# Patient Record
Sex: MALE | Race: WHITE | Employment: OTHER | URBAN - METROPOLITAN AREA
[De-identification: names, ages, dates, MRNs, and addresses within clinical notes are randomized per-mention and may not be internally consistent; named-entity substitution may affect disease eponyms.]

---

## 2019-10-15 ENCOUNTER — HOSPITAL ENCOUNTER (INPATIENT)
Age: 64
LOS: 10 days | Discharge: NURSING, REHAB OR LONG-TERM CARE | DRG: 166 | End: 2019-10-25
Attending: EMERGENCY MEDICINE | Admitting: INTERNAL MEDICINE
Payer: MEDICAID

## 2019-10-15 DIAGNOSIS — I48.92 ATRIAL FLUTTER, UNSPECIFIED TYPE (HCC): Primary | ICD-10-CM

## 2019-10-15 LAB
ABSOLUTE EOS #: 0.25 K/UL (ref 0–0.44)
ABSOLUTE IMMATURE GRANULOCYTE: 0.07 K/UL (ref 0–0.3)
ABSOLUTE LYMPH #: 1.22 K/UL (ref 1.1–3.7)
ABSOLUTE MONO #: 1.1 K/UL (ref 0.1–1.2)
ANION GAP SERPL CALCULATED.3IONS-SCNC: 13 MMOL/L (ref 9–17)
BASOPHILS # BLD: 1 % (ref 0–2)
BASOPHILS ABSOLUTE: 0.06 K/UL (ref 0–0.2)
BUN BLDV-MCNC: 14 MG/DL (ref 8–23)
BUN/CREAT BLD: ABNORMAL (ref 9–20)
CALCIUM SERPL-MCNC: 9.4 MG/DL (ref 8.6–10.4)
CHLORIDE BLD-SCNC: 104 MMOL/L (ref 98–107)
CO2: 24 MMOL/L (ref 20–31)
CREAT SERPL-MCNC: 0.68 MG/DL (ref 0.7–1.2)
DIFFERENTIAL TYPE: ABNORMAL
EOSINOPHILS RELATIVE PERCENT: 3 % (ref 1–4)
GFR AFRICAN AMERICAN: >60 ML/MIN
GFR NON-AFRICAN AMERICAN: >60 ML/MIN
GFR SERPL CREATININE-BSD FRML MDRD: ABNORMAL ML/MIN/{1.73_M2}
GFR SERPL CREATININE-BSD FRML MDRD: ABNORMAL ML/MIN/{1.73_M2}
GLUCOSE BLD-MCNC: 107 MG/DL (ref 70–99)
HCT VFR BLD CALC: 43.7 % (ref 40.7–50.3)
HEMOGLOBIN: 14.5 G/DL (ref 13–17)
IMMATURE GRANULOCYTES: 1 %
LYMPHOCYTES # BLD: 15 % (ref 24–43)
MAGNESIUM: 2.4 MG/DL (ref 1.6–2.6)
MCH RBC QN AUTO: 31.3 PG (ref 25.2–33.5)
MCHC RBC AUTO-ENTMCNC: 33.2 G/DL (ref 28.4–34.8)
MCV RBC AUTO: 94.2 FL (ref 82.6–102.9)
MONOCYTES # BLD: 13 % (ref 3–12)
NRBC AUTOMATED: 0 PER 100 WBC
PARTIAL THROMBOPLASTIN TIME: 27.1 SEC (ref 20.5–30.5)
PARTIAL THROMBOPLASTIN TIME: 27.6 SEC (ref 20.5–30.5)
PDW BLD-RTO: 13.2 % (ref 11.8–14.4)
PLATELET # BLD: 263 K/UL (ref 138–453)
PLATELET ESTIMATE: ABNORMAL
PMV BLD AUTO: 10.2 FL (ref 8.1–13.5)
POTASSIUM SERPL-SCNC: 3.8 MMOL/L (ref 3.7–5.3)
RBC # BLD: 4.64 M/UL (ref 4.21–5.77)
RBC # BLD: ABNORMAL 10*6/UL
SEG NEUTROPHILS: 67 % (ref 36–65)
SEGMENTED NEUTROPHILS ABSOLUTE COUNT: 5.62 K/UL (ref 1.5–8.1)
SODIUM BLD-SCNC: 141 MMOL/L (ref 135–144)
TROPONIN INTERP: NORMAL
TROPONIN INTERP: NORMAL
TROPONIN T: NORMAL NG/ML
TROPONIN T: NORMAL NG/ML
TROPONIN, HIGH SENSITIVITY: 8 NG/L (ref 0–22)
TROPONIN, HIGH SENSITIVITY: 9 NG/L (ref 0–22)
TSH SERPL DL<=0.05 MIU/L-ACNC: 2.8 MIU/L (ref 0.3–5)
WBC # BLD: 8.3 K/UL (ref 3.5–11.3)
WBC # BLD: ABNORMAL 10*3/UL

## 2019-10-15 PROCEDURE — 96365 THER/PROPH/DIAG IV INF INIT: CPT

## 2019-10-15 PROCEDURE — 96375 TX/PRO/DX INJ NEW DRUG ADDON: CPT

## 2019-10-15 PROCEDURE — 80048 BASIC METABOLIC PNL TOTAL CA: CPT

## 2019-10-15 PROCEDURE — 99285 EMERGENCY DEPT VISIT HI MDM: CPT

## 2019-10-15 PROCEDURE — 6370000000 HC RX 637 (ALT 250 FOR IP): Performed by: STUDENT IN AN ORGANIZED HEALTH CARE EDUCATION/TRAINING PROGRAM

## 2019-10-15 PROCEDURE — 93005 ELECTROCARDIOGRAM TRACING: CPT | Performed by: INTERNAL MEDICINE

## 2019-10-15 PROCEDURE — 83735 ASSAY OF MAGNESIUM: CPT

## 2019-10-15 PROCEDURE — 36415 COLL VENOUS BLD VENIPUNCTURE: CPT

## 2019-10-15 PROCEDURE — 80307 DRUG TEST PRSMV CHEM ANLYZR: CPT

## 2019-10-15 PROCEDURE — 2500000003 HC RX 250 WO HCPCS: Performed by: STUDENT IN AN ORGANIZED HEALTH CARE EDUCATION/TRAINING PROGRAM

## 2019-10-15 PROCEDURE — 2060000000 HC ICU INTERMEDIATE R&B

## 2019-10-15 PROCEDURE — 85730 THROMBOPLASTIN TIME PARTIAL: CPT

## 2019-10-15 PROCEDURE — 84443 ASSAY THYROID STIM HORMONE: CPT

## 2019-10-15 PROCEDURE — 6360000002 HC RX W HCPCS: Performed by: STUDENT IN AN ORGANIZED HEALTH CARE EDUCATION/TRAINING PROGRAM

## 2019-10-15 PROCEDURE — 84484 ASSAY OF TROPONIN QUANT: CPT

## 2019-10-15 PROCEDURE — 2580000003 HC RX 258: Performed by: STUDENT IN AN ORGANIZED HEALTH CARE EDUCATION/TRAINING PROGRAM

## 2019-10-15 PROCEDURE — 96376 TX/PRO/DX INJ SAME DRUG ADON: CPT

## 2019-10-15 PROCEDURE — 85025 COMPLETE CBC W/AUTO DIFF WBC: CPT

## 2019-10-15 PROCEDURE — 93005 ELECTROCARDIOGRAM TRACING: CPT | Performed by: STUDENT IN AN ORGANIZED HEALTH CARE EDUCATION/TRAINING PROGRAM

## 2019-10-15 RX ORDER — ONDANSETRON 2 MG/ML
4 INJECTION INTRAMUSCULAR; INTRAVENOUS EVERY 6 HOURS PRN
Status: DISCONTINUED | OUTPATIENT
Start: 2019-10-15 | End: 2019-10-21

## 2019-10-15 RX ORDER — DILTIAZEM HYDROCHLORIDE 5 MG/ML
20 INJECTION INTRAVENOUS ONCE
Status: COMPLETED | OUTPATIENT
Start: 2019-10-15 | End: 2019-10-15

## 2019-10-15 RX ORDER — METOPROLOL TARTRATE 50 MG/1
25 TABLET, FILM COATED ORAL 2 TIMES DAILY
Status: DISCONTINUED | OUTPATIENT
Start: 2019-10-15 | End: 2019-10-15

## 2019-10-15 RX ORDER — HEPARIN SODIUM 10000 [USP'U]/100ML
15.8 INJECTION, SOLUTION INTRAVENOUS CONTINUOUS
Status: DISCONTINUED | OUTPATIENT
Start: 2019-10-15 | End: 2019-10-15

## 2019-10-15 RX ORDER — ACETAMINOPHEN 325 MG/1
650 TABLET ORAL EVERY 4 HOURS PRN
Status: DISCONTINUED | OUTPATIENT
Start: 2019-10-15 | End: 2019-10-21

## 2019-10-15 RX ORDER — SODIUM CHLORIDE 0.9 % (FLUSH) 0.9 %
10 SYRINGE (ML) INJECTION PRN
Status: DISCONTINUED | OUTPATIENT
Start: 2019-10-15 | End: 2019-10-21

## 2019-10-15 RX ORDER — AMIODARONE HYDROCHLORIDE 50 MG/ML
300 INJECTION, SOLUTION INTRAVENOUS ONCE
Status: COMPLETED | OUTPATIENT
Start: 2019-10-15 | End: 2019-10-15

## 2019-10-15 RX ORDER — HEPARIN SODIUM 1000 [USP'U]/ML
4000 INJECTION, SOLUTION INTRAVENOUS; SUBCUTANEOUS PRN
Status: DISCONTINUED | OUTPATIENT
Start: 2019-10-15 | End: 2019-10-21

## 2019-10-15 RX ORDER — 0.9 % SODIUM CHLORIDE 0.9 %
500 INTRAVENOUS SOLUTION INTRAVENOUS ONCE
Status: COMPLETED | OUTPATIENT
Start: 2019-10-15 | End: 2019-10-15

## 2019-10-15 RX ORDER — HEPARIN SODIUM 1000 [USP'U]/ML
5000 INJECTION, SOLUTION INTRAVENOUS; SUBCUTANEOUS PRN
Status: DISCONTINUED | OUTPATIENT
Start: 2019-10-15 | End: 2019-10-15

## 2019-10-15 RX ORDER — HEPARIN SODIUM 10000 [USP'U]/100ML
1000 INJECTION, SOLUTION INTRAVENOUS CONTINUOUS
Status: DISCONTINUED | OUTPATIENT
Start: 2019-10-15 | End: 2019-10-16

## 2019-10-15 RX ORDER — HEPARIN SODIUM 1000 [USP'U]/ML
2000 INJECTION, SOLUTION INTRAVENOUS; SUBCUTANEOUS PRN
Status: DISCONTINUED | OUTPATIENT
Start: 2019-10-15 | End: 2019-10-21

## 2019-10-15 RX ORDER — DILTIAZEM HYDROCHLORIDE 5 MG/ML
30 INJECTION INTRAVENOUS ONCE
Status: COMPLETED | OUTPATIENT
Start: 2019-10-15 | End: 2019-10-15

## 2019-10-15 RX ORDER — SODIUM CHLORIDE 0.9 % (FLUSH) 0.9 %
10 SYRINGE (ML) INJECTION EVERY 12 HOURS SCHEDULED
Status: DISCONTINUED | OUTPATIENT
Start: 2019-10-15 | End: 2019-10-21

## 2019-10-15 RX ORDER — ASPIRIN 81 MG/1
81 TABLET ORAL DAILY
Status: DISCONTINUED | OUTPATIENT
Start: 2019-10-16 | End: 2019-10-21

## 2019-10-15 RX ORDER — HEPARIN SODIUM 1000 [USP'U]/ML
10000 INJECTION, SOLUTION INTRAVENOUS; SUBCUTANEOUS ONCE
Status: DISCONTINUED | OUTPATIENT
Start: 2019-10-15 | End: 2019-10-15

## 2019-10-15 RX ORDER — HEPARIN SODIUM 1000 [USP'U]/ML
10000 INJECTION, SOLUTION INTRAVENOUS; SUBCUTANEOUS PRN
Status: DISCONTINUED | OUTPATIENT
Start: 2019-10-15 | End: 2019-10-15

## 2019-10-15 RX ORDER — ONDANSETRON 2 MG/ML
4 INJECTION INTRAMUSCULAR; INTRAVENOUS EVERY 6 HOURS PRN
Status: DISCONTINUED | OUTPATIENT
Start: 2019-10-15 | End: 2019-10-15

## 2019-10-15 RX ORDER — SODIUM CHLORIDE 0.9 % (FLUSH) 0.9 %
10 SYRINGE (ML) INJECTION EVERY 12 HOURS SCHEDULED
Status: DISCONTINUED | OUTPATIENT
Start: 2019-10-15 | End: 2019-10-18

## 2019-10-15 RX ADMIN — SODIUM CHLORIDE, PRESERVATIVE FREE 10 ML: 5 INJECTION INTRAVENOUS at 22:37

## 2019-10-15 RX ADMIN — AMIODARONE HYDROCHLORIDE 300 MG: 50 INJECTION, SOLUTION INTRAVENOUS at 18:48

## 2019-10-15 RX ADMIN — HEPARIN SODIUM AND DEXTROSE 7.9 UNITS/KG/HR: 10000; 5 INJECTION INTRAVENOUS at 19:31

## 2019-10-15 RX ADMIN — SODIUM CHLORIDE 500 ML: 9 INJECTION, SOLUTION INTRAVENOUS at 17:05

## 2019-10-15 RX ADMIN — DILTIAZEM HYDROCHLORIDE 10 MG/HR: 5 INJECTION INTRAVENOUS at 16:23

## 2019-10-15 RX ADMIN — DILTIAZEM HYDROCHLORIDE 30 MG: 5 INJECTION INTRAVENOUS at 17:06

## 2019-10-15 RX ADMIN — AMIODARONE HYDROCHLORIDE 1 MG/MIN: 50 INJECTION, SOLUTION INTRAVENOUS at 18:52

## 2019-10-15 RX ADMIN — METOPROLOL TARTRATE 25 MG: 25 TABLET ORAL at 23:30

## 2019-10-15 RX ADMIN — DILTIAZEM HYDROCHLORIDE 20 MG: 5 INJECTION INTRAVENOUS at 16:18

## 2019-10-15 ASSESSMENT — ENCOUNTER SYMPTOMS
BLOOD IN STOOL: 0
VOMITING: 0
CONSTIPATION: 0
ABDOMINAL PAIN: 0
SHORTNESS OF BREATH: 0
NAUSEA: 0
DIARRHEA: 0
COUGH: 0
PHOTOPHOBIA: 0
SORE THROAT: 0

## 2019-10-15 ASSESSMENT — PAIN SCALES - GENERAL: PAINLEVEL_OUTOF10: 0

## 2019-10-16 ENCOUNTER — APPOINTMENT (OUTPATIENT)
Dept: GENERAL RADIOLOGY | Age: 64
DRG: 166 | End: 2019-10-16
Payer: MEDICAID

## 2019-10-16 LAB
ABSOLUTE EOS #: 0.34 K/UL (ref 0–0.44)
ABSOLUTE IMMATURE GRANULOCYTE: 0.09 K/UL (ref 0–0.3)
ABSOLUTE LYMPH #: 1.49 K/UL (ref 1.1–3.7)
ABSOLUTE MONO #: 0.89 K/UL (ref 0.1–1.2)
ANION GAP SERPL CALCULATED.3IONS-SCNC: 10 MMOL/L (ref 9–17)
BASOPHILS # BLD: 1 % (ref 0–2)
BASOPHILS ABSOLUTE: 0.05 K/UL (ref 0–0.2)
BUN BLDV-MCNC: 15 MG/DL (ref 8–23)
BUN/CREAT BLD: ABNORMAL (ref 9–20)
CALCIUM SERPL-MCNC: 8.6 MG/DL (ref 8.6–10.4)
CHLORIDE BLD-SCNC: 105 MMOL/L (ref 98–107)
CO2: 22 MMOL/L (ref 20–31)
CREAT SERPL-MCNC: 0.67 MG/DL (ref 0.7–1.2)
DIFFERENTIAL TYPE: ABNORMAL
EKG ATRIAL RATE: 264 BPM
EKG ATRIAL RATE: 286 BPM
EKG ATRIAL RATE: 70 BPM
EKG P AXIS: 87 DEGREES
EKG Q-T INTERVAL: 268 MS
EKG Q-T INTERVAL: 322 MS
EKG Q-T INTERVAL: 366 MS
EKG QRS DURATION: 78 MS
EKG QRS DURATION: 86 MS
EKG QRS DURATION: 96 MS
EKG QTC CALCULATION (BAZETT): 413 MS
EKG QTC CALCULATION (BAZETT): 477 MS
EKG QTC CALCULATION (BAZETT): 550 MS
EKG R AXIS: 21 DEGREES
EKG R AXIS: 29 DEGREES
EKG R AXIS: 6 DEGREES
EKG T AXIS: 58 DEGREES
EKG T AXIS: 60 DEGREES
EKG T AXIS: 61 DEGREES
EKG VENTRICULAR RATE: 132 BPM
EKG VENTRICULAR RATE: 136 BPM
EKG VENTRICULAR RATE: 143 BPM
EOSINOPHILS RELATIVE PERCENT: 4 % (ref 1–4)
GFR AFRICAN AMERICAN: >60 ML/MIN
GFR NON-AFRICAN AMERICAN: >60 ML/MIN
GFR SERPL CREATININE-BSD FRML MDRD: ABNORMAL ML/MIN/{1.73_M2}
GFR SERPL CREATININE-BSD FRML MDRD: ABNORMAL ML/MIN/{1.73_M2}
GLUCOSE BLD-MCNC: 121 MG/DL (ref 70–99)
HCT VFR BLD CALC: 42 % (ref 40.7–50.3)
HEMOGLOBIN: 13.7 G/DL (ref 13–17)
IMMATURE GRANULOCYTES: 1 %
LYMPHOCYTES # BLD: 19 % (ref 24–43)
MCH RBC QN AUTO: 31.4 PG (ref 25.2–33.5)
MCHC RBC AUTO-ENTMCNC: 32.6 G/DL (ref 28.4–34.8)
MCV RBC AUTO: 96.3 FL (ref 82.6–102.9)
MONOCYTES # BLD: 11 % (ref 3–12)
NRBC AUTOMATED: 0 PER 100 WBC
PARTIAL THROMBOPLASTIN TIME: 32.5 SEC (ref 20.5–30.5)
PARTIAL THROMBOPLASTIN TIME: 39 SEC (ref 20.5–30.5)
PARTIAL THROMBOPLASTIN TIME: 50.8 SEC (ref 20.5–30.5)
PDW BLD-RTO: 13.1 % (ref 11.8–14.4)
PLATELET # BLD: 245 K/UL (ref 138–453)
PLATELET ESTIMATE: ABNORMAL
PMV BLD AUTO: 9.9 FL (ref 8.1–13.5)
POTASSIUM SERPL-SCNC: 3.8 MMOL/L (ref 3.7–5.3)
RBC # BLD: 4.36 M/UL (ref 4.21–5.77)
RBC # BLD: ABNORMAL 10*6/UL
SEG NEUTROPHILS: 64 % (ref 36–65)
SEGMENTED NEUTROPHILS ABSOLUTE COUNT: 5.18 K/UL (ref 1.5–8.1)
SODIUM BLD-SCNC: 137 MMOL/L (ref 135–144)
TROPONIN INTERP: NORMAL
TROPONIN INTERP: NORMAL
TROPONIN T: NORMAL NG/ML
TROPONIN T: NORMAL NG/ML
TROPONIN, HIGH SENSITIVITY: 9 NG/L (ref 0–22)
TROPONIN, HIGH SENSITIVITY: 9 NG/L (ref 0–22)
WBC # BLD: 8 K/UL (ref 3.5–11.3)
WBC # BLD: ABNORMAL 10*3/UL

## 2019-10-16 PROCEDURE — 6370000000 HC RX 637 (ALT 250 FOR IP): Performed by: STUDENT IN AN ORGANIZED HEALTH CARE EDUCATION/TRAINING PROGRAM

## 2019-10-16 PROCEDURE — 93005 ELECTROCARDIOGRAM TRACING: CPT | Performed by: INTERNAL MEDICINE

## 2019-10-16 PROCEDURE — 1200000000 HC SEMI PRIVATE

## 2019-10-16 PROCEDURE — 6360000002 HC RX W HCPCS

## 2019-10-16 PROCEDURE — 80048 BASIC METABOLIC PNL TOTAL CA: CPT

## 2019-10-16 PROCEDURE — 94761 N-INVAS EAR/PLS OXIMETRY MLT: CPT

## 2019-10-16 PROCEDURE — 85025 COMPLETE CBC W/AUTO DIFF WBC: CPT

## 2019-10-16 PROCEDURE — 99223 1ST HOSP IP/OBS HIGH 75: CPT | Performed by: INTERNAL MEDICINE

## 2019-10-16 PROCEDURE — 2709999900 HC NON-CHARGEABLE SUPPLY

## 2019-10-16 PROCEDURE — 84484 ASSAY OF TROPONIN QUANT: CPT

## 2019-10-16 PROCEDURE — 2580000003 HC RX 258: Performed by: STUDENT IN AN ORGANIZED HEALTH CARE EDUCATION/TRAINING PROGRAM

## 2019-10-16 PROCEDURE — 93325 DOPPLER ECHO COLOR FLOW MAPG: CPT

## 2019-10-16 PROCEDURE — 6360000002 HC RX W HCPCS: Performed by: STUDENT IN AN ORGANIZED HEALTH CARE EDUCATION/TRAINING PROGRAM

## 2019-10-16 PROCEDURE — 71045 X-RAY EXAM CHEST 1 VIEW: CPT

## 2019-10-16 PROCEDURE — 93312 ECHO TRANSESOPHAGEAL: CPT

## 2019-10-16 PROCEDURE — B246ZZ4 ULTRASONOGRAPHY OF RIGHT AND LEFT HEART, TRANSESOPHAGEAL: ICD-10-PCS | Performed by: INTERNAL MEDICINE

## 2019-10-16 PROCEDURE — 85730 THROMBOPLASTIN TIME PARTIAL: CPT

## 2019-10-16 PROCEDURE — 36415 COLL VENOUS BLD VENIPUNCTURE: CPT

## 2019-10-16 PROCEDURE — 2500000003 HC RX 250 WO HCPCS: Performed by: STUDENT IN AN ORGANIZED HEALTH CARE EDUCATION/TRAINING PROGRAM

## 2019-10-16 RX ORDER — SIMVASTATIN 10 MG
10 TABLET ORAL NIGHTLY
Status: DISCONTINUED | OUTPATIENT
Start: 2019-10-16 | End: 2019-10-21

## 2019-10-16 RX ORDER — METOPROLOL TARTRATE 50 MG/1
50 TABLET, FILM COATED ORAL 2 TIMES DAILY
Status: DISCONTINUED | OUTPATIENT
Start: 2019-10-16 | End: 2019-10-20

## 2019-10-16 RX ORDER — SODIUM CHLORIDE 0.9 % (FLUSH) 0.9 %
10 SYRINGE (ML) INJECTION PRN
Status: DISCONTINUED | OUTPATIENT
Start: 2019-10-16 | End: 2019-10-21

## 2019-10-16 RX ORDER — SODIUM CHLORIDE 0.9 % (FLUSH) 0.9 %
10 SYRINGE (ML) INJECTION EVERY 12 HOURS SCHEDULED
Status: DISCONTINUED | OUTPATIENT
Start: 2019-10-16 | End: 2019-10-18

## 2019-10-16 RX ORDER — HEPARIN SODIUM 10000 [USP'U]/100ML
8.1 INJECTION, SOLUTION INTRAVENOUS CONTINUOUS
Status: DISCONTINUED | OUTPATIENT
Start: 2019-10-16 | End: 2019-10-21

## 2019-10-16 RX ADMIN — AMIODARONE HYDROCHLORIDE 1 MG/MIN: 50 INJECTION, SOLUTION INTRAVENOUS at 03:11

## 2019-10-16 RX ADMIN — SODIUM CHLORIDE, PRESERVATIVE FREE 10 ML: 5 INJECTION INTRAVENOUS at 21:23

## 2019-10-16 RX ADMIN — AMIODARONE HYDROCHLORIDE 1 MG/MIN: 50 INJECTION, SOLUTION INTRAVENOUS at 14:31

## 2019-10-16 RX ADMIN — SODIUM CHLORIDE, PRESERVATIVE FREE 10 ML: 5 INJECTION INTRAVENOUS at 23:39

## 2019-10-16 RX ADMIN — METOPROLOL TARTRATE 50 MG: 25 TABLET ORAL at 21:23

## 2019-10-16 RX ADMIN — METOPROLOL TARTRATE 50 MG: 25 TABLET ORAL at 08:48

## 2019-10-16 RX ADMIN — HEPARIN SODIUM 2000 UNITS: 1000 INJECTION INTRAVENOUS; SUBCUTANEOUS at 14:01

## 2019-10-16 RX ADMIN — HEPARIN SODIUM 2000 UNITS: 1000 INJECTION INTRAVENOUS; SUBCUTANEOUS at 06:01

## 2019-10-16 RX ADMIN — DILTIAZEM HYDROCHLORIDE 12.5 MG/HR: 5 INJECTION INTRAVENOUS at 04:57

## 2019-10-16 RX ADMIN — SIMVASTATIN 10 MG: 10 TABLET, FILM COATED ORAL at 23:39

## 2019-10-16 RX ADMIN — SODIUM CHLORIDE, PRESERVATIVE FREE 10 ML: 5 INJECTION INTRAVENOUS at 00:32

## 2019-10-16 RX ADMIN — Medication 81 MG: at 08:48

## 2019-10-16 RX ADMIN — DILTIAZEM HYDROCHLORIDE 15 MG/HR: 5 INJECTION INTRAVENOUS at 03:21

## 2019-10-16 RX ADMIN — SODIUM CHLORIDE, PRESERVATIVE FREE 10 ML: 5 INJECTION INTRAVENOUS at 21:24

## 2019-10-16 ASSESSMENT — PAIN DESCRIPTION - ONSET
ONSET: GRADUAL
ONSET: GRADUAL

## 2019-10-16 ASSESSMENT — PAIN DESCRIPTION - DESCRIPTORS
DESCRIPTORS: ACHING;DISCOMFORT
DESCRIPTORS: ACHING;DISCOMFORT
DESCRIPTORS: PRESSURE

## 2019-10-16 ASSESSMENT — PAIN DESCRIPTION - LOCATION
LOCATION: LEG
LOCATION: LEG
LOCATION: CHEST
LOCATION: CHEST

## 2019-10-16 ASSESSMENT — PAIN DESCRIPTION - ORIENTATION
ORIENTATION: LEFT

## 2019-10-16 ASSESSMENT — PAIN SCALES - GENERAL
PAINLEVEL_OUTOF10: 1
PAINLEVEL_OUTOF10: 5
PAINLEVEL_OUTOF10: 0
PAINLEVEL_OUTOF10: 2
PAINLEVEL_OUTOF10: 0
PAINLEVEL_OUTOF10: 2

## 2019-10-16 ASSESSMENT — PAIN DESCRIPTION - FREQUENCY
FREQUENCY: INTERMITTENT
FREQUENCY: INTERMITTENT
FREQUENCY: CONTINUOUS

## 2019-10-16 ASSESSMENT — PAIN DESCRIPTION - PAIN TYPE
TYPE: ACUTE PAIN

## 2019-10-17 LAB
EKG ATRIAL RATE: 245 BPM
EKG ATRIAL RATE: 246 BPM
EKG ATRIAL RATE: 249 BPM
EKG P AXIS: -40 DEGREES
EKG Q-T INTERVAL: 406 MS
EKG Q-T INTERVAL: 442 MS
EKG Q-T INTERVAL: 460 MS
EKG QRS DURATION: 78 MS
EKG QRS DURATION: 78 MS
EKG QRS DURATION: 80 MS
EKG QTC CALCULATION (BAZETT): 474 MS
EKG QTC CALCULATION (BAZETT): 500 MS
EKG QTC CALCULATION (BAZETT): 503 MS
EKG R AXIS: 21 DEGREES
EKG R AXIS: 34 DEGREES
EKG R AXIS: 39 DEGREES
EKG T AXIS: 54 DEGREES
EKG T AXIS: 54 DEGREES
EKG T AXIS: 55 DEGREES
EKG VENTRICULAR RATE: 72 BPM
EKG VENTRICULAR RATE: 77 BPM
EKG VENTRICULAR RATE: 82 BPM
PARTIAL THROMBOPLASTIN TIME: 44.2 SEC (ref 20.5–30.5)
PARTIAL THROMBOPLASTIN TIME: 66.6 SEC (ref 20.5–30.5)
PARTIAL THROMBOPLASTIN TIME: 70.9 SEC (ref 20.5–30.5)

## 2019-10-17 PROCEDURE — 97162 PT EVAL MOD COMPLEX 30 MIN: CPT

## 2019-10-17 PROCEDURE — 99232 SBSQ HOSP IP/OBS MODERATE 35: CPT | Performed by: INTERNAL MEDICINE

## 2019-10-17 PROCEDURE — 6360000002 HC RX W HCPCS: Performed by: STUDENT IN AN ORGANIZED HEALTH CARE EDUCATION/TRAINING PROGRAM

## 2019-10-17 PROCEDURE — 6370000000 HC RX 637 (ALT 250 FOR IP): Performed by: STUDENT IN AN ORGANIZED HEALTH CARE EDUCATION/TRAINING PROGRAM

## 2019-10-17 PROCEDURE — 85730 THROMBOPLASTIN TIME PARTIAL: CPT

## 2019-10-17 PROCEDURE — 36415 COLL VENOUS BLD VENIPUNCTURE: CPT

## 2019-10-17 PROCEDURE — G0008 ADMIN INFLUENZA VIRUS VAC: HCPCS | Performed by: STUDENT IN AN ORGANIZED HEALTH CARE EDUCATION/TRAINING PROGRAM

## 2019-10-17 PROCEDURE — 97530 THERAPEUTIC ACTIVITIES: CPT

## 2019-10-17 PROCEDURE — 2580000003 HC RX 258: Performed by: STUDENT IN AN ORGANIZED HEALTH CARE EDUCATION/TRAINING PROGRAM

## 2019-10-17 PROCEDURE — 6360000002 HC RX W HCPCS: Performed by: NURSE PRACTITIONER

## 2019-10-17 PROCEDURE — 90686 IIV4 VACC NO PRSV 0.5 ML IM: CPT | Performed by: STUDENT IN AN ORGANIZED HEALTH CARE EDUCATION/TRAINING PROGRAM

## 2019-10-17 PROCEDURE — 2500000003 HC RX 250 WO HCPCS: Performed by: STUDENT IN AN ORGANIZED HEALTH CARE EDUCATION/TRAINING PROGRAM

## 2019-10-17 PROCEDURE — 1200000000 HC SEMI PRIVATE

## 2019-10-17 RX ORDER — FUROSEMIDE 10 MG/ML
20 INJECTION INTRAMUSCULAR; INTRAVENOUS ONCE
Status: COMPLETED | OUTPATIENT
Start: 2019-10-17 | End: 2019-10-17

## 2019-10-17 RX ORDER — DIGOXIN 0.25 MG/ML
250 INJECTION INTRAMUSCULAR; INTRAVENOUS EVERY 4 HOURS
Status: DISPENSED | OUTPATIENT
Start: 2019-10-17 | End: 2019-10-17

## 2019-10-17 RX ADMIN — DILTIAZEM HYDROCHLORIDE 5 MG/HR: 5 INJECTION INTRAVENOUS at 16:27

## 2019-10-17 RX ADMIN — METOPROLOL TARTRATE 50 MG: 25 TABLET ORAL at 08:15

## 2019-10-17 RX ADMIN — HEPARIN SODIUM 2000 UNITS: 1000 INJECTION INTRAVENOUS; SUBCUTANEOUS at 04:11

## 2019-10-17 RX ADMIN — AMIODARONE HYDROCHLORIDE 1 MG/MIN: 50 INJECTION, SOLUTION INTRAVENOUS at 06:05

## 2019-10-17 RX ADMIN — INFLUENZA A VIRUS A/BRISBANE/02/2018 IVR-190 (H1N1) ANTIGEN (PROPIOLACTONE INACTIVATED), INFLUENZA A VIRUS A/KANSAS/14/2017 X-327 (H3N2) ANTIGEN (PROPIOLACTONE INACTIVATED), INFLUENZA B VIRUS B/MARYLAND/15/2016 ANTIGEN (PROPIOLACTONE INACTIVATED), INFLUENZA B VIRUS B/PHUKET/3073/2013 BVR-1B ANTIGEN (PROPIOLACTONE INACTIVATED) 0.5 ML: 15; 15; 15; 15 INJECTION, SUSPENSION INTRAMUSCULAR at 10:04

## 2019-10-17 RX ADMIN — DILTIAZEM HYDROCHLORIDE 10 MG/HR: 5 INJECTION INTRAVENOUS at 00:41

## 2019-10-17 RX ADMIN — Medication 81 MG: at 08:15

## 2019-10-17 RX ADMIN — HEPARIN SODIUM 14.04 UNITS/KG/HR: 10000 INJECTION, SOLUTION INTRAVENOUS at 10:03

## 2019-10-17 RX ADMIN — METOPROLOL TARTRATE 50 MG: 25 TABLET ORAL at 22:33

## 2019-10-17 RX ADMIN — FUROSEMIDE 20 MG: 10 INJECTION, SOLUTION INTRAMUSCULAR; INTRAVENOUS at 14:27

## 2019-10-17 RX ADMIN — SIMVASTATIN 10 MG: 10 TABLET, FILM COATED ORAL at 22:33

## 2019-10-18 ENCOUNTER — APPOINTMENT (OUTPATIENT)
Dept: CT IMAGING | Age: 64
DRG: 166 | End: 2019-10-18
Payer: MEDICAID

## 2019-10-18 ENCOUNTER — APPOINTMENT (OUTPATIENT)
Dept: CARDIAC CATH/INVASIVE PROCEDURES | Age: 64
DRG: 166 | End: 2019-10-18
Payer: MEDICAID

## 2019-10-18 LAB
ABSOLUTE EOS #: 0.19 K/UL (ref 0–0.44)
ABSOLUTE EOS #: 0.27 K/UL (ref 0–0.44)
ABSOLUTE IMMATURE GRANULOCYTE: 0.16 K/UL (ref 0–0.3)
ABSOLUTE IMMATURE GRANULOCYTE: 0.19 K/UL (ref 0–0.3)
ABSOLUTE LYMPH #: 1.03 K/UL (ref 1.1–3.7)
ABSOLUTE LYMPH #: 1.2 K/UL (ref 1.1–3.7)
ABSOLUTE MONO #: 1.03 K/UL (ref 0.1–1.2)
ABSOLUTE MONO #: 1.1 K/UL (ref 0.1–1.2)
ALBUMIN SERPL-MCNC: 3.6 G/DL (ref 3.5–5.2)
ALBUMIN/GLOBULIN RATIO: 1.2 (ref 1–2.5)
ALP BLD-CCNC: 54 U/L (ref 40–129)
ALT SERPL-CCNC: 28 U/L (ref 5–41)
AMPHETAMINE SCREEN URINE: NEGATIVE
ANION GAP SERPL CALCULATED.3IONS-SCNC: 10 MMOL/L (ref 9–17)
ANION GAP SERPL CALCULATED.3IONS-SCNC: 10 MMOL/L (ref 9–17)
AST SERPL-CCNC: 23 U/L
BARBITURATE SCREEN URINE: NEGATIVE
BASOPHILS # BLD: 1 % (ref 0–2)
BASOPHILS # BLD: 1 % (ref 0–2)
BASOPHILS ABSOLUTE: 0.05 K/UL (ref 0–0.2)
BASOPHILS ABSOLUTE: 0.06 K/UL (ref 0–0.2)
BENZODIAZEPINE SCREEN, URINE: NEGATIVE
BILIRUB SERPL-MCNC: 0.37 MG/DL (ref 0.3–1.2)
BILIRUBIN URINE: NEGATIVE
BUN BLDV-MCNC: 12 MG/DL (ref 8–23)
BUN BLDV-MCNC: 12 MG/DL (ref 8–23)
BUN/CREAT BLD: ABNORMAL (ref 9–20)
BUN/CREAT BLD: ABNORMAL (ref 9–20)
BUPRENORPHINE URINE: NORMAL
CALCIUM SERPL-MCNC: 8.7 MG/DL (ref 8.6–10.4)
CALCIUM SERPL-MCNC: 8.8 MG/DL (ref 8.6–10.4)
CANNABINOID SCREEN URINE: NEGATIVE
CHLORIDE BLD-SCNC: 107 MMOL/L (ref 98–107)
CHLORIDE BLD-SCNC: 109 MMOL/L (ref 98–107)
CO2: 21 MMOL/L (ref 20–31)
CO2: 22 MMOL/L (ref 20–31)
COCAINE METABOLITE, URINE: NEGATIVE
COLOR: YELLOW
COMMENT UA: ABNORMAL
CREAT SERPL-MCNC: 0.48 MG/DL (ref 0.7–1.2)
CREAT SERPL-MCNC: 0.51 MG/DL (ref 0.7–1.2)
CULTURE: NORMAL
DIFFERENTIAL TYPE: ABNORMAL
DIFFERENTIAL TYPE: ABNORMAL
EOSINOPHILS RELATIVE PERCENT: 2 % (ref 1–4)
EOSINOPHILS RELATIVE PERCENT: 3 % (ref 1–4)
GFR AFRICAN AMERICAN: >60 ML/MIN
GFR AFRICAN AMERICAN: >60 ML/MIN
GFR NON-AFRICAN AMERICAN: >60 ML/MIN
GFR NON-AFRICAN AMERICAN: >60 ML/MIN
GFR SERPL CREATININE-BSD FRML MDRD: ABNORMAL ML/MIN/{1.73_M2}
GLUCOSE BLD-MCNC: 95 MG/DL (ref 70–99)
GLUCOSE BLD-MCNC: 97 MG/DL (ref 70–99)
GLUCOSE URINE: NEGATIVE
HCT VFR BLD CALC: 43.3 % (ref 40.7–50.3)
HCT VFR BLD CALC: 43.5 % (ref 40.7–50.3)
HEMOGLOBIN: 13.4 G/DL (ref 13–17)
HEMOGLOBIN: 13.9 G/DL (ref 13–17)
IMMATURE GRANULOCYTES: 2 %
IMMATURE GRANULOCYTES: 2 %
INR BLD: 1
KETONES, URINE: NEGATIVE
LEUKOCYTE ESTERASE, URINE: NEGATIVE
LYMPHOCYTES # BLD: 11 % (ref 24–43)
LYMPHOCYTES # BLD: 12 % (ref 24–43)
Lab: NORMAL
MCH RBC QN AUTO: 31.1 PG (ref 25.2–33.5)
MCH RBC QN AUTO: 31.2 PG (ref 25.2–33.5)
MCHC RBC AUTO-ENTMCNC: 30.9 G/DL (ref 28.4–34.8)
MCHC RBC AUTO-ENTMCNC: 32 G/DL (ref 28.4–34.8)
MCV RBC AUTO: 100.7 FL (ref 82.6–102.9)
MCV RBC AUTO: 97.3 FL (ref 82.6–102.9)
MDMA URINE: NORMAL
METHADONE SCREEN, URINE: NEGATIVE
METHAMPHETAMINE, URINE: NORMAL
MONOCYTES # BLD: 11 % (ref 3–12)
MONOCYTES # BLD: 11 % (ref 3–12)
NITRITE, URINE: NEGATIVE
NRBC AUTOMATED: 0 PER 100 WBC
NRBC AUTOMATED: 0 PER 100 WBC
OPIATES, URINE: NEGATIVE
OXYCODONE SCREEN URINE: NEGATIVE
PARTIAL THROMBOPLASTIN TIME: 35.1 SEC (ref 20.5–30.5)
PARTIAL THROMBOPLASTIN TIME: 59.8 SEC (ref 20.5–30.5)
PDW BLD-RTO: 13.4 % (ref 11.8–14.4)
PDW BLD-RTO: 13.5 % (ref 11.8–14.4)
PH UA: 7.5 (ref 5–8)
PHENCYCLIDINE, URINE: NEGATIVE
PLATELET # BLD: 212 K/UL (ref 138–453)
PLATELET # BLD: 220 K/UL (ref 138–453)
PLATELET ESTIMATE: ABNORMAL
PLATELET ESTIMATE: ABNORMAL
PMV BLD AUTO: 10 FL (ref 8.1–13.5)
PMV BLD AUTO: 9.9 FL (ref 8.1–13.5)
POTASSIUM SERPL-SCNC: 4 MMOL/L (ref 3.7–5.3)
POTASSIUM SERPL-SCNC: 4.1 MMOL/L (ref 3.7–5.3)
PROPOXYPHENE, URINE: NORMAL
PROTEIN UA: NEGATIVE
PROTHROMBIN TIME: 10.7 SEC (ref 9–12)
RBC # BLD: 4.3 M/UL (ref 4.21–5.77)
RBC # BLD: 4.47 M/UL (ref 4.21–5.77)
RBC # BLD: ABNORMAL 10*6/UL
RBC # BLD: ABNORMAL 10*6/UL
SEG NEUTROPHILS: 71 % (ref 36–65)
SEG NEUTROPHILS: 73 % (ref 36–65)
SEGMENTED NEUTROPHILS ABSOLUTE COUNT: 6.97 K/UL (ref 1.5–8.1)
SEGMENTED NEUTROPHILS ABSOLUTE COUNT: 7.5 K/UL (ref 1.5–8.1)
SODIUM BLD-SCNC: 138 MMOL/L (ref 135–144)
SODIUM BLD-SCNC: 141 MMOL/L (ref 135–144)
SPECIFIC GRAVITY UA: 1.04 (ref 1–1.03)
SPECIMEN DESCRIPTION: NORMAL
TEST INFORMATION: NORMAL
TOTAL PROTEIN: 6.7 G/DL (ref 6.4–8.3)
TRICYCLIC ANTIDEPRESSANTS, UR: NORMAL
TURBIDITY: CLEAR
URINE HGB: NEGATIVE
UROBILINOGEN, URINE: NORMAL
WBC # BLD: 10.3 K/UL (ref 3.5–11.3)
WBC # BLD: 9.4 K/UL (ref 3.5–11.3)
WBC # BLD: ABNORMAL 10*3/UL
WBC # BLD: ABNORMAL 10*3/UL

## 2019-10-18 PROCEDURE — 6360000004 HC RX CONTRAST MEDICATION

## 2019-10-18 PROCEDURE — 6360000002 HC RX W HCPCS

## 2019-10-18 PROCEDURE — 6360000002 HC RX W HCPCS: Performed by: STUDENT IN AN ORGANIZED HEALTH CARE EDUCATION/TRAINING PROGRAM

## 2019-10-18 PROCEDURE — C1769 GUIDE WIRE: HCPCS

## 2019-10-18 PROCEDURE — 2709999900 HC NON-CHARGEABLE SUPPLY

## 2019-10-18 PROCEDURE — 93454 CORONARY ARTERY ANGIO S&I: CPT | Performed by: INTERNAL MEDICINE

## 2019-10-18 PROCEDURE — 85730 THROMBOPLASTIN TIME PARTIAL: CPT

## 2019-10-18 PROCEDURE — 80048 BASIC METABOLIC PNL TOTAL CA: CPT

## 2019-10-18 PROCEDURE — 6370000000 HC RX 637 (ALT 250 FOR IP): Performed by: STUDENT IN AN ORGANIZED HEALTH CARE EDUCATION/TRAINING PROGRAM

## 2019-10-18 PROCEDURE — 80053 COMPREHEN METABOLIC PANEL: CPT

## 2019-10-18 PROCEDURE — C1894 INTRO/SHEATH, NON-LASER: HCPCS

## 2019-10-18 PROCEDURE — 71250 CT THORAX DX C-: CPT

## 2019-10-18 PROCEDURE — 85610 PROTHROMBIN TIME: CPT

## 2019-10-18 PROCEDURE — B2111ZZ FLUOROSCOPY OF MULTIPLE CORONARY ARTERIES USING LOW OSMOLAR CONTRAST: ICD-10-PCS | Performed by: INTERNAL MEDICINE

## 2019-10-18 PROCEDURE — 83036 HEMOGLOBIN GLYCOSYLATED A1C: CPT

## 2019-10-18 PROCEDURE — 99233 SBSQ HOSP IP/OBS HIGH 50: CPT | Performed by: INTERNAL MEDICINE

## 2019-10-18 PROCEDURE — 81003 URINALYSIS AUTO W/O SCOPE: CPT

## 2019-10-18 PROCEDURE — C1760 CLOSURE DEV, VASC: HCPCS

## 2019-10-18 PROCEDURE — 2580000003 HC RX 258: Performed by: STUDENT IN AN ORGANIZED HEALTH CARE EDUCATION/TRAINING PROGRAM

## 2019-10-18 PROCEDURE — 36415 COLL VENOUS BLD VENIPUNCTURE: CPT

## 2019-10-18 PROCEDURE — 1200000000 HC SEMI PRIVATE

## 2019-10-18 PROCEDURE — 94761 N-INVAS EAR/PLS OXIMETRY MLT: CPT

## 2019-10-18 PROCEDURE — 87641 MR-STAPH DNA AMP PROBE: CPT

## 2019-10-18 PROCEDURE — 2500000003 HC RX 250 WO HCPCS

## 2019-10-18 PROCEDURE — 99253 IP/OBS CNSLTJ NEW/EST LOW 45: CPT | Performed by: NURSE PRACTITIONER

## 2019-10-18 PROCEDURE — 85025 COMPLETE CBC W/AUTO DIFF WBC: CPT

## 2019-10-18 RX ORDER — SODIUM CHLORIDE 0.9 % (FLUSH) 0.9 %
10 SYRINGE (ML) INJECTION PRN
Status: DISCONTINUED | OUTPATIENT
Start: 2019-10-18 | End: 2019-10-21

## 2019-10-18 RX ORDER — ACETAMINOPHEN 325 MG/1
650 TABLET ORAL EVERY 4 HOURS PRN
Status: DISCONTINUED | OUTPATIENT
Start: 2019-10-18 | End: 2019-10-21

## 2019-10-18 RX ORDER — SODIUM CHLORIDE 0.9 % (FLUSH) 0.9 %
10 SYRINGE (ML) INJECTION EVERY 12 HOURS SCHEDULED
Status: DISCONTINUED | OUTPATIENT
Start: 2019-10-18 | End: 2019-10-21

## 2019-10-18 RX ADMIN — SODIUM CHLORIDE, PRESERVATIVE FREE 10 ML: 5 INJECTION INTRAVENOUS at 08:39

## 2019-10-18 RX ADMIN — HEPARIN SODIUM 2000 UNITS: 1000 INJECTION INTRAVENOUS; SUBCUTANEOUS at 22:08

## 2019-10-18 RX ADMIN — HEPARIN SODIUM 14.04 UNITS/KG/HR: 10000 INJECTION, SOLUTION INTRAVENOUS at 20:31

## 2019-10-18 RX ADMIN — METOPROLOL TARTRATE 50 MG: 25 TABLET ORAL at 08:38

## 2019-10-18 RX ADMIN — METOPROLOL TARTRATE 50 MG: 25 TABLET ORAL at 21:57

## 2019-10-18 RX ADMIN — Medication 81 MG: at 08:38

## 2019-10-18 RX ADMIN — SIMVASTATIN 10 MG: 10 TABLET, FILM COATED ORAL at 20:29

## 2019-10-18 ASSESSMENT — ENCOUNTER SYMPTOMS
SHORTNESS OF BREATH: 0
ABDOMINAL PAIN: 0
EYE REDNESS: 0
TROUBLE SWALLOWING: 0
NAUSEA: 0
CHEST TIGHTNESS: 0
COUGH: 0
VOMITING: 0
CONSTIPATION: 0
EYE DISCHARGE: 0

## 2019-10-18 ASSESSMENT — PAIN SCALES - GENERAL: PAINLEVEL_OUTOF10: 0

## 2019-10-19 LAB
ABSOLUTE EOS #: 0.21 K/UL (ref 0–0.44)
ABSOLUTE IMMATURE GRANULOCYTE: 0.21 K/UL (ref 0–0.3)
ABSOLUTE LYMPH #: 1.3 K/UL (ref 1.1–3.7)
ABSOLUTE MONO #: 1.09 K/UL (ref 0.1–1.2)
ALLEN TEST: ABNORMAL
ALLEN TEST: POSITIVE
ANION GAP SERPL CALCULATED.3IONS-SCNC: 10 MMOL/L (ref 9–17)
BASOPHILS # BLD: 1 % (ref 0–2)
BASOPHILS ABSOLUTE: 0.07 K/UL (ref 0–0.2)
BUN BLDV-MCNC: 11 MG/DL (ref 8–23)
BUN/CREAT BLD: ABNORMAL (ref 9–20)
CALCIUM SERPL-MCNC: 8.9 MG/DL (ref 8.6–10.4)
CHLORIDE BLD-SCNC: 105 MMOL/L (ref 98–107)
CO2: 23 MMOL/L (ref 20–31)
CREAT SERPL-MCNC: 0.61 MG/DL (ref 0.7–1.2)
DIFFERENTIAL TYPE: ABNORMAL
EOSINOPHILS RELATIVE PERCENT: 2 % (ref 1–4)
FIO2: 21
FIO2: ABNORMAL
GFR AFRICAN AMERICAN: >60 ML/MIN
GFR NON-AFRICAN AMERICAN: >60 ML/MIN
GFR SERPL CREATININE-BSD FRML MDRD: ABNORMAL ML/MIN/{1.73_M2}
GFR SERPL CREATININE-BSD FRML MDRD: ABNORMAL ML/MIN/{1.73_M2}
GLUCOSE BLD-MCNC: 114 MG/DL (ref 70–99)
HCT VFR BLD CALC: 42 % (ref 40.7–50.3)
HEMOGLOBIN: 13.7 G/DL (ref 13–17)
IMMATURE GRANULOCYTES: 2 %
LYMPHOCYTES # BLD: 13 % (ref 24–43)
MCH RBC QN AUTO: 31.1 PG (ref 25.2–33.5)
MCHC RBC AUTO-ENTMCNC: 32.6 G/DL (ref 28.4–34.8)
MCV RBC AUTO: 95.2 FL (ref 82.6–102.9)
MODE: ABNORMAL
MODE: ABNORMAL
MONOCYTES # BLD: 11 % (ref 3–12)
MRSA, DNA, NASAL: NORMAL
NEGATIVE BASE EXCESS, ART: 1 (ref 0–2)
NEGATIVE BASE EXCESS, ART: ABNORMAL (ref 0–2)
NRBC AUTOMATED: 0 PER 100 WBC
O2 DEVICE/FLOW/%: ABNORMAL
O2 DEVICE/FLOW/%: ABNORMAL
PARTIAL THROMBOPLASTIN TIME: 26.2 SEC (ref 20.5–30.5)
PARTIAL THROMBOPLASTIN TIME: 47.1 SEC (ref 20.5–30.5)
PARTIAL THROMBOPLASTIN TIME: 60.2 SEC (ref 20.5–30.5)
PATIENT TEMP: ABNORMAL
PATIENT TEMP: ABNORMAL
PDW BLD-RTO: 13.2 % (ref 11.8–14.4)
PLATELET # BLD: 234 K/UL (ref 138–453)
PLATELET ESTIMATE: ABNORMAL
PMV BLD AUTO: 10.2 FL (ref 8.1–13.5)
POC HCO3: 23.6 MMOL/L (ref 21–28)
POC HCO3: 24.4 MMOL/L (ref 21–28)
POC LACTIC ACID: 1.08 MMOL/L (ref 0.56–1.39)
POC O2 SATURATION: 86 % (ref 94–98)
POC O2 SATURATION: 92 % (ref 94–98)
POC PCO2 TEMP: ABNORMAL MM HG
POC PCO2 TEMP: ABNORMAL MM HG
POC PCO2: 37 MM HG (ref 35–48)
POC PCO2: 37.7 MM HG (ref 35–48)
POC PH TEMP: ABNORMAL
POC PH TEMP: ABNORMAL
POC PH: 7.41 (ref 7.35–7.45)
POC PH: 7.42 (ref 7.35–7.45)
POC PO2 TEMP: ABNORMAL MM HG
POC PO2 TEMP: ABNORMAL MM HG
POC PO2: 51.1 MM HG (ref 83–108)
POC PO2: 61.9 MM HG (ref 83–108)
POSITIVE BASE EXCESS, ART: 0 (ref 0–3)
POSITIVE BASE EXCESS, ART: ABNORMAL (ref 0–3)
POTASSIUM SERPL-SCNC: 4.5 MMOL/L (ref 3.7–5.3)
RBC # BLD: 4.41 M/UL (ref 4.21–5.77)
RBC # BLD: ABNORMAL 10*6/UL
SAMPLE SITE: ABNORMAL
SAMPLE SITE: ABNORMAL
SEG NEUTROPHILS: 71 % (ref 36–65)
SEGMENTED NEUTROPHILS ABSOLUTE COUNT: 7.16 K/UL (ref 1.5–8.1)
SODIUM BLD-SCNC: 138 MMOL/L (ref 135–144)
SPECIMEN DESCRIPTION: NORMAL
TCO2 (CALC), ART: 25 MMOL/L (ref 22–29)
TCO2 (CALC), ART: 26 MMOL/L (ref 22–29)
WBC # BLD: 10 K/UL (ref 3.5–11.3)
WBC # BLD: ABNORMAL 10*3/UL

## 2019-10-19 PROCEDURE — 6360000002 HC RX W HCPCS: Performed by: STUDENT IN AN ORGANIZED HEALTH CARE EDUCATION/TRAINING PROGRAM

## 2019-10-19 PROCEDURE — 2580000003 HC RX 258: Performed by: STUDENT IN AN ORGANIZED HEALTH CARE EDUCATION/TRAINING PROGRAM

## 2019-10-19 PROCEDURE — 80048 BASIC METABOLIC PNL TOTAL CA: CPT

## 2019-10-19 PROCEDURE — 6370000000 HC RX 637 (ALT 250 FOR IP): Performed by: STUDENT IN AN ORGANIZED HEALTH CARE EDUCATION/TRAINING PROGRAM

## 2019-10-19 PROCEDURE — 94761 N-INVAS EAR/PLS OXIMETRY MLT: CPT

## 2019-10-19 PROCEDURE — 83605 ASSAY OF LACTIC ACID: CPT

## 2019-10-19 PROCEDURE — 36600 WITHDRAWAL OF ARTERIAL BLOOD: CPT

## 2019-10-19 PROCEDURE — 2500000003 HC RX 250 WO HCPCS: Performed by: STUDENT IN AN ORGANIZED HEALTH CARE EDUCATION/TRAINING PROGRAM

## 2019-10-19 PROCEDURE — 36415 COLL VENOUS BLD VENIPUNCTURE: CPT

## 2019-10-19 PROCEDURE — 82803 BLOOD GASES ANY COMBINATION: CPT

## 2019-10-19 PROCEDURE — 85730 THROMBOPLASTIN TIME PARTIAL: CPT

## 2019-10-19 PROCEDURE — 93880 EXTRACRANIAL BILAT STUDY: CPT

## 2019-10-19 PROCEDURE — 85025 COMPLETE CBC W/AUTO DIFF WBC: CPT

## 2019-10-19 PROCEDURE — 99233 SBSQ HOSP IP/OBS HIGH 50: CPT | Performed by: INTERNAL MEDICINE

## 2019-10-19 PROCEDURE — 93005 ELECTROCARDIOGRAM TRACING: CPT | Performed by: STUDENT IN AN ORGANIZED HEALTH CARE EDUCATION/TRAINING PROGRAM

## 2019-10-19 PROCEDURE — 2060000000 HC ICU INTERMEDIATE R&B

## 2019-10-19 PROCEDURE — 93970 EXTREMITY STUDY: CPT

## 2019-10-19 RX ADMIN — METOPROLOL TARTRATE 50 MG: 25 TABLET ORAL at 08:24

## 2019-10-19 RX ADMIN — AMIODARONE HYDROCHLORIDE 1 MG/MIN: 50 INJECTION, SOLUTION INTRAVENOUS at 14:33

## 2019-10-19 RX ADMIN — HEPARIN SODIUM 4000 UNITS: 1000 INJECTION INTRAVENOUS; SUBCUTANEOUS at 21:27

## 2019-10-19 RX ADMIN — SIMVASTATIN 10 MG: 10 TABLET, FILM COATED ORAL at 19:43

## 2019-10-19 RX ADMIN — DILTIAZEM HYDROCHLORIDE 5 MG/HR: 5 INJECTION INTRAVENOUS at 01:59

## 2019-10-19 RX ADMIN — METOPROLOL TARTRATE 50 MG: 25 TABLET ORAL at 19:43

## 2019-10-19 RX ADMIN — AMIODARONE HYDROCHLORIDE 1 MG/MIN: 50 INJECTION, SOLUTION INTRAVENOUS at 19:36

## 2019-10-19 RX ADMIN — HEPARIN SODIUM 2000 UNITS: 1000 INJECTION INTRAVENOUS; SUBCUTANEOUS at 13:03

## 2019-10-19 RX ADMIN — HEPARIN SODIUM 16.06 UNITS/KG/HR: 10000 INJECTION, SOLUTION INTRAVENOUS at 12:29

## 2019-10-19 RX ADMIN — Medication 81 MG: at 08:24

## 2019-10-19 ASSESSMENT — PAIN SCALES - GENERAL: PAINLEVEL_OUTOF10: 0

## 2019-10-20 LAB
ABSOLUTE EOS #: 0.3 K/UL (ref 0–0.44)
ABSOLUTE IMMATURE GRANULOCYTE: 0.28 K/UL (ref 0–0.3)
ABSOLUTE LYMPH #: 1.32 K/UL (ref 1.1–3.7)
ABSOLUTE MONO #: 1.33 K/UL (ref 0.1–1.2)
ANION GAP SERPL CALCULATED.3IONS-SCNC: 11 MMOL/L (ref 9–17)
BASOPHILS # BLD: 1 % (ref 0–2)
BASOPHILS ABSOLUTE: 0.08 K/UL (ref 0–0.2)
BLOOD BANK SPECIMEN: NORMAL
BUN BLDV-MCNC: 11 MG/DL (ref 8–23)
BUN/CREAT BLD: ABNORMAL (ref 9–20)
CALCIUM SERPL-MCNC: 8.6 MG/DL (ref 8.6–10.4)
CHLORIDE BLD-SCNC: 105 MMOL/L (ref 98–107)
CO2: 20 MMOL/L (ref 20–31)
CREAT SERPL-MCNC: 0.6 MG/DL (ref 0.7–1.2)
DIFFERENTIAL TYPE: ABNORMAL
EOSINOPHILS RELATIVE PERCENT: 2 % (ref 1–4)
ESTIMATED AVERAGE GLUCOSE: 120 MG/DL
GFR AFRICAN AMERICAN: >60 ML/MIN
GFR NON-AFRICAN AMERICAN: >60 ML/MIN
GFR SERPL CREATININE-BSD FRML MDRD: ABNORMAL ML/MIN/{1.73_M2}
GFR SERPL CREATININE-BSD FRML MDRD: ABNORMAL ML/MIN/{1.73_M2}
GLUCOSE BLD-MCNC: 113 MG/DL (ref 70–99)
GLUCOSE BLD-MCNC: 129 MG/DL (ref 75–110)
HBA1C MFR BLD: 5.8 % (ref 4–6)
HCT VFR BLD CALC: 43.4 % (ref 40.7–50.3)
HEMOGLOBIN: 13.8 G/DL (ref 13–17)
IMMATURE GRANULOCYTES: 2 %
LYMPHOCYTES # BLD: 10 % (ref 24–43)
MCH RBC QN AUTO: 31.2 PG (ref 25.2–33.5)
MCHC RBC AUTO-ENTMCNC: 31.8 G/DL (ref 28.4–34.8)
MCV RBC AUTO: 98 FL (ref 82.6–102.9)
MONOCYTES # BLD: 10 % (ref 3–12)
NRBC AUTOMATED: 0 PER 100 WBC
PARTIAL THROMBOPLASTIN TIME: 49.2 SEC (ref 20.5–30.5)
PARTIAL THROMBOPLASTIN TIME: 72.6 SEC (ref 20.5–30.5)
PARTIAL THROMBOPLASTIN TIME: 90.1 SEC (ref 20.5–30.5)
PDW BLD-RTO: 13.3 % (ref 11.8–14.4)
PLATELET # BLD: 228 K/UL (ref 138–453)
PLATELET ESTIMATE: ABNORMAL
PMV BLD AUTO: 10.6 FL (ref 8.1–13.5)
POTASSIUM SERPL-SCNC: 4.5 MMOL/L (ref 3.7–5.3)
RBC # BLD: 4.43 M/UL (ref 4.21–5.77)
RBC # BLD: ABNORMAL 10*6/UL
SEG NEUTROPHILS: 75 % (ref 36–65)
SEGMENTED NEUTROPHILS ABSOLUTE COUNT: 9.66 K/UL (ref 1.5–8.1)
SODIUM BLD-SCNC: 136 MMOL/L (ref 135–144)
WBC # BLD: 13 K/UL (ref 3.5–11.3)
WBC # BLD: ABNORMAL 10*3/UL

## 2019-10-20 PROCEDURE — 86900 BLOOD TYPING SEROLOGIC ABO: CPT

## 2019-10-20 PROCEDURE — 82947 ASSAY GLUCOSE BLOOD QUANT: CPT

## 2019-10-20 PROCEDURE — 2580000003 HC RX 258: Performed by: STUDENT IN AN ORGANIZED HEALTH CARE EDUCATION/TRAINING PROGRAM

## 2019-10-20 PROCEDURE — 86850 RBC ANTIBODY SCREEN: CPT

## 2019-10-20 PROCEDURE — 85025 COMPLETE CBC W/AUTO DIFF WBC: CPT

## 2019-10-20 PROCEDURE — 2500000003 HC RX 250 WO HCPCS: Performed by: STUDENT IN AN ORGANIZED HEALTH CARE EDUCATION/TRAINING PROGRAM

## 2019-10-20 PROCEDURE — 80048 BASIC METABOLIC PNL TOTAL CA: CPT

## 2019-10-20 PROCEDURE — 85730 THROMBOPLASTIN TIME PARTIAL: CPT

## 2019-10-20 PROCEDURE — 6370000000 HC RX 637 (ALT 250 FOR IP): Performed by: STUDENT IN AN ORGANIZED HEALTH CARE EDUCATION/TRAINING PROGRAM

## 2019-10-20 PROCEDURE — 36415 COLL VENOUS BLD VENIPUNCTURE: CPT

## 2019-10-20 PROCEDURE — 99233 SBSQ HOSP IP/OBS HIGH 50: CPT | Performed by: INTERNAL MEDICINE

## 2019-10-20 PROCEDURE — 6360000002 HC RX W HCPCS: Performed by: STUDENT IN AN ORGANIZED HEALTH CARE EDUCATION/TRAINING PROGRAM

## 2019-10-20 PROCEDURE — 86901 BLOOD TYPING SEROLOGIC RH(D): CPT

## 2019-10-20 PROCEDURE — 2060000000 HC ICU INTERMEDIATE R&B

## 2019-10-20 PROCEDURE — 86920 COMPATIBILITY TEST SPIN: CPT

## 2019-10-20 RX ORDER — CHLORHEXIDINE GLUCONATE 0.12 MG/ML
15 RINSE ORAL ONCE
Status: COMPLETED | OUTPATIENT
Start: 2019-10-20 | End: 2019-10-21

## 2019-10-20 RX ORDER — CHLORHEXIDINE GLUCONATE 4 G/100ML
SOLUTION TOPICAL SEE ADMIN INSTRUCTIONS
Status: DISCONTINUED | OUTPATIENT
Start: 2019-10-20 | End: 2019-10-21 | Stop reason: HOSPADM

## 2019-10-20 RX ORDER — VERAPAMIL HYDROCHLORIDE 2.5 MG/ML
5 INJECTION, SOLUTION INTRAVENOUS EVERY 6 HOURS PRN
Status: DISCONTINUED | OUTPATIENT
Start: 2019-10-20 | End: 2019-10-21

## 2019-10-20 RX ADMIN — HEPARIN SODIUM 17.11 UNITS/KG/HR: 10000 INJECTION, SOLUTION INTRAVENOUS at 15:11

## 2019-10-20 RX ADMIN — MAGNESIUM HYDROXIDE 30 ML: 400 SUSPENSION ORAL at 09:02

## 2019-10-20 RX ADMIN — AMIODARONE HYDROCHLORIDE 1 MG/MIN: 50 INJECTION, SOLUTION INTRAVENOUS at 05:27

## 2019-10-20 RX ADMIN — HEPARIN SODIUM 2000 UNITS: 1000 INJECTION INTRAVENOUS; SUBCUTANEOUS at 22:34

## 2019-10-20 RX ADMIN — VERAPAMIL HYDROCHLORIDE 5 MG: 2.5 INJECTION, SOLUTION INTRAVENOUS at 15:22

## 2019-10-20 RX ADMIN — Medication 10 ML: at 21:05

## 2019-10-20 RX ADMIN — METOPROLOL TARTRATE 50 MG: 25 TABLET ORAL at 08:27

## 2019-10-20 RX ADMIN — METOPROLOL TARTRATE 75 MG: 25 TABLET ORAL at 21:04

## 2019-10-20 RX ADMIN — AMIODARONE HYDROCHLORIDE 1 MG/MIN: 50 INJECTION, SOLUTION INTRAVENOUS at 23:17

## 2019-10-20 RX ADMIN — HEPARIN SODIUM 22.11 UNITS/KG/HR: 10000 INJECTION, SOLUTION INTRAVENOUS at 01:57

## 2019-10-20 RX ADMIN — Medication 81 MG: at 08:27

## 2019-10-20 RX ADMIN — AMIODARONE HYDROCHLORIDE 1 MG/MIN: 50 INJECTION, SOLUTION INTRAVENOUS at 15:12

## 2019-10-20 RX ADMIN — SIMVASTATIN 10 MG: 10 TABLET, FILM COATED ORAL at 23:03

## 2019-10-20 ASSESSMENT — PAIN SCALES - GENERAL
PAINLEVEL_OUTOF10: 0

## 2019-10-21 ENCOUNTER — APPOINTMENT (OUTPATIENT)
Dept: GENERAL RADIOLOGY | Age: 64
DRG: 166 | End: 2019-10-21
Payer: MEDICAID

## 2019-10-21 ENCOUNTER — ANESTHESIA EVENT (OUTPATIENT)
Dept: OPERATING ROOM | Age: 64
DRG: 166 | End: 2019-10-21
Payer: MEDICAID

## 2019-10-21 ENCOUNTER — ANESTHESIA (OUTPATIENT)
Dept: OPERATING ROOM | Age: 64
DRG: 166 | End: 2019-10-21
Payer: MEDICAID

## 2019-10-21 VITALS — TEMPERATURE: 97.9 F | OXYGEN SATURATION: 100 % | DIASTOLIC BLOOD PRESSURE: 93 MMHG | SYSTOLIC BLOOD PRESSURE: 126 MMHG

## 2019-10-21 LAB
ABSOLUTE EOS #: 0.34 K/UL (ref 0–0.44)
ABSOLUTE IMMATURE GRANULOCYTE: 0.35 K/UL (ref 0–0.3)
ABSOLUTE LYMPH #: 1.36 K/UL (ref 1.1–3.7)
ABSOLUTE MONO #: 1.25 K/UL (ref 0.1–1.2)
ALLEN TEST: ABNORMAL
ALLEN TEST: NORMAL
ANION GAP SERPL CALCULATED.3IONS-SCNC: 12 MMOL/L (ref 9–17)
ANION GAP SERPL CALCULATED.3IONS-SCNC: 15 MMOL/L (ref 9–17)
ANION GAP: 14 MMOL/L (ref 7–16)
BASOPHILS # BLD: 1 % (ref 0–2)
BASOPHILS ABSOLUTE: 0.07 K/UL (ref 0–0.2)
BUN BLDV-MCNC: 11 MG/DL (ref 8–23)
BUN BLDV-MCNC: 12 MG/DL (ref 8–23)
BUN/CREAT BLD: ABNORMAL (ref 9–20)
BUN/CREAT BLD: ABNORMAL (ref 9–20)
CALCIUM IONIZED: 1.02 MMOL/L (ref 1.13–1.33)
CALCIUM IONIZED: 1.15 MMOL/L (ref 1.13–1.33)
CALCIUM SERPL-MCNC: 7.8 MG/DL (ref 8.6–10.4)
CALCIUM SERPL-MCNC: 8.9 MG/DL (ref 8.6–10.4)
CHLORIDE BLD-SCNC: 103 MMOL/L (ref 98–107)
CHLORIDE BLD-SCNC: 107 MMOL/L (ref 98–107)
CO2: 20 MMOL/L (ref 20–31)
CO2: 21 MMOL/L (ref 20–31)
CREAT SERPL-MCNC: 0.58 MG/DL (ref 0.7–1.2)
CREAT SERPL-MCNC: 0.74 MG/DL (ref 0.7–1.2)
DIFFERENTIAL TYPE: ABNORMAL
EOSINOPHILS RELATIVE PERCENT: 3 % (ref 1–4)
FIO2: 40
FIO2: 60
FIO2: ABNORMAL
FIO2: NORMAL
GFR AFRICAN AMERICAN: >60 ML/MIN
GFR AFRICAN AMERICAN: >60 ML/MIN
GFR NON-AFRICAN AMERICAN: >60 ML/MIN
GFR SERPL CREATININE-BSD FRML MDRD: >60 ML/MIN
GFR SERPL CREATININE-BSD FRML MDRD: ABNORMAL ML/MIN/{1.73_M2}
GFR SERPL CREATININE-BSD FRML MDRD: NORMAL ML/MIN/{1.73_M2}
GLUCOSE BLD-MCNC: 104 MG/DL (ref 75–110)
GLUCOSE BLD-MCNC: 107 MG/DL (ref 70–99)
GLUCOSE BLD-MCNC: 108 MG/DL (ref 74–100)
GLUCOSE BLD-MCNC: 118 MG/DL (ref 75–110)
GLUCOSE BLD-MCNC: 121 MG/DL (ref 75–110)
GLUCOSE BLD-MCNC: 122 MG/DL (ref 74–100)
GLUCOSE BLD-MCNC: 123 MG/DL (ref 74–100)
GLUCOSE BLD-MCNC: 127 MG/DL (ref 74–100)
GLUCOSE BLD-MCNC: 129 MG/DL (ref 75–110)
GLUCOSE BLD-MCNC: 138 MG/DL (ref 75–110)
GLUCOSE BLD-MCNC: 153 MG/DL (ref 74–100)
GLUCOSE BLD-MCNC: 185 MG/DL (ref 70–99)
GLUCOSE BLD-MCNC: 187 MG/DL (ref 74–100)
GLUCOSE BLD-MCNC: 188 MG/DL (ref 74–100)
GLUCOSE BLD-MCNC: 192 MG/DL (ref 74–100)
GLUCOSE BLD-MCNC: 197 MG/DL (ref 74–100)
GLUCOSE BLD-MCNC: 234 MG/DL (ref 74–100)
GLUCOSE BLD-MCNC: 290 MG/DL (ref 74–100)
HCT VFR BLD CALC: 44 % (ref 40.7–50.3)
HCT VFR BLD CALC: 44.6 % (ref 40.7–50.3)
HEMOGLOBIN: 13.8 G/DL (ref 13–17)
HEMOGLOBIN: 14.7 G/DL (ref 13–17)
IMMATURE GRANULOCYTES: 3 %
INR BLD: 1
LYMPHOCYTES # BLD: 11 % (ref 24–43)
MAGNESIUM: 2.8 MG/DL (ref 1.6–2.6)
MCH RBC QN AUTO: 31 PG (ref 25.2–33.5)
MCH RBC QN AUTO: 31.2 PG (ref 25.2–33.5)
MCHC RBC AUTO-ENTMCNC: 31.4 G/DL (ref 28.4–34.8)
MCHC RBC AUTO-ENTMCNC: 33 G/DL (ref 28.4–34.8)
MCV RBC AUTO: 94.7 FL (ref 82.6–102.9)
MCV RBC AUTO: 98.9 FL (ref 82.6–102.9)
MODE: ABNORMAL
MODE: NORMAL
MONOCYTES # BLD: 10 % (ref 3–12)
NEGATIVE BASE EXCESS, ART: 1 (ref 0–2)
NEGATIVE BASE EXCESS, ART: 1 (ref 0–2)
NEGATIVE BASE EXCESS, ART: 2 (ref 0–2)
NEGATIVE BASE EXCESS, ART: 3 (ref 0–2)
NEGATIVE BASE EXCESS, ART: 3 (ref 0–2)
NEGATIVE BASE EXCESS, ART: 4 (ref 0–2)
NEGATIVE BASE EXCESS, ART: 4 (ref 0–2)
NEGATIVE BASE EXCESS, ART: ABNORMAL (ref 0–2)
NEGATIVE BASE EXCESS, ART: ABNORMAL (ref 0–2)
NRBC AUTOMATED: 0 PER 100 WBC
NRBC AUTOMATED: 0 PER 100 WBC
O2 DEVICE/FLOW/%: ABNORMAL
O2 DEVICE/FLOW/%: NORMAL
PARTIAL THROMBOPLASTIN TIME: 25.6 SEC (ref 20.5–30.5)
PARTIAL THROMBOPLASTIN TIME: 85.2 SEC (ref 20.5–30.5)
PATIENT TEMP: ABNORMAL
PATIENT TEMP: NORMAL
PDW BLD-RTO: 13.2 % (ref 11.8–14.4)
PDW BLD-RTO: 13.5 % (ref 11.8–14.4)
PLATELET # BLD: 196 K/UL (ref 138–453)
PLATELET # BLD: 238 K/UL (ref 138–453)
PLATELET ESTIMATE: ABNORMAL
PMV BLD AUTO: 10.2 FL (ref 8.1–13.5)
PMV BLD AUTO: 10.4 FL (ref 8.1–13.5)
POC CHLORIDE: 105 MMOL/L (ref 98–107)
POC CREATININE: 0.7 MG/DL (ref 0.51–1.19)
POC HCO3: 21 MMOL/L (ref 21–28)
POC HCO3: 21.2 MMOL/L (ref 21–28)
POC HCO3: 21.4 MMOL/L (ref 21–28)
POC HCO3: 22.8 MMOL/L (ref 21–28)
POC HCO3: 23.3 MMOL/L (ref 21–28)
POC HCO3: 23.6 MMOL/L (ref 21–28)
POC HCO3: 23.7 MMOL/L (ref 21–28)
POC HCO3: 23.8 MMOL/L (ref 21–28)
POC HCO3: 24.5 MMOL/L (ref 21–28)
POC HCO3: 26 MMOL/L (ref 21–28)
POC HCO3: 26.6 MMOL/L (ref 21–28)
POC HEMATOCRIT: 27 % (ref 41–53)
POC HEMATOCRIT: 31 % (ref 41–53)
POC HEMATOCRIT: 34 % (ref 41–53)
POC HEMATOCRIT: 35 % (ref 41–53)
POC HEMATOCRIT: 35 % (ref 41–53)
POC HEMATOCRIT: 40 % (ref 41–53)
POC HEMATOCRIT: 41 % (ref 41–53)
POC HEMATOCRIT: 43 % (ref 41–53)
POC HEMATOCRIT: 44 % (ref 41–53)
POC HEMOGLOBIN: 10.5 G/DL (ref 13.5–17.5)
POC HEMOGLOBIN: 11.5 G/DL (ref 13.5–17.5)
POC HEMOGLOBIN: 11.8 G/DL (ref 13.5–17.5)
POC HEMOGLOBIN: 12 G/DL (ref 13.5–17.5)
POC HEMOGLOBIN: 13.8 G/DL (ref 13.5–17.5)
POC HEMOGLOBIN: 13.9 G/DL (ref 13.5–17.5)
POC HEMOGLOBIN: 14.6 G/DL (ref 13.5–17.5)
POC HEMOGLOBIN: 15 G/DL (ref 13.5–17.5)
POC HEMOGLOBIN: 9.3 G/DL (ref 13.5–17.5)
POC IONIZED CALCIUM: 0.98 MMOL/L (ref 1.15–1.33)
POC IONIZED CALCIUM: 1.02 MMOL/L (ref 1.15–1.33)
POC IONIZED CALCIUM: 1.05 MMOL/L (ref 1.15–1.33)
POC IONIZED CALCIUM: 1.08 MMOL/L (ref 1.15–1.33)
POC IONIZED CALCIUM: 1.09 MMOL/L (ref 1.15–1.33)
POC IONIZED CALCIUM: 1.12 MMOL/L (ref 1.15–1.33)
POC IONIZED CALCIUM: 1.17 MMOL/L (ref 1.15–1.33)
POC IONIZED CALCIUM: 1.18 MMOL/L (ref 1.15–1.33)
POC IONIZED CALCIUM: 1.32 MMOL/L (ref 1.15–1.33)
POC LACTIC ACID: 3.91 MMOL/L (ref 0.56–1.39)
POC O2 SATURATION: 100 % (ref 94–98)
POC O2 SATURATION: 93 % (ref 94–98)
POC O2 SATURATION: 95 % (ref 94–98)
POC O2 SATURATION: 96 % (ref 94–98)
POC O2 SATURATION: 97 % (ref 94–98)
POC PCO2 TEMP: ABNORMAL MM HG
POC PCO2 TEMP: NORMAL MM HG
POC PCO2: 34.8 MM HG (ref 35–48)
POC PCO2: 35.5 MM HG (ref 35–48)
POC PCO2: 36.8 MM HG (ref 35–48)
POC PCO2: 39 MM HG (ref 35–48)
POC PCO2: 40.2 MM HG (ref 35–48)
POC PCO2: 40.6 MM HG (ref 35–48)
POC PCO2: 40.9 MM HG (ref 35–48)
POC PCO2: 41.1 MM HG (ref 35–48)
POC PCO2: 42.9 MM HG (ref 35–48)
POC PCO2: 43 MM HG (ref 35–48)
POC PCO2: 43 MM HG (ref 35–48)
POC PH TEMP: ABNORMAL
POC PH TEMP: NORMAL
POC PH: 7.34 (ref 7.35–7.45)
POC PH: 7.34 (ref 7.35–7.45)
POC PH: 7.35 (ref 7.35–7.45)
POC PH: 7.35 (ref 7.35–7.45)
POC PH: 7.37 (ref 7.35–7.45)
POC PH: 7.37 (ref 7.35–7.45)
POC PH: 7.38 (ref 7.35–7.45)
POC PH: 7.38 (ref 7.35–7.45)
POC PH: 7.39 (ref 7.35–7.45)
POC PH: 7.39 (ref 7.35–7.45)
POC PH: 7.48 (ref 7.35–7.45)
POC PO2 TEMP: ABNORMAL MM HG
POC PO2 TEMP: NORMAL MM HG
POC PO2: 183 MM HG (ref 83–108)
POC PO2: 259.3 MM HG (ref 83–108)
POC PO2: 275.5 MM HG (ref 83–108)
POC PO2: 340.1 MM HG (ref 83–108)
POC PO2: 362.1 MM HG (ref 83–108)
POC PO2: 509 MM HG (ref 83–108)
POC PO2: 510.6 MM HG (ref 83–108)
POC PO2: 70 MM HG (ref 83–108)
POC PO2: 73.5 MM HG (ref 83–108)
POC PO2: 82.2 MM HG (ref 83–108)
POC PO2: 94.5 MM HG (ref 83–108)
POC POTASSIUM: 3.2 MMOL/L (ref 3.5–4.5)
POC POTASSIUM: 3.5 MMOL/L (ref 3.5–4.5)
POC POTASSIUM: 3.6 MMOL/L (ref 3.5–4.5)
POC POTASSIUM: 4.3 MMOL/L (ref 3.5–4.5)
POC POTASSIUM: 4.4 MMOL/L (ref 3.5–4.5)
POC POTASSIUM: 4.6 MMOL/L (ref 3.5–4.5)
POC POTASSIUM: 4.6 MMOL/L (ref 3.5–4.5)
POC POTASSIUM: 5.7 MMOL/L (ref 3.5–4.5)
POC POTASSIUM: 5.9 MMOL/L (ref 3.5–4.5)
POC SODIUM: 135 MMOL/L (ref 138–146)
POC SODIUM: 137 MMOL/L (ref 138–146)
POC SODIUM: 138 MMOL/L (ref 138–146)
POC SODIUM: 139 MMOL/L (ref 138–146)
POC SODIUM: 140 MMOL/L (ref 138–146)
POC SODIUM: 140 MMOL/L (ref 138–146)
POC SODIUM: 142 MMOL/L (ref 138–146)
POSITIVE BASE EXCESS, ART: 1 (ref 0–3)
POSITIVE BASE EXCESS, ART: 3 (ref 0–3)
POSITIVE BASE EXCESS, ART: ABNORMAL (ref 0–3)
POSITIVE BASE EXCESS, ART: NORMAL (ref 0–3)
POTASSIUM SERPL-SCNC: 3.6 MMOL/L (ref 3.7–5.3)
POTASSIUM SERPL-SCNC: 4.2 MMOL/L (ref 3.7–5.3)
POTASSIUM SERPL-SCNC: 4.4 MMOL/L (ref 3.7–5.3)
PROTHROMBIN TIME: 11 SEC (ref 9–12)
RBC # BLD: 4.45 M/UL (ref 4.21–5.77)
RBC # BLD: 4.71 M/UL (ref 4.21–5.77)
RBC # BLD: ABNORMAL 10*6/UL
SAMPLE SITE: ABNORMAL
SAMPLE SITE: NORMAL
SEG NEUTROPHILS: 72 % (ref 36–65)
SEGMENTED NEUTROPHILS ABSOLUTE COUNT: 8.86 K/UL (ref 1.5–8.1)
SODIUM BLD-SCNC: 136 MMOL/L (ref 135–144)
SODIUM BLD-SCNC: 142 MMOL/L (ref 135–144)
TCO2 (CALC), ART: 22 MMOL/L (ref 22–29)
TCO2 (CALC), ART: 22 MMOL/L (ref 22–29)
TCO2 (CALC), ART: 23 MMOL/L (ref 22–29)
TCO2 (CALC), ART: 24 MMOL/L (ref 22–29)
TCO2 (CALC), ART: 25 MMOL/L (ref 22–29)
TCO2 (CALC), ART: 26 MMOL/L (ref 22–29)
TCO2 (CALC), ART: 27 MMOL/L (ref 22–29)
TCO2 (CALC), ART: 28 MMOL/L (ref 22–29)
WBC # BLD: 12.2 K/UL (ref 3.5–11.3)
WBC # BLD: 30.3 K/UL (ref 3.5–11.3)
WBC # BLD: ABNORMAL 10*3/UL

## 2019-10-21 PROCEDURE — 6360000002 HC RX W HCPCS: Performed by: THORACIC SURGERY (CARDIOTHORACIC VASCULAR SURGERY)

## 2019-10-21 PROCEDURE — 85347 COAGULATION TIME ACTIVATED: CPT

## 2019-10-21 PROCEDURE — 2720000010 HC SURG SUPPLY STERILE: Performed by: THORACIC SURGERY (CARDIOTHORACIC VASCULAR SURGERY)

## 2019-10-21 PROCEDURE — 2580000003 HC RX 258: Performed by: NURSE ANESTHETIST, CERTIFIED REGISTERED

## 2019-10-21 PROCEDURE — 84132 ASSAY OF SERUM POTASSIUM: CPT

## 2019-10-21 PROCEDURE — 2060000000 HC ICU INTERMEDIATE R&B

## 2019-10-21 PROCEDURE — 2580000003 HC RX 258: Performed by: NURSE PRACTITIONER

## 2019-10-21 PROCEDURE — 3600000018 HC SURGERY OHS ADDTL 15MIN: Performed by: THORACIC SURGERY (CARDIOTHORACIC VASCULAR SURGERY)

## 2019-10-21 PROCEDURE — 2580000003 HC RX 258: Performed by: THORACIC SURGERY (CARDIOTHORACIC VASCULAR SURGERY)

## 2019-10-21 PROCEDURE — 02100Z9 BYPASS CORONARY ARTERY, ONE ARTERY FROM LEFT INTERNAL MAMMARY, OPEN APPROACH: ICD-10-PCS | Performed by: THORACIC SURGERY (CARDIOTHORACIC VASCULAR SURGERY)

## 2019-10-21 PROCEDURE — 82435 ASSAY OF BLOOD CHLORIDE: CPT

## 2019-10-21 PROCEDURE — 71045 X-RAY EXAM CHEST 1 VIEW: CPT

## 2019-10-21 PROCEDURE — 99233 SBSQ HOSP IP/OBS HIGH 50: CPT | Performed by: INTERNAL MEDICINE

## 2019-10-21 PROCEDURE — 6360000002 HC RX W HCPCS: Performed by: NURSE ANESTHETIST, CERTIFIED REGISTERED

## 2019-10-21 PROCEDURE — 80048 BASIC METABOLIC PNL TOTAL CA: CPT

## 2019-10-21 PROCEDURE — 94761 N-INVAS EAR/PLS OXIMETRY MLT: CPT

## 2019-10-21 PROCEDURE — 94660 CPAP INITIATION&MGMT: CPT

## 2019-10-21 PROCEDURE — 82330 ASSAY OF CALCIUM: CPT

## 2019-10-21 PROCEDURE — 2580000003 HC RX 258: Performed by: PHYSICIAN ASSISTANT

## 2019-10-21 PROCEDURE — 85390 FIBRINOLYSINS SCREEN I&R: CPT

## 2019-10-21 PROCEDURE — 83605 ASSAY OF LACTIC ACID: CPT

## 2019-10-21 PROCEDURE — 84295 ASSAY OF SERUM SODIUM: CPT

## 2019-10-21 PROCEDURE — 85610 PROTHROMBIN TIME: CPT

## 2019-10-21 PROCEDURE — 6360000002 HC RX W HCPCS: Performed by: STUDENT IN AN ORGANIZED HEALTH CARE EDUCATION/TRAINING PROGRAM

## 2019-10-21 PROCEDURE — 2580000003 HC RX 258: Performed by: STUDENT IN AN ORGANIZED HEALTH CARE EDUCATION/TRAINING PROGRAM

## 2019-10-21 PROCEDURE — 6370000000 HC RX 637 (ALT 250 FOR IP): Performed by: NURSE ANESTHETIST, CERTIFIED REGISTERED

## 2019-10-21 PROCEDURE — 85576 BLOOD PLATELET AGGREGATION: CPT

## 2019-10-21 PROCEDURE — 3600000008 HC SURGERY OHS BASE: Performed by: THORACIC SURGERY (CARDIOTHORACIC VASCULAR SURGERY)

## 2019-10-21 PROCEDURE — 2700000000 HC OXYGEN THERAPY PER DAY

## 2019-10-21 PROCEDURE — 85730 THROMBOPLASTIN TIME PARTIAL: CPT

## 2019-10-21 PROCEDURE — 3700000001 HC ADD 15 MINUTES (ANESTHESIA): Performed by: THORACIC SURGERY (CARDIOTHORACIC VASCULAR SURGERY)

## 2019-10-21 PROCEDURE — 2000000000 HC ICU R&B

## 2019-10-21 PROCEDURE — 2500000003 HC RX 250 WO HCPCS: Performed by: PHYSICIAN ASSISTANT

## 2019-10-21 PROCEDURE — 05H633Z INSERTION OF INFUSION DEVICE INTO LEFT SUBCLAVIAN VEIN, PERCUTANEOUS APPROACH: ICD-10-PCS | Performed by: ANESTHESIOLOGY

## 2019-10-21 PROCEDURE — 6360000002 HC RX W HCPCS

## 2019-10-21 PROCEDURE — 85027 COMPLETE CBC AUTOMATED: CPT

## 2019-10-21 PROCEDURE — 02L70CK OCCLUSION OF LEFT ATRIAL APPENDAGE WITH EXTRALUMINAL DEVICE, OPEN APPROACH: ICD-10-PCS | Performed by: THORACIC SURGERY (CARDIOTHORACIC VASCULAR SURGERY)

## 2019-10-21 PROCEDURE — 85014 HEMATOCRIT: CPT

## 2019-10-21 PROCEDURE — 82947 ASSAY GLUCOSE BLOOD QUANT: CPT

## 2019-10-21 PROCEDURE — 82803 BLOOD GASES ANY COMBINATION: CPT

## 2019-10-21 PROCEDURE — C2618 PROBE/NEEDLE, CRYO: HCPCS | Performed by: THORACIC SURGERY (CARDIOTHORACIC VASCULAR SURGERY)

## 2019-10-21 PROCEDURE — 86927 PLASMA FRESH FROZEN: CPT

## 2019-10-21 PROCEDURE — 2709999900 HC NON-CHARGEABLE SUPPLY: Performed by: THORACIC SURGERY (CARDIOTHORACIC VASCULAR SURGERY)

## 2019-10-21 PROCEDURE — 6370000000 HC RX 637 (ALT 250 FOR IP): Performed by: PHYSICIAN ASSISTANT

## 2019-10-21 PROCEDURE — 82565 ASSAY OF CREATININE: CPT

## 2019-10-21 PROCEDURE — 94002 VENT MGMT INPAT INIT DAY: CPT

## 2019-10-21 PROCEDURE — P9041 ALBUMIN (HUMAN),5%, 50ML: HCPCS | Performed by: PHYSICIAN ASSISTANT

## 2019-10-21 PROCEDURE — 33533 CABG ARTERIAL SINGLE: CPT | Performed by: THORACIC SURGERY (CARDIOTHORACIC VASCULAR SURGERY)

## 2019-10-21 PROCEDURE — 85025 COMPLETE CBC W/AUTO DIFF WBC: CPT

## 2019-10-21 PROCEDURE — 2780000010 HC IMPLANT OTHER: Performed by: THORACIC SURGERY (CARDIOTHORACIC VASCULAR SURGERY)

## 2019-10-21 PROCEDURE — 33259 ABLATE ATRIA W/BYPASS ADD-ON: CPT | Performed by: THORACIC SURGERY (CARDIOTHORACIC VASCULAR SURGERY)

## 2019-10-21 PROCEDURE — 93005 ELECTROCARDIOGRAM TRACING: CPT | Performed by: PHYSICIAN ASSISTANT

## 2019-10-21 PROCEDURE — 36415 COLL VENOUS BLD VENIPUNCTURE: CPT

## 2019-10-21 PROCEDURE — 37799 UNLISTED PX VASCULAR SURGERY: CPT

## 2019-10-21 PROCEDURE — 02570ZK DESTRUCTION OF LEFT ATRIAL APPENDAGE, OPEN APPROACH: ICD-10-PCS | Performed by: THORACIC SURGERY (CARDIOTHORACIC VASCULAR SURGERY)

## 2019-10-21 PROCEDURE — 6360000002 HC RX W HCPCS: Performed by: PHYSICIAN ASSISTANT

## 2019-10-21 PROCEDURE — 3700000000 HC ANESTHESIA ATTENDED CARE: Performed by: THORACIC SURGERY (CARDIOTHORACIC VASCULAR SURGERY)

## 2019-10-21 PROCEDURE — 87086 URINE CULTURE/COLONY COUNT: CPT

## 2019-10-21 PROCEDURE — C1713 ANCHOR/SCREW BN/BN,TIS/BN: HCPCS | Performed by: THORACIC SURGERY (CARDIOTHORACIC VASCULAR SURGERY)

## 2019-10-21 PROCEDURE — 5A1221Z PERFORMANCE OF CARDIAC OUTPUT, CONTINUOUS: ICD-10-PCS | Performed by: THORACIC SURGERY (CARDIOTHORACIC VASCULAR SURGERY)

## 2019-10-21 PROCEDURE — P9017 PLASMA 1 DONOR FRZ W/IN 8 HR: HCPCS

## 2019-10-21 PROCEDURE — 6360000002 HC RX W HCPCS: Performed by: NURSE PRACTITIONER

## 2019-10-21 PROCEDURE — 6370000000 HC RX 637 (ALT 250 FOR IP): Performed by: NURSE PRACTITIONER

## 2019-10-21 PROCEDURE — 2500000003 HC RX 250 WO HCPCS: Performed by: NURSE ANESTHETIST, CERTIFIED REGISTERED

## 2019-10-21 PROCEDURE — C9290 INJ, BUPIVACAINE LIPOSOME: HCPCS | Performed by: THORACIC SURGERY (CARDIOTHORACIC VASCULAR SURGERY)

## 2019-10-21 PROCEDURE — 83735 ASSAY OF MAGNESIUM: CPT

## 2019-10-21 PROCEDURE — 94770 HC ETCO2 MONITOR DAILY: CPT

## 2019-10-21 PROCEDURE — 85384 FIBRINOGEN ACTIVITY: CPT

## 2019-10-21 DEVICE — SCREW BONE L12MM DIA3MM STRNL TI SELF DRL LCK FOR STBL INT: Type: IMPLANTABLE DEVICE | Site: HEART | Status: FUNCTIONAL

## 2019-10-21 DEVICE — ZINACTIVE USE 2540327 DEVICE OCCL CLP L50MM PLUNG GRP FLX SHFT FOR GILLINOV: Type: IMPLANTABLE DEVICE | Site: HEART | Status: FUNCTIONAL

## 2019-10-21 DEVICE — IMPLANTABLE DEVICE: Type: IMPLANTABLE DEVICE | Site: HEART | Status: FUNCTIONAL

## 2019-10-21 RX ORDER — MAGNESIUM SULFATE 1 G/100ML
1 INJECTION INTRAVENOUS PRN
Status: DISCONTINUED | OUTPATIENT
Start: 2019-10-21 | End: 2019-10-25 | Stop reason: HOSPADM

## 2019-10-21 RX ORDER — ASPIRIN 81 MG/1
81 TABLET ORAL
Status: COMPLETED | OUTPATIENT
Start: 2019-10-21 | End: 2019-10-21

## 2019-10-21 RX ORDER — HYDRALAZINE HYDROCHLORIDE 20 MG/ML
5 INJECTION INTRAMUSCULAR; INTRAVENOUS EVERY 5 MIN PRN
Status: DISCONTINUED | OUTPATIENT
Start: 2019-10-21 | End: 2019-10-25 | Stop reason: HOSPADM

## 2019-10-21 RX ORDER — BISACODYL 10 MG
10 SUPPOSITORY, RECTAL RECTAL DAILY PRN
Status: DISCONTINUED | OUTPATIENT
Start: 2019-10-21 | End: 2019-10-25 | Stop reason: HOSPADM

## 2019-10-21 RX ORDER — DOCUSATE SODIUM 100 MG/1
100 CAPSULE, LIQUID FILLED ORAL 2 TIMES DAILY
Status: DISCONTINUED | OUTPATIENT
Start: 2019-10-21 | End: 2019-10-25 | Stop reason: HOSPADM

## 2019-10-21 RX ORDER — POLYETHYLENE GLYCOL 3350 17 G/17G
17 POWDER, FOR SOLUTION ORAL DAILY
Status: DISCONTINUED | OUTPATIENT
Start: 2019-10-22 | End: 2019-10-25 | Stop reason: HOSPADM

## 2019-10-21 RX ORDER — ONDANSETRON 2 MG/ML
4 INJECTION INTRAMUSCULAR; INTRAVENOUS EVERY 8 HOURS PRN
Status: DISCONTINUED | OUTPATIENT
Start: 2019-10-21 | End: 2019-10-25 | Stop reason: HOSPADM

## 2019-10-21 RX ORDER — PROPOFOL 10 MG/ML
INJECTION, EMULSION INTRAVENOUS CONTINUOUS PRN
Status: DISCONTINUED | OUTPATIENT
Start: 2019-10-21 | End: 2019-10-21 | Stop reason: SDUPTHER

## 2019-10-21 RX ORDER — DIPHENHYDRAMINE HCL 25 MG
25 TABLET ORAL NIGHTLY PRN
Status: DISCONTINUED | OUTPATIENT
Start: 2019-10-22 | End: 2019-10-25 | Stop reason: HOSPADM

## 2019-10-21 RX ORDER — SODIUM CHLORIDE 9 MG/ML
INJECTION, SOLUTION INTRAVENOUS CONTINUOUS
Status: DISCONTINUED | OUTPATIENT
Start: 2019-10-21 | End: 2019-10-25 | Stop reason: HOSPADM

## 2019-10-21 RX ORDER — SODIUM CHLORIDE 9 MG/ML
INJECTION, SOLUTION INTRAVENOUS CONTINUOUS PRN
Status: DISCONTINUED | OUTPATIENT
Start: 2019-10-21 | End: 2019-10-21 | Stop reason: SDUPTHER

## 2019-10-21 RX ORDER — PROTAMINE SULFATE 10 MG/ML
INJECTION, SOLUTION INTRAVENOUS PRN
Status: DISCONTINUED | OUTPATIENT
Start: 2019-10-21 | End: 2019-10-21 | Stop reason: SDUPTHER

## 2019-10-21 RX ORDER — OXYCODONE HYDROCHLORIDE AND ACETAMINOPHEN 5; 325 MG/1; MG/1
1 TABLET ORAL EVERY 4 HOURS PRN
Status: DISCONTINUED | OUTPATIENT
Start: 2019-10-21 | End: 2019-10-25 | Stop reason: HOSPADM

## 2019-10-21 RX ORDER — SODIUM CHLORIDE, SODIUM LACTATE, POTASSIUM CHLORIDE, CALCIUM CHLORIDE 600; 310; 30; 20 MG/100ML; MG/100ML; MG/100ML; MG/100ML
INJECTION, SOLUTION INTRAVENOUS CONTINUOUS PRN
Status: DISCONTINUED | OUTPATIENT
Start: 2019-10-21 | End: 2019-10-21 | Stop reason: SDUPTHER

## 2019-10-21 RX ORDER — ACETAMINOPHEN 325 MG/1
650 TABLET ORAL EVERY 4 HOURS PRN
Status: DISCONTINUED | OUTPATIENT
Start: 2019-10-21 | End: 2019-10-25 | Stop reason: HOSPADM

## 2019-10-21 RX ORDER — SODIUM CHLORIDE 0.9 % (FLUSH) 0.9 %
10 SYRINGE (ML) INJECTION EVERY 12 HOURS SCHEDULED
Status: DISCONTINUED | OUTPATIENT
Start: 2019-10-21 | End: 2019-10-25 | Stop reason: HOSPADM

## 2019-10-21 RX ORDER — M-VIT,TX,IRON,MINS/CALC/FOLIC 27MG-0.4MG
1 TABLET ORAL
Status: DISCONTINUED | OUTPATIENT
Start: 2019-10-22 | End: 2019-10-25 | Stop reason: HOSPADM

## 2019-10-21 RX ORDER — DEXTROSE MONOHYDRATE 25 G/50ML
INJECTION, SOLUTION INTRAVENOUS PRN
Status: DISCONTINUED | OUTPATIENT
Start: 2019-10-21 | End: 2019-10-21 | Stop reason: SDUPTHER

## 2019-10-21 RX ORDER — POTASSIUM CHLORIDE 29.8 MG/ML
20 INJECTION INTRAVENOUS PRN
Status: DISCONTINUED | OUTPATIENT
Start: 2019-10-21 | End: 2019-10-25 | Stop reason: HOSPADM

## 2019-10-21 RX ORDER — OXYCODONE HYDROCHLORIDE AND ACETAMINOPHEN 5; 325 MG/1; MG/1
2 TABLET ORAL EVERY 4 HOURS PRN
Status: DISCONTINUED | OUTPATIENT
Start: 2019-10-21 | End: 2019-10-25 | Stop reason: HOSPADM

## 2019-10-21 RX ORDER — ATORVASTATIN CALCIUM 40 MG/1
40 TABLET, FILM COATED ORAL NIGHTLY
Status: DISCONTINUED | OUTPATIENT
Start: 2019-10-22 | End: 2019-10-25 | Stop reason: HOSPADM

## 2019-10-21 RX ORDER — DEXTROSE MONOHYDRATE 25 G/50ML
12.5 INJECTION, SOLUTION INTRAVENOUS PRN
Status: DISCONTINUED | OUTPATIENT
Start: 2019-10-21 | End: 2019-10-25 | Stop reason: HOSPADM

## 2019-10-21 RX ORDER — AMIODARONE HYDROCHLORIDE 200 MG/1
200 TABLET ORAL 3 TIMES DAILY
Status: DISCONTINUED | OUTPATIENT
Start: 2019-10-21 | End: 2019-10-21

## 2019-10-21 RX ORDER — ROCURONIUM BROMIDE 10 MG/ML
INJECTION, SOLUTION INTRAVENOUS PRN
Status: DISCONTINUED | OUTPATIENT
Start: 2019-10-21 | End: 2019-10-21 | Stop reason: SDUPTHER

## 2019-10-21 RX ORDER — FAMOTIDINE 20 MG/1
20 TABLET, FILM COATED ORAL 2 TIMES DAILY
Status: DISCONTINUED | OUTPATIENT
Start: 2019-10-23 | End: 2019-10-25 | Stop reason: HOSPADM

## 2019-10-21 RX ORDER — NICOTINE POLACRILEX 4 MG
15 LOZENGE BUCCAL PRN
Status: DISCONTINUED | OUTPATIENT
Start: 2019-10-21 | End: 2019-10-25 | Stop reason: HOSPADM

## 2019-10-21 RX ORDER — ACETAMINOPHEN 650 MG/1
650 SUPPOSITORY RECTAL EVERY 4 HOURS PRN
Status: DISCONTINUED | OUTPATIENT
Start: 2019-10-21 | End: 2019-10-25 | Stop reason: HOSPADM

## 2019-10-21 RX ORDER — MILRINONE LACTATE 0.2 MG/ML
0.38 INJECTION, SOLUTION INTRAVENOUS CONTINUOUS
Status: DISCONTINUED | OUTPATIENT
Start: 2019-10-21 | End: 2019-10-25 | Stop reason: HOSPADM

## 2019-10-21 RX ORDER — SODIUM CHLORIDE 0.9 % (FLUSH) 0.9 %
10 SYRINGE (ML) INJECTION PRN
Status: DISCONTINUED | OUTPATIENT
Start: 2019-10-21 | End: 2019-10-25 | Stop reason: HOSPADM

## 2019-10-21 RX ORDER — LIDOCAINE HYDROCHLORIDE 10 MG/ML
INJECTION, SOLUTION EPIDURAL; INFILTRATION; INTRACAUDAL; PERINEURAL PRN
Status: DISCONTINUED | OUTPATIENT
Start: 2019-10-21 | End: 2019-10-21 | Stop reason: SDUPTHER

## 2019-10-21 RX ORDER — AMIODARONE HYDROCHLORIDE 200 MG/1
200 TABLET ORAL 2 TIMES DAILY
Status: DISCONTINUED | OUTPATIENT
Start: 2019-10-21 | End: 2019-10-23

## 2019-10-21 RX ORDER — MIDAZOLAM HYDROCHLORIDE 1 MG/ML
INJECTION INTRAMUSCULAR; INTRAVENOUS PRN
Status: DISCONTINUED | OUTPATIENT
Start: 2019-10-21 | End: 2019-10-21 | Stop reason: SDUPTHER

## 2019-10-21 RX ORDER — SODIUM CHLORIDE 0.9 % (FLUSH) 0.9 %
10 SYRINGE (ML) INJECTION EVERY 12 HOURS SCHEDULED
Status: DISCONTINUED | OUTPATIENT
Start: 2019-10-21 | End: 2019-10-21

## 2019-10-21 RX ORDER — FENTANYL CITRATE 50 UG/ML
25 INJECTION, SOLUTION INTRAMUSCULAR; INTRAVENOUS
Status: DISCONTINUED | OUTPATIENT
Start: 2019-10-21 | End: 2019-10-25 | Stop reason: HOSPADM

## 2019-10-21 RX ORDER — CHLORHEXIDINE GLUCONATE 0.12 MG/ML
15 RINSE ORAL 2 TIMES DAILY
Status: DISCONTINUED | OUTPATIENT
Start: 2019-10-21 | End: 2019-10-25 | Stop reason: HOSPADM

## 2019-10-21 RX ORDER — ALBUMIN, HUMAN INJ 5% 5 %
25 SOLUTION INTRAVENOUS PRN
Status: DISCONTINUED | OUTPATIENT
Start: 2019-10-21 | End: 2019-10-25 | Stop reason: HOSPADM

## 2019-10-21 RX ORDER — SODIUM CHLORIDE 0.9 % (FLUSH) 0.9 %
10 SYRINGE (ML) INJECTION PRN
Status: DISCONTINUED | OUTPATIENT
Start: 2019-10-21 | End: 2019-10-21

## 2019-10-21 RX ORDER — SODIUM CHLORIDE 9 MG/ML
INJECTION, SOLUTION INTRAVENOUS CONTINUOUS
Status: DISCONTINUED | OUTPATIENT
Start: 2019-10-22 | End: 2019-10-21

## 2019-10-21 RX ORDER — DEXTROSE MONOHYDRATE 50 MG/ML
100 INJECTION, SOLUTION INTRAVENOUS PRN
Status: DISCONTINUED | OUTPATIENT
Start: 2019-10-21 | End: 2019-10-25 | Stop reason: HOSPADM

## 2019-10-21 RX ORDER — ASPIRIN 81 MG/1
81 TABLET ORAL DAILY
Status: DISCONTINUED | OUTPATIENT
Start: 2019-10-21 | End: 2019-10-25 | Stop reason: HOSPADM

## 2019-10-21 RX ORDER — HEPARIN SODIUM 1000 [USP'U]/ML
INJECTION, SOLUTION INTRAVENOUS; SUBCUTANEOUS PRN
Status: DISCONTINUED | OUTPATIENT
Start: 2019-10-21 | End: 2019-10-21 | Stop reason: SDUPTHER

## 2019-10-21 RX ORDER — LISINOPRIL 2.5 MG/1
2.5 TABLET ORAL DAILY
Status: DISCONTINUED | OUTPATIENT
Start: 2019-10-22 | End: 2019-10-23

## 2019-10-21 RX ORDER — PROTAMINE SULFATE 10 MG/ML
50 INJECTION, SOLUTION INTRAVENOUS
Status: ACTIVE | OUTPATIENT
Start: 2019-10-21 | End: 2019-10-21

## 2019-10-21 RX ORDER — MEPERIDINE HYDROCHLORIDE 50 MG/ML
25 INJECTION INTRAMUSCULAR; INTRAVENOUS; SUBCUTANEOUS
Status: ACTIVE | OUTPATIENT
Start: 2019-10-21 | End: 2019-10-21

## 2019-10-21 RX ORDER — PROPOFOL 10 MG/ML
10 INJECTION, EMULSION INTRAVENOUS
Status: DISCONTINUED | OUTPATIENT
Start: 2019-10-21 | End: 2019-10-25 | Stop reason: HOSPADM

## 2019-10-21 RX ORDER — CLOPIDOGREL BISULFATE 75 MG/1
75 TABLET ORAL DAILY
Status: DISCONTINUED | OUTPATIENT
Start: 2019-10-22 | End: 2019-10-25 | Stop reason: HOSPADM

## 2019-10-21 RX ORDER — PROPOFOL 10 MG/ML
INJECTION, EMULSION INTRAVENOUS PRN
Status: DISCONTINUED | OUTPATIENT
Start: 2019-10-21 | End: 2019-10-21 | Stop reason: SDUPTHER

## 2019-10-21 RX ORDER — FENTANYL CITRATE 50 UG/ML
50 INJECTION, SOLUTION INTRAMUSCULAR; INTRAVENOUS
Status: DISCONTINUED | OUTPATIENT
Start: 2019-10-21 | End: 2019-10-25 | Stop reason: HOSPADM

## 2019-10-21 RX ORDER — PROPOFOL 10 MG/ML
INJECTION, EMULSION INTRAVENOUS
Status: COMPLETED
Start: 2019-10-21 | End: 2019-10-21

## 2019-10-21 RX ORDER — FENTANYL CITRATE 0.05 MG/ML
INJECTION, SOLUTION INTRAMUSCULAR; INTRAVENOUS PRN
Status: DISCONTINUED | OUTPATIENT
Start: 2019-10-21 | End: 2019-10-21 | Stop reason: SDUPTHER

## 2019-10-21 RX ADMIN — SODIUM CHLORIDE: 900 INJECTION, SOLUTION INTRAVENOUS at 08:50

## 2019-10-21 RX ADMIN — FENTANYL CITRATE 100 MCG: 50 INJECTION, SOLUTION INTRAMUSCULAR; INTRAVENOUS at 10:07

## 2019-10-21 RX ADMIN — FENTANYL CITRATE 50 MCG: 50 INJECTION INTRAMUSCULAR; INTRAVENOUS at 19:49

## 2019-10-21 RX ADMIN — ROCURONIUM BROMIDE 50 MG: 10 INJECTION INTRAVENOUS at 09:28

## 2019-10-21 RX ADMIN — MUPIROCIN: 20 OINTMENT TOPICAL at 21:23

## 2019-10-21 RX ADMIN — DEXMEDETOMIDINE HYDROCHLORIDE 0.3 MCG/KG/HR: 100 INJECTION, SOLUTION INTRAVENOUS at 22:21

## 2019-10-21 RX ADMIN — FENTANYL CITRATE 350 MCG: 50 INJECTION, SOLUTION INTRAMUSCULAR; INTRAVENOUS at 10:13

## 2019-10-21 RX ADMIN — Medication 81 MG: at 17:24

## 2019-10-21 RX ADMIN — MUPIROCIN: 20 OINTMENT TOPICAL at 08:35

## 2019-10-21 RX ADMIN — FENTANYL CITRATE 50 MCG: 50 INJECTION, SOLUTION INTRAMUSCULAR; INTRAVENOUS at 10:57

## 2019-10-21 RX ADMIN — PROPOFOL 30 MCG/KG/MIN: 10 INJECTION, EMULSION INTRAVENOUS at 14:45

## 2019-10-21 RX ADMIN — PHENYLEPHRINE HYDROCHLORIDE 100 MCG/MIN: 10 INJECTION INTRAVENOUS at 14:45

## 2019-10-21 RX ADMIN — PHENYLEPHRINE HYDROCHLORIDE 100 MCG: 10 INJECTION INTRAVENOUS at 13:47

## 2019-10-21 RX ADMIN — PHENYLEPHRINE HYDROCHLORIDE 100 MCG: 10 INJECTION INTRAVENOUS at 09:59

## 2019-10-21 RX ADMIN — FENTANYL CITRATE 50 MCG: 50 INJECTION, SOLUTION INTRAMUSCULAR; INTRAVENOUS at 10:54

## 2019-10-21 RX ADMIN — PROTAMINE SULFATE 10 MG: 10 INJECTION, SOLUTION INTRAVENOUS at 13:24

## 2019-10-21 RX ADMIN — PROPOFOL 35 MCG/KG/MIN: 10 INJECTION, EMULSION INTRAVENOUS at 16:05

## 2019-10-21 RX ADMIN — ROCURONIUM BROMIDE 50 MG: 10 INJECTION INTRAVENOUS at 13:41

## 2019-10-21 RX ADMIN — CHLORHEXIDINE GLUCONATE 0.12% ORAL RINSE 15 ML: 1.2 LIQUID ORAL at 08:34

## 2019-10-21 RX ADMIN — FENTANYL CITRATE 100 MCG: 50 INJECTION, SOLUTION INTRAMUSCULAR; INTRAVENOUS at 08:56

## 2019-10-21 RX ADMIN — FENTANYL CITRATE 50 MCG: 50 INJECTION, SOLUTION INTRAMUSCULAR; INTRAVENOUS at 10:19

## 2019-10-21 RX ADMIN — SODIUM CHLORIDE 3.7 UNITS/HR: 9 INJECTION, SOLUTION INTRAVENOUS at 19:05

## 2019-10-21 RX ADMIN — DEXTROSE MONOHYDRATE 25 G: 25 INJECTION, SOLUTION INTRAVENOUS at 12:42

## 2019-10-21 RX ADMIN — FENTANYL CITRATE 50 MCG: 50 INJECTION, SOLUTION INTRAMUSCULAR; INTRAVENOUS at 11:01

## 2019-10-21 RX ADMIN — SODIUM CHLORIDE: 9 INJECTION, SOLUTION INTRAVENOUS at 15:30

## 2019-10-21 RX ADMIN — FENTANYL CITRATE 100 MCG: 50 INJECTION, SOLUTION INTRAMUSCULAR; INTRAVENOUS at 10:10

## 2019-10-21 RX ADMIN — SODIUM CHLORIDE 2000 MG: 9 INJECTION, SOLUTION INTRAVENOUS at 09:49

## 2019-10-21 RX ADMIN — HEPARIN SODIUM 40000 UNITS: 1000 INJECTION INTRAVENOUS; SUBCUTANEOUS at 11:00

## 2019-10-21 RX ADMIN — ROCURONIUM BROMIDE 50 MG: 10 INJECTION INTRAVENOUS at 11:23

## 2019-10-21 RX ADMIN — HEPARIN SODIUM 19.1 UNITS/KG/HR: 10000 INJECTION, SOLUTION INTRAVENOUS at 05:09

## 2019-10-21 RX ADMIN — ROCURONIUM BROMIDE 50 MG: 10 INJECTION INTRAVENOUS at 10:00

## 2019-10-21 RX ADMIN — FENTANYL CITRATE 50 MCG: 50 INJECTION, SOLUTION INTRAMUSCULAR; INTRAVENOUS at 10:04

## 2019-10-21 RX ADMIN — PHENYLEPHRINE HYDROCHLORIDE 50 MCG: 10 INJECTION INTRAVENOUS at 10:48

## 2019-10-21 RX ADMIN — Medication 3 G: at 12:30

## 2019-10-21 RX ADMIN — PROPOFOL 25 MCG/KG/MIN: 10 INJECTION, EMULSION INTRAVENOUS at 13:58

## 2019-10-21 RX ADMIN — FENTANYL CITRATE 50 MCG: 50 INJECTION, SOLUTION INTRAMUSCULAR; INTRAVENOUS at 09:58

## 2019-10-21 RX ADMIN — MILRINONE LACTATE 0.19 MCG/KG/MIN: 200 INJECTION, SOLUTION INTRAVENOUS at 17:21

## 2019-10-21 RX ADMIN — PROPOFOL 100 MG: 10 INJECTION, EMULSION INTRAVENOUS at 09:03

## 2019-10-21 RX ADMIN — SODIUM CHLORIDE: 9 INJECTION, SOLUTION INTRAVENOUS at 04:29

## 2019-10-21 RX ADMIN — SODIUM CHLORIDE, POTASSIUM CHLORIDE, SODIUM LACTATE AND CALCIUM CHLORIDE: 600; 310; 30; 20 INJECTION, SOLUTION INTRAVENOUS at 08:50

## 2019-10-21 RX ADMIN — Medication 15 ML: at 21:23

## 2019-10-21 RX ADMIN — MILRINONE LACTATE 0.19 MCG/KG/MIN: 200 INJECTION, SOLUTION INTRAVENOUS at 23:51

## 2019-10-21 RX ADMIN — PHENYLEPHRINE HYDROCHLORIDE 100 MCG: 10 INJECTION INTRAVENOUS at 13:38

## 2019-10-21 RX ADMIN — PHENYLEPHRINE HYDROCHLORIDE 100 MCG: 10 INJECTION INTRAVENOUS at 10:11

## 2019-10-21 RX ADMIN — POTASSIUM CHLORIDE 20 MEQ: 29.8 INJECTION, SOLUTION INTRAVENOUS at 15:30

## 2019-10-21 RX ADMIN — PROPOFOL 35 MCG/KG/MIN: 10 INJECTION, EMULSION INTRAVENOUS at 17:59

## 2019-10-21 RX ADMIN — SODIUM CHLORIDE 6 UNITS/HR: 9 INJECTION, SOLUTION INTRAVENOUS at 14:45

## 2019-10-21 RX ADMIN — PHENYLEPHRINE HYDROCHLORIDE 100 MCG: 10 INJECTION INTRAVENOUS at 10:06

## 2019-10-21 RX ADMIN — CALCIUM CHLORIDE 1 G: 100 INJECTION INTRAVENOUS; INTRAVENTRICULAR at 17:54

## 2019-10-21 RX ADMIN — SODIUM CHLORIDE 2 UNITS/HR: 9 INJECTION, SOLUTION INTRAVENOUS at 12:13

## 2019-10-21 RX ADMIN — MIDAZOLAM HYDROCHLORIDE 2 MG: 1 INJECTION, SOLUTION INTRAMUSCULAR; INTRAVENOUS at 08:56

## 2019-10-21 RX ADMIN — INSULIN HUMAN 5 UNITS: 100 INJECTION, SOLUTION PARENTERAL at 13:21

## 2019-10-21 RX ADMIN — Medication 10 ML: at 08:35

## 2019-10-21 RX ADMIN — Medication 3 G: at 09:45

## 2019-10-21 RX ADMIN — PHENYLEPHRINE HYDROCHLORIDE 100 MCG: 10 INJECTION INTRAVENOUS at 13:44

## 2019-10-21 RX ADMIN — ROCURONIUM BROMIDE 50 MG: 10 INJECTION INTRAVENOUS at 12:21

## 2019-10-21 RX ADMIN — PHENYLEPHRINE HYDROCHLORIDE 100 MCG: 10 INJECTION INTRAVENOUS at 09:09

## 2019-10-21 RX ADMIN — PROTAMINE SULFATE 290 MG: 10 INJECTION, SOLUTION INTRAVENOUS at 13:27

## 2019-10-21 RX ADMIN — PHENYLEPHRINE HYDROCHLORIDE 25 MCG/MIN: 10 INJECTION INTRAVENOUS at 10:12

## 2019-10-21 RX ADMIN — ALBUMIN (HUMAN) 25 G: 12.5 INJECTION, SOLUTION INTRAVENOUS at 16:00

## 2019-10-21 RX ADMIN — METOPROLOL TARTRATE 12.5 MG: 25 TABLET ORAL at 08:34

## 2019-10-21 RX ADMIN — ROCURONIUM BROMIDE 50 MG: 10 INJECTION INTRAVENOUS at 14:07

## 2019-10-21 RX ADMIN — PHENYLEPHRINE HYDROCHLORIDE 100 MCG: 10 INJECTION INTRAVENOUS at 10:22

## 2019-10-21 RX ADMIN — LIDOCAINE HYDROCHLORIDE 50 MG: 10 INJECTION, SOLUTION EPIDURAL; INFILTRATION; INTRACAUDAL at 09:03

## 2019-10-21 RX ADMIN — FAMOTIDINE 20 MG: 10 INJECTION, SOLUTION INTRAVENOUS at 21:23

## 2019-10-21 RX ADMIN — INSULIN HUMAN 10 UNITS: 100 INJECTION, SOLUTION PARENTERAL at 12:43

## 2019-10-21 RX ADMIN — ALBUMIN (HUMAN) 25 G: 12.5 INJECTION, SOLUTION INTRAVENOUS at 18:33

## 2019-10-21 RX ADMIN — PHENYLEPHRINE HYDROCHLORIDE 100 MCG: 10 INJECTION INTRAVENOUS at 13:46

## 2019-10-21 RX ADMIN — AMIODARONE HYDROCHLORIDE 1 MG/MIN: 50 INJECTION, SOLUTION INTRAVENOUS at 08:00

## 2019-10-21 RX ADMIN — FENTANYL CITRATE 50 MCG: 50 INJECTION, SOLUTION INTRAMUSCULAR; INTRAVENOUS at 10:02

## 2019-10-21 RX ADMIN — Medication 81 MG: at 08:34

## 2019-10-21 RX ADMIN — PHENYLEPHRINE HYDROCHLORIDE 100 MCG: 10 INJECTION INTRAVENOUS at 10:09

## 2019-10-21 RX ADMIN — PHENYLEPHRINE HYDROCHLORIDE 100 MCG: 10 INJECTION INTRAVENOUS at 09:07

## 2019-10-21 RX ADMIN — AMIODARONE HYDROCHLORIDE 200 MG: 200 TABLET ORAL at 08:34

## 2019-10-21 RX ADMIN — OXYCODONE HYDROCHLORIDE AND ACETAMINOPHEN 2 TABLET: 5; 325 TABLET ORAL at 21:23

## 2019-10-21 RX ADMIN — PROTAMINE SULFATE 50 MG: 10 INJECTION, SOLUTION INTRAVENOUS at 13:56

## 2019-10-21 RX ADMIN — Medication 3 G: at 21:23

## 2019-10-21 RX ADMIN — OXYCODONE HYDROCHLORIDE AND ACETAMINOPHEN 2 TABLET: 5; 325 TABLET ORAL at 17:25

## 2019-10-21 RX ADMIN — ROCURONIUM BROMIDE 50 MG: 10 INJECTION INTRAVENOUS at 09:03

## 2019-10-21 RX ADMIN — MIDAZOLAM HYDROCHLORIDE 2 MG: 1 INJECTION, SOLUTION INTRAMUSCULAR; INTRAVENOUS at 12:32

## 2019-10-21 RX ADMIN — PHENYLEPHRINE HYDROCHLORIDE 100 MCG: 10 INJECTION INTRAVENOUS at 11:06

## 2019-10-21 RX ADMIN — POTASSIUM CHLORIDE 20 MEQ: 29.8 INJECTION, SOLUTION INTRAVENOUS at 16:59

## 2019-10-21 RX ADMIN — SODIUM CHLORIDE 5 G/HR: 900 INJECTION, SOLUTION INTRAVENOUS at 09:49

## 2019-10-21 ASSESSMENT — PULMONARY FUNCTION TESTS
PIF_VALUE: 1
PIF_VALUE: 21
PIF_VALUE: 1
PIF_VALUE: 27
PIF_VALUE: 22
PIF_VALUE: 31
PIF_VALUE: 10
PIF_VALUE: 29
PIF_VALUE: 0
PIF_VALUE: 22
PIF_VALUE: 21
PIF_VALUE: 24
PIF_VALUE: 2
PIF_VALUE: 27
PIF_VALUE: 32
PIF_VALUE: 14
PIF_VALUE: 25
PIF_VALUE: 1
PIF_VALUE: 2
PIF_VALUE: 30
PIF_VALUE: 32
PIF_VALUE: 26
PIF_VALUE: 29
PIF_VALUE: 1
PIF_VALUE: 25
PIF_VALUE: 0
PIF_VALUE: 21
PIF_VALUE: 1
PIF_VALUE: 25
PIF_VALUE: 1
PIF_VALUE: 25
PIF_VALUE: 0
PIF_VALUE: 37
PIF_VALUE: 30
PIF_VALUE: 1
PIF_VALUE: 2
PIF_VALUE: 5
PIF_VALUE: 23
PIF_VALUE: 1
PIF_VALUE: 20
PIF_VALUE: 30
PIF_VALUE: 23
PIF_VALUE: 27
PIF_VALUE: 25
PIF_VALUE: 10
PIF_VALUE: 0
PIF_VALUE: 22
PIF_VALUE: 22
PIF_VALUE: 20
PIF_VALUE: 29
PIF_VALUE: 23
PIF_VALUE: 30
PIF_VALUE: 2
PIF_VALUE: 20
PIF_VALUE: 22
PIF_VALUE: 21
PIF_VALUE: 1
PIF_VALUE: 2
PIF_VALUE: 21
PIF_VALUE: 24
PIF_VALUE: 0
PIF_VALUE: 30
PIF_VALUE: 27
PIF_VALUE: 22
PIF_VALUE: 23
PIF_VALUE: 24
PIF_VALUE: 21
PIF_VALUE: 21
PIF_VALUE: 25
PIF_VALUE: 32
PIF_VALUE: 20
PIF_VALUE: 34
PIF_VALUE: 29
PIF_VALUE: 20
PIF_VALUE: 22
PIF_VALUE: 24
PIF_VALUE: 1
PIF_VALUE: 24
PIF_VALUE: 1
PIF_VALUE: 26
PIF_VALUE: 21
PIF_VALUE: 1
PIF_VALUE: 23
PIF_VALUE: 1
PIF_VALUE: 26
PIF_VALUE: 26
PIF_VALUE: 1
PIF_VALUE: 27
PIF_VALUE: 1
PIF_VALUE: 3
PIF_VALUE: 2
PIF_VALUE: 31
PIF_VALUE: 21
PIF_VALUE: 26
PIF_VALUE: 1
PIF_VALUE: 25
PIF_VALUE: 31
PIF_VALUE: 29
PIF_VALUE: 24
PIF_VALUE: 1
PIF_VALUE: 22
PIF_VALUE: 30
PIF_VALUE: 25
PIF_VALUE: 31
PIF_VALUE: 20
PIF_VALUE: 22
PIF_VALUE: 21
PIF_VALUE: 1
PIF_VALUE: 29
PIF_VALUE: 26
PIF_VALUE: 25
PIF_VALUE: 1
PIF_VALUE: 0
PIF_VALUE: 2
PIF_VALUE: 25
PIF_VALUE: 22
PIF_VALUE: 0
PIF_VALUE: 27
PIF_VALUE: 13
PIF_VALUE: 29
PIF_VALUE: 28
PIF_VALUE: 32
PIF_VALUE: 25
PIF_VALUE: 26
PIF_VALUE: 20
PIF_VALUE: 27
PIF_VALUE: 27
PIF_VALUE: 20
PIF_VALUE: 29
PIF_VALUE: 0
PIF_VALUE: 30
PIF_VALUE: 27
PIF_VALUE: 0
PIF_VALUE: 1
PIF_VALUE: 1
PIF_VALUE: 23
PIF_VALUE: 27
PIF_VALUE: 21
PIF_VALUE: 23
PIF_VALUE: 1
PIF_VALUE: 31
PIF_VALUE: 25
PIF_VALUE: 1
PIF_VALUE: 21
PIF_VALUE: 22
PIF_VALUE: 31
PIF_VALUE: 2
PIF_VALUE: 1
PIF_VALUE: 29
PIF_VALUE: 0
PIF_VALUE: 1
PIF_VALUE: 23
PIF_VALUE: 2
PIF_VALUE: 25
PIF_VALUE: 4
PIF_VALUE: 23
PIF_VALUE: 28
PIF_VALUE: 0
PIF_VALUE: 31
PIF_VALUE: 2
PIF_VALUE: 1
PIF_VALUE: 0
PIF_VALUE: 1
PIF_VALUE: 32
PIF_VALUE: 23
PIF_VALUE: 24
PIF_VALUE: 27
PIF_VALUE: 30
PIF_VALUE: 1
PIF_VALUE: 25
PIF_VALUE: 22
PIF_VALUE: 26
PIF_VALUE: 32
PIF_VALUE: 22
PIF_VALUE: 30
PIF_VALUE: 24
PIF_VALUE: 0
PIF_VALUE: 21
PIF_VALUE: 29
PIF_VALUE: 1
PIF_VALUE: 1
PIF_VALUE: 31
PIF_VALUE: 25
PIF_VALUE: 1
PIF_VALUE: 1
PIF_VALUE: 22
PIF_VALUE: 25
PIF_VALUE: 1
PIF_VALUE: 1
PIF_VALUE: 21
PIF_VALUE: 1
PIF_VALUE: 5
PIF_VALUE: 1
PIF_VALUE: 0
PIF_VALUE: 20
PIF_VALUE: 24
PIF_VALUE: 25
PIF_VALUE: 21
PIF_VALUE: 31
PIF_VALUE: 26
PIF_VALUE: 10
PIF_VALUE: 1
PIF_VALUE: 29
PIF_VALUE: 24
PIF_VALUE: 19
PIF_VALUE: 25
PIF_VALUE: 28
PIF_VALUE: 1
PIF_VALUE: 0
PIF_VALUE: 0
PIF_VALUE: 2
PIF_VALUE: 28
PIF_VALUE: 20
PIF_VALUE: 2
PIF_VALUE: 28
PIF_VALUE: 22
PIF_VALUE: 30
PIF_VALUE: 31
PIF_VALUE: 30
PIF_VALUE: 0
PIF_VALUE: 1
PIF_VALUE: 23
PIF_VALUE: 1
PIF_VALUE: 21
PIF_VALUE: 25
PIF_VALUE: 23
PIF_VALUE: 23
PIF_VALUE: 22
PIF_VALUE: 32
PIF_VALUE: 3
PIF_VALUE: 0
PIF_VALUE: 20
PIF_VALUE: 21
PIF_VALUE: 28
PIF_VALUE: 25
PIF_VALUE: 1
PIF_VALUE: 21
PIF_VALUE: 25
PIF_VALUE: 1
PIF_VALUE: 21
PIF_VALUE: 22
PIF_VALUE: 21
PIF_VALUE: 23
PIF_VALUE: 32
PIF_VALUE: 4
PIF_VALUE: 29
PIF_VALUE: 29
PIF_VALUE: 27
PIF_VALUE: 0
PIF_VALUE: 25
PIF_VALUE: 26
PIF_VALUE: 30
PIF_VALUE: 26
PIF_VALUE: 19
PIF_VALUE: 1
PIF_VALUE: 29
PIF_VALUE: 31
PIF_VALUE: 22
PIF_VALUE: 20
PIF_VALUE: 21
PIF_VALUE: 2
PIF_VALUE: 30
PIF_VALUE: 30
PIF_VALUE: 28
PIF_VALUE: 0
PIF_VALUE: 30
PIF_VALUE: 1
PIF_VALUE: 25
PIF_VALUE: 26
PIF_VALUE: 3
PIF_VALUE: 21
PIF_VALUE: 0
PIF_VALUE: 24
PIF_VALUE: 23
PIF_VALUE: 21
PIF_VALUE: 30
PIF_VALUE: 20
PIF_VALUE: 1
PIF_VALUE: 34
PIF_VALUE: 1
PIF_VALUE: 2
PIF_VALUE: 0
PIF_VALUE: 1
PIF_VALUE: 26
PIF_VALUE: 1
PIF_VALUE: 24
PIF_VALUE: 28
PIF_VALUE: 1
PIF_VALUE: 1
PIF_VALUE: 29
PIF_VALUE: 1
PIF_VALUE: 1
PIF_VALUE: 26
PIF_VALUE: 3
PIF_VALUE: 23
PIF_VALUE: 32
PIF_VALUE: 1
PIF_VALUE: 25
PIF_VALUE: 30
PIF_VALUE: 29
PIF_VALUE: 2
PIF_VALUE: 1
PIF_VALUE: 19
PIF_VALUE: 21
PIF_VALUE: 24
PIF_VALUE: 24
PIF_VALUE: 26
PIF_VALUE: 20
PIF_VALUE: 28
PIF_VALUE: 30
PIF_VALUE: 1
PIF_VALUE: 0
PIF_VALUE: 26
PIF_VALUE: 21
PIF_VALUE: 27
PIF_VALUE: 2
PIF_VALUE: 1
PIF_VALUE: 1
PIF_VALUE: 21
PIF_VALUE: 27
PIF_VALUE: 26
PIF_VALUE: 1
PIF_VALUE: 21
PIF_VALUE: 1
PIF_VALUE: 21
PIF_VALUE: 28
PIF_VALUE: 11
PIF_VALUE: 22
PIF_VALUE: 20
PIF_VALUE: 30
PIF_VALUE: 1
PIF_VALUE: 2
PIF_VALUE: 32
PIF_VALUE: 30
PIF_VALUE: 28
PIF_VALUE: 27
PIF_VALUE: 25
PIF_VALUE: 21
PIF_VALUE: 1
PIF_VALUE: 1
PIF_VALUE: 2
PIF_VALUE: 28
PIF_VALUE: 1
PIF_VALUE: 0
PIF_VALUE: 29
PIF_VALUE: 1
PIF_VALUE: 28
PIF_VALUE: 30
PIF_VALUE: 21
PIF_VALUE: 22
PIF_VALUE: 29
PIF_VALUE: 32
PIF_VALUE: 1
PIF_VALUE: 30
PIF_VALUE: 24
PIF_VALUE: 25

## 2019-10-21 ASSESSMENT — PAIN SCALES - GENERAL
PAINLEVEL_OUTOF10: 0

## 2019-10-22 ENCOUNTER — APPOINTMENT (OUTPATIENT)
Dept: GENERAL RADIOLOGY | Age: 64
DRG: 166 | End: 2019-10-22
Payer: MEDICAID

## 2019-10-22 LAB
ABO/RH: NORMAL
ANION GAP SERPL CALCULATED.3IONS-SCNC: 9 MMOL/L (ref 9–17)
ANTIBODY SCREEN: NEGATIVE
ARM BAND NUMBER: NORMAL
BLD PROD TYP BPU: NORMAL
BUN BLDV-MCNC: 17 MG/DL (ref 8–23)
BUN/CREAT BLD: ABNORMAL (ref 9–20)
CALCIUM SERPL-MCNC: 8.1 MG/DL (ref 8.6–10.4)
CHLORIDE BLD-SCNC: 113 MMOL/L (ref 98–107)
CO2: 21 MMOL/L (ref 20–31)
CREAT SERPL-MCNC: 0.74 MG/DL (ref 0.7–1.2)
CROSSMATCH RESULT: NORMAL
CROSSMATCH RESULT: NORMAL
CULTURE: NO GROWTH
DISPENSE STATUS BLOOD BANK: NORMAL
EKG ATRIAL RATE: 249 BPM
EKG ATRIAL RATE: 59 BPM
EKG P AXIS: -85 DEGREES
EKG P AXIS: 80 DEGREES
EKG P-R INTERVAL: 240 MS
EKG Q-T INTERVAL: 326 MS
EKG Q-T INTERVAL: 570 MS
EKG QRS DURATION: 72 MS
EKG QRS DURATION: 84 MS
EKG QTC CALCULATION (BAZETT): 460 MS
EKG QTC CALCULATION (BAZETT): 564 MS
EKG R AXIS: 15 DEGREES
EKG R AXIS: 3 DEGREES
EKG T AXIS: -12 DEGREES
EKG T AXIS: 51 DEGREES
EKG VENTRICULAR RATE: 120 BPM
EKG VENTRICULAR RATE: 59 BPM
EXPIRATION DATE: NORMAL
GFR AFRICAN AMERICAN: >60 ML/MIN
GFR NON-AFRICAN AMERICAN: >60 ML/MIN
GFR SERPL CREATININE-BSD FRML MDRD: ABNORMAL ML/MIN/{1.73_M2}
GFR SERPL CREATININE-BSD FRML MDRD: ABNORMAL ML/MIN/{1.73_M2}
GLUCOSE BLD-MCNC: 112 MG/DL (ref 70–99)
GLUCOSE BLD-MCNC: 113 MG/DL (ref 75–110)
GLUCOSE BLD-MCNC: 113 MG/DL (ref 75–110)
GLUCOSE BLD-MCNC: 116 MG/DL (ref 75–110)
GLUCOSE BLD-MCNC: 118 MG/DL (ref 75–110)
GLUCOSE BLD-MCNC: 132 MG/DL (ref 75–110)
GLUCOSE BLD-MCNC: 133 MG/DL (ref 75–110)
GLUCOSE BLD-MCNC: 139 MG/DL (ref 75–110)
GLUCOSE BLD-MCNC: 146 MG/DL (ref 75–110)
GLUCOSE BLD-MCNC: 147 MG/DL (ref 75–110)
GLUCOSE BLD-MCNC: 151 MG/DL (ref 75–110)
GLUCOSE BLD-MCNC: 169 MG/DL (ref 75–110)
GLUCOSE BLD-MCNC: 184 MG/DL (ref 75–110)
HCT VFR BLD CALC: 33 % (ref 40.7–50.3)
HEMOGLOBIN: 10.8 G/DL (ref 13–17)
INR BLD: 1
Lab: NORMAL
MAGNESIUM: 2.6 MG/DL (ref 1.6–2.6)
MCH RBC QN AUTO: 31 PG (ref 25.2–33.5)
MCHC RBC AUTO-ENTMCNC: 32.7 G/DL (ref 28.4–34.8)
MCV RBC AUTO: 94.8 FL (ref 82.6–102.9)
NRBC AUTOMATED: 0 PER 100 WBC
PDW BLD-RTO: 13.2 % (ref 11.8–14.4)
PLATELET # BLD: 155 K/UL (ref 138–453)
PMV BLD AUTO: 10.4 FL (ref 8.1–13.5)
POC ANGLE TEG W HEP: 58.6 DEG (ref 59–74)
POC ANGLE TEG W HEP: 64.7 DEG (ref 59–74)
POC ANGLE TEG W HEP: 66.6 DEG (ref 59–74)
POC ANGLE TEG: 65.3 DEG (ref 59–74)
POC ANGLE TEG: ABNORMAL DEG (ref 59–74)
POC EPL TEG W/HEP: 2.1 % (ref 0–15)
POC EPL TEG W/HEP: ABNORMAL % (ref 0–15)
POC EPL TEG W/HEP: ABNORMAL % (ref 0–15)
POC EPL TEG: ABNORMAL % (ref 0–15)
POC EPL TEG: ABNORMAL % (ref 0–15)
POC KINETICS TEG W HEP: 1.7 MIN (ref 1–3)
POC KINETICS TEG W HEP: 1.9 MIN (ref 1–3)
POC KINETICS TEG W HEP: 2.4 MIN (ref 1–3)
POC KINETICS TEG: 1.8 MIN (ref 1–3)
POC KINETICS TEG: 3 MIN (ref 1–3)
POC LY30(LYSIS) TEG W HEP: 2.1 % (ref 0–8)
POC LY30(LYSIS) TEG W HEP: ABNORMAL % (ref 0–8)
POC LY30(LYSIS) TEG W HEP: ABNORMAL % (ref 0–8)
POC LY30(LYSIS) TEG: ABNORMAL % (ref 0–8)
POC LY30(LYSIS) TEG: ABNORMAL % (ref 0–8)
POC MA(MAX CLOT) TEG: 65.8 MM (ref 55–74)
POC MA(MAX CLOT) TEG: ABNORMAL MM (ref 55–74)
POC MAX CLOT TEG W HEP: 64.5 MM (ref 55–74)
POC MAX CLOT TEG W HEP: 64.7 MM (ref 55–74)
POC MAX CLOT TEG W HEP: ABNORMAL MM (ref 55–74)
POC REACTION TIME TEG W HEP: 10.1 MIN (ref 4–9)
POC REACTION TIME TEG W HEP: 10.4 MIN (ref 4–9)
POC REACTION TIME TEG W HEP: 11.9 MIN (ref 4–9)
POC REACTION TIME TEG: 10.5 MIN (ref 4–9)
POC REACTION TIME TEG: 15.6 MIN (ref 4–9)
POTASSIUM SERPL-SCNC: 4.3 MMOL/L (ref 3.7–5.3)
POTASSIUM SERPL-SCNC: 4.7 MMOL/L (ref 3.7–5.3)
PROTHROMBIN TIME: 11 SEC (ref 9–12)
RBC # BLD: 3.48 M/UL (ref 4.21–5.77)
SODIUM BLD-SCNC: 143 MMOL/L (ref 135–144)
SPECIMEN DESCRIPTION: NORMAL
TEG COMMENT: ABNORMAL
TRANSFUSION STATUS: NORMAL
UNIT DIVISION: 0
UNIT NUMBER: NORMAL
WBC # BLD: 23.3 K/UL (ref 3.5–11.3)

## 2019-10-22 PROCEDURE — 2580000003 HC RX 258: Performed by: PHYSICIAN ASSISTANT

## 2019-10-22 PROCEDURE — 71045 X-RAY EXAM CHEST 1 VIEW: CPT

## 2019-10-22 PROCEDURE — 94667 MNPJ CHEST WALL 1ST: CPT

## 2019-10-22 PROCEDURE — 6360000002 HC RX W HCPCS: Performed by: THORACIC SURGERY (CARDIOTHORACIC VASCULAR SURGERY)

## 2019-10-22 PROCEDURE — 83735 ASSAY OF MAGNESIUM: CPT

## 2019-10-22 PROCEDURE — 6370000000 HC RX 637 (ALT 250 FOR IP): Performed by: PHYSICIAN ASSISTANT

## 2019-10-22 PROCEDURE — 85027 COMPLETE CBC AUTOMATED: CPT

## 2019-10-22 PROCEDURE — 94761 N-INVAS EAR/PLS OXIMETRY MLT: CPT

## 2019-10-22 PROCEDURE — 2000000000 HC ICU R&B

## 2019-10-22 PROCEDURE — 6360000002 HC RX W HCPCS: Performed by: PHYSICIAN ASSISTANT

## 2019-10-22 PROCEDURE — APPSS30 APP SPLIT SHARED TIME 16-30 MINUTES: Performed by: PHYSICIAN ASSISTANT

## 2019-10-22 PROCEDURE — 94668 MNPJ CHEST WALL SBSQ: CPT

## 2019-10-22 PROCEDURE — 2700000000 HC OXYGEN THERAPY PER DAY

## 2019-10-22 PROCEDURE — 80048 BASIC METABOLIC PNL TOTAL CA: CPT

## 2019-10-22 PROCEDURE — 97530 THERAPEUTIC ACTIVITIES: CPT

## 2019-10-22 PROCEDURE — 2580000003 HC RX 258: Performed by: THORACIC SURGERY (CARDIOTHORACIC VASCULAR SURGERY)

## 2019-10-22 PROCEDURE — 99024 POSTOP FOLLOW-UP VISIT: CPT | Performed by: PHYSICIAN ASSISTANT

## 2019-10-22 PROCEDURE — 85610 PROTHROMBIN TIME: CPT

## 2019-10-22 PROCEDURE — 97162 PT EVAL MOD COMPLEX 30 MIN: CPT

## 2019-10-22 PROCEDURE — 97166 OT EVAL MOD COMPLEX 45 MIN: CPT

## 2019-10-22 PROCEDURE — 97535 SELF CARE MNGMENT TRAINING: CPT

## 2019-10-22 PROCEDURE — 99233 SBSQ HOSP IP/OBS HIGH 50: CPT | Performed by: INTERNAL MEDICINE

## 2019-10-22 PROCEDURE — 2500000003 HC RX 250 WO HCPCS: Performed by: PHYSICIAN ASSISTANT

## 2019-10-22 RX ORDER — FUROSEMIDE 10 MG/ML
20 INJECTION INTRAMUSCULAR; INTRAVENOUS ONCE
Status: COMPLETED | OUTPATIENT
Start: 2019-10-22 | End: 2019-10-22

## 2019-10-22 RX ADMIN — DOCUSATE SODIUM 100 MG: 100 CAPSULE, LIQUID FILLED ORAL at 09:36

## 2019-10-22 RX ADMIN — Medication 3 G: at 05:09

## 2019-10-22 RX ADMIN — MUPIROCIN: 20 OINTMENT TOPICAL at 09:36

## 2019-10-22 RX ADMIN — MUPIROCIN: 20 OINTMENT TOPICAL at 19:51

## 2019-10-22 RX ADMIN — FENTANYL CITRATE 50 MCG: 50 INJECTION INTRAMUSCULAR; INTRAVENOUS at 00:16

## 2019-10-22 RX ADMIN — AMIODARONE HYDROCHLORIDE 200 MG: 200 TABLET ORAL at 19:52

## 2019-10-22 RX ADMIN — OXYCODONE HYDROCHLORIDE AND ACETAMINOPHEN 1 TABLET: 5; 325 TABLET ORAL at 13:41

## 2019-10-22 RX ADMIN — POLYETHYLENE GLYCOL 3350 17 G: 17 POWDER, FOR SOLUTION ORAL at 09:36

## 2019-10-22 RX ADMIN — AMIODARONE HYDROCHLORIDE 200 MG: 200 TABLET ORAL at 09:36

## 2019-10-22 RX ADMIN — LISINOPRIL 2.5 MG: 2.5 TABLET ORAL at 09:36

## 2019-10-22 RX ADMIN — Medication 81 MG: at 09:36

## 2019-10-22 RX ADMIN — DESMOPRESSIN ACETATE 40 MG: 0.2 TABLET ORAL at 19:52

## 2019-10-22 RX ADMIN — ENOXAPARIN SODIUM 40 MG: 40 INJECTION SUBCUTANEOUS at 09:35

## 2019-10-22 RX ADMIN — Medication 3 G: at 12:32

## 2019-10-22 RX ADMIN — OXYCODONE HYDROCHLORIDE AND ACETAMINOPHEN 1 TABLET: 5; 325 TABLET ORAL at 23:36

## 2019-10-22 RX ADMIN — Medication 3 G: at 19:53

## 2019-10-22 RX ADMIN — Medication 15 ML: at 19:52

## 2019-10-22 RX ADMIN — FAMOTIDINE 20 MG: 10 INJECTION, SOLUTION INTRAVENOUS at 09:35

## 2019-10-22 RX ADMIN — SODIUM CHLORIDE, PRESERVATIVE FREE 10 ML: 5 INJECTION INTRAVENOUS at 09:36

## 2019-10-22 RX ADMIN — FAMOTIDINE 20 MG: 10 INJECTION, SOLUTION INTRAVENOUS at 19:52

## 2019-10-22 RX ADMIN — OXYCODONE HYDROCHLORIDE AND ACETAMINOPHEN 2 TABLET: 5; 325 TABLET ORAL at 18:58

## 2019-10-22 RX ADMIN — CLOPIDOGREL 75 MG: 75 TABLET, FILM COATED ORAL at 09:37

## 2019-10-22 RX ADMIN — FUROSEMIDE 20 MG: 10 INJECTION, SOLUTION INTRAMUSCULAR; INTRAVENOUS at 07:43

## 2019-10-22 RX ADMIN — PHENYLEPHRINE HYDROCHLORIDE 50 MCG/MIN: 10 INJECTION INTRAVENOUS at 05:11

## 2019-10-22 RX ADMIN — MULTIPLE VITAMINS W/ MINERALS TAB 1 TABLET: TAB at 09:37

## 2019-10-22 RX ADMIN — SODIUM CHLORIDE 6.2 UNITS/HR: 9 INJECTION, SOLUTION INTRAVENOUS at 12:32

## 2019-10-22 RX ADMIN — SODIUM CHLORIDE: 9 INJECTION, SOLUTION INTRAVENOUS at 14:03

## 2019-10-22 RX ADMIN — POTASSIUM CHLORIDE 20 MEQ: 29.8 INJECTION, SOLUTION INTRAVENOUS at 06:02

## 2019-10-22 RX ADMIN — DOCUSATE SODIUM 100 MG: 100 CAPSULE, LIQUID FILLED ORAL at 19:52

## 2019-10-22 RX ADMIN — INSULIN LISPRO 1 UNITS: 100 INJECTION, SOLUTION INTRAVENOUS; SUBCUTANEOUS at 21:53

## 2019-10-22 ASSESSMENT — PAIN DESCRIPTION - LOCATION
LOCATION: CHEST;INCISION
LOCATION: CHEST

## 2019-10-22 ASSESSMENT — PAIN SCALES - GENERAL
PAINLEVEL_OUTOF10: 5
PAINLEVEL_OUTOF10: 8
PAINLEVEL_OUTOF10: 4
PAINLEVEL_OUTOF10: 7
PAINLEVEL_OUTOF10: 2

## 2019-10-22 ASSESSMENT — PAIN DESCRIPTION - PAIN TYPE
TYPE: SURGICAL PAIN
TYPE: SURGICAL PAIN

## 2019-10-22 ASSESSMENT — PAIN SCALES - WONG BAKER
WONGBAKER_NUMERICALRESPONSE: 4
WONGBAKER_NUMERICALRESPONSE: 4

## 2019-10-23 LAB
ANION GAP SERPL CALCULATED.3IONS-SCNC: 11 MMOL/L (ref 9–17)
BUN BLDV-MCNC: 21 MG/DL (ref 8–23)
BUN/CREAT BLD: ABNORMAL (ref 9–20)
CALCIUM SERPL-MCNC: 8.3 MG/DL (ref 8.6–10.4)
CHLORIDE BLD-SCNC: 104 MMOL/L (ref 98–107)
CO2: 23 MMOL/L (ref 20–31)
CREAT SERPL-MCNC: 0.57 MG/DL (ref 0.7–1.2)
GFR AFRICAN AMERICAN: >60 ML/MIN
GFR NON-AFRICAN AMERICAN: >60 ML/MIN
GFR SERPL CREATININE-BSD FRML MDRD: ABNORMAL ML/MIN/{1.73_M2}
GFR SERPL CREATININE-BSD FRML MDRD: ABNORMAL ML/MIN/{1.73_M2}
GLUCOSE BLD-MCNC: 123 MG/DL (ref 75–110)
GLUCOSE BLD-MCNC: 151 MG/DL (ref 75–110)
GLUCOSE BLD-MCNC: 161 MG/DL (ref 75–110)
GLUCOSE BLD-MCNC: 168 MG/DL (ref 70–99)
HCT VFR BLD CALC: 33 % (ref 40.7–50.3)
HEMOGLOBIN: 10.5 G/DL (ref 13–17)
INR BLD: 1
MAGNESIUM: 2.6 MG/DL (ref 1.6–2.6)
MCH RBC QN AUTO: 31.3 PG (ref 25.2–33.5)
MCHC RBC AUTO-ENTMCNC: 31.8 G/DL (ref 28.4–34.8)
MCV RBC AUTO: 98.5 FL (ref 82.6–102.9)
NRBC AUTOMATED: 0 PER 100 WBC
PDW BLD-RTO: 13.5 % (ref 11.8–14.4)
PLATELET # BLD: 137 K/UL (ref 138–453)
PMV BLD AUTO: 11.1 FL (ref 8.1–13.5)
POTASSIUM SERPL-SCNC: 4.6 MMOL/L (ref 3.7–5.3)
PROTHROMBIN TIME: 10.3 SEC (ref 9–12)
RBC # BLD: 3.35 M/UL (ref 4.21–5.77)
SODIUM BLD-SCNC: 138 MMOL/L (ref 135–144)
WBC # BLD: 25.2 K/UL (ref 3.5–11.3)

## 2019-10-23 PROCEDURE — 85027 COMPLETE CBC AUTOMATED: CPT

## 2019-10-23 PROCEDURE — 83735 ASSAY OF MAGNESIUM: CPT

## 2019-10-23 PROCEDURE — 2000000000 HC ICU R&B

## 2019-10-23 PROCEDURE — 94660 CPAP INITIATION&MGMT: CPT

## 2019-10-23 PROCEDURE — 97530 THERAPEUTIC ACTIVITIES: CPT

## 2019-10-23 PROCEDURE — 36415 COLL VENOUS BLD VENIPUNCTURE: CPT

## 2019-10-23 PROCEDURE — 82947 ASSAY GLUCOSE BLOOD QUANT: CPT

## 2019-10-23 PROCEDURE — 6370000000 HC RX 637 (ALT 250 FOR IP): Performed by: PHYSICIAN ASSISTANT

## 2019-10-23 PROCEDURE — 94761 N-INVAS EAR/PLS OXIMETRY MLT: CPT

## 2019-10-23 PROCEDURE — 94668 MNPJ CHEST WALL SBSQ: CPT

## 2019-10-23 PROCEDURE — 99233 SBSQ HOSP IP/OBS HIGH 50: CPT | Performed by: INTERNAL MEDICINE

## 2019-10-23 PROCEDURE — 97116 GAIT TRAINING THERAPY: CPT

## 2019-10-23 PROCEDURE — 97110 THERAPEUTIC EXERCISES: CPT

## 2019-10-23 PROCEDURE — 6360000002 HC RX W HCPCS: Performed by: PHYSICIAN ASSISTANT

## 2019-10-23 PROCEDURE — 2580000003 HC RX 258: Performed by: PHYSICIAN ASSISTANT

## 2019-10-23 PROCEDURE — 80048 BASIC METABOLIC PNL TOTAL CA: CPT

## 2019-10-23 PROCEDURE — 85610 PROTHROMBIN TIME: CPT

## 2019-10-23 PROCEDURE — 2700000000 HC OXYGEN THERAPY PER DAY

## 2019-10-23 RX ADMIN — MUPIROCIN: 20 OINTMENT TOPICAL at 20:57

## 2019-10-23 RX ADMIN — POLYETHYLENE GLYCOL 3350 17 G: 17 POWDER, FOR SOLUTION ORAL at 10:00

## 2019-10-23 RX ADMIN — FAMOTIDINE 20 MG: 20 TABLET, FILM COATED ORAL at 20:58

## 2019-10-23 RX ADMIN — DIPHENHYDRAMINE HCL 25 MG: 25 TABLET ORAL at 00:00

## 2019-10-23 RX ADMIN — SODIUM CHLORIDE, PRESERVATIVE FREE 10 ML: 5 INJECTION INTRAVENOUS at 20:58

## 2019-10-23 RX ADMIN — CLOPIDOGREL 75 MG: 75 TABLET, FILM COATED ORAL at 09:59

## 2019-10-23 RX ADMIN — Medication 3 G: at 04:55

## 2019-10-23 RX ADMIN — INSULIN LISPRO 2 UNITS: 100 INJECTION, SOLUTION INTRAVENOUS; SUBCUTANEOUS at 08:17

## 2019-10-23 RX ADMIN — MUPIROCIN: 20 OINTMENT TOPICAL at 10:02

## 2019-10-23 RX ADMIN — Medication 81 MG: at 09:59

## 2019-10-23 RX ADMIN — ENOXAPARIN SODIUM 40 MG: 40 INJECTION SUBCUTANEOUS at 09:58

## 2019-10-23 RX ADMIN — DOCUSATE SODIUM 100 MG: 100 CAPSULE, LIQUID FILLED ORAL at 09:59

## 2019-10-23 RX ADMIN — SODIUM CHLORIDE, PRESERVATIVE FREE 10 ML: 5 INJECTION INTRAVENOUS at 10:01

## 2019-10-23 RX ADMIN — INSULIN LISPRO 2 UNITS: 100 INJECTION, SOLUTION INTRAVENOUS; SUBCUTANEOUS at 12:42

## 2019-10-23 RX ADMIN — SODIUM CHLORIDE: 9 INJECTION, SOLUTION INTRAVENOUS at 11:32

## 2019-10-23 RX ADMIN — DESMOPRESSIN ACETATE 40 MG: 0.2 TABLET ORAL at 22:00

## 2019-10-23 RX ADMIN — FAMOTIDINE 20 MG: 20 TABLET, FILM COATED ORAL at 09:59

## 2019-10-23 RX ADMIN — Medication 15 ML: at 20:57

## 2019-10-23 ASSESSMENT — PAIN SCALES - GENERAL
PAINLEVEL_OUTOF10: 0

## 2019-10-24 LAB
ANION GAP SERPL CALCULATED.3IONS-SCNC: 9 MMOL/L (ref 9–17)
BUN BLDV-MCNC: 23 MG/DL (ref 8–23)
BUN/CREAT BLD: ABNORMAL (ref 9–20)
CALCIUM SERPL-MCNC: 8.1 MG/DL (ref 8.6–10.4)
CHLORIDE BLD-SCNC: 103 MMOL/L (ref 98–107)
CO2: 21 MMOL/L (ref 20–31)
CREAT SERPL-MCNC: 0.49 MG/DL (ref 0.7–1.2)
GFR AFRICAN AMERICAN: >60 ML/MIN
GFR NON-AFRICAN AMERICAN: >60 ML/MIN
GFR SERPL CREATININE-BSD FRML MDRD: ABNORMAL ML/MIN/{1.73_M2}
GFR SERPL CREATININE-BSD FRML MDRD: ABNORMAL ML/MIN/{1.73_M2}
GLUCOSE BLD-MCNC: 105 MG/DL (ref 75–110)
GLUCOSE BLD-MCNC: 107 MG/DL (ref 75–110)
GLUCOSE BLD-MCNC: 110 MG/DL (ref 70–99)
GLUCOSE BLD-MCNC: 110 MG/DL (ref 75–110)
GLUCOSE BLD-MCNC: 93 MG/DL (ref 75–110)
GLUCOSE BLD-MCNC: 97 MG/DL (ref 75–110)
HCT VFR BLD CALC: 32.1 % (ref 40.7–50.3)
HEMOGLOBIN: 9.8 G/DL (ref 13–17)
INR BLD: 0.9
MAGNESIUM: 2.8 MG/DL (ref 1.6–2.6)
MCH RBC QN AUTO: 31.2 PG (ref 25.2–33.5)
MCHC RBC AUTO-ENTMCNC: 30.5 G/DL (ref 28.4–34.8)
MCV RBC AUTO: 102.2 FL (ref 82.6–102.9)
NRBC AUTOMATED: 0.1 PER 100 WBC
PDW BLD-RTO: 13.7 % (ref 11.8–14.4)
PLATELET # BLD: 144 K/UL (ref 138–453)
PMV BLD AUTO: 11.1 FL (ref 8.1–13.5)
POTASSIUM SERPL-SCNC: 4.5 MMOL/L (ref 3.7–5.3)
PROTHROMBIN TIME: 9.9 SEC (ref 9–12)
RBC # BLD: 3.14 M/UL (ref 4.21–5.77)
SODIUM BLD-SCNC: 133 MMOL/L (ref 135–144)
WBC # BLD: 21.6 K/UL (ref 3.5–11.3)

## 2019-10-24 PROCEDURE — 6360000002 HC RX W HCPCS: Performed by: PHYSICIAN ASSISTANT

## 2019-10-24 PROCEDURE — 85610 PROTHROMBIN TIME: CPT

## 2019-10-24 PROCEDURE — 98960 EDU&TRN PT SELF-MGMT NQHP 1: CPT

## 2019-10-24 PROCEDURE — 6370000000 HC RX 637 (ALT 250 FOR IP): Performed by: INTERNAL MEDICINE

## 2019-10-24 PROCEDURE — 83735 ASSAY OF MAGNESIUM: CPT

## 2019-10-24 PROCEDURE — 94668 MNPJ CHEST WALL SBSQ: CPT

## 2019-10-24 PROCEDURE — 82947 ASSAY GLUCOSE BLOOD QUANT: CPT

## 2019-10-24 PROCEDURE — 94669 MECHANICAL CHEST WALL OSCILL: CPT

## 2019-10-24 PROCEDURE — 85027 COMPLETE CBC AUTOMATED: CPT

## 2019-10-24 PROCEDURE — 2700000000 HC OXYGEN THERAPY PER DAY

## 2019-10-24 PROCEDURE — 99232 SBSQ HOSP IP/OBS MODERATE 35: CPT | Performed by: INTERNAL MEDICINE

## 2019-10-24 PROCEDURE — 94660 CPAP INITIATION&MGMT: CPT

## 2019-10-24 PROCEDURE — 97535 SELF CARE MNGMENT TRAINING: CPT

## 2019-10-24 PROCEDURE — 80048 BASIC METABOLIC PNL TOTAL CA: CPT

## 2019-10-24 PROCEDURE — 6370000000 HC RX 637 (ALT 250 FOR IP): Performed by: PHYSICIAN ASSISTANT

## 2019-10-24 PROCEDURE — 2580000003 HC RX 258: Performed by: PHYSICIAN ASSISTANT

## 2019-10-24 PROCEDURE — 36415 COLL VENOUS BLD VENIPUNCTURE: CPT

## 2019-10-24 PROCEDURE — 99254 IP/OBS CNSLTJ NEW/EST MOD 60: CPT | Performed by: PHYSICAL MEDICINE & REHABILITATION

## 2019-10-24 PROCEDURE — 93005 ELECTROCARDIOGRAM TRACING: CPT | Performed by: INTERNAL MEDICINE

## 2019-10-24 PROCEDURE — 97530 THERAPEUTIC ACTIVITIES: CPT

## 2019-10-24 PROCEDURE — 97116 GAIT TRAINING THERAPY: CPT

## 2019-10-24 PROCEDURE — 94761 N-INVAS EAR/PLS OXIMETRY MLT: CPT

## 2019-10-24 PROCEDURE — 2140000001 HC CVICU INTERMEDIATE R&B

## 2019-10-24 RX ORDER — LISINOPRIL 2.5 MG/1
2.5 TABLET ORAL DAILY
Status: DISCONTINUED | OUTPATIENT
Start: 2019-10-24 | End: 2019-10-25 | Stop reason: HOSPADM

## 2019-10-24 RX ADMIN — Medication 15 ML: at 20:50

## 2019-10-24 RX ADMIN — FAMOTIDINE 20 MG: 20 TABLET, FILM COATED ORAL at 20:45

## 2019-10-24 RX ADMIN — MUPIROCIN: 20 OINTMENT TOPICAL at 20:45

## 2019-10-24 RX ADMIN — Medication 15 ML: at 09:27

## 2019-10-24 RX ADMIN — MUPIROCIN: 20 OINTMENT TOPICAL at 09:32

## 2019-10-24 RX ADMIN — ENOXAPARIN SODIUM 40 MG: 40 INJECTION SUBCUTANEOUS at 09:29

## 2019-10-24 RX ADMIN — MULTIPLE VITAMINS W/ MINERALS TAB 1 TABLET: TAB at 09:26

## 2019-10-24 RX ADMIN — DOCUSATE SODIUM 100 MG: 100 CAPSULE, LIQUID FILLED ORAL at 00:47

## 2019-10-24 RX ADMIN — SODIUM CHLORIDE, PRESERVATIVE FREE 10 ML: 5 INJECTION INTRAVENOUS at 20:45

## 2019-10-24 RX ADMIN — DOCUSATE SODIUM 100 MG: 100 CAPSULE, LIQUID FILLED ORAL at 20:45

## 2019-10-24 RX ADMIN — POLYETHYLENE GLYCOL 3350 17 G: 17 POWDER, FOR SOLUTION ORAL at 09:28

## 2019-10-24 RX ADMIN — LISINOPRIL 2.5 MG: 2.5 TABLET ORAL at 09:43

## 2019-10-24 RX ADMIN — DOCUSATE SODIUM 100 MG: 100 CAPSULE, LIQUID FILLED ORAL at 09:27

## 2019-10-24 RX ADMIN — SODIUM CHLORIDE, PRESERVATIVE FREE 10 ML: 5 INJECTION INTRAVENOUS at 09:27

## 2019-10-24 RX ADMIN — Medication 81 MG: at 09:27

## 2019-10-24 RX ADMIN — FAMOTIDINE 20 MG: 20 TABLET, FILM COATED ORAL at 09:26

## 2019-10-24 RX ADMIN — CLOPIDOGREL 75 MG: 75 TABLET, FILM COATED ORAL at 09:27

## 2019-10-24 RX ADMIN — DESMOPRESSIN ACETATE 40 MG: 0.2 TABLET ORAL at 20:45

## 2019-10-24 ASSESSMENT — PAIN SCALES - GENERAL
PAINLEVEL_OUTOF10: 0

## 2019-10-25 ENCOUNTER — HOSPITAL ENCOUNTER (INPATIENT)
Age: 64
LOS: 3 days | Discharge: ANOTHER HOSPITAL | DRG: 201 | End: 2019-10-28
Attending: PHYSICAL MEDICINE & REHABILITATION | Admitting: PHYSICAL MEDICINE & REHABILITATION
Payer: MEDICAID

## 2019-10-25 VITALS
RESPIRATION RATE: 16 BRPM | TEMPERATURE: 97.9 F | HEIGHT: 67 IN | WEIGHT: 287.48 LBS | BODY MASS INDEX: 45.12 KG/M2 | SYSTOLIC BLOOD PRESSURE: 106 MMHG | HEART RATE: 68 BPM | OXYGEN SATURATION: 93 % | DIASTOLIC BLOOD PRESSURE: 54 MMHG

## 2019-10-25 PROBLEM — Q24.9 CARDIAC ABNORMALITY: Status: ACTIVE | Noted: 2019-10-25

## 2019-10-25 LAB
ANION GAP SERPL CALCULATED.3IONS-SCNC: 8 MMOL/L (ref 9–17)
BUN BLDV-MCNC: 18 MG/DL (ref 8–23)
BUN/CREAT BLD: ABNORMAL (ref 9–20)
CALCIUM SERPL-MCNC: 7.7 MG/DL (ref 8.6–10.4)
CHLORIDE BLD-SCNC: 103 MMOL/L (ref 98–107)
CO2: 22 MMOL/L (ref 20–31)
CREAT SERPL-MCNC: 0.46 MG/DL (ref 0.7–1.2)
EKG ATRIAL RATE: 70 BPM
EKG Q-T INTERVAL: 496 MS
EKG QRS DURATION: 80 MS
EKG QTC CALCULATION (BAZETT): 519 MS
EKG R AXIS: 39 DEGREES
EKG T AXIS: 32 DEGREES
EKG VENTRICULAR RATE: 66 BPM
GFR AFRICAN AMERICAN: >60 ML/MIN
GFR NON-AFRICAN AMERICAN: >60 ML/MIN
GFR SERPL CREATININE-BSD FRML MDRD: ABNORMAL ML/MIN/{1.73_M2}
GFR SERPL CREATININE-BSD FRML MDRD: ABNORMAL ML/MIN/{1.73_M2}
GLUCOSE BLD-MCNC: 102 MG/DL (ref 75–110)
GLUCOSE BLD-MCNC: 105 MG/DL (ref 70–99)
GLUCOSE BLD-MCNC: 96 MG/DL (ref 75–110)
HCT VFR BLD CALC: 30.6 % (ref 40.7–50.3)
HEMOGLOBIN: 9.9 G/DL (ref 13–17)
INR BLD: 1
LV EF: 40 %
LVEF MODALITY: NORMAL
MAGNESIUM: 2.6 MG/DL (ref 1.6–2.6)
MCH RBC QN AUTO: 31.4 PG (ref 25.2–33.5)
MCHC RBC AUTO-ENTMCNC: 32.4 G/DL (ref 28.4–34.8)
MCV RBC AUTO: 97.1 FL (ref 82.6–102.9)
NRBC AUTOMATED: 0.3 PER 100 WBC
PDW BLD-RTO: 13.7 % (ref 11.8–14.4)
PLATELET # BLD: 159 K/UL (ref 138–453)
PMV BLD AUTO: 10.5 FL (ref 8.1–13.5)
POTASSIUM SERPL-SCNC: 4.3 MMOL/L (ref 3.7–5.3)
PROTHROMBIN TIME: 10.3 SEC (ref 9–12)
RBC # BLD: 3.15 M/UL (ref 4.21–5.77)
SODIUM BLD-SCNC: 133 MMOL/L (ref 135–144)
WBC # BLD: 15.5 K/UL (ref 3.5–11.3)

## 2019-10-25 PROCEDURE — 93010 ELECTROCARDIOGRAM REPORT: CPT | Performed by: INTERNAL MEDICINE

## 2019-10-25 PROCEDURE — 80048 BASIC METABOLIC PNL TOTAL CA: CPT

## 2019-10-25 PROCEDURE — 93320 DOPPLER ECHO COMPLETE: CPT

## 2019-10-25 PROCEDURE — 94660 CPAP INITIATION&MGMT: CPT

## 2019-10-25 PROCEDURE — APPSS15 APP SPLIT SHARED TIME 0-15 MINUTES: Performed by: PHYSICIAN ASSISTANT

## 2019-10-25 PROCEDURE — 97530 THERAPEUTIC ACTIVITIES: CPT

## 2019-10-25 PROCEDURE — 6370000000 HC RX 637 (ALT 250 FOR IP): Performed by: INTERNAL MEDICINE

## 2019-10-25 PROCEDURE — 82947 ASSAY GLUCOSE BLOOD QUANT: CPT

## 2019-10-25 PROCEDURE — 6370000000 HC RX 637 (ALT 250 FOR IP): Performed by: PHYSICIAN ASSISTANT

## 2019-10-25 PROCEDURE — 93308 TTE F-UP OR LMTD: CPT

## 2019-10-25 PROCEDURE — 97116 GAIT TRAINING THERAPY: CPT

## 2019-10-25 PROCEDURE — 93325 DOPPLER ECHO COLOR FLOW MAPG: CPT

## 2019-10-25 PROCEDURE — 6360000002 HC RX W HCPCS: Performed by: PHYSICIAN ASSISTANT

## 2019-10-25 PROCEDURE — 97110 THERAPEUTIC EXERCISES: CPT

## 2019-10-25 PROCEDURE — 99024 POSTOP FOLLOW-UP VISIT: CPT | Performed by: PHYSICIAN ASSISTANT

## 2019-10-25 PROCEDURE — 85610 PROTHROMBIN TIME: CPT

## 2019-10-25 PROCEDURE — 1180000000 HC REHAB R&B

## 2019-10-25 PROCEDURE — 83735 ASSAY OF MAGNESIUM: CPT

## 2019-10-25 PROCEDURE — 2580000003 HC RX 258: Performed by: PHYSICIAN ASSISTANT

## 2019-10-25 PROCEDURE — 99239 HOSP IP/OBS DSCHRG MGMT >30: CPT | Performed by: INTERNAL MEDICINE

## 2019-10-25 PROCEDURE — 85027 COMPLETE CBC AUTOMATED: CPT

## 2019-10-25 PROCEDURE — APPSS30 APP SPLIT SHARED TIME 16-30 MINUTES: Performed by: PHYSICIAN ASSISTANT

## 2019-10-25 PROCEDURE — 36415 COLL VENOUS BLD VENIPUNCTURE: CPT

## 2019-10-25 RX ORDER — ACETAMINOPHEN 325 MG/1
650 TABLET ORAL EVERY 4 HOURS PRN
Status: DISCONTINUED | OUTPATIENT
Start: 2019-10-25 | End: 2019-10-28 | Stop reason: HOSPADM

## 2019-10-25 RX ORDER — OXYCODONE HYDROCHLORIDE AND ACETAMINOPHEN 5; 325 MG/1; MG/1
1 TABLET ORAL EVERY 4 HOURS PRN
Status: DISCONTINUED | OUTPATIENT
Start: 2019-10-25 | End: 2019-10-28 | Stop reason: HOSPADM

## 2019-10-25 RX ORDER — DOCUSATE SODIUM 100 MG/1
100 CAPSULE, LIQUID FILLED ORAL 2 TIMES DAILY
Status: DISCONTINUED | OUTPATIENT
Start: 2019-10-26 | End: 2019-10-26

## 2019-10-25 RX ORDER — OXYCODONE HYDROCHLORIDE AND ACETAMINOPHEN 5; 325 MG/1; MG/1
1 TABLET ORAL EVERY 4 HOURS PRN
Status: CANCELLED | OUTPATIENT
Start: 2019-10-25

## 2019-10-25 RX ORDER — ATORVASTATIN CALCIUM 40 MG/1
40 TABLET, FILM COATED ORAL NIGHTLY
Status: CANCELLED | OUTPATIENT
Start: 2019-10-25

## 2019-10-25 RX ORDER — OXYCODONE HYDROCHLORIDE AND ACETAMINOPHEN 5; 325 MG/1; MG/1
2 TABLET ORAL EVERY 4 HOURS PRN
Status: DISCONTINUED | OUTPATIENT
Start: 2019-10-25 | End: 2019-10-28 | Stop reason: HOSPADM

## 2019-10-25 RX ORDER — DIPHENHYDRAMINE HCL 25 MG
25 TABLET ORAL NIGHTLY PRN
Status: CANCELLED | OUTPATIENT
Start: 2019-10-25

## 2019-10-25 RX ORDER — FAMOTIDINE 20 MG/1
20 TABLET, FILM COATED ORAL 2 TIMES DAILY
Status: DISCONTINUED | OUTPATIENT
Start: 2019-10-26 | End: 2019-10-28 | Stop reason: HOSPADM

## 2019-10-25 RX ORDER — ATORVASTATIN CALCIUM 40 MG/1
40 TABLET, FILM COATED ORAL NIGHTLY
Status: DISCONTINUED | OUTPATIENT
Start: 2019-10-26 | End: 2019-10-28 | Stop reason: HOSPADM

## 2019-10-25 RX ORDER — OXYCODONE HYDROCHLORIDE AND ACETAMINOPHEN 5; 325 MG/1; MG/1
2 TABLET ORAL EVERY 4 HOURS PRN
Status: CANCELLED | OUTPATIENT
Start: 2019-10-25

## 2019-10-25 RX ORDER — LISINOPRIL 2.5 MG/1
2.5 TABLET ORAL DAILY
Qty: 30 TABLET | Refills: 3 | Status: ON HOLD | OUTPATIENT
Start: 2019-10-26 | End: 2019-11-18 | Stop reason: SDUPTHER

## 2019-10-25 RX ORDER — CLOPIDOGREL BISULFATE 75 MG/1
75 TABLET ORAL DAILY
Status: DISCONTINUED | OUTPATIENT
Start: 2019-10-26 | End: 2019-10-28 | Stop reason: HOSPADM

## 2019-10-25 RX ORDER — ATORVASTATIN CALCIUM 40 MG/1
40 TABLET, FILM COATED ORAL NIGHTLY
Qty: 30 TABLET | Refills: 3 | Status: ON HOLD | OUTPATIENT
Start: 2019-10-25 | End: 2019-11-18 | Stop reason: SDUPTHER

## 2019-10-25 RX ORDER — ASPIRIN 81 MG/1
81 TABLET ORAL DAILY
Status: DISCONTINUED | OUTPATIENT
Start: 2019-10-26 | End: 2019-10-28 | Stop reason: HOSPADM

## 2019-10-25 RX ORDER — LISINOPRIL 5 MG/1
2.5 TABLET ORAL DAILY
Status: DISCONTINUED | OUTPATIENT
Start: 2019-10-26 | End: 2019-10-28 | Stop reason: HOSPADM

## 2019-10-25 RX ORDER — BISACODYL 10 MG
10 SUPPOSITORY, RECTAL RECTAL DAILY PRN
Status: DISCONTINUED | OUTPATIENT
Start: 2019-10-25 | End: 2019-10-26

## 2019-10-25 RX ORDER — DIPHENHYDRAMINE HCL 25 MG
25 TABLET ORAL NIGHTLY PRN
Status: DISCONTINUED | OUTPATIENT
Start: 2019-10-25 | End: 2019-10-28 | Stop reason: HOSPADM

## 2019-10-25 RX ORDER — LISINOPRIL 2.5 MG/1
2.5 TABLET ORAL DAILY
Status: CANCELLED | OUTPATIENT
Start: 2019-10-26

## 2019-10-25 RX ORDER — BISACODYL 10 MG
10 SUPPOSITORY, RECTAL RECTAL DAILY PRN
Status: CANCELLED | OUTPATIENT
Start: 2019-10-25

## 2019-10-25 RX ORDER — ACETAMINOPHEN 325 MG/1
650 TABLET ORAL EVERY 4 HOURS PRN
Status: CANCELLED | OUTPATIENT
Start: 2019-10-25

## 2019-10-25 RX ORDER — CLOPIDOGREL BISULFATE 75 MG/1
75 TABLET ORAL DAILY
Qty: 30 TABLET | Refills: 3 | Status: ON HOLD | OUTPATIENT
Start: 2019-10-26 | End: 2019-11-18 | Stop reason: SDUPTHER

## 2019-10-25 RX ORDER — ASPIRIN 81 MG/1
81 TABLET ORAL DAILY
Status: CANCELLED | OUTPATIENT
Start: 2019-10-26

## 2019-10-25 RX ORDER — BISACODYL 10 MG
10 SUPPOSITORY, RECTAL RECTAL DAILY PRN
Status: DISCONTINUED | OUTPATIENT
Start: 2019-10-25 | End: 2019-10-28 | Stop reason: HOSPADM

## 2019-10-25 RX ORDER — DOCUSATE SODIUM 100 MG/1
100 CAPSULE, LIQUID FILLED ORAL 2 TIMES DAILY
Status: CANCELLED | OUTPATIENT
Start: 2019-10-25

## 2019-10-25 RX ORDER — POLYETHYLENE GLYCOL 3350 17 G/17G
17 POWDER, FOR SOLUTION ORAL DAILY
Status: DISCONTINUED | OUTPATIENT
Start: 2019-10-26 | End: 2019-10-28 | Stop reason: HOSPADM

## 2019-10-25 RX ORDER — CLOPIDOGREL BISULFATE 75 MG/1
75 TABLET ORAL DAILY
Status: CANCELLED | OUTPATIENT
Start: 2019-10-26

## 2019-10-25 RX ORDER — FAMOTIDINE 20 MG/1
20 TABLET, FILM COATED ORAL 2 TIMES DAILY
Status: CANCELLED | OUTPATIENT
Start: 2019-10-25

## 2019-10-25 RX ADMIN — DOCUSATE SODIUM 100 MG: 100 CAPSULE, LIQUID FILLED ORAL at 09:08

## 2019-10-25 RX ADMIN — FAMOTIDINE 20 MG: 20 TABLET, FILM COATED ORAL at 19:55

## 2019-10-25 RX ADMIN — Medication 81 MG: at 09:08

## 2019-10-25 RX ADMIN — LISINOPRIL 2.5 MG: 2.5 TABLET ORAL at 09:08

## 2019-10-25 RX ADMIN — POLYETHYLENE GLYCOL 3350 17 G: 17 POWDER, FOR SOLUTION ORAL at 09:09

## 2019-10-25 RX ADMIN — SODIUM CHLORIDE, PRESERVATIVE FREE 10 ML: 5 INJECTION INTRAVENOUS at 09:09

## 2019-10-25 RX ADMIN — MUPIROCIN: 20 OINTMENT TOPICAL at 09:09

## 2019-10-25 RX ADMIN — CLOPIDOGREL 75 MG: 75 TABLET, FILM COATED ORAL at 09:08

## 2019-10-25 RX ADMIN — ENOXAPARIN SODIUM 40 MG: 40 INJECTION SUBCUTANEOUS at 09:09

## 2019-10-25 RX ADMIN — DESMOPRESSIN ACETATE 40 MG: 0.2 TABLET ORAL at 19:55

## 2019-10-25 RX ADMIN — FAMOTIDINE 20 MG: 20 TABLET, FILM COATED ORAL at 09:08

## 2019-10-25 RX ADMIN — Medication 15 ML: at 09:08

## 2019-10-25 RX ADMIN — MULTIPLE VITAMINS W/ MINERALS TAB 1 TABLET: TAB at 09:08

## 2019-10-25 ASSESSMENT — PAIN SCALES - GENERAL
PAINLEVEL_OUTOF10: 0

## 2019-10-26 ENCOUNTER — APPOINTMENT (OUTPATIENT)
Dept: GENERAL RADIOLOGY | Age: 64
DRG: 201 | End: 2019-10-26
Attending: PHYSICAL MEDICINE & REHABILITATION
Payer: MEDICAID

## 2019-10-26 LAB
ABSOLUTE EOS #: 0.41 K/UL (ref 0–0.4)
ABSOLUTE IMMATURE GRANULOCYTE: ABNORMAL K/UL (ref 0–0.3)
ABSOLUTE LYMPH #: 0.41 K/UL (ref 1–4.8)
ABSOLUTE MONO #: 0.95 K/UL (ref 0.1–1.3)
ANION GAP SERPL CALCULATED.3IONS-SCNC: 11 MMOL/L (ref 9–17)
BASOPHILS # BLD: 0 % (ref 0–2)
BASOPHILS ABSOLUTE: 0 K/UL (ref 0–0.2)
BUN BLDV-MCNC: 15 MG/DL (ref 8–23)
BUN/CREAT BLD: ABNORMAL (ref 9–20)
CALCIUM SERPL-MCNC: 8.1 MG/DL (ref 8.6–10.4)
CHLORIDE BLD-SCNC: 102 MMOL/L (ref 98–107)
CO2: 23 MMOL/L (ref 20–31)
CREAT SERPL-MCNC: 0.59 MG/DL (ref 0.7–1.2)
DIFFERENTIAL TYPE: ABNORMAL
EOSINOPHILS RELATIVE PERCENT: 3 % (ref 0–4)
GFR AFRICAN AMERICAN: >60 ML/MIN
GFR NON-AFRICAN AMERICAN: >60 ML/MIN
GFR SERPL CREATININE-BSD FRML MDRD: ABNORMAL ML/MIN/{1.73_M2}
GFR SERPL CREATININE-BSD FRML MDRD: ABNORMAL ML/MIN/{1.73_M2}
GLUCOSE BLD-MCNC: 111 MG/DL (ref 70–99)
HCT VFR BLD CALC: 30.8 % (ref 41–53)
HEMOGLOBIN: 10.1 G/DL (ref 13.5–17.5)
IMMATURE GRANULOCYTES: ABNORMAL %
LYMPHOCYTES # BLD: 3 % (ref 24–44)
MCH RBC QN AUTO: 31.3 PG (ref 26–34)
MCHC RBC AUTO-ENTMCNC: 32.9 G/DL (ref 31–37)
MCV RBC AUTO: 94.9 FL (ref 80–100)
METAMYELOCYTES ABSOLUTE COUNT: 0.68 K/UL
METAMYELOCYTES: 5 %
MONOCYTES # BLD: 7 % (ref 1–7)
MORPHOLOGY: ABNORMAL
MYELOCYTES ABSOLUTE COUNT: 0.14 K/UL
MYELOCYTES: 1 %
NRBC AUTOMATED: ABNORMAL PER 100 WBC
PDW BLD-RTO: 13.9 % (ref 11.5–14.9)
PLATELET # BLD: 212 K/UL (ref 150–450)
PLATELET ESTIMATE: ABNORMAL
PMV BLD AUTO: 7.7 FL (ref 6–12)
POTASSIUM SERPL-SCNC: 5 MMOL/L (ref 3.7–5.3)
RBC # BLD: 3.24 M/UL (ref 4.5–5.9)
RBC # BLD: ABNORMAL 10*6/UL
SEG NEUTROPHILS: 81 % (ref 36–66)
SEGMENTED NEUTROPHILS ABSOLUTE COUNT: 10.91 K/UL (ref 1.3–9.1)
SODIUM BLD-SCNC: 136 MMOL/L (ref 135–144)
WBC # BLD: 13.5 K/UL (ref 3.5–11)
WBC # BLD: ABNORMAL 10*3/UL

## 2019-10-26 PROCEDURE — 6370000000 HC RX 637 (ALT 250 FOR IP): Performed by: INTERNAL MEDICINE

## 2019-10-26 PROCEDURE — 97162 PT EVAL MOD COMPLEX 30 MIN: CPT

## 2019-10-26 PROCEDURE — 36415 COLL VENOUS BLD VENIPUNCTURE: CPT

## 2019-10-26 PROCEDURE — 97116 GAIT TRAINING THERAPY: CPT

## 2019-10-26 PROCEDURE — 97110 THERAPEUTIC EXERCISES: CPT

## 2019-10-26 PROCEDURE — 6360000002 HC RX W HCPCS: Performed by: INTERNAL MEDICINE

## 2019-10-26 PROCEDURE — 1180000000 HC REHAB R&B

## 2019-10-26 PROCEDURE — 97535 SELF CARE MNGMENT TRAINING: CPT

## 2019-10-26 PROCEDURE — 6370000000 HC RX 637 (ALT 250 FOR IP): Performed by: PHYSICAL MEDICINE & REHABILITATION

## 2019-10-26 PROCEDURE — 71045 X-RAY EXAM CHEST 1 VIEW: CPT

## 2019-10-26 PROCEDURE — 99223 1ST HOSP IP/OBS HIGH 75: CPT | Performed by: PHYSICAL MEDICINE & REHABILITATION

## 2019-10-26 PROCEDURE — 97530 THERAPEUTIC ACTIVITIES: CPT

## 2019-10-26 PROCEDURE — 97166 OT EVAL MOD COMPLEX 45 MIN: CPT

## 2019-10-26 PROCEDURE — 6370000000 HC RX 637 (ALT 250 FOR IP): Performed by: STUDENT IN AN ORGANIZED HEALTH CARE EDUCATION/TRAINING PROGRAM

## 2019-10-26 PROCEDURE — 85025 COMPLETE CBC W/AUTO DIFF WBC: CPT

## 2019-10-26 PROCEDURE — 80048 BASIC METABOLIC PNL TOTAL CA: CPT

## 2019-10-26 RX ORDER — DOCUSATE SODIUM 100 MG/1
100 CAPSULE, LIQUID FILLED ORAL 2 TIMES DAILY PRN
Status: DISCONTINUED | OUTPATIENT
Start: 2019-10-26 | End: 2019-10-28 | Stop reason: HOSPADM

## 2019-10-26 RX ORDER — FUROSEMIDE 20 MG/1
20 TABLET ORAL
Status: DISCONTINUED | OUTPATIENT
Start: 2019-10-26 | End: 2019-10-28 | Stop reason: HOSPADM

## 2019-10-26 RX ORDER — SENNA PLUS 8.6 MG/1
2 TABLET ORAL NIGHTLY PRN
Status: DISCONTINUED | OUTPATIENT
Start: 2019-10-26 | End: 2019-10-28 | Stop reason: HOSPADM

## 2019-10-26 RX ADMIN — FAMOTIDINE 20 MG: 20 TABLET, FILM COATED ORAL at 22:16

## 2019-10-26 RX ADMIN — MUPIROCIN: 20 OINTMENT TOPICAL at 08:09

## 2019-10-26 RX ADMIN — ENOXAPARIN SODIUM 30 MG: 30 INJECTION SUBCUTANEOUS at 22:17

## 2019-10-26 RX ADMIN — FAMOTIDINE 20 MG: 20 TABLET, FILM COATED ORAL at 08:07

## 2019-10-26 RX ADMIN — DOCUSATE SODIUM 100 MG: 100 CAPSULE, LIQUID FILLED ORAL at 08:07

## 2019-10-26 RX ADMIN — ASPIRIN 81 MG: 81 TABLET, COATED ORAL at 08:07

## 2019-10-26 RX ADMIN — CLOPIDOGREL BISULFATE 75 MG: 75 TABLET ORAL at 08:07

## 2019-10-26 RX ADMIN — ENOXAPARIN SODIUM 30 MG: 30 INJECTION SUBCUTANEOUS at 14:06

## 2019-10-26 RX ADMIN — ATORVASTATIN CALCIUM 40 MG: 40 TABLET, FILM COATED ORAL at 22:17

## 2019-10-26 RX ADMIN — FUROSEMIDE 20 MG: 20 TABLET ORAL at 22:17

## 2019-10-26 RX ADMIN — POLYETHYLENE GLYCOL 3350 17 G: 17 POWDER, FOR SOLUTION ORAL at 08:07

## 2019-10-26 RX ADMIN — LISINOPRIL 2.5 MG: 5 TABLET ORAL at 08:07

## 2019-10-26 ASSESSMENT — PAIN DESCRIPTION - DESCRIPTORS: DESCRIPTORS: ACHING;DISCOMFORT

## 2019-10-26 ASSESSMENT — 9 HOLE PEG TEST
TEST_RESULT: IMPAIRED
TEST_RESULT: IMPAIRED
TESTTIME_SECONDS: 49
TESTTIME_SECONDS: 70

## 2019-10-26 ASSESSMENT — PAIN DESCRIPTION - FREQUENCY: FREQUENCY: INTERMITTENT

## 2019-10-26 ASSESSMENT — PAIN DESCRIPTION - ORIENTATION: ORIENTATION: LEFT

## 2019-10-26 ASSESSMENT — PAIN SCALES - GENERAL
PAINLEVEL_OUTOF10: 0
PAINLEVEL_OUTOF10: 0
PAINLEVEL_OUTOF10: 3

## 2019-10-26 ASSESSMENT — PAIN DESCRIPTION - PROGRESSION: CLINICAL_PROGRESSION: NOT CHANGED

## 2019-10-26 ASSESSMENT — PAIN DESCRIPTION - ONSET: ONSET: ON-GOING

## 2019-10-27 VITALS
TEMPERATURE: 98.1 F | RESPIRATION RATE: 16 BRPM | DIASTOLIC BLOOD PRESSURE: 58 MMHG | HEIGHT: 67 IN | WEIGHT: 283 LBS | BODY MASS INDEX: 44.42 KG/M2 | SYSTOLIC BLOOD PRESSURE: 115 MMHG | OXYGEN SATURATION: 94 % | HEART RATE: 71 BPM

## 2019-10-27 LAB
ANION GAP SERPL CALCULATED.3IONS-SCNC: 12 MMOL/L (ref 9–17)
BUN BLDV-MCNC: 13 MG/DL (ref 8–23)
BUN/CREAT BLD: ABNORMAL (ref 9–20)
CALCIUM SERPL-MCNC: 8.4 MG/DL (ref 8.6–10.4)
CHLORIDE BLD-SCNC: 103 MMOL/L (ref 98–107)
CO2: 21 MMOL/L (ref 20–31)
CREAT SERPL-MCNC: 0.68 MG/DL (ref 0.7–1.2)
GFR AFRICAN AMERICAN: >60 ML/MIN
GFR NON-AFRICAN AMERICAN: >60 ML/MIN
GFR SERPL CREATININE-BSD FRML MDRD: ABNORMAL ML/MIN/{1.73_M2}
GFR SERPL CREATININE-BSD FRML MDRD: ABNORMAL ML/MIN/{1.73_M2}
GLUCOSE BLD-MCNC: 135 MG/DL (ref 70–99)
POTASSIUM SERPL-SCNC: 4.7 MMOL/L (ref 3.7–5.3)
SODIUM BLD-SCNC: 136 MMOL/L (ref 135–144)

## 2019-10-27 PROCEDURE — 1180000000 HC REHAB R&B

## 2019-10-27 PROCEDURE — 97116 GAIT TRAINING THERAPY: CPT

## 2019-10-27 PROCEDURE — 6360000002 HC RX W HCPCS: Performed by: INTERNAL MEDICINE

## 2019-10-27 PROCEDURE — 36415 COLL VENOUS BLD VENIPUNCTURE: CPT

## 2019-10-27 PROCEDURE — 97110 THERAPEUTIC EXERCISES: CPT

## 2019-10-27 PROCEDURE — 80048 BASIC METABOLIC PNL TOTAL CA: CPT

## 2019-10-27 PROCEDURE — 97535 SELF CARE MNGMENT TRAINING: CPT

## 2019-10-27 PROCEDURE — 6370000000 HC RX 637 (ALT 250 FOR IP): Performed by: STUDENT IN AN ORGANIZED HEALTH CARE EDUCATION/TRAINING PROGRAM

## 2019-10-27 PROCEDURE — 6370000000 HC RX 637 (ALT 250 FOR IP): Performed by: PHYSICAL MEDICINE & REHABILITATION

## 2019-10-27 PROCEDURE — 6370000000 HC RX 637 (ALT 250 FOR IP): Performed by: INTERNAL MEDICINE

## 2019-10-27 RX ORDER — CLOPIDOGREL BISULFATE 75 MG/1
75 TABLET ORAL DAILY
Status: CANCELLED | OUTPATIENT
Start: 2019-10-28

## 2019-10-27 RX ORDER — SENNA PLUS 8.6 MG/1
2 TABLET ORAL NIGHTLY PRN
Status: CANCELLED | OUTPATIENT
Start: 2019-10-27

## 2019-10-27 RX ORDER — FAMOTIDINE 20 MG/1
20 TABLET, FILM COATED ORAL 2 TIMES DAILY
Status: CANCELLED | OUTPATIENT
Start: 2019-10-28

## 2019-10-27 RX ORDER — DIPHENHYDRAMINE HCL 25 MG
25 TABLET ORAL NIGHTLY PRN
Status: CANCELLED | OUTPATIENT
Start: 2019-10-27

## 2019-10-27 RX ORDER — POLYETHYLENE GLYCOL 3350 17 G/17G
17 POWDER, FOR SOLUTION ORAL DAILY
Status: CANCELLED | OUTPATIENT
Start: 2019-10-28

## 2019-10-27 RX ORDER — ASPIRIN 81 MG/1
81 TABLET ORAL DAILY
Status: CANCELLED | OUTPATIENT
Start: 2019-10-28

## 2019-10-27 RX ORDER — FUROSEMIDE 20 MG/1
20 TABLET ORAL
Status: CANCELLED | OUTPATIENT
Start: 2019-10-28

## 2019-10-27 RX ORDER — BISACODYL 10 MG
10 SUPPOSITORY, RECTAL RECTAL DAILY PRN
Status: CANCELLED | OUTPATIENT
Start: 2019-10-27

## 2019-10-27 RX ORDER — ATORVASTATIN CALCIUM 40 MG/1
40 TABLET, FILM COATED ORAL NIGHTLY
Status: CANCELLED | OUTPATIENT
Start: 2019-10-28

## 2019-10-27 RX ORDER — OXYCODONE HYDROCHLORIDE AND ACETAMINOPHEN 5; 325 MG/1; MG/1
2 TABLET ORAL EVERY 4 HOURS PRN
Status: CANCELLED | OUTPATIENT
Start: 2019-10-27

## 2019-10-27 RX ORDER — DOCUSATE SODIUM 100 MG/1
100 CAPSULE, LIQUID FILLED ORAL 2 TIMES DAILY PRN
Status: CANCELLED | OUTPATIENT
Start: 2019-10-27

## 2019-10-27 RX ORDER — LISINOPRIL 5 MG/1
2.5 TABLET ORAL DAILY
Status: CANCELLED | OUTPATIENT
Start: 2019-10-28

## 2019-10-27 RX ORDER — ACETAMINOPHEN 325 MG/1
650 TABLET ORAL EVERY 4 HOURS PRN
Status: CANCELLED | OUTPATIENT
Start: 2019-10-27

## 2019-10-27 RX ORDER — OXYCODONE HYDROCHLORIDE AND ACETAMINOPHEN 5; 325 MG/1; MG/1
1 TABLET ORAL EVERY 4 HOURS PRN
Status: CANCELLED | OUTPATIENT
Start: 2019-10-27

## 2019-10-27 RX ORDER — FUROSEMIDE 40 MG/1
40 TABLET ORAL ONCE
Status: COMPLETED | OUTPATIENT
Start: 2019-10-27 | End: 2019-10-27

## 2019-10-27 RX ADMIN — FAMOTIDINE 20 MG: 20 TABLET, FILM COATED ORAL at 21:21

## 2019-10-27 RX ADMIN — ENOXAPARIN SODIUM 30 MG: 30 INJECTION SUBCUTANEOUS at 21:21

## 2019-10-27 RX ADMIN — LISINOPRIL 2.5 MG: 5 TABLET ORAL at 07:32

## 2019-10-27 RX ADMIN — CLOPIDOGREL BISULFATE 75 MG: 75 TABLET ORAL at 07:32

## 2019-10-27 RX ADMIN — FUROSEMIDE 40 MG: 40 TABLET ORAL at 23:34

## 2019-10-27 RX ADMIN — POLYETHYLENE GLYCOL 3350 17 G: 17 POWDER, FOR SOLUTION ORAL at 07:34

## 2019-10-27 RX ADMIN — FAMOTIDINE 20 MG: 20 TABLET, FILM COATED ORAL at 07:32

## 2019-10-27 RX ADMIN — ENOXAPARIN SODIUM 30 MG: 30 INJECTION SUBCUTANEOUS at 07:31

## 2019-10-27 RX ADMIN — ATORVASTATIN CALCIUM 40 MG: 40 TABLET, FILM COATED ORAL at 21:21

## 2019-10-27 RX ADMIN — ASPIRIN 81 MG: 81 TABLET, COATED ORAL at 07:34

## 2019-10-27 ASSESSMENT — PAIN SCALES - GENERAL
PAINLEVEL_OUTOF10: 0
PAINLEVEL_OUTOF10: 0

## 2019-10-27 ASSESSMENT — PAIN DESCRIPTION - PROGRESSION
CLINICAL_PROGRESSION: NOT CHANGED
CLINICAL_PROGRESSION: NOT CHANGED

## 2019-10-28 ENCOUNTER — APPOINTMENT (OUTPATIENT)
Dept: GENERAL RADIOLOGY | Age: 64
DRG: 201 | End: 2019-10-28
Attending: PHYSICAL MEDICINE & REHABILITATION
Payer: MEDICAID

## 2019-10-28 ENCOUNTER — HOSPITAL ENCOUNTER (INPATIENT)
Age: 64
LOS: 7 days | Discharge: NURSING, REHAB OR LONG-TERM CARE | DRG: 201 | End: 2019-11-04
Attending: INTERNAL MEDICINE | Admitting: INTERNAL MEDICINE
Payer: MEDICAID

## 2019-10-28 DIAGNOSIS — Q24.9 CARDIAC ABNORMALITY: Primary | ICD-10-CM

## 2019-10-28 PROBLEM — R00.0 TACHYCARDIA: Status: ACTIVE | Noted: 2019-10-28

## 2019-10-28 LAB
ABSOLUTE BANDS #: 0.26 K/UL (ref 0–1)
ABSOLUTE EOS #: 0.26 K/UL (ref 0–0.4)
ABSOLUTE IMMATURE GRANULOCYTE: ABNORMAL K/UL (ref 0–0.3)
ABSOLUTE LYMPH #: 0.9 K/UL (ref 1–4.8)
ABSOLUTE MONO #: 0.9 K/UL (ref 0.1–1.3)
ANION GAP SERPL CALCULATED.3IONS-SCNC: 12 MMOL/L (ref 9–17)
BANDS: 2 % (ref 0–10)
BASOPHILS # BLD: 0 % (ref 0–2)
BASOPHILS ABSOLUTE: 0 K/UL (ref 0–0.2)
BUN BLDV-MCNC: 13 MG/DL (ref 8–23)
BUN/CREAT BLD: ABNORMAL (ref 9–20)
CALCIUM SERPL-MCNC: 8.3 MG/DL (ref 8.6–10.4)
CHLORIDE BLD-SCNC: 105 MMOL/L (ref 98–107)
CO2: 22 MMOL/L (ref 20–31)
CREAT SERPL-MCNC: 0.6 MG/DL (ref 0.7–1.2)
DIFFERENTIAL TYPE: ABNORMAL
EOSINOPHILS RELATIVE PERCENT: 2 % (ref 0–4)
GFR AFRICAN AMERICAN: >60 ML/MIN
GFR NON-AFRICAN AMERICAN: >60 ML/MIN
GFR SERPL CREATININE-BSD FRML MDRD: ABNORMAL ML/MIN/{1.73_M2}
GFR SERPL CREATININE-BSD FRML MDRD: ABNORMAL ML/MIN/{1.73_M2}
GLUCOSE BLD-MCNC: 107 MG/DL (ref 70–99)
HCT VFR BLD CALC: 29 % (ref 41–53)
HEMOGLOBIN: 9.7 G/DL (ref 13.5–17.5)
IMMATURE GRANULOCYTES: ABNORMAL %
LYMPHOCYTES # BLD: 7 % (ref 24–44)
MCH RBC QN AUTO: 30.8 PG (ref 26–34)
MCHC RBC AUTO-ENTMCNC: 33.3 G/DL (ref 31–37)
MCV RBC AUTO: 92.6 FL (ref 80–100)
METAMYELOCYTES ABSOLUTE COUNT: 0.26 K/UL
METAMYELOCYTES: 2 %
MONOCYTES # BLD: 7 % (ref 1–7)
MORPHOLOGY: ABNORMAL
MYELOCYTES ABSOLUTE COUNT: 0.13 K/UL
MYELOCYTES: 1 %
NRBC AUTOMATED: ABNORMAL PER 100 WBC
PDW BLD-RTO: 14 % (ref 11.5–14.9)
PLATELET # BLD: 286 K/UL (ref 150–450)
PLATELET ESTIMATE: ABNORMAL
PMV BLD AUTO: 7.9 FL (ref 6–12)
POTASSIUM SERPL-SCNC: 4.6 MMOL/L (ref 3.7–5.3)
RBC # BLD: 3.14 M/UL (ref 4.5–5.9)
RBC # BLD: ABNORMAL 10*6/UL
SEG NEUTROPHILS: 79 % (ref 36–66)
SEGMENTED NEUTROPHILS ABSOLUTE COUNT: 10.09 K/UL (ref 1.3–9.1)
SODIUM BLD-SCNC: 139 MMOL/L (ref 135–144)
WBC # BLD: 12.8 K/UL (ref 3.5–11)
WBC # BLD: ABNORMAL 10*3/UL

## 2019-10-28 PROCEDURE — 80048 BASIC METABOLIC PNL TOTAL CA: CPT

## 2019-10-28 PROCEDURE — 6370000000 HC RX 637 (ALT 250 FOR IP): Performed by: PHYSICAL MEDICINE & REHABILITATION

## 2019-10-28 PROCEDURE — 36415 COLL VENOUS BLD VENIPUNCTURE: CPT

## 2019-10-28 PROCEDURE — 2000000000 HC ICU R&B

## 2019-10-28 PROCEDURE — 93005 ELECTROCARDIOGRAM TRACING: CPT | Performed by: INTERNAL MEDICINE

## 2019-10-28 PROCEDURE — 2500000003 HC RX 250 WO HCPCS: Performed by: INTERNAL MEDICINE

## 2019-10-28 PROCEDURE — 6360000002 HC RX W HCPCS: Performed by: INTERNAL MEDICINE

## 2019-10-28 PROCEDURE — 85025 COMPLETE CBC W/AUTO DIFF WBC: CPT

## 2019-10-28 PROCEDURE — 6370000000 HC RX 637 (ALT 250 FOR IP): Performed by: INTERNAL MEDICINE

## 2019-10-28 PROCEDURE — 2580000003 HC RX 258: Performed by: INTERNAL MEDICINE

## 2019-10-28 PROCEDURE — 71045 X-RAY EXAM CHEST 1 VIEW: CPT

## 2019-10-28 PROCEDURE — 94761 N-INVAS EAR/PLS OXIMETRY MLT: CPT

## 2019-10-28 PROCEDURE — 99233 SBSQ HOSP IP/OBS HIGH 50: CPT | Performed by: INTERNAL MEDICINE

## 2019-10-28 PROCEDURE — 6360000002 HC RX W HCPCS: Performed by: PHYSICAL MEDICINE & REHABILITATION

## 2019-10-28 RX ORDER — ADENOSINE 3 MG/ML
INJECTION, SOLUTION INTRAVENOUS
Status: DISCONTINUED
Start: 2019-10-28 | End: 2019-10-28 | Stop reason: HOSPADM

## 2019-10-28 RX ORDER — OXYCODONE HYDROCHLORIDE AND ACETAMINOPHEN 5; 325 MG/1; MG/1
1 TABLET ORAL EVERY 4 HOURS PRN
Status: DISCONTINUED | OUTPATIENT
Start: 2019-10-28 | End: 2019-11-04 | Stop reason: HOSPADM

## 2019-10-28 RX ORDER — ASPIRIN 81 MG/1
81 TABLET ORAL DAILY
Status: DISCONTINUED | OUTPATIENT
Start: 2019-10-28 | End: 2019-10-28

## 2019-10-28 RX ORDER — ATORVASTATIN CALCIUM 40 MG/1
40 TABLET, FILM COATED ORAL NIGHTLY
Status: DISCONTINUED | OUTPATIENT
Start: 2019-10-28 | End: 2019-11-04 | Stop reason: HOSPADM

## 2019-10-28 RX ORDER — ASPIRIN 81 MG/1
81 TABLET ORAL DAILY
Status: DISCONTINUED | OUTPATIENT
Start: 2019-10-28 | End: 2019-10-28 | Stop reason: SDUPTHER

## 2019-10-28 RX ORDER — ADENOSINE 3 MG/ML
12 INJECTION, SOLUTION INTRAVENOUS ONCE
Status: COMPLETED | OUTPATIENT
Start: 2019-10-28 | End: 2019-10-28

## 2019-10-28 RX ORDER — ACETAMINOPHEN 325 MG/1
650 TABLET ORAL EVERY 4 HOURS PRN
Status: DISCONTINUED | OUTPATIENT
Start: 2019-10-28 | End: 2019-11-04 | Stop reason: HOSPADM

## 2019-10-28 RX ORDER — DIGOXIN 0.25 MG/ML
500 INJECTION INTRAMUSCULAR; INTRAVENOUS ONCE
Status: COMPLETED | OUTPATIENT
Start: 2019-10-28 | End: 2019-10-28

## 2019-10-28 RX ORDER — DOCUSATE SODIUM 100 MG/1
100 CAPSULE, LIQUID FILLED ORAL 2 TIMES DAILY PRN
Status: DISCONTINUED | OUTPATIENT
Start: 2019-10-28 | End: 2019-11-04 | Stop reason: HOSPADM

## 2019-10-28 RX ORDER — FAMOTIDINE 20 MG/1
20 TABLET, FILM COATED ORAL 2 TIMES DAILY
Status: DISCONTINUED | OUTPATIENT
Start: 2019-10-28 | End: 2019-11-04 | Stop reason: HOSPADM

## 2019-10-28 RX ORDER — ESMOLOL HYDROCHLORIDE 10 MG/ML
100 INJECTION, SOLUTION INTRAVENOUS CONTINUOUS
Status: DISCONTINUED | OUTPATIENT
Start: 2019-10-28 | End: 2019-10-30

## 2019-10-28 RX ORDER — ADENOSINE 3 MG/ML
6 INJECTION, SOLUTION INTRAVENOUS ONCE
Status: COMPLETED | OUTPATIENT
Start: 2019-10-28 | End: 2019-10-28

## 2019-10-28 RX ORDER — CLOPIDOGREL BISULFATE 75 MG/1
75 TABLET ORAL DAILY
Status: DISCONTINUED | OUTPATIENT
Start: 2019-10-28 | End: 2019-10-28 | Stop reason: SDUPTHER

## 2019-10-28 RX ORDER — BISACODYL 10 MG
10 SUPPOSITORY, RECTAL RECTAL DAILY PRN
Status: DISCONTINUED | OUTPATIENT
Start: 2019-10-28 | End: 2019-11-04 | Stop reason: HOSPADM

## 2019-10-28 RX ORDER — 0.9 % SODIUM CHLORIDE 0.9 %
250 INTRAVENOUS SOLUTION INTRAVENOUS ONCE
Status: COMPLETED | OUTPATIENT
Start: 2019-10-28 | End: 2019-10-28

## 2019-10-28 RX ORDER — OXYCODONE HYDROCHLORIDE AND ACETAMINOPHEN 5; 325 MG/1; MG/1
2 TABLET ORAL EVERY 4 HOURS PRN
Status: DISCONTINUED | OUTPATIENT
Start: 2019-10-28 | End: 2019-11-04 | Stop reason: HOSPADM

## 2019-10-28 RX ORDER — DIPHENHYDRAMINE HCL 25 MG
25 TABLET ORAL NIGHTLY PRN
Status: DISCONTINUED | OUTPATIENT
Start: 2019-10-28 | End: 2019-11-04 | Stop reason: HOSPADM

## 2019-10-28 RX ORDER — FUROSEMIDE 10 MG/ML
20 INJECTION INTRAMUSCULAR; INTRAVENOUS 2 TIMES DAILY
Status: DISCONTINUED | OUTPATIENT
Start: 2019-10-28 | End: 2019-10-30

## 2019-10-28 RX ORDER — LISINOPRIL 5 MG/1
2.5 TABLET ORAL DAILY
Status: DISCONTINUED | OUTPATIENT
Start: 2019-10-28 | End: 2019-11-04 | Stop reason: HOSPADM

## 2019-10-28 RX ORDER — ATORVASTATIN CALCIUM 40 MG/1
40 TABLET, FILM COATED ORAL NIGHTLY
Status: DISCONTINUED | OUTPATIENT
Start: 2019-10-28 | End: 2019-10-28 | Stop reason: SDUPTHER

## 2019-10-28 RX ORDER — CLOPIDOGREL BISULFATE 75 MG/1
75 TABLET ORAL DAILY
Status: DISCONTINUED | OUTPATIENT
Start: 2019-10-28 | End: 2019-10-28

## 2019-10-28 RX ADMIN — FUROSEMIDE 20 MG: 10 INJECTION, SOLUTION INTRAMUSCULAR; INTRAVENOUS at 08:18

## 2019-10-28 RX ADMIN — ESMOLOL HYDROCHLORIDE 50 MCG/KG/MIN: 10 INJECTION INTRAVENOUS at 11:29

## 2019-10-28 RX ADMIN — ESMOLOL HYDROCHLORIDE 50 MCG/KG/MIN: 10 INJECTION INTRAVENOUS at 22:45

## 2019-10-28 RX ADMIN — SODIUM CHLORIDE 250 ML: 0.9 INJECTION, SOLUTION INTRAVENOUS at 06:32

## 2019-10-28 RX ADMIN — METOPROLOL TARTRATE 25 MG: 25 TABLET ORAL at 21:19

## 2019-10-28 RX ADMIN — FUROSEMIDE 20 MG: 10 INJECTION, SOLUTION INTRAMUSCULAR; INTRAVENOUS at 17:13

## 2019-10-28 RX ADMIN — ATORVASTATIN CALCIUM 40 MG: 40 TABLET, FILM COATED ORAL at 20:31

## 2019-10-28 RX ADMIN — ENOXAPARIN SODIUM 135 MG: 150 INJECTION SUBCUTANEOUS at 20:32

## 2019-10-28 RX ADMIN — FAMOTIDINE 20 MG: 20 TABLET ORAL at 20:32

## 2019-10-28 RX ADMIN — ESMOLOL HYDROCHLORIDE 50 MCG/KG/MIN: 10 INJECTION INTRAVENOUS at 01:54

## 2019-10-28 RX ADMIN — FAMOTIDINE 20 MG: 20 TABLET ORAL at 08:17

## 2019-10-28 RX ADMIN — ESMOLOL HYDROCHLORIDE 100 MCG/KG/MIN: 10 INJECTION INTRAVENOUS at 09:19

## 2019-10-28 RX ADMIN — ENOXAPARIN SODIUM 100 MG: 100 INJECTION SUBCUTANEOUS at 12:22

## 2019-10-28 RX ADMIN — ESMOLOL HYDROCHLORIDE 100 MCG/KG/MIN: 10 INJECTION INTRAVENOUS at 17:45

## 2019-10-28 RX ADMIN — ADENOSINE 6 MG: 3 INJECTION, SOLUTION INTRAVENOUS at 01:16

## 2019-10-28 RX ADMIN — ESMOLOL HYDROCHLORIDE 50 MCG/KG/MIN: 10 INJECTION INTRAVENOUS at 21:08

## 2019-10-28 RX ADMIN — ADENOSINE 12 MG: 3 INJECTION, SOLUTION INTRAVENOUS at 01:21

## 2019-10-28 RX ADMIN — ENOXAPARIN SODIUM 30 MG: 30 INJECTION SUBCUTANEOUS at 08:18

## 2019-10-28 RX ADMIN — METOPROLOL TARTRATE 25 MG: 25 TABLET ORAL at 11:03

## 2019-10-28 RX ADMIN — DIGOXIN 500 MCG: 0.25 INJECTION INTRAMUSCULAR; INTRAVENOUS at 11:03

## 2019-10-28 ASSESSMENT — PAIN SCALES - GENERAL
PAINLEVEL_OUTOF10: 0
PAINLEVEL_OUTOF10: 0
PAINLEVEL_OUTOF10: 5
PAINLEVEL_OUTOF10: 0
PAINLEVEL_OUTOF10: 0

## 2019-10-29 LAB
ABSOLUTE BANDS #: 0.23 K/UL (ref 0–1)
ABSOLUTE EOS #: 0.81 K/UL (ref 0–0.4)
ABSOLUTE IMMATURE GRANULOCYTE: ABNORMAL K/UL (ref 0–0.3)
ABSOLUTE LYMPH #: 0.7 K/UL (ref 1–4.8)
ABSOLUTE MONO #: 0.7 K/UL (ref 0.1–1.3)
ANION GAP SERPL CALCULATED.3IONS-SCNC: 10 MMOL/L (ref 9–17)
BANDS: 2 % (ref 0–10)
BASOPHILS # BLD: 0 % (ref 0–2)
BASOPHILS ABSOLUTE: 0 K/UL (ref 0–0.2)
BUN BLDV-MCNC: 12 MG/DL (ref 8–23)
BUN/CREAT BLD: ABNORMAL (ref 9–20)
CALCIUM SERPL-MCNC: 8.2 MG/DL (ref 8.6–10.4)
CHLORIDE BLD-SCNC: 101 MMOL/L (ref 98–107)
CO2: 24 MMOL/L (ref 20–31)
CREAT SERPL-MCNC: 0.6 MG/DL (ref 0.7–1.2)
DIFFERENTIAL TYPE: ABNORMAL
EKG ATRIAL RATE: 100 BPM
EKG Q-T INTERVAL: 364 MS
EKG QRS DURATION: 74 MS
EKG QTC CALCULATION (BAZETT): 499 MS
EKG R AXIS: 59 DEGREES
EKG T AXIS: 111 DEGREES
EKG VENTRICULAR RATE: 113 BPM
EOSINOPHILS RELATIVE PERCENT: 7 % (ref 0–4)
GFR AFRICAN AMERICAN: >60 ML/MIN
GFR NON-AFRICAN AMERICAN: >60 ML/MIN
GFR SERPL CREATININE-BSD FRML MDRD: ABNORMAL ML/MIN/{1.73_M2}
GFR SERPL CREATININE-BSD FRML MDRD: ABNORMAL ML/MIN/{1.73_M2}
GLUCOSE BLD-MCNC: 105 MG/DL (ref 70–99)
HCT VFR BLD CALC: 29.7 % (ref 41–53)
HEMOGLOBIN: 9.7 G/DL (ref 13.5–17.5)
IMMATURE GRANULOCYTES: ABNORMAL %
LYMPHOCYTES # BLD: 6 % (ref 24–44)
MCH RBC QN AUTO: 30.6 PG (ref 26–34)
MCHC RBC AUTO-ENTMCNC: 32.6 G/DL (ref 31–37)
MCV RBC AUTO: 93.8 FL (ref 80–100)
METAMYELOCYTES ABSOLUTE COUNT: 0.12 K/UL
METAMYELOCYTES: 1 %
MONOCYTES # BLD: 6 % (ref 1–7)
MORPHOLOGY: ABNORMAL
MYELOCYTES ABSOLUTE COUNT: 0.12 K/UL
MYELOCYTES: 1 %
NRBC AUTOMATED: ABNORMAL PER 100 WBC
PDW BLD-RTO: 13.6 % (ref 11.5–14.9)
PLATELET # BLD: 224 K/UL (ref 150–450)
PLATELET ESTIMATE: ABNORMAL
PMV BLD AUTO: 7.3 FL (ref 6–12)
POTASSIUM SERPL-SCNC: 4.1 MMOL/L (ref 3.7–5.3)
RBC # BLD: 3.17 M/UL (ref 4.5–5.9)
RBC # BLD: ABNORMAL 10*6/UL
SEG NEUTROPHILS: 77 % (ref 36–66)
SEGMENTED NEUTROPHILS ABSOLUTE COUNT: 8.92 K/UL (ref 1.3–9.1)
SODIUM BLD-SCNC: 135 MMOL/L (ref 135–144)
WBC # BLD: 11.6 K/UL (ref 3.5–11)
WBC # BLD: ABNORMAL 10*3/UL

## 2019-10-29 PROCEDURE — 93010 ELECTROCARDIOGRAM REPORT: CPT | Performed by: INTERNAL MEDICINE

## 2019-10-29 PROCEDURE — 6370000000 HC RX 637 (ALT 250 FOR IP): Performed by: INTERNAL MEDICINE

## 2019-10-29 PROCEDURE — 85025 COMPLETE CBC W/AUTO DIFF WBC: CPT

## 2019-10-29 PROCEDURE — 36415 COLL VENOUS BLD VENIPUNCTURE: CPT

## 2019-10-29 PROCEDURE — 6360000002 HC RX W HCPCS: Performed by: INTERNAL MEDICINE

## 2019-10-29 PROCEDURE — 97535 SELF CARE MNGMENT TRAINING: CPT

## 2019-10-29 PROCEDURE — 97166 OT EVAL MOD COMPLEX 45 MIN: CPT

## 2019-10-29 PROCEDURE — 80048 BASIC METABOLIC PNL TOTAL CA: CPT

## 2019-10-29 PROCEDURE — 6370000000 HC RX 637 (ALT 250 FOR IP): Performed by: PHYSICAL MEDICINE & REHABILITATION

## 2019-10-29 PROCEDURE — 2060000000 HC ICU INTERMEDIATE R&B

## 2019-10-29 RX ORDER — DIGOXIN 0.25 MG/ML
500 INJECTION INTRAMUSCULAR; INTRAVENOUS ONCE
Status: COMPLETED | OUTPATIENT
Start: 2019-10-29 | End: 2019-10-29

## 2019-10-29 RX ORDER — AMIODARONE HYDROCHLORIDE 200 MG/1
200 TABLET ORAL 2 TIMES DAILY
Status: DISCONTINUED | OUTPATIENT
Start: 2019-10-29 | End: 2019-11-01 | Stop reason: ALTCHOICE

## 2019-10-29 RX ADMIN — ENOXAPARIN SODIUM 135 MG: 150 INJECTION SUBCUTANEOUS at 21:30

## 2019-10-29 RX ADMIN — METOPROLOL TARTRATE 25 MG: 25 TABLET ORAL at 19:43

## 2019-10-29 RX ADMIN — FAMOTIDINE 20 MG: 20 TABLET ORAL at 19:43

## 2019-10-29 RX ADMIN — DIGOXIN 500 MCG: 0.25 INJECTION INTRAMUSCULAR; INTRAVENOUS at 12:31

## 2019-10-29 RX ADMIN — ATORVASTATIN CALCIUM 40 MG: 40 TABLET, FILM COATED ORAL at 19:43

## 2019-10-29 RX ADMIN — FUROSEMIDE 20 MG: 10 INJECTION, SOLUTION INTRAMUSCULAR; INTRAVENOUS at 17:50

## 2019-10-29 RX ADMIN — FAMOTIDINE 20 MG: 20 TABLET ORAL at 08:16

## 2019-10-29 RX ADMIN — LISINOPRIL 2.5 MG: 5 TABLET ORAL at 08:15

## 2019-10-29 RX ADMIN — METOPROLOL TARTRATE 25 MG: 25 TABLET ORAL at 08:15

## 2019-10-29 RX ADMIN — AMIODARONE HYDROCHLORIDE 200 MG: 200 TABLET ORAL at 19:43

## 2019-10-29 RX ADMIN — AMIODARONE HYDROCHLORIDE 200 MG: 200 TABLET ORAL at 12:31

## 2019-10-29 RX ADMIN — ENOXAPARIN SODIUM 135 MG: 150 INJECTION SUBCUTANEOUS at 08:37

## 2019-10-29 ASSESSMENT — PAIN SCALES - GENERAL
PAINLEVEL_OUTOF10: 0

## 2019-10-30 LAB
ABSOLUTE EOS #: 0.53 K/UL (ref 0–0.4)
ABSOLUTE IMMATURE GRANULOCYTE: ABNORMAL K/UL (ref 0–0.3)
ABSOLUTE LYMPH #: 0.66 K/UL (ref 1–4.8)
ABSOLUTE MONO #: 1.58 K/UL (ref 0.1–1.3)
ANION GAP SERPL CALCULATED.3IONS-SCNC: 12 MMOL/L (ref 9–17)
BASOPHILS # BLD: 0 % (ref 0–2)
BASOPHILS ABSOLUTE: 0 K/UL (ref 0–0.2)
BUN BLDV-MCNC: 11 MG/DL (ref 8–23)
BUN/CREAT BLD: NORMAL (ref 9–20)
CALCIUM SERPL-MCNC: 8.6 MG/DL (ref 8.6–10.4)
CHLORIDE BLD-SCNC: 99 MMOL/L (ref 98–107)
CO2: 26 MMOL/L (ref 20–31)
CREAT SERPL-MCNC: 0.7 MG/DL (ref 0.7–1.2)
DIFFERENTIAL TYPE: ABNORMAL
EOSINOPHILS RELATIVE PERCENT: 4 % (ref 0–4)
GFR AFRICAN AMERICAN: >60 ML/MIN
GFR NON-AFRICAN AMERICAN: >60 ML/MIN
GFR SERPL CREATININE-BSD FRML MDRD: NORMAL ML/MIN/{1.73_M2}
GFR SERPL CREATININE-BSD FRML MDRD: NORMAL ML/MIN/{1.73_M2}
GLUCOSE BLD-MCNC: 98 MG/DL (ref 70–99)
HCT VFR BLD CALC: 31.7 % (ref 41–53)
HEMOGLOBIN: 10.4 G/DL (ref 13.5–17.5)
IMMATURE GRANULOCYTES: ABNORMAL %
LYMPHOCYTES # BLD: 5 % (ref 24–44)
MCH RBC QN AUTO: 30.5 PG (ref 26–34)
MCHC RBC AUTO-ENTMCNC: 32.7 G/DL (ref 31–37)
MCV RBC AUTO: 93.4 FL (ref 80–100)
MONOCYTES # BLD: 12 % (ref 1–7)
MORPHOLOGY: ABNORMAL
NRBC AUTOMATED: ABNORMAL PER 100 WBC
PDW BLD-RTO: 13.8 % (ref 11.5–14.9)
PLATELET # BLD: 144 K/UL (ref 150–450)
PLATELET ESTIMATE: ABNORMAL
PMV BLD AUTO: 7 FL (ref 6–12)
POTASSIUM SERPL-SCNC: 4.4 MMOL/L (ref 3.7–5.3)
RBC # BLD: 3.4 M/UL (ref 4.5–5.9)
RBC # BLD: ABNORMAL 10*6/UL
SEG NEUTROPHILS: 79 % (ref 36–66)
SEGMENTED NEUTROPHILS ABSOLUTE COUNT: 10.43 K/UL (ref 1.3–9.1)
SODIUM BLD-SCNC: 137 MMOL/L (ref 135–144)
WBC # BLD: 13.2 K/UL (ref 3.5–11)
WBC # BLD: ABNORMAL 10*3/UL

## 2019-10-30 PROCEDURE — 36415 COLL VENOUS BLD VENIPUNCTURE: CPT

## 2019-10-30 PROCEDURE — 80048 BASIC METABOLIC PNL TOTAL CA: CPT

## 2019-10-30 PROCEDURE — 6360000002 HC RX W HCPCS: Performed by: INTERNAL MEDICINE

## 2019-10-30 PROCEDURE — 2060000000 HC ICU INTERMEDIATE R&B

## 2019-10-30 PROCEDURE — 6370000000 HC RX 637 (ALT 250 FOR IP): Performed by: PHYSICAL MEDICINE & REHABILITATION

## 2019-10-30 PROCEDURE — 97530 THERAPEUTIC ACTIVITIES: CPT

## 2019-10-30 PROCEDURE — 6370000000 HC RX 637 (ALT 250 FOR IP): Performed by: INTERNAL MEDICINE

## 2019-10-30 PROCEDURE — 97162 PT EVAL MOD COMPLEX 30 MIN: CPT

## 2019-10-30 PROCEDURE — 85025 COMPLETE CBC W/AUTO DIFF WBC: CPT

## 2019-10-30 PROCEDURE — 97535 SELF CARE MNGMENT TRAINING: CPT

## 2019-10-30 PROCEDURE — 97116 GAIT TRAINING THERAPY: CPT

## 2019-10-30 RX ORDER — SPIRONOLACTONE 25 MG/1
25 TABLET ORAL DAILY
Status: DISCONTINUED | OUTPATIENT
Start: 2019-10-30 | End: 2019-11-04 | Stop reason: HOSPADM

## 2019-10-30 RX ORDER — BUMETANIDE 1 MG/1
1 TABLET ORAL DAILY
Status: DISCONTINUED | OUTPATIENT
Start: 2019-10-30 | End: 2019-11-02

## 2019-10-30 RX ORDER — CLOPIDOGREL BISULFATE 75 MG/1
75 TABLET ORAL DAILY
Status: DISCONTINUED | OUTPATIENT
Start: 2019-10-30 | End: 2019-11-04 | Stop reason: HOSPADM

## 2019-10-30 RX ORDER — METOPROLOL TARTRATE 50 MG/1
50 TABLET, FILM COATED ORAL 2 TIMES DAILY
Status: DISCONTINUED | OUTPATIENT
Start: 2019-10-30 | End: 2019-10-31

## 2019-10-30 RX ORDER — DIGOXIN 0.25 MG/ML
250 INJECTION INTRAMUSCULAR; INTRAVENOUS ONCE
Status: COMPLETED | OUTPATIENT
Start: 2019-10-30 | End: 2019-10-30

## 2019-10-30 RX ADMIN — ENOXAPARIN SODIUM 135 MG: 150 INJECTION SUBCUTANEOUS at 21:56

## 2019-10-30 RX ADMIN — FAMOTIDINE 20 MG: 20 TABLET ORAL at 21:56

## 2019-10-30 RX ADMIN — DIGOXIN 250 MCG: 0.25 INJECTION INTRAMUSCULAR; INTRAVENOUS at 21:50

## 2019-10-30 RX ADMIN — AMIODARONE HYDROCHLORIDE 200 MG: 200 TABLET ORAL at 08:29

## 2019-10-30 RX ADMIN — ENOXAPARIN SODIUM 135 MG: 150 INJECTION SUBCUTANEOUS at 10:51

## 2019-10-30 RX ADMIN — FUROSEMIDE 20 MG: 10 INJECTION, SOLUTION INTRAMUSCULAR; INTRAVENOUS at 08:28

## 2019-10-30 RX ADMIN — BUMETANIDE 1 MG: 1 TABLET ORAL at 10:23

## 2019-10-30 RX ADMIN — SPIRONOLACTONE 25 MG: 25 TABLET, FILM COATED ORAL at 10:23

## 2019-10-30 RX ADMIN — FAMOTIDINE 20 MG: 20 TABLET ORAL at 08:28

## 2019-10-30 RX ADMIN — LISINOPRIL 2.5 MG: 5 TABLET ORAL at 08:28

## 2019-10-30 RX ADMIN — METOPROLOL TARTRATE 25 MG: 25 TABLET ORAL at 08:28

## 2019-10-30 RX ADMIN — CLOPIDOGREL BISULFATE 75 MG: 75 TABLET ORAL at 10:23

## 2019-10-30 RX ADMIN — ATORVASTATIN CALCIUM 40 MG: 40 TABLET, FILM COATED ORAL at 21:56

## 2019-10-30 ASSESSMENT — PAIN DESCRIPTION - PROGRESSION
CLINICAL_PROGRESSION: NOT CHANGED
CLINICAL_PROGRESSION: NOT CHANGED

## 2019-10-30 ASSESSMENT — PAIN SCALES - WONG BAKER
WONGBAKER_NUMERICALRESPONSE: 4
WONGBAKER_NUMERICALRESPONSE: 4

## 2019-10-30 ASSESSMENT — 9 HOLE PEG TEST
TEST_RESULT: IMPAIRED
TEST_RESULT: IMPAIRED
TESTTIME_SECONDS: 70

## 2019-10-30 ASSESSMENT — PAIN SCALES - GENERAL: PAINLEVEL_OUTOF10: 0

## 2019-10-31 ENCOUNTER — APPOINTMENT (OUTPATIENT)
Dept: GENERAL RADIOLOGY | Age: 64
DRG: 201 | End: 2019-10-31
Attending: INTERNAL MEDICINE
Payer: MEDICAID

## 2019-10-31 LAB
ABSOLUTE EOS #: 0.42 K/UL (ref 0–0.4)
ABSOLUTE IMMATURE GRANULOCYTE: ABNORMAL K/UL (ref 0–0.3)
ABSOLUTE LYMPH #: 1.17 K/UL (ref 1–4.8)
ABSOLUTE MONO #: 0.64 K/UL (ref 0.1–1.3)
ANION GAP SERPL CALCULATED.3IONS-SCNC: 11 MMOL/L (ref 9–17)
BASOPHILS # BLD: 0 % (ref 0–2)
BASOPHILS ABSOLUTE: 0 K/UL (ref 0–0.2)
BUN BLDV-MCNC: 15 MG/DL (ref 8–23)
BUN/CREAT BLD: ABNORMAL (ref 9–20)
CALCIUM SERPL-MCNC: 8.7 MG/DL (ref 8.6–10.4)
CHLORIDE BLD-SCNC: 100 MMOL/L (ref 98–107)
CO2: 26 MMOL/L (ref 20–31)
CREAT SERPL-MCNC: 0.69 MG/DL (ref 0.7–1.2)
DIFFERENTIAL TYPE: ABNORMAL
EKG ATRIAL RATE: 216 BPM
EKG P AXIS: 41 DEGREES
EKG P-R INTERVAL: 86 MS
EKG Q-T INTERVAL: 216 MS
EKG QRS DURATION: 72 MS
EKG QTC CALCULATION (BAZETT): 409 MS
EKG R AXIS: 58 DEGREES
EKG T AXIS: -90 DEGREES
EKG VENTRICULAR RATE: 216 BPM
EOSINOPHILS RELATIVE PERCENT: 4 % (ref 0–4)
GFR AFRICAN AMERICAN: >60 ML/MIN
GFR NON-AFRICAN AMERICAN: >60 ML/MIN
GFR SERPL CREATININE-BSD FRML MDRD: ABNORMAL ML/MIN/{1.73_M2}
GFR SERPL CREATININE-BSD FRML MDRD: ABNORMAL ML/MIN/{1.73_M2}
GLUCOSE BLD-MCNC: 101 MG/DL (ref 70–99)
HCT VFR BLD CALC: 30.6 % (ref 41–53)
HEMOGLOBIN: 10 G/DL (ref 13.5–17.5)
IMMATURE GRANULOCYTES: ABNORMAL %
LYMPHOCYTES # BLD: 11 % (ref 24–44)
MCH RBC QN AUTO: 30.8 PG (ref 26–34)
MCHC RBC AUTO-ENTMCNC: 32.6 G/DL (ref 31–37)
MCV RBC AUTO: 94.4 FL (ref 80–100)
MONOCYTES # BLD: 6 % (ref 1–7)
MORPHOLOGY: ABNORMAL
MORPHOLOGY: ABNORMAL
NRBC AUTOMATED: ABNORMAL PER 100 WBC
PDW BLD-RTO: 14.1 % (ref 11.5–14.9)
PLATELET # BLD: 112 K/UL (ref 150–450)
PLATELET ESTIMATE: ABNORMAL
PMV BLD AUTO: 7.9 FL (ref 6–12)
POTASSIUM SERPL-SCNC: 4.4 MMOL/L (ref 3.7–5.3)
RBC # BLD: 3.24 M/UL (ref 4.5–5.9)
RBC # BLD: ABNORMAL 10*6/UL
SEG NEUTROPHILS: 79 % (ref 36–66)
SEGMENTED NEUTROPHILS ABSOLUTE COUNT: 8.37 K/UL (ref 1.3–9.1)
SODIUM BLD-SCNC: 137 MMOL/L (ref 135–144)
WBC # BLD: 10.6 K/UL (ref 3.5–11)
WBC # BLD: ABNORMAL 10*3/UL

## 2019-10-31 PROCEDURE — 6370000000 HC RX 637 (ALT 250 FOR IP): Performed by: INTERNAL MEDICINE

## 2019-10-31 PROCEDURE — 80048 BASIC METABOLIC PNL TOTAL CA: CPT

## 2019-10-31 PROCEDURE — 71045 X-RAY EXAM CHEST 1 VIEW: CPT

## 2019-10-31 PROCEDURE — 93010 ELECTROCARDIOGRAM REPORT: CPT | Performed by: INTERNAL MEDICINE

## 2019-10-31 PROCEDURE — 36415 COLL VENOUS BLD VENIPUNCTURE: CPT

## 2019-10-31 PROCEDURE — 6370000000 HC RX 637 (ALT 250 FOR IP): Performed by: PHYSICAL MEDICINE & REHABILITATION

## 2019-10-31 PROCEDURE — 85025 COMPLETE CBC W/AUTO DIFF WBC: CPT

## 2019-10-31 PROCEDURE — 2060000000 HC ICU INTERMEDIATE R&B

## 2019-10-31 RX ADMIN — ATORVASTATIN CALCIUM 40 MG: 40 TABLET, FILM COATED ORAL at 22:24

## 2019-10-31 RX ADMIN — CLOPIDOGREL BISULFATE 75 MG: 75 TABLET ORAL at 08:45

## 2019-10-31 RX ADMIN — BUMETANIDE 1 MG: 1 TABLET ORAL at 08:46

## 2019-10-31 RX ADMIN — APIXABAN 5 MG: 5 TABLET, FILM COATED ORAL at 22:24

## 2019-10-31 RX ADMIN — AMIODARONE HYDROCHLORIDE 200 MG: 200 TABLET ORAL at 22:24

## 2019-10-31 RX ADMIN — METOPROLOL TARTRATE 25 MG: 25 TABLET ORAL at 22:24

## 2019-10-31 RX ADMIN — FAMOTIDINE 20 MG: 20 TABLET ORAL at 22:24

## 2019-10-31 RX ADMIN — AMIODARONE HYDROCHLORIDE 200 MG: 200 TABLET ORAL at 08:45

## 2019-10-31 RX ADMIN — FAMOTIDINE 20 MG: 20 TABLET ORAL at 08:46

## 2019-10-31 RX ADMIN — APIXABAN 5 MG: 5 TABLET, FILM COATED ORAL at 08:45

## 2019-11-01 LAB
ABSOLUTE EOS #: 0.43 K/UL (ref 0–0.4)
ABSOLUTE IMMATURE GRANULOCYTE: ABNORMAL K/UL (ref 0–0.3)
ABSOLUTE LYMPH #: 0.76 K/UL (ref 1–4.8)
ABSOLUTE MONO #: 0.86 K/UL (ref 0.1–1.3)
ANION GAP SERPL CALCULATED.3IONS-SCNC: 12 MMOL/L (ref 9–17)
BASOPHILS # BLD: 0 % (ref 0–2)
BASOPHILS ABSOLUTE: 0 K/UL (ref 0–0.2)
BUN BLDV-MCNC: 12 MG/DL (ref 8–23)
BUN/CREAT BLD: ABNORMAL (ref 9–20)
CALCIUM SERPL-MCNC: 8.8 MG/DL (ref 8.6–10.4)
CHLORIDE BLD-SCNC: 99 MMOL/L (ref 98–107)
CO2: 26 MMOL/L (ref 20–31)
CREAT SERPL-MCNC: 0.62 MG/DL (ref 0.7–1.2)
DIFFERENTIAL TYPE: ABNORMAL
EOSINOPHILS RELATIVE PERCENT: 4 % (ref 0–4)
GFR AFRICAN AMERICAN: >60 ML/MIN
GFR NON-AFRICAN AMERICAN: >60 ML/MIN
GFR SERPL CREATININE-BSD FRML MDRD: ABNORMAL ML/MIN/{1.73_M2}
GFR SERPL CREATININE-BSD FRML MDRD: ABNORMAL ML/MIN/{1.73_M2}
GLUCOSE BLD-MCNC: 105 MG/DL (ref 70–99)
HCT VFR BLD CALC: 30.7 % (ref 41–53)
HEMOGLOBIN: 10.1 G/DL (ref 13.5–17.5)
IMMATURE GRANULOCYTES: ABNORMAL %
LYMPHOCYTES # BLD: 7 % (ref 24–44)
MCH RBC QN AUTO: 30.6 PG (ref 26–34)
MCHC RBC AUTO-ENTMCNC: 32.9 G/DL (ref 31–37)
MCV RBC AUTO: 93.1 FL (ref 80–100)
MONOCYTES # BLD: 8 % (ref 1–7)
MORPHOLOGY: ABNORMAL
MORPHOLOGY: ABNORMAL
NRBC AUTOMATED: ABNORMAL PER 100 WBC
PDW BLD-RTO: 13.9 % (ref 11.5–14.9)
PLATELET # BLD: 139 K/UL (ref 150–450)
PLATELET ESTIMATE: ABNORMAL
PMV BLD AUTO: 7.7 FL (ref 6–12)
POTASSIUM SERPL-SCNC: 4.1 MMOL/L (ref 3.7–5.3)
RBC # BLD: 3.29 M/UL (ref 4.5–5.9)
RBC # BLD: ABNORMAL 10*6/UL
SEG NEUTROPHILS: 81 % (ref 36–66)
SEGMENTED NEUTROPHILS ABSOLUTE COUNT: 8.75 K/UL (ref 1.3–9.1)
SODIUM BLD-SCNC: 137 MMOL/L (ref 135–144)
WBC # BLD: 10.8 K/UL (ref 3.5–11)
WBC # BLD: ABNORMAL 10*3/UL

## 2019-11-01 PROCEDURE — 2500000003 HC RX 250 WO HCPCS: Performed by: INTERNAL MEDICINE

## 2019-11-01 PROCEDURE — 85025 COMPLETE CBC W/AUTO DIFF WBC: CPT

## 2019-11-01 PROCEDURE — 97110 THERAPEUTIC EXERCISES: CPT

## 2019-11-01 PROCEDURE — 2060000000 HC ICU INTERMEDIATE R&B

## 2019-11-01 PROCEDURE — 93971 EXTREMITY STUDY: CPT

## 2019-11-01 PROCEDURE — 36415 COLL VENOUS BLD VENIPUNCTURE: CPT

## 2019-11-01 PROCEDURE — 97530 THERAPEUTIC ACTIVITIES: CPT

## 2019-11-01 PROCEDURE — 80048 BASIC METABOLIC PNL TOTAL CA: CPT

## 2019-11-01 PROCEDURE — 6370000000 HC RX 637 (ALT 250 FOR IP): Performed by: INTERNAL MEDICINE

## 2019-11-01 PROCEDURE — 97116 GAIT TRAINING THERAPY: CPT

## 2019-11-01 PROCEDURE — 6370000000 HC RX 637 (ALT 250 FOR IP): Performed by: PHYSICAL MEDICINE & REHABILITATION

## 2019-11-01 RX ORDER — OXYCODONE HYDROCHLORIDE AND ACETAMINOPHEN 5; 325 MG/1; MG/1
1 TABLET ORAL EVERY 4 HOURS PRN
Qty: 30 TABLET | Refills: 0 | Status: ON HOLD | OUTPATIENT
Start: 2019-11-01 | End: 2019-11-18 | Stop reason: HOSPADM

## 2019-11-01 RX ORDER — BISACODYL 10 MG
10 SUPPOSITORY, RECTAL RECTAL DAILY PRN
Qty: 30 SUPPOSITORY | Refills: 2 | Status: ON HOLD | OUTPATIENT
Start: 2019-11-01 | End: 2019-11-18 | Stop reason: HOSPADM

## 2019-11-01 RX ORDER — METOPROLOL TARTRATE 50 MG/1
50 TABLET, FILM COATED ORAL 2 TIMES DAILY
Qty: 60 TABLET | Refills: 3 | Status: ON HOLD | OUTPATIENT
Start: 2019-11-01 | End: 2019-11-18 | Stop reason: HOSPADM

## 2019-11-01 RX ORDER — METOPROLOL TARTRATE 50 MG/1
50 TABLET, FILM COATED ORAL 2 TIMES DAILY
Status: DISCONTINUED | OUTPATIENT
Start: 2019-11-01 | End: 2019-11-02

## 2019-11-01 RX ORDER — METOPROLOL TARTRATE 5 MG/5ML
5 INJECTION INTRAVENOUS EVERY 6 HOURS PRN
Status: DISCONTINUED | OUTPATIENT
Start: 2019-11-01 | End: 2019-11-04 | Stop reason: HOSPADM

## 2019-11-01 RX ORDER — SPIRONOLACTONE 25 MG/1
25 TABLET ORAL DAILY
Qty: 30 TABLET | Refills: 3 | Status: ON HOLD | OUTPATIENT
Start: 2019-11-02 | End: 2019-11-18 | Stop reason: HOSPADM

## 2019-11-01 RX ORDER — BUMETANIDE 1 MG/1
1 TABLET ORAL DAILY
Qty: 30 TABLET | Refills: 3 | Status: ON HOLD | OUTPATIENT
Start: 2019-11-02 | End: 2019-11-18 | Stop reason: SDUPTHER

## 2019-11-01 RX ORDER — DIGOXIN 250 MCG
250 TABLET ORAL DAILY
Status: DISCONTINUED | OUTPATIENT
Start: 2019-11-01 | End: 2019-11-04 | Stop reason: HOSPADM

## 2019-11-01 RX ORDER — DIGOXIN 250 MCG
250 TABLET ORAL DAILY
Qty: 30 TABLET | Refills: 3 | Status: ON HOLD | OUTPATIENT
Start: 2019-11-01 | End: 2019-11-18 | Stop reason: HOSPADM

## 2019-11-01 RX ORDER — PSEUDOEPHEDRINE HCL 30 MG
100 TABLET ORAL 2 TIMES DAILY PRN
Qty: 30 CAPSULE | Refills: 1 | Status: ON HOLD | OUTPATIENT
Start: 2019-11-01 | End: 2019-11-18 | Stop reason: HOSPADM

## 2019-11-01 RX ORDER — FAMOTIDINE 20 MG/1
20 TABLET, FILM COATED ORAL 2 TIMES DAILY
Qty: 60 TABLET | Refills: 3 | Status: ON HOLD | OUTPATIENT
Start: 2019-11-01 | End: 2019-11-18 | Stop reason: HOSPADM

## 2019-11-01 RX ADMIN — FAMOTIDINE 20 MG: 20 TABLET ORAL at 10:12

## 2019-11-01 RX ADMIN — SPIRONOLACTONE 25 MG: 25 TABLET, FILM COATED ORAL at 10:15

## 2019-11-01 RX ADMIN — METOPROLOL TARTRATE 50 MG: 50 TABLET, FILM COATED ORAL at 20:55

## 2019-11-01 RX ADMIN — APIXABAN 5 MG: 5 TABLET, FILM COATED ORAL at 20:34

## 2019-11-01 RX ADMIN — FAMOTIDINE 20 MG: 20 TABLET ORAL at 20:34

## 2019-11-01 RX ADMIN — BUMETANIDE 1 MG: 1 TABLET ORAL at 10:14

## 2019-11-01 RX ADMIN — METOPROLOL TARTRATE 50 MG: 50 TABLET, FILM COATED ORAL at 10:13

## 2019-11-01 RX ADMIN — AMIODARONE HYDROCHLORIDE 200 MG: 200 TABLET ORAL at 10:13

## 2019-11-01 RX ADMIN — CLOPIDOGREL BISULFATE 75 MG: 75 TABLET ORAL at 10:12

## 2019-11-01 RX ADMIN — LISINOPRIL 2.5 MG: 5 TABLET ORAL at 10:14

## 2019-11-01 RX ADMIN — DIGOXIN 250 MCG: 250 TABLET ORAL at 14:15

## 2019-11-01 RX ADMIN — APIXABAN 5 MG: 5 TABLET, FILM COATED ORAL at 10:12

## 2019-11-01 RX ADMIN — ATORVASTATIN CALCIUM 40 MG: 40 TABLET, FILM COATED ORAL at 20:35

## 2019-11-01 RX ADMIN — METOPROLOL TARTRATE 5 MG: 1 INJECTION, SOLUTION INTRAVENOUS at 03:48

## 2019-11-01 RX ADMIN — METOPROLOL TARTRATE 50 MG: 50 TABLET, FILM COATED ORAL at 01:29

## 2019-11-02 LAB
ABSOLUTE BANDS #: 0.12 K/UL (ref 0–1)
ABSOLUTE EOS #: 0 K/UL (ref 0–0.4)
ABSOLUTE IMMATURE GRANULOCYTE: ABNORMAL K/UL (ref 0–0.3)
ABSOLUTE LYMPH #: 1.5 K/UL (ref 1–4.8)
ABSOLUTE MONO #: 1.04 K/UL (ref 0.1–1.3)
ANION GAP SERPL CALCULATED.3IONS-SCNC: 12 MMOL/L (ref 9–17)
BANDS: 1 % (ref 0–10)
BASOPHILS # BLD: 0 % (ref 0–2)
BASOPHILS ABSOLUTE: 0 K/UL (ref 0–0.2)
BUN BLDV-MCNC: 14 MG/DL (ref 8–23)
BUN/CREAT BLD: NORMAL (ref 9–20)
CALCIUM SERPL-MCNC: 9 MG/DL (ref 8.6–10.4)
CHLORIDE BLD-SCNC: 101 MMOL/L (ref 98–107)
CO2: 26 MMOL/L (ref 20–31)
CREAT SERPL-MCNC: 0.73 MG/DL (ref 0.7–1.2)
DIFFERENTIAL TYPE: ABNORMAL
EOSINOPHILS RELATIVE PERCENT: 0 % (ref 0–4)
GFR AFRICAN AMERICAN: >60 ML/MIN
GFR NON-AFRICAN AMERICAN: >60 ML/MIN
GFR SERPL CREATININE-BSD FRML MDRD: NORMAL ML/MIN/{1.73_M2}
GFR SERPL CREATININE-BSD FRML MDRD: NORMAL ML/MIN/{1.73_M2}
GLUCOSE BLD-MCNC: 96 MG/DL (ref 70–99)
HCT VFR BLD CALC: 30.7 % (ref 41–53)
HEMOGLOBIN: 10 G/DL (ref 13.5–17.5)
IMMATURE GRANULOCYTES: ABNORMAL %
LYMPHOCYTES # BLD: 13 % (ref 24–44)
MCH RBC QN AUTO: 30.4 PG (ref 26–34)
MCHC RBC AUTO-ENTMCNC: 32.7 G/DL (ref 31–37)
MCV RBC AUTO: 93.2 FL (ref 80–100)
MONOCYTES # BLD: 9 % (ref 1–7)
MORPHOLOGY: NORMAL
NRBC AUTOMATED: ABNORMAL PER 100 WBC
PDW BLD-RTO: 13.5 % (ref 11.5–14.9)
PLATELET # BLD: 169 K/UL (ref 150–450)
PLATELET ESTIMATE: ABNORMAL
PMV BLD AUTO: 7.7 FL (ref 6–12)
POTASSIUM SERPL-SCNC: 4.6 MMOL/L (ref 3.7–5.3)
RBC # BLD: 3.29 M/UL (ref 4.5–5.9)
RBC # BLD: ABNORMAL 10*6/UL
SEG NEUTROPHILS: 77 % (ref 36–66)
SEGMENTED NEUTROPHILS ABSOLUTE COUNT: 8.84 K/UL (ref 1.3–9.1)
SODIUM BLD-SCNC: 139 MMOL/L (ref 135–144)
WBC # BLD: 11.5 K/UL (ref 3.5–11)
WBC # BLD: ABNORMAL 10*3/UL

## 2019-11-02 PROCEDURE — 97110 THERAPEUTIC EXERCISES: CPT

## 2019-11-02 PROCEDURE — 6370000000 HC RX 637 (ALT 250 FOR IP): Performed by: INTERNAL MEDICINE

## 2019-11-02 PROCEDURE — 6370000000 HC RX 637 (ALT 250 FOR IP): Performed by: PHYSICAL MEDICINE & REHABILITATION

## 2019-11-02 PROCEDURE — 2060000000 HC ICU INTERMEDIATE R&B

## 2019-11-02 PROCEDURE — 97530 THERAPEUTIC ACTIVITIES: CPT

## 2019-11-02 PROCEDURE — 85025 COMPLETE CBC W/AUTO DIFF WBC: CPT

## 2019-11-02 PROCEDURE — 80048 BASIC METABOLIC PNL TOTAL CA: CPT

## 2019-11-02 PROCEDURE — 36415 COLL VENOUS BLD VENIPUNCTURE: CPT

## 2019-11-02 RX ORDER — AMIODARONE HYDROCHLORIDE 200 MG/1
200 TABLET ORAL 2 TIMES DAILY
Status: DISCONTINUED | OUTPATIENT
Start: 2019-11-02 | End: 2019-11-04 | Stop reason: HOSPADM

## 2019-11-02 RX ORDER — BUMETANIDE 1 MG/1
1 TABLET ORAL 2 TIMES DAILY
Status: DISCONTINUED | OUTPATIENT
Start: 2019-11-02 | End: 2019-11-04 | Stop reason: HOSPADM

## 2019-11-02 RX ADMIN — BUMETANIDE 1 MG: 1 TABLET ORAL at 21:41

## 2019-11-02 RX ADMIN — METOPROLOL TARTRATE 75 MG: 50 TABLET, FILM COATED ORAL at 21:41

## 2019-11-02 RX ADMIN — FAMOTIDINE 20 MG: 20 TABLET ORAL at 21:41

## 2019-11-02 RX ADMIN — APIXABAN 5 MG: 5 TABLET, FILM COATED ORAL at 21:41

## 2019-11-02 RX ADMIN — APIXABAN 5 MG: 5 TABLET, FILM COATED ORAL at 09:11

## 2019-11-02 RX ADMIN — SPIRONOLACTONE 25 MG: 25 TABLET, FILM COATED ORAL at 09:10

## 2019-11-02 RX ADMIN — DIGOXIN 250 MCG: 250 TABLET ORAL at 09:10

## 2019-11-02 RX ADMIN — ATORVASTATIN CALCIUM 40 MG: 40 TABLET, FILM COATED ORAL at 21:41

## 2019-11-02 RX ADMIN — CLOPIDOGREL BISULFATE 75 MG: 75 TABLET ORAL at 09:11

## 2019-11-02 RX ADMIN — AMIODARONE HYDROCHLORIDE 200 MG: 200 TABLET ORAL at 12:44

## 2019-11-02 RX ADMIN — LISINOPRIL 2.5 MG: 5 TABLET ORAL at 09:11

## 2019-11-02 RX ADMIN — METOPROLOL TARTRATE 50 MG: 50 TABLET, FILM COATED ORAL at 09:10

## 2019-11-02 RX ADMIN — AMIODARONE HYDROCHLORIDE 200 MG: 200 TABLET ORAL at 21:41

## 2019-11-02 RX ADMIN — BUMETANIDE 1 MG: 1 TABLET ORAL at 09:11

## 2019-11-02 RX ADMIN — FAMOTIDINE 20 MG: 20 TABLET ORAL at 09:11

## 2019-11-03 LAB
ABSOLUTE EOS #: 0.6 K/UL (ref 0–0.4)
ABSOLUTE IMMATURE GRANULOCYTE: ABNORMAL K/UL (ref 0–0.3)
ABSOLUTE LYMPH #: 1 K/UL (ref 1–4.8)
ABSOLUTE MONO #: 0.9 K/UL (ref 0.1–1.3)
ANION GAP SERPL CALCULATED.3IONS-SCNC: 13 MMOL/L (ref 9–17)
BASOPHILS # BLD: 1 % (ref 0–2)
BASOPHILS ABSOLUTE: 0.1 K/UL (ref 0–0.2)
BUN BLDV-MCNC: 17 MG/DL (ref 8–23)
BUN/CREAT BLD: NORMAL (ref 9–20)
CALCIUM SERPL-MCNC: 9.1 MG/DL (ref 8.6–10.4)
CHLORIDE BLD-SCNC: 101 MMOL/L (ref 98–107)
CO2: 25 MMOL/L (ref 20–31)
CREAT SERPL-MCNC: 0.77 MG/DL (ref 0.7–1.2)
DIFFERENTIAL TYPE: ABNORMAL
EOSINOPHILS RELATIVE PERCENT: 5 % (ref 0–4)
GFR AFRICAN AMERICAN: >60 ML/MIN
GFR NON-AFRICAN AMERICAN: >60 ML/MIN
GFR SERPL CREATININE-BSD FRML MDRD: NORMAL ML/MIN/{1.73_M2}
GFR SERPL CREATININE-BSD FRML MDRD: NORMAL ML/MIN/{1.73_M2}
GLUCOSE BLD-MCNC: 99 MG/DL (ref 70–99)
HCT VFR BLD CALC: 30.9 % (ref 41–53)
HEMOGLOBIN: 10.4 G/DL (ref 13.5–17.5)
IMMATURE GRANULOCYTES: ABNORMAL %
LYMPHOCYTES # BLD: 9 % (ref 24–44)
MCH RBC QN AUTO: 31.4 PG (ref 26–34)
MCHC RBC AUTO-ENTMCNC: 33.5 G/DL (ref 31–37)
MCV RBC AUTO: 93.7 FL (ref 80–100)
MONOCYTES # BLD: 8 % (ref 1–7)
NRBC AUTOMATED: ABNORMAL PER 100 WBC
PDW BLD-RTO: 13.6 % (ref 11.5–14.9)
PLATELET # BLD: 211 K/UL (ref 150–450)
PLATELET ESTIMATE: ABNORMAL
PMV BLD AUTO: 7.4 FL (ref 6–12)
POTASSIUM SERPL-SCNC: 4.4 MMOL/L (ref 3.7–5.3)
RBC # BLD: 3.3 M/UL (ref 4.5–5.9)
RBC # BLD: ABNORMAL 10*6/UL
SEG NEUTROPHILS: 77 % (ref 36–66)
SEGMENTED NEUTROPHILS ABSOLUTE COUNT: 8.3 K/UL (ref 1.3–9.1)
SODIUM BLD-SCNC: 139 MMOL/L (ref 135–144)
WBC # BLD: 10.8 K/UL (ref 3.5–11)
WBC # BLD: ABNORMAL 10*3/UL

## 2019-11-03 PROCEDURE — 6370000000 HC RX 637 (ALT 250 FOR IP): Performed by: INTERNAL MEDICINE

## 2019-11-03 PROCEDURE — 85025 COMPLETE CBC W/AUTO DIFF WBC: CPT

## 2019-11-03 PROCEDURE — 97530 THERAPEUTIC ACTIVITIES: CPT

## 2019-11-03 PROCEDURE — 97110 THERAPEUTIC EXERCISES: CPT

## 2019-11-03 PROCEDURE — 2060000000 HC ICU INTERMEDIATE R&B

## 2019-11-03 PROCEDURE — 36415 COLL VENOUS BLD VENIPUNCTURE: CPT

## 2019-11-03 PROCEDURE — 80048 BASIC METABOLIC PNL TOTAL CA: CPT

## 2019-11-03 PROCEDURE — 6370000000 HC RX 637 (ALT 250 FOR IP): Performed by: PHYSICAL MEDICINE & REHABILITATION

## 2019-11-03 PROCEDURE — 93005 ELECTROCARDIOGRAM TRACING: CPT | Performed by: INTERNAL MEDICINE

## 2019-11-03 RX ADMIN — APIXABAN 5 MG: 5 TABLET, FILM COATED ORAL at 21:13

## 2019-11-03 RX ADMIN — DIGOXIN 250 MCG: 250 TABLET ORAL at 09:28

## 2019-11-03 RX ADMIN — AMIODARONE HYDROCHLORIDE 200 MG: 200 TABLET ORAL at 09:28

## 2019-11-03 RX ADMIN — BUMETANIDE 1 MG: 1 TABLET ORAL at 09:26

## 2019-11-03 RX ADMIN — AMIODARONE HYDROCHLORIDE 200 MG: 200 TABLET ORAL at 21:13

## 2019-11-03 RX ADMIN — METOPROLOL TARTRATE 75 MG: 50 TABLET, FILM COATED ORAL at 21:13

## 2019-11-03 RX ADMIN — BUMETANIDE 1 MG: 1 TABLET ORAL at 21:13

## 2019-11-03 RX ADMIN — METOPROLOL TARTRATE 75 MG: 50 TABLET, FILM COATED ORAL at 09:28

## 2019-11-03 RX ADMIN — FAMOTIDINE 20 MG: 20 TABLET ORAL at 21:13

## 2019-11-03 RX ADMIN — LISINOPRIL 2.5 MG: 5 TABLET ORAL at 09:27

## 2019-11-03 RX ADMIN — CLOPIDOGREL BISULFATE 75 MG: 75 TABLET ORAL at 09:28

## 2019-11-03 RX ADMIN — ATORVASTATIN CALCIUM 40 MG: 40 TABLET, FILM COATED ORAL at 21:13

## 2019-11-03 RX ADMIN — APIXABAN 5 MG: 5 TABLET, FILM COATED ORAL at 09:28

## 2019-11-03 RX ADMIN — SPIRONOLACTONE 25 MG: 25 TABLET, FILM COATED ORAL at 09:28

## 2019-11-03 RX ADMIN — FAMOTIDINE 20 MG: 20 TABLET ORAL at 09:26

## 2019-11-03 ASSESSMENT — PAIN SCALES - GENERAL: PAINLEVEL_OUTOF10: 5

## 2019-11-03 ASSESSMENT — PAIN DESCRIPTION - ORIENTATION: ORIENTATION: LEFT

## 2019-11-03 ASSESSMENT — PAIN DESCRIPTION - DESCRIPTORS: DESCRIPTORS: SORE

## 2019-11-04 ENCOUNTER — HOSPITAL ENCOUNTER (INPATIENT)
Age: 64
LOS: 14 days | Discharge: HOME | DRG: 862 | End: 2019-11-18
Attending: PHYSICAL MEDICINE & REHABILITATION | Admitting: PHYSICAL MEDICINE & REHABILITATION
Payer: MEDICAID

## 2019-11-04 VITALS
TEMPERATURE: 97.5 F | SYSTOLIC BLOOD PRESSURE: 104 MMHG | WEIGHT: 277.2 LBS | HEART RATE: 109 BPM | OXYGEN SATURATION: 97 % | BODY MASS INDEX: 43.51 KG/M2 | DIASTOLIC BLOOD PRESSURE: 59 MMHG | HEIGHT: 67 IN | RESPIRATION RATE: 16 BRPM

## 2019-11-04 DIAGNOSIS — M79.652 ACUTE PAIN OF LEFT THIGH: Primary | ICD-10-CM

## 2019-11-04 LAB
ABSOLUTE EOS #: 0.3 K/UL (ref 0–0.4)
ABSOLUTE IMMATURE GRANULOCYTE: ABNORMAL K/UL (ref 0–0.3)
ABSOLUTE LYMPH #: 0.4 K/UL (ref 1–4.8)
ABSOLUTE MONO #: 0.81 K/UL (ref 0.1–1.3)
ANION GAP SERPL CALCULATED.3IONS-SCNC: 14 MMOL/L (ref 9–17)
BASOPHILS # BLD: 0 % (ref 0–2)
BASOPHILS ABSOLUTE: 0 K/UL (ref 0–0.2)
BUN BLDV-MCNC: 18 MG/DL (ref 8–23)
BUN/CREAT BLD: ABNORMAL (ref 9–20)
CALCIUM SERPL-MCNC: 9.1 MG/DL (ref 8.6–10.4)
CHLORIDE BLD-SCNC: 101 MMOL/L (ref 98–107)
CO2: 24 MMOL/L (ref 20–31)
CREAT SERPL-MCNC: 0.9 MG/DL (ref 0.7–1.2)
DIFFERENTIAL TYPE: ABNORMAL
EOSINOPHILS RELATIVE PERCENT: 3 % (ref 0–4)
GFR AFRICAN AMERICAN: >60 ML/MIN
GFR NON-AFRICAN AMERICAN: >60 ML/MIN
GFR SERPL CREATININE-BSD FRML MDRD: ABNORMAL ML/MIN/{1.73_M2}
GFR SERPL CREATININE-BSD FRML MDRD: ABNORMAL ML/MIN/{1.73_M2}
GLUCOSE BLD-MCNC: 102 MG/DL (ref 70–99)
HCT VFR BLD CALC: 31.2 % (ref 41–53)
HEMOGLOBIN: 10.5 G/DL (ref 13.5–17.5)
IMMATURE GRANULOCYTES: ABNORMAL %
LYMPHOCYTES # BLD: 4 % (ref 24–44)
MCH RBC QN AUTO: 31.3 PG (ref 26–34)
MCHC RBC AUTO-ENTMCNC: 33.7 G/DL (ref 31–37)
MCV RBC AUTO: 93 FL (ref 80–100)
MONOCYTES # BLD: 8 % (ref 1–7)
MORPHOLOGY: ABNORMAL
NRBC AUTOMATED: ABNORMAL PER 100 WBC
PDW BLD-RTO: 13.7 % (ref 11.5–14.9)
PLATELET # BLD: 267 K/UL (ref 150–450)
PLATELET ESTIMATE: ABNORMAL
PMV BLD AUTO: 7 FL (ref 6–12)
POTASSIUM SERPL-SCNC: 4.7 MMOL/L (ref 3.7–5.3)
RBC # BLD: 3.36 M/UL (ref 4.5–5.9)
RBC # BLD: ABNORMAL 10*6/UL
SEG NEUTROPHILS: 85 % (ref 36–66)
SEGMENTED NEUTROPHILS ABSOLUTE COUNT: 8.59 K/UL (ref 1.3–9.1)
SODIUM BLD-SCNC: 139 MMOL/L (ref 135–144)
WBC # BLD: 10.1 K/UL (ref 3.5–11)
WBC # BLD: ABNORMAL 10*3/UL

## 2019-11-04 PROCEDURE — 6370000000 HC RX 637 (ALT 250 FOR IP): Performed by: INTERNAL MEDICINE

## 2019-11-04 PROCEDURE — 97116 GAIT TRAINING THERAPY: CPT

## 2019-11-04 PROCEDURE — 6370000000 HC RX 637 (ALT 250 FOR IP): Performed by: PHYSICAL MEDICINE & REHABILITATION

## 2019-11-04 PROCEDURE — 85025 COMPLETE CBC W/AUTO DIFF WBC: CPT

## 2019-11-04 PROCEDURE — 97162 PT EVAL MOD COMPLEX 30 MIN: CPT

## 2019-11-04 PROCEDURE — 97535 SELF CARE MNGMENT TRAINING: CPT

## 2019-11-04 PROCEDURE — 94669 MECHANICAL CHEST WALL OSCILL: CPT

## 2019-11-04 PROCEDURE — 80048 BASIC METABOLIC PNL TOTAL CA: CPT

## 2019-11-04 PROCEDURE — 36415 COLL VENOUS BLD VENIPUNCTURE: CPT

## 2019-11-04 PROCEDURE — 1180000000 HC REHAB R&B

## 2019-11-04 RX ORDER — FAMOTIDINE 20 MG/1
20 TABLET, FILM COATED ORAL 2 TIMES DAILY
Status: CANCELLED | OUTPATIENT
Start: 2019-11-04

## 2019-11-04 RX ORDER — METOPROLOL TARTRATE 5 MG/5ML
5 INJECTION INTRAVENOUS EVERY 6 HOURS PRN
Status: DISCONTINUED | OUTPATIENT
Start: 2019-11-04 | End: 2019-11-05

## 2019-11-04 RX ORDER — BISACODYL 10 MG
10 SUPPOSITORY, RECTAL RECTAL DAILY PRN
Status: DISCONTINUED | OUTPATIENT
Start: 2019-11-04 | End: 2019-11-04 | Stop reason: SDUPTHER

## 2019-11-04 RX ORDER — OXYCODONE HYDROCHLORIDE AND ACETAMINOPHEN 5; 325 MG/1; MG/1
2 TABLET ORAL EVERY 4 HOURS PRN
Status: CANCELLED | OUTPATIENT
Start: 2019-11-04

## 2019-11-04 RX ORDER — DIGOXIN 125 MCG
250 TABLET ORAL DAILY
Status: DISCONTINUED | OUTPATIENT
Start: 2019-11-05 | End: 2019-11-12

## 2019-11-04 RX ORDER — BUMETANIDE 1 MG/1
1 TABLET ORAL 2 TIMES DAILY
Status: DISCONTINUED | OUTPATIENT
Start: 2019-11-04 | End: 2019-11-05

## 2019-11-04 RX ORDER — CLOPIDOGREL BISULFATE 75 MG/1
75 TABLET ORAL DAILY
Status: CANCELLED | OUTPATIENT
Start: 2019-11-05

## 2019-11-04 RX ORDER — DOCUSATE SODIUM 100 MG/1
100 CAPSULE, LIQUID FILLED ORAL 2 TIMES DAILY PRN
Status: CANCELLED | OUTPATIENT
Start: 2019-11-04

## 2019-11-04 RX ORDER — OXYCODONE HYDROCHLORIDE AND ACETAMINOPHEN 5; 325 MG/1; MG/1
2 TABLET ORAL EVERY 4 HOURS PRN
Status: DISCONTINUED | OUTPATIENT
Start: 2019-11-04 | End: 2019-11-18 | Stop reason: HOSPADM

## 2019-11-04 RX ORDER — DIGOXIN 250 MCG
250 TABLET ORAL DAILY
Status: CANCELLED | OUTPATIENT
Start: 2019-11-05

## 2019-11-04 RX ORDER — BISACODYL 10 MG
10 SUPPOSITORY, RECTAL RECTAL DAILY PRN
Status: DISCONTINUED | OUTPATIENT
Start: 2019-11-04 | End: 2019-11-18 | Stop reason: HOSPADM

## 2019-11-04 RX ORDER — SPIRONOLACTONE 25 MG/1
25 TABLET ORAL DAILY
Status: CANCELLED | OUTPATIENT
Start: 2019-11-05

## 2019-11-04 RX ORDER — SPIRONOLACTONE 25 MG/1
25 TABLET ORAL DAILY
Status: DISCONTINUED | OUTPATIENT
Start: 2019-11-05 | End: 2019-11-06

## 2019-11-04 RX ORDER — ACETAMINOPHEN 325 MG/1
650 TABLET ORAL EVERY 4 HOURS PRN
Status: CANCELLED | OUTPATIENT
Start: 2019-11-04

## 2019-11-04 RX ORDER — POLYETHYLENE GLYCOL 3350 17 G/17G
17 POWDER, FOR SOLUTION ORAL DAILY
Status: DISCONTINUED | OUTPATIENT
Start: 2019-11-04 | End: 2019-11-18 | Stop reason: HOSPADM

## 2019-11-04 RX ORDER — ATORVASTATIN CALCIUM 40 MG/1
40 TABLET, FILM COATED ORAL NIGHTLY
Status: DISCONTINUED | OUTPATIENT
Start: 2019-11-04 | End: 2019-11-18 | Stop reason: HOSPADM

## 2019-11-04 RX ORDER — AMIODARONE HYDROCHLORIDE 200 MG/1
200 TABLET ORAL 2 TIMES DAILY
Status: DISCONTINUED | OUTPATIENT
Start: 2019-11-04 | End: 2019-11-12

## 2019-11-04 RX ORDER — AMIODARONE HYDROCHLORIDE 200 MG/1
200 TABLET ORAL 2 TIMES DAILY
Status: CANCELLED | OUTPATIENT
Start: 2019-11-04

## 2019-11-04 RX ORDER — BUMETANIDE 1 MG/1
1 TABLET ORAL 2 TIMES DAILY
Status: CANCELLED | OUTPATIENT
Start: 2019-11-04

## 2019-11-04 RX ORDER — DIPHENHYDRAMINE HCL 25 MG
25 TABLET ORAL NIGHTLY PRN
Status: CANCELLED | OUTPATIENT
Start: 2019-11-04

## 2019-11-04 RX ORDER — SENNA PLUS 8.6 MG/1
2 TABLET ORAL NIGHTLY PRN
Status: DISCONTINUED | OUTPATIENT
Start: 2019-11-04 | End: 2019-11-18 | Stop reason: HOSPADM

## 2019-11-04 RX ORDER — LISINOPRIL 5 MG/1
2.5 TABLET ORAL DAILY
Status: DISCONTINUED | OUTPATIENT
Start: 2019-11-05 | End: 2019-11-04 | Stop reason: SDUPTHER

## 2019-11-04 RX ORDER — DOCUSATE SODIUM 100 MG/1
100 CAPSULE, LIQUID FILLED ORAL 2 TIMES DAILY PRN
Status: DISCONTINUED | OUTPATIENT
Start: 2019-11-04 | End: 2019-11-18 | Stop reason: HOSPADM

## 2019-11-04 RX ORDER — LISINOPRIL 5 MG/1
2.5 TABLET ORAL DAILY
Status: CANCELLED | OUTPATIENT
Start: 2019-11-05

## 2019-11-04 RX ORDER — BISACODYL 10 MG
10 SUPPOSITORY, RECTAL RECTAL DAILY PRN
Status: CANCELLED | OUTPATIENT
Start: 2019-11-04

## 2019-11-04 RX ORDER — CLOPIDOGREL BISULFATE 75 MG/1
75 TABLET ORAL DAILY
Status: DISCONTINUED | OUTPATIENT
Start: 2019-11-05 | End: 2019-11-18 | Stop reason: HOSPADM

## 2019-11-04 RX ORDER — OXYCODONE HYDROCHLORIDE AND ACETAMINOPHEN 5; 325 MG/1; MG/1
1 TABLET ORAL EVERY 4 HOURS PRN
Status: DISCONTINUED | OUTPATIENT
Start: 2019-11-04 | End: 2019-11-18 | Stop reason: HOSPADM

## 2019-11-04 RX ORDER — METOPROLOL TARTRATE 5 MG/5ML
5 INJECTION INTRAVENOUS EVERY 6 HOURS PRN
Status: CANCELLED | OUTPATIENT
Start: 2019-11-04

## 2019-11-04 RX ORDER — FAMOTIDINE 20 MG/1
20 TABLET, FILM COATED ORAL 2 TIMES DAILY
Status: DISCONTINUED | OUTPATIENT
Start: 2019-11-04 | End: 2019-11-17

## 2019-11-04 RX ORDER — DIPHENHYDRAMINE HCL 25 MG
25 TABLET ORAL NIGHTLY PRN
Status: DISCONTINUED | OUTPATIENT
Start: 2019-11-04 | End: 2019-11-18 | Stop reason: HOSPADM

## 2019-11-04 RX ORDER — OXYCODONE HYDROCHLORIDE AND ACETAMINOPHEN 5; 325 MG/1; MG/1
1 TABLET ORAL EVERY 4 HOURS PRN
Status: CANCELLED | OUTPATIENT
Start: 2019-11-04

## 2019-11-04 RX ORDER — ACETAMINOPHEN 325 MG/1
650 TABLET ORAL EVERY 4 HOURS PRN
Status: DISCONTINUED | OUTPATIENT
Start: 2019-11-04 | End: 2019-11-18 | Stop reason: HOSPADM

## 2019-11-04 RX ORDER — ATORVASTATIN CALCIUM 40 MG/1
40 TABLET, FILM COATED ORAL NIGHTLY
Status: CANCELLED | OUTPATIENT
Start: 2019-11-04

## 2019-11-04 RX ORDER — SENNA AND DOCUSATE SODIUM 50; 8.6 MG/1; MG/1
1 TABLET, FILM COATED ORAL 2 TIMES DAILY
Status: DISCONTINUED | OUTPATIENT
Start: 2019-11-04 | End: 2019-11-18 | Stop reason: HOSPADM

## 2019-11-04 RX ORDER — FUROSEMIDE 20 MG/1
20 TABLET ORAL
Status: DISCONTINUED | OUTPATIENT
Start: 2019-11-04 | End: 2019-11-05

## 2019-11-04 RX ORDER — LISINOPRIL 5 MG/1
2.5 TABLET ORAL DAILY
Status: DISCONTINUED | OUTPATIENT
Start: 2019-11-04 | End: 2019-11-06

## 2019-11-04 RX ADMIN — FAMOTIDINE 20 MG: 20 TABLET ORAL at 09:49

## 2019-11-04 RX ADMIN — AMIODARONE HYDROCHLORIDE 200 MG: 200 TABLET ORAL at 09:48

## 2019-11-04 RX ADMIN — METOPROLOL TARTRATE 75 MG: 50 TABLET, FILM COATED ORAL at 20:51

## 2019-11-04 RX ADMIN — SPIRONOLACTONE 25 MG: 25 TABLET, FILM COATED ORAL at 09:49

## 2019-11-04 RX ADMIN — BUMETANIDE 1 MG: 1 TABLET ORAL at 20:58

## 2019-11-04 RX ADMIN — DIPHENHYDRAMINE HCL 25 MG: 25 TABLET ORAL at 20:51

## 2019-11-04 RX ADMIN — CLOPIDOGREL BISULFATE 75 MG: 75 TABLET ORAL at 09:49

## 2019-11-04 RX ADMIN — AMIODARONE HYDROCHLORIDE 200 MG: 200 TABLET ORAL at 20:51

## 2019-11-04 RX ADMIN — LISINOPRIL 2.5 MG: 5 TABLET ORAL at 09:48

## 2019-11-04 RX ADMIN — APIXABAN 5 MG: 5 TABLET, FILM COATED ORAL at 09:48

## 2019-11-04 RX ADMIN — OXYCODONE HYDROCHLORIDE AND ACETAMINOPHEN 2 TABLET: 5; 325 TABLET ORAL at 20:59

## 2019-11-04 RX ADMIN — DIGOXIN 250 MCG: 250 TABLET ORAL at 09:48

## 2019-11-04 RX ADMIN — APIXABAN 5 MG: 5 TABLET, FILM COATED ORAL at 20:51

## 2019-11-04 RX ADMIN — ATORVASTATIN CALCIUM 40 MG: 40 TABLET, FILM COATED ORAL at 20:51

## 2019-11-04 RX ADMIN — BUMETANIDE 1 MG: 1 TABLET ORAL at 09:48

## 2019-11-04 RX ADMIN — FAMOTIDINE 20 MG: 20 TABLET ORAL at 20:51

## 2019-11-04 ASSESSMENT — PAIN DESCRIPTION - LOCATION: LOCATION: KNEE

## 2019-11-04 ASSESSMENT — PAIN SCALES - GENERAL
PAINLEVEL_OUTOF10: 0
PAINLEVEL_OUTOF10: 8
PAINLEVEL_OUTOF10: 2
PAINLEVEL_OUTOF10: 9

## 2019-11-04 ASSESSMENT — PAIN DESCRIPTION - PAIN TYPE: TYPE: ACUTE PAIN

## 2019-11-04 ASSESSMENT — PAIN DESCRIPTION - ORIENTATION: ORIENTATION: ANTERIOR;LEFT

## 2019-11-04 ASSESSMENT — PAIN DESCRIPTION - PROGRESSION: CLINICAL_PROGRESSION: GRADUALLY WORSENING

## 2019-11-04 ASSESSMENT — PAIN - FUNCTIONAL ASSESSMENT: PAIN_FUNCTIONAL_ASSESSMENT: PREVENTS OR INTERFERES SOME ACTIVE ACTIVITIES AND ADLS

## 2019-11-04 ASSESSMENT — PAIN DESCRIPTION - DESCRIPTORS: DESCRIPTORS: CONSTANT

## 2019-11-04 ASSESSMENT — PAIN DESCRIPTION - FREQUENCY: FREQUENCY: CONTINUOUS

## 2019-11-05 LAB
ABSOLUTE BANDS #: 0.38 K/UL (ref 0–1)
ABSOLUTE EOS #: 0.29 K/UL (ref 0–0.4)
ABSOLUTE IMMATURE GRANULOCYTE: ABNORMAL K/UL (ref 0–0.3)
ABSOLUTE LYMPH #: 0.95 K/UL (ref 1–4.8)
ABSOLUTE MONO #: 0.48 K/UL (ref 0.1–1.3)
ANION GAP SERPL CALCULATED.3IONS-SCNC: 14 MMOL/L (ref 9–17)
BANDS: 4 % (ref 0–10)
BASOPHILS # BLD: 1 % (ref 0–2)
BASOPHILS ABSOLUTE: 0.1 K/UL (ref 0–0.2)
BUN BLDV-MCNC: 23 MG/DL (ref 8–23)
BUN/CREAT BLD: ABNORMAL (ref 9–20)
CALCIUM SERPL-MCNC: 9.5 MG/DL (ref 8.6–10.4)
CHLORIDE BLD-SCNC: 97 MMOL/L (ref 98–107)
CO2: 25 MMOL/L (ref 20–31)
CREAT SERPL-MCNC: 1.02 MG/DL (ref 0.7–1.2)
DIFFERENTIAL TYPE: ABNORMAL
EKG ATRIAL RATE: 107 BPM
EKG P AXIS: 93 DEGREES
EKG P-R INTERVAL: 180 MS
EKG Q-T INTERVAL: 362 MS
EKG QRS DURATION: 76 MS
EKG QTC CALCULATION (BAZETT): 483 MS
EKG R AXIS: 28 DEGREES
EKG T AXIS: 82 DEGREES
EKG VENTRICULAR RATE: 107 BPM
EOSINOPHILS RELATIVE PERCENT: 3 % (ref 0–4)
GFR AFRICAN AMERICAN: >60 ML/MIN
GFR NON-AFRICAN AMERICAN: >60 ML/MIN
GFR SERPL CREATININE-BSD FRML MDRD: ABNORMAL ML/MIN/{1.73_M2}
GFR SERPL CREATININE-BSD FRML MDRD: ABNORMAL ML/MIN/{1.73_M2}
GLUCOSE BLD-MCNC: 110 MG/DL (ref 75–110)
GLUCOSE BLD-MCNC: 162 MG/DL (ref 70–99)
HCT VFR BLD CALC: 32.9 % (ref 41–53)
HEMOGLOBIN: 10.9 G/DL (ref 13.5–17.5)
IMMATURE GRANULOCYTES: ABNORMAL %
LYMPHOCYTES # BLD: 10 % (ref 24–44)
MCH RBC QN AUTO: 30.8 PG (ref 26–34)
MCHC RBC AUTO-ENTMCNC: 33.2 G/DL (ref 31–37)
MCV RBC AUTO: 92.6 FL (ref 80–100)
METAMYELOCYTES ABSOLUTE COUNT: 0.1 K/UL
METAMYELOCYTES: 1 %
MONOCYTES # BLD: 5 % (ref 1–7)
MORPHOLOGY: ABNORMAL
NRBC AUTOMATED: ABNORMAL PER 100 WBC
PDW BLD-RTO: 13.8 % (ref 11.5–14.9)
PLATELET # BLD: 340 K/UL (ref 150–450)
PLATELET ESTIMATE: ABNORMAL
PMV BLD AUTO: 7.5 FL (ref 6–12)
POTASSIUM SERPL-SCNC: 5.1 MMOL/L (ref 3.7–5.3)
RBC # BLD: 3.56 M/UL (ref 4.5–5.9)
RBC # BLD: ABNORMAL 10*6/UL
SEG NEUTROPHILS: 76 % (ref 36–66)
SEGMENTED NEUTROPHILS ABSOLUTE COUNT: 7.2 K/UL (ref 1.3–9.1)
SODIUM BLD-SCNC: 136 MMOL/L (ref 135–144)
WBC # BLD: 9.5 K/UL (ref 3.5–11)
WBC # BLD: ABNORMAL 10*3/UL

## 2019-11-05 PROCEDURE — 85025 COMPLETE CBC W/AUTO DIFF WBC: CPT

## 2019-11-05 PROCEDURE — 80048 BASIC METABOLIC PNL TOTAL CA: CPT

## 2019-11-05 PROCEDURE — 82947 ASSAY GLUCOSE BLOOD QUANT: CPT

## 2019-11-05 PROCEDURE — 6370000000 HC RX 637 (ALT 250 FOR IP): Performed by: INTERNAL MEDICINE

## 2019-11-05 PROCEDURE — 97116 GAIT TRAINING THERAPY: CPT

## 2019-11-05 PROCEDURE — 97166 OT EVAL MOD COMPLEX 45 MIN: CPT

## 2019-11-05 PROCEDURE — 36415 COLL VENOUS BLD VENIPUNCTURE: CPT

## 2019-11-05 PROCEDURE — 93010 ELECTROCARDIOGRAM REPORT: CPT | Performed by: INTERNAL MEDICINE

## 2019-11-05 PROCEDURE — 97110 THERAPEUTIC EXERCISES: CPT

## 2019-11-05 PROCEDURE — 97535 SELF CARE MNGMENT TRAINING: CPT

## 2019-11-05 PROCEDURE — 6370000000 HC RX 637 (ALT 250 FOR IP): Performed by: PHYSICAL MEDICINE & REHABILITATION

## 2019-11-05 PROCEDURE — 97530 THERAPEUTIC ACTIVITIES: CPT

## 2019-11-05 PROCEDURE — 99223 1ST HOSP IP/OBS HIGH 75: CPT | Performed by: PHYSICAL MEDICINE & REHABILITATION

## 2019-11-05 PROCEDURE — 1180000000 HC REHAB R&B

## 2019-11-05 RX ORDER — BUMETANIDE 1 MG/1
1 TABLET ORAL DAILY
Status: DISCONTINUED | OUTPATIENT
Start: 2019-11-06 | End: 2019-11-18 | Stop reason: HOSPADM

## 2019-11-05 RX ORDER — CALCIUM CARBONATE 200(500)MG
500 TABLET,CHEWABLE ORAL 3 TIMES DAILY PRN
Status: DISCONTINUED | OUTPATIENT
Start: 2019-11-05 | End: 2019-11-18 | Stop reason: HOSPADM

## 2019-11-05 RX ADMIN — APIXABAN 5 MG: 5 TABLET, FILM COATED ORAL at 07:48

## 2019-11-05 RX ADMIN — AMIODARONE HYDROCHLORIDE 200 MG: 200 TABLET ORAL at 21:17

## 2019-11-05 RX ADMIN — DIPHENHYDRAMINE HCL 25 MG: 25 TABLET ORAL at 21:17

## 2019-11-05 RX ADMIN — LISINOPRIL 2.5 MG: 5 TABLET ORAL at 07:47

## 2019-11-05 RX ADMIN — APIXABAN 5 MG: 5 TABLET, FILM COATED ORAL at 21:17

## 2019-11-05 RX ADMIN — OXYCODONE HYDROCHLORIDE AND ACETAMINOPHEN 2 TABLET: 5; 325 TABLET ORAL at 09:31

## 2019-11-05 RX ADMIN — METOPROLOL TARTRATE 50 MG: 25 TABLET, FILM COATED ORAL at 12:20

## 2019-11-05 RX ADMIN — DOCUSATE SODIUM AND SENNOSIDES 1 TABLET: 50; 8.6 TABLET, FILM COATED ORAL at 07:46

## 2019-11-05 RX ADMIN — ATORVASTATIN CALCIUM 40 MG: 40 TABLET, FILM COATED ORAL at 21:17

## 2019-11-05 RX ADMIN — Medication 500 MG: at 21:17

## 2019-11-05 RX ADMIN — POLYETHYLENE GLYCOL 3350 17 G: 17 POWDER, FOR SOLUTION ORAL at 07:46

## 2019-11-05 RX ADMIN — FAMOTIDINE 20 MG: 20 TABLET ORAL at 21:17

## 2019-11-05 RX ADMIN — OXYCODONE HYDROCHLORIDE AND ACETAMINOPHEN 2 TABLET: 5; 325 TABLET ORAL at 18:31

## 2019-11-05 RX ADMIN — AMIODARONE HYDROCHLORIDE 200 MG: 200 TABLET ORAL at 07:48

## 2019-11-05 RX ADMIN — CLOPIDOGREL BISULFATE 75 MG: 75 TABLET ORAL at 07:48

## 2019-11-05 RX ADMIN — SPIRONOLACTONE 25 MG: 25 TABLET, FILM COATED ORAL at 07:47

## 2019-11-05 RX ADMIN — DIGOXIN 250 MCG: 125 TABLET ORAL at 07:53

## 2019-11-05 RX ADMIN — OXYCODONE HYDROCHLORIDE AND ACETAMINOPHEN 2 TABLET: 5; 325 TABLET ORAL at 23:03

## 2019-11-05 RX ADMIN — FAMOTIDINE 20 MG: 20 TABLET ORAL at 07:48

## 2019-11-05 RX ADMIN — METOPROLOL TARTRATE 75 MG: 50 TABLET, FILM COATED ORAL at 07:48

## 2019-11-05 RX ADMIN — BUMETANIDE 1 MG: 1 TABLET ORAL at 07:53

## 2019-11-05 ASSESSMENT — 9 HOLE PEG TEST
TEST_RESULT: IMPAIRED
TEST_RESULT: IMPAIRED
TESTTIME_SECONDS: 61
TESTTIME_SECONDS: 47

## 2019-11-05 ASSESSMENT — PAIN DESCRIPTION - DESCRIPTORS: DESCRIPTORS: CONSTANT;DISCOMFORT;SHARP

## 2019-11-05 ASSESSMENT — PAIN SCALES - GENERAL
PAINLEVEL_OUTOF10: 4
PAINLEVEL_OUTOF10: 0
PAINLEVEL_OUTOF10: 6
PAINLEVEL_OUTOF10: 5
PAINLEVEL_OUTOF10: 0
PAINLEVEL_OUTOF10: 8
PAINLEVEL_OUTOF10: 7

## 2019-11-05 ASSESSMENT — PAIN DESCRIPTION - ORIENTATION
ORIENTATION: LEFT
ORIENTATION: UPPER;OUTER

## 2019-11-05 ASSESSMENT — PAIN DESCRIPTION - LOCATION
LOCATION: LEG
LOCATION: LEG

## 2019-11-05 ASSESSMENT — PAIN DESCRIPTION - PROGRESSION: CLINICAL_PROGRESSION: GRADUALLY WORSENING

## 2019-11-05 ASSESSMENT — PAIN DESCRIPTION - PAIN TYPE
TYPE: ACUTE PAIN
TYPE: ACUTE PAIN

## 2019-11-05 ASSESSMENT — PAIN SCALES - WONG BAKER: WONGBAKER_NUMERICALRESPONSE: 8

## 2019-11-05 ASSESSMENT — PAIN - FUNCTIONAL ASSESSMENT: PAIN_FUNCTIONAL_ASSESSMENT: PREVENTS OR INTERFERES WITH ALL ACTIVE AND SOME PASSIVE ACTIVITIES

## 2019-11-05 ASSESSMENT — PAIN DESCRIPTION - FREQUENCY: FREQUENCY: CONTINUOUS

## 2019-11-06 LAB
ABSOLUTE EOS #: 0.5 K/UL (ref 0–0.4)
ABSOLUTE IMMATURE GRANULOCYTE: ABNORMAL K/UL (ref 0–0.3)
ABSOLUTE LYMPH #: 1.2 K/UL (ref 1–4.8)
ABSOLUTE MONO #: 0.9 K/UL (ref 0.1–1.3)
ANION GAP SERPL CALCULATED.3IONS-SCNC: 12 MMOL/L (ref 9–17)
BASOPHILS # BLD: 1 % (ref 0–2)
BASOPHILS ABSOLUTE: 0.1 K/UL (ref 0–0.2)
BUN BLDV-MCNC: 19 MG/DL (ref 8–23)
BUN/CREAT BLD: ABNORMAL (ref 9–20)
CALCIUM SERPL-MCNC: 9.4 MG/DL (ref 8.6–10.4)
CHLORIDE BLD-SCNC: 97 MMOL/L (ref 98–107)
CO2: 25 MMOL/L (ref 20–31)
CREAT SERPL-MCNC: 0.76 MG/DL (ref 0.7–1.2)
DIFFERENTIAL TYPE: ABNORMAL
EOSINOPHILS RELATIVE PERCENT: 5 % (ref 0–4)
GFR AFRICAN AMERICAN: >60 ML/MIN
GFR NON-AFRICAN AMERICAN: >60 ML/MIN
GFR SERPL CREATININE-BSD FRML MDRD: ABNORMAL ML/MIN/{1.73_M2}
GFR SERPL CREATININE-BSD FRML MDRD: ABNORMAL ML/MIN/{1.73_M2}
GLUCOSE BLD-MCNC: 98 MG/DL (ref 70–99)
HCT VFR BLD CALC: 32.6 % (ref 41–53)
HEMOGLOBIN: 10.7 G/DL (ref 13.5–17.5)
IMMATURE GRANULOCYTES: ABNORMAL %
LYMPHOCYTES # BLD: 12 % (ref 24–44)
MCH RBC QN AUTO: 30.9 PG (ref 26–34)
MCHC RBC AUTO-ENTMCNC: 32.9 G/DL (ref 31–37)
MCV RBC AUTO: 93.9 FL (ref 80–100)
MONOCYTES # BLD: 9 % (ref 1–7)
NRBC AUTOMATED: ABNORMAL PER 100 WBC
PDW BLD-RTO: 13.3 % (ref 11.5–14.9)
PLATELET # BLD: 367 K/UL (ref 150–450)
PLATELET ESTIMATE: ABNORMAL
PMV BLD AUTO: 7.3 FL (ref 6–12)
POTASSIUM SERPL-SCNC: 4.8 MMOL/L (ref 3.7–5.3)
RBC # BLD: 3.47 M/UL (ref 4.5–5.9)
RBC # BLD: ABNORMAL 10*6/UL
SEG NEUTROPHILS: 73 % (ref 36–66)
SEGMENTED NEUTROPHILS ABSOLUTE COUNT: 7.1 K/UL (ref 1.3–9.1)
SODIUM BLD-SCNC: 134 MMOL/L (ref 135–144)
WBC # BLD: 9.7 K/UL (ref 3.5–11)
WBC # BLD: ABNORMAL 10*3/UL

## 2019-11-06 PROCEDURE — 36415 COLL VENOUS BLD VENIPUNCTURE: CPT

## 2019-11-06 PROCEDURE — 97530 THERAPEUTIC ACTIVITIES: CPT

## 2019-11-06 PROCEDURE — 6370000000 HC RX 637 (ALT 250 FOR IP): Performed by: PHYSICAL MEDICINE & REHABILITATION

## 2019-11-06 PROCEDURE — 99232 SBSQ HOSP IP/OBS MODERATE 35: CPT | Performed by: PHYSICAL MEDICINE & REHABILITATION

## 2019-11-06 PROCEDURE — 97535 SELF CARE MNGMENT TRAINING: CPT

## 2019-11-06 PROCEDURE — 6370000000 HC RX 637 (ALT 250 FOR IP): Performed by: INTERNAL MEDICINE

## 2019-11-06 PROCEDURE — 97542 WHEELCHAIR MNGMENT TRAINING: CPT

## 2019-11-06 PROCEDURE — 85025 COMPLETE CBC W/AUTO DIFF WBC: CPT

## 2019-11-06 PROCEDURE — 97116 GAIT TRAINING THERAPY: CPT

## 2019-11-06 PROCEDURE — 1180000000 HC REHAB R&B

## 2019-11-06 PROCEDURE — 97110 THERAPEUTIC EXERCISES: CPT

## 2019-11-06 PROCEDURE — 80048 BASIC METABOLIC PNL TOTAL CA: CPT

## 2019-11-06 RX ORDER — PREDNISONE 20 MG/1
40 TABLET ORAL ONCE
Status: COMPLETED | OUTPATIENT
Start: 2019-11-06 | End: 2019-11-06

## 2019-11-06 RX ORDER — ONDANSETRON 4 MG/1
4 TABLET, FILM COATED ORAL EVERY 6 HOURS PRN
Status: DISCONTINUED | OUTPATIENT
Start: 2019-11-06 | End: 2019-11-18 | Stop reason: HOSPADM

## 2019-11-06 RX ORDER — GABAPENTIN 100 MG/1
100 CAPSULE ORAL 3 TIMES DAILY
Status: DISCONTINUED | OUTPATIENT
Start: 2019-11-06 | End: 2019-11-18 | Stop reason: HOSPADM

## 2019-11-06 RX ADMIN — METOPROLOL TARTRATE 50 MG: 25 TABLET, FILM COATED ORAL at 11:21

## 2019-11-06 RX ADMIN — AMIODARONE HYDROCHLORIDE 200 MG: 200 TABLET ORAL at 20:40

## 2019-11-06 RX ADMIN — OXYCODONE HYDROCHLORIDE AND ACETAMINOPHEN 2 TABLET: 5; 325 TABLET ORAL at 11:21

## 2019-11-06 RX ADMIN — CLOPIDOGREL BISULFATE 75 MG: 75 TABLET ORAL at 08:56

## 2019-11-06 RX ADMIN — AMIODARONE HYDROCHLORIDE 200 MG: 200 TABLET ORAL at 08:56

## 2019-11-06 RX ADMIN — METOPROLOL TARTRATE 50 MG: 25 TABLET, FILM COATED ORAL at 05:51

## 2019-11-06 RX ADMIN — DOCUSATE SODIUM AND SENNOSIDES 1 TABLET: 50; 8.6 TABLET, FILM COATED ORAL at 20:39

## 2019-11-06 RX ADMIN — GABAPENTIN 100 MG: 100 CAPSULE ORAL at 22:53

## 2019-11-06 RX ADMIN — METOPROLOL TARTRATE 50 MG: 25 TABLET, FILM COATED ORAL at 17:21

## 2019-11-06 RX ADMIN — METOPROLOL TARTRATE 50 MG: 25 TABLET, FILM COATED ORAL at 22:54

## 2019-11-06 RX ADMIN — ATORVASTATIN CALCIUM 40 MG: 40 TABLET, FILM COATED ORAL at 20:40

## 2019-11-06 RX ADMIN — BUMETANIDE 1 MG: 1 TABLET ORAL at 08:57

## 2019-11-06 RX ADMIN — FAMOTIDINE 20 MG: 20 TABLET ORAL at 20:39

## 2019-11-06 RX ADMIN — ONDANSETRON HYDROCHLORIDE 4 MG: 4 TABLET, FILM COATED ORAL at 15:19

## 2019-11-06 RX ADMIN — DIGOXIN 250 MCG: 125 TABLET ORAL at 08:57

## 2019-11-06 RX ADMIN — FAMOTIDINE 20 MG: 20 TABLET ORAL at 08:56

## 2019-11-06 RX ADMIN — APIXABAN 5 MG: 5 TABLET, FILM COATED ORAL at 20:40

## 2019-11-06 RX ADMIN — OXYCODONE HYDROCHLORIDE AND ACETAMINOPHEN 2 TABLET: 5; 325 TABLET ORAL at 05:51

## 2019-11-06 RX ADMIN — APIXABAN 5 MG: 5 TABLET, FILM COATED ORAL at 08:57

## 2019-11-06 RX ADMIN — OXYCODONE HYDROCHLORIDE AND ACETAMINOPHEN 2 TABLET: 5; 325 TABLET ORAL at 16:16

## 2019-11-06 RX ADMIN — PREDNISONE 40 MG: 20 TABLET ORAL at 13:35

## 2019-11-06 ASSESSMENT — PAIN SCALES - GENERAL
PAINLEVEL_OUTOF10: 7
PAINLEVEL_OUTOF10: 0
PAINLEVEL_OUTOF10: 0
PAINLEVEL_OUTOF10: 5
PAINLEVEL_OUTOF10: 7
PAINLEVEL_OUTOF10: 8
PAINLEVEL_OUTOF10: 4
PAINLEVEL_OUTOF10: 6
PAINLEVEL_OUTOF10: 0

## 2019-11-06 ASSESSMENT — PAIN DESCRIPTION - ORIENTATION
ORIENTATION: LEFT
ORIENTATION: LEFT
ORIENTATION: LEFT;OTHER (COMMENT)
ORIENTATION: LEFT

## 2019-11-06 ASSESSMENT — PAIN DESCRIPTION - PAIN TYPE
TYPE: ACUTE PAIN

## 2019-11-06 ASSESSMENT — PAIN DESCRIPTION - FREQUENCY
FREQUENCY: CONTINUOUS
FREQUENCY: INTERMITTENT
FREQUENCY: INTERMITTENT

## 2019-11-06 ASSESSMENT — PAIN - FUNCTIONAL ASSESSMENT
PAIN_FUNCTIONAL_ASSESSMENT: ACTIVITIES ARE NOT PREVENTED
PAIN_FUNCTIONAL_ASSESSMENT: ACTIVITIES ARE NOT PREVENTED

## 2019-11-06 ASSESSMENT — PAIN DESCRIPTION - DESCRIPTORS
DESCRIPTORS: THROBBING
DESCRIPTORS: THROBBING
DESCRIPTORS: CONSTANT;SHARP

## 2019-11-06 ASSESSMENT — PAIN DESCRIPTION - ONSET
ONSET: ON-GOING
ONSET: ON-GOING

## 2019-11-06 ASSESSMENT — PAIN DESCRIPTION - LOCATION
LOCATION: LEG

## 2019-11-06 ASSESSMENT — PAIN SCALES - WONG BAKER
WONGBAKER_NUMERICALRESPONSE: 0

## 2019-11-06 ASSESSMENT — PAIN DESCRIPTION - PROGRESSION
CLINICAL_PROGRESSION: GRADUALLY IMPROVING

## 2019-11-07 LAB
ABSOLUTE EOS #: 0.1 K/UL (ref 0–0.4)
ABSOLUTE IMMATURE GRANULOCYTE: ABNORMAL K/UL (ref 0–0.3)
ABSOLUTE LYMPH #: 1.1 K/UL (ref 1–4.8)
ABSOLUTE MONO #: 1 K/UL (ref 0.1–1.3)
ANION GAP SERPL CALCULATED.3IONS-SCNC: 12 MMOL/L (ref 9–17)
BASOPHILS # BLD: 0 % (ref 0–2)
BASOPHILS ABSOLUTE: 0.1 K/UL (ref 0–0.2)
BUN BLDV-MCNC: 16 MG/DL (ref 8–23)
BUN/CREAT BLD: ABNORMAL (ref 9–20)
CALCIUM SERPL-MCNC: 9.7 MG/DL (ref 8.6–10.4)
CHLORIDE BLD-SCNC: 97 MMOL/L (ref 98–107)
CO2: 25 MMOL/L (ref 20–31)
CREAT SERPL-MCNC: 0.78 MG/DL (ref 0.7–1.2)
DIFFERENTIAL TYPE: ABNORMAL
EOSINOPHILS RELATIVE PERCENT: 1 % (ref 0–4)
GFR AFRICAN AMERICAN: >60 ML/MIN
GFR NON-AFRICAN AMERICAN: >60 ML/MIN
GFR SERPL CREATININE-BSD FRML MDRD: ABNORMAL ML/MIN/{1.73_M2}
GFR SERPL CREATININE-BSD FRML MDRD: ABNORMAL ML/MIN/{1.73_M2}
GLUCOSE BLD-MCNC: 94 MG/DL (ref 70–99)
HCT VFR BLD CALC: 34.1 % (ref 41–53)
HEMOGLOBIN: 11.1 G/DL (ref 13.5–17.5)
IMMATURE GRANULOCYTES: ABNORMAL %
LYMPHOCYTES # BLD: 9 % (ref 24–44)
MCH RBC QN AUTO: 30.3 PG (ref 26–34)
MCHC RBC AUTO-ENTMCNC: 32.7 G/DL (ref 31–37)
MCV RBC AUTO: 92.7 FL (ref 80–100)
MONOCYTES # BLD: 9 % (ref 1–7)
NRBC AUTOMATED: ABNORMAL PER 100 WBC
PDW BLD-RTO: 13.5 % (ref 11.5–14.9)
PLATELET # BLD: 455 K/UL (ref 150–450)
PLATELET ESTIMATE: ABNORMAL
PMV BLD AUTO: 7.1 FL (ref 6–12)
POTASSIUM SERPL-SCNC: 4.6 MMOL/L (ref 3.7–5.3)
RBC # BLD: 3.67 M/UL (ref 4.5–5.9)
RBC # BLD: ABNORMAL 10*6/UL
SEG NEUTROPHILS: 81 % (ref 36–66)
SEGMENTED NEUTROPHILS ABSOLUTE COUNT: 9.4 K/UL (ref 1.3–9.1)
SODIUM BLD-SCNC: 134 MMOL/L (ref 135–144)
WBC # BLD: 11.6 K/UL (ref 3.5–11)
WBC # BLD: ABNORMAL 10*3/UL

## 2019-11-07 PROCEDURE — 85025 COMPLETE CBC W/AUTO DIFF WBC: CPT

## 2019-11-07 PROCEDURE — 97535 SELF CARE MNGMENT TRAINING: CPT

## 2019-11-07 PROCEDURE — 6370000000 HC RX 637 (ALT 250 FOR IP): Performed by: INTERNAL MEDICINE

## 2019-11-07 PROCEDURE — 80048 BASIC METABOLIC PNL TOTAL CA: CPT

## 2019-11-07 PROCEDURE — 97110 THERAPEUTIC EXERCISES: CPT

## 2019-11-07 PROCEDURE — 97530 THERAPEUTIC ACTIVITIES: CPT

## 2019-11-07 PROCEDURE — 97116 GAIT TRAINING THERAPY: CPT

## 2019-11-07 PROCEDURE — 6370000000 HC RX 637 (ALT 250 FOR IP): Performed by: PHYSICAL MEDICINE & REHABILITATION

## 2019-11-07 PROCEDURE — 1180000000 HC REHAB R&B

## 2019-11-07 PROCEDURE — 36415 COLL VENOUS BLD VENIPUNCTURE: CPT

## 2019-11-07 RX ADMIN — GABAPENTIN 100 MG: 100 CAPSULE ORAL at 20:06

## 2019-11-07 RX ADMIN — FAMOTIDINE 20 MG: 20 TABLET ORAL at 20:06

## 2019-11-07 RX ADMIN — DIPHENHYDRAMINE HCL 25 MG: 25 TABLET ORAL at 20:06

## 2019-11-07 RX ADMIN — METOPROLOL TARTRATE 50 MG: 25 TABLET, FILM COATED ORAL at 11:58

## 2019-11-07 RX ADMIN — METOPROLOL TARTRATE 50 MG: 25 TABLET, FILM COATED ORAL at 17:11

## 2019-11-07 RX ADMIN — BUMETANIDE 1 MG: 1 TABLET ORAL at 07:44

## 2019-11-07 RX ADMIN — APIXABAN 5 MG: 5 TABLET, FILM COATED ORAL at 07:44

## 2019-11-07 RX ADMIN — AMIODARONE HYDROCHLORIDE 200 MG: 200 TABLET ORAL at 20:06

## 2019-11-07 RX ADMIN — ATORVASTATIN CALCIUM 40 MG: 40 TABLET, FILM COATED ORAL at 20:06

## 2019-11-07 RX ADMIN — CLOPIDOGREL BISULFATE 75 MG: 75 TABLET ORAL at 07:44

## 2019-11-07 RX ADMIN — APIXABAN 5 MG: 5 TABLET, FILM COATED ORAL at 20:06

## 2019-11-07 RX ADMIN — DOCUSATE SODIUM AND SENNOSIDES 1 TABLET: 50; 8.6 TABLET, FILM COATED ORAL at 20:06

## 2019-11-07 RX ADMIN — OXYCODONE HYDROCHLORIDE AND ACETAMINOPHEN 2 TABLET: 5; 325 TABLET ORAL at 15:58

## 2019-11-07 RX ADMIN — DIGOXIN 250 MCG: 125 TABLET ORAL at 07:44

## 2019-11-07 RX ADMIN — FAMOTIDINE 20 MG: 20 TABLET ORAL at 07:44

## 2019-11-07 RX ADMIN — GABAPENTIN 100 MG: 100 CAPSULE ORAL at 13:16

## 2019-11-07 RX ADMIN — METOPROLOL TARTRATE 50 MG: 25 TABLET, FILM COATED ORAL at 05:29

## 2019-11-07 RX ADMIN — AMIODARONE HYDROCHLORIDE 200 MG: 200 TABLET ORAL at 07:44

## 2019-11-07 RX ADMIN — GABAPENTIN 100 MG: 100 CAPSULE ORAL at 07:44

## 2019-11-07 ASSESSMENT — PAIN DESCRIPTION - ORIENTATION
ORIENTATION: LEFT;OUTER;UPPER
ORIENTATION: LEFT

## 2019-11-07 ASSESSMENT — PAIN DESCRIPTION - DESCRIPTORS
DESCRIPTORS: CRAMPING
DESCRIPTORS: THROBBING

## 2019-11-07 ASSESSMENT — PAIN DESCRIPTION - LOCATION
LOCATION: LEG

## 2019-11-07 ASSESSMENT — PAIN DESCRIPTION - ONSET
ONSET: ON-GOING

## 2019-11-07 ASSESSMENT — PAIN SCALES - GENERAL
PAINLEVEL_OUTOF10: 0
PAINLEVEL_OUTOF10: 5
PAINLEVEL_OUTOF10: 7
PAINLEVEL_OUTOF10: 6
PAINLEVEL_OUTOF10: 0
PAINLEVEL_OUTOF10: 4
PAINLEVEL_OUTOF10: 0
PAINLEVEL_OUTOF10: 6

## 2019-11-07 ASSESSMENT — PAIN DESCRIPTION - FREQUENCY
FREQUENCY: INTERMITTENT
FREQUENCY: CONTINUOUS
FREQUENCY: INTERMITTENT

## 2019-11-07 ASSESSMENT — PAIN SCALES - WONG BAKER
WONGBAKER_NUMERICALRESPONSE: 6
WONGBAKER_NUMERICALRESPONSE: 0

## 2019-11-07 ASSESSMENT — PAIN DESCRIPTION - PAIN TYPE
TYPE: ACUTE PAIN

## 2019-11-07 ASSESSMENT — PAIN DESCRIPTION - PROGRESSION
CLINICAL_PROGRESSION: GRADUALLY IMPROVING
CLINICAL_PROGRESSION: NOT CHANGED

## 2019-11-07 ASSESSMENT — PAIN - FUNCTIONAL ASSESSMENT: PAIN_FUNCTIONAL_ASSESSMENT: PREVENTS OR INTERFERES SOME ACTIVE ACTIVITIES AND ADLS

## 2019-11-08 LAB
ABSOLUTE EOS #: 0.2 K/UL (ref 0–0.4)
ABSOLUTE IMMATURE GRANULOCYTE: ABNORMAL K/UL (ref 0–0.3)
ABSOLUTE LYMPH #: 1.3 K/UL (ref 1–4.8)
ABSOLUTE MONO #: 0.8 K/UL (ref 0.1–1.3)
ANION GAP SERPL CALCULATED.3IONS-SCNC: 12 MMOL/L (ref 9–17)
BASOPHILS # BLD: 1 % (ref 0–2)
BASOPHILS ABSOLUTE: 0.1 K/UL (ref 0–0.2)
BUN BLDV-MCNC: 16 MG/DL (ref 8–23)
BUN/CREAT BLD: NORMAL (ref 9–20)
CALCIUM SERPL-MCNC: 9.3 MG/DL (ref 8.6–10.4)
CHLORIDE BLD-SCNC: 100 MMOL/L (ref 98–107)
CO2: 27 MMOL/L (ref 20–31)
CREAT SERPL-MCNC: 0.8 MG/DL (ref 0.7–1.2)
DIFFERENTIAL TYPE: ABNORMAL
EOSINOPHILS RELATIVE PERCENT: 2 % (ref 0–4)
GFR AFRICAN AMERICAN: >60 ML/MIN
GFR NON-AFRICAN AMERICAN: >60 ML/MIN
GFR SERPL CREATININE-BSD FRML MDRD: NORMAL ML/MIN/{1.73_M2}
GFR SERPL CREATININE-BSD FRML MDRD: NORMAL ML/MIN/{1.73_M2}
GLUCOSE BLD-MCNC: 85 MG/DL (ref 70–99)
HCT VFR BLD CALC: 33.8 % (ref 41–53)
HEMOGLOBIN: 11.1 G/DL (ref 13.5–17.5)
IMMATURE GRANULOCYTES: ABNORMAL %
LYMPHOCYTES # BLD: 18 % (ref 24–44)
MCH RBC QN AUTO: 30.6 PG (ref 26–34)
MCHC RBC AUTO-ENTMCNC: 32.8 G/DL (ref 31–37)
MCV RBC AUTO: 93.2 FL (ref 80–100)
MONOCYTES # BLD: 11 % (ref 1–7)
NRBC AUTOMATED: ABNORMAL PER 100 WBC
PDW BLD-RTO: 13.7 % (ref 11.5–14.9)
PLATELET # BLD: 440 K/UL (ref 150–450)
PLATELET ESTIMATE: ABNORMAL
PMV BLD AUTO: 7.2 FL (ref 6–12)
POTASSIUM SERPL-SCNC: 4.4 MMOL/L (ref 3.7–5.3)
RBC # BLD: 3.62 M/UL (ref 4.5–5.9)
RBC # BLD: ABNORMAL 10*6/UL
SEG NEUTROPHILS: 68 % (ref 36–66)
SEGMENTED NEUTROPHILS ABSOLUTE COUNT: 5 K/UL (ref 1.3–9.1)
SODIUM BLD-SCNC: 139 MMOL/L (ref 135–144)
WBC # BLD: 7.4 K/UL (ref 3.5–11)
WBC # BLD: ABNORMAL 10*3/UL

## 2019-11-08 PROCEDURE — 1180000000 HC REHAB R&B

## 2019-11-08 PROCEDURE — 36415 COLL VENOUS BLD VENIPUNCTURE: CPT

## 2019-11-08 PROCEDURE — 6370000000 HC RX 637 (ALT 250 FOR IP): Performed by: INTERNAL MEDICINE

## 2019-11-08 PROCEDURE — 80048 BASIC METABOLIC PNL TOTAL CA: CPT

## 2019-11-08 PROCEDURE — 97116 GAIT TRAINING THERAPY: CPT

## 2019-11-08 PROCEDURE — 6370000000 HC RX 637 (ALT 250 FOR IP): Performed by: PHYSICAL MEDICINE & REHABILITATION

## 2019-11-08 PROCEDURE — 97535 SELF CARE MNGMENT TRAINING: CPT

## 2019-11-08 PROCEDURE — 97530 THERAPEUTIC ACTIVITIES: CPT

## 2019-11-08 PROCEDURE — 85025 COMPLETE CBC W/AUTO DIFF WBC: CPT

## 2019-11-08 RX ADMIN — AMIODARONE HYDROCHLORIDE 200 MG: 200 TABLET ORAL at 09:33

## 2019-11-08 RX ADMIN — POLYETHYLENE GLYCOL 3350 17 G: 17 POWDER, FOR SOLUTION ORAL at 09:34

## 2019-11-08 RX ADMIN — METOPROLOL TARTRATE 50 MG: 25 TABLET, FILM COATED ORAL at 22:50

## 2019-11-08 RX ADMIN — GABAPENTIN 100 MG: 100 CAPSULE ORAL at 13:15

## 2019-11-08 RX ADMIN — FAMOTIDINE 20 MG: 20 TABLET ORAL at 10:02

## 2019-11-08 RX ADMIN — ATORVASTATIN CALCIUM 40 MG: 40 TABLET, FILM COATED ORAL at 20:38

## 2019-11-08 RX ADMIN — DIGOXIN 250 MCG: 125 TABLET ORAL at 09:34

## 2019-11-08 RX ADMIN — DOCUSATE SODIUM AND SENNOSIDES 1 TABLET: 50; 8.6 TABLET, FILM COATED ORAL at 09:34

## 2019-11-08 RX ADMIN — FAMOTIDINE 20 MG: 20 TABLET ORAL at 20:38

## 2019-11-08 RX ADMIN — OXYCODONE HYDROCHLORIDE AND ACETAMINOPHEN 2 TABLET: 5; 325 TABLET ORAL at 01:13

## 2019-11-08 RX ADMIN — METOPROLOL TARTRATE 50 MG: 25 TABLET, FILM COATED ORAL at 17:27

## 2019-11-08 RX ADMIN — APIXABAN 5 MG: 5 TABLET, FILM COATED ORAL at 20:38

## 2019-11-08 RX ADMIN — APIXABAN 5 MG: 5 TABLET, FILM COATED ORAL at 09:33

## 2019-11-08 RX ADMIN — GABAPENTIN 100 MG: 100 CAPSULE ORAL at 20:38

## 2019-11-08 RX ADMIN — AMIODARONE HYDROCHLORIDE 200 MG: 200 TABLET ORAL at 20:38

## 2019-11-08 RX ADMIN — OXYCODONE HYDROCHLORIDE AND ACETAMINOPHEN 2 TABLET: 5; 325 TABLET ORAL at 05:58

## 2019-11-08 RX ADMIN — CLOPIDOGREL BISULFATE 75 MG: 75 TABLET ORAL at 09:33

## 2019-11-08 RX ADMIN — GABAPENTIN 100 MG: 100 CAPSULE ORAL at 09:33

## 2019-11-08 RX ADMIN — DOCUSATE SODIUM AND SENNOSIDES 1 TABLET: 50; 8.6 TABLET, FILM COATED ORAL at 20:38

## 2019-11-08 RX ADMIN — DOCUSATE SODIUM 100 MG: 100 CAPSULE, LIQUID FILLED ORAL at 09:34

## 2019-11-08 RX ADMIN — METOPROLOL TARTRATE 50 MG: 25 TABLET, FILM COATED ORAL at 05:55

## 2019-11-08 RX ADMIN — METOPROLOL TARTRATE 50 MG: 25 TABLET, FILM COATED ORAL at 13:15

## 2019-11-08 RX ADMIN — BUMETANIDE 1 MG: 1 TABLET ORAL at 09:35

## 2019-11-08 RX ADMIN — OXYCODONE HYDROCHLORIDE AND ACETAMINOPHEN 2 TABLET: 5; 325 TABLET ORAL at 13:19

## 2019-11-08 ASSESSMENT — PAIN SCALES - WONG BAKER: WONGBAKER_NUMERICALRESPONSE: 8

## 2019-11-08 ASSESSMENT — PAIN DESCRIPTION - LOCATION
LOCATION: LEG
LOCATION: LEG

## 2019-11-08 ASSESSMENT — PAIN SCALES - GENERAL
PAINLEVEL_OUTOF10: 8
PAINLEVEL_OUTOF10: 4
PAINLEVEL_OUTOF10: 8
PAINLEVEL_OUTOF10: 7
PAINLEVEL_OUTOF10: 6
PAINLEVEL_OUTOF10: 6
PAINLEVEL_OUTOF10: 3

## 2019-11-08 ASSESSMENT — PAIN - FUNCTIONAL ASSESSMENT
PAIN_FUNCTIONAL_ASSESSMENT: PREVENTS OR INTERFERES SOME ACTIVE ACTIVITIES AND ADLS
PAIN_FUNCTIONAL_ASSESSMENT: PREVENTS OR INTERFERES WITH ALL ACTIVE AND SOME PASSIVE ACTIVITIES

## 2019-11-08 ASSESSMENT — PAIN DESCRIPTION - PAIN TYPE
TYPE: ACUTE PAIN
TYPE: ACUTE PAIN;CHRONIC PAIN

## 2019-11-08 ASSESSMENT — PAIN DESCRIPTION - ORIENTATION: ORIENTATION: LEFT

## 2019-11-08 ASSESSMENT — PAIN DESCRIPTION - DESCRIPTORS: DESCRIPTORS: CRAMPING

## 2019-11-09 LAB
ABSOLUTE EOS #: 0.3 K/UL (ref 0–0.4)
ABSOLUTE IMMATURE GRANULOCYTE: ABNORMAL K/UL (ref 0–0.3)
ABSOLUTE LYMPH #: 1 K/UL (ref 1–4.8)
ABSOLUTE MONO #: 1 K/UL (ref 0.1–1.3)
ANION GAP SERPL CALCULATED.3IONS-SCNC: 12 MMOL/L (ref 9–17)
BASOPHILS # BLD: 1 % (ref 0–2)
BASOPHILS ABSOLUTE: 0.1 K/UL (ref 0–0.2)
BUN BLDV-MCNC: 17 MG/DL (ref 8–23)
BUN/CREAT BLD: NORMAL (ref 9–20)
CALCIUM SERPL-MCNC: 9.1 MG/DL (ref 8.6–10.4)
CHLORIDE BLD-SCNC: 101 MMOL/L (ref 98–107)
CO2: 27 MMOL/L (ref 20–31)
CREAT SERPL-MCNC: 0.83 MG/DL (ref 0.7–1.2)
DIFFERENTIAL TYPE: ABNORMAL
EOSINOPHILS RELATIVE PERCENT: 3 % (ref 0–4)
GFR AFRICAN AMERICAN: >60 ML/MIN
GFR NON-AFRICAN AMERICAN: >60 ML/MIN
GFR SERPL CREATININE-BSD FRML MDRD: NORMAL ML/MIN/{1.73_M2}
GFR SERPL CREATININE-BSD FRML MDRD: NORMAL ML/MIN/{1.73_M2}
GLUCOSE BLD-MCNC: 89 MG/DL (ref 70–99)
HCT VFR BLD CALC: 32.5 % (ref 41–53)
HEMOGLOBIN: 10.9 G/DL (ref 13.5–17.5)
IMMATURE GRANULOCYTES: ABNORMAL %
LYMPHOCYTES # BLD: 12 % (ref 24–44)
MCH RBC QN AUTO: 31.3 PG (ref 26–34)
MCHC RBC AUTO-ENTMCNC: 33.6 G/DL (ref 31–37)
MCV RBC AUTO: 93.2 FL (ref 80–100)
MONOCYTES # BLD: 12 % (ref 1–7)
NRBC AUTOMATED: ABNORMAL PER 100 WBC
PDW BLD-RTO: 13.5 % (ref 11.5–14.9)
PLATELET # BLD: 437 K/UL (ref 150–450)
PLATELET ESTIMATE: ABNORMAL
PMV BLD AUTO: 7.1 FL (ref 6–12)
POTASSIUM SERPL-SCNC: 4.5 MMOL/L (ref 3.7–5.3)
RBC # BLD: 3.48 M/UL (ref 4.5–5.9)
RBC # BLD: ABNORMAL 10*6/UL
SEG NEUTROPHILS: 72 % (ref 36–66)
SEGMENTED NEUTROPHILS ABSOLUTE COUNT: 5.8 K/UL (ref 1.3–9.1)
SODIUM BLD-SCNC: 140 MMOL/L (ref 135–144)
WBC # BLD: 8.1 K/UL (ref 3.5–11)
WBC # BLD: ABNORMAL 10*3/UL

## 2019-11-09 PROCEDURE — 6370000000 HC RX 637 (ALT 250 FOR IP): Performed by: PHYSICAL MEDICINE & REHABILITATION

## 2019-11-09 PROCEDURE — 6370000000 HC RX 637 (ALT 250 FOR IP): Performed by: INTERNAL MEDICINE

## 2019-11-09 PROCEDURE — 97116 GAIT TRAINING THERAPY: CPT

## 2019-11-09 PROCEDURE — 36415 COLL VENOUS BLD VENIPUNCTURE: CPT

## 2019-11-09 PROCEDURE — 80048 BASIC METABOLIC PNL TOTAL CA: CPT

## 2019-11-09 PROCEDURE — 85025 COMPLETE CBC W/AUTO DIFF WBC: CPT

## 2019-11-09 PROCEDURE — 1180000000 HC REHAB R&B

## 2019-11-09 PROCEDURE — 97110 THERAPEUTIC EXERCISES: CPT

## 2019-11-09 PROCEDURE — 97535 SELF CARE MNGMENT TRAINING: CPT

## 2019-11-09 RX ADMIN — APIXABAN 5 MG: 5 TABLET, FILM COATED ORAL at 09:46

## 2019-11-09 RX ADMIN — BUMETANIDE 1 MG: 1 TABLET ORAL at 09:47

## 2019-11-09 RX ADMIN — METOPROLOL TARTRATE 50 MG: 25 TABLET, FILM COATED ORAL at 16:50

## 2019-11-09 RX ADMIN — FAMOTIDINE 20 MG: 20 TABLET ORAL at 09:46

## 2019-11-09 RX ADMIN — METOPROLOL TARTRATE 50 MG: 25 TABLET, FILM COATED ORAL at 12:01

## 2019-11-09 RX ADMIN — APIXABAN 5 MG: 5 TABLET, FILM COATED ORAL at 20:34

## 2019-11-09 RX ADMIN — GABAPENTIN 100 MG: 100 CAPSULE ORAL at 09:46

## 2019-11-09 RX ADMIN — OXYCODONE HYDROCHLORIDE AND ACETAMINOPHEN 2 TABLET: 5; 325 TABLET ORAL at 00:28

## 2019-11-09 RX ADMIN — DOCUSATE SODIUM AND SENNOSIDES 1 TABLET: 50; 8.6 TABLET, FILM COATED ORAL at 09:46

## 2019-11-09 RX ADMIN — GABAPENTIN 100 MG: 100 CAPSULE ORAL at 20:34

## 2019-11-09 RX ADMIN — CLOPIDOGREL BISULFATE 75 MG: 75 TABLET ORAL at 09:46

## 2019-11-09 RX ADMIN — DOCUSATE SODIUM AND SENNOSIDES 1 TABLET: 50; 8.6 TABLET, FILM COATED ORAL at 20:34

## 2019-11-09 RX ADMIN — FAMOTIDINE 20 MG: 20 TABLET ORAL at 20:34

## 2019-11-09 RX ADMIN — AMIODARONE HYDROCHLORIDE 200 MG: 200 TABLET ORAL at 09:46

## 2019-11-09 RX ADMIN — DIGOXIN 250 MCG: 125 TABLET ORAL at 09:46

## 2019-11-09 RX ADMIN — AMIODARONE HYDROCHLORIDE 200 MG: 200 TABLET ORAL at 20:34

## 2019-11-09 RX ADMIN — METOPROLOL TARTRATE 50 MG: 25 TABLET, FILM COATED ORAL at 04:58

## 2019-11-09 RX ADMIN — OXYCODONE HYDROCHLORIDE AND ACETAMINOPHEN 2 TABLET: 5; 325 TABLET ORAL at 07:43

## 2019-11-09 RX ADMIN — GABAPENTIN 100 MG: 100 CAPSULE ORAL at 13:35

## 2019-11-09 RX ADMIN — ATORVASTATIN CALCIUM 40 MG: 40 TABLET, FILM COATED ORAL at 20:34

## 2019-11-09 ASSESSMENT — PAIN DESCRIPTION - LOCATION
LOCATION: LEG
LOCATION: LEG

## 2019-11-09 ASSESSMENT — PAIN SCALES - GENERAL
PAINLEVEL_OUTOF10: 7
PAINLEVEL_OUTOF10: 6
PAINLEVEL_OUTOF10: 2
PAINLEVEL_OUTOF10: 7
PAINLEVEL_OUTOF10: 6

## 2019-11-09 ASSESSMENT — PAIN DESCRIPTION - DESCRIPTORS: DESCRIPTORS: CRAMPING;ACHING

## 2019-11-09 ASSESSMENT — PAIN DESCRIPTION - FREQUENCY: FREQUENCY: INTERMITTENT

## 2019-11-09 ASSESSMENT — PAIN DESCRIPTION - PAIN TYPE: TYPE: ACUTE PAIN;CHRONIC PAIN

## 2019-11-09 ASSESSMENT — PAIN DESCRIPTION - ORIENTATION
ORIENTATION: LEFT
ORIENTATION: RIGHT

## 2019-11-10 LAB
ABSOLUTE EOS #: 0.2 K/UL (ref 0–0.4)
ABSOLUTE IMMATURE GRANULOCYTE: ABNORMAL K/UL (ref 0–0.3)
ABSOLUTE LYMPH #: 1.1 K/UL (ref 1–4.8)
ABSOLUTE MONO #: 1 K/UL (ref 0.1–1.3)
ANION GAP SERPL CALCULATED.3IONS-SCNC: 11 MMOL/L (ref 9–17)
BASOPHILS # BLD: 1 % (ref 0–2)
BASOPHILS ABSOLUTE: 0.1 K/UL (ref 0–0.2)
BUN BLDV-MCNC: 13 MG/DL (ref 8–23)
BUN/CREAT BLD: ABNORMAL (ref 9–20)
CALCIUM SERPL-MCNC: 9.3 MG/DL (ref 8.6–10.4)
CHLORIDE BLD-SCNC: 101 MMOL/L (ref 98–107)
CO2: 28 MMOL/L (ref 20–31)
CREAT SERPL-MCNC: 0.8 MG/DL (ref 0.7–1.2)
DIFFERENTIAL TYPE: ABNORMAL
EOSINOPHILS RELATIVE PERCENT: 3 % (ref 0–4)
GFR AFRICAN AMERICAN: >60 ML/MIN
GFR NON-AFRICAN AMERICAN: >60 ML/MIN
GFR SERPL CREATININE-BSD FRML MDRD: ABNORMAL ML/MIN/{1.73_M2}
GFR SERPL CREATININE-BSD FRML MDRD: ABNORMAL ML/MIN/{1.73_M2}
GLUCOSE BLD-MCNC: 103 MG/DL (ref 70–99)
HCT VFR BLD CALC: 33.9 % (ref 41–53)
HEMOGLOBIN: 11.2 G/DL (ref 13.5–17.5)
IMMATURE GRANULOCYTES: ABNORMAL %
LYMPHOCYTES # BLD: 13 % (ref 24–44)
MCH RBC QN AUTO: 30.6 PG (ref 26–34)
MCHC RBC AUTO-ENTMCNC: 33 G/DL (ref 31–37)
MCV RBC AUTO: 92.7 FL (ref 80–100)
MONOCYTES # BLD: 11 % (ref 1–7)
NRBC AUTOMATED: ABNORMAL PER 100 WBC
PDW BLD-RTO: 13.7 % (ref 11.5–14.9)
PLATELET # BLD: 441 K/UL (ref 150–450)
PLATELET ESTIMATE: ABNORMAL
PMV BLD AUTO: 7 FL (ref 6–12)
POTASSIUM SERPL-SCNC: 4.9 MMOL/L (ref 3.7–5.3)
RBC # BLD: 3.65 M/UL (ref 4.5–5.9)
RBC # BLD: ABNORMAL 10*6/UL
SEG NEUTROPHILS: 72 % (ref 36–66)
SEGMENTED NEUTROPHILS ABSOLUTE COUNT: 6.2 K/UL (ref 1.3–9.1)
SODIUM BLD-SCNC: 140 MMOL/L (ref 135–144)
WBC # BLD: 8.6 K/UL (ref 3.5–11)
WBC # BLD: ABNORMAL 10*3/UL

## 2019-11-10 PROCEDURE — 80048 BASIC METABOLIC PNL TOTAL CA: CPT

## 2019-11-10 PROCEDURE — 85025 COMPLETE CBC W/AUTO DIFF WBC: CPT

## 2019-11-10 PROCEDURE — 6370000000 HC RX 637 (ALT 250 FOR IP): Performed by: INTERNAL MEDICINE

## 2019-11-10 PROCEDURE — 36415 COLL VENOUS BLD VENIPUNCTURE: CPT

## 2019-11-10 PROCEDURE — 99232 SBSQ HOSP IP/OBS MODERATE 35: CPT | Performed by: PHYSICAL MEDICINE & REHABILITATION

## 2019-11-10 PROCEDURE — 6370000000 HC RX 637 (ALT 250 FOR IP): Performed by: PHYSICAL MEDICINE & REHABILITATION

## 2019-11-10 PROCEDURE — 1180000000 HC REHAB R&B

## 2019-11-10 RX ADMIN — POLYETHYLENE GLYCOL 3350 17 G: 17 POWDER, FOR SOLUTION ORAL at 08:36

## 2019-11-10 RX ADMIN — CLOPIDOGREL BISULFATE 75 MG: 75 TABLET ORAL at 08:36

## 2019-11-10 RX ADMIN — BUMETANIDE 1 MG: 1 TABLET ORAL at 08:47

## 2019-11-10 RX ADMIN — APIXABAN 5 MG: 5 TABLET, FILM COATED ORAL at 20:45

## 2019-11-10 RX ADMIN — OXYCODONE HYDROCHLORIDE AND ACETAMINOPHEN 2 TABLET: 5; 325 TABLET ORAL at 17:02

## 2019-11-10 RX ADMIN — DOCUSATE SODIUM AND SENNOSIDES 1 TABLET: 50; 8.6 TABLET, FILM COATED ORAL at 20:45

## 2019-11-10 RX ADMIN — DIGOXIN 250 MCG: 125 TABLET ORAL at 08:46

## 2019-11-10 RX ADMIN — GABAPENTIN 100 MG: 100 CAPSULE ORAL at 14:23

## 2019-11-10 RX ADMIN — AMIODARONE HYDROCHLORIDE 200 MG: 200 TABLET ORAL at 20:45

## 2019-11-10 RX ADMIN — GABAPENTIN 100 MG: 100 CAPSULE ORAL at 20:45

## 2019-11-10 RX ADMIN — FAMOTIDINE 20 MG: 20 TABLET ORAL at 20:45

## 2019-11-10 RX ADMIN — DOCUSATE SODIUM AND SENNOSIDES 1 TABLET: 50; 8.6 TABLET, FILM COATED ORAL at 08:36

## 2019-11-10 RX ADMIN — AMIODARONE HYDROCHLORIDE 200 MG: 200 TABLET ORAL at 08:36

## 2019-11-10 RX ADMIN — OXYCODONE HYDROCHLORIDE AND ACETAMINOPHEN 2 TABLET: 5; 325 TABLET ORAL at 00:59

## 2019-11-10 RX ADMIN — ATORVASTATIN CALCIUM 40 MG: 40 TABLET, FILM COATED ORAL at 20:45

## 2019-11-10 RX ADMIN — DIPHENHYDRAMINE HCL 25 MG: 25 TABLET ORAL at 20:45

## 2019-11-10 RX ADMIN — APIXABAN 5 MG: 5 TABLET, FILM COATED ORAL at 08:36

## 2019-11-10 RX ADMIN — METOPROLOL TARTRATE 50 MG: 25 TABLET, FILM COATED ORAL at 17:03

## 2019-11-10 RX ADMIN — METOPROLOL TARTRATE 50 MG: 25 TABLET, FILM COATED ORAL at 12:08

## 2019-11-10 RX ADMIN — GABAPENTIN 100 MG: 100 CAPSULE ORAL at 08:36

## 2019-11-10 RX ADMIN — FAMOTIDINE 20 MG: 20 TABLET ORAL at 08:37

## 2019-11-10 RX ADMIN — METOPROLOL TARTRATE 50 MG: 25 TABLET, FILM COATED ORAL at 23:28

## 2019-11-10 ASSESSMENT — PAIN SCALES - GENERAL
PAINLEVEL_OUTOF10: 4
PAINLEVEL_OUTOF10: 6
PAINLEVEL_OUTOF10: 7
PAINLEVEL_OUTOF10: 7

## 2019-11-10 ASSESSMENT — PAIN DESCRIPTION - DESCRIPTORS: DESCRIPTORS: SHARP;SHOOTING

## 2019-11-10 ASSESSMENT — PAIN DESCRIPTION - ONSET: ONSET: GRADUAL

## 2019-11-10 ASSESSMENT — PAIN DESCRIPTION - ORIENTATION: ORIENTATION: LEFT

## 2019-11-10 ASSESSMENT — PAIN DESCRIPTION - LOCATION: LOCATION: LEG

## 2019-11-10 ASSESSMENT — PAIN DESCRIPTION - FREQUENCY: FREQUENCY: INTERMITTENT

## 2019-11-10 ASSESSMENT — PAIN DESCRIPTION - PAIN TYPE: TYPE: ACUTE PAIN

## 2019-11-11 ENCOUNTER — APPOINTMENT (OUTPATIENT)
Dept: GENERAL RADIOLOGY | Age: 64
DRG: 862 | End: 2019-11-11
Attending: PHYSICAL MEDICINE & REHABILITATION
Payer: MEDICAID

## 2019-11-11 LAB
ABSOLUTE EOS #: 0.22 K/UL (ref 0–0.4)
ABSOLUTE IMMATURE GRANULOCYTE: ABNORMAL K/UL (ref 0–0.3)
ABSOLUTE LYMPH #: 0.81 K/UL (ref 1–4.8)
ABSOLUTE MONO #: 1.04 K/UL (ref 0.1–1.3)
ANION GAP SERPL CALCULATED.3IONS-SCNC: 13 MMOL/L (ref 9–17)
BASOPHILS # BLD: 2 % (ref 0–2)
BASOPHILS ABSOLUTE: 0.15 K/UL (ref 0–0.2)
BUN BLDV-MCNC: 14 MG/DL (ref 8–23)
BUN/CREAT BLD: NORMAL (ref 9–20)
CALCIUM SERPL-MCNC: 9.2 MG/DL (ref 8.6–10.4)
CHLORIDE BLD-SCNC: 99 MMOL/L (ref 98–107)
CO2: 25 MMOL/L (ref 20–31)
CREAT SERPL-MCNC: 0.8 MG/DL (ref 0.7–1.2)
DIFFERENTIAL TYPE: ABNORMAL
EOSINOPHILS RELATIVE PERCENT: 3 % (ref 0–4)
GFR AFRICAN AMERICAN: >60 ML/MIN
GFR NON-AFRICAN AMERICAN: >60 ML/MIN
GFR SERPL CREATININE-BSD FRML MDRD: NORMAL ML/MIN/{1.73_M2}
GFR SERPL CREATININE-BSD FRML MDRD: NORMAL ML/MIN/{1.73_M2}
GLUCOSE BLD-MCNC: 90 MG/DL (ref 70–99)
HCT VFR BLD CALC: 33.8 % (ref 41–53)
HEMOGLOBIN: 11.3 G/DL (ref 13.5–17.5)
IMMATURE GRANULOCYTES: ABNORMAL %
LYMPHOCYTES # BLD: 11 % (ref 24–44)
MCH RBC QN AUTO: 31 PG (ref 26–34)
MCHC RBC AUTO-ENTMCNC: 33.4 G/DL (ref 31–37)
MCV RBC AUTO: 92.6 FL (ref 80–100)
MONOCYTES # BLD: 14 % (ref 1–7)
MORPHOLOGY: ABNORMAL
NRBC AUTOMATED: ABNORMAL PER 100 WBC
PDW BLD-RTO: 14 % (ref 11.5–14.9)
PLATELET # BLD: 443 K/UL (ref 150–450)
PLATELET ESTIMATE: ABNORMAL
PMV BLD AUTO: 7.2 FL (ref 6–12)
POTASSIUM SERPL-SCNC: 4.1 MMOL/L (ref 3.7–5.3)
RBC # BLD: 3.65 M/UL (ref 4.5–5.9)
RBC # BLD: ABNORMAL 10*6/UL
SEG NEUTROPHILS: 70 % (ref 36–66)
SEGMENTED NEUTROPHILS ABSOLUTE COUNT: 5.18 K/UL (ref 1.3–9.1)
SODIUM BLD-SCNC: 137 MMOL/L (ref 135–144)
WBC # BLD: 7.4 K/UL (ref 3.5–11)
WBC # BLD: ABNORMAL 10*3/UL

## 2019-11-11 PROCEDURE — 80048 BASIC METABOLIC PNL TOTAL CA: CPT

## 2019-11-11 PROCEDURE — 36415 COLL VENOUS BLD VENIPUNCTURE: CPT

## 2019-11-11 PROCEDURE — 99232 SBSQ HOSP IP/OBS MODERATE 35: CPT | Performed by: PHYSICAL MEDICINE & REHABILITATION

## 2019-11-11 PROCEDURE — 97150 GROUP THERAPEUTIC PROCEDURES: CPT

## 2019-11-11 PROCEDURE — 6370000000 HC RX 637 (ALT 250 FOR IP): Performed by: PHYSICAL MEDICINE & REHABILITATION

## 2019-11-11 PROCEDURE — 73502 X-RAY EXAM HIP UNI 2-3 VIEWS: CPT

## 2019-11-11 PROCEDURE — 93971 EXTREMITY STUDY: CPT

## 2019-11-11 PROCEDURE — 97535 SELF CARE MNGMENT TRAINING: CPT

## 2019-11-11 PROCEDURE — 1180000000 HC REHAB R&B

## 2019-11-11 PROCEDURE — 6370000000 HC RX 637 (ALT 250 FOR IP): Performed by: INTERNAL MEDICINE

## 2019-11-11 PROCEDURE — 85025 COMPLETE CBC W/AUTO DIFF WBC: CPT

## 2019-11-11 PROCEDURE — 97530 THERAPEUTIC ACTIVITIES: CPT

## 2019-11-11 PROCEDURE — 97110 THERAPEUTIC EXERCISES: CPT

## 2019-11-11 PROCEDURE — 97116 GAIT TRAINING THERAPY: CPT

## 2019-11-11 RX ORDER — DILTIAZEM HYDROCHLORIDE 120 MG/1
120 CAPSULE, COATED, EXTENDED RELEASE ORAL DAILY
Status: DISCONTINUED | OUTPATIENT
Start: 2019-11-11 | End: 2019-11-12

## 2019-11-11 RX ADMIN — METOPROLOL TARTRATE 50 MG: 25 TABLET, FILM COATED ORAL at 23:26

## 2019-11-11 RX ADMIN — FAMOTIDINE 20 MG: 20 TABLET ORAL at 23:27

## 2019-11-11 RX ADMIN — APIXABAN 5 MG: 5 TABLET, FILM COATED ORAL at 23:27

## 2019-11-11 RX ADMIN — METOPROLOL TARTRATE 50 MG: 25 TABLET, FILM COATED ORAL at 17:22

## 2019-11-11 RX ADMIN — AMIODARONE HYDROCHLORIDE 200 MG: 200 TABLET ORAL at 07:59

## 2019-11-11 RX ADMIN — ATORVASTATIN CALCIUM 40 MG: 40 TABLET, FILM COATED ORAL at 23:27

## 2019-11-11 RX ADMIN — GABAPENTIN 100 MG: 100 CAPSULE ORAL at 12:33

## 2019-11-11 RX ADMIN — OXYCODONE HYDROCHLORIDE AND ACETAMINOPHEN 2 TABLET: 5; 325 TABLET ORAL at 12:33

## 2019-11-11 RX ADMIN — DOCUSATE SODIUM AND SENNOSIDES 1 TABLET: 50; 8.6 TABLET, FILM COATED ORAL at 07:59

## 2019-11-11 RX ADMIN — BUMETANIDE 1 MG: 1 TABLET ORAL at 08:00

## 2019-11-11 RX ADMIN — METOPROLOL TARTRATE 50 MG: 25 TABLET, FILM COATED ORAL at 04:58

## 2019-11-11 RX ADMIN — ONDANSETRON HYDROCHLORIDE 4 MG: 4 TABLET, FILM COATED ORAL at 17:22

## 2019-11-11 RX ADMIN — CLOPIDOGREL BISULFATE 75 MG: 75 TABLET ORAL at 07:59

## 2019-11-11 RX ADMIN — METOPROLOL TARTRATE 50 MG: 25 TABLET, FILM COATED ORAL at 11:03

## 2019-11-11 RX ADMIN — GABAPENTIN 100 MG: 100 CAPSULE ORAL at 07:59

## 2019-11-11 RX ADMIN — POLYETHYLENE GLYCOL 3350 17 G: 17 POWDER, FOR SOLUTION ORAL at 07:58

## 2019-11-11 RX ADMIN — DIGOXIN 250 MCG: 125 TABLET ORAL at 07:59

## 2019-11-11 RX ADMIN — OXYCODONE HYDROCHLORIDE AND ACETAMINOPHEN 2 TABLET: 5; 325 TABLET ORAL at 08:05

## 2019-11-11 RX ADMIN — FAMOTIDINE 20 MG: 20 TABLET ORAL at 07:59

## 2019-11-11 RX ADMIN — DIPHENHYDRAMINE HCL 25 MG: 25 TABLET ORAL at 23:27

## 2019-11-11 RX ADMIN — APIXABAN 5 MG: 5 TABLET, FILM COATED ORAL at 07:59

## 2019-11-11 RX ADMIN — GABAPENTIN 100 MG: 100 CAPSULE ORAL at 23:27

## 2019-11-11 RX ADMIN — DILTIAZEM HYDROCHLORIDE 120 MG: 120 CAPSULE, COATED, EXTENDED RELEASE ORAL at 07:59

## 2019-11-11 RX ADMIN — AMIODARONE HYDROCHLORIDE 200 MG: 200 TABLET ORAL at 23:27

## 2019-11-11 ASSESSMENT — PAIN SCALES - GENERAL
PAINLEVEL_OUTOF10: 3
PAINLEVEL_OUTOF10: 5
PAINLEVEL_OUTOF10: 7
PAINLEVEL_OUTOF10: 6

## 2019-11-11 ASSESSMENT — PAIN DESCRIPTION - DESCRIPTORS: DESCRIPTORS: SHARP;SHOOTING

## 2019-11-11 ASSESSMENT — PAIN - FUNCTIONAL ASSESSMENT: PAIN_FUNCTIONAL_ASSESSMENT: PREVENTS OR INTERFERES SOME ACTIVE ACTIVITIES AND ADLS

## 2019-11-11 ASSESSMENT — PAIN DESCRIPTION - LOCATION: LOCATION: LEG

## 2019-11-11 ASSESSMENT — PAIN DESCRIPTION - ORIENTATION: ORIENTATION: LEFT

## 2019-11-11 ASSESSMENT — PAIN DESCRIPTION - PAIN TYPE: TYPE: ACUTE PAIN

## 2019-11-11 ASSESSMENT — PAIN DESCRIPTION - FREQUENCY: FREQUENCY: INTERMITTENT

## 2019-11-11 ASSESSMENT — PAIN DESCRIPTION - ONSET: ONSET: GRADUAL

## 2019-11-11 ASSESSMENT — PAIN DESCRIPTION - PROGRESSION: CLINICAL_PROGRESSION: NOT CHANGED

## 2019-11-12 LAB
ABSOLUTE EOS #: 0.07 K/UL (ref 0–0.4)
ABSOLUTE IMMATURE GRANULOCYTE: ABNORMAL K/UL (ref 0–0.3)
ABSOLUTE LYMPH #: 0.77 K/UL (ref 1–4.8)
ABSOLUTE MONO #: 0.63 K/UL (ref 0.1–1.3)
ANION GAP SERPL CALCULATED.3IONS-SCNC: 12 MMOL/L (ref 9–17)
BASOPHILS # BLD: 0 % (ref 0–2)
BASOPHILS ABSOLUTE: 0 K/UL (ref 0–0.2)
BUN BLDV-MCNC: 15 MG/DL (ref 8–23)
BUN/CREAT BLD: NORMAL (ref 9–20)
CALCIUM SERPL-MCNC: 9 MG/DL (ref 8.6–10.4)
CHLORIDE BLD-SCNC: 100 MMOL/L (ref 98–107)
CO2: 26 MMOL/L (ref 20–31)
CREAT SERPL-MCNC: 0.84 MG/DL (ref 0.7–1.2)
DIFFERENTIAL TYPE: ABNORMAL
DIGOXIN DATE LAST DOSE: NORMAL
DIGOXIN DOSE AMOUNT: NORMAL
DIGOXIN DOSE TIME: 759
DIGOXIN LEVEL: 1.2 NG/ML (ref 0.5–2)
EOSINOPHILS RELATIVE PERCENT: 1 % (ref 0–4)
GFR AFRICAN AMERICAN: >60 ML/MIN
GFR NON-AFRICAN AMERICAN: >60 ML/MIN
GFR SERPL CREATININE-BSD FRML MDRD: NORMAL ML/MIN/{1.73_M2}
GFR SERPL CREATININE-BSD FRML MDRD: NORMAL ML/MIN/{1.73_M2}
GLUCOSE BLD-MCNC: 93 MG/DL (ref 70–99)
HCT VFR BLD CALC: 32.8 % (ref 41–53)
HEMOGLOBIN: 10.9 G/DL (ref 13.5–17.5)
IMMATURE GRANULOCYTES: ABNORMAL %
LYMPHOCYTES # BLD: 11 % (ref 24–44)
MCH RBC QN AUTO: 30.9 PG (ref 26–34)
MCHC RBC AUTO-ENTMCNC: 33.2 G/DL (ref 31–37)
MCV RBC AUTO: 93 FL (ref 80–100)
MONOCYTES # BLD: 9 % (ref 1–7)
MORPHOLOGY: ABNORMAL
MORPHOLOGY: ABNORMAL
NRBC AUTOMATED: ABNORMAL PER 100 WBC
PDW BLD-RTO: 13.6 % (ref 11.5–14.9)
PLATELET # BLD: 412 K/UL (ref 150–450)
PLATELET ESTIMATE: ABNORMAL
PMV BLD AUTO: 7.2 FL (ref 6–12)
POTASSIUM SERPL-SCNC: 4.4 MMOL/L (ref 3.7–5.3)
RBC # BLD: 3.53 M/UL (ref 4.5–5.9)
RBC # BLD: ABNORMAL 10*6/UL
SEG NEUTROPHILS: 79 % (ref 36–66)
SEGMENTED NEUTROPHILS ABSOLUTE COUNT: 5.53 K/UL (ref 1.3–9.1)
SODIUM BLD-SCNC: 138 MMOL/L (ref 135–144)
WBC # BLD: 7 K/UL (ref 3.5–11)
WBC # BLD: ABNORMAL 10*3/UL

## 2019-11-12 PROCEDURE — 97110 THERAPEUTIC EXERCISES: CPT

## 2019-11-12 PROCEDURE — 97116 GAIT TRAINING THERAPY: CPT

## 2019-11-12 PROCEDURE — 36415 COLL VENOUS BLD VENIPUNCTURE: CPT

## 2019-11-12 PROCEDURE — 80162 ASSAY OF DIGOXIN TOTAL: CPT

## 2019-11-12 PROCEDURE — 6370000000 HC RX 637 (ALT 250 FOR IP): Performed by: PHYSICAL MEDICINE & REHABILITATION

## 2019-11-12 PROCEDURE — 6370000000 HC RX 637 (ALT 250 FOR IP): Performed by: INTERNAL MEDICINE

## 2019-11-12 PROCEDURE — 1180000000 HC REHAB R&B

## 2019-11-12 PROCEDURE — 85025 COMPLETE CBC W/AUTO DIFF WBC: CPT

## 2019-11-12 PROCEDURE — 97535 SELF CARE MNGMENT TRAINING: CPT

## 2019-11-12 PROCEDURE — 80048 BASIC METABOLIC PNL TOTAL CA: CPT

## 2019-11-12 PROCEDURE — 97530 THERAPEUTIC ACTIVITIES: CPT

## 2019-11-12 RX ORDER — DIGOXIN 125 MCG
125 TABLET ORAL DAILY
Status: DISCONTINUED | OUTPATIENT
Start: 2019-11-13 | End: 2019-11-18 | Stop reason: HOSPADM

## 2019-11-12 RX ORDER — AMIODARONE HYDROCHLORIDE 200 MG/1
200 TABLET ORAL DAILY
Status: DISCONTINUED | OUTPATIENT
Start: 2019-11-12 | End: 2019-11-18 | Stop reason: HOSPADM

## 2019-11-12 RX ADMIN — APIXABAN 5 MG: 5 TABLET, FILM COATED ORAL at 09:41

## 2019-11-12 RX ADMIN — OXYCODONE HYDROCHLORIDE AND ACETAMINOPHEN 2 TABLET: 5; 325 TABLET ORAL at 12:35

## 2019-11-12 RX ADMIN — DOCUSATE SODIUM AND SENNOSIDES 1 TABLET: 50; 8.6 TABLET, FILM COATED ORAL at 09:42

## 2019-11-12 RX ADMIN — METOPROLOL SUCCINATE 150 MG: 100 TABLET, EXTENDED RELEASE ORAL at 10:01

## 2019-11-12 RX ADMIN — CLOPIDOGREL BISULFATE 75 MG: 75 TABLET ORAL at 09:42

## 2019-11-12 RX ADMIN — METOPROLOL SUCCINATE 150 MG: 100 TABLET, EXTENDED RELEASE ORAL at 21:39

## 2019-11-12 RX ADMIN — FAMOTIDINE 20 MG: 20 TABLET ORAL at 09:42

## 2019-11-12 RX ADMIN — GABAPENTIN 100 MG: 100 CAPSULE ORAL at 09:42

## 2019-11-12 RX ADMIN — POLYETHYLENE GLYCOL 3350 17 G: 17 POWDER, FOR SOLUTION ORAL at 09:42

## 2019-11-12 RX ADMIN — FAMOTIDINE 20 MG: 20 TABLET ORAL at 21:39

## 2019-11-12 RX ADMIN — GABAPENTIN 100 MG: 100 CAPSULE ORAL at 15:08

## 2019-11-12 RX ADMIN — APIXABAN 5 MG: 5 TABLET, FILM COATED ORAL at 21:39

## 2019-11-12 RX ADMIN — METOPROLOL TARTRATE 50 MG: 25 TABLET, FILM COATED ORAL at 05:23

## 2019-11-12 RX ADMIN — ATORVASTATIN CALCIUM 40 MG: 40 TABLET, FILM COATED ORAL at 21:39

## 2019-11-12 RX ADMIN — BUMETANIDE 1 MG: 1 TABLET ORAL at 09:53

## 2019-11-12 RX ADMIN — AMIODARONE HYDROCHLORIDE 200 MG: 200 TABLET ORAL at 09:41

## 2019-11-12 RX ADMIN — Medication 500 MG: at 15:08

## 2019-11-12 RX ADMIN — DOCUSATE SODIUM AND SENNOSIDES 1 TABLET: 50; 8.6 TABLET, FILM COATED ORAL at 21:39

## 2019-11-12 RX ADMIN — GABAPENTIN 100 MG: 100 CAPSULE ORAL at 21:39

## 2019-11-12 ASSESSMENT — PAIN DESCRIPTION - DESCRIPTORS
DESCRIPTORS: SHOOTING;THROBBING
DESCRIPTORS: ACHING;THROBBING

## 2019-11-12 ASSESSMENT — PAIN SCALES - GENERAL
PAINLEVEL_OUTOF10: 6
PAINLEVEL_OUTOF10: 7
PAINLEVEL_OUTOF10: 7
PAINLEVEL_OUTOF10: 3

## 2019-11-12 ASSESSMENT — PAIN DESCRIPTION - LOCATION
LOCATION: LEG
LOCATION: LEG;KNEE

## 2019-11-12 ASSESSMENT — PAIN DESCRIPTION - PAIN TYPE: TYPE: ACUTE PAIN

## 2019-11-12 ASSESSMENT — PAIN DESCRIPTION - ORIENTATION
ORIENTATION: LEFT
ORIENTATION: LEFT

## 2019-11-13 LAB
ABSOLUTE EOS #: 0.13 K/UL (ref 0–0.4)
ABSOLUTE IMMATURE GRANULOCYTE: ABNORMAL K/UL (ref 0–0.3)
ABSOLUTE LYMPH #: 0.86 K/UL (ref 1–4.8)
ABSOLUTE MONO #: 0.79 K/UL (ref 0.1–1.3)
ANION GAP SERPL CALCULATED.3IONS-SCNC: 10 MMOL/L (ref 9–17)
BASOPHILS # BLD: 0 % (ref 0–2)
BASOPHILS ABSOLUTE: 0 K/UL (ref 0–0.2)
BUN BLDV-MCNC: 15 MG/DL (ref 8–23)
BUN/CREAT BLD: ABNORMAL (ref 9–20)
CALCIUM SERPL-MCNC: 8.9 MG/DL (ref 8.6–10.4)
CHLORIDE BLD-SCNC: 99 MMOL/L (ref 98–107)
CO2: 28 MMOL/L (ref 20–31)
CREAT SERPL-MCNC: 0.85 MG/DL (ref 0.7–1.2)
DIFFERENTIAL TYPE: ABNORMAL
EOSINOPHILS RELATIVE PERCENT: 2 % (ref 0–4)
GFR AFRICAN AMERICAN: >60 ML/MIN
GFR NON-AFRICAN AMERICAN: >60 ML/MIN
GFR SERPL CREATININE-BSD FRML MDRD: ABNORMAL ML/MIN/{1.73_M2}
GFR SERPL CREATININE-BSD FRML MDRD: ABNORMAL ML/MIN/{1.73_M2}
GLUCOSE BLD-MCNC: 113 MG/DL (ref 70–99)
HCT VFR BLD CALC: 32.1 % (ref 41–53)
HEMOGLOBIN: 10.6 G/DL (ref 13.5–17.5)
IMMATURE GRANULOCYTES: ABNORMAL %
LYMPHOCYTES # BLD: 13 % (ref 24–44)
MCH RBC QN AUTO: 30.5 PG (ref 26–34)
MCHC RBC AUTO-ENTMCNC: 33 G/DL (ref 31–37)
MCV RBC AUTO: 92.3 FL (ref 80–100)
METAMYELOCYTES ABSOLUTE COUNT: 0.07 K/UL
METAMYELOCYTES: 1 %
MONOCYTES # BLD: 12 % (ref 1–7)
MORPHOLOGY: ABNORMAL
MORPHOLOGY: ABNORMAL
NRBC AUTOMATED: ABNORMAL PER 100 WBC
PDW BLD-RTO: 13.6 % (ref 11.5–14.9)
PLATELET # BLD: 384 K/UL (ref 150–450)
PLATELET ESTIMATE: ABNORMAL
PMV BLD AUTO: 7 FL (ref 6–12)
POTASSIUM SERPL-SCNC: 3.8 MMOL/L (ref 3.7–5.3)
RBC # BLD: 3.48 M/UL (ref 4.5–5.9)
RBC # BLD: ABNORMAL 10*6/UL
SEG NEUTROPHILS: 72 % (ref 36–66)
SEGMENTED NEUTROPHILS ABSOLUTE COUNT: 4.75 K/UL (ref 1.3–9.1)
SODIUM BLD-SCNC: 137 MMOL/L (ref 135–144)
WBC # BLD: 6.6 K/UL (ref 3.5–11)
WBC # BLD: ABNORMAL 10*3/UL

## 2019-11-13 PROCEDURE — 97535 SELF CARE MNGMENT TRAINING: CPT

## 2019-11-13 PROCEDURE — 6370000000 HC RX 637 (ALT 250 FOR IP): Performed by: INTERNAL MEDICINE

## 2019-11-13 PROCEDURE — 6370000000 HC RX 637 (ALT 250 FOR IP): Performed by: PHYSICAL MEDICINE & REHABILITATION

## 2019-11-13 PROCEDURE — 85025 COMPLETE CBC W/AUTO DIFF WBC: CPT

## 2019-11-13 PROCEDURE — 97116 GAIT TRAINING THERAPY: CPT

## 2019-11-13 PROCEDURE — 97530 THERAPEUTIC ACTIVITIES: CPT

## 2019-11-13 PROCEDURE — 36415 COLL VENOUS BLD VENIPUNCTURE: CPT

## 2019-11-13 PROCEDURE — 1180000000 HC REHAB R&B

## 2019-11-13 PROCEDURE — 80048 BASIC METABOLIC PNL TOTAL CA: CPT

## 2019-11-13 PROCEDURE — 97110 THERAPEUTIC EXERCISES: CPT

## 2019-11-13 RX ADMIN — DOCUSATE SODIUM AND SENNOSIDES 1 TABLET: 50; 8.6 TABLET, FILM COATED ORAL at 07:14

## 2019-11-13 RX ADMIN — POLYETHYLENE GLYCOL 3350 17 G: 17 POWDER, FOR SOLUTION ORAL at 07:14

## 2019-11-13 RX ADMIN — GABAPENTIN 100 MG: 100 CAPSULE ORAL at 21:56

## 2019-11-13 RX ADMIN — METOPROLOL SUCCINATE 150 MG: 100 TABLET, EXTENDED RELEASE ORAL at 07:14

## 2019-11-13 RX ADMIN — DIGOXIN 125 MCG: 125 TABLET ORAL at 07:15

## 2019-11-13 RX ADMIN — BUMETANIDE 1 MG: 1 TABLET ORAL at 07:16

## 2019-11-13 RX ADMIN — OXYCODONE HYDROCHLORIDE AND ACETAMINOPHEN 2 TABLET: 5; 325 TABLET ORAL at 00:38

## 2019-11-13 RX ADMIN — ATORVASTATIN CALCIUM 40 MG: 40 TABLET, FILM COATED ORAL at 21:56

## 2019-11-13 RX ADMIN — ONDANSETRON HYDROCHLORIDE 4 MG: 4 TABLET, FILM COATED ORAL at 15:34

## 2019-11-13 RX ADMIN — APIXABAN 5 MG: 5 TABLET, FILM COATED ORAL at 07:14

## 2019-11-13 RX ADMIN — FAMOTIDINE 20 MG: 20 TABLET ORAL at 21:56

## 2019-11-13 RX ADMIN — CLOPIDOGREL BISULFATE 75 MG: 75 TABLET ORAL at 07:14

## 2019-11-13 RX ADMIN — AMIODARONE HYDROCHLORIDE 200 MG: 200 TABLET ORAL at 07:14

## 2019-11-13 RX ADMIN — METOPROLOL SUCCINATE 150 MG: 100 TABLET, EXTENDED RELEASE ORAL at 21:56

## 2019-11-13 RX ADMIN — FAMOTIDINE 20 MG: 20 TABLET ORAL at 07:14

## 2019-11-13 RX ADMIN — GABAPENTIN 100 MG: 100 CAPSULE ORAL at 07:14

## 2019-11-13 RX ADMIN — APIXABAN 5 MG: 5 TABLET, FILM COATED ORAL at 21:56

## 2019-11-13 RX ADMIN — GABAPENTIN 100 MG: 100 CAPSULE ORAL at 13:00

## 2019-11-13 ASSESSMENT — PAIN DESCRIPTION - PROGRESSION: CLINICAL_PROGRESSION: NOT CHANGED

## 2019-11-13 ASSESSMENT — PAIN SCALES - GENERAL
PAINLEVEL_OUTOF10: 8
PAINLEVEL_OUTOF10: 0

## 2019-11-13 ASSESSMENT — PAIN SCALES - WONG BAKER: WONGBAKER_NUMERICALRESPONSE: 0

## 2019-11-14 LAB
ABSOLUTE BANDS #: 0.13 K/UL (ref 0–1)
ABSOLUTE EOS #: 0.4 K/UL (ref 0–0.4)
ABSOLUTE IMMATURE GRANULOCYTE: ABNORMAL K/UL (ref 0–0.3)
ABSOLUTE LYMPH #: 1.47 K/UL (ref 1–4.8)
ABSOLUTE MONO #: 0.67 K/UL (ref 0.1–1.3)
ANION GAP SERPL CALCULATED.3IONS-SCNC: 10 MMOL/L (ref 9–17)
BANDS: 2 % (ref 0–10)
BASOPHILS # BLD: 0 % (ref 0–2)
BASOPHILS ABSOLUTE: 0 K/UL (ref 0–0.2)
BUN BLDV-MCNC: 16 MG/DL (ref 8–23)
BUN/CREAT BLD: NORMAL (ref 9–20)
CALCIUM SERPL-MCNC: 8.8 MG/DL (ref 8.6–10.4)
CHLORIDE BLD-SCNC: 99 MMOL/L (ref 98–107)
CO2: 27 MMOL/L (ref 20–31)
CREAT SERPL-MCNC: 0.79 MG/DL (ref 0.7–1.2)
DIFFERENTIAL TYPE: ABNORMAL
EOSINOPHILS RELATIVE PERCENT: 6 % (ref 0–4)
GFR AFRICAN AMERICAN: >60 ML/MIN
GFR NON-AFRICAN AMERICAN: >60 ML/MIN
GFR SERPL CREATININE-BSD FRML MDRD: NORMAL ML/MIN/{1.73_M2}
GFR SERPL CREATININE-BSD FRML MDRD: NORMAL ML/MIN/{1.73_M2}
GLUCOSE BLD-MCNC: 99 MG/DL (ref 70–99)
HCT VFR BLD CALC: 33 % (ref 41–53)
HEMOGLOBIN: 10.8 G/DL (ref 13.5–17.5)
IMMATURE GRANULOCYTES: ABNORMAL %
LYMPHOCYTES # BLD: 22 % (ref 24–44)
MCH RBC QN AUTO: 30.3 PG (ref 26–34)
MCHC RBC AUTO-ENTMCNC: 32.6 G/DL (ref 31–37)
MCV RBC AUTO: 92.9 FL (ref 80–100)
MONOCYTES # BLD: 10 % (ref 1–7)
MORPHOLOGY: NORMAL
NRBC AUTOMATED: ABNORMAL PER 100 WBC
PDW BLD-RTO: 14.1 % (ref 11.5–14.9)
PLATELET # BLD: 373 K/UL (ref 150–450)
PLATELET ESTIMATE: ABNORMAL
PMV BLD AUTO: 7 FL (ref 6–12)
POTASSIUM SERPL-SCNC: 4.1 MMOL/L (ref 3.7–5.3)
RBC # BLD: 3.55 M/UL (ref 4.5–5.9)
RBC # BLD: ABNORMAL 10*6/UL
SEG NEUTROPHILS: 60 % (ref 36–66)
SEGMENTED NEUTROPHILS ABSOLUTE COUNT: 4.03 K/UL (ref 1.3–9.1)
SODIUM BLD-SCNC: 136 MMOL/L (ref 135–144)
WBC # BLD: 6.7 K/UL (ref 3.5–11)
WBC # BLD: ABNORMAL 10*3/UL

## 2019-11-14 PROCEDURE — 97535 SELF CARE MNGMENT TRAINING: CPT

## 2019-11-14 PROCEDURE — 85025 COMPLETE CBC W/AUTO DIFF WBC: CPT

## 2019-11-14 PROCEDURE — 36415 COLL VENOUS BLD VENIPUNCTURE: CPT

## 2019-11-14 PROCEDURE — 6370000000 HC RX 637 (ALT 250 FOR IP): Performed by: INTERNAL MEDICINE

## 2019-11-14 PROCEDURE — 80048 BASIC METABOLIC PNL TOTAL CA: CPT

## 2019-11-14 PROCEDURE — 97116 GAIT TRAINING THERAPY: CPT

## 2019-11-14 PROCEDURE — 97110 THERAPEUTIC EXERCISES: CPT

## 2019-11-14 PROCEDURE — 1180000000 HC REHAB R&B

## 2019-11-14 PROCEDURE — 6370000000 HC RX 637 (ALT 250 FOR IP): Performed by: PHYSICAL MEDICINE & REHABILITATION

## 2019-11-14 PROCEDURE — 97530 THERAPEUTIC ACTIVITIES: CPT

## 2019-11-14 RX ADMIN — FAMOTIDINE 20 MG: 20 TABLET ORAL at 07:40

## 2019-11-14 RX ADMIN — FAMOTIDINE 20 MG: 20 TABLET ORAL at 20:43

## 2019-11-14 RX ADMIN — DIPHENHYDRAMINE HCL 25 MG: 25 TABLET ORAL at 21:30

## 2019-11-14 RX ADMIN — OXYCODONE HYDROCHLORIDE AND ACETAMINOPHEN 2 TABLET: 5; 325 TABLET ORAL at 09:32

## 2019-11-14 RX ADMIN — GABAPENTIN 100 MG: 100 CAPSULE ORAL at 14:46

## 2019-11-14 RX ADMIN — ATORVASTATIN CALCIUM 40 MG: 40 TABLET, FILM COATED ORAL at 20:43

## 2019-11-14 RX ADMIN — DOCUSATE SODIUM AND SENNOSIDES 1 TABLET: 50; 8.6 TABLET, FILM COATED ORAL at 07:40

## 2019-11-14 RX ADMIN — OXYCODONE HYDROCHLORIDE AND ACETAMINOPHEN 2 TABLET: 5; 325 TABLET ORAL at 00:33

## 2019-11-14 RX ADMIN — METOPROLOL SUCCINATE 150 MG: 100 TABLET, EXTENDED RELEASE ORAL at 20:43

## 2019-11-14 RX ADMIN — GABAPENTIN 100 MG: 100 CAPSULE ORAL at 20:43

## 2019-11-14 RX ADMIN — DIGOXIN 125 MCG: 125 TABLET ORAL at 07:40

## 2019-11-14 RX ADMIN — OXYCODONE HYDROCHLORIDE AND ACETAMINOPHEN 2 TABLET: 5; 325 TABLET ORAL at 16:38

## 2019-11-14 RX ADMIN — AMIODARONE HYDROCHLORIDE 200 MG: 200 TABLET ORAL at 07:41

## 2019-11-14 RX ADMIN — GABAPENTIN 100 MG: 100 CAPSULE ORAL at 07:41

## 2019-11-14 RX ADMIN — CLOPIDOGREL BISULFATE 75 MG: 75 TABLET ORAL at 07:41

## 2019-11-14 RX ADMIN — METOPROLOL SUCCINATE 150 MG: 100 TABLET, EXTENDED RELEASE ORAL at 09:32

## 2019-11-14 RX ADMIN — BUMETANIDE 1 MG: 1 TABLET ORAL at 07:42

## 2019-11-14 RX ADMIN — APIXABAN 5 MG: 5 TABLET, FILM COATED ORAL at 20:43

## 2019-11-14 RX ADMIN — APIXABAN 5 MG: 5 TABLET, FILM COATED ORAL at 07:40

## 2019-11-14 ASSESSMENT — PAIN DESCRIPTION - PAIN TYPE
TYPE: ACUTE PAIN
TYPE: ACUTE PAIN

## 2019-11-14 ASSESSMENT — PAIN SCALES - GENERAL
PAINLEVEL_OUTOF10: 5
PAINLEVEL_OUTOF10: 0
PAINLEVEL_OUTOF10: 7
PAINLEVEL_OUTOF10: 6
PAINLEVEL_OUTOF10: 7
PAINLEVEL_OUTOF10: 0

## 2019-11-14 ASSESSMENT — PAIN DESCRIPTION - ORIENTATION
ORIENTATION: LEFT
ORIENTATION: LEFT

## 2019-11-14 ASSESSMENT — PAIN DESCRIPTION - LOCATION
LOCATION: LEG
LOCATION: LEG

## 2019-11-14 ASSESSMENT — PAIN DESCRIPTION - FREQUENCY: FREQUENCY: INTERMITTENT

## 2019-11-14 ASSESSMENT — PAIN DESCRIPTION - DESCRIPTORS: DESCRIPTORS: ACHING;THROBBING;DISCOMFORT

## 2019-11-15 LAB
ABSOLUTE EOS #: 0.06 K/UL (ref 0–0.4)
ABSOLUTE IMMATURE GRANULOCYTE: ABNORMAL K/UL (ref 0–0.3)
ABSOLUTE LYMPH #: 1.54 K/UL (ref 1–4.8)
ABSOLUTE MONO #: 0.51 K/UL (ref 0.1–1.3)
ANION GAP SERPL CALCULATED.3IONS-SCNC: 10 MMOL/L (ref 9–17)
BASOPHILS # BLD: 0 % (ref 0–2)
BASOPHILS ABSOLUTE: 0 K/UL (ref 0–0.2)
BUN BLDV-MCNC: 16 MG/DL (ref 8–23)
BUN/CREAT BLD: ABNORMAL (ref 9–20)
CALCIUM SERPL-MCNC: 8.9 MG/DL (ref 8.6–10.4)
CHLORIDE BLD-SCNC: 105 MMOL/L (ref 98–107)
CO2: 30 MMOL/L (ref 20–31)
CREAT SERPL-MCNC: 0.81 MG/DL (ref 0.7–1.2)
DIFFERENTIAL TYPE: ABNORMAL
EOSINOPHILS RELATIVE PERCENT: 1 % (ref 0–4)
GFR AFRICAN AMERICAN: >60 ML/MIN
GFR NON-AFRICAN AMERICAN: >60 ML/MIN
GFR SERPL CREATININE-BSD FRML MDRD: ABNORMAL ML/MIN/{1.73_M2}
GFR SERPL CREATININE-BSD FRML MDRD: ABNORMAL ML/MIN/{1.73_M2}
GLUCOSE BLD-MCNC: 98 MG/DL (ref 70–99)
HCT VFR BLD CALC: 32.1 % (ref 41–53)
HEMOGLOBIN: 10.7 G/DL (ref 13.5–17.5)
IMMATURE GRANULOCYTES: ABNORMAL %
LYMPHOCYTES # BLD: 24 % (ref 24–44)
MCH RBC QN AUTO: 30.9 PG (ref 26–34)
MCHC RBC AUTO-ENTMCNC: 33.3 G/DL (ref 31–37)
MCV RBC AUTO: 92.7 FL (ref 80–100)
MONOCYTES # BLD: 8 % (ref 1–7)
MORPHOLOGY: ABNORMAL
NRBC AUTOMATED: ABNORMAL PER 100 WBC
PDW BLD-RTO: 13.9 % (ref 11.5–14.9)
PLATELET # BLD: 328 K/UL (ref 150–450)
PLATELET ESTIMATE: ABNORMAL
PMV BLD AUTO: 7 FL (ref 6–12)
POTASSIUM SERPL-SCNC: 4.6 MMOL/L (ref 3.7–5.3)
RBC # BLD: 3.46 M/UL (ref 4.5–5.9)
RBC # BLD: ABNORMAL 10*6/UL
SEG NEUTROPHILS: 67 % (ref 36–66)
SEGMENTED NEUTROPHILS ABSOLUTE COUNT: 4.29 K/UL (ref 1.3–9.1)
SODIUM BLD-SCNC: 145 MMOL/L (ref 135–144)
WBC # BLD: 6.4 K/UL (ref 3.5–11)
WBC # BLD: ABNORMAL 10*3/UL

## 2019-11-15 PROCEDURE — 97110 THERAPEUTIC EXERCISES: CPT

## 2019-11-15 PROCEDURE — 97116 GAIT TRAINING THERAPY: CPT

## 2019-11-15 PROCEDURE — 6370000000 HC RX 637 (ALT 250 FOR IP): Performed by: INTERNAL MEDICINE

## 2019-11-15 PROCEDURE — 51798 US URINE CAPACITY MEASURE: CPT

## 2019-11-15 PROCEDURE — 97530 THERAPEUTIC ACTIVITIES: CPT

## 2019-11-15 PROCEDURE — 1180000000 HC REHAB R&B

## 2019-11-15 PROCEDURE — 97535 SELF CARE MNGMENT TRAINING: CPT

## 2019-11-15 PROCEDURE — 6370000000 HC RX 637 (ALT 250 FOR IP): Performed by: PHYSICAL MEDICINE & REHABILITATION

## 2019-11-15 PROCEDURE — 80048 BASIC METABOLIC PNL TOTAL CA: CPT

## 2019-11-15 PROCEDURE — 85025 COMPLETE CBC W/AUTO DIFF WBC: CPT

## 2019-11-15 PROCEDURE — 36415 COLL VENOUS BLD VENIPUNCTURE: CPT

## 2019-11-15 RX ADMIN — OXYCODONE HYDROCHLORIDE AND ACETAMINOPHEN 2 TABLET: 5; 325 TABLET ORAL at 08:02

## 2019-11-15 RX ADMIN — METOPROLOL SUCCINATE 150 MG: 100 TABLET, EXTENDED RELEASE ORAL at 08:01

## 2019-11-15 RX ADMIN — DIGOXIN 125 MCG: 125 TABLET ORAL at 08:02

## 2019-11-15 RX ADMIN — FAMOTIDINE 20 MG: 20 TABLET ORAL at 20:34

## 2019-11-15 RX ADMIN — GABAPENTIN 100 MG: 100 CAPSULE ORAL at 08:02

## 2019-11-15 RX ADMIN — ATORVASTATIN CALCIUM 40 MG: 40 TABLET, FILM COATED ORAL at 20:34

## 2019-11-15 RX ADMIN — APIXABAN 5 MG: 5 TABLET, FILM COATED ORAL at 08:02

## 2019-11-15 RX ADMIN — GABAPENTIN 100 MG: 100 CAPSULE ORAL at 20:34

## 2019-11-15 RX ADMIN — GABAPENTIN 100 MG: 100 CAPSULE ORAL at 15:07

## 2019-11-15 RX ADMIN — AMIODARONE HYDROCHLORIDE 200 MG: 200 TABLET ORAL at 08:01

## 2019-11-15 RX ADMIN — OXYCODONE HYDROCHLORIDE AND ACETAMINOPHEN 2 TABLET: 5; 325 TABLET ORAL at 15:08

## 2019-11-15 RX ADMIN — APIXABAN 5 MG: 5 TABLET, FILM COATED ORAL at 20:34

## 2019-11-15 RX ADMIN — FAMOTIDINE 20 MG: 20 TABLET ORAL at 08:02

## 2019-11-15 RX ADMIN — METOPROLOL SUCCINATE 150 MG: 100 TABLET, EXTENDED RELEASE ORAL at 20:34

## 2019-11-15 RX ADMIN — CLOPIDOGREL BISULFATE 75 MG: 75 TABLET ORAL at 08:01

## 2019-11-15 RX ADMIN — BUMETANIDE 1 MG: 1 TABLET ORAL at 08:02

## 2019-11-15 ASSESSMENT — PAIN SCALES - GENERAL
PAINLEVEL_OUTOF10: 3
PAINLEVEL_OUTOF10: 4
PAINLEVEL_OUTOF10: 5
PAINLEVEL_OUTOF10: 6
PAINLEVEL_OUTOF10: 4

## 2019-11-16 LAB
-: NORMAL
ABSOLUTE EOS #: 0.4 K/UL (ref 0–0.4)
ABSOLUTE IMMATURE GRANULOCYTE: ABNORMAL K/UL (ref 0–0.3)
ABSOLUTE LYMPH #: 0.9 K/UL (ref 1–4.8)
ABSOLUTE MONO #: 0.9 K/UL (ref 0.1–1.3)
ANION GAP SERPL CALCULATED.3IONS-SCNC: 11 MMOL/L (ref 9–17)
BASOPHILS # BLD: 1 % (ref 0–2)
BASOPHILS ABSOLUTE: 0.1 K/UL (ref 0–0.2)
BUN BLDV-MCNC: 13 MG/DL (ref 8–23)
BUN/CREAT BLD: ABNORMAL (ref 9–20)
CALCIUM SERPL-MCNC: 9.1 MG/DL (ref 8.6–10.4)
CHLORIDE BLD-SCNC: 104 MMOL/L (ref 98–107)
CO2: 27 MMOL/L (ref 20–31)
CREAT SERPL-MCNC: 0.77 MG/DL (ref 0.7–1.2)
DIFFERENTIAL TYPE: ABNORMAL
EOSINOPHILS RELATIVE PERCENT: 5 % (ref 0–4)
GFR AFRICAN AMERICAN: >60 ML/MIN
GFR NON-AFRICAN AMERICAN: >60 ML/MIN
GFR SERPL CREATININE-BSD FRML MDRD: ABNORMAL ML/MIN/{1.73_M2}
GFR SERPL CREATININE-BSD FRML MDRD: ABNORMAL ML/MIN/{1.73_M2}
GLUCOSE BLD-MCNC: 131 MG/DL (ref 70–99)
HCT VFR BLD CALC: 33.1 % (ref 41–53)
HEMOGLOBIN: 10.8 G/DL (ref 13.5–17.5)
IMMATURE GRANULOCYTES: ABNORMAL %
LYMPHOCYTES # BLD: 11 % (ref 24–44)
MCH RBC QN AUTO: 30.4 PG (ref 26–34)
MCHC RBC AUTO-ENTMCNC: 32.7 G/DL (ref 31–37)
MCV RBC AUTO: 93.2 FL (ref 80–100)
MONOCYTES # BLD: 11 % (ref 1–7)
NRBC AUTOMATED: ABNORMAL PER 100 WBC
PDW BLD-RTO: 14 % (ref 11.5–14.9)
PLATELET # BLD: 326 K/UL (ref 150–450)
PLATELET ESTIMATE: ABNORMAL
PMV BLD AUTO: 7.6 FL (ref 6–12)
POTASSIUM SERPL-SCNC: 4.4 MMOL/L (ref 3.7–5.3)
RBC # BLD: 3.55 M/UL (ref 4.5–5.9)
RBC # BLD: ABNORMAL 10*6/UL
REASON FOR REJECTION: NORMAL
SEG NEUTROPHILS: 72 % (ref 36–66)
SEGMENTED NEUTROPHILS ABSOLUTE COUNT: 5.9 K/UL (ref 1.3–9.1)
SODIUM BLD-SCNC: 142 MMOL/L (ref 135–144)
WBC # BLD: 8.1 K/UL (ref 3.5–11)
WBC # BLD: ABNORMAL 10*3/UL
ZZ NTE CLEAN UP: ORDERED TEST: NORMAL
ZZ NTE WITH NAME CLEAN UP: SPECIMEN SOURCE: NORMAL

## 2019-11-16 PROCEDURE — 80048 BASIC METABOLIC PNL TOTAL CA: CPT

## 2019-11-16 PROCEDURE — 85025 COMPLETE CBC W/AUTO DIFF WBC: CPT

## 2019-11-16 PROCEDURE — 6370000000 HC RX 637 (ALT 250 FOR IP): Performed by: PHYSICAL MEDICINE & REHABILITATION

## 2019-11-16 PROCEDURE — 6370000000 HC RX 637 (ALT 250 FOR IP): Performed by: INTERNAL MEDICINE

## 2019-11-16 PROCEDURE — 97110 THERAPEUTIC EXERCISES: CPT

## 2019-11-16 PROCEDURE — 97116 GAIT TRAINING THERAPY: CPT

## 2019-11-16 PROCEDURE — 36415 COLL VENOUS BLD VENIPUNCTURE: CPT

## 2019-11-16 PROCEDURE — 1180000000 HC REHAB R&B

## 2019-11-16 RX ADMIN — OXYCODONE HYDROCHLORIDE AND ACETAMINOPHEN 2 TABLET: 5; 325 TABLET ORAL at 11:03

## 2019-11-16 RX ADMIN — GABAPENTIN 100 MG: 100 CAPSULE ORAL at 20:24

## 2019-11-16 RX ADMIN — GABAPENTIN 100 MG: 100 CAPSULE ORAL at 14:46

## 2019-11-16 RX ADMIN — METOPROLOL SUCCINATE 150 MG: 100 TABLET, EXTENDED RELEASE ORAL at 20:24

## 2019-11-16 RX ADMIN — DIGOXIN 125 MCG: 125 TABLET ORAL at 08:02

## 2019-11-16 RX ADMIN — Medication 500 MG: at 14:49

## 2019-11-16 RX ADMIN — METOPROLOL SUCCINATE 150 MG: 100 TABLET, EXTENDED RELEASE ORAL at 08:03

## 2019-11-16 RX ADMIN — APIXABAN 5 MG: 5 TABLET, FILM COATED ORAL at 08:02

## 2019-11-16 RX ADMIN — DOCUSATE SODIUM AND SENNOSIDES 1 TABLET: 50; 8.6 TABLET, FILM COATED ORAL at 08:02

## 2019-11-16 RX ADMIN — FAMOTIDINE 20 MG: 20 TABLET ORAL at 08:03

## 2019-11-16 RX ADMIN — ATORVASTATIN CALCIUM 40 MG: 40 TABLET, FILM COATED ORAL at 20:25

## 2019-11-16 RX ADMIN — BUMETANIDE 1 MG: 1 TABLET ORAL at 08:04

## 2019-11-16 RX ADMIN — FAMOTIDINE 20 MG: 20 TABLET ORAL at 20:24

## 2019-11-16 RX ADMIN — GABAPENTIN 100 MG: 100 CAPSULE ORAL at 08:02

## 2019-11-16 RX ADMIN — CLOPIDOGREL BISULFATE 75 MG: 75 TABLET ORAL at 08:03

## 2019-11-16 RX ADMIN — AMIODARONE HYDROCHLORIDE 200 MG: 200 TABLET ORAL at 08:02

## 2019-11-16 RX ADMIN — APIXABAN 5 MG: 5 TABLET, FILM COATED ORAL at 20:24

## 2019-11-16 ASSESSMENT — PAIN SCALES - GENERAL
PAINLEVEL_OUTOF10: 7
PAINLEVEL_OUTOF10: 0
PAINLEVEL_OUTOF10: 3
PAINLEVEL_OUTOF10: 3

## 2019-11-17 LAB
ABSOLUTE EOS #: 0.4 K/UL (ref 0–0.4)
ABSOLUTE IMMATURE GRANULOCYTE: ABNORMAL K/UL (ref 0–0.3)
ABSOLUTE LYMPH #: 0.8 K/UL (ref 1–4.8)
ABSOLUTE MONO #: 1 K/UL (ref 0.1–1.3)
ANION GAP SERPL CALCULATED.3IONS-SCNC: 11 MMOL/L (ref 9–17)
BASOPHILS # BLD: 1 % (ref 0–2)
BASOPHILS ABSOLUTE: 0.1 K/UL (ref 0–0.2)
BUN BLDV-MCNC: 14 MG/DL (ref 8–23)
BUN/CREAT BLD: ABNORMAL (ref 9–20)
CALCIUM SERPL-MCNC: 9.1 MG/DL (ref 8.6–10.4)
CHLORIDE BLD-SCNC: 104 MMOL/L (ref 98–107)
CO2: 26 MMOL/L (ref 20–31)
CREAT SERPL-MCNC: 0.78 MG/DL (ref 0.7–1.2)
DIFFERENTIAL TYPE: ABNORMAL
EOSINOPHILS RELATIVE PERCENT: 4 % (ref 0–4)
GFR AFRICAN AMERICAN: >60 ML/MIN
GFR NON-AFRICAN AMERICAN: >60 ML/MIN
GFR SERPL CREATININE-BSD FRML MDRD: ABNORMAL ML/MIN/{1.73_M2}
GFR SERPL CREATININE-BSD FRML MDRD: ABNORMAL ML/MIN/{1.73_M2}
GLUCOSE BLD-MCNC: 100 MG/DL (ref 70–99)
HCT VFR BLD CALC: 32.1 % (ref 41–53)
HEMOGLOBIN: 10.6 G/DL (ref 13.5–17.5)
IMMATURE GRANULOCYTES: ABNORMAL %
LYMPHOCYTES # BLD: 9 % (ref 24–44)
MCH RBC QN AUTO: 31 PG (ref 26–34)
MCHC RBC AUTO-ENTMCNC: 33.1 G/DL (ref 31–37)
MCV RBC AUTO: 93.8 FL (ref 80–100)
MONOCYTES # BLD: 11 % (ref 1–7)
NRBC AUTOMATED: ABNORMAL PER 100 WBC
PDW BLD-RTO: 13.9 % (ref 11.5–14.9)
PLATELET # BLD: 283 K/UL (ref 150–450)
PLATELET ESTIMATE: ABNORMAL
PMV BLD AUTO: 7.1 FL (ref 6–12)
POTASSIUM SERPL-SCNC: 4.3 MMOL/L (ref 3.7–5.3)
RBC # BLD: 3.42 M/UL (ref 4.5–5.9)
RBC # BLD: ABNORMAL 10*6/UL
SEG NEUTROPHILS: 75 % (ref 36–66)
SEGMENTED NEUTROPHILS ABSOLUTE COUNT: 7.2 K/UL (ref 1.3–9.1)
SODIUM BLD-SCNC: 141 MMOL/L (ref 135–144)
WBC # BLD: 9.5 K/UL (ref 3.5–11)
WBC # BLD: ABNORMAL 10*3/UL

## 2019-11-17 PROCEDURE — 80048 BASIC METABOLIC PNL TOTAL CA: CPT

## 2019-11-17 PROCEDURE — 97530 THERAPEUTIC ACTIVITIES: CPT

## 2019-11-17 PROCEDURE — 36415 COLL VENOUS BLD VENIPUNCTURE: CPT

## 2019-11-17 PROCEDURE — 1180000000 HC REHAB R&B

## 2019-11-17 PROCEDURE — 6370000000 HC RX 637 (ALT 250 FOR IP): Performed by: INTERNAL MEDICINE

## 2019-11-17 PROCEDURE — 6370000000 HC RX 637 (ALT 250 FOR IP): Performed by: PHYSICAL MEDICINE & REHABILITATION

## 2019-11-17 PROCEDURE — 97535 SELF CARE MNGMENT TRAINING: CPT

## 2019-11-17 PROCEDURE — 97110 THERAPEUTIC EXERCISES: CPT

## 2019-11-17 PROCEDURE — 97116 GAIT TRAINING THERAPY: CPT

## 2019-11-17 PROCEDURE — 85025 COMPLETE CBC W/AUTO DIFF WBC: CPT

## 2019-11-17 RX ORDER — PANTOPRAZOLE SODIUM 40 MG/1
40 TABLET, DELAYED RELEASE ORAL
Status: DISCONTINUED | OUTPATIENT
Start: 2019-11-17 | End: 2019-11-18 | Stop reason: HOSPADM

## 2019-11-17 RX ORDER — NICOTINE POLACRILEX 4 MG/1
20 GUM, CHEWING ORAL DAILY
Status: DISCONTINUED | OUTPATIENT
Start: 2019-11-17 | End: 2019-11-17 | Stop reason: CLARIF

## 2019-11-17 RX ADMIN — BUMETANIDE 1 MG: 1 TABLET ORAL at 07:54

## 2019-11-17 RX ADMIN — APIXABAN 5 MG: 5 TABLET, FILM COATED ORAL at 20:23

## 2019-11-17 RX ADMIN — GABAPENTIN 100 MG: 100 CAPSULE ORAL at 14:52

## 2019-11-17 RX ADMIN — DOCUSATE SODIUM AND SENNOSIDES 1 TABLET: 50; 8.6 TABLET, FILM COATED ORAL at 07:53

## 2019-11-17 RX ADMIN — GABAPENTIN 100 MG: 100 CAPSULE ORAL at 07:53

## 2019-11-17 RX ADMIN — FAMOTIDINE 20 MG: 20 TABLET ORAL at 07:52

## 2019-11-17 RX ADMIN — METOPROLOL SUCCINATE 150 MG: 100 TABLET, EXTENDED RELEASE ORAL at 20:23

## 2019-11-17 RX ADMIN — ATORVASTATIN CALCIUM 40 MG: 40 TABLET, FILM COATED ORAL at 20:23

## 2019-11-17 RX ADMIN — GABAPENTIN 100 MG: 100 CAPSULE ORAL at 20:23

## 2019-11-17 RX ADMIN — METOPROLOL SUCCINATE 150 MG: 100 TABLET, EXTENDED RELEASE ORAL at 07:53

## 2019-11-17 RX ADMIN — DIGOXIN 125 MCG: 125 TABLET ORAL at 07:52

## 2019-11-17 RX ADMIN — POLYETHYLENE GLYCOL 3350 17 G: 17 POWDER, FOR SOLUTION ORAL at 07:53

## 2019-11-17 RX ADMIN — Medication 500 MG: at 18:32

## 2019-11-17 RX ADMIN — AMIODARONE HYDROCHLORIDE 200 MG: 200 TABLET ORAL at 07:52

## 2019-11-17 RX ADMIN — APIXABAN 5 MG: 5 TABLET, FILM COATED ORAL at 07:53

## 2019-11-17 RX ADMIN — OXYCODONE HYDROCHLORIDE AND ACETAMINOPHEN 2 TABLET: 5; 325 TABLET ORAL at 14:52

## 2019-11-17 RX ADMIN — CLOPIDOGREL BISULFATE 75 MG: 75 TABLET ORAL at 07:52

## 2019-11-17 ASSESSMENT — PAIN DESCRIPTION - PAIN TYPE
TYPE: ACUTE PAIN
TYPE: ACUTE PAIN

## 2019-11-17 ASSESSMENT — PAIN SCALES - GENERAL
PAINLEVEL_OUTOF10: 0
PAINLEVEL_OUTOF10: 3
PAINLEVEL_OUTOF10: 4
PAINLEVEL_OUTOF10: 7

## 2019-11-17 ASSESSMENT — PAIN DESCRIPTION - PROGRESSION: CLINICAL_PROGRESSION: GRADUALLY IMPROVING

## 2019-11-17 ASSESSMENT — PAIN DESCRIPTION - LOCATION
LOCATION: LEG
LOCATION: LEG

## 2019-11-17 ASSESSMENT — PAIN DESCRIPTION - ORIENTATION
ORIENTATION: LEFT
ORIENTATION: LEFT

## 2019-11-18 VITALS
DIASTOLIC BLOOD PRESSURE: 85 MMHG | TEMPERATURE: 97.7 F | BODY MASS INDEX: 40.65 KG/M2 | HEIGHT: 67 IN | RESPIRATION RATE: 20 BRPM | SYSTOLIC BLOOD PRESSURE: 98 MMHG | OXYGEN SATURATION: 97 % | HEART RATE: 108 BPM | WEIGHT: 259 LBS

## 2019-11-18 LAB
ABSOLUTE EOS #: 0.3 K/UL (ref 0–0.4)
ABSOLUTE IMMATURE GRANULOCYTE: ABNORMAL K/UL (ref 0–0.3)
ABSOLUTE LYMPH #: 0.9 K/UL (ref 1–4.8)
ABSOLUTE MONO #: 0.9 K/UL (ref 0.1–1.3)
ANION GAP SERPL CALCULATED.3IONS-SCNC: 13 MMOL/L (ref 9–17)
BASOPHILS # BLD: 1 % (ref 0–2)
BASOPHILS ABSOLUTE: 0.1 K/UL (ref 0–0.2)
BUN BLDV-MCNC: 12 MG/DL (ref 8–23)
BUN/CREAT BLD: ABNORMAL (ref 9–20)
CALCIUM SERPL-MCNC: 8.9 MG/DL (ref 8.6–10.4)
CHLORIDE BLD-SCNC: 103 MMOL/L (ref 98–107)
CO2: 25 MMOL/L (ref 20–31)
CREAT SERPL-MCNC: 0.75 MG/DL (ref 0.7–1.2)
DIFFERENTIAL TYPE: ABNORMAL
DIGOXIN DATE LAST DOSE: NORMAL
DIGOXIN DOSE AMOUNT: NORMAL
DIGOXIN DOSE TIME: 752
DIGOXIN LEVEL: 0.6 NG/ML (ref 0.5–2)
EOSINOPHILS RELATIVE PERCENT: 4 % (ref 0–4)
GFR AFRICAN AMERICAN: >60 ML/MIN
GFR NON-AFRICAN AMERICAN: >60 ML/MIN
GFR SERPL CREATININE-BSD FRML MDRD: ABNORMAL ML/MIN/{1.73_M2}
GFR SERPL CREATININE-BSD FRML MDRD: ABNORMAL ML/MIN/{1.73_M2}
GLUCOSE BLD-MCNC: 100 MG/DL (ref 70–99)
HCT VFR BLD CALC: 32.6 % (ref 41–53)
HEMOGLOBIN: 10.9 G/DL (ref 13.5–17.5)
IMMATURE GRANULOCYTES: ABNORMAL %
LYMPHOCYTES # BLD: 12 % (ref 24–44)
MCH RBC QN AUTO: 31.3 PG (ref 26–34)
MCHC RBC AUTO-ENTMCNC: 33.3 G/DL (ref 31–37)
MCV RBC AUTO: 94 FL (ref 80–100)
MONOCYTES # BLD: 11 % (ref 1–7)
NRBC AUTOMATED: ABNORMAL PER 100 WBC
PDW BLD-RTO: 14.3 % (ref 11.5–14.9)
PLATELET # BLD: 260 K/UL (ref 150–450)
PLATELET ESTIMATE: ABNORMAL
PMV BLD AUTO: 7.4 FL (ref 6–12)
POTASSIUM SERPL-SCNC: 4 MMOL/L (ref 3.7–5.3)
RBC # BLD: 3.47 M/UL (ref 4.5–5.9)
RBC # BLD: ABNORMAL 10*6/UL
SEG NEUTROPHILS: 72 % (ref 36–66)
SEGMENTED NEUTROPHILS ABSOLUTE COUNT: 5.8 K/UL (ref 1.3–9.1)
SODIUM BLD-SCNC: 141 MMOL/L (ref 135–144)
WBC # BLD: 8.1 K/UL (ref 3.5–11)
WBC # BLD: ABNORMAL 10*3/UL

## 2019-11-18 PROCEDURE — 80162 ASSAY OF DIGOXIN TOTAL: CPT

## 2019-11-18 PROCEDURE — 97110 THERAPEUTIC EXERCISES: CPT

## 2019-11-18 PROCEDURE — 97116 GAIT TRAINING THERAPY: CPT

## 2019-11-18 PROCEDURE — 97530 THERAPEUTIC ACTIVITIES: CPT

## 2019-11-18 PROCEDURE — 85025 COMPLETE CBC W/AUTO DIFF WBC: CPT

## 2019-11-18 PROCEDURE — 6370000000 HC RX 637 (ALT 250 FOR IP): Performed by: INTERNAL MEDICINE

## 2019-11-18 PROCEDURE — 93005 ELECTROCARDIOGRAM TRACING: CPT | Performed by: INTERNAL MEDICINE

## 2019-11-18 PROCEDURE — 80048 BASIC METABOLIC PNL TOTAL CA: CPT

## 2019-11-18 PROCEDURE — 99239 HOSP IP/OBS DSCHRG MGMT >30: CPT | Performed by: PHYSICAL MEDICINE & REHABILITATION

## 2019-11-18 PROCEDURE — 6370000000 HC RX 637 (ALT 250 FOR IP): Performed by: PHYSICAL MEDICINE & REHABILITATION

## 2019-11-18 PROCEDURE — 36415 COLL VENOUS BLD VENIPUNCTURE: CPT

## 2019-11-18 RX ORDER — METOPROLOL SUCCINATE 50 MG/1
150 TABLET, EXTENDED RELEASE ORAL 2 TIMES DAILY
Qty: 90 TABLET | Refills: 0 | Status: ON HOLD | OUTPATIENT
Start: 2019-11-18 | End: 2020-01-17 | Stop reason: HOSPADM

## 2019-11-18 RX ORDER — AMIODARONE HYDROCHLORIDE 200 MG/1
200 TABLET ORAL DAILY
Qty: 30 TABLET | Refills: 0 | Status: SHIPPED | OUTPATIENT
Start: 2019-11-19 | End: 2020-06-04

## 2019-11-18 RX ORDER — GABAPENTIN 100 MG/1
100 CAPSULE ORAL 3 TIMES DAILY
Qty: 90 CAPSULE | Refills: 0 | Status: SHIPPED | OUTPATIENT
Start: 2019-11-18 | End: 2020-06-04

## 2019-11-18 RX ORDER — DIPHENHYDRAMINE HCL 25 MG
25 TABLET ORAL NIGHTLY PRN
COMMUNITY
Start: 2019-11-18 | End: 2019-12-18

## 2019-11-18 RX ORDER — BUMETANIDE 1 MG/1
1 TABLET ORAL DAILY
Qty: 30 TABLET | Refills: 0 | Status: SHIPPED | OUTPATIENT
Start: 2019-11-18 | End: 2020-01-15 | Stop reason: ALTCHOICE

## 2019-11-18 RX ORDER — ATORVASTATIN CALCIUM 40 MG/1
40 TABLET, FILM COATED ORAL NIGHTLY
Qty: 30 TABLET | Refills: 0 | Status: SHIPPED | OUTPATIENT
Start: 2019-11-18 | End: 2020-09-21 | Stop reason: SDUPTHER

## 2019-11-18 RX ORDER — DIGOXIN 125 MCG
125 TABLET ORAL DAILY
Qty: 30 TABLET | Refills: 0 | Status: SHIPPED | OUTPATIENT
Start: 2019-11-19 | End: 2020-06-04

## 2019-11-18 RX ORDER — LISINOPRIL 2.5 MG/1
2.5 TABLET ORAL DAILY
Qty: 30 TABLET | Refills: 0 | Status: SHIPPED | OUTPATIENT
Start: 2019-11-18 | End: 2020-06-04

## 2019-11-18 RX ORDER — PANTOPRAZOLE SODIUM 40 MG/1
40 TABLET, DELAYED RELEASE ORAL
Qty: 30 TABLET | Refills: 0 | Status: ON HOLD | OUTPATIENT
Start: 2019-11-19 | End: 2020-01-17 | Stop reason: SDUPTHER

## 2019-11-18 RX ORDER — CLOPIDOGREL BISULFATE 75 MG/1
75 TABLET ORAL DAILY
Qty: 30 TABLET | Refills: 0 | Status: SHIPPED | OUTPATIENT
Start: 2019-11-18 | End: 2020-09-21 | Stop reason: SDUPTHER

## 2019-11-18 RX ORDER — CALCIUM CARBONATE 200(500)MG
500 TABLET,CHEWABLE ORAL 3 TIMES DAILY PRN
COMMUNITY
Start: 2019-11-18 | End: 2019-12-18

## 2019-11-18 RX ORDER — OXYCODONE HYDROCHLORIDE AND ACETAMINOPHEN 5; 325 MG/1; MG/1
1 TABLET ORAL EVERY 8 HOURS PRN
Qty: 21 TABLET | Refills: 0 | Status: SHIPPED | OUTPATIENT
Start: 2019-11-18 | End: 2019-11-25

## 2019-11-18 RX ADMIN — OXYCODONE HYDROCHLORIDE AND ACETAMINOPHEN 2 TABLET: 5; 325 TABLET ORAL at 13:12

## 2019-11-18 RX ADMIN — PANTOPRAZOLE SODIUM 40 MG: 40 TABLET, DELAYED RELEASE ORAL at 05:32

## 2019-11-18 RX ADMIN — GABAPENTIN 100 MG: 100 CAPSULE ORAL at 08:12

## 2019-11-18 RX ADMIN — CLOPIDOGREL BISULFATE 75 MG: 75 TABLET ORAL at 08:13

## 2019-11-18 RX ADMIN — GABAPENTIN 100 MG: 100 CAPSULE ORAL at 13:12

## 2019-11-18 RX ADMIN — DIGOXIN 125 MCG: 125 TABLET ORAL at 08:12

## 2019-11-18 RX ADMIN — APIXABAN 5 MG: 5 TABLET, FILM COATED ORAL at 08:12

## 2019-11-18 RX ADMIN — METOPROLOL SUCCINATE 150 MG: 100 TABLET, EXTENDED RELEASE ORAL at 08:13

## 2019-11-18 RX ADMIN — DOCUSATE SODIUM AND SENNOSIDES 1 TABLET: 50; 8.6 TABLET, FILM COATED ORAL at 08:12

## 2019-11-18 RX ADMIN — AMIODARONE HYDROCHLORIDE 200 MG: 200 TABLET ORAL at 08:13

## 2019-11-18 RX ADMIN — BUMETANIDE 1 MG: 1 TABLET ORAL at 08:14

## 2019-11-18 ASSESSMENT — PAIN SCALES - GENERAL: PAINLEVEL_OUTOF10: 7

## 2019-11-19 LAB
EKG ATRIAL RATE: 106 BPM
EKG P AXIS: 59 DEGREES
EKG P-R INTERVAL: 114 MS
EKG Q-T INTERVAL: 344 MS
EKG QRS DURATION: 92 MS
EKG QTC CALCULATION (BAZETT): 456 MS
EKG R AXIS: 24 DEGREES
EKG T AXIS: 65 DEGREES
EKG VENTRICULAR RATE: 106 BPM

## 2019-11-19 PROCEDURE — 93010 ELECTROCARDIOGRAM REPORT: CPT | Performed by: INTERNAL MEDICINE

## 2019-11-21 ENCOUNTER — OFFICE VISIT (OUTPATIENT)
Dept: CARDIOTHORACIC SURGERY | Age: 64
End: 2019-11-21

## 2019-11-21 VITALS
HEIGHT: 67 IN | OXYGEN SATURATION: 96 % | SYSTOLIC BLOOD PRESSURE: 86 MMHG | BODY MASS INDEX: 40.49 KG/M2 | DIASTOLIC BLOOD PRESSURE: 57 MMHG | WEIGHT: 258 LBS | HEART RATE: 115 BPM | TEMPERATURE: 97.8 F

## 2019-11-21 DIAGNOSIS — Z95.1 S/P CABG X 3: Primary | ICD-10-CM

## 2019-11-21 PROCEDURE — 99024 POSTOP FOLLOW-UP VISIT: CPT | Performed by: PHYSICIAN ASSISTANT

## 2019-12-10 ENCOUNTER — HOSPITAL ENCOUNTER (OUTPATIENT)
Age: 64
Setting detail: SPECIMEN
Discharge: HOME | End: 2019-12-10
Payer: MEDICAID

## 2019-12-10 LAB
ABSOLUTE EOS #: 0.28 K/UL (ref 0–0.44)
ABSOLUTE IMMATURE GRANULOCYTE: 0.1 K/UL (ref 0–0.3)
ABSOLUTE LYMPH #: 0.92 K/UL (ref 1.1–3.7)
ABSOLUTE MONO #: 0.81 K/UL (ref 0.1–1.2)
ALBUMIN SERPL-MCNC: 4 G/DL (ref 3.5–5.2)
ALBUMIN/GLOBULIN RATIO: 1.2 (ref 1–2.5)
ALP BLD-CCNC: 75 U/L (ref 40–129)
ALT SERPL-CCNC: 18 U/L (ref 5–41)
ANION GAP SERPL CALCULATED.3IONS-SCNC: 16 MMOL/L (ref 9–17)
ANION GAP SERPL CALCULATED.3IONS-SCNC: 17 MMOL/L (ref 9–17)
AST SERPL-CCNC: 22 U/L
BASOPHILS # BLD: 1 % (ref 0–2)
BASOPHILS ABSOLUTE: 0.06 K/UL (ref 0–0.2)
BILIRUB SERPL-MCNC: 0.48 MG/DL (ref 0.3–1.2)
BUN BLDV-MCNC: 12 MG/DL (ref 8–23)
BUN BLDV-MCNC: 12 MG/DL (ref 8–23)
BUN/CREAT BLD: ABNORMAL (ref 9–20)
BUN/CREAT BLD: ABNORMAL (ref 9–20)
CALCIUM SERPL-MCNC: 9.3 MG/DL (ref 8.6–10.4)
CALCIUM SERPL-MCNC: 9.5 MG/DL (ref 8.6–10.4)
CHLORIDE BLD-SCNC: 105 MMOL/L (ref 98–107)
CHLORIDE BLD-SCNC: 105 MMOL/L (ref 98–107)
CHOLESTEROL/HDL RATIO: 2.3
CHOLESTEROL: 98 MG/DL
CO2: 20 MMOL/L (ref 20–31)
CO2: 21 MMOL/L (ref 20–31)
CREAT SERPL-MCNC: 0.74 MG/DL (ref 0.7–1.2)
CREAT SERPL-MCNC: 0.81 MG/DL (ref 0.7–1.2)
DIFFERENTIAL TYPE: ABNORMAL
EOSINOPHILS RELATIVE PERCENT: 4 % (ref 1–4)
ESTIMATED AVERAGE GLUCOSE: 114 MG/DL
FERRITIN: 143 UG/L (ref 30–400)
FOLATE: 14.1 NG/ML
GFR AFRICAN AMERICAN: >60 ML/MIN
GFR AFRICAN AMERICAN: >60 ML/MIN
GFR NON-AFRICAN AMERICAN: >60 ML/MIN
GFR NON-AFRICAN AMERICAN: >60 ML/MIN
GFR SERPL CREATININE-BSD FRML MDRD: ABNORMAL ML/MIN/{1.73_M2}
GLUCOSE BLD-MCNC: 111 MG/DL (ref 70–99)
GLUCOSE BLD-MCNC: 113 MG/DL (ref 70–99)
HBA1C MFR BLD: 5.6 % (ref 4–6)
HCT VFR BLD CALC: 36.5 % (ref 40.7–50.3)
HDLC SERPL-MCNC: 43 MG/DL
HEMOGLOBIN: 11.6 G/DL (ref 13–17)
IMMATURE GRANULOCYTES: 1 %
IRON SATURATION: 26 % (ref 20–55)
IRON: 62 UG/DL (ref 59–158)
LDL CHOLESTEROL: 38 MG/DL (ref 0–130)
LYMPHOCYTES # BLD: 13 % (ref 24–43)
MCH RBC QN AUTO: 30.2 PG (ref 25.2–33.5)
MCHC RBC AUTO-ENTMCNC: 31.8 G/DL (ref 28.4–34.8)
MCV RBC AUTO: 95.1 FL (ref 82.6–102.9)
MONOCYTES # BLD: 11 % (ref 3–12)
NRBC AUTOMATED: 0 PER 100 WBC
PDW BLD-RTO: 13.8 % (ref 11.8–14.4)
PLATELET # BLD: 313 K/UL (ref 138–453)
PLATELET ESTIMATE: ABNORMAL
PMV BLD AUTO: 10.3 FL (ref 8.1–13.5)
POTASSIUM SERPL-SCNC: 3.9 MMOL/L (ref 3.7–5.3)
POTASSIUM SERPL-SCNC: 4 MMOL/L (ref 3.7–5.3)
RBC # BLD: 3.84 M/UL (ref 4.21–5.77)
RBC # BLD: ABNORMAL 10*6/UL
SEG NEUTROPHILS: 70 % (ref 36–65)
SEGMENTED NEUTROPHILS ABSOLUTE COUNT: 5.1 K/UL (ref 1.5–8.1)
SODIUM BLD-SCNC: 142 MMOL/L (ref 135–144)
SODIUM BLD-SCNC: 142 MMOL/L (ref 135–144)
TOTAL IRON BINDING CAPACITY: 236 UG/DL (ref 250–450)
TOTAL PROTEIN: 7.4 G/DL (ref 6.4–8.3)
TRIGL SERPL-MCNC: 85 MG/DL
TSH SERPL DL<=0.05 MIU/L-ACNC: 2.44 MIU/L (ref 0.3–5)
UNSATURATED IRON BINDING CAPACITY: 174 UG/DL (ref 112–347)
VITAMIN B-12: 308 PG/ML (ref 232–1245)
VLDLC SERPL CALC-MCNC: NORMAL MG/DL (ref 1–30)
WBC # BLD: 7.3 K/UL (ref 3.5–11.3)
WBC # BLD: ABNORMAL 10*3/UL

## 2019-12-11 ENCOUNTER — HOSPITAL ENCOUNTER (OUTPATIENT)
Dept: VASCULAR LAB | Age: 64
Discharge: HOME | End: 2019-12-11
Payer: MEDICAID

## 2019-12-11 PROCEDURE — 93970 EXTREMITY STUDY: CPT

## 2020-01-14 ENCOUNTER — HOSPITAL ENCOUNTER (OUTPATIENT)
Age: 65
Setting detail: SPECIMEN
Discharge: HOME | End: 2020-01-14
Payer: MEDICAID

## 2020-01-14 LAB
ANION GAP SERPL CALCULATED.3IONS-SCNC: 17 MMOL/L (ref 9–17)
BUN BLDV-MCNC: 16 MG/DL (ref 8–23)
BUN/CREAT BLD: ABNORMAL (ref 9–20)
CALCIUM SERPL-MCNC: 9.6 MG/DL (ref 8.6–10.4)
CHLORIDE BLD-SCNC: 87 MMOL/L (ref 98–107)
CO2: 33 MMOL/L (ref 20–31)
CREAT SERPL-MCNC: 0.99 MG/DL (ref 0.7–1.2)
GFR AFRICAN AMERICAN: >60 ML/MIN
GFR NON-AFRICAN AMERICAN: >60 ML/MIN
GFR SERPL CREATININE-BSD FRML MDRD: ABNORMAL ML/MIN/{1.73_M2}
GFR SERPL CREATININE-BSD FRML MDRD: ABNORMAL ML/MIN/{1.73_M2}
GLUCOSE BLD-MCNC: 116 MG/DL (ref 70–99)
MAGNESIUM: 2 MG/DL (ref 1.6–2.6)
POTASSIUM SERPL-SCNC: 2.6 MMOL/L (ref 3.7–5.3)
SODIUM BLD-SCNC: 137 MMOL/L (ref 135–144)

## 2020-01-15 ENCOUNTER — APPOINTMENT (OUTPATIENT)
Dept: GENERAL RADIOLOGY | Age: 65
DRG: 425 | End: 2020-01-15
Payer: MEDICAID

## 2020-01-15 ENCOUNTER — HOSPITAL ENCOUNTER (INPATIENT)
Age: 65
LOS: 2 days | Discharge: HOME | DRG: 425 | End: 2020-01-17
Attending: EMERGENCY MEDICINE | Admitting: INTERNAL MEDICINE
Payer: MEDICAID

## 2020-01-15 PROBLEM — I48.91 ATRIAL FIBRILLATION (HCC): Status: ACTIVE | Noted: 2020-01-15

## 2020-01-15 PROBLEM — I50.40 COMBINED SYSTOLIC AND DIASTOLIC CONGESTIVE HEART FAILURE (HCC): Status: ACTIVE | Noted: 2020-01-15

## 2020-01-15 PROBLEM — E87.3 METABOLIC ALKALOSIS: Status: ACTIVE | Noted: 2020-01-15

## 2020-01-15 PROBLEM — I25.5 ISCHEMIC CARDIOMYOPATHY: Status: ACTIVE | Noted: 2020-01-15

## 2020-01-15 PROBLEM — E87.6 HYPOKALEMIA: Status: ACTIVE | Noted: 2020-01-15

## 2020-01-15 LAB
-: ABNORMAL
ALLEN TEST: POSITIVE
AMORPHOUS: ABNORMAL
AMPHETAMINE SCREEN URINE: NEGATIVE
ANION GAP SERPL CALCULATED.3IONS-SCNC: 19 MMOL/L (ref 9–17)
BACTERIA: ABNORMAL
BARBITURATE SCREEN URINE: NEGATIVE
BENZODIAZEPINE SCREEN, URINE: NEGATIVE
BILIRUBIN URINE: NEGATIVE
BNP INTERPRETATION: ABNORMAL
BUN BLDV-MCNC: 21 MG/DL (ref 8–23)
BUN/CREAT BLD: ABNORMAL (ref 9–20)
BUPRENORPHINE URINE: NORMAL
CALCIUM SERPL-MCNC: 9.6 MG/DL (ref 8.6–10.4)
CANNABINOID SCREEN URINE: NEGATIVE
CASTS UA: ABNORMAL /LPF (ref 0–8)
CHLORIDE BLD-SCNC: 86 MMOL/L (ref 98–107)
CHLORIDE, UR: 79 MMOL/L
CO2: 32 MMOL/L (ref 20–31)
COCAINE METABOLITE, URINE: NEGATIVE
COLOR: YELLOW
CREAT SERPL-MCNC: 1.1 MG/DL (ref 0.7–1.2)
CRYSTALS, UA: ABNORMAL /HPF
DIGOXIN DATE LAST DOSE: NORMAL
DIGOXIN DOSE AMOUNT: NORMAL
DIGOXIN DOSE TIME: NORMAL
DIGOXIN LEVEL: 1.1 NG/ML (ref 0.5–2)
EPITHELIAL CELLS UA: ABNORMAL /HPF (ref 0–5)
FIO2: 21
GFR AFRICAN AMERICAN: >60 ML/MIN
GFR NON-AFRICAN AMERICAN: >60 ML/MIN
GFR SERPL CREATININE-BSD FRML MDRD: ABNORMAL ML/MIN/{1.73_M2}
GFR SERPL CREATININE-BSD FRML MDRD: ABNORMAL ML/MIN/{1.73_M2}
GLUCOSE BLD-MCNC: 129 MG/DL (ref 70–99)
GLUCOSE URINE: NEGATIVE
KETONES, URINE: NEGATIVE
LEUKOCYTE ESTERASE, URINE: ABNORMAL
MDMA URINE: NORMAL
METHADONE SCREEN, URINE: NEGATIVE
METHAMPHETAMINE, URINE: NORMAL
MODE: ABNORMAL
MUCUS: ABNORMAL
NEGATIVE BASE EXCESS, ART: ABNORMAL (ref 0–2)
NITRITE, URINE: NEGATIVE
O2 DEVICE/FLOW/%: ABNORMAL
OPIATES, URINE: NEGATIVE
OTHER OBSERVATIONS UA: ABNORMAL
OXYCODONE SCREEN URINE: NEGATIVE
PATIENT TEMP: ABNORMAL
PH UA: 8.5 (ref 5–8)
PHENCYCLIDINE, URINE: NEGATIVE
POC HCO3: 36.3 MMOL/L (ref 21–28)
POC O2 SATURATION: 94 % (ref 94–98)
POC PCO2 TEMP: ABNORMAL MM HG
POC PCO2: 44.7 MM HG (ref 35–48)
POC PH TEMP: ABNORMAL
POC PH: 7.52 (ref 7.35–7.45)
POC PO2 TEMP: ABNORMAL MM HG
POC PO2: 64.8 MM HG (ref 83–108)
POSITIVE BASE EXCESS, ART: 12 (ref 0–3)
POTASSIUM SERPL-SCNC: 2.6 MMOL/L (ref 3.7–5.3)
POTASSIUM, UR: 21.1 MMOL/L
PRO-BNP: 1182 PG/ML
PROPOXYPHENE, URINE: NORMAL
PROTEIN UA: NEGATIVE
RBC UA: ABNORMAL /HPF (ref 0–4)
RENAL EPITHELIAL, UA: ABNORMAL /HPF
SAMPLE SITE: ABNORMAL
SODIUM BLD-SCNC: 137 MMOL/L (ref 135–144)
SODIUM,UR: 104 MMOL/L
SPECIFIC GRAVITY UA: 1.01 (ref 1–1.03)
TCO2 (CALC), ART: 38 MMOL/L (ref 22–29)
TEST INFORMATION: NORMAL
TRICHOMONAS: ABNORMAL
TRICYCLIC ANTIDEPRESSANTS, UR: NORMAL
TROPONIN INTERP: ABNORMAL
TROPONIN INTERP: NORMAL
TROPONIN T: ABNORMAL NG/ML
TROPONIN T: NORMAL NG/ML
TROPONIN, HIGH SENSITIVITY: 22 NG/L (ref 0–22)
TROPONIN, HIGH SENSITIVITY: 26 NG/L (ref 0–22)
TURBIDITY: ABNORMAL
URINE HGB: ABNORMAL
UROBILINOGEN, URINE: NORMAL
WBC UA: ABNORMAL /HPF (ref 0–5)
YEAST: ABNORMAL

## 2020-01-15 PROCEDURE — 94660 CPAP INITIATION&MGMT: CPT

## 2020-01-15 PROCEDURE — 6360000002 HC RX W HCPCS: Performed by: STUDENT IN AN ORGANIZED HEALTH CARE EDUCATION/TRAINING PROGRAM

## 2020-01-15 PROCEDURE — 99223 1ST HOSP IP/OBS HIGH 75: CPT | Performed by: INTERNAL MEDICINE

## 2020-01-15 PROCEDURE — 84133 ASSAY OF URINE POTASSIUM: CPT

## 2020-01-15 PROCEDURE — 87186 SC STD MICRODIL/AGAR DIL: CPT

## 2020-01-15 PROCEDURE — 82803 BLOOD GASES ANY COMBINATION: CPT

## 2020-01-15 PROCEDURE — G0378 HOSPITAL OBSERVATION PER HR: HCPCS

## 2020-01-15 PROCEDURE — 84484 ASSAY OF TROPONIN QUANT: CPT

## 2020-01-15 PROCEDURE — 87077 CULTURE AEROBIC IDENTIFY: CPT

## 2020-01-15 PROCEDURE — 93005 ELECTROCARDIOGRAM TRACING: CPT | Performed by: INTERNAL MEDICINE

## 2020-01-15 PROCEDURE — 82436 ASSAY OF URINE CHLORIDE: CPT

## 2020-01-15 PROCEDURE — 96367 TX/PROPH/DG ADDL SEQ IV INF: CPT

## 2020-01-15 PROCEDURE — 83880 ASSAY OF NATRIURETIC PEPTIDE: CPT

## 2020-01-15 PROCEDURE — 80162 ASSAY OF DIGOXIN TOTAL: CPT

## 2020-01-15 PROCEDURE — 80307 DRUG TEST PRSMV CHEM ANLYZR: CPT

## 2020-01-15 PROCEDURE — 6360000002 HC RX W HCPCS: Performed by: EMERGENCY MEDICINE

## 2020-01-15 PROCEDURE — 93005 ELECTROCARDIOGRAM TRACING: CPT | Performed by: EMERGENCY MEDICINE

## 2020-01-15 PROCEDURE — 80048 BASIC METABOLIC PNL TOTAL CA: CPT

## 2020-01-15 PROCEDURE — 81001 URINALYSIS AUTO W/SCOPE: CPT

## 2020-01-15 PROCEDURE — 99285 EMERGENCY DEPT VISIT HI MDM: CPT

## 2020-01-15 PROCEDURE — 84300 ASSAY OF URINE SODIUM: CPT

## 2020-01-15 PROCEDURE — 1200000000 HC SEMI PRIVATE

## 2020-01-15 PROCEDURE — 96376 TX/PRO/DX INJ SAME DRUG ADON: CPT

## 2020-01-15 PROCEDURE — 71046 X-RAY EXAM CHEST 2 VIEWS: CPT

## 2020-01-15 PROCEDURE — 87086 URINE CULTURE/COLONY COUNT: CPT

## 2020-01-15 PROCEDURE — 36600 WITHDRAWAL OF ARTERIAL BLOOD: CPT

## 2020-01-15 PROCEDURE — 96365 THER/PROPH/DIAG IV INF INIT: CPT

## 2020-01-15 PROCEDURE — 6370000000 HC RX 637 (ALT 250 FOR IP): Performed by: STUDENT IN AN ORGANIZED HEALTH CARE EDUCATION/TRAINING PROGRAM

## 2020-01-15 PROCEDURE — 82340 ASSAY OF CALCIUM IN URINE: CPT

## 2020-01-15 RX ORDER — POTASSIUM CHLORIDE 20 MEQ/1
40 TABLET, EXTENDED RELEASE ORAL EVERY 4 HOURS
Status: COMPLETED | OUTPATIENT
Start: 2020-01-15 | End: 2020-01-15

## 2020-01-15 RX ORDER — SODIUM CHLORIDE 0.9 % (FLUSH) 0.9 %
10 SYRINGE (ML) INJECTION PRN
Status: DISCONTINUED | OUTPATIENT
Start: 2020-01-15 | End: 2020-01-15

## 2020-01-15 RX ORDER — DIGOXIN 125 MCG
125 TABLET ORAL DAILY
Status: DISCONTINUED | OUTPATIENT
Start: 2020-01-15 | End: 2020-01-17 | Stop reason: HOSPADM

## 2020-01-15 RX ORDER — METOPROLOL SUCCINATE 100 MG/1
100 TABLET, EXTENDED RELEASE ORAL 2 TIMES DAILY
Status: DISCONTINUED | OUTPATIENT
Start: 2020-01-15 | End: 2020-01-17 | Stop reason: HOSPADM

## 2020-01-15 RX ORDER — SODIUM CHLORIDE 0.9 % (FLUSH) 0.9 %
10 SYRINGE (ML) INJECTION PRN
Status: DISCONTINUED | OUTPATIENT
Start: 2020-01-15 | End: 2020-01-17 | Stop reason: HOSPADM

## 2020-01-15 RX ORDER — SODIUM CHLORIDE 0.9 % (FLUSH) 0.9 %
10 SYRINGE (ML) INJECTION EVERY 12 HOURS SCHEDULED
Status: DISCONTINUED | OUTPATIENT
Start: 2020-01-15 | End: 2020-01-17 | Stop reason: HOSPADM

## 2020-01-15 RX ORDER — ONDANSETRON 2 MG/ML
4 INJECTION INTRAMUSCULAR; INTRAVENOUS EVERY 6 HOURS PRN
Status: DISCONTINUED | OUTPATIENT
Start: 2020-01-15 | End: 2020-01-15 | Stop reason: SDUPTHER

## 2020-01-15 RX ORDER — MAGNESIUM SULFATE 1 G/100ML
1 INJECTION INTRAVENOUS PRN
Status: DISCONTINUED | OUTPATIENT
Start: 2020-01-15 | End: 2020-01-17

## 2020-01-15 RX ORDER — PANTOPRAZOLE SODIUM 40 MG/1
40 TABLET, DELAYED RELEASE ORAL
Status: DISCONTINUED | OUTPATIENT
Start: 2020-01-16 | End: 2020-01-17 | Stop reason: HOSPADM

## 2020-01-15 RX ORDER — ACETAMINOPHEN 325 MG/1
650 TABLET ORAL EVERY 4 HOURS PRN
Status: DISCONTINUED | OUTPATIENT
Start: 2020-01-15 | End: 2020-01-17 | Stop reason: HOSPADM

## 2020-01-15 RX ORDER — SENNA PLUS 8.6 MG/1
1 TABLET ORAL NIGHTLY
Status: DISCONTINUED | OUTPATIENT
Start: 2020-01-15 | End: 2020-01-17 | Stop reason: HOSPADM

## 2020-01-15 RX ORDER — ATORVASTATIN CALCIUM 40 MG/1
40 TABLET, FILM COATED ORAL NIGHTLY
Status: DISCONTINUED | OUTPATIENT
Start: 2020-01-15 | End: 2020-01-17 | Stop reason: HOSPADM

## 2020-01-15 RX ORDER — SODIUM CHLORIDE 0.9 % (FLUSH) 0.9 %
10 SYRINGE (ML) INJECTION EVERY 12 HOURS SCHEDULED
Status: DISCONTINUED | OUTPATIENT
Start: 2020-01-15 | End: 2020-01-15

## 2020-01-15 RX ORDER — MAGNESIUM SULFATE 1 G/100ML
1 INJECTION INTRAVENOUS
Status: DISPENSED | OUTPATIENT
Start: 2020-01-15 | End: 2020-01-15

## 2020-01-15 RX ORDER — AMIODARONE HYDROCHLORIDE 200 MG/1
200 TABLET ORAL DAILY
Status: DISCONTINUED | OUTPATIENT
Start: 2020-01-15 | End: 2020-01-17 | Stop reason: HOSPADM

## 2020-01-15 RX ORDER — POTASSIUM CHLORIDE 7.45 MG/ML
10 INJECTION INTRAVENOUS ONCE
Status: COMPLETED | OUTPATIENT
Start: 2020-01-15 | End: 2020-01-15

## 2020-01-15 RX ORDER — LISINOPRIL 2.5 MG/1
2.5 TABLET ORAL DAILY
Status: DISCONTINUED | OUTPATIENT
Start: 2020-01-15 | End: 2020-01-17 | Stop reason: HOSPADM

## 2020-01-15 RX ORDER — ONDANSETRON 2 MG/ML
4 INJECTION INTRAMUSCULAR; INTRAVENOUS EVERY 6 HOURS PRN
Status: DISCONTINUED | OUTPATIENT
Start: 2020-01-15 | End: 2020-01-17 | Stop reason: HOSPADM

## 2020-01-15 RX ORDER — GABAPENTIN 100 MG/1
100 CAPSULE ORAL 3 TIMES DAILY
Status: DISCONTINUED | OUTPATIENT
Start: 2020-01-15 | End: 2020-01-17 | Stop reason: HOSPADM

## 2020-01-15 RX ORDER — CLOPIDOGREL BISULFATE 75 MG/1
75 TABLET ORAL DAILY
Status: DISCONTINUED | OUTPATIENT
Start: 2020-01-15 | End: 2020-01-17 | Stop reason: HOSPADM

## 2020-01-15 RX ORDER — POTASSIUM CHLORIDE 7.45 MG/ML
INJECTION INTRAVENOUS
Status: DISPENSED
Start: 2020-01-15 | End: 2020-01-16

## 2020-01-15 RX ORDER — POTASSIUM CHLORIDE 7.45 MG/ML
10 INJECTION INTRAVENOUS
Status: DISPENSED | OUTPATIENT
Start: 2020-01-15 | End: 2020-01-15

## 2020-01-15 RX ADMIN — POTASSIUM CHLORIDE 10 MEQ: 10 INJECTION, SOLUTION INTRAVENOUS at 19:16

## 2020-01-15 RX ADMIN — MAGNESIUM SULFATE HEPTAHYDRATE 1 G: 1 INJECTION, SOLUTION INTRAVENOUS at 12:00

## 2020-01-15 RX ADMIN — POTASSIUM CHLORIDE 10 MEQ: 7.46 INJECTION, SOLUTION INTRAVENOUS at 23:49

## 2020-01-15 RX ADMIN — POTASSIUM CHLORIDE 40 MEQ: 1500 TABLET, EXTENDED RELEASE ORAL at 14:30

## 2020-01-15 RX ADMIN — DESMOPRESSIN ACETATE 40 MG: 0.2 TABLET ORAL at 21:23

## 2020-01-15 RX ADMIN — SENNOSIDES 8.6 MG: 8.6 TABLET, FILM COATED ORAL at 22:13

## 2020-01-15 RX ADMIN — POTASSIUM CHLORIDE 10 MEQ: 7.46 INJECTION, SOLUTION INTRAVENOUS at 10:48

## 2020-01-15 RX ADMIN — POTASSIUM CHLORIDE 40 MEQ: 1500 TABLET, EXTENDED RELEASE ORAL at 21:56

## 2020-01-15 RX ADMIN — POTASSIUM CHLORIDE 10 MEQ: 10 INJECTION, SOLUTION INTRAVENOUS at 18:13

## 2020-01-15 RX ADMIN — POTASSIUM CHLORIDE 40 MEQ: 1500 TABLET, EXTENDED RELEASE ORAL at 17:20

## 2020-01-15 RX ADMIN — POTASSIUM CHLORIDE 10 MEQ: 10 INJECTION, SOLUTION INTRAVENOUS at 21:27

## 2020-01-15 RX ADMIN — GABAPENTIN 100 MG: 100 CAPSULE ORAL at 21:23

## 2020-01-15 RX ADMIN — MAGNESIUM SULFATE HEPTAHYDRATE 1 G: 1 INJECTION, SOLUTION INTRAVENOUS at 12:11

## 2020-01-15 ASSESSMENT — ENCOUNTER SYMPTOMS
DIARRHEA: 0
SHORTNESS OF BREATH: 1
COUGH: 1
WHEEZING: 0
TROUBLE SWALLOWING: 1
SORE THROAT: 0
VOMITING: 0
NAUSEA: 0
CHOKING: 0
RHINORRHEA: 0
CHEST TIGHTNESS: 0
STRIDOR: 0

## 2020-01-15 NOTE — PROGRESS NOTES
1400-patient stating he took all of  his medications in the AM and will resume meds on night shift. Nurse will continue to monitor.

## 2020-01-15 NOTE — PROGRESS NOTES
extended release tablet 100 mg, 100 mg, Oral, BID    Labs:     CBC: No results for input(s): WBC, HGB, HCT, PLT in the last 72 hours.   BMP:   Recent Labs     01/14/20  1312 01/15/20  1011    137   K 2.6* 2.6*   CO2 33* 32*   BUN 16 21   CREATININE 0.99 1.10   LABGLOM >60 >60   GLUCOSE 116* 129*     CARDIAC ENZYMES:  Recent Labs     01/15/20  1011 01/15/20  1213   TROPHS 26* 22     FASTING LIPID PANEL:  Lab Results   Component Value Date    HDL 43 12/10/2019    TRIG 85 12/10/2019     [facility]   Pager: [phone]

## 2020-01-15 NOTE — ED PROVIDER NOTES
[facility]  eMERGENCY dEPARTMENT eNCOUnter      Pt Name: Elaina Jimenez  MRN: [id number]  [place] 1955  Date of evaluation: 1/15/2020  PCP:    [address]       Chief Complaint   Patient presents with    Other     Pt called from pcp office to come to ER d/t low potassium of 2.6         HISTORY OF PRESENT ILLNESS    Elaina Jimenez is a 60 y.o. male who presents with known hypokalemia, potassium 2.6. He was called by his physician about his recent labs. He believes he is off his diuretic because he has been urinating a lot. He has some dyspnea on exertion the past few days, more than recent but not severe. He has dry cough for the past several weeks especially after eating food or swallowing liquids and is scheduled for a scope tomorrow. He has improvement in his leg edema. He denies vomiting diarrhea. Denies weakness. He denies cramps. He denies chest pain. No palpitations noticed. He has atrial fibrillation and is compliant with his medications. He denies feeling palpitations. REVIEW OF SYSTEMS         Review of Systems   Constitutional: Negative for chills, diaphoresis, fatigue and fever. HENT: Positive for trouble swallowing. Negative for nosebleeds, postnasal drip, rhinorrhea and sore throat. Eyes: Negative for visual disturbance. Respiratory: Positive for cough and shortness of breath. Negative for choking, chest tightness, wheezing and stridor. Cardiovascular: Positive for leg swelling. Negative for chest pain and palpitations. Gastrointestinal: Negative for diarrhea, nausea and vomiting. Endocrine: Positive for polyuria. Genitourinary: Negative for dysuria, flank pain and frequency. Musculoskeletal: Negative for joint swelling and myalgias. Skin: Negative for rash. Neurological: Negative for dizziness, tremors, seizures, syncope, weakness and light-headedness. Hematological: Negative.     Psychiatric/Behavioral:
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
## 2020-01-15 NOTE — ED NOTES
Dr. Aneudy Pardo at bedside re-evaluating patient and updating on test results and poc.       Sheyla Quiroga, RN  01/15/20 [id number]

## 2020-01-15 NOTE — PROGRESS NOTES
felicia.    Pearlean Lefort, MD  PGY-2 Internal Medicine Resident  [place], [place]  1/15/2020 7:36 PM Attending Physician Statement  I have discussed the care of Wilian Santacruz, including pertinent history and exam findings,  with the resident. I have seen and examined the patient and the key elements of all parts of the encounter have been performed by me. I agree with the assessment, plan and orders as documented by the resident with additions . CC A. Fib  Patient examined in the Er. Metabolic alkalosis. Will replace CL as KCL to replace there K+ and to correct the alkalosis. Xray chest reviewed and normal.No Co2 retention. Treatment plan Discussed with nursing staff in detail , all questions answered . Electronically signed by Karlo Obregon MD on   1/15/20 at 9:42 PM    Please note that this chart was generated using voice recognition Dragon dictation software. Although every effort was made to ensure the accuracy of this automated transcription, some errors in transcription may have occurred.

## 2020-01-15 NOTE — CARE COORDINATION
Case Management Initial Discharge Plan  Elaina Jimenez,             Met with:patient to discuss discharge plans. Information verified: address, contacts, phone number, , insurance Yes  PCP: Gladys Morejon  Date of last visit: 20    Insurance Provider: [facility]    Discharge Planning    Living Arrangements:      Support Systems:       Home has 1 stories  0 steps to enter the building. Patient able to perform ADL's:Independent    Current Services (outpatient & in home) none  DME equipment: uses a cane, has a walker  DME provider:       Potential Assistance Needed:       Patient agreeable to home care: No  [place] of choice provided:  n/a    Prior SNF/Rehab Placement and Facility:   Agreeable to SNF/Rehab: No  [place] of choice provided: n/a   Evaluation: no    Expected Discharge date:     Patient expects to be discharged to: Follow Up Appointment: Best Day/ Time:      Transportation provider: CHERELLE  Transportation arrangements needed for discharge: [place] [facility]. Readmission Risk              Risk of Unplanned Readmission:        0             Does patient have a readmission risk score greater than 14?: not calculated. If yes, follow-up appointment must be made within 7 days of discharge.      Goals of Care: lab work return to normal.      Discharge Plan: return to [employer]          Electronically signed by Barry Abdul RN on 1/15/20 at 1:19 PM

## 2020-01-16 LAB
ABSOLUTE EOS #: 0.15 K/UL (ref 0–0.44)
ABSOLUTE IMMATURE GRANULOCYTE: 0.05 K/UL (ref 0–0.3)
ABSOLUTE LYMPH #: 1.11 K/UL (ref 1.1–3.7)
ABSOLUTE MONO #: 1.17 K/UL (ref 0.1–1.2)
ANION GAP SERPL CALCULATED.3IONS-SCNC: 15 MMOL/L (ref 9–17)
BASOPHILS # BLD: 1 % (ref 0–2)
BASOPHILS ABSOLUTE: 0.04 K/UL (ref 0–0.2)
BUN BLDV-MCNC: 19 MG/DL (ref 8–23)
BUN/CREAT BLD: ABNORMAL (ref 9–20)
CALCIUM SERPL-MCNC: 8.6 MG/DL (ref 8.6–10.4)
CALCIUM URINE: 0.7 MG/DL
CALCIUM URINE: 2.7 MG/DL
CHLORIDE BLD-SCNC: 93 MMOL/L (ref 98–107)
CO2: 30 MMOL/L (ref 20–31)
CREAT SERPL-MCNC: 0.85 MG/DL (ref 0.7–1.2)
DIFFERENTIAL TYPE: ABNORMAL
EKG ATRIAL RATE: 127 BPM
EKG ATRIAL RATE: 90 BPM
EKG Q-T INTERVAL: 390 MS
EKG Q-T INTERVAL: 542 MS
EKG QRS DURATION: 102 MS
EKG QRS DURATION: 86 MS
EKG QTC CALCULATION (BAZETT): 518 MS
EKG QTC CALCULATION (BAZETT): 625 MS
EKG R AXIS: 22 DEGREES
EKG R AXIS: 23 DEGREES
EKG T AXIS: 163 DEGREES
EKG T AXIS: 88 DEGREES
EKG VENTRICULAR RATE: 106 BPM
EKG VENTRICULAR RATE: 80 BPM
EOSINOPHILS RELATIVE PERCENT: 2 % (ref 1–4)
GFR AFRICAN AMERICAN: >60 ML/MIN
GFR NON-AFRICAN AMERICAN: >60 ML/MIN
GFR SERPL CREATININE-BSD FRML MDRD: ABNORMAL ML/MIN/{1.73_M2}
GFR SERPL CREATININE-BSD FRML MDRD: ABNORMAL ML/MIN/{1.73_M2}
GLUCOSE BLD-MCNC: 115 MG/DL (ref 70–99)
HCT VFR BLD CALC: 35.3 % (ref 40.7–50.3)
HEMOGLOBIN: 11.6 G/DL (ref 13–17)
IMMATURE GRANULOCYTES: 1 %
LYMPHOCYTES # BLD: 16 % (ref 24–43)
MAGNESIUM: 2.3 MG/DL (ref 1.6–2.6)
MAGNESIUM: 2.4 MG/DL (ref 1.6–2.6)
MCH RBC QN AUTO: 30.4 PG (ref 25.2–33.5)
MCHC RBC AUTO-ENTMCNC: 32.9 G/DL (ref 28.4–34.8)
MCV RBC AUTO: 92.4 FL (ref 82.6–102.9)
MONOCYTES # BLD: 16 % (ref 3–12)
NRBC AUTOMATED: 0 PER 100 WBC
PDW BLD-RTO: 13.2 % (ref 11.8–14.4)
PLATELET # BLD: 252 K/UL (ref 138–453)
PLATELET ESTIMATE: ABNORMAL
PMV BLD AUTO: 10.4 FL (ref 8.1–13.5)
POTASSIUM SERPL-SCNC: 3 MMOL/L (ref 3.7–5.3)
RBC # BLD: 3.82 M/UL (ref 4.21–5.77)
RBC # BLD: ABNORMAL 10*6/UL
SEG NEUTROPHILS: 64 % (ref 36–65)
SEGMENTED NEUTROPHILS ABSOLUTE COUNT: 4.64 K/UL (ref 1.5–8.1)
SODIUM BLD-SCNC: 138 MMOL/L (ref 135–144)
WBC # BLD: 7.2 K/UL (ref 3.5–11.3)
WBC # BLD: ABNORMAL 10*3/UL

## 2020-01-16 PROCEDURE — 1200000000 HC SEMI PRIVATE

## 2020-01-16 PROCEDURE — 97162 PT EVAL MOD COMPLEX 30 MIN: CPT

## 2020-01-16 PROCEDURE — 80048 BASIC METABOLIC PNL TOTAL CA: CPT

## 2020-01-16 PROCEDURE — G0378 HOSPITAL OBSERVATION PER HR: HCPCS

## 2020-01-16 PROCEDURE — 96376 TX/PRO/DX INJ SAME DRUG ADON: CPT

## 2020-01-16 PROCEDURE — 99232 SBSQ HOSP IP/OBS MODERATE 35: CPT | Performed by: INTERNAL MEDICINE

## 2020-01-16 PROCEDURE — 6370000000 HC RX 637 (ALT 250 FOR IP): Performed by: STUDENT IN AN ORGANIZED HEALTH CARE EDUCATION/TRAINING PROGRAM

## 2020-01-16 PROCEDURE — 93010 ELECTROCARDIOGRAM REPORT: CPT | Performed by: INTERNAL MEDICINE

## 2020-01-16 PROCEDURE — 82340 ASSAY OF CALCIUM IN URINE: CPT

## 2020-01-16 PROCEDURE — 97116 GAIT TRAINING THERAPY: CPT

## 2020-01-16 PROCEDURE — 6370000000 HC RX 637 (ALT 250 FOR IP): Performed by: INTERNAL MEDICINE

## 2020-01-16 PROCEDURE — 2580000003 HC RX 258: Performed by: STUDENT IN AN ORGANIZED HEALTH CARE EDUCATION/TRAINING PROGRAM

## 2020-01-16 PROCEDURE — 36415 COLL VENOUS BLD VENIPUNCTURE: CPT

## 2020-01-16 PROCEDURE — 83735 ASSAY OF MAGNESIUM: CPT

## 2020-01-16 PROCEDURE — 6360000002 HC RX W HCPCS: Performed by: STUDENT IN AN ORGANIZED HEALTH CARE EDUCATION/TRAINING PROGRAM

## 2020-01-16 PROCEDURE — 97535 SELF CARE MNGMENT TRAINING: CPT | Performed by: OCCUPATIONAL THERAPIST

## 2020-01-16 PROCEDURE — 97165 OT EVAL LOW COMPLEX 30 MIN: CPT | Performed by: OCCUPATIONAL THERAPIST

## 2020-01-16 PROCEDURE — 85025 COMPLETE CBC W/AUTO DIFF WBC: CPT

## 2020-01-16 RX ORDER — SPIRONOLACTONE 25 MG/1
25 TABLET ORAL DAILY
Status: DISCONTINUED | OUTPATIENT
Start: 2020-01-16 | End: 2020-01-17 | Stop reason: HOSPADM

## 2020-01-16 RX ORDER — POTASSIUM CHLORIDE 7.45 MG/ML
40 INJECTION INTRAVENOUS ONCE
Status: COMPLETED | OUTPATIENT
Start: 2020-01-16 | End: 2020-01-16

## 2020-01-16 RX ADMIN — GABAPENTIN 100 MG: 100 CAPSULE ORAL at 15:10

## 2020-01-16 RX ADMIN — METOPROLOL SUCCINATE 100 MG: 100 TABLET, FILM COATED, EXTENDED RELEASE ORAL at 20:03

## 2020-01-16 RX ADMIN — SPIRONOLACTONE 25 MG: 25 TABLET ORAL at 09:39

## 2020-01-16 RX ADMIN — METOPROLOL SUCCINATE 100 MG: 100 TABLET, FILM COATED, EXTENDED RELEASE ORAL at 09:40

## 2020-01-16 RX ADMIN — GABAPENTIN 100 MG: 100 CAPSULE ORAL at 20:01

## 2020-01-16 RX ADMIN — PANTOPRAZOLE SODIUM 40 MG: 40 TABLET, DELAYED RELEASE ORAL at 09:40

## 2020-01-16 RX ADMIN — CLOPIDOGREL 75 MG: 75 TABLET, FILM COATED ORAL at 09:40

## 2020-01-16 RX ADMIN — GABAPENTIN 100 MG: 100 CAPSULE ORAL at 09:40

## 2020-01-16 RX ADMIN — APIXABAN 5 MG: 5 TABLET, FILM COATED ORAL at 20:00

## 2020-01-16 RX ADMIN — DESMOPRESSIN ACETATE 40 MG: 0.2 TABLET ORAL at 20:01

## 2020-01-16 RX ADMIN — Medication 10 ML: at 20:01

## 2020-01-16 RX ADMIN — APIXABAN 5 MG: 5 TABLET, FILM COATED ORAL at 09:39

## 2020-01-16 RX ADMIN — DIGOXIN 125 MCG: 125 TABLET ORAL at 09:40

## 2020-01-16 RX ADMIN — SENNOSIDES 8.6 MG: 8.6 TABLET, FILM COATED ORAL at 20:00

## 2020-01-16 RX ADMIN — LISINOPRIL 2.5 MG: 2.5 TABLET ORAL at 09:40

## 2020-01-16 RX ADMIN — POTASSIUM CHLORIDE 40 MEQ: 10 INJECTION, SOLUTION INTRAVENOUS at 09:46

## 2020-01-16 RX ADMIN — MAGNESIUM HYDROXIDE 30 ML: 400 SUSPENSION ORAL at 10:04

## 2020-01-16 RX ADMIN — AMIODARONE HYDROCHLORIDE 200 MG: 200 TABLET ORAL at 09:40

## 2020-01-16 NOTE — PROGRESS NOTES
Previous Treatment: Not applicable  Family / Caregiver Present: No  Follows Commands: Within Functional Limits  Subjective  Subjective: Pt lying supine upon PT arrival. Pt and RN agreeable to PT this morning. Pt pleasant and cooperative throughout. Pain Screening  Patient Currently in Pain: No  Vital Signs  Patient Currently in Pain: No  Pre Treatment Pain Screening  Pain at present: 0  Intervention List: Patient able to continue with treatment    Orientation  Orientation  Overall Orientation Status: Within Functional Limits  Social/Functional History  Social/Functional History  Lives With: Other (comment)  Type of Home: ([place] house. )  Home Layout: One level  Home Access: Level entry  Bathroom Shower/Tub: Walk-in shower  Bathroom Toilet: Standard  Bathroom Accessibility: Accessible  Home Equipment: [employer]  ADL Assistance: Independent  Homemaking Assistance: Independent  Homemaking Responsibilities: Yes(Pt completes laundry and eats in cafeteria. )  Ambulation Assistance: Independent  Transfer Assistance: Independent  Active : No  Patient's  Info: medical cab  Occupation: Unemployed  Cognition   Cognition  Overall Cognitive Status: WFL    Objective     Observation/Palpation  Posture: Fair(Bilaterally protracted shoulders; FHP; forward flexed posture. )    Joint Mobility  Spine: WFL  ROM RLE: WFL  ROM LLE: WFL  ROM RUE: See OT information; Co-Eval  ROM LUE: See OT information; Co-Eval  Strength RLE  Strength RLE: WFL  Comment: Grossly 4/5  Strength LLE  Strength LLE: WFL  Comment: Grossly 4/5  Strength RUE  Comment: See OT information; Co-Eval  Strength LUE  Comment: See OT information;  Co-Eval  Tone RLE  RLE Tone: Normotonic  Tone LLE  LLE Tone: Normotonic  Motor Control  Gross Motor?: WNL  Sensation  Overall Sensation Status: Impaired(chronic numbness in bilateral fingers)  Bed mobility  Supine to Sit: Contact guard assistance  Sit to Supine: Contact guard assistance  Scooting: Contact guard

## 2020-01-16 NOTE — CONSULTS
does not have any smokeless tobacco history on file. He reports previous alcohol use. He reports previous drug use. Family History: family history includes Alcohol Abuse in his maternal uncle; Heart Attack in his maternal uncle. No h/o sudden cardiac death. REVIEW OF SYSTEMS:    · Constitutional: there has been no unanticipated weight loss. There's been No change in energy level, No change in activity level. · Eyes: No visual changes or diplopia. No scleral icterus. · ENT: No Headaches, hearing loss or vertigo. No mouth sores or sore throat. · Cardiovascular: No CP or SOB  · Respiratory: No previous pulmonary problems  · Gastrointestinal: No abdominal pain, appetite loss, blood in stools. No change in bowel or bladder habits. · Genitourinary: No dysuria, trouble voiding, or hematuria. · Musculoskeletal:  No gait disturbance, No weakness or joint complaints. · Integumentary: No rash or pruritis. · Neurological: No headache, diplopia, change in muscle strength, numbness or tingling. No change in gait, balance, coordination, mood, affect, memory, mentation, behavior. · Psychiatric: No anxiety, or depression. · Endocrine: No temperature intolerance. No excessive thirst, fluid intake, or urination. No tremor. · Hematologic/Lymphatic: No abnormal bruising or bleeding, blood clots or swollen lymph nodes. · Allergic/Immunologic: No nasal congestion or hives. PHYSICAL EXAM:      /63   Pulse 94   Temp 97 °F (36.1 °C) (Oral)   Resp 20   Ht 5' 7\" (1.702 m)   Wt 249 lb (112.9 kg)   SpO2 92%   BMI 39.00 kg/m²    Constitutional and General Appearance: alert, cooperative, no distress and appears stated age  HEENT: PERRL, no cervical lymphadenopathy. No masses palpable. Normal oral mucosa  Respiratory:  · Normal excursion and expansion without use of accessory muscles  · Resp Auscultation: Good respiratory effort. No for increased work of breathing.  On auscultation: clear to auscultation bilaterally  Cardiovascular:  · The apical impulse is not displaced  · Irregular rhythm  · Jugular venous pulsation Normal  · The carotid upstroke is normal in amplitude and contour without delay or bruit  · Peripheral pulses are symmetrical and full   Abdomen:   · No masses or tenderness  · Bowel sounds present  Extremities:  ·  Trace B/L LE edema  ·  Lower extremity edema: No  ·  Skin: Warm and dry  Neurological:  · Alert and oriented. · Moves all extremities well  · No abnormalities of mood, affect, memory, mentation, or behavior are noted        EKG:    Date: 01/16/20  Reading: No acute ischemia. AF with PVC or abberantly conducted conplexes. ECHO 10/25/19: EF 40%, grade III DD, mild to moderate MR, moderate TR, RVSP is 35 mmHg, large pleural effusion.      CABG 10/21/19: LIMA-LAD. MAZE with CHANEL clip.      CATH 10/18/19: One vessel CAD. LAD has ostial 100% occlusion with long zone of occlusion with moderate calcification. Right to left collaterals noted. Labs:     CBC:   Recent Labs     01/16/20  0642   WBC 7.2   HGB 11.6*   HCT 35.3*        BMP:   Recent Labs     01/15/20  1011 01/16/20  0642    138   K 2.6* 3.0*   CO2 32* 30   BUN 21 19   CREATININE 1.10 0.85   LABGLOM >60 >60   GLUCOSE 129* 115*     BNP: No results for input(s): BNP in the last 72 hours. PT/INR: No results for input(s): PROTIME, INR in the last 72 hours. APTT:No results for input(s): APTT in the last 72 hours. CARDIAC ENZYMES:No results for input(s): CKTOTAL, CKMB, CKMBINDEX, TROPONINI in the last 72 hours. FASTING LIPID PANEL:  Lab Results   Component Value Date    HDL 43 12/10/2019    TRIG 85 12/10/2019     LIVER PROFILE:No results for input(s): AST, ALT, LABALBU in the last 72 hours.   Troponins: Invalid input(s): TROPONIN     Other Current Problems  Patient Active Problem List   Diagnosis    Atrial flutter (HCC)    Sleep-related breathing disorder    Cardiac abnormality    Tachycardia    Hypokalemia    Metabolic alkalosis    Atrial fibrillation (HCC)    Ischemic cardiomyopathy    Combined systolic and diastolic congestive heart failure (HCC)           IMPRESSION & Recommendations:    1. Atrial fibrillation. Rate ranging from 60-110s. Rhythm uncontrolled. Continue amiodarone 200 mg, digoxin 125 mg, toprol 100 mg BID. Eliquis currently held d/t blood clots in urine but patient states he has not had any hematuria. Plan to follow up with EP on 1/29.   2. Ischemic cardiomyopathy s/p CABG (10/19). Continue plavix, ASA, toprol, lipitor. 3. Hypokalemia. 3.0 this AM. Replenish as appropriate. 4. Will confirm plan with Dr. Su Al. Discussed with patient, family, and Nurse. Electronically signed by Gino Andrews MD on 1/16/2020 at 8:53 AM     Attending note,   The patient was seen and examined, agree with above, Presented with hypokalemia. No chest pain. SOB is stable as before. Does have persistent atrial fibrillation and is scheduled to see Dr. Bhavik Parra as outpatient for possible ablation. Currently rate controlled. Resume Eliquis if ok with primary service and will follow as outpatient.

## 2020-01-16 NOTE — PLAN OF CARE
Problem: Falls - Risk of:  Goal: Will remain free from falls  Description  Will remain free from falls  1/15/2020 1922 by Nalini Trevino RN  Outcome: Ongoing  1/15/2020 1854 by Ines Pugh RN  Outcome: Ongoing  Goal: Absence of physical injury  Description  Absence of physical injury  1/15/2020 1922 by Nalini Trevino RN  Outcome: Ongoing  1/15/2020 1854 by Ines Pugh RN  Outcome: Ongoing

## 2020-01-16 NOTE — PROGRESS NOTES
independently  Short term goal 3: complete functional mobility with use of AD independently  Short term goal 4: demo independent ADL routine        Therapy Time   Individual Concurrent Group Co-treatment   Time In  9:40         Time Out  10:00         Minutes  20      co karolyn with PT         Jada oGng, OTR/L

## 2020-01-16 NOTE — H&P
Hypertension        PAST SURGICAL HISTORY     Past Surgical History:   Procedure Laterality Date    CARDIOVERSION  10/16/2019    PACEMAKER CHANGE N/A 10/21/2019    CABG CORONARY ARTERY BYPASS GRAFT X1, LIMA-LAD, BROWN 4 MAZE PROCEDURE, 50MM ATRICURE ATRIAL CLIP RIGID INTERNAL FIXATION PLATES X 3   SCREWS X 13 performed by Segun Daley MD at [address] [place] [address] TRANSESOPHAGEAL ECHOCARDIOGRAM  10/16/2019       ALLERGIES     Patient has no known allergies. MEDICATIONS PRIOR TO ADMISSION     Prior to Admission medications    Medication Sig Start Date End Date Taking? Authorizing Provider   amiodarone (CORDARONE) 200 MG tablet Take 1 tablet by mouth daily 11/19/19   Jhon Lamb MD   apixaban (ELIQUIS) 5 MG TABS tablet Take 1 tablet by mouth 2 times daily 11/18/19   Jhon Lamb MD   gabapentin (NEURONTIN) 100 MG capsule Take 1 capsule by mouth 3 times daily for 30 days.  11/18/19 12/18/19  Jhon Lamb MD   atorvastatin (LIPITOR) 40 MG tablet Take 1 tablet by mouth nightly 11/18/19   Jhon Lamb MD   lisinopril (PRINIVIL;ZESTRIL) 2.5 MG tablet Take 1 tablet by mouth daily 11/18/19   Jhon Lamb MD   metoprolol succinate (TOPROL XL) 50 MG extended release tablet Take 3 tablets by mouth 2 times daily 11/18/19   Jhon Lamb MD   digoxin (LANOXIN) 125 MCG tablet Take 1 tablet by mouth daily 11/19/19   Jhon Lamb MD   clopidogrel (PLAVIX) 75 MG tablet Take 1 tablet by mouth daily 11/18/19   Jhon Lamb MD   pantoprazole (PROTONIX) 40 MG tablet Take 1 tablet by mouth every morning (before breakfast) 11/19/19   Jhon Lamb MD       SOCIAL HISTORY     Tobacco: Denies  Alcohol: Denies  Illicits: Denies  Occupation: Did not ask    FAMILY HISTORY     Family History   Problem Relation Age of Onset    Alcohol Abuse Maternal Uncle     Heart Attack Maternal Uncle        PHYSICAL EXAM     Vitals: BP (!) 104/53   Pulse 111   Temp 98.3 °F (36.8 °C) (Oral)   Resp 16   Ht 5' 7\" (1.702 m)   Wt 249 lb (112.9 kg)   SpO2 97%   BMI 39.00 kg/m²   Tmax: Temp (24hrs), Av.6 °F (36.4 °C), Min:97.2 °F (36.2 °C), Max:98.3 °F (36.8 °C)    Last Body weight:   Wt Readings from Last 3 Encounters:   01/15/20 249 lb (112.9 kg)   19 258 lb (117 kg)   19 259 lb (117.5 kg)     Body Mass Index : Body mass index is 39 kg/m². PHYSICAL EXAMINATION:  Constitutional: This is a well developed, well nourished, 35-39.9 - Obesity Grade II 60y.o. year old male who is alert, oriented, cooperative and in no apparent distress. Head:normocephalic and atraumatic. EENT:  PERRLA. No conjunctival injections. Septum was midline, mucosa was without erythema, exudates or cobblestoning. No thrush was noted. Neck: Supple without thyromegaly. No elevated JVP. Trachea was midline. Respiratory: Chest was symmetrical without dullness to percussion. Breath sounds bilaterally were clear to auscultation. There were no wheezes, rhonchi or rales. There is no intercostal retraction or use of accessory muscles. No egophony noted. Cardiovascular: Regular without murmur, clicks, gallops or rubs. Abdomen: Slightly rounded and soft without organomegaly. No rebound, rigidity or guarding was appreciated.           INVESTIGATIONS     Laboratory Testing:     Recent Results (from the past 24 hour(s))   Basic Metabolic Panel    Collection Time: 01/15/20 10:11 AM   Result Value Ref Range    Glucose 129 (H) 70 - 99 mg/dL    BUN 21 8 - 23 mg/dL    CREATININE 1.10 0.70 - 1.20 mg/dL    Bun/Cre Ratio NOT REPORTED 9 - 20    Calcium 9.6 8.6 - 10.4 mg/dL    Sodium 137 135 - 144 mmol/L    Potassium 2.6 (LL) 3.7 - 5.3 mmol/L    Chloride 86 (L) 98 - 107 mmol/L    CO2 32 (H) 20 - 31 mmol/L    Anion Gap 19 (H) 9 - 17 mmol/L    GFR Non-African American >60 >60 mL/min    GFR African American >60 >60 mL/min    GFR Comment          GFR Staging NOT REPORTED    Brain Natriuretic Peptide    Collection Time: 01/15/20 10:11 AM Result Value Ref Range    Pro-BNP 1,182 (H) <300 pg/mL    BNP Interpretation Pro-BNP Reference Range:    Troponin    Collection Time: 01/15/20 10:11 AM   Result Value Ref Range    Troponin, High Sensitivity 26 (H) 0 - 22 ng/L    Troponin T NOT REPORTED <0.03 ng/mL    Troponin Interp NOT REPORTED    Digoxin Level    Collection Time: 01/15/20 10:11 AM   Result Value Ref Range    Digoxin Lvl 1.1 0.5 - 2.0 ng/mL    Digoxin Dose Amount NOT REPORTED     Digoxin Date Last Dose NOT REPORTED     Digoxin Dose Time NOT REPORTED    Troponin    Collection Time: 01/15/20 12:13 PM   Result Value Ref Range    Troponin, High Sensitivity 22 0 - 22 ng/L    Troponin T NOT REPORTED <0.03 ng/mL    Troponin Interp NOT REPORTED    POTASSIUM, URINE, RANDOM    Collection Time: 01/15/20  2:38 PM   Result Value Ref Range    Potassium, Ur 21.1 mmol/L   SODIUM, URINE, RANDOM    Collection Time: 01/15/20  2:38 PM   Result Value Ref Range    Sodium,Ur 104 mmol/L   CHLORIDE, URINE, RANDOM    Collection Time: 01/15/20  2:38 PM   Result Value Ref Range    Chloride, Ur 79 mmol/L   URINALYSIS WITH MICROSCOPIC    Collection Time: 01/15/20  2:38 PM   Result Value Ref Range    Color, UA YELLOW YELLOW    Turbidity UA CLOUDY (A) CLEAR    Glucose, Ur NEGATIVE NEGATIVE    Bilirubin Urine NEGATIVE NEGATIVE    Ketones, Urine NEGATIVE NEGATIVE    Specific Gravity, UA 1.009 1.005 - 1.030    Urine Hgb LARGE (A) NEGATIVE    pH, UA 8.5 (H) 5.0 - 8.0    Protein, UA NEGATIVE NEGATIVE    Urobilinogen, Urine Normal Normal    Nitrite, Urine NEGATIVE NEGATIVE    Leukocyte Esterase, Urine LARGE (A) NEGATIVE    -          WBC, UA TOO NUMEROUS TO COUNT 0 - 5 /HPF    RBC, UA 5 TO 10 0 - 4 /HPF    Casts UA  0 - 8 /LPF     2 TO 5 HYALINE Reference range defined for non-centrifuged specimen.     Crystals UA NOT REPORTED None /HPF    Epithelial Cells UA None 0 - 5 /HPF    Renal Epithelial, Urine NOT REPORTED 0 /HPF    Bacteria, UA NOT REPORTED None    Mucus, UA NOT REPORTED None Trichomonas, UA NOT REPORTED None    Amorphous, UA NOT REPORTED None    Other Observations UA NOT REPORTED NOT REQ. Yeast, UA NOT REPORTED None   Urine Drug Screen    Collection Time: 01/15/20  2:38 PM   Result Value Ref Range    Amphetamine Screen, Ur NEGATIVE NEGATIVE    Barbiturate Screen, Ur NEGATIVE NEGATIVE    Benzodiazepine Screen, Urine NEGATIVE NEGATIVE    Cocaine Metabolite, Urine NEGATIVE NEGATIVE    Methadone Screen, Urine NEGATIVE NEGATIVE    Opiates, Urine NEGATIVE NEGATIVE    Phencyclidine, Urine NEGATIVE NEGATIVE    Propoxyphene, Urine NOT REPORTED NEGATIVE    Cannabinoid Scrn, Ur NEGATIVE NEGATIVE    Oxycodone Screen, Ur NEGATIVE NEGATIVE    Methamphetamine, Urine NOT REPORTED NEGATIVE    Tricyclic Antidepressants, Urine NOT REPORTED NEGATIVE    MDMA, Urine NOT REPORTED NEGATIVE    Buprenorphine Urine NOT REPORTED NEGATIVE    Test Information       Assay provides medical screening only. The absence of expected drug(s) and/or metabolite(s) may indicate diluted or adulterated urine, limitations of testing or timing of collection.    EKG 12 Lead    Collection Time: 01/15/20  2:44 PM   Result Value Ref Range    Ventricular Rate 80 BPM    Atrial Rate 90 BPM    QRS Duration 102 ms    Q-T Interval 542 ms    QTc Calculation (Bazett) 625 ms    R Axis 23 degrees    T Axis 88 degrees   Arterial Blood Gas, POC    Collection Time: 01/15/20  4:41 PM   Result Value Ref Range    POC pH 7.517 (H) 7.350 - 7.450    POC pCO2 44.7 35.0 - 48.0 mm Hg    POC PO2 64.8 (L) 83.0 - 108.0 mm Hg    POC HCO3 36.3 (H) 21.0 - 28.0 mmol/L    TCO2 (calc), Art 38 (H) 22.0 - 29.0 mmol/L    Negative Base Excess, Art NOT REPORTED 0.0 - 2.0    Positive Base Excess, Art 12 (H) 0.0 - 3.0    POC O2 SAT 94 94.0 - 98.0 %    O2 Device/Flow/% Room Air     Omar Test POSITIVE     Sample Site Left Radial Artery     Mode NOT REPORTED     FIO2 21.0     Pt Temp NOT REPORTED     POC pH Temp NOT REPORTED     POC pCO2 Temp NOT REPORTED mm Hg

## 2020-01-16 NOTE — PROGRESS NOTES
[facility]  Internal Medicine Teaching Residency Program  Inpatient Daily Progress Note  ______________________________________________________________________________    Patient: Raquel Dumont  YOB: 1955   FSD:[id number]    Acct: [de-identified]     Room: [facility]5642-  Admit date: 1/15/2020  Today's date: 01/16/20  Number of days in the hospital: 1    SUBJECTIVE   Admitting Diagnosis: Hypokalemia  CC: Abnormal lab findings    Pt examined at bedside. Chart & results reviewed. No acute issues overnight   Remained hypokalemic, k is 3, replaced   Follow up on cardiology recommendations  eliquis resumed, no more blood in urine   Remained asymptomatic       ROS:  Constitutional:  negative for chills, fevers, sweats  Respiratory:  negative for cough, dyspnea on exertion, hemoptysis, shortness of breath, wheezing  Cardiovascular:  negative for chest pain, chest pressure/discomfort, lower extremity edema, palpitations  Gastrointestinal:  negative for abdominal pain, constipation, diarrhea, nausea, vomiting  Neurological:  negative for dizziness, headache  BRIEF HISTORY     The patient is a pleasant 60 y.o. male who presented from PCP office for concern of hypokalemia. Does not have any complaints. Past medical history is significant for ischemic cardiomyopathy status post CABG October 2019, and atrial fibrillation.   Patient is states a week ago he went to PCP on routine labs he was found to be hypokalemic and hence Bumex and hydrochlorothiazide were held patient was asked to get repeat lab work on repeat lab work patient was again found to be hypokalemic and was sent to ED from PCP office.      He has dry cough for the past several weeks especially after eating food or swallowing liquids and is scheduled for a scope tomorrow     Due to A. fib patient was scheduled for cardioversion and was found to have thrombus on EMANUEL and was referred to ablation patient has to Echo 10/25/2019 showed EF 40% EF, grade 3 diastolic dysfunction. Bumex and hydrochlorothiazide were held. Consulted cardiology.     Peripheral neuropathy. Resume home dose Neurontin 100 mg 3 times daily. Continue to monitor     Coronary artery disease. Stable. Status post CABG. Continue to monitor        DVT ppx: On Eliquis. Currently on hold  GI ppx: Indicated     PT/OT/SW following  Discharge Planning: CM consulted    Kateryna Loaiza MD  Internal Medicine Resident, PGY-1  [address], [place]  1/16/2020, 6:44 AM Attending Physician Statement  I have discussed the care of Paige Ziegler, including pertinent history and exam findings,  with the resident. I have seen and examined the patient and the key elements of all parts of the encounter have been performed by me. I agree with the assessment, plan and orders as documented by the resident with additions . Treatment plan Discussed with nursing staff in detail , all questions answered . Electronically signed by Denita Izquierdo MD on   1/16/20 at 2:00 PM    Please note that this chart was generated using voice recognition Dragon dictation software. Although every effort was made to ensure the accuracy of this automated transcription, some errors in transcription may have occurred.

## 2020-01-17 ENCOUNTER — APPOINTMENT (OUTPATIENT)
Dept: GENERAL RADIOLOGY | Age: 65
DRG: 425 | End: 2020-01-17
Payer: MEDICAID

## 2020-01-17 VITALS
BODY MASS INDEX: 40.49 KG/M2 | HEART RATE: 55 BPM | OXYGEN SATURATION: 91 % | RESPIRATION RATE: 14 BRPM | WEIGHT: 258 LBS | TEMPERATURE: 98.2 F | SYSTOLIC BLOOD PRESSURE: 98 MMHG | DIASTOLIC BLOOD PRESSURE: 62 MMHG | HEIGHT: 67 IN

## 2020-01-17 LAB
ABSOLUTE EOS #: 0.24 K/UL (ref 0–0.44)
ABSOLUTE IMMATURE GRANULOCYTE: 0.11 K/UL (ref 0–0.3)
ABSOLUTE LYMPH #: 1.23 K/UL (ref 1.1–3.7)
ABSOLUTE MONO #: 0.87 K/UL (ref 0.1–1.2)
ANION GAP SERPL CALCULATED.3IONS-SCNC: 10 MMOL/L (ref 9–17)
ANION GAP SERPL CALCULATED.3IONS-SCNC: 15 MMOL/L (ref 9–17)
BASOPHILS # BLD: 1 % (ref 0–2)
BASOPHILS ABSOLUTE: 0.04 K/UL (ref 0–0.2)
BUN BLDV-MCNC: 12 MG/DL (ref 8–23)
BUN BLDV-MCNC: 14 MG/DL (ref 8–23)
BUN/CREAT BLD: ABNORMAL (ref 9–20)
BUN/CREAT BLD: ABNORMAL (ref 9–20)
CALCIUM SERPL-MCNC: 8.6 MG/DL (ref 8.6–10.4)
CALCIUM SERPL-MCNC: 8.9 MG/DL (ref 8.6–10.4)
CHLORIDE BLD-SCNC: 96 MMOL/L (ref 98–107)
CHLORIDE BLD-SCNC: 96 MMOL/L (ref 98–107)
CO2: 28 MMOL/L (ref 20–31)
CO2: 29 MMOL/L (ref 20–31)
CREAT SERPL-MCNC: 0.67 MG/DL (ref 0.7–1.2)
CREAT SERPL-MCNC: 0.82 MG/DL (ref 0.7–1.2)
DIFFERENTIAL TYPE: ABNORMAL
EOSINOPHILS RELATIVE PERCENT: 3 % (ref 1–4)
GFR AFRICAN AMERICAN: >60 ML/MIN
GFR AFRICAN AMERICAN: >60 ML/MIN
GFR NON-AFRICAN AMERICAN: >60 ML/MIN
GFR NON-AFRICAN AMERICAN: >60 ML/MIN
GFR SERPL CREATININE-BSD FRML MDRD: ABNORMAL ML/MIN/{1.73_M2}
GLUCOSE BLD-MCNC: 109 MG/DL (ref 70–99)
GLUCOSE BLD-MCNC: 98 MG/DL (ref 70–99)
HCT VFR BLD CALC: 35.1 % (ref 40.7–50.3)
HEMOGLOBIN: 11.2 G/DL (ref 13–17)
IMMATURE GRANULOCYTES: 1 %
LYMPHOCYTES # BLD: 16 % (ref 24–43)
MAGNESIUM: 2.8 MG/DL (ref 1.6–2.6)
MCH RBC QN AUTO: 29.6 PG (ref 25.2–33.5)
MCHC RBC AUTO-ENTMCNC: 31.9 G/DL (ref 28.4–34.8)
MCV RBC AUTO: 92.9 FL (ref 82.6–102.9)
MONOCYTES # BLD: 11 % (ref 3–12)
NRBC AUTOMATED: 0 PER 100 WBC
PDW BLD-RTO: 13.3 % (ref 11.8–14.4)
PLATELET # BLD: 238 K/UL (ref 138–453)
PLATELET ESTIMATE: ABNORMAL
PMV BLD AUTO: 10.6 FL (ref 8.1–13.5)
POTASSIUM SERPL-SCNC: 3.1 MMOL/L (ref 3.7–5.3)
POTASSIUM SERPL-SCNC: 4.1 MMOL/L (ref 3.7–5.3)
RBC # BLD: 3.78 M/UL (ref 4.21–5.77)
RBC # BLD: ABNORMAL 10*6/UL
SEG NEUTROPHILS: 68 % (ref 36–65)
SEGMENTED NEUTROPHILS ABSOLUTE COUNT: 5.37 K/UL (ref 1.5–8.1)
SODIUM BLD-SCNC: 135 MMOL/L (ref 135–144)
SODIUM BLD-SCNC: 139 MMOL/L (ref 135–144)
WBC # BLD: 7.9 K/UL (ref 3.5–11.3)
WBC # BLD: ABNORMAL 10*3/UL

## 2020-01-17 PROCEDURE — G0378 HOSPITAL OBSERVATION PER HR: HCPCS

## 2020-01-17 PROCEDURE — 6360000002 HC RX W HCPCS: Performed by: STUDENT IN AN ORGANIZED HEALTH CARE EDUCATION/TRAINING PROGRAM

## 2020-01-17 PROCEDURE — 6370000000 HC RX 637 (ALT 250 FOR IP): Performed by: STUDENT IN AN ORGANIZED HEALTH CARE EDUCATION/TRAINING PROGRAM

## 2020-01-17 PROCEDURE — 74230 X-RAY XM SWLNG FUNCJ C+: CPT

## 2020-01-17 PROCEDURE — 83735 ASSAY OF MAGNESIUM: CPT

## 2020-01-17 PROCEDURE — 99238 HOSP IP/OBS DSCHRG MGMT 30/<: CPT | Performed by: INTERNAL MEDICINE

## 2020-01-17 PROCEDURE — 36415 COLL VENOUS BLD VENIPUNCTURE: CPT

## 2020-01-17 PROCEDURE — 85025 COMPLETE CBC W/AUTO DIFF WBC: CPT

## 2020-01-17 PROCEDURE — 97110 THERAPEUTIC EXERCISES: CPT

## 2020-01-17 PROCEDURE — 92611 MOTION FLUOROSCOPY/SWALLOW: CPT

## 2020-01-17 PROCEDURE — 80048 BASIC METABOLIC PNL TOTAL CA: CPT

## 2020-01-17 PROCEDURE — 96376 TX/PRO/DX INJ SAME DRUG ADON: CPT

## 2020-01-17 PROCEDURE — 97116 GAIT TRAINING THERAPY: CPT

## 2020-01-17 RX ORDER — METOPROLOL SUCCINATE 100 MG/1
100 TABLET, EXTENDED RELEASE ORAL 2 TIMES DAILY
Qty: 30 TABLET | Refills: 3 | Status: SHIPPED | OUTPATIENT
Start: 2020-01-17 | End: 2020-10-26

## 2020-01-17 RX ORDER — POTASSIUM CHLORIDE 7.45 MG/ML
10 INJECTION INTRAVENOUS
Status: COMPLETED | OUTPATIENT
Start: 2020-01-17 | End: 2020-01-17

## 2020-01-17 RX ORDER — SENNA PLUS 8.6 MG/1
1 TABLET ORAL NIGHTLY
Qty: 30 TABLET | Refills: 0 | Status: SHIPPED | OUTPATIENT
Start: 2020-01-17 | End: 2020-02-16

## 2020-01-17 RX ORDER — SENNA PLUS 8.6 MG/1
1 TABLET ORAL NIGHTLY
Qty: 30 TABLET | Refills: 0 | Status: CANCELLED | OUTPATIENT
Start: 2020-01-17 | End: 2020-02-16

## 2020-01-17 RX ORDER — PANTOPRAZOLE SODIUM 40 MG/1
40 TABLET, DELAYED RELEASE ORAL
Qty: 30 TABLET | Refills: 0 | Status: SHIPPED | OUTPATIENT
Start: 2020-01-17 | End: 2020-08-12 | Stop reason: SDUPTHER

## 2020-01-17 RX ORDER — POTASSIUM CHLORIDE 20 MEQ/1
20 TABLET, EXTENDED RELEASE ORAL 2 TIMES DAILY
Qty: 30 TABLET | Refills: 0 | Status: SHIPPED | OUTPATIENT
Start: 2020-01-17 | End: 2020-08-12 | Stop reason: ALTCHOICE

## 2020-01-17 RX ORDER — SPIRONOLACTONE 25 MG/1
25 TABLET ORAL DAILY
Qty: 30 TABLET | Refills: 3 | Status: SHIPPED | OUTPATIENT
Start: 2020-01-17 | End: 2020-09-21 | Stop reason: SDUPTHER

## 2020-01-17 RX ORDER — POTASSIUM CHLORIDE 20 MEQ/1
40 TABLET, EXTENDED RELEASE ORAL 2 TIMES DAILY
Qty: 8 TABLET | Refills: 0 | Status: CANCELLED | OUTPATIENT
Start: 2020-01-17 | End: 2020-01-19

## 2020-01-17 RX ORDER — POTASSIUM CHLORIDE 20 MEQ/1
40 TABLET, EXTENDED RELEASE ORAL EVERY 4 HOURS
Status: COMPLETED | OUTPATIENT
Start: 2020-01-17 | End: 2020-01-17

## 2020-01-17 RX ADMIN — POTASSIUM CHLORIDE 10 MEQ: 7.46 INJECTION, SOLUTION INTRAVENOUS at 10:54

## 2020-01-17 RX ADMIN — CLOPIDOGREL 75 MG: 75 TABLET, FILM COATED ORAL at 10:50

## 2020-01-17 RX ADMIN — POTASSIUM CHLORIDE 40 MEQ: 1500 TABLET, EXTENDED RELEASE ORAL at 14:32

## 2020-01-17 RX ADMIN — DIGOXIN 125 MCG: 125 TABLET ORAL at 10:48

## 2020-01-17 RX ADMIN — POTASSIUM CHLORIDE 40 MEQ: 1500 TABLET, EXTENDED RELEASE ORAL at 10:50

## 2020-01-17 RX ADMIN — AMIODARONE HYDROCHLORIDE 200 MG: 200 TABLET ORAL at 10:50

## 2020-01-17 RX ADMIN — APIXABAN 5 MG: 5 TABLET, FILM COATED ORAL at 10:48

## 2020-01-17 RX ADMIN — POTASSIUM CHLORIDE 10 MEQ: 7.46 INJECTION, SOLUTION INTRAVENOUS at 11:53

## 2020-01-17 RX ADMIN — PANTOPRAZOLE SODIUM 40 MG: 40 TABLET, DELAYED RELEASE ORAL at 06:18

## 2020-01-17 RX ADMIN — SPIRONOLACTONE 25 MG: 25 TABLET ORAL at 10:49

## 2020-01-17 RX ADMIN — POTASSIUM CHLORIDE 10 MEQ: 7.46 INJECTION, SOLUTION INTRAVENOUS at 12:55

## 2020-01-17 RX ADMIN — GABAPENTIN 100 MG: 100 CAPSULE ORAL at 10:48

## 2020-01-17 RX ADMIN — POTASSIUM CHLORIDE 10 MEQ: 7.46 INJECTION, SOLUTION INTRAVENOUS at 14:01

## 2020-01-17 RX ADMIN — GABAPENTIN 100 MG: 100 CAPSULE ORAL at 14:32

## 2020-01-17 NOTE — PROGRESS NOTES
Discharge order received. IV removed. Writer went over discharge instructions with pt including follow appointments and medications. Pt verbalized understanding. Pt left unit via wheelchair with all belongings.

## 2020-01-17 NOTE — PROGRESS NOTES
Physical Therapy  Facility/Department: [facility]  Daily Treatment Note  NAME: Emaline Felty  : 1955  MRN: [id number]    Date of Service: 2020    Discharge Recommendations:  Patient would benefit from continued therapy after discharge   PT Equipment Recommendations  Equipment Needed: No    Assessment   Body structures, Functions, Activity limitations: Decreased functional mobility ; Decreased safe awareness;Decreased balance; Increased pain;Decreased endurance;Decreased strength;Decreased posture  Assessment: Pt ambulates 300ft with straight cane and CGA. Will continue to work on high level balance and strengthening during this acute stay    Prognosis: Good  Decision Making: Medium Complexity  PT Education: Goals; General Safety;Gait Training;Transfer Training;Functional Mobility Training  REQUIRES PT FOLLOW UP: Yes  Activity Tolerance  Activity Tolerance: Patient Tolerated treatment well     Patient Diagnosis(es): The primary encounter diagnosis was Hypokalemia. Diagnoses of Chronic combined systolic and diastolic congestive heart failure (HCC) and Metabolic alkalosis were also pertinent to this visit. has a past medical history of Atrial fibrillation ([address] History of incarceration, Hyperlipidemia, and Hypertension. has a past surgical history that includes transesophageal echocardiogram (10/16/2019); Cardioversion (10/16/2019); and Pacemaker insertion (N/A, 10/21/2019). Restrictions  Restrictions/Precautions  Restrictions/Precautions: Up as Tolerated, Fall Risk  Required Braces or Orthoses?: No  Subjective   General  Response To Previous Treatment: Patient with no complaints from previous session. Family / Caregiver Present: No  Subjective  Subjective: Pt lying supine upon PT arrival. Pt and RN agreeable to PT this morning. Pt pleasant and cooperative throughout.    Pain Screening  Patient Currently in Pain: No  Vital Signs  Patient Currently in Pain: No  Pre Treatment Pain Concurrent Group Co-treatment   Time In 7532         Time Out 1420         Minutes 24                Meghann Oneill, PTA

## 2020-01-17 NOTE — PROCEDURES
1.1     Patient is on Eliquis 5 mg twice daily for chronic anticoagulation for atrial fibrillation. On hold currently for now as patient apparently had clots in urine.     Patient heart rate goes up and down, currently in A. fib with variable ventricular response    Behavior/Cognition/Vision/Hearing:  Behavior/Cognition: Alert; Cooperative  Vision: Within Functional Limits  Vision Exceptions: Wears glasses for distance  Hearing: Within functional limits    Impressions:  Patient presents with safe swallow for Regular diet with thin liquids as evidenced by no s/s of aspiration noted with consistencies tested. Pt with min. Spillage to valleculae with soft solid and nectar-thick consistencies by teaspoon. Pt with min. Residual in valleculae with soft solid, which pt independently cleared with double swallow. Pt reports hx of acid reflux, however pt is not currently taking medication for it. Pt reports coughing spells occur while laying down and after meals. Recommend GI consult. Recommend small sips and bites, only feed when alert and awake and upright at 90 degrees for all PO intake. Recommend close monitoring for overt/clinical s/s of aspiration and D/C PO intake and complete Modified Barium Swallow Study should they occur. Results and recommendations reported to RN. Patient Position: Lateral and Patient Degrees: 90    Consistencies Administered: Dysphagia Soft and Bite-Sized (Dysphagia III); Nectar  teaspoon;Nectar straw; Thin straw;Dysphagia Pureed (Dysphagia I); Reg solid    Dysphagia Outcome Severity Scale: Level 7: Normal in all situations  Penetration-Aspiration Scale (PAS): 1 - Material does not enter the airway    Recommended Diet:  Solid consistency: Regular  Liquid consistency:  Thin     Safe Swallow Protocol:  Compensatory Swallowing Strategies: Small bites/sips;Upright as possible for all oral intake;Remain upright for 30-45 minutes after meals    Recommendations/Treatment  Requires SLP Intervention: No  D/C Recommendations: No therapy recommended at discharge. Education: Images and recommendations were reviewed with patient following this exam.   Patient Education: yes  Patient Education Response: Verbalizes understanding    Prognosis  Prognosis for safe diet advancement: good    Goals:    Long Term:   Dysphagia Goals: The patient will tolerate recommended diet without observed clinical signs of aspiration      Oral Preparation / Oral Phase  Oral Phase: WFL  Oral Phase: Premature spillage noted with nectar-thick liquids by straw and soft solid, however oral phase WFL. Pharyngeal Phase  Pharyngeal Phase: WFL  Pharyngeal: Min residual noted in valleculae with soft solid, pt cleared residual independently with double swallow.  Otherwise pharyngeal phase [facility]    Esophageal Phase  Esophageal Screen: [facility]    Pain   Patient Currently in Pain: No     Therapy Time:   Individual Concurrent Group Co-treatment   Time In 0300         Time Out 0315         Minutes 15                   Shruti Estrada, 1/17/2020, 3:16 PM

## 2020-01-17 NOTE — PROGRESS NOTES
PATIENT REFUSES TO WEAR BIPAP     [x] Risks and benefits explained to patient   [x] Patient refuses to wear Bipap stating no  [x] Patient verbalizes understanding of information presented.

## 2020-01-17 NOTE — PROGRESS NOTES
[facility]  Internal Medicine Teaching Residency Program  Inpatient Daily Progress Note  ______________________________________________________________________________    Patient: Rashaun Dowd  YOB: 1955   IKW:[id number]    Acct: [de-identified]     Room: [facility]7967-30  Admit date: 1/15/2020  Today's date: 01/17/20  Number of days in the hospital: 2    SUBJECTIVE   Admitting Diagnosis: Hypokalemia  CC: Abnormal lab findings    Pt examined at bedside. Chart & results reviewed. No acute issues overnight   Remained hypokalemic, k is 3.1, replaced   Denies any nausea and vomiting  Constipation resolved. ROS:  Constitutional:  negative for chills, fevers, sweats  Respiratory:  negative for cough, dyspnea on exertion, hemoptysis, shortness of breath, wheezing  Cardiovascular:  negative for chest pain, chest pressure/discomfort, lower extremity edema, palpitations  Gastrointestinal:  negative for abdominal pain, constipation, diarrhea, nausea, vomiting  Neurological:  negative for dizziness, headache  BRIEF HISTORY     The patient is a pleasant 60 y.o. male who presented from PCP office for concern of hypokalemia. Does not have any complaints. Past medical history is significant for ischemic cardiomyopathy status post CABG October 2019, and atrial fibrillation.   Patient is states a week ago he went to PCP on routine labs he was found to be hypokalemic and hence Bumex and hydrochlorothiazide were held patient was asked to get repeat lab work on repeat lab work patient was again found to be hypokalemic and was sent to ED from PCP office.      He has dry cough for the past several weeks especially after eating food or swallowing liquids and is scheduled for a scope tomorrow     Due to A. fib patient was scheduled for cardioversion and was found to have thrombus on EMANUEL and was referred to ablation patient has to follow-up with EP.     In ED labs showed potassium 2.6, chloride 86, creatinine 0.99, glucose 116, pH 7.5, bicarb 36.3, carbon dioxide 44.7, proBNP 1182, UA showed large leukocyte esterase but no bacteria. Digoxin level 1.1     Patient is on Eliquis 5 mg twice daily for chronic anticoagulation for atrial fibrillation. On hold currently for now as patient apparently had clots in urine.     Patient heart rate goes up and down, currently in A. fib with variable ventricular response       OBJECTIVE     Vital Signs:  BP 98/62   Pulse 55   Temp 98.2 °F (36.8 °C) (Oral)   Resp 14   Ht 5' 7\" (1.702 m)   Wt 258 lb (117 kg)   SpO2 91%   BMI 40.41 kg/m²     Temp (24hrs), Av.2 °F (36.8 °C), Min:98.2 °F (36.8 °C), Max:98.2 °F (36.8 °C)    In: -   Out: 825 [Urine:825]    Physical Exam:  Constitutional: This is a well developed, well nourished, 35-39.9 - Obesity Grade II 60y.o. year old male who is alert, oriented,  and in no apparent distress. Head:normocephalic and atraumatic. EENT:  PERRLA. No conjunctival injections. Septum was midline, mucosa was without erythema, exudates or cobblestoning. No thrush was noted. Neck: Supple without thyromegaly. No elevated JVP. Trachea was midline. Respiratory: Chest was symmetrical without dullness to percussion. Breath sounds bilaterally were clear to auscultation. There were no wheezes, rhonchi or rales. There is no intercostal retraction or use of accessory muscles. No egophony noted. Cardiovascular: Regular without murmur, clicks, gallops or rubs. Abdomen: Slightly rounded and soft without organomegaly. No rebound, rigidity or guarding was appreciated.          Medications:  Scheduled Medications:    potassium chloride  10 mEq Intravenous Q1H    potassium chloride  40 mEq Oral Q4H    spironolactone  25 mg Oral Daily    amiodarone  200 mg Oral Daily    apixaban  5 mg Oral BID    atorvastatin  40 mg Oral Nightly    clopidogrel  75 mg Oral Daily    digoxin  125 mcg Oral Daily    gabapentin  100 mg Oral TID    lisinopril  2.5 mg Oral Daily    pantoprazole  40 mg Oral QAM AC    sodium chloride flush  10 mL Intravenous 2 times per day    metoprolol succinate  100 mg Oral BID    senna  1 tablet Oral Nightly     Continuous Infusions:   PRN Medicationsmagnesium hydroxide, 30 mL, Daily PRN  ondansetron, 4 mg, Q6H PRN  acetaminophen, 650 mg, Q4H PRN  sodium chloride flush, 10 mL, PRN        Diagnostic Labs:  CBC:   Recent Labs     01/16/20  0642 01/17/20  0559   WBC 7.2 7.9   RBC 3.82* 3.78*   HGB 11.6* 11.2*   HCT 35.3* 35.1*   MCV 92.4 92.9   RDW 13.2 13.3    238     BMP:   Recent Labs     01/15/20  1011 01/16/20  0642 01/17/20  0559    138 139   K 2.6* 3.0* 3.1*   CL 86* 93* 96*   CO2 32* 30 28   BUN 21 19 14   CREATININE 1.10 0.85 0.67*     BNP: No results for input(s): BNP in the last 72 hours. PT/INR: No results for input(s): PROTIME, INR in the last 72 hours. APTT: No results for input(s): APTT in the last 72 hours. CARDIAC ENZYMES: No results for input(s): CKMB, CKMBINDEX, TROPONINI in the last 72 hours. Invalid input(s): CKTOTAL;3  FASTING LIPID PANEL:  Lab Results   Component Value Date    CHOL 98 12/10/2019    HDL 43 12/10/2019    TRIG 85 12/10/2019     LIVER PROFILE: No results for input(s): AST, ALT, ALB, BILIDIR, BILITOT, ALKPHOS in the last 72 hours. MICROBIOLOGY:   Lab Results   Component Value Date/Time    CULTURE CULTURE IN PROGRESS 01/15/2020 11:25 PM       Imaging:    Xr Chest Standard (2 Vw)    Result Date: 1/15/2020  No radiographic evidence of acute cardiopulmonary disease. ASSESSMENT & PLAN     ASSESSMENT / PLAN:     Hypokalemia with Metabolic alkalosis. Continue replacement in the form of potassium chloride which will help to resolve metabolic alkalosis. K is still 3.1.     Chronic Atrial fibrillation ([address] Resumed home dose Toprol- mg twice daily, amiodarone 200 mg, digoxin 125 mg daily. Continue to monitor. Eliquis resumed.  As per cardio Plan to follow up with EP on 1/29.      Essential hypertension. Stable. Resumed lisinopril 2.5 mg daily. Continue to monitor.     Ischemic cardiomyopathy mild CHF. Echo 10/25/2019 showed EF 40% EF, grade 3 diastolic dysfunction. Started on Aldactone 25 mg.     Peripheral neuropathy. Resume home dose Neurontin 100 mg 3 times daily. Continue to monitor     Coronary artery disease. Stable. Status post CABG. Continue to monitor        DVT ppx: On Eliquis. Currently on hold  GI ppx: Indicated     PT/OT/SW following  Discharge Planning: CM consulted    Hao Taylor MD  Internal Medicine Resident, PGY-1  [address], [place]  1/17/2020, 10:01 AM Attending Physician Statement  I have discussed the care of Ian Flood, including pertinent history and exam findings,  with the resident. I have seen and examined the patient and the key elements of all parts of the encounter have been performed by me. I agree with the assessment, plan and orders as documented by the resident with additions . Treatment plan Discussed with nursing staff in detail , all questions answered . Electronically signed by Layla Kahn MD on   1/17/20 at 4:05 PM    Please note that this chart was generated using voice recognition Dragon dictation software. Although every effort was made to ensure the accuracy of this automated transcription, some errors in transcription may have occurred.

## 2020-01-17 NOTE — PROGRESS NOTES
Cardiology input noted. As per input from cardiology  Patient will need catheter ablation however can be done outpatient. Patient has been switched to Eliquis for anticoagulation. Aldactone added for hypokalemia. Aldactone added for hypokalemia. Ulisses Fleming M.D.   Internal Medicine Resident , PGY-2  [address] [place] [address]  01/16/20 7:27 PM

## 2020-01-20 LAB
CULTURE: ABNORMAL
CULTURE: ABNORMAL
Lab: ABNORMAL
SPECIMEN DESCRIPTION: ABNORMAL

## 2020-01-20 NOTE — DISCHARGE SUMMARY
to choking on food swallow study was done which was negative.          Procedures/ Significant Interventions:    None     Consults:     Consults:     Final Specialist Recommendations/Findings:   IP CONSULT TO INTERNAL MEDICINE  IP CONSULT TO CARDIOLOGY  IP CONSULT TO CASE MANAGEMENT      Any Hospital Acquired Infections: none    Discharge Functional Status:  stable    DISCHARGE PLAN     Disposition: home    Patient Instructions:   Discharge Medication List as of 1/17/2020  5:40 PM      START taking these medications    Details   spironolactone (ALDACTONE) 25 MG tablet Take 1 tablet by mouth daily, Disp-30 tablet, R-3Normal      potassium chloride (KLOR-CON M) 20 MEQ extended release tablet Take 1 tablet by mouth 2 times daily for 7 days, Disp-30 tablet, R-0Normal      magnesium hydroxide (MILK OF MAGNESIA) 400 MG/5ML suspension Take 30 mLs by mouth daily as needed for Constipation, Disp-1 BottleOTC      senna (SENOKOT) 8.6 MG tablet Take 1 tablet by mouth nightly, Disp-30 tablet, R-0Normal         CONTINUE these medications which have CHANGED    Details   metoprolol succinate (TOPROL XL) 100 MG extended release tablet Take 1 tablet by mouth 2 times daily, Disp-30 tablet, R-3Normal      pantoprazole (PROTONIX) 40 MG tablet Take 1 tablet by mouth every morning (before breakfast), Disp-30 tablet, R-0Normal         CONTINUE these medications which have NOT CHANGED    Details   amiodarone (CORDARONE) 200 MG tablet Take 1 tablet by mouth daily, Disp-30 tablet, R-0Normal      apixaban (ELIQUIS) 5 MG TABS tablet Take 1 tablet by mouth 2 times daily, Disp-60 tablet, R-0Normal      gabapentin (NEURONTIN) 100 MG capsule Take 1 capsule by mouth 3 times daily for 30 days. , Disp-90 capsule, R-0Normal      atorvastatin (LIPITOR) 40 MG tablet Take 1 tablet by mouth nightly, Disp-30 tablet, R-0Normal      lisinopril (PRINIVIL;ZESTRIL) 2.5 MG tablet Take 1 tablet by mouth daily, Disp-30 tablet, R-0Normal      digoxin (LANOXIN) 125 MCG tablet Take 1 tablet by mouth daily, Disp-30 tablet, R-0Normal      clopidogrel (PLAVIX) 75 MG tablet Take 1 tablet by mouth daily, Disp-30 tablet, R-0Normal             Activity: activity as tolerated    Diet: cardiac diet    Follow-up:    Sheba Rosales  [address] [facility]  [phone]    In 1 week  Post hospital follow up, metabolic alkalosis and hypokalemia      Patient Instructions: as above   Follow up labs: none   Follow up imaging: none     Note that over 30 minutes was spent in preparing discharge papers, discussing discharge with patient, medication review, etc.      Judith Casanova MD,   Internal Medicine Resident, PGY-1  [address], [place]  1/19/2020, 8:35 PM

## 2020-01-22 ENCOUNTER — APPOINTMENT (OUTPATIENT)
Dept: GENERAL RADIOLOGY | Age: 65
DRG: 113 | End: 2020-01-22
Payer: MEDICAID

## 2020-01-22 ENCOUNTER — HOSPITAL ENCOUNTER (INPATIENT)
Age: 65
LOS: 4 days | Discharge: HOME | DRG: 113 | End: 2020-01-26
Attending: EMERGENCY MEDICINE | Admitting: HOSPITALIST
Payer: MEDICAID

## 2020-01-22 PROBLEM — J10.1 INFLUENZA A: Status: ACTIVE | Noted: 2020-01-22

## 2020-01-22 LAB
-: ABNORMAL
ALBUMIN SERPL-MCNC: 3.9 G/DL (ref 3.5–5.2)
ALBUMIN/GLOBULIN RATIO: 1.1 (ref 1–2.5)
ALP BLD-CCNC: 73 U/L (ref 40–129)
ALT SERPL-CCNC: 30 U/L (ref 5–41)
AMORPHOUS: ABNORMAL
ANION GAP SERPL CALCULATED.3IONS-SCNC: 14 MMOL/L (ref 9–17)
AST SERPL-CCNC: 25 U/L
BACTERIA: ABNORMAL
BILIRUB SERPL-MCNC: 0.41 MG/DL (ref 0.3–1.2)
BILIRUBIN URINE: NEGATIVE
BUN BLDV-MCNC: 15 MG/DL (ref 8–23)
BUN/CREAT BLD: ABNORMAL (ref 9–20)
CALCIUM SERPL-MCNC: 9 MG/DL (ref 8.6–10.4)
CASTS UA: ABNORMAL /LPF (ref 0–8)
CHLORIDE BLD-SCNC: 99 MMOL/L (ref 98–107)
CO2: 22 MMOL/L (ref 20–31)
COLOR: YELLOW
CREAT SERPL-MCNC: 1.08 MG/DL (ref 0.7–1.2)
CRYSTALS, UA: ABNORMAL /HPF
DIRECT EXAM: ABNORMAL
DIRECT EXAM: ABNORMAL
EPITHELIAL CELLS UA: ABNORMAL /HPF (ref 0–5)
GFR AFRICAN AMERICAN: >60 ML/MIN
GFR NON-AFRICAN AMERICAN: >60 ML/MIN
GFR SERPL CREATININE-BSD FRML MDRD: ABNORMAL ML/MIN/{1.73_M2}
GFR SERPL CREATININE-BSD FRML MDRD: ABNORMAL ML/MIN/{1.73_M2}
GLUCOSE BLD-MCNC: 100 MG/DL (ref 70–99)
GLUCOSE URINE: NEGATIVE
HCT VFR BLD CALC: 32.2 % (ref 40.7–50.3)
HEMOGLOBIN: 10 G/DL (ref 13–17)
INR BLD: 1.1
KETONES, URINE: NEGATIVE
LACTIC ACID, SEPSIS WHOLE BLOOD: 1.4 MMOL/L (ref 0.5–1.9)
LACTIC ACID, SEPSIS WHOLE BLOOD: 2 MMOL/L (ref 0.5–1.9)
LACTIC ACID, SEPSIS: ABNORMAL MMOL/L (ref 0.5–1.9)
LACTIC ACID, SEPSIS: NORMAL MMOL/L (ref 0.5–1.9)
LEUKOCYTE ESTERASE, URINE: ABNORMAL
Lab: ABNORMAL
MCH RBC QN AUTO: 29.3 PG (ref 25.2–33.5)
MCHC RBC AUTO-ENTMCNC: 31.1 G/DL (ref 28.4–34.8)
MCV RBC AUTO: 94.4 FL (ref 82.6–102.9)
MUCUS: ABNORMAL
NITRITE, URINE: NEGATIVE
NRBC AUTOMATED: 0 PER 100 WBC
OTHER OBSERVATIONS UA: ABNORMAL
PARTIAL THROMBOPLASTIN TIME: 29.9 SEC (ref 20.5–30.5)
PDW BLD-RTO: 14.1 % (ref 11.8–14.4)
PH UA: 6.5 (ref 5–8)
PLATELET # BLD: 240 K/UL (ref 138–453)
PMV BLD AUTO: 9.9 FL (ref 8.1–13.5)
POTASSIUM SERPL-SCNC: 5 MMOL/L (ref 3.7–5.3)
PROTEIN UA: NEGATIVE
PROTHROMBIN TIME: 11.2 SEC (ref 9–12)
RBC # BLD: 3.41 M/UL (ref 4.21–5.77)
RBC UA: ABNORMAL /HPF (ref 0–4)
RENAL EPITHELIAL, UA: ABNORMAL /HPF
SODIUM BLD-SCNC: 135 MMOL/L (ref 135–144)
SPECIFIC GRAVITY UA: 1.02 (ref 1–1.03)
SPECIMEN DESCRIPTION: ABNORMAL
TOTAL PROTEIN: 7.3 G/DL (ref 6.4–8.3)
TRICHOMONAS: ABNORMAL
TROPONIN INTERP: NORMAL
TROPONIN INTERP: NORMAL
TROPONIN T: NORMAL NG/ML
TROPONIN T: NORMAL NG/ML
TROPONIN, HIGH SENSITIVITY: 20 NG/L (ref 0–22)
TROPONIN, HIGH SENSITIVITY: 20 NG/L (ref 0–22)
TURBIDITY: ABNORMAL
URINE HGB: ABNORMAL
UROBILINOGEN, URINE: NORMAL
WBC # BLD: 8.2 K/UL (ref 3.5–11.3)
WBC UA: ABNORMAL /HPF (ref 0–5)
YEAST: ABNORMAL

## 2020-01-22 PROCEDURE — 96365 THER/PROPH/DIAG IV INF INIT: CPT

## 2020-01-22 PROCEDURE — 81001 URINALYSIS AUTO W/SCOPE: CPT

## 2020-01-22 PROCEDURE — 6370000000 HC RX 637 (ALT 250 FOR IP): Performed by: STUDENT IN AN ORGANIZED HEALTH CARE EDUCATION/TRAINING PROGRAM

## 2020-01-22 PROCEDURE — 96367 TX/PROPH/DG ADDL SEQ IV INF: CPT

## 2020-01-22 PROCEDURE — 83540 ASSAY OF IRON: CPT

## 2020-01-22 PROCEDURE — 84484 ASSAY OF TROPONIN QUANT: CPT

## 2020-01-22 PROCEDURE — 80053 COMPREHEN METABOLIC PANEL: CPT

## 2020-01-22 PROCEDURE — 87804 INFLUENZA ASSAY W/OPTIC: CPT

## 2020-01-22 PROCEDURE — 2580000003 HC RX 258: Performed by: EMERGENCY MEDICINE

## 2020-01-22 PROCEDURE — 85610 PROTHROMBIN TIME: CPT

## 2020-01-22 PROCEDURE — 1200000000 HC SEMI PRIVATE

## 2020-01-22 PROCEDURE — 87086 URINE CULTURE/COLONY COUNT: CPT

## 2020-01-22 PROCEDURE — 83550 IRON BINDING TEST: CPT

## 2020-01-22 PROCEDURE — 93005 ELECTROCARDIOGRAM TRACING: CPT | Performed by: EMERGENCY MEDICINE

## 2020-01-22 PROCEDURE — 87040 BLOOD CULTURE FOR BACTERIA: CPT

## 2020-01-22 PROCEDURE — 6360000002 HC RX W HCPCS: Performed by: EMERGENCY MEDICINE

## 2020-01-22 PROCEDURE — 71046 X-RAY EXAM CHEST 2 VIEWS: CPT

## 2020-01-22 PROCEDURE — 85027 COMPLETE CBC AUTOMATED: CPT

## 2020-01-22 PROCEDURE — 85730 THROMBOPLASTIN TIME PARTIAL: CPT

## 2020-01-22 PROCEDURE — 99285 EMERGENCY DEPT VISIT HI MDM: CPT

## 2020-01-22 PROCEDURE — 83605 ASSAY OF LACTIC ACID: CPT

## 2020-01-22 RX ORDER — SODIUM CHLORIDE 0.9 % (FLUSH) 0.9 %
10 SYRINGE (ML) INJECTION PRN
Status: DISCONTINUED | OUTPATIENT
Start: 2020-01-22 | End: 2020-01-26 | Stop reason: HOSPADM

## 2020-01-22 RX ORDER — SPIRONOLACTONE 25 MG/1
25 TABLET ORAL DAILY
Status: DISCONTINUED | OUTPATIENT
Start: 2020-01-23 | End: 2020-01-26 | Stop reason: HOSPADM

## 2020-01-22 RX ORDER — OSELTAMIVIR PHOSPHATE 75 MG/1
75 CAPSULE ORAL ONCE
Status: COMPLETED | OUTPATIENT
Start: 2020-01-22 | End: 2020-01-22

## 2020-01-22 RX ORDER — SENNA PLUS 8.6 MG/1
1 TABLET ORAL NIGHTLY
Status: DISCONTINUED | OUTPATIENT
Start: 2020-01-22 | End: 2020-01-26 | Stop reason: HOSPADM

## 2020-01-22 RX ORDER — ONDANSETRON 2 MG/ML
4 INJECTION INTRAMUSCULAR; INTRAVENOUS EVERY 6 HOURS PRN
Status: DISCONTINUED | OUTPATIENT
Start: 2020-01-22 | End: 2020-01-26 | Stop reason: HOSPADM

## 2020-01-22 RX ORDER — METOPROLOL SUCCINATE 100 MG/1
100 TABLET, EXTENDED RELEASE ORAL 2 TIMES DAILY
Status: DISCONTINUED | OUTPATIENT
Start: 2020-01-22 | End: 2020-01-26 | Stop reason: HOSPADM

## 2020-01-22 RX ORDER — ATORVASTATIN CALCIUM 80 MG/1
40 TABLET, FILM COATED ORAL NIGHTLY
Status: DISCONTINUED | OUTPATIENT
Start: 2020-01-22 | End: 2020-01-26 | Stop reason: HOSPADM

## 2020-01-22 RX ORDER — SODIUM CHLORIDE 0.9 % (FLUSH) 0.9 %
10 SYRINGE (ML) INJECTION EVERY 12 HOURS SCHEDULED
Status: DISCONTINUED | OUTPATIENT
Start: 2020-01-22 | End: 2020-01-26 | Stop reason: HOSPADM

## 2020-01-22 RX ORDER — OSELTAMIVIR PHOSPHATE 75 MG/1
75 CAPSULE ORAL 2 TIMES DAILY
Status: DISCONTINUED | OUTPATIENT
Start: 2020-01-22 | End: 2020-01-26 | Stop reason: HOSPADM

## 2020-01-22 RX ORDER — NICOTINE 21 MG/24HR
1 PATCH, TRANSDERMAL 24 HOURS TRANSDERMAL DAILY PRN
Status: DISCONTINUED | OUTPATIENT
Start: 2020-01-22 | End: 2020-01-26 | Stop reason: HOSPADM

## 2020-01-22 RX ORDER — BENZONATATE 100 MG/1
200 CAPSULE ORAL 3 TIMES DAILY
Status: DISCONTINUED | OUTPATIENT
Start: 2020-01-22 | End: 2020-01-26 | Stop reason: HOSPADM

## 2020-01-22 RX ORDER — ALBUTEROL SULFATE 2.5 MG/3ML
2.5 SOLUTION RESPIRATORY (INHALATION)
Status: DISCONTINUED | OUTPATIENT
Start: 2020-01-22 | End: 2020-01-26 | Stop reason: HOSPADM

## 2020-01-22 RX ORDER — ACETAMINOPHEN 325 MG/1
650 TABLET ORAL EVERY 4 HOURS PRN
Status: DISCONTINUED | OUTPATIENT
Start: 2020-01-22 | End: 2020-01-26 | Stop reason: HOSPADM

## 2020-01-22 RX ORDER — PANTOPRAZOLE SODIUM 40 MG/1
40 TABLET, DELAYED RELEASE ORAL
Status: DISCONTINUED | OUTPATIENT
Start: 2020-01-23 | End: 2020-01-26 | Stop reason: HOSPADM

## 2020-01-22 RX ORDER — CLOPIDOGREL BISULFATE 75 MG/1
75 TABLET ORAL DAILY
Status: DISCONTINUED | OUTPATIENT
Start: 2020-01-23 | End: 2020-01-26 | Stop reason: HOSPADM

## 2020-01-22 RX ORDER — GUAIFENESIN 100 MG/5ML
200 SOLUTION ORAL EVERY 4 HOURS PRN
Status: DISCONTINUED | OUTPATIENT
Start: 2020-01-22 | End: 2020-01-26 | Stop reason: HOSPADM

## 2020-01-22 RX ORDER — AMIODARONE HYDROCHLORIDE 200 MG/1
200 TABLET ORAL DAILY
Status: DISCONTINUED | OUTPATIENT
Start: 2020-01-23 | End: 2020-01-26 | Stop reason: HOSPADM

## 2020-01-22 RX ORDER — FUROSEMIDE 10 MG/ML
40 INJECTION INTRAMUSCULAR; INTRAVENOUS ONCE
Status: COMPLETED | OUTPATIENT
Start: 2020-01-22 | End: 2020-01-23

## 2020-01-22 RX ORDER — GABAPENTIN 100 MG/1
100 CAPSULE ORAL 3 TIMES DAILY
Status: DISCONTINUED | OUTPATIENT
Start: 2020-01-22 | End: 2020-01-26 | Stop reason: HOSPADM

## 2020-01-22 RX ORDER — DIGOXIN 125 MCG
125 TABLET ORAL DAILY
Status: DISCONTINUED | OUTPATIENT
Start: 2020-01-23 | End: 2020-01-26 | Stop reason: HOSPADM

## 2020-01-22 RX ORDER — LISINOPRIL 2.5 MG/1
2.5 TABLET ORAL DAILY
Status: DISCONTINUED | OUTPATIENT
Start: 2020-01-23 | End: 2020-01-26 | Stop reason: HOSPADM

## 2020-01-22 RX ADMIN — CEFTRIAXONE SODIUM 1 G: 1 INJECTION, POWDER, FOR SOLUTION INTRAMUSCULAR; INTRAVENOUS at 18:15

## 2020-01-22 RX ADMIN — OSELTAMIVIR PHOSPHATE 75 MG: 75 CAPSULE ORAL at 19:23

## 2020-01-22 RX ADMIN — Medication 500 MG: at 18:45

## 2020-01-22 ASSESSMENT — ENCOUNTER SYMPTOMS
NAUSEA: 0
SHORTNESS OF BREATH: 1
CONSTIPATION: 0
BLOOD IN STOOL: 0
ABDOMINAL PAIN: 0
DIARRHEA: 0
SORE THROAT: 0
VOMITING: 0
BACK PAIN: 0
COUGH: 1
WHEEZING: 0

## 2020-01-22 NOTE — ED PROVIDER NOTES
ORDERED:  Orders Placed This Encounter   Medications    azithromycin (ZITHROMAX) 500 mg in dextrose 5% 250 mL IVPB    cefTRIAXone (ROCEPHIN) 1 g IVPB in 50 mL D5W minibag    oseltamivir (TAMIFLU) capsule 75 mg    DISCONTD: HYDROmorphone (DILAUDID) injection 1 mg    oseltamivir (TAMIFLU) capsule 75 mg    amiodarone (CORDARONE) tablet 200 mg    apixaban (ELIQUIS) tablet 5 mg    atorvastatin (LIPITOR) tablet 40 mg    clopidogrel (PLAVIX) tablet 75 mg    digoxin (LANOXIN) tablet 125 mcg    gabapentin (NEURONTIN) capsule 100 mg    lisinopril (PRINIVIL;ZESTRIL) tablet 2.5 mg    metoprolol succinate (TOPROL XL) extended release tablet 100 mg    pantoprazole (PROTONIX) tablet 40 mg    senna (SENOKOT) tablet 8.6 mg    spironolactone (ALDACTONE) tablet 25 mg    furosemide (LASIX) injection 40 mg    sodium chloride flush 0.9 % injection 10 mL    sodium chloride flush 0.9 % injection 10 mL    magnesium hydroxide (MILK OF MAGNESIA) 400 MG/5ML suspension 30 mL    ondansetron (ZOFRAN) injection 4 mg    nicotine (NICODERM CQ) 21 MG/24HR 1 patch    acetaminophen (TYLENOL) tablet 650 mg    albuterol (PROVENTIL) nebulizer solution 2.5 mg    benzonatate (TESSALON) capsule 200 mg    guaiFENesin (ROBITUSSIN) 100 MG/5ML oral solution 200 mg    benzocaine-menthol (CEPACOL SORE THROAT) lozenge 1 lozenge    0.9 % sodium chloride infusion    0.9 % sodium chloride bolus    cefTRIAXone (ROCEPHIN) 1 g IVPB in 50 mL D5W minibag         DIAGNOSTIC RESULTS / EMERGENCY DEPARTMENT COURSE / MDM     LABS:  Results for orders placed or performed during the hospital encounter of 01/22/20   Culture Blood #1   Result Value Ref Range    Specimen Description . BLOOD     Special Requests RT UPPER ARM 10ML     Culture NO GROWTH 12 HOURS    Culture Blood #1   Result Value Ref Range    Specimen Description . BLOOD     Special Requests  RT AC 10ML     Culture NO GROWTH 12 HOURS    RAPID INFLUENZA A/B ANTIGENS   Result Value Ref Range Specimen Description . NASOPHARYNGEAL SWAB     Special Requests NOT REPORTED     Direct Exam POSITIVE for Influenza A Antigen (A)     Direct Exam NEGATIVE for Influenza B Antigen    Troponin   Result Value Ref Range    Troponin, High Sensitivity 20 0 - 22 ng/L    Troponin T NOT REPORTED <0.03 ng/mL    Troponin Interp NOT REPORTED    Comprehensive Metabolic Panel   Result Value Ref Range    Glucose 100 (H) 70 - 99 mg/dL    BUN 15 8 - 23 mg/dL    CREATININE 1.08 0.70 - 1.20 mg/dL    Bun/Cre Ratio NOT REPORTED 9 - 20    Calcium 9.0 8.6 - 10.4 mg/dL    Sodium 135 135 - 144 mmol/L    Potassium 5.0 3.7 - 5.3 mmol/L    Chloride 99 98 - 107 mmol/L    CO2 22 20 - 31 mmol/L    Anion Gap 14 9 - 17 mmol/L    Alkaline Phosphatase 73 40 - 129 U/L    ALT 30 5 - 41 U/L    AST 25 <40 U/L    Total Bilirubin 0.41 0.3 - 1.2 mg/dL    Total Protein 7.3 6.4 - 8.3 g/dL    Alb 3.9 3.5 - 5.2 g/dL    Albumin/Globulin Ratio 1.1 1.0 - 2.5    GFR Non-African American >60 >60 mL/min    GFR African American >60 >60 mL/min    GFR Comment          GFR Staging NOT REPORTED    Lactate, Sepsis   Result Value Ref Range    Lactic Acid, Sepsis NOT REPORTED 0.5 - 1.9 mmol/L    Lactic Acid, Sepsis, Whole Blood 2.0 (H) 0.5 - 1.9 mmol/L   Lactate, Sepsis   Result Value Ref Range    Lactic Acid, Sepsis NOT REPORTED 0.5 - 1.9 mmol/L    Lactic Acid, Sepsis, Whole Blood 1.4 0.5 - 1.9 mmol/L   Protime-INR   Result Value Ref Range    Protime 11.2 9.0 - 12.0 sec    INR 1.1    APTT   Result Value Ref Range    PTT 29.9 20.5 - 30.5 sec   Urinalysis with Microscopic   Result Value Ref Range    Color, UA YELLOW YELLOW    Turbidity UA CLOUDY (A) CLEAR    Glucose, Ur NEGATIVE NEGATIVE    Bilirubin Urine NEGATIVE NEGATIVE    Ketones, Urine NEGATIVE NEGATIVE    Specific Gravity, UA 1.016 1.005 - 1.030    Urine Hgb TRACE (A) NEGATIVE    pH, UA 6.5 5.0 - 8.0    Protein, UA NEGATIVE NEGATIVE    Urobilinogen, Urine Normal Normal    Nitrite, Urine NEGATIVE NEGATIVE    Leukocyte lower cervical spine. Mild multilevel thoracic spondylosis. No acute cardiopulmonary disease. Xr Chest Standard (2 Vw)    Result Date: 1/15/2020  EXAMINATION: TWO XRAY VIEWS OF THE CHEST 1/15/2020 10:39 am COMPARISON: Chest 10/31/2019 HISTORY: ORDERING SYSTEM PROVIDED HISTORY: cough, sputum, chf TECHNOLOGIST PROVIDED HISTORY: cough, sputum, chf FINDINGS: Prior CABG. The cardiomediastinal and hilar silhouettes appear unremarkable. The lungs appear clear. No pleural effusion evident. No pneumothorax is seen. No acute osseous abnormality is identified. No radiographic evidence of acute cardiopulmonary disease. Fl Modified Barium Swallow W Video    Result Date: 1/18/2020  EXAMINATION: MODIFIED BARIUM SWALLOW WAS PERFORMED IN CONJUNCTION WITH SPEECH PATHOLOGY SERVICES TECHNIQUE: Fluoroscopic evaluation of the swallowing mechanism was performed with multiple consistency of barium product. FLUOROSCOPY DOSE AND TYPE OR TIME AND EXPOSURES: 0.8 minutes. DAP 1.053 mGy COMPARISON: None HISTORY: ORDERING SYSTEM PROVIDED HISTORY: chocking and cough after eating TECHNOLOGIST PROVIDED HISTORY: chocking and cough after eating FINDINGS: No aspiration or penetration with multiple attempted consistencies. No aspiration. Please see separate speech pathology report for full discussion of findings and recommendations. EKG    EKG: normal EKG, normal sinus rhythm. All EKG's are interpreted by the Emergency Department Physician whoeither signs or Co-signs this chart in the absence of a cardiologist.    MDM/EMERGENCY DEPARTMENT COURSE:      ED Course as of Jan 23 1109   Wed Jan 22, 2020   1743 Lung sounds are clear to auscultation bilaterally no wheezing may be some faint crackles in the bases. Patient has low-grade temp so we will do septic work-up. Concern for CHF exacerbation with possible overlying pneumonia. I also added on a flu swab.     [KW]   1833 INR: 1.1 [KW]   1839 Lactic Acid, Sepsis, Whole but occasionally words are mis-transcribed.)       Tamara Giraldo DO  01/23/20 1108

## 2020-01-23 PROBLEM — Z95.1 HX OF CABG: Status: ACTIVE | Noted: 2020-01-23

## 2020-01-23 PROBLEM — N30.00 ACUTE CYSTITIS WITHOUT HEMATURIA: Status: ACTIVE | Noted: 2020-01-23

## 2020-01-23 PROBLEM — L03.116 LEFT LEG CELLULITIS: Status: ACTIVE | Noted: 2020-01-23

## 2020-01-23 LAB
CULTURE: NORMAL
EKG ATRIAL RATE: 340 BPM
EKG Q-T INTERVAL: 374 MS
EKG QRS DURATION: 86 MS
EKG QTC CALCULATION (BAZETT): 467 MS
EKG R AXIS: 31 DEGREES
EKG T AXIS: 89 DEGREES
EKG VENTRICULAR RATE: 94 BPM
IRON SATURATION: 12 % (ref 20–55)
IRON: 32 UG/DL (ref 59–158)
Lab: NORMAL
SPECIMEN DESCRIPTION: NORMAL
TOTAL IRON BINDING CAPACITY: 273 UG/DL (ref 250–450)
UNSATURATED IRON BINDING CAPACITY: 241 UG/DL (ref 112–347)

## 2020-01-23 PROCEDURE — 2580000003 HC RX 258: Performed by: HOSPITALIST

## 2020-01-23 PROCEDURE — 97535 SELF CARE MNGMENT TRAINING: CPT

## 2020-01-23 PROCEDURE — 6360000002 HC RX W HCPCS: Performed by: NURSE PRACTITIONER

## 2020-01-23 PROCEDURE — 93010 ELECTROCARDIOGRAM REPORT: CPT | Performed by: INTERNAL MEDICINE

## 2020-01-23 PROCEDURE — 6360000002 HC RX W HCPCS: Performed by: HOSPITALIST

## 2020-01-23 PROCEDURE — 1200000000 HC SEMI PRIVATE

## 2020-01-23 PROCEDURE — 76937 US GUIDE VASCULAR ACCESS: CPT

## 2020-01-23 PROCEDURE — 97530 THERAPEUTIC ACTIVITIES: CPT

## 2020-01-23 PROCEDURE — 6370000000 HC RX 637 (ALT 250 FOR IP): Performed by: NURSE PRACTITIONER

## 2020-01-23 PROCEDURE — 2580000003 HC RX 258: Performed by: NURSE PRACTITIONER

## 2020-01-23 PROCEDURE — 99223 1ST HOSP IP/OBS HIGH 75: CPT | Performed by: HOSPITALIST

## 2020-01-23 PROCEDURE — 97166 OT EVAL MOD COMPLEX 45 MIN: CPT

## 2020-01-23 PROCEDURE — 97162 PT EVAL MOD COMPLEX 30 MIN: CPT

## 2020-01-23 RX ORDER — SODIUM CHLORIDE 9 MG/ML
INJECTION, SOLUTION INTRAVENOUS CONTINUOUS
Status: DISCONTINUED | OUTPATIENT
Start: 2020-01-23 | End: 2020-01-26 | Stop reason: HOSPADM

## 2020-01-23 RX ORDER — 0.9 % SODIUM CHLORIDE 0.9 %
500 INTRAVENOUS SOLUTION INTRAVENOUS ONCE
Status: COMPLETED | OUTPATIENT
Start: 2020-01-23 | End: 2020-01-23

## 2020-01-23 RX ADMIN — AMIODARONE HYDROCHLORIDE 200 MG: 200 TABLET ORAL at 08:48

## 2020-01-23 RX ADMIN — APIXABAN 5 MG: 5 TABLET, FILM COATED ORAL at 00:44

## 2020-01-23 RX ADMIN — BENZONATATE 200 MG: 100 CAPSULE ORAL at 08:48

## 2020-01-23 RX ADMIN — OSELTAMIVIR PHOSPHATE 75 MG: 75 CAPSULE ORAL at 21:17

## 2020-01-23 RX ADMIN — SENNOSIDES 8.6 MG: 8.6 TABLET, FILM COATED ORAL at 21:17

## 2020-01-23 RX ADMIN — GABAPENTIN 100 MG: 100 CAPSULE ORAL at 14:42

## 2020-01-23 RX ADMIN — GABAPENTIN 100 MG: 100 CAPSULE ORAL at 21:17

## 2020-01-23 RX ADMIN — BENZOCAINE AND MENTHOL 1 LOZENGE: 15; 3.6 LOZENGE ORAL at 06:20

## 2020-01-23 RX ADMIN — GUAIFENESIN 200 MG: 200 SOLUTION ORAL at 11:40

## 2020-01-23 RX ADMIN — BENZONATATE 200 MG: 100 CAPSULE ORAL at 21:17

## 2020-01-23 RX ADMIN — APIXABAN 5 MG: 5 TABLET, FILM COATED ORAL at 09:57

## 2020-01-23 RX ADMIN — SODIUM CHLORIDE, PRESERVATIVE FREE 10 ML: 5 INJECTION INTRAVENOUS at 08:50

## 2020-01-23 RX ADMIN — BENZONATATE 200 MG: 100 CAPSULE ORAL at 00:44

## 2020-01-23 RX ADMIN — OSELTAMIVIR PHOSPHATE 75 MG: 75 CAPSULE ORAL at 08:48

## 2020-01-23 RX ADMIN — SENNOSIDES 8.6 MG: 8.6 TABLET, FILM COATED ORAL at 00:44

## 2020-01-23 RX ADMIN — ATORVASTATIN CALCIUM 40 MG: 80 TABLET, FILM COATED ORAL at 21:17

## 2020-01-23 RX ADMIN — SPIRONOLACTONE 25 MG: 25 TABLET ORAL at 08:48

## 2020-01-23 RX ADMIN — APIXABAN 5 MG: 5 TABLET, FILM COATED ORAL at 21:17

## 2020-01-23 RX ADMIN — FUROSEMIDE 40 MG: 10 INJECTION, SOLUTION INTRAMUSCULAR; INTRAVENOUS at 00:43

## 2020-01-23 RX ADMIN — BENZONATATE 200 MG: 100 CAPSULE ORAL at 14:42

## 2020-01-23 RX ADMIN — METOPROLOL SUCCINATE 100 MG: 100 TABLET, FILM COATED, EXTENDED RELEASE ORAL at 00:43

## 2020-01-23 RX ADMIN — GABAPENTIN 100 MG: 100 CAPSULE ORAL at 00:44

## 2020-01-23 RX ADMIN — ACETAMINOPHEN 650 MG: 325 TABLET ORAL at 05:26

## 2020-01-23 RX ADMIN — GUAIFENESIN 200 MG: 200 SOLUTION ORAL at 17:56

## 2020-01-23 RX ADMIN — GUAIFENESIN 200 MG: 200 SOLUTION ORAL at 05:26

## 2020-01-23 RX ADMIN — ATORVASTATIN CALCIUM 40 MG: 80 TABLET, FILM COATED ORAL at 00:44

## 2020-01-23 RX ADMIN — SODIUM CHLORIDE 500 ML: 9 INJECTION, SOLUTION INTRAVENOUS at 08:49

## 2020-01-23 RX ADMIN — SODIUM CHLORIDE: 9 INJECTION, SOLUTION INTRAVENOUS at 11:40

## 2020-01-23 RX ADMIN — GABAPENTIN 100 MG: 100 CAPSULE ORAL at 08:48

## 2020-01-23 RX ADMIN — CLOPIDOGREL 75 MG: 75 TABLET, FILM COATED ORAL at 08:48

## 2020-01-23 RX ADMIN — CEFTRIAXONE SODIUM 1 G: 1 INJECTION, POWDER, FOR SOLUTION INTRAMUSCULAR; INTRAVENOUS at 17:56

## 2020-01-23 RX ADMIN — SODIUM CHLORIDE, PRESERVATIVE FREE 10 ML: 5 INJECTION INTRAVENOUS at 00:43

## 2020-01-23 RX ADMIN — BENZOCAINE AND MENTHOL 1 LOZENGE: 15; 3.6 LOZENGE ORAL at 18:01

## 2020-01-23 RX ADMIN — PANTOPRAZOLE SODIUM 40 MG: 40 TABLET, DELAYED RELEASE ORAL at 06:20

## 2020-01-23 RX ADMIN — BENZOCAINE AND MENTHOL 1 LOZENGE: 15; 3.6 LOZENGE ORAL at 14:42

## 2020-01-23 RX ADMIN — BENZOCAINE AND MENTHOL 1 LOZENGE: 15; 3.6 LOZENGE ORAL at 09:57

## 2020-01-23 ASSESSMENT — PAIN SCALES - GENERAL
PAINLEVEL_OUTOF10: 3
PAINLEVEL_OUTOF10: 3

## 2020-01-23 ASSESSMENT — PAIN DESCRIPTION - LOCATION: LOCATION: CHEST

## 2020-01-23 NOTE — ED PROVIDER NOTES
[address] ED  Emergency Department  Emergency Medicine Resident Sign-out     Care of Napoleon Ivy was assumed from Dr. Lata Fang and is being seen for Cough (cough and short of breath symptoms for months having chills) and Chest Pain (cough since having open heart in Oct. )  . The patient's initial evaluation and plan have been discussed with the prior provider who initially evaluated the patient.      EMERGENCY DEPARTMENT COURSE / MEDICAL DECISION MAKING:       MEDICATIONS GIVEN:  Orders Placed This Encounter   Medications    azithromycin (ZITHROMAX) 500 mg in dextrose 5% 250 mL IVPB    cefTRIAXone (ROCEPHIN) 1 g IVPB in 50 mL D5W minibag    oseltamivir (TAMIFLU) capsule 75 mg    DISCONTD: HYDROmorphone (DILAUDID) injection 1 mg       LABS / RADIOLOGY:     Labs Reviewed   RAPID INFLUENZA A/B ANTIGENS - Abnormal; Notable for the following components:       Result Value    Direct Exam POSITIVE for Influenza A Antigen (*)     All other components within normal limits   COMPREHENSIVE METABOLIC PANEL - Abnormal; Notable for the following components:    Glucose 100 (*)     All other components within normal limits   LACTATE, SEPSIS - Abnormal; Notable for the following components:    Lactic Acid, Sepsis, Whole Blood 2.0 (*)     All other components within normal limits   URINALYSIS WITH MICROSCOPIC - Abnormal; Notable for the following components:    Turbidity UA CLOUDY (*)     Urine Hgb TRACE (*)     Leukocyte Esterase, Urine MODERATE (*)     All other components within normal limits   CBC - Abnormal; Notable for the following components:    RBC 3.41 (*)     Hemoglobin 10.0 (*)     Hematocrit 32.2 (*)     All other components within normal limits   CULTURE BLOOD #1   CULTURE BLOOD #1   URINE CULTURE   TROPONIN   LACTATE, SEPSIS   PROTIME-INR   APTT   TROPONIN   PREVIOUS SPECIMEN       Xr Chest Standard (2 Vw)    Result Date: 1/22/2020  EXAMINATION: TWO XRAY VIEWS OF THE CHEST 1/22/2020 5:58 pm treat with Tamiflu, septic work-up with antibiotics as well. [facility]      OUTSTANDING TASKS / RECOMMENDATIONS:    1. Bed placement     FINAL IMPRESSION:     1. Acute on chronic systolic congestive heart failure ([address]    2.  Influenza A        DISPOSITION:         DISPOSITION:  []  Discharge   []  Transfer -    []  Admission -     []  Against Medical Advice   []  Eloped   FOLLOW-UP: Hayley Dumont  [address] [facility]  [phone]           DISCHARGE MEDICATIONS: New Prescriptions    No medications on file          Bandar Whitt DO  Emergency Medicine Resident  [place], [place]  01/22/20 2880

## 2020-01-23 NOTE — ED NOTES
Report received from [facility]  All questions answered     Santiago Reddy RN  01/22/20 2024

## 2020-01-23 NOTE — ED NOTES
Patient asleep on stretcher; remains on monitor  Resting comfortably with respirations even and non-labored  No needs at this time  Will continue to follow plan of care     Berenice Avila RN  01/22/20 1275

## 2020-01-23 NOTE — ED NOTES
Patient resting comfortably in bed, in no apparent distress  Respirations even and non-labored  No needs at this time  Will continue to follow plan of care     Shena Amaya RN  01/22/20 6709

## 2020-01-23 NOTE — PROGRESS NOTES
Physical Therapy    Facility/Department: [address] ORTHO/MED SURG  Initial Assessment    NAME: Pamela Weeks  : 1955  MRN: [id number]    Date of Service: 2020    Discharge Recommendations:  Patient would benefit from continued therapy after discharge   Assessment   Body structures, Functions, Activity limitations: Decreased functional mobility ; Decreased endurance;Decreased strength  Assessment: The pt ambulated 35 ft with a std cane x CGA . Will continue to see for gait and strengthening while in the hospital  Prognosis: Good  Decision Making: Medium Complexity  PT Education: Goals;Plan of Care;PT Role  REQUIRES PT FOLLOW UP: Yes  Activity Tolerance  Activity Tolerance: Patient limited by fatigue       Patient Diagnosis(es): The primary encounter diagnosis was Acute on chronic systolic congestive heart failure ([facility] [address] A diagnosis of Influenza A was also pertinent to this visit. has a past medical history of Atrial fibrillation ([facility] [address] History of incarceration, Hyperlipidemia, and Hypertension. has a past surgical history that includes transesophageal echocardiogram (10/16/2019); Cardioversion (10/16/2019); and Pacemaker insertion (N/A, 10/21/2019).     Restrictions  Restrictions/Precautions  Required Braces or Orthoses?: No  Position Activity Restriction  Other position/activity restrictions: up with assist   Vision/Hearing        Subjective  General  Patient assessed for rehabilitation services?: Yes  Response To Previous Treatment: Not applicable  Family / Caregiver Present: No  Follows Commands: Within Functional Limits  Pain Screening  Patient Currently in Pain: Yes  Pain Assessment  Pain Assessment: 0-10  Pain Level: 3  Pain Location: Chest  Vital Signs  Patient Currently in Pain: Yes     Orientation  Orientation  Overall Orientation Status: Within Normal Limits  Social/Functional History  Social/Functional History  Lives With: Other (comment)(half way house)  Type of Home: House  Home Layout: (01/23/20 1232)  AM-PAC Inpatient T-Scale Score : 43.63 (01/23/20 1232)  Mobility Inpatient CMS 0-100% Score: 46.58 (01/23/20 1232)  Mobility Inpatient CMS G-Code Modifier : CK (01/23/20 1232)        Goals  Short term goals  Time Frame for Short term goals: 10 visits  Short term goal 1: transfers with SBA  Short term goal 2: amb 150 ft with a std cane x SBA  Short term goal 3: ascend/descend 4 steps with SBA  Short term goal 4: exercise program x SBA  Patient Goals   Patient goals : Return home     Therapy Time   Individual Concurrent Group Co-treatment   Time In 0800         Time Out 0825         Minutes 25             1 of [address], PT

## 2020-01-23 NOTE — CARE COORDINATION
Case Management Initial Discharge Plan  Kin Donato,             Met with:patient to discuss discharge plans. Information verified: address, contacts, phone number, , insurance Yes  PCP: Gerhardt Dickens  Date of last visit: 2020    Insurance Provider: [address]    Discharge Planning    Living Arrangements:  Friends   Support Systems:  Friends/Neighbors    Home is a [place] through [facility]. Facility has 1 story  no stairs to climb to get into front door, no stairs to climb to reach second floor  Location of bedroom/bathroom in home main    Patient able to perform ADL's:Independent    Current Services (outpatient & in home) DME  DME equipment: cane  DME provider: n/a      Potential Assistance Needed:  N/A IV infusion center    Patient agreeable to home care: No  [place] of choice provided:  yes    Prior SNF/Rehab Placement and Facility: None  Agreeable to SNF/Rehab: No  [place] of choice provided: n/a   Evaluation: no - Current with [facility] [employer] [address] ([facility]. [facility])    Expected Discharge date:  20  Patient expects to be discharged to:  home  Follow Up Appointment: Best Day/ Time:     [facility]  Transportation arrangements needed for discharge: Yes Will need to call [facility] [address] to arrange. Transport only available up to 3PM.    Readmission Risk              Risk of Unplanned Readmission:        24             Does patient have a readmission risk score greater than 14?: Yes  If yes, follow-up appointment must be made within 7 days of discharge. Goals of Care: Improve infection and alleviate cough      Discharge Plan: Return to [facility] [employer]. Pt gives permission to contact his  Ms. Lorenza Calle or whomever is available to discuss his post hospital stay needs at [facility]. 15. Monitor for IV abx needs after hospital stay for LLE cellulitis.   CM called [facility]

## 2020-01-23 NOTE — ED NOTES
Patient resting comfortably in bed, in no apparent distress  Respirations even and non-labored  No other needs at this time  Will continue to follow plan of care     Robert Molina RN  01/22/20 2040

## 2020-01-23 NOTE — PROGRESS NOTES
Occupational Therapy   Occupational Therapy Initial Assessment  Date: 2020   Patient Name: Gretchen Abdul  MRN: [id number]     : 1955    Date of Service: 2020    Discharge Recommendations:    No occupational therapy recommended at discharge. Assessment   Performance deficits / Impairments: Decreased endurance;Decreased ADL status; Decreased functional mobility ; Decreased high-level IADLs;Decreased fine motor control  Prognosis: Good  Decision Making: Medium Complexity  Patient Education: pt ed on POC, purpose of eval, importance of movement, benefits of therapy, balancing rest with movement, safety during functional transfers/functional mobility. good return   REQUIRES OT FOLLOW UP: Yes  Activity Tolerance  Activity Tolerance: Patient Tolerated treatment well  Safety Devices  Safety Devices in place: Yes  Type of devices: Call light within reach; Left in bed  Restraints  Initially in place: No         Patient Diagnosis(es): The primary encounter diagnosis was Acute on chronic systolic congestive heart failure ([facility] [address] A diagnosis of Influenza A was also pertinent to this visit. has a past medical history of Atrial fibrillation ([address] History of incarceration, Hyperlipidemia, and Hypertension. has a past surgical history that includes transesophageal echocardiogram (10/16/2019); Cardioversion (10/16/2019); and Pacemaker insertion (N/A, 10/21/2019). Restrictions  Restrictions/Precautions  Required Braces or Orthoses?: No  Position Activity Restriction  Other position/activity restrictions: up with assist     Subjective   General  Patient assessed for rehabilitation services?: Yes  Family / Caregiver Present: No  Diagnosis: cough  General Comment  Comments: RN ok'd for therapy this morning.  Pt agreeable to participate in session and cooperative/pleasant throughout   Patient Currently in Pain: Denies    Oxygen Therapy  O2 Device: None (Room air)     Social/Functional Other  Comment: pt reported difficulty holding onto objects due to numbness to fingertips but reported this is chronic      Bed mobility  Supine to Sit: Supervision  Sit to Supine: Supervision  Scooting: Supervision  Transfers  Sit to stand: Stand by assistance  Stand to sit: Stand by assistance  Transfer Comments: with cane      Cognition  Overall Cognitive Status: WFL     Sensation  Overall Sensation Status: Impaired  Additional Comments: pt reported numbness to finger tips and difficulty holding onto objects, which is chronic since 2010       LUE AROM : Exceptions  L Shoulder Flexion 0-180: 0-100  L Elbow Flexion 0-145: WFL  L Wrist Flexion 0-80: WFL  L Wrist Extension 0-70: WFL  Left Hand AROM: WFL    RUE AROM : Exceptions  R Shoulder Flexion 0-180: 0-100  R Elbow Flexion 0-145: WFL  R Wrist Flexion 0-80: WFL  R Wrist Extension 0-70: WFL  Right Hand AROM: WFL    LUE Strength  Gross LUE Strength: WFL  L Hand General: 4+/5  RUE Strength  Gross RUE Strength: WFL  R Hand General: 4+/5      Plan   Plan  Times per week: 2-3x/wk    AM-PAC Score   AM-[facility] Inpatient Daily Activity Raw Score: 20 (01/23/20 1114)  AM-PAC Inpatient ADL T-Scale Score : 42.03 (01/23/20 1114)  ADL Inpatient CMS 0-100% Score: 38.32 (01/23/20 1114)  ADL Inpatient CMS G-Code Modifier : Kathrine Mota (01/23/20 1114)    Goals  Short term goals  Time Frame for Short term goals: pt will, by discharge  Short term goal 1: complete LB ADLs and toileting tasks with supervision and set up  Short term goal 2: complete UB ADLs and grooming tasks with mod I and set up  Short term goal 3: increase activity tolerance to 25+ minutes in order to participate in daily tasks  Short term goal 4: dem supervision during functional transfers/functional mobility with LRD, as needed  Short term goal 5: dem ~8 minutes dynamic standing tolerance with supervision and LRd in order to complete functional tasks      Therapy Time   Individual Concurrent Group Co-treatment   Time In 0235

## 2020-01-23 NOTE — H&P
11/18/19  Yes Riley Farr MD   atorvastatin (LIPITOR) 40 MG tablet Take 1 tablet by mouth nightly 11/18/19  Yes Riley Farr MD   lisinopril (PRINIVIL;ZESTRIL) 2.5 MG tablet Take 1 tablet by mouth daily 11/18/19  Yes Riley Farr MD   digoxin (LANOXIN) 125 MCG tablet Take 1 tablet by mouth daily 11/19/19  Yes Riley Farr MD   clopidogrel (PLAVIX) 75 MG tablet Take 1 tablet by mouth daily 11/18/19  Yes Riley Farr MD   gabapentin (NEURONTIN) 100 MG capsule Take 1 capsule by mouth 3 times daily for 30 days. 11/18/19 12/18/19  Riley Farr MD        Allergies:     Patient has no known allergies. Social History:     Tobacco:    reports that he has never smoked. He does not have any smokeless tobacco history on file. Alcohol:      reports previous alcohol use. Drug Use:  reports previous drug use. Family History:     Family History   Problem Relation Age of Onset    Alcohol Abuse Maternal Uncle     Heart Attack Maternal Uncle        Review of Systems:     Positive and Negative as described in HPI.     CONSTITUTIONAL:  negative for fevers, chills, sweats, fatigue, weight loss  HEENT:  negative for vision, hearing changes, runny nose, throat pain  RESPIRATORY: Positive for constant cough  CARDIOVASCULAR:  negative for chest pain, palpitations  GASTROINTESTINAL:  negative for nausea, vomiting, diarrhea, constipation, change in bowel habits, abdominal pain   GENITOURINARY:  negative for difficulty of urination, burning with urination, frequency   INTEGUMENT:  negative for rash, skin lesions, easy bruising   HEMATOLOGIC/LYMPHATIC:  negative for swelling/edema   ALLERGIC/IMMUNOLOGIC:  negative for urticaria , itching  ENDOCRINE:  negative increase in drinking, increase in urination, hot or cold intolerance  MUSCULOSKELETAL:  negative joint pains, muscle aches, swelling of joints  NEUROLOGICAL:  negative for headaches, dizziness, lightheadedness, numbness, pain, tingling ms    QTc Calculation (Bazett) 467 ms    R Axis 31 degrees    T Axis 89 degrees   Culture Blood #1    Collection Time: 01/22/20  5:55 PM   Result Value Ref Range    Specimen Description . BLOOD     Special Requests  RT AC 10ML     Culture NO GROWTH 12 HOURS    Comprehensive Metabolic Panel    Collection Time: 01/22/20  6:10 PM   Result Value Ref Range    Glucose 100 (H) 70 - 99 mg/dL    BUN 15 8 - 23 mg/dL    CREATININE 1.08 0.70 - 1.20 mg/dL    Bun/Cre Ratio NOT REPORTED 9 - 20    Calcium 9.0 8.6 - 10.4 mg/dL    Sodium 135 135 - 144 mmol/L    Potassium 5.0 3.7 - 5.3 mmol/L    Chloride 99 98 - 107 mmol/L    CO2 22 20 - 31 mmol/L    Anion Gap 14 9 - 17 mmol/L    Alkaline Phosphatase 73 40 - 129 U/L    ALT 30 5 - 41 U/L    AST 25 <40 U/L    Total Bilirubin 0.41 0.3 - 1.2 mg/dL    Total Protein 7.3 6.4 - 8.3 g/dL    Alb 3.9 3.5 - 5.2 g/dL    Albumin/Globulin Ratio 1.1 1.0 - 2.5    GFR Non-African American >60 >60 mL/min    GFR African American >60 >60 mL/min    GFR Comment          GFR Staging NOT REPORTED    Protime-INR    Collection Time: 01/22/20  6:10 PM   Result Value Ref Range    Protime 11.2 9.0 - 12.0 sec    INR 1.1    APTT    Collection Time: 01/22/20  6:10 PM   Result Value Ref Range    PTT 29.9 20.5 - 30.5 sec   Troponin    Collection Time: 01/22/20  6:10 PM   Result Value Ref Range    Troponin, High Sensitivity 20 0 - 22 ng/L    Troponin T NOT REPORTED <0.03 ng/mL    Troponin Interp NOT REPORTED    Iron and TIBC    Collection Time: 01/22/20  6:10 PM   Result Value Ref Range    Iron 32 (L) 59 - 158 ug/dL    TIBC 273 250 - 450 ug/dL    Iron Saturation 12 (L) 20 - 55 %    UIBC 241 112 - 347 ug/dL   Lactate, Sepsis    Collection Time: 01/22/20  6:12 PM   Result Value Ref Range    Lactic Acid, Sepsis NOT REPORTED 0.5 - 1.9 mmol/L    Lactic Acid, Sepsis, Whole Blood 2.0 (H) 0.5 - 1.9 mmol/L   RAPID INFLUENZA A/B ANTIGENS    Collection Time: 01/22/20  6:31 PM   Result Value Ref Range    Specimen Description [facility] NASOPHARYNGEAL SWAB     Special Requests NOT REPORTED     Direct Exam POSITIVE for Influenza A Antigen (A)     Direct Exam NEGATIVE for Influenza B Antigen    Troponin    Collection Time: 01/22/20  6:54 PM   Result Value Ref Range    Troponin, High Sensitivity 20 0 - 22 ng/L    Troponin T NOT REPORTED <0.03 ng/mL    Troponin Interp NOT REPORTED    CBC    Collection Time: 01/22/20  6:54 PM   Result Value Ref Range    WBC 8.2 3.5 - 11.3 k/uL    RBC 3.41 (L) 4.21 - 5.77 m/uL    Hemoglobin 10.0 (L) 13.0 - 17.0 g/dL    Hematocrit 32.2 (L) 40.7 - 50.3 %    MCV 94.4 82.6 - 102.9 fL    MCH 29.3 25.2 - 33.5 pg    MCHC 31.1 28.4 - 34.8 g/dL    RDW 14.1 11.8 - 14.4 %    Platelets 817 807 - 426 k/uL    MPV 9.9 8.1 - 13.5 fL    NRBC Automated 0.0 0.0 per 100 WBC   Urinalysis with Microscopic    Collection Time: 01/22/20  7:32 PM   Result Value Ref Range    Color, UA YELLOW YELLOW    Turbidity UA CLOUDY (A) CLEAR    Glucose, Ur NEGATIVE NEGATIVE    Bilirubin Urine NEGATIVE NEGATIVE    Ketones, Urine NEGATIVE NEGATIVE    Specific Gravity, UA 1.016 1.005 - 1.030    Urine Hgb TRACE (A) NEGATIVE    pH, UA 6.5 5.0 - 8.0    Protein, UA NEGATIVE NEGATIVE    Urobilinogen, Urine Normal Normal    Nitrite, Urine NEGATIVE NEGATIVE    Leukocyte Esterase, Urine MODERATE (A) NEGATIVE    -          WBC, UA 50  0 - 5 /HPF    RBC, UA 2 TO 5 0 - 4 /HPF    Casts UA  0 - 8 /LPF     2 TO 5 HYALINE Reference range defined for non-centrifuged specimen. Crystals UA NOT REPORTED None /HPF    Epithelial Cells UA 2 TO 5 0 - 5 /HPF    Renal Epithelial, Urine NOT REPORTED 0 /HPF    Bacteria, UA NOT REPORTED None    Mucus, UA NOT REPORTED None    Trichomonas, UA NOT REPORTED None    Amorphous, UA NOT REPORTED None    Other Observations UA NOT REPORTED NOT REQ.     Yeast, UA NOT REPORTED None   Lactate, Sepsis    Collection Time: 01/22/20  8:06 PM   Result Value Ref Range    Lactic Acid, Sepsis NOT REPORTED 0.5 - 1.9 mmol/L    Lactic Acid, Sepsis, Whole Blood 1.4 0.5 - 1.9 mmol/L       Imaging/Diagnostics:  Xr Chest Standard (2 Vw)     Result Date: 1/22/2020  No acute cardiopulmonary disease. Fl Modified Barium Swallow W Video    Result Date: 1/18/2020  No aspiration. Please see separate speech pathology report for full discussion of findings and recommendations. Assessment :      Hospital Problems           Last Modified POA    * (Principal) Influenza A 1/23/2020 Yes    Atrial fibrillation ([facility] [address] 1/23/2020 Yes    Ischemic cardiomyopathy 1/23/2020 Yes    Combined systolic and diastolic congestive heart failure ([facility] [address] 1/23/2020 Yes    Left leg cellulitis 1/23/2020 Yes    Acute cystitis without hematuria 1/23/2020 Yes    Hx of CABG 1/23/2020 Yes          Plan:     Patient status inpatient in the Med/Surge    1. Influenza A -continue supportive care. Patient is unclear when he became symptomatic. Tamiflu is not indicated. 2. Left leg cellulitis -vcontinue Rocephin  3. Urinary tract infection -patient is on Rocephin  4. Coronary artery disease and history of CABG -stable. Continue home medications. 5. Combined systolic and diastolic congestive heart failure -patient is hypotensive and chest x-ray is clear. Will introduce some fluids with care. 6. Hypotension -likely due to volume depletion. Mild IV hydration will be initiated. Consultations:   IP CONSULT TO INTERNAL MEDICINE  IP CONSULT TO HOSPITALIST  IP CONSULT TO DIETITIAN     Patient is admitted as inpatient status because of co-morbidities listed above, severity of signs and symptoms as outlined, requirement for current medical therapies and most importantly because of direct risk to patient if care not provided in a hospital setting.     Tyrell Nichols DO  1/23/2020  8:42 AM    Copy sent to Dr. Dallas Shay

## 2020-01-24 LAB
CHOLESTEROL/HDL RATIO: 2.9
CHOLESTEROL: 90 MG/DL
HCT VFR BLD CALC: 33.4 % (ref 40.7–50.3)
HDLC SERPL-MCNC: 31 MG/DL
HEMOGLOBIN: 10.4 G/DL (ref 13–17)
LDL CHOLESTEROL: 37 MG/DL (ref 0–130)
MAGNESIUM: 2.5 MG/DL (ref 1.6–2.6)
MCH RBC QN AUTO: 29.5 PG (ref 25.2–33.5)
MCHC RBC AUTO-ENTMCNC: 31.1 G/DL (ref 28.4–34.8)
MCV RBC AUTO: 94.9 FL (ref 82.6–102.9)
NRBC AUTOMATED: 0 PER 100 WBC
PDW BLD-RTO: 14.6 % (ref 11.8–14.4)
PLATELET # BLD: 216 K/UL (ref 138–453)
PMV BLD AUTO: 10 FL (ref 8.1–13.5)
RBC # BLD: 3.52 M/UL (ref 4.21–5.77)
TRIGL SERPL-MCNC: 110 MG/DL
TSH SERPL DL<=0.05 MIU/L-ACNC: 1.48 MIU/L (ref 0.3–5)
VLDLC SERPL CALC-MCNC: ABNORMAL MG/DL (ref 1–30)
WBC # BLD: 3.6 K/UL (ref 3.5–11.3)

## 2020-01-24 PROCEDURE — 97530 THERAPEUTIC ACTIVITIES: CPT

## 2020-01-24 PROCEDURE — 85027 COMPLETE CBC AUTOMATED: CPT

## 2020-01-24 PROCEDURE — 6360000002 HC RX W HCPCS: Performed by: HOSPITALIST

## 2020-01-24 PROCEDURE — 84443 ASSAY THYROID STIM HORMONE: CPT

## 2020-01-24 PROCEDURE — 6370000000 HC RX 637 (ALT 250 FOR IP): Performed by: HOSPITALIST

## 2020-01-24 PROCEDURE — 94660 CPAP INITIATION&MGMT: CPT

## 2020-01-24 PROCEDURE — 36415 COLL VENOUS BLD VENIPUNCTURE: CPT

## 2020-01-24 PROCEDURE — 80061 LIPID PANEL: CPT

## 2020-01-24 PROCEDURE — 2580000003 HC RX 258: Performed by: NURSE PRACTITIONER

## 2020-01-24 PROCEDURE — 2580000003 HC RX 258: Performed by: HOSPITALIST

## 2020-01-24 PROCEDURE — 6370000000 HC RX 637 (ALT 250 FOR IP): Performed by: NURSE PRACTITIONER

## 2020-01-24 PROCEDURE — 99232 SBSQ HOSP IP/OBS MODERATE 35: CPT | Performed by: HOSPITALIST

## 2020-01-24 PROCEDURE — 83735 ASSAY OF MAGNESIUM: CPT

## 2020-01-24 PROCEDURE — 1200000000 HC SEMI PRIVATE

## 2020-01-24 RX ORDER — PREDNISONE 20 MG/1
40 TABLET ORAL DAILY
Status: DISCONTINUED | OUTPATIENT
Start: 2020-01-24 | End: 2020-01-26 | Stop reason: HOSPADM

## 2020-01-24 RX ADMIN — DIGOXIN 125 MCG: 125 TABLET ORAL at 08:34

## 2020-01-24 RX ADMIN — APIXABAN 5 MG: 5 TABLET, FILM COATED ORAL at 20:52

## 2020-01-24 RX ADMIN — GABAPENTIN 100 MG: 100 CAPSULE ORAL at 08:34

## 2020-01-24 RX ADMIN — SODIUM CHLORIDE: 9 INJECTION, SOLUTION INTRAVENOUS at 16:04

## 2020-01-24 RX ADMIN — BENZOCAINE AND MENTHOL 1 LOZENGE: 15; 3.6 LOZENGE ORAL at 00:18

## 2020-01-24 RX ADMIN — SPIRONOLACTONE 25 MG: 25 TABLET ORAL at 08:34

## 2020-01-24 RX ADMIN — OSELTAMIVIR PHOSPHATE 75 MG: 75 CAPSULE ORAL at 20:52

## 2020-01-24 RX ADMIN — APIXABAN 5 MG: 5 TABLET, FILM COATED ORAL at 08:34

## 2020-01-24 RX ADMIN — SODIUM CHLORIDE, PRESERVATIVE FREE 10 ML: 5 INJECTION INTRAVENOUS at 20:52

## 2020-01-24 RX ADMIN — GABAPENTIN 100 MG: 100 CAPSULE ORAL at 14:06

## 2020-01-24 RX ADMIN — ATORVASTATIN CALCIUM 40 MG: 80 TABLET, FILM COATED ORAL at 20:52

## 2020-01-24 RX ADMIN — OSELTAMIVIR PHOSPHATE 75 MG: 75 CAPSULE ORAL at 08:34

## 2020-01-24 RX ADMIN — BENZONATATE 200 MG: 100 CAPSULE ORAL at 14:06

## 2020-01-24 RX ADMIN — BENZONATATE 200 MG: 100 CAPSULE ORAL at 08:34

## 2020-01-24 RX ADMIN — GUAIFENESIN 200 MG: 200 SOLUTION ORAL at 00:17

## 2020-01-24 RX ADMIN — BENZOCAINE AND MENTHOL 1 LOZENGE: 15; 3.6 LOZENGE ORAL at 18:04

## 2020-01-24 RX ADMIN — PANTOPRAZOLE SODIUM 40 MG: 40 TABLET, DELAYED RELEASE ORAL at 06:20

## 2020-01-24 RX ADMIN — CLOPIDOGREL 75 MG: 75 TABLET, FILM COATED ORAL at 08:34

## 2020-01-24 RX ADMIN — AMIODARONE HYDROCHLORIDE 200 MG: 200 TABLET ORAL at 08:34

## 2020-01-24 RX ADMIN — GABAPENTIN 100 MG: 100 CAPSULE ORAL at 20:52

## 2020-01-24 RX ADMIN — CEFTRIAXONE SODIUM 1 G: 1 INJECTION, POWDER, FOR SOLUTION INTRAMUSCULAR; INTRAVENOUS at 17:55

## 2020-01-24 RX ADMIN — PREDNISONE 40 MG: 20 TABLET ORAL at 14:06

## 2020-01-24 RX ADMIN — BENZONATATE 200 MG: 100 CAPSULE ORAL at 20:52

## 2020-01-24 RX ADMIN — SENNOSIDES 8.6 MG: 8.6 TABLET, FILM COATED ORAL at 20:51

## 2020-01-24 NOTE — PROGRESS NOTES
Meds:    predniSONE  40 mg Oral Daily    cefTRIAXone (ROCEPHIN) IV  1 g Intravenous Q24H    oseltamivir  75 mg Oral BID    amiodarone  200 mg Oral Daily    apixaban  5 mg Oral BID    atorvastatin  40 mg Oral Nightly    clopidogrel  75 mg Oral Daily    digoxin  125 mcg Oral Daily    gabapentin  100 mg Oral TID    lisinopril  2.5 mg Oral Daily    metoprolol succinate  100 mg Oral BID    pantoprazole  40 mg Oral QAM AC    senna  1 tablet Oral Nightly    spironolactone  25 mg Oral Daily    sodium chloride flush  10 mL Intravenous 2 times per day    benzonatate  200 mg Oral TID     Continuous Infusions:    sodium chloride 100 mL/hr at 20 2121     PRN Meds: benzocaine-menthol, sodium chloride flush, magnesium hydroxide, ondansetron, nicotine, acetaminophen, albuterol, guaiFENesin    Data:     Past Medical History:   has a past medical history of Atrial fibrillation ([address] History of incarceration, Hyperlipidemia, and Hypertension. Social History:   reports that he has never smoked. He does not have any smokeless tobacco history on file. He reports previous alcohol use. He reports previous drug use. Family History:   Family History   Problem Relation Age of Onset    Alcohol Abuse Maternal Uncle     Heart Attack Maternal Uncle        Vitals:  /73   Pulse 98   Temp 98.7 °F (37.1 °C) (Oral)   Resp 20   Ht 5' 7\" (1.702 m)   Wt 250 lb 4.8 oz (113.5 kg)   SpO2 98%   BMI 39.20 kg/m²   Temp (24hrs), Av.2 °F (37.3 °C), Min:98.7 °F (37.1 °C), Max:99.6 °F (37.6 °C)    No results for input(s): POCGLU in the last 72 hours. I/O (24Hr):     Intake/Output Summary (Last 24 hours) at 2020 1249  Last data filed at 2020 3943  Gross per 24 hour   Intake 1727 ml   Output 600 ml   Net 1127 ml       Labs:  Hematology:  Recent Labs     20  1810 20  1854 20  0816   WBC  --  8.2 3.6   RBC  --  3.41* 3.52*   HGB  --  10.0* 10.4*   HCT  --  32.2* 33.4*   MCV  --  94.4 94.9 MCH  --  29.3 29.5   MCHC  --  31.1 31.1   RDW  --  14.1 14.6*   PLT  --  240 216   MPV  --  9.9 10.0   INR 1.1  --   --      Chemistry:  Recent Labs     01/22/20 1810 01/22/20  1854 01/24/20  0816     --   --    K 5.0  --   --    CL 99  --   --    CO2 22  --   --    GLUCOSE 100*  --   --    BUN 15  --   --    CREATININE 1.08  --   --    MG  --   --  2.5   ANIONGAP 14  --   --    LABGLOM >60  --   --    GFRAA >60  --   --    CALCIUM 9.0  --   --    TROPHS 20 20  --      Recent Labs     01/22/20 1810 01/24/20  0816   PROT 7.3  --    LABALBU 3.9  --    TSH  --  1.48   AST 25  --    ALT 30  --    ALKPHOS 73  --    BILITOT 0.41  --    CHOL  --  90   HDL  --  31*   LDLCHOLESTEROL  --  37   CHOLHDLRATIO  --  2.9   TRIG  --  110   VLDL  --  NOT REPORTED     ABG:  Lab Results   Component Value Date    POCPH 7.517 01/15/2020    POCPCO2 44.7 01/15/2020    POCPO2 64.8 01/15/2020    POCHCO3 36.3 01/15/2020    NBEA NOT REPORTED 01/15/2020    PBEA 12 01/15/2020    XAW8CKN 38 01/15/2020    JAMR1PDO 94 01/15/2020    FIO2 21.0 01/15/2020     Lab Results   Component Value Date/Time    SPECIAL NOT REPORTED 01/22/2020 07:32 PM     Lab Results   Component Value Date/Time    CULTURE NO SIGNIFICANT GROWTH 01/22/2020 07:32 PM       Radiology:  Xr Chest Standard (2 Vw)    Result Date: 1/22/2020  No acute cardiopulmonary disease. Fl Modified Barium Swallow W Video    Result Date: 1/18/2020  No aspiration. Please see separate speech pathology report for full discussion of findings and recommendations.        Physical Examination:        General appearance:  alert, cooperative and no distress  Mental Status:  oriented to person, place and time and normal affect  Lungs:  clear to auscultation bilaterally, normal effort  Heart:  regular rate and rhythm, no murmur  Abdomen:  soft, nontender, nondistended, normal bowel sounds, no masses, hepatomegaly, splenomegaly  Extremities:  no edema, redness, tenderness in the calves  Skin:  no gross lesions, rashes, induration    Assessment:        Hospital Problems           Last Modified POA    * (Principal) Influenza A 1/23/2020 Yes    Atrial fibrillation ([facility] [address] 1/23/2020 Yes    Ischemic cardiomyopathy 1/23/2020 Yes    Combined systolic and diastolic congestive heart failure ([facility] [address] 1/23/2020 Yes    Left leg cellulitis 1/23/2020 Yes    Acute cystitis without hematuria 1/23/2020 Yes    Hx of CABG 1/23/2020 Yes          Plan:        1. Influenza A -continue supportive care. Patient is unclear when he became symptomatic. At least 5 days prior to coming to hospital. Tamiflu is not indicated. 2. Left leg cellulitis -vcontinue Rocephin  3. Urinary tract infection -patient is on Rocephin  4. Coronary artery disease and history of CABG -stable. Continue home medications. 5. Combined systolic and diastolic congestive heart failure -patient is hypotensive and chest x-ray is clear. Will introduce some fluids with care. 6. Hypotension -likely due to volume depletion. Mild IV hydration will be initiated.     Dianna Booth DO  1/24/2020  12:49 PM

## 2020-01-24 NOTE — PROGRESS NOTES
Physical Therapy  Facility/Department: [address] ORTHO/MED SURG  Daily Treatment Note  NAME: Eneida Sood  : 1955  MRN: [id number]    Date of Service: 2020    Discharge Recommendations:  Further therapy recommended at discharge. Assessment   Body structures, Functions, Activity limitations: Decreased functional mobility ; Decreased endurance;Decreased strength  Assessment: The pt ambulated ~300' ft with a standard cane and SBA. Pt performs bed mobility and transfers with a cane and SBA. Pt reported that he does not have any stairs that he will need to navigate. Will continue to see for gait and strengthening while in the hospital  Prognosis: Good  PT Education: Goals;PT Role;Plan of Care;Home Exercise Program  REQUIRES PT FOLLOW UP: Yes  Activity Tolerance  Activity Tolerance: Patient limited by fatigue     Patient Diagnosis(es): The primary encounter diagnosis was Acute on chronic systolic congestive heart failure ([facility] [address] A diagnosis of Influenza A was also pertinent to this visit. has a past medical history of Atrial fibrillation ([facility] [address] History of incarceration, Hyperlipidemia, and Hypertension. has a past surgical history that includes transesophageal echocardiogram (10/16/2019); Cardioversion (10/16/2019); and Pacemaker insertion (N/A, 10/21/2019). Restrictions  Restrictions/Precautions  Required Braces or Orthoses?: No  Position Activity Restriction  Other position/activity restrictions: up with assist      Subjective   General  Chart Reviewed: Yes  Response To Previous Treatment: Patient with no complaints from previous session. Family / Caregiver Present: No  Subjective  Subjective: RN and pt agreed to PT today.   Pain Screening  Patient Currently in Pain: Denies  Vital Signs  Patient Currently in Pain: Denies       Orientation  Orientation  Overall Orientation Status: Within Normal Limits    Objective   Bed mobility  Supine to Sit: Stand by assistance  Sit to Supine: Stand by assistance  Scooting: Stand by assistance  Transfers  Sit to Stand: Stand by assistance  Stand to sit: Stand by assistance  Ambulation  Ambulation?: Yes  Ambulation 1  Surface: level tile  Device: Single point cane  Assistance: Stand by assistance  Gait Deviations: Slow Amarilis  Distance: ~300'     Exercises  Hip Flexion: Marches: x15 BLE  Hip Abduction: x15 BLE  Knee Long Arc Quad: x15 BLE  Ankle Pumps: x15 BLE       Goals  Short term goals  Time Frame for Short term goals: 10 visits  Short term goal 1: transfers with SBA  Short term goal 2: amb 150 ft with a std cane x SBA  Short term goal 3: ascend/descend 4 steps with SBA  Short term goal 4: exercise program x SBA  Patient Goals   Patient goals : Return home    Plan    Plan  Times per week: 5-6x wk  Current Treatment Recommendations: Strengthening, Endurance Training, Gait Training, Functional Mobility Training, Stair training, Safety Education & Training  Safety Devices  Type of devices: Left in bed, Call light within reach, Nurse notified, Gait belt     Therapy Time   Individual Concurrent Group Co-treatment   Time In 1140         Time Out 1158         Minutes 18         Timed Code Treatment Minutes: [address] [facility], [facility]

## 2020-01-24 NOTE — PLAN OF CARE
Problem: Falls - Risk of:  Goal: Will remain free from falls  Description  Will remain free from falls  1/24/2020 1815 by Renay Mcnair RN  Outcome: Met This Shift  1/24/2020 0541 by Pascual Galo RN  Outcome: Met This Shift  Goal: Absence of physical injury  Description  Absence of physical injury  1/24/2020 1815 by Renay Mcnair RN  Outcome: Met This Shift  1/24/2020 0541 by Pascual Galo RN  Outcome: Met This Shift     Problem: Musculor/Skeletal Functional Status  Goal: Highest potential functional level  1/24/2020 1815 by Renay Mcnair RN  Outcome: Met This Shift  1/24/2020 0541 by Pascual Galo RN  Outcome: Met This Shift     Problem: Pain:  Goal: Pain level will decrease  Description  Pain level will decrease  1/24/2020 1815 by Renay Mcnair RN  Outcome: Met This Shift  1/24/2020 0541 by Pascual Galo RN  Outcome: Met This Shift  Goal: Control of acute pain  Description  Control of acute pain  1/24/2020 1815 by Renay Mcnair RN  Outcome: Met This Shift  1/24/2020 0541 by Pascual Galo RN  Outcome: Met This Shift  Goal: Control of chronic pain  Description  Control of chronic pain  1/24/2020 1815 by Renay Mcnair RN  Outcome: Met This Shift  1/24/2020 0541 by Pascual Galo RN  Outcome: Met This Shift

## 2020-01-24 NOTE — PROGRESS NOTES
Notified Nidhi Luciano via perfect serve pt is desatting to 75 while sleeping. States he's been told he has sleep apnea in the past and has worn a bipap. Respiratory therapist coming to bedside. Pt asymptomatic.

## 2020-01-25 LAB
BNP INTERPRETATION: ABNORMAL
PRO-BNP: 570 PG/ML

## 2020-01-25 PROCEDURE — 94660 CPAP INITIATION&MGMT: CPT

## 2020-01-25 PROCEDURE — 99232 SBSQ HOSP IP/OBS MODERATE 35: CPT | Performed by: HOSPITALIST

## 2020-01-25 PROCEDURE — 2580000003 HC RX 258: Performed by: NURSE PRACTITIONER

## 2020-01-25 PROCEDURE — 6360000002 HC RX W HCPCS: Performed by: HOSPITALIST

## 2020-01-25 PROCEDURE — 1200000000 HC SEMI PRIVATE

## 2020-01-25 PROCEDURE — 6370000000 HC RX 637 (ALT 250 FOR IP): Performed by: HOSPITALIST

## 2020-01-25 PROCEDURE — 36415 COLL VENOUS BLD VENIPUNCTURE: CPT

## 2020-01-25 PROCEDURE — 6370000000 HC RX 637 (ALT 250 FOR IP): Performed by: NURSE PRACTITIONER

## 2020-01-25 PROCEDURE — 83880 ASSAY OF NATRIURETIC PEPTIDE: CPT

## 2020-01-25 RX ORDER — DOXYCYCLINE HYCLATE 100 MG
100 TABLET ORAL EVERY 12 HOURS SCHEDULED
Status: DISCONTINUED | OUTPATIENT
Start: 2020-01-25 | End: 2020-01-26 | Stop reason: HOSPADM

## 2020-01-25 RX ORDER — FUROSEMIDE 10 MG/ML
40 INJECTION INTRAMUSCULAR; INTRAVENOUS ONCE
Status: COMPLETED | OUTPATIENT
Start: 2020-01-25 | End: 2020-01-25

## 2020-01-25 RX ADMIN — OSELTAMIVIR PHOSPHATE 75 MG: 75 CAPSULE ORAL at 08:54

## 2020-01-25 RX ADMIN — CLOPIDOGREL 75 MG: 75 TABLET, FILM COATED ORAL at 08:54

## 2020-01-25 RX ADMIN — DIGOXIN 125 MCG: 125 TABLET ORAL at 08:54

## 2020-01-25 RX ADMIN — PREDNISONE 40 MG: 20 TABLET ORAL at 08:53

## 2020-01-25 RX ADMIN — ATORVASTATIN CALCIUM 40 MG: 80 TABLET, FILM COATED ORAL at 20:51

## 2020-01-25 RX ADMIN — OSELTAMIVIR PHOSPHATE 75 MG: 75 CAPSULE ORAL at 20:52

## 2020-01-25 RX ADMIN — METOPROLOL SUCCINATE 100 MG: 100 TABLET, FILM COATED, EXTENDED RELEASE ORAL at 08:53

## 2020-01-25 RX ADMIN — LISINOPRIL 2.5 MG: 2.5 TABLET ORAL at 08:54

## 2020-01-25 RX ADMIN — METOPROLOL SUCCINATE 100 MG: 100 TABLET, FILM COATED, EXTENDED RELEASE ORAL at 20:50

## 2020-01-25 RX ADMIN — BENZONATATE 200 MG: 100 CAPSULE ORAL at 20:50

## 2020-01-25 RX ADMIN — GABAPENTIN 100 MG: 100 CAPSULE ORAL at 20:52

## 2020-01-25 RX ADMIN — SODIUM CHLORIDE, PRESERVATIVE FREE 10 ML: 5 INJECTION INTRAVENOUS at 08:54

## 2020-01-25 RX ADMIN — BENZONATATE 200 MG: 100 CAPSULE ORAL at 09:07

## 2020-01-25 RX ADMIN — SPIRONOLACTONE 25 MG: 25 TABLET ORAL at 08:53

## 2020-01-25 RX ADMIN — GABAPENTIN 100 MG: 100 CAPSULE ORAL at 13:53

## 2020-01-25 RX ADMIN — GABAPENTIN 100 MG: 100 CAPSULE ORAL at 08:54

## 2020-01-25 RX ADMIN — FUROSEMIDE 40 MG: 10 INJECTION, SOLUTION INTRAMUSCULAR; INTRAVENOUS at 11:37

## 2020-01-25 RX ADMIN — APIXABAN 5 MG: 5 TABLET, FILM COATED ORAL at 08:54

## 2020-01-25 RX ADMIN — BENZONATATE 200 MG: 100 CAPSULE ORAL at 13:53

## 2020-01-25 RX ADMIN — DOXYCYCLINE HYCLATE 100 MG: 100 TABLET, COATED ORAL at 20:51

## 2020-01-25 RX ADMIN — DOXYCYCLINE HYCLATE 100 MG: 100 TABLET, COATED ORAL at 11:37

## 2020-01-25 RX ADMIN — AMIODARONE HYDROCHLORIDE 200 MG: 200 TABLET ORAL at 08:54

## 2020-01-25 RX ADMIN — PANTOPRAZOLE SODIUM 40 MG: 40 TABLET, DELAYED RELEASE ORAL at 06:35

## 2020-01-25 RX ADMIN — APIXABAN 5 MG: 5 TABLET, FILM COATED ORAL at 20:50

## 2020-01-25 RX ADMIN — SODIUM CHLORIDE: 9 INJECTION, SOLUTION INTRAVENOUS at 02:09

## 2020-01-25 ASSESSMENT — PAIN SCALES - GENERAL: PAINLEVEL_OUTOF10: 0

## 2020-01-25 NOTE — CARE COORDINATION
Transitional Planning  Spoke with Dr Lai Almodovar about discharge plans and whether pt will be discharged today. Dr Lai Almodovar plan is discharge tomorrow. Ailyn Kerris Ms Nash Prey [phone] at [facility] to let her know pt would not be discharged today and the plan is tomorrow. Will need to call her tomorrow once we have a time for her to pick him up.

## 2020-01-25 NOTE — PROGRESS NOTES
Patient resting in bed, side rails up x 2, call light within reach. Denies any pain/distress. Most likely discharge tomorrow.

## 2020-01-25 NOTE — PROGRESS NOTES
fibrillation ([facility] [address] 1/23/2020 Yes    Ischemic cardiomyopathy 1/23/2020 Yes    Combined systolic and diastolic congestive heart failure ([facility] [address] 1/23/2020 Yes    Left leg cellulitis 1/23/2020 Yes    Acute cystitis without hematuria 1/23/2020 Yes    Hx of CABG 1/23/2020 Yes          Plan:        1. Influenza A -continue supportive care. Mahesh Garcia is unclear when he became symptomatic. At least 5 days prior to coming to hospital.   2. Left leg cellulitis -improving. We will switch antibiotics to oral doxycycline prepare patient for discharge. 3. Urinary tract infection -improved  4. Coronary artery disease and history of CABG -stable.  Continue home medications. 5. Combined systolic and diastolic congestive heart failure -patient is hypotensive and chest x-ray is clear.  Will introduce some fluids with care. 6. Hypotension -likely due to volume depletion.  Mild IV hydration will be initiated. 7. Lower extremity edema -patient has mild increase in lower extremity edema. We will give him 1 dose of IV Lasix.     Iris Lima DO  1/25/2020  9:56 AM

## 2020-01-25 NOTE — PLAN OF CARE
Problem: Falls - Risk of:  Goal: Will remain free from falls  Description  Will remain free from falls  1/25/2020 0529 by Florina Alvarado RN  Outcome: Ongoing  1/24/2020 1815 by Lila Gilmore RN  Outcome: Met This Shift  Goal: Absence of physical injury  Description  Absence of physical injury  1/25/2020 0529 by Florina Alvarado RN  Outcome: Ongoing  1/24/2020 1815 by Lila Gilmore RN  Outcome: Met This Shift     Problem: Musculor/Skeletal Functional Status  Goal: Highest potential functional level  1/25/2020 0529 by Florina Alvarado RN  Outcome: Ongoing  1/24/2020 1815 by Lila Gilmore RN  Outcome: Met This Shift     Problem: Pain:  Goal: Pain level will decrease  Description  Pain level will decrease  1/25/2020 0529 by Florina Alvarado RN  Outcome: Ongoing  1/24/2020 1815 by Lila Gilmore RN  Outcome: Met This Shift  Goal: Control of acute pain  Description  Control of acute pain  1/25/2020 0529 by Florina Alvarado RN  Outcome: Ongoing  1/24/2020 1815 by Lila Gilmore RN  Outcome: Met This Shift  Goal: Control of chronic pain  Description  Control of chronic pain  1/25/2020 0529 by Florina Alvarado RN  Outcome: Ongoing  1/24/2020 1815 by Lila Gilmore RN  Outcome: Met This Shift

## 2020-01-26 VITALS
HEART RATE: 83 BPM | TEMPERATURE: 97.7 F | SYSTOLIC BLOOD PRESSURE: 120 MMHG | WEIGHT: 250.3 LBS | OXYGEN SATURATION: 95 % | BODY MASS INDEX: 39.28 KG/M2 | HEIGHT: 67 IN | RESPIRATION RATE: 16 BRPM | DIASTOLIC BLOOD PRESSURE: 86 MMHG

## 2020-01-26 LAB
ANION GAP SERPL CALCULATED.3IONS-SCNC: 10 MMOL/L (ref 9–17)
BUN BLDV-MCNC: 13 MG/DL (ref 8–23)
BUN/CREAT BLD: ABNORMAL (ref 9–20)
CALCIUM SERPL-MCNC: 8.4 MG/DL (ref 8.6–10.4)
CHLORIDE BLD-SCNC: 108 MMOL/L (ref 98–107)
CO2: 23 MMOL/L (ref 20–31)
CREAT SERPL-MCNC: 0.73 MG/DL (ref 0.7–1.2)
GFR AFRICAN AMERICAN: >60 ML/MIN
GFR NON-AFRICAN AMERICAN: >60 ML/MIN
GFR SERPL CREATININE-BSD FRML MDRD: ABNORMAL ML/MIN/{1.73_M2}
GFR SERPL CREATININE-BSD FRML MDRD: ABNORMAL ML/MIN/{1.73_M2}
GLUCOSE BLD-MCNC: 91 MG/DL (ref 70–99)
HCT VFR BLD CALC: 32.8 % (ref 40.7–50.3)
HEMOGLOBIN: 10.6 G/DL (ref 13–17)
MCH RBC QN AUTO: 31.5 PG (ref 25.2–33.5)
MCHC RBC AUTO-ENTMCNC: 32.3 G/DL (ref 28.4–34.8)
MCV RBC AUTO: 97.6 FL (ref 82.6–102.9)
NRBC AUTOMATED: 0 PER 100 WBC
PDW BLD-RTO: 14.5 % (ref 11.8–14.4)
PLATELET # BLD: 255 K/UL (ref 138–453)
PMV BLD AUTO: 10 FL (ref 8.1–13.5)
POTASSIUM SERPL-SCNC: 4.9 MMOL/L (ref 3.7–5.3)
RBC # BLD: 3.36 M/UL (ref 4.21–5.77)
SODIUM BLD-SCNC: 141 MMOL/L (ref 135–144)
WBC # BLD: 7.4 K/UL (ref 3.5–11.3)

## 2020-01-26 PROCEDURE — 94660 CPAP INITIATION&MGMT: CPT

## 2020-01-26 PROCEDURE — 6370000000 HC RX 637 (ALT 250 FOR IP): Performed by: NURSE PRACTITIONER

## 2020-01-26 PROCEDURE — 6370000000 HC RX 637 (ALT 250 FOR IP): Performed by: HOSPITALIST

## 2020-01-26 PROCEDURE — 85027 COMPLETE CBC AUTOMATED: CPT

## 2020-01-26 PROCEDURE — 99239 HOSP IP/OBS DSCHRG MGMT >30: CPT | Performed by: HOSPITALIST

## 2020-01-26 PROCEDURE — 36415 COLL VENOUS BLD VENIPUNCTURE: CPT

## 2020-01-26 PROCEDURE — 80048 BASIC METABOLIC PNL TOTAL CA: CPT

## 2020-01-26 RX ORDER — DOXYCYCLINE HYCLATE 100 MG
100 TABLET ORAL EVERY 12 HOURS SCHEDULED
Qty: 10 TABLET | Refills: 0 | Status: SHIPPED | OUTPATIENT
Start: 2020-01-26 | End: 2020-01-31

## 2020-01-26 RX ORDER — PREDNISONE 20 MG/1
40 TABLET ORAL DAILY
Qty: 20 TABLET | Refills: 0 | Status: SHIPPED | OUTPATIENT
Start: 2020-01-27 | End: 2020-02-06

## 2020-01-26 RX ORDER — OSELTAMIVIR PHOSPHATE 75 MG/1
75 CAPSULE ORAL 2 TIMES DAILY
Qty: 6 CAPSULE | Refills: 0 | Status: SHIPPED | OUTPATIENT
Start: 2020-01-26 | End: 2020-01-29

## 2020-01-26 RX ADMIN — OSELTAMIVIR PHOSPHATE 75 MG: 75 CAPSULE ORAL at 08:16

## 2020-01-26 RX ADMIN — AMIODARONE HYDROCHLORIDE 200 MG: 200 TABLET ORAL at 08:15

## 2020-01-26 RX ADMIN — BENZONATATE 200 MG: 100 CAPSULE ORAL at 13:08

## 2020-01-26 RX ADMIN — PANTOPRAZOLE SODIUM 40 MG: 40 TABLET, DELAYED RELEASE ORAL at 06:42

## 2020-01-26 RX ADMIN — CLOPIDOGREL 75 MG: 75 TABLET, FILM COATED ORAL at 08:15

## 2020-01-26 RX ADMIN — GABAPENTIN 100 MG: 100 CAPSULE ORAL at 08:16

## 2020-01-26 RX ADMIN — METOPROLOL SUCCINATE 100 MG: 100 TABLET, FILM COATED, EXTENDED RELEASE ORAL at 08:16

## 2020-01-26 RX ADMIN — LISINOPRIL 2.5 MG: 2.5 TABLET ORAL at 08:16

## 2020-01-26 RX ADMIN — SPIRONOLACTONE 25 MG: 25 TABLET ORAL at 08:16

## 2020-01-26 RX ADMIN — DIGOXIN 125 MCG: 125 TABLET ORAL at 08:15

## 2020-01-26 RX ADMIN — GABAPENTIN 100 MG: 100 CAPSULE ORAL at 13:08

## 2020-01-26 RX ADMIN — PREDNISONE 40 MG: 20 TABLET ORAL at 08:16

## 2020-01-26 RX ADMIN — DOXYCYCLINE HYCLATE 100 MG: 100 TABLET, COATED ORAL at 08:15

## 2020-01-26 RX ADMIN — APIXABAN 5 MG: 5 TABLET, FILM COATED ORAL at 08:15

## 2020-01-26 RX ADMIN — BENZONATATE 200 MG: 100 CAPSULE ORAL at 08:15

## 2020-01-26 ASSESSMENT — PAIN SCALES - GENERAL: PAINLEVEL_OUTOF10: 0

## 2020-01-26 NOTE — PROGRESS NOTES
Called Ms. Winchester @  [phone],  [facility] to let her know Edgar Hsu is discharged and ready to be picked up.   Ms. Jeramy Diaz is sending a ride to the main entrance

## 2020-01-26 NOTE — DISCHARGE SUMMARY
01/26/2020    K 4.9 01/26/2020     01/26/2020    CO2 23 01/26/2020    ANIONGAP 10 01/26/2020    BUN 13 01/26/2020    CREATININE 0.73 01/26/2020    BUNCRER NOT REPORTED 01/26/2020    CALCIUM 8.4 01/26/2020    LABGLOM >60 01/26/2020    GFRAA >60 01/26/2020    GFR      01/26/2020    GFR NOT REPORTED 01/26/2020     HFP:    Lab Results   Component Value Date    PROT 7.3 01/22/2020     CMP:    Lab Results   Component Value Date    GLUCOSE 91 01/26/2020     01/26/2020    K 4.9 01/26/2020     01/26/2020    CO2 23 01/26/2020    BUN 13 01/26/2020    CREATININE 0.73 01/26/2020    ANIONGAP 10 01/26/2020    ALKPHOS 73 01/22/2020    ALT 30 01/22/2020    AST 25 01/22/2020    BILITOT 0.41 01/22/2020    LABALBU 3.9 01/22/2020    ALBUMIN 1.1 01/22/2020    LABGLOM >60 01/26/2020    GFRAA >60 01/26/2020    GFR      01/26/2020    GFR NOT REPORTED 01/26/2020    PROT 7.3 01/22/2020    CALCIUM 8.4 01/26/2020     PT/INR:    Lab Results   Component Value Date    PROTIME 11.2 01/22/2020    INR 1.1 01/22/2020     PTT:   Lab Results   Component Value Date    APTT 29.9 01/22/2020     FLP:    Lab Results   Component Value Date    CHOL 90 01/24/2020    TRIG 110 01/24/2020    HDL 31 01/24/2020     U/A:    Lab Results   Component Value Date    COLORU YELLOW 01/22/2020    TURBIDITY CLOUDY 01/22/2020    SPECGRAV 1.016 01/22/2020    HGBUR TRACE 01/22/2020    PHUR 6.5 01/22/2020    PROTEINU NEGATIVE 01/22/2020    GLUCOSEU NEGATIVE 01/22/2020    KETUA NEGATIVE 01/22/2020    BILIRUBINUR NEGATIVE 01/22/2020    UROBILINOGEN Normal 01/22/2020    NITRU NEGATIVE 01/22/2020    LEUKOCYTESUR MODERATE 01/22/2020     TSH:    Lab Results   Component Value Date    TSH 1.48 01/24/2020        Radiology:  Xr Chest Standard (2 Vw)    Result Date: 1/22/2020  No acute cardiopulmonary disease.        Consultations:    Consults:     Final Specialist Recommendations/Findings:   IP CONSULT TO INTERNAL MEDICINE  IP CONSULT TO HOSPITALIST  IP CONSULT

## 2020-01-27 NOTE — PROGRESS NOTES
CLINICAL PHARMACY NOTE: MEDS TO [address] Select Patient?: No  Total # of Prescriptions Filled: 3   The following medications were delivered to the patient:  · Tamiflu  · Doxycycline  · Prednisone  Total # of Interventions Completed: 0  Time Spent (min): 0    Additional Documentation:  Medications delivered to patient. No reported questions.

## 2020-01-28 LAB
CULTURE: NORMAL
CULTURE: NORMAL
Lab: NORMAL
Lab: NORMAL
SPECIMEN DESCRIPTION: NORMAL
SPECIMEN DESCRIPTION: NORMAL

## 2020-01-29 ENCOUNTER — HOSPITAL ENCOUNTER (OUTPATIENT)
Dept: OTHER | Age: 65
Discharge: HOME | End: 2020-01-29
Payer: MEDICAID

## 2020-01-29 VITALS
HEART RATE: 98 BPM | OXYGEN SATURATION: 97 % | RESPIRATION RATE: 20 BRPM | SYSTOLIC BLOOD PRESSURE: 94 MMHG | DIASTOLIC BLOOD PRESSURE: 62 MMHG

## 2020-01-29 PROBLEM — I50.42 HEART FAILURE, SYSTOLIC AND DIASTOLIC, CHRONIC (HCC): Status: ACTIVE | Noted: 2020-01-29

## 2020-01-29 PROCEDURE — 99215 OFFICE O/P EST HI 40 MIN: CPT

## 2020-01-29 PROCEDURE — 99215 OFFICE O/P EST HI 40 MIN: CPT | Performed by: NURSE PRACTITIONER

## 2020-01-29 RX ORDER — ASPIRIN 81 MG/1
81 TABLET ORAL DAILY
COMMUNITY
End: 2020-09-18 | Stop reason: ALTCHOICE

## 2020-01-29 RX ORDER — HYDROCHLOROTHIAZIDE 25 MG/1
25 TABLET ORAL 2 TIMES DAILY
COMMUNITY
End: 2020-06-04

## 2020-01-29 ASSESSMENT — ENCOUNTER SYMPTOMS
ABDOMINAL PAIN: 0
SHORTNESS OF BREATH: 1
BLOOD IN STOOL: 0
EYE DISCHARGE: 0
COUGH: 1

## 2020-01-29 NOTE — PROGRESS NOTES
CHF Clinic at [address] [place] [address]    Office: [phone] Fax: [phone] [facility] CHF CLINIC  [phone]  Dept: [phone]  Loc: [phone]    Ian Flood is a 60 y.o. male who presents today for CHF evaluation. HPI:     +  Sob with walking, no sob with ADLs , is at [facility] , release from [facility].    mild fatigue, not curtailing activity   No Edema   Cough, treated for bronchitis , abx and steroids,  and influenza A     Has cardio appt with dr Qasim Jacobson planned, 2/7/20  Se[facility] dr Luis Coley [facility] [address] [place]         Past Medical History:   Diagnosis Date    Atrial fibrillation [facility]) 10/2019    History of incarceration     released 2019    Hyperlipidemia     Hypertension      Past Surgical History:   Procedure Laterality Date    CARDIOVERSION  10/16/2019    CARPAL TUNNEL RELEASE Bilateral 2008    PACEMAKER CHANGE N/A 10/21/2019    CABG CORONARY ARTERY BYPASS GRAFT X1, LIMA-LAD, BROWN 4 MAZE PROCEDURE, 50MM ATRICURE ATRIAL CLIP RIGID INTERNAL FIXATION PLATES X 3   SCREWS X 13 performed by Oneyda Olvera MD at [address] [place] [address] TRANSESOPHAGEAL ECHOCARDIOGRAM  10/16/2019       Family History   Problem Relation Age of Onset    Alcohol Abuse Maternal Uncle     Heart Attack Maternal Uncle        Social History     Tobacco Use    Smoking status: Never Smoker    Smokeless tobacco: Never Used   Substance Use Topics    Alcohol use: Not Currently      Current Outpatient Medications   Medication Sig Dispense Refill    aspirin 81 MG EC tablet Take 81 mg by mouth daily      hydrochlorothiazide (HYDRODIURIL) 25 MG tablet Take 25 mg by mouth 2 times daily      oseltamivir (TAMIFLU) 75 MG capsule Take 1 capsule by mouth 2 times daily for 3 days 6 capsule 0    predniSONE (DELTASONE) 20 MG tablet Take 2 tablets by mouth daily for 10 days 20 tablet 0    doxycycline hyclate (VIBRA-TABS) 100 MG tablet Take 1 tablet by mouth every 12 hours for 5 days 10 tablet 0    metoprolol succinate (TOPROL XL) 100 MG extended release tablet Take 1 tablet by mouth 2 times daily 30 tablet 3    spironolactone (ALDACTONE) 25 MG tablet Take 1 tablet by mouth daily 30 tablet 3    potassium chloride (KLOR-CON M) 20 MEQ extended release tablet Take 1 tablet by mouth 2 times daily for 7 days 30 tablet 0    magnesium hydroxide (MILK OF MAGNESIA) 400 MG/5ML suspension Take 30 mLs by mouth daily as needed for Constipation 1 Bottle     senna (SENOKOT) 8.6 MG tablet Take 1 tablet by mouth nightly 30 tablet 0    pantoprazole (PROTONIX) 40 MG tablet Take 1 tablet by mouth every morning (before breakfast) 30 tablet 0    amiodarone (CORDARONE) 200 MG tablet Take 1 tablet by mouth daily 30 tablet 0    apixaban (ELIQUIS) 5 MG TABS tablet Take 1 tablet by mouth 2 times daily 60 tablet 0    atorvastatin (LIPITOR) 40 MG tablet Take 1 tablet by mouth nightly 30 tablet 0    lisinopril (PRINIVIL;ZESTRIL) 2.5 MG tablet Take 1 tablet by mouth daily 30 tablet 0    digoxin (LANOXIN) 125 MCG tablet Take 1 tablet by mouth daily 30 tablet 0    clopidogrel (PLAVIX) 75 MG tablet Take 1 tablet by mouth daily 30 tablet 0    gabapentin (NEURONTIN) 100 MG capsule Take 1 capsule by mouth 3 times daily for 30 days. 90 capsule 0     No current facility-administered medications for this encounter. No Known Allergies      Subjective:      Review of Systems   Constitutional: Positive for fatigue. Negative for activity change, chills and fever. Eyes: Negative for discharge and visual disturbance. Respiratory: Positive for cough and shortness of breath. Cardiovascular: Negative for chest pain and leg swelling. Gastrointestinal: Negative for abdominal pain and blood in stool. Endocrine: Negative for cold intolerance and heat intolerance. Genitourinary: Negative for dysuria and flank pain.    Musculoskeletal: Circumference (cm): 45 cm  L Calf Circumference (cm): 44 cm  R Ankle Circumference (cm): 29 cm  L Ankle Circumference (cm): 29 cm    CBC:   Lab Results   Component Value Date    WBC 7.4 01/26/2020    RBC 3.36 01/26/2020    HGB 10.6 01/26/2020    HCT 32.8 01/26/2020    MCV 97.6 01/26/2020    MCH 31.5 01/26/2020    MCHC 32.3 01/26/2020    RDW 14.5 01/26/2020     01/26/2020    MPV 10.0 01/26/2020     CMP:    Lab Results   Component Value Date     01/26/2020    K 4.9 01/26/2020     01/26/2020    CO2 23 01/26/2020    BUN 13 01/26/2020    CREATININE 0.73 01/26/2020    GFRAA >60 01/26/2020    LABGLOM >60 01/26/2020    GLUCOSE 91 01/26/2020    PROT 7.3 01/22/2020    LABALBU 3.9 01/22/2020    CALCIUM 8.4 01/26/2020    BILITOT 0.41 01/22/2020    ALKPHOS 73 01/22/2020    AST 25 01/22/2020    ALT 30 01/22/2020     Lab Results   Component Value Date    LABA1C 5.6 12/10/2019     Echo Summary  Left ventricle is normal in size with mild left ventricular hypertrophy. Overall systolic function is moderately reduced with a calculated EF 40%. Abnormal septal wall motion. Grade III (severe) left ventricular diastolic dysfunction. Left atrium is moderately dilated. Right atrial dilatation. Right ventricular dilatation with reduced systolic function. Mild to moderate mitral regurgitation (posteriorly eccentric jet.)  Moderate tricuspid regurgitation. Estimated right ventricular systolic  pressure is 35 mmHg. Large pleural effusion.     Signature  ----------------------------------------------------------------------------   Electronically signed by Saurav Cuenca) on 10/25/2019 12:46      :Assessment      1. Heart failure, systolic and diastolic, chronic ([address]    2. Ischemic cardiomyopathy    3. Paroxysmal atrial fibrillation (HCC)    4. Hypokalemia        :Plan      1. Heart failure, systolic and diastolic, chronic ([address]    2. Ischemic cardiomyopathy    3. Paroxysmal atrial fibrillation (HCC)    4. Hypokalemia          On Guideline-Directed Medication Therapy:   Beta Blocker:   ACE1 / ARB / ARN1  Aldosterone Antagonist:   Hydral-Nitrate  Diuretic Therapy  The current medical regimen is effective;  continue present plan and medications. Healing cellulitis. Will monitor legs for edema and for improved appearance following cellulitis. H/o CABG. Was in prison and is establishing himself with providers. Will see dr Kathy Pérez in feb. No f/u needed with CTS. irregular rhythm, has appt w/ dr Ti Yi. On KCl repletion. Last K+ wnl. Treated for copd and influenza A. Minor cough remains. Education completed. Reviewed all educational  material in the new pt education folder. Educational materials  given including multiple pages on Low Sodium food choices, how to read a food label, a Weight and Heart Failure Zone log, and illustrated Signs and Symptoms of Heart Failure       No orders of the defined types were placed in this encounter. No orders of the defined types were placed in this encounter. Patientgiven verbal and/or written educational instructions. Follow up as directed. I have reviewed and agree with the nursing documentation. Verbally reviewed medication list with patient; patient verbalized understanding. Discussed 2000mg/day sodium restricted diet; patient verbalized understanding. Moderate daily exercise encouraged as tolerated. Discussed rest breaks as needed; patient verbalized understanding. Patient instructed to weigh self at the same time of each day, using same clothes and same scale; reinforced teaching to monitor for 3-5 lb weight increase over 1-2 days, and to notify the CHF clinic at [phone] or physician office if weight change noted. Patient verbalized understanding. Risks of smoking discussed with the patient if applicable; patient strongly discouraged to smoke. Patient verbalized understanding.      Signs and symptoms of CHF discussed with patient, such as feeling more tired than normal, feeling short of breath, coughing that increases when you lie down, sudden weight gain, swelling of your feet, legs or belly. Patient verbalized understanding to notify the CHF clinic at [phone] or physician office if these symptoms occur. Compliance with plan of care and further disease process causes discussed with patient, patient encouraged to keep all follow up appointments. Patient verbalized understanding. Greater than 40 minutes spent face to face with the patient.            Electronically signedby LUDY Yung CNP on 1/29/2020 at 2:18 PM

## 2020-02-05 ENCOUNTER — HOSPITAL ENCOUNTER (OUTPATIENT)
Dept: SLEEP CENTER | Age: 65
Discharge: HOME | End: 2020-02-07
Payer: MEDICAID

## 2020-02-05 PROCEDURE — 95810 POLYSOM 6/> YRS 4/> PARAM: CPT

## 2020-02-06 VITALS
RESPIRATION RATE: 20 BRPM | WEIGHT: 253 LBS | BODY MASS INDEX: 39.71 KG/M2 | HEIGHT: 67 IN | HEART RATE: 44 BPM | OXYGEN SATURATION: 92 %

## 2020-02-07 ENCOUNTER — HOSPITAL ENCOUNTER (OUTPATIENT)
Dept: SLEEP CENTER | Age: 65
Discharge: HOME | End: 2020-02-09
Payer: MEDICAID

## 2020-02-07 PROCEDURE — 95811 POLYSOM 6/>YRS CPAP 4/> PARM: CPT

## 2020-02-14 ENCOUNTER — HOSPITAL ENCOUNTER (EMERGENCY)
Age: 65
Discharge: HOME | End: 2020-02-14
Attending: EMERGENCY MEDICINE
Payer: MEDICAID

## 2020-02-14 VITALS
WEIGHT: 256 LBS | HEIGHT: 67 IN | TEMPERATURE: 97.5 F | HEART RATE: 72 BPM | BODY MASS INDEX: 40.18 KG/M2 | OXYGEN SATURATION: 98 % | SYSTOLIC BLOOD PRESSURE: 151 MMHG | RESPIRATION RATE: 17 BRPM | DIASTOLIC BLOOD PRESSURE: 92 MMHG

## 2020-02-14 PROCEDURE — 2500000003 HC RX 250 WO HCPCS: Performed by: EMERGENCY MEDICINE

## 2020-02-14 PROCEDURE — 99283 EMERGENCY DEPT VISIT LOW MDM: CPT

## 2020-02-14 RX ORDER — PETROLATUM,WHITE
1 OINTMENT IN PACKET (GRAM) TOPICAL PRN
Qty: 106 G | Refills: 0 | Status: ON HOLD | OUTPATIENT
Start: 2020-02-14 | End: 2021-03-09 | Stop reason: HOSPADM

## 2020-02-14 RX ORDER — TRANEXAMIC ACID 100 MG/ML
1000 INJECTION, SOLUTION INTRAVENOUS ONCE
Status: COMPLETED | OUTPATIENT
Start: 2020-02-14 | End: 2020-02-14

## 2020-02-14 RX ADMIN — TRANEXAMIC ACID 1000 MG: 100 INJECTION, SOLUTION INTRAVENOUS at 12:57

## 2020-02-14 ASSESSMENT — ENCOUNTER SYMPTOMS
VOMITING: 0
COUGH: 0
ABDOMINAL PAIN: 0
BACK PAIN: 0
SHORTNESS OF BREATH: 0
NAUSEA: 0
CHEST TIGHTNESS: 0

## 2020-02-14 NOTE — ED PROVIDER NOTES
Transportation needs:     Medical: Not on file     Non-medical: Not on file   Tobacco Use    Smoking status: Never Smoker    Smokeless tobacco: Never Used   Substance and Sexual Activity    Alcohol use: Not Currently    Drug use: Not Currently    Sexual activity: Not Currently   Lifestyle    Physical activity:     Days per week: Not on file     Minutes per session: Not on file    Stress: Not on file   Relationships    Social connections:     Talks on phone: Not on file     Gets together: Not on file     Attends [employer] service: Not on file     Active member of club or organization: Not on file     Attends meetings of clubs or organizations: Not on file     Relationship status: Not on file    Intimate partner violence:     Fear of current or ex partner: Not on file     Emotionally abused: Not on file     Physically abused: Not on file     Forced sexual activity: Not on file   Other Topics Concern    Not on file   Social History Narrative    Not on file       Patient advised to stop smoking or to avoid tobacco use. Family History   Problem Relation Age of Onset    Alcohol Abuse Maternal Uncle     Heart Attack Maternal Uncle         Allergies:  Patient has no known allergies. HomeMedications:  Prior to Admission medications    Medication Sig Start Date End Date Taking?  Authorizing Provider   white petrolatum GEL Apply 28 g topically as needed for Dry Lips 2/14/20  Yes Henderson Edin Kim, DO   aspirin 81 MG EC tablet Take 81 mg by mouth daily    Historical Provider, MD   hydrochlorothiazide (HYDRODIURIL) 25 MG tablet Take 25 mg by mouth 2 times daily    Historical Provider, MD   metoprolol succinate (TOPROL XL) 100 MG extended release tablet Take 1 tablet by mouth 2 times daily 1/17/20   Trinidad Jones MD   spironolactone (ALDACTONE) 25 MG tablet Take 1 tablet by mouth daily 1/17/20   Trinidad Jones MD   potassium chloride (KLOR-CON M) 20 MEQ extended release tablet Take 1 tablet by (116.1 kg)   SpO2 98%   BMI 40.10 kg/m²     Physical Exam  Constitutional:       General: He is not in acute distress. Appearance: Normal appearance. He is well-developed. HENT:      Head: Normocephalic and atraumatic. Right Ear: External ear normal.      Left Ear: External ear normal.      Nose: No nasal deformity or laceration. Eyes:      Conjunctiva/sclera: Conjunctivae normal.   Neck:      Musculoskeletal: Normal range of motion. Vascular: No JVD. Trachea: No tracheal deviation. Cardiovascular:      Rate and Rhythm: Normal rate and regular rhythm. Heart sounds: No murmur. Pulmonary:      Effort: Pulmonary effort is normal.      Breath sounds: Normal breath sounds. Abdominal:      Tenderness: There is no abdominal tenderness. Musculoskeletal: Normal range of motion. Comments: Plus pitting edema bilateral lower extremities with circumferential swelling which are symmetric. The left lower extremity has a little bit erythema on the leg. This is chronic. Skin:     General: Skin is warm. Neurological:      Mental Status: He is alert and oriented to person, place, and time. DIFFERENTIAL  DIAGNOSIS     PLAN (LABS / IMAGING / EKG):  No orders of the defined types were placed in this encounter. MEDICATIONS ORDERED:  Orders Placed This Encounter   Medications    white petrolatum GEL     Sig: Apply 28 g topically as needed for Dry Lips     Dispense:  106 g     Refill:  0    tranexamic acid (CYKLOKAPRON) injection 1,000 mg         CORES  DIAGNOSTIC RESULTS / EMERGENCY DEPARTMENT COURSE / MDM     LABS:  No results found for this visit on 02/14/20. IMPRESSION: This is a 72-year-old male present with lower lip bleeding. On exam he is no acute distress. There is significant leg swelling bilaterally, the left side is more erythematous. Note tenderness crepitus or warmth.   Lower lip is chapped, there is a small area that appears to have a wound from a bite in

## 2020-02-14 NOTE — ED NOTES
Bleeding of lip now controlled. Pt leaves in NAD w/ rr even and unlabored, acknowledges all d/c instructions.      Marah Vergara RN  02/14/20 7501

## 2020-02-14 NOTE — ED PROVIDER NOTES
[facility]     Emergency Department     Faculty Attestation    I performed a history and physical examination of the patient and discussed management with the resident. I reviewed the residents note and agree with the documented findings and plan of care. Any areas of disagreement are noted on the chart. I was personally present for the key portions of any procedures. I have documented in the chart those procedures where I was not present during the key portions. I have reviewed the emergency nurses triage note. I agree with the chief complaint, past medical history, past surgical history, allergies, medications, social and family history as documented unless otherwise noted below. For Physician Assistant/ Nurse Practitioner cases/documentation I have personally evaluated this patient and have completed at least one if not all key elements of the E/M (history, physical exam, and MDM). Additional findings are as noted. I have personally seen and evaluated the patient. I find the patient's history and physical exam are consistent with the NP/PA documentation. I agree with the care provided, treatment rendered, disposition and follow-up plan. Persistent bleeding from a small wound on the inner lip patient is on anticoagulant therapy      Critical Care     Margaret Arellano M.D.   Attending Emergency  Physician              Evan Joseph MD  02/14/20 7984

## 2020-02-20 ENCOUNTER — TELEPHONE (OUTPATIENT)
Dept: GASTROENTEROLOGY | Age: 65
End: 2020-02-20

## 2020-02-20 LAB
STATUS: NORMAL
STATUS: NORMAL

## 2020-02-22 PROBLEM — J10.1 INFLUENZA A: Status: RESOLVED | Noted: 2020-01-22 | Resolved: 2020-02-22

## 2020-02-26 ENCOUNTER — HOSPITAL ENCOUNTER (OUTPATIENT)
Dept: OTHER | Age: 65
Discharge: HOME | End: 2020-02-26
Payer: MEDICAID

## 2020-02-26 VITALS
HEART RATE: 78 BPM | WEIGHT: 250.2 LBS | OXYGEN SATURATION: 97 % | RESPIRATION RATE: 20 BRPM | DIASTOLIC BLOOD PRESSURE: 74 MMHG | BODY MASS INDEX: 39.19 KG/M2 | SYSTOLIC BLOOD PRESSURE: 126 MMHG

## 2020-02-26 PROCEDURE — 99212 OFFICE O/P EST SF 10 MIN: CPT

## 2020-02-26 RX ORDER — FUROSEMIDE 40 MG/1
40 TABLET ORAL DAILY
COMMUNITY
End: 2020-07-30

## 2020-02-26 RX ORDER — NITROGLYCERIN 0.4 MG/1
0.4 TABLET SUBLINGUAL EVERY 5 MIN PRN
COMMUNITY
End: 2020-11-23 | Stop reason: SDUPTHER

## 2020-02-27 ENCOUNTER — HOSPITAL ENCOUNTER (OUTPATIENT)
Age: 65
Discharge: HOME | End: 2020-02-27
Payer: MEDICAID

## 2020-02-27 LAB
ALBUMIN SERPL-MCNC: 4.1 G/DL (ref 3.5–5.2)
ALBUMIN/GLOBULIN RATIO: 1.2 (ref 1–2.5)
ALP BLD-CCNC: 76 U/L (ref 40–129)
ALT SERPL-CCNC: 22 U/L (ref 5–41)
ANION GAP SERPL CALCULATED.3IONS-SCNC: 17 MMOL/L (ref 9–17)
AST SERPL-CCNC: 22 U/L
BILIRUB SERPL-MCNC: 0.27 MG/DL (ref 0.3–1.2)
BUN BLDV-MCNC: 17 MG/DL (ref 8–23)
BUN/CREAT BLD: ABNORMAL (ref 9–20)
CALCIUM SERPL-MCNC: 9.5 MG/DL (ref 8.6–10.4)
CHLORIDE BLD-SCNC: 101 MMOL/L (ref 98–107)
CO2: 21 MMOL/L (ref 20–31)
CREAT SERPL-MCNC: 0.71 MG/DL (ref 0.7–1.2)
GFR AFRICAN AMERICAN: >60 ML/MIN
GFR NON-AFRICAN AMERICAN: >60 ML/MIN
GFR SERPL CREATININE-BSD FRML MDRD: ABNORMAL ML/MIN/{1.73_M2}
GFR SERPL CREATININE-BSD FRML MDRD: ABNORMAL ML/MIN/{1.73_M2}
GLUCOSE BLD-MCNC: 109 MG/DL (ref 70–99)
POTASSIUM SERPL-SCNC: 4.1 MMOL/L (ref 3.7–5.3)
SODIUM BLD-SCNC: 139 MMOL/L (ref 135–144)
TOTAL PROTEIN: 7.6 G/DL (ref 6.4–8.3)

## 2020-02-27 PROCEDURE — 80053 COMPREHEN METABOLIC PANEL: CPT

## 2020-02-27 PROCEDURE — 36415 COLL VENOUS BLD VENIPUNCTURE: CPT

## 2020-04-20 ENCOUNTER — TELEPHONE (OUTPATIENT)
Dept: GASTROENTEROLOGY | Age: 65
End: 2020-04-20

## 2020-04-20 NOTE — TELEPHONE ENCOUNTER
Tried to contact patient to inform him of the need to postpone new patient visit scheduled on 4/22/20 due to current situation with Covid-19 but patient phone does not accept calls. Visit has been rescheduled and appointment card mailed.

## 2020-05-26 ENCOUNTER — HOSPITAL ENCOUNTER (OUTPATIENT)
Age: 65
Discharge: HOME | End: 2020-05-26
Payer: MEDICAID

## 2020-05-26 LAB
ABSOLUTE EOS #: 0.22 K/UL (ref 0–0.44)
ABSOLUTE IMMATURE GRANULOCYTE: 0.06 K/UL (ref 0–0.3)
ABSOLUTE LYMPH #: 0.98 K/UL (ref 1.1–3.7)
ABSOLUTE MONO #: 0.74 K/UL (ref 0.1–1.2)
ALBUMIN SERPL-MCNC: 3.9 G/DL (ref 3.5–5.2)
ALBUMIN/GLOBULIN RATIO: 1.3 (ref 1–2.5)
ALP BLD-CCNC: 58 U/L (ref 40–129)
ALT SERPL-CCNC: 17 U/L (ref 5–41)
ANION GAP SERPL CALCULATED.3IONS-SCNC: 14 MMOL/L (ref 9–17)
AST SERPL-CCNC: 19 U/L
BASOPHILS # BLD: 1 % (ref 0–2)
BASOPHILS ABSOLUTE: 0.05 K/UL (ref 0–0.2)
BILIRUB SERPL-MCNC: 0.39 MG/DL (ref 0.3–1.2)
BNP INTERPRETATION: NORMAL
BUN BLDV-MCNC: 19 MG/DL (ref 8–23)
BUN/CREAT BLD: ABNORMAL (ref 9–20)
CALCIUM SERPL-MCNC: 9.4 MG/DL (ref 8.6–10.4)
CHLORIDE BLD-SCNC: 107 MMOL/L (ref 98–107)
CHOLESTEROL/HDL RATIO: 2
CHOLESTEROL: 112 MG/DL
CO2: 23 MMOL/L (ref 20–31)
CREAT SERPL-MCNC: 0.8 MG/DL (ref 0.7–1.2)
DIFFERENTIAL TYPE: ABNORMAL
EOSINOPHILS RELATIVE PERCENT: 3 % (ref 1–4)
GFR AFRICAN AMERICAN: >60 ML/MIN
GFR NON-AFRICAN AMERICAN: >60 ML/MIN
GFR SERPL CREATININE-BSD FRML MDRD: ABNORMAL ML/MIN/{1.73_M2}
GFR SERPL CREATININE-BSD FRML MDRD: ABNORMAL ML/MIN/{1.73_M2}
GLUCOSE BLD-MCNC: 103 MG/DL (ref 70–99)
HCT VFR BLD CALC: 39.1 % (ref 40.7–50.3)
HDLC SERPL-MCNC: 56 MG/DL
HEMOGLOBIN: 12.4 G/DL (ref 13–17)
IMMATURE GRANULOCYTES: 1 %
LDL CHOLESTEROL: 45 MG/DL (ref 0–130)
LYMPHOCYTES # BLD: 14 % (ref 24–43)
MCH RBC QN AUTO: 30 PG (ref 25.2–33.5)
MCHC RBC AUTO-ENTMCNC: 31.7 G/DL (ref 28.4–34.8)
MCV RBC AUTO: 94.7 FL (ref 82.6–102.9)
MONOCYTES # BLD: 10 % (ref 3–12)
NRBC AUTOMATED: 0 PER 100 WBC
PDW BLD-RTO: 14.4 % (ref 11.8–14.4)
PLATELET # BLD: 235 K/UL (ref 138–453)
PLATELET ESTIMATE: ABNORMAL
PMV BLD AUTO: 9.8 FL (ref 8.1–13.5)
POTASSIUM SERPL-SCNC: 4.4 MMOL/L (ref 3.7–5.3)
PRO-BNP: 157 PG/ML
RBC # BLD: 4.13 M/UL (ref 4.21–5.77)
RBC # BLD: ABNORMAL 10*6/UL
SEG NEUTROPHILS: 71 % (ref 36–65)
SEGMENTED NEUTROPHILS ABSOLUTE COUNT: 5.18 K/UL (ref 1.5–8.1)
SODIUM BLD-SCNC: 144 MMOL/L (ref 135–144)
TOTAL PROTEIN: 6.9 G/DL (ref 6.4–8.3)
TRIGL SERPL-MCNC: 53 MG/DL
VLDLC SERPL CALC-MCNC: NORMAL MG/DL (ref 1–30)
WBC # BLD: 7.2 K/UL (ref 3.5–11.3)
WBC # BLD: ABNORMAL 10*3/UL

## 2020-05-26 PROCEDURE — 83880 ASSAY OF NATRIURETIC PEPTIDE: CPT

## 2020-05-26 PROCEDURE — 36415 COLL VENOUS BLD VENIPUNCTURE: CPT

## 2020-05-26 PROCEDURE — 85025 COMPLETE CBC W/AUTO DIFF WBC: CPT

## 2020-05-26 PROCEDURE — 80061 LIPID PANEL: CPT

## 2020-05-26 PROCEDURE — 80053 COMPREHEN METABOLIC PANEL: CPT

## 2020-05-27 ENCOUNTER — HOSPITAL ENCOUNTER (OUTPATIENT)
Dept: OTHER | Age: 65
Discharge: HOME | End: 2020-05-27
Payer: MEDICAID

## 2020-05-27 VITALS
SYSTOLIC BLOOD PRESSURE: 104 MMHG | HEART RATE: 78 BPM | DIASTOLIC BLOOD PRESSURE: 70 MMHG | RESPIRATION RATE: 20 BRPM | OXYGEN SATURATION: 99 % | BODY MASS INDEX: 42.48 KG/M2 | WEIGHT: 271.2 LBS

## 2020-05-27 PROCEDURE — 99212 OFFICE O/P EST SF 10 MIN: CPT | Performed by: NURSE PRACTITIONER

## 2020-05-27 PROCEDURE — 99213 OFFICE O/P EST LOW 20 MIN: CPT

## 2020-05-28 ASSESSMENT — ENCOUNTER SYMPTOMS: COLOR CHANGE: 1

## 2020-05-29 ENCOUNTER — TELEPHONE (OUTPATIENT)
Dept: OTHER | Age: 65
End: 2020-05-29

## 2020-05-29 NOTE — TELEPHONE ENCOUNTER
Telephone call to pt to assess his leg since we have not heard back from him. He has not heard back from his pcp Gui Hensley NP,  re: his leg. Dimitri Major RN calls his pcp and leave message with pcp office. Pt states blister is still present, but the redness is not as bad. He is using a non steroidal cream he was prescribed in the past on the legs. He cannot assess warmth as he states he has no feeling in his hands. Pt states he has his same, 'normal' amount of swelling. Pt sees Cardio and has plans for labs on Mon. Pt to call us if any change in his leg. With erythema improving, the increased lasix dose may be improving status. If he is not started on abx over the WE, and if there are any worsening symptoms, will start abx for the pt.

## 2020-06-01 ENCOUNTER — HOSPITAL ENCOUNTER (OUTPATIENT)
Age: 65
Discharge: HOME | End: 2020-06-01
Payer: MEDICAID

## 2020-06-01 ENCOUNTER — TELEPHONE (OUTPATIENT)
Dept: OTHER | Age: 65
End: 2020-06-01

## 2020-06-01 LAB
ANION GAP SERPL CALCULATED.3IONS-SCNC: 18 MMOL/L (ref 9–17)
BUN BLDV-MCNC: 18 MG/DL (ref 8–23)
BUN/CREAT BLD: ABNORMAL (ref 9–20)
CALCIUM SERPL-MCNC: 9.8 MG/DL (ref 8.6–10.4)
CHLORIDE BLD-SCNC: 100 MMOL/L (ref 98–107)
CO2: 22 MMOL/L (ref 20–31)
CREAT SERPL-MCNC: 0.95 MG/DL (ref 0.7–1.2)
GFR AFRICAN AMERICAN: >60 ML/MIN
GFR NON-AFRICAN AMERICAN: >60 ML/MIN
GFR SERPL CREATININE-BSD FRML MDRD: ABNORMAL ML/MIN/{1.73_M2}
GFR SERPL CREATININE-BSD FRML MDRD: ABNORMAL ML/MIN/{1.73_M2}
GLUCOSE BLD-MCNC: 109 MG/DL (ref 70–99)
POTASSIUM SERPL-SCNC: 4.2 MMOL/L (ref 3.7–5.3)
SODIUM BLD-SCNC: 140 MMOL/L (ref 135–144)

## 2020-06-01 PROCEDURE — 80048 BASIC METABOLIC PNL TOTAL CA: CPT

## 2020-06-01 PROCEDURE — 36415 COLL VENOUS BLD VENIPUNCTURE: CPT

## 2020-06-03 ENCOUNTER — HOSPITAL ENCOUNTER (OUTPATIENT)
Dept: OTHER | Age: 65
Discharge: HOME | End: 2020-06-03
Payer: MEDICAID

## 2020-06-03 VITALS
BODY MASS INDEX: 42.52 KG/M2 | OXYGEN SATURATION: 96 % | SYSTOLIC BLOOD PRESSURE: 122 MMHG | DIASTOLIC BLOOD PRESSURE: 72 MMHG | WEIGHT: 271.5 LBS | RESPIRATION RATE: 18 BRPM | HEART RATE: 80 BPM

## 2020-06-03 PROCEDURE — 99213 OFFICE O/P EST LOW 20 MIN: CPT | Performed by: NURSE PRACTITIONER

## 2020-06-03 PROCEDURE — 99212 OFFICE O/P EST SF 10 MIN: CPT

## 2020-06-03 RX ORDER — CEPHALEXIN 500 MG/1
500 CAPSULE ORAL 2 TIMES DAILY
Qty: 20 CAPSULE | Refills: 0 | Status: SHIPPED | OUTPATIENT
Start: 2020-06-03 | End: 2020-06-13

## 2020-06-03 ASSESSMENT — ENCOUNTER SYMPTOMS: COLOR CHANGE: 1

## 2020-06-03 NOTE — PROGRESS NOTES
Date:  6/3/2020  Time:  12:12 PM    CHF Clinic at [address] [facility]    Office: [phone] Fax: [phone]    Re:  Anuradha Schwba   Patient : 1955    Vital Signs: /72   Pulse 80   Resp 18   Wt 271 lb 8 oz (123.2 kg)   SpO2 96%   BMI 42.52 kg/m²                       O2 Device: None (Room air)                           Recent Labs     20  1300      K 4.2      CO2 22   BUN 18   CREATININE 0.95   GLUCOSE 109*        Respiratory:                               Complaints: Left  Lower leg remain red and warm to the touch  Physician Orders LUANNE Bonilla will order Keflex per her instructions and would like pt seen in 2 weeks    Comment : Mr Joseph Salinas ambulates per self with a cane. No acute s/s of CHF noted on assessment . Pt weight remains unchanged. His left lower leg is red and warm to the touch with a small ulcer noted. LUANNE Bonilla once again noted no change in the appearance of the leg since 20. [facility] has not been able to get in touch with his current PCP. Keflex will be ordered and follow up assessment will be in 2 weeks on 20. We will continue to get in touch with PCP Komal Tabares.     Electronically signed by Rajinder Bryant RN on 6/3/2020 at 12:12 PM

## 2020-06-03 NOTE — PROGRESS NOTES
CHF Clinic at [facility]    Office: [phone] Fax: [phone] [facility] CHF CLINIC  [phone]  Dept: [phone]  Loc: [phone]    Robin Fam is a 60 y.o. male who presents today for CHF evaluation. HPI:     Pt returns to  [facility] with ongoing raised erythema of LLE. Previous blister has popped and leaves behind a quatter size ulcer, scabbed, no concerns for focal infection at that site. However, concerns for LLE cellulits continue as leg remains erythematous and slightly warmer than R leg. Pt states his leg is often red, but not as red as it is currently or has been in past 2 weeks. He has been unsuccessful in reaching his pcp office to have this evaluated. Pt was seeing dr Geovanna Maurer when he was homeless, but since having obtained a residence he is no longer seeing her. He can see her in [place] on [facility], but he uses buses for transportation and does not know that part of town.        Past Medical History:   Diagnosis Date    Atrial fibrillation ([address] 10/2019    History of incarceration     released 2019    Hyperlipidemia     Hypertension      Past Surgical History:   Procedure Laterality Date    CARDIOVERSION  10/16/2019    CARPAL TUNNEL RELEASE Bilateral 2008    PACEMAKER CHANGE N/A 10/21/2019    CABG CORONARY ARTERY BYPASS GRAFT X1, LIMA-LAD, BROWN 4 MAZE PROCEDURE, 50MM ATRICURE ATRIAL CLIP RIGID INTERNAL FIXATION PLATES X 3   SCREWS X 13 performed by Chelsi Avitia MD at [address] [place] [address] TRANSESOPHAGEAL ECHOCARDIOGRAM  10/16/2019       Family History   Problem Relation Age of Onset    Alcohol Abuse Maternal Uncle     Heart Attack Maternal Uncle        Social History     Tobacco Use    Smoking status: Never Smoker    Smokeless tobacco: Never Used   Substance Use Topics    Alcohol use: Not Currently      Current Outpatient Medications   Medication Sig causes discussed with patient, patient encouraged to keep all follow up appointments. Patient verbalized understanding.       Electronically signedby LUDY Khan CNP on 6/3/2020 at 5:40 PM

## 2020-06-04 ENCOUNTER — OFFICE VISIT (OUTPATIENT)
Dept: GASTROENTEROLOGY | Age: 65
End: 2020-06-04
Payer: MEDICAID

## 2020-06-04 VITALS — RESPIRATION RATE: 19 BRPM | TEMPERATURE: 97.6 F | WEIGHT: 273 LBS | BODY MASS INDEX: 42.76 KG/M2

## 2020-06-04 PROCEDURE — G8417 CALC BMI ABV UP PARAM F/U: HCPCS | Performed by: INTERNAL MEDICINE

## 2020-06-04 PROCEDURE — G8427 DOCREV CUR MEDS BY ELIG CLIN: HCPCS | Performed by: INTERNAL MEDICINE

## 2020-06-04 PROCEDURE — 1036F TOBACCO NON-USER: CPT | Performed by: INTERNAL MEDICINE

## 2020-06-04 PROCEDURE — 99202 OFFICE O/P NEW SF 15 MIN: CPT | Performed by: INTERNAL MEDICINE

## 2020-06-04 PROCEDURE — 3017F COLORECTAL CA SCREEN DOC REV: CPT | Performed by: INTERNAL MEDICINE

## 2020-06-04 RX ORDER — PANTOPRAZOLE SODIUM 40 MG/1
40 TABLET, DELAYED RELEASE ORAL
Qty: 60 TABLET | Refills: 5 | Status: SHIPPED | OUTPATIENT
Start: 2020-06-04 | End: 2020-11-23 | Stop reason: SDUPTHER

## 2020-06-04 ASSESSMENT — ENCOUNTER SYMPTOMS
APNEA: 1
GASTROINTESTINAL NEGATIVE: 1
COUGH: 1
EYES NEGATIVE: 1
CHEST TIGHTNESS: 1
ALLERGIC/IMMUNOLOGIC NEGATIVE: 1

## 2020-06-04 NOTE — PROGRESS NOTES
Reason for Referral: NP cough, silent reflux   Mortimer Agar, MD  [address] [facility], [address] [place] [address] [address]    Chief Complaint   Patient presents with    New Patient     Patient states that he gets a bad cough after he eats/drinks. States it feels like he is coughing up a lung. Patient denies having trouble swallowing. He states having a heavy feeling over his chest.    Gastroesophageal Reflux     He states that he doesn't have gerd symptoms but they ordered him protonix, he does not think that this helps at all. HISTORY OF PRESENT ILLNESS: Mr.Andrew Kassandra Valdez is a 60 y.o. male with a past history remarkable for HARPREET, A. fib on Eliquis, morbid obesity, history of CABG in October 2019 on dual antiplatelet therapy (aspirin and Plavix), referred for evaluation of nonproductive postprandial cough. Nonproductive cough had started since the patient's a CABG procedure in October 2019 patient reports that her symptoms are exacerbated in the postprandial setting and appear to be associated with silent reflux symptoms. Most recently the patient has reported worsening of his \"coughing spells\" due to recent weight gain. Patient has reported gaining approximately 20 pounds over the last few months which is likely contributed the patient's silent reflux-like symptoms. Patient reports some modest improvement with Protonix 40 mg daily. Past Medical,Family, and Social History reviewed and does contribute to the patient presentingcondition. Patient's PMH/PSH,SH,PSYCH Hx, MEDs, ALLERGIES, and ROS were all reviewed and updated in the appropriate sections.     PAST MEDICAL HISTORY:  Past Medical History:   Diagnosis Date    Atrial fibrillation ([address] 10/2019    History of incarceration     released 2019    Hyperlipidemia     Hypertension        Past Surgical History:   Procedure Laterality Date    CARDIOVERSION  10/16/2019    CARPAL TUNNEL RELEASE Bilateral 2008    PACEMAKER CHANGE N/A 10/21/2019 Uncle          SOCIAL HISTORY:   Social History     Socioeconomic History    Marital status:      Spouse name: Not on file    Number of children: Not on file    Years of education: Not on file    Highest education level: Not on file   Occupational History    Not on file   Social Needs    Financial resource strain: Not on file    Food insecurity     Worry: Not on file     Inability: Not on file    Transportation needs     Medical: Not on file     Non-medical: Not on file   Tobacco Use    Smoking status: Never Smoker    Smokeless tobacco: Never Used   Substance and Sexual Activity    Alcohol use: Not Currently    Drug use: Not Currently    Sexual activity: Not Currently   Lifestyle    Physical activity     Days per week: Not on file     Minutes per session: Not on file    Stress: Not on file   Relationships    Social connections     Talks on phone: Not on file     Gets together: Not on file     Attends Denominational service: Not on file     Active member of club or organization: Not on file     Attends meetings of clubs or organizations: Not on file     Relationship status: Not on file    Intimate partner violence     Fear of current or ex partner: Not on file     Emotionally abused: Not on file     Physically abused: Not on file     Forced sexual activity: Not on file   Other Topics Concern    Not on file   Social History Narrative    Not on file         REVIEW OF SYSTEMS: A 12-point review of systems was obtained and pertinent positives and negatives were listed below. REVIEW OF SYSTEMS:     Constitutional: No fever, no chills, no lethargy, no weakness. HEENT:  No headache, otalgia, itchy eyes, nasal discharge or sore throat. Cardiac:  No chest pain, dyspnea, orthopnea or PND. Chest:   No cough, phlegm or wheezing. Abdomen:      Detailed by MA   Neuro:  No focal weakness, abnormal movements or seizure like activity. Skin:   No rashes, no itching.   :   No hematuria, no pyuria, no dysuria, no flank pain. Extremities:  No swelling or joint pains. ROS was otherwise negative    Review of Systems    PHYSICAL EXAMINATION: Vital signs reviewed per the nursing documentation. Temp 97.6 °F (36.4 °C)   Resp 19   Wt 273 lb (123.8 kg)   BMI 42.76 kg/m²   Body mass index is 42.76 kg/m². Physical Exam    Physical Exam   Constitutional: Patient is oriented to person, place, and time. Patient appears well-developed and well-nourished. HENT:   Head: Normocephalic and atraumatic. Eyes: Pupils are equal, round, and reactive to light. EOM are normal.   Neck: Normal range of motion. Neck supple. No JVD present. No tracheal deviation present. No thyromegaly present. Cardiovascular: Normal rate, regular rhythm, normal heart sounds and intact distal pulses. Pulmonary/Chest: Effort normal and breath sounds normal. No stridor. No respiratory distress. He has no wheezes. He has no rales. He exhibits no tenderness. Abdominal: Soft. Bowel sounds are normal. He exhibits no distension and no mass. There is no tenderness. There is no rebound and no guarding. No hernia. Musculoskeletal: Normal range of motion. Lymphadenopathy:    Patient has no cervical adenopathy. Neurological: Patient is alert and oriented to person, place, and time. Psychiatric: Patient has a normal mood and affect.  Patient behavior is normal.       LABORATORY DATA: Reviewed  Lab Results   Component Value Date    WBC 7.2 05/26/2020    HGB 12.4 (L) 05/26/2020    HCT 39.1 (L) 05/26/2020    MCV 94.7 05/26/2020     05/26/2020     06/01/2020    K 4.2 06/01/2020     06/01/2020    CO2 22 06/01/2020    BUN 18 06/01/2020    CREATININE 0.95 06/01/2020    LABALBU 3.9 05/26/2020    BILITOT 0.39 05/26/2020    ALKPHOS 58 05/26/2020    AST 19 05/26/2020    ALT 17 05/26/2020    INR 1.1 01/22/2020         Lab Results   Component Value Date    RBC 4.13 (L) 05/26/2020    HGB 12.4 (L) 05/26/2020    MCV 94.7 05/26/2020    MCH 30.0 05/26/2020    MCHC 31.7 05/26/2020    RDW 14.4 05/26/2020    MPV 9.8 05/26/2020    BASOPCT 1 05/26/2020    LYMPHSABS 0.98 (L) 05/26/2020    MONOSABS 0.74 05/26/2020    NEUTROABS 5.18 05/26/2020    EOSABS 0.22 05/26/2020    BASOSABS 0.05 05/26/2020         DIAGNOSTIC TESTING:     No results found. IMPRESSION: Mr.Andrew Lavelle Jones is a 60 y.o. male with a past history remarkable for HARPREET, A. fib on Eliquis, morbid obesity, history of CABG in October 2019 on dual antiplatelet therapy (aspirin and Plavix), referred for evaluation of nonproductive postprandial cough. Nonproductive cough had started since the patient's a CABG procedure in October 2019 patient reports that her symptoms are exacerbated in the postprandial setting and appear to be associated with silent reflux symptoms. Most recently the patient has reported worsening of his \"coughing spells\" due to recent weight gain. Patient has reported gaining approximately 20 pounds over the last few months which is likely contributed the patient's silent reflux-like symptoms. Patient reports some modest improvement with Protonix 40 mg daily. PLAN:    1) nonproductive postprandial cough- findings appear to be suggestive of a silent reflux   Versus laryngeal pharyngeal reflux. Patient to be placed on Protonix 40 mg daily. Advised patient that he is to separate his PPI medication from others and to take this medication 30 minutes before breakfast on empty stomach to enhance efficacy. Patient patient's worsening weight gain, history of HARPREET, and poor dietary habits is likely contributing to these reflux symptoms. Will order for Protonix 40 mg twice daily but patient instructed only to take if there is no improvement after dietary modifications  and/or modifying the timing of his PPI dose. He is encouraged to lose weight, approximately 5 pounds prior to the next visit    2) RTC in 8 weeks.   There are no changes in weight or symptoms after 8 weeks

## 2020-06-16 ENCOUNTER — HOSPITAL ENCOUNTER (OUTPATIENT)
Dept: OTHER | Age: 65
Discharge: HOME | End: 2020-06-16
Payer: MEDICAID

## 2020-06-16 VITALS
OXYGEN SATURATION: 98 % | DIASTOLIC BLOOD PRESSURE: 78 MMHG | RESPIRATION RATE: 18 BRPM | HEART RATE: 78 BPM | BODY MASS INDEX: 43.78 KG/M2 | WEIGHT: 279.5 LBS | SYSTOLIC BLOOD PRESSURE: 128 MMHG

## 2020-06-16 PROBLEM — L53.9 LEG ERYTHEMA: Status: ACTIVE | Noted: 2020-06-16

## 2020-06-16 PROCEDURE — 99213 OFFICE O/P EST LOW 20 MIN: CPT | Performed by: NURSE PRACTITIONER

## 2020-06-16 PROCEDURE — 99212 OFFICE O/P EST SF 10 MIN: CPT

## 2020-06-16 ASSESSMENT — ENCOUNTER SYMPTOMS: COLOR CHANGE: 1

## 2020-06-16 NOTE — PROGRESS NOTES
CHF Clinic at [facility]    Office: [phone] Fax: [address] [facility]  [address] [id number]  Dept: [phone]  Loc: [phone]    Brittany Pedroza is a 60 y.o. male who presents today for CHF evaluation.        HPI:     Denies shortness of breath  Denies  fatigue  +  Edema ; chronic, no change in pink/red appearance of skin,   Completed keflex treatment             Past Medical History:   Diagnosis Date    Atrial fibrillation ([address] 10/2019    History of incarceration     released 2019    Hyperlipidemia     Hypertension      Past Surgical History:   Procedure Laterality Date    CARDIOVERSION  10/16/2019    CARPAL TUNNEL RELEASE Bilateral 2008    PACEMAKER CHANGE N/A 10/21/2019    CABG CORONARY ARTERY BYPASS GRAFT X1, LIMA-LAD, BROWN 4 MAZE PROCEDURE, 50MM ATRICURE ATRIAL CLIP RIGID INTERNAL FIXATION PLATES X 3   SCREWS X 13 performed by Carmelo Ray MD at [address] [place] [address] TRANSESOPHAGEAL ECHOCARDIOGRAM  10/16/2019       Family History   Problem Relation Age of Onset    Alcohol Abuse Maternal Uncle     Heart Attack Maternal Uncle        Social History     Tobacco Use    Smoking status: Never Smoker    Smokeless tobacco: Never Used   Substance Use Topics    Alcohol use: Not Currently      Current Outpatient Medications   Medication Sig Dispense Refill    pantoprazole (PROTONIX) 40 MG tablet Take 1 tablet by mouth 2 times daily (before meals) 60 tablet 5    furosemide (LASIX) 40 MG tablet Take 40 mg by mouth daily      aspirin 81 MG EC tablet Take 81 mg by mouth daily      spironolactone (ALDACTONE) 25 MG tablet Take 1 tablet by mouth daily 30 tablet 3    apixaban (ELIQUIS) 5 MG TABS tablet Take 1 tablet by mouth 2 times daily 60 tablet 0    atorvastatin (LIPITOR) 40 MG tablet Take 1 tablet by mouth nightly 30 tablet 0    clopidogrel (PLAVIX) 75 MG tablet Take 1

## 2020-06-22 ENCOUNTER — OFFICE VISIT (OUTPATIENT)
Dept: FAMILY MEDICINE CLINIC | Age: 65
End: 2020-06-22
Payer: MEDICAID

## 2020-06-22 VITALS
HEIGHT: 67 IN | SYSTOLIC BLOOD PRESSURE: 138 MMHG | HEART RATE: 80 BPM | TEMPERATURE: 98.5 F | WEIGHT: 282.2 LBS | DIASTOLIC BLOOD PRESSURE: 80 MMHG | BODY MASS INDEX: 44.29 KG/M2

## 2020-06-22 PROBLEM — L03.116 LEFT LEG CELLULITIS: Status: RESOLVED | Noted: 2020-01-23 | Resolved: 2020-06-22

## 2020-06-22 PROBLEM — R00.0 TACHYCARDIA: Status: RESOLVED | Noted: 2019-10-28 | Resolved: 2020-06-22

## 2020-06-22 PROBLEM — L53.9 LEG ERYTHEMA: Status: RESOLVED | Noted: 2020-06-16 | Resolved: 2020-06-22

## 2020-06-22 PROBLEM — Q24.9 CARDIAC ABNORMALITY: Status: RESOLVED | Noted: 2019-10-25 | Resolved: 2020-06-22

## 2020-06-22 PROBLEM — E87.6 HYPOKALEMIA: Status: RESOLVED | Noted: 2020-01-15 | Resolved: 2020-06-22

## 2020-06-22 PROBLEM — E87.3 METABOLIC ALKALOSIS: Status: RESOLVED | Noted: 2020-01-15 | Resolved: 2020-06-22

## 2020-06-22 PROCEDURE — 99213 OFFICE O/P EST LOW 20 MIN: CPT

## 2020-06-22 RX ORDER — TRIAMCINOLONE ACETONIDE 1 MG/G
CREAM TOPICAL 2 TIMES DAILY
COMMUNITY
End: 2020-10-26

## 2020-06-22 RX ORDER — AMMONIUM LACTATE 12 G/100G
CREAM TOPICAL DAILY
COMMUNITY
End: 2021-02-25 | Stop reason: ALTCHOICE

## 2020-06-22 ASSESSMENT — ENCOUNTER SYMPTOMS
CONSTIPATION: 0
ABDOMINAL DISTENTION: 0
NAUSEA: 0
DIARRHEA: 0
WHEEZING: 0
SHORTNESS OF BREATH: 0
VOMITING: 0
APNEA: 0
PHOTOPHOBIA: 0
COUGH: 1
COLOR CHANGE: 1

## 2020-06-22 ASSESSMENT — PATIENT HEALTH QUESTIONNAIRE - PHQ9
SUM OF ALL RESPONSES TO PHQ QUESTIONS 1-9: 0
1. LITTLE INTEREST OR PLEASURE IN DOING THINGS: 0
2. FEELING DOWN, DEPRESSED OR HOPELESS: 0
SUM OF ALL RESPONSES TO PHQ9 QUESTIONS 1 & 2: 0
SUM OF ALL RESPONSES TO PHQ QUESTIONS 1-9: 0

## 2020-06-22 NOTE — PROGRESS NOTES
I have reviewed and discussed key elements of Laura Eugene with the resident including plan of care and follow up and agree with the care yogesh plan.
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
## 2020-06-22 NOTE — PROGRESS NOTES
Visit Information    Have you changed or started any medications since your last visit including any over-the-counter medicines, vitamins, or herbal medicines? no   Have you stopped taking any of your medications? Is so, why? -  no  Are you having any side effects from any of your medications? - no    Have you seen any other physician or provider since your last visit? yes - Cardiology,  Pulmonology,   Have you had any other diagnostic tests since your last visit?  no   Have you been seen in the emergency room and/or had an admission in a hospital since we last saw you?  yes - [facility]   Have you had your routine dental cleaning in the past 6 months?  no     Do you have an active MyChart account? If no, what is the barrier?   No: Pending    Patient Care Team:  Maris Okeefe MD as PCP - General (Family Medicine)    Medical History Review  Past Medical, Family, and Social History reviewed and does contribute to the patient presenting condition    Health Maintenance   Topic Date Due    Hepatitis C screen  1955    Pneumococcal 0-64 years Vaccine (1 of 1 - PPSV23) 07/17/1963    HIV screen  07/17/1972    DTaP/Tdap/Td vaccine (1 - Tdap) 07/17/1976    Shingles Vaccine (1 of 2) 07/17/2007    Colon cancer screen colonoscopy  07/17/2007    Lipid screen  05/26/2021    Potassium monitoring  06/01/2021    Creatinine monitoring  06/01/2021    Diabetes screen  12/10/2022    Flu vaccine  Completed    Hepatitis A vaccine  Aged Out    Hepatitis B vaccine  Aged Out    Hib vaccine  Aged Out    Meningococcal (ACWY) vaccine  Aged Out

## 2020-06-22 NOTE — PATIENT INSTRUCTIONS
Thank you for letting us take care of you today. We hope all your questions were addressed. If a question was overlooked or something else comes to mind after you return home, please contact a member of your Care Team listed below. Please make sure you have a routine office visit set up to follow-up on [address]. Your Care Team at [place] [address] is Team #4  Margarita Guzman MD (Faculty)  Radha Hagan MD (Faculty  Beni Ambriz MD (Resident)  Ryne Flanagan MD (Resident)  Leandro Viatl MD (Resident)  Priyanka Schwartz MD (Resident)  Riya Wilburn MD (Resident)  DELIA Lal ,PERRY PENA,PERRY ORDOÑEZ, PERRY Anderson ([facility] office)  Citlaly Fuller [facility] office)  Alvina Gilliam [facility] office)  Talat John, [address] ([address] [facility])  Isaias Strickland, [address] [facility] (Clinical Pharmacist)       Office phone number: [phone]    If you need to get in right away due to illness, please be advised we have \"Same Day\" appointments available Monday-Friday. Please call us at [phone] option #3 to schedule your \"Same Day\" appointment.

## 2020-06-25 ENCOUNTER — TELEPHONE (OUTPATIENT)
Dept: GASTROENTEROLOGY | Age: 65
End: 2020-06-25

## 2020-06-25 NOTE — TELEPHONE ENCOUNTER
Patient called and LVM stating that his cough is getting worse. Writer called patient back. Patient states that he has been taking Protonix BID and he is still having attacks of coughing. He states that it is getting worse and it takes him up to 30 minutes to get under control and he completely looses his breath when it happens. He wants to know if there is anything else that he can do or take, or if he should make a sooner appointment to see Dusty Ryan. Writer informed patient that the doctor would be notified of this and we will get back to him and let him know what to do next. Patient thanked writer for calling him back.

## 2020-06-30 ENCOUNTER — HOSPITAL ENCOUNTER (OUTPATIENT)
Dept: OTHER | Age: 65
Discharge: HOME | End: 2020-06-30
Payer: MEDICAID

## 2020-06-30 ENCOUNTER — TELEPHONE (OUTPATIENT)
Dept: FAMILY MEDICINE CLINIC | Age: 65
End: 2020-06-30

## 2020-06-30 VITALS
OXYGEN SATURATION: 96 % | DIASTOLIC BLOOD PRESSURE: 82 MMHG | TEMPERATURE: 96.8 F | WEIGHT: 275 LBS | HEART RATE: 85 BPM | SYSTOLIC BLOOD PRESSURE: 122 MMHG | RESPIRATION RATE: 20 BRPM | BODY MASS INDEX: 43.06 KG/M2

## 2020-06-30 PROCEDURE — 99212 OFFICE O/P EST SF 10 MIN: CPT

## 2020-06-30 NOTE — TELEPHONE ENCOUNTER
Estevan Rodney called stating the pharmacy do not prescribe JOBST OPAQUE KNEE 20-30MMHG XL) you would need to print out the order and fax it over to [facility] pharmacy.

## 2020-07-08 ENCOUNTER — OFFICE VISIT (OUTPATIENT)
Dept: FAMILY MEDICINE CLINIC | Age: 65
End: 2020-07-08
Payer: MEDICAID

## 2020-07-08 VITALS
HEART RATE: 82 BPM | WEIGHT: 284 LBS | SYSTOLIC BLOOD PRESSURE: 123 MMHG | HEIGHT: 67 IN | TEMPERATURE: 97.3 F | BODY MASS INDEX: 44.57 KG/M2 | DIASTOLIC BLOOD PRESSURE: 70 MMHG

## 2020-07-08 PROCEDURE — G8417 CALC BMI ABV UP PARAM F/U: HCPCS

## 2020-07-08 PROCEDURE — 3017F COLORECTAL CA SCREEN DOC REV: CPT

## 2020-07-08 PROCEDURE — 99213 OFFICE O/P EST LOW 20 MIN: CPT

## 2020-07-08 PROCEDURE — G8427 DOCREV CUR MEDS BY ELIG CLIN: HCPCS

## 2020-07-08 PROCEDURE — 1036F TOBACCO NON-USER: CPT

## 2020-07-08 RX ORDER — IBUPROFEN 600 MG/1
600 TABLET ORAL 4 TIMES DAILY PRN
Qty: 360 TABLET | Refills: 1 | Status: SHIPPED | OUTPATIENT
Start: 2020-07-08 | End: 2020-07-10 | Stop reason: SDUPTHER

## 2020-07-08 ASSESSMENT — ENCOUNTER SYMPTOMS
PHOTOPHOBIA: 0
DIARRHEA: 0
SHORTNESS OF BREATH: 0
COLOR CHANGE: 0
WHEEZING: 0
CONSTIPATION: 0
VOMITING: 0
ABDOMINAL DISTENTION: 0
COUGH: 0
APNEA: 0
NAUSEA: 0

## 2020-07-08 NOTE — PROGRESS NOTES
123/70 (Site: Right Upper Arm, Position: Sitting, Cuff Size: Small Adult)   Pulse 82   Temp 97.3 °F (36.3 °C) (Temporal)   Ht 5' 7\" (1.702 m)   Wt 284 lb (128.8 kg)   BMI 44.48 kg/m²    BP Readings from Last 3 Encounters:   07/08/20 123/70   06/30/20 122/82   06/22/20 138/80       Physical Exam  Constitutional:       General: He is not in acute distress. Appearance: Normal appearance. He is obese. He is not ill-appearing, toxic-appearing or diaphoretic. HENT:      Head: Normocephalic and atraumatic. Nose: Nose normal. No congestion. Eyes:      General: No scleral icterus. Right eye: No discharge. Left eye: No discharge. Extraocular Movements: Extraocular movements intact. Conjunctiva/sclera: Conjunctivae normal.      Pupils: Pupils are equal, round, and reactive to light. Cardiovascular:      Rate and Rhythm: Normal rate and regular rhythm. Pulses: Normal pulses. Heart sounds: No murmur. Pulmonary:      Effort: Pulmonary effort is normal.      Breath sounds: Normal breath sounds. No wheezing. Abdominal:      General: There is no distension. Tenderness: There is no abdominal tenderness. Musculoskeletal: Normal range of motion. General: Tenderness present. No swelling. Right lower leg: Edema present. Left lower leg: Edema present. Comments: Right thigh pain on active flexion, no tenderness on passive flexion or extension. Negative straight leg raise test   Skin:     General: Skin is warm. Coloration: Skin is not jaundiced. Findings: Erythema present. Comments: Venous dermatitis in bilateral legs getting better than last time   Neurological:      Mental Status: He is alert and oriented to person, place, and time. Motor: No weakness. Psychiatric:         Mood and Affect: Mood normal.         Behavior: Behavior normal.         Thought Content:  Thought content normal.         Judgment: Judgment normal. Lab Results   Component Value Date    WBC 7.2 05/26/2020    HGB 12.4 (L) 05/26/2020    HCT 39.1 (L) 05/26/2020     05/26/2020    CHOL 112 05/26/2020    TRIG 53 05/26/2020    HDL 56 05/26/2020    ALT 17 05/26/2020    AST 19 05/26/2020     06/01/2020    K 4.2 06/01/2020     06/01/2020    CREATININE 0.95 06/01/2020    BUN 18 06/01/2020    CO2 22 06/01/2020    TSH 1.48 01/24/2020    INR 1.1 01/22/2020    LABA1C 5.6 12/10/2019     Lab Results   Component Value Date    CALCIUM 9.8 06/01/2020     Lab Results   Component Value Date    LDLCHOLESTEROL 45 05/26/2020       Assessment and Plan:    1. Right thigh pain  Ibuprofen 600 mg 4 times daily as needed  Voltaren gel as needed      2. Need for prophylactic vaccination and inoculation against varicella  - zoster recombinant adjuvanted vaccine [facility]) 50 MCG/0.5ML SUSR injection; Inject 0.5 mLs into the muscle once for 1 dose 50 MCG IM then repeat 2-6 months. Dispense: 1 each; Refill: 1          Requested Prescriptions     Pending Prescriptions Disp Refills    zoster recombinant adjuvanted vaccine (SHINGRIX) 50 MCG/0.5ML SUSR injection 1 each 1     Sig: Inject 0.5 mLs into the muscle once for 1 dose 50 MCG IM then repeat 2-6 months. There are no discontinued medications. Angelica Verdin received counseling on the following healthy behaviors: nutrition, exercise and medication adherence    Discussed use,benefit, and side effects of prescribed medications. Barriers to medication compliance addressed. All patient questions answered. Pt voiced understanding. Return in about 6 months (around 1/8/2021) for CHF, Thigh pain, AFIB. Disclaimer: Some orall of this note was transcribed using voice-recognition software. This may cause typographical errors occasionally. Although all effort is made to fix these errors, please do not hesitate to contact our office if there Tacey Lash concern with the understanding of this note.

## 2020-07-08 NOTE — PROGRESS NOTES
Visit Information    Have you changed or started any medications since your last visit including any over-the-counter medicines, vitamins, or herbal medicines? no   Have you stopped taking any of your medications? Is so, why? -  no  Are you having any side effects from any of your medications? - no    Have you seen any other physician or provider since your last visit?  no   Have you had any other diagnostic tests since your last visit?  no   Have you been seen in the emergency room and/or had an admission in a hospital since we last saw you?  no   Have you had your routine dental cleaning in the past 6 months?  no     Do you have an active MyChart account? If no, what is the barrier?  No declined    Patient Care Team:  Diane York MD as PCP - General (Family Medicine)    Medical History Review  Past Medical, Family, and Social History reviewed and does not contribute to the patient presenting condition    Health Maintenance   Topic Date Due    Hepatitis C screen  1955    Pneumococcal 0-64 years Vaccine (1 of 1 - PPSV23) 07/17/1963    HIV screen  07/17/1972    DTaP/Tdap/Td vaccine (1 - Tdap) 07/17/1976    Shingles Vaccine (1 of 2) 07/17/2007    Colon cancer screen colonoscopy  07/17/2007    Flu vaccine (1) 09/01/2020    Lipid screen  05/26/2021    Potassium monitoring  06/01/2021    Creatinine monitoring  06/01/2021    Diabetes screen  12/10/2022    Hepatitis A vaccine  Aged Out    Hepatitis B vaccine  Aged Out    Hib vaccine  Aged Out    Meningococcal (ACWY) vaccine  Aged Out

## 2020-07-08 NOTE — PROGRESS NOTES
I have reviewed and discussed yeboah elements of Ruddy Flood with the resident including plan of care and follow up and agree with the care yogesh plan.

## 2020-07-10 RX ORDER — IBUPROFEN 600 MG/1
600 TABLET ORAL 4 TIMES DAILY PRN
Qty: 360 TABLET | Refills: 1 | Status: SHIPPED | OUTPATIENT
Start: 2020-07-10 | End: 2020-10-26

## 2020-07-16 ENCOUNTER — TELEPHONE (OUTPATIENT)
Dept: GASTROENTEROLOGY | Age: 65
End: 2020-07-16

## 2020-07-16 NOTE — TELEPHONE ENCOUNTER
Pt ask if you call him today to please wait until the afternoon as he has a dentist appt this morning

## 2020-07-16 NOTE — TELEPHONE ENCOUNTER
Pt called back and stated the ENT that Dr Jag Bobo referred to  called and said they do not take his insurance.  Please contact pt and let him know what to do next

## 2020-07-16 NOTE — TELEPHONE ENCOUNTER
Patient called 7/16/2020 @ 1:25 and LVM that he needs to speak with  nurse Please return his call [phone]

## 2020-07-20 ENCOUNTER — TELEPHONE (OUTPATIENT)
Dept: GASTROENTEROLOGY | Age: 65
End: 2020-07-20

## 2020-07-20 NOTE — TELEPHONE ENCOUNTER
Patient called and stated that he found 3 ENT doctors that his insurance would cover:  Mira Dao [id number]  Chloeromina Tae [id number]  Chela Gutierrez [id number]      Writer made new referral to Dr. Chela Gutierrez and faxed referral to [phone]

## 2020-07-28 ENCOUNTER — TELEPHONE (OUTPATIENT)
Dept: FAMILY MEDICINE CLINIC | Age: 65
End: 2020-07-28

## 2020-07-28 ENCOUNTER — OFFICE VISIT (OUTPATIENT)
Dept: FAMILY MEDICINE CLINIC | Age: 65
End: 2020-07-28
Payer: MEDICAID

## 2020-07-28 VITALS
SYSTOLIC BLOOD PRESSURE: 128 MMHG | HEIGHT: 67 IN | BODY MASS INDEX: 45.83 KG/M2 | WEIGHT: 292 LBS | DIASTOLIC BLOOD PRESSURE: 78 MMHG | HEART RATE: 77 BPM | TEMPERATURE: 97.3 F

## 2020-07-28 PROCEDURE — 1036F TOBACCO NON-USER: CPT | Performed by: STUDENT IN AN ORGANIZED HEALTH CARE EDUCATION/TRAINING PROGRAM

## 2020-07-28 PROCEDURE — 3017F COLORECTAL CA SCREEN DOC REV: CPT | Performed by: STUDENT IN AN ORGANIZED HEALTH CARE EDUCATION/TRAINING PROGRAM

## 2020-07-28 PROCEDURE — 99213 OFFICE O/P EST LOW 20 MIN: CPT | Performed by: STUDENT IN AN ORGANIZED HEALTH CARE EDUCATION/TRAINING PROGRAM

## 2020-07-28 PROCEDURE — G8417 CALC BMI ABV UP PARAM F/U: HCPCS | Performed by: STUDENT IN AN ORGANIZED HEALTH CARE EDUCATION/TRAINING PROGRAM

## 2020-07-28 PROCEDURE — G8427 DOCREV CUR MEDS BY ELIG CLIN: HCPCS | Performed by: STUDENT IN AN ORGANIZED HEALTH CARE EDUCATION/TRAINING PROGRAM

## 2020-07-28 RX ORDER — LIDOCAINE 4 G/G
1 PATCH TOPICAL DAILY
Qty: 30 PATCH | Refills: 0 | Status: SHIPPED | OUTPATIENT
Start: 2020-07-28 | End: 2020-08-04

## 2020-07-28 ASSESSMENT — ENCOUNTER SYMPTOMS
VOMITING: 0
WHEEZING: 0
CONSTIPATION: 0
DIARRHEA: 0
NAUSEA: 0
COUGH: 0
COLOR CHANGE: 0
APNEA: 0
PHOTOPHOBIA: 0
ABDOMINAL DISTENTION: 0
SHORTNESS OF BREATH: 0

## 2020-07-28 NOTE — TELEPHONE ENCOUNTER
Patient called stated insurance does not cover the patch that was prescribe for right thigh pain, asking if something else could be called in, Please advise

## 2020-07-28 NOTE — PROGRESS NOTES
Subjective: Verona Dickens is a 63 y.o. male with  has a past medical history of Atrial fibrillation ([address] History of incarceration, Hyperlipidemia, and Hypertension. Family History   Problem Relation Age of Onset    Alcohol Abuse Maternal Uncle     Heart Attack Maternal Uncle        Presented tot office today for:  Chief Complaint   Patient presents with    Pain     F/U       HPI  Patient is a 80-year-old male presenting for right thigh pain follow-up. Pain was sudden in onset 3 weeks ago and is reported as constant 6 out of 10 sharp pain that is nonradiating. No numbness/tingling, loss of sensation, decreased range of motion. Has tried ibuprofen Voltaren gel with no relief. Denies any erythema, swelling. Review of Systems   Constitutional: Negative for activity change, appetite change, fatigue, fever and unexpected weight change. HENT: Negative for congestion. Eyes: Negative for photophobia and visual disturbance. Respiratory: Negative for apnea, cough, shortness of breath and wheezing. Cardiovascular: Negative for chest pain, palpitations and leg swelling. Gastrointestinal: Negative for abdominal distention, constipation, diarrhea, nausea and vomiting. Endocrine: Negative for polyuria. Genitourinary: Negative for dysuria and urgency. Musculoskeletal: Positive for arthralgias and gait problem. Right thigh pain   Skin: Negative for color change, pallor, rash and wound. Venous dermatitis getting better than last time   Neurological: Positive for weakness. Negative for dizziness, tremors, light-headedness and headaches. Psychiatric/Behavioral: Negative for agitation, behavioral problems, confusion and decreased concentration.        Objective:    /78 (Site: Right Upper Arm, Position: Sitting, Cuff Size: Medium Adult)   Pulse 77   Temp 97.3 °F (36.3 °C) (Temporal)   Ht 5' 7\" (1.702 m)   Wt 292 lb (132.5 kg)   BMI 45.73 kg/m²    BP Readings from Last 3

## 2020-07-28 NOTE — PROGRESS NOTES
I have reviewed and discussed yeboah elements of Sim aWrren with the resident including plan of care and follow up and agree with the care yogesh plan.

## 2020-07-29 ENCOUNTER — OFFICE VISIT (OUTPATIENT)
Dept: SURGERY | Age: 65
End: 2020-07-29
Payer: MEDICAID

## 2020-07-29 ENCOUNTER — HOSPITAL ENCOUNTER (OUTPATIENT)
Dept: OTHER | Age: 65
Discharge: HOME | End: 2020-07-29
Payer: MEDICAID

## 2020-07-29 VITALS
HEART RATE: 85 BPM | TEMPERATURE: 98.3 F | WEIGHT: 295 LBS | SYSTOLIC BLOOD PRESSURE: 130 MMHG | OXYGEN SATURATION: 98 % | RESPIRATION RATE: 16 BRPM | DIASTOLIC BLOOD PRESSURE: 80 MMHG | BODY MASS INDEX: 46.2 KG/M2

## 2020-07-29 VITALS
HEART RATE: 76 BPM | BODY MASS INDEX: 45.73 KG/M2 | TEMPERATURE: 96.6 F | SYSTOLIC BLOOD PRESSURE: 115 MMHG | DIASTOLIC BLOOD PRESSURE: 73 MMHG | HEIGHT: 67 IN

## 2020-07-29 PROBLEM — M79.651 RIGHT THIGH PAIN: Status: ACTIVE | Noted: 2020-07-29

## 2020-07-29 PROBLEM — R20.0 BILATERAL HAND NUMBNESS: Status: ACTIVE | Noted: 2020-07-29

## 2020-07-29 PROBLEM — R29.898 LEFT LEG WEAKNESS: Status: ACTIVE | Noted: 2020-07-29

## 2020-07-29 PROCEDURE — 1036F TOBACCO NON-USER: CPT | Performed by: STUDENT IN AN ORGANIZED HEALTH CARE EDUCATION/TRAINING PROGRAM

## 2020-07-29 PROCEDURE — G8417 CALC BMI ABV UP PARAM F/U: HCPCS | Performed by: STUDENT IN AN ORGANIZED HEALTH CARE EDUCATION/TRAINING PROGRAM

## 2020-07-29 PROCEDURE — 3017F COLORECTAL CA SCREEN DOC REV: CPT | Performed by: STUDENT IN AN ORGANIZED HEALTH CARE EDUCATION/TRAINING PROGRAM

## 2020-07-29 PROCEDURE — 99202 OFFICE O/P NEW SF 15 MIN: CPT | Performed by: STUDENT IN AN ORGANIZED HEALTH CARE EDUCATION/TRAINING PROGRAM

## 2020-07-29 PROCEDURE — 99212 OFFICE O/P EST SF 10 MIN: CPT

## 2020-07-29 PROCEDURE — 99214 OFFICE O/P EST MOD 30 MIN: CPT | Performed by: NURSE PRACTITIONER

## 2020-07-29 PROCEDURE — G8427 DOCREV CUR MEDS BY ELIG CLIN: HCPCS | Performed by: STUDENT IN AN ORGANIZED HEALTH CARE EDUCATION/TRAINING PROGRAM

## 2020-07-29 RX ORDER — POLYETHYLENE GLYCOL 3350 17 G/17G
POWDER ORAL
Qty: 1 BOTTLE | Refills: 0 | Status: ON HOLD | OUTPATIENT
Start: 2020-07-29 | End: 2021-03-09 | Stop reason: HOSPADM

## 2020-07-29 RX ORDER — FUROSEMIDE 20 MG/1
TABLET ORAL
Qty: 30 TABLET | Refills: 1 | Status: SHIPPED
Start: 2020-07-29 | End: 2020-08-12 | Stop reason: DRUGHIGH

## 2020-07-29 RX ORDER — POTASSIUM CHLORIDE 20 MEQ/1
20 TABLET, EXTENDED RELEASE ORAL DAILY
Qty: 30 TABLET | Refills: 1 | Status: SHIPPED | OUTPATIENT
Start: 2020-07-29 | End: 2020-12-03 | Stop reason: SDUPTHER

## 2020-07-29 ASSESSMENT — ENCOUNTER SYMPTOMS
BLOOD IN STOOL: 0
COUGH: 0
EYE DISCHARGE: 0
COLOR CHANGE: 1
ABDOMINAL PAIN: 0
SHORTNESS OF BREATH: 1

## 2020-07-29 ASSESSMENT — PATIENT HEALTH QUESTIONNAIRE - PHQ9
2. FEELING DOWN, DEPRESSED OR HOPELESS: 0
SUM OF ALL RESPONSES TO PHQ QUESTIONS 1-9: 0
SUM OF ALL RESPONSES TO PHQ9 QUESTIONS 1 & 2: 0
SUM OF ALL RESPONSES TO PHQ QUESTIONS 1-9: 0
1. LITTLE INTEREST OR PLEASURE IN DOING THINGS: 0

## 2020-07-29 ASSESSMENT — PAIN SCALES - GENERAL: PAINLEVEL_OUTOF10: 0

## 2020-07-29 NOTE — PROGRESS NOTES
[facility]    Patient's Name: Ron Ojeda   YOB: 1955 (63 y.o.)    CC: + FIT    HPI: Ron Ojeda is a 63 y.o. male that presents to the [facility] as a consult for a colonoscopy. Pt had a positive Cologuard test and was referred here for colonoscopy. Patient does have significant past medical and surgical history including a CABG and MAZE procedure last year and currently takes Plavix, Eliquis and aspirin. Patient has hypertension, hyperlipidemia, and history of A. fib. Patient denies any current fever, chills, nausea, emesis, chest pain, shortness of breath. Patient denies any history of colon cancer for self or family. Patient does admit to having Hx of cancer in his family mothers side mainly being throat and breast cancer but not sure of more details then that. Patient denies any use of cigarettes or nicotine at this time. Patient denies any excessive alcohol use. Patient denies any other illicit drug use. Pt does have appointment with CHF clinic today. No Known Allergies    Current Outpatient Medications on File Prior to Visit   Medication Sig Dispense Refill    lidocaine 4 % external patch Place 1 patch onto the skin daily 30 patch 0    ibuprofen (ADVIL;MOTRIN) 600 MG tablet Take 1 tablet by mouth 4 times daily as needed for Pain 360 tablet 1    diclofenac sodium (VOLTAREN) 1 % GEL Apply 2 g topically 4 times daily as needed for Pain 2 Tube 1    triamcinolone (KENALOG) 0.1 % cream Apply topically 2 times daily Apply topically 2 times daily.  ammonium lactate (AMLACTIN) 12 % cream Apply topically daily Apply topically as needed.       Elastic Bandages & Supports (JOBST OPAQUE KNEE 20-30MMHG XL) MISC Dispense two pair closed toe knee high 20 to 30 mmHg Jobst compression stockings 2 each 0    pantoprazole (PROTONIX) 40 MG tablet Take 1 tablet by mouth 2 times daily (before meals) 60 tablet 5    furosemide (LASIX) 40 MG tablet Take 40 mg by mouth daily      nitroGLYCERIN (NITROSTAT) 0.4 MG SL tablet Place 0.4 mg under the tongue every 5 minutes as needed for Chest pain up to max of 3 total doses. If no relief after 1 dose, call 911.  white petrolatum GEL Apply 28 g topically as needed for Dry Lips 106 g 0    aspirin 81 MG EC tablet Take 81 mg by mouth daily      metoprolol succinate (TOPROL XL) 100 MG extended release tablet Take 1 tablet by mouth 2 times daily 30 tablet 3    spironolactone (ALDACTONE) 25 MG tablet Take 1 tablet by mouth daily 30 tablet 3    potassium chloride (KLOR-CON M) 20 MEQ extended release tablet Take 1 tablet by mouth 2 times daily for 7 days 30 tablet 0    magnesium hydroxide (MILK OF MAGNESIA) 400 MG/5ML suspension Take 30 mLs by mouth daily as needed for Constipation 1 Bottle     pantoprazole (PROTONIX) 40 MG tablet Take 1 tablet by mouth every morning (before breakfast) 30 tablet 0    apixaban (ELIQUIS) 5 MG TABS tablet Take 1 tablet by mouth 2 times daily 60 tablet 0    atorvastatin (LIPITOR) 40 MG tablet Take 1 tablet by mouth nightly 30 tablet 0    clopidogrel (PLAVIX) 75 MG tablet Take 1 tablet by mouth daily 30 tablet 0     No current facility-administered medications on file prior to visit.         Past Medical History:   Diagnosis Date    Atrial fibrillation ([address] 10/2019    History of incarceration     released 2019    Hyperlipidemia     Hypertension        Past Surgical History:   Procedure Laterality Date    CARDIAC SURGERY      CARDIOVERSION  10/16/2019    CARPAL TUNNEL RELEASE Bilateral 2008    CERVICAL SPINE SURGERY      2-5    FRACTURE SURGERY      Left leg    PACEMAKER CHANGE N/A 10/21/2019    CABG CORONARY ARTERY BYPASS GRAFT X1, LIMA-LAD, BROWN 4 MAZE PROCEDURE, 50MM ATRICURE ATRIAL CLIP RIGID INTERNAL FIXATION PLATES X 3   SCREWS X 13 performed by Krystle Craig MD at [address] [place] [address] TRANSESOPHAGEAL ECHOCARDIOGRAM  10/16/2019       Social History     Socioeconomic History    Marital status:      Spouse name: None    Number of children: None    Years of education: None    Highest education level: None   Occupational History    None   Social Needs    Financial resource strain: None    Food insecurity     Worry: None     Inability: None    Transportation needs     Medical: None     Non-medical: None   Tobacco Use    Smoking status: Never Smoker    Smokeless tobacco: Never Used   Substance and Sexual Activity    Alcohol use: Not Currently    Drug use: Not Currently    Sexual activity: Not Currently   Lifestyle    Physical activity     Days per week: None     Minutes per session: None    Stress: None   Relationships    Social connections     Talks on phone: None     Gets together: None     Attends Jainism service: None     Active member of club or organization: None     Attends meetings of clubs or organizations: None     Relationship status: None    Intimate partner violence     Fear of current or ex partner: None     Emotionally abused: None     Physically abused: None     Forced sexual activity: None   Other Topics Concern    None   Social History Narrative    None       Family History   Problem Relation Age of Onset    Alcohol Abuse Maternal Uncle     Heart Attack Maternal Uncle        ROS:  GEN: Denies recent weight loss, fatigue, fevers, chills. HEENT: No rhinorrhea, dysphagia, odynphagia. CV: No history of MI, recent chest pain. RESP: Denies shortness of breath, COPD, asthma. GI: per HPI  : Denies increased frequency or dysuria. HEM[de-identified] Denies history of anemia or DVTs. ENDO: Denies history of thyroid problems or diabetes. NEURO: Denies history of CVA, TIA. Notes reviewed, and agree with documentation in pt's chart. PHYSICAL EXAM:  Vitals:    07/29/20 0850   BP: 115/73   Pulse: 76   Temp: 96.6 °F (35.9 °C)     GEN: Alert and oriented x 3. In no acute distress. Appears stated age.   HEENT: PERRLA, EOMI  NECK: trachea midline  HEART: Regular rate and rhythm,   LUNGS: symmetrical chest rise bilaterally. No audible wheezes   ABDOMEN: soft, NT, ND, no rebound, no scars present no of abdomen, previous sternotomy scar noted  EXT: no cyanosis, clubbing, +1 edema b/l LE present, chronic venous stasis changes to b/l LE  NEURO: no focal deficits, gross motor intact. SKIN: No rashes or nodules noted. ASSESSMENT/ PLAN:  Augusta Bains is a 63 y.o. male that presents to the [facility] for + FIT test. We recommend that this patient undergo a colonoscopy at the earliest convenient date. The colon preparation was discussed in detail with patient, allowing for questions. All questions were answered in the office. The risks, benefits and alternatives, of the procedure were explained in detail. All questions were thoroughly answered. Patient understood and agreed to proceed with the procedure. A signed informed consent was obtained for the procedure in the office. A date will be forthcoming and we will follow up with Augusta Bains as needed at the [facility]. Patient will need to obtain medical and cardiology/CT Sx clearance prior to procedure. Will schedule once cleared. Pt understands will need to hold [facility] [place] by [facility] per discretion and regimen of cardiology/CT Sx recommendations. Thank you for the consult. We appreciate being involved in the care of your patient. Ziggy Gage  7/29/2020     I have reviewed the resident's note and I either performed the key elements of the medical history and physical exam or was present with the resident when the key elements of the medical history and physical exam were performed. I have discussed the findings, established the care plan and recommendations with Resident.     Electronically signed by Mary Beth Sheriff IV, DO on 8/5/20 at 10:55 AM EDT

## 2020-07-29 NOTE — PROGRESS NOTES
CHF Clinic at [facility]    Office: [phone] Fax: [address] [facility]  [phone]  Dept: [phone]  Loc: [phone]    Emanuel Villarreal is a 63 y.o. male who presents today for CHF evaluation. HPI:     + shortness of breath, with intense exertion. Minimal problems with a short distance   using cane d/t thigh pain. And L leg weakness/ thigh pain   Baseline  fatigue  +  Edema  X sev wks, increasing in past few weeks, no blistering. Elevates at night, edema after legs are dependent 10 + mins. No weeping   Denies chest pain   Denies  palpitations     Denies high Na+ foods  \drinking approx 28- 32 oz fluids , he thinks. Asked to measure.    Pt did make lasagna for himself recently     Pt had admit in [address] , low K+ of 2.6        Past Medical History:   Diagnosis Date    [address] [address] [facility] 10/2019    History of incarceration     released 2019    Hyperlipidemia     Hypertension      Past Surgical History:   Procedure Laterality Date    CARDIAC SURGERY      CARDIOVERSION  10/16/2019    CARPAL TUNNEL RELEASE Bilateral 2008    CERVICAL SPINE SURGERY      2-5    FRACTURE SURGERY      Left leg    PACEMAKER CHANGE N/A 10/21/2019    CABG CORONARY ARTERY BYPASS GRAFT X1, LIMA-LAD, BROWN 4 MAZE PROCEDURE, 50MM ATRICURE ATRIAL CLIP RIGID INTERNAL FIXATION PLATES X 3   SCREWS X 13 performed by Tutu Morgan MD at [address] [place] [address] TRANSESOPHAGEAL ECHOCARDIOGRAM  10/16/2019       Family History   Problem Relation Age of Onset    Alcohol Abuse Maternal Uncle     Heart Attack Maternal Uncle        Social History     Tobacco Use    Smoking status: Never Smoker    Smokeless tobacco: Never Used   Substance Use Topics    Alcohol use: Not Currently      Current Outpatient Medications   Medication Sig Dispense Refill    polyethylene glycol (MIRALAX) 17 GM/SCOOP POWD powder 250g bottle. For colonoscopy prep. Follow per instructions from clinic. 1 Bottle 0    furosemide (LASIX) 20 MG tablet Take 1 tab po q afternoon. Continue with lasix 40 mg q am. 30 tablet 1    potassium chloride (KLOR-CON M) 20 MEQ extended release tablet Take 1 tablet by mouth daily 30 tablet 1    lidocaine 4 % external patch Place 1 patch onto the skin daily 30 patch 0    ibuprofen (ADVIL;MOTRIN) 600 MG tablet Take 1 tablet by mouth 4 times daily as needed for Pain 360 tablet 1    diclofenac sodium (VOLTAREN) 1 % GEL Apply 2 g topically 4 times daily as needed for Pain 2 Tube 1    triamcinolone (KENALOG) 0.1 % cream Apply topically 2 times daily Apply topically 2 times daily.  ammonium lactate (AMLACTIN) 12 % cream Apply topically daily Apply topically as needed.  Elastic Bandages & Supports (JOBST OPAQUE KNEE 20-30MMHG XL) MISC Dispense two pair closed toe knee high 20 to 30 mmHg Jobst compression stockings 2 each 0    furosemide (LASIX) 40 MG tablet Take 40 mg by mouth daily      nitroGLYCERIN (NITROSTAT) 0.4 MG SL tablet Place 0.4 mg under the tongue every 5 minutes as needed for Chest pain up to max of 3 total doses. If no relief after 1 dose, call 911.       aspirin 81 MG EC tablet Take 81 mg by mouth daily      spironolactone (ALDACTONE) 25 MG tablet Take 1 tablet by mouth daily 30 tablet 3    magnesium hydroxide (MILK OF MAGNESIA) 400 MG/5ML suspension Take 30 mLs by mouth daily as needed for Constipation 1 Bottle     pantoprazole (PROTONIX) 40 MG tablet Take 1 tablet by mouth every morning (before breakfast) 30 tablet 0    apixaban (ELIQUIS) 5 MG TABS tablet Take 1 tablet by mouth 2 times daily 60 tablet 0    atorvastatin (LIPITOR) 40 MG tablet Take 1 tablet by mouth nightly 30 tablet 0    clopidogrel (PLAVIX) 75 MG tablet Take 1 tablet by mouth daily 30 tablet 0    pantoprazole (PROTONIX) 40 MG tablet Take 1 tablet by mouth 2 times daily (before meals) 60 tablet 5    white petrolatum GEL Apply 28 g topically as needed for Dry Lips 106 g 0    metoprolol succinate (TOPROL XL) 100 MG extended release tablet Take 1 tablet by mouth 2 times daily 30 tablet 3    potassium chloride (KLOR-CON M) 20 MEQ extended release tablet Take 1 tablet by mouth 2 times daily for 7 days 30 tablet 0     No current facility-administered medications for this encounter. No Known Allergies      Subjective:      Review of Systems   Constitutional: Positive for fatigue. Negative for activity change, chills and fever. Eyes: Negative for discharge and visual disturbance. Respiratory: Positive for shortness of breath. Negative for cough. Cardiovascular: Positive for leg swelling. Negative for chest pain. Gastrointestinal: Negative for abdominal pain and blood in stool. Endocrine: Negative for cold intolerance and heat intolerance. Genitourinary: Negative for dysuria and flank pain. Musculoskeletal: Positive for gait problem, joint swelling and myalgias. Skin: Positive for color change. Negative for pallor, rash and wound. Neurological: Positive for weakness. Negative for dizziness and headaches. Psychiatric/Behavioral: Negative for hallucinations and suicidal ideas. Objective:     Physical Exam  Vitals signs and nursing note reviewed. Constitutional:       Appearance: He is well-developed. Comments:      HENT:      Head: Normocephalic and atraumatic. Eyes:      General: No scleral icterus. Conjunctiva/sclera: Conjunctivae normal.   Neck:      Musculoskeletal: Neck supple. Cardiovascular:      Rate and Rhythm: Normal rate and regular rhythm. Heart sounds: Normal heart sounds. Pulmonary:      Effort: Pulmonary effort is normal.      Breath sounds: Normal breath sounds. No wheezing or rales. Abdominal:      General: Bowel sounds are normal.      Palpations: Abdomen is soft. Musculoskeletal: Normal range of motion. Right lower leg: Edema present.       Left lower leg: Edema present. Comments: Mild redness of L LE, mild warmth. 3+ edema. Blisters starting to form. RLE 2+ pitting edema    Skin:     General: Skin is warm and dry. Neurological:      Mental Status: He is alert and oriented to person, place, and time. /80   Pulse 85   Temp 98.3 °F (36.8 °C)   Resp 16   Wt 295 lb (133.8 kg)   SpO2 98%   BMI 46.20 kg/m²           Lower Extremity Measurements in cm. R Calf Circumference (cm): 49.5 cm  L Calf Circumference (cm): 50 cm  R Ankle Circumference (cm): 29.5 cm  L Ankle Circumference (cm): 31.5 cm    CBC:   Lab Results   Component Value Date    WBC 7.2 05/26/2020    RBC 4.13 05/26/2020    HGB 12.4 05/26/2020    HCT 39.1 05/26/2020    MCV 94.7 05/26/2020    MCH 30.0 05/26/2020    MCHC 31.7 05/26/2020    RDW 14.4 05/26/2020     05/26/2020    MPV 9.8 05/26/2020     CMP:    Lab Results   Component Value Date     06/01/2020    K 4.2 06/01/2020     06/01/2020    CO2 22 06/01/2020    BUN 18 06/01/2020    CREATININE 0.95 06/01/2020    GFRAA >60 06/01/2020    LABGLOM >60 06/01/2020    GLUCOSE 109 06/01/2020    PROT 6.9 05/26/2020    LABALBU 3.9 05/26/2020    CALCIUM 9.8 06/01/2020    BILITOT 0.39 05/26/2020    ALKPHOS 58 05/26/2020    AST 19 05/26/2020    ALT 17 05/26/2020     Lab Results   Component Value Date    LABA1C 5.6 12/10/2019       Echo Overall systolic function is moderately reduced with a calculated EF 40%. Abnormal septal wall motion. Grade III (severe) left ventricular diastolic dysfunction. Left atrium is moderately dilated. Right atrial dilatation. Right ventricular dilatation with reduced systolic function. Mild to moderate mitral regurgitation (posteriorly eccentric jet.)  Moderate tricuspid regurgitation. Estimated right ventricular systolic  pressure is 35 mmHg.   Large pleural effusion.     Signature  ----------------------------------------------------------------------------   Electronically signed by Rekha Arriola(Sonographer) on 10/25/2019 12:46    :Assessment      1. Heart failure, systolic and diastolic, chronic ([address]    2. Hypokalemia    3. Bilateral hand numbness    4. Right thigh pain    5. Left leg weakness        :Plan      1. Heart failure, systolic and diastolic, chronic ([address]    2. Hypokalemia    3. Bilateral hand numbness    4. Right thigh pain    5. Left leg weakness      Medications reviewed. Echocardiogram reviewed. On Guideline-Directed Medication Therapy:   Beta - blocker  Aldosterone Antagonist  Diuretic Therapy    Pt is up 20 lbs since last appt in June.   edema has returned in b/l LE. Blisters starting to emerge. Prev treated with keflex with no change. This erythema and mild warmth is characteristic of his edema pattern. leg measurements have increased. Pt denies poor diet or increased fluids. Will add lasix 20 mg q afternoon, and KCl as well. Labs prior to next appt, will monitor renal fx and K+ levels. RTC 2 w         Orders Placed This Encounter   Procedures    Basic Metabolic Panel     Standing Status:   Future     Standing Expiration Date:   7/29/2021     Orders Placed This Encounter   Medications    furosemide (LASIX) 20 MG tablet     Sig: Take 1 tab po q afternoon. Continue with lasix 40 mg q am.     Dispense:  30 tablet     Refill:  1    potassium chloride (KLOR-CON M) 20 MEQ extended release tablet     Sig: Take 1 tablet by mouth daily     Dispense:  30 tablet     Refill:  1       Patientgiven verbal and/or written educational instructions. Follow up as directed. I have reviewed and agree with the nursing documentation. Verbally reviewed medication list with patient; patient verbalized understanding. Discussed 2000mg/day sodium restricted diet; patient verbalized understanding. Moderate daily exercise encouraged as tolerated. Discussed rest breaks as needed; patient verbalized understanding.      Patient instructed to weigh self at the same time of each day, using same clothes and same scale; reinforced teaching to monitor for 3-5 lb weight increase over 1-2 days, and to notify the CHF clinic at [phone] or physician office if weight change noted. Patient verbalized understanding. Risks of smoking discussed with the patient if applicable; patient strongly discouraged to smoke. Patient verbalized understanding. Signs and symptoms of CHF discussed with patient, such as feeling more tired than normal, feeling short of breath, coughing that increases when you lie down, sudden weight gain, swelling of your feet, legs or belly. Patient verbalized understanding to notify the CHF clinic at [phone] or physician office if these symptoms occur. Compliance with plan of care and further disease process causes discussed with patient, patient encouraged to keep all follow up appointments. Patient verbalized understanding. Patient was seen with total face to face time of  25 minutes. More than 50% of this visit was counseling and education, & coordination of care.            Electronically signedby LUDY Rosales CNP on 7/29/2020 at 2:54 PM

## 2020-07-30 RX ORDER — FUROSEMIDE 40 MG/1
40 TABLET ORAL DAILY
Qty: 60 TABLET | OUTPATIENT
Start: 2020-07-30

## 2020-07-30 NOTE — TELEPHONE ENCOUNTER
Already on lasix 20 mg
refill and close the encounter. If I can be of assistance, please route to the applicable pool. Thank you.

## 2020-07-30 NOTE — TELEPHONE ENCOUNTER
Patient called in stating that he was told by pharmacist that if script is changed to 5% instead of 4% insurance will cover.  Please advise

## 2020-08-03 ENCOUNTER — TELEPHONE (OUTPATIENT)
Dept: FAMILY MEDICINE CLINIC | Age: 65
End: 2020-08-03

## 2020-08-04 ENCOUNTER — OFFICE VISIT (OUTPATIENT)
Dept: GASTROENTEROLOGY | Age: 65
End: 2020-08-04
Payer: MEDICAID

## 2020-08-04 VITALS — BODY MASS INDEX: 45.73 KG/M2 | TEMPERATURE: 97.2 F | WEIGHT: 292 LBS | RESPIRATION RATE: 18 BRPM

## 2020-08-04 PROCEDURE — G8427 DOCREV CUR MEDS BY ELIG CLIN: HCPCS | Performed by: INTERNAL MEDICINE

## 2020-08-04 PROCEDURE — 1036F TOBACCO NON-USER: CPT | Performed by: INTERNAL MEDICINE

## 2020-08-04 PROCEDURE — G8417 CALC BMI ABV UP PARAM F/U: HCPCS | Performed by: INTERNAL MEDICINE

## 2020-08-04 PROCEDURE — 3017F COLORECTAL CA SCREEN DOC REV: CPT | Performed by: INTERNAL MEDICINE

## 2020-08-04 PROCEDURE — 99213 OFFICE O/P EST LOW 20 MIN: CPT | Performed by: INTERNAL MEDICINE

## 2020-08-04 RX ORDER — LIDOCAINE 50 MG/G
1 PATCH TOPICAL DAILY
Qty: 10 PATCH | Refills: 0 | Status: SHIPPED | OUTPATIENT
Start: 2020-08-04 | End: 2020-08-10

## 2020-08-04 ASSESSMENT — ENCOUNTER SYMPTOMS
CHEST TIGHTNESS: 1
GASTROINTESTINAL NEGATIVE: 1
APNEA: 1
EYES NEGATIVE: 1
COUGH: 1
ALLERGIC/IMMUNOLOGIC NEGATIVE: 1

## 2020-08-04 NOTE — PROGRESS NOTES
Subjective:      Patient ID: Jesus Cloud is a 63 y.o. male. HPI    Review of Systems   Constitutional: Positive for fatigue. HENT: Negative. Eyes: Negative. Respiratory: Positive for apnea, cough and chest tightness. Cardiovascular: Negative. Gastrointestinal: Negative. Endocrine: Negative. Genitourinary: Negative. Musculoskeletal: Positive for gait problem. Skin: Negative. Allergic/Immunologic: Negative. Hematological: Bruises/bleeds easily. Psychiatric/Behavioral: Positive for sleep disturbance. All other systems reviewed and are negative.       Objective:   Physical Exam    Assessment:            Plan:

## 2020-08-04 NOTE — TELEPHONE ENCOUNTER
PC from [facility] outpatient pharmacy in regards to lidocaine patch script     States that box comes qty of 30 but script only written for 10, wants ok to dispense full box for qty 30?     Please review and advise

## 2020-08-04 NOTE — PROGRESS NOTES
GI FOLLOW UP    INTERVAL HISTORY:       Postprandial refill  Patient has 1 large meal the middle day where he most significantly has most of his postprandial symptoms    Chief Complaint   Patient presents with    Follow-up     Patient states he still has a bad cough after he eats. He states it feels like he is coughing up a lung, cant catch his breath. He states having a lot of swelling in his lower extremities. HISTORY OF PRESENT ILLNESS: Mr.Andrew Boogie Limon is a 60 y.o. male with a past history remarkable for HARPREET, A. fib on Eliquis, morbid obesity, history of CABG in October 2019 on dual antiplatelet therapy (aspirin and Plavix), referred for evaluation of nonproductive postprandial cough.     Nonproductive cough had started since the patient's a CABG procedure in October 2019 patient reports that her symptoms are exacerbated in the postprandial setting and appear to be associated with silent reflux symptoms. Most recently the patient has reported worsening of his \"coughing spells\" due to recent weight gain. Patient has reported gaining approximately 20 pounds over the last few months which is likely contributed the patient's silent reflux-like symptoms. Patient reports some modest improvement with Protonix 40 mg daily. Past Medical,Family, and Social History reviewed and does contribute to the patient presenting condition. Patient's PMH/PSH,SH,PSYCH Hx, MEDs, ALLERGIES, and ROS were all reviewed and updated in the appropriate sections.     PAST MEDICAL HISTORY:  Past Medical History:   Diagnosis Date    Atrial fibrillation ([address] 10/2019    History of incarceration     released 2019    Hyperlipidemia     Hypertension        Past Surgical History:   Procedure Laterality Date    CARDIAC SURGERY      CARDIOVERSION  10/16/2019    CARPAL TUNNEL RELEASE Bilateral 2008    CERVICAL for Dry Lips, Disp: 106 g, Rfl: 0    aspirin 81 MG EC tablet, Take 81 mg by mouth daily, Disp: , Rfl:     metoprolol succinate (TOPROL XL) 100 MG extended release tablet, Take 1 tablet by mouth 2 times daily, Disp: 30 tablet, Rfl: 3    spironolactone (ALDACTONE) 25 MG tablet, Take 1 tablet by mouth daily, Disp: 30 tablet, Rfl: 3    magnesium hydroxide (MILK OF MAGNESIA) 400 MG/5ML suspension, Take 30 mLs by mouth daily as needed for Constipation, Disp: 1 Bottle, Rfl:     pantoprazole (PROTONIX) 40 MG tablet, Take 1 tablet by mouth every morning (before breakfast), Disp: 30 tablet, Rfl: 0    apixaban (ELIQUIS) 5 MG TABS tablet, Take 1 tablet by mouth 2 times daily, Disp: 60 tablet, Rfl: 0    atorvastatin (LIPITOR) 40 MG tablet, Take 1 tablet by mouth nightly, Disp: 30 tablet, Rfl: 0    clopidogrel (PLAVIX) 75 MG tablet, Take 1 tablet by mouth daily, Disp: 30 tablet, Rfl: 0    potassium chloride (KLOR-CON M) 20 MEQ extended release tablet, Take 1 tablet by mouth 2 times daily for 7 days, Disp: 30 tablet, Rfl: 0    ALLERGIES:   No Known Allergies    FAMILY HISTORY:       Problem Relation Age of Onset    Alcohol Abuse Maternal Uncle     Heart Attack Maternal Uncle          SOCIAL HISTORY:   Social History     Socioeconomic History    Marital status:      Spouse name: Not on file    Number of children: Not on file    Years of education: Not on file    Highest education level: Not on file   Occupational History    Not on file   Social Needs    Financial resource strain: Not on file    Food insecurity     Worry: Not on file     Inability: Not on file   [employer] needs     Medical: Not on file     Non-medical: Not on file   Tobacco Use    Smoking status: Never Smoker    Smokeless tobacco: Never Used   Substance and Sexual Activity    Alcohol use: Not Currently    Drug use: Not Currently    Sexual activity: Not Currently   Lifestyle    Physical activity     Days per week: Not on file Minutes per session: Not on file    Stress: Not on file   Relationships    Social connections     Talks on phone: Not on file     Gets together: Not on file     Attends Protestant service: Not on file     Active member of club or organization: Not on file     Attends meetings of clubs or organizations: Not on file     Relationship status: Not on file    Intimate partner violence     Fear of current or ex partner: Not on file     Emotionally abused: Not on file     Physically abused: Not on file     Forced sexual activity: Not on file   Other Topics Concern    Not on file   Social History Narrative    Not on file       REVIEW OF SYSTEMS: A 12-point review of systems was obtained and pertinent positives and negatives were listed below. REVIEW OF SYSTEMS:     Constitutional: No fever, no chills, no lethargy, no weakness. HEENT:  No headache, otalgia, itchy eyes, nasal discharge or sore throat. Cardiac:  No chest pain, dyspnea, orthopnea or PND. Chest:   No cough, phlegm or wheezing. Abdomen:      Detailed by MA   Neuro:  No focal weakness, abnormal movements or seizure like activity. Skin:   No rashes, no itching. :   No hematuria, no pyuria, no dysuria, no flank pain. Extremities:  No swelling or joint pains. ROS was otherwise negative    Review of Systems    PHYSICAL EXAMINATION: Vital signs reviewed per the nursing documentation. Temp 97.2 °F (36.2 °C)   Resp 18   Wt 292 lb (132.5 kg)   BMI 45.73 kg/m²   Body mass index is 45.73 kg/m². Physical Exam    Physical Exam   Constitutional: Patient is oriented to person, place, and time. Patient appears well-developed and well-nourished. HENT:   Head: Normocephalic and atraumatic. Eyes: Pupils are equal, round, and reactive to light. EOM are normal.   Neck: Normal range of motion. Neck supple. No JVD present. No tracheal deviation present. No thyromegaly present.    Cardiovascular: Normal rate, regular rhythm, normal heart sounds and intact distal pulses. Pulmonary/Chest: Effort normal and breath sounds normal. No stridor. No respiratory distress. He has no wheezes. He has no rales. He exhibits no tenderness. Abdominal: Soft. Bowel sounds are normal. He exhibits no distension and no mass. There is no tenderness. There is no rebound and no guarding. No hernia. Musculoskeletal: Normal range of motion. Lymphadenopathy:    Patient has no cervical adenopathy. Neurological: Patient is alert and oriented to person, place, and time. Psychiatric: Patient has a normal mood and affect. Patient behavior is normal.       LABORATORY DATA: Reviewed  Lab Results   Component Value Date    WBC 7.2 05/26/2020    HGB 12.4 (L) 05/26/2020    HCT 39.1 (L) 05/26/2020    MCV 94.7 05/26/2020     05/26/2020     06/01/2020    K 4.2 06/01/2020     06/01/2020    CO2 22 06/01/2020    BUN 18 06/01/2020    CREATININE 0.95 06/01/2020    LABALBU 3.9 05/26/2020    BILITOT 0.39 05/26/2020    ALKPHOS 58 05/26/2020    AST 19 05/26/2020    ALT 17 05/26/2020    INR 1.1 01/22/2020         Lab Results   Component Value Date    RBC 4.13 (L) 05/26/2020    HGB 12.4 (L) 05/26/2020    MCV 94.7 05/26/2020    MCH 30.0 05/26/2020    MCHC 31.7 05/26/2020    RDW 14.4 05/26/2020    MPV 9.8 05/26/2020    BASOPCT 1 05/26/2020    LYMPHSABS 0.98 (L) 05/26/2020    MONOSABS 0.74 05/26/2020    NEUTROABS 5.18 05/26/2020    EOSABS 0.22 05/26/2020    BASOSABS 0.05 05/26/2020         DIAGNOSTIC TESTING:     No results found.        IMPRESSION: Mr.Andrew Franko Lopez is a 60 y.o. male with a past history remarkable for HARPREET, A. fib on Eliquis, morbid obesity, history of CABG in October 2019 on dual antiplatelet therapy (aspirin and Plavix), referred for evaluation of nonproductive postprandial cough.     Nonproductive cough had started since the patient's a CABG procedure in October 2019 patient reports that her symptoms are exacerbated in the postprandial setting and appear to be associated with silent reflux symptoms. Most recently the patient has reported worsening of his \"coughing spells\" due to recent weight gain. Patient has reported gaining approximately 20 pounds over the last few months which is likely contributed the patient's silent reflux-like symptoms. Patient reports some modest improvement with Protonix 40 mg daily.      Patient was unable to space out his meals and continues to consume 1 large meal which is likely contributing to his postprandial reflux and regurgitation. Additionally, patient was taking Protonix at the wrong times    PLAN:    1) Silent reflux with postprandial regurgitation-Protonix 40mg BID to be continued. Not being timed accordingly. Patient was educated on timing. Evening dose to be taken in evening time and patient to continue with Protonix 30 minutes before breakfast.  Strongly advised spacing out his meals with small portions. No strong indication for endoscopy at this time. 2) RTC in 4 -6 weeks. Thank you for allowing me to participate in the care of Mr. Brett Simpson. For any further questions please do not hesitate to contact me. I have reviewed and agree with the ROS entered by the MA/LPN from today's encounter documented in a separate note. Rosanna Strauss MD, MPH   [facility]  Office #: [phone]    this note is created with the assistance of a speech recognition program.  While intending to generate a document that actually reflects the content of the visit, the document can still have some errors including those of syntax and sound a like substitutions which may escape proof reading. It such instances, actual meaning can be extrapolated by contextual diversion.

## 2020-08-05 ENCOUNTER — TELEPHONE (OUTPATIENT)
Dept: GASTROENTEROLOGY | Age: 65
End: 2020-08-05

## 2020-08-10 ENCOUNTER — TELEPHONE (OUTPATIENT)
Dept: FAMILY MEDICINE CLINIC | Age: 65
End: 2020-08-10

## 2020-08-10 ENCOUNTER — HOSPITAL ENCOUNTER (OUTPATIENT)
Age: 65
Setting detail: SPECIMEN
Discharge: HOME | End: 2020-08-10
Payer: MEDICAID

## 2020-08-10 LAB
ANION GAP SERPL CALCULATED.3IONS-SCNC: 15 MMOL/L (ref 9–17)
BUN BLDV-MCNC: 23 MG/DL (ref 8–23)
BUN/CREAT BLD: ABNORMAL (ref 9–20)
CALCIUM SERPL-MCNC: 9.2 MG/DL (ref 8.6–10.4)
CHLORIDE BLD-SCNC: 106 MMOL/L (ref 98–107)
CO2: 24 MMOL/L (ref 20–31)
CREAT SERPL-MCNC: 0.95 MG/DL (ref 0.7–1.2)
GFR AFRICAN AMERICAN: >60 ML/MIN
GFR NON-AFRICAN AMERICAN: >60 ML/MIN
GFR SERPL CREATININE-BSD FRML MDRD: ABNORMAL ML/MIN/{1.73_M2}
GFR SERPL CREATININE-BSD FRML MDRD: ABNORMAL ML/MIN/{1.73_M2}
GLUCOSE BLD-MCNC: 111 MG/DL (ref 70–99)
POTASSIUM SERPL-SCNC: 4.6 MMOL/L (ref 3.7–5.3)
SODIUM BLD-SCNC: 145 MMOL/L (ref 135–144)

## 2020-08-10 RX ORDER — LIDOCAINE 50 MG/G
1 PATCH TOPICAL DAILY
Qty: 30 PATCH | Refills: 2 | Status: SHIPPED | OUTPATIENT
Start: 2020-08-10 | End: 2020-11-08

## 2020-08-10 NOTE — TELEPHONE ENCOUNTER
Previous encounter on 08/04/2020. It had not been addressed. Perfect serve sent to Dr. Jessica Lewis.

## 2020-08-10 NOTE — TELEPHONE ENCOUNTER
Patient came into the office upset that this has not been addressed. He states his legs are in pain and he wants this to be addressed today. Can someone in office, please sign pended order. Thank you.

## 2020-08-11 NOTE — TELEPHONE ENCOUNTER
PC from pt stating that script was transferred to [address] [place] [address] incorrectly, transferred as 4% and order is supposed to be 5% due to insurance coverage. Writer called pharmacy and spoke to Gaviota Roe who states that they have lidocaine 4% script from July they were filling for pt and that no new script was transferred from [facility]. Writer called in script for lidocaine 5% to [facility] as ordered 08/10.      No additional needs at this time

## 2020-08-12 ENCOUNTER — HOSPITAL ENCOUNTER (OUTPATIENT)
Dept: OTHER | Age: 65
Discharge: HOME | End: 2020-08-12
Payer: MEDICAID

## 2020-08-12 VITALS
OXYGEN SATURATION: 97 % | TEMPERATURE: 98.9 F | DIASTOLIC BLOOD PRESSURE: 64 MMHG | HEART RATE: 74 BPM | SYSTOLIC BLOOD PRESSURE: 102 MMHG | BODY MASS INDEX: 47.02 KG/M2 | RESPIRATION RATE: 20 BRPM | WEIGHT: 300.2 LBS

## 2020-08-12 PROCEDURE — 99214 OFFICE O/P EST MOD 30 MIN: CPT | Performed by: NURSE PRACTITIONER

## 2020-08-12 PROCEDURE — 99212 OFFICE O/P EST SF 10 MIN: CPT

## 2020-08-12 RX ORDER — LISINOPRIL 5 MG/1
2.5 TABLET ORAL DAILY
Qty: 45 TABLET | Refills: 0 | Status: SHIPPED | OUTPATIENT
Start: 2020-08-12 | End: 2020-11-23 | Stop reason: SINTOL

## 2020-08-12 RX ORDER — FUROSEMIDE 40 MG/1
40 TABLET ORAL 2 TIMES DAILY
Qty: 60 TABLET | Refills: 1 | Status: SHIPPED | OUTPATIENT
Start: 2020-08-12 | End: 2020-10-12 | Stop reason: DRUGHIGH

## 2020-08-12 ASSESSMENT — ENCOUNTER SYMPTOMS
COUGH: 1
BLOOD IN STOOL: 0
EYE DISCHARGE: 0
ABDOMINAL PAIN: 0
SHORTNESS OF BREATH: 1

## 2020-08-12 NOTE — PROGRESS NOTES
CHF Clinic at [facility]    Office: [phone] Fax: [phone] [facility]  [phone] [id number]  Dept: [phone]  Loc: [phone]    Demetra Arreola is a 63 y.o. male who presents today for CHF evaluation. HPI:     Denies shortness of breath, did significant distance walking yesterday and had no issues   Uses cane since L leg \"went dead on him\", approx 2010, noticed L leg weakness after neck surgery. occas Fatigue, needs nap sporadically   +  Edema , feels unchanged with exception of his hands swelling  Denies chest pain   Denies  palpitations   Pt has been coughing after eating consistently  . To see ENT re: this issue. Reviewing his diet, Pt reports eating pre packed oatmeal, various flavors  Yesterdays meals included double meat whopper and a lunch meat sandwich for dinner. Fluid intake he states is 50-64oz.          Past Medical History:   Diagnosis Date    Atrial fibrillation ([address] 10/2019    Cellulitis     Coronary artery disease     History of incarceration     released 2019    Hyperlipidemia     Hypertension      Past Surgical History:   Procedure Laterality Date    CARDIAC SURGERY      CARDIOVERSION  10/16/2019    CARPAL TUNNEL RELEASE Bilateral 2008    CERVICAL SPINE SURGERY      2-5    FRACTURE SURGERY      Left leg    PACEMAKER CHANGE N/A 10/21/2019    CABG CORONARY ARTERY BYPASS GRAFT X1, LIMA-LAD, BROWN 4 MAZE PROCEDURE, 50MM ATRICURE ATRIAL CLIP RIGID INTERNAL FIXATION PLATES X 3   SCREWS X 13 performed by Man Montesinos MD at [address] [place] [address] TRANSESOPHAGEAL ECHOCARDIOGRAM  10/16/2019       Family History   Problem Relation Age of Onset    Alcohol Abuse Maternal Uncle     Heart Attack Maternal Uncle        Social History     Tobacco Use    Smoking status: Never Smoker    Smokeless tobacco: Never Used   Substance Use Topics    Alcohol use: patch onto the skin daily 12 hours on, 12 hours off. 30 patch 2    Elastic Bandages & Supports (JOBST OPAQUE KNEE 20-30MMHG XL) MISC Dispense two pair closed toe knee high 20 to 30 mmHg Jobst compression stockings 2 each 0    nitroGLYCERIN (NITROSTAT) 0.4 MG SL tablet Place 0.4 mg under the tongue every 5 minutes as needed for Chest pain up to max of 3 total doses. If no relief after 1 dose, call 911.  metoprolol succinate (TOPROL XL) 100 MG extended release tablet Take 1 tablet by mouth 2 times daily 30 tablet 3     No current facility-administered medications for this encounter. No Known Allergies      Subjective:      Review of Systems   Constitutional: Negative for activity change, chills, fatigue and fever. Eyes: Negative for discharge and visual disturbance. Respiratory: Positive for cough and shortness of breath. Cardiovascular: Positive for leg swelling. Negative for chest pain and palpitations. Gastrointestinal: Negative for abdominal pain and blood in stool. Endocrine: Negative for cold intolerance and heat intolerance. Genitourinary: Negative for dysuria and flank pain. Musculoskeletal: Negative for joint swelling and myalgias. L leg weakness, needs cane   Skin: Negative for pallor and rash. Neurological: Positive for numbness. Negative for dizziness and headaches. Psychiatric/Behavioral: Negative for hallucinations and suicidal ideas. Objective:     EMANUEL Summary  Left ventricle is normal in size with mild left ventricular hypertrophy. Overall systolic function is moderately reduced with a calculated EF 40%. Abnormal septal wall motion. Grade III (severe) left ventricular diastolic dysfunction. Left atrium is moderately dilated. Right atrial dilatation. Right ventricular dilatation with reduced systolic function. Mild to moderate mitral regurgitation (posteriorly eccentric jet.)  Moderate tricuspid regurgitation.  Estimated right ventricular Value Date     08/10/2020    K 4.6 08/10/2020     08/10/2020    CO2 24 08/10/2020    BUN 23 08/10/2020    CREATININE 0.95 08/10/2020    GFRAA >60 08/10/2020    LABGLOM >60 08/10/2020    GLUCOSE 111 08/10/2020    PROT 6.9 05/26/2020    LABALBU 3.9 05/26/2020    CALCIUM 9.2 08/10/2020    BILITOT 0.39 05/26/2020    ALKPHOS 58 05/26/2020    AST 19 05/26/2020    ALT 17 05/26/2020     Lab Results   Component Value Date    LABA1C 5.6 12/10/2019           :Assessment      1. Chronic combined systolic and diastolic congestive heart failure (HCC)    2. Paroxysmal atrial fibrillation ([facility] [address]    3. Hx of CABG    4. Coronary artery disease involving native coronary artery of native heart without angina pectoris    5. Chronic venous insufficiency        :Plan      1. Chronic combined systolic and diastolic congestive heart failure (HCC)    2. Paroxysmal atrial fibrillation ([facility] [address]    3. Hx of CABG    4. Coronary artery disease involving native coronary artery of native heart without angina pectoris    5. Chronic venous insufficiency      Wt gain,  leg measurements increased. increased his lasix to 40 mg bid. RTC 2 w. Extensive counseling on low Na+ diet given today. [place] Jobst hose [place] pharm [place]. F/u in  2 w, and labs prior to appt. On Guideline-Directed Medication Therapy:     Aldosterone Antagonist  Diuretic Therapy  Will add ACE I, low dose only, due to low BP  Pt was on toprol 100 mg bid, but his BP too low to add back toprol today. Not following with [facility] provider. Asked for phone number to establish  with new group. givent to pt today. Pt  Has not been making his f/u appts with dr Andrea Escoto. On eliquis for afib. Was on amiodarone in past, but no longer. Taking ASA and statin. has not needed NTG in a long time. Exercise is limited . His diet is poor. Medications reviewed. Echocardiogram reviewed. Medications ordered   Hose ordered.     Patient was seen with total face to face time of  25 minutes. More than 50% of this visit was counseling and education, & coordination of care. Orders Placed This Encounter   Procedures    Basic Metabolic Panel     Standing Status:   Future     Standing Expiration Date:   2021     Orders Placed This Encounter   Medications    furosemide (LASIX) 40 MG tablet     Sig: Take 1 tablet by mouth 2 times daily     Dispense:  60 tablet     Refill:  1    lisinopril (PRINIVIL;ZESTRIL) 5 MG tablet     Sig: Take 0.5 tablets by mouth daily     Dispense:  45 tablet     Refill:  0    Elastic Bandages & Supports (JOBST KNEE HIGH COMPRESSION SM) MISC     Si each by Does not apply route daily Wear q day. Remove q hs. Diagnosis: Chronic venous insufficiency     Dispense:  2 each     Refill:  0    Elastic Bandages & Supports (JOBST KNEE HIGH COMPRESSION SM) MISC     Sig: Wear q day. Remove q hs     Dispense:  2 each     Refill:  0       Patientgiven verbal and/or written educational instructions. Follow up as directed. I have reviewed and agree with the nursing documentation. Verbally reviewed medication list with patient; patient verbalized understanding. Discussed 2000mg/day sodium restricted diet; patient verbalized understanding. Moderate daily exercise encouraged as tolerated. Discussed rest breaks as needed; patient verbalized understanding. Patient instructed to weigh self at the same time of each day, using same clothes and same scale; reinforced teaching to monitor for 3-5 lb weight increase over 1-2 days, and to notify the CHF clinic at [phone] or physician office if weight change noted. Patient verbalized understanding. Risks of smoking discussed with the patient if applicable; patient strongly discouraged to smoke. Patient verbalized understanding.      Signs and symptoms of CHF discussed with patient, such as feeling more tired than normal, feeling short of breath, coughing that increases when you lie down, sudden weight gain, swelling of your feet, legs or belly. Patient verbalized understanding to notify the CHF clinic at [phone] or physician office if these symptoms occur. Compliance with plan of care and further disease process causes discussed with patient, patient encouraged to keep all follow up appointments. Patient verbalized understanding.       Electronically signedby LUDY Bermeo CNP on 8/17/2020 at 3:12 PM

## 2020-08-13 ENCOUNTER — TELEPHONE (OUTPATIENT)
Dept: FAMILY MEDICINE CLINIC | Age: 65
End: 2020-08-13

## 2020-08-13 NOTE — TELEPHONE ENCOUNTER
PA request for Lidocaine.     PA processed and submitted to pt insurance, waiting for response in regards to coverage

## 2020-08-19 NOTE — PROGRESS NOTES
PHYSICAL EXAMINATION FORM   Name: Cooper Holt      EXAMINATION     Vitals: BP 94/62   Pulse 80   Temp 98 °F (36.7 °C) (Temporal)   Resp 20   Ht 4' 10.5\" (1.486 m)   Wt 30.4 kg (67 lb)   BMI 13.76 kg/m²   BSA 1.15 m²  male  Vision: R 20/               L 20/                      Corrected   Y         N     MEDICAL NORMAL ABNORMAL FINDINGS   Appearance  · Marfan stigmata (kyphoscoliosis, high-arched palate, pectus excavatum, arachnodactyly, arm span > height, hyperlaxity, myopia, MVP, aortic insufficiency) Yes    Eyes/ears/nose/throat  · Pupils equal  · Hearing Yes    Lymph nodes Yes    Heart*  · Murmurs (auscultation standing, supine, +/- Valsalva)  · Location of point of maximal impulse (PMI) Yes    Pulses  · Simultaneous femoral and radial pulses Yes    Lungs Yes    Abdomen Yes    Genitourinary (males only)** Yes    Skin  · HSV, lesions suggestive of MRSA, tinea corporis Yes    Neurologic# Yes    MUSCULOSKELETAL     Neck Yes    Back Yes    Shoulder/arm Yes    Elbow/forearm Yes    Wrist/hand/fingers Yes    Hip/thigh Yes    Knee Yes    Leg/Ankle Yes    Foot/toes Yes    Functional  · Duck-walk, single leg hop Yes    * Consider ECG, echocardiogram, and referral to cardiology for abnormal cardiac history or exam  ** Consider  exam in private setting.  Having third party present is recommended.  # Consider cognitive evaluation or baseline neuropsychiatric testing if a history of significant concussion.    On the basic on the examination on this day, I approve this child's participation in interscholastic sports for 395 days from this date.  Yes  Examination Date: 8/19/2020    Additional Comments:                    Signature*: _______________________________________           Print Name:  Akosua Walker MD                         * effective January 2003, the [facility] Board of Directors approved a recommendation, consistent with the [place] [employer], that allow Physician's Assistants or Advanced Nurse  Visit Information    Have you changed or started any medications since your last visit including any over-the-counter medicines, vitamins, or herbal medicines? no   Have you stopped taking any of your medications? Is so, why? -  no  Are you having any side effects from any of your medications? - no    Have you seen any other physician or provider since your last visit?  no   Have you had any other diagnostic tests since your last visit?  no   Have you been seen in the emergency room and/or had an admission in a hospital since we last saw you?  no   Have you had your routine dental cleaning in the past 6 months?  no     Do you have an active MyChart account? If no, what is the barrier?   Yes    Patient Care Team:  Camila Valera MD as PCP - General (Family Medicine)    Medical History Review  Past Medical, Family, and Social History reviewed and does not contribute to the patient presenting condition    Health Maintenance   Topic Date Due    Hepatitis C screen  1955    Pneumococcal 0-64 years Vaccine (1 of 1 - PPSV23) 07/17/1963    HIV screen  07/17/1972    DTaP/Tdap/Td vaccine (1 - Tdap) 07/17/1976    Shingles Vaccine (1 of 2) 07/17/2007    Flu vaccine (1) 09/01/2020    Lipid screen  05/26/2021    Potassium monitoring  06/01/2021    Creatinine monitoring  06/01/2021    Diabetes screen  12/10/2022    Colon cancer screen fecal DNA test (Cologuard)  07/13/2023    Hepatitis A vaccine  Aged Out    Hepatitis B vaccine  Aged Out    Hib vaccine  Aged Out    Meningococcal (ACWY) vaccine  Aged Out Probationers to sign off on physicals.               To be completed by athlete or parent prior to examination  Name: Cooper Holt  School Year: ____________________   Address: ___________________________________________ City/State: ________________________________________________   Phone No: __________________________________________ Birthday: 2006    Age: 13 year old   Class: ________ Student ID No: _______   Parent's Name: ______________________________________ Phone No: ________________________________________________   Address: ___________________________________________ City/State: ________________________________________________   HISTORY FORM    Medicines and Allergies: Please list all of the prescription and over- the-counter medicines (herbal and nutritional) that your are currently taking   Do you have allergies?  __ Yes    __ No If yes, please identify specific allergy below.   __ Medicines                                              __Pollens                                              __Food                                              __Stinging Insects   Explain \"Yes\" answers below.  [place] questions you don't know the answer to.  GENERAL QUESTIONS Yes No    1  Has a doctor ever denied or restricted your participation in sports       for any reason?      2  Do you have any ongoing medical conditions? If so, please        identify below: __Asthma  __Anemia  __Diabetes  __Infections      Other: _________________________________________      3  Have you ever spent the night in the hospital?      4  Have you ever had surgery?      HEART HEALTH QUESTIONS ABOUT YOU Yes No    5  Have you ever passed out or nearly passes out DURING or        AFTER exercise?      6  Have you ever had discomfort, pain, tightness or pressure in       your chest during exercise?       7  Does your heart ever race or skip beats (irregular beats)       during exercise?      8  Has a doctor ever told you that you have any  heart problems?       If so check all that apply: __High blood pressure       __Heart murmur  __High cholesterol  __ Heart infection      __Kawasaki disease  __Other_________________________      9   Has a doctor ever ordered a test for your heart? (for example        ECG/EKG, echocardiogram)      10  Do you every get lightheaded or feel more short of breath         than expected during exercise?       11  Have you ever had an unexplained seizure?      12  Do you get more tired or short of breath more quickly than         your friends during exercise?      HEART HEALTH QUESTIONS ABOUT YOUR FAMILY Yes No    13 Has any family member or relative  of heart problems or         had an unexpected or unexplained sudden death before age         50 (including drowning, unexplained car accident, or sudden        infant death syndrome)?      14 Does anyone in your family have hypertrophic        cardiomyopathy, Marfan syndrome, arrhythmogenic right        ventricular cardiomyopathy, long QT syndrome, short QT        syndrome, Brugada syndrome, or catecholaminergic        polymorphic ventricular tachycardia)?      15 Does anyone in your family have a heart problem,        pacemaker, or implanted defibrillator?      16 Has anyone in your family had unexplained fainting,        unexplained seizure, or near drowning?      BONE AND JOINT QUESTIONS Yes No    17 Have you ever had an injury to a bone, muscle, ligament or        tendon that caused you to miss a practice or a game?      18 Have you ever had any broken or fracture bones or         dislocated joints?      19 Have you ever had any injury that required x-rays, MRI, CT        scan, injections, therapy, brace, cast, or crutches?      20 Have you ever had a stress fracture?      21 Have you ever been told that you have or have had an x-ray        for neck instability or atlantoaxial instability? (Down         syndrome or dwarfism)      22 Do you regularly use a brace,  orthotics, or other assistive         device?      23 Do you have a bone, muscle, or joint injury that bothers you?      24 Do any of your joints become painful, swollen, feel warm, or       look red?      25 Do you have any history of juvenile arthritis or connective       tissue disease?       MEDICAL QUESTIONS Yes No   26 Do you cough, wheeze, or have difficulty breathing during       or after exercise?     27 Have you ever used an inhaler or taken asthma medicine?     28. Is there anyone in your family who has asthma?     29 Were you born without or are you missing a kidney, an eye,         a testicle (males), your spleen, or any other organ?     30 Do you have groin pain or painful bulge or hernia in the groin area?     31 Have you had infectious mononucleosis (mono) within the last        month?     32 Do you have any rashes, pressure sores, or other skin problems?     33 Have you had a herpes or MRSA skin infection?     34 Have you ever had a head injury or concussion?     35 Have you ever had a hit or blow to the head that caused         confusion, prolonged headache, or memory problems?     36 Do you have a history of seizure disorder?     37 Do you have headaches with exercise?     38 Have you ever had numbness, tingling, or weakness in your arms        or legs after being hit or falling?     39 Have you ever been unable to move your or legs after being hit or        falling?     40 Have you ever become ill while exercising in the heat?     41 Do you get frequent muscle cramps when exercising?     42 Do you or someone in your family have sickle cell trait or disease?     43 Have you had any problems with your eyes or vision?     44 Have you had any eye injuries?     45 Do you wear glasses or contact lenses?     46 Do you wear protective eyewear, such as goggles or a face shield?     47 Do you worry about your weight?     48 Are you trying to or has anyone recommended that you gain or         lose weight?      49 Are you on a special diet or do you avoid certain types of foods?     50 Have you ever had an eating disorder?     51 Have you or any family member or relative been diagnosed with        cancer?     52 Do you have any concerns that you would like to discuss with a        doctor?     FEMALES ONLY Yes No   53 Have you ever had a menstrual period?     54 How old were you when you had your first menstrual period?     55 How many periods have you had in the last 12 months?       Explain 'yes' answers here:  ________________________________________________________________    ________________________________________________________________    ________________________________________________________________    ________________________________________________________________    ________________________________________________________________    ________________________________________________________________    ________________________________________________________________    ________________________________________________________________     I hereby state that, to the best of my knowledge, my answers to the above questions are complete and correct.   Signature of athlete: _____________________________________________ Signature of parent/guardian: ________________________________________

## 2020-08-20 ENCOUNTER — OFFICE VISIT (OUTPATIENT)
Dept: FAMILY MEDICINE CLINIC | Age: 65
End: 2020-08-20
Payer: MEDICAID

## 2020-08-20 VITALS
DIASTOLIC BLOOD PRESSURE: 79 MMHG | SYSTOLIC BLOOD PRESSURE: 120 MMHG | BODY MASS INDEX: 46.52 KG/M2 | HEART RATE: 91 BPM | TEMPERATURE: 96.7 F | WEIGHT: 297 LBS

## 2020-08-20 PROCEDURE — 99211 OFF/OP EST MAY X REQ PHY/QHP: CPT | Performed by: FAMILY MEDICINE

## 2020-08-20 PROCEDURE — 99213 OFFICE O/P EST LOW 20 MIN: CPT

## 2020-08-20 PROCEDURE — G0009 ADMIN PNEUMOCOCCAL VACCINE: HCPCS | Performed by: FAMILY MEDICINE

## 2020-08-20 PROCEDURE — 90471 IMMUNIZATION ADMIN: CPT | Performed by: FAMILY MEDICINE

## 2020-08-20 PROCEDURE — 1036F TOBACCO NON-USER: CPT

## 2020-08-20 PROCEDURE — G8427 DOCREV CUR MEDS BY ELIG CLIN: HCPCS

## 2020-08-20 PROCEDURE — 3017F COLORECTAL CA SCREEN DOC REV: CPT

## 2020-08-20 PROCEDURE — G8417 CALC BMI ABV UP PARAM F/U: HCPCS

## 2020-08-20 ASSESSMENT — ENCOUNTER SYMPTOMS
DIARRHEA: 0
COLOR CHANGE: 0
WHEEZING: 0
PHOTOPHOBIA: 0
NAUSEA: 0
VOMITING: 0
COUGH: 0
ABDOMINAL DISTENTION: 0
SHORTNESS OF BREATH: 0
CONSTIPATION: 0
APNEA: 0

## 2020-08-20 NOTE — PROGRESS NOTES
Subjective: Patrick Ramos is a 63 y.o. male with  has a past medical history of Atrial fibrillation ([address] Cellulitis, Coronary artery disease, History of incarceration, Hyperlipidemia, and Hypertension. Presented to the office today for:  Chief Complaint   Patient presents with    Leg Pain     Here for follow up on right thigh pain       HPI   80-year-old male came to the clinic as a follow-up for his right thigh pain. Patient states that after applying lidocaine patches and Voltaren gel patient's pain is much much better he is able to walk now. Patient also recently got the compression stockings for lower leg edema. Patient has an upcoming appointment with cardiology next week. Patient recently had a CHF clinic visit and his Lasix dose was increased patient was started on potassium pills as well since his potassium was low. Patient today has no other complaints. Patient denies any shortness of breath, chest pain, dizziness or other associated symptoms. Patient did have Cologuard done which was abnormal and needs clearance to get a colonoscopy. I will refer the procedure clearance to the cardiology team.  Patient mood is fine no other questions or concerns at this time        Review of Systems   Constitutional: Negative for activity change, appetite change, fatigue, fever and unexpected weight change. HENT: Negative for congestion. Eyes: Negative for photophobia and visual disturbance. Respiratory: Negative for apnea, cough, shortness of breath and wheezing. Cardiovascular: Negative for chest pain, palpitations and leg swelling. Gastrointestinal: Negative for abdominal distention, constipation, diarrhea, nausea and vomiting. Endocrine: Negative for polyuria. Genitourinary: Negative for dysuria and urgency. Musculoskeletal: Positive for arthralgias and myalgias. Right thigh pain which is better from before    Skin: Negative for color change, pallor, rash and wound. weakness. Psychiatric:         Mood and Affect: Mood normal.         Behavior: Behavior normal.         Thought Content: Thought content normal.         Judgment: Judgment normal.         Lab Results   Component Value Date    WBC 7.2 05/26/2020    HGB 12.4 (L) 05/26/2020    HCT 39.1 (L) 05/26/2020     05/26/2020    CHOL 112 05/26/2020    TRIG 53 05/26/2020    HDL 56 05/26/2020    ALT 17 05/26/2020    AST 19 05/26/2020     (H) 08/10/2020    K 4.6 08/10/2020     08/10/2020    CREATININE 0.95 08/10/2020    BUN 23 08/10/2020    CO2 24 08/10/2020    TSH 1.48 01/24/2020    INR 1.1 01/22/2020    LABA1C 5.6 12/10/2019     Lab Results   Component Value Date    CALCIUM 9.2 08/10/2020     Lab Results   Component Value Date    LDLCHOLESTEROL 45 05/26/2020       Assessment and Plan:    1. Right thigh pain  Improving with lidocaine patches  Voltaren gel seems to be helping with the patient      2. Need for prophylactic vaccination and inoculation against varicella  - zoster recombinant adjuvanted vaccine [facility]) 50 MCG/0.5ML SUSR injection; Inject 0.5 mLs into the muscle once for 1 dose 50 MCG IM then repeat 2-6 months. Dispense: 1 each; Refill: 1    3. Systolic congestive heart failure, unspecified HF chronicity ([address]  He recently saw CHF clinic and furosemide dose was increased to 20 mg twice daily  Upcoming appointment with cardiology next week    4. Chronic atrial fibrillation  No recent palpitations reported, no chest pain reported    5. Hypokalemia  On potassium pills currently    6. Pneumococcal vaccine administered  - Pneumococcal polysaccharide vaccine 23-valent greater than or equal to 3yo subcutaneous/IM    7. Encounter for screening for HIV  - HIV Screen; Future    8. Need for diphtheria-tetanus-pertussis (Tdap) vaccine  - Tdap (age 6y and older) IM (Boostrix)    5.  Immunization due  - [place] IMMUNIZ ADMIN,1 SINGLE/COMB VAC/TOXOID  - [place] [id number] ADDL          Requested Prescriptions

## 2020-08-20 NOTE — PROGRESS NOTES
Visit Information    Have you changed or started any medications since your last visit including any over-the-counter medicines, vitamins, or herbal medicines? no   Have you stopped taking any of your medications? Is so, why? -  no  Are you having any side effects from any of your medications? - no    Have you seen any other physician or provider since your last visit? yes - gastro   Have you had any other diagnostic tests since your last visit?  no   Have you been seen in the emergency room and/or had an admission in a hospital since we last saw you?  no   Have you had your routine dental cleaning in the past 6 months?  no     Do you have an active MyChart account? If no, what is the barrier?   No: declined    Patient Care Team:  Sixto Mak MD as PCP - General (Family Medicine)    Medical History Review  Past Medical, Family, and Social History reviewed and does not contribute to the patient presenting condition    Health Maintenance   Topic Date Due    Hepatitis C screen  1955    Pneumococcal 0-64 years Vaccine (1 of 1 - PPSV23) 07/17/1963    HIV screen  07/17/1972    DTaP/Tdap/Td vaccine (1 - Tdap) 07/17/1976    Shingles Vaccine (1 of 2) 07/17/2007    Flu vaccine (1) 09/01/2020    Lipid screen  05/26/2021    Potassium monitoring  08/10/2021    Creatinine monitoring  08/10/2021    Diabetes screen  12/10/2022    Colon cancer screen fecal DNA test (Cologuard)  07/13/2023    Hepatitis A vaccine  Aged Out    Hepatitis B vaccine  Aged Out    Hib vaccine  Aged Out    Meningococcal (ACWY) vaccine  Aged Out

## 2020-08-21 ENCOUNTER — TELEPHONE (OUTPATIENT)
Dept: FAMILY MEDICINE CLINIC | Age: 65
End: 2020-08-21

## 2020-08-21 NOTE — PROGRESS NOTES
Attending Physician Statement    Wt Readings from Last 3 Encounters:   08/20/20 297 lb (134.7 kg)   08/12/20 (!) 300 lb 3.2 oz (136.2 kg)   08/04/20 292 lb (132.5 kg)     Temp Readings from Last 3 Encounters:   08/20/20 96.7 °F (35.9 °C)   08/12/20 98.9 °F (37.2 °C) (Temporal)   08/04/20 97.2 °F (36.2 °C)     BP Readings from Last 3 Encounters:   08/20/20 120/79   08/12/20 102/64   07/29/20 130/80     Pulse Readings from Last 3 Encounters:   08/20/20 91   08/12/20 74   07/29/20 85         I have discussed the care of Jesus Cloud, including pertinent history and exam findings with the resident. I have reviewed the key elements of all parts of the encounter with the resident. I agree with the assessment, plan and orders as documented by the resident.   (GE Modifier)

## 2020-08-25 ENCOUNTER — HOSPITAL ENCOUNTER (OUTPATIENT)
Age: 65
Setting detail: SPECIMEN
Discharge: HOME | End: 2020-08-25
Payer: MEDICAID

## 2020-08-25 LAB
ANION GAP SERPL CALCULATED.3IONS-SCNC: 14 MMOL/L (ref 9–17)
BUN BLDV-MCNC: 21 MG/DL (ref 8–23)
BUN/CREAT BLD: NORMAL (ref 9–20)
CALCIUM SERPL-MCNC: 8.9 MG/DL (ref 8.6–10.4)
CHLORIDE BLD-SCNC: 101 MMOL/L (ref 98–107)
CO2: 20 MMOL/L (ref 20–31)
CREAT SERPL-MCNC: 0.83 MG/DL (ref 0.7–1.2)
GFR AFRICAN AMERICAN: >60 ML/MIN
GFR NON-AFRICAN AMERICAN: >60 ML/MIN
GFR SERPL CREATININE-BSD FRML MDRD: NORMAL ML/MIN/{1.73_M2}
GFR SERPL CREATININE-BSD FRML MDRD: NORMAL ML/MIN/{1.73_M2}
GLUCOSE BLD-MCNC: 93 MG/DL (ref 70–99)
HIV AG/AB: NONREACTIVE
POTASSIUM SERPL-SCNC: 4.5 MMOL/L (ref 3.7–5.3)
SODIUM BLD-SCNC: 135 MMOL/L (ref 135–144)

## 2020-08-26 ENCOUNTER — HOSPITAL ENCOUNTER (OUTPATIENT)
Dept: OTHER | Age: 65
Discharge: HOME | End: 2020-08-26
Payer: MEDICAID

## 2020-08-26 VITALS
OXYGEN SATURATION: 97 % | HEART RATE: 83 BPM | DIASTOLIC BLOOD PRESSURE: 80 MMHG | SYSTOLIC BLOOD PRESSURE: 110 MMHG | BODY MASS INDEX: 46.08 KG/M2 | WEIGHT: 294.2 LBS | TEMPERATURE: 97.8 F | RESPIRATION RATE: 20 BRPM

## 2020-08-26 PROCEDURE — 99212 OFFICE O/P EST SF 10 MIN: CPT

## 2020-08-27 ENCOUNTER — TELEPHONE (OUTPATIENT)
Dept: GASTROENTEROLOGY | Age: 65
End: 2020-08-27

## 2020-08-27 NOTE — TELEPHONE ENCOUNTER
Patient called and LVM stating that his cough is getting worse and doesn't know what to do. Writer will review this with  and follow up with the patient today.

## 2020-08-31 NOTE — TELEPHONE ENCOUNTER
Patient called and asked for information in regards to the ENT referral. States he has an appt on 09/21 with a Jaxon Gaming but doesn't have any of his contact information. Writer gave patient the contact info of the office he was referred to. Patient also asked if we received his message last week. Writer informed patient that we did and Halle Jolley is out of town currently and we will review it tomorrow when he is back in office. Patient is complaining that his cough continues to get worse. Writer will forward message to Halle Jolley.

## 2020-09-17 ENCOUNTER — TELEPHONE (OUTPATIENT)
Dept: FAMILY MEDICINE CLINIC | Age: 65
End: 2020-09-17

## 2020-09-17 NOTE — TELEPHONE ENCOUNTER
Patient called in to say having urination concerns, will use restroom then thinks he is finished but he is not.  Made patient appointment

## 2020-09-18 ENCOUNTER — TELEPHONE (OUTPATIENT)
Dept: GASTROENTEROLOGY | Age: 65
End: 2020-09-18

## 2020-09-18 ENCOUNTER — OFFICE VISIT (OUTPATIENT)
Dept: FAMILY MEDICINE CLINIC | Age: 65
End: 2020-09-18
Payer: MEDICAID

## 2020-09-18 VITALS
BODY MASS INDEX: 46.67 KG/M2 | WEIGHT: 298 LBS | SYSTOLIC BLOOD PRESSURE: 120 MMHG | DIASTOLIC BLOOD PRESSURE: 73 MMHG | TEMPERATURE: 97 F | HEART RATE: 64 BPM

## 2020-09-18 LAB
BILIRUBIN, POC: NORMAL
BLOOD URINE, POC: NORMAL
CLARITY, POC: CLEAR
COLOR, POC: YELLOW
GLUCOSE URINE, POC: NORMAL
KETONES, POC: NORMAL
LEUKOCYTE EST, POC: NORMAL
NITRITE, POC: NORMAL
PH, POC: 5.5
PROTEIN, POC: NORMAL
SPECIFIC GRAVITY, POC: 1.02
UROBILINOGEN, POC: 0.2

## 2020-09-18 PROCEDURE — 3017F COLORECTAL CA SCREEN DOC REV: CPT | Performed by: STUDENT IN AN ORGANIZED HEALTH CARE EDUCATION/TRAINING PROGRAM

## 2020-09-18 PROCEDURE — G8427 DOCREV CUR MEDS BY ELIG CLIN: HCPCS | Performed by: STUDENT IN AN ORGANIZED HEALTH CARE EDUCATION/TRAINING PROGRAM

## 2020-09-18 PROCEDURE — G8417 CALC BMI ABV UP PARAM F/U: HCPCS | Performed by: STUDENT IN AN ORGANIZED HEALTH CARE EDUCATION/TRAINING PROGRAM

## 2020-09-18 PROCEDURE — 1036F TOBACCO NON-USER: CPT | Performed by: STUDENT IN AN ORGANIZED HEALTH CARE EDUCATION/TRAINING PROGRAM

## 2020-09-18 PROCEDURE — 81002 URINALYSIS NONAUTO W/O SCOPE: CPT | Performed by: STUDENT IN AN ORGANIZED HEALTH CARE EDUCATION/TRAINING PROGRAM

## 2020-09-18 PROCEDURE — 99213 OFFICE O/P EST LOW 20 MIN: CPT | Performed by: STUDENT IN AN ORGANIZED HEALTH CARE EDUCATION/TRAINING PROGRAM

## 2020-09-18 RX ORDER — TAMSULOSIN HYDROCHLORIDE 0.4 MG/1
0.4 CAPSULE ORAL DAILY
Qty: 30 CAPSULE | Refills: 0 | Status: SHIPPED | OUTPATIENT
Start: 2020-09-18 | End: 2020-10-22 | Stop reason: SDUPTHER

## 2020-09-18 ASSESSMENT — ENCOUNTER SYMPTOMS
VOMITING: 0
NAUSEA: 0
CHEST TIGHTNESS: 0
COUGH: 0
SORE THROAT: 0
DIARRHEA: 0
ABDOMINAL PAIN: 0
SHORTNESS OF BREATH: 0
CONSTIPATION: 0

## 2020-09-18 NOTE — PROGRESS NOTES
and regular rhythm. Heart sounds: Normal heart sounds. No murmur. No friction rub. No gallop. Pulmonary:      Effort: Pulmonary effort is normal. No respiratory distress. Breath sounds: Normal breath sounds. No wheezing or rales. Chest:      Chest wall: No tenderness. Abdominal:      General: Bowel sounds are normal. There is no distension. Palpations: Abdomen is soft. There is no mass. Tenderness: There is no abdominal tenderness. There is no guarding. Skin:     Capillary Refill: Capillary refill takes less than 2 seconds. Neurological:      Mental Status: He is alert and oriented to person, place, and time. Lab Results   Component Value Date    WBC 7.2 05/26/2020    HGB 12.4 (L) 05/26/2020    HCT 39.1 (L) 05/26/2020     05/26/2020    CHOL 112 05/26/2020    TRIG 53 05/26/2020    HDL 56 05/26/2020    ALT 17 05/26/2020    AST 19 05/26/2020     08/25/2020    K 4.5 08/25/2020     08/25/2020    CREATININE 0.83 08/25/2020    BUN 21 08/25/2020    CO2 20 08/25/2020    TSH 1.48 01/24/2020    INR 1.1 01/22/2020    LABA1C 5.6 12/10/2019     Lab Results   Component Value Date    CALCIUM 8.9 08/25/2020     Lab Results   Component Value Date    LDLCHOLESTEROL 45 05/26/2020       Assessment and Plan:    1. Overflow incontinence of urine  - tamsulosin (FLOMAX) 0.4 MG capsule; Take 1 capsule by mouth daily  Dispense: 30 capsule; Refill: 0  - PSA, Diagnostic; Future  - POCT Urinalysis no Micro    2. Need for hepatitis C screening test  - Hepatitis C Antibody; Future        Requested Prescriptions     Signed Prescriptions Disp Refills    tamsulosin (FLOMAX) 0.4 MG capsule 30 capsule 0     Sig: Take 1 capsule by mouth daily       Medications Discontinued During This Encounter   Medication Reason    apixaban (ELIQUIS) 5 MG TABS tablet Therapy completed    aspirin 81 MG EC tablet Therapy completed       Return in about 4 weeks (around 10/16/2020).       Diana Hillman received counseling on the following healthy behaviors: nutrition and exercise  Reviewed prior labs and health maintenance  Continue current medications, diet and exercise. Discussed use, benefit, and side effects of prescribed medications. Barriers to medication compliance addressed. Patient given educational materials - see patient instructions  Was a self-tracking handout given in paper form or via [employer]? Yes    Requested Prescriptions     Signed Prescriptions Disp Refills    tamsulosin (FLOMAX) 0.4 MG capsule 30 capsule 0     Sig: Take 1 capsule by mouth daily       All patient questions answered. Patient voiced understanding. Quality Measures    Body mass index is 46.67 kg/m². Elevated. Weight control planned discussed Healthy diet and regular exercise. BP: 120/73 Blood pressure is normal. Treatment plan consists of No treatment change needed.     Lab Results   Component Value Date    LDLCHOLESTEROL 45 05/26/2020    (goal LDL reduction with dx if diabetes is 50% LDL reduction)      PHQ Scores 7/29/2020 6/22/2020   PHQ2 Score 0 0   PHQ9 Score 0 0     Interpretation of Total Score Depression Severity: 1-4 = Minimal depression, 5-9 = Mild depression, 10-14 = Moderate depression, 15-19 = Moderately severe depression, 20-27 = Severe depression

## 2020-09-18 NOTE — PATIENT INSTRUCTIONS
Thank you for letting us take care of you today. We hope all your questions were addressed. If a question was overlooked or something else comes to mind after you return home, please contact a member of your Care Team listed below. Your Care Team at [place] [address] is Team #4  Rosanne Poole MD (Faculty)  Carmen Underwood MD (Resident)  Nely Albarran MD (Resident)  Vick Banerjee MD (Resident)  Cynthia Walker MD (Resident)  DELIA Clement,PERRY [address] [facility] office)  Gabino Portillo, [address] (Clinical Practice Manager)  Laila Delong [facility] (Clinical Pharmacist)       Office phone number: [phone]    If you need to get in right away due to illness, please be advised we have \"Same Day\" appointments available Monday-Friday. Please call us at [phone] option #3 to schedule your \"Same Day\" appointment.

## 2020-09-18 NOTE — PROGRESS NOTES
Visit Information    Have you changed or started any medications since your last visit including any over-the-counter medicines, vitamins, or herbal medicines? no   Have you stopped taking any of your medications? Is so, why? -  no  Are you having any side effects from any of your medications? - no    Have you seen any other physician or provider since your last visit?  no   Have you had any other diagnostic tests since your last visit?  no   Have you been seen in the emergency room and/or had an admission in a hospital since we last saw you?  no   Have you had your routine dental cleaning in the past 6 months?  no     Do you have an active MyChart account? If no, what is the barrier?   No:     Patient Care Team:  Brittny Walsh MD as PCP - General (Family Medicine)    Medical History Review  Past Medical, Family, and Social History reviewed and does not contribute to the patient presenting condition    Health Maintenance   Topic Date Due    Hepatitis C screen  1955    Shingles Vaccine (1 of 2) 07/17/2007    Flu vaccine (1) 09/01/2020    Lipid screen  05/26/2021    Potassium monitoring  08/25/2021    Creatinine monitoring  08/25/2021    Colon cancer screen fecal DNA test (Cologuard)  07/13/2023    DTaP/Tdap/Td vaccine (2 - Td) 08/20/2030    Pneumococcal 0-64 years Vaccine  Completed    HIV screen  Completed    Hepatitis A vaccine  Aged Out    Hepatitis B vaccine  Aged Out    Hib vaccine  Aged Out    Meningococcal (ACWY) vaccine  Aged Out

## 2020-09-21 RX ORDER — ATORVASTATIN CALCIUM 40 MG/1
40 TABLET, FILM COATED ORAL NIGHTLY
Qty: 30 TABLET | Refills: 0 | Status: SHIPPED | OUTPATIENT
Start: 2020-09-21 | End: 2020-10-22 | Stop reason: SDUPTHER

## 2020-09-21 RX ORDER — SPIRONOLACTONE 25 MG/1
25 TABLET ORAL DAILY
Qty: 30 TABLET | Refills: 3 | Status: SHIPPED | OUTPATIENT
Start: 2020-09-21 | End: 2021-03-08

## 2020-09-21 RX ORDER — CLOPIDOGREL BISULFATE 75 MG/1
75 TABLET ORAL DAILY
Qty: 30 TABLET | Refills: 0 | Status: SHIPPED | OUTPATIENT
Start: 2020-09-21 | End: 2020-10-22 | Stop reason: SDUPTHER

## 2020-09-22 ENCOUNTER — HOSPITAL ENCOUNTER (OUTPATIENT)
Age: 65
Setting detail: SPECIMEN
Discharge: HOME | End: 2020-09-22
Payer: MEDICAID

## 2020-09-22 LAB
HEPATITIS C ANTIBODY: NONREACTIVE
PROSTATE SPECIFIC ANTIGEN: 0.67 UG/L

## 2020-09-23 ENCOUNTER — HOSPITAL ENCOUNTER (OUTPATIENT)
Dept: OTHER | Age: 65
Discharge: HOME | End: 2020-09-23
Payer: MEDICAID

## 2020-09-23 VITALS
OXYGEN SATURATION: 96 % | SYSTOLIC BLOOD PRESSURE: 130 MMHG | HEART RATE: 89 BPM | BODY MASS INDEX: 47.46 KG/M2 | DIASTOLIC BLOOD PRESSURE: 60 MMHG | RESPIRATION RATE: 16 BRPM | WEIGHT: 303 LBS | TEMPERATURE: 97.6 F

## 2020-09-23 PROCEDURE — 99213 OFFICE O/P EST LOW 20 MIN: CPT | Performed by: NURSE PRACTITIONER

## 2020-09-23 PROCEDURE — 99212 OFFICE O/P EST SF 10 MIN: CPT

## 2020-09-23 ASSESSMENT — ENCOUNTER SYMPTOMS
EYE DISCHARGE: 0
SHORTNESS OF BREATH: 0
COUGH: 0
BLOOD IN STOOL: 0
ABDOMINAL PAIN: 0

## 2020-09-23 NOTE — PROGRESS NOTES
Date:  2020  Time:  11:53 AM    CHF Clinic at [facility]    Office: [phone] Fax: [phone]    Re:  Emanuel Villarreal   Patient : 1955    Vital Signs: /60   Pulse 89   Temp 97.6 °F (36.4 °C)   Resp 16   Wt (!) 303 lb (137.4 kg)   SpO2 96%   BMI 47.46 kg/m²                                                   No results for input(s): CBC, HGB, HCT, WBC, PLATELET, NA, K, CL, CO2, BUN, CREATININE, GLUCOSE, BNP, INR in the last 72 hours. Respiratory:    Assessment  Charting Type: Reassessment    Breath Sounds  Right Upper Lobe: Clear  Right Middle Lobe: Clear  Right Lower Lobe: Clear  Left Upper Lobe: Clear  Left Lower Lobe: Diminished    Cough/Sputum  Cough: Non-productive         Peripheral Vascular  RLE Edema: +1, Pitting  LLE Edema: +1, Pitting      Complaints: Patient feels more SOB today and feels legs are more swollen than usual     Physician Orders Jun Cao CNP in to see patient will increase Lasix to 60mg in am and continue the 40mg in afternoon for 1 week no labs at this time had labs end og August and were WNL. Will see in 1 week to reassess. Comment : Weight up 303 from 294 9 pound weight gain. Reinforced following a strict low sodium diet and fluid restriction of 1500ml to 2000ml also reviewed in detail medications patient seems to be compliant with all meds. Will see 2020 .     Electronically signed by Sukhdev Roe RN on 2020 at 11:53 AM

## 2020-09-23 NOTE — PROGRESS NOTES
suspension Take 30 mLs by mouth daily as needed for Constipation 1 Bottle     metoprolol succinate (TOPROL XL) 100 MG extended release tablet Take 1 tablet by mouth 2 times daily 30 tablet 3     No current facility-administered medications for this encounter. No Known Allergies      Subjective:      Review of Systems   Constitutional: Negative for activity change, chills, fatigue and fever. Eyes: Negative for discharge and visual disturbance. Respiratory: Negative for cough and shortness of breath. Cardiovascular: Positive for leg swelling. Negative for chest pain. Gastrointestinal: Negative for abdominal pain and blood in stool. Endocrine: Negative for cold intolerance and heat intolerance. Genitourinary: Negative for dysuria and flank pain. Musculoskeletal: Negative for joint swelling and myalgias. Skin: Negative for pallor and rash. Neurological: Negative for dizziness and headaches. Psychiatric/Behavioral: Negative for hallucinations and suicidal ideas. Objective:     Physical Exam  Vitals signs and nursing note reviewed. Constitutional:       Appearance: He is well-developed. He is obese. Comments:      HENT:      Head: Normocephalic and atraumatic. Eyes:      General: No scleral icterus. Conjunctiva/sclera: Conjunctivae normal.   Neck:      Musculoskeletal: Neck supple. Cardiovascular:      Rate and Rhythm: Normal rate and regular rhythm. Heart sounds: Normal heart sounds. Pulmonary:      Effort: Pulmonary effort is normal.      Breath sounds: Normal breath sounds. No wheezing or rales. Abdominal:      General: Bowel sounds are normal.      Palpations: Abdomen is soft. Musculoskeletal: Normal range of motion. Right lower leg: Edema present. Left lower leg: Edema present. Comments: 1+ pitting b/l LE; wearing compression hose   Skin:     General: Skin is warm and dry.    Neurological:      Mental Status: He is alert and oriented to person, systolic and diastolic, chronic (HCC)        Wt is up 9 lbs.  leg measurements are up slightly. No adventitious lung sounds on exam.   Pt reports compliance with low Na+ diet and fluids. Review of recent foods by RN and writer does not give concern for dietary indiscretions. Pt's renal chemistry is wnl . Will increase am lasix since his diuresis appears to be most effective in the am. Will increase to lasix 60 mg in am, 40 mg in pm, x 1 week. his diastolic BP is low, so will continue to monitor BP with increased diuresis. On Guideline-Directed Medication Therapy:     ACEI / ARB / ARNi:   Beta - blocker  Aldosterone Antagonist  Diuretic Therapy      RTC in one week for eval of wt, and leg edema. No orders of the defined types were placed in this encounter. No orders of the defined types were placed in this encounter. Patientgiven verbal and/or written educational instructions. Follow up as directed. I have reviewed and agree with the nursing documentation. Verbally reviewed medication list with patient; patient verbalized understanding. Discussed 2000mg/day sodium restricted diet; patient verbalized understanding. Moderate daily exercise encouraged as tolerated. Discussed rest breaks as needed; patient verbalized understanding. Patient instructed to weigh self at the same time of each day, using same clothes and same scale; reinforced teaching to monitor for 3-5 lb weight increase over 1-2 days, and to notify the CHF clinic at [phone] or physician office if weight change noted. Patient verbalized understanding. Risks of smoking discussed with the patient if applicable; patient strongly discouraged to smoke. Patient verbalized understanding.      Signs and symptoms of CHF discussed with patient, such as feeling more tired than normal, feeling short of breath, coughing that increases when you lie down, sudden weight gain, swelling of your feet, legs or belly. Patient verbalized understanding to notify the CHF clinic at [phone] or physician office if these symptoms occur. Compliance with plan of care and further disease process causes discussed with patient, patient encouraged to keep all follow up appointments. Patient verbalized understanding.       Electronically signedby LUDY Meek CNP on 9/23/2020 at 2:37 PM

## 2020-09-24 NOTE — TELEPHONE ENCOUNTER
Dorota Request for pending medication.     Last Visit Date:9 /18/2020  Next Visit Date:  Future Appointments   Date Time Provider Taj Flores   9/30/2020 11:30 AM [facility] CHF CLINIC RM 1 [facility] CHF CLI [facility]   10/1/2020  2:15 PM Loren Ochoa MD [facility]   10/20/2020  2:00 PM Sixto Mak MD [address] [place] [address] Maintenance   Topic Date Due    Shingles Vaccine (1 of 2) 07/17/2007    Flu vaccine (1) 09/01/2020    Lipid screen  05/26/2021    Potassium monitoring  08/25/2021    Creatinine monitoring  08/25/2021    Statin Therapy  09/21/2021    Colon cancer screen fecal DNA test (Cologuard)  07/13/2023    DTaP/Tdap/Td vaccine (2 - Td) 08/20/2030    Pneumococcal 0-64 years Vaccine  Completed    Hepatitis C screen  Completed    HIV screen  Completed    Hepatitis A vaccine  Aged Out    Hepatitis B vaccine  Aged Out    Hib vaccine  Aged Out    Meningococcal (ACWY) vaccine  Aged Out       Hemoglobin A1C (%)   Date Value   12/10/2019 5.6   10/18/2019 5.8             ( goal A1C is < 7)   No results found for: LABMICR  LDL Cholesterol (mg/dL)   Date Value   05/26/2020 45       (goal LDL is <100)   AST (U/L)   Date Value   05/26/2020 19     ALT (U/L)   Date Value   05/26/2020 17     BUN (mg/dL)   Date Value   08/25/2020 21     BP Readings from Last 3 Encounters:   09/23/20 130/60   09/18/20 120/73   08/26/20 110/80          (goal 120/80)    All Future Testing planned in [facility]  Lab Frequency Next Occurrence   Basic Metabolic Panel Once 93/40/5245   Magnesium Once 01/21/2020       Next Visit Date:  Future Appointments   Date Time Provider Taj Flores   9/30/2020 11:30 AM [facility] CHF CLINIC RM 1 [facility] CHF [facility]   10/1/2020  2:15 PM Loren Ochoa MD sv [facility]   10/20/2020  2:00 PM Sixto Mak MD [address]         Patient Active Problem List:     Atrial flutter [facility])     Sleep-related breathing disorder     Hypokalemia     Atrial fibrillation ([address]     Ischemic cardiomyopathy     Combined systolic and diastolic congestive heart failure (HCC)     Acute cystitis without hematuria     Hx of CABG     Heart failure, systolic and diastolic, chronic (HCC)     Bilateral hand numbness     Right thigh pain     Left leg weakness     Coronary artery disease

## 2020-09-30 ENCOUNTER — HOSPITAL ENCOUNTER (OUTPATIENT)
Dept: OTHER | Age: 65
Discharge: HOME | End: 2020-09-30
Payer: MEDICAID

## 2020-09-30 ENCOUNTER — HOSPITAL ENCOUNTER (OUTPATIENT)
Age: 65
Discharge: HOME | End: 2020-09-30
Payer: MEDICAID

## 2020-09-30 VITALS
OXYGEN SATURATION: 98 % | HEART RATE: 87 BPM | SYSTOLIC BLOOD PRESSURE: 114 MMHG | TEMPERATURE: 97.3 F | WEIGHT: 307.2 LBS | DIASTOLIC BLOOD PRESSURE: 76 MMHG | RESPIRATION RATE: 20 BRPM | BODY MASS INDEX: 48.11 KG/M2

## 2020-09-30 LAB
ANION GAP SERPL CALCULATED.3IONS-SCNC: 13 MMOL/L (ref 9–17)
BUN BLDV-MCNC: 19 MG/DL (ref 8–23)
BUN/CREAT BLD: NORMAL (ref 9–20)
CALCIUM SERPL-MCNC: 9.2 MG/DL (ref 8.6–10.4)
CHLORIDE BLD-SCNC: 100 MMOL/L (ref 98–107)
CO2: 24 MMOL/L (ref 20–31)
CREAT SERPL-MCNC: 0.97 MG/DL (ref 0.7–1.2)
GFR AFRICAN AMERICAN: >60 ML/MIN
GFR NON-AFRICAN AMERICAN: >60 ML/MIN
GFR SERPL CREATININE-BSD FRML MDRD: NORMAL ML/MIN/{1.73_M2}
GFR SERPL CREATININE-BSD FRML MDRD: NORMAL ML/MIN/{1.73_M2}
GLUCOSE BLD-MCNC: 96 MG/DL (ref 70–99)
POTASSIUM SERPL-SCNC: 4.1 MMOL/L (ref 3.7–5.3)
SODIUM BLD-SCNC: 137 MMOL/L (ref 135–144)

## 2020-09-30 PROCEDURE — 99212 OFFICE O/P EST SF 10 MIN: CPT

## 2020-09-30 PROCEDURE — 80048 BASIC METABOLIC PNL TOTAL CA: CPT

## 2020-09-30 ASSESSMENT — PAIN DESCRIPTION - LOCATION: LOCATION: LEG

## 2020-09-30 ASSESSMENT — PAIN DESCRIPTION - ORIENTATION: ORIENTATION: LEFT;ANTERIOR

## 2020-09-30 ASSESSMENT — PAIN DESCRIPTION - PAIN TYPE: TYPE: CHRONIC PAIN

## 2020-09-30 ASSESSMENT — PAIN SCALES - GENERAL: PAINLEVEL_OUTOF10: 7

## 2020-09-30 ASSESSMENT — PAIN DESCRIPTION - DESCRIPTORS: DESCRIPTORS: CONSTANT

## 2020-09-30 NOTE — PROGRESS NOTES
Verbally reviewed medication list with patient; patient verbalized understanding. Discussed 2000mg/day sodium restricted diet; patient verbalized understanding. Moderate daily exercise encouraged as tolerated. Discussed rest breaks as needed; patient verbalized understanding. Patient instructed to weigh self at the same time of each day, using same clothes and same scale; reinforced teaching to monitor for 3-5 lb weight increase over 1-2 days, and to notify the CHF clinic at [phone] or physician office if weight change noted. Patient verbalized understanding. Risks of smoking discussed with the patient if applicable; patient strongly discouraged to smoke. Patient verbalized understanding. Signs and symptoms of CHF discussed with patient, such as feeling more tired than normal, feeling short of breath, coughing that increases when you lie down, sudden weight gain, swelling of your feet, legs or belly. Patient verbalized understanding to notify the CHF clinic at [phone] or physician office if these symptoms occur. Compliance with plan of care and further disease process causes discussed with patient, patient encouraged to keep all follow up appointments. Patient verbalized understanding.

## 2020-09-30 NOTE — PROGRESS NOTES
Date:  2020  Time:  1:13 PM    CHF Clinic at [address] [place] [address]    Office: [phone] Fax: [phone]    Re:  Marcie Perry   Patient : 1955    Vital Signs: /76   Pulse 87   Temp 97.3 °F (36.3 °C) (Temporal)   Resp 20   Wt (!) 307 lb 3.2 oz (139.3 kg)   SpO2 98%   BMI 48.11 kg/m²                       O2 Device: None (Room air)                           No results for input(s): CBC, HGB, HCT, WBC, PLATELET, NA, K, CL, CO2, BUN, CREATININE, GLUCOSE, BNP, INR in the last 72 hours. Respiratory:    Assessment  Charting Type: Reassessment    Breath Sounds  Right Upper Lobe: Clear  Right Middle Lobe: Clear  Right Lower Lobe: Clear  Left Upper Lobe: Clear  Left Lower Lobe: Clear, Diminished    Cough/Sputum  Cough: Productive  Frequency: Occasional  Sputum Amount: Small  Sputum Color: Clear         Peripheral Vascular  RLE Edema: +2, Pitting  LLE Edema: +2, +3, Pitting      Complaints: Patient c/o increased dyspnea with exertion and increase in swelling    Physician Orders BMP now, Increase lasix to 60 mg twice daily until next appt. in clinic on 10/12/20    Comment : Patient here for scheduled visit. His weight is up 4.2 lbs from last visit one week ago. He says he is not eating a lot and drinking less than the 2L fluid restriction and doesn't know why he is gaining weight. Lasix was increased from 40 mg twice daily to 60 mg in AM and 40 mg in PM at last appt. And he states compliance with taking medications as ordered. Patient also has an increase in swelling to lower legs, ankles and feet, L>R. Discussed weight gain and increased swelling with Cruz Taylor CNP with clinic and orders received to check BMP now and once lab results are obtained will adjust diuretics accordingly. Will call patient at home later with any new medication changes. Next CHF Clinic visit 10/12/20.     Electronically signed by Jadyn Ferris RN on 2020 at 1:13 PM

## 2020-10-01 ENCOUNTER — OFFICE VISIT (OUTPATIENT)
Dept: GASTROENTEROLOGY | Age: 65
End: 2020-10-01
Payer: MEDICAID

## 2020-10-01 ENCOUNTER — TELEPHONE (OUTPATIENT)
Dept: GASTROENTEROLOGY | Age: 65
End: 2020-10-01

## 2020-10-01 VITALS — SYSTOLIC BLOOD PRESSURE: 97 MMHG | WEIGHT: 308 LBS | BODY MASS INDEX: 48.24 KG/M2 | DIASTOLIC BLOOD PRESSURE: 64 MMHG

## 2020-10-01 PROCEDURE — G8427 DOCREV CUR MEDS BY ELIG CLIN: HCPCS | Performed by: INTERNAL MEDICINE

## 2020-10-01 PROCEDURE — 1036F TOBACCO NON-USER: CPT | Performed by: INTERNAL MEDICINE

## 2020-10-01 PROCEDURE — G8484 FLU IMMUNIZE NO ADMIN: HCPCS | Performed by: INTERNAL MEDICINE

## 2020-10-01 PROCEDURE — G8417 CALC BMI ABV UP PARAM F/U: HCPCS | Performed by: INTERNAL MEDICINE

## 2020-10-01 PROCEDURE — 99213 OFFICE O/P EST LOW 20 MIN: CPT | Performed by: INTERNAL MEDICINE

## 2020-10-01 PROCEDURE — 3017F COLORECTAL CA SCREEN DOC REV: CPT | Performed by: INTERNAL MEDICINE

## 2020-10-01 RX ORDER — HYDROCORTISONE 25 MG/G
CREAM TOPICAL
Qty: 1 TUBE | Refills: 2 | Status: SHIPPED | OUTPATIENT
Start: 2020-10-01 | End: 2021-02-25 | Stop reason: ALTCHOICE

## 2020-10-01 ASSESSMENT — ENCOUNTER SYMPTOMS
EYES NEGATIVE: 1
CHEST TIGHTNESS: 1
APNEA: 1
SHORTNESS OF BREATH: 1
COUGH: 1
ANAL BLEEDING: 1
ALLERGIC/IMMUNOLOGIC NEGATIVE: 1

## 2020-10-01 NOTE — TELEPHONE ENCOUNTER
Dr Lorin Bill ordered colonoscopy for Jennie Hillman at office visit on 10/01/20. He is to be schedule after cardiac clearance is received for Plavix and ASA. Clearance faxed to Dr Jaymie Dill at [facility] office. Jennie Hillman will need to be scheduled in the OR due to recent CABP and on bipap. He will also need 3 mos RTC.

## 2020-10-01 NOTE — PROGRESS NOTES
GI FOLLOW UP    INTERVAL HISTORY:       Rectal bleeding and positive stool occult testing    Chief Complaint   Patient presents with    Follow-up     Patient states still having issues with his cough. He states he is seeing a lung specialist in 2 weeks. He said the coughing is getting better though.  Rectal Bleeding     Patient states for the past 2-3 weeks he has had blood with his bowel movements. He states he only notices it on his wipes. HISTORY OF PRESENT ILLNESS: Mr.Andrew YUN Berg is a 60 y. o. male with a past history remarkable for HARPREET, A. fib on Eliquis, morbid obesity, history of CABG in October 2019 on dual antiplatelet therapy (aspirin and Plavix), referred for evaluation of nonproductive postprandial cough.     Nonproductive cough had started since the patient's a CABG procedure in October 2019 patient reports that her symptoms are exacerbated in the postprandial setting and appear to be associated with silent reflux symptoms.  Most recently the patient has reported worsening of his \"coughing spells\" due to recent weight gain.  Patient has reported gaining approximately 20 pounds over the last few months which is likely contributed the patient's silent reflux-like symptoms.  Patient reports some modest improvement with Protonix 40 mg daily. Past Medical,Family, and Social History reviewed and does contribute to the patient presenting condition. Patient's PMH/PSH,SH,PSYCH Hx, MEDs, ALLERGIES, and ROS were all reviewed and updated in the appropriate sections.     PAST MEDICAL HISTORY:  Past Medical History:   Diagnosis Date    Atrial fibrillation ([facility]) 10/2019    Cellulitis     Coronary artery disease     History of incarceration     released 2019    Hyperlipidemia     Hypertension        Past Surgical History:   Procedure Laterality Date    CARDIAC SURGERY      CARDIOVERSION  10/16/2019    CARPAL TUNNEL RELEASE Bilateral 2008    CERVICAL SPINE SURGERY      2-5    CORONARY ARTERY BYPASS GRAFT MAZE PROCEDURE N/A 10/21/2019    CABG CORONARY ARTERY BYPASS GRAFT X1, LIMA-LAD, BROWN 4 MAZE PROCEDURE, 50MM ATRICURE ATRIAL CLIP RIGID INTERNAL FIXATION PLATES X 3   SCREWS X 13 performed by Jose Ferreira MD at [address] [place] [address]      Left leg    TRANSESOPHAGEAL ECHOCARDIOGRAM  10/16/2019       CURRENT MEDICATIONS:    Current Outpatient Medications:     diclofenac sodium (VOLTAREN) 1 % GEL, APPLY 2 G TOPICALLY 4 TIMES DAILY AS NEEDED FOR PAIN, Disp: 200 g, Rfl: 1    atorvastatin (LIPITOR) 40 MG tablet, Take 1 tablet by mouth nightly, Disp: 30 tablet, Rfl: 0    clopidogrel (PLAVIX) 75 MG tablet, Take 1 tablet by mouth daily, Disp: 30 tablet, Rfl: 0    spironolactone (ALDACTONE) 25 MG tablet, Take 1 tablet by mouth daily, Disp: 30 tablet, Rfl: 3    tamsulosin (FLOMAX) 0.4 MG capsule, Take 1 capsule by mouth daily, Disp: 30 capsule, Rfl: 0    Elastic Bandages & Supports (JOBST KNEE HIGH COMPRESSION SM) MISC, Wear q day. Remove q hs, Disp: 2 each, Rfl: 0    furosemide (LASIX) 40 MG tablet, Take 1 tablet by mouth 2 times daily, Disp: 60 tablet, Rfl: 1    lisinopril (PRINIVIL;ZESTRIL) 5 MG tablet, Take 0.5 tablets by mouth daily, Disp: 45 tablet, Rfl: 0    Elastic Bandages & Supports (JOBST KNEE HIGH COMPRESSION SM) MISC, 2 each by Does not apply route daily Wear q day. Remove q hs. Diagnosis: Chronic venous insufficiency, Disp: 2 each, Rfl: 0    lidocaine (LIDODERM) 5 %, Place 1 patch onto the skin daily 12 hours on, 12 hours off., Disp: 30 patch, Rfl: 2    polyethylene glycol (MIRALAX) 17 GM/SCOOP POWD powder, 250g bottle. For colonoscopy prep.  Follow per instructions from clinic., Disp: 1 Bottle, Rfl: 0    potassium chloride (KLOR-CON M) 20 MEQ extended release tablet, Take 1 tablet by mouth daily, Disp: 30 tablet, Rfl: 1    ibuprofen (ADVIL;MOTRIN) 600 MG tablet, Take 1 tablet by mouth 4 times daily as needed for Pain, Disp: 360 tablet, Rfl: 1    triamcinolone (KENALOG) 0.1 % cream, Apply topically 2 times daily Apply topically 2 times daily. , Disp: , Rfl:     ammonium lactate (AMLACTIN) 12 % cream, Apply topically daily Apply topically as needed. , Disp: , Rfl:     Elastic Bandages & Supports (JOBST OPAQUE KNEE 20-30MMHG XL) MISC, Dispense two pair closed toe knee high 20 to 30 mmHg Jobst compression stockings, Disp: 2 each, Rfl: 0    pantoprazole (PROTONIX) 40 MG tablet, Take 1 tablet by mouth 2 times daily (before meals), Disp: 60 tablet, Rfl: 5    nitroGLYCERIN (NITROSTAT) 0.4 MG SL tablet, Place 0.4 mg under the tongue every 5 minutes as needed for Chest pain up to max of 3 total doses.  If no relief after 1 dose, call 911., Disp: , Rfl:     white petrolatum GEL, Apply 28 g topically as needed for Dry Lips, Disp: 106 g, Rfl: 0    metoprolol succinate (TOPROL XL) 100 MG extended release tablet, Take 1 tablet by mouth 2 times daily, Disp: 30 tablet, Rfl: 3    magnesium hydroxide (MILK OF MAGNESIA) 400 MG/5ML suspension, Take 30 mLs by mouth daily as needed for Constipation, Disp: 1 Bottle, Rfl:     ALLERGIES:   No Known Allergies    FAMILY HISTORY:       Problem Relation Age of Onset    Alcohol Abuse Maternal Uncle     Heart Attack Maternal Uncle          SOCIAL HISTORY:   Social History     Socioeconomic History    Marital status:      Spouse name: Not on file    Number of children: Not on file    Years of education: Not on file    Highest education level: Not on file   Occupational History    Not on file   Social Needs    Financial resource strain: Not on file    Food insecurity     Worry: Not on file     Inability: Not on file    Transportation needs     Medical: Not on file     Non-medical: Not on file   Tobacco Use    Smoking status: Never Smoker    Smokeless tobacco: Never Used   Substance and Sexual Activity    Alcohol use: Not Currently    Drug use: Not Currently    Sexual activity: Not Currently   Lifestyle    Physical activity     Days per week: Not on file     Minutes per session: Not on file    Stress: Not on file   Relationships    Social connections     Talks on phone: Not on file     Gets together: Not on file     Attends [employer]ism service: Not on file     Active member of club or organization: Not on file     Attends meetings of clubs or organizations: Not on file     Relationship status: Not on file    Intimate partner violence     Fear of current or ex partner: Not on file     Emotionally abused: Not on file     Physically abused: Not on file     Forced sexual activity: Not on file   Other Topics Concern    Not on file   Social History Narrative    Not on file       REVIEW OF SYSTEMS: A 12-point review of systems was obtained and pertinent positives and negatives were listed below. REVIEW OF SYSTEMS:     Constitutional: No fever, no chills, no lethargy, no weakness. HEENT:  No headache, otalgia, itchy eyes, nasal discharge or sore throat. Cardiac:  No chest pain, dyspnea, orthopnea or PND. Chest:   No cough, phlegm or wheezing. Abdomen:      Detailed by MA   Neuro:  No focal weakness, abnormal movements or seizure like activity. Skin:   No rashes, no itching. :   No hematuria, no pyuria, no dysuria, no flank pain. Extremities:  No swelling or joint pains. ROS was otherwise negative    Review of Systems   Constitutional: Positive for fatigue. HENT: Negative. Eyes: Negative. Respiratory: Positive for apnea, cough, chest tightness and shortness of breath. Cardiovascular: Negative. Gastrointestinal: Positive for anal bleeding. Endocrine: Negative. Genitourinary: Negative. Musculoskeletal: Positive for gait problem. Skin: Negative. Allergic/Immunologic: Negative. Hematological: Bruises/bleeds easily. Psychiatric/Behavioral: Positive for sleep disturbance.    All other systems reviewed and are negative. PHYSICAL EXAMINATION: Vital signs reviewed per the nursing documentation. BP 97/64   Wt (!) 308 lb (139.7 kg)   BMI 48.24 kg/m²   Body mass index is 48.24 kg/m². Physical Exam    Physical Exam   Constitutional: Patient is oriented to person, place, and time. Patient appears well-developed and well-nourished. HENT:   Head: Normocephalic and atraumatic. Eyes: Pupils are equal, round, and reactive to light. EOM are normal.   Neck: Normal range of motion. Neck supple. No JVD present. No tracheal deviation present. No thyromegaly present. Cardiovascular: Normal rate, regular rhythm, normal heart sounds and intact distal pulses. Pulmonary/Chest: Effort normal and breath sounds normal. No stridor. No respiratory distress. He has no wheezes. He has no rales. He exhibits no tenderness. Abdominal: Soft. Bowel sounds are normal. He exhibits no distension and no mass. There is no tenderness. There is no rebound and no guarding. No hernia. Musculoskeletal: Normal range of motion. Lymphadenopathy:    Patient has no cervical adenopathy. Neurological: Patient is alert and oriented to person, place, and time. Psychiatric: Patient has a normal mood and affect.  Patient behavior is normal.       LABORATORY DATA: Reviewed  Lab Results   Component Value Date    WBC 7.2 05/26/2020    HGB 12.4 (L) 05/26/2020    HCT 39.1 (L) 05/26/2020    MCV 94.7 05/26/2020     05/26/2020     09/30/2020    K 4.1 09/30/2020     09/30/2020    CO2 24 09/30/2020    BUN 19 09/30/2020    CREATININE 0.97 09/30/2020    LABALBU 3.9 05/26/2020    BILITOT 0.39 05/26/2020    ALKPHOS 58 05/26/2020    AST 19 05/26/2020    ALT 17 05/26/2020    INR 1.1 01/22/2020         Lab Results   Component Value Date    RBC 4.13 (L) 05/26/2020    HGB 12.4 (L) 05/26/2020    MCV 94.7 05/26/2020    MCH 30.0 05/26/2020    MCHC 31.7 05/26/2020    RDW 14.4 05/26/2020    MPV 9.8 05/26/2020    BASOPCT 1 05/26/2020 LYMPHSABS 0.98 (L) 05/26/2020    MONOSABS 0.74 05/26/2020    NEUTROABS 5.18 05/26/2020    EOSABS 0.22 05/26/2020    BASOSABS 0.05 05/26/2020         DIAGNOSTIC TESTING:     No results found. IMPRESSION: Mr.Andrew YUN Berg is a 60 y. o. male with a past history remarkable for HARPREET, A. fib on Eliquis, morbid obesity, history of CABG in October 2019 on dual antiplatelet therapy (aspirin and Plavix), referred for evaluation of nonproductive postprandial cough.     Nonproductive cough had started since the patient's a CABG procedure in October 2019 patient reports that her symptoms are exacerbated in the postprandial setting and appear to be associated with silent reflux symptoms.  Most recently the patient has reported worsening of his \"coughing spells\" due to recent weight gain.  Patient has reported gaining approximately 20 pounds over the last few months which is likely contributed the patient's silent reflux-like symptoms.  Patient reports some modest improvement with Protonix 40 mg daily.      Patient was unable to space out his meals and continues to consume 1 large meal which is likely contributing to his postprandial reflux and regurgitation. Additionally, patient was taking Protonix at the wrong times      PLAN:    1) Cologaurd positive, will need clearance from cardiology to hold dual antiplatelet therapy (currently on aspirin Plavix). Plan for Colonoscopy, to be performed in OR due to cardiac risk and elevated ASA score. Patient recent history of a CABG and has a history of A. fib while on Eliquis    2) RTC in 2-3 months. Thank you for allowing me to participate in the care of Mr. Vrigil Ray. For any further questions please do not hesitate to contact me. I have reviewed and agree with the ROS entered by the MA/LPN from today's encounter documented in a separate note.         Janine العلي MD, MPH   [facility]  Office #: [phone]    this note is created with the assistance of a speech recognition program.  While intending to generate a document that actually reflects the content of the visit, the document can still have some errors including those of syntax and sound a like substitutions which may escape proof reading. It such instances, actual meaning can be extrapolated by contextual diversion.

## 2020-10-06 NOTE — TELEPHONE ENCOUNTER
Clearance received from Dr Mango Angelo. Patient is low cardiac risk but acceptable for planned procedure. Ok to hold Plavix 5 days prior to surgery. Need to call to schedule Chayito Dumont at [facility] in the OR, last case of the day.

## 2020-10-07 RX ORDER — POLYETHYLENE GLYCOL 3350 17 G/17G
POWDER, FOR SOLUTION ORAL
Qty: 238 G | Refills: 0 | Status: SHIPPED | OUTPATIENT
Start: 2020-10-07 | End: 2020-10-26 | Stop reason: SDUPTHER

## 2020-10-07 NOTE — TELEPHONE ENCOUNTER
Talked to Desirae Mims regarding scheduling colonoscopy. He is now scheduled STV Thursday 10/29/20 @ 11:30 am colon Miralax/Mag Cit in OR as last case Dr Reanna Coleman. Covid test requested at [facility] 10/25/20. Ok to hold Plavix 5 days prior to procedure per Dr Matthew Angelo. Bowel prep instructions mailed.

## 2020-10-12 ENCOUNTER — TELEPHONE (OUTPATIENT)
Dept: CARDIOLOGY CLINIC | Age: 65
End: 2020-10-12

## 2020-10-12 ENCOUNTER — HOSPITAL ENCOUNTER (OUTPATIENT)
Dept: OTHER | Age: 65
Discharge: HOME | End: 2020-10-12
Payer: MEDICAID

## 2020-10-12 VITALS
SYSTOLIC BLOOD PRESSURE: 120 MMHG | HEART RATE: 81 BPM | OXYGEN SATURATION: 98 % | RESPIRATION RATE: 20 BRPM | BODY MASS INDEX: 45.98 KG/M2 | DIASTOLIC BLOOD PRESSURE: 64 MMHG | TEMPERATURE: 97.1 F | WEIGHT: 293.6 LBS

## 2020-10-12 PROCEDURE — 99212 OFFICE O/P EST SF 10 MIN: CPT

## 2020-10-12 RX ORDER — FUROSEMIDE 40 MG/1
TABLET ORAL
Qty: 90 TABLET | Refills: 1 | Status: SHIPPED | OUTPATIENT
Start: 2020-10-12 | End: 2020-11-23 | Stop reason: SDUPTHER

## 2020-10-12 ASSESSMENT — PAIN SCALES - GENERAL: PAINLEVEL_OUTOF10: 8

## 2020-10-12 ASSESSMENT — PAIN DESCRIPTION - LOCATION: LOCATION: LEG

## 2020-10-12 ASSESSMENT — PAIN DESCRIPTION - PAIN TYPE: TYPE: CHRONIC PAIN

## 2020-10-12 NOTE — PROGRESS NOTES
Date:  10/12/2020  Time:  12:15 PM    CHF Clinic at [address] [facility]    Office: [phone] Fax: [phone]    Re:  Suzanne Fabry   Patient : 1955    Vital Signs: /64   Pulse 81   Temp 97.1 °F (36.2 °C) (Temporal)   Resp 20   Wt 293 lb 9.6 oz (133.2 kg)   SpO2 98%   BMI 45.98 kg/m²                       O2 Device: None (Room air)                           No results for input(s): CBC, HGB, HCT, WBC, PLATELET, NA, K, CL, CO2, BUN, CREATININE, GLUCOSE, BNP, INR in the last 72 hours. Respiratory:    Assessment  Charting Type: Reassessment    Breath Sounds  Right Upper Lobe: Clear  Right Middle Lobe: Clear  Right Lower Lobe: Clear  Left Upper Lobe: Clear  Left Lower Lobe: Clear    Cough/Sputum  Cough: None         Peripheral Vascular  RLE Edema: +1, Pitting  LLE Edema: +1, Pitting      Complaints: Ongoing left leg pain    Physician Orders Continue lasix 60 mg twice daily. BMP at next CHF Clinic visit in 3 weeks    Comment : Patient here for scheduled visit. His weight is down 13.6 lbs from last visit 12 days ago. He denies any increase in dyspnea with exertion or chest pain. Ongoing lower leg, ankle and pedal edema noted with a significant decrease from last visit. Continues to wear compression stockings. Medication list reviewed and updated. Reinforced low sodium diet and fluid restrictions. Spoke with Teddy Espinoza CNP with clinic regarding patient's weight loss and decrease in swelling, orders as above. No s/s acute chf noted at this time. Next CHF Clinic visit in 3 weeks.     Electronically signed by Franny Ugarte RN on 10/12/2020 at 12:15 PM

## 2020-10-12 NOTE — TELEPHONE ENCOUNTER
Evans Wiseman RN updates writer that pt has done very well since last appt. Will continue his lasix 60 mg bid .  Will continue close monitor w/labs

## 2020-10-19 ENCOUNTER — TELEPHONE (OUTPATIENT)
Dept: FAMILY MEDICINE CLINIC | Age: 65
End: 2020-10-19

## 2020-10-19 NOTE — TELEPHONE ENCOUNTER
Last visit:   Last Med refill:   Does patient have enough medication for 72 hours: No:     Next Visit Date:  Future Appointments   Date Time Provider Taj Flores   10/20/2020  2:00 PM Tal Hairston MD [facility]   10/25/2020 12:00 PM STA PAT RM 4 [facility] PAT [facility]   11/3/2020 11:00 AM STV CHF CLINIC RM 1 STVZ CHF CLI [facility]       Health Maintenance   Topic Date Due    Shingles Vaccine (1 of 2) 07/17/2007    Flu vaccine (1) 09/01/2020    Lipid screen  05/26/2021    Potassium monitoring  09/30/2021    Creatinine monitoring  09/30/2021    Colon cancer screen fecal DNA test (Cologuard)  07/13/2023    DTaP/Tdap/Td vaccine (2 - Td) 08/20/2030    Pneumococcal 0-64 years Vaccine  Completed    Hepatitis C screen  Completed    HIV screen  Completed    Hepatitis A vaccine  Aged Out    Hepatitis B vaccine  Aged Out    Hib vaccine  Aged Out    Meningococcal (ACWY) vaccine  Aged Out       Hemoglobin A1C (%)   Date Value   12/10/2019 5.6   10/18/2019 5.8             ( goal A1C is < 7)   No results found for: LABMICR  LDL Cholesterol (mg/dL)   Date Value   05/26/2020 45   01/24/2020 37       (goal LDL is <100)   AST (U/L)   Date Value   05/26/2020 19     ALT (U/L)   Date Value   05/26/2020 17     BUN (mg/dL)   Date Value   09/30/2020 19     BP Readings from Last 3 Encounters:   10/12/20 120/64   10/01/20 97/64   09/30/20 114/76          (goal 120/80)    All Future Testing planned in [facility]  Lab Frequency Next Occurrence   Basic Metabolic Panel Once 23/53/4347   Magnesium Once 97/40/3371   Basic Metabolic Panel Once 03/19/6837   COLONOSCOPY (Diagnostic) Once 72/59/0623   Basic Metabolic Panel Once 89/03/0719               Patient Active Problem List:     Atrial flutter (HCC)     Sleep-related breathing disorder     Hypokalemia     Atrial fibrillation (HCC)     Ischemic cardiomyopathy     Combined systolic and diastolic congestive heart failure ([address]     Acute cystitis without hematuria     Hx of CABG     Heart failure, systolic and diastolic, chronic (HCC)     Bilateral hand numbness     Right thigh pain     Left leg weakness     Coronary artery disease           Please address the medication refill and close the encounter. If I can be of assistance, please route to the applicable pool. Thank you.

## 2020-10-20 ENCOUNTER — HOSPITAL ENCOUNTER (OUTPATIENT)
Age: 65
Setting detail: SPECIMEN
Discharge: HOME | End: 2020-10-20
Payer: MEDICAID

## 2020-10-20 ENCOUNTER — OFFICE VISIT (OUTPATIENT)
Dept: FAMILY MEDICINE CLINIC | Age: 65
End: 2020-10-20
Payer: MEDICAID

## 2020-10-20 VITALS
TEMPERATURE: 98.2 F | DIASTOLIC BLOOD PRESSURE: 75 MMHG | HEART RATE: 77 BPM | SYSTOLIC BLOOD PRESSURE: 126 MMHG | BODY MASS INDEX: 46.8 KG/M2 | HEIGHT: 67 IN | WEIGHT: 298.2 LBS

## 2020-10-20 PROBLEM — N39.490 OVERFLOW INCONTINENCE OF URINE: Status: ACTIVE | Noted: 2020-10-20

## 2020-10-20 PROBLEM — K21.9 GASTROESOPHAGEAL REFLUX DISEASE WITHOUT ESOPHAGITIS: Status: ACTIVE | Noted: 2020-10-20

## 2020-10-20 PROBLEM — R05.3 CHRONIC COUGH: Status: ACTIVE | Noted: 2020-10-20

## 2020-10-20 PROCEDURE — 99213 OFFICE O/P EST LOW 20 MIN: CPT

## 2020-10-20 PROCEDURE — 3017F COLORECTAL CA SCREEN DOC REV: CPT

## 2020-10-20 PROCEDURE — G8482 FLU IMMUNIZE ORDER/ADMIN: HCPCS

## 2020-10-20 PROCEDURE — 99211 OFF/OP EST MAY X REQ PHY/QHP: CPT

## 2020-10-20 PROCEDURE — 1036F TOBACCO NON-USER: CPT

## 2020-10-20 PROCEDURE — G8417 CALC BMI ABV UP PARAM F/U: HCPCS

## 2020-10-20 PROCEDURE — G0008 ADMIN INFLUENZA VIRUS VAC: HCPCS | Performed by: FAMILY MEDICINE

## 2020-10-20 PROCEDURE — G8427 DOCREV CUR MEDS BY ELIG CLIN: HCPCS

## 2020-10-20 RX ORDER — ACETAMINOPHEN 500 MG
500 TABLET ORAL EVERY 12 HOURS PRN
COMMUNITY
End: 2020-11-23 | Stop reason: SDUPTHER

## 2020-10-20 ASSESSMENT — ENCOUNTER SYMPTOMS
PHOTOPHOBIA: 0
APNEA: 0
SHORTNESS OF BREATH: 1
WHEEZING: 0
COUGH: 1
COLOR CHANGE: 0
NAUSEA: 0
CONSTIPATION: 0
DIARRHEA: 0
VOMITING: 0
ABDOMINAL DISTENTION: 0

## 2020-10-20 NOTE — PROGRESS NOTES
Visit Information    Have you changed or started any medications since your last visit including any over-the-counter medicines, vitamins, or herbal medicines? no   Have you stopped taking any of your medications? Is so, why? -  no  Are you having any side effects from any of your medications? - no    Have you seen any other physician or provider since your last visit? ENT, Pulmonmology  Have you had any other diagnostic tests since your last visit? Chest XR,   Have you been seen in the emergency room and/or had an admission in a hospital since we last saw you?  no   Have you had your routine dental cleaning in the past 6 months?  no     Do you have an active MyChart account? If no, what is the barrier? No: declined    Patient Care Team:  Tere Tao MD as PCP - General (Family Medicine)  Errol Cowart MD as Consulting Physician (Gastroenterology)    Medical History Review  Past Medical, Family, and Social History reviewed and does not contribute to the patient presenting condition    Health Maintenance   Topic Date Due    Shingles Vaccine (1 of 2) 07/17/2007    Flu vaccine (1) 09/01/2020    Lipid screen  05/26/2021    Potassium monitoring  09/30/2021    Creatinine monitoring  09/30/2021    Colon cancer screen fecal DNA test (Cologuard)  07/13/2023    DTaP/Tdap/Td vaccine (2 - Td) 08/20/2030    Pneumococcal 0-64 years Vaccine  Completed    Hepatitis C screen  Completed    HIV screen  Completed    Hepatitis A vaccine  Aged Out    Hepatitis B vaccine  Aged Out    Hib vaccine  Aged Out    Meningococcal (ACWY) vaccine  Aged [employer], \"Influenza - Inactivated\"  given to [place], or parent/legal guardian of  [place] and verbalized understanding. Patient responses:    Have you ever had a reaction to a flu vaccine? No  Do you have any current illness? No  Have you ever had Guillian Victor Syndrome?   No  Do you have a serious allergy to any of the following: Neomycin, Polymyxin, Thimerosal, eggs or egg products? No    Flu vaccine given per order. Please see immunization tab. Risks and benefits explained. Current VIS given.

## 2020-10-20 NOTE — PATIENT INSTRUCTIONS
Thank you for letting us take care of you today. We hope all your questions were addressed. If a question was overlooked or something else comes to mind after you return home, please contact a member of your Care Team listed below. Your Care Team at [place] [address] is Team #4  Abdoul Petty MD (Faculty)  Irma Cali MD (Resident)  Maribell Kidd MD (Resident)  Shree Shukla MD (Resident)  Karilyn Pallas, MD (Resident)  DELIA Price RMA [address] [address] [facility] office)  Deana Ivania, [address] (Clinical Practice Manager)  Raffaele Vale [facility] (Clinical Pharmacist)       Office phone number: [phone]    If you need to get in right away due to illness, please be advised we have \"Same Day\" appointments available Monday-Friday. Please call us at [phone] option #3 to schedule your \"Same Day\" appointment. Patient Education        Influenza (Flu) Vaccine (Inactivated or Recombinant): What You Need to Know  Why get vaccinated? Influenza vaccine can prevent influenza (flu). Flu is a contagious disease that spreads around the [facility] every year, usually between October and May. Anyone can get the flu, but it is more dangerous for some people. Infants and young children, people 72years of age and older, pregnant women, and people with certain health conditions or a weakened immune system are at greatest risk of flu complications. Pneumonia, bronchitis, sinus infections and ear infections are examples of flu-related complications. If you have a medical condition, such as heart disease, cancer or diabetes, flu can make it worse. Flu can cause fever and chills, sore throat, muscle aches, fatigue, cough, headache, and runny or stuffy nose. Some people may have vomiting and diarrhea, though this is more common in children than adults. Each year, thousands of people in the [facility] die from flu, and many more are hospitalized.  Flu vaccine prevents millions of illnesses and flu-related visits to the doctor each year. Influenza vaccine  CDC recommends everyone 10months of age and older get vaccinated every flu season. Children 6 months through 6years of age may need 2 doses during a single flu season. Everyone else needs only 1 dose each flu season. It takes about 2 weeks for protection to develop after vaccination. There are many flu viruses, and they are always changing. Each year a new flu vaccine is made to protect against three or four viruses that are likely to cause disease in the upcoming flu season. Even when the vaccine doesn't exactly match these viruses, it may still provide some protection. Influenza vaccine does not cause flu. Influenza vaccine may be given at the same time as other vaccines. Talk with your health care provider  Tell your vaccine provider if the person getting the vaccine:  · Has had an allergic reaction after a previous dose of influenza vaccine, or has any severe, life-threatening allergies. · Has ever had Guillain-Barré Syndrome (also called GBS). In some cases, your health care provider may decide to postpone influenza vaccination to a future visit. People with minor illnesses, such as a cold, may be vaccinated. People who are moderately or severely ill should usually wait until they recover before getting influenza vaccine. Your health care provider can give you more information. Risks of a vaccine reaction  · Soreness, redness, and swelling where shot is given, fever, muscle aches, and headache can happen after influenza vaccine. · There may be a very small increased risk of Guillain-Barré Syndrome (GBS) after inactivated influenza vaccine (the flu shot). Allegra Flow children who get the flu shot along with pneumococcal vaccine (PCV13), and/or DTaP vaccine at the same time might be slightly more likely to have a seizure caused by fever.  Tell your health care provider if a child who is getting flu vaccine has ever had a

## 2020-10-20 NOTE — PROGRESS NOTES
Attending Physician Statement  I have discussed the care of Nanette Smith, including pertinent history and exam findings,  with the resident. I have seen and examined the patient and the key elements of all parts of the encounter have been performed by me. I agree with the assessment, plan and orders as documented by the resident.   ([address] [place] [address])    Anthony Coulter MD

## 2020-10-20 NOTE — PROGRESS NOTES
Subjective: Vira Goodrich is a 63 y.o. male with  has a past medical history of Atrial fibrillation ([address] Cellulitis, Coronary artery disease, History of incarceration, Hyperlipidemia, and Hypertension. Presented to the office today for:  Chief Complaint   Patient presents with   [facility] Urinary Frequency     Not as bad as before. Still has some frequency.  Health Maintenance     Flu vaccine       HPI  63-year-old male came to the clinic because of chronic cough  He states his Leg pain better is better now and uses compression stockings. He alsoo B/l hand pain, hx of carpal tunel and surgery  2018 neck surgery and hsince then had bilateral hand pain and will refer to physical therapy. He has had some SOB, no bilateral lower leg swelling appreciated, takes Lasix and dose was recently increased (Lasix 60 mg po BID) per CHF clinic  Chronic cough since heart surgery, takes lisinopril, needs PFT done  flomax helping with urine incontinence, PSA was normal   Colonoscpy is upcoming for abnormal cologuard results  He got a flu shot today           Review of Systems   Constitutional: Negative for activity change, appetite change, fatigue, fever and unexpected weight change. HENT: Negative for congestion. Eyes: Negative for photophobia and visual disturbance. Respiratory: Positive for cough and shortness of breath. Negative for apnea and wheezing. Cardiovascular: Negative for chest pain, palpitations and leg swelling. Gastrointestinal: Negative for abdominal distention, constipation, diarrhea, nausea and vomiting. Endocrine: Negative for polyuria. Genitourinary: Negative for dysuria and urgency. Skin: Negative for color change, pallor, rash and wound. Neurological: Positive for weakness and numbness. Negative for dizziness, tremors, light-headedness and headaches.         Bilateral hand numbness and tingling   Psychiatric/Behavioral: Negative for agitation, behavioral problems, confusion and decreased concentration. The patient has a   Family History   Problem Relation Age of Onset    Alcohol Abuse Maternal Uncle     Heart Attack Maternal Uncle        Objective:    /75 (Site: Left Lower Arm, Position: Sitting, Cuff Size: Medium Adult) Comment: machine  Pulse 77   Temp 98.2 °F (36.8 °C) (Temporal)   Ht 5' 7.01\" (1.702 m)   Wt 298 lb 3.2 oz (135.3 kg)   BMI 46.69 kg/m²    BP Readings from Last 3 Encounters:   10/20/20 126/75   10/12/20 120/64   10/01/20 97/64       Physical Exam  Constitutional:       General: He is not in acute distress. Appearance: Normal appearance. He is not ill-appearing, toxic-appearing or diaphoretic. HENT:      Head: Normocephalic and atraumatic. Nose: Nose normal. No congestion. Eyes:      General: No scleral icterus. Right eye: No discharge. Left eye: No discharge. Extraocular Movements: Extraocular movements intact. Conjunctiva/sclera: Conjunctivae normal.      Pupils: Pupils are equal, round, and reactive to light. Cardiovascular:      Rate and Rhythm: Normal rate and regular rhythm. Pulses: Normal pulses. Heart sounds: No murmur. Pulmonary:      Effort: Pulmonary effort is normal.      Breath sounds: Normal breath sounds. No wheezing. Abdominal:      General: There is no distension. Tenderness: There is no abdominal tenderness. Musculoskeletal: Normal range of motion. General: No swelling or tenderness. Right lower leg: No edema. Left lower leg: No edema. Skin:     General: Skin is warm. Coloration: Skin is not jaundiced. Findings: No erythema. Neurological:      Mental Status: He is alert and oriented to person, place, and time. Motor: No weakness. Psychiatric:         Mood and Affect: Mood normal.         Behavior: Behavior normal.         Thought Content:  Thought content normal.         Judgment: Judgment normal.         Lab Results   Component Value Date    WBC 7.2 05/26/2020    HGB 12.4 (L) 05/26/2020    HCT 39.1 (L) 05/26/2020     05/26/2020    CHOL 112 05/26/2020    TRIG 53 05/26/2020    HDL 56 05/26/2020    ALT 17 05/26/2020    AST 19 05/26/2020     09/30/2020    K 4.1 09/30/2020     09/30/2020    CREATININE 0.97 09/30/2020    BUN 19 09/30/2020    CO2 24 09/30/2020    TSH 1.48 01/24/2020    PSA 0.67 09/22/2020    INR 1.1 01/22/2020    LABA1C 5.6 12/10/2019     Lab Results   Component Value Date    CALCIUM 9.2 09/30/2020     Lab Results   Component Value Date    LDLCHOLESTEROL 45 05/26/2020       Assessment and Plan:    1. Heart failure, systolic and diastolic, chronic (HCC)  Spironolactone 25 mg daily  Lisinopril 2.5 mg p.o. daily  Lasix 60 mg twice daily per CHF clinic     2. Left leg weakness  Better than before  Voltaren gel    3. Bilateral hand numbness  - [facility]    4. Chronic cough  PFT pending  Allergy testing pending  Protonix for GERD      5. Gastroesophageal reflux disease without esophagitis  Protonix 40 mg p.o. bid    6. Overflow incontinence of urine  On Flomax and symptoms are better per patient           Requested Prescriptions      No prescriptions requested or ordered in this encounter       There are no discontinued medications. Elana Thorpe received counseling on the following healthy behaviors: nutrition, exercise and medication adherence    Discussed use,benefit, and side effects of prescribed medications. Barriers to medication compliance addressed. All patient questions answered. Pt voiced understanding. Return in about 3 months (around 6/16/3629) for systolic CHF, chronic cough . Disclaimer: Some orall of this note was transcribed using voice-recognition software. This may cause typographical errors occasionally.  Although all effort is made to fix these errors, please do not hesitate to contact our office if there isany concern with the understanding of this note.

## 2020-10-21 LAB
2000687N OAK TREE IGE: <0.34 KU/L (ref 0–0.34)
ALLERGEN BERMUDA GRASS IGE: <0.34 KU/L (ref 0–0.34)
ALLERGEN BIRCH IGE: <0.34 KU/L (ref 0–0.34)
ALLERGEN COW MILK IGE: <0.34 KU/L (ref 0–0.34)
ALLERGEN DOG DANDER IGE: <0.34 KU/L (ref 0–0.34)
ALLERGEN GERMAN COCKROACH IGE: <0.34 KU/L (ref 0–0.34)
ALLERGEN HORMODENDRUM IGE: <0.34 KUL/L (ref 0–0.34)
ALLERGEN MOUSE EPITHELIA IGE: <0.34 KU/L (ref 0–0.34)
ALLERGEN PEANUT (F13) IGE: <0.34 KU/L (ref 0–0.34)
ALLERGEN PECAN TREE IGE: <0.34 KU/L (ref 0–0.34)
ALLERGEN PIGWEED ROUGH IGE: <0.34 KU/L (ref 0–0.34)
ALLERGEN SHEEP SORREL (W18) IGE: <0.34 KU/L (ref 0–0.34)
ALLERGEN TREE SYCAMORE: <0.34 KU/L (ref 0–0.34)
ALLERGEN WALNUT TREE IGE: <0.34 KU/L (ref 0–0.34)
ALLERGEN WHITE MULBERRY TREE, IGE: <0.34 KU/L (ref 0–0.34)
ALLERGEN, TREE, WHITE ASH IGE: <0.34 KU/L (ref 0–0.34)
ALTERNARIA ALTERNATA: <0.34 KU/L (ref 0–0.34)
ASPERGILLUS FUMIGATUS: <0.34 KU/L (ref 0–0.34)
CAT DANDER ANTIBODY: <0.34 KU/L (ref 0–0.34)
COTTONWOOD TREE: <0.34 KU/L (ref 0–0.34)
D. FARINAE: <0.34 KU/L (ref 0–0.34)
D. PTERONYSSINUS: <0.34 KU/L (ref 0–0.34)
ELM TREE: <0.34 KU/L (ref 0–0.34)
IGE: 69 IU/ML
MAPLE/BOXELDER TREE: <0.34 KU/L (ref 0–0.34)
MOUNTAIN CEDAR TREE: <0.34 KU/L (ref 0–0.34)
MUCOR RACEMOSUS: <0.34 KU/L (ref 0–0.34)
P. NOTATUM: <0.34 KU/L (ref 0–0.34)
RUSSIAN THISTLE: <0.34 KU/L (ref 0–0.34)
SHORT RAGWD(A ARTEMIS.) IGE: <0.34 KU/L (ref 0–0.34)
TIMOTHY GRASS: <0.34 KU/L (ref 0–0.34)

## 2020-10-22 RX ORDER — ATORVASTATIN CALCIUM 40 MG/1
40 TABLET, FILM COATED ORAL NIGHTLY
Qty: 30 TABLET | Refills: 0 | Status: SHIPPED | OUTPATIENT
Start: 2020-10-22 | End: 2020-11-23 | Stop reason: SDUPTHER

## 2020-10-22 RX ORDER — CLOPIDOGREL BISULFATE 75 MG/1
75 TABLET ORAL DAILY
Qty: 30 TABLET | Refills: 0 | Status: SHIPPED | OUTPATIENT
Start: 2020-10-22 | End: 2020-11-23 | Stop reason: SDUPTHER

## 2020-10-22 RX ORDER — TAMSULOSIN HYDROCHLORIDE 0.4 MG/1
0.4 CAPSULE ORAL DAILY
Qty: 30 CAPSULE | Refills: 0 | Status: SHIPPED | OUTPATIENT
Start: 2020-10-22 | End: 2020-12-03

## 2020-10-25 ENCOUNTER — HOSPITAL ENCOUNTER (OUTPATIENT)
Dept: PREADMISSION TESTING | Age: 65
Setting detail: SPECIMEN
Discharge: HOME | End: 2020-10-29
Payer: MEDICAID

## 2020-10-25 LAB
SARS-COV-2, RAPID: NORMAL
SARS-COV-2: NORMAL
SARS-COV-2: NOT DETECTED
SOURCE: NORMAL

## 2020-10-25 PROCEDURE — U0003 INFECTIOUS AGENT DETECTION BY NUCLEIC ACID (DNA OR RNA); SEVERE ACUTE RESPIRATORY SYNDROME CORONAVIRUS 2 (SARS-COV-2) (CORONAVIRUS DISEASE [COVID-19]), AMPLIFIED PROBE TECHNIQUE, MAKING USE OF HIGH THROUGHPUT TECHNOLOGIES AS DESCRIBED BY CMS-2020-01-R: HCPCS

## 2020-10-27 NOTE — TELEPHONE ENCOUNTER
Talked to Zane Davis regarding bowel prep instructions. He states he misplaced the instructions our office gave him. He understands verbal instructions given for CLD, Miralax and Dulcolax.

## 2020-10-28 NOTE — TELEPHONE ENCOUNTER
Mickey ABURTO unsure of when to take mag citrate. Writer called patient back and instructed him to take the mag citrate at 8:00pm tonight. He would also like instructions emailed to him. Writer emailed instructions to [email or web address]

## 2020-10-29 ENCOUNTER — HOSPITAL ENCOUNTER (OUTPATIENT)
Age: 65
Setting detail: OUTPATIENT SURGERY
Discharge: HOME | End: 2020-10-29
Attending: INTERNAL MEDICINE | Admitting: INTERNAL MEDICINE
Payer: MEDICAID

## 2020-10-29 ENCOUNTER — ANESTHESIA EVENT (OUTPATIENT)
Dept: OPERATING ROOM | Age: 65
End: 2020-10-29
Payer: MEDICAID

## 2020-10-29 ENCOUNTER — ANESTHESIA (OUTPATIENT)
Dept: OPERATING ROOM | Age: 65
End: 2020-10-29
Payer: MEDICAID

## 2020-10-29 VITALS
RESPIRATION RATE: 20 BRPM | TEMPERATURE: 97.2 F | BODY MASS INDEX: 46.3 KG/M2 | OXYGEN SATURATION: 99 % | SYSTOLIC BLOOD PRESSURE: 116 MMHG | HEART RATE: 81 BPM | HEIGHT: 67 IN | DIASTOLIC BLOOD PRESSURE: 71 MMHG | WEIGHT: 295 LBS

## 2020-10-29 VITALS — SYSTOLIC BLOOD PRESSURE: 113 MMHG | OXYGEN SATURATION: 98 % | DIASTOLIC BLOOD PRESSURE: 73 MMHG

## 2020-10-29 LAB
ALLEN TEST: ABNORMAL
ANION GAP: 9 MMOL/L (ref 7–16)
FIO2: ABNORMAL
GFR NON-AFRICAN AMERICAN: >60 ML/MIN
GFR SERPL CREATININE-BSD FRML MDRD: >60 ML/MIN
GFR SERPL CREATININE-BSD FRML MDRD: NORMAL ML/MIN/{1.73_M2}
GLUCOSE BLD-MCNC: 103 MG/DL (ref 74–100)
HCO3 VENOUS: 26.3 MMOL/L (ref 22–29)
MODE: ABNORMAL
NEGATIVE BASE EXCESS, VEN: ABNORMAL (ref 0–2)
O2 DEVICE/FLOW/%: ABNORMAL
O2 SAT, VEN: 87 % (ref 60–85)
PATIENT TEMP: ABNORMAL
PCO2, VEN: 40.8 MM HG (ref 41–51)
PH VENOUS: 7.42 (ref 7.32–7.43)
PO2, VEN: 52.3 MM HG (ref 30–50)
POC CHLORIDE: 103 MMOL/L (ref 98–107)
POC CREATININE: 1.14 MG/DL (ref 0.51–1.19)
POC HEMATOCRIT: 38 % (ref 41–53)
POC HEMOGLOBIN: 12.8 G/DL (ref 13.5–17.5)
POC IONIZED CALCIUM: 1.18 MMOL/L (ref 1.15–1.33)
POC LACTIC ACID: 1.31 MMOL/L (ref 0.56–1.39)
POC PCO2 TEMP: ABNORMAL MM HG
POC PH TEMP: ABNORMAL
POC PO2 TEMP: ABNORMAL MM HG
POC POTASSIUM: 3.8 MMOL/L (ref 3.5–4.5)
POC SODIUM: 138 MMOL/L (ref 138–146)
POSITIVE BASE EXCESS, VEN: 2 (ref 0–3)
SAMPLE SITE: ABNORMAL
TOTAL CO2, VENOUS: 28 MMOL/L (ref 23–30)

## 2020-10-29 PROCEDURE — 3609010300 HC COLONOSCOPY W/BIOPSY SINGLE/MULTIPLE: Performed by: INTERNAL MEDICINE

## 2020-10-29 PROCEDURE — 82803 BLOOD GASES ANY COMBINATION: CPT

## 2020-10-29 PROCEDURE — 84295 ASSAY OF SERUM SODIUM: CPT

## 2020-10-29 PROCEDURE — 6360000002 HC RX W HCPCS: Performed by: NURSE ANESTHETIST, CERTIFIED REGISTERED

## 2020-10-29 PROCEDURE — 82330 ASSAY OF CALCIUM: CPT

## 2020-10-29 PROCEDURE — 84132 ASSAY OF SERUM POTASSIUM: CPT

## 2020-10-29 PROCEDURE — 3700000000 HC ANESTHESIA ATTENDED CARE: Performed by: INTERNAL MEDICINE

## 2020-10-29 PROCEDURE — 45385 COLONOSCOPY W/LESION REMOVAL: CPT | Performed by: INTERNAL MEDICINE

## 2020-10-29 PROCEDURE — 2709999900 HC NON-CHARGEABLE SUPPLY: Performed by: INTERNAL MEDICINE

## 2020-10-29 PROCEDURE — 85014 HEMATOCRIT: CPT

## 2020-10-29 PROCEDURE — 93005 ELECTROCARDIOGRAM TRACING: CPT | Performed by: ANESTHESIOLOGY

## 2020-10-29 PROCEDURE — 7100000011 HC PHASE II RECOVERY - ADDTL 15 MIN: Performed by: INTERNAL MEDICINE

## 2020-10-29 PROCEDURE — 83605 ASSAY OF LACTIC ACID: CPT

## 2020-10-29 PROCEDURE — 7100000010 HC PHASE II RECOVERY - FIRST 15 MIN: Performed by: INTERNAL MEDICINE

## 2020-10-29 PROCEDURE — 3609010600 HC COLONOSCOPY POLYPECTOMY SNARE/COLD BIOPSY: Performed by: INTERNAL MEDICINE

## 2020-10-29 PROCEDURE — 45381 COLONOSCOPY SUBMUCOUS NJX: CPT | Performed by: INTERNAL MEDICINE

## 2020-10-29 PROCEDURE — 45380 COLONOSCOPY AND BIOPSY: CPT | Performed by: INTERNAL MEDICINE

## 2020-10-29 PROCEDURE — 2580000003 HC RX 258: Performed by: NURSE ANESTHETIST, CERTIFIED REGISTERED

## 2020-10-29 PROCEDURE — 2500000003 HC RX 250 WO HCPCS: Performed by: NURSE ANESTHETIST, CERTIFIED REGISTERED

## 2020-10-29 PROCEDURE — 82565 ASSAY OF CREATININE: CPT

## 2020-10-29 PROCEDURE — 3609019800 HC COLONOSCOPY WITH SUBMUCOSAL INJECTION: Performed by: INTERNAL MEDICINE

## 2020-10-29 PROCEDURE — 82435 ASSAY OF BLOOD CHLORIDE: CPT

## 2020-10-29 PROCEDURE — 3700000001 HC ADD 15 MINUTES (ANESTHESIA): Performed by: INTERNAL MEDICINE

## 2020-10-29 PROCEDURE — 88305 TISSUE EXAM BY PATHOLOGIST: CPT

## 2020-10-29 PROCEDURE — 82947 ASSAY GLUCOSE BLOOD QUANT: CPT

## 2020-10-29 RX ORDER — LIDOCAINE HYDROCHLORIDE 10 MG/ML
INJECTION, SOLUTION EPIDURAL; INFILTRATION; INTRACAUDAL; PERINEURAL PRN
Status: DISCONTINUED | OUTPATIENT
Start: 2020-10-29 | End: 2020-10-29 | Stop reason: SDUPTHER

## 2020-10-29 RX ORDER — SODIUM CHLORIDE, SODIUM LACTATE, POTASSIUM CHLORIDE, CALCIUM CHLORIDE 600; 310; 30; 20 MG/100ML; MG/100ML; MG/100ML; MG/100ML
INJECTION, SOLUTION INTRAVENOUS CONTINUOUS PRN
Status: DISCONTINUED | OUTPATIENT
Start: 2020-10-29 | End: 2020-10-29 | Stop reason: SDUPTHER

## 2020-10-29 RX ORDER — PROPOFOL 10 MG/ML
INJECTION, EMULSION INTRAVENOUS PRN
Status: DISCONTINUED | OUTPATIENT
Start: 2020-10-29 | End: 2020-10-29 | Stop reason: SDUPTHER

## 2020-10-29 RX ORDER — PROPOFOL 10 MG/ML
INJECTION, EMULSION INTRAVENOUS CONTINUOUS PRN
Status: DISCONTINUED | OUTPATIENT
Start: 2020-10-29 | End: 2020-10-29 | Stop reason: SDUPTHER

## 2020-10-29 RX ADMIN — PROPOFOL 60 MG: 10 INJECTION, EMULSION INTRAVENOUS at 13:03

## 2020-10-29 RX ADMIN — LIDOCAINE HYDROCHLORIDE 50 MG: 10 INJECTION, SOLUTION EPIDURAL; INFILTRATION; INTRACAUDAL; PERINEURAL at 12:48

## 2020-10-29 RX ADMIN — SODIUM CHLORIDE, POTASSIUM CHLORIDE, SODIUM LACTATE AND CALCIUM CHLORIDE: 600; 310; 30; 20 INJECTION, SOLUTION INTRAVENOUS at 12:41

## 2020-10-29 RX ADMIN — PROPOFOL 100 MG: 10 INJECTION, EMULSION INTRAVENOUS at 12:48

## 2020-10-29 RX ADMIN — PROPOFOL 100 MCG/KG/MIN: 10 INJECTION, EMULSION INTRAVENOUS at 12:48

## 2020-10-29 ASSESSMENT — PULMONARY FUNCTION TESTS
PIF_VALUE: 1
PIF_VALUE: 0
PIF_VALUE: 1
PIF_VALUE: 0
PIF_VALUE: 1
PIF_VALUE: 0
PIF_VALUE: 1
PIF_VALUE: 2
PIF_VALUE: 1

## 2020-10-29 ASSESSMENT — PAIN DESCRIPTION - DESCRIPTORS: DESCRIPTORS: ACHING;NUMBNESS

## 2020-10-29 ASSESSMENT — PAIN - FUNCTIONAL ASSESSMENT: PAIN_FUNCTIONAL_ASSESSMENT: 0-10

## 2020-10-29 ASSESSMENT — PAIN SCALES - GENERAL
PAINLEVEL_OUTOF10: 0

## 2020-10-29 NOTE — ANESTHESIA POSTPROCEDURE EVALUATION
Department of Anesthesiology  Postprocedure Note    Patient: Robson Best  MRN: [id number]  YOB: 1955  Date of evaluation: 10/29/2020  Time:  3:05 PM     Procedure Summary     Date:  10/29/20 Room / Location:  [address]    Anesthesia Start:  9139 Anesthesia Stop:  7183    Procedures:       COLONOSCOPY WITH BIOPSY (N/A )      COLONOSCOPY SUBMUCOSAL/BOTOX INJECTION      COLONOSCOPY POLYPECTOMY SNARE/COLD BIOPSY Diagnosis:  (GERD, UNSPECIFIED IF ESOPHAGITIS PRESENT)    Surgeon:  Wendy Ventura MD Responsible Provider:  Elizabeth Mortensen MD    Anesthesia Type:  TIVA, General ASA Status:  3          Anesthesia Type: TIVA, General    Jeni Phase I: Jeni Score: 10    Jeni Phase II: Jeni Score: 10    Last vitals: Reviewed and per EMR flowsheets.        Anesthesia Post Evaluation    Patient location during evaluation: PACU  Patient participation: complete - patient participated  Level of consciousness: awake and alert  Pain score: 3  Airway patency: patent  Nausea & Vomiting: no vomiting and no nausea  Complications: no  Cardiovascular status: hemodynamically stable  Respiratory status: acceptable  Hydration status: stable

## 2020-10-29 NOTE — ANESTHESIA PRE PROCEDURE
Department of Anesthesiology  Preprocedure Note       Name:  Azeem Fitzgerald   Age:  63 y.o.  :  1955                                          MRN:  [id number]         Date:  10/29/2020      Surgeon: Reina Gan):  Crystal Tamez MD    Procedure: Procedure(s):  COLONOSCOPY DIAGNOSTIC    Medications prior to admission:   Prior to Admission medications    Medication Sig Start Date End Date Taking? Authorizing Provider   clopidogrel (PLAVIX) 75 MG tablet Take 1 tablet by mouth daily  Patient taking differently: Take 75 mg by mouth daily Stopped for procedure on 10/29/2020 10/22/20  Yes Karilyn Pallas, MD   atorvastatin (LIPITOR) 40 MG tablet Take 1 tablet by mouth nightly 10/22/20  Yes Karilyn Pallas, MD   tamsulosin [facility]) 0.4 MG capsule Take 1 capsule by mouth daily 10/22/20  Yes Karilyn Pallas, MD   acetaminophen (TYLENOL) 500 MG tablet Take 500 mg by mouth every 12 hours as needed for Pain   Yes Historical Provider, MD   furosemide (LASIX) 40 MG tablet Take 1.5 tabs po bid. Patient taking differently: Take 60 mg by mouth Take 1.5 tabs po bid. 10/12/20  Yes LUDY Vizcaino CNP   bisacodyl (DULCOLAX) 5 MG EC tablet TAKE 4 TABS AS DIRECTED BY PHYSICIAN OFFICE 10/7/20  Yes Crystal Tamez MD   hydrocortisone (ANUSOL-HC) 2.5 % CREA rectal cream Apply at bedtime prn for hemorrhoids 10/1/20  Yes Crystal Tamez MD   diclofenac sodium (VOLTAREN) 1 % GEL APPLY 2 G TOPICALLY 4 TIMES DAILY AS NEEDED FOR PAIN 20  Yes Karilyn Pallas, MD   spironolactone (ALDACTONE) 25 MG tablet Take 1 tablet by mouth daily 20  Yes Irma Cali MD   lisinopril (PRINIVIL;ZESTRIL) 5 MG tablet Take 0.5 tablets by mouth daily 20  Yes LUDY Vizcaino CNP   lidocaine (LIDODERM) 5 % Place 1 patch onto the skin daily 12 hours on, 12 hours off. Patient taking differently: Place 1 patch onto the skin daily 12 hours on, 12 hours off. Patch applied at night.  8/10/20 11/8/20 Yes Garfield Jensen MD   polyethylene glycol (MIRALAX) 17 GM/SCOOP POWD powder 250g bottle. For colonoscopy prep. Follow per instructions from clinic. 7/29/20  Yes Christen Solorio DO   potassium chloride (KLOR-CON M) 20 MEQ extended release tablet Take 1 tablet by mouth daily 7/29/20  Yes Claudean Sines, APRN - CNP   ammonium lactate (AMLACTIN) 12 % cream Apply topically daily Apply topically as needed. Yes Historical Provider, MD   pantoprazole (PROTONIX) 40 MG tablet Take 1 tablet by mouth 2 times daily (before meals) 6/4/20  Yes Reinaldo Joyner MD   magnesium citrate solution Take 296 mLs by mouth once for 1 dose 10/7/20 10/7/20  Reinaldo Joyner MD   Elastic Bandages & Supports (JOBST KNEE HIGH COMPRESSION SM) MISC Wear q day. Remove q hs 8/17/20   Claudean Sines, APRN - CNP   Elastic Bandages & Supports (JOBST KNEE HIGH COMPRESSION SM) MISC 2 each by Does not apply route daily Wear q day. Remove q hs. Diagnosis: Chronic venous insufficiency 8/12/20   Claudeangozi Tineos, APRN - CNP   Elastic Bandages & Supports (JOBST OPAQUE KNEE 20-30MMHG XL) MISC Dispense two pair closed toe knee high 20 to 30 mmHg Jobst compression stockings 6/22/20   Tal Hairston MD   nitroGLYCERIN (NITROSTAT) 0.4 MG SL tablet Place 0.4 mg under the tongue every 5 minutes as needed for Chest pain up to max of 3 total doses. If no relief after 1 dose, call 911. Historical Provider, MD   white petrolatum GEL Apply 28 g topically as needed for Dry Lips 2/14/20   Jeni Santana DO       Current medications:    No current facility-administered medications for this encounter.         Allergies:  No Known Allergies    Problem List:    Patient Active Problem List   Diagnosis Code    Atrial flutter ([address] I48.92    Sleep-related breathing disorder G47.30    Hypokalemia E87.6    Atrial fibrillation (HCC) I48.91    Ischemic cardiomyopathy I25.5    Combined systolic and diastolic congestive heart failure ([address] I50.40    Acute cystitis without hematuria N30.00    Hx of CABG Z95.1    Heart failure, systolic and diastolic, chronic (HCC) V56.77    Bilateral hand numbness R20.0    Right thigh pain M79.651    Left leg weakness R29.898    Coronary artery disease I25.10    Chronic cough R05    Gastroesophageal reflux disease without esophagitis K21.9    Overflow incontinence of urine N39.490       Past Medical History:        Diagnosis Date    Atrial fibrillation ([facility] 10/2019    Dr. Lance Abbott BPH (benign prostatic hyperplasia)     Cellulitis     Cervical disc disease     [facility]     Coronary artery disease 10/2019    CABG    GERD (gastroesophageal reflux disease)     History of incarceration     released 2019    Hyperlipidemia     Hypertension     Obesity     Sleep apnea     C-pap       Past Surgical History:        Procedure Laterality Date    ABDOMINAL EXPLORATION SURGERY      CARDIOVERSION  10/16/2019    CARPAL TUNNEL RELEASE Bilateral 2008    CERVICAL SPINE SURGERY      2-5    CORONARY ARTERY BYPASS GRAFT MAZE PROCEDURE N/A 10/21/2019    CABG CORONARY ARTERY BYPASS GRAFT X1, LIMA-LAD, BRWON 4 MAZE PROCEDURE, 50MM ATRICURE ATRIAL CLIP RIGID INTERNAL FIXATION PLATES X 3   SCREWS X 13 performed by Romana Garre, MD at [address] [place] [address]      Left leg    TRANSESOPHAGEAL ECHOCARDIOGRAM  10/16/2019       Social History:    Social History     Tobacco Use    Smoking status: Never Smoker    Smokeless tobacco: Never Used   Substance Use Topics    Alcohol use: Not Currently     Comment: stopped 1991                                Counseling given: Not Answered      Vital Signs (Current):   Vitals:    10/26/20 1443 10/29/20 1030   BP:  (!) 111/52   Pulse:  79   Resp:  24   Temp:  97.7 °F (36.5 °C)   TempSrc:  Temporal   SpO2:  96%   Weight: 295 lb (133.8 kg) 295 lb (133.8 kg)   Height: 5' 7\" (1.702 m) 5' 7\" (1.702 m)                                              BP Readings from Last 3 Encounters:   10/29/20 (!) 111/52 distance: >3 FB   Neck ROM: full  Mouth opening: > = 3 FB Dental:          Pulmonary:normal exam    (+) sleep apnea: on CPAP,                             Cardiovascular:    (+) hypertension: no interval change, CAD: no interval change, CABG/stent: no interval change, CHF: no interval change,       ECG reviewed  Rhythm: regular  Rate: normal  Echocardiogram reviewed                  Neuro/Psych:   Negative Neuro/Psych ROS              GI/Hepatic/Renal:   (+) GERD: no interval change, morbid obesity          Endo/Other: Negative Endo/Other ROS                    Abdominal:   (+) obese,         Vascular: negative vascular ROS. Anesthesia Plan      MAC     ASA 4       Induction: intravenous. Anesthetic plan and risks discussed with patient. Plan discussed with CRNA.                 Christopher Wahl MD   10/29/2020

## 2020-10-29 NOTE — H&P
History and Physical    Pt Name: Marcie Perry  MRN: [id number]  YOB: 1955  Date of evaluation: 10/29/2020    SUBJECTIVE:   History of Chief Complaint:    Patient complains of blood per rectum. He is a bit of a poor historian. He says that there is [place] lot\" of blood when he wipes, unsure if it is in the toilet or mixed with the stool. He has post prandial coughing as well. Per chart, cologuard was positive. Patient has been scheduled for colonoscopy today. Past Medical History    has a past medical history of Atrial fibrillation ([facility] BPH (benign prostatic hyperplasia), Cellulitis, Cervical disc disease, CHF (congestive heart failure) ([address] Coronary artery disease, GERD (gastroesophageal reflux disease), History of incarceration, Hyperlipidemia, Hypertension, Obesity, and Sleep apnea. Past Surgical History   has a past surgical history that includes transesophageal echocardiogram (10/16/2019); Cardioversion (10/16/2019); Coronary Artery Bypass Graft Maze Procedure (N/A, 10/21/2019); Carpal tunnel release (Bilateral, 2008); fracture surgery; Cervical spine surgery; and Abdominal exploration surgery. Medications  Prior to Admission medications    Medication Sig Start Date End Date Taking? Authorizing Provider   clopidogrel (PLAVIX) 75 MG tablet Take 1 tablet by mouth daily  Patient taking differently: Take 75 mg by mouth daily Stopped for procedure on 10/29/2020 10/22/20  Yes Farheen Prince MD   atorvastatin (LIPITOR) 40 MG tablet Take 1 tablet by mouth nightly 10/22/20  Yes Farheen Prince MD   tamsulosin [facility]) 0.4 MG capsule Take 1 capsule by mouth daily 10/22/20  Yes Farheen Prince MD   acetaminophen (TYLENOL) 500 MG tablet Take 500 mg by mouth every 12 hours as needed for Pain   Yes Historical Provider, MD   furosemide (LASIX) 40 MG tablet Take 1.5 tabs po bid. Patient taking differently: Take 60 mg by mouth Take 1.5 tabs po bid.  10/12/20  Yes LUDY Narayan - LUANNE   bisacodyl (DULCOLAX) 5 MG EC tablet TAKE 4 TABS AS DIRECTED BY PHYSICIAN OFFICE 10/7/20  Yes Raffaele Davies MD   hydrocortisone (ANUSOL-HC) 2.5 % CREA rectal cream Apply at bedtime prn for hemorrhoids 10/1/20  Yes Raffaele Davies MD   diclofenac sodium (VOLTAREN) 1 % GEL APPLY 2 G TOPICALLY 4 TIMES DAILY AS NEEDED FOR PAIN 9/25/20  Yes Aisha Pedroza MD   spironolactone (ALDACTONE) 25 MG tablet Take 1 tablet by mouth daily 9/21/20  Yes Chinyere Hunter MD   lisinopril (PRINIVIL;ZESTRIL) 5 MG tablet Take 0.5 tablets by mouth daily 8/12/20  Yes LUDY Martins CNP   lidocaine (LIDODERM) 5 % Place 1 patch onto the skin daily 12 hours on, 12 hours off. Patient taking differently: Place 1 patch onto the skin daily 12 hours on, 12 hours off. Patch applied at night. 8/10/20 11/8/20 Yes Jerson Spivey MD   polyethylene glycol (MIRALAX) 17 GM/SCOOP POWD powder 250g bottle. For colonoscopy prep. Follow per instructions from clinic. 7/29/20  Yes Pieter Crews,    potassium chloride (KLOR-CON M) 20 MEQ extended release tablet Take 1 tablet by mouth daily 7/29/20  Yes LUDY Martins CNP   ammonium lactate (AMLACTIN) 12 % cream Apply topically daily Apply topically as needed. Yes Historical Provider, MD   pantoprazole (PROTONIX) 40 MG tablet Take 1 tablet by mouth 2 times daily (before meals) 6/4/20  Yes Raffaele Davies MD   magnesium citrate solution Take 296 mLs by mouth once for 1 dose 10/7/20 10/7/20  Raffaele Davies MD   Elastic Bandages & Supports (JOBST KNEE HIGH COMPRESSION SM) MISC Wear q day. Remove q hs 8/17/20   LUDY Martins CNP   Elastic Bandages & Supports (JOBST KNEE HIGH COMPRESSION SM) MISC 2 each by Does not apply route daily Wear q day. Remove q hs.    Diagnosis: Chronic venous insufficiency 8/12/20   LUDY Martins CNP   Elastic Bandages & Supports (JOBST OPAQUE KNEE 20-30MMHG XL) MISC Dispense two pair closed toe knee high 20 to 30 mmHg Jobst compression stockings 6/22/20 Leilani Andrew MD   nitroGLYCERIN (NITROSTAT) 0.4 MG SL tablet Place 0.4 mg under the tongue every 5 minutes as needed for Chest pain up to max of 3 total doses. If no relief after 1 dose, call 911. Historical Provider, MD   white petrolatum GEL Apply 28 g topically as needed for Dry Lips 2/14/20   Nicola Kim,      Allergies  has No Known Allergies. Family History  family history includes Alcohol Abuse in his maternal uncle; Heart Attack in his maternal uncle. Social History   reports that he has never smoked. He has never used smokeless tobacco.   reports previous alcohol use. reports previous drug use. Marital Status   Occupation retired    OBJECTIVE:   VITALS:  height is 5' 7\" (1.702 m) and weight is 295 lb (133.8 kg). His temporal temperature is 97.7 °F (36.5 °C). His blood pressure is 111/52 (abnormal) and his pulse is 79. His respiration is 24 and oxygen saturation is 96%. CONSTITUTIONAL:alert & oriented x 3, no acute distress. Pleasant. Obese. Poor historian. SKIN:  Warm and dry, no rashes on exposed areas of skin. Some mild erythema RLE. HEAD:  Normocephalic, atraumatic. EYES: PERRL. EOMs intact. EARS:  Hearing grossly WNL. NOSE:  Nares patent. No rhinorrhea. MOUTH/THROAT:  benign  NECK:supple, no lymphadenopathy. Thick neck. LUNGS: Clear to auscultation bilaterally, no wheezes. Overall decreased breath sounds. CARDIOVASCULAR: RRR. ABDOMEN: soft, non tender. Rotund/obese. EXTREMITIES: positive edema bilateral lower extremities. IMPRESSIONS:   1. Blood per rectum, cologuard positive  2.  has a past medical history of Atrial fibrillation ([address] (10/2019), BPH (benign prostatic hyperplasia), Cellulitis, Cervical disc disease, CHF (congestive heart failure) ([address] Coronary artery disease (10/2019), GERD (gastroesophageal reflux disease), History of incarceration, Hyperlipidemia, Hypertension, Obesity, and Sleep apnea.    PLANS:   1. colonoscopy    LAMONT CHERY WILLY  Electronically signed 10/29/2020 at 10:45 AM

## 2020-10-29 NOTE — OP NOTE
the left lateral decubitus position and final time-out accomplished in the presence of the nursing staff. Baseline vital signs were obtained and reviewed, and IV sedation was subsequently initiated. FINDINGS: Rectal examination demonstrated no significant visible external abnormality and digital palpation was unremarkable. Following adequate conscious sedation the colonoscope was introduced and advanced under direct visualization to the cecum, which was identified by the ileocecal valve and appendiceal orifice. The bowel preparation was felt to be FAIR. This included moderate amounts of white stool that was mostly able to be adequately irrigated and aspirated. Cecal intubation time was 9 minutes. Once maximally inserted, the endoscope was withdrawn and the mucosa was carefully inspected. The mucosal exam was revealed polyps described below, flat polyp in proximal transverse (will require EMR). Retroflexion was performed in the rectum and moderate sized internal hemorrhoids. Withdrawal time was 23 minutes. IMPRESSION:     FAIR PREP     1) Hepatic flexure polyps, 5mm and 2mm s/p cold biopsy and removal  2) Ascending colon polyp 5mm s/p cold biopsy and removal  3) Proximal Transverse colon polyp, flat Elyssa IIa, 9mm in size (will require EMR resection while off DAPT x 7 days), adjacent mucosa was tattoo'd  4) Mid-transverse colon polyp 5mm s/p cold biopsy and removal  5) Distal transverse colon polyp (#2), subpedunculated, 8mm in size, s/p hot snare polypectomy  6) Distal rectal polyp, 6mm in size, s/p cold biopsy and removal  7) Moderate sized internal hemorrhoids    RECOMMENDATIONS:   1) Follow up path results in GI clinic.  May resume AP/AC therapy after 24 hrs  2) Repeat Colonoscopy with in 3 months for planned second stage EMR        Raffaele Davies MD  [facility]   10/29/20    This note is created with the assistance of a speech recognition program.  While intending to generate a document that actually reflects the content of the visit, the document can still have some errors including those of syntax and sound a like substitutions which may escape proof reading. It such instances, actual meaning can be extrapolated by contextual diversion. The patient was counseled at length about the risks of keysha Covid-19 during their perioperative period and any recovery window from their procedure. The patient was made aware that keysha Covid-19  may worsen their prognosis for recovering from their procedure  and lend to a higher morbidity and/or mortality risk. All material risks, benefits, and reasonable alternatives including postponing the procedure were discussed. The patient DOES wish to proceed with the procedure at this time.

## 2020-10-30 ENCOUNTER — TELEPHONE (OUTPATIENT)
Dept: GASTROENTEROLOGY | Age: 65
End: 2020-10-30

## 2020-10-30 LAB
EKG ATRIAL RATE: 78 BPM
EKG Q-T INTERVAL: 458 MS
EKG QRS DURATION: 78 MS
EKG QTC CALCULATION (BAZETT): 508 MS
EKG R AXIS: 55 DEGREES
EKG T AXIS: 72 DEGREES
EKG VENTRICULAR RATE: 74 BPM
SURGICAL PATHOLOGY REPORT: NORMAL

## 2020-10-30 NOTE — TELEPHONE ENCOUNTER
Mickey martinez asking to schedule another procedure with Dr Rafat Murphy.   Per Dr Rafat Murphy op note 10/29/20:     2) Repeat Colonoscopy with in 3 months for planned second stage EMR

## 2020-11-02 ENCOUNTER — TELEPHONE (OUTPATIENT)
Dept: FAMILY MEDICINE CLINIC | Age: 65
End: 2020-11-02

## 2020-11-02 DIAGNOSIS — I87.2 VENOUS INSUFFICIENCY OF BOTH LOWER EXTREMITIES: Primary | ICD-10-CM

## 2020-11-03 ENCOUNTER — HOSPITAL ENCOUNTER (OUTPATIENT)
Dept: OTHER | Age: 65
Discharge: HOME | End: 2020-11-03
Payer: MEDICAID

## 2020-11-03 VITALS
OXYGEN SATURATION: 98 % | BODY MASS INDEX: 46.99 KG/M2 | WEIGHT: 300 LBS | DIASTOLIC BLOOD PRESSURE: 62 MMHG | RESPIRATION RATE: 20 BRPM | HEART RATE: 76 BPM | SYSTOLIC BLOOD PRESSURE: 104 MMHG | TEMPERATURE: 96.4 F

## 2020-11-03 PROCEDURE — 99212 OFFICE O/P EST SF 10 MIN: CPT

## 2020-11-03 NOTE — PROGRESS NOTES
Date:  11/3/2020  Time:  11:34 AM    CHF Clinic at [facility]    Office: [phone] Fax: [phone]    Re:  Lizette Omer   Patient : 1955    Vital Signs: /62   Pulse 76   Temp 96.4 °F (35.8 °C) (Temporal)   Resp 20   Wt 300 lb (136.1 kg)   SpO2 98%   BMI 46.99 kg/m²                       O2 Device: None (Room air)                           No results for input(s): CBC, HGB, HCT, WBC, PLATELET, NA, K, CL, CO2, BUN, CREATININE, GLUCOSE, BNP, INR in the last 72 hours. Respiratory:    Assessment  Charting Type: Reassessment    Breath Sounds  Right Upper Lobe: Clear  Right Middle Lobe: Clear  Right Lower Lobe: Clear  Left Upper Lobe: Clear  Left Lower Lobe: Clear    Cough/Sputum  Cough: Non-productive  Frequency: Infrequent       Peripheral Vascular  RLE Edema: +1, Pitting  LLE Edema: +1, Pitting    Complaints: Slight increase in cough. Comment : Patient here ambulatory for routine visit. Weight increased 7 lbs in 2 weeks. Patient unsure why. Reviewed low salt diet and fluid limits. Encouraged to eat frequent small meals. States compliance with medications. He lasix is 60 mg 2x day. Cardiology appt is in 2021. Next visit here 2 weeks 2020.     Electronically signed by Nely Richard RN on 11/3/2020 at 11:34 AM

## 2020-11-04 DIAGNOSIS — R20.0 BILATERAL HAND NUMBNESS: ICD-10-CM

## 2020-11-04 DIAGNOSIS — R29.898 LEFT LEG WEAKNESS: Primary | ICD-10-CM

## 2020-11-18 ENCOUNTER — TELEPHONE (OUTPATIENT)
Dept: FAMILY MEDICINE CLINIC | Age: 65
End: 2020-11-18

## 2020-11-23 ENCOUNTER — TELEPHONE (OUTPATIENT)
Dept: GASTROENTEROLOGY | Age: 65
End: 2020-11-23

## 2020-11-23 ENCOUNTER — OFFICE VISIT (OUTPATIENT)
Dept: FAMILY MEDICINE CLINIC | Age: 65
End: 2020-11-23
Payer: MEDICAID

## 2020-11-23 VITALS
TEMPERATURE: 98.8 F | BODY MASS INDEX: 48.24 KG/M2 | HEART RATE: 82 BPM | WEIGHT: 308 LBS | DIASTOLIC BLOOD PRESSURE: 62 MMHG | SYSTOLIC BLOOD PRESSURE: 109 MMHG

## 2020-11-23 PROCEDURE — 99213 OFFICE O/P EST LOW 20 MIN: CPT

## 2020-11-23 RX ORDER — PANTOPRAZOLE SODIUM 40 MG/1
40 TABLET, DELAYED RELEASE ORAL
Qty: 60 TABLET | Refills: 5 | Status: SHIPPED | OUTPATIENT
Start: 2020-11-23 | End: 2021-06-28

## 2020-11-23 RX ORDER — FUROSEMIDE 40 MG/1
60 TABLET ORAL DAILY
Qty: 90 TABLET | Refills: 2 | Status: SHIPPED | OUTPATIENT
Start: 2020-11-23 | End: 2020-11-23

## 2020-11-23 RX ORDER — BENZONATATE 100 MG/1
100 CAPSULE ORAL 2 TIMES DAILY PRN
Qty: 20 CAPSULE | Refills: 0 | Status: SHIPPED | OUTPATIENT
Start: 2020-11-23 | End: 2020-11-30

## 2020-11-23 RX ORDER — FUROSEMIDE 40 MG/1
60 TABLET ORAL DAILY
Qty: 90 TABLET | Refills: 2 | Status: SHIPPED | OUTPATIENT
Start: 2020-11-23 | End: 2021-02-25 | Stop reason: SDUPTHER

## 2020-11-23 RX ORDER — LOSARTAN POTASSIUM 50 MG/1
50 TABLET ORAL DAILY
Qty: 90 TABLET | Refills: 1 | Status: SHIPPED | OUTPATIENT
Start: 2020-11-23 | End: 2021-03-31

## 2020-11-23 RX ORDER — CLOPIDOGREL BISULFATE 75 MG/1
75 TABLET ORAL DAILY
Qty: 30 TABLET | Refills: 0 | Status: SHIPPED | OUTPATIENT
Start: 2020-11-23 | End: 2020-12-30 | Stop reason: SDUPTHER

## 2020-11-23 RX ORDER — ATORVASTATIN CALCIUM 40 MG/1
40 TABLET, FILM COATED ORAL NIGHTLY
Qty: 30 TABLET | Refills: 0 | Status: SHIPPED | OUTPATIENT
Start: 2020-11-23 | End: 2021-01-26 | Stop reason: SDUPTHER

## 2020-11-23 RX ORDER — GUAIFENESIN 600 MG/1
600 TABLET, EXTENDED RELEASE ORAL 2 TIMES DAILY PRN
Qty: 30 TABLET | Refills: 1 | Status: SHIPPED | OUTPATIENT
Start: 2020-11-23 | End: 2020-12-08

## 2020-11-23 RX ORDER — ACETAMINOPHEN 500 MG
500 TABLET ORAL EVERY 12 HOURS PRN
Qty: 120 TABLET | Refills: 1 | Status: ON HOLD | OUTPATIENT
Start: 2020-11-23 | End: 2021-03-09 | Stop reason: HOSPADM

## 2020-11-23 RX ORDER — NITROGLYCERIN 0.4 MG/1
0.4 TABLET SUBLINGUAL EVERY 5 MIN PRN
Qty: 25 TABLET | Refills: 1 | Status: SHIPPED | OUTPATIENT
Start: 2020-11-23

## 2020-11-23 ASSESSMENT — ENCOUNTER SYMPTOMS
COLOR CHANGE: 0
CONSTIPATION: 0
DIARRHEA: 0
PHOTOPHOBIA: 0
VOMITING: 0
NAUSEA: 0
COUGH: 1
SHORTNESS OF BREATH: 0
WHEEZING: 0
APNEA: 0
ABDOMINAL DISTENTION: 0

## 2020-11-23 NOTE — PROGRESS NOTES
Subjective: Miller Dockery is a 63 y.o. male with  has a past medical history of Atrial fibrillation ([facility] [address] BPH (benign prostatic hyperplasia), Cellulitis, Cervical disc disease, CHF (congestive heart failure) ([address] Coronary artery disease, GERD (gastroesophageal reflux disease), History of incarceration, Hyperlipidemia, Hypertension, Obesity, and Sleep apnea. Presented to the office today for:  Chief Complaint   Patient presents with    Follow-up       HPI     42-year-old male came to the clinic to follow-up for chronic cough which recently has been getting worse. Patient recently had a negative Covid test for a colonoscopy he got 3 weeks ago. Patient does not complain of any new shortness of breath and also sees a pulmonologist who recently started intranasal glucocorticoids. But patient is mentioning no relief with that. Patient mentions that his cough started about a year and a half ago. Differential for chronic cough include chronic GERD, postnasal drip, viral infection and lisinopril. Patient lisinopril was changed to losartan and I will see him again to follow-up. I will also give him symptomatic relief treatment with Mucinex and Tessalon. Patient does not complain of any fever, chills or any other complaints at this visit. He states his leg pain is gone. Review of Systems   Constitutional: Negative for activity change, appetite change, fatigue, fever and unexpected weight change. HENT: Negative for congestion. Eyes: Negative for photophobia and visual disturbance. Respiratory: Positive for cough. Negative for apnea, shortness of breath and wheezing. Cardiovascular: Negative for chest pain, palpitations and leg swelling. Gastrointestinal: Negative for abdominal distention, constipation, diarrhea, nausea and vomiting. Endocrine: Negative for polyuria. Genitourinary: Negative for dysuria and urgency. Skin: Negative for color change, pallor, rash and wound. Neurological: Negative for dizziness, tremors, weakness, light-headedness and headaches. Psychiatric/Behavioral: Negative for agitation, behavioral problems, confusion and decreased concentration. The patient has a   Family History   Problem Relation Age of Onset    Alcohol Abuse Maternal Uncle     Heart Attack Maternal Uncle        Objective:    /62 (Site: Right Lower Arm, Position: Sitting, Cuff Size: Medium Adult)   Pulse 82   Temp 98.8 °F (37.1 °C) (Temporal)   Wt (!) 308 lb (139.7 kg)   BMI 48.24 kg/m²    BP Readings from Last 3 Encounters:   11/23/20 109/62   11/03/20 104/62   10/29/20 116/71       Physical Exam  Constitutional:       General: He is not in acute distress. Appearance: Normal appearance. He is obese. He is not ill-appearing, toxic-appearing or diaphoretic. HENT:      Head: Normocephalic and atraumatic. Nose: Nose normal. No congestion. Eyes:      General: No scleral icterus. Right eye: No discharge. Left eye: No discharge. Extraocular Movements: Extraocular movements intact. Conjunctiva/sclera: Conjunctivae normal.      Pupils: Pupils are equal, round, and reactive to light. Cardiovascular:      Rate and Rhythm: Normal rate and regular rhythm. Pulses: Normal pulses. Heart sounds: No murmur. Pulmonary:      Effort: Pulmonary effort is normal.      Breath sounds: Normal breath sounds. No wheezing. Abdominal:      General: There is no distension. Tenderness: There is no abdominal tenderness. Musculoskeletal: Normal range of motion. General: No swelling or tenderness. Right lower leg: Edema present. Left lower leg: Edema present. Comments: Lower leg edema stable   Skin:     General: Skin is warm. Coloration: Skin is not jaundiced. Findings: No erythema. Neurological:      Mental Status: He is alert and oriented to person, place, and time. Motor: No weakness. Psychiatric:         Mood and Affect: Mood normal.         Behavior: Behavior normal.         Thought Content: Thought content normal.         Judgment: Judgment normal.         Lab Results   Component Value Date    WBC 7.2 05/26/2020    HGB 12.4 (L) 05/26/2020    HCT 39.1 (L) 05/26/2020     05/26/2020    CHOL 112 05/26/2020    TRIG 53 05/26/2020    HDL 56 05/26/2020    ALT 17 05/26/2020    AST 19 05/26/2020     09/30/2020    K 4.1 09/30/2020     09/30/2020    CREATININE 1.14 10/29/2020    BUN 19 09/30/2020    CO2 24 09/30/2020    TSH 1.48 01/24/2020    PSA 0.67 09/22/2020    INR 1.1 01/22/2020    LABA1C 5.6 12/10/2019     Lab Results   Component Value Date    CALCIUM 9.2 09/30/2020     Lab Results   Component Value Date    LDLCHOLESTEROL 45 05/26/2020       Assessment and Plan:    1. Cough  - guaiFENesin (MUCINEX) 600 MG extended release tablet; Take 1 tablet by mouth 2 times daily as needed for Congestion  Dispense: 30 tablet; Refill: 1  - benzonatate (TESSALON) 100 MG capsule; Take 1 capsule by mouth 2 times daily as needed for Cough  Dispense: 20 capsule; Refill: 0    2. Heart failure, systolic and diastolic, chronic (HCC)  - furosemide (LASIX) 40 MG tablet; Take 1.5 tablets by mouth daily Take 1.5 tabs po bid. Dispense: 90 tablet; Refill: 2  - atorvastatin (LIPITOR) 40 MG tablet; Take 1 tablet by mouth nightly  Dispense: 30 tablet; Refill: 0  - losartan (COZAAR) 50 MG tablet; Take 1 tablet by mouth daily  Dispense: 90 tablet; Refill: 1    3. Gastroesophageal reflux disease without esophagitis  - pantoprazole (PROTONIX) 40 MG tablet; Take 1 tablet by mouth 2 times daily (before meals)  Dispense: 60 tablet; Refill: 5    4. Chronic cough    5. Right thigh pain  - acetaminophen (TYLENOL) 500 MG tablet; Take 1 tablet by mouth every 12 hours as needed for Pain  Dispense: 120 tablet; Refill: 1    6.  Coronary artery disease involving native coronary artery of native heart without angina healthy behaviors: nutrition, exercise and medication adherence    Discussed use,benefit, and side effects of prescribed medications. Barriers to medication compliance addressed. All patient questions answered. Pt voiced understanding. No follow-ups on file. Disclaimer: Some orall of this note was transcribed using voice-recognition software. This may cause typographical errors occasionally. Although all effort is made to fix these errors, please do not hesitate to contact our office if there [place] concern with the understanding of this note.

## 2020-11-23 NOTE — PROGRESS NOTES
benzonatate (TESSALON) 100 MG capsule   2. Heart failure, systolic and diastolic, chronic (HCC)  atorvastatin (LIPITOR) 40 MG tablet    losartan (COZAAR) 50 MG tablet    furosemide (LASIX) 40 MG tablet    DISCONTINUED: furosemide (LASIX) 40 MG tablet   3. Gastroesophageal reflux disease without esophagitis  pantoprazole (PROTONIX) 40 MG tablet   4. Chronic cough     5. Right thigh pain  acetaminophen (TYLENOL) 500 MG tablet   6. Coronary artery disease involving native coronary artery of native heart without angina pectoris  nitroGLYCERIN (NITROSTAT) 0.4 MG SL tablet    clopidogrel (PLAVIX) 75 MG tablet    . I agree with orders as documented by the resident. Recommendations:  Chronic cough  Worsening, more productive  Recent negative Covid test  On GERD tx, no related symptoms at this time  Non-smoker  Glucocorticoids given by Pulmonology for cough  On Lisinopril, possible cause? Will D/c and start on new BP med      More than 25 minutes spent  in face to face encounter with the patient and more than half in counseling. Patient's questions were answered. Patient Voiced understanding to the counseling. Return in about 6 weeks (around 1/4/2021) for Chronic cough .    (GC Modifier)-Dr. Starlyn Lesches, MD

## 2020-11-23 NOTE — TELEPHONE ENCOUNTER
Pt called and left voicemail to schedule from letter which would be a f/u colon appt.  LVM on return to callback to schedule

## 2020-11-23 NOTE — PROGRESS NOTES
Visit Information    Have you changed or started any medications since your last visit including any over-the-counter medicines, vitamins, or herbal medicines? no   Have you stopped taking any of your medications? Is so, why? -  no  Are you having any side effects from any of your medications? - no    Have you seen any other physician or provider since your last visit?  no   Have you had any other diagnostic tests since your last visit?  no   Have you been seen in the emergency room and/or had an admission in a hospital since we last saw you?  no   Have you had your routine dental cleaning in the past 6 months?  no     Do you have an active MyChart account? If no, what is the barrier?   Yes    Patient Care Team:  Ines Sharp MD as PCP - General (Family Medicine)  Angelia Lucero MD as Consulting Physician (Gastroenterology)    Medical History Review  Past Medical, Family, and Social History reviewed and does not contribute to the patient presenting condition    Health Maintenance   Topic Date Due    Shingles Vaccine (1 of 2) 07/17/2007    Lipid screen  05/26/2021    Potassium monitoring  10/29/2021    Creatinine monitoring  10/29/2021    DTaP/Tdap/Td vaccine (2 - Td) 08/20/2030    Colon cancer screen colonoscopy  10/29/2030    Flu vaccine  Completed    Hepatitis C screen  Completed    HIV screen  Completed    Hepatitis A vaccine  Aged Out    Hepatitis B vaccine  Aged Out    Hib vaccine  Aged Out    Meningococcal (ACWY) vaccine  Aged Out    Pneumococcal 0-64 years Vaccine  Aged Out

## 2020-11-24 ENCOUNTER — HOSPITAL ENCOUNTER (OUTPATIENT)
Dept: OTHER | Age: 65
Discharge: HOME | End: 2020-11-24
Payer: MEDICAID

## 2020-11-24 VITALS
SYSTOLIC BLOOD PRESSURE: 112 MMHG | WEIGHT: 307 LBS | HEART RATE: 83 BPM | DIASTOLIC BLOOD PRESSURE: 64 MMHG | RESPIRATION RATE: 22 BRPM | OXYGEN SATURATION: 98 % | BODY MASS INDEX: 48.08 KG/M2

## 2020-11-24 PROCEDURE — 99212 OFFICE O/P EST SF 10 MIN: CPT

## 2020-11-24 PROCEDURE — 99212 OFFICE O/P EST SF 10 MIN: CPT | Performed by: NURSE PRACTITIONER

## 2020-11-24 RX ORDER — ISOSORBIDE MONONITRATE 30 MG/1
30 TABLET, EXTENDED RELEASE ORAL DAILY
Qty: 90 TABLET | Refills: 0 | Status: SHIPPED | OUTPATIENT
Start: 2020-11-24 | End: 2021-03-08 | Stop reason: SDUPTHER

## 2020-11-24 NOTE — PROGRESS NOTES
Verbally reviewed medication list with patient; patient verbalized understanding. Discussed 2000mg/day sodium restricted diet; patient verbalized understanding. Moderate daily exercise encouraged as tolerated. Discussed rest breaks as needed; patient verbalized understanding. Patient instructed to weigh self at the same time of each day, using same clothes and same scale; reinforced teaching to monitor for 3-5 lb weight increase over 1-2 days, and to notify the CHF clinic at [phone] or physician office if weight change noted. Patient verbalized understanding. Risks of smoking discussed with the patient if applicable; patient strongly discouraged to smoke. Patient verbalized understanding. Signs and symptoms of CHF discussed with patient, such as feeling more tired than normal, feeling short of breath, coughing that increases when you lie down, sudden weight gain, swelling of your feet, legs or belly. Patient verbalized understanding to notify the CHF clinic at [phone] or physician office if these symptoms occur. Compliance with plan of care and further disease process causes discussed with patient, patient encouraged to keep all follow up appointments. Patient verbalized understanding.

## 2020-11-24 NOTE — PROGRESS NOTES
As pt was leaving the  CHF Clinic and walking down the [address], he stopped to update writer on how he was feeling. Pt suddenly had a \"spasm\" in his left chest. Pt given in a chair and his pain subsided. Pt said this has not happened in 4 mos. Pt has h/o CABG x 1, in 2019. Pt has NTG sl ordered, but stated he has never used it. Pt's ekg from his colonoscopy 11/18/20  and his subsequent CARDIO OV ekg shared with Dr Mykel Adler. Most recent ekg has nonspecific ST abnormalities only, no concerning T wave inversion . Discussed ordering imdur for pt due to chest pain. This will be especially  helpful to reduce afterload for this CHF  pt as well. VS from appt with [facility] RN today  reviewed. Last cardiac cath reviewed  Last few ekg's  Reviewed. Echocardiogram reviewed.    Discussed pt with dr Mykel Adler

## 2020-11-24 NOTE — PROGRESS NOTES
Date:  11/24/2020  Time:  12:50 PM    CHF Clinic at [facility]    Office: [phone] Fax: [phone]    Re:  Sheldon Sands   Patient DO: 1955    Vital Signs: /64   Pulse 83   Resp 22   Wt (!) 307 lb (139.3 kg)   SpO2 98%   BMI 48.08 kg/m²                                                   No results for input(s): CBC, HGB, HCT, WBC, PLATELET, NA, K, CL, CO2, BUN, CREATININE, GLUCOSE, BNP, INR in the last 72 hours. Respiratory:    Assessment  Charting Type: Admission    Breath Sounds  Right Upper Lobe: Clear  Right Middle Lobe: Clear  Right Lower Lobe: Clear  Left Upper Lobe: Clear  Left Lower Lobe: Clear    Cough/Sputum  Cough: Non-productive  Frequency: Frequent         Peripheral Vascular  RLE Edema: Trace  LLE Edema: Trace      Complaints:  C/o of frequent cough    Physician Orders  none    Comment :  Pt. In today for follow up, weight up from last visit, wearing 2 pairs of pants heavy boots refuses to take boots off for wt. Check, has c/o of cough saw pcp yesterday made medication changes discontinued  Lisinopril and started cozaar, also started tessilon pearls, encouraged pt. To start new meds. He states hes feeling good following diet and fluid restrictions, will return 4 weeks. Calf measurements and ankle done over socks.     Electronically signed by Alfonzo Bailon RN on 11/24/2020 at 12:50 PM

## 2020-11-30 RX ORDER — PANTOPRAZOLE SODIUM 40 MG/1
40 TABLET, DELAYED RELEASE ORAL
Qty: 60 TABLET | Refills: 5 | Status: CANCELLED | OUTPATIENT
Start: 2020-11-30

## 2020-11-30 RX ORDER — POTASSIUM CHLORIDE 20 MEQ/1
20 TABLET, EXTENDED RELEASE ORAL DAILY
Qty: 30 TABLET | Refills: 1 | Status: CANCELLED | OUTPATIENT
Start: 2020-11-30

## 2020-11-30 NOTE — TELEPHONE ENCOUNTER
Patient request for Potassium CHloride. Please review and e-scribe if applicable.      Last Visit Date: 11/23/2020  Next Visit Date:  Visit date not found    Hemoglobin A1C (%)   Date Value   12/10/2019 5.6   10/18/2019 5.8             ( goal A1C is < 7)   No results found for: LABMICR  LDL Cholesterol (mg/dL)   Date Value   05/26/2020 45       (goal LDL is <100)   AST (U/L)   Date Value   05/26/2020 19     ALT (U/L)   Date Value   05/26/2020 17     BUN (mg/dL)   Date Value   09/30/2020 19     BP Readings from Last 3 Encounters:   11/24/20 112/64   11/23/20 109/62   11/03/20 104/62          (goal 120/80)        Patient Active Problem List:     Atrial flutter (HCC)     Sleep-related breathing disorder     Hypokalemia     Atrial fibrillation (HCC)     Ischemic cardiomyopathy     Combined systolic and diastolic congestive heart failure (HCC)     Acute cystitis without hematuria     Hx of CABG     Heart failure, systolic and diastolic, chronic (HCC)     Bilateral hand numbness     Right thigh pain     Left leg weakness     Coronary artery disease     Chronic cough     Gastroesophageal reflux disease without esophagitis     Overflow incontinence of urine     Polyp of colon      MA

## 2020-11-30 NOTE — TELEPHONE ENCOUNTER
Last visit: 11/23/20  Last Med refill:   Does patient have enough medication for 72 hours: Yes    Next Visit Date:  Future Appointments   Date Time Provider Taj Sandra   12/11/2020  2:15 PM Jenny Ashford MD [facility]   12/22/2020  9:00 AM [facility] [place] Maintenance   Topic Date Due    Shingles Vaccine (2 of 2) 01/12/2021    Lipid screen  05/26/2021    Potassium monitoring  10/29/2021    Creatinine monitoring  10/29/2021    DTaP/Tdap/Td vaccine (2 - Td) 08/20/2030    Colon cancer screen colonoscopy  10/29/2030    Flu vaccine  Completed    Hepatitis C screen  Completed    HIV screen  Completed    Hepatitis A vaccine  Aged Out    Hepatitis B vaccine  Aged Out    Hib vaccine  Aged Out    Meningococcal (ACWY) vaccine  Aged Out    Pneumococcal 0-64 years Vaccine  Aged Out       Hemoglobin A1C (%)   Date Value   12/10/2019 5.6   10/18/2019 5.8             ( goal A1C is < 7)   No results found for: LABMICR  LDL Cholesterol (mg/dL)   Date Value   05/26/2020 45   01/24/2020 37       (goal LDL is <100)   AST (U/L)   Date Value   05/26/2020 19     ALT (U/L)   Date Value   05/26/2020 17     BUN (mg/dL)   Date Value   09/30/2020 19     BP Readings from Last 3 Encounters:   11/24/20 112/64   11/23/20 109/62   11/03/20 104/62          (goal 120/80)    All Future Testing planned in [facility]  Lab Frequency Next Occurrence   Basic Metabolic Panel Once 20/40/5213   Magnesium Once 02/93/6024   Basic Metabolic Panel Once 17/68/8646   COLONOSCOPY (Diagnostic) Once 73/19/4873   Basic Metabolic Panel Once 17/90/9747               Patient Active Problem List:     Atrial flutter (HCC)     Sleep-related breathing disorder     Hypokalemia     Atrial fibrillation (HCC)     Ischemic cardiomyopathy     Combined systolic and diastolic congestive heart failure ([address]     Acute cystitis without hematuria     Hx of CABG     Heart failure, systolic and diastolic, chronic ([address] Bilateral hand numbness     Right thigh pain     Left leg weakness     Coronary artery disease     Chronic cough     Gastroesophageal reflux disease without esophagitis     Overflow incontinence of urine     Polyp of colon

## 2020-12-03 RX ORDER — TAMSULOSIN HYDROCHLORIDE 0.4 MG/1
0.4 CAPSULE ORAL DAILY
Qty: 30 CAPSULE | Refills: 0 | Status: SHIPPED | OUTPATIENT
Start: 2020-12-03 | End: 2020-12-30 | Stop reason: SDUPTHER

## 2020-12-03 RX ORDER — POTASSIUM CHLORIDE 20 MEQ/1
20 TABLET, EXTENDED RELEASE ORAL DAILY
Qty: 30 TABLET | Refills: 1 | Status: SHIPPED | OUTPATIENT
Start: 2020-12-03 | End: 2021-05-10

## 2020-12-11 ENCOUNTER — TELEPHONE (OUTPATIENT)
Dept: GASTROENTEROLOGY | Age: 65
End: 2020-12-11

## 2020-12-11 ENCOUNTER — OFFICE VISIT (OUTPATIENT)
Dept: GASTROENTEROLOGY | Age: 65
End: 2020-12-11
Payer: MEDICAID

## 2020-12-11 VITALS — SYSTOLIC BLOOD PRESSURE: 112 MMHG | BODY MASS INDEX: 48.55 KG/M2 | WEIGHT: 310 LBS | DIASTOLIC BLOOD PRESSURE: 64 MMHG

## 2020-12-11 PROCEDURE — 3017F COLORECTAL CA SCREEN DOC REV: CPT | Performed by: INTERNAL MEDICINE

## 2020-12-11 PROCEDURE — 1036F TOBACCO NON-USER: CPT | Performed by: INTERNAL MEDICINE

## 2020-12-11 PROCEDURE — G8427 DOCREV CUR MEDS BY ELIG CLIN: HCPCS | Performed by: INTERNAL MEDICINE

## 2020-12-11 PROCEDURE — 99213 OFFICE O/P EST LOW 20 MIN: CPT | Performed by: INTERNAL MEDICINE

## 2020-12-11 PROCEDURE — G8482 FLU IMMUNIZE ORDER/ADMIN: HCPCS | Performed by: INTERNAL MEDICINE

## 2020-12-11 PROCEDURE — G8417 CALC BMI ABV UP PARAM F/U: HCPCS | Performed by: INTERNAL MEDICINE

## 2020-12-11 RX ORDER — SUCRALFATE 1 G/1
1 TABLET ORAL 4 TIMES DAILY
Qty: 120 TABLET | Refills: 3 | Status: SHIPPED | OUTPATIENT
Start: 2020-12-11 | End: 2021-04-26

## 2020-12-11 ASSESSMENT — ENCOUNTER SYMPTOMS
ALLERGIC/IMMUNOLOGIC NEGATIVE: 1
APNEA: 1
EYES NEGATIVE: 1
SHORTNESS OF BREATH: 1
COUGH: 1
CHEST TIGHTNESS: 1
ANAL BLEEDING: 1

## 2020-12-11 NOTE — TELEPHONE ENCOUNTER
Colon w/EMR with Dr Robyn Horn ordered at [address] with Dr Dennie Molly. Pt informed he will be contacted soon to schedule the procedure. Pt will need cardiac clearance for Plavix; he believes Dr Andria Mendez (PCP) is the prescriber. Dr Melany Grace is his cardiologist.     [facility]; Dr Jaymie Dill is not the prescriber for Plavix. Called Dr Puneet Yoder office and confirmed Dr Andria Mendez did prescribe the Plavix. Writer faxed cardiac clearance to Dr Andria Mendez at [phone].

## 2020-12-11 NOTE — PROGRESS NOTES
GI FOLLOW UP    INTERVAL HISTORY:     Status post colonoscopy and identified to have a sessile serrated adenomas and tubular adenomas removed aside from a flat lesion which will require EMR technique      Chief Complaint   Patient presents with    Follow-up     colonoscopy follow up. discuss repeat colonoscopy to remove flat polyp in transverse colon    Hemorrhoids     Patient states his hemorrhoids \"act up\" occasionally but his cream helps. HISTORY OF PRESENT ILLNESS: Mr.Andrew YUN Berg is a 60 y. o. male with a past history remarkable for HARPREET, A. fib on Eliquis, morbid obesity, history of CABG in October 2019 on dual antiplatelet therapy (aspirin and Plavix), referred for evaluation of nonproductive postprandial cough. Recent colonoscopy which identified tubular adenomas and sessile serrated adenomas which were removed aside from an isolated flat lesion that will require EMR. Findings discussed with the patient. Past Medical,Family, and Social History reviewed and does contribute to the patient presenting condition. Patient's PMH/PSH,SH,PSYCH Hx, MEDs, ALLERGIES, and ROS were all reviewed and updated in the appropriate sections.     PAST MEDICAL HISTORY:  Past Medical History:   Diagnosis Date    Atrial fibrillation ([address] 10/2019    Dr. Latisha Lewis BPH (benign prostatic hyperplasia)     Cellulitis     Cervical disc disease     CHF (congestive heart failure) ([address]     Coronary artery disease 10/2019    CABG    GERD (gastroesophageal reflux disease)     History of incarceration     released 2019    Hyperlipidemia     Hypertension     Obesity     Sleep apnea     C-pap       Past Surgical History:   Procedure Laterality Date    ABDOMINAL EXPLORATION SURGERY      CARDIOVERSION  10/16/2019    CARPAL TUNNEL RELEASE Bilateral 2008    CERVICAL SPINE SURGERY      2-5    COLONOSCOPY  10/29/2020     WITH BIOPSY, COLONOSCOPY SUBMUCOSAL/BOTOX INJECTION,  COLONOSCOPY POLYPECTOMY SNARE/COLD BIOPSY     COLONOSCOPY N/A 10/29/2020    COLONOSCOPY WITH BIOPSY performed by Manuel Cornejo MD at [address]  10/29/2020    COLONOSCOPY SUBMUCOSAL/BOTOX INJECTION performed by Manuel Cornejo MD at [address]  10/29/2020    COLONOSCOPY POLYPECTOMY SNARE/COLD BIOPSY performed by Manuel Cornejo MD at [place] [address] N/A 10/21/2019    CABG CORONARY ARTERY BYPASS GRAFT X1, LIMA-LAD, BROWN 4 MAZE PROCEDURE, 50MM ATRICURE ATRIAL CLIP RIGID INTERNAL FIXATION PLATES X 3   SCREWS X 13 performed by Ciro Martins MD at [facility]      Left leg    TRANSESOPHAGEAL ECHOCARDIOGRAM  10/16/2019       CURRENT MEDICATIONS:    Current Outpatient Medications:     potassium chloride (KLOR-CON M) 20 MEQ extended release tablet, Take 1 tablet by mouth daily, Disp: 30 tablet, Rfl: 1    tamsulosin (FLOMAX) 0.4 MG capsule, TAKE 1 CAPSULE BY MOUTH DAILY, Disp: 30 capsule, Rfl: 0    isosorbide mononitrate (IMDUR) 30 MG extended release tablet, Take 1 tablet by mouth daily, Disp: 90 tablet, Rfl: 0    nitroGLYCERIN (NITROSTAT) 0.4 MG SL tablet, Place 1 tablet under the tongue every 5 minutes as needed for Chest pain up to max of 3 total doses.  If no relief after 1 dose, call 911., Disp: 25 tablet, Rfl: 1    acetaminophen (TYLENOL) 500 MG tablet, Take 1 tablet by mouth every 12 hours as needed for Pain, Disp: 120 tablet, Rfl: 1    clopidogrel (PLAVIX) 75 MG tablet, Take 1 tablet by mouth daily, Disp: 30 tablet, Rfl: 0    pantoprazole (PROTONIX) 40 MG tablet, Take 1 tablet by mouth 2 times daily (before meals), Disp: 60 tablet, Rfl: 5    atorvastatin (LIPITOR) 40 MG tablet, Take 1 tablet by mouth nightly, Disp: 30 tablet, Rfl: 0    losartan (COZAAR) 50 MG tablet, Take 1 tablet by mouth daily (Patient taking differently: Take 50 mg by mouth daily New script), Disp: 90 tablet, Rfl: 1    furosemide (LASIX) 40 MG tablet, Take 1.5 tablets by mouth daily Take 1 tab in the morning daily and half a tablet in the evening (Patient taking differently: Take 60 mg by mouth daily Take 1 tab in the morning daily and half a tablet in the evening     taking 60mg bid), Disp: 90 tablet, Rfl: 2    bisacodyl (DULCOLAX) 5 MG EC tablet, TAKE 4 TABS AS DIRECTED BY PHYSICIAN OFFICE, Disp: 4 tablet, Rfl: 0    hydrocortisone (ANUSOL-HC) 2.5 % CREA rectal cream, Apply at bedtime prn for hemorrhoids, Disp: 1 Tube, Rfl: 2    diclofenac sodium (VOLTAREN) 1 % GEL, APPLY 2 G TOPICALLY 4 TIMES DAILY AS NEEDED FOR PAIN, Disp: 200 g, Rfl: 1    spironolactone (ALDACTONE) 25 MG tablet, Take 1 tablet by mouth daily, Disp: 30 tablet, Rfl: 3    Elastic Bandages & Supports (JOBST KNEE HIGH COMPRESSION SM) MISC, Wear q day. Remove q hs, Disp: 2 each, Rfl: 0    Elastic Bandages & Supports (JOBST KNEE HIGH COMPRESSION SM) MISC, 2 each by Does not apply route daily Wear q day. Remove q hs. Diagnosis: Chronic venous insufficiency, Disp: 2 each, Rfl: 0    polyethylene glycol (MIRALAX) 17 GM/SCOOP POWD powder, 250g bottle. For colonoscopy prep. Follow per instructions from clinic., Disp: 1 Bottle, Rfl: 0    ammonium lactate (AMLACTIN) 12 % cream, Apply topically daily Apply topically as needed. , Disp: , Rfl:     Elastic Bandages & Supports (JOBST OPAQUE KNEE 20-30MMHG XL) MISC, Dispense two pair closed toe knee high 20 to 30 mmHg Jobst compression stockings, Disp: 2 each, Rfl: 0    white petrolatum GEL, Apply 28 g topically as needed for Dry Lips, Disp: 106 g, Rfl: 0    magnesium citrate solution, Take 296 mLs by mouth once for 1 dose, Disp: 296 mL, Rfl: 0    ALLERGIES:   No Known Allergies    FAMILY HISTORY:       Problem Relation Age of Onset    Alcohol Abuse Maternal Uncle     Heart Attack Maternal Uncle          SOCIAL HISTORY:   Social History     Socioeconomic History    Marital status:      Spouse name: Not on file    Number of children: Not on file    Years of education: Not on file    Highest education level: Not on file   Occupational History    Not on file   Social Needs    Financial resource strain: Not on file    Food insecurity     Worry: Not on file     Inability: Not on file    Transportation needs     Medical: Not on file     Non-medical: Not on file   Tobacco Use    Smoking status: Never Smoker    Smokeless tobacco: Never Used   Substance and Sexual Activity    Alcohol use: Not Currently     Comment: stopped 1991    Drug use: Not Currently    Sexual activity: Not Currently   Lifestyle    Physical activity     Days per week: Not on file     Minutes per session: Not on file    Stress: Not on file   Relationships    Social connections     Talks on phone: Not on file     Gets together: Not on file     Attends Judaism service: Not on file     Active member of club or organization: Not on file     Attends meetings of clubs or organizations: Not on file     Relationship status: Not on file    Intimate partner violence     Fear of current or ex partner: Not on file     Emotionally abused: Not on file     Physically abused: Not on file     Forced sexual activity: Not on file   Other Topics Concern    Not on file   Social History Narrative    Not on file       REVIEW OF SYSTEMS: A 12-point review of systems was obtained and pertinent positives and negatives were listed below. REVIEW OF SYSTEMS:     Constitutional: No fever, no chills, no lethargy, no weakness. HEENT:  No headache, otalgia, itchy eyes, nasal discharge or sore throat. Cardiac:  No chest pain, dyspnea, orthopnea or PND. Chest:   No cough, phlegm or wheezing. Abdomen:      Detailed by MA   Neuro:  No focal weakness, abnormal movements or seizure like activity. Skin:   No rashes, no itching. :   No hematuria, no pyuria, no dysuria, no flank pain.   Extremities:  No swelling or joint pains. ROS was otherwise negative    Review of Systems   Constitutional: Positive for fatigue. HENT: Negative. Eyes: Negative. Respiratory: Positive for apnea, cough, chest tightness and shortness of breath. Cardiovascular: Negative. Gastrointestinal: Positive for anal bleeding. Endocrine: Negative. Genitourinary: Negative. Musculoskeletal: Positive for gait problem. Skin: Negative. Allergic/Immunologic: Negative. Hematological: Bruises/bleeds easily. Psychiatric/Behavioral: Positive for sleep disturbance. All other systems reviewed and are negative. PHYSICAL EXAMINATION: Vital signs reviewed per the nursing documentation. BP (!) 101/59   Wt (!) 310 lb (140.6 kg)   BMI 48.55 kg/m²   Body mass index is 48.55 kg/m². Physical Exam    Physical Exam   Constitutional: Patient is oriented to person, place, and time. Patient appears well-developed and well-nourished. HENT:   Head: Normocephalic and atraumatic. Eyes: Pupils are equal, round, and reactive to light. EOM are normal.   Neck: Normal range of motion. Neck supple. No JVD present. No tracheal deviation present. No thyromegaly present. Cardiovascular: Normal rate, regular rhythm, normal heart sounds and intact distal pulses. Pulmonary/Chest: Effort normal and breath sounds normal. No stridor. No respiratory distress. He has no wheezes. He has no rales. He exhibits no tenderness. Abdominal: Soft. Bowel sounds are normal. He exhibits no distension and no mass. There is no tenderness. There is no rebound and no guarding. No hernia. Musculoskeletal: Normal range of motion. Lymphadenopathy:    Patient has no cervical adenopathy. Neurological: Patient is alert and oriented to person, place, and time. Psychiatric: Patient has a normal mood and affect.  Patient behavior is normal.       LABORATORY DATA: Reviewed  Lab Results   Component Value Date    WBC 7.2 05/26/2020    HGB 12.4 (L) 05/26/2020    HCT 39.1 (L) 05/26/2020    MCV 94.7 05/26/2020     05/26/2020     09/30/2020    K 4.1 09/30/2020     09/30/2020    CO2 24 09/30/2020    BUN 19 09/30/2020    CREATININE 1.14 10/29/2020    LABALBU 3.9 05/26/2020    BILITOT 0.39 05/26/2020    ALKPHOS 58 05/26/2020    AST 19 05/26/2020    ALT 17 05/26/2020    INR 1.1 01/22/2020         Lab Results   Component Value Date    RBC 4.13 (L) 05/26/2020    HGB 12.4 (L) 05/26/2020    MCV 94.7 05/26/2020    MCH 30.0 05/26/2020    MCHC 31.7 05/26/2020    RDW 14.4 05/26/2020    MPV 9.8 05/26/2020    BASOPCT 1 05/26/2020    LYMPHSABS 0.98 (L) 05/26/2020    MONOSABS 0.74 05/26/2020    NEUTROABS 5.18 05/26/2020    EOSABS 0.22 05/26/2020    BASOSABS 0.05 05/26/2020         DIAGNOSTIC TESTING:     No results found. IMPRESSION: Mr.Andrew YUN Berg is a 60 y. o. male with a past history remarkable for HARPREET, A. fib on Eliquis, morbid obesity, history of CABG in October 2019 on dual antiplatelet therapy (aspirin and Plavix), referred for evaluation of nonproductive postprandial cough. Recent colonoscopy which identified tubular adenomas and sessile serrated adenomas which were removed aside from an isolated flat lesion that will require EMR. Findings discussed with the patient. PLAN:        1) GERD-patient continues to be on PPI 40 mg twice daily. He continues to have refractory symptoms. We will add Carafate 1 g 4 times daily to the patient's regimen and advised to maintain and adhere to dietary modifications to avoid any triggering episodes. 2) residual flat polyp, Elyssa IIa, 9 mm in size in the proximal transverse colon-will require EMR resection and cessation of Plavix for 7 days to ensure reduce risk of bleeding. 3) Colonoscopy  With Dr. Stephanie Daily for EMR procedure. Follow-up in 2 to 3 months    Thank you for allowing me to participate in the care of Mr. Daysi Durham. For any further questions please do not hesitate to contact me.     I have reviewed and agree with the ROS entered by the MA/LPN from today's encounter documented in a separate note. Wendy Ventura MD, MPH   [facility]  Office #: [phone]    this note is created with the assistance of a speech recognition program.  While intending to generate a document that actually reflects the content of the visit, the document can still have some errors including those of syntax and sound a like substitutions which may escape proof reading. It such instances, actual meaning can be extrapolated by contextual diversion.

## 2020-12-21 ENCOUNTER — TELEPHONE (OUTPATIENT)
Dept: FAMILY MEDICINE CLINIC | Age: 65
End: 2020-12-21

## 2020-12-21 DIAGNOSIS — I50.20 SYSTOLIC CONGESTIVE HEART FAILURE, UNSPECIFIED HF CHRONICITY (HCC): ICD-10-CM

## 2020-12-21 DIAGNOSIS — I87.8 CHRONIC VENOUS STASIS: ICD-10-CM

## 2020-12-21 NOTE — TELEPHONE ENCOUNTER
Cardiac clearance received and scanned. Patient is low cardiac risk for planned procedure and is able to hold Plavix 5 days prior to procedure. Patient can now be called to schedule colon w/emr with Dr Francisco Norris.

## 2020-12-22 ENCOUNTER — HOSPITAL ENCOUNTER (OUTPATIENT)
Age: 65
Discharge: HOME | End: 2020-12-22
Payer: MEDICAID

## 2020-12-22 ENCOUNTER — HOSPITAL ENCOUNTER (OUTPATIENT)
Dept: OTHER | Age: 65
Discharge: HOME | End: 2020-12-22
Payer: MEDICAID

## 2020-12-22 VITALS
SYSTOLIC BLOOD PRESSURE: 130 MMHG | HEART RATE: 97 BPM | WEIGHT: 314 LBS | OXYGEN SATURATION: 97 % | RESPIRATION RATE: 20 BRPM | BODY MASS INDEX: 49.18 KG/M2 | DIASTOLIC BLOOD PRESSURE: 60 MMHG

## 2020-12-22 LAB
ANION GAP SERPL CALCULATED.3IONS-SCNC: 18 MMOL/L (ref 9–17)
BUN BLDV-MCNC: 16 MG/DL (ref 8–23)
BUN/CREAT BLD: ABNORMAL (ref 9–20)
CALCIUM SERPL-MCNC: 8.7 MG/DL (ref 8.6–10.4)
CHLORIDE BLD-SCNC: 103 MMOL/L (ref 98–107)
CO2: 18 MMOL/L (ref 20–31)
CREAT SERPL-MCNC: 0.75 MG/DL (ref 0.7–1.2)
GFR AFRICAN AMERICAN: >60 ML/MIN
GFR NON-AFRICAN AMERICAN: >60 ML/MIN
GFR SERPL CREATININE-BSD FRML MDRD: ABNORMAL ML/MIN/{1.73_M2}
GFR SERPL CREATININE-BSD FRML MDRD: ABNORMAL ML/MIN/{1.73_M2}
GLUCOSE BLD-MCNC: 135 MG/DL (ref 70–99)
POTASSIUM SERPL-SCNC: 3.7 MMOL/L (ref 3.7–5.3)
SODIUM BLD-SCNC: 139 MMOL/L (ref 135–144)

## 2020-12-22 PROCEDURE — 80048 BASIC METABOLIC PNL TOTAL CA: CPT

## 2020-12-22 PROCEDURE — 99212 OFFICE O/P EST SF 10 MIN: CPT

## 2020-12-22 RX ORDER — POLYETHYLENE GLYCOL 3350 17 G/17G
POWDER, FOR SOLUTION ORAL
Qty: 238 G | Refills: 0 | Status: SHIPPED | OUTPATIENT
Start: 2020-12-22 | End: 2021-03-19

## 2020-12-22 NOTE — PROGRESS NOTES
Date:  2020  Time:  8:39 AM    CHF Clinic at [facility]    Office: [phone] Fax: [phone]    Re:  Dianne Flynn   Patient : 1955    Vital Signs: /60   Pulse 97   Resp 20   Wt (!) 314 lb (142.4 kg)   SpO2 97%   BMI 49.18 kg/m²                                                   No results for input(s): CBC, HGB, HCT, WBC, PLATELET, NA, K, CL, CO2, BUN, CREATININE, GLUCOSE, BNP, INR in the last 72 hours.      Respiratory:    Assessment  Charting Type: Reassessment    Breath Sounds  Right Upper Lobe: Clear  Right Middle Lobe: Clear  Right Lower Lobe: Clear  Left Upper Lobe: Clear  Left Lower Lobe: Clear    Cough/Sputum  Cough: Non-productive         Peripheral Vascular  RLE Edema: +1, Non-pitting  LLE Edema: +1, Non-pitting      Complaints:Patient states feels more SOB today weight is up 7 pounds from last visit in [facility]    Physician Orders Talked to [facility] got stat bmp once we get results will decide what to do with Lasix order BUN/cret 18/.75 K 3.7 orders received to increase Lasix to 80mg po  Bid for 3 days and  kcl 20meg po total of 40meq x 3 days     Comment : Denies chest pain today has had increased SOB since last visit has gained 14 pounds in 2 months 7 pounds every visit states only drinks 3 glasses of fluid per day and try to watch low sodium diet has a lot of meals on meals delivered last night had spaghetti which has a lot of sodium reviewed in detail low sodium diet and instructed will call once we have results Patient called with orders will see on 2020 to reevaluate     Electronically signed by Lukasz Connell RN on 2020 at 8:39 AM

## 2020-12-22 NOTE — TELEPHONE ENCOUNTER
Talked to Nicko Mir to inform him of Covid testing on 01/08/21 @ 9:15 am at [facility], he voiced understanding.

## 2020-12-28 NOTE — TELEPHONE ENCOUNTER
Last visit:   Last Med refill:   Does patient have enough medication for 72 hours: No:     Next Visit Date:  Future Appointments   Date Time Provider Taj Flores   12/30/2020  1:00 PM [facility] CHF CLINIC RM 1 [facility] CHF CLI [facility]   1/8/2021  9:15 AM SCHEDULE, [facility] COVID SCREENING [facility] COV [facility]   3/15/2021  3:00 PM Trace Montoya MD [address] Maintenance   Topic Date Due    Shingles Vaccine (2 of 2) 01/12/2021    Lipid screen  05/26/2021    Potassium monitoring  12/22/2021    Creatinine monitoring  12/22/2021    Diabetes screen  12/10/2022    DTaP/Tdap/Td vaccine (2 - Td) 08/20/2030    Colon cancer screen colonoscopy  10/29/2030    Flu vaccine  Completed    Pneumococcal 0-64 years Vaccine  Completed    Hepatitis C screen  Completed    HIV screen  Completed    Hepatitis A vaccine  Aged Out    Hepatitis B vaccine  Aged Out    Hib vaccine  Aged Out    Meningococcal (ACWY) vaccine  Aged Out       Hemoglobin A1C (%)   Date Value   12/10/2019 5.6   10/18/2019 5.8             ( goal A1C is < 7)   No results found for: LABMICR  LDL Cholesterol (mg/dL)   Date Value   05/26/2020 45   01/24/2020 37       (goal LDL is <100)   AST (U/L)   Date Value   05/26/2020 19     ALT (U/L)   Date Value   05/26/2020 17     BUN (mg/dL)   Date Value   12/22/2020 16     BP Readings from Last 3 Encounters:   12/22/20 130/60   12/11/20 112/64   11/24/20 112/64          (goal 120/80)    All Future Testing planned in [facility]  Lab Frequency Next Occurrence   Basic Metabolic Panel Once 85/56/4379   Magnesium Once 33/23/9028   Basic Metabolic Panel Once 16/93/4384   COLONOSCOPY (Diagnostic) Once 64/76/7571   Basic Metabolic Panel Once 52/16/9121   COLONOSCOPY (Diagnostic) Once 01/11/2021               Patient Active Problem List:     Atrial flutter (HCC)     Sleep-related breathing disorder     Hypokalemia     Atrial fibrillation (HCC)     Ischemic cardiomyopathy     Combined systolic and diastolic congestive heart failure (HCC)     Acute cystitis without hematuria     Hx of CABG     Heart failure, systolic and diastolic, chronic (HCC)     Bilateral hand numbness     Right thigh pain     Left leg weakness     Coronary artery disease     Chronic cough     Gastroesophageal reflux disease without esophagitis     Overflow incontinence of urine     Polyp of colon           Please address the medication refill and close the encounter. If I can be of assistance, please route to the applicable pool. Thank you.

## 2020-12-30 ENCOUNTER — HOSPITAL ENCOUNTER (OUTPATIENT)
Dept: OTHER | Age: 65
Discharge: HOME | End: 2020-12-30
Payer: MEDICAID

## 2020-12-30 VITALS
SYSTOLIC BLOOD PRESSURE: 110 MMHG | WEIGHT: 315 LBS | OXYGEN SATURATION: 96 % | DIASTOLIC BLOOD PRESSURE: 60 MMHG | HEART RATE: 88 BPM | RESPIRATION RATE: 18 BRPM | BODY MASS INDEX: 49.49 KG/M2

## 2020-12-30 PROCEDURE — 99212 OFFICE O/P EST SF 10 MIN: CPT

## 2020-12-30 RX ORDER — TAMSULOSIN HYDROCHLORIDE 0.4 MG/1
0.4 CAPSULE ORAL DAILY
Qty: 30 CAPSULE | Refills: 0 | Status: SHIPPED | OUTPATIENT
Start: 2020-12-30 | End: 2021-02-04

## 2020-12-30 RX ORDER — CLOPIDOGREL BISULFATE 75 MG/1
75 TABLET ORAL DAILY
Qty: 30 TABLET | Refills: 0 | Status: SHIPPED | OUTPATIENT
Start: 2020-12-30 | End: 2021-02-04

## 2020-12-30 NOTE — TELEPHONE ENCOUNTER
Received call from patient requesting a refill on Mucinex  MG BID. I dont see that listed on his med list states he got a script from you on 12/08/20. Please review and advise.  Thank you

## 2021-01-04 ENCOUNTER — TELEPHONE (OUTPATIENT)
Dept: FAMILY MEDICINE CLINIC | Age: 66
End: 2021-01-04

## 2021-01-04 NOTE — TELEPHONE ENCOUNTER
Patient calling requesting Rx for a Wedge to elevate his legs at night. Patient would like the Rx for his compression stockings and the wedge sent over to [facility] [address]. Patient also requesting someone call his insurance at [phone] to increase his number of transportation visits to PT.   Writer called Dl's Patient going to [facility] [phone]

## 2021-01-05 NOTE — TELEPHONE ENCOUNTER
Pt called regarding the compression stocking and wedge to be sent to [facility], explain 72 hours for response.

## 2021-01-07 DIAGNOSIS — I50.20 SYSTOLIC CONGESTIVE HEART FAILURE, UNSPECIFIED HF CHRONICITY (HCC): Primary | ICD-10-CM

## 2021-01-08 ENCOUNTER — HOSPITAL ENCOUNTER (OUTPATIENT)
Dept: LAB | Age: 66
Setting detail: SPECIMEN
Discharge: HOME | End: 2021-01-08
Payer: MEDICAID

## 2021-01-08 DIAGNOSIS — Z20.822 COVID-19 RULED OUT BY LABORATORY TESTING: Primary | ICD-10-CM

## 2021-01-08 PROCEDURE — U0003 INFECTIOUS AGENT DETECTION BY NUCLEIC ACID (DNA OR RNA); SEVERE ACUTE RESPIRATORY SYNDROME CORONAVIRUS 2 (SARS-COV-2) (CORONAVIRUS DISEASE [COVID-19]), AMPLIFIED PROBE TECHNIQUE, MAKING USE OF HIGH THROUGHPUT TECHNOLOGIES AS DESCRIBED BY CMS-2020-01-R: HCPCS

## 2021-01-08 PROCEDURE — U0005 INFEC AGEN DETEC AMPLI PROBE: HCPCS

## 2021-01-10 LAB
SARS-COV-2, RAPID: NORMAL
SARS-COV-2: NORMAL
SARS-COV-2: NOT DETECTED
SOURCE: NORMAL

## 2021-01-11 ENCOUNTER — TELEPHONE (OUTPATIENT)
Dept: FAMILY MEDICINE CLINIC | Age: 66
End: 2021-01-11

## 2021-01-11 NOTE — TELEPHONE ENCOUNTER
Called and informed patient of negative Covid test collected on 1/8/2021. Patient verbalized understanding.

## 2021-01-12 ENCOUNTER — ANESTHESIA (OUTPATIENT)
Dept: OPERATING ROOM | Age: 66
End: 2021-01-12
Payer: MEDICAID

## 2021-01-12 ENCOUNTER — HOSPITAL ENCOUNTER (OUTPATIENT)
Age: 66
Setting detail: OUTPATIENT SURGERY
Discharge: HOME | End: 2021-01-12
Attending: INTERNAL MEDICINE | Admitting: INTERNAL MEDICINE
Payer: MEDICAID

## 2021-01-12 ENCOUNTER — ANESTHESIA EVENT (OUTPATIENT)
Dept: OPERATING ROOM | Age: 66
End: 2021-01-12
Payer: MEDICAID

## 2021-01-12 VITALS — DIASTOLIC BLOOD PRESSURE: 86 MMHG | SYSTOLIC BLOOD PRESSURE: 160 MMHG | OXYGEN SATURATION: 98 %

## 2021-01-12 VITALS
OXYGEN SATURATION: 98 % | HEIGHT: 67 IN | RESPIRATION RATE: 26 BRPM | BODY MASS INDEX: 47.75 KG/M2 | DIASTOLIC BLOOD PRESSURE: 76 MMHG | TEMPERATURE: 97.9 F | HEART RATE: 72 BPM | WEIGHT: 304.24 LBS | SYSTOLIC BLOOD PRESSURE: 141 MMHG

## 2021-01-12 PROCEDURE — 6360000002 HC RX W HCPCS: Performed by: NURSE ANESTHETIST, CERTIFIED REGISTERED

## 2021-01-12 PROCEDURE — 45385 COLONOSCOPY W/LESION REMOVAL: CPT | Performed by: INTERNAL MEDICINE

## 2021-01-12 PROCEDURE — 3700000001 HC ADD 15 MINUTES (ANESTHESIA): Performed by: INTERNAL MEDICINE

## 2021-01-12 PROCEDURE — 88305 TISSUE EXAM BY PATHOLOGIST: CPT

## 2021-01-12 PROCEDURE — 3609010400 HC COLONOSCOPY POLYPECTOMY HOT BIOPSY: Performed by: INTERNAL MEDICINE

## 2021-01-12 PROCEDURE — 2720000010 HC SURG SUPPLY STERILE: Performed by: INTERNAL MEDICINE

## 2021-01-12 PROCEDURE — 7100000010 HC PHASE II RECOVERY - FIRST 15 MIN: Performed by: INTERNAL MEDICINE

## 2021-01-12 PROCEDURE — 2580000003 HC RX 258: Performed by: ANESTHESIOLOGY

## 2021-01-12 PROCEDURE — 7100000011 HC PHASE II RECOVERY - ADDTL 15 MIN: Performed by: INTERNAL MEDICINE

## 2021-01-12 PROCEDURE — 2500000003 HC RX 250 WO HCPCS: Performed by: NURSE ANESTHETIST, CERTIFIED REGISTERED

## 2021-01-12 PROCEDURE — 2709999900 HC NON-CHARGEABLE SUPPLY: Performed by: INTERNAL MEDICINE

## 2021-01-12 PROCEDURE — 3700000000 HC ANESTHESIA ATTENDED CARE: Performed by: INTERNAL MEDICINE

## 2021-01-12 RX ORDER — PROPOFOL 10 MG/ML
INJECTION, EMULSION INTRAVENOUS PRN
Status: DISCONTINUED | OUTPATIENT
Start: 2021-01-12 | End: 2021-01-12 | Stop reason: SDUPTHER

## 2021-01-12 RX ORDER — LIDOCAINE HYDROCHLORIDE 10 MG/ML
1 INJECTION, SOLUTION EPIDURAL; INFILTRATION; INTRACAUDAL; PERINEURAL
Status: DISCONTINUED | OUTPATIENT
Start: 2021-01-12 | End: 2021-01-12 | Stop reason: HOSPADM

## 2021-01-12 RX ORDER — SODIUM CHLORIDE, SODIUM LACTATE, POTASSIUM CHLORIDE, CALCIUM CHLORIDE 600; 310; 30; 20 MG/100ML; MG/100ML; MG/100ML; MG/100ML
INJECTION, SOLUTION INTRAVENOUS CONTINUOUS
Status: DISCONTINUED | OUTPATIENT
Start: 2021-01-12 | End: 2021-01-12 | Stop reason: HOSPADM

## 2021-01-12 RX ORDER — LIDOCAINE HYDROCHLORIDE 20 MG/ML
INJECTION, SOLUTION EPIDURAL; INFILTRATION; INTRACAUDAL; PERINEURAL PRN
Status: DISCONTINUED | OUTPATIENT
Start: 2021-01-12 | End: 2021-01-12 | Stop reason: SDUPTHER

## 2021-01-12 RX ORDER — SODIUM CHLORIDE 0.9 % (FLUSH) 0.9 %
10 SYRINGE (ML) INJECTION EVERY 12 HOURS SCHEDULED
Status: DISCONTINUED | OUTPATIENT
Start: 2021-01-12 | End: 2021-01-12 | Stop reason: HOSPADM

## 2021-01-12 RX ORDER — SODIUM CHLORIDE 9 MG/ML
INJECTION, SOLUTION INTRAVENOUS CONTINUOUS
Status: DISCONTINUED | OUTPATIENT
Start: 2021-01-12 | End: 2021-01-12 | Stop reason: HOSPADM

## 2021-01-12 RX ORDER — SODIUM CHLORIDE 0.9 % (FLUSH) 0.9 %
10 SYRINGE (ML) INJECTION PRN
Status: DISCONTINUED | OUTPATIENT
Start: 2021-01-12 | End: 2021-01-12 | Stop reason: HOSPADM

## 2021-01-12 RX ADMIN — PROPOFOL 30 MG: 10 INJECTION, EMULSION INTRAVENOUS at 11:38

## 2021-01-12 RX ADMIN — PROPOFOL 30 MG: 10 INJECTION, EMULSION INTRAVENOUS at 11:35

## 2021-01-12 RX ADMIN — PROPOFOL 30 MG: 10 INJECTION, EMULSION INTRAVENOUS at 11:40

## 2021-01-12 RX ADMIN — PROPOFOL 50 MG: 10 INJECTION, EMULSION INTRAVENOUS at 11:29

## 2021-01-12 RX ADMIN — PROPOFOL 30 MG: 10 INJECTION, EMULSION INTRAVENOUS at 11:36

## 2021-01-12 RX ADMIN — PROPOFOL 50 MG: 10 INJECTION, EMULSION INTRAVENOUS at 11:33

## 2021-01-12 RX ADMIN — PROPOFOL 50 MG: 10 INJECTION, EMULSION INTRAVENOUS at 11:27

## 2021-01-12 RX ADMIN — PROPOFOL 50 MG: 10 INJECTION, EMULSION INTRAVENOUS at 11:28

## 2021-01-12 RX ADMIN — SODIUM CHLORIDE, POTASSIUM CHLORIDE, SODIUM LACTATE AND CALCIUM CHLORIDE: 600; 310; 30; 20 INJECTION, SOLUTION INTRAVENOUS at 10:16

## 2021-01-12 RX ADMIN — LIDOCAINE HYDROCHLORIDE 100 MG: 20 INJECTION, SOLUTION EPIDURAL; INFILTRATION; INTRACAUDAL; PERINEURAL at 11:27

## 2021-01-12 RX ADMIN — PROPOFOL 50 MG: 10 INJECTION, EMULSION INTRAVENOUS at 11:31

## 2021-01-12 ASSESSMENT — PULMONARY FUNCTION TESTS
PIF_VALUE: 0

## 2021-01-12 ASSESSMENT — PAIN SCALES - GENERAL
PAINLEVEL_OUTOF10: 0
PAINLEVEL_OUTOF10: 0

## 2021-01-12 NOTE — ANESTHESIA PRE PROCEDURE
Department of Anesthesiology  Preprocedure Note       Name:  Marcella Gaming   Age:  63 y.o.  :  1955                                          MRN:  [id number]         Date:  2021      Surgeon: Jd Laurent):  Elizabeth Mackey MD    Procedure: Procedure(s):  COLONOSCOPY POLYPECTOMY HOT SNARE, COLD SNARE POLPECTOMY    Medications prior to admission:   Prior to Admission medications    Medication Sig Start Date End Date Taking? Authorizing Provider   tamsulosin (FLOMAX) 0.4 MG capsule Take 1 capsule by mouth daily 20  Yes Venessa David MD   polyethylene glycol [facility]) 17 GM/SCOOP powder Use as directed by following your patient instructions given by office.  20  Yes Elizabeth Mackey MD   bisacodyl (DULCOLAX) 5 MG EC tablet TAKE 4 TABS AS DIRECTED BY PHYSICIAN OFFICE 20  Yes Elizabeth Mackey MD   sucralfate (CARAFATE) 1 GM tablet Take 1 tablet by mouth 4 times daily 20  Yes Irineo Kim MD   potassium chloride (KLOR-CON M) 20 MEQ extended release tablet Take 1 tablet by mouth daily 12/3/20  Yes Venessa David MD   isosorbide mononitrate (IMDUR) 30 MG extended release tablet Take 1 tablet by mouth daily 20  Yes LUDY Ortega CNP   acetaminophen (TYLENOL) 500 MG tablet Take 1 tablet by mouth every 12 hours as needed for Pain 20  Yes Venessa David MD   pantoprazole (PROTONIX) 40 MG tablet Take 1 tablet by mouth 2 times daily (before meals) 20  Yes Venessa David MD   atorvastatin (LIPITOR) 40 MG tablet Take 1 tablet by mouth nightly 20  Yes Venessa David MD   losartan (COZAAR) 50 MG tablet Take 1 tablet by mouth daily  Patient taking differently: Take 50 mg by mouth daily New script 20  Yes Venessa David MD   furosemide (LASIX) 40 MG tablet Take 1.5 tablets by mouth daily Take 1 tab in the morning daily and half a tablet in the evening  Patient taking differently: Take 60 mg by mouth daily Take 1 tab in the morning daily and half a tablet in the evening     taking 60mg bid 11/23/20  Yes Wing Hernandez MD   bisacodyl (DULCOLAX) 5 MG EC tablet TAKE 4 TABS AS DIRECTED BY PHYSICIAN OFFICE 10/7/20  Yes Eloise Acosta MD   hydrocortisone (ANUSOL-HC) 2.5 % CREA rectal cream Apply at bedtime prn for hemorrhoids 10/1/20  Yes Eloise Acosta MD   diclofenac sodium (VOLTAREN) 1 % GEL APPLY 2 G TOPICALLY 4 TIMES DAILY AS NEEDED FOR PAIN 9/25/20  Yes Wing Hernandez MD   spironolactone (ALDACTONE) 25 MG tablet Take 1 tablet by mouth daily 9/21/20  Yes Sinan Singh MD   polyethylene glycol (MIRALAX) 17 GM/SCOOP POWD powder 250g bottle. For colonoscopy prep. Follow per instructions from clinic. 7/29/20  Yes Lorna Fang DO   ammonium lactate (AMLACTIN) 12 % cream Apply topically daily Apply topically as needed. Yes Historical Provider, MD   clopidogrel (PLAVIX) 75 MG tablet Take 1 tablet by mouth daily 12/30/20   Wing Hernandez MD   magnesium citrate solution Take 296 mLs by mouth once for 1 dose 12/22/20 12/22/20  Paco Crain MD   nitroGLYCERIN (NITROSTAT) 0.4 MG SL tablet Place 1 tablet under the tongue every 5 minutes as needed for Chest pain up to max of 3 total doses. If no relief after 1 dose, call 911. 11/23/20   Wing Hernandez MD   magnesium citrate solution Take 296 mLs by mouth once for 1 dose 10/7/20 10/7/20  Eloise Acosta MD   Elastic Bandages & Supports (JOBST KNEE HIGH COMPRESSION SM) MISC Wear q day. Remove q hs 8/17/20   Yasmin ELLY ColónN - CNP   Elastic Bandages & Supports (JOBST KNEE HIGH COMPRESSION SM) MISC 2 each by Does not apply route daily Wear q day. Remove q hs.    Diagnosis: Chronic venous insufficiency 8/12/20   Yasmin Colón APRN - CNP   Elastic Bandages & Supports (JOBST OPAQUE KNEE 20-30MMHG XL) MISC Dispense two pair closed toe knee high 20 to 30 mmHg Jobst compression stockings 6/22/20   Wing Hernandez MD   white petrolatum GEL Apply 28 g topically as needed for Dry Lips 2/14/20   Lester Mares, DO       Current medications:    Current Facility-Administered Medications   Medication Dose Route Frequency Provider Last Rate Last Admin    0.9 % sodium chloride infusion   Intravenous Continuous Milo Lockhart MD        lactated ringers infusion   Intravenous Continuous Milo Lockhart  mL/hr at 01/12/21 1016 New Bag at 01/12/21 1016    sodium chloride flush 0.9 % injection 10 mL  10 mL Intravenous 2 times per day Milo Lockhart MD        sodium chloride flush 0.9 % injection 10 mL  10 mL Intravenous PRN Milo Lockhart MD        lidocaine PF 1 % injection 1 mL  1 mL Intradermal Once PRN Milo Lockhart MD         Current Outpatient Medications   Medication Sig Dispense Refill    tamsulosin (FLOMAX) 0.4 MG capsule Take 1 capsule by mouth daily 30 capsule 0    polyethylene glycol (GLYCOLAX) 17 GM/SCOOP powder Use as directed by following your patient instructions given by office.  238 g 0    bisacodyl (DULCOLAX) 5 MG EC tablet TAKE 4 TABS AS DIRECTED BY PHYSICIAN OFFICE 4 tablet 0    sucralfate (CARAFATE) 1 GM tablet Take 1 tablet by mouth 4 times daily 120 tablet 3    potassium chloride (KLOR-CON M) 20 MEQ extended release tablet Take 1 tablet by mouth daily 30 tablet 1    isosorbide mononitrate (IMDUR) 30 MG extended release tablet Take 1 tablet by mouth daily 90 tablet 0    acetaminophen (TYLENOL) 500 MG tablet Take 1 tablet by mouth every 12 hours as needed for Pain 120 tablet 1    pantoprazole (PROTONIX) 40 MG tablet Take 1 tablet by mouth 2 times daily (before meals) 60 tablet 5    atorvastatin (LIPITOR) 40 MG tablet Take 1 tablet by mouth nightly 30 tablet 0    losartan (COZAAR) 50 MG tablet Take 1 tablet by mouth daily (Patient taking differently: Take 50 mg by mouth daily New script) 90 tablet 1    furosemide (LASIX) 40 MG tablet Take 1.5 tablets by mouth daily Take 1 tab in the morning daily and half a tablet in the evening (Patient taking differently: Take 60 mg by mouth daily Take 1 tab in the morning daily and half a tablet in the evening     taking 60mg bid) 90 tablet 2    bisacodyl (DULCOLAX) 5 MG EC tablet TAKE 4 TABS AS DIRECTED BY PHYSICIAN OFFICE 4 tablet 0    hydrocortisone (ANUSOL-HC) 2.5 % CREA rectal cream Apply at bedtime prn for hemorrhoids 1 Tube 2    diclofenac sodium (VOLTAREN) 1 % GEL APPLY 2 G TOPICALLY 4 TIMES DAILY AS NEEDED FOR PAIN 200 g 1    spironolactone (ALDACTONE) 25 MG tablet Take 1 tablet by mouth daily 30 tablet 3    polyethylene glycol (MIRALAX) 17 GM/SCOOP POWD powder 250g bottle. For colonoscopy prep. Follow per instructions from clinic. 1 Bottle 0    ammonium lactate (AMLACTIN) 12 % cream Apply topically daily Apply topically as needed.  clopidogrel (PLAVIX) 75 MG tablet Take 1 tablet by mouth daily 30 tablet 0    magnesium citrate solution Take 296 mLs by mouth once for 1 dose 296 mL 0    nitroGLYCERIN (NITROSTAT) 0.4 MG SL tablet Place 1 tablet under the tongue every 5 minutes as needed for Chest pain up to max of 3 total doses. If no relief after 1 dose, call 911. 25 tablet 1    magnesium citrate solution Take 296 mLs by mouth once for 1 dose 296 mL 0    Elastic Bandages & Supports (JOBST KNEE HIGH COMPRESSION SM) MISC Wear q day. Remove q hs 2 each 0    Elastic Bandages & Supports (JOBST KNEE HIGH COMPRESSION SM) MISC 2 each by Does not apply route daily Wear q day. Remove q hs.    Diagnosis: Chronic venous insufficiency 2 each 0    Elastic Bandages & Supports (JOBST OPAQUE KNEE 20-30MMHG XL) MISC Dispense two pair closed toe knee high 20 to 30 mmHg Jobst compression stockings 2 each 0    white petrolatum GEL Apply 28 g topically as needed for Dry Lips 106 g 0       Allergies:  No Known Allergies    Problem List:    Patient Active Problem List   Diagnosis Code    Atrial flutter (HCC) I48.92    Sleep-related breathing disorder G47.30    Hypokalemia E87.6    Atrial fibrillation (HCC) I48.91    Ischemic cardiomyopathy I25.5    Combined systolic and diastolic congestive heart failure (HCC) I50.40    Acute cystitis without hematuria N30.00    Hx of CABG Z95.1    Heart failure, systolic and diastolic, chronic (HCC) G50.66    Bilateral hand numbness R20.0    Right thigh pain M79.651    Left leg weakness R29.898    Coronary artery disease I25.10    Chronic cough R05    Gastroesophageal reflux disease without esophagitis K21.9    Overflow incontinence of urine N39.490    Polyp of colon K63.5       Past Medical History:        Diagnosis Date    Atrial fibrillation ([facility] [address] 10/2019    Dr. Reginald Wiley    BPH (benign prostatic hyperplasia)     Cellulitis     Cervical disc disease     CHF (congestive heart failure) ([facility] [address]     Coronary artery disease 10/2019    CABG    CPAP (continuous positive airway pressure) dependence     GERD (gastroesophageal reflux disease)     History of incarceration     released 2019    Hyperlipidemia     Hypertension     Myocardial infarct ([facility] [address]     Obesity     Sleep apnea     C-pap       Past Surgical History:        Procedure Laterality Date    ABDOMINAL EXPLORATION SURGERY      CARDIAC SURGERY      CARDIOVERSION  10/16/2019    CARPAL TUNNEL RELEASE Bilateral 2008    CERVICAL SPINE SURGERY      2-5    COLONOSCOPY  10/29/2020     WITH BIOPSY, COLONOSCOPY SUBMUCOSAL/BOTOX INJECTION,  COLONOSCOPY POLYPECTOMY SNARE/COLD BIOPSY     COLONOSCOPY N/A 10/29/2020    COLONOSCOPY WITH BIOPSY performed by Quoc Kilgore MD at [place] [address]  10/29/2020    COLONOSCOPY SUBMUCOSAL/BOTOX INJECTION performed by Quoc Kilgore MD at [place] [address]  10/29/2020    COLONOSCOPY POLYPECTOMY SNARE/COLD BIOPSY performed by Quoc Kilgore MD at [place] [address] N/A 10/21/2019    CABG CORONARY ARTERY BYPASS GRAFT X1, LIMA-LAD, BROWN 4 MAZE PROCEDURE, 50MM ATRICURE ATRIAL CLIP RIGID INTERNAL FIXATION PLATES X 3   SCREWS X 13 performed by Christel Fu MD at [address] [place] [address] hours.    Coags:   Lab Results   Component Value Date    PROTIME 11.2 01/22/2020    INR 1.1 01/22/2020    APTT 29.9 01/22/2020       HCG (If Applicable): No results found for: PREGTESTUR, PREGSERUM, HCG, HCGQUANT     ABGs: No results found for: PHART, PO2ART, ECX4BYS, RJB3COO, BEART, L9DHJJPA     Type & Screen (If Applicable):  No results found for: LABABO, LABRH    Drug/Infectious Status (If Applicable):  Lab Results   Component Value Date    HEPCAB NONREACTIVE 09/22/2020       COVID-19 Screening (If Applicable):   Lab Results   Component Value Date    COVID19 Not Detected 01/08/2021         Anesthesia Evaluation  Patient summary reviewed and Nursing notes reviewed no history of anesthetic complications:   Airway: Mallampati: II  TM distance: >3 FB   Neck ROM: full  Mouth opening: > = 3 FB Dental:          Pulmonary:normal exam    (+) sleep apnea:                             Cardiovascular:  Exercise tolerance: no interval change,   (+) hypertension:, past MI:, CAD:, CABG/stent:, CHF:,           Rate: normal                    Neuro/Psych:               GI/Hepatic/Renal:   (+) GERD:,           Endo/Other:                     Abdominal:           Vascular:                                        Anesthesia Plan      MAC and general     ASA 3       Induction: intravenous. Anesthetic plan and risks discussed with patient. Plan discussed with CRNA.     Attending anesthesiologist reviewed and agrees with Pre Eval content              Obinna Jackson DO   1/12/2021

## 2021-01-12 NOTE — H&P
2008    CERVICAL SPINE SURGERY      2-5    COLONOSCOPY  10/29/2020     WITH BIOPSY, COLONOSCOPY SUBMUCOSAL/BOTOX INJECTION,  COLONOSCOPY POLYPECTOMY SNARE/COLD BIOPSY     COLONOSCOPY N/A 10/29/2020    COLONOSCOPY WITH BIOPSY performed by Shalom Faulkner MD at [address]  10/29/2020    COLONOSCOPY SUBMUCOSAL/BOTOX INJECTION performed by Shalom Faulkner MD at [address]  10/29/2020    COLONOSCOPY POLYPECTOMY SNARE/COLD BIOPSY performed by Shalom Faulkner MD at [place] [address] N/A 10/21/2019    CABG CORONARY ARTERY BYPASS GRAFT X1, LIMA-LAD, BROWN 4 MAZE PROCEDURE, 50MM ATRICURE ATRIAL CLIP RIGID INTERNAL FIXATION PLATES X 3   SCREWS X 13 performed by Farhad Miner MD at [address] [place] [address]      Left leg    TRANSESOPHAGEAL ECHOCARDIOGRAM  10/16/2019        Medications Prior to Admission:     Prior to Admission medications    Medication Sig Start Date End Date Taking? Authorizing Provider   tamsulosin (FLOMAX) 0.4 MG capsule Take 1 capsule by mouth daily 12/30/20  Yes Fahad Hayes MD   polyethylene glycol [facility]) 17 GM/SCOOP powder Use as directed by following your patient instructions given by office.  12/22/20  Yes Pao Pulido MD   bisacodyl (DULCOLAX) 5 MG EC tablet TAKE 4 TABS AS DIRECTED BY PHYSICIAN OFFICE 12/22/20  Yes Pao Pulido MD   sucralfate (CARAFATE) 1 GM tablet Take 1 tablet by mouth 4 times daily 12/11/20  Yes Shalom Faulkner MD   potassium chloride (KLOR-CON M) 20 MEQ extended release tablet Take 1 tablet by mouth daily 12/3/20  Yes Fahad Hayes MD   isosorbide mononitrate (IMDUR) 30 MG extended release tablet Take 1 tablet by mouth daily 11/24/20  Yes LUDY Martinez CNP   acetaminophen (TYLENOL) 500 MG tablet Take 1 tablet by mouth every 12 hours as needed for Pain 11/23/20  Yes Fahad Hayes MD   pantoprazole (PROTONIX) 40 MG tablet Take 1 tablet by mouth 2 times daily (before meals) 11/23/20  Yes Esperanza Estrada MD   atorvastatin (LIPITOR) 40 MG tablet Take 1 tablet by mouth nightly 11/23/20  Yes Esperanza Estrada MD   losartan (COZAAR) 50 MG tablet Take 1 tablet by mouth daily  Patient taking differently: Take 50 mg by mouth daily New script 11/23/20  Yes Esperanza Estrada MD   furosemide (LASIX) 40 MG tablet Take 1.5 tablets by mouth daily Take 1 tab in the morning daily and half a tablet in the evening  Patient taking differently: Take 60 mg by mouth daily Take 1 tab in the morning daily and half a tablet in the evening     taking 60mg bid 11/23/20  Yes Esperanza Estrada MD   bisacodyl (DULCOLAX) 5 MG EC tablet TAKE 4 TABS AS DIRECTED BY PHYSICIAN OFFICE 10/7/20  Yes Vince Espinoza MD   hydrocortisone (ANUSOL-HC) 2.5 % CREA rectal cream Apply at bedtime prn for hemorrhoids 10/1/20  Yes Vince Espinoza MD   diclofenac sodium (VOLTAREN) 1 % GEL APPLY 2 G TOPICALLY 4 TIMES DAILY AS NEEDED FOR PAIN 9/25/20  Yes Esperanza Estrada MD   spironolactone (ALDACTONE) 25 MG tablet Take 1 tablet by mouth daily 9/21/20  Yes Go Langston MD   polyethylene glycol (MIRALAX) 17 GM/SCOOP POWD powder 250g bottle. For colonoscopy prep. Follow per instructions from clinic. 7/29/20  Yes Zenaida Cook DO   ammonium lactate (AMLACTIN) 12 % cream Apply topically daily Apply topically as needed. Yes Historical Provider, MD   clopidogrel (PLAVIX) 75 MG tablet Take 1 tablet by mouth daily 12/30/20   Esperanza Estrada MD   magnesium citrate solution Take 296 mLs by mouth once for 1 dose 12/22/20 12/22/20  Kaley Cruz MD   nitroGLYCERIN (NITROSTAT) 0.4 MG SL tablet Place 1 tablet under the tongue every 5 minutes as needed for Chest pain up to max of 3 total doses. If no relief after 1 dose, call 911. 11/23/20   Esperanza Estrada MD   magnesium citrate solution Take 296 mLs by mouth once for 1 dose 10/7/20 10/7/20  Vince Espinoza MD   Elastic Bandages & Supports (JOBST KNEE HIGH COMPRESSION SM) MISC Wear q day.  Remove q hs 8/17/20   Marli Bland, headaches, dizziness, lightheadedness, numbness, pain, tingling extremities  BEHAVIOR/PSYCH:  negative for depression, anxiety    Physical Exam:   BP (!) 146/75   Pulse 71   Temp 98 °F (36.7 °C) (Oral)   Resp 20   Ht 5' 7\" (1.702 m)   Wt (!) 304 lb 3.8 oz (138 kg)   SpO2 98%   BMI 47.65 kg/m²  No results for input(s): POCGLU in the last 72 hours. General Appearance: morbidly obese male  alert and in no acute distress  Mental status: oriented to person, place, and time with normal affect  Head:  normocephalic, atraumatic. Eye: no icterus, redness, pupils equal and reactive, extraocular eye movements intact, conjunctiva clear  Ear: normal external ear, no discharge, hearing intact  Nose:  no drainage noted  Mouth: mucous membranes moist  Neck: thick, decreased airway, no carotid bruits, thyroid not palpable  Lungs: Bilateral equal air entry, diminished breath sounds, no wheezing, rales or rhonchi, normal effort  Cardiovascular: HR 71 regular rate and rhythm at present (past Hx A Fib) , no murmur, gallop, rub. Scar consistent with CABG   Abdomen: Rotund, nontender, nondistended, normal bowel sounds  Neurologic: There are no new focal motor or sensory deficits, normal muscle tone and bulk, no abnormal sensation, normal speech, cranial nerves II through XII grossly intact  Skin: Skin redness w/o warmth on left lower leg No gross lesions, rashes, bruising or bleeding on exposed skin area  Extremities: bilateral lower extremity 2+ edema > on left. peripheral pulses decreased 1+ and palpable, no calf pain with palpation  Psych: normal affect     Investigations:      Laboratory Testing:  No results found for this or any previous visit (from the past 24 hour(s)). Recent Labs     12/22/20  0859      K 3.7      CO2 18*   BUN 16   CREATININE 0.75   GLUCOSE 135*       Recent Labs     01/08/21  2139   COVID19       Not Detected           Imaging/Diagnostics:    No results found. Diagnosis:      1. Hx adenomatous polyps     Plans:     1.  Diagnostic colonoscopy       LUYD Denny - CNP  1/12/2021  10:33 AM

## 2021-01-12 NOTE — OP NOTE
[facility]     PROCEDURE DATE: 01/12/21    REFERRING PHYSICIAN: No ref. provider found     PRIMARY CARE PROVIDER: Yasmine Maya MD    ATTENDING PHYSICIAN: Jakub Brasher MD     HISTORY: Mr. Kandace Loera is a 63 y.o. male who presents to the [place] [address] Endoscopy unit for colonoscopy. The patient's clinical history is remarkable for flat colon polyp. He is currently medically stable and appropriate for the planned procedure. PREOPERATIVE DIAGNOSIS: Flat colon polyp. PROCEDURES:   Transanal Colonoscopy with polypectomy (snare cautery). Cold snare polypectomy. POSTPROCEDURE DIAGNOSIS:  2 polyps removed    SPECIMENS: polyps    MEDICATIONS:     MAC per anesthesia    EBL: minimal    INSTRUMENT: Olympus PCF-H190 AL flexible Colonoscope. PREPARATION: The nature and character of the procedure as well as risks, benefits, and alternatives were discussed with the patient and informed consent was obtained. Complications were said to include, but were not limited to: medication allergy, medication reaction, cardiovascular and respiratory problems, bleeding, perforation, infection, and/or missed diagnosis. Following arrival in the endoscopy room, the patient was placed in the left lateral decubitus position and final time-out accomplished in the presence of the nursing staff. Baseline vital signs were obtained and reviewed, and IV sedation was subsequently initiated. FINDINGS: Rectal examination demonstrated no significant visible external abnormality and digital palpation was unremarkable. Following adequate conscious sedation the colonoscope was introduced and advanced under direct visualization to the cecum, which was identified by the ileocecal valve and appendiceal orifice. The bowel preparation was felt to be adequate. This included small amounts of thick, thin and watery stool that was able to be adequately irrigated and aspirated.  Cecal intubation time was 3

## 2021-01-13 LAB — SURGICAL PATHOLOGY REPORT: NORMAL

## 2021-01-19 ENCOUNTER — OFFICE VISIT (OUTPATIENT)
Dept: FAMILY MEDICINE CLINIC | Age: 66
End: 2021-01-19
Payer: MEDICAID

## 2021-01-19 VITALS
HEART RATE: 83 BPM | SYSTOLIC BLOOD PRESSURE: 109 MMHG | WEIGHT: 314 LBS | BODY MASS INDEX: 49.18 KG/M2 | OXYGEN SATURATION: 98 % | DIASTOLIC BLOOD PRESSURE: 63 MMHG

## 2021-01-19 DIAGNOSIS — N39.43 POST-VOID DRIBBLING: ICD-10-CM

## 2021-01-19 DIAGNOSIS — R09.82 POST-NASAL DRIP: ICD-10-CM

## 2021-01-19 DIAGNOSIS — R05.3 CHRONIC COUGH: Primary | ICD-10-CM

## 2021-01-19 PROCEDURE — G8417 CALC BMI ABV UP PARAM F/U: HCPCS

## 2021-01-19 PROCEDURE — 3017F COLORECTAL CA SCREEN DOC REV: CPT

## 2021-01-19 PROCEDURE — 99213 OFFICE O/P EST LOW 20 MIN: CPT

## 2021-01-19 PROCEDURE — G8427 DOCREV CUR MEDS BY ELIG CLIN: HCPCS

## 2021-01-19 PROCEDURE — G8482 FLU IMMUNIZE ORDER/ADMIN: HCPCS

## 2021-01-19 PROCEDURE — 1036F TOBACCO NON-USER: CPT

## 2021-01-19 RX ORDER — GUAIFENESIN 600 MG/1
600 TABLET, EXTENDED RELEASE ORAL 2 TIMES DAILY
Qty: 30 TABLET | Refills: 0 | Status: SHIPPED | OUTPATIENT
Start: 2021-01-19 | End: 2021-02-03

## 2021-01-19 RX ORDER — LORATADINE 10 MG/1
10 TABLET ORAL DAILY
Qty: 30 TABLET | Refills: 1 | Status: SHIPPED | OUTPATIENT
Start: 2021-01-19 | End: 2021-03-08

## 2021-01-19 ASSESSMENT — ENCOUNTER SYMPTOMS
SHORTNESS OF BREATH: 1
DIARRHEA: 0
ABDOMINAL DISTENTION: 0
COUGH: 1
VOMITING: 0
WHEEZING: 0
NAUSEA: 0
APNEA: 0
PHOTOPHOBIA: 0
COLOR CHANGE: 0
CONSTIPATION: 0

## 2021-01-19 ASSESSMENT — PATIENT HEALTH QUESTIONNAIRE - PHQ9
SUM OF ALL RESPONSES TO PHQ9 QUESTIONS 1 & 2: 0
SUM OF ALL RESPONSES TO PHQ QUESTIONS 1-9: 0
2. FEELING DOWN, DEPRESSED OR HOPELESS: 0
SUM OF ALL RESPONSES TO PHQ QUESTIONS 1-9: 0

## 2021-01-19 NOTE — PROGRESS NOTES
Attending Physician Statement  I have discussed the care of Karissa Taylor, including pertinent history and exam findings,  with the resident. I have reviewed the key elements of all parts of the encounter with the resident. I agree with the assessment, plan and orders as documented by the resident.   (GE Modifier)    Zak Desai

## 2021-01-19 NOTE — PROGRESS NOTES
Subjective: Ute Sanchez is a 63 y.o. male with  has a past medical history of Atrial fibrillation ([address] BPH (benign prostatic hyperplasia), Cellulitis, Cervical disc disease, CHF (congestive heart failure) ([address] Coronary artery disease, CPAP (continuous positive airway pressure) dependence, GERD (gastroesophageal reflux disease), History of incarceration, Hyperlipidemia, Hypertension, Myocardial infarct ([address] Obesity, and Sleep apnea. Presented to the office today for:  Chief Complaint   Patient presents with    Cough     follow up - not better, getting worse     Other     urinating incont. (leakage) - getting worse     Shortness of Breath     feels more often, getting worse       HPI    60-year-old male came to the clinic for chronic cough. Patient states the cough is getting worse and is not productive. Her his cough initially started in 2019 after cardiac surgery. Patient states that he has seen ENT, pulmonology and GI. Patient recently had a colonoscopy and also had his PPI dose increased per GI and was started on Carafate as well. Patient has tried Tessalon without much relief but was not able to get Mucinex. Patient tried Flonase without much relief as well. Patient has some shortness of breath currently and will be seeing cardiologist in the next few weeks he does follow-up with CHF clinic and had his Lasix dose increased to 60 mg twice daily. Patient is also complaining of urinary incontinence and mentions that he takes flomax, I will refer to Urology. Patient states that he had a PFT done but does not know the results I will look for the results for him and update him. His other problems include Numbness in b/l hands worse, He mentions that his Leg pain is better but he is having transportation issues.   GI increased PPI dose and added carafate  Never got Mucinex  Recently got colonoscopy again    Review of Systems   Constitutional: Negative for activity change, appetite change, fatigue, fever and unexpected weight change. HENT: Negative for congestion. Eyes: Negative for photophobia and visual disturbance. Respiratory: Positive for cough and shortness of breath. Negative for apnea and wheezing. Cardiovascular: Negative for chest pain, palpitations and leg swelling. Gastrointestinal: Negative for abdominal distention, constipation, diarrhea, nausea and vomiting. Endocrine: Negative for polyuria. Genitourinary: Negative for dysuria and urgency. Urinary incontinence    Skin: Negative for color change, pallor, rash and wound. Neurological: Positive for numbness. Negative for dizziness, tremors, weakness, light-headedness and headaches. Bilateral hand pain   Psychiatric/Behavioral: Negative for agitation, behavioral problems, confusion and decreased concentration. The patient has a   Family History   Problem Relation Age of Onset    Alcohol Abuse Maternal Uncle     Heart Attack Maternal Uncle        Objective:    /63 (Site: Left Lower Arm, Position: Sitting, Cuff Size: Large Adult)   Pulse 83   Wt (!) 314 lb (142.4 kg)   SpO2 98%   BMI 49.18 kg/m²    BP Readings from Last 3 Encounters:   01/19/21 109/63   01/12/21 (!) 141/76   01/12/21 (!) 160/86       Physical Exam  Constitutional:       General: He is not in acute distress. Appearance: Normal appearance. He is obese. He is not ill-appearing, toxic-appearing or diaphoretic. HENT:      Head: Normocephalic and atraumatic. Nose: Nose normal. No congestion. Eyes:      General: No scleral icterus. Right eye: No discharge. Left eye: No discharge. Extraocular Movements: Extraocular movements intact. Conjunctiva/sclera: Conjunctivae normal.      Pupils: Pupils are equal, round, and reactive to light. Cardiovascular:      Rate and Rhythm: Normal rate and regular rhythm. Pulses: Normal pulses. Heart sounds: No murmur.    Pulmonary:      Effort:

## 2021-01-19 NOTE — PROGRESS NOTES
Visit Information    Have you changed or started any medications since your last visit including any over-the-counter medicines, vitamins, or herbal medicines? no   Have you stopped taking any of your medications? Is so, why? -  no  Are you having any side effects from any of your medications? - no    Have you seen any other physician or provider since your last visit?  no   Have you had any other diagnostic tests since your last visit?  no   Have you been seen in the emergency room and/or had an admission in a hospital since we last saw you?  no   Have you had your routine dental cleaning in the past 6 months?  no     Do you have an active MyChart account? If no, what is the barrier?   No:    Patient Care Team:  Je Perez MD as PCP - General (Family Medicine)  Yuliya Khan MD as Consulting Physician (Gastroenterology)    Medical History Review  Past Medical, Family, and Social History reviewed and does contribute to the patient presenting condition    Health Maintenance   Topic Date Due    Shingles Vaccine (2 of 2) 01/12/2021    Lipid screen  05/26/2021    Potassium monitoring  12/22/2021    Creatinine monitoring  12/22/2021    Diabetes screen  12/10/2022    DTaP/Tdap/Td vaccine (2 - Td) 08/20/2030    Colon cancer screen colonoscopy  01/12/2031    Flu vaccine  Completed    Pneumococcal 0-64 years Vaccine  Completed    Hepatitis C screen  Completed    HIV screen  Completed    Hepatitis A vaccine  Aged Out    Hepatitis B vaccine  Aged Out    Hib vaccine  Aged Out    Meningococcal (ACWY) vaccine  Aged Out

## 2021-01-20 ENCOUNTER — HOSPITAL ENCOUNTER (OUTPATIENT)
Age: 66
Discharge: HOME | End: 2021-01-22
Payer: MEDICAID

## 2021-01-20 ENCOUNTER — HOSPITAL ENCOUNTER (OUTPATIENT)
Dept: OTHER | Age: 66
Discharge: HOME | End: 2021-01-20
Payer: MEDICAID

## 2021-01-20 ENCOUNTER — HOSPITAL ENCOUNTER (OUTPATIENT)
Dept: GENERAL RADIOLOGY | Age: 66
Discharge: HOME | End: 2021-01-22
Payer: MEDICAID

## 2021-01-20 VITALS
DIASTOLIC BLOOD PRESSURE: 60 MMHG | WEIGHT: 315 LBS | HEART RATE: 78 BPM | SYSTOLIC BLOOD PRESSURE: 104 MMHG | BODY MASS INDEX: 49.34 KG/M2 | OXYGEN SATURATION: 98 % | RESPIRATION RATE: 20 BRPM

## 2021-01-20 DIAGNOSIS — R05.3 CHRONIC COUGH: ICD-10-CM

## 2021-01-20 PROCEDURE — 99212 OFFICE O/P EST SF 10 MIN: CPT

## 2021-01-20 PROCEDURE — 71046 X-RAY EXAM CHEST 2 VIEWS: CPT

## 2021-01-20 ASSESSMENT — PAIN DESCRIPTION - PAIN TYPE: TYPE: CHRONIC PAIN

## 2021-01-20 ASSESSMENT — PAIN DESCRIPTION - ORIENTATION: ORIENTATION: LOWER

## 2021-01-20 ASSESSMENT — PAIN SCALES - GENERAL: PAINLEVEL_OUTOF10: 3

## 2021-01-20 NOTE — PROGRESS NOTES
Date:  2021  Time:  10:37 AM    CHF Clinic at [address] [place] [address]    Office: [phone] Fax: [phone]    Re:  Mariajose Adhikari   Patient : 1955    Vital Signs: /60   Pulse 78   Resp 20   Wt (!) 315 lb (142.9 kg)   SpO2 98%   BMI 49.34 kg/m²                       O2 Device: None (Room air)                           No results for input(s): CBC, HGB, HCT, WBC, PLATELET, NA, K, CL, CO2, BUN, CREATININE, GLUCOSE, BNP, INR in the last 72 hours. Respiratory:    Assessment  Charting Type: Reassessment    Breath Sounds  Right Upper Lobe: Clear  Right Middle Lobe: Clear  Right Lower Lobe: Clear  Left Upper Lobe: Clear  Left Lower Lobe: Clear    Cough/Sputum  Cough: Productive  Frequency: Frequent  Sputum Amount: Moderate  Sputum Color: Clear       Peripheral Vascular  RLE Edema: +2, Pitting  LLE Edema: +2, Pitting    Complaints: Continued productive cough    Comment : Patient here ambulatory. Weight decreased one pound in one month. No new or acute s/s CHF. Legs continue with edema, but not increased. Continues with his cough and is having a CXR today per PCP order. Reminded of low salt diet and fluid limits. States compliance with medications. States his lasix 60 mg 2x day. Has cardiology appt 2/15/2021. Next visit here 6 weeks 3/3/2021.     Electronically signed by Sondra Morales RN on 2021 at 10:37 AM

## 2021-01-25 DIAGNOSIS — I50.42 HEART FAILURE, SYSTOLIC AND DIASTOLIC, CHRONIC (HCC): ICD-10-CM

## 2021-01-25 NOTE — TELEPHONE ENCOUNTER
Dr. Johnnie Centeno, Patient called today inquiring about his compression stockings and wedge. I see the compression stocking order which I will fax to [facility] care today if you could just sign this pended order for the wedge so we can address this as well. [facility]SE [employer]. [address]     Patient updated on status.

## 2021-01-25 NOTE — TELEPHONE ENCOUNTER
Last visit:  11/19/20  Last Med refill: 114/23/20  Does patient have enough medication for 72 hours: No:      Next Visit Date: 02/16/21  Future Appointments   Date Time Provider Taj Arvizui   2/12/2021  9:10 AM SCHEDULE, [facility] UROLOGY [facility]   2/16/2021  9:45 AM [facility]   3/3/2021 10:00 AM STV CHF CLINIC RM 1 [facility] CHF CLI [facility]   3/15/2021  3:00 PM Nighat Blum MD [address] Maintenance   Topic Date Due    Shingles Vaccine (2 of 2) 01/12/2021    Lipid screen  05/26/2021    Potassium monitoring  12/22/2021    Creatinine monitoring  12/22/2021    Diabetes screen  12/10/2022    DTaP/Tdap/Td vaccine (2 - Td) 08/20/2030    Colon cancer screen colonoscopy  01/12/2031    Flu vaccine  Completed    Pneumococcal 0-64 years Vaccine  Completed    Hepatitis C screen  Completed    HIV screen  Completed    Hepatitis A vaccine  Aged Out    Hepatitis B vaccine  Aged Out    Hib vaccine  Aged Out    Meningococcal (ACWY) vaccine  Aged Out       Hemoglobin A1C (%)   Date Value   12/10/2019 5.6   10/18/2019 5.8             ( goal A1C is < 7)   No results found for: LABMICR  LDL Cholesterol (mg/dL)   Date Value   05/26/2020 45   01/24/2020 37       (goal LDL is <100)   AST (U/L)   Date Value   05/26/2020 19     ALT (U/L)   Date Value   05/26/2020 17     BUN (mg/dL)   Date Value   12/22/2020 16     BP Readings from Last 3 Encounters:   01/20/21 104/60   01/19/21 109/63   01/12/21 (!) 141/76          (goal 120/80)    All Future Testing planned in [facility]  Lab Frequency Next Occurrence   Basic Metabolic Panel Once 42/28/3097   COLONOSCOPY (Diagnostic) Once 08/25/4693   Basic Metabolic Panel Once 67/74/1498   COLONOSCOPY (Diagnostic) Once 01/11/2021   COVID-19 Once 01/05/2021               Patient Active Problem List:     Atrial flutter (HCC)     Sleep-related breathing disorder     Hypokalemia     Atrial fibrillation (HCC)     Ischemic cardiomyopathy Combined systolic and diastolic congestive heart failure (HCC)     Acute cystitis without hematuria     Hx of CABG     Heart failure, systolic and diastolic, chronic (HCC)     Bilateral hand numbness     Right thigh pain     Left leg weakness     Coronary artery disease     Chronic cough     Gastroesophageal reflux disease without esophagitis     Overflow incontinence of urine     Polyp of colon     Post-void dribbling     Post-nasal drip

## 2021-01-26 DIAGNOSIS — I87.8 CHRONIC VENOUS STASIS: ICD-10-CM

## 2021-01-26 DIAGNOSIS — I50.42 HEART FAILURE, SYSTOLIC AND DIASTOLIC, CHRONIC (HCC): Primary | ICD-10-CM

## 2021-01-26 RX ORDER — ATORVASTATIN CALCIUM 40 MG/1
40 TABLET, FILM COATED ORAL NIGHTLY
Qty: 30 TABLET | Refills: 0 | Status: SHIPPED | OUTPATIENT
Start: 2021-01-26 | End: 2021-02-22

## 2021-01-29 DIAGNOSIS — I50.42 HEART FAILURE, SYSTOLIC AND DIASTOLIC, CHRONIC (HCC): Primary | ICD-10-CM

## 2021-01-29 NOTE — TELEPHONE ENCOUNTER
Scripts for the compression stockings and the wedge faxed to [facility]. Patient updated.    Can you please sign the order Dr. Britt Seal

## 2021-02-01 DIAGNOSIS — N39.490 OVERFLOW INCONTINENCE OF URINE: ICD-10-CM

## 2021-02-01 DIAGNOSIS — I25.10 CORONARY ARTERY DISEASE INVOLVING NATIVE CORONARY ARTERY OF NATIVE HEART WITHOUT ANGINA PECTORIS: ICD-10-CM

## 2021-02-01 NOTE — TELEPHONE ENCOUNTER
Dorota Request for pending medications.     Last Visit Date: 1/19/21  Next Visit Date:  Future Appointments   Date Time Provider Taj Flores   2/12/2021  9:10 AM SCHEDULE, [facility] UROLOGY [facility]   2/16/2021  9:45 AM [facility]   3/3/2021 10:00 AM STV CHF CLINIC RM 1 [facility]F [facility]   3/15/2021  3:00 PM Apollo Chow MD [address] Maintenance   Topic Date Due    Shingles Vaccine (2 of 2) 01/12/2021    Lipid screen  05/26/2021    Potassium monitoring  12/22/2021    Creatinine monitoring  12/22/2021    Diabetes screen  12/10/2022    DTaP/Tdap/Td vaccine (2 - Td) 08/20/2030    Colon cancer screen colonoscopy  01/12/2031    Flu vaccine  Completed    Pneumococcal 0-64 years Vaccine  Completed    Hepatitis C screen  Completed    HIV screen  Completed    Hepatitis A vaccine  Aged Out    Hepatitis B vaccine  Aged Out    Hib vaccine  Aged Out    Meningococcal (ACWY) vaccine  Aged Out       Hemoglobin A1C (%)   Date Value   12/10/2019 5.6   10/18/2019 5.8             ( goal A1C is < 7)   No results found for: LABMICR  LDL Cholesterol (mg/dL)   Date Value   05/26/2020 45       (goal LDL is <100)   AST (U/L)   Date Value   05/26/2020 19     ALT (U/L)   Date Value   05/26/2020 17     BUN (mg/dL)   Date Value   12/22/2020 16     BP Readings from Last 3 Encounters:   01/20/21 104/60   01/19/21 109/63   01/12/21 (!) 141/76          (goal 120/80)    All Future Testing planned in [facility]  Lab Frequency Next Occurrence   Basic Metabolic Panel Once 77/96/2660   COLONOSCOPY (Diagnostic) Once 63/39/8552   Basic Metabolic Panel Once 91/88/0684   COLONOSCOPY (Diagnostic) Once 01/11/2021   COVID-19 Once 01/05/2021       Next Visit Date:  Future Appointments   Date Time Provider Taj Flores   2/12/2021  9:10 AM SCHEDULE, [facility]OLOGY [facility]   2/16/2021  9:45 [facility]   3/3/2021 10:00 AM STV CHF CLINIC RM 1 STVZ CHF CLI [facility]   3/15/2021  3:00 PM Ace Hook MD sv gr s [facility]OLPP         Patient Active Problem List:     Atrial flutter ([address]     Sleep-related breathing disorder     Hypokalemia     Atrial fibrillation ([address]     Ischemic cardiomyopathy     Combined systolic and diastolic congestive heart failure ([address]     Acute cystitis without hematuria     Hx of CABG     Heart failure, systolic and diastolic, chronic (HCC)     Bilateral hand numbness     Right thigh pain     Left leg weakness     Coronary artery disease     Chronic cough     Gastroesophageal reflux disease without esophagitis     Overflow incontinence of urine     Polyp of colon     Post-void dribbling     Post-nasal drip

## 2021-02-04 RX ORDER — TAMSULOSIN HYDROCHLORIDE 0.4 MG/1
0.4 CAPSULE ORAL DAILY
Qty: 30 CAPSULE | Refills: 0 | Status: SHIPPED | OUTPATIENT
Start: 2021-02-04 | End: 2021-02-12 | Stop reason: SDUPTHER

## 2021-02-04 RX ORDER — CLOPIDOGREL BISULFATE 75 MG/1
75 TABLET ORAL DAILY
Qty: 30 TABLET | Refills: 0 | Status: SHIPPED | OUTPATIENT
Start: 2021-02-04 | End: 2021-03-08

## 2021-02-08 ENCOUNTER — TELEPHONE (OUTPATIENT)
Dept: FAMILY MEDICINE CLINIC | Age: 66
End: 2021-02-08

## 2021-02-08 NOTE — TELEPHONE ENCOUNTER
PC form for transportation states he can take the bus, needs to state he cant due to his disability/condiniton. Please advise, I did put this paperwork in your mail box. Also, patient states PT is not helping as he stated at his last visit, he said is wasting all his transportion on the PT. Please advise, he does have upcoming appointment on 2/16/21.

## 2021-02-12 ENCOUNTER — OFFICE VISIT (OUTPATIENT)
Dept: UROLOGY | Age: 66
End: 2021-02-12
Payer: MEDICAID

## 2021-02-12 VITALS
HEART RATE: 92 BPM | WEIGHT: 315 LBS | BODY MASS INDEX: 49.44 KG/M2 | HEIGHT: 67 IN | DIASTOLIC BLOOD PRESSURE: 46 MMHG | SYSTOLIC BLOOD PRESSURE: 78 MMHG

## 2021-02-12 DIAGNOSIS — N39.490 OVERFLOW INCONTINENCE OF URINE: ICD-10-CM

## 2021-02-12 DIAGNOSIS — N39.43 POST-VOID DRIBBLING: Primary | ICD-10-CM

## 2021-02-12 PROCEDURE — 99204 OFFICE O/P NEW MOD 45 MIN: CPT | Performed by: UROLOGY

## 2021-02-12 PROCEDURE — 3017F COLORECTAL CA SCREEN DOC REV: CPT | Performed by: UROLOGY

## 2021-02-12 PROCEDURE — 1036F TOBACCO NON-USER: CPT | Performed by: UROLOGY

## 2021-02-12 PROCEDURE — 99202 OFFICE O/P NEW SF 15 MIN: CPT | Performed by: UROLOGY

## 2021-02-12 PROCEDURE — G8417 CALC BMI ABV UP PARAM F/U: HCPCS | Performed by: UROLOGY

## 2021-02-12 PROCEDURE — G8482 FLU IMMUNIZE ORDER/ADMIN: HCPCS | Performed by: UROLOGY

## 2021-02-12 PROCEDURE — G8427 DOCREV CUR MEDS BY ELIG CLIN: HCPCS | Performed by: UROLOGY

## 2021-02-12 RX ORDER — TAMSULOSIN HYDROCHLORIDE 0.4 MG/1
0.4 CAPSULE ORAL DAILY
Qty: 30 CAPSULE | Refills: 5 | Status: SHIPPED | OUTPATIENT
Start: 2021-02-12 | End: 2021-08-17

## 2021-02-12 NOTE — PATIENT INSTRUCTIONS
Continue to take Flomax medication. Surgery scheduler will call you with date and time for surgery and covid Testing.

## 2021-02-12 NOTE — PROGRESS NOTES
[place], [address] [facility], [place]. [address] Urology Clinic Consultation / New Patient Visit    Patient:  Heidi Felix  YOB: 1955  Date: 2/12/2021  Consult requested from Jaime Bell MD   for purpose of evaluation of LUTs with Post void dribbling. HISTORY OF PRESENT ILLNESS:   The patient is a 63 y.o. male who presents today for follow-up for the following problem(s): post void dribbling  Overall the problem(s) : are worsening. Associated Symptoms: No dysuria, gross hematuria. Pain Severity:       2/12/21  Worsening post void dribbling and urinary incontinence after CABG. He states he has worsening frequency and urgency with post void dribbling. His PVD is bad enough he needs to wear pads. He denies previous procedures on his prostate. He is currently on tamsulosin for LUTs without success improving his symptoms. He has overactivity with incontinence. Summary of old records:   (Patient's old records, notes and chart reviewed and summarized above.)    Last several PSA's:  Lab Results   Component Value Date    PSA 0.67 09/22/2020       Last total testosterone:  No results found for: TESTOSTERONE    Urinalysis today:  No results found for this visit on 02/12/21.       Last BUN and creatinine:  Lab Results   Component Value Date    BUN 16 12/22/2020     Lab Results   Component Value Date    CREATININE 0.75 12/22/2020       Imaging Reviewed during this Office Visit:   (results were independently reviewed by physician and radiology report verified)    PAST MEDICAL, FAMILY AND SOCIAL HISTORY:  Past Medical History:   Diagnosis Date    Atrial fibrillation ([address] 10/2019    Dr. Penny Joyner BPH (benign prostatic hyperplasia)     Cellulitis     Cervical disc disease     CHF (congestive heart failure) ([address]     Coronary artery disease 10/2019    CABG    CPAP (continuous positive airway pressure) dependence     GERD (gastroesophageal reflux disease)     History of incarceration     released 2019    Hyperlipidemia     Hypertension     [facility]     Obesity     Sleep apnea     C-pap     Past Surgical History:   Procedure Laterality Date    ABDOMINAL EXPLORATION SURGERY      CARDIAC SURGERY      CARDIOVERSION  10/16/2019    CARPAL TUNNEL RELEASE Bilateral 2008    CERVICAL SPINE SURGERY      2-5    COLONOSCOPY  10/29/2020     WITH BIOPSY, COLONOSCOPY SUBMUCOSAL/BOTOX INJECTION,  COLONOSCOPY POLYPECTOMY SNARE/COLD BIOPSY     COLONOSCOPY N/A 10/29/2020    COLONOSCOPY WITH BIOPSY performed by Keiry Sethi MD at [address] [place] [address]  10/29/2020    COLONOSCOPY SUBMUCOSAL/BOTOX INJECTION performed by Keiry Sethi MD at [address] [place] [address]  10/29/2020    COLONOSCOPY POLYPECTOMY SNARE/COLD BIOPSY performed by Keiry Sethi MD at [address] [place] [address] N/A 1/12/2021    COLONOSCOPY POLYPECTOMY HOT SNARE, COLD SNARE POLPECTOMY performed by Juice Mayfield MD at [address] [place] [address] N/A 10/21/2019    CABG CORONARY ARTERY BYPASS GRAFT X1, LIMA-LAD, BROWN 4 MAZE PROCEDURE, 50MM ATRICURE ATRIAL CLIP RIGID INTERNAL FIXATION PLATES X 3   SCREWS X 13 performed by Cheryl Barry MD at [address] [place] [address]      Left leg    TRANSESOPHAGEAL ECHOCARDIOGRAM  10/16/2019     Family History   Problem Relation Age of Onset    Alcohol Abuse Maternal Uncle     Heart Attack Maternal Uncle      No outpatient medications have been marked as taking for the 2/12/21 encounter (Office Visit) with Bhavin Jones MD.       Patient has no known allergies.   Social History     Tobacco Use   Smoking Status Never Smoker   Smokeless Tobacco Never Used       Social History     Substance and Sexual Activity   Alcohol Use Not Currently    Comment: stopped 1991       REVIEW OF SYSTEMS:  Constitutional: negative  Eyes: negative  Respiratory: positive for  dry cough  Cardiovascular: positive for  edema  Gastrointestinal: Normal  negative  Musculoskeletal: positive for  myalgias and joint swelling  Genitourinary: positive for frequency and penile discharge  Skin: negative   Neurological: positive for tremor and numbness  Hematological/Lymphatic: positive for easy bruising  Psychological: negative    Physical Exam:    This a 63 y.o. male   There were no vitals filed for this visit. Constitutional: Patient in no acute distress   Neuro: alert and oriented to person place and time. Psych: Mood and affect normal.  Head: atraumatic normocephalic  Eyes: EOMi  HEENT: neck supple, trachea midline  Lungs: Respiratory effort normal  Cardiovascular:  Normal peripheral pulses  Abdomen: Soft, non-tender, non-distended, No CVA  Bladder: non-tender and not distended. FROMx4, no cyanosis clubbing edema  Skin: warm and dry      Assessment and Plan      1. Post-void dribbling           Plan:      No follow-ups on file.   Continue Flomax  Cystoscopy and Urodynamics for overactivity with urinary incontinence  RBUS

## 2021-02-19 ENCOUNTER — OFFICE VISIT (OUTPATIENT)
Dept: FAMILY MEDICINE CLINIC | Age: 66
End: 2021-02-19
Payer: MEDICAID

## 2021-02-19 ENCOUNTER — TELEPHONE (OUTPATIENT)
Dept: FAMILY MEDICINE CLINIC | Age: 66
End: 2021-02-19

## 2021-02-19 VITALS
TEMPERATURE: 97 F | SYSTOLIC BLOOD PRESSURE: 98 MMHG | DIASTOLIC BLOOD PRESSURE: 46 MMHG | HEIGHT: 67 IN | BODY MASS INDEX: 49.44 KG/M2 | WEIGHT: 315 LBS

## 2021-02-19 DIAGNOSIS — R05.3 CHRONIC COUGH: ICD-10-CM

## 2021-02-19 DIAGNOSIS — I48.0 PAROXYSMAL ATRIAL FIBRILLATION (HCC): ICD-10-CM

## 2021-02-19 DIAGNOSIS — Z95.1 HX OF CABG: ICD-10-CM

## 2021-02-19 DIAGNOSIS — N39.43 POST-VOID DRIBBLING: ICD-10-CM

## 2021-02-19 DIAGNOSIS — I25.10 CORONARY ARTERY DISEASE INVOLVING NATIVE CORONARY ARTERY OF NATIVE HEART WITHOUT ANGINA PECTORIS: ICD-10-CM

## 2021-02-19 DIAGNOSIS — R09.82 POST-NASAL DRIP: ICD-10-CM

## 2021-02-19 DIAGNOSIS — I50.42 HEART FAILURE, SYSTOLIC AND DIASTOLIC, CHRONIC (HCC): Primary | ICD-10-CM

## 2021-02-19 PROCEDURE — G8417 CALC BMI ABV UP PARAM F/U: HCPCS

## 2021-02-19 PROCEDURE — G8427 DOCREV CUR MEDS BY ELIG CLIN: HCPCS

## 2021-02-19 PROCEDURE — G8482 FLU IMMUNIZE ORDER/ADMIN: HCPCS

## 2021-02-19 PROCEDURE — 3017F COLORECTAL CA SCREEN DOC REV: CPT

## 2021-02-19 PROCEDURE — 99213 OFFICE O/P EST LOW 20 MIN: CPT

## 2021-02-19 PROCEDURE — 1036F TOBACCO NON-USER: CPT

## 2021-02-19 ASSESSMENT — ENCOUNTER SYMPTOMS
CONSTIPATION: 0
NAUSEA: 0
VOMITING: 0
COUGH: 1
COLOR CHANGE: 0
DIARRHEA: 0
ABDOMINAL DISTENTION: 0
SHORTNESS OF BREATH: 0
APNEA: 0
WHEEZING: 0
PHOTOPHOBIA: 0

## 2021-02-19 NOTE — TELEPHONE ENCOUNTER
Patient seen cardio on 02/15/21, they was calling to confirm if he should follow up again? Patient thought needed to follow up again with Cardio after today visit. Checked with Dr. Bonilla Gomez he didn't know patient seen the cardio on 02/15/21 couldn't see progress note. He just wanted to make sure he was seeing cardio no need for another appt at this moment due to being seen on 02/15/21.

## 2021-02-19 NOTE — PROGRESS NOTES
incontinence    Patient states that he had a PFT done and will see Pulmonologist soon. His other problems include Numbness in b/l hands worse, He mentions that his Leg pain is better but he is having transportation issues. GI increased PPI dose and added carafate  Never got Mucinex      Review of Systems   Constitutional: Negative for activity change, appetite change, fatigue, fever and unexpected weight change. HENT: Negative for congestion. Eyes: Negative for photophobia and visual disturbance. Respiratory: Positive for cough. Negative for apnea, shortness of breath and wheezing. Cardiovascular: Positive for leg swelling. Negative for chest pain and palpitations. Gastrointestinal: Negative for abdominal distention, constipation, diarrhea, nausea and vomiting. Endocrine: Negative for polyuria. Genitourinary: Negative for dysuria and urgency. Urine dribbling and incontinence    Musculoskeletal: Positive for arthralgias. Skin: Negative for color change, pallor, rash and wound. Neurological: Negative for dizziness, tremors, weakness, light-headedness and headaches. Psychiatric/Behavioral: Negative for agitation, behavioral problems, confusion and decreased concentration. The patient has a   Family History   Problem Relation Age of Onset    Alcohol Abuse Maternal Uncle     Heart Attack Maternal Uncle        Objective:    BP (!) 98/46   Temp 97 °F (36.1 °C) (Temporal)   Ht 5' 7\" (1.702 m)   Wt (!) 323 lb 6.4 oz (146.7 kg)   BMI 50.65 kg/m²    BP Readings from Last 3 Encounters:   02/19/21 (!) 98/46   02/12/21 (!) 78/46   01/20/21 104/60       Physical Exam  Constitutional:       General: He is not in acute distress. Appearance: Normal appearance. He is obese. He is not ill-appearing, toxic-appearing or diaphoretic. HENT:      Head: Normocephalic and atraumatic. Nose: Nose normal. No congestion. Eyes:      General: No scleral icterus.         Right eye: No discharge. Left eye: No discharge. Extraocular Movements: Extraocular movements intact. Conjunctiva/sclera: Conjunctivae normal.      Pupils: Pupils are equal, round, and reactive to light. Cardiovascular:      Rate and Rhythm: Normal rate and regular rhythm. Pulses: Normal pulses. Heart sounds: No murmur. Pulmonary:      Effort: Pulmonary effort is normal.      Breath sounds: Normal breath sounds. No wheezing. Abdominal:      General: There is no distension. Tenderness: There is no abdominal tenderness. Musculoskeletal: Normal range of motion. General: Swelling present. No tenderness. Right lower leg: Edema present. Left lower leg: Edema present. Skin:     General: Skin is warm. Coloration: Skin is not jaundiced. Findings: No erythema. Neurological:      Mental Status: He is alert and oriented to person, place, and time. Motor: No weakness. Psychiatric:         Mood and Affect: Mood normal.         Behavior: Behavior normal.         Thought Content: Thought content normal.         Judgment: Judgment normal.         Lab Results   Component Value Date    WBC 7.2 05/26/2020    HGB 12.4 (L) 05/26/2020    HCT 39.1 (L) 05/26/2020     05/26/2020    CHOL 112 05/26/2020    TRIG 53 05/26/2020    HDL 56 05/26/2020    ALT 17 05/26/2020    AST 19 05/26/2020     12/22/2020    K 3.7 12/22/2020     12/22/2020    CREATININE 0.75 12/22/2020    BUN 16 12/22/2020    CO2 18 (L) 12/22/2020    TSH 1.48 01/24/2020    PSA 0.67 09/22/2020    INR 1.1 01/22/2020    LABA1C 5.6 12/10/2019     Lab Results   Component Value Date    CALCIUM 8.7 12/22/2020     Lab Results   Component Value Date    LDLCHOLESTEROL 45 05/26/2020       Assessment and Plan:    1. Coronary artery disease involving native coronary artery of native heart without angina pectoris  CABG in 2019  On spironolactone  On Plavix    2.  Heart failure, systolic and diastolic, chronic ([facility] [address]  Spironolactone  On Lasix  Follows up with CHF clinic  Has a Cardiologist at [facility] but unsure if he has seen them in a while    3. Paroxysmal atrial fibrillation (HCC)  Currently not on rhythm or rate control  Used to be on Eliquis in the past  Chadsvasc score of 3  Will be referred to TCC      4. Hx of CABG  On Lipitor  On Plavix  On Imdur 30 mg  NitroSTAT PRN      5. Chronic cough  Was referred to ENT who tried Flonase without much relief  Was referred to pulmonology  Try a trial off loratadine without much relief  Chest x-ray unremarkable    6. Post-void dribbling  Flomax currently  Urology referral  Upcoming urodynamic studies pending    7. Post-nasal drip  Loratadine trial       Requested Prescriptions      No prescriptions requested or ordered in this encounter       There are no discontinued medications. Bijanjennifer Wahl received counseling on the following healthy behaviors: nutrition, exercise and medication adherence    Discussed use,benefit, and side effects of prescribed medications. Barriers to medication compliance addressed. All patient questions answered. Pt voiced understanding. Return in about 6 weeks (around 4/2/2021) for CHF, cough. Disclaimer: Some orall of this note was transcribed using voice-recognition software. This may cause typographical errors occasionally. Although all effort is made to fix these errors, please do not hesitate to contact our office if there Ebb Lipa concern with the understanding of this note.

## 2021-02-19 NOTE — PROGRESS NOTES
Visit Information    Have you changed or started any medications since your last visit including any over-the-counter medicines, vitamins, or herbal medicines? no   Are you having any side effects from any of your medications? -  no  Have you stopped taking any of your medications? Is so, why? -  yes - see list    Have you seen any other physician or provider since your last visit? No  Have you had any other diagnostic tests since your last visit? No  Have you been seen in the emergency room and/or had an admission to a hospital since we last saw you? No  Have you had your routine dental cleaning in the past 6 months? no    Have you activated your [email or web address] account? If not, what are your barriers?  No: declined     Patient Care Team:  Skinny Gaspar MD as PCP - General (Family Medicine)  Leonie Soria MD as Consulting Physician (Gastroenterology)  Saurav Mccormick MD as Consulting Physician (Urology)    Medical History Review  Past Medical, Family, and Social History reviewed and does not contribute to the patient presenting condition    Health Maintenance   Topic Date Due    Lipid screen  05/26/2021    Potassium monitoring  12/22/2021    Creatinine monitoring  12/22/2021    Diabetes screen  12/10/2022    DTaP/Tdap/Td vaccine (2 - Td) 08/20/2030    Colon cancer screen colonoscopy  01/12/2031    Flu vaccine  Completed    Shingles Vaccine  Completed    Pneumococcal 0-64 years Vaccine  Completed    Hepatitis C screen  Completed    HIV screen  Completed    Hepatitis A vaccine  Aged Out    Hepatitis B vaccine  Aged Out    Hib vaccine  Aged Out    Meningococcal (ACWY) vaccine  Aged Out

## 2021-02-19 NOTE — PROGRESS NOTES
Attending Physician Statement  I have discussed the care of Selwyn Victoria, 63 y.o. male,including pertinent history and exam findings,  with the resident Dr. Jose Antonio Nix MD.  History:  Chief Complaint   Patient presents with    Follow-up     chronic cough    Shoulder Pain     states had no improvment since last visit     Patient is here to discuss chronic cough and shoulder pain. Past medical history is remarkable for CAD status post CABAG one year ago. I have reviewed the key elements of the encounter with the resident. Examination was done by resident as documented in residents note. BP Readings from Last 3 Encounters:   02/19/21 (!) 98/46   02/12/21 (!) 78/46   01/20/21 104/60     BP (!) 98/46   Temp 97 °F (36.1 °C) (Temporal)   Ht 5' 7\" (1.702 m)   Wt (!) 323 lb 6.4 oz (146.7 kg)   BMI 50.65 kg/m²   Lab Results   Component Value Date    WBC 7.2 05/26/2020    HGB 12.4 (L) 05/26/2020    HCT 39.1 (L) 05/26/2020     05/26/2020    CHOL 112 05/26/2020    TRIG 53 05/26/2020    HDL 56 05/26/2020    ALT 17 05/26/2020    AST 19 05/26/2020     12/22/2020    K 3.7 12/22/2020     12/22/2020    CREATININE 0.75 12/22/2020    BUN 16 12/22/2020    CO2 18 (L) 12/22/2020    TSH 1.48 01/24/2020    PSA 0.67 09/22/2020    INR 1.1 01/22/2020    LABA1C 5.6 12/10/2019     Lab Results   Component Value Date    CALCIUM 8.7 12/22/2020     Lab Results   Component Value Date    LDLCHOLESTEROL 45 05/26/2020     I agree with the assessment, plan and diagnosis of    Diagnosis Orders   1. Heart failure, systolic and diastolic, chronic ([address]  Nancy Pacheco MD, Cardiology, [place]   2. Coronary artery disease involving native coronary artery of native heart without angina pectoris  MARTIN - Jairo Garcia MD, Cardiology, [place]   3. Paroxysmal atrial fibrillation (HCC)  Nancy Pacheco MD, Cardiology, [place]   4. Hx of CABG     5. Chronic cough     6. Post-void dribbling     7.  Post-nasal drip       I agree

## 2021-02-22 DIAGNOSIS — I50.42 HEART FAILURE, SYSTOLIC AND DIASTOLIC, CHRONIC (HCC): ICD-10-CM

## 2021-02-25 ENCOUNTER — HOSPITAL ENCOUNTER (OUTPATIENT)
Dept: OTHER | Age: 66
Discharge: HOME | End: 2021-02-25
Payer: MEDICAID

## 2021-02-25 VITALS
HEART RATE: 93 BPM | DIASTOLIC BLOOD PRESSURE: 68 MMHG | SYSTOLIC BLOOD PRESSURE: 122 MMHG | WEIGHT: 315 LBS | BODY MASS INDEX: 50.06 KG/M2 | OXYGEN SATURATION: 98 % | RESPIRATION RATE: 20 BRPM

## 2021-02-25 DIAGNOSIS — I50.42 CHRONIC COMBINED SYSTOLIC AND DIASTOLIC CONGESTIVE HEART FAILURE (HCC): ICD-10-CM

## 2021-02-25 DIAGNOSIS — I48.91 ATRIAL FIBRILLATION, UNSPECIFIED TYPE (HCC): ICD-10-CM

## 2021-02-25 DIAGNOSIS — I50.42 HEART FAILURE, SYSTOLIC AND DIASTOLIC, CHRONIC (HCC): Primary | ICD-10-CM

## 2021-02-25 PROCEDURE — 99212 OFFICE O/P EST SF 10 MIN: CPT

## 2021-02-25 PROCEDURE — 99214 OFFICE O/P EST MOD 30 MIN: CPT | Performed by: NURSE PRACTITIONER

## 2021-02-25 RX ORDER — FUROSEMIDE 20 MG/1
60 TABLET ORAL 2 TIMES DAILY
Qty: 180 TABLET | Refills: 1 | Status: ON HOLD | OUTPATIENT
Start: 2021-02-25 | End: 2021-03-16 | Stop reason: SDUPTHER

## 2021-02-25 RX ORDER — ATORVASTATIN CALCIUM 40 MG/1
40 TABLET, FILM COATED ORAL NIGHTLY
Qty: 30 TABLET | Refills: 2 | Status: SHIPPED | OUTPATIENT
Start: 2021-02-25 | End: 2021-06-01 | Stop reason: SDUPTHER

## 2021-02-25 ASSESSMENT — PAIN DESCRIPTION - PAIN TYPE: TYPE: ACUTE PAIN

## 2021-02-25 ASSESSMENT — PAIN DESCRIPTION - ORIENTATION: ORIENTATION: LEFT

## 2021-02-25 ASSESSMENT — PAIN SCALES - GENERAL: PAINLEVEL_OUTOF10: 7

## 2021-02-25 ASSESSMENT — ENCOUNTER SYMPTOMS
SHORTNESS OF BREATH: 1
EYE DISCHARGE: 0
BLOOD IN STOOL: 0
ABDOMINAL PAIN: 0
COUGH: 0

## 2021-02-25 ASSESSMENT — PAIN DESCRIPTION - LOCATION: LOCATION: SHOULDER

## 2021-02-25 NOTE — PROGRESS NOTES
CHF Clinic at [facility]    Office: [phone] Fax: [phone] [facility]  [phone]  Dept: [phone]  Loc: [phone]    Kandace Loera is a 63 y.o. male who presents today for CHF evaluation. HPI:     +  shortness of breath, w/ exertion  +   fatigue  +  Edema   Denies chest pain   Denies  palpitations   + L bshoulder due to chrinic issue and fall. Using cpap, but says does not help his sleep. Sees dr Moses Aver   Seeing gastric surg soon per dr Pawan Drummond  Has been taking lasix 60 mg bid although his last order from pcp is not written for lasix 60 bid. He will run out soon. Order placed today  Urinating 20 x /day    Diet: eating pre packaged foods like pop tarts, granola bars.  Canned soup  Fluids:         Past Medical History:   Diagnosis Date    Atrial fibrillation ([address] 10/2019    Dr. Vahe Castillo BPH (benign prostatic hyperplasia)     Cellulitis     Cervical disc disease     CHF (congestive heart failure) ([address]     Coronary artery disease 10/2019    CABG    CPAP (continuous positive airway pressure) dependence     GERD (gastroesophageal reflux disease)     History of incarceration     released 2019    Hyperlipidemia     Hypertension     Myocardial infarct ([address]     Obesity     Sleep apnea     C-pap     Past Surgical History:   Procedure Laterality Date    ABDOMINAL EXPLORATION SURGERY      CARDIAC SURGERY      CARDIOVERSION  10/16/2019    CARPAL TUNNEL RELEASE Bilateral 2008    CERVICAL SPINE SURGERY      2-5    COLONOSCOPY  10/29/2020     WITH BIOPSY, COLONOSCOPY SUBMUCOSAL/BOTOX INJECTION,  COLONOSCOPY POLYPECTOMY SNARE/COLD BIOPSY     COLONOSCOPY N/A 10/29/2020    COLONOSCOPY WITH BIOPSY performed by Cecilia Cowart MD at [address] [place] [address]  10/29/2020    COLONOSCOPY SUBMUCOSAL/BOTOX INJECTION performed by Cecilia Cowart MD at [place] [address]  COLONOSCOPY  10/29/2020    COLONOSCOPY POLYPECTOMY SNARE/COLD BIOPSY performed by Yuliya Khan MD at [address] N/A 1/12/2021    COLONOSCOPY POLYPECTOMY HOT SNARE, COLD SNARE POLPECTOMY performed by Denice Gleason MD at [address] [facility] N/A 10/21/2019    CABG CORONARY ARTERY BYPASS GRAFT X1, LIMA-LAD, BROWN 4 MAZE PROCEDURE, 50MM ATRICURE ATRIAL CLIP RIGID INTERNAL FIXATION PLATES X 3   SCREWS X 13 performed by Julia Gibson MD at [address] [place] [address]      Left leg    TRANSESOPHAGEAL ECHOCARDIOGRAM  10/16/2019       Family History   Problem Relation Age of Onset    Alcohol Abuse Maternal Uncle     Heart Attack Maternal Uncle        Social History     Tobacco Use    Smoking status: Never Smoker    Smokeless tobacco: Never Used   Substance Use Topics    Alcohol use: Not Currently     Comment: stopped 1991      Current Outpatient Medications   Medication Sig Dispense Refill    atorvastatin (LIPITOR) 40 MG tablet TAKE 1 TABLET BY MOUTH NIGHTLY 30 tablet 2    furosemide (LASIX) 20 MG tablet Take 3 tablets by mouth 2 times daily 180 tablet 1    loratadine (CLARITIN) 10 MG tablet Take 1 tablet by mouth daily 30 tablet 1    isosorbide mononitrate (IMDUR) 30 MG extended release tablet Take 1 tablet by mouth daily 90 tablet 0    nitroGLYCERIN (NITROSTAT) 0.4 MG SL tablet Place 1 tablet under the tongue every 5 minutes as needed for Chest pain up to max of 3 total doses. If no relief after 1 dose, call 911. 25 tablet 1    losartan (COZAAR) 50 MG tablet Take 1 tablet by mouth daily 90 tablet 1    tamsulosin (FLOMAX) 0.4 MG capsule Take 1 capsule by mouth daily 30 capsule 5    clopidogrel (PLAVIX) 75 MG tablet TAKE 1 TABLET BY MOUTH DAILY 30 tablet 0    polyethylene glycol (GLYCOLAX) 17 GM/SCOOP powder Use as directed by following your patient instructions given by office.  (Patient not taking: Reported on 2/19/2021) 238 g 0    bisacodyl (DULCOLAX) 5 MG EC tablet TAKE 4 TABS AS DIRECTED BY PHYSICIAN OFFICE (Patient not taking: Reported on 2/19/2021) 4 tablet 0    magnesium citrate solution Take 296 mLs by mouth once for 1 dose 296 mL 0    sucralfate (CARAFATE) 1 GM tablet Take 1 tablet by mouth 4 times daily 120 tablet 3    potassium chloride (KLOR-CON M) 20 MEQ extended release tablet Take 1 tablet by mouth daily 30 tablet 1    acetaminophen (TYLENOL) 500 MG tablet Take 1 tablet by mouth every 12 hours as needed for Pain (Patient not taking: Reported on 2/19/2021) 120 tablet 1    pantoprazole (PROTONIX) 40 MG tablet Take 1 tablet by mouth 2 times daily (before meals) 60 tablet 5    bisacodyl (DULCOLAX) 5 MG EC tablet TAKE 4 TABS AS DIRECTED BY PHYSICIAN OFFICE (Patient not taking: Reported on 2/19/2021) 4 tablet 0    magnesium citrate solution Take 296 mLs by mouth once for 1 dose 296 mL 0    diclofenac sodium (VOLTAREN) 1 % GEL APPLY 2 G TOPICALLY 4 TIMES DAILY AS NEEDED FOR PAIN (Patient not taking: Reported on 2/19/2021) 200 g 1    spironolactone (ALDACTONE) 25 MG tablet Take 1 tablet by mouth daily 30 tablet 3    Elastic Bandages & Supports (JOBST KNEE HIGH COMPRESSION SM) MISC Wear q day. Remove q hs 2 each 0    Elastic Bandages & Supports (JOBST KNEE HIGH COMPRESSION SM) MISC 2 each by Does not apply route daily Wear q day. Remove q hs. Diagnosis: Chronic venous insufficiency 2 each 0    polyethylene glycol (MIRALAX) 17 GM/SCOOP POWD powder 250g bottle. For colonoscopy prep. Follow per instructions from clinic. (Patient not taking: Reported on 2/19/2021) 1 Bottle 0    Elastic Bandages & Supports (JOBST OPAQUE KNEE 20-30MMHG XL) MISC Dispense two pair closed toe knee high 20 to 30 mmHg Jobst compression stockings 2 each 0    white petrolatum GEL Apply 28 g topically as needed for Dry Lips (Patient not taking: Reported on 2/19/2021) 106 g 0     No current facility-administered medications for this encounter.       No Known Allergies      Subjective:      Review of Systems   Constitutional: Positive for fatigue. Negative for activity change, chills and fever. Eyes: Negative for discharge and visual disturbance. Respiratory: Positive for shortness of breath. Negative for cough. Cardiovascular: Positive for leg swelling. Negative for chest pain. Gastrointestinal: Negative for abdominal pain and blood in stool. Endocrine: Negative for cold intolerance and heat intolerance. Genitourinary: Negative for dysuria and flank pain. Musculoskeletal: Positive for arthralgias. Negative for joint swelling and myalgias. Skin: Negative for pallor and rash. Neurological: Negative for dizziness and headaches. Psychiatric/Behavioral: Negative for hallucinations and suicidal ideas. Objective:     Physical Exam  Vitals signs and nursing note reviewed. Constitutional:       Appearance: He is obese. Comments:      HENT:      Head: Normocephalic and atraumatic. Eyes:      General: No scleral icterus. Conjunctiva/sclera: Conjunctivae normal.   Neck:      Musculoskeletal: Normal range of motion. Cardiovascular:      Rate and Rhythm: Normal rate and regular rhythm. Heart sounds: Normal heart sounds. Pulmonary:      Effort: Pulmonary effort is normal.      Breath sounds: Normal breath sounds. No wheezing or rales. Abdominal:      General: Bowel sounds are normal.      Palpations: Abdomen is soft. Musculoskeletal: Normal range of motion. Right lower leg: Edema present. Left lower leg: Edema present. Comments: 1-2+ pitting edema b/l LE; wearing comp hose   Skin:     General: Skin is warm and dry. Neurological:      Mental Status: He is alert and oriented to person, place, and time. /68   Pulse 93   Resp 20   Wt (!) 319 lb 9.6 oz (145 kg)   SpO2 98%   BMI 50.06 kg/m²   O2 Device: None (Room air)       Lower Extremity Measurements in cm.    R Calf Circumference (cm): 51.5 cm  L Calf Circumference (cm): 55 cm  R Ankle Circumference (cm): 28 cm  L Ankle Circumference (cm): 27 cm      Echo CONCLUSIONS     Summary  Left ventricle is normal in size with mild left ventricular hypertrophy. Overall systolic function is moderately reduced with a calculated EF 40%. Abnormal septal wall motion. Grade III (severe) left ventricular diastolic dysfunction. Left atrium is moderately dilated. Right atrial dilatation. Right ventricular dilatation with reduced systolic function. Mild to moderate mitral regurgitation (posteriorly eccentric jet.)  Moderate tricuspid regurgitation. Estimated right ventricular systolic  pressure is 35 mmHg. Large pleural effusion.      10/25/2019 12:46      CBC:   Lab Results   Component Value Date    WBC 7.2 05/26/2020    RBC 4.13 05/26/2020    HGB 12.4 05/26/2020    HCT 39.1 05/26/2020    MCV 94.7 05/26/2020    MCH 30.0 05/26/2020    MCHC 31.7 05/26/2020    RDW 14.4 05/26/2020     05/26/2020    MPV 9.8 05/26/2020     CMP:    Lab Results   Component Value Date     12/22/2020    K 3.7 12/22/2020     12/22/2020    CO2 18 12/22/2020    BUN 16 12/22/2020    CREATININE 0.75 12/22/2020    GFRAA >60 12/22/2020    LABGLOM >60 12/22/2020    GLUCOSE 135 12/22/2020    PROT 6.9 05/26/2020    LABALBU 3.9 05/26/2020    CALCIUM 8.7 12/22/2020    BILITOT 0.39 05/26/2020    ALKPHOS 58 05/26/2020    AST 19 05/26/2020    ALT 17 05/26/2020     Lab Results   Component Value Date    LABA1C 5.6 12/10/2019           :Assessment      1. Heart failure, systolic and diastolic, chronic ([address]    2. Atrial fibrillation, unspecified type ([address]    3. Chronic combined systolic and diastolic congestive heart failure ([address]        :Plan      1. Heart failure, systolic and diastolic, chronic ([address]    2. Atrial fibrillation, unspecified type ([address]    3.  Chronic combined systolic and diastolic congestive heart failure (HCC)        Wt is up 4. 6 lbs since last appt    leg measurements are same applicable; patient strongly discouraged to smoke. Patient verbalized understanding. Signs and symptoms of CHF discussed with patient, such as feeling more tired than normal, feeling short of breath, coughing that increases when you lie down, sudden weight gain, swelling of your feet, legs or belly. Patient verbalized understanding to notify the CHF clinic at [phone] or physician office if these symptoms occur. Compliance with plan of care and further disease process causes discussed with patient, patient encouraged to keep all follow up appointments. Patient verbalized understanding. Echocardiogram reviewed. TCC note reviewed  Labs reviewed. Medications reviewed.   Labs ordered     Electronically signedby LUDY Sandoval CNP on 2/25/2021 at 11:07 AM

## 2021-03-03 ENCOUNTER — HOSPITAL ENCOUNTER (OUTPATIENT)
Dept: ULTRASOUND IMAGING | Age: 66
Discharge: HOME | End: 2021-03-05
Payer: MEDICAID

## 2021-03-03 ENCOUNTER — HOSPITAL ENCOUNTER (OUTPATIENT)
Age: 66
Discharge: HOME | End: 2021-03-03
Payer: MEDICAID

## 2021-03-03 ENCOUNTER — HOSPITAL ENCOUNTER (OUTPATIENT)
Dept: NON INVASIVE DIAGNOSTICS | Age: 66
Discharge: HOME | End: 2021-03-03
Payer: MEDICAID

## 2021-03-03 DIAGNOSIS — R06.02 SOB (SHORTNESS OF BREATH): ICD-10-CM

## 2021-03-03 DIAGNOSIS — I50.42 HEART FAILURE, SYSTOLIC AND DIASTOLIC, CHRONIC (HCC): ICD-10-CM

## 2021-03-03 DIAGNOSIS — N39.43 POST-VOID DRIBBLING: ICD-10-CM

## 2021-03-03 LAB
ANION GAP SERPL CALCULATED.3IONS-SCNC: 15 MMOL/L (ref 9–17)
BUN BLDV-MCNC: 15 MG/DL (ref 8–23)
BUN/CREAT BLD: ABNORMAL (ref 9–20)
CALCIUM SERPL-MCNC: 9.3 MG/DL (ref 8.6–10.4)
CHLORIDE BLD-SCNC: 97 MMOL/L (ref 98–107)
CHOLESTEROL, FASTING: 122 MG/DL
CHOLESTEROL/HDL RATIO: 2.6
CO2: 22 MMOL/L (ref 20–31)
CREAT SERPL-MCNC: 0.83 MG/DL (ref 0.7–1.2)
GFR AFRICAN AMERICAN: >60 ML/MIN
GFR NON-AFRICAN AMERICAN: >60 ML/MIN
GFR SERPL CREATININE-BSD FRML MDRD: ABNORMAL ML/MIN/{1.73_M2}
GFR SERPL CREATININE-BSD FRML MDRD: ABNORMAL ML/MIN/{1.73_M2}
GLUCOSE BLD-MCNC: 107 MG/DL (ref 70–99)
HDLC SERPL-MCNC: 47 MG/DL
LDL CHOLESTEROL: 54 MG/DL (ref 0–130)
LV EF: 55 %
LVEF MODALITY: NORMAL
POTASSIUM SERPL-SCNC: 4.3 MMOL/L (ref 3.7–5.3)
SODIUM BLD-SCNC: 134 MMOL/L (ref 135–144)
TRIGLYCERIDE, FASTING: 104 MG/DL
VLDLC SERPL CALC-MCNC: NORMAL MG/DL (ref 1–30)

## 2021-03-03 PROCEDURE — 93306 TTE W/DOPPLER COMPLETE: CPT

## 2021-03-03 PROCEDURE — 36415 COLL VENOUS BLD VENIPUNCTURE: CPT

## 2021-03-03 PROCEDURE — 80061 LIPID PANEL: CPT

## 2021-03-03 PROCEDURE — 80048 BASIC METABOLIC PNL TOTAL CA: CPT

## 2021-03-03 PROCEDURE — 76770 US EXAM ABDO BACK WALL COMP: CPT

## 2021-03-04 ENCOUNTER — TELEPHONE (OUTPATIENT)
Dept: FAMILY MEDICINE CLINIC | Age: 66
End: 2021-03-04

## 2021-03-04 NOTE — TELEPHONE ENCOUNTER
Pt called in regarding his [employer] transportation, it was updated.  Informed patient and was faxed to the medical sup unit, he said they couldn't take fax I offered to mail he said or email or a call, I tried to call it had me on hold for 20 mins, explain to him maybe he could reach out to them again and we dont email and see what they suggest.

## 2021-03-08 ENCOUNTER — APPOINTMENT (OUTPATIENT)
Dept: GENERAL RADIOLOGY | Age: 66
End: 2021-03-08
Payer: MEDICAID

## 2021-03-08 ENCOUNTER — HOSPITAL ENCOUNTER (OUTPATIENT)
Age: 66
Setting detail: OBSERVATION
Discharge: HOME | End: 2021-03-09
Attending: EMERGENCY MEDICINE | Admitting: STUDENT IN AN ORGANIZED HEALTH CARE EDUCATION/TRAINING PROGRAM
Payer: MEDICAID

## 2021-03-08 ENCOUNTER — APPOINTMENT (OUTPATIENT)
Dept: CT IMAGING | Age: 66
End: 2021-03-08
Payer: MEDICAID

## 2021-03-08 DIAGNOSIS — N17.9 AKI (ACUTE KIDNEY INJURY) (HCC): Primary | ICD-10-CM

## 2021-03-08 DIAGNOSIS — R09.82 POST-NASAL DRIP: ICD-10-CM

## 2021-03-08 DIAGNOSIS — I25.10 CORONARY ARTERY DISEASE INVOLVING NATIVE CORONARY ARTERY OF NATIVE HEART WITHOUT ANGINA PECTORIS: ICD-10-CM

## 2021-03-08 DIAGNOSIS — E87.5 HYPERKALEMIA: ICD-10-CM

## 2021-03-08 DIAGNOSIS — R05.3 CHRONIC COUGH: ICD-10-CM

## 2021-03-08 PROBLEM — I50.9 ACUTE CHF (CONGESTIVE HEART FAILURE) (HCC): Status: ACTIVE | Noted: 2021-03-08

## 2021-03-08 LAB
ABSOLUTE EOS #: 0.19 K/UL (ref 0–0.44)
ABSOLUTE IMMATURE GRANULOCYTE: 0.23 K/UL (ref 0–0.3)
ABSOLUTE LYMPH #: 1.09 K/UL (ref 1.1–3.7)
ABSOLUTE MONO #: 1.18 K/UL (ref 0.1–1.2)
ANION GAP SERPL CALCULATED.3IONS-SCNC: 15 MMOL/L (ref 9–17)
BASOPHILS # BLD: 1 % (ref 0–2)
BASOPHILS ABSOLUTE: 0.08 K/UL (ref 0–0.2)
BNP INTERPRETATION: ABNORMAL
BUN BLDV-MCNC: 29 MG/DL (ref 8–23)
BUN/CREAT BLD: ABNORMAL (ref 9–20)
CALCIUM SERPL-MCNC: 8.9 MG/DL (ref 8.6–10.4)
CHLORIDE BLD-SCNC: 98 MMOL/L (ref 98–107)
CO2: 20 MMOL/L (ref 20–31)
CREAT SERPL-MCNC: 1.6 MG/DL (ref 0.7–1.2)
DIFFERENTIAL TYPE: ABNORMAL
EOSINOPHILS RELATIVE PERCENT: 2 % (ref 1–4)
GFR AFRICAN AMERICAN: 53 ML/MIN
GFR NON-AFRICAN AMERICAN: 44 ML/MIN
GFR SERPL CREATININE-BSD FRML MDRD: ABNORMAL ML/MIN/{1.73_M2}
GFR SERPL CREATININE-BSD FRML MDRD: ABNORMAL ML/MIN/{1.73_M2}
GLUCOSE BLD-MCNC: 113 MG/DL (ref 75–110)
GLUCOSE BLD-MCNC: 144 MG/DL (ref 70–99)
GLUCOSE BLD-MCNC: 91 MG/DL (ref 75–110)
GLUCOSE BLD-MCNC: 97 MG/DL (ref 75–110)
HCT VFR BLD CALC: 34.8 % (ref 40.7–50.3)
HEMOGLOBIN: 10.9 G/DL (ref 13–17)
IMMATURE GRANULOCYTES: 2 %
INR BLD: 1
LYMPHOCYTES # BLD: 10 % (ref 24–43)
MAGNESIUM: 2.4 MG/DL (ref 1.6–2.6)
MCH RBC QN AUTO: 28.5 PG (ref 25.2–33.5)
MCHC RBC AUTO-ENTMCNC: 31.3 G/DL (ref 28.4–34.8)
MCV RBC AUTO: 90.9 FL (ref 82.6–102.9)
MONOCYTES # BLD: 11 % (ref 3–12)
NRBC AUTOMATED: 0 PER 100 WBC
PDW BLD-RTO: 14.7 % (ref 11.8–14.4)
PHOSPHORUS: 4 MG/DL (ref 2.5–4.5)
PLATELET # BLD: 271 K/UL (ref 138–453)
PLATELET ESTIMATE: ABNORMAL
PMV BLD AUTO: 9.4 FL (ref 8.1–13.5)
POTASSIUM SERPL-SCNC: 5.9 MMOL/L (ref 3.7–5.3)
PRO-BNP: 448 PG/ML
PROTHROMBIN TIME: 10.6 SEC (ref 9.1–12.3)
RBC # BLD: 3.83 M/UL (ref 4.21–5.77)
RBC # BLD: ABNORMAL 10*6/UL
SEG NEUTROPHILS: 74 % (ref 36–65)
SEGMENTED NEUTROPHILS ABSOLUTE COUNT: 8.25 K/UL (ref 1.5–8.1)
SODIUM BLD-SCNC: 133 MMOL/L (ref 135–144)
TROPONIN INTERP: ABNORMAL
TROPONIN INTERP: ABNORMAL
TROPONIN T: ABNORMAL NG/ML
TROPONIN T: ABNORMAL NG/ML
TROPONIN, HIGH SENSITIVITY: 24 NG/L (ref 0–22)
TROPONIN, HIGH SENSITIVITY: 27 NG/L (ref 0–22)
WBC # BLD: 11 K/UL (ref 3.5–11.3)
WBC # BLD: ABNORMAL 10*3/UL

## 2021-03-08 PROCEDURE — 96361 HYDRATE IV INFUSION ADD-ON: CPT

## 2021-03-08 PROCEDURE — 70450 CT HEAD/BRAIN W/O DYE: CPT

## 2021-03-08 PROCEDURE — 85610 PROTHROMBIN TIME: CPT

## 2021-03-08 PROCEDURE — 99220 PR INITIAL OBSERVATION CARE/DAY 70 MINUTES: CPT | Performed by: STUDENT IN AN ORGANIZED HEALTH CARE EDUCATION/TRAINING PROGRAM

## 2021-03-08 PROCEDURE — 96372 THER/PROPH/DIAG INJ SC/IM: CPT

## 2021-03-08 PROCEDURE — 6370000000 HC RX 637 (ALT 250 FOR IP): Performed by: STUDENT IN AN ORGANIZED HEALTH CARE EDUCATION/TRAINING PROGRAM

## 2021-03-08 PROCEDURE — 80048 BASIC METABOLIC PNL TOTAL CA: CPT

## 2021-03-08 PROCEDURE — 73030 X-RAY EXAM OF SHOULDER: CPT

## 2021-03-08 PROCEDURE — 82947 ASSAY GLUCOSE BLOOD QUANT: CPT

## 2021-03-08 PROCEDURE — 93005 ELECTROCARDIOGRAM TRACING: CPT | Performed by: STUDENT IN AN ORGANIZED HEALTH CARE EDUCATION/TRAINING PROGRAM

## 2021-03-08 PROCEDURE — 6360000002 HC RX W HCPCS: Performed by: STUDENT IN AN ORGANIZED HEALTH CARE EDUCATION/TRAINING PROGRAM

## 2021-03-08 PROCEDURE — G0378 HOSPITAL OBSERVATION PER HR: HCPCS

## 2021-03-08 PROCEDURE — 85025 COMPLETE CBC W/AUTO DIFF WBC: CPT

## 2021-03-08 PROCEDURE — 2580000003 HC RX 258: Performed by: STUDENT IN AN ORGANIZED HEALTH CARE EDUCATION/TRAINING PROGRAM

## 2021-03-08 PROCEDURE — 84100 ASSAY OF PHOSPHORUS: CPT

## 2021-03-08 PROCEDURE — 99283 EMERGENCY DEPT VISIT LOW MDM: CPT

## 2021-03-08 PROCEDURE — 96374 THER/PROPH/DIAG INJ IV PUSH: CPT

## 2021-03-08 PROCEDURE — 71046 X-RAY EXAM CHEST 2 VIEWS: CPT

## 2021-03-08 PROCEDURE — 84484 ASSAY OF TROPONIN QUANT: CPT

## 2021-03-08 PROCEDURE — 96375 TX/PRO/DX INJ NEW DRUG ADDON: CPT

## 2021-03-08 PROCEDURE — 83735 ASSAY OF MAGNESIUM: CPT

## 2021-03-08 PROCEDURE — 83880 ASSAY OF NATRIURETIC PEPTIDE: CPT

## 2021-03-08 RX ORDER — CETIRIZINE HYDROCHLORIDE 10 MG/1
10 TABLET ORAL DAILY
Status: DISCONTINUED | OUTPATIENT
Start: 2021-03-08 | End: 2021-03-09 | Stop reason: HOSPADM

## 2021-03-08 RX ORDER — ONDANSETRON 2 MG/ML
4 INJECTION INTRAMUSCULAR; INTRAVENOUS EVERY 6 HOURS PRN
Status: DISCONTINUED | OUTPATIENT
Start: 2021-03-08 | End: 2021-03-09 | Stop reason: HOSPADM

## 2021-03-08 RX ORDER — ACETAMINOPHEN 650 MG/1
650 SUPPOSITORY RECTAL EVERY 6 HOURS PRN
Status: DISCONTINUED | OUTPATIENT
Start: 2021-03-08 | End: 2021-03-09 | Stop reason: HOSPADM

## 2021-03-08 RX ORDER — SPIRONOLACTONE 25 MG/1
25 TABLET ORAL DAILY
Status: DISCONTINUED | OUTPATIENT
Start: 2021-03-08 | End: 2021-03-09 | Stop reason: HOSPADM

## 2021-03-08 RX ORDER — ACETAMINOPHEN 325 MG/1
650 TABLET ORAL EVERY 6 HOURS PRN
Status: DISCONTINUED | OUTPATIENT
Start: 2021-03-08 | End: 2021-03-09 | Stop reason: HOSPADM

## 2021-03-08 RX ORDER — PANTOPRAZOLE SODIUM 40 MG/1
40 TABLET, DELAYED RELEASE ORAL
Status: DISCONTINUED | OUTPATIENT
Start: 2021-03-08 | End: 2021-03-09 | Stop reason: HOSPADM

## 2021-03-08 RX ORDER — SODIUM CHLORIDE 0.9 % (FLUSH) 0.9 %
10 SYRINGE (ML) INJECTION PRN
Status: DISCONTINUED | OUTPATIENT
Start: 2021-03-08 | End: 2021-03-09 | Stop reason: HOSPADM

## 2021-03-08 RX ORDER — 0.9 % SODIUM CHLORIDE 0.9 %
500 INTRAVENOUS SOLUTION INTRAVENOUS ONCE
Status: COMPLETED | OUTPATIENT
Start: 2021-03-08 | End: 2021-03-08

## 2021-03-08 RX ORDER — PROMETHAZINE HYDROCHLORIDE 12.5 MG/1
12.5 TABLET ORAL EVERY 6 HOURS PRN
Status: DISCONTINUED | OUTPATIENT
Start: 2021-03-08 | End: 2021-03-09 | Stop reason: HOSPADM

## 2021-03-08 RX ORDER — MIDODRINE HYDROCHLORIDE 5 MG/1
5 TABLET ORAL 2 TIMES DAILY PRN
Status: DISCONTINUED | OUTPATIENT
Start: 2021-03-08 | End: 2021-03-09 | Stop reason: HOSPADM

## 2021-03-08 RX ORDER — SPIRONOLACTONE 25 MG/1
25 TABLET ORAL DAILY
Qty: 30 TABLET | Refills: 3 | Status: ON HOLD | OUTPATIENT
Start: 2021-03-08 | End: 2021-03-09 | Stop reason: HOSPADM

## 2021-03-08 RX ORDER — LOSARTAN POTASSIUM 50 MG/1
50 TABLET ORAL DAILY
Status: DISCONTINUED | OUTPATIENT
Start: 2021-03-08 | End: 2021-03-09 | Stop reason: HOSPADM

## 2021-03-08 RX ORDER — SODIUM CHLORIDE 0.9 % (FLUSH) 0.9 %
10 SYRINGE (ML) INJECTION EVERY 12 HOURS SCHEDULED
Status: DISCONTINUED | OUTPATIENT
Start: 2021-03-08 | End: 2021-03-09 | Stop reason: HOSPADM

## 2021-03-08 RX ORDER — LORATADINE 10 MG/1
10 TABLET ORAL DAILY
Qty: 30 TABLET | Refills: 1 | Status: SHIPPED | OUTPATIENT
Start: 2021-03-08 | End: 2021-05-24 | Stop reason: SDUPTHER

## 2021-03-08 RX ORDER — SODIUM CHLORIDE 9 MG/ML
INJECTION, SOLUTION INTRAVENOUS CONTINUOUS
Status: DISCONTINUED | OUTPATIENT
Start: 2021-03-08 | End: 2021-03-09 | Stop reason: HOSPADM

## 2021-03-08 RX ORDER — ATORVASTATIN CALCIUM 80 MG/1
40 TABLET, FILM COATED ORAL NIGHTLY
Status: DISCONTINUED | OUTPATIENT
Start: 2021-03-08 | End: 2021-03-09 | Stop reason: HOSPADM

## 2021-03-08 RX ORDER — DEXTROSE MONOHYDRATE 25 G/50ML
25 INJECTION, SOLUTION INTRAVENOUS ONCE
Status: COMPLETED | OUTPATIENT
Start: 2021-03-08 | End: 2021-03-08

## 2021-03-08 RX ORDER — SODIUM CHLORIDE, SODIUM LACTATE, POTASSIUM CHLORIDE, AND CALCIUM CHLORIDE .6; .31; .03; .02 G/100ML; G/100ML; G/100ML; G/100ML
500 INJECTION, SOLUTION INTRAVENOUS ONCE
Status: COMPLETED | OUTPATIENT
Start: 2021-03-08 | End: 2021-03-08

## 2021-03-08 RX ORDER — CLOPIDOGREL BISULFATE 75 MG/1
75 TABLET ORAL DAILY
Qty: 30 TABLET | Refills: 0 | Status: SHIPPED | OUTPATIENT
Start: 2021-03-08 | End: 2021-04-19 | Stop reason: SDUPTHER

## 2021-03-08 RX ORDER — SUCRALFATE 1 G/1
1 TABLET ORAL 4 TIMES DAILY
Status: DISCONTINUED | OUTPATIENT
Start: 2021-03-08 | End: 2021-03-09 | Stop reason: HOSPADM

## 2021-03-08 RX ORDER — TAMSULOSIN HYDROCHLORIDE 0.4 MG/1
0.4 CAPSULE ORAL DAILY
Status: DISCONTINUED | OUTPATIENT
Start: 2021-03-08 | End: 2021-03-09 | Stop reason: HOSPADM

## 2021-03-08 RX ORDER — CLOPIDOGREL BISULFATE 75 MG/1
75 TABLET ORAL DAILY
Status: DISCONTINUED | OUTPATIENT
Start: 2021-03-08 | End: 2021-03-09 | Stop reason: HOSPADM

## 2021-03-08 RX ORDER — ISOSORBIDE MONONITRATE 30 MG/1
30 TABLET, EXTENDED RELEASE ORAL DAILY
Status: DISCONTINUED | OUTPATIENT
Start: 2021-03-08 | End: 2021-03-09 | Stop reason: HOSPADM

## 2021-03-08 RX ORDER — CLOPIDOGREL BISULFATE 75 MG/1
75 TABLET ORAL DAILY
Qty: 30 TABLET | Refills: 0 | Status: ON HOLD | OUTPATIENT
Start: 2021-03-08 | End: 2021-03-09 | Stop reason: HOSPADM

## 2021-03-08 RX ORDER — SODIUM POLYSTYRENE SULFONATE 15 G/60ML
15 SUSPENSION ORAL; RECTAL
Status: ACTIVE | OUTPATIENT
Start: 2021-03-08 | End: 2021-03-08

## 2021-03-08 RX ORDER — ISOSORBIDE MONONITRATE 30 MG/1
30 TABLET, EXTENDED RELEASE ORAL DAILY
Qty: 90 TABLET | Refills: 0 | Status: SHIPPED | OUTPATIENT
Start: 2021-03-08 | End: 2021-06-01 | Stop reason: SDUPTHER

## 2021-03-08 RX ORDER — SPIRONOLACTONE 25 MG/1
25 TABLET ORAL DAILY
Qty: 30 TABLET | Refills: 3 | Status: ON HOLD | OUTPATIENT
Start: 2021-03-08 | End: 2021-03-16 | Stop reason: HOSPADM

## 2021-03-08 RX ORDER — FUROSEMIDE 10 MG/ML
40 INJECTION INTRAMUSCULAR; INTRAVENOUS 2 TIMES DAILY
Status: DISCONTINUED | OUTPATIENT
Start: 2021-03-08 | End: 2021-03-09 | Stop reason: HOSPADM

## 2021-03-08 RX ORDER — HEPARIN SODIUM 5000 [USP'U]/ML
5000 INJECTION, SOLUTION INTRAVENOUS; SUBCUTANEOUS EVERY 8 HOURS SCHEDULED
Status: DISCONTINUED | OUTPATIENT
Start: 2021-03-08 | End: 2021-03-09 | Stop reason: HOSPADM

## 2021-03-08 RX ADMIN — SODIUM CHLORIDE, POTASSIUM CHLORIDE, SODIUM LACTATE AND CALCIUM CHLORIDE 500 ML: 600; 310; 30; 20 INJECTION, SOLUTION INTRAVENOUS at 13:30

## 2021-03-08 RX ADMIN — HEPARIN SODIUM 5000 UNITS: 5000 INJECTION INTRAVENOUS; SUBCUTANEOUS at 23:00

## 2021-03-08 RX ADMIN — ATORVASTATIN CALCIUM 40 MG: 80 TABLET, FILM COATED ORAL at 21:21

## 2021-03-08 RX ADMIN — PANTOPRAZOLE SODIUM 40 MG: 40 TABLET, DELAYED RELEASE ORAL at 21:21

## 2021-03-08 RX ADMIN — SODIUM CHLORIDE 500 ML: 0.9 INJECTION, SOLUTION INTRAVENOUS at 11:30

## 2021-03-08 RX ADMIN — INSULIN HUMAN 10 UNITS: 100 INJECTION, SOLUTION PARENTERAL at 12:32

## 2021-03-08 RX ADMIN — SUCRALFATE 1 G: 1 TABLET ORAL at 21:21

## 2021-03-08 RX ADMIN — FUROSEMIDE 40 MG: 10 INJECTION, SOLUTION INTRAMUSCULAR; INTRAVENOUS at 20:29

## 2021-03-08 RX ADMIN — DEXTROSE MONOHYDRATE 25 G: 25 INJECTION, SOLUTION INTRAVENOUS at 12:33

## 2021-03-08 ASSESSMENT — ENCOUNTER SYMPTOMS
ABDOMINAL PAIN: 0
SHORTNESS OF BREATH: 0
NAUSEA: 1
DIARRHEA: 0
SHORTNESS OF BREATH: 1
COUGH: 0
CONSTIPATION: 0
VOMITING: 0
NAUSEA: 0

## 2021-03-08 NOTE — CARE COORDINATION
Case Management Initial Discharge Plan  Ute Sanchez,             Met with:patient to discuss discharge plans. Information verified: address, contacts, phone number, , insurance Yes    Emergency Contact/Next of Kin name & number:   JOSE CUBA No  No Brother/Sister  Brother/Sister [phone]         PCP: Alex Yan MD  Date of last visit: 1 month ago  Insurance Provider: Destinee YOUNG    Discharge Planning    Living Arrangements:  Alone   Support Systems:  Family Members    Home has 1 stories  4 stairs to climb to get into front door, 0stairs to climb to reach second floor  Location of bedroom/bathroom in home main    Patient able to perform ADL's:Independent    Current Services (outpatient & in home) none  DME equipment: cane  DME provider: none    Receiving oral anticoagulation therapy?  no    If indicated:   Physician managing anticoagulation treatment: n/a  Where does patient obtain lab work for [employer] treatment? n/a      Potential Assistance Needed:  N/A    Patient agreeable to home care: No  [place] of choice provided:  n/a    Prior SNF/Rehab Placement and Facility: none  Agreeable to SNF/Rehab: No  [place] of choice provided: n/a     Evaluation: no    Expected Discharge date:       Patient expects to be discharged to:  home  Follow Up Appointment: Best Day/ Time:      Transportation provider: *cab  Transportation arrangements needed for discharge: Yes    Readmission Risk              Risk of Unplanned Readmission:        0             Does patient have a readmission risk score greater than 14?: n/a  If yes, follow-up appointment must be made within 7 days of discharge.      Goals of Care: answers      Discharge Plan: Home independently will need a cab          Electronically signed by Bib Reid RN on 3/8/21 at 4:49 PM EST

## 2021-03-08 NOTE — ED PROVIDER NOTES
Faculty Sign-Out Attestation  Handoff taken on the following patient from prior Attending Physician: Galina Quinones    I was available and discussed any additional care issues that arose and coordinated the management plans with the resident(s) caring for the patient during my duty period. Any areas of disagreement with residents documentation of care or procedures are noted on the chart. I was personally present for the key portions of any/all procedures during my duty period. I have documented in the chart those procedures where I was not present during the key portions. 82-year-old female presenting after syncopal event found to have acute renal failure and hyperkalemia 5.9. Has received insulin and dextrose for this. Admitted pending bed assignment.     Adrian Dos Santos MD  Attending Physician        Adrian Dos Santos MD  03/08/21 [id number]

## 2021-03-08 NOTE — ED NOTES
Pt sitting in chair  Remains on cardiac monitor   NAD, resp even and non-labored, pt has no complainants  Pt updated on plan of care      Gladys Mattson RN  03/08/21 [id number]

## 2021-03-08 NOTE — TELEPHONE ENCOUNTER
E-scribe request for spironolactone  Please review and e-scribe if applicable.      Last Visit Date: 02/19/2021  Next Visit Date:  Visit date not found    Hemoglobin A1C (%)   Date Value   12/10/2019 5.6   10/18/2019 5.8             ( goal A1C is < 7)   No results found for: LABMICR  LDL Cholesterol (mg/dL)   Date Value   03/03/2021 54       (goal LDL is <100)   AST (U/L)   Date Value   05/26/2020 19     ALT (U/L)   Date Value   05/26/2020 17     BUN (mg/dL)   Date Value   03/03/2021 15     BP Readings from Last 3 Encounters:   02/25/21 122/68   02/19/21 (!) 98/46   02/12/21 (!) 78/46          (goal 120/80)        Patient Active Problem List:     Atrial flutter (HCC)     Sleep-related breathing disorder     Hypokalemia     Atrial fibrillation (HCC)     Ischemic cardiomyopathy     Combined systolic and diastolic congestive heart failure (HCC)     Acute cystitis without hematuria     Hx of CABG     Heart failure, systolic and diastolic, chronic (HCC)     Bilateral hand numbness     Right thigh pain     Left leg weakness     Coronary artery disease     Chronic cough     Gastroesophageal reflux disease without esophagitis     Overflow incontinence of urine     Polyp of colon     Post-void dribbling     Post-nasal drip

## 2021-03-08 NOTE — ED NOTES
Bed: 15  Expected date:   Expected time:   Means of arrival:   Comments:  [id number], RN  03/08/21 8443

## 2021-03-08 NOTE — TELEPHONE ENCOUNTER
Dorota Request for pending medications.     Last Visit Date: 2/19/21  Next Visit Date:  Future Appointments   Date Time Provider Taj Flores   3/11/2021 10:00 AM [facility] 1 [facility]   3/15/2021  3:00 PM MD snow Grant [address] Maintenance   Topic Date Due    COVID-19 Vaccine (1 of 2) Never done    Lipid screen  03/03/2022    Potassium monitoring  03/03/2022    Creatinine monitoring  03/03/2022    Diabetes screen  12/10/2022    DTaP/Tdap/Td vaccine (2 - Td) 08/20/2030    Colon cancer screen colonoscopy  01/12/2031    Flu vaccine  Completed    Shingles Vaccine  Completed    Pneumococcal 0-64 years Vaccine  Completed    Hepatitis C screen  Completed    HIV screen  Completed    Hepatitis A vaccine  Aged Out    Hepatitis B vaccine  Aged Out    Hib vaccine  Aged Out    Meningococcal (ACWY) vaccine  Aged Out       Hemoglobin A1C (%)   Date Value   12/10/2019 5.6   10/18/2019 5.8             ( goal A1C is < 7)   No results found for: LABMICR  LDL Cholesterol (mg/dL)   Date Value   03/03/2021 54       (goal LDL is <100)   AST (U/L)   Date Value   05/26/2020 19     ALT (U/L)   Date Value   05/26/2020 17     BUN (mg/dL)   Date Value   03/03/2021 15     BP Readings from Last 3 Encounters:   02/25/21 122/68   02/19/21 (!) 98/46   02/12/21 (!) 78/46          (goal 120/80)    All Future Testing planned in [facility]  Lab Frequency Next Occurrence   Basic Metabolic Panel Once 68/66/2570   COLONOSCOPY (Diagnostic) Once 76/82/0180   Basic Metabolic Panel Once 97/09/7350   COLONOSCOPY (Diagnostic) Once 01/11/2021   COVID-19 Once 01/05/2021   US URINARY BLADDER LIMITED Once 12/01/2021       Next Visit Date:  Future Appointments   Date Time Provider Taj Flores   3/11/2021 10:00 AM [facility] RM 1 [facility]   3/15/2021  3:00 PM MD snow Grant [id number]         Patient Active Problem List:     Atrial flutter ([address]     Sleep-related breathing disorder     Hypokalemia     Atrial fibrillation (HCC)     Ischemic cardiomyopathy     Combined systolic and diastolic congestive heart failure (HCC)     Acute cystitis without hematuria     Hx of CABG     Heart failure, systolic and diastolic, chronic (HCC)     Bilateral hand numbness     Right thigh pain     Left leg weakness     Coronary artery disease     Chronic cough     Gastroesophageal reflux disease without esophagitis     Overflow incontinence of urine     Polyp of colon     Post-void dribbling     Post-nasal drip

## 2021-03-08 NOTE — ED NOTES
Pt requesting to sit in a chair at bedside, pt assisted to chair, remains on monitor, call light in reach     Alisia Oliver RN  03/08/21 1408

## 2021-03-08 NOTE — ED PROVIDER NOTES
[facility] ED  Emergency Department  Senior Resident Attestation     I performed a history and physical examination of the patient and discussed management with the vilma resident. I reviewed the vilma residents note and agree with the documented findings and plan of care. Any areas of disagreement are noted on the chart. Case was then discussed with Faculty Attending Supervisor for additional medical management. PERTINENT ATTENDING PHYSICIAN COMMENTS:    HISTORY:   Gene Reid is a 63 y.o. male who  has a past medical history of Atrial fibrillation ([facility] [address] (10/2019), BPH (benign prostatic hyperplasia), Cellulitis, Cervical disc disease, CHF (congestive heart failure) ([facility] [address] Coronary artery disease (10/2019), CPAP (continuous positive airway pressure) dependence, GERD (gastroesophageal reflux disease), History of incarceration, Hyperlipidemia, Hypertension, Myocardial infarct ([facility] [address] Obesity, and Sleep apnea. and presents with complaint of dizziness, episode last week that resulted in fall and having shoulder pain. States that these episodes come out of nowhere and he just feels ill. Does not have any cold sweats, chest pain, shortness of breath and these episodes happen. He states that he starts to get blurry vision and nauseated but has not actually vomited. Had a CABG back in 2019 and has not had a stress test that he is aware of that has been recent. Recent echo showed EF greater than 55%. Is on antiplatelets and is unsure whether or not he is on any anticoagulation medications. Dates that the swelling in his legs has been baseline. No back pain or abdominal pain. PHYSICAL:    ,  Pulse: 77, Resp: 16, BP: (!) 99/48, SpO2: 98 %  Gen: WDWN, NAD, morbidly obese  Neck: Supple, free ROM  Cards: Regular rate and rhythm, distal pulses 2+ bilaterally  Pulm: Lung sounds clear to auscultation  Abdomen: Soft, non-tender, non-distended.   No abdominal bruit or palpable abdominal mass  Skin: warm, dry  Extremities: no clubbing, cyanosis. 2+ bilateral lower extremity pitting edema    IMPRESSION:   Vertigo, stroke, hypotension, hypoglycemia, hypertensive emergency, ACS, PE, dissection, CHF exacerbation    PLAN:   Cardiac work-up, CT head. Plan for likely admission to observational unit unless work-up is positive for any acute findings    Vida Shepard, [address] [place] [address]  Emergency Medicine Resident Physician, PGY-3  03/08/21 11:46 AM    (Please note that portions of this note were completed with a voice recognition program.  Efforts were made to edit the dictations but occasionally words are mis-transcribed.)          [address], DO  Resident  03/08/21 3615

## 2021-03-08 NOTE — ED PROVIDER NOTES
(Bilateral, 2008); fracture surgery; Cervical spine surgery; Abdominal exploration surgery; Colonoscopy (10/29/2020); Colonoscopy (N/A, 10/29/2020); Colonoscopy (10/29/2020); Colonoscopy (10/29/2020); Cardiac surgery; and Colonoscopy (N/A, 1/12/2021). No other pertinent PSH on review with patient/guardian. Social History     Socioeconomic History    Marital status:      Spouse name: Not on file    Number of children: Not on file    Years of education: Not on file    Highest education level: Not on file   Occupational History    Not on file   Social Needs    Financial resource strain: Not on file    Food insecurity     Worry: Not on file     Inability: Not on file    Transportation needs     Medical: Not on file     Non-medical: Not on file   Tobacco Use    Smoking status: Never Smoker    Smokeless tobacco: Never Used   Substance and Sexual Activity    Alcohol use: Not Currently     Comment: stopped 1991    Drug use: Not Currently    Sexual activity: Not Currently   Lifestyle    Physical activity     Days per week: Not on file     Minutes per session: Not on file    Stress: Not on file   Relationships    Social connections     Talks on phone: Not on file     Gets together: Not on file     Attends Hinduism service: Not on file     Active member of club or organization: Not on file     Attends meetings of clubs or organizations: Not on file     Relationship status: Not on file    Intimate partner violence     Fear of current or ex partner: Not on file     Emotionally abused: Not on file     Physically abused: Not on file     Forced sexual activity: Not on file   Other Topics Concern    Not on file   Social History Narrative    Not on file     I counseled the patient against using tobacco products. Family History   Problem Relation Age of Onset    Alcohol Abuse Maternal Uncle     Heart Attack Maternal Uncle      No other pertinent FamHx on review with patient/guardian.     Allergies: Patient has no known allergies. Home Medications:  Prior to Admission medications    Medication Sig Start Date End Date Taking? Authorizing Provider   clopidogrel (PLAVIX) 75 MG tablet Take 1 tablet by mouth daily 3/8/21   Rama Sanchez MD   isosorbide mononitrate (IMDUR) 30 MG extended release tablet Take 1 tablet by mouth daily 3/8/21   Rama Sanchez MD   spironolactone (ALDACTONE) 25 MG tablet Take 1 tablet by mouth daily 3/8/21   Rama Sanchez MD   clopidogrel (PLAVIX) 75 MG tablet TAKE 1 TABLET BY MOUTH DAILY 3/8/21   Rama Sanchez MD   spironolactone (ALDACTONE) 25 MG tablet TAKE 1 TABLET BY MOUTH DAILY 3/8/21   Fernando Magaña MD   loratadine (CLARITIN) 10 MG tablet TAKE 1 TABLET BY MOUTH DAILY 3/8/21   Fernando Magaña MD   atorvastatin (LIPITOR) 40 MG tablet TAKE 1 TABLET BY MOUTH NIGHTLY 2/25/21   Colonel Gabby MD   furosemide (LASIX) 20 MG tablet Take 3 tablets by mouth 2 times daily 2/25/21   Radford Fleischer, APRN - CNP   tamsulosin [facility]) 0.4 MG capsule Take 1 capsule by mouth daily 2/12/21   Bhavin Jones MD   polyethylene glycol [facility]) 17 GM/SCOOP powder Use as directed by following your patient instructions given by office. Patient not taking: Reported on 2/19/2021 12/22/20   Juice Mayfield MD   bisacodyl (DULCOLAX) 5 MG EC tablet TAKE 4 TABS AS DIRECTED BY PHYSICIAN OFFICE  Patient not taking: Reported on 2/19/2021 12/22/20   Juice Mayfield MD   magnesium citrate solution Take 296 mLs by mouth once for 1 dose 12/22/20 12/22/20  Juice Mayfield MD   sucralfate (CARAFATE) 1 GM tablet Take 1 tablet by mouth 4 times daily 12/11/20   Keiry Sethi MD   potassium chloride (KLOR-CON M) 20 MEQ extended release tablet Take 1 tablet by mouth daily 12/3/20   Colonel Gabby MD   nitroGLYCERIN (NITROSTAT) 0.4 MG SL tablet Place 1 tablet under the tongue every 5 minutes as needed for Chest pain up to max of 3 total doses.  If no relief after 1 dose, call 911. 11/23/20   Colonel Gabby MD   acetaminophen (TYLENOL) 500 MG tablet Take 1 tablet by mouth every 12 hours as needed for Pain  Patient not taking: Reported on 2/19/2021 11/23/20   León Ibanez MD   pantoprazole (PROTONIX) 40 MG tablet Take 1 tablet by mouth 2 times daily (before meals) 11/23/20   León Ibanez MD   losartan (COZAAR) 50 MG tablet Take 1 tablet by mouth daily 11/23/20   León Ibanez MD   bisacodyl (DULCOLAX) 5 MG EC tablet TAKE 4 TABS AS DIRECTED BY PHYSICIAN OFFICE  Patient not taking: Reported on 2/19/2021 10/7/20   Apollo Chow MD   magnesium citrate solution Take 296 mLs by mouth once for 1 dose 10/7/20 10/7/20  Apollo Chow MD   diclofenac sodium (VOLTAREN) 1 % GEL APPLY 2 G TOPICALLY 4 TIMES DAILY AS NEEDED FOR PAIN  Patient not taking: Reported on 2/19/2021 9/25/20   León Ibanez MD   Elastic Bandages & Supports (JOBST KNEE HIGH COMPRESSION SM) MISC Wear q day. Remove q hs 8/17/20   LUDY Torres CNP   Elastic Bandages & Supports (JOBST KNEE HIGH COMPRESSION SM) MISC 2 each by Does not apply route daily Wear q day. Remove q hs. Diagnosis: Chronic venous insufficiency 8/12/20   LUDY Torres CNP   polyethylene glycol (MIRALAX) 17 GM/SCOOP POWD powder 250g bottle. For colonoscopy prep. Follow per instructions from clinic. Patient not taking: Reported on 2/19/2021 7/29/20   Gino Mota DO   Elastic Bandages & Supports (JOBST OPAQUE KNEE 20-30MMHG XL) MISC Dispense two pair closed toe knee high 20 to 30 mmHg Jobst compression stockings 6/22/20   León Ibanez MD   white petrolatum GEL Apply 28 g topically as needed for Dry Lips  Patient not taking: Reported on 2/19/2021 2/14/20   Jennifer Guan DO     REVIEW OF SYSTEMS    (2-9 systems for level 4, 10 ormore for level 5)      Review of Systems   Constitutional: Negative for fever. Eyes: Positive for visual disturbance. Respiratory: Negative for cough and shortness of breath. Cardiovascular: Negative for chest pain.    Gastrointestinal: Positive for nausea. Negative for abdominal pain, constipation, diarrhea and vomiting. Genitourinary: Negative for dysuria and frequency. Musculoskeletal:        Positive for left shoulder pain   Skin: Negative for rash. Allergic/Immunologic: Negative for immunocompromised state. Neurological: Positive for dizziness. Negative for syncope, weakness, numbness and headaches. Hematological: Does not bruise/bleed easily. PHYSICAL EXAM   (up to 7 for level 4, 8 or more for level 5)      INITIAL VITALS:   BP (!) 102/49   Pulse 76   Temp 98.7 °F (37.1 °C) (Oral)   Resp (!) 38   Ht 5' 7\" (1.702 m)   Wt (!) 320 lb (145.2 kg)   SpO2 97%   BMI 50.12 kg/m²     Physical Exam  Constitutional:       General: He is not in acute distress. Appearance: Normal appearance. He is obese. He is not ill-appearing, toxic-appearing or diaphoretic. HENT:      Head: Normocephalic and atraumatic. Right Ear: External ear normal.      Left Ear: External ear normal.   Eyes:      General:         Right eye: No discharge. Left eye: No discharge. Cardiovascular:      Rate and Rhythm: Normal rate and regular rhythm. Pulses: Normal pulses. Heart sounds: No murmur. Pulmonary:      Effort: Pulmonary effort is normal. No respiratory distress. Comments: Decreased breath sounds  Abdominal:      Palpations: Abdomen is soft. Tenderness: There is no abdominal tenderness. Musculoskeletal:      Right lower leg: Edema present. Left lower leg: Edema present. Comments: Edema symmetric, at baseline per patient. Mild diffuse tenderness of left shoulder. Pain with abduction. Radial pulse 2+. Skin:     Capillary Refill: Capillary refill takes less than 2 seconds. Neurological:      General: No focal deficit present. Mental Status: He is alert and oriented to person, place, and time. Cranial Nerves: No cranial nerve deficit. Sensory: No sensory deficit. Motor: No weakness. Coordination: Coordination normal.       DIFFERENTIAL  DIAGNOSIS     PLAN (LABS / IMAGING / EKG):  Orders Placed This Encounter   Procedures    XR CHEST (2 VW)    XR SHOULDER LEFT (MIN 2 VIEWS)    CT HEAD WO CONTRAST    CBC Auto Differential    BASIC METABOLIC PANEL    MAGNESIUM    PHOSPHORUS    Troponin    Brain Natriuretic Peptide    Troponin    Protime-INR    Inpatient consult to Hospitalist    Inpatient consult to Family Practice    POC Glucose Fingerstick    POC Glucose Fingerstick    EKG 12 Lead    PATIENT STATUS (FROM ED OR OR/PROCEDURAL) Observation     MEDICATIONS ORDERED:  Orders Placed This Encounter   Medications    0.9 % sodium chloride bolus    insulin regular (HUMULIN R;NOVOLIN R) injection 10 Units    dextrose 50 % IV solution    lactated ringers bolus     DIAGNOSTIC RESULTS / EMERGENCY DEPARTMENT COURSE / MDM     LABS:  Results for orders placed or performed during the hospital encounter of 03/08/21   CBC Auto Differential   Result Value Ref Range    WBC 11.0 3.5 - 11.3 k/uL    RBC 3.83 (L) 4.21 - 5.77 m/uL    Hemoglobin 10.9 (L) 13.0 - 17.0 g/dL    Hematocrit 34.8 (L) 40.7 - 50.3 %    MCV 90.9 82.6 - 102.9 fL    MCH 28.5 25.2 - 33.5 pg    MCHC 31.3 28.4 - 34.8 g/dL    RDW 14.7 (H) 11.8 - 14.4 %    Platelets 925 685 - 438 k/uL    MPV 9.4 8.1 - 13.5 fL    NRBC Automated 0.0 0.0 per 100 WBC    Differential Type NOT REPORTED     Seg Neutrophils 74 (H) 36 - 65 %    Lymphocytes 10 (L) 24 - 43 %    Monocytes 11 3 - 12 %    Eosinophils % 2 1 - 4 %    Basophils 1 0 - 2 %    Immature Granulocytes 2 (H) 0 %    Segs Absolute 8.25 (H) 1.50 - 8.10 k/uL    Absolute Lymph # 1.09 (L) 1.10 - 3.70 k/uL    Absolute Mono # 1.18 0.10 - 1.20 k/uL    Absolute Eos # 0.19 0.00 - 0.44 k/uL    Basophils Absolute 0.08 0.00 - 0.20 k/uL    Absolute Immature Granulocyte 0.23 0.00 - 0.30 k/uL    WBC Morphology NOT REPORTED     RBC Morphology ANISOCYTOSIS PRESENT     Platelet Estimate NOT REPORTED    BASIC METABOLIC PANEL   Result Value Ref Range    Glucose 144 (H) 70 - 99 mg/dL    BUN 29 (H) 8 - 23 mg/dL    CREATININE 1.60 (H) 0.70 - 1.20 mg/dL    Bun/Cre Ratio NOT REPORTED 9 - 20    Calcium 8.9 8.6 - 10.4 mg/dL    Sodium 133 (L) 135 - 144 mmol/L    Potassium 5.9 (H) 3.7 - 5.3 mmol/L    Chloride 98 98 - 107 mmol/L    CO2 20 20 - 31 mmol/L    Anion Gap 15 9 - 17 mmol/L    GFR Non-African American 44 (L) >60 mL/min    GFR  53 (L) >60 mL/min    GFR Comment          GFR Staging NOT REPORTED    MAGNESIUM   Result Value Ref Range    Magnesium 2.4 1.6 - 2.6 mg/dL   PHOSPHORUS   Result Value Ref Range    Phosphorus 4.0 2.5 - 4.5 mg/dL   Brain Natriuretic Peptide   Result Value Ref Range    Pro- (H) <300 pg/mL    BNP Interpretation Pro-BNP Reference Range:    Troponin   Result Value Ref Range    Troponin, High Sensitivity 27 (H) 0 - 22 ng/L    Troponin T NOT REPORTED <0.03 ng/mL    Troponin Interp NOT REPORTED    Protime-INR   Result Value Ref Range    Protime 10.6 9.1 - 12.3 sec    INR 1.0    POC Glucose Fingerstick   Result Value Ref Range    POC Glucose 113 (H) 75 - 110 mg/dL   POC Glucose Fingerstick   Result Value Ref Range    POC Glucose 91 75 - 110 mg/dL       IMPRESSION/MDM/ED COURSE:  63 y.o. male presented with episodic blurry vision, nausea, and dizziness x1 week. Patient hypotensive with BP 90s over 40s. No dizziness or lightheadedness currently. On exam patient is in no acute distress, nontoxic. Heart RRR. Decreased breath sounds, likely secondary to body habitus. Symmetric edema of legs bilaterally. No focal neurologic deficits. Will obtain cardiac work-up including electrolytes and head CT. Left shoulder x-ray also obtained due to fall last week. ED Course as of Mar 09 0901   Mon Mar 08, 2021   1130 IV established, will give 500 cc fluid bolus. [AF]   1213 New SYDNEY and hyperkalemia. Patient takes Lasix 60 mg twice daily and potassium supplement.   No EKG changes so we will hold calcium. Will treat with insulin and glucose. BASIC METABOLIC PANEL(!):    Glucose 144(!)   BUN 29(!)   Creatinine 1.60(!)   Bun/Cre Ratio NOT REPORTED   Calcium 8.9   Sodium 133(!)   Potassium 5.9(!)   Chloride 98   CO2 20   Anion Gap 15   GFR Non- 44(!)   GFR  53(!)   GFR Comment        GFR Staging NOT REPORTED [AF]   1219 IMPRESSION:  Multilevel degenerative changes    [AF]   1220 IMPRESSION:  No acute cardiopulmonary findings    [AF]   1315 IMPRESSION:  No acute intracranial abnormality. Senescent changes including chronic  microvascular change. Findings of sinus inflammation.    [AF]   4378 Spoke to [facility] family practice will see and evaluate the patient.    [AF]   [id number] Accepted by family medicine for admission.    [AF]      ED Course User Index  [AF] Kristie Huerta DO     Patient/Guardian requesting discharge. Patient/Guardian was given written and verbal instructions prior to discharge. Patient/Guardian understood and agreed. Patient/Guardian had no further questions. RADIOLOGY:  CT HEAD WO CONTRAST   Preliminary Result   No acute intracranial abnormality. Senescent changes including chronic   microvascular change. Findings of sinus inflammation. XR CHEST (2 VW)   Final Result   No acute cardiopulmonary findings         XR SHOULDER LEFT (MIN 2 VIEWS)   Final Result   Multilevel degenerative changes           EKG  Normal sinus rhythm with PVCs. Normal axis. No ST elevation or depression. Nonspecific T wave abnormalities unchanged from previous. . All EKG's are interpreted by the Emergency Department Physician who either signs or Co-signs this chart in the absence of a cardiologist.    PROCEDURES:  None    CONSULTS:  IP CONSULT TO HOSPITALIST  IP CONSULT TO FAMILY MEDICINE    FINAL IMPRESSION      1. SYDNEY (acute kidney injury) ([facility] [address]    2.  Hyperkalemia        DISPOSITION / PLAN     DISPOSITION Admitted 03/08/2021 02:25:57 PM    PATIENT

## 2021-03-08 NOTE — H&P
[facility]  History & Physical Examination Note              Date:   3/8/2021  Patient name:  Marcella Gaming  Date of admission:  3/8/2021 11:04 AM  MRN:   [id number]  YOB: 1955    CHIEF COMPLAINT:     Chief Complaint   Patient presents with    Dizziness     started about a week ago       History Obtained From:  Patient and chart review. HPI:     The patient is a 63 y.o.  male with history of HTN, CHF, CAD with CABG in 2019, who presented to the ED with blurry vision, fatigue and wooziness that has been ongoing for the last 1 week. Patient states he has been feeling short of breath for the last month. [facility] CHF clinic, last seen on 02/25. Patient takes Lasix 20 mg 3 times daily for CHF. Denies headache, chest pain, abdominal pain, diarrhea, problems with urination, fever, chills, nausea, vomiting. Does not check his blood pressure at home. States today he felt more weak than usual and decided to come to the Emergency department. Patient had some labs done on 03/032021 which were unremarkable, including kidney function. Patient is morbidly obese, does not follow appropriate low sodium diet and not compliant with fluid restriction. In the ED, CT head was negative, CXR was negative. Pro BNP mildly elevated at 448, Creatinine 1.60 and Potassium 5.9. Patient will be admitted for management of SYDNEY and Hypotension. BP was 99/48, was given 500 mL fluid bolus, D50 and IV Insulin. PAST MEDICAL HISTORY:        has a past medical history of Atrial fibrillation ([address] BPH (benign prostatic hyperplasia), Cellulitis, Cervical disc disease, CHF (congestive heart failure) ([address] Coronary artery disease, CPAP (continuous positive airway pressure) dependence, GERD (gastroesophageal reflux disease), History of incarceration, Hyperlipidemia, Hypertension, Myocardial infarct ([address] Obesity, and Sleep apnea.     PAST SURGICAL HISTORY: has a past surgical history that includes transesophageal echocardiogram (10/16/2019); Cardioversion (10/16/2019); Coronary Artery Bypass Graft Maze Procedure (N/A, 10/21/2019); Carpal tunnel release (Bilateral, 2008); fracture surgery; Cervical spine surgery; Abdominal exploration surgery; Colonoscopy (10/29/2020); Colonoscopy (N/A, 10/29/2020); Colonoscopy (10/29/2020); Colonoscopy (10/29/2020); Cardiac surgery; and Colonoscopy (N/A, 1/12/2021). FAMILY HISTORY:     family history includes Alcohol Abuse in his maternal uncle; Heart Attack in his maternal uncle. HOME MEDICATIONS:     Prior to Admission medications    Medication Sig Start Date End Date Taking? Authorizing Provider   clopidogrel (PLAVIX) 75 MG tablet Take 1 tablet by mouth daily 3/8/21   Kieran Bishop MD   isosorbide mononitrate (IMDUR) 30 MG extended release tablet Take 1 tablet by mouth daily 3/8/21   Kieran Bishop MD   spironolactone (ALDACTONE) 25 MG tablet Take 1 tablet by mouth daily 3/8/21   Kieran Bishop MD   clopidogrel (PLAVIX) 75 MG tablet TAKE 1 TABLET BY MOUTH DAILY 3/8/21   Kieran Bishop MD   spironolactone (ALDACTONE) 25 MG tablet TAKE 1 TABLET BY MOUTH DAILY 3/8/21   Fernando Magaña MD   loratadine (CLARITIN) 10 MG tablet TAKE 1 TABLET BY MOUTH DAILY 3/8/21   Fernando Magaña MD   atorvastatin (LIPITOR) 40 MG tablet TAKE 1 TABLET BY MOUTH NIGHTLY 2/25/21   Wing Hernandez MD   furosemide (LASIX) 20 MG tablet Take 3 tablets by mouth 2 times daily 2/25/21   LUDY Antonio - CNP   tamsulosin [facility]) 0.4 MG capsule Take 1 capsule by mouth daily 2/12/21   Asif Frausto MD   polyethylene glycol [facility]) 17 GM/SCOOP powder Use as directed by following your patient instructions given by office.   Patient not taking: Reported on 2/19/2021 12/22/20   Paco Crain MD   bisacodyl (DULCOLAX) 5 MG EC tablet TAKE 4 TABS AS DIRECTED BY PHYSICIAN OFFICE  Patient not taking: Reported on 2/19/2021 12/22/20   Paco Crain MD magnesium citrate solution Take 296 mLs by mouth once for 1 dose 12/22/20 12/22/20  Kaley Cruz MD   sucralfate (CARAFATE) 1 GM tablet Take 1 tablet by mouth 4 times daily 12/11/20   Vince Espinoza MD   potassium chloride (KLOR-CON M) 20 MEQ extended release tablet Take 1 tablet by mouth daily 12/3/20   Esperanza Estrada MD   nitroGLYCERIN (NITROSTAT) 0.4 MG SL tablet Place 1 tablet under the tongue every 5 minutes as needed for Chest pain up to max of 3 total doses. If no relief after 1 dose, call 911. 11/23/20   Esperanza Estrada MD   acetaminophen (TYLENOL) 500 MG tablet Take 1 tablet by mouth every 12 hours as needed for Pain  Patient not taking: Reported on 2/19/2021 11/23/20   Esperanza Estrada MD   pantoprazole (PROTONIX) 40 MG tablet Take 1 tablet by mouth 2 times daily (before meals) 11/23/20   Esperanza Estrada MD   losartan (COZAAR) 50 MG tablet Take 1 tablet by mouth daily 11/23/20   Esperanza Estrada MD   bisacodyl (DULCOLAX) 5 MG EC tablet TAKE 4 TABS AS DIRECTED BY PHYSICIAN OFFICE  Patient not taking: Reported on 2/19/2021 10/7/20   Vince Espinoza MD   magnesium citrate solution Take 296 mLs by mouth once for 1 dose 10/7/20 10/7/20  Vince Espinoza MD   diclofenac sodium (VOLTAREN) 1 % GEL APPLY 2 G TOPICALLY 4 TIMES DAILY AS NEEDED FOR PAIN  Patient not taking: Reported on 2/19/2021 9/25/20   Esperanza Estrada MD   Elastic Bandages & Supports (JOBST KNEE HIGH COMPRESSION SM) MISC Wear q day. Remove q hs 8/17/20   LUDY Pruitt CNP   Elastic Bandages & Supports (JOBST KNEE HIGH COMPRESSION SM) MISC 2 each by Does not apply route daily Wear q day. Remove q hs. Diagnosis: Chronic venous insufficiency 8/12/20   LUDY Pruitt CNP   polyethylene glycol (MIRALAX) 17 GM/SCOOP POWD powder 250g bottle. For colonoscopy prep. Follow per instructions from clinic.   Patient not taking: Reported on 2/19/2021 7/29/20   Zenaida Cook DO   Elastic Bandages & Supports (JOBST OPAQUE KNEE 20-30MMHG XL) MISC Dispense two pair closed toe knee high 20 to 30 mmHg Jobst compression stockings 20   Fahad Hayes MD   white petrolatum GEL Apply 28 g topically as needed for Dry Lips  Patient not taking: Reported on 2021   Deangelo Prado DO       ALLERGIES:      Patient has no known allergies. SOCIAL HISTORY:      reports that he has never smoked. He has never used smokeless tobacco. He reports previous alcohol use. He reports previous drug use. REVIEW OF SYSTEMS:     Review of Systems   Constitutional: Positive for fatigue. Negative for chills and fever. Eyes: Positive for visual disturbance. Respiratory: Positive for shortness of breath. Negative for cough. Cardiovascular: Positive for leg swelling. Negative for chest pain and palpitations. Gastrointestinal: Negative for abdominal pain, diarrhea, nausea and vomiting. Endocrine: Positive for cold intolerance. Negative for polyuria. Genitourinary: Negative for dysuria. Neurological: Positive for weakness and numbness (hands and feet). All other systems reviewed and are negative. PHYSICAL EXAM:     Vitals:    21 1131 21 1209 21 1313 21 1402   BP: (!) 96/55 (!) 94/44  (!) 102/49   Pulse: 73 80  76   Resp: 17 22  (!) 38   Temp:   98.7 °F (37.1 °C)    TempSrc:   Oral    SpO2: 95% 95%  97%   Weight:       Height:           No intake or output data in the 24 hours ending 21 1445    Physical Exam  Vitals signs reviewed. Constitutional:       General: He is awake. He is not in acute distress. Appearance: He is morbidly obese. Neck:      Vascular: No carotid bruit. Cardiovascular:      Rate and Rhythm: Normal rate and regular rhythm. Pulmonary:      Effort: Pulmonary effort is normal.      Breath sounds: Decreased breath sounds present. Musculoskeletal:      Right lower le+ Pitting Edema present. Left lower le+ Pitting Edema present.    Neurological:      Mental Status: He is alert and easily aroused. Psychiatric:         Behavior: Behavior is cooperative.        DIAGNOSTICS:      Laboratory Testing:    Recent Results (from the past 24 hour(s))   CBC Auto Differential    Collection Time: 03/08/21 11:22 AM   Result Value Ref Range    WBC 11.0 3.5 - 11.3 k/uL    RBC 3.83 (L) 4.21 - 5.77 m/uL    Hemoglobin 10.9 (L) 13.0 - 17.0 g/dL    Hematocrit 34.8 (L) 40.7 - 50.3 %    MCV 90.9 82.6 - 102.9 fL    MCH 28.5 25.2 - 33.5 pg    MCHC 31.3 28.4 - 34.8 g/dL    RDW 14.7 (H) 11.8 - 14.4 %    Platelets 252 395 - 193 k/uL    MPV 9.4 8.1 - 13.5 fL    NRBC Automated 0.0 0.0 per 100 WBC    Differential Type NOT REPORTED     Seg Neutrophils 74 (H) 36 - 65 %    Lymphocytes 10 (L) 24 - 43 %    Monocytes 11 3 - 12 %    Eosinophils % 2 1 - 4 %    Basophils 1 0 - 2 %    Immature Granulocytes 2 (H) 0 %    Segs Absolute 8.25 (H) 1.50 - 8.10 k/uL    Absolute Lymph # 1.09 (L) 1.10 - 3.70 k/uL    Absolute Mono # 1.18 0.10 - 1.20 k/uL    Absolute Eos # 0.19 0.00 - 0.44 k/uL    Basophils Absolute 0.08 0.00 - 0.20 k/uL    Absolute Immature Granulocyte 0.23 0.00 - 0.30 k/uL    WBC Morphology NOT REPORTED     RBC Morphology ANISOCYTOSIS PRESENT     Platelet Estimate NOT REPORTED    BASIC METABOLIC PANEL    Collection Time: 03/08/21 11:22 AM   Result Value Ref Range    Glucose 144 (H) 70 - 99 mg/dL    BUN 29 (H) 8 - 23 mg/dL    CREATININE 1.60 (H) 0.70 - 1.20 mg/dL    Bun/Cre Ratio NOT REPORTED 9 - 20    Calcium 8.9 8.6 - 10.4 mg/dL    Sodium 133 (L) 135 - 144 mmol/L    Potassium 5.9 (H) 3.7 - 5.3 mmol/L    Chloride 98 98 - 107 mmol/L    CO2 20 20 - 31 mmol/L    Anion Gap 15 9 - 17 mmol/L    GFR Non-African American 44 (L) >60 mL/min    GFR  53 (L) >60 mL/min    GFR Comment          GFR Staging NOT REPORTED    MAGNESIUM    Collection Time: 03/08/21 11:22 AM   Result Value Ref Range    Magnesium 2.4 1.6 - 2.6 mg/dL   PHOSPHORUS    Collection Time: 03/08/21 11:22 AM   Result Value Ref Range    Phosphorus 4.0 M-Mode, Doppler, Color Doppler. Procedure Date Date: 03/03/2021 Start: 10:55 AM Study Location: [facility] Technical Quality: Adequate visualization Indications:Dyspnea/SOB. History / Tech. Comments: Procedure explained to patient. CHF, HX CABG Patient Status: Outpatient Height: 67 inches Weight: 320.01 pounds BSA: 2.47 m^2 BMI: 50.12 kg/m^2 HR: 83 bpm Allergies   - *No Known Allergies. CONCLUSIONS Summary Normal LV size and wall thickness. No obvious wall motion abnormality seen. Normal LV systolic function with LVEF >55%. Normal RV size and function. RV systolic pressure 29 mmHg LA and RA appears normal in size. No obvious significant structural valvular abnormality noted. No significant valvular stenosis or regurgitation noted. Normal aortic root dimension. No significant pericardial effusion noted. No obvious intra-cardiac mass or shunt noted. IVC normal diameter and inspiratory collapse indicating normal RA filling pressure. Signature ----------------------------------------------------------------------------  Electronically signed by Kevin Fiore(Interpreting physician) on  03/04/2021 12:22 PM ---------------------------------------------------------------------------- ----------------------------------------------------------------------------  Electronically signed by Rocio Jovel(Sonographer) on 03/03/2021  11:57 AM ---------------------------------------------------------------------------- FINDINGS Left Atrium Left atrium is normal in size. Left Ventricle Left ventricle is normal in size. Global left ventricular systolic function is normal. Calculated ejection fraction 61% by Heart Model. Abnormal septal motion; may be due to post open heart. Right Atrium Right atrium is normal in size. Right Ventricle Normal right ventricular size and function. TAPSE value of 1.93cm noted. Mitral Valve Normal mitral valve structure. Mild mitral regurgitation.  Aortic Valve Normal aortic valve structure and function without stenosis or regurgitation. Tricuspid Valve No obvious valvular abnormality. Trivial tricuspid regurgitation. No pulmonary hypertension. Estimated right ventricular systolic pressure is 56UDKE. Pulmonic Valve The pulmonic valve is normal in structure. No pulmonic insufficiency. Pericardial Effusion No pericardial effusion seen. Miscellaneous E/E' average = 16.3. IVC normal diameter & inspiratory collapse indicating normal RA filling pressure .  M-mode / 2D Measurements & Calculations:   LVIDd:5.1 cm(3.7 - 5.6 cm)       Diastolic EYNBHT:877 ml  YNLVV:8.0 cm(2.2 - 4.0 cm)       Systolic LHLGIO:26 ml  IVSd:1 cm(0.6 - 1.1 cm)          Aortic Root:3.2 cm(2.0 - 3.7 cm)  LVPWd:1 cm(0.6 - 1.1 cm)         LA Dimension: 4.3 cm(1.9 - 4.0 cm)  Fractional Shortenin.25 %    LA volume/Index: 61.43 ml /25m^2  Calculated LVEF (%): 61.18 %     LVOT:1.9 cm                                   RVDd:3.6 cm   Mitral:                                 Aortic   Valve Area (P1/2-Time): 3.33 cm^2       Peak Velocity: 1.59 m/s  Peak E-Wave: 0.97 m/s                   Mean Velocity: 1.08 m/s  Peak A-Wave: 0.60 m/s                   Peak Gradient: 10.11 mmHg  E/A Ratio: 1.62                         Mean Gradient: 5 mmHg  Peak Gradient: 3.79 mmHg  Mean Gradient: 2 mmHg  Deceleration Time: 227 msec             Area (continuity): 2.32 cm^2  P1/2t: 66 msec                          AV VTI: 32.3 cm   Area (continuity): 2.76 cm^2  Mean Velocity: 0.59 m/s   Tricuspid:                              Pulmonic:   Peak TR Velocity: 2.36 m/s              Peak Velocity: 1.17 m/s  Peak TR Gradient: 22.2784 mmHg          Peak Gradient: 5.48 mmHg  Diastology / Tissue Doppler Septal Wall E' velocity:0.06 m/s Septal Wall E/E':17.2 Lateral Wall E' velocity:0.06 m/s Lateral Wall E/E':15.4    Xr Chest (2 Vw)    Result Date: 3/8/2021  EXAMINATION: TWO XRAY VIEWS OF THE CHEST 3/8/2021 11:49 am COMPARISON: 2021, chest exam HISTORY: white matter is present consistent with chronic microvascular change. Mild diffuse cortical atrophy is evident. The patient has a remote right basal ganglial infarct. ORBITS: The visualized portion of the orbits demonstrate no acute abnormality. SINUSES: Moderate inflammatory change in the ethmoid sinuses. Mild inflammatory change in the sphenoid sinuses. Mastoid air cells are appropriately aerated. SOFT TISSUES/SKULL:  No acute abnormality of the visualized skull or soft tissues. No acute intracranial abnormality. Senescent changes including chronic microvascular change. Findings of sinus inflammation. Us Renal Complete    Result Date: 3/3/2021  EXAMINATION: RETROPERITONEAL ULTRASOUND OF THE KIDNEYS AND URINARY BLADDER 3/3/2021 COMPARISON: None HISTORY: ORDERING SYSTEM PROVIDED HISTORY: Post-void dribbling FINDINGS: Kidneys: The right kidney measures 12.2 cm in length and the left kidney measures 12.3 cm in length. Borderline thin appearance of the renal cortex bilaterally which could reflect medical renal disease. No suspicious mass or shadowing calculus. Bladder: Prevoid urinary bladder volume 174 mL. No significant postvoid residual volume was demonstrated. Bilateral ureteral jets were visualized. No focal urinary bladder wall abnormality was identified. No suspicious renal mass, calculus, or hydronephrosis. Borderline thin appearance of the renal cortex bilaterally, which could reflect medical renal disease. Unremarkable ultrasound appearance of the urinary bladder. Xr Shoulder Left (min 2 Views)    Result Date: 3/8/2021  EXAMINATION: TWO XRAY VIEWS OF THE LEFT SHOULDER 3/8/2021 11:49 am COMPARISON: None.  HISTORY: ORDERING SYSTEM PROVIDED HISTORY: fall - left shoulder pain TECHNOLOGIST PROVIDED HISTORY: fall - left shoulder pain FINDINGS: There are degenerative/posttraumatic changes of the left distal clavicle/acromioclavicular joint with associated deformity/spurring There are a few degenerative cysts seen at the posterolateral aspect left humeral head There is mild glenohumeral joint space compromise There are postsurgical changes of the mid to lower cervical spine No definite acute fracture, dislocation or abnormal soft tissue calcification     Multilevel degenerative changes         ASSESSMENT:       Principal Problem:    SYDNEY (acute kidney injury) ([address]  Active Problems:    Combined systolic and diastolic congestive heart failure (HCC)    Hx of CABG    Coronary artery disease    Gastroesophageal reflux disease without esophagitis    Acute CHF (congestive heart failure) ([address]  Resolved Problems:    * No resolved hospital problems.  *      PLAN:     Patient status: Admit the patient as Inpatient in the Progressive Unit     SYDNEY 2/2 Medication vs BPH  - Creatinine 1.60, baseline around 0.7  - BUN 29  - Potassium 5.9, given D50 and IV Insulin in the ED  - IVF  mL/hr  - Daily BMP and CBC  - Will get Urine Na and Urine Creatinine to calculate FeNa    Acute on Chronic CHF  - Pro   - Bilateral lower extremity edema  - Will give IV Lasix 40 mg twice daily  - 1500 mL Fluid restriction  - Strict Intake and Output    Hypertension  - Imdur 30 mg daily  - Losartan 50 mg daily    HARPREET  - CPAP while sleeping     BPH  - Flomax 0.4 mg daily    Hyperlipidemia  - Lipitor 40 mg nightly    DVT prophylaxis: heparin (porcine) injection 5,000 TID  GI prophylaxis: Protonix 40 mg daily      Consultations:   Consults: IP CONSULT TO HOSPITALIST  IP CONSULT TO FAMILY MEDICINE  PT/OT    Above plan discussed with the patient in room, who agreed to the above plan     Plan will be discussed with the attending, Dr. Carmen Regalado MD  Family Medicine Resident  3/8/2021 2:45 PM

## 2021-03-08 NOTE — Clinical Note
Patient Class: Observation [104]   REQUIRED: Diagnosis: SYDNEY (acute kidney injury) ([facility] [[id number]]   Estimated Length of Stay: Estimated stay of less than 2 midnights   Admitting Provider: Sudha Rodas [[id number]]   Telemetry Bed Required?: No

## 2021-03-08 NOTE — TELEPHONE ENCOUNTER
E-scribe request for loratadine, clopidogrel . Please review and e-scribe if applicable.      Last Visit Date: 2/19/2021  Next Visit Date:  3/8/2021    Hemoglobin A1C (%)   Date Value   12/10/2019 5.6   10/18/2019 5.8             ( goal A1C is < 7)   No results found for: LABMICR  LDL Cholesterol (mg/dL)   Date Value   03/03/2021 54       (goal LDL is <100)   AST (U/L)   Date Value   05/26/2020 19     ALT (U/L)   Date Value   05/26/2020 17     BUN (mg/dL)   Date Value   03/03/2021 15     BP Readings from Last 3 Encounters:   02/25/21 122/68   02/19/21 (!) 98/46   02/12/21 (!) 78/46          (goal 120/80)        Patient Active Problem List:     Atrial flutter (HCC)     Sleep-related breathing disorder     Hypokalemia     Atrial fibrillation (HCC)     Ischemic cardiomyopathy     Combined systolic and diastolic congestive heart failure (HCC)     Acute cystitis without hematuria     Hx of CABG     Heart failure, systolic and diastolic, chronic (HCC)     Bilateral hand numbness     Right thigh pain     Left leg weakness     Coronary artery disease     Chronic cough     Gastroesophageal reflux disease without esophagitis     Overflow incontinence of urine     Polyp of colon     Post-void dribbling     Post-nasal drip

## 2021-03-08 NOTE — ED NOTES
Admitting team at bedside      Leda Morelos, [address] [facility]  03/08/21 5000

## 2021-03-08 NOTE — ED PROVIDER NOTES
[address] ED     Emergency Department     Faculty Attestation    I performed a history and physical examination of the patient and discussed management with the resident. I reviewed the residents note and agree with the documented findings and plan of care. Any areas of disagreement are noted on the chart. I was personally present for the key portions of any procedures. I have documented in the chart those procedures where I was not present during the key portions. I have reviewed the emergency nurses triage note. I agree with the chief complaint, past medical history, past surgical history, allergies, medications, social and family history as documented unless otherwise noted below. For Physician Assistant/ Nurse Practitioner cases/documentation I have personally evaluated this patient and have completed at least one if not all key elements of the E/M (history, physical exam, and MDM). Additional findings are as noted. Patient presents stating that he started feeling \"queasy\" this morning. He describes as feeling little sick to his stomach and somewhat off balance. He says that he had an episode similar to this about 1 week ago but resolved after several minutes. He says that he then had 2 more episodes during the week and then again today and this time it is not resolving. He says he did fall earlier in the week as well. He denies fever, chest pain, shortness of breath, abdominal pain. Patient does have a history of CHF and is on 60 mg of Lasix. He denies any difficulty with urinating. On exam, patient is resting comfortably in the bed. He is alert and oriented and answering questions appropriately. Lungs are clear to auscultation bilaterally and heart sounds are normal.  Abdomen is soft and nontender. There is moderate edema to the bilateral lower extremities. Patient is mildly hypotensive in the 78T to 95O systolic. Will administer small fluid bolus.   Will get EKG, chest x-ray, labs and plan to admit patient.     EKG Interpretation    Interpreted by emergency department physician    Rhythm: normal sinus   Rate: normal  Axis: normal  Ectopy: premature ventricular contractions (unifocal)  Conduction: normal  ST Segments: normal  T Waves: non specific changes  Q Waves: none    Clinical Impression: non-specific EKG    Robert Gaspar MD  Attending Emergency  Physician             Nilay Jones MD  03/08/21 [id number]

## 2021-03-08 NOTE — ED NOTES
Pt resting comfortably on cot  NAD, resp even and non-labored, pt states he if feeling better than when he arrived  Call light in reach, whiteboard updated, side rails up x2, pt remains on continuous cardiac monitor      Denita Ellis RN  03/08/21 [id number]

## 2021-03-09 VITALS
HEART RATE: 85 BPM | TEMPERATURE: 97.5 F | OXYGEN SATURATION: 97 % | SYSTOLIC BLOOD PRESSURE: 111 MMHG | WEIGHT: 315 LBS | BODY MASS INDEX: 49.44 KG/M2 | DIASTOLIC BLOOD PRESSURE: 60 MMHG | RESPIRATION RATE: 18 BRPM | HEIGHT: 67 IN

## 2021-03-09 LAB
ABSOLUTE EOS #: 0.3 K/UL (ref 0–0.44)
ABSOLUTE IMMATURE GRANULOCYTE: 0.16 K/UL (ref 0–0.3)
ABSOLUTE LYMPH #: 1.38 K/UL (ref 1.1–3.7)
ABSOLUTE MONO #: 1.15 K/UL (ref 0.1–1.2)
ANION GAP SERPL CALCULATED.3IONS-SCNC: 13 MMOL/L (ref 9–17)
BASOPHILS # BLD: 1 % (ref 0–2)
BASOPHILS ABSOLUTE: 0.05 K/UL (ref 0–0.2)
BUN BLDV-MCNC: 27 MG/DL (ref 8–23)
BUN/CREAT BLD: ABNORMAL (ref 9–20)
CALCIUM SERPL-MCNC: 9.3 MG/DL (ref 8.6–10.4)
CHLORIDE BLD-SCNC: 99 MMOL/L (ref 98–107)
CO2: 23 MMOL/L (ref 20–31)
CREAT SERPL-MCNC: 1.03 MG/DL (ref 0.7–1.2)
CREATININE URINE: 76.9 MG/DL (ref 39–259)
DIFFERENTIAL TYPE: ABNORMAL
EKG ATRIAL RATE: 312 BPM
EKG P AXIS: 73 DEGREES
EKG P-R INTERVAL: 154 MS
EKG Q-T INTERVAL: 426 MS
EKG QRS DURATION: 90 MS
EKG QTC CALCULATION (BAZETT): 472 MS
EKG R AXIS: 25 DEGREES
EKG T AXIS: 62 DEGREES
EKG VENTRICULAR RATE: 74 BPM
EOSINOPHILS RELATIVE PERCENT: 3 % (ref 1–4)
GFR AFRICAN AMERICAN: >60 ML/MIN
GFR NON-AFRICAN AMERICAN: >60 ML/MIN
GFR SERPL CREATININE-BSD FRML MDRD: ABNORMAL ML/MIN/{1.73_M2}
GFR SERPL CREATININE-BSD FRML MDRD: ABNORMAL ML/MIN/{1.73_M2}
GLUCOSE BLD-MCNC: 99 MG/DL (ref 70–99)
HCT VFR BLD CALC: 36.9 % (ref 40.7–50.3)
HEMOGLOBIN: 11.5 G/DL (ref 13–17)
IMMATURE GRANULOCYTES: 2 %
LYMPHOCYTES # BLD: 13 % (ref 24–43)
MCH RBC QN AUTO: 27.8 PG (ref 25.2–33.5)
MCHC RBC AUTO-ENTMCNC: 31.2 G/DL (ref 28.4–34.8)
MCV RBC AUTO: 89.1 FL (ref 82.6–102.9)
MONOCYTES # BLD: 11 % (ref 3–12)
NRBC AUTOMATED: 0 PER 100 WBC
PDW BLD-RTO: 14.8 % (ref 11.8–14.4)
PLATELET # BLD: 285 K/UL (ref 138–453)
PLATELET ESTIMATE: ABNORMAL
PMV BLD AUTO: 9 FL (ref 8.1–13.5)
POTASSIUM SERPL-SCNC: 4.8 MMOL/L (ref 3.7–5.3)
RBC # BLD: 4.14 M/UL (ref 4.21–5.77)
RBC # BLD: ABNORMAL 10*6/UL
SEG NEUTROPHILS: 70 % (ref 36–65)
SEGMENTED NEUTROPHILS ABSOLUTE COUNT: 7.26 K/UL (ref 1.5–8.1)
SODIUM BLD-SCNC: 135 MMOL/L (ref 135–144)
SODIUM,UR: 58 MMOL/L
TOTAL PROTEIN, URINE: 22 MG/DL
WBC # BLD: 10.3 K/UL (ref 3.5–11.3)
WBC # BLD: ABNORMAL 10*3/UL

## 2021-03-09 PROCEDURE — 85025 COMPLETE CBC W/AUTO DIFF WBC: CPT

## 2021-03-09 PROCEDURE — 99239 HOSP IP/OBS DSCHRG MGMT >30: CPT | Performed by: STUDENT IN AN ORGANIZED HEALTH CARE EDUCATION/TRAINING PROGRAM

## 2021-03-09 PROCEDURE — 6360000002 HC RX W HCPCS: Performed by: STUDENT IN AN ORGANIZED HEALTH CARE EDUCATION/TRAINING PROGRAM

## 2021-03-09 PROCEDURE — 2580000003 HC RX 258: Performed by: STUDENT IN AN ORGANIZED HEALTH CARE EDUCATION/TRAINING PROGRAM

## 2021-03-09 PROCEDURE — 84156 ASSAY OF PROTEIN URINE: CPT

## 2021-03-09 PROCEDURE — 96376 TX/PRO/DX INJ SAME DRUG ADON: CPT

## 2021-03-09 PROCEDURE — 84300 ASSAY OF URINE SODIUM: CPT

## 2021-03-09 PROCEDURE — 96372 THER/PROPH/DIAG INJ SC/IM: CPT

## 2021-03-09 PROCEDURE — 80048 BASIC METABOLIC PNL TOTAL CA: CPT

## 2021-03-09 PROCEDURE — G0378 HOSPITAL OBSERVATION PER HR: HCPCS

## 2021-03-09 PROCEDURE — 6370000000 HC RX 637 (ALT 250 FOR IP): Performed by: STUDENT IN AN ORGANIZED HEALTH CARE EDUCATION/TRAINING PROGRAM

## 2021-03-09 PROCEDURE — 82570 ASSAY OF URINE CREATININE: CPT

## 2021-03-09 RX ADMIN — CETIRIZINE HYDROCHLORIDE 10 MG: 10 TABLET ORAL at 10:02

## 2021-03-09 RX ADMIN — CLOPIDOGREL 75 MG: 75 TABLET, FILM COATED ORAL at 10:01

## 2021-03-09 RX ADMIN — TAMSULOSIN HYDROCHLORIDE 0.4 MG: 0.4 CAPSULE ORAL at 10:00

## 2021-03-09 RX ADMIN — SUCRALFATE 1 G: 1 TABLET ORAL at 10:01

## 2021-03-09 RX ADMIN — PANTOPRAZOLE SODIUM 40 MG: 40 TABLET, DELAYED RELEASE ORAL at 06:31

## 2021-03-09 RX ADMIN — FUROSEMIDE 40 MG: 10 INJECTION, SOLUTION INTRAMUSCULAR; INTRAVENOUS at 10:02

## 2021-03-09 RX ADMIN — SODIUM CHLORIDE, PRESERVATIVE FREE 10 ML: 5 INJECTION INTRAVENOUS at 10:02

## 2021-03-09 RX ADMIN — HEPARIN SODIUM 5000 UNITS: 5000 INJECTION INTRAVENOUS; SUBCUTANEOUS at 06:31

## 2021-03-09 RX ADMIN — ISOSORBIDE MONONITRATE 30 MG: 30 TABLET ORAL at 10:01

## 2021-03-09 RX ADMIN — LOSARTAN POTASSIUM 50 MG: 50 TABLET, FILM COATED ORAL at 10:01

## 2021-03-09 ASSESSMENT — PAIN SCALES - GENERAL: PAINLEVEL_OUTOF10: 7

## 2021-03-09 NOTE — DISCHARGE SUMMARY
Does not apply route daily Wear q day. Remove q hs. Diagnosis: Chronic venous insufficiency             furosemide (LASIX) 20 MG tablet  Take 3 tablets by mouth 2 times daily             isosorbide mononitrate (IMDUR) 30 MG extended release tablet  Take 1 tablet by mouth daily             loratadine (CLARITIN) 10 MG tablet  TAKE 1 TABLET BY MOUTH DAILY             losartan (COZAAR) 50 MG tablet  Take 1 tablet by mouth daily             magnesium citrate solution  Take 296 mLs by mouth once for 1 dose             nitroGLYCERIN (NITROSTAT) 0.4 MG SL tablet  Place 1 tablet under the tongue every 5 minutes as needed for Chest pain up to max of 3 total doses. If no relief after 1 dose, call 911. pantoprazole (PROTONIX) 40 MG tablet  Take 1 tablet by mouth 2 times daily (before meals)             polyethylene glycol (GLYCOLAX) 17 GM/SCOOP powder  Use as directed by following your patient instructions given by office. potassium chloride (KLOR-CON M) 20 MEQ extended release tablet  Take 1 tablet by mouth daily             spironolactone (ALDACTONE) 25 MG tablet  Take 1 tablet by mouth daily             sucralfate (CARAFATE) 1 GM tablet  Take 1 tablet by mouth 4 times daily             tamsulosin (FLOMAX) 0.4 MG capsule  Take 1 capsule by mouth daily                 Send Copies to: Moira Amador MD,       Note that over 30 minutes was spent in preparing discharge papers, discussing discharge with patient and family, medication review, etc.      Brodie Hayes MD  Family Medicine Resident  Family Medicine Inpatient Service  3/9/2021 11:11 AM          Please note that this chart was generated using voice recognition Dragon dictation software.   Although every effort was made to ensure the accuracy of this automated transcription, some errors in transcription may have occurred

## 2021-03-09 NOTE — ED NOTES
Pt assisted from chair to bed, urinal emptied, warm blanket given  RR even and unlabored, NAD, A&O, call light in reach, denies any other needs     Digna Emery RN  03/09/21 [id number]

## 2021-03-09 NOTE — ED NOTES
Urine specimen labeled and sent to lab  Pt resting on chair, RR even and unlabored, NAD, A&O, call light in reach, denies any needs     Rajendra Murray RN  03/09/21 0026

## 2021-03-09 NOTE — ED NOTES
Report received from Wendy Corbett RN  Pt resting on chair, RR even and unlabored, NAD, A&O, call light in reach, denies any needs     Nic Warner RN  03/08/21 7935

## 2021-03-10 ENCOUNTER — HOSPITAL ENCOUNTER (OUTPATIENT)
Age: 66
Setting detail: SPECIMEN
Discharge: HOME | End: 2021-03-10
Payer: MEDICAID

## 2021-03-10 ENCOUNTER — OFFICE VISIT (OUTPATIENT)
Dept: FAMILY MEDICINE CLINIC | Age: 66
End: 2021-03-10
Payer: MEDICAID

## 2021-03-10 ENCOUNTER — TELEPHONE (OUTPATIENT)
Dept: FAMILY MEDICINE CLINIC | Age: 66
End: 2021-03-10

## 2021-03-10 VITALS
HEIGHT: 67 IN | BODY MASS INDEX: 49.44 KG/M2 | TEMPERATURE: 97.7 F | HEART RATE: 95 BPM | SYSTOLIC BLOOD PRESSURE: 91 MMHG | DIASTOLIC BLOOD PRESSURE: 53 MMHG | WEIGHT: 315 LBS

## 2021-03-10 DIAGNOSIS — N17.9 AKI (ACUTE KIDNEY INJURY) (HCC): ICD-10-CM

## 2021-03-10 DIAGNOSIS — I50.42 CHRONIC COMBINED SYSTOLIC AND DIASTOLIC CONGESTIVE HEART FAILURE (HCC): ICD-10-CM

## 2021-03-10 DIAGNOSIS — N17.9 AKI (ACUTE KIDNEY INJURY) (HCC): Primary | ICD-10-CM

## 2021-03-10 DIAGNOSIS — E87.5 HYPERKALEMIA: ICD-10-CM

## 2021-03-10 DIAGNOSIS — H53.8 BLURRY VISION: ICD-10-CM

## 2021-03-10 DIAGNOSIS — I48.91 ATRIAL FIBRILLATION, UNSPECIFIED TYPE (HCC): ICD-10-CM

## 2021-03-10 LAB
ANION GAP SERPL CALCULATED.3IONS-SCNC: 16 MMOL/L (ref 9–17)
BUN BLDV-MCNC: 40 MG/DL (ref 8–23)
BUN/CREAT BLD: ABNORMAL (ref 9–20)
CALCIUM SERPL-MCNC: 9.1 MG/DL (ref 8.6–10.4)
CHLORIDE BLD-SCNC: 103 MMOL/L (ref 98–107)
CO2: 21 MMOL/L (ref 20–31)
CREAT SERPL-MCNC: 1.66 MG/DL (ref 0.7–1.2)
GFR AFRICAN AMERICAN: 51 ML/MIN
GFR NON-AFRICAN AMERICAN: 42 ML/MIN
GFR SERPL CREATININE-BSD FRML MDRD: ABNORMAL ML/MIN/{1.73_M2}
GFR SERPL CREATININE-BSD FRML MDRD: ABNORMAL ML/MIN/{1.73_M2}
GLUCOSE BLD-MCNC: 123 MG/DL (ref 70–99)
POTASSIUM SERPL-SCNC: 5.2 MMOL/L (ref 3.7–5.3)
SODIUM BLD-SCNC: 140 MMOL/L (ref 135–144)

## 2021-03-10 PROCEDURE — 99213 OFFICE O/P EST LOW 20 MIN: CPT

## 2021-03-10 ASSESSMENT — ENCOUNTER SYMPTOMS
VOMITING: 0
NAUSEA: 0
SHORTNESS OF BREATH: 0
CONSTIPATION: 0
COLOR CHANGE: 0
ABDOMINAL DISTENTION: 0
COUGH: 1
PHOTOPHOBIA: 0
WHEEZING: 0
DIARRHEA: 0
APNEA: 0

## 2021-03-10 NOTE — PROGRESS NOTES
Subjective: Catherine Croft is a 63 y.o. male with  has a past medical history of Atrial fibrillation ([address] BPH (benign prostatic hyperplasia), Cellulitis, Cervical disc disease, CHF (congestive heart failure) ([address] Coronary artery disease, CPAP (continuous positive airway pressure) dependence, GERD (gastroesophageal reflux disease), History of incarceration, Hyperlipidemia, Hypertension, Myocardial infarct ([address] Obesity, and Sleep apnea. Presented to the office today for:  Chief Complaint   Patient presents with   Carmenza Rm       HPI    Patient went to hospital and had SYDNEY with high potassium. Patient was given fluids and IV Lasix for mild CHF exacerbation as well. Patient kidney function came down to normal levels. And also potassium levels were also normalized. Patient has CHF clinic appointment tomorrow and also will be make an appointment to see his cardiologist as well. Patient is still complaining of the cough but no new changes. Patient today also needs form to be filled for transportation issues. no other questions or concerns at this time. Review of Systems   Constitutional: Negative for activity change, appetite change, fatigue, fever and unexpected weight change. HENT: Negative for congestion. Eyes: Negative for photophobia and visual disturbance. Respiratory: Positive for cough. Negative for apnea, shortness of breath and wheezing. Cardiovascular: Positive for leg swelling. Negative for chest pain and palpitations. Gastrointestinal: Negative for abdominal distention, constipation, diarrhea, nausea and vomiting. Endocrine: Negative for polyuria. Genitourinary: Negative for dysuria and urgency. Musculoskeletal: Positive for arthralgias. Skin: Negative for color change, pallor, rash and wound. Neurological: Negative for dizziness, tremors, weakness, light-headedness and headaches.    Psychiatric/Behavioral: Negative for agitation, behavioral problems, confusion and decreased concentration. The patient has a   Family History   Problem Relation Age of Onset    Alcohol Abuse Maternal Uncle     Heart Attack Maternal Uncle        Objective:    BP (!) 91/53   Pulse 95   Temp 97.7 °F (36.5 °C) (Temporal)   Ht 5' 7\" (1.702 m)   Wt (!) 321 lb (145.6 kg)   BMI 50.28 kg/m²    BP Readings from Last 3 Encounters:   03/10/21 (!) 91/53   03/09/21 111/60   02/25/21 122/68       Physical Exam  Constitutional:       General: He is not in acute distress. Appearance: Normal appearance. He is obese. He is not ill-appearing, toxic-appearing or diaphoretic. HENT:      Head: Normocephalic and atraumatic. Nose: Nose normal. No congestion. Eyes:      General: No scleral icterus. Right eye: No discharge. Left eye: No discharge. Extraocular Movements: Extraocular movements intact. Conjunctiva/sclera: Conjunctivae normal.      Pupils: Pupils are equal, round, and reactive to light. Cardiovascular:      Rate and Rhythm: Normal rate and regular rhythm. Pulses: Normal pulses. Heart sounds: No murmur. Pulmonary:      Effort: Pulmonary effort is normal.      Breath sounds: Normal breath sounds. No wheezing. Abdominal:      General: There is no distension. Tenderness: There is no abdominal tenderness. Musculoskeletal: Normal range of motion. General: Swelling present. No tenderness. Right lower leg: Edema present. Left lower leg: Edema present. Comments: Using compression stockings    Skin:     General: Skin is warm. Coloration: Skin is not jaundiced. Findings: No erythema. Neurological:      Mental Status: He is alert and oriented to person, place, and time. Motor: No weakness. Psychiatric:         Mood and Affect: Mood normal.         Behavior: Behavior normal.         Thought Content:  Thought content normal.         Judgment: Judgment normal.         Lab Results Component Value Date    WBC 10.3 03/09/2021    HGB 11.5 (L) 03/09/2021    HCT 36.9 (L) 03/09/2021     03/09/2021    CHOL 112 05/26/2020    TRIG 53 05/26/2020    HDL 47 03/03/2021    ALT 17 05/26/2020    AST 19 05/26/2020     03/09/2021    K 4.8 03/09/2021    CL 99 03/09/2021    CREATININE 1.03 03/09/2021    BUN 27 (H) 03/09/2021    CO2 23 03/09/2021    TSH 1.48 01/24/2020    PSA 0.67 09/22/2020    INR 1.0 03/08/2021    LABA1C 5.6 12/10/2019     Lab Results   Component Value Date    CALCIUM 9.3 03/09/2021    PHOS 4.0 03/08/2021     Lab Results   Component Value Date    LDLCHOLESTEROL 54 03/03/2021       Assessment and Plan:    1. Atrial fibrillation, unspecified type ([address]  No palpitations reported  Will be seeing Cardiology   Needs to determine if he needs to be on aticoagulation    2. Chronic combined systolic and diastolic congestive heart failure ([address]  Lasix  CHF clinic appointment tomorrow  Will call Cardio to set up appointment     3. SYDNEY (acute kidney injury) ([address]  - Basic Metabolic Panel; Future    4. Hyperkalemia  - Basic Metabolic Panel; Future    5. Gomez Henry MD, Ophthalmology, [facility] Prescriptions      No prescriptions requested or ordered in this encounter       There are no discontinued medications. Tricia Padillajhonathan received counseling on the following healthy behaviors: nutrition, exercise and medication adherence    Discussed use,benefit, and side effects of prescribed medications. Barriers to medication compliance addressed. All patient questions answered. Pt voiced understanding. No follow-ups on file. Disclaimer: Some orall of this note was transcribed using voice-recognition software. This may cause typographical errors occasionally. Although all effort is made to fix these errors, please do not hesitate to contact our office if there Ranell Yves concern with the understanding of this note.

## 2021-03-10 NOTE — PROGRESS NOTES
I have reviewed and discussed key elements of Jarome Sudha with the resident including plan of care and follow up and agree with the care yogesh plan.

## 2021-03-10 NOTE — PROGRESS NOTES
Visit Information    Have you changed or started any medications since your last visit including any over-the-counter medicines, vitamins, or herbal medicines? no   Have you stopped taking any of your medications? Is so, why? -  no  Are you having any side effects from any of your medications? - no    Have you seen any other physician or provider since your last visit?  no   Have you had any other diagnostic tests since your last visit? yes -    Have you been seen in the emergency room and/or had an admission in a hospital since we last saw you?  yes -    Have you had your routine dental cleaning in the past 6 months?  yes -      Do you have an active MyChart account? If no, what is the barrier?   Yes    Patient Care Team:  Mercedez Erwin MD as PCP - General (Family Medicine)  Sabra Peguero MD as Consulting Physician (Gastroenterology)  Joy Khan MD as Consulting Physician (Urology)    Medical History Review  Past Medical, Family, and Social History reviewed and does not contribute to the patient presenting condition    Health Maintenance   Topic Date Due    COVID-19 Vaccine (1 of 2) Never done    Lipid screen  03/03/2022    Potassium monitoring  03/09/2022    Creatinine monitoring  03/09/2022    DTaP/Tdap/Td vaccine (2 - Td) 08/20/2030    Colon cancer screen colonoscopy  01/12/2031    Flu vaccine  Completed    Shingles Vaccine  Completed    Pneumococcal 0-64 years Vaccine  Completed    Hepatitis C screen  Completed    HIV screen  Completed    Hepatitis A vaccine  Aged Out    Hepatitis B vaccine  Aged Out    Hib vaccine  Aged Out    Meningococcal (ACWY) vaccine  Aged Out

## 2021-03-10 NOTE — TELEPHONE ENCOUNTER
[address] Transitions Initial Follow Up Call    Call within 2 business days of discharge: Yes     Patient: Heidi Felix Patient : 1955 MRN: [id number]    [unfilled]    RARS: No data recorded     Spoke with: Spoke with pt he states he is doing better arrived home yesterday from [facility]. States he is taking all is medications as directed. Pt has appointment today and plans on keeping it.     Discharge department/facility:  [address] [place] [address] services provided:  Scheduled appointment with PCP- PCP 3-10-21 @ 215 pm  Obtained and reviewed discharge summary and/or continuity of care documents    Follow Up  Future Appointments   Date Time Provider Taj Flores   3/10/2021  2:15 PM Doloris Lipoma, [facility] [facility]   3/11/2021 10:00 AM [facility]  1 [facility] CHF CLI [facility]   3/15/2021  3:00 PM MD snow Fam gr ilda Lee LPN

## 2021-03-11 ENCOUNTER — TELEPHONE (OUTPATIENT)
Dept: FAMILY MEDICINE CLINIC | Age: 66
End: 2021-03-11

## 2021-03-11 ENCOUNTER — HOSPITAL ENCOUNTER (OUTPATIENT)
Dept: OTHER | Age: 66
Discharge: HOME | End: 2021-03-11
Payer: MEDICAID

## 2021-03-11 VITALS
HEART RATE: 93 BPM | RESPIRATION RATE: 20 BRPM | OXYGEN SATURATION: 98 % | DIASTOLIC BLOOD PRESSURE: 70 MMHG | SYSTOLIC BLOOD PRESSURE: 110 MMHG | WEIGHT: 315 LBS | BODY MASS INDEX: 49.49 KG/M2

## 2021-03-11 PROCEDURE — 99212 OFFICE O/P EST SF 10 MIN: CPT

## 2021-03-11 RX ORDER — LISINOPRIL 2.5 MG/1
2.5 TABLET ORAL DAILY
COMMUNITY
End: 2021-03-31 | Stop reason: ALTCHOICE

## 2021-03-11 ASSESSMENT — PAIN DESCRIPTION - DESCRIPTORS: DESCRIPTORS: ACHING

## 2021-03-11 ASSESSMENT — PAIN DESCRIPTION - ORIENTATION: ORIENTATION: LEFT

## 2021-03-11 ASSESSMENT — PAIN DESCRIPTION - PAIN TYPE: TYPE: CHRONIC PAIN

## 2021-03-11 ASSESSMENT — PAIN DESCRIPTION - LOCATION: LOCATION: BACK;SHOULDER

## 2021-03-11 ASSESSMENT — PAIN SCALES - GENERAL: PAINLEVEL_OUTOF10: 6

## 2021-03-11 NOTE — PROGRESS NOTES
Verbally reviewed medication list with patient; patient verbalized understanding. Discussed 2000mg/day sodium restricted diet; patient verbalized understanding. Moderate daily exercise encouraged as tolerated. Discussed rest breaks as needed; patient verbalized understanding. Patient instructed to weigh self at the same time of each day, using same clothes and same scale; reinforced teaching to monitor for 3-5 lb weight increase over 1-2 days, and to notify the CHF clinic at [phone] or physician office if weight change noted. Patient verbalized understanding. Risks of smoking discussed with the patient if applicable; patient strongly discouraged to smoke. Patient verbalized understanding. Signs and symptoms of CHF discussed with patient, such as feeling more tired than normal, feeling short of breath, coughing that increases when you lie down, sudden weight gain, swelling of your feet, legs or belly. Patient verbalized understanding to notify the CHF clinic at [phone] or physician office if these symptoms occur. Compliance with plan of care and further disease process causes discussed with patient, patient encouraged to keep all follow up appointments. Patient verbalized understanding.

## 2021-03-11 NOTE — TELEPHONE ENCOUNTER
Irineo George from [address] [facility] calling wants to notify MD the results of pt's labs from yesterday that were elevated Please address.

## 2021-03-11 NOTE — PROGRESS NOTES
Date:  3/11/2021  Time:  11:24 AM    CHF Clinic at [facility]    Office: [phone] Fax: [phone]    Re:  Janae Brand   Patient : 1955    Vital Signs: /70   Pulse 93   Resp 20   Wt (!) 316 lb (143.3 kg)   SpO2 98%   BMI 49.49 kg/m²                       O2 Device: None (Room air)                           Recent Labs     21  0650 03/10/21  1607   HGB 11.5*  --    HCT 36.9*  --    WBC 10.3  --     140   K 4.8 5.2   CL 99 103   CO2 23 21   BUN 27* 40*   CREATININE 1.03 1.66*   GLUCOSE 99 123*        Respiratory:    Assessment  Charting Type: Reassessment    Breath Sounds  Right Upper Lobe: Clear  Right Middle Lobe: Clear  Right Lower Lobe: Clear  Left Upper Lobe: Clear  Left Lower Lobe: Clear    Cough/Sputum  Cough: Productive  Frequency: Occasional  Sputum Amount: Small  Sputum Color: Clear         Peripheral Vascular  RLE Edema: +1, Non-pitting  LLE Edema: +1, Non-pitting      Complaints: No new complaints at this time    Physician Orders None    Comment : Arrived for scheduled visit per ambulatory. Weight down 3 lbs from last month. Patient was in ER overnight a few days ago for hyperkalemia and SYDNEY. Labs improved prior to discharge from ER. He had a PCP appt. Yesterday and labs drawn after his visit. Results as above. Notified [facility] clinic, spoke to nurse Mode Claros and informed of lab results. She will send message to Dr. Kyung Nageotte regarding abnormal labs. Patient notified of above and will wait to hear from office with any new orders. Ongoing dyspnea with mild exertion, resolves with rest. Ongoing lower leg, ankle and pedal edema noted but no increase from last appt. Continues to wear his compression stockings daily. Medication list reviewed and updated. Reinforced sodium intake and fluid restrictions. Next CHF Clinic visit in 2 weeks.     Electronically signed by Margarita Londono RN on 3/11/2021 at 11:24 AM

## 2021-03-14 DIAGNOSIS — N17.9 AKI (ACUTE KIDNEY INJURY) (HCC): Primary | ICD-10-CM

## 2021-03-15 ENCOUNTER — HOSPITAL ENCOUNTER (INPATIENT)
Age: 66
LOS: 1 days | Discharge: HOME | DRG: 469 | End: 2021-03-16
Attending: EMERGENCY MEDICINE | Admitting: HOSPITALIST
Payer: MEDICAID

## 2021-03-15 ENCOUNTER — APPOINTMENT (OUTPATIENT)
Dept: GENERAL RADIOLOGY | Age: 66
DRG: 469 | End: 2021-03-15
Payer: MEDICAID

## 2021-03-15 ENCOUNTER — TELEPHONE (OUTPATIENT)
Dept: FAMILY MEDICINE CLINIC | Age: 66
End: 2021-03-15

## 2021-03-15 ENCOUNTER — OFFICE VISIT (OUTPATIENT)
Dept: GASTROENTEROLOGY | Age: 66
End: 2021-03-15
Payer: MEDICAID

## 2021-03-15 VITALS — DIASTOLIC BLOOD PRESSURE: 44 MMHG | WEIGHT: 314 LBS | SYSTOLIC BLOOD PRESSURE: 75 MMHG | BODY MASS INDEX: 49.18 KG/M2

## 2021-03-15 DIAGNOSIS — I95.9 HYPOTENSION, UNSPECIFIED HYPOTENSION TYPE: Primary | ICD-10-CM

## 2021-03-15 DIAGNOSIS — I48.91 ATRIAL FIBRILLATION, UNSPECIFIED TYPE (HCC): ICD-10-CM

## 2021-03-15 DIAGNOSIS — D12.6 ADENOMATOUS POLYP OF COLON, UNSPECIFIED PART OF COLON: Primary | ICD-10-CM

## 2021-03-15 DIAGNOSIS — I50.42 HEART FAILURE, SYSTOLIC AND DIASTOLIC, CHRONIC (HCC): ICD-10-CM

## 2021-03-15 DIAGNOSIS — N17.9 AKI (ACUTE KIDNEY INJURY) (HCC): ICD-10-CM

## 2021-03-15 LAB
ABSOLUTE EOS #: 0.2 K/UL (ref 0–0.44)
ABSOLUTE IMMATURE GRANULOCYTE: 0.19 K/UL (ref 0–0.3)
ABSOLUTE LYMPH #: 0.97 K/UL (ref 1.1–3.7)
ABSOLUTE MONO #: 1.48 K/UL (ref 0.1–1.2)
ANION GAP SERPL CALCULATED.3IONS-SCNC: 14 MMOL/L (ref 9–17)
BASOPHILS # BLD: 1 % (ref 0–2)
BASOPHILS ABSOLUTE: 0.06 K/UL (ref 0–0.2)
BNP INTERPRETATION: ABNORMAL
BUN BLDV-MCNC: 33 MG/DL (ref 8–23)
BUN/CREAT BLD: ABNORMAL (ref 9–20)
CALCIUM SERPL-MCNC: 9.5 MG/DL (ref 8.6–10.4)
CHLORIDE BLD-SCNC: 102 MMOL/L (ref 98–107)
CO2: 22 MMOL/L (ref 20–31)
CREAT SERPL-MCNC: 1.69 MG/DL (ref 0.7–1.2)
DIFFERENTIAL TYPE: ABNORMAL
EOSINOPHILS RELATIVE PERCENT: 2 % (ref 1–4)
GFR AFRICAN AMERICAN: 50 ML/MIN
GFR NON-AFRICAN AMERICAN: 41 ML/MIN
GFR SERPL CREATININE-BSD FRML MDRD: ABNORMAL ML/MIN/{1.73_M2}
GFR SERPL CREATININE-BSD FRML MDRD: ABNORMAL ML/MIN/{1.73_M2}
GLUCOSE BLD-MCNC: 101 MG/DL (ref 70–99)
HCT VFR BLD CALC: 33.2 % (ref 40.7–50.3)
HEMOGLOBIN: 10.4 G/DL (ref 13–17)
IMMATURE GRANULOCYTES: 2 %
LYMPHOCYTES # BLD: 8 % (ref 24–43)
MCH RBC QN AUTO: 28 PG (ref 25.2–33.5)
MCHC RBC AUTO-ENTMCNC: 31.3 G/DL (ref 28.4–34.8)
MCV RBC AUTO: 89.5 FL (ref 82.6–102.9)
MONOCYTES # BLD: 12 % (ref 3–12)
NRBC AUTOMATED: 0 PER 100 WBC
PDW BLD-RTO: 15.1 % (ref 11.8–14.4)
PLATELET # BLD: 261 K/UL (ref 138–453)
PLATELET ESTIMATE: ABNORMAL
PMV BLD AUTO: 9.4 FL (ref 8.1–13.5)
POTASSIUM SERPL-SCNC: 5.3 MMOL/L (ref 3.7–5.3)
PRO-BNP: 427 PG/ML
RBC # BLD: 3.71 M/UL (ref 4.21–5.77)
RBC # BLD: ABNORMAL 10*6/UL
SEG NEUTROPHILS: 75 % (ref 36–65)
SEGMENTED NEUTROPHILS ABSOLUTE COUNT: 9.26 K/UL (ref 1.5–8.1)
SODIUM BLD-SCNC: 138 MMOL/L (ref 135–144)
TROPONIN INTERP: ABNORMAL
TROPONIN INTERP: NORMAL
TROPONIN T: ABNORMAL NG/ML
TROPONIN T: NORMAL NG/ML
TROPONIN, HIGH SENSITIVITY: 22 NG/L (ref 0–22)
TROPONIN, HIGH SENSITIVITY: 25 NG/L (ref 0–22)
WBC # BLD: 12.2 K/UL (ref 3.5–11.3)
WBC # BLD: ABNORMAL 10*3/UL

## 2021-03-15 PROCEDURE — 2000000000 HC ICU R&B

## 2021-03-15 PROCEDURE — 71045 X-RAY EXAM CHEST 1 VIEW: CPT

## 2021-03-15 PROCEDURE — 99285 EMERGENCY DEPT VISIT HI MDM: CPT

## 2021-03-15 PROCEDURE — 2580000003 HC RX 258: Performed by: STUDENT IN AN ORGANIZED HEALTH CARE EDUCATION/TRAINING PROGRAM

## 2021-03-15 PROCEDURE — 84540 ASSAY OF URINE/UREA-N: CPT

## 2021-03-15 PROCEDURE — 96361 HYDRATE IV INFUSION ADD-ON: CPT

## 2021-03-15 PROCEDURE — 96372 THER/PROPH/DIAG INJ SC/IM: CPT

## 2021-03-15 PROCEDURE — 99213 OFFICE O/P EST LOW 20 MIN: CPT | Performed by: INTERNAL MEDICINE

## 2021-03-15 PROCEDURE — 2700000000 HC OXYGEN THERAPY PER DAY

## 2021-03-15 PROCEDURE — G8417 CALC BMI ABV UP PARAM F/U: HCPCS | Performed by: INTERNAL MEDICINE

## 2021-03-15 PROCEDURE — 3017F COLORECTAL CA SCREEN DOC REV: CPT | Performed by: INTERNAL MEDICINE

## 2021-03-15 PROCEDURE — G0378 HOSPITAL OBSERVATION PER HR: HCPCS

## 2021-03-15 PROCEDURE — 1036F TOBACCO NON-USER: CPT | Performed by: INTERNAL MEDICINE

## 2021-03-15 PROCEDURE — 82570 ASSAY OF URINE CREATININE: CPT

## 2021-03-15 PROCEDURE — 80048 BASIC METABOLIC PNL TOTAL CA: CPT

## 2021-03-15 PROCEDURE — 93005 ELECTROCARDIOGRAM TRACING: CPT | Performed by: STUDENT IN AN ORGANIZED HEALTH CARE EDUCATION/TRAINING PROGRAM

## 2021-03-15 PROCEDURE — G0328 FECAL BLOOD SCRN IMMUNOASSAY: HCPCS

## 2021-03-15 PROCEDURE — G8482 FLU IMMUNIZE ORDER/ADMIN: HCPCS | Performed by: INTERNAL MEDICINE

## 2021-03-15 PROCEDURE — 96360 HYDRATION IV INFUSION INIT: CPT

## 2021-03-15 PROCEDURE — 83880 ASSAY OF NATRIURETIC PEPTIDE: CPT

## 2021-03-15 PROCEDURE — G8427 DOCREV CUR MEDS BY ELIG CLIN: HCPCS | Performed by: INTERNAL MEDICINE

## 2021-03-15 PROCEDURE — 6360000002 HC RX W HCPCS: Performed by: STUDENT IN AN ORGANIZED HEALTH CARE EDUCATION/TRAINING PROGRAM

## 2021-03-15 PROCEDURE — 94660 CPAP INITIATION&MGMT: CPT

## 2021-03-15 PROCEDURE — 84484 ASSAY OF TROPONIN QUANT: CPT

## 2021-03-15 PROCEDURE — 6370000000 HC RX 637 (ALT 250 FOR IP): Performed by: STUDENT IN AN ORGANIZED HEALTH CARE EDUCATION/TRAINING PROGRAM

## 2021-03-15 PROCEDURE — 85025 COMPLETE CBC W/AUTO DIFF WBC: CPT

## 2021-03-15 RX ORDER — PROMETHAZINE HYDROCHLORIDE 12.5 MG/1
12.5 TABLET ORAL EVERY 6 HOURS PRN
Status: DISCONTINUED | OUTPATIENT
Start: 2021-03-15 | End: 2021-03-16 | Stop reason: HOSPADM

## 2021-03-15 RX ORDER — CETIRIZINE HYDROCHLORIDE 10 MG/1
5 TABLET ORAL DAILY
Status: DISCONTINUED | OUTPATIENT
Start: 2021-03-16 | End: 2021-03-16 | Stop reason: HOSPADM

## 2021-03-15 RX ORDER — SODIUM CHLORIDE 0.9 % (FLUSH) 0.9 %
10 SYRINGE (ML) INJECTION EVERY 12 HOURS SCHEDULED
Status: DISCONTINUED | OUTPATIENT
Start: 2021-03-15 | End: 2021-03-16 | Stop reason: HOSPADM

## 2021-03-15 RX ORDER — LOSARTAN POTASSIUM 50 MG/1
50 TABLET ORAL DAILY
Status: DISCONTINUED | OUTPATIENT
Start: 2021-03-16 | End: 2021-03-16 | Stop reason: HOSPADM

## 2021-03-15 RX ORDER — ONDANSETRON 2 MG/ML
4 INJECTION INTRAMUSCULAR; INTRAVENOUS EVERY 6 HOURS PRN
Status: DISCONTINUED | OUTPATIENT
Start: 2021-03-15 | End: 2021-03-16 | Stop reason: HOSPADM

## 2021-03-15 RX ORDER — NITROGLYCERIN 0.4 MG/1
0.4 TABLET SUBLINGUAL EVERY 5 MIN PRN
Status: DISCONTINUED | OUTPATIENT
Start: 2021-03-15 | End: 2021-03-16 | Stop reason: HOSPADM

## 2021-03-15 RX ORDER — HEPARIN SODIUM 5000 [USP'U]/ML
5000 INJECTION, SOLUTION INTRAVENOUS; SUBCUTANEOUS EVERY 8 HOURS SCHEDULED
Status: DISCONTINUED | OUTPATIENT
Start: 2021-03-15 | End: 2021-03-16 | Stop reason: HOSPADM

## 2021-03-15 RX ORDER — 0.9 % SODIUM CHLORIDE 0.9 %
500 INTRAVENOUS SOLUTION INTRAVENOUS ONCE
Status: COMPLETED | OUTPATIENT
Start: 2021-03-15 | End: 2021-03-15

## 2021-03-15 RX ORDER — ISOSORBIDE MONONITRATE 30 MG/1
30 TABLET, EXTENDED RELEASE ORAL DAILY
Status: DISCONTINUED | OUTPATIENT
Start: 2021-03-16 | End: 2021-03-16 | Stop reason: HOSPADM

## 2021-03-15 RX ORDER — SODIUM CHLORIDE 9 MG/ML
INJECTION, SOLUTION INTRAVENOUS CONTINUOUS
Status: DISCONTINUED | OUTPATIENT
Start: 2021-03-15 | End: 2021-03-16 | Stop reason: HOSPADM

## 2021-03-15 RX ORDER — PANTOPRAZOLE SODIUM 40 MG/1
40 TABLET, DELAYED RELEASE ORAL
Status: DISCONTINUED | OUTPATIENT
Start: 2021-03-16 | End: 2021-03-16 | Stop reason: HOSPADM

## 2021-03-15 RX ORDER — SUCRALFATE 1 G/1
1 TABLET ORAL 4 TIMES DAILY
Status: DISCONTINUED | OUTPATIENT
Start: 2021-03-15 | End: 2021-03-16 | Stop reason: HOSPADM

## 2021-03-15 RX ORDER — TAMSULOSIN HYDROCHLORIDE 0.4 MG/1
0.4 CAPSULE ORAL DAILY
Status: DISCONTINUED | OUTPATIENT
Start: 2021-03-16 | End: 2021-03-16 | Stop reason: HOSPADM

## 2021-03-15 RX ORDER — ATORVASTATIN CALCIUM 40 MG/1
40 TABLET, FILM COATED ORAL NIGHTLY
Status: DISCONTINUED | OUTPATIENT
Start: 2021-03-15 | End: 2021-03-16 | Stop reason: HOSPADM

## 2021-03-15 RX ORDER — ACETAMINOPHEN 325 MG/1
650 TABLET ORAL EVERY 6 HOURS PRN
Status: DISCONTINUED | OUTPATIENT
Start: 2021-03-15 | End: 2021-03-16 | Stop reason: HOSPADM

## 2021-03-15 RX ORDER — DOXYCYCLINE HYCLATE 100 MG
100 TABLET ORAL EVERY 12 HOURS SCHEDULED
Status: DISCONTINUED | OUTPATIENT
Start: 2021-03-15 | End: 2021-03-16 | Stop reason: HOSPADM

## 2021-03-15 RX ORDER — SODIUM CHLORIDE 0.9 % (FLUSH) 0.9 %
10 SYRINGE (ML) INJECTION PRN
Status: DISCONTINUED | OUTPATIENT
Start: 2021-03-15 | End: 2021-03-16 | Stop reason: HOSPADM

## 2021-03-15 RX ORDER — CLOPIDOGREL BISULFATE 75 MG/1
75 TABLET ORAL DAILY
Status: DISCONTINUED | OUTPATIENT
Start: 2021-03-16 | End: 2021-03-16 | Stop reason: HOSPADM

## 2021-03-15 RX ORDER — ACETAMINOPHEN 650 MG/1
650 SUPPOSITORY RECTAL EVERY 6 HOURS PRN
Status: DISCONTINUED | OUTPATIENT
Start: 2021-03-15 | End: 2021-03-16 | Stop reason: HOSPADM

## 2021-03-15 RX ADMIN — HEPARIN SODIUM 5000 UNITS: 5000 INJECTION INTRAVENOUS; SUBCUTANEOUS at 23:00

## 2021-03-15 RX ADMIN — SODIUM CHLORIDE, PRESERVATIVE FREE 10 ML: 5 INJECTION INTRAVENOUS at 23:05

## 2021-03-15 RX ADMIN — SUCRALFATE 1 G: 1 TABLET ORAL at 23:00

## 2021-03-15 RX ADMIN — SODIUM CHLORIDE: 9 INJECTION, SOLUTION INTRAVENOUS at 23:04

## 2021-03-15 RX ADMIN — DESMOPRESSIN ACETATE 40 MG: 0.2 TABLET ORAL at 23:00

## 2021-03-15 RX ADMIN — SODIUM CHLORIDE 500 ML: 9 INJECTION, SOLUTION INTRAVENOUS at 17:29

## 2021-03-15 ASSESSMENT — ENCOUNTER SYMPTOMS
CHEST TIGHTNESS: 1
APNEA: 1
COUGH: 1
NAUSEA: 0
SHORTNESS OF BREATH: 1
ALLERGIC/IMMUNOLOGIC NEGATIVE: 1
EYES NEGATIVE: 1
ANAL BLEEDING: 1
SHORTNESS OF BREATH: 0
VOMITING: 0
BACK PAIN: 0
ABDOMINAL PAIN: 0

## 2021-03-15 ASSESSMENT — PAIN DESCRIPTION - LOCATION: LOCATION: BACK;SHOULDER

## 2021-03-15 ASSESSMENT — PAIN DESCRIPTION - FREQUENCY: FREQUENCY: CONTINUOUS

## 2021-03-15 ASSESSMENT — PAIN DESCRIPTION - ONSET: ONSET: ON-GOING

## 2021-03-15 ASSESSMENT — PAIN SCALES - GENERAL: PAINLEVEL_OUTOF10: 7

## 2021-03-15 ASSESSMENT — PAIN DESCRIPTION - PAIN TYPE: TYPE: CHRONIC PAIN

## 2021-03-15 NOTE — ED NOTES
Pt. Presents to the ED with c/o lightheadedness. Pt. States he has had some episodes of him getting lightheaded and blurred vision. Pt. States he was seen here recently for the same thing. Pt. States he was in a gastro check up when his blood pressure was 70/40's. Pt. Denies any complaints of SOB or chest pain at this time. Pt. Does state a cardiac hx. Of single bypass and CHF. Pt. Denies nausea and vomiting at this time. Pt resting on stretcher, no respiratory distress noted, pt updated on plan of care, will continue to monitor, call light in reach.         Jerilyn Kwan RN  03/15/21 5949

## 2021-03-15 NOTE — ED NOTES
Pt. Resting on stretcher in room 33. Pt. Given warm blankets and pillow. Pt. Updated on plan of care. All questions answered at this time. Will continue to monitor.       Salome Browning RN  03/15/21 [id number]

## 2021-03-15 NOTE — ED PROVIDER NOTES
[facility]  Emergency Department  Faculty Attestation     I performed a history and physical examination of the patient and discussed management with the resident. I reviewed the residents note and agree with the documented findings and plan of care. Any areas of disagreement are noted on the chart. I was personally present for the key portions of any procedures. I have documented in the chart those procedures where I was not present during the key portions. I have reviewed the emergency nurses triage note. I agree with the chief complaint, past medical history, past surgical history, allergies, medications, social and family history as documented unless otherwise noted below. For Physician Assistant/ Nurse Practitioner cases/documentation I have personally evaluated this patient and have completed at least one if not all key elements of the E/M (history, physical exam, and MDM). Additional findings are as noted. Primary Care Physician:  Cm Briones MD    Screenings:  [unfilled]    CHIEF COMPLAINT       Chief Complaint   Patient presents with    Dizziness       RECENT VITALS:   Temp: 98.2 °F (36.8 °C),  Pulse: 71, Resp: 25, BP: 101/64    LABS:  Labs Reviewed   CBC WITH AUTO DIFFERENTIAL   BASIC METABOLIC PANEL   TROPONIN   TROPONIN   BRAIN NATRIURETIC PEPTIDE       Radiology  XR CHEST PORTABLE    (Results Pending)         EKG:   EKG Interpretation    Interpreted by me    Rhythm: normal sinus   Rate: normal  Axis: normal  Ectopy: none  Conduction: normal  ST Segments: no acute change  T Waves: Inversion V2  Q Waves: none    Clinical Impression: Nonspecific T wave change    Attending Physician Additional  Notes    Patient is sent from gastroenterology office for hypotension, 75/45. Patient feels blurring of his vision tingling in his hands and feet and queasy especially when upright. No chest pain headache abdominal pain back pain.   No recent vomiting or diarrhea. No recent GI bleeding. He was seen in the office for follow-up after previous colonoscopy with polypectomy. He has 1 remaining polyp to be resected. He has a history of heart disease and CHF and recurrent hypotension. His medications include Lasix and lisinopril. He had similar episode last week and was admitted for less than 24 hours. He was told his symptoms were from dehydration and he improved with gentle hydration. He did have follow-up to his cardiologist and an echocardiogram done. On exam here while supine his blood pressure is 101/64. Normal vital signs otherwise other than tachypnea. Breath sounds are diminished. Mild JVD but no gallop. Darletta Sandip He does have 2+ symmetrical tense edema in both lower extremities. Impression is hypotension, consider anemia, dehydration, overmedication or decreased cardiac output. Plan is laboratory studies, bedside ultrasound, chest x-ray, BNP, gentle hydration, anticipate admission. Consider midodrine          Felicity Curling.  Melinda Norris MD, [facility] [facility]  Attending Emergency  Physician               Stephanie Fontana MD  03/15/21 [id number]

## 2021-03-15 NOTE — ED NOTES
Blood specimen sent to lab. Pt resting on stretcher, no respiratory distress noted, pt updated on plan of care, will continue to monitor, call light in reach.        Sabrina Thompson RN  03/15/21 [id number]

## 2021-03-15 NOTE — TELEPHONE ENCOUNTER
Patient called in to let the doctor know he is having another episode is wondering if the doctor can call him.

## 2021-03-15 NOTE — ED NOTES
Pt. Updated on plan of care. All questions answered at this time. Call light within reach. Pt. Up to chair at bedside. Will continue to monitor and updated as needed.       Ibrahima Roth RN  03/15/21 [id number]

## 2021-03-15 NOTE — ED PROVIDER NOTES
[facility]  ED  Emergency Department Encounter  Emergency Medicine Resident     Pt Name: Gene Reid  MRN: [id number]  [place] 1955  Date of evaluation: 3/15/21  PCP:  Donaldo Ernst MD    CHIEF COMPLAINT       Chief Complaint   Patient presents with    Dizziness       HISTORY [place]  (Location/Symptom, Timing/Onset, Context/Setting, Quality, Duration, Modifying Factors,Severity.)      Gene Reid is a 63year old male, PMH CABG, CHF, atrial fibrillation, who presents with low blood pressure at G. I. follow-up appointment. The patient was scheduled for follow-up G. I. today for polyps. Reports that his blood pressure was noted to be low during the appointment to the 70 systolic. He is instructed to report to the emergency department. The patient is currently denying any chest pain, shortness of breath or lightheadedness. The patient was admitted to the hospital on 3/8-3/9 for [place] and CHF exacerbation. PAST MEDICAL / SURGICAL / SOCIAL / FAMILY HISTORY      has a past medical history of Atrial fibrillation ([address] BPH (benign prostatic hyperplasia), Cellulitis, Cervical disc disease, CHF (congestive heart failure) ([address] Coronary artery disease, CPAP (continuous positive airway pressure) dependence, GERD (gastroesophageal reflux disease), History of incarceration, Hyperlipidemia, Hypertension, Myocardial infarct ([address] Obesity, and Sleep apnea. has a past surgical history that includes transesophageal echocardiogram (10/16/2019); Cardioversion (10/16/2019); Coronary Artery Bypass Graft Maze Procedure (N/A, 10/21/2019); Carpal tunnel release (Bilateral, 2008); fracture surgery; Cervical spine surgery; Abdominal exploration surgery; Colonoscopy (10/29/2020); Colonoscopy (N/A, 10/29/2020); Colonoscopy (10/29/2020); Colonoscopy (10/29/2020); Cardiac surgery; and Colonoscopy (N/A, 1/12/2021).  =    Social History     Socioeconomic History    Marital status:  Spouse name: Not on file    Number of children: Not on file    Years of education: Not on file    Highest education level: Not on file   Occupational History    Not on file   Social Needs    Financial resource strain: Not on file    Food insecurity     Worry: Not on file     Inability: Not on file    Transportation needs     Medical: Not on file     Non-medical: Not on file   Tobacco Use    Smoking status: Never Smoker    Smokeless tobacco: Never Used   Substance and Sexual Activity    Alcohol use: Not Currently     Comment: stopped 1991    Drug use: Not Currently    Sexual activity: Not Currently   Lifestyle    Physical activity     Days per week: Not on file     Minutes per session: Not on file    Stress: Not on file   Relationships    Social connections     Talks on phone: Not on file     Gets together: Not on file     Attends Christianity service: Not on file     Active member of club or organization: Not on file     Attends meetings of clubs or organizations: Not on file     Relationship status: Not on file    Intimate partner violence     Fear of current or ex partner: Not on file     Emotionally abused: Not on file     Physically abused: Not on file     Forced sexual activity: Not on file   Other Topics Concern    Not on file   Social History Narrative    Not on file       Family History   Problem Relation Age of Onset    Alcohol Abuse Maternal Uncle     Heart Attack Maternal Uncle         Allergies:  Patient has no known allergies. Home Medications:  Prior to Admission medications    Medication Sig Start Date End Date Taking?  Authorizing Provider   lisinopril (PRINIVIL;ZESTRIL) 2.5 MG tablet Take 2.5 mg by mouth daily    Historical Provider, MD   clopidogrel (PLAVIX) 75 MG tablet Take 1 tablet by mouth daily 3/8/21   Nataliya Leslie MD   isosorbide mononitrate (IMDUR) 30 MG extended release tablet Take 1 tablet by mouth daily 3/8/21   Nataliya Leslie MD   spironolactone (ALDACTONE) 25 MG tablet Take 1 tablet by mouth daily 3/8/21   Fernando Magaña MD   loratadine (CLARITIN) 10 MG tablet TAKE 1 TABLET BY MOUTH DAILY 3/8/21   Bryson Rincon MD   atorvastatin (LIPITOR) 40 MG tablet TAKE 1 TABLET BY MOUTH NIGHTLY 2/25/21   Aiden Dos Santos MD   furosemide (LASIX) 20 MG tablet Take 3 tablets by mouth 2 times daily 2/25/21   LUDY Shea - CNP   tamsulosin [facility]) 0.4 MG capsule Take 1 capsule by mouth daily 2/12/21   Danica Johnson MD   polyethylene glycol [facility]) 17 GM/SCOOP powder Use as directed by following your patient instructions given by office. 12/22/20   Dianna Nguyen MD   sucralfate (CARAFATE) 1 GM tablet Take 1 tablet by mouth 4 times daily 12/11/20   Tiffanie Metcalf MD   potassium chloride (KLOR-CON M) 20 MEQ extended release tablet Take 1 tablet by mouth daily 12/3/20   Aiden Dos Santos MD   nitroGLYCERIN (NITROSTAT) 0.4 MG SL tablet Place 1 tablet under the tongue every 5 minutes as needed for Chest pain up to max of 3 total doses. If no relief after 1 dose, call 911. 11/23/20   Aiden Dos Santos MD   pantoprazole (PROTONIX) 40 MG tablet Take 1 tablet by mouth 2 times daily (before meals) 11/23/20   Aiden Dos Santos MD   losartan (COZAAR) 50 MG tablet Take 1 tablet by mouth daily 11/23/20   Aiden Dos Santos MD   magnesium citrate solution Take 296 mLs by mouth once for 1 dose 10/7/20 10/7/20  Tiffanie Metcalf MD   diclofenac sodium (VOLTAREN) 1 % GEL APPLY 2 G TOPICALLY 4 TIMES DAILY AS NEEDED FOR PAIN 9/25/20   Aiden Dos Santos MD   Elastic Bandages & Supports (JOBST KNEE HIGH COMPRESSION SM) MISC 2 each by Does not apply route daily Wear q day. Remove q hs. Diagnosis: Chronic venous insufficiency 8/12/20   LUDY Shea - CNP       REVIEW OFSYSTEMS    (2-9 systems for level 4, 10 or more for level 5)      Review of Systems   Constitutional: Negative for chills and fever. Respiratory: Negative for shortness of breath. Cardiovascular: Negative for chest pain.    Gastrointestinal: Negative for abdominal pain, nausea and vomiting. Genitourinary: Negative for difficulty urinating. Musculoskeletal: Negative for back pain and neck pain. Skin: Negative for rash. Neurological: Negative for syncope, light-headedness and headaches. Hematological: Does not bruise/bleed easily. PHYSICAL EXAM   (up to 7 for level 4, 8 or more forlevel 5)      INITIAL VITALS:   ED Triage Vitals   BP Temp Temp src Pulse Resp SpO2 Height Weight   -- -- -- -- -- -- -- --       Physical Exam  Constitutional:       General: He is not in acute distress. Appearance: He is well-developed. He is not diaphoretic. HENT:      Head: Normocephalic and atraumatic. Eyes:      Conjunctiva/sclera: Conjunctivae normal.      Pupils: Pupils are equal, round, and reactive to light. Neck:      Musculoskeletal: Neck supple. Cardiovascular:      Rate and Rhythm: Normal rate and regular rhythm. Heart sounds: No murmur. No friction rub. No gallop. Pulmonary:      Effort: Pulmonary effort is normal. No respiratory distress. Breath sounds: Normal breath sounds. No wheezing or rales. Abdominal:      General: There is no distension. Palpations: Abdomen is soft. Tenderness: There is no abdominal tenderness. There is no guarding. Musculoskeletal:      Comments: +1 pitting edema bilaterally   Skin:     General: Skin is warm. Neurological:      Mental Status: He is alert and oriented to person, place, and time.          DIFFERENTIAL  DIAGNOSIS     PLAN (LABS / IMAGING / EKG):  Orders Placed This Encounter   Procedures    XR CHEST PORTABLE    CBC WITH AUTO DIFFERENTIAL    BASIC METABOLIC PANEL    Troponin    Brain Natriuretic Peptide    Orthostatic blood pressure and pulse    Inpatient consult to Primary Care Provider    EKG 12 Lead    PATIENT STATUS (FROM ED OR OR/PROCEDURAL) Inpatient       MEDICATIONS ORDERED:  Orders Placed This Encounter   Medications    0.9 % sodium chloride bolus    pantoprazole (PROTONIX) tablet 40 mg     Initial MDM/Plan: 63 y.o. male who presents with hypotension at outpatient clinic follow-up. On arrival, the patient's blood pressure low systolic. Patient's not to cardiac. Saturating will on room air. Lungs were clear to auscultation bilaterally. No crackles at the lung bases. Abdomen soft, nondistended nontender. Intact distal pulses. The patient does have +1 pitting edema bilateral lower extremities. Will get cardiac workup, orthostatic blood pressure and give small fluid bolus of 500 ml NS. Differential diagnosis include medication side effect, the patient is on multiple antihypertensive medications. Differential diagnosis also include ACS, dehydration, hypovolemia. No signs of infectious etiology.     DIAGNOSTIC RESULTS / EMERGENCYDEPARTMENT COURSE / MDM     LABS:  Labs Reviewed   CBC WITH AUTO DIFFERENTIAL - Abnormal; Notable for the following components:       Result Value    WBC 12.2 (*)     RBC 3.71 (*)     Hemoglobin 10.4 (*)     Hematocrit 33.2 (*)     RDW 15.1 (*)     Seg Neutrophils 75 (*)     Lymphocytes 8 (*)     Immature Granulocytes 2 (*)     Segs Absolute 9.26 (*)     Absolute Lymph # 0.97 (*)     Absolute Mono # 1.48 (*)     All other components within normal limits   BASIC METABOLIC PANEL - Abnormal; Notable for the following components:    Glucose 101 (*)     BUN 33 (*)     CREATININE 1.69 (*)     GFR Non- 41 (*)     GFR  50 (*)     All other components within normal limits   TROPONIN - Abnormal; Notable for the following components:    Troponin, High Sensitivity 25 (*)     All other components within normal limits   BRAIN NATRIURETIC PEPTIDE - Abnormal; Notable for the following components:    Pro- (*)     All other components within normal limits   TROPONIN         RADIOLOGY:  Xr Chest Portable    Result Date: 3/15/2021  EXAMINATION: ONE XRAY VIEW OF THE CHEST 3/15/2021 5:22 pm COMPARISON: March 8, 2021 HISTORY:

## 2021-03-15 NOTE — PROGRESS NOTES
GI FOLLOW UP    INTERVAL HISTORY: Status post EMR resection of colon polyp    Patient has reported recent frequent episodes of symptomatic hypotension with symptoms of dizziness and blurry vision appear to be concerning  Patient had an episode of blurry vision today  Blood pressure today is 55E systolic and 68A diastolic    Moira Amador MD  [address] [facility],  [address] [facility]    Chief Complaint   Patient presents with    Follow-up     EMR follow up. Patient states having blurry vision/tingling in extremities/ dizziness. Patients BP is low. Educated patient on monitoring his BP prior to taking his medication.  Gastroesophageal Reflux     Patient denies any symptoms since taking PPI and carafate. 1. Adenomatous polyp of colon, unspecified part of colon          HISTORY OF PRESENT ILLNESS: Mr.Andrew YUN Berg is a 60 y. o. male with a past history remarkable for HARPREET, A. fib on Eliquis, morbid obesity, history of CABG in October 2019 on dual antiplatelet therapy (aspirin and Plavix), referred for evaluation of nonproductive postprandial cough. This appears to be most consistent with the patient's chronic GERD symptoms. He appears to be on maximum PPI therapy     Recent colonoscopy which identified tubular adenomas and sessile serrated adenomas which were removed aside from an isolated flat lesion that will require EMR. Findings discussed with the patient. Past Medical,Family, and Social History reviewed and does contribute to the patient presenting condition. Patient's PMH/PSH,SH,PSYCH Hx, MEDs, ALLERGIES, and ROS were all reviewed and updated in the appropriate sections.     PAST MEDICAL HISTORY:  Past Medical History:   Diagnosis Date    Atrial fibrillation ([facility] [address] 10/2019    Dr. Frankey Nab BPH (benign prostatic hyperplasia)     Cellulitis     Cervical disc disease     [facility]estive [facility] failure) [facility] [address]     Coronary artery disease 10/2019    CABG    CPAP (continuous positive airway pressure) dependence     GERD (gastroesophageal reflux disease)     History of incarceration     released 2019    Hyperlipidemia     Hypertension     Myocardial infarct ([facility] [address]     Obesity     Sleep apnea     C-pap       Past Surgical History:   Procedure Laterality Date    ABDOMINAL EXPLORATION SURGERY      CARDIAC SURGERY      CARDIOVERSION  10/16/2019    CARPAL TUNNEL RELEASE Bilateral 2008    CERVICAL SPINE SURGERY      2-5    COLONOSCOPY  10/29/2020     WITH BIOPSY, COLONOSCOPY SUBMUCOSAL/BOTOX INJECTION,  COLONOSCOPY POLYPECTOMY SNARE/COLD BIOPSY     COLONOSCOPY N/A 10/29/2020    COLONOSCOPY WITH BIOPSY performed by Carmen Ng MD at [address] [place] [address]  10/29/2020    COLONOSCOPY SUBMUCOSAL/BOTOX INJECTION performed by Carmen Ng MD at [address] [place] [address]  10/29/2020    COLONOSCOPY POLYPECTOMY SNARE/COLD BIOPSY performed by Carmen Ng MD at [address] [place] [address] N/A 1/12/2021    COLONOSCOPY POLYPECTOMY HOT SNARE, COLD SNARE POLPECTOMY performed by Nirav Matute MD at [address] [facility] N/A 10/21/2019    CABG CORONARY ARTERY BYPASS GRAFT X1, LIMA-LAD, BROWN 4 MAZE PROCEDURE, 50MM ATRICURE ATRIAL CLIP RIGID INTERNAL FIXATION PLATES X 3   SCREWS X 13 performed by Jennie Vides MD at [facility]      Left leg    TRANSESOPHAGEAL ECHOCARDIOGRAM  10/16/2019       CURRENT MEDICATIONS:    Current Outpatient Medications:     lisinopril (PRINIVIL;ZESTRIL) 2.5 MG tablet, Take 2.5 mg by mouth daily, Disp: , Rfl:     clopidogrel (PLAVIX) 75 MG tablet, Take 1 tablet by mouth daily, Disp: 30 tablet, Rfl: 0    isosorbide mononitrate (IMDUR) 30 MG extended release tablet, Take 1 tablet by mouth daily, Disp: 90 tablet, Rfl: 0    spironolactone (ALDACTONE) 25 MG tablet, Take 1 tablet by mouth daily, Disp: 30 tablet, Highest education level: Not on file   Occupational History    Not on file   Social Needs    Financial resource strain: Not on file    Food insecurity     Worry: Not on file     Inability: Not on file    Transportation needs     Medical: Not on file     Non-medical: Not on file   Tobacco Use    Smoking status: Never Smoker    Smokeless tobacco: Never Used   Substance and Sexual Activity    Alcohol use: Not Currently     Comment: stopped 1991    Drug use: Not Currently    Sexual activity: Not Currently   Lifestyle    Physical activity     Days per week: Not on file     Minutes per session: Not on file    Stress: Not on file   Relationships    Social connections     Talks on phone: Not on file     Gets together: Not on file     Attends Church service: Not on file     Active member of club or organization: Not on file     Attends meetings of clubs or organizations: Not on file     Relationship status: Not on file    Intimate partner violence     Fear of current or ex partner: Not on file     Emotionally abused: Not on file     Physically abused: Not on file     Forced sexual activity: Not on file   Other Topics Concern    Not on file   Social History Narrative    Not on file       REVIEW OF SYSTEMS: A 12-point review of systems was obtained and pertinent positives and negatives were listed below. REVIEW OF SYSTEMS:     Constitutional: No fever, no chills, no lethargy, no weakness. HEENT:  No headache, otalgia, itchy eyes, nasal discharge or sore throat. Cardiac:  No chest pain, dyspnea, orthopnea or PND. Chest:   No cough, phlegm or wheezing. Abdomen:      Detailed by MA   Neuro:  No focal weakness, abnormal movements or seizure like activity. Skin:   No rashes, no itching. :   No hematuria, no pyuria, no dysuria, no flank pain. Extremities:  No swelling or joint pains. ROS was otherwise negative    Review of Systems   Constitutional: Positive for fatigue. HENT: Negative.     Eyes: CO2 21 03/10/2021    BUN 40 (H) 03/10/2021    CREATININE 1.66 (H) 03/10/2021    LABALBU 3.9 05/26/2020    BILITOT 0.39 05/26/2020    ALKPHOS 58 05/26/2020    AST 19 05/26/2020    ALT 17 05/26/2020    INR 1.0 03/08/2021         Lab Results   Component Value Date    RBC 4.14 (L) 03/09/2021    HGB 11.5 (L) 03/09/2021    MCV 89.1 03/09/2021    MCH 27.8 03/09/2021    MCHC 31.2 03/09/2021    RDW 14.8 (H) 03/09/2021    MPV 9.0 03/09/2021    BASOPCT 1 03/09/2021    LYMPHSABS 1.38 03/09/2021    MONOSABS 1.15 03/09/2021    NEUTROABS 7.26 03/09/2021    EOSABS 0.30 03/09/2021    BASOSABS 0.05 03/09/2021         DIAGNOSTIC TESTING:     Echo Complete 2d W Doppler W Color    Result Date: 3/4/2021  Transthoracic Echocardiography Report (TTE)  Patient Name [address]    Date of Study               03/03/2021               Ordolores Hartley   Date of      1955  Gender                      Male  Birth   Age          63 year(s)  Race                           Room Number              Height:                     67 inch, 170.18 cm   Corporate ID [id number]    Weight:                     320 pounds, 145.2 kg  #   Patient Acct [de-identified]   BSA:          2.47 m^2      BMI:      50.12  #                                                              kg/m^2   MR #         [id number]     Sonographer                 Panchito Ocala   Accession #  [id number]  Interpreting Physician      Kevin Fiore   Fellow                   Referring Nurse                           Practitioner   Interpreting             Referring Physician         Terry Colón *  Fellow  Type of Study   TTE procedure:2D Echocardiogram, M-Mode, Doppler, Color Doppler. Procedure Date Date: 03/03/2021 Start: 10:55 AM Study Location: [facility] Technical Quality: Adequate visualization Indications:Dyspnea/SOB. History / Tech. Comments: Procedure explained to patient.  CHF, HX CABG Patient Status: Outpatient Height: 67 inches Weight: 320.01 pounds BSA: 2.47 m^2 BMI: 50.12 kg/m^2 HR: 83 bpm Allergies   - *No Known Allergies. CONCLUSIONS Summary Normal LV size and wall thickness. No obvious wall motion abnormality seen. Normal LV systolic function with LVEF >55%. Normal RV size and function. RV systolic pressure 29 mmHg LA and RA appears normal in size. No obvious significant structural valvular abnormality noted. No significant valvular stenosis or regurgitation noted. Normal aortic root dimension. No significant pericardial effusion noted. No obvious intra-cardiac mass or shunt noted. IVC normal diameter and inspiratory collapse indicating normal RA filling pressure. Signature ----------------------------------------------------------------------------  Electronically signed by Kevin Fiore(Interpreting physician) on  03/04/2021 12:22 PM ---------------------------------------------------------------------------- ----------------------------------------------------------------------------  Electronically signed by Rocio Maurice(Sonographer) on 03/03/2021  11:57 AM ---------------------------------------------------------------------------- FINDINGS Left Atrium Left atrium is normal in size. Left Ventricle Left ventricle is normal in size. Global left ventricular systolic function is normal. Calculated ejection fraction 61% by Heart Model. Abnormal septal motion; may be due to post open heart. Right Atrium Right atrium is normal in size. Right Ventricle Normal right ventricular size and function. TAPSE value of 1.93cm noted. Mitral Valve Normal mitral valve structure. Mild mitral regurgitation. Aortic Valve Normal aortic valve structure and function without stenosis or regurgitation. Tricuspid Valve No obvious valvular abnormality. Trivial tricuspid regurgitation. No pulmonary hypertension. Estimated right ventricular systolic pressure is 89HXTI. Pulmonic Valve The pulmonic valve is normal in structure. No pulmonic insufficiency.  Pericardial Effusion No pericardial effusion cardiopulmonary findings     Ct Head Wo Contrast    Result Date: 3/8/2021  EXAMINATION: CT OF THE HEAD WITHOUT CONTRAST  3/8/2021 12:13 pm TECHNIQUE: CT of the head was performed without the administration of intravenous contrast. Dose modulation, iterative reconstruction, and/or weight based adjustment of the mA/kV was utilized to reduce the radiation dose to as low as reasonably achievable. COMPARISON: None. HISTORY: ORDERING SYSTEM PROVIDED HISTORY: dizziness TECHNOLOGIST PROVIDED HISTORY: dizziness Decision Support Exception->Emergency Medical Condition (MA) Reason for Exam:  and presents with complaint of dizziness, episode last week that resulted in fall and having shoulder pain. States that these episodes come out of nowhere and he just feels ill. Does not have any cold sweats, chest pain, shortness of breath and these episodes happen. He states that he starts to get blurry vision and nauseated but has not actually vomited. Had a CABG back in 2019 and has not had a stress test that he is aware of that has been recent. Recent echo showed EF greater than 55%. Is on antiplatelets and is unsure whether or not he is on any anticoagulation medications. Dates that the swelling in his legs has been baseline. No back pain or abdominal pain. Acuity: Unknown Type of Exam: Unknown FINDINGS: BRAIN/VENTRICLES: There is no acute intracranial hemorrhage, mass effect or midline shift. No abnormal extra-axial fluid collection. The gray-white differentiation is maintained without evidence of an acute infarct. There is no evidence of hydrocephalus. Scattered hypodensity in the white matter is present consistent with chronic microvascular change. Mild diffuse cortical atrophy is evident. The patient has a remote right basal ganglial infarct. ORBITS: The visualized portion of the orbits demonstrate no acute abnormality. SINUSES: Moderate inflammatory change in the ethmoid sinuses.   Mild inflammatory change in the sphenoid sinuses. Mastoid air cells are appropriately aerated. SOFT TISSUES/SKULL:  No acute abnormality of the visualized skull or soft tissues. No acute intracranial abnormality. Senescent changes including chronic microvascular change. Findings of sinus inflammation. Us Renal Complete    Result Date: 3/3/2021  EXAMINATION: RETROPERITONEAL ULTRASOUND OF THE KIDNEYS AND URINARY BLADDER 3/3/2021 COMPARISON: None HISTORY: ORDERING SYSTEM PROVIDED HISTORY: Post-void dribbling FINDINGS: Kidneys: The right kidney measures 12.2 cm in length and the left kidney measures 12.3 cm in length. Borderline thin appearance of the renal cortex bilaterally which could reflect medical renal disease. No suspicious mass or shadowing calculus. Bladder: Prevoid urinary bladder volume 174 mL. No significant postvoid residual volume was demonstrated. Bilateral ureteral jets were visualized. No focal urinary bladder wall abnormality was identified. No suspicious renal mass, calculus, or hydronephrosis. Borderline thin appearance of the renal cortex bilaterally, which could reflect medical renal disease. Unremarkable ultrasound appearance of the urinary bladder. Xr Shoulder Left (min 2 Views)    Result Date: 3/8/2021  EXAMINATION: TWO XRAY VIEWS OF THE LEFT SHOULDER 3/8/2021 11:49 am COMPARISON: None. HISTORY: ORDERING SYSTEM PROVIDED HISTORY: fall - left shoulder pain TECHNOLOGIST PROVIDED HISTORY: fall - left shoulder pain FINDINGS: There are degenerative/posttraumatic changes of the left distal clavicle/acromioclavicular joint with associated deformity/spurring There are a few degenerative cysts seen at the posterolateral aspect left humeral head There is mild glenohumeral joint space compromise There are postsurgical changes of the mid to lower cervical spine No definite acute fracture, dislocation or abnormal soft tissue calcification     Multilevel degenerative changes          Assessment  1.  Adenomatous polyp of colon, unspecified part of colon          IMPRESSION:Mr. Iman Kearney a 60 y. o. male with a past history remarkable for HARPREET, A. fib on Eliquis, morbid obesity, history of CABG in October 2019 on dual antiplatelet therapy (aspirin and Plavix), referred for evaluation of nonproductive postprandial cough. This appears to be most consistent with the patient's chronic GERD symptoms. He appears to be on maximum PPI therapy    BP on low side, patient has symptoms related to this. Strongly recommend discussion with PCP and cardiologist about adjusting medications. Patient appears to be hypotensive and symptomatic with repeated blood pressures and the 61T systolic and diastolic in the 65J. Had blurry vision this morning. Similar symptoms required visit to the emergency department where he was identified to have SYDNEY. Unclear whether or not his blood pressure medications were adjusted accordingly    PLAN:    1) tubular adenomas on recent colonoscopy-status post EMR resection. Repeat colonoscopy in 3 years    2) chronic GERD-patient continue with Protonix 40 mg twice daily and Carafate 4 times daily. Education with regards to timing of medications provided. Symptoms appear to be moderately controlled from the GI standpoint. 3) RTC in 2 months     We will send patient to the emergency department due to his symptomatic hypotension    Thank you for allowing me to participate in the care of Mr. Neri Mendoza. For any further questions please do not hesitate to contact me. I have reviewed and agree with the ROS entered by the MA/LPN from today's encounter documented in a separate note.         Joy Llanes MD, MPH   [facility]  Office #: [phone]    this note is created with the assistance of a speech recognition program.  While intending to generate a document that actually reflects the content of the visit, the document can still have some errors including those of syntax and sound a like substitutions which may escape proof reading. It such instances, actual meaning can be extrapolated by contextual diversion.

## 2021-03-16 ENCOUNTER — TELEPHONE (OUTPATIENT)
Dept: UROLOGY | Age: 66
End: 2021-03-16

## 2021-03-16 VITALS
TEMPERATURE: 98.2 F | HEIGHT: 67 IN | BODY MASS INDEX: 49.36 KG/M2 | HEART RATE: 73 BPM | WEIGHT: 314.5 LBS | DIASTOLIC BLOOD PRESSURE: 54 MMHG | SYSTOLIC BLOOD PRESSURE: 111 MMHG | OXYGEN SATURATION: 97 % | RESPIRATION RATE: 25 BRPM

## 2021-03-16 LAB
ABSOLUTE EOS #: 0.28 K/UL (ref 0–0.44)
ABSOLUTE IMMATURE GRANULOCYTE: 0.11 K/UL (ref 0–0.3)
ABSOLUTE LYMPH #: 1.01 K/UL (ref 1.1–3.7)
ABSOLUTE MONO #: 0.74 K/UL (ref 0.1–1.2)
ALBUMIN SERPL-MCNC: 3.8 G/DL (ref 3.5–5.2)
ALBUMIN/GLOBULIN RATIO: 1.2 (ref 1–2.5)
ALP BLD-CCNC: 65 U/L (ref 40–129)
ALT SERPL-CCNC: 14 U/L (ref 5–41)
ANION GAP SERPL CALCULATED.3IONS-SCNC: 15 MMOL/L (ref 9–17)
AST SERPL-CCNC: 15 U/L
BASOPHILS # BLD: 1 % (ref 0–2)
BASOPHILS ABSOLUTE: 0.05 K/UL (ref 0–0.2)
BILIRUB SERPL-MCNC: 0.29 MG/DL (ref 0.3–1.2)
BILIRUBIN URINE: NEGATIVE
BUN BLDV-MCNC: 29 MG/DL (ref 8–23)
BUN/CREAT BLD: ABNORMAL (ref 9–20)
CALCIUM SERPL-MCNC: 9.1 MG/DL (ref 8.6–10.4)
CHLORIDE BLD-SCNC: 105 MMOL/L (ref 98–107)
CO2: 19 MMOL/L (ref 20–31)
COLOR: YELLOW
COMMENT UA: NORMAL
CREAT SERPL-MCNC: 1.11 MG/DL (ref 0.7–1.2)
CREATININE URINE: 106.4 MG/DL (ref 39–259)
DIFFERENTIAL TYPE: ABNORMAL
EKG ATRIAL RATE: 65 BPM
EKG P AXIS: 60 DEGREES
EKG P-R INTERVAL: 218 MS
EKG Q-T INTERVAL: 416 MS
EKG QRS DURATION: 74 MS
EKG QTC CALCULATION (BAZETT): 432 MS
EKG R AXIS: 15 DEGREES
EKG T AXIS: 49 DEGREES
EKG VENTRICULAR RATE: 65 BPM
EOSINOPHILS RELATIVE PERCENT: 4 % (ref 1–4)
GFR AFRICAN AMERICAN: >60 ML/MIN
GFR NON-AFRICAN AMERICAN: >60 ML/MIN
GFR SERPL CREATININE-BSD FRML MDRD: ABNORMAL ML/MIN/{1.73_M2}
GFR SERPL CREATININE-BSD FRML MDRD: ABNORMAL ML/MIN/{1.73_M2}
GLUCOSE BLD-MCNC: 146 MG/DL (ref 70–99)
GLUCOSE URINE: NEGATIVE
HCT VFR BLD CALC: 34.6 % (ref 40.7–50.3)
HEMOGLOBIN: 10.5 G/DL (ref 13–17)
IMMATURE GRANULOCYTES: 2 %
KETONES, URINE: NEGATIVE
LEUKOCYTE ESTERASE, URINE: NEGATIVE
LYMPHOCYTES # BLD: 13 % (ref 24–43)
MCH RBC QN AUTO: 28.3 PG (ref 25.2–33.5)
MCHC RBC AUTO-ENTMCNC: 30.3 G/DL (ref 28.4–34.8)
MCV RBC AUTO: 93.3 FL (ref 82.6–102.9)
MONOCYTES # BLD: 10 % (ref 3–12)
NITRITE, URINE: NEGATIVE
NRBC AUTOMATED: 0 PER 100 WBC
PDW BLD-RTO: 15.3 % (ref 11.8–14.4)
PH UA: 5.5 (ref 5–8)
PLATELET # BLD: 241 K/UL (ref 138–453)
PLATELET ESTIMATE: ABNORMAL
PMV BLD AUTO: 9.6 FL (ref 8.1–13.5)
POTASSIUM SERPL-SCNC: 4.5 MMOL/L (ref 3.7–5.3)
PROTEIN UA: NEGATIVE
RBC # BLD: 3.71 M/UL (ref 4.21–5.77)
RBC # BLD: ABNORMAL 10*6/UL
SEG NEUTROPHILS: 71 % (ref 36–65)
SEGMENTED NEUTROPHILS ABSOLUTE COUNT: 5.37 K/UL (ref 1.5–8.1)
SODIUM BLD-SCNC: 139 MMOL/L (ref 135–144)
SPECIFIC GRAVITY UA: 1.02 (ref 1–1.03)
TOTAL PROTEIN: 6.9 G/DL (ref 6.4–8.3)
TROPONIN INTERP: NORMAL
TROPONIN T: NORMAL NG/ML
TROPONIN, HIGH SENSITIVITY: 18 NG/L (ref 0–22)
TURBIDITY: CLEAR
UREA NITROGEN, UR: 960 MG/DL
URINE HGB: NEGATIVE
UROBILINOGEN, URINE: NORMAL
WBC # BLD: 7.6 K/UL (ref 3.5–11.3)
WBC # BLD: ABNORMAL 10*3/UL

## 2021-03-16 PROCEDURE — 6370000000 HC RX 637 (ALT 250 FOR IP): Performed by: STUDENT IN AN ORGANIZED HEALTH CARE EDUCATION/TRAINING PROGRAM

## 2021-03-16 PROCEDURE — 2580000003 HC RX 258: Performed by: STUDENT IN AN ORGANIZED HEALTH CARE EDUCATION/TRAINING PROGRAM

## 2021-03-16 PROCEDURE — 6360000002 HC RX W HCPCS: Performed by: STUDENT IN AN ORGANIZED HEALTH CARE EDUCATION/TRAINING PROGRAM

## 2021-03-16 PROCEDURE — 85025 COMPLETE CBC W/AUTO DIFF WBC: CPT

## 2021-03-16 PROCEDURE — G0378 HOSPITAL OBSERVATION PER HR: HCPCS

## 2021-03-16 PROCEDURE — 93010 ELECTROCARDIOGRAM REPORT: CPT | Performed by: INTERNAL MEDICINE

## 2021-03-16 PROCEDURE — 99235 HOSP IP/OBS SAME DATE MOD 70: CPT | Performed by: HOSPITALIST

## 2021-03-16 PROCEDURE — 6370000000 HC RX 637 (ALT 250 FOR IP): Performed by: HOSPITALIST

## 2021-03-16 PROCEDURE — 94660 CPAP INITIATION&MGMT: CPT

## 2021-03-16 PROCEDURE — 80053 COMPREHEN METABOLIC PANEL: CPT

## 2021-03-16 PROCEDURE — 93970 EXTREMITY STUDY: CPT

## 2021-03-16 PROCEDURE — 84484 ASSAY OF TROPONIN QUANT: CPT

## 2021-03-16 PROCEDURE — 81003 URINALYSIS AUTO W/O SCOPE: CPT

## 2021-03-16 PROCEDURE — 36415 COLL VENOUS BLD VENIPUNCTURE: CPT

## 2021-03-16 PROCEDURE — 96372 THER/PROPH/DIAG INJ SC/IM: CPT

## 2021-03-16 RX ORDER — BLOOD PRESSURE TEST KIT
1 KIT MISCELLANEOUS 2 TIMES DAILY
Qty: 1 KIT | Refills: 0 | Status: SHIPPED | OUTPATIENT
Start: 2021-03-16

## 2021-03-16 RX ORDER — FUROSEMIDE 20 MG/1
60 TABLET ORAL 2 TIMES DAILY
Qty: 180 TABLET | Refills: 1 | Status: SHIPPED | OUTPATIENT
Start: 2021-03-16 | End: 2021-09-21 | Stop reason: SDUPTHER

## 2021-03-16 RX ADMIN — SUCRALFATE 1 G: 1 TABLET ORAL at 16:17

## 2021-03-16 RX ADMIN — ISOSORBIDE MONONITRATE 30 MG: 30 TABLET ORAL at 10:05

## 2021-03-16 RX ADMIN — TAMSULOSIN HYDROCHLORIDE 0.4 MG: 0.4 CAPSULE ORAL at 10:06

## 2021-03-16 RX ADMIN — HEPARIN SODIUM 5000 UNITS: 5000 INJECTION INTRAVENOUS; SUBCUTANEOUS at 13:30

## 2021-03-16 RX ADMIN — CETIRIZINE HYDROCHLORIDE 5 MG: 10 TABLET ORAL at 10:04

## 2021-03-16 RX ADMIN — DOXYCYCLINE HYCLATE 100 MG: 100 TABLET, COATED ORAL at 16:35

## 2021-03-16 RX ADMIN — PANTOPRAZOLE SODIUM 40 MG: 40 TABLET, DELAYED RELEASE ORAL at 16:17

## 2021-03-16 RX ADMIN — SODIUM CHLORIDE, PRESERVATIVE FREE 10 ML: 5 INJECTION INTRAVENOUS at 09:55

## 2021-03-16 RX ADMIN — DOXYCYCLINE HYCLATE 100 MG: 100 TABLET, COATED ORAL at 04:49

## 2021-03-16 RX ADMIN — PANTOPRAZOLE SODIUM 40 MG: 40 TABLET, DELAYED RELEASE ORAL at 06:34

## 2021-03-16 RX ADMIN — SUCRALFATE 1 G: 1 TABLET ORAL at 13:01

## 2021-03-16 RX ADMIN — HEPARIN SODIUM 5000 UNITS: 5000 INJECTION INTRAVENOUS; SUBCUTANEOUS at 06:34

## 2021-03-16 RX ADMIN — LOSARTAN POTASSIUM 50 MG: 50 TABLET ORAL at 10:05

## 2021-03-16 RX ADMIN — SUCRALFATE 1 G: 1 TABLET ORAL at 09:55

## 2021-03-16 RX ADMIN — CLOPIDOGREL 75 MG: 75 TABLET, FILM COATED ORAL at 10:04

## 2021-03-16 ASSESSMENT — ENCOUNTER SYMPTOMS
SHORTNESS OF BREATH: 0
CHEST TIGHTNESS: 0
APNEA: 0
COUGH: 0
COLOR CHANGE: 0

## 2021-03-16 ASSESSMENT — PAIN SCALES - GENERAL: PAINLEVEL_OUTOF10: 0

## 2021-03-16 ASSESSMENT — PAIN DESCRIPTION - PROGRESSION: CLINICAL_PROGRESSION: NOT CHANGED

## 2021-03-16 NOTE — CARE COORDINATION
Case Management Initial Discharge Plan  Alexia Rinne,             Met with:patient to discuss discharge plans. Information verified: address, contacts, phone number, , insurance Yes    Emergency Contact/Next of Kin name & number: [address]    PCP: Cade Hernandez MD  Date of last visit: last wed    Insurance Provider: [facility]    Discharge Planning    Living Arrangements:  [place]:  Family Members, Friends/Neighbors    Home is an apartment       Patient able to perform ADL's:Independent    Current Services (outpatient & in home) none  DME equipment: cane, medical alert, gave him a scale today      Receiving oral anticoagulation therapy? plavix          Potential Assistance Needed:  F/u pcp    Patient agreeable to home care: no skilled needs  Freedom of choice provided:  n/a    Prior SNF/Rehab Placement and Facility: none   Agreeable to SNF/Rehab: n/a  Fate of choice provided: n/a     Evaluation: no    Expected Discharge date:  21    Patient expects to be discharged to:  home  Follow Up Appointment: Best Day/ Time: Monday AM    Transportation provider: [employer]  Transportation arrangements needed for discharge: Yes    Readmission Risk              Risk of Unplanned Readmission:        16             Does patient have a readmission risk score greater than 14?: Yes  If yes, follow-up appointment must be made within 7 days of discharge.      Goals of Care: return to adl's without dizziness         Discharge Plan: return home independently     Transport arrangements made  @ 18:15      Electronically signed by Renard Singh RN on 3/16/21 at 5:21 PM EDT

## 2021-03-16 NOTE — H&P
[address] [facility]  History & Physical Examination Note              Date:   3/15/2021  Patient name:  Jina Esposito  Date of admission:  3/15/2021  4:46 PM  MRN:   [id number]  YOB: 1955    CHIEF COMPLAINT:     Chief Complaint   Patient presents with    Dizziness       History Obtained From:  Patient and chart review. HPI:     The patient is a 63 y.o.  male with history of SYDNEY, atrial fibrillation, systolic and diastolic heart failure, history of CABG, coronary artery disease. Patient was sent in from GI outpatient office today for hypotension with blood pressure of 75/45. Patient at the time was complaining of blurry vision, denied any chest pain. He also complains of chronic SOB for which he uses CPAP at night. He was being seen in the GI office for a follow up of his hx of polypectomy. Patient was also recently discharged from the hospital at which time he was admitted for SYDNEY. Patient denies noticing any active bleeding at this time. At ER, vitals were stable with blood pressure 101/64. Patient did receive a small fluid bolus of 500 mls. In the ED WBC count was elevated at 12.2, hemoglobin was low at 10.4 (baseline 10-11). Creatinine was elevated at 1.69 (baseline normal). Troponins 25->22. proBNP was elevated 427 (baseline 500-1000). and he is admitted to the hospital for SYDNEY. PAST MEDICAL HISTORY:        has a past medical history of Atrial fibrillation ([address] BPH (benign prostatic hyperplasia), Cellulitis, Cervical disc disease, CHF (congestive heart failure) ([address] Coronary artery disease, CPAP (continuous positive airway pressure) dependence, GERD (gastroesophageal reflux disease), History of incarceration, Hyperlipidemia, Hypertension, Myocardial infarct ([address] Obesity, and Sleep apnea. PAST SURGICAL HISTORY:      has a past surgical history that includes transesophageal echocardiogram (10/16/2019);  Cardioversion (10/16/2019); Coronary Artery Bypass Graft Maze Procedure (N/A, 10/21/2019); Carpal tunnel release (Bilateral, 2008); fracture surgery; Cervical spine surgery; Abdominal exploration surgery; Colonoscopy (10/29/2020); Colonoscopy (N/A, 10/29/2020); Colonoscopy (10/29/2020); Colonoscopy (10/29/2020); Cardiac surgery; and Colonoscopy (N/A, 1/12/2021). FAMILY HISTORY:     family history includes Alcohol Abuse in his maternal uncle; Heart Attack in his maternal uncle. HOME MEDICATIONS:     Prior to Admission medications    Medication Sig Start Date End Date Taking? Authorizing Provider   lisinopril (PRINIVIL;ZESTRIL) 2.5 MG tablet Take 2.5 mg by mouth daily    Historical Provider, MD   clopidogrel (PLAVIX) 75 MG tablet Take 1 tablet by mouth daily 3/8/21   Yolanda Bernard MD   isosorbide mononitrate (IMDUR) 30 MG extended release tablet Take 1 tablet by mouth daily 3/8/21   Yolanda Bernard MD   spironolactone (ALDACTONE) 25 MG tablet Take 1 tablet by mouth daily 3/8/21   Fernando Magaña MD   loratadine (CLARITIN) 10 MG tablet TAKE 1 TABLET BY MOUTH DAILY 3/8/21   Fernando Magaña MD   atorvastatin (LIPITOR) 40 MG tablet TAKE 1 TABLET BY MOUTH NIGHTLY 2/25/21   Moira Amador MD   furosemide (LASIX) 20 MG tablet Take 3 tablets by mouth 2 times daily 2/25/21   LUDY Brunson - CNP   tamsulosin [facility]) 0.4 MG capsule Take 1 capsule by mouth daily 2/12/21   Willow Peng MD   polyethylene glycol [facility]) 17 GM/SCOOP powder Use as directed by following your patient instructions given by office. 12/22/20   Sebastian Dennison MD   sucralfate (CARAFATE) 1 GM tablet Take 1 tablet by mouth 4 times daily 12/11/20   Janet Linares MD   potassium chloride (KLOR-CON M) 20 MEQ extended release tablet Take 1 tablet by mouth daily 12/3/20   Moira Amador MD   nitroGLYCERIN (NITROSTAT) 0.4 MG SL tablet Place 1 tablet under the tongue every 5 minutes as needed for Chest pain up to max of 3 total doses.  If no relief after 1 dose, call 911. 11/23/20   Colonel Gabby MD   pantoprazole (PROTONIX) 40 MG tablet Take 1 tablet by mouth 2 times daily (before meals) 11/23/20   Colonel Gabby MD   losartan (COZAAR) 50 MG tablet Take 1 tablet by mouth daily 11/23/20   Colonel Gabby MD   magnesium citrate solution Take 296 mLs by mouth once for 1 dose 10/7/20 10/7/20  Keiry Sethi MD   diclofenac sodium (VOLTAREN) 1 % GEL APPLY 2 G TOPICALLY 4 TIMES DAILY AS NEEDED FOR PAIN 9/25/20   Colonel Gabby MD   Elastic Bandages & Supports (JOBST KNEE HIGH COMPRESSION SM) MISC 2 each by Does not apply route daily Wear q day. Remove q hs. Diagnosis: Chronic venous insufficiency 8/12/20   Radford Fleischer, APRN - CNP       ALLERGIES:      Patient has no known allergies. SOCIAL HISTORY:      reports that he has never smoked. He has never used smokeless tobacco. He reports previous alcohol use. He reports previous drug use. REVIEW OF SYSTEMS:     CONSTITUTIONAL:  no fevers  EYES: negative for double vision  HEENT: No headaches, no rhinorrhea, no nasal congestion, no sore throat, no difficulty swallowing  RESPIRATORY:negative for dyspnea, no wheezing. Positive for SOB.   CARDIOVASCULAR: negative for chest pain, no palpitations, no fatigue, no edema   GASTROINTESTINAL: no nausea, no vomiting, no change in bowel habits, no abdominal pain   GENITOURINARY: negative for frequency, no dysuria, no nocturia   INTEGUMENT: positive for redness of the left LE extending from ankle to knee, no easy bruising   HEMATOLOGIC/LYMPHATIC: positive for b/l LE swelling/edema   ALLERGIC/IMMUNOLOGIC: negative for urticaria   ENDOCRINE: no polydipsia, no polyuria, no hot or cold intolerance  MUSCULOSKELETAL: no joint pains, no muscle aches, no swelling of joints or extremities  NEUROLOGICAL: no numbness, no tingling  BEHAVIOR/PSYCH: negative    PHYSICAL EXAM:     Vitals:    03/15/21 1656 03/15/21 1657 03/15/21 1703   BP: 101/64     Pulse: 71     Resp: 25     Temp: 98.2 °F (36.8 °C) Absolute 0.06 0.00 - 0.20 k/uL    Absolute Immature Granulocyte 0.19 0.00 - 0.30 k/uL    WBC Morphology NOT REPORTED     RBC Morphology ANISOCYTOSIS PRESENT     Platelet Estimate NOT REPORTED    BASIC METABOLIC PANEL    Collection Time: 03/15/21  5:23 PM   Result Value Ref Range    Glucose 101 (H) 70 - 99 mg/dL    BUN 33 (H) 8 - 23 mg/dL    CREATININE 1.69 (H) 0.70 - 1.20 mg/dL    Bun/Cre Ratio NOT REPORTED 9 - 20    Calcium 9.5 8.6 - 10.4 mg/dL    Sodium 138 135 - 144 mmol/L    Potassium 5.3 3.7 - 5.3 mmol/L    Chloride 102 98 - 107 mmol/L    CO2 22 20 - 31 mmol/L    Anion Gap 14 9 - 17 mmol/L    GFR Non-African American 41 (L) >60 mL/min    GFR African American 50 (L) >60 mL/min    GFR Comment          GFR Staging NOT REPORTED    Troponin    Collection Time: 03/15/21  5:23 PM   Result Value Ref Range    Troponin, High Sensitivity 25 (H) 0 - 22 ng/L    Troponin T NOT REPORTED <0.03 ng/mL    Troponin Interp NOT REPORTED    Brain Natriuretic Peptide    Collection Time: 03/15/21  5:23 PM   Result Value Ref Range    Pro- (H) <300 pg/mL    BNP Interpretation Pro-BNP Reference Range:    Troponin    Collection Time: 03/15/21  6:52 PM   Result Value Ref Range    Troponin, High Sensitivity 22 0 - 22 ng/L    Troponin T NOT REPORTED <0.03 ng/mL    Troponin Interp NOT REPORTED          Imaging/Diagonstics:  Echo Complete 2d W Doppler W Color    Result Date: 3/4/2021  Transthoracic Echocardiography Report (TTE)  Patient Name [address]    Date of Study               03/03/2021               Era Renetta   Date of      1955  Gender                      Male  Birth   Age          63 year(s)  Race                           Room Number              Height:                     67 inch, 170.18 cm   Corporate ID [id number]    Weight:                     320 pounds, 145.2 kg  #   Patient Acct [de-identified]   BSA:          2.47 m^2      BMI:      50.12  #                                                              kg/m^2   MR Calculated ejection fraction 61% by Heart Model. Abnormal septal motion; may be due to post open heart. Right Atrium Right atrium is normal in size. Right Ventricle Normal right ventricular size and function. TAPSE value of 1.93cm noted. Mitral Valve Normal mitral valve structure. Mild mitral regurgitation. Aortic Valve Normal aortic valve structure and function without stenosis or regurgitation. Tricuspid Valve No obvious valvular abnormality. Trivial tricuspid regurgitation. No pulmonary hypertension. Estimated right ventricular systolic pressure is 26FGKC. Pulmonic Valve The pulmonic valve is normal in structure. No pulmonic insufficiency. Pericardial Effusion No pericardial effusion seen. Miscellaneous E/E' average = 16.3. IVC normal diameter & inspiratory collapse indicating normal RA filling pressure .  M-mode / 2D Measurements & Calculations:   LVIDd:5.1 cm(3.7 - 5.6 cm)       Diastolic TYNMPC:177 ml  GEAHL:7.0 cm(2.2 - 4.0 cm)       Systolic YPWKAV:65 ml  IVSd:1 cm(0.6 - 1.1 cm)          Aortic Root:3.2 cm(2.0 - 3.7 cm)  LVPWd:1 cm(0.6 - 1.1 cm)         LA Dimension: 4.3 cm(1.9 - 4.0 cm)  Fractional Shortenin.25 %    LA volume/Index: 61.43 ml /25m^2  Calculated LVEF (%): 61.18 %     LVOT:1.9 cm                                   RVDd:3.6 cm   Mitral:                                 Aortic   Valve Area (P1/2-Time): 3.33 cm^2       Peak Velocity: 1.59 m/s  Peak E-Wave: 0.97 m/s                   Mean Velocity: 1.08 m/s  Peak A-Wave: 0.60 m/s                   Peak Gradient: 10.11 mmHg  E/A Ratio: 1.62                         Mean Gradient: 5 mmHg  Peak Gradient: 3.79 mmHg  Mean Gradient: 2 mmHg  Deceleration Time: 227 msec             Area (continuity): 2.32 cm^2  P1/2t: 66 msec                          AV VTI: 32.3 cm   Area (continuity): 2.76 cm^2  Mean Velocity: 0.59 m/s   Tricuspid:                              Pulmonic:   Peak TR Velocity: 2.36 m/s              Peak Velocity: 1.17 m/s  Peak TR Gradient: pain. Acuity: Unknown Type of Exam: Unknown FINDINGS: BRAIN/VENTRICLES: There is no acute intracranial hemorrhage, mass effect or midline shift. No abnormal extra-axial fluid collection. The gray-white differentiation is maintained without evidence of an acute infarct. There is no evidence of hydrocephalus. Scattered hypodensity in the white matter is present consistent with chronic microvascular change. Mild diffuse cortical atrophy is evident. The patient has a remote right basal ganglial infarct. ORBITS: The visualized portion of the orbits demonstrate no acute abnormality. SINUSES: Moderate inflammatory change in the ethmoid sinuses. Mild inflammatory change in the sphenoid sinuses. Mastoid air cells are appropriately aerated. SOFT TISSUES/SKULL:  No acute abnormality of the visualized skull or soft tissues. No acute intracranial abnormality. Senescent changes including chronic microvascular change. Findings of sinus inflammation. Us Renal Complete    Result Date: 3/3/2021  EXAMINATION: RETROPERITONEAL ULTRASOUND OF THE KIDNEYS AND URINARY BLADDER 3/3/2021 COMPARISON: None HISTORY: ORDERING SYSTEM PROVIDED HISTORY: Post-void dribbling FINDINGS: Kidneys: The right kidney measures 12.2 cm in length and the left kidney measures 12.3 cm in length. Borderline thin appearance of the renal cortex bilaterally which could reflect medical renal disease. No suspicious mass or shadowing calculus. Bladder: Prevoid urinary bladder volume 174 mL. No significant postvoid residual volume was demonstrated. Bilateral ureteral jets were visualized. No focal urinary bladder wall abnormality was identified. No suspicious renal mass, calculus, or hydronephrosis. Borderline thin appearance of the renal cortex bilaterally, which could reflect medical renal disease. Unremarkable ultrasound appearance of the urinary bladder.      Xr Shoulder Left (min 2 Views)    Result Date: 3/8/2021  EXAMINATION: TWO XRAY VIEWS OF THE LEFT SHOULDER 3/8/2021 11:49 am COMPARISON: None. HISTORY: ORDERING SYSTEM PROVIDED HISTORY: fall - left shoulder pain TECHNOLOGIST PROVIDED HISTORY: fall - left shoulder pain FINDINGS: There are degenerative/posttraumatic changes of the left distal clavicle/acromioclavicular joint with associated deformity/spurring There are a few degenerative cysts seen at the posterolateral aspect left humeral head There is mild glenohumeral joint space compromise There are postsurgical changes of the mid to lower cervical spine No definite acute fracture, dislocation or abnormal soft tissue calcification     Multilevel degenerative changes     Xr Chest Portable    Result Date: 3/15/2021  EXAMINATION: ONE XRAY VIEW OF THE CHEST 3/15/2021 5:22 pm COMPARISON: March 8, 2021 HISTORY: ORDERING SYSTEM PROVIDED HISTORY: shortness of breath TECHNOLOGIST PROVIDED HISTORY: shortness of breath Reason for Exam: short of breath FINDINGS: Atrial appendage clip. Sternotomy wires. Sternotomy metallic plates. The lungs are without acute focal process. There is no effusion or pneumothorax. The cardiomediastinal silhouette is without acute process. The osseous structures are without acute process. No acute process. ASSESSMENT:       Active Problems:    SYDNEY (acute kidney injury) ([facility]  Resolved Problems:    * No resolved hospital problems. *      PLAN:     Patient status: Admit the patient as Inpatient observation in the Progressive Unit     SYDNEY likely secondary to diuretic medications in setting of CHF  -Creatinine elevated at 1.69  -Follow-up urine urea, urine creatinine  -IV fluids at 75 mL/hour  - proBNP was elevated 427 (baseline 500-1000)  - home aldactone, lasix and lisinopril on hold. Meds need to be adjusted upon discharge.     Left lower extremity cellulitis  - Leukocytosis -follow-up UA, no clinical signs of sepsis  - Started on doxycycline PO 100mg BID for 7 days    Normocytic anemia 2/2 colonic polyps  -Follows up with GI outpatient, status post tubular adenomas on recent colonoscopy, status post EMR resection.   Repeat colonoscopy in 3 years  -Hemodynamically stable, hemoglobin 10.4 (around baseline)  -Follow-up fecal occult    Chronic GERD  -Continue with Protonix 40 mg twice daily, Carafate 4 times daily    Hx of CAD with stent placements, CABG  - continue with plavix    HARPREET  - CPAP at night    DVT prophylaxis: heparin (porcine) injection 5,000 TID  GI prophylaxis: Protonix 40mg BID      Consultations:   Consults: IP CONSULT TO PRIMARY CARE PROVIDER  IP CONSULT TO SOCIAL WORK  PT/OT    Above plan discussed with the patient and family in room, who agreed to the above plan     Plan will be discussed with the attending, Dr. Sina Ventura MD  Family Medicine Resident  3/15/2021 9:13 PM

## 2021-03-16 NOTE — PROGRESS NOTES
Physical Therapy    DATE: 3/16/2021    NAME: Janet Kilpatrick  MRN: [id number]   : 1955      Patient not seen this date for Physical Therapy due to: Other: Pt has current LE dopplers ordered. PT will await results and ck back as time allows or 3/17/21.         Electronically signed by Navi Campa on 3/16/2021 at 8:32 AM

## 2021-03-16 NOTE — ED NOTES
Report called to [facility]. All questions answered.       Edson Medicine, RN  03/15/21 1547

## 2021-03-16 NOTE — PROGRESS NOTES
CLINICAL PHARMACY NOTE: MEDS TO [address] Select Patient?: No  Total # of Prescriptions Filled: 1   The following medications were delivered to the patient:  · lasix  Total # of Interventions Completed: 0  Time Spent (min): 0    Additional Documentation:

## 2021-03-16 NOTE — DISCHARGE INSTR - COC
Natalia De La Garza MD at [address] [facility] N/A 10/21/2019    CABG CORONARY ARTERY BYPASS GRAFT X1, LIMA-LAD, BROWN 4 MAZE PROCEDURE, 50MM ATRICURE ATRIAL CLIP RIGID INTERNAL FIXATION PLATES X 3   SCREWS X 13 performed by Norberto Ferguson MD at [address] [facility]      Left leg    TRANSESOPHAGEAL ECHOCARDIOGRAM  10/16/2019       Immunization History:   Immunization History   Administered Date(s) Administered    Influenza, Quadv, IM, PF (6 mo and older Fluzone, Flulaval, Fluarix, and 3 yrs and older Afluria) 10/17/2019, 10/20/2020    Pneumococcal Polysaccharide ([id number]) 08/20/2020    Tdap (Boostrix, Adacel) 08/20/2020    Zoster Recombinant (Shingrix) 08/24/2020, 11/17/2020       Active Problems:  Patient Active Problem List   Diagnosis Code    Atrial flutter ([facility] I48.92    Sleep-related breathing disorder G47.30    Hypokalemia E87.6    Atrial fibrillation ([facility] I48.91    Ischemic cardiomyopathy I25.5    Combined systolic and diastolic congestive heart failure ([facility] I50.40    Acute cystitis without hematuria N30.00    Hx of CABG Z95.1    Heart failure, systolic and diastolic, chronic (HCC) F55.43    Bilateral hand numbness R20.0    Right thigh pain M79.651    Left leg weakness R29.898    Coronary artery disease I25.10    Chronic cough R05    Gastroesophageal reflux disease without esophagitis K21.9    Overflow incontinence of urine N39.490    Polyp of colon K63.5    Post-void dribbling N39.43    Post-nasal drip R09.82    SYDNEY (acute kidney injury) (HCC) N17.9    Acute CHF (congestive heart failure) ([facility]) I50.9    Hyperkalemia E87.5    Hypotension I95.9       Isolation/Infection:   Isolation          No Isolation        Patient Infection Status     Infection Onset Added Last Indicated Last Indicated By Review Planned Expiration Resolved Resolved By    None active    Resolved    COVID-19 Rule Out 01/08/21 01/08/21 01/08/21 COVID-19 (Ordered) 01/10/21 Rule-Out Test Resulted    COVID-19 Rule Out 10/25/20 10/25/20 10/25/20 COVID-19 (Ordered)   10/25/20 Rule-Out Test Resulted    INFLUENZA 20 RAPID INFLUENZA A/B ANTIGENS   20           Nurse Assessment:  Last Vital Signs: BP (!) 111/54   Pulse 73   Temp 98.2 °F (36.8 °C) (Axillary)   Resp 28   Ht 5' 7\" (1.702 m)   Wt (!) 314 lb 8 oz (142.7 kg)   SpO2 97%   BMI 49.26 kg/m²     Last documented pain score (0-10 scale): Pain Level: 0  Last Weight:   Wt Readings from Last 1 Encounters:   21 (!) 314 lb 8 oz (142.7 kg)     Mental Status:  oriented, alert, coherent, logical, thought processes intact and able to concentrate and follow conversation    IV Access:  [address] [facility] SULMA IV ACCESS:[id number]}    Nursing Mobility/ADLs:  Walking   {P DME LHDN:224512705}  Transfer  {P DME MMCH:581096832}  Bathing  {[facility] DME PGIP:213961656}  Dressing  {[facility] DME VIPL:666373544}  Toileting  {P DME NDRE:162428562}  Feeding  {[facility] DME CAQP:000405363}  Med Admin  {[facility] DME MYJK:059723669}  Med Delivery   { SULMA MED Delivery:446988502}    Wound Care Documentation and Therapy:        Elimination:  Continence:   · Bowel: {YES / DH:12043}  · Bladder: {YES / OF:79448}  Urinary Catheter: {Urinary Catheter:127757377}   Colostomy/Ileostomy/Ileal Conduit: {YES / FS:10724}       Date of Last BM: ***    Intake/Output Summary (Last 24 hours) at 3/16/2021 1626  Last data filed at 3/16/2021 1000  Gross per 24 hour   Intake 1272.8 ml   Output 1150 ml   Net 122.8 ml     I/O last 3 completed shifts: In: 1272.8 [P.O.:720;  I.V.:552.8]  Out: 1150 [Urine:1150]    Safety Concerns:     { SULMA Safety Concerns:910048487}    Impairments/Disabilities:      { SULMA Impairments/Disabilities:152778225}    Nutrition Therapy:  Current Nutrition Therapy:   [employer] Wiseman SULMA Diet List:030876437}    Routes of Feeding: {CHP DME Other Feedings:151188786}  Liquids: {Slp liquid thickness:63046}  Daily Fluid Restriction: {CHP DME Yes amt

## 2021-03-16 NOTE — TELEPHONE ENCOUNTER
Patient is scheduled for a cysto/urodynamics on 3/26 at 10:15AMl at [facility] and for covid testing on 3/22 at 10:30AM at the [facility]. No answer and unable to leave message. Letter mailed out today.

## 2021-03-16 NOTE — ED NOTES
Pt resting in chair. Pt. Given water. Pt updated on plan of care. All questions answered at this time. Will continue to monitor.       Salome Browning RN  03/15/21 9362

## 2021-03-16 NOTE — PROGRESS NOTES
[facility]    Progress Note    Date:   3/16/2021  Patient name:  Kandace Loera  Date of admission:  3/15/2021  4:46 PM  MRN:   [id number]  YOB: 1955    SUBJECTIVE/Last 24 hours update:     Patient seen and examined at bedside this morning. No acute events overnight, no new complaints. Patient's creatinine down from yesterday, currently receiving IV fluid 75 cc/h. Patient comfortably  sitting up in chair, states he was never dizzy to begin with, was sent here from his gastroenterologist office because he could not get in touch with his PCP. Patient comfortably breathing on room air, states he does feel ready to go home. HPI:     Refer to H&P     REVIEW OF SYSTEMS:      Review of Systems   Constitutional: Negative for activity change, appetite change, chills, fatigue and fever. Respiratory: Negative for apnea, cough, chest tightness and shortness of breath. Musculoskeletal: Negative for joint swelling. Skin: Negative for color change, pallor, rash and wound. Neurological: Negative for dizziness, tremors, facial asymmetry, light-headedness and headaches. Psychiatric/Behavioral: Negative for agitation, behavioral problems and confusion. The patient is not nervous/anxious. PAST MEDICAL HISTORY:      has a past medical history of Atrial fibrillation ([address] BPH (benign prostatic hyperplasia), Cellulitis, Cervical disc disease, CHF (congestive heart failure) ([address] Coronary artery disease, CPAP (continuous positive airway pressure) dependence, GERD (gastroesophageal reflux disease), History of incarceration, Hyperlipidemia, Hypertension, Myocardial infarct ([address] Obesity, and Sleep apnea. PAST SURGICAL HISTORY:      has a past surgical history that includes transesophageal echocardiogram (10/16/2019); Cardioversion (10/16/2019); Coronary Artery Bypass Graft Maze Procedure (N/A, 10/21/2019);  Carpal tunnel release (Bilateral, 2008); fracture surgery; Cervical spine surgery; Abdominal exploration surgery; Colonoscopy (10/29/2020); Colonoscopy (N/A, 10/29/2020); Colonoscopy (10/29/2020); Colonoscopy (10/29/2020); Cardiac surgery; and Colonoscopy (N/A, 1/12/2021). SOCIAL HISTORY:      reports that he has never smoked. He has never used smokeless tobacco. He reports previous alcohol use. He reports previous drug use. FAMILY HISTORY:     family history includes Alcohol Abuse in his maternal uncle; Heart Attack in his maternal uncle. HOME MEDICATIONS:      Prior to Admission medications    Medication Sig Start Date End Date Taking?  Authorizing Provider   lisinopril (PRINIVIL;ZESTRIL) 2.5 MG tablet Take 2.5 mg by mouth daily   Yes Historical Provider, MD   clopidogrel (PLAVIX) 75 MG tablet Take 1 tablet by mouth daily 3/8/21  Yes Fernando Magaña MD   isosorbide mononitrate (IMDUR) 30 MG extended release tablet Take 1 tablet by mouth daily 3/8/21  Yes Fernando Magaña MD   spironolactone (ALDACTONE) 25 MG tablet Take 1 tablet by mouth daily 3/8/21  Yes Fernando Magaña MD   atorvastatin (LIPITOR) 40 MG tablet TAKE 1 TABLET BY MOUTH NIGHTLY 2/25/21  Yes Esperanza Estrada MD   furosemide (LASIX) 20 MG tablet Take 3 tablets by mouth 2 times daily 2/25/21  Yes Marli Bland APRN - CNP   tamsulosin (FLOMAX) 0.4 MG capsule Take 1 capsule by mouth daily 2/12/21  Yes Giana Lewis MD   sucralfate (CARAFATE) 1 GM tablet Take 1 tablet by mouth 4 times daily 12/11/20  Yes Vince Espinoza MD   potassium chloride (KLOR-CON M) 20 MEQ extended release tablet Take 1 tablet by mouth daily 12/3/20  Yes Esperanza Estrada MD   pantoprazole (PROTONIX) 40 MG tablet Take 1 tablet by mouth 2 times daily (before meals) 11/23/20  Yes Esperanza Estrada MD   losartan (COZAAR) 50 MG tablet Take 1 tablet by mouth daily 11/23/20  Yes Espernaza Estrada MD   Elastic Bandages & Supports (JOBST KNEE HIGH COMPRESSION SM) [address] 2 each by Does not apply route daily Wear q day. Remove q hs. Diagnosis: Chronic venous insufficiency 8/12/20  Yes Arturo Stark APRN - CNP   loratadine (CLARITIN) 10 MG tablet TAKE 1 TABLET BY MOUTH DAILY 3/8/21   Svetlana Motta MD   polyethylene glycol (GLYCOLAX) 17 GM/SCOOP powder Use as directed by following your patient instructions given by office. 12/22/20   Liset Riggs MD   nitroGLYCERIN (NITROSTAT) 0.4 MG SL tablet Place 1 tablet under the tongue every 5 minutes as needed for Chest pain up to max of 3 total doses. If no relief after 1 dose, call 911. 11/23/20   León Ibanez MD   magnesium citrate solution Take 296 mLs by mouth once for 1 dose 10/7/20 10/7/20  Apollo Chow MD   diclofenac sodium (VOLTAREN) 1 % GEL APPLY 2 G TOPICALLY 4 TIMES DAILY AS NEEDED FOR PAIN 9/25/20   León Ibanez MD       ALLERGIES:     Patient has no known allergies. OBJECTIVE:       Vitals:    03/16/21 0424 03/16/21 0631 03/16/21 0720 03/16/21 0800   BP: (!) 107/53  (!) 111/54    Pulse: 73  79 75   Resp: 20  24 28   Temp: 98.2 °F (36.8 °C)  98.2 °F (36.8 °C)    TempSrc: Oral  Axillary    SpO2: 99%  92% 95%   Weight:  (!) 314 lb 8 oz (142.7 kg)     Height:             Intake/Output Summary (Last 24 hours) at 3/16/2021 0925  Last data filed at 3/16/2021 4570  Gross per 24 hour   Intake 1272.8 ml   Output 850 ml   Net 422.8 ml       PHYSICAL EXAM:    Physical Exam  Vitals signs and nursing note reviewed. Constitutional:       Appearance: Normal appearance. He is obese. Cardiovascular:      Rate and Rhythm: Normal rate and regular rhythm. Pulses: Normal pulses. Heart sounds: Normal heart sounds. Pulmonary:      Effort: Pulmonary effort is normal.      Breath sounds: Normal breath sounds. Abdominal:      General: Abdomen is flat. Bowel sounds are normal.      Palpations: Abdomen is soft. Neurological:      General: No focal deficit present. Mental Status: He is alert and oriented to person, place, and time.  Mental status is at baseline. Psychiatric:         Mood and Affect: Mood normal.         Behavior: Behavior normal.         Thought Content:  Thought content normal.         Judgment: Judgment normal.         DIAGNOSTICS:     Laboratory Testing:    Recent Results (from the past 24 hour(s))   CBC WITH AUTO DIFFERENTIAL    Collection Time: 03/15/21  5:23 PM   Result Value Ref Range    WBC 12.2 (H) 3.5 - 11.3 k/uL    RBC 3.71 (L) 4.21 - 5.77 m/uL    Hemoglobin 10.4 (L) 13.0 - 17.0 g/dL    Hematocrit 33.2 (L) 40.7 - 50.3 %    MCV 89.5 82.6 - 102.9 fL    MCH 28.0 25.2 - 33.5 pg    MCHC 31.3 28.4 - 34.8 g/dL    RDW 15.1 (H) 11.8 - 14.4 %    Platelets 230 155 - 581 k/uL    MPV 9.4 8.1 - 13.5 fL    NRBC Automated 0.0 0.0 per 100 WBC    Differential Type NOT REPORTED     Seg Neutrophils 75 (H) 36 - 65 %    Lymphocytes 8 (L) 24 - 43 %    Monocytes 12 3 - 12 %    Eosinophils % 2 1 - 4 %    Basophils 1 0 - 2 %    Immature Granulocytes 2 (H) 0 %    Segs Absolute 9.26 (H) 1.50 - 8.10 k/uL    Absolute Lymph # 0.97 (L) 1.10 - 3.70 k/uL    Absolute Mono # 1.48 (H) 0.10 - 1.20 k/uL    Absolute Eos # 0.20 0.00 - 0.44 k/uL    Basophils Absolute 0.06 0.00 - 0.20 k/uL    Absolute Immature Granulocyte 0.19 0.00 - 0.30 k/uL    WBC Morphology NOT REPORTED     RBC Morphology ANISOCYTOSIS PRESENT     Platelet Estimate NOT REPORTED    BASIC METABOLIC PANEL    Collection Time: 03/15/21  5:23 PM   Result Value Ref Range    Glucose 101 (H) 70 - 99 mg/dL    BUN 33 (H) 8 - 23 mg/dL    CREATININE 1.69 (H) 0.70 - 1.20 mg/dL    Bun/Cre Ratio NOT REPORTED 9 - 20    Calcium 9.5 8.6 - 10.4 mg/dL    Sodium 138 135 - 144 mmol/L    Potassium 5.3 3.7 - 5.3 mmol/L    Chloride 102 98 - 107 mmol/L    CO2 22 20 - 31 mmol/L    Anion Gap 14 9 - 17 mmol/L    GFR Non-African American 41 (L) >60 mL/min    GFR African American 50 (L) >60 mL/min    GFR Comment          GFR Staging NOT REPORTED    Troponin    Collection Time: 03/15/21  5:23 PM   Result Value Ref Range    Troponin, High Sensitivity 25 (H) 0 - 22 ng/L    Troponin T NOT REPORTED <0.03 ng/mL    Troponin Interp NOT REPORTED    Brain Natriuretic Peptide    Collection Time: 03/15/21  5:23 PM   Result Value Ref Range    Pro- (H) <300 pg/mL    BNP Interpretation Pro-BNP Reference Range:    EKG 12 Lead    Collection Time: 03/15/21  5:31 PM   Result Value Ref Range    Ventricular Rate 65 BPM    Atrial Rate 65 BPM    P-R Interval 218 ms    QRS Duration 74 ms    Q-T Interval 416 ms    QTc Calculation (Bazett) 432 ms    P Axis 60 degrees    R Axis 15 degrees    T Axis 49 degrees   Troponin    Collection Time: 03/15/21  6:52 PM   Result Value Ref Range    Troponin, High Sensitivity 22 0 - 22 ng/L    Troponin T NOT REPORTED <0.03 ng/mL    Troponin Interp NOT REPORTED    Urea nitrogen, urine    Collection Time: 03/16/21  4:45 AM   Result Value Ref Range    Urea Nitrogen, Ur 960 mg/dL   Creatinine, urine, random - (Necessary for FENa calculation)    Collection Time: 03/16/21  4:45 AM   Result Value Ref Range    Creatinine, Ur 106.4 39.0 - 259.0 mg/dL   URINALYSIS    Collection Time: 03/16/21  4:45 AM   Result Value Ref Range    Color, UA YELLOW YELLOW    Turbidity UA CLEAR CLEAR    Glucose, Ur NEGATIVE NEGATIVE    Bilirubin Urine NEGATIVE NEGATIVE    Ketones, Urine NEGATIVE NEGATIVE    Specific Gravity, UA 1.018 1.005 - 1.030    Urine Hgb NEGATIVE NEGATIVE    pH, UA 5.5 5.0 - 8.0    Protein, UA NEGATIVE NEGATIVE    Urobilinogen, Urine Normal Normal    Nitrite, Urine NEGATIVE NEGATIVE    Leukocyte Esterase, Urine NEGATIVE NEGATIVE    Urinalysis Comments       Microscopic exam not performed based on chemical results unless requested in original order.    Comprehensive Metabolic Panel    Collection Time: 03/16/21  5:32 AM   Result Value Ref Range    Glucose 146 (H) 70 - 99 mg/dL    BUN 29 (H) 8 - 23 mg/dL    CREATININE 1.11 0.70 - 1.20 mg/dL    Bun/Cre Ratio NOT REPORTED 9 - 20    Calcium 9.1 8.6 - 10.4 mg/dL    Sodium 139 135 - 144 mmol/L Potassium 4.5 3.7 - 5.3 mmol/L    Chloride 105 98 - 107 mmol/L    CO2 19 (L) 20 - 31 mmol/L    Anion Gap 15 9 - 17 mmol/L    Alkaline Phosphatase 65 40 - 129 U/L    ALT 14 5 - 41 U/L    AST 15 <40 U/L    Total Bilirubin 0.29 (L) 0.3 - 1.2 mg/dL    Total Protein 6.9 6.4 - 8.3 g/dL    Albumin 3.8 3.5 - 5.2 g/dL    Albumin/Globulin Ratio 1.2 1.0 - 2.5    GFR Non-African American >60 >60 mL/min    GFR African American >60 >60 mL/min    GFR Comment          GFR Staging NOT REPORTED    CBC auto differential    Collection Time: 03/16/21  5:32 AM   Result Value Ref Range    WBC 7.6 3.5 - 11.3 k/uL    RBC 3.71 (L) 4.21 - 5.77 m/uL    Hemoglobin 10.5 (L) 13.0 - 17.0 g/dL    Hematocrit 34.6 (L) 40.7 - 50.3 %    MCV 93.3 82.6 - 102.9 fL    MCH 28.3 25.2 - 33.5 pg    MCHC 30.3 28.4 - 34.8 g/dL    RDW 15.3 (H) 11.8 - 14.4 %    Platelets 802 618 - 698 k/uL    MPV 9.6 8.1 - 13.5 fL    NRBC Automated 0.0 0.0 per 100 WBC    Differential Type NOT REPORTED     WBC Morphology NOT REPORTED     RBC Morphology ANISOCYTOSIS PRESENT     Platelet Estimate NOT REPORTED     Seg Neutrophils 71 (H) 36 - 65 %    Lymphocytes 13 (L) 24 - 43 %    Monocytes 10 3 - 12 %    Eosinophils % 4 1 - 4 %    Basophils 1 0 - 2 %    Immature Granulocytes 2 (H) 0 %    Segs Absolute 5.37 1.50 - 8.10 k/uL    Absolute Lymph # 1.01 (L) 1.10 - 3.70 k/uL    Absolute Mono # 0.74 0.10 - 1.20 k/uL    Absolute Eos # 0.28 0.00 - 0.44 k/uL    Basophils Absolute 0.05 0.00 - 0.20 k/uL    Absolute Immature Granulocyte 0.11 0.00 - 0.30 k/uL   Troponin    Collection Time: 03/16/21  5:32 AM   Result Value Ref Range    Troponin, High Sensitivity 18 0 - 22 ng/L    Troponin T NOT REPORTED <0.03 ng/mL    Troponin Interp NOT REPORTED          Imaging/Diagonstics:  EKG: normal EKG, normal sinus rhythm.     Echo Complete 2d W Doppler W Color    Result Date: 3/4/2021  Transthoracic Echocardiography Report (TTE)  Patient Name Karolyn Lang     Date of Study               03/03/2021 Salvadorradha Foil   Date of      1955  Gender                      Male  Birth   Age          63 year(s)  Race                           Room Number              Height:                     67 inch, 170.18 cm   Corporate ID [id number]    Weight:                     320 pounds, 145.2 kg  #   Patient Acct [de-identified]   BSA:          2.47 m^2      BMI:      50.12  #                                                              kg/m^2   MR #         [id number]     Sonographer                 Janey Phipps   Accession #  [id number]  Interpreting Physician      Kevin Fiore   Fellow                   Referring Nurse                           Practitioner   Interpreting             Referring Physician         Ladonna Pitt *  Fellow  Type of Study   TTE procedure:2D Echocardiogram, M-Mode, Doppler, Color Doppler. Procedure Date Date: 03/03/2021 Start: 10:55 AM Study Location: [facility] Technical Quality: Adequate visualization Indications:Dyspnea/SOB. History / Tech. Comments: Procedure explained to patient. CHF, HX CABG Patient Status: Outpatient Height: 67 inches Weight: 320.01 pounds BSA: 2.47 m^2 BMI: 50.12 kg/m^2 HR: 83 bpm Allergies   - *No Known Allergies. CONCLUSIONS Summary Normal LV size and wall thickness. No obvious wall motion abnormality seen. Normal LV systolic function with LVEF >55%. Normal RV size and function. RV systolic pressure 29 mmHg LA and RA appears normal in size. No obvious significant structural valvular abnormality noted. No significant valvular stenosis or regurgitation noted. Normal aortic root dimension. No significant pericardial effusion noted. No obvious intra-cardiac mass or shunt noted. IVC normal diameter and inspiratory collapse indicating normal RA filling pressure.  Signature ----------------------------------------------------------------------------  Electronically signed by Kevin Fiore(Interpreting physician) on  03/04/2021 12:22 PM ---------------------------------------------------------------------------- ----------------------------------------------------------------------------  Electronically signed by Rocio Mc(Sonographer) on 2021  11:57 AM ---------------------------------------------------------------------------- FINDINGS Left Atrium Left atrium is normal in size. Left Ventricle Left ventricle is normal in size. Global left ventricular systolic function is normal. Calculated ejection fraction 61% by Heart Model. Abnormal septal motion; may be due to post open heart. Right Atrium Right atrium is normal in size. Right Ventricle Normal right ventricular size and function. TAPSE value of 1.93cm noted. Mitral Valve Normal mitral valve structure. Mild mitral regurgitation. Aortic Valve Normal aortic valve structure and function without stenosis or regurgitation. Tricuspid Valve No obvious valvular abnormality. Trivial tricuspid regurgitation. No pulmonary hypertension. Estimated right ventricular systolic pressure is 10NXKA. Pulmonic Valve The pulmonic valve is normal in structure. No pulmonic insufficiency. Pericardial Effusion No pericardial effusion seen. Miscellaneous E/E' average = 16.3. IVC normal diameter & inspiratory collapse indicating normal RA filling pressure .  M-mode / 2D Measurements & Calculations:   LVIDd:5.1 cm(3.7 - 5.6 cm)       Diastolic ACZHIS:658 ml  NVBOT:5.4 cm(2.2 - 4.0 cm)       Systolic TRPPAN:70 ml  IVSd:1 cm(0.6 - 1.1 cm)          Aortic Root:3.2 cm(2.0 - 3.7 cm)  LVPWd:1 cm(0.6 - 1.1 cm)         LA Dimension: 4.3 cm(1.9 - 4.0 cm)  Fractional Shortenin.25 %    LA volume/Index: 61.43 ml /25m^2  Calculated LVEF (%): 61.18 %     LVOT:1.9 cm                                   RVDd:3.6 cm   Mitral:                                 Aortic   Valve Area (P1/2-Time): 3.33 cm^2       Peak Velocity: 1.59 m/s  Peak E-Wave: 0.97 m/s                   Mean Velocity: 1.08 m/s  Peak A-Wave: 0.60 m/s chest pain, shortness of breath and these episodes happen. He states that he starts to get blurry vision and nauseated but has not actually vomited. Had a CABG back in 2019 and has not had a stress test that he is aware of that has been recent. Recent echo showed EF greater than 55%. Is on antiplatelets and is unsure whether or not he is on any anticoagulation medications. Dates that the swelling in his legs has been baseline. No back pain or abdominal pain. Acuity: Unknown Type of Exam: Unknown FINDINGS: BRAIN/VENTRICLES: There is no acute intracranial hemorrhage, mass effect or midline shift. No abnormal extra-axial fluid collection. The gray-white differentiation is maintained without evidence of an acute infarct. There is no evidence of hydrocephalus. Scattered hypodensity in the white matter is present consistent with chronic microvascular change. Mild diffuse cortical atrophy is evident. The patient has a remote right basal ganglial infarct. ORBITS: The visualized portion of the orbits demonstrate no acute abnormality. SINUSES: Moderate inflammatory change in the ethmoid sinuses. Mild inflammatory change in the sphenoid sinuses. Mastoid air cells are appropriately aerated. SOFT TISSUES/SKULL:  No acute abnormality of the visualized skull or soft tissues. No acute intracranial abnormality. Senescent changes including chronic microvascular change. Findings of sinus inflammation. Us Renal Complete    Result Date: 3/3/2021  EXAMINATION: RETROPERITONEAL ULTRASOUND OF THE KIDNEYS AND URINARY BLADDER 3/3/2021 COMPARISON: None HISTORY: ORDERING SYSTEM PROVIDED HISTORY: Post-void dribbling FINDINGS: Kidneys: The right kidney measures 12.2 cm in length and the left kidney measures 12.3 cm in length. Borderline thin appearance of the renal cortex bilaterally which could reflect medical renal disease. No suspicious mass or shadowing calculus. Bladder: Prevoid urinary bladder volume 174 mL.   No significant postvoid residual volume was demonstrated. Bilateral ureteral jets were visualized. No focal urinary bladder wall abnormality was identified. No suspicious renal mass, calculus, or hydronephrosis. Borderline thin appearance of the renal cortex bilaterally, which could reflect medical renal disease. Unremarkable ultrasound appearance of the urinary bladder. Xr Shoulder Left (min 2 Views)    Result Date: 3/8/2021  EXAMINATION: TWO XRAY VIEWS OF THE LEFT SHOULDER 3/8/2021 11:49 am COMPARISON: None. HISTORY: ORDERING SYSTEM PROVIDED HISTORY: fall - left shoulder pain TECHNOLOGIST PROVIDED HISTORY: fall - left shoulder pain FINDINGS: There are degenerative/posttraumatic changes of the left distal clavicle/acromioclavicular joint with associated deformity/spurring There are a few degenerative cysts seen at the posterolateral aspect left humeral head There is mild glenohumeral joint space compromise There are postsurgical changes of the mid to lower cervical spine No definite acute fracture, dislocation or abnormal soft tissue calcification     Multilevel degenerative changes     Xr Chest Portable    Result Date: 3/15/2021  EXAMINATION: ONE XRAY VIEW OF THE CHEST 3/15/2021 5:22 pm COMPARISON: March 8, 2021 HISTORY: ORDERING SYSTEM PROVIDED HISTORY: shortness of breath TECHNOLOGIST PROVIDED HISTORY: shortness of breath Reason for Exam: short of breath FINDINGS: Atrial appendage clip. Sternotomy wires. Sternotomy metallic plates. The lungs are without acute focal process. There is no effusion or pneumothorax. The cardiomediastinal silhouette is without acute process. The osseous structures are without acute process. No acute process.        Scheduled Meds:   atorvastatin  40 mg Oral Nightly    clopidogrel  75 mg Oral Daily    isosorbide mononitrate  30 mg Oral Daily    cetirizine  5 mg Oral Daily    losartan  50 mg Oral Daily    sucralfate  1 g Oral 4x Daily    tamsulosin  0.4 mg Oral Daily    sodium chloride flush  10 mL Intravenous 2 times per day    pantoprazole  40 mg Oral BID AC    heparin (porcine)  5,000 Units Subcutaneous 3 times per day    doxycycline hyclate  100 mg Oral 2 times per day     Continuous Infusions:   sodium chloride 75 mL/hr at 03/15/21 2304     PRN Meds:.nitroGLYCERIN, sodium chloride flush, promethazine **OR** ondansetron, acetaminophen **OR** acetaminophen    ASSESSMENT:     Active Problems:    SYDNEY (acute kidney injury) ([facility] [address]  Resolved Problems:    * No resolved hospital problems. *      PLAN:     SYDNEY likely secondary to diuretic medications in setting of CHF- resolved   -Creatinine 1.11, decreased from 1.69 yesterday   -Follow-up urine urea, urine creatinine  -IV fluids at 75 mL/hour  - proBNP was elevated 427 (baseline 500-1000)  - home aldactone, lasix and lisinopril on hold. Meds to be adjusted upon discharge.     Left lower extremity cellulitis- improving   -WBC 7.6 from 12.2 yesterday  -urinalysis unremarkable, no clinical signs of sepsis  -doxycycline PO 100mg BID for 7 days     Normocytic anemia 2/2 colonic polyps  -Follows up with GI outpatient, status post tubular adenomas on recent colonoscopy, status post EMR resection.   Repeat colonoscopy in 3 years  -Hemodynamically stable, hemoglobin 10.5 (around baseline)  -Follow-up fecal occult     Chronic GERD  - Protonix 40 mg twice daily, Carafate 4 times daily     Hx of CAD with stent placements, CABG  - continue with plavix     HARPREET  - CPAP at night     DVT prophylaxis: heparin (porcine) injection 5,000 TID  GI prophylaxis: Protonix 40mg BID      Portia Morgan MD  Family Medicine Resident  3/16/2021 9:25 AM

## 2021-03-19 ENCOUNTER — TELEPHONE (OUTPATIENT)
Dept: FAMILY MEDICINE CLINIC | Age: 66
End: 2021-03-19

## 2021-03-19 ENCOUNTER — TELEPHONE (OUTPATIENT)
Dept: UROLOGY | Age: 66
End: 2021-03-19

## 2021-03-19 NOTE — TELEPHONE ENCOUNTER
Received call from patient stating he is having a hard time getting his transportation cancelled from [facility] to his PT appointments. States that PT [employer] cancel the PCP office has to do it. Writer called transportation and spoke with Marylu Jones all transportation has now been cancelled no confirmation number provided. Patient updated as well.

## 2021-03-21 NOTE — DISCHARGE SUMMARY
Department of [facility] [address] [facility]    Discharge Summary      NAME:  Gene Bang  :  1955  MRN:  [id number]    Admit date:  3/15/2021  Discharge date:  3/16/2021    Admitting Physician:  Aleks Lorenzo MD    Primary Diagnosis on Admission:   Present on Admission:   SYDNEY (acute kidney injury) ([address]   Hypotension      Secondary Diagnoses:  does not have any pertinent problems on file. Admission Condition: poor     Discharged Condition: good    Hospital Course: The patient is a 53-year-old male with significant past medical history of SYDNEY, atrial fibrillation, systolic and systolic heart failure, CABG, CAD who presented to the emergency department on 3/15 where he was sent from his outpatient GI office. He stated he was told he has low blood pressure and to go to the emergency department, was complaining of blurry vision but could not reach his PCP so he went to the GI doctor. Denied any chest pain, acute shortness of breath. Does have chronic shortness of breath for which he uses CPAP at night. At time of admission, vitals were stable. Patient received 25 L bolus fluid in the ED, white blood cell count elevated at 12.2, creatinine elevated at 1.69, troponins downtrending. Patient was sent to the hospital for management of SYDNEY. On 3/16, patient's creatinine was trending down, was receiving IV fluids 75 cc/h. Patient was much more comfortable than the previous day, stated he was not dizzy. Stated he was ready to go home. Today on day of discharge pt feels better with no further complaints. Vitals and Labs are at pts baseline. All consultants involved during this admission are agreeable to d/c.     Consults: none    Significant Diagnostic/theraputic interventions: none      Disposition: home    Instructions to Patient:     Follow up with Celine Solorio MD in  1 week    Discharge Medications:     Augustin Miranda   Home Medication Instructions FREDDIEDY:[id number]    Printed on:03/21/21 0110   Medication Information                      atorvastatin (LIPITOR) 40 MG tablet  TAKE 1 TABLET BY MOUTH NIGHTLY             Blood Pressure Monitor KIT  1 each by Does not apply route 2 times daily             clopidogrel (PLAVIX) 75 MG tablet  Take 1 tablet by mouth daily             Elastic Bandages & Supports (JOBST KNEE HIGH COMPRESSION SM) MISC  2 each by Does not apply route daily Wear q day. Remove q hs. Diagnosis: Chronic venous insufficiency             furosemide (LASIX) 20 MG tablet  Take 3 tablets by mouth 2 times daily             isosorbide mononitrate (IMDUR) 30 MG extended release tablet  Take 1 tablet by mouth daily             lisinopril (PRINIVIL;ZESTRIL) 2.5 MG tablet  Take 2.5 mg by mouth daily             loratadine (CLARITIN) 10 MG tablet  TAKE 1 TABLET BY MOUTH DAILY             losartan (COZAAR) 50 MG tablet  Take 1 tablet by mouth daily             Misc. Devices (Enhanced Medical Decisions WEIGHT SCALE) MISC  1 each by Does not apply route as needed (weigh once a day and record)             nitroGLYCERIN (NITROSTAT) 0.4 MG SL tablet  Place 1 tablet under the tongue every 5 minutes as needed for Chest pain up to max of 3 total doses. If no relief after 1 dose, call 911.              pantoprazole (PROTONIX) 40 MG tablet  Take 1 tablet by mouth 2 times daily (before meals)             potassium chloride (KLOR-CON M) 20 MEQ extended release tablet  Take 1 tablet by mouth daily             sucralfate (CARAFATE) 1 GM tablet  Take 1 tablet by mouth 4 times daily             tamsulosin (FLOMAX) 0.4 MG capsule  Take 1 capsule by mouth daily               Send Copies to: Naomi Smith MD      Note that over 30 minutes was spent in preparing discharge papers, discussing discharge with patient and family, medication review, etc.    Signed:  Andrei Tatum MD PGY 1  3/21/2021, 1:10 AM

## 2021-03-22 ENCOUNTER — HOSPITAL ENCOUNTER (OUTPATIENT)
Age: 66
Setting detail: SPECIMEN
Discharge: HOME | End: 2021-03-22
Payer: MEDICAID

## 2021-03-22 DIAGNOSIS — R05.3 CHRONIC COUGH: ICD-10-CM

## 2021-03-22 DIAGNOSIS — R09.82 POST-NASAL DRIP: ICD-10-CM

## 2021-03-22 LAB
ANION GAP SERPL CALCULATED.3IONS-SCNC: 12 MMOL/L (ref 9–17)
BUN BLDV-MCNC: 28 MG/DL (ref 8–23)
BUN/CREAT BLD: ABNORMAL (ref 9–20)
CALCIUM SERPL-MCNC: 9.2 MG/DL (ref 8.6–10.4)
CHLORIDE BLD-SCNC: 101 MMOL/L (ref 98–107)
CO2: 25 MMOL/L (ref 20–31)
CREAT SERPL-MCNC: 1.2 MG/DL (ref 0.7–1.2)
GFR AFRICAN AMERICAN: >60 ML/MIN
GFR NON-AFRICAN AMERICAN: >60 ML/MIN
GFR SERPL CREATININE-BSD FRML MDRD: ABNORMAL ML/MIN/{1.73_M2}
GFR SERPL CREATININE-BSD FRML MDRD: ABNORMAL ML/MIN/{1.73_M2}
GLUCOSE BLD-MCNC: 100 MG/DL (ref 70–99)
POTASSIUM SERPL-SCNC: 4.6 MMOL/L (ref 3.7–5.3)
SODIUM BLD-SCNC: 138 MMOL/L (ref 135–144)

## 2021-03-22 RX ORDER — LORATADINE 10 MG/1
10 TABLET ORAL DAILY
Qty: 30 TABLET | Refills: 1 | OUTPATIENT
Start: 2021-03-22

## 2021-03-22 NOTE — TELEPHONE ENCOUNTER
Last visit: 03/10/21  Last Med refill:   Does patient have enough medication for 72 hours:     Next Visit Date:  Future Appointments   Date Time Provider Taj Flores   3/22/2021 10:30 AM SCHEDULE, [facility] COVID SCREENING [facility]   3/25/2021 10:30 AM STV CHF CLINIC RM 1 [facility] CHF CLI [facility]   3/31/2021  3:30 PM León Ibanez MD [id number] [facility]   6/14/2021  3:00 PM Apollo Chow MD [address] Maintenance   Topic Date Due    COVID-19 Vaccine (1) Never done    Lipid screen  03/03/2022    Potassium monitoring  03/16/2022    Creatinine monitoring  03/16/2022    Diabetes screen  12/10/2022    DTaP/Tdap/Td vaccine (2 - Td) 08/20/2030    Colon cancer screen colonoscopy  01/12/2031    Flu vaccine  Completed    Shingles Vaccine  Completed    Pneumococcal 0-64 years Vaccine  Completed    Hepatitis C screen  Completed    HIV screen  Completed    Hepatitis A vaccine  Aged Out    Hepatitis B vaccine  Aged Out    Hib vaccine  Aged Out    Meningococcal (ACWY) vaccine  Aged Out       Hemoglobin A1C (%)   Date Value   12/10/2019 5.6   10/18/2019 5.8             ( goal A1C is < 7)   No results found for: LABMICR  LDL Cholesterol (mg/dL)   Date Value   03/03/2021 54   05/26/2020 45       (goal LDL is <100)   AST (U/L)   Date Value   03/16/2021 15     ALT (U/L)   Date Value   03/16/2021 14     BUN (mg/dL)   Date Value   03/16/2021 29 (H)     BP Readings from Last 3 Encounters:   03/16/21 (!) 111/54   03/15/21 (!) 75/44   03/11/21 110/70          (goal 120/80)    All Future Testing planned in [facility]  Lab Frequency Next Occurrence   COLONOSCOPY (Diagnostic) Once 10/15/2020   COLONOSCOPY (Diagnostic) Once 01/11/2021   COVID-19 Once 01/05/2021   US URINARY BLADDER LIMITED Once 12/01/2021   Creatinine, Random Urine Once 03/14/2021   Sodium, Urine, Random Once 03/14/2021   Urea Nitrogen, Urine Once 03/14/2021   US RETROPERITONEAL COMPLETE Once 84/27/3666   Basic Metabolic Panel Once 91/26/7603   Basic Metabolic Panel Once 36/77/1599   COVID-19 Once 03/18/2021               Patient Active Problem List:     Atrial flutter ([facility]     Sleep-related breathing disorder     Hypokalemia     Atrial fibrillation ([facility]     Ischemic cardiomyopathy     Combined systolic and diastolic congestive heart failure ([facility]     Acute cystitis without hematuria     Hx of CABG     Heart failure, systolic and diastolic, chronic (HCC)     Bilateral hand numbness     Right thigh pain     Left leg weakness     Coronary artery disease     Chronic cough     Gastroesophageal reflux disease without esophagitis     Overflow incontinence of urine     Polyp of colon     Post-void dribbling     Post-nasal drip     SYDNEY (acute kidney injury) ([facility]     Acute CHF (congestive heart failure) ([facility])     Hyperkalemia     Hypotension

## 2021-03-23 DIAGNOSIS — Z20.822 COVID-19 RULED OUT BY LABORATORY TESTING: ICD-10-CM

## 2021-03-23 LAB
SARS-COV-2: NORMAL
SARS-COV-2: NOT DETECTED
SOURCE: NORMAL

## 2021-03-24 NOTE — PROGRESS NOTES
Notified dr Sherin Rome pt adm 3//15-3/16 for low bp,stopped spiraldactone per order. appt pcp 3/31/21.

## 2021-03-25 ENCOUNTER — HOSPITAL ENCOUNTER (OUTPATIENT)
Dept: OTHER | Age: 66
Discharge: HOME | End: 2021-03-25
Payer: MEDICAID

## 2021-03-25 VITALS
HEART RATE: 74 BPM | RESPIRATION RATE: 20 BRPM | DIASTOLIC BLOOD PRESSURE: 80 MMHG | SYSTOLIC BLOOD PRESSURE: 112 MMHG | OXYGEN SATURATION: 98 % | BODY MASS INDEX: 49.77 KG/M2 | WEIGHT: 315 LBS

## 2021-03-25 PROCEDURE — 99212 OFFICE O/P EST SF 10 MIN: CPT

## 2021-03-25 NOTE — PROGRESS NOTES
Date:  3/25/2021  Time:  12:13 PM    CHF Clinic at [facility]    Office: [phone] Fax: [phone]    Re:  Connie Kimball   Patient : 1955    Vital Signs: /80   Pulse 74   Resp 20   Wt (!) 317 lb 12.8 oz (144.2 kg)   SpO2 98%   BMI 49.77 kg/m²                       O2 Device: None (Room air)                           No results for input(s): CBC, HGB, HCT, WBC, PLATELET, NA, K, CL, CO2, BUN, CREATININE, GLUCOSE, BNP, INR in the last 72 hours. Respiratory:         Breath Sounds  Right Upper Lobe: Clear  Right Middle Lobe: Clear  Right Lower Lobe: Clear  Left Upper Lobe: Clear  Left Lower Lobe: Clear    Cough/Sputum  Cough: Productive  Frequency: Occasional  Sputum Amount: Small  Sputum Color: Clear         Peripheral Vascular  RLE Edema: Non-pitting  LLE Edema: Non-pitting      Complaints: Denies c/o shortness of breath, or any new complaint      Comment : Weight is up 1.8 lbs in 2 weeks. He states he was admitted to [facility] on 3/15/21  with Low BP. His BUN/CRE were elevated during that admit. BUN 33 /Cre 1.69 he states  \" they took me off my Spironolactone. \". He has since had labs rechecked  On 3/22/21 and  Showed improvement BUN 28 /CRE 1.2. He feels good. Denies increased shortness of breath or chest pain, dizziness or any other complaint. BP stable 112/80. He has nonpitting edema to bilateral LE slight increased. Reminded pt of the importance of following  a low sodium diet and fluid limits of 2 liters daily especially now that Aldactone is discontinued. He continues to take his Lasix 60 mg BID. He is awaiting cystoscopy surgery with urology soon. He has a f/u with [facility] on 3/30/21 at 1:15 pm with Sanjana Davies CNP and next f/u here on  at 56 AM. Pt instructed to call for appt sooner if increased S/S of CHF develop prior to next scheduled appt.       Electronically signed by Sandra Servin RN on 3/25/2021 at 12:13

## 2021-03-26 ENCOUNTER — TELEPHONE (OUTPATIENT)
Dept: UROLOGY | Age: 66
End: 2021-03-26

## 2021-03-26 ENCOUNTER — HOSPITAL ENCOUNTER (OUTPATIENT)
Age: 66
Setting detail: OUTPATIENT SURGERY
Discharge: HOME | End: 2021-03-26
Attending: UROLOGY | Admitting: UROLOGY
Payer: MEDICAID

## 2021-03-26 VITALS
TEMPERATURE: 97 F | WEIGHT: 315 LBS | SYSTOLIC BLOOD PRESSURE: 119 MMHG | HEIGHT: 67 IN | DIASTOLIC BLOOD PRESSURE: 62 MMHG | BODY MASS INDEX: 49.44 KG/M2 | OXYGEN SATURATION: 96 % | RESPIRATION RATE: 20 BRPM | HEART RATE: 76 BPM

## 2021-03-26 PROCEDURE — 6370000000 HC RX 637 (ALT 250 FOR IP): Performed by: UROLOGY

## 2021-03-26 PROCEDURE — 7100000040 HC SPAR PHASE II RECOVERY - FIRST 15 MIN: Performed by: UROLOGY

## 2021-03-26 PROCEDURE — 7100000041 HC SPAR PHASE II RECOVERY - ADDTL 15 MIN: Performed by: UROLOGY

## 2021-03-26 PROCEDURE — 3600000012 HC SURGERY LEVEL 2 ADDTL 15MIN: Performed by: UROLOGY

## 2021-03-26 PROCEDURE — 3600000002 HC SURGERY LEVEL 2 BASE: Performed by: UROLOGY

## 2021-03-26 PROCEDURE — 2580000003 HC RX 258: Performed by: UROLOGY

## 2021-03-26 PROCEDURE — 2709999900 HC NON-CHARGEABLE SUPPLY: Performed by: UROLOGY

## 2021-03-26 RX ORDER — ULTRASOUND COUPLING MEDIUM
GEL (GRAM) TOPICAL PRN
Status: DISCONTINUED | OUTPATIENT
Start: 2021-03-26 | End: 2021-03-26 | Stop reason: ALTCHOICE

## 2021-03-26 RX ORDER — LIDOCAINE HYDROCHLORIDE 20 MG/ML
JELLY TOPICAL PRN
Status: DISCONTINUED | OUTPATIENT
Start: 2021-03-26 | End: 2021-03-26 | Stop reason: ALTCHOICE

## 2021-03-26 RX ORDER — OXYBUTYNIN CHLORIDE 5 MG/1
5 TABLET, EXTENDED RELEASE ORAL DAILY
Qty: 30 TABLET | Refills: 3 | Status: SHIPPED | OUTPATIENT
Start: 2021-03-26 | End: 2021-07-20

## 2021-03-26 RX ORDER — MAGNESIUM HYDROXIDE 1200 MG/15ML
LIQUID ORAL PRN
Status: DISCONTINUED | OUTPATIENT
Start: 2021-03-26 | End: 2021-03-26 | Stop reason: ALTCHOICE

## 2021-03-26 ASSESSMENT — PAIN SCALES - GENERAL: PAINLEVEL_OUTOF10: 0

## 2021-03-26 NOTE — TELEPHONE ENCOUNTER
----- Message from Morro Whelan sent at 3/26/2021  2:11 PM EDT -----    ----- Message -----  From: Perri Peabody, PA-C  Sent: 3/26/2021  10:54 AM EDT  To: Byron Mena MD, #    Hi MAs,     Can you please arrange for this pt to have a urolift procedure. Thanks!   [employer]

## 2021-03-26 NOTE — OP NOTE
the bladder there was no bladder mucosal lesions, tumors noted. However, patient had grade 2 through trabeculations all throughout the bladder. We then retroflexed and did not note any median lobe but is prostatic lobes were obstructive in nature. This concluded our procedure. Please note that Dr. Georgia Segura was present throughout entire duration of procedure. Plan:  Patient will be discharged home with oxybutynin for overactivity. He will need to have a bladder outlet procedure likely UroLift in the coming weeks.     Electronically signed by Shankar Guan MD on 3/26/2021 at 10:40 AM

## 2021-03-26 NOTE — PROGRESS NOTES
Discharge instructions reviewed with pt. Verbalizes understanding. Copy of instructions given to pt. Discharged home per wheelchair with medical cab service.

## 2021-03-26 NOTE — H&P
Julio White, [address] [facility], [place], [facility] [place]   Urology H&P      Patient:  Ashlyn Gray  MRN: [id number]  YOB: 1955    CHIEF COMPLAINT: Urinary incontinence    HISTORY OF PRESENT ILLNESS:   The patient is a 63 y.o. male who presents with post void dribbling. Patient presents today for cystoscopy, urodynamics to evaluate for overactivity    Patient's old records, notes and chart reviewed and summarized above. Past Medical History:    Past Medical History:   Diagnosis Date    Arthritis     back    Atrial fibrillation ([address] 10/2019    Dr. Nancy Walker BPH (benign prostatic hyperplasia)     Cellulitis     Cervical disc disease     CHF (congestive heart failure) [facility])     cardiologist Dr. Robert Jacobs. [facility] CHF clinic    Coronary artery disease 10/2019     CABG [facility]    Dr. Cleia Lockhart Cardiology last seen within the last year.     CPAP (continuous positive airway pressure) dependence     GERD (gastroesophageal reflux disease)     History of incarceration     released 2019    Hyperlipidemia     Dr. Benjamin Covert Hypertension     [facility] CHF clinic     Dr. Benjamin Covert Myocardial infarct [facility])     Obesity     Sleep apnea     C-pap    Wears reading eyeglasses     Wellness examination     pcp Dr. Sudheer Montiel 3/10/2021       Past Surgical History:    Past Surgical History:   Procedure Laterality Date    ABDOMINAL EXPLORATION SURGERY      CARDIAC SURGERY      CARDIOVERSION  10/16/2019    CARPAL TUNNEL RELEASE Bilateral 2008    CERVICAL SPINE SURGERY      2-5    COLONOSCOPY  10/29/2020     WITH BIOPSY, COLONOSCOPY SUBMUCOSAL/BOTOX INJECTION,  COLONOSCOPY POLYPECTOMY SNARE/COLD BIOPSY     COLONOSCOPY N/A 10/29/2020    COLONOSCOPY WITH BIOPSY performed by Evan Jo MD at [address] [place] [address]  10/29/2020    COLONOSCOPY SUBMUCOSAL/BOTOX INJECTION performed by Evan Jo MD at [address] [place] [address]  10/29/2020    COLONOSCOPY POLYPECTOMY SNARE/COLD BIOPSY performed by Quoc Kilgore MD at [facility] COLONOSCOPY N/A 1/12/2021    COLONOSCOPY POLYPECTOMY HOT SNARE, COLD SNARE POLPECTOMY performed by Dianne Oliveros MD at [address] [facility] N/A 10/21/2019    CABG CORONARY ARTERY BYPASS GRAFT X1, LIMA-LAD, BROWN 4 MAZE PROCEDURE, 50MM ATRICURE ATRIAL CLIP RIGID INTERNAL FIXATION PLATES X 3   SCREWS X 13 performed by Christel Fu MD at [address] [facility]      cataract surgery 2020    FRACTURE SURGERY      Left leg    TRANSESOPHAGEAL ECHOCARDIOGRAM  10/16/2019       Medications:    No current facility-administered medications for this encounter.      Allergies:  No Known Allergies    Social History:   Social History     Socioeconomic History    Marital status:      Spouse name: Not on file    Number of children: Not on file    Years of education: Not on file    Highest education level: Not on file   Occupational History    Not on file   Social Needs    Financial resource strain: Not on file    Food insecurity     Worry: Not on file     Inability: Not on file   [employer] needs     Medical: Not on file     Non-medical: Not on file   Tobacco Use    Smoking status: Never Smoker    Smokeless tobacco: Never Used   Substance and Sexual Activity    Alcohol use: Not Currently     Comment: stopped 1991    Drug use: Not Currently    Sexual activity: Not Currently   Lifestyle    Physical activity     Days per week: Not on file     Minutes per session: Not on file    Stress: Not on file   Relationships    Social connections     Talks on phone: Not on file     Gets together: Not on file     Attends Mosque service: Not on file     Active member of club or organization: Not on file     Attends meetings of clubs or organizations: Not on file     Relationship status: Not on file    Intimate partner violence     Fear of current or ex partner: Not on file     Emotionally abused: Not on file     Physically abused: Not on file     Forced sexual activity: Not on file   Other Topics Concern    Not on file   Social History Narrative    Not on file       Family History:    Family History   Problem Relation Age of Onset    Alcohol Abuse Maternal Uncle     Heart Attack Maternal Uncle     Cancer Mother     Alcohol Abuse Father        REVIEW OF SYSTEMS:  A comprehensive 14 point review of systems was obtained. Constitutional: No fatigue  Eyes: No blurry vision  Ears, nose, mouth, throat, face: No ringing in the ears; no facial droop. Respiratory: No cough or cold. Cardiovascular: No palpitations  Gastrointestinal: No diarrhea or constipation. Genitourinary: No burning with urination  Integument/Skin: No rashes  Hematologic/Lymphatic: No easy bruising  Musculoskeletal: No muscle pains  Neurologic: No weakness in the extremities. Psychiatric: No depression or suicidal thoughts. Endocrine: No heat or cold intolerances. Allergic/Immunologic: No current seasonal allergies; no skin hives. Physical Exam:      This a 63 y.o. female   Vitals:    03/26/21 0751   BP: 123/73   Pulse: 72   Resp: 20   Temp: 96.8 °F (36 °C)   SpO2: 96%     Constitutional: Patient in no acute distress. Neuro: alert and oriented to person place and time. Head: Atraumatic and normocephalic. Neck: Trachea midline. Ext: 2+ radial pulses bilaterally. Psych: Mood and affect normal.  Skin: No rashes or bruising present. Lungs: Respiratory effort normal.  Cardiovascular:  Regular rhythm. Abdomen: Soft, non-tender, non-distended. Neither side has CVA tenderness on exam.  Bladder non-tender and not distended. Lymphatics: no palpable lymphadenopathy  Pelvic exam: deferred. Rectal exam not indicated. Labs:  No results for input(s): WBC, HGB, HCT, MCV, PLT in the last 72 hours. No results for input(s): NA, K, CL, CO2, PHOS, BUN, CREATININE in the last 72 hours.     Invalid input(s): CA    No results for input(s): COLORU, PHUR, LABCAST, Caitie Arevalo, MUCUS, TRICHOMONAS, YEAST, BACTERIA, CLARITYU, SPECGRAV, LEUKOCYTESUR, UROBILINOGEN, Joan Amel in the last 72 hours. Invalid input(s): NITRATE, GLUCOSEUKETONESUAMORPHOUS        -----------------------------------------------------------------  Imaging Results:  No results found.     Assessment and Plan   Impression:   problem list:  Urinary incontinence      Plan:   Cystoscopy, urodynamics to evaluate bladder function        Rosalinda Rogers  7:56 AM 3/26/2021

## 2021-03-29 ENCOUNTER — TELEPHONE (OUTPATIENT)
Dept: UROLOGY | Age: 66
End: 2021-03-29

## 2021-03-29 NOTE — TELEPHONE ENCOUNTER
----- Message from Mariam Coleman PA-C sent at 3/26/2021 10:54 AM EDT -----  Hi MAs,     Can you please arrange for this pt to have a urolift procedure. Thanks!   RANDY TRIMBLE [facility]

## 2021-03-30 ENCOUNTER — HOSPITAL ENCOUNTER (OUTPATIENT)
Dept: LAB | Age: 66
Setting detail: SPECIMEN
Discharge: HOME | End: 2021-03-30
Payer: MEDICAID

## 2021-03-30 ENCOUNTER — TELEPHONE (OUTPATIENT)
Dept: UROLOGY | Age: 66
End: 2021-03-30

## 2021-03-30 DIAGNOSIS — Z20.822 COVID-19 RULED OUT BY LABORATORY TESTING: Primary | ICD-10-CM

## 2021-03-30 PROCEDURE — U0003 INFECTIOUS AGENT DETECTION BY NUCLEIC ACID (DNA OR RNA); SEVERE ACUTE RESPIRATORY SYNDROME CORONAVIRUS 2 (SARS-COV-2) (CORONAVIRUS DISEASE [COVID-19]), AMPLIFIED PROBE TECHNIQUE, MAKING USE OF HIGH THROUGHPUT TECHNOLOGIES AS DESCRIBED BY CMS-2020-01-R: HCPCS

## 2021-03-30 PROCEDURE — U0005 INFEC AGEN DETEC AMPLI PROBE: HCPCS

## 2021-03-30 NOTE — TELEPHONE ENCOUNTER
Patient is scheduled for surgery on 4/2 at 9:30AM at [facility] and for covid testing on 3/30 at [facility]. Talked to patient and gave him the dates, times and instructions to make sure he has someone bring him and npo after midnight. Patient stated he will call sister to see if she can bring him and will call back if not. Patient confirmed and verbalized understanding.

## 2021-03-31 ENCOUNTER — OFFICE VISIT (OUTPATIENT)
Dept: FAMILY MEDICINE CLINIC | Age: 66
End: 2021-03-31
Payer: MEDICAID

## 2021-03-31 VITALS
HEIGHT: 67 IN | HEART RATE: 73 BPM | WEIGHT: 315 LBS | BODY MASS INDEX: 49.44 KG/M2 | SYSTOLIC BLOOD PRESSURE: 122 MMHG | DIASTOLIC BLOOD PRESSURE: 50 MMHG | TEMPERATURE: 97.2 F

## 2021-03-31 DIAGNOSIS — K64.9 HEMORRHOIDS, UNSPECIFIED HEMORRHOID TYPE: ICD-10-CM

## 2021-03-31 DIAGNOSIS — Z20.822 COVID-19 VIRUS RNA TEST RESULT INDETERMINATE: ICD-10-CM

## 2021-03-31 DIAGNOSIS — N32.81 OVERACTIVE BLADDER: ICD-10-CM

## 2021-03-31 DIAGNOSIS — N17.9 AKI (ACUTE KIDNEY INJURY) (HCC): Primary | ICD-10-CM

## 2021-03-31 DIAGNOSIS — I50.42 HEART FAILURE, SYSTOLIC AND DIASTOLIC, CHRONIC (HCC): ICD-10-CM

## 2021-03-31 LAB
SARS-COV-2: ABNORMAL
SARS-COV-2: ABNORMAL
SOURCE: ABNORMAL

## 2021-03-31 PROCEDURE — 1111F DSCHRG MED/CURRENT MED MERGE: CPT | Performed by: STUDENT IN AN ORGANIZED HEALTH CARE EDUCATION/TRAINING PROGRAM

## 2021-03-31 PROCEDURE — 99215 OFFICE O/P EST HI 40 MIN: CPT | Performed by: STUDENT IN AN ORGANIZED HEALTH CARE EDUCATION/TRAINING PROGRAM

## 2021-03-31 PROCEDURE — 99211 OFF/OP EST MAY X REQ PHY/QHP: CPT | Performed by: FAMILY MEDICINE

## 2021-03-31 RX ORDER — ANTIOX #8/OM3/DHA/EPA/LUT/ZEAX 250-2.5 MG
CAPSULE ORAL
COMMUNITY
Start: 2021-03-26

## 2021-03-31 RX ORDER — LOSARTAN POTASSIUM 25 MG/1
TABLET ORAL
COMMUNITY
Start: 2021-03-30 | End: 2021-04-13 | Stop reason: SDUPTHER

## 2021-03-31 RX ORDER — LOSARTAN POTASSIUM 25 MG/1
25 TABLET ORAL DAILY
Qty: 30 TABLET | Refills: 3 | Status: SHIPPED | OUTPATIENT
Start: 2021-03-31 | End: 2021-09-21 | Stop reason: SINTOL

## 2021-03-31 ASSESSMENT — ENCOUNTER SYMPTOMS
BLOOD IN STOOL: 1
VOMITING: 0
DIARRHEA: 0
SHORTNESS OF BREATH: 0
CONSTIPATION: 0
COUGH: 0
ABDOMINAL PAIN: 0
NAUSEA: 0

## 2021-03-31 NOTE — PROGRESS NOTES
Attending Physician Statement  I  have discussed the care of Zack Mis including pertinent history and exam findings with the resident. I agree with the assessment, plan and orders as documented by the resident. BP (!) 122/50 (Site: Left Lower Arm, Position: Sitting, Cuff Size: Large Adult) Comment: manual  Pulse 73   Temp 97.2 °F (36.2 °C) (Temporal)   Ht 5' 7\" (1.702 m)   Wt (!) 324 lb 3.2 oz (147.1 kg)   BMI 50.78 kg/m²    BP Readings from Last 3 Encounters:   03/31/21 (!) 122/50   03/26/21 119/62   03/25/21 112/80     Wt Readings from Last 3 Encounters:   03/31/21 (!) 324 lb 3.2 oz (147.1 kg)   03/26/21 (!) 320 lb (145.2 kg)   03/25/21 (!) 317 lb 12.8 oz (144.2 kg)          Diagnosis Orders   1. SYDNEY (acute kidney injury) ([facility]  [place] DISCHARGE MEDS RECONCILED W/ CURRENT OUTPATIENT MED LIST   2. Overactive bladder     3. COVID-19 virus RNA test result indeterminate  COVID-19   4. Hemorrhoids, unspecified hemorrhoid type     5.  Heart failure, systolic and diastolic, chronic (HCC)  losartan (COZAAR) 25 MG tablet     Hospital follow up  PCP - Ana Lilia Ferrell DO 3/31/2021 4:10 PM

## 2021-03-31 NOTE — PROGRESS NOTES
Visit Information    Have you changed or started any medications since your last visit including any over-the-counter medicines, vitamins, or herbal medicines? yes - see list   Have you stopped taking any of your medications? Is so, why? -  yes - see list  Are you having any side effects from any of your medications? - no    Have you seen any other physician or provider since your last visit?  cardio   Have you had any other diagnostic tests since your last visit?  no   Have you been seen in the emergency room and/or had an admission in a hospital since we last saw you?  yes - [facility] 3-26-31 and 3-15-21   Have you had your routine dental cleaning in the past 6 months?  no     Do you have an active MyChart account? If no, what is the barrier?   No: declined    Patient Care Team:  Ronak Lew MD as PCP - General (Family Medicine)  Eneida Grissom MD as Consulting Physician (Gastroenterology)  Lyndsay Cotton MD as Consulting Physician (Urology)    Medical History Review  Past Medical, Family, and Social History reviewed and does not contribute to the patient presenting condition    Health Maintenance   Topic Date Due    COVID-19 Vaccine (1) Never done    Lipid screen  03/03/2022    Potassium monitoring  03/22/2022    Creatinine monitoring  03/22/2022    DTaP/Tdap/Td vaccine (2 - Td) 08/20/2030    Colon cancer screen colonoscopy  01/12/2031    Flu vaccine  Completed    Shingles Vaccine  Completed    Pneumococcal 0-64 years Vaccine  Completed    Hepatitis C screen  Completed    HIV screen  Completed    Hepatitis A vaccine  Aged Out    Hepatitis B vaccine  Aged Out    Hib vaccine  Aged Out    Meningococcal (ACWY) vaccine  Aged Out

## 2021-03-31 NOTE — PATIENT INSTRUCTIONS
Thank you for letting us take care of you today. We hope all your questions were addressed. If a question was overlooked or something else comes to mind after you return home, please contact a member of your Care Team listed below. Your Care Team at [place] [address] is Team #5  Shawn Snowden MD (Faculty)  Maria Fernanda Browning MD (Resident)  Alyssa Arevalo MD (Resident)  Javier Clemente MD (Resident)  DELIA Jimenez. ,PERRY ORDOÑEZ, PERRY Lopez Book ([facility] office)  [facility] office)  Bruna Duran, [address] (Clinical Practice Manager)  Pina Lopez [facility] (Clinical Pharmacist)       Office phone number: [phone]    If you need to get in right away due to illness, please be advised we have \"Same Day\" appointments available Monday-Friday. Please call us at [phone] option #3 to schedule your \"Same Day\" appointment. Schedule a Vaccine  When you qualify to receive the vaccine, call the [facility]) COVID-19 Vaccination Hotline to schedule your appointment or to get additional information about the [facility]) locations which are offering the COVID-19 vaccine. To be 94% effective, it's important that you receive two doses of one of the COVID-19 vaccines. -If you are receiving the Cole Peter vaccine, your second shot will be scheduled as close to 21 days after the first shot as possible. -If you are receiving the Moderna vaccine, your second shot will be scheduled as close to 28 days after the first shot as possible. [facility]) COVID-19 Vaccination Hotline: [phone]    Links to [facility]) website and [facility] website:    [employer]cy-[facility]-monitoring-coronavirus-covid-19/covid-19-vaccine/[place]-vaccine    [email or web address]

## 2021-03-31 NOTE — TELEPHONE ENCOUNTER
Patient is unable to get a ride for his surgery on 4/2. Patient has been rescheduled for 5/14 at 11:30AM and for covid testing on 5/10 at 10:00AM. Left voicemail with all information and to call back to confirm. Letter mailed out today.

## 2021-03-31 NOTE — PROGRESS NOTES
Post-Discharge Transitional Care Management Services or Hospital Follow Up      Mariajose Adhikari   YOB: 1955    Date of Office Visit:  3/31/2021  Date of Hospital Admission: 3/26/21  Date of Hospital Discharge: 3/26/21  Readmission Risk Score(high >=14%.  Medium >=10%):Readmission Risk Score: 16      Care management risk score Rising risk (score 2-5) and Complex Care (Scores >=6): 1     Non face to face  following discharge, date last encounter closed (first attempt may have been earlier): *No documented post hospital discharge outreach found in the last 14 days *No documented post hospital discharge outreach found in the last 14 days    Call initiated 2 business days of discharge: *No response recorded in the last 14 days     Patient Active Problem List   Diagnosis    [facility] fl[facility]    Sleep-related breathing disorder    Hypokalemia    Atrial fibrillation ([address]    Ischemic cardiomyopathy    Combined systolic and diastolic congestive heart failure ([address]    Acute cystitis without hematuria    Hx of CABG    Heart failure, systolic and diastolic, chronic (HCC)    Bilateral hand numbness    Right thigh pain    Left leg weakness    Coronary artery disease    Chronic cough    Gastroesophageal reflux disease without esophagitis    Overflow incontinence of urine    Polyp of colon    Post-void dribbling    Post-nasal drip    SYDNEY (acute kidney injury) ([address]    Acute CHF (congestive heart failure) ([address]    Hyperkalemia    Hypotension    Overactive bladder    Hemorrhoids    COVID-19 virus RNA test result indeterminate       No Known Allergies    Medications listed as ordered at the time of discharge from hospital   Caroline Edwards   [place] Medication Instructions VGT:[id number]    Printed on:03/31/21 1691   Medication Information                      atorvastatin (LIPITOR) 40 MG tablet  TAKE 1 TABLET BY MOUTH NIGHTLY             Blood Pressure Monitor KIT  1 each by Does not apply route 2 times daily             clopidogrel (PLAVIX) 75 MG tablet  Take 1 tablet by mouth daily             Elastic Bandages & Supports (JOBST KNEE HIGH COMPRESSION SM) MISC  2 each by Does not apply route daily Wear q day. Remove q hs. Diagnosis: Chronic venous insufficiency             furosemide (LASIX) 20 MG tablet  Take 3 tablets by mouth 2 times daily             isosorbide mononitrate (IMDUR) 30 MG extended release tablet  Take 1 tablet by mouth daily             loratadine (CLARITIN) 10 MG tablet  TAKE 1 TABLET BY MOUTH DAILY             losartan (COZAAR) 25 MG tablet               losartan (COZAAR) 25 MG tablet  Take 1 tablet by mouth daily             Misc. Devices ([employer] WEIGHT SCALE) MISC  1 each by Does not apply route as needed (weigh once a day and record)             Multiple Vitamins-Minerals (PRESERVISION AREDS 2) CAPS               nitroGLYCERIN (NITROSTAT) 0.4 MG SL tablet  Place 1 tablet under the tongue every 5 minutes as needed for Chest pain up to max of 3 total doses. If no relief after 1 dose, call 911.              oxybutynin (DITROPAN XL) 5 MG extended release tablet  Take 1 tablet by mouth daily             pantoprazole (PROTONIX) 40 MG tablet  Take 1 tablet by mouth 2 times daily (before meals)             potassium chloride (KLOR-CON M) 20 MEQ extended release tablet  Take 1 tablet by mouth daily             sucralfate (CARAFATE) 1 GM tablet  Take 1 tablet by mouth 4 times daily             tamsulosin (FLOMAX) 0.4 MG capsule  Take 1 capsule by mouth daily                   Medications marked \"taking\" at this time  Outpatient Medications Marked as Taking for the 3/31/21 encounter (Office Visit) with Cody Adamson MD   Medication Sig Dispense Refill    losartan (COZAAR) 25 MG tablet       Multiple Vitamins-Minerals (PRESERVISION AREDS 2) CAPS       losartan (COZAAR) 25 MG tablet Take 1 tablet by mouth daily 30 tablet 3    oxybutynin (DITROPAN XL) 5 MG extended release tablet Take 1 tablet by mouth daily 30 tablet 3    furosemide (LASIX) 20 MG tablet Take 3 tablets by mouth 2 times daily 180 tablet 1    Blood Pressure Monitor KIT 1 each by Does not apply route 2 times daily 1 kit 0    Misc. Devices ([employer] WEIGHT SCALE) MISC 1 each by Does not apply route as needed (weigh once a day and record) 1 each 0    clopidogrel (PLAVIX) 75 MG tablet Take 1 tablet by mouth daily 30 tablet 0    isosorbide mononitrate (IMDUR) 30 MG extended release tablet Take 1 tablet by mouth daily 90 tablet 0    loratadine (CLARITIN) 10 MG tablet TAKE 1 TABLET BY MOUTH DAILY 30 tablet 1    atorvastatin (LIPITOR) 40 MG tablet TAKE 1 TABLET BY MOUTH NIGHTLY 30 tablet 2    tamsulosin (FLOMAX) 0.4 MG capsule Take 1 capsule by mouth daily 30 capsule 5    sucralfate (CARAFATE) 1 GM tablet Take 1 tablet by mouth 4 times daily 120 tablet 3    potassium chloride (KLOR-CON M) 20 MEQ extended release tablet Take 1 tablet by mouth daily 30 tablet 1    nitroGLYCERIN (NITROSTAT) 0.4 MG SL tablet Place 1 tablet under the tongue every 5 minutes as needed for Chest pain up to max of 3 total doses. If no relief after 1 dose, call 911. 25 tablet 1    pantoprazole (PROTONIX) 40 MG tablet Take 1 tablet by mouth 2 times daily (before meals) 60 tablet 5    Elastic Bandages & Supports (JOBST KNEE HIGH COMPRESSION SM) MISC 2 each by Does not apply route daily Wear q day. Remove q hs. Diagnosis: Chronic venous insufficiency 2 each 0        Medications patient taking as of now reconciled against medications ordered at time of hospital discharge: Yes    Chief Complaint   Patient presents with   [facility] ED Follow-up     [facility] 3-26-21 and 3-15-21 hypotension       HPI    Inpatient course: Discharge summary reviewed- see chart. Interval history/Current status:  Improved       Pt admitted initially for hypotension. On admission BP was well controlled   Found to have SYDNEY  Fluids given.    SYDNEY improved and resolved upon discharge. Cr on 3/22 (on discharge): 1.20     Pt states he has post void dribbling. Seen by urology last week   Cystoscopy performed  Found to have overactive bladder  Following up with urology   Started on oxybutynin     Hx of hemorrhoids  Pt states having pablo red blood x2 days   No pain upon defecation or abdominal pain   Pt states similar episodes as prior   Colonoscopy in jan '21 : polyp removed , repeat in 3 years     Losartan recently changed by cardio from 50mg to 25mg daily     Pre procedure covid test 3/30/21 was indeterminiate   Pt is asymptomatic  Will reorder test today     Review of Systems   Constitutional: Negative for chills, fatigue and fever. Eyes: Negative for visual disturbance. Respiratory: Negative for cough and shortness of breath. Cardiovascular: Negative for chest pain, palpitations and leg swelling. Gastrointestinal: Positive for blood in stool. Negative for abdominal pain, constipation, diarrhea, nausea and vomiting. Genitourinary: Positive for difficulty urinating. Negative for dysuria and frequency. Musculoskeletal: Negative for myalgias. Neurological: Negative for weakness, light-headedness and headaches. Vitals:    03/31/21 1513 03/31/21 1522   BP: (!) 102/53 (!) 122/50   Site: Left Lower Arm Left Lower Arm   Position: Sitting Sitting   Cuff Size: Medium Adult Large Adult   Pulse: 73    Temp: 97.2 °F (36.2 °C)    TempSrc: Temporal    Weight: (!) 324 lb 3.2 oz (147.1 kg)    Height: 5' 7\" (1.702 m)      Body mass index is 50.78 kg/m². Wt Readings from Last 3 Encounters:   03/31/21 (!) 324 lb 3.2 oz (147.1 kg)   03/26/21 (!) 320 lb (145.2 kg)   03/25/21 (!) 317 lb 12.8 oz (144.2 kg)     BP Readings from Last 3 Encounters:   03/31/21 (!) 122/50   03/26/21 119/62   03/25/21 112/80       Physical Exam  Vitals signs reviewed. Constitutional:       General: He is not in acute distress.   Eyes:      Conjunctiva/sclera: Conjunctivae normal.   Neck:

## 2021-04-01 ENCOUNTER — HOSPITAL ENCOUNTER (OUTPATIENT)
Age: 66
Setting detail: SPECIMEN
Discharge: HOME | End: 2021-04-01
Payer: MEDICAID

## 2021-04-01 ENCOUNTER — TELEPHONE (OUTPATIENT)
Dept: FAMILY MEDICINE CLINIC | Age: 66
End: 2021-04-01

## 2021-04-01 DIAGNOSIS — Z20.822 COVID-19 VIRUS RNA TEST RESULT INDETERMINATE: ICD-10-CM

## 2021-04-01 NOTE — TELEPHONE ENCOUNTER
Pt calling states he has hemoroids and they have started bleeding today. Reports his stool is soft. Reports he has soiled his under garments 2 times today. Using cream and wipes to clean around his rectum. Pt advised per Dr Derrick Motta to increase fiber state hydrated and if he actively starts bleeding to seek medical help at the nearest ER. Also advised if need be call his GI MD that did his colonoscopy. Pt agreed to this plan of care.

## 2021-04-02 LAB
SARS-COV-2: NORMAL
SARS-COV-2: NOT DETECTED
SOURCE: NORMAL

## 2021-04-09 ENCOUNTER — IMMUNIZATION (OUTPATIENT)
Dept: FAMILY MEDICINE CLINIC | Age: 66
End: 2021-04-09
Payer: MEDICAID

## 2021-04-09 PROCEDURE — 0001A COVID-19, PFIZER VACCINE 30MCG/0.3ML DOSE: CPT | Performed by: INTERNAL MEDICINE

## 2021-04-09 PROCEDURE — 91300 COVID-19, PFIZER VACCINE 30MCG/0.3ML DOSE: CPT | Performed by: INTERNAL MEDICINE

## 2021-04-13 ENCOUNTER — HOSPITAL ENCOUNTER (OUTPATIENT)
Dept: OTHER | Age: 66
Discharge: HOME | End: 2021-04-13
Payer: MEDICAID

## 2021-04-13 VITALS
DIASTOLIC BLOOD PRESSURE: 60 MMHG | HEART RATE: 85 BPM | OXYGEN SATURATION: 96 % | SYSTOLIC BLOOD PRESSURE: 110 MMHG | RESPIRATION RATE: 18 BRPM | BODY MASS INDEX: 49.18 KG/M2 | WEIGHT: 314 LBS

## 2021-04-13 DIAGNOSIS — I50.42 HEART FAILURE, SYSTOLIC AND DIASTOLIC, CHRONIC (HCC): Primary | ICD-10-CM

## 2021-04-13 DIAGNOSIS — G47.33 OSA (OBSTRUCTIVE SLEEP APNEA): ICD-10-CM

## 2021-04-13 DIAGNOSIS — S80.812A EXCORIATION OF LEFT LOWER LEG, INITIAL ENCOUNTER: ICD-10-CM

## 2021-04-13 PROBLEM — I50.32 CHRONIC DIASTOLIC (CONGESTIVE) HEART FAILURE (HCC): Status: ACTIVE | Noted: 2020-01-29

## 2021-04-13 PROCEDURE — 99212 OFFICE O/P EST SF 10 MIN: CPT

## 2021-04-13 PROCEDURE — 99213 OFFICE O/P EST LOW 20 MIN: CPT | Performed by: NURSE PRACTITIONER

## 2021-04-13 RX ORDER — AZELASTINE HCL 205.5 UG/1
SPRAY NASAL
COMMUNITY
End: 2021-08-16 | Stop reason: ALTCHOICE

## 2021-04-13 NOTE — PROGRESS NOTES
CHF Clinic at [facility]    Office: [phone] Fax: [phone] [facility]  [phone]  Dept: [phone]  Loc: [phone]    Abhilash Knowles is a 63 y.o. male who presents today for CHF evaluation. HPI:     +  shortness of breath, with exertion only  +   Fatigue, t/o day, worsens as day progress. + jw, wears mask compliantly    + Edema , reprots no noticeable change. + hand edema  Denies chest pain   Denies  palpitations     Not using lotion on dry skin. + elevation of legs. reports compliance with low Na+ and monitored fluid intake. Past Medical History:   Diagnosis Date    Arthritis     back    Atrial fibrillation ([address] 10/2019    Dr. Maricruz Hartman BPH (benign prostatic hyperplasia)     Cellulitis     Cervical disc disease     CHF (congestive heart failure) [facility])     [facility]    Coronary artery disease 10/2019     CABG [facility]    Dr. Judith Diaz Cardiology last seen within the last year.     CPAP (continuous positive airway pressure) dependence     GERD (gastroesophageal reflux disease)     History of incarceration     released 2019    History of incarceration     Hyperlipidemia     Dr. Coreen Sanchez Hypertension     [facility] CHF clinic     Dr. Coreen Sanchez Myocardial infarct [facility])     Obesity     Sleep apnea     C-pap    Wears reading eyeglasses     Wellness examination     pcp Dr. Gege Aguilar 3/10/2021     Past Surgical History:   Procedure Laterality Date    ABDOMINAL EXPLORATION SURGERY      CARDIAC SURGERY      CARDIOVERSION  10/16/2019    CARPAL TUNNEL RELEASE Bilateral 2008    CERVICAL SPINE SURGERY      2-5    COLONOSCOPY  10/29/2020     WITH BIOPSY, COLONOSCOPY SUBMUCOSAL/BOTOX INJECTION,  COLONOSCOPY POLYPECTOMY SNARE/COLD BIOPSY     COLONOSCOPY N/A 10/29/2020    COLONOSCOPY WITH BIOPSY lower leg: Edema present. Left lower leg: Edema present. Comments: Trace - 1 + pitting edema of b/l LE   Skin:     General: Skin is warm and dry. Comments: Area of erythmeatous skin, scaly. No increased tenderness to palation. No warmth. Pt has one dime size and 2 pea sized scabs present laterally in red area. Neurological:      Mental Status: He is alert and oriented to person, place, and time. /60   Pulse 85   Resp 18   Wt (!) 314 lb (142.4 kg)   SpO2 96%   BMI 49.18 kg/m²                 Echo CONCLUSIONS     Summary  Normal LV size and wall thickness. No obvious wall motion abnormality seen. Normal LV systolic function with LVEF >55%. Normal RV size and function. RV systolic pressure 29 mmHg  LA and RA appears normal in size. No obvious significant structural valvular abnormality noted. No significant valvular stenosis or regurgitation noted. Normal aortic root dimension. No significant pericardial effusion noted. No obvious intra-cardiac mass or shunt noted. IVC normal diameter and inspiratory collapse indicating normal RA filling  pressure.     Signature  ----------------------------------------------------------------------------   Electronically signed by Kevin Fiore(Interpreting physician) on   03/04/2021 12:22 PM      Lower Extremity Measurements in cm.    R Calf Circumference (cm): 51 cm  L Calf Circumference (cm): 53 cm  R Ankle Circumference (cm): 27 cm  L Ankle Circumference (cm): 27 cm    CBC:   Lab Results   Component Value Date    WBC 7.6 03/16/2021    RBC 3.71 03/16/2021    HGB 10.5 03/16/2021    HCT 34.6 03/16/2021    MCV 93.3 03/16/2021    MCH 28.3 03/16/2021    MCHC 30.3 03/16/2021    RDW 15.3 03/16/2021     03/16/2021    MPV 9.6 03/16/2021     CMP:    Lab Results   Component Value Date     03/22/2021    K 4.6 03/22/2021     03/22/2021    CO2 25 03/22/2021    BUN 28 03/22/2021    CREATININE 1.20 03/22/2021    GFRAA >60 reinforced teaching to monitor for 3-5 lb weight increase over 1-2 days, and to notify the CHF clinic at [phone] or physician office if weight change noted. Patient verbalized understanding. Risks of smoking discussed with the patient if applicable; patient strongly discouraged to smoke. Patient verbalized understanding. Signs and symptoms of CHF discussed with patient, such as feeling more tired than normal, feeling short of breath, coughing that increases when you lie down, sudden weight gain, swelling of your feet, legs or belly. Patient verbalized understanding to notify the CHF clinic at [phone] or physician office if these symptoms occur. Compliance with plan of care and further disease process causes discussed with patient, patient encouraged to keep all follow up appointments. Patient verbalized understanding. Echocardiogram reviewed. Labs reviewed. Medications reviewed.       Electronically signedby LUDY Sun CNP on 4/13/2021 at 10:56 AM

## 2021-04-19 DIAGNOSIS — I25.10 CORONARY ARTERY DISEASE INVOLVING NATIVE CORONARY ARTERY OF NATIVE HEART WITHOUT ANGINA PECTORIS: ICD-10-CM

## 2021-04-19 RX ORDER — CLOPIDOGREL BISULFATE 75 MG/1
75 TABLET ORAL DAILY
Qty: 90 TABLET | Refills: 0 | Status: SHIPPED | OUTPATIENT
Start: 2021-04-19 | End: 2021-07-26 | Stop reason: SDUPTHER

## 2021-04-19 NOTE — TELEPHONE ENCOUNTER
Please address the medication refill and close the encounter. If I can be of assistance, please route to the applicable pool. Thank you.     Last visit: 3-31-21  Last Med refill: 3-8-21  Does patient have enough medication for 72 hours: No:     Next Visit Date:  Future Appointments   Date Time Provider Taj Flores   4/23/2021 10:00 AM MD Gary Rodriguez FP [facility]   4/30/2021  9:00 AM [facility] [place] [facility], PFIZER 21 DAY SECOND DOSE JESSY [facility]  [address] [facility]   5/10/2021 10:00 AM SCHEDULE, [facility] COVID SCREENING [facility] COV [facility]   5/12/2021 10:30 AM ST[facility] CHF CLINIC RM 1 [facility] CHF CLI [facility]   6/14/2021  3:00 PM Cody Medina MD [address] Maintenance   Topic Date Due    COVID-19 Vaccine (2 - Pfizer 2-dose series) 04/30/2021    Lipid screen  03/03/2022    Potassium monitoring  03/22/2022    Creatinine monitoring  03/22/2022    DTaP/Tdap/Td vaccine (2 - Td) 08/20/2030    Colon cancer screen colonoscopy  01/12/2031    Flu vaccine  Completed    Shingles Vaccine  Completed    Pneumococcal 0-64 years Vaccine  Completed    Hepatitis C screen  Completed    HIV screen  Completed    Hepatitis A vaccine  Aged Out    Hepatitis B vaccine  Aged Out    Hib vaccine  Aged Out    Meningococcal (ACWY) vaccine  Aged Out       Hemoglobin A1C (%)   Date Value   12/10/2019 5.6   10/18/2019 5.8             ( goal A1C is < 7)   No results found for: LABMICR  LDL Cholesterol (mg/dL)   Date Value   03/03/2021 54   05/26/2020 45       (goal LDL is <100)   AST (U/L)   Date Value   03/16/2021 15     ALT (U/L)   Date Value   03/16/2021 14     BUN (mg/dL)   Date Value   03/22/2021 28 (H)     BP Readings from Last 3 Encounters:   04/13/21 110/60   03/31/21 (!) 122/50   03/26/21 119/62          (goal 120/80)    All Future Testing planned in [facility]  Lab Frequency Next Occurrence   COLONOSCOPY (Diagnostic) Once 01/11/2021   COVID-19 Once 01/05/2021    URINARY BLADDER LIMITED Once 12/01/2021   Creatinine,

## 2021-04-23 ENCOUNTER — OFFICE VISIT (OUTPATIENT)
Dept: FAMILY MEDICINE CLINIC | Age: 66
End: 2021-04-23
Payer: MEDICAID

## 2021-04-23 VITALS
HEIGHT: 67 IN | HEART RATE: 73 BPM | DIASTOLIC BLOOD PRESSURE: 70 MMHG | WEIGHT: 315 LBS | BODY MASS INDEX: 49.44 KG/M2 | SYSTOLIC BLOOD PRESSURE: 121 MMHG

## 2021-04-23 DIAGNOSIS — N39.490 OVERFLOW INCONTINENCE OF URINE: ICD-10-CM

## 2021-04-23 DIAGNOSIS — R05.3 CHRONIC COUGH: Primary | ICD-10-CM

## 2021-04-23 DIAGNOSIS — I50.42 CHRONIC COMBINED SYSTOLIC AND DIASTOLIC CONGESTIVE HEART FAILURE (HCC): ICD-10-CM

## 2021-04-23 DIAGNOSIS — I48.91 ATRIAL FIBRILLATION, UNSPECIFIED TYPE (HCC): ICD-10-CM

## 2021-04-23 PROCEDURE — G8427 DOCREV CUR MEDS BY ELIG CLIN: HCPCS

## 2021-04-23 PROCEDURE — 3017F COLORECTAL CA SCREEN DOC REV: CPT

## 2021-04-23 PROCEDURE — G8417 CALC BMI ABV UP PARAM F/U: HCPCS

## 2021-04-23 PROCEDURE — 1036F TOBACCO NON-USER: CPT

## 2021-04-23 PROCEDURE — 99213 OFFICE O/P EST LOW 20 MIN: CPT

## 2021-04-23 RX ORDER — LORATADINE 10 MG/1
CAPSULE, LIQUID FILLED ORAL DAILY
COMMUNITY
End: 2021-05-11

## 2021-04-23 RX ORDER — FUROSEMIDE 20 MG/1
60 TABLET ORAL
COMMUNITY
Start: 2020-09-09 | End: 2021-05-11

## 2021-04-23 RX ORDER — OXYBUTYNIN CHLORIDE 5 MG/1
5 TABLET, EXTENDED RELEASE ORAL DAILY
COMMUNITY
End: 2021-05-11

## 2021-04-23 RX ORDER — METFORMIN HYDROCHLORIDE 500 MG/1
TABLET, EXTENDED RELEASE ORAL
COMMUNITY
Start: 2021-04-02

## 2021-04-23 RX ORDER — SUCRALFATE 1 G/1
1 TABLET ORAL 4 TIMES DAILY
COMMUNITY
End: 2021-04-26 | Stop reason: ALTCHOICE

## 2021-04-23 RX ORDER — NITROGLYCERIN 0.4 MG/1
0.4 TABLET SUBLINGUAL EVERY 5 MIN PRN
COMMUNITY
End: 2021-05-11

## 2021-04-23 ASSESSMENT — ENCOUNTER SYMPTOMS
SHORTNESS OF BREATH: 0
NAUSEA: 0
CONSTIPATION: 0
VOMITING: 0
COLOR CHANGE: 0
DIARRHEA: 0
WHEEZING: 0
COUGH: 1
ABDOMINAL DISTENTION: 0
APNEA: 0
PHOTOPHOBIA: 0

## 2021-04-23 NOTE — PROGRESS NOTES
Subjective: Rafat Gallo is a 63 y.o. male with  has a past medical history of Arthritis, Atrial fibrillation ([address] BPH (benign prostatic hyperplasia), Cellulitis, Cervical disc disease, CHF (congestive heart failure) ([address] Coronary artery disease, CPAP (continuous positive airway pressure) dependence, GERD (gastroesophageal reflux disease), History of incarceration, History of incarceration, Hyperlipidemia, Hypertension, Myocardial infarct ([address] Obesity, Sleep apnea, Wears reading eyeglasses, and Wellness examination. Presented to the office today for:  Chief Complaint   Patient presents with    Cough     coughing attacks for 15/30 minutes usually in morning and evenings       HPI     62 yo M came with chronic Cough which is still there  Was referred to ENT who tried Flonase without much relief  Was referred to pulmonology as well in the past  Try a trial off loratadine without much relief  Chest x-ray unremarkable    Overactive bladder on Oxybutynin, cystoscopy planned next month    BMI 50, Dietician for bariatric surgeon next month. Needs to loose weight before that. Patient got his Covid vaccines. For his CHF, saw  CHF clinic, currently lasix 60 BID, leg edema is better. Has lost about 4 lbs. June 29, appt w/ Cardiology, for anticoaguilation for afib  HTN, losartan 25     Hemorrhoids resolved, Recent colonoscopy showed tubnular adenomas s/p resection, To repeat colonoscopy in 3 years. Review of Systems   Constitutional: Negative for activity change, appetite change, fatigue, fever and unexpected weight change. HENT: Negative for congestion. Eyes: Negative for photophobia and visual disturbance. Respiratory: Positive for cough. Negative for apnea, shortness of breath and wheezing. Cardiovascular: Negative for chest pain, palpitations and leg swelling. Gastrointestinal: Negative for abdominal distention, constipation, diarrhea, nausea and vomiting.    Endocrine: Negative for polyuria. Genitourinary: Positive for urgency. Negative for dysuria. Skin: Negative for color change, pallor, rash and wound. Neurological: Negative for dizziness, tremors, weakness, light-headedness and headaches. Psychiatric/Behavioral: Negative for agitation, behavioral problems, confusion and decreased concentration. The patient has a   Family History   Problem Relation Age of Onset    Alcohol Abuse Maternal Uncle     Heart Attack Maternal Uncle     Cancer Mother     Alcohol Abuse Father        Objective:    /70 (Site: Left Lower Arm, Position: Sitting, Cuff Size: Large Adult)   Pulse 73   Ht 5' 7\" (1.702 m)   Wt (!) 319 lb 12.8 oz (145.1 kg)   BMI 50.09 kg/m²    BP Readings from Last 3 Encounters:   04/23/21 121/70   04/13/21 110/60   03/31/21 (!) 122/50       Physical Exam  Constitutional:       General: He is not in acute distress. Appearance: Normal appearance. He is obese. He is not ill-appearing, toxic-appearing or diaphoretic. HENT:      Head: Normocephalic and atraumatic. Nose: Nose normal. No congestion. Eyes:      General: No scleral icterus. Right eye: No discharge. Left eye: No discharge. Extraocular Movements: Extraocular movements intact. Conjunctiva/sclera: Conjunctivae normal.      Pupils: Pupils are equal, round, and reactive to light. Cardiovascular:      Rate and Rhythm: Normal rate and regular rhythm. Pulses: Normal pulses. Heart sounds: No murmur. Pulmonary:      Effort: Pulmonary effort is normal.      Breath sounds: Normal breath sounds. No wheezing. Abdominal:      General: There is no distension. Tenderness: There is no abdominal tenderness. Musculoskeletal: Normal range of motion. General: Swelling present. No tenderness. Right lower leg: Edema present. Left lower leg: Edema present. Comments: Edema is chronic    Skin:     General: Skin is warm.       Coloration: Skin is not jaundiced. Findings: No erythema. Neurological:      Mental Status: He is alert and oriented to person, place, and time. Motor: No weakness. Psychiatric:         Mood and Affect: Mood normal.         Behavior: Behavior normal.         Thought Content: Thought content normal.         Judgment: Judgment normal.         Lab Results   Component Value Date    WBC 7.6 03/16/2021    HGB 10.5 (L) 03/16/2021    HCT 34.6 (L) 03/16/2021     03/16/2021    CHOL 112 05/26/2020    TRIG 53 05/26/2020    HDL 47 03/03/2021    ALT 14 03/16/2021    AST 15 03/16/2021     03/22/2021    K 4.6 03/22/2021     03/22/2021    CREATININE 1.20 03/22/2021    BUN 28 (H) 03/22/2021    CO2 25 03/22/2021    TSH 1.48 01/24/2020    PSA 0.67 09/22/2020    INR 1.0 03/08/2021    LABA1C 5.6 12/10/2019     Lab Results   Component Value Date    CALCIUM 9.2 03/22/2021    PHOS 4.0 03/08/2021     Lab Results   Component Value Date    LDLCHOLESTEROL 54 03/03/2021       Assessment and Plan:    1. Atrial fibrillation, unspecified type [facility])  Not on anticoagulation currently     2. Chronic combined systolic and diastolic congestive heart failure (HCC)  Lasix 60 mg BID    3. Overflow incontinence of urine  On Oxybutynin  cystoscopy planned next month    4. Chronic cough  - Allergen Faraday; Future  - Allergen, Region 5 Respiratory Panel; Future  - Allergen, Inhalant, Comprehensive Panel; Future          Requested Prescriptions      No prescriptions requested or ordered in this encounter       There are no discontinued medications. Arturo James received counseling on the following healthy behaviors: nutrition, exercise and medication adherence    Discussed use,benefit, and side effects of prescribed medications. Barriers to medication compliance addressed. All patient questions answered. Pt voiced understanding. Return in about 3 months (around 7/23/2021) for cough.         Disclaimer: Some orall of this note was transcribed using voice-recognition software. This may cause typographical errors occasionally. Although all effort is made to fix these errors, please do not hesitate to contact our office if there Willena Few concern with the understanding of this note.

## 2021-04-23 NOTE — PROGRESS NOTES
Visit Information    Have you changed or started any medications since your last visit including any over-the-counter medicines, vitamins, or herbal medicines? no   Are you having any side effects from any of your medications? -  no  Have you stopped taking any of your medications? Is so, why? -  no    Have you seen any other physician or provider since your last visit? No  Have you had any other diagnostic tests since your last visit? No  Have you been seen in the emergency room and/or had an admission to a hospital since we last saw you? No  Have you had your routine dental cleaning in the past 6 months? no    Have you activated your [employer] account? If not, what are your barriers?  No: declined     Patient Care Team:  Santos Ng MD as PCP - General (Family Medicine)  Aletha Soto MD as Consulting Physician (Gastroenterology)  Emerson Brasher MD as Consulting Physician (Urology)    Medical History Review  Past Medical, Family, and Social History reviewed and does not contribute to the patient presenting condition    Health Maintenance   Topic Date Due    COVID-19 Vaccine (2 - Pfizer 2-dose series) 04/30/2021    Lipid screen  03/03/2022    Potassium monitoring  03/22/2022    Creatinine monitoring  03/22/2022    DTaP/Tdap/Td vaccine (2 - Td) 08/20/2030    Colon cancer screen colonoscopy  01/12/2031    Flu vaccine  Completed    Shingles Vaccine  Completed    Pneumococcal 0-64 years Vaccine  Completed    Hepatitis C screen  Completed    HIV screen  Completed    Hepatitis A vaccine  Aged Out    Hepatitis B vaccine  Aged Out    Hib vaccine  Aged Out    Meningococcal (ACWY) vaccine  Aged Out

## 2021-04-26 RX ORDER — SUCRALFATE 1 G/1
1 TABLET ORAL 4 TIMES DAILY
Qty: 120 TABLET | Refills: 3 | Status: SHIPPED | OUTPATIENT
Start: 2021-04-26 | End: 2021-07-06 | Stop reason: ALTCHOICE

## 2021-04-30 ENCOUNTER — IMMUNIZATION (OUTPATIENT)
Dept: FAMILY MEDICINE CLINIC | Age: 66
End: 2021-04-30
Payer: MEDICAID

## 2021-04-30 ENCOUNTER — HOSPITAL ENCOUNTER (OUTPATIENT)
Age: 66
Setting detail: SPECIMEN
Discharge: HOME | End: 2021-04-30
Payer: MEDICAID

## 2021-04-30 DIAGNOSIS — R05.3 CHRONIC COUGH: ICD-10-CM

## 2021-04-30 PROCEDURE — 0002A COVID-19, PFIZER VACCINE 30MCG/0.3ML DOSE: CPT | Performed by: INTERNAL MEDICINE

## 2021-04-30 PROCEDURE — 91300 COVID-19, PFIZER VACCINE 30MCG/0.3ML DOSE: CPT | Performed by: INTERNAL MEDICINE

## 2021-05-04 LAB
2000687N OAK TREE IGE: <0.1 KU/L (ref 0–0.34)
2000687N OAK TREE IGE: <0.1 KU/L (ref 0–0.34)
ALLERGEN BERMUDA GRASS IGE: <0.1 KU/L (ref 0–0.34)
ALLERGEN BERMUDA GRASS IGE: <0.1 KU/L (ref 0–0.34)
ALLERGEN BIRCH IGE: <0.1 KU/L (ref 0–0.34)
ALLERGEN BIRCH IGE: <0.1 KU/L (ref 0–0.34)
ALLERGEN COCKLEBUR IGE: <0.1 KU/L (ref 0–0.34)
ALLERGEN DOG DANDER IGE: <0.1 KU/L (ref 0–0.34)
ALLERGEN DOG DANDER IGE: <0.1 KU/L (ref 0–0.34)
ALLERGEN GERMAN COCKROACH IGE: <0.1 KU/L (ref 0–0.34)
ALLERGEN GERMAN COCKROACH IGE: <0.1 KU/L (ref 0–0.34)
ALLERGEN HORMODENDRUM IGE: <0.1 KUL/L (ref 0–0.34)
ALLERGEN HORMODENDRUM IGE: <0.1 KUL/L (ref 0–0.34)
ALLERGEN JOHNSON GRASS IGE: <0.1 KU/L (ref 0–0.34)
ALLERGEN JUNE KENTUCKY BLUEGRASS: <0.1 KU/L (ref 0–0.34)
ALLERGEN LAMB'S QUARTER IGE: <0.1 KU/L (ref 0–0.34)
ALLERGEN MARSHELDER ROUGH: <0.1 KU/L (ref 0–0.34)
ALLERGEN MOUSE EPITHELIA IGE: <0.1 KU/L (ref 0–0.34)
ALLERGEN MUGWORT IGE: <0.1 KU/L (ref 0–0.34)
ALLERGEN NETTLE IGE: <0.1 KU/L (ref 0–0.34)
ALLERGEN PECAN TREE IGE: <0.1 KU/L (ref 0–0.34)
ALLERGEN PIGWEED ROUGH IGE: <0.1 KU/L (ref 0–0.34)
ALLERGEN SHEEP SORREL (W18) IGE: <0.1 KU/L (ref 0–0.34)
ALLERGEN TREE SYCAMORE: <0.1 KU/L (ref 0–0.34)
ALLERGEN TREE SYCAMORE: <0.1 KU/L (ref 0–0.34)
ALLERGEN WALNUT TREE IGE: <0.1 KU/L (ref 0–0.34)
ALLERGEN WALNUT TREE IGE: <0.1 KU/L (ref 0–0.34)
ALLERGEN WHITE MULBERRY TREE, IGE: <0.1 KU/L (ref 0–0.34)
ALLERGEN, TREE, WHITE ASH IGE: <0.1 KU/L (ref 0–0.34)
ALLERGEN, TREE, WHITE ASH IGE: <0.1 KU/L (ref 0–0.34)
ALTERNARIA ALTERNATA: <0.1 KU/L (ref 0–0.34)
ALTERNARIA ALTERNATA: <0.1 KU/L (ref 0–0.34)
ASPERGILLUS FUMIGATUS: <0.1 KU/L (ref 0–0.34)
ASPERGILLUS FUMIGATUS: <0.1 KU/L (ref 0–0.34)
CAT DANDER ANTIBODY: <0.1 KU/L (ref 0–0.34)
CAT DANDER ANTIBODY: <0.1 KU/L (ref 0–0.34)
COTTONWOOD TREE: <0.1 KU/L (ref 0–0.34)
COTTONWOOD TREE: <0.1 KU/L (ref 0–0.34)
D. FARINAE: <0.1 KU/L (ref 0–0.34)
D. FARINAE: <0.1 KU/L (ref 0–0.34)
D. PTERONYSSINUS: <0.1 KU/L (ref 0–0.34)
D. PTERONYSSINUS: <0.1 KU/L (ref 0–0.34)
ELM TREE: <0.1 KU/L (ref 0–0.34)
ELM TREE: <0.1 KU/L (ref 0–0.34)
HOUSE DUST HOLLISTER STIER IGE: <0.1 KU/L (ref 0–0.34)
IGE: 100 IU/ML
IGE: 97 IU/ML
MAPLE/BOXELDER TREE: <0.1 KU/L (ref 0–0.34)
MAPLE/BOXELDER TREE: <0.1 KU/L (ref 0–0.34)
MOUNTAIN CEDAR TREE: <0.1 KU/L (ref 0–0.34)
MUCOR RACEMOSUS: <0.1 KU/L (ref 0–0.34)
P. NOTATUM: <0.1 KU/L (ref 0–0.34)
RUSSIAN THISTLE: <0.1 KU/L (ref 0–0.34)
SHORT RAGWD(A ARTEMIS.) IGE: <0.1 KU/L (ref 0–0.34)
SHORT RAGWD(A ARTEMIS.) IGE: <0.1 KU/L (ref 0–0.34)
TIMOTHY GRASS: <0.1 KU/L (ref 0–0.34)

## 2021-05-05 ENCOUNTER — TELEPHONE (OUTPATIENT)
Dept: FAMILY MEDICINE CLINIC | Age: 66
End: 2021-05-05

## 2021-05-05 NOTE — TELEPHONE ENCOUNTER
Pt calling asking for results of allergy testing Can you please review and advise. Pt wants a call back he states he still has his coughing spells.  Thanks

## 2021-05-10 ENCOUNTER — HOSPITAL ENCOUNTER (OUTPATIENT)
Dept: LAB | Age: 66
Setting detail: SPECIMEN
Discharge: HOME | End: 2021-05-10
Payer: MEDICAID

## 2021-05-10 DIAGNOSIS — Z20.822 COVID-19 RULED OUT BY LABORATORY TESTING: Primary | ICD-10-CM

## 2021-05-10 PROCEDURE — U0005 INFEC AGEN DETEC AMPLI PROBE: HCPCS

## 2021-05-10 PROCEDURE — U0003 INFECTIOUS AGENT DETECTION BY NUCLEIC ACID (DNA OR RNA); SEVERE ACUTE RESPIRATORY SYNDROME CORONAVIRUS 2 (SARS-COV-2) (CORONAVIRUS DISEASE [COVID-19]), AMPLIFIED PROBE TECHNIQUE, MAKING USE OF HIGH THROUGHPUT TECHNOLOGIES AS DESCRIBED BY CMS-2020-01-R: HCPCS

## 2021-05-10 RX ORDER — POTASSIUM CHLORIDE 20 MEQ/1
20 TABLET, EXTENDED RELEASE ORAL DAILY
Qty: 30 TABLET | Refills: 1 | Status: SHIPPED | OUTPATIENT
Start: 2021-05-10 | End: 2021-06-28 | Stop reason: SDUPTHER

## 2021-05-10 NOTE — TELEPHONE ENCOUNTER
Potassium chloride pending for refill     Health Maintenance   Topic Date Due    Lipid screen  03/03/2022    Potassium monitoring  03/22/2022    Creatinine monitoring  03/22/2022    DTaP/Tdap/Td vaccine (2 - Td) 08/20/2030    Colon cancer screen colonoscopy  01/12/2031    Flu vaccine  Completed    Shingles Vaccine  Completed    Pneumococcal 0-64 years Vaccine  Completed    COVID-19 Vaccine  Completed    Hepatitis C screen  Completed    HIV screen  Completed    Hepatitis A vaccine  Aged Out    Hepatitis B vaccine  Aged Out    Hib vaccine  Aged Out    Meningococcal (ACWY) vaccine  Aged Out             (applicable per patient's age: Cancer Screenings, Depression Screening, Fall Risk Screening, Immunizations)    Hemoglobin A1C (%)   Date Value   12/10/2019 5.6   10/18/2019 5.8     LDL Cholesterol (mg/dL)   Date Value   03/03/2021 54     AST (U/L)   Date Value   03/16/2021 15     ALT (U/L)   Date Value   03/16/2021 14     BUN (mg/dL)   Date Value   03/22/2021 28 (H)      (goal A1C is < 7)   (goal LDL is <100) need 30-50% reduction from baseline     BP Readings from Last 3 Encounters:   04/23/21 121/70   04/13/21 110/60   03/31/21 (!) 122/50    (goal /80)      All Future Testing planned in [facility]:  Lab Frequency Next Occurrence   COLONOSCOPY (Diagnostic) Once 01/11/2021   COVID-19 Once 01/05/2021   [address] Once 12/01/2021   Creatinine, Random Urine Once 03/14/2021   Sodium, Urine, Random Once 03/14/2021   Urea Nitrogen, Urine Once 03/14/2021   US RETROPERITONEAL COMPLETE Once 35/72/2490   Basic Metabolic Panel Once 41/24/4985   Basic Metabolic Panel Once 11/11/5580   COVID-19 Once 03/30/2021   COVID-19 Once 05/07/2021       Next Visit Date:  Future Appointments   Date Time Provider Taj Flores   5/12/2021 10:30 AM [facility] RM 1 [facility]   6/14/2021  3:00 PM Bisi Horn MD [id number]            Patient Active Problem List:     Atrial flutter ([facility]     Sleep-related breathing disorder     Hypokalemia     Atrial fibrillation (HCC)     Ischemic cardiomyopathy     Combined systolic and diastolic congestive heart failure (HCC)     Acute cystitis without hematuria     Hx of CABG     Heart failure, systolic and diastolic, chronic (HCC)     Bilateral hand numbness     Right thigh pain     Left leg weakness     Coronary artery disease     Chronic cough     Gastroesophageal reflux disease without esophagitis     Overflow incontinence of urine     Polyp of colon     Post-void dribbling     Post-nasal drip     SYDNEY (acute kidney injury) ([facility]     Acute CHF (congestive heart failure) (HCC)     Hyperkalemia     Hypotension     Overactive bladder     Hemorrhoids     COVID-19 virus RNA test result indeterminate     HARPREET (obstructive sleep apnea)

## 2021-05-11 ENCOUNTER — TELEPHONE (OUTPATIENT)
Dept: FAMILY MEDICINE CLINIC | Age: 66
End: 2021-05-11

## 2021-05-11 LAB
SARS-COV-2: NORMAL
SARS-COV-2: NOT DETECTED
SOURCE: NORMAL

## 2021-05-11 NOTE — TELEPHONE ENCOUNTER
Patient has upcoming procedure for a cystoscopy, wanted to know if its okay to stop taking his clopidogrel (plavix)? Please advise, thank you.

## 2021-05-12 ENCOUNTER — TELEPHONE (OUTPATIENT)
Dept: SURGERY | Age: 66
End: 2021-05-12

## 2021-05-12 ENCOUNTER — HOSPITAL ENCOUNTER (OUTPATIENT)
Dept: OTHER | Age: 66
Discharge: HOME | End: 2021-05-12
Payer: MEDICAID

## 2021-05-12 VITALS
RESPIRATION RATE: 20 BRPM | BODY MASS INDEX: 52.39 KG/M2 | SYSTOLIC BLOOD PRESSURE: 128 MMHG | WEIGHT: 314.8 LBS | HEART RATE: 89 BPM | DIASTOLIC BLOOD PRESSURE: 82 MMHG | OXYGEN SATURATION: 97 %

## 2021-05-12 PROCEDURE — 99212 OFFICE O/P EST SF 10 MIN: CPT

## 2021-05-12 ASSESSMENT — PAIN DESCRIPTION - ONSET: ONSET: ON-GOING

## 2021-05-12 ASSESSMENT — PAIN DESCRIPTION - LOCATION: LOCATION: BACK

## 2021-05-12 ASSESSMENT — PAIN DESCRIPTION - FREQUENCY: FREQUENCY: INTERMITTENT

## 2021-05-12 ASSESSMENT — PAIN DESCRIPTION - DESCRIPTORS: DESCRIPTORS: ACHING

## 2021-05-12 ASSESSMENT — PAIN DESCRIPTION - ORIENTATION: ORIENTATION: LOWER;MID

## 2021-05-12 NOTE — PROGRESS NOTES
Pt states\" the urologist told me to notify my cardiologist and primary care that I was off the Plavix \" . Pt states he notified [facility] cardiology by leaving message  this past Monday but has not heard back from [facility] office if any further instructions prior to surgery. He states he notified his PCP office yesterday and has not heard back from them either. Writer notified Shane Escobar CNP of all of above. See above orders received, Writer left another  message with [facility] office that pt has been off Plavix since last week and to contact pt with any further orders/ instructions.        Electronically signed by Gaston Abernathy RN on 5/12/2021 at 10:48 AM

## 2021-05-13 ENCOUNTER — TELEPHONE (OUTPATIENT)
Dept: CARDIOLOGY | Age: 66
End: 2021-05-13

## 2021-05-13 NOTE — TELEPHONE ENCOUNTER
Pt was called and updated to have labs done today or tomorrow. Pt had SYDNEY in march 2021, and his last Cr revealed an upward trend (1.2). Will obtain labs to verify no continuing upward trend in the setting of a recent episode of SYDNEY in 3/2021. Pt went off of his plavix for cysto on 5/14, states he again called [facility] to notify them,  but did not hear back from office.    Nicole BOSTON CNP   [facility] CNP

## 2021-05-14 ENCOUNTER — HOSPITAL ENCOUNTER (OUTPATIENT)
Age: 66
Setting detail: OUTPATIENT SURGERY
Discharge: HOME | End: 2021-05-14
Attending: UROLOGY | Admitting: UROLOGY
Payer: MEDICAID

## 2021-05-14 ENCOUNTER — ANESTHESIA (OUTPATIENT)
Dept: OPERATING ROOM | Age: 66
End: 2021-05-14
Payer: MEDICAID

## 2021-05-14 ENCOUNTER — ANESTHESIA EVENT (OUTPATIENT)
Dept: OPERATING ROOM | Age: 66
End: 2021-05-14
Payer: MEDICAID

## 2021-05-14 VITALS
HEART RATE: 73 BPM | SYSTOLIC BLOOD PRESSURE: 127 MMHG | RESPIRATION RATE: 16 BRPM | OXYGEN SATURATION: 94 % | TEMPERATURE: 97 F | BODY MASS INDEX: 52.48 KG/M2 | HEIGHT: 65 IN | WEIGHT: 315 LBS | DIASTOLIC BLOOD PRESSURE: 73 MMHG

## 2021-05-14 VITALS — DIASTOLIC BLOOD PRESSURE: 76 MMHG | OXYGEN SATURATION: 95 % | SYSTOLIC BLOOD PRESSURE: 124 MMHG

## 2021-05-14 LAB
GFR NON-AFRICAN AMERICAN: >60 ML/MIN
GFR SERPL CREATININE-BSD FRML MDRD: >60 ML/MIN
GFR SERPL CREATININE-BSD FRML MDRD: NORMAL ML/MIN/{1.73_M2}
GLUCOSE BLD-MCNC: 117 MG/DL (ref 74–100)
POC BUN: 13 MG/DL (ref 8–26)
POC CHLORIDE: 105 MMOL/L (ref 98–107)
POC CREATININE: 0.83 MG/DL (ref 0.51–1.19)
POC IONIZED CALCIUM: 1.11 MMOL/L (ref 1.15–1.33)
POC LACTIC ACID: 1.68 MMOL/L (ref 0.56–1.39)
POC POTASSIUM: 5.4 MMOL/L (ref 3.5–4.5)
POC POTASSIUM: 5.5 MMOL/L (ref 3.5–4.5)
POC SODIUM: 139 MMOL/L (ref 138–146)

## 2021-05-14 PROCEDURE — 6360000002 HC RX W HCPCS: Performed by: NURSE ANESTHETIST, CERTIFIED REGISTERED

## 2021-05-14 PROCEDURE — 82947 ASSAY GLUCOSE BLOOD QUANT: CPT

## 2021-05-14 PROCEDURE — 84132 ASSAY OF SERUM POTASSIUM: CPT

## 2021-05-14 PROCEDURE — 7100000040 HC SPAR PHASE II RECOVERY - FIRST 15 MIN: Performed by: UROLOGY

## 2021-05-14 PROCEDURE — 3700000000 HC ANESTHESIA ATTENDED CARE: Performed by: UROLOGY

## 2021-05-14 PROCEDURE — 84295 ASSAY OF SERUM SODIUM: CPT

## 2021-05-14 PROCEDURE — 83605 ASSAY OF LACTIC ACID: CPT

## 2021-05-14 PROCEDURE — 82435 ASSAY OF BLOOD CHLORIDE: CPT

## 2021-05-14 PROCEDURE — 7100000041 HC SPAR PHASE II RECOVERY - ADDTL 15 MIN: Performed by: UROLOGY

## 2021-05-14 PROCEDURE — 3600000002 HC SURGERY LEVEL 2 BASE: Performed by: UROLOGY

## 2021-05-14 PROCEDURE — 3700000001 HC ADD 15 MINUTES (ANESTHESIA): Performed by: UROLOGY

## 2021-05-14 PROCEDURE — 6360000002 HC RX W HCPCS: Performed by: PHYSICIAN ASSISTANT

## 2021-05-14 PROCEDURE — 82330 ASSAY OF CALCIUM: CPT

## 2021-05-14 PROCEDURE — 2709999900 HC NON-CHARGEABLE SUPPLY: Performed by: UROLOGY

## 2021-05-14 PROCEDURE — 3600000012 HC SURGERY LEVEL 2 ADDTL 15MIN: Performed by: UROLOGY

## 2021-05-14 PROCEDURE — 82565 ASSAY OF CREATININE: CPT

## 2021-05-14 PROCEDURE — 2580000003 HC RX 258: Performed by: ANESTHESIOLOGY

## 2021-05-14 PROCEDURE — C1889 IMPLANT/INSERT DEVICE, NOC: HCPCS | Performed by: UROLOGY

## 2021-05-14 PROCEDURE — 2500000003 HC RX 250 WO HCPCS: Performed by: NURSE ANESTHETIST, CERTIFIED REGISTERED

## 2021-05-14 PROCEDURE — 2580000003 HC RX 258: Performed by: UROLOGY

## 2021-05-14 PROCEDURE — 84520 ASSAY OF UREA NITROGEN: CPT

## 2021-05-14 DEVICE — SYSTEM URO W/ IMPL DEL DEV FOR TREAT OF URIN OUTFLO: Type: IMPLANTABLE DEVICE | Status: FUNCTIONAL

## 2021-05-14 RX ORDER — FENTANYL CITRATE 50 UG/ML
25 INJECTION, SOLUTION INTRAMUSCULAR; INTRAVENOUS EVERY 5 MIN PRN
Status: DISCONTINUED | OUTPATIENT
Start: 2021-05-14 | End: 2021-05-14 | Stop reason: HOSPADM

## 2021-05-14 RX ORDER — SODIUM CHLORIDE 9 MG/ML
25 INJECTION, SOLUTION INTRAVENOUS PRN
Status: DISCONTINUED | OUTPATIENT
Start: 2021-05-14 | End: 2021-05-14 | Stop reason: HOSPADM

## 2021-05-14 RX ORDER — MAGNESIUM HYDROXIDE 1200 MG/15ML
LIQUID ORAL CONTINUOUS PRN
Status: COMPLETED | OUTPATIENT
Start: 2021-05-14 | End: 2021-05-14

## 2021-05-14 RX ORDER — SODIUM CHLORIDE 0.9 % (FLUSH) 0.9 %
5-40 SYRINGE (ML) INJECTION EVERY 12 HOURS SCHEDULED
Status: DISCONTINUED | OUTPATIENT
Start: 2021-05-14 | End: 2021-05-14 | Stop reason: HOSPADM

## 2021-05-14 RX ORDER — MEPERIDINE HYDROCHLORIDE 50 MG/ML
12.5 INJECTION INTRAMUSCULAR; INTRAVENOUS; SUBCUTANEOUS EVERY 5 MIN PRN
Status: DISCONTINUED | OUTPATIENT
Start: 2021-05-14 | End: 2021-05-14 | Stop reason: HOSPADM

## 2021-05-14 RX ORDER — FENTANYL CITRATE 50 UG/ML
INJECTION, SOLUTION INTRAMUSCULAR; INTRAVENOUS PRN
Status: DISCONTINUED | OUTPATIENT
Start: 2021-05-14 | End: 2021-05-14 | Stop reason: SDUPTHER

## 2021-05-14 RX ORDER — LIDOCAINE HYDROCHLORIDE 10 MG/ML
1 INJECTION, SOLUTION EPIDURAL; INFILTRATION; INTRACAUDAL; PERINEURAL
Status: DISCONTINUED | OUTPATIENT
Start: 2021-05-14 | End: 2021-05-14 | Stop reason: HOSPADM

## 2021-05-14 RX ORDER — SODIUM CHLORIDE, SODIUM LACTATE, POTASSIUM CHLORIDE, CALCIUM CHLORIDE 600; 310; 30; 20 MG/100ML; MG/100ML; MG/100ML; MG/100ML
INJECTION, SOLUTION INTRAVENOUS CONTINUOUS
Status: DISCONTINUED | OUTPATIENT
Start: 2021-05-14 | End: 2021-05-14 | Stop reason: HOSPADM

## 2021-05-14 RX ORDER — SODIUM CHLORIDE 0.9 % (FLUSH) 0.9 %
5-40 SYRINGE (ML) INJECTION PRN
Status: DISCONTINUED | OUTPATIENT
Start: 2021-05-14 | End: 2021-05-14 | Stop reason: HOSPADM

## 2021-05-14 RX ORDER — LIDOCAINE HYDROCHLORIDE 10 MG/ML
INJECTION, SOLUTION EPIDURAL; INFILTRATION; INTRACAUDAL; PERINEURAL PRN
Status: DISCONTINUED | OUTPATIENT
Start: 2021-05-14 | End: 2021-05-14 | Stop reason: SDUPTHER

## 2021-05-14 RX ORDER — MIDAZOLAM HYDROCHLORIDE 2 MG/2ML
1 INJECTION, SOLUTION INTRAMUSCULAR; INTRAVENOUS EVERY 10 MIN PRN
Status: DISCONTINUED | OUTPATIENT
Start: 2021-05-14 | End: 2021-05-14 | Stop reason: HOSPADM

## 2021-05-14 RX ORDER — PROPOFOL 10 MG/ML
INJECTION, EMULSION INTRAVENOUS PRN
Status: DISCONTINUED | OUTPATIENT
Start: 2021-05-14 | End: 2021-05-14 | Stop reason: SDUPTHER

## 2021-05-14 RX ADMIN — FENTANYL CITRATE 25 MCG: 50 INJECTION, SOLUTION INTRAMUSCULAR; INTRAVENOUS at 12:30

## 2021-05-14 RX ADMIN — PROPOFOL 200 MG: 10 INJECTION, EMULSION INTRAVENOUS at 13:06

## 2021-05-14 RX ADMIN — PROPOFOL 500 MG: 10 INJECTION, EMULSION INTRAVENOUS at 12:18

## 2021-05-14 RX ADMIN — LIDOCAINE HYDROCHLORIDE 50 MG: 10 INJECTION, SOLUTION EPIDURAL; INFILTRATION; INTRACAUDAL; PERINEURAL at 12:18

## 2021-05-14 RX ADMIN — PROPOFOL 200 MG: 10 INJECTION, EMULSION INTRAVENOUS at 12:49

## 2021-05-14 RX ADMIN — SODIUM CHLORIDE, POTASSIUM CHLORIDE, SODIUM LACTATE AND CALCIUM CHLORIDE: 600; 310; 30; 20 INJECTION, SOLUTION INTRAVENOUS at 14:01

## 2021-05-14 RX ADMIN — Medication 3000 MG: at 12:21

## 2021-05-14 RX ADMIN — FENTANYL CITRATE 25 MCG: 50 INJECTION, SOLUTION INTRAMUSCULAR; INTRAVENOUS at 12:27

## 2021-05-14 RX ADMIN — SODIUM CHLORIDE, POTASSIUM CHLORIDE, SODIUM LACTATE AND CALCIUM CHLORIDE: 600; 310; 30; 20 INJECTION, SOLUTION INTRAVENOUS at 11:18

## 2021-05-14 RX ADMIN — FENTANYL CITRATE 50 MCG: 50 INJECTION, SOLUTION INTRAMUSCULAR; INTRAVENOUS at 12:22

## 2021-05-14 ASSESSMENT — PULMONARY FUNCTION TESTS
PIF_VALUE: 0
PIF_VALUE: 1
PIF_VALUE: 0
PIF_VALUE: 1
PIF_VALUE: 0
PIF_VALUE: 2
PIF_VALUE: 0
PIF_VALUE: 1
PIF_VALUE: 0
PIF_VALUE: 1
PIF_VALUE: 0

## 2021-05-14 ASSESSMENT — PAIN - FUNCTIONAL ASSESSMENT: PAIN_FUNCTIONAL_ASSESSMENT: 0-10

## 2021-05-14 NOTE — ANESTHESIA PRE PROCEDURE
Department of Anesthesiology  Preprocedure Note       Name:  Sonia Schlatter   Age:  63 y.o.  :  1955                                          MRN:  [id number]         Date:  2021      Surgeon: Keturah Casanova):  Jacob Cole MD    Procedure: Procedure(s):  CYSTOSCOPY, UROLIFT    Medications prior to admission:   Prior to Admission medications    Medication Sig Start Date End Date Taking? Authorizing Provider   potassium chloride (KLOR-CON M) 20 MEQ extended release tablet TAKE 1 TABLET BY MOUTH DAILY 5/10/21  Yes Harmeet Arzate MD   sucralfate (CARAFATE) 1 GM tablet TAKE 1 TABLET BY MOUTH 4 TIMES DAILY 21  Yes Ashlie Francis MD   Prenatal Vit-Fe Fumarate-FA (NIVA-PLUS) 27-1 MG TABS  21  Yes Historical Provider, MD   azelastine HCl 0.15 % SOLN by Nasal route   Yes Historical Provider, MD   Multiple Vitamins-Minerals (PRESERVISION AREDS 2) CAPS  3/26/21  Yes Historical Provider, MD   losartan (COZAAR) 25 MG tablet Take 1 tablet by mouth daily 3/31/21 7/29/21 Yes Dick Reyes MD   oxybutynin (DITROPAN XL) 5 MG extended release tablet Take 1 tablet by mouth daily 3/26/21  Yes Ingrid Fowler MD   furosemide (LASIX) 20 MG tablet Take 3 tablets by mouth 2 times daily 3/16/21  Yes Bambi Pinto MD   isosorbide mononitrate (IMDUR) 30 MG extended release tablet Take 1 tablet by mouth daily 3/8/21  Yes Fernando Magaña MD   loratadine (CLARITIN) 10 MG tablet TAKE 1 TABLET BY MOUTH DAILY 3/8/21  Yes Fernando Magaña MD   atorvastatin (LIPITOR) 40 MG tablet TAKE 1 TABLET BY MOUTH NIGHTLY 21  Yes Lb Smith MD   tamsulosin [facility]) 0.4 MG capsule Take 1 capsule by mouth daily 21  Yes Jacob Cole MD   nitroGLYCERIN (NITROSTAT) 0.4 MG SL tablet Place 1 tablet under the tongue every 5 minutes as needed for Chest pain up to max of 3 total doses.  If no relief after 1 dose, call 911. 20  Yes Lb Smith MD   pantoprazole (PROTONIX) 40 MG tablet Take 1 tablet by mouth 2 times daily (before meals) 11/23/20  Yes Josefina Lobo MD   clopidogrel (PLAVIX) 75 MG tablet Take 1 tablet by mouth daily  Patient taking differently: Take 75 mg by mouth daily Last dose taken 5/7 for upcoming surgery per surgeon's paperwork. Pt. Notifying cardiologist today that he stopped per surgeon's instruction on paperwork. 4/19/21   Adrian Severin, MD   Blood Pressure Monitor KIT 1 each by Does not apply route 2 times daily 3/16/21   Megan Martin MD   Misc. Devices (mymission2 WEIGHT SCALE) MISC 1 each by Does not apply route as needed (weigh once a day and record) 3/16/21   Megan Martin MD   Elastic Bandages & Supports (JOBST KNEE HIGH COMPRESSION SM) MISC 2 each by Does not apply route daily Wear q day. Remove q hs.    Diagnosis: Chronic venous insufficiency 8/12/20   Maurilio Saleh, APRN - CNP       Current medications:    Current Facility-Administered Medications   Medication Dose Route Frequency Provider Last Rate Last Admin    ceFAZolin (ANCEF) 3000 mg in dextrose 5 % 100 mL IVPB  3,000 mg Intravenous On Call to [address] [facility],[address], [facility]           Allergies:  No Known Allergies    Problem List:    Patient Active Problem List   Diagnosis Code    Atrial flutter ([email or web address] I48.92    Sleep-related breathing disorder G47.30    Hypokalemia E87.6    Atrial fibrillation (HCC) I48.91    Ischemic cardiomyopathy I25.5    Combined systolic and diastolic congestive heart failure (HCC) I50.40    Acute cystitis without hematuria N30.00    Hx of CABG Z95.1    Heart failure, systolic and diastolic, chronic (HCC) X54.78    Bilateral hand numbness R20.0    Right thigh pain M79.651    Left leg weakness R29.898    Coronary artery disease I25.10    Chronic cough R05    Gastroesophageal reflux disease without esophagitis K21.9    Overflow incontinence of urine N39.490    Polyp of colon K63.5    Post-void dribbling N39.43    Post-nasal drip R09.82    SYDNEY (acute kidney injury) ([email or web address] N17.9    Acute CHF (congestive heart failure) (HCC) I50.9    Hyperkalemia E87.5    Hypotension I95.9    Overactive bladder N32.81    Hemorrhoids K64.9    COVID-19 virus RNA test result indeterminate Z20.822    HARPREET (obstructive sleep apnea) G47.33       Past Medical History:        Diagnosis Date    Arthritis     back    Atrial fibrillation ([address] 10/2019    Dr. Miguel Angel Goddard BPH (benign prostatic hyperplasia)     Cellulitis     Cervical disc disease     CHF (congestive heart failure) [facility])     cardiologist Dr. [facility]F clinic    Coronary artery disease 10/2019     CABG 2019 [facility]    Dr. Noam Cr Cardiology last seen within the last year.     CPAP (continuous positive airway pressure) dependence     GERD (gastroesophageal reflux disease)     on rx    History of incarceration     released 2019    History of incarceration     Hyperlipidemia     Dr. Beto Rodriguez Hypertension     [facility] CHF clinic     Dr. Beto Rodriguez Myocardial infarct [facility])     Dr. Beto Rodriguez Obesity     Sleep apnea     C-pap    Wears reading eyeglasses     Wellness examination     Dr. Radha Richard 4/2021       Past Surgical History:        Procedure Laterality Date    ABDOMINAL EXPLORATION SURGERY      CARDIAC SURGERY      2019    CARDIOVERSION  10/16/2019    CARPAL TUNNEL RELEASE Bilateral 2008    CERVICAL SPINE SURGERY      2-5    COLONOSCOPY  10/29/2020     WITH BIOPSY, COLONOSCOPY SUBMUCOSAL/BOTOX INJECTION,  COLONOSCOPY POLYPECTOMY SNARE/COLD BIOPSY     COLONOSCOPY N/A 10/29/2020    COLONOSCOPY WITH BIOPSY performed by Jennifer Mojica MD at [place] [address]  10/29/2020    COLONOSCOPY SUBMUCOSAL/BOTOX INJECTION performed by Jennifer Mojica MD at [place] [address]  10/29/2020    COLONOSCOPY POLYPECTOMY SNARE/COLD BIOPSY performed by Jennifer Mojica MD at [place] [address] N/A 1/12/2021    COLONOSCOPY POLYPECTOMY HOT SNARE, COLD SNARE POLPECTOMY performed by Epi Duenas MD at [place] [address] MAZE PROCEDURE N/A 10/21/2019    CABG CORONARY ARTERY BYPASS GRAFT X1, LIMA-LAD, BROWN 4 MAZE PROCEDURE, 50MM ATRICURE ATRIAL CLIP RIGID INTERNAL FIXATION PLATES X 3   SCREWS X 13 performed by Kary Carr MD at [place] [address] 3/26/2021    URODYNAMICS performed by Cornelius Mora MD at [address] [place] [address] Drive  3/26/2021    CYSTOSCOPY FLEXIBLE performed by Cornelius Mora MD at [address]      cataract surgery 2020    FRACTURE SURGERY      Left leg    TRANSESOPHAGEAL ECHOCARDIOGRAM  10/16/2019       Social History:    Social History     Tobacco Use    Smoking status: Never Smoker    Smokeless tobacco: Never Used   Substance Use Topics    Alcohol use: Not Currently     Comment: stopped 1991                                Counseling given: Not Answered      Vital Signs (Current):   Vitals:    05/11/21 0946   Weight: (!) 317 lb (143.8 kg)   Height: 5' 5\" (1.651 m)                                              BP Readings from Last 3 Encounters:   05/12/21 128/82   04/23/21 121/70   04/13/21 110/60       NPO Status: Time of last liquid consumption: 2200                        Time of last solid consumption: 2100                                                      BMI:   Wt Readings from Last 3 Encounters:   05/11/21 (!) 317 lb (143.8 kg)   05/12/21 (!) 314 lb 12.8 oz (142.8 kg)   04/23/21 (!) 319 lb 12.8 oz (145.1 kg)     Body mass index is 52.75 kg/m².     CBC:   Lab Results   Component Value Date    WBC 7.6 03/16/2021    RBC 3.71 03/16/2021    HGB 10.5 03/16/2021    HCT 34.6 03/16/2021    MCV 93.3 03/16/2021    RDW 15.3 03/16/2021     03/16/2021       CMP:   Lab Results   Component Value Date     03/22/2021    K 4.6 03/22/2021     03/22/2021    CO2 25 03/22/2021    BUN 28 03/22/2021    CREATININE 1.20 03/22/2021    GFRAA >60 03/22/2021    LABGLOM >60 03/22/2021    GLUCOSE 100 03/22/2021    PROT 6.9 03/16/2021    CALCIUM 9.2 03/22/2021    BILITOT

## 2021-05-14 NOTE — OP NOTE
FACILITY:  [facility], [address] [facility]. Tony Canada  1955  [id number]    DATE: 5/14/21   SURGEON: Dr. Deborah Elias M.D.  ASSISTANT: Dr. Ata Frey MD, Dr. Janna Garcia MD  PREOPERATIVE DIAGNOSIS:  BPH with obstruction   POSTOPERATIVE DIAGNOSIS: same  OPERATION:  1. Cystoscopy Urolift  2. Fulguration  ANESTHESIA:  MAC.   COMPLICATIONS:  None. ESTIMATED BLOOD LOSS:  Minimal.  FLUIDS:  Crystalloid. DRAINS:  none  Implants: 6 x urolift clips. SPECIMENS:  None. INDICATIONS FOR THE PROCEDURE:  Tony Canada is a 63 y.o. male  with a history of BPH with obstruction. After treatment options were discussed including risks, benefits, alternatives, goals and possible complications,  the patient elected to proceed with today's procedure. NARRATIVE SUMMARY OF THE PROCEDURE:  The patient was moved back to the OR and placed in lithotomy position. He was induced under MAC anesthesia after a time out was taken per protocol with everyone in agreement. The patient had a urolift scope passed into the urethra and into the bladder with ease. The prostate was evaluated and noted the bladder neck and veru montanum, which were our landmarks through out the case. We started at the bladder neck and moved 1.5 cm distal to ensure proper placement of the proximal clips bilaterally. We then placed clips in the distal gland, ensuring not to travel distal to the veru. We ensured the anterior channel was widely patent and saw the bulk of the prostate obstruction be resolved. We placed a total of:  6 urolift clips attempted, 5 seated. Attempted deployment of impant on patients left side, implant not properly seated. Lateral lobe obstruction was effectively addressed with 5 implants. At this time a clip was found to have been placed in the bladder, which was excised and removed with endoscopic scissors. We then fulgurated the exit site, and hemostasis was achieved.    There was no capsular pull through. At this time hemostasis was achieved. We filled the bladder and terminated the case. Catheter was placed to control bleeding, which will plan to be removed in PACU. The patient tolerated well and was moved to PACU in good position. Plan  Urinate in PACU  Follow up as outpatient.

## 2021-05-14 NOTE — H&P
Pre-op History and Physical  Presley Coelho PA-C    Patient:  Conrado Martins  MRN: [id number]  YOB: 1955    HISTORY OF PRESENT ILLNESS:     The patient is a 63 y.o. male who presents with BPH with LUTs. Here for cystoscopy, Urolift procedure. Patient's old records, notes and chart reviewed and summarized above. Presley Coelho PA-C independently reviewed the images and verified the radiology reports from:    No results found. Past Medical History:    Past Medical History:   Diagnosis Date    Arthritis     back    Atrial fibrillation ([address] 10/2019    Dr. Gerardo Guy BPH (benign prostatic hyperplasia)     Cellulitis     Cervical disc disease     CHF [facility]congest[facility] heart failure) [facility])     [facility]    Coronary artery disease 10/2019     CABG 2019 [facility]    Dr. Bella Alex Cardiology last seen within the last year.     CPAP (continuous positive airway pressure) dependence     GERD (gastroesophageal reflux disease)     on rx    History of incarceration     released 2019    History of incarceration     Hyperlipidemia     Dr. Michele Lanier Hypertension     [facility] CHF clinic     Dr. Michele Lanier Myocardial infarct [facility])     Dr. Michele Lanier Obesity     Sleep apnea     C-pap    Wears reading eyeglasses     Wellness examination     Dr. Virginia Farrell 4/2021       Past Surgical History:    Past Surgical History:   Procedure Laterality Date    ABDOMINAL EXPLORATION SURGERY      CARDIAC SURGERY      2019    CARDIOVERSION  10/16/2019    CARPAL TUNNEL RELEASE Bilateral 2008    CERVICAL SPINE SURGERY      2-5    COLONOSCOPY  10/29/2020     WITH BIOPSY, COLONOSCOPY SUBMUCOSAL/BOTOX INJECTION,  COLONOSCOPY POLYPECTOMY SNARE/COLD BIOPSY     COLONOSCOPY N/A 10/29/2020    COLONOSCOPY WITH BIOPSY performed by Rogelio Dobbins MD at [address] [place] [address]  10/29/2020    COLONOSCOPY SUBMUCOSAL/BOTOX INJECTION performed by Rogelio Dobbins MD at [address] [place] [address] COLONOSCOPY  10/29/2020    COLONOSCOPY POLYPECTOMY SNARE/COLD BIOPSY performed by Nabil Roque MD at [address] [facility] N/A 1/12/2021    COLONOSCOPY POLYPECTOMY HOT SNARE, COLD SNARE POLPECTOMY performed by Steven Gordillo MD at [address] [facility] N/A 10/21/2019    CABG CORONARY ARTERY BYPASS GRAFT X1, LIMA-LAD, BROWN 4 MAZE PROCEDURE, 50MM ATRICURE ATRIAL CLIP RIGID INTERNAL FIXATION PLATES X 3   SCREWS X 13 performed by Nena Nation MD at [facility] 3/26/2021    URODYNAMICS performed by Padma Salcedo MD at [address] [facility]  3/26/2021    CYSTOSCOPY FLEXIBLE performed by Padma Salcedo MD at [address]      cataract surgery 2020    FRACTURE SURGERY      Left leg    TRANSESOPHAGEAL ECHOCARDIOGRAM  10/16/2019       Medications Prior to Admission:    Prior to Admission medications    Medication Sig Start Date End Date Taking?  Authorizing Provider   potassium chloride (KLOR-CON M) 20 MEQ extended release tablet TAKE 1 TABLET BY MOUTH DAILY 5/10/21  Yes Nguyen Garcia MD   sucralfate (CARAFATE) 1 GM tablet TAKE 1 TABLET BY MOUTH 4 TIMES DAILY 4/26/21  Yes Nabil Roque MD   Prenatal Vit-Fe Fumarate-FA (NIVA-PLUS) 27-1 MG TABS  4/2/21  Yes Historical Provider, MD   azelastine HCl 0.15 % SOLN by Nasal route   Yes Historical Provider, MD   Multiple Vitamins-Minerals (PRESERVISION AREDS 2) CAPS  3/26/21  Yes Historical Provider, MD   losartan (COZAAR) 25 MG tablet Take 1 tablet by mouth daily 3/31/21 7/29/21 Yes Michi Cruz MD   oxybutynin (DITROPAN XL) 5 MG extended release tablet Take 1 tablet by mouth daily 3/26/21  Yes Irving Sánchez MD   furosemide (LASIX) 20 MG tablet Take 3 tablets by mouth 2 times daily 3/16/21  Yes Leonarda Sandhu MD   isosorbide mononitrate (IMDUR) 30 MG extended release tablet Take 1 tablet by mouth daily 3/8/21  Yes Fernando Magaña MD   loratadine (CLARITIN) 10 MG tablet TAKE 1 TABLET BY MOUTH DAILY 3/8/21  Yes Fernando Magaña MD   atorvastatin (LIPITOR) 40 MG tablet TAKE 1 TABLET BY MOUTH NIGHTLY 2/25/21  Yes Eren Garrett MD   tamsulosin [facility]) 0.4 MG capsule Take 1 capsule by mouth daily 2/12/21  Yes Patricia Nicole MD   nitroGLYCERIN (NITROSTAT) 0.4 MG SL tablet Place 1 tablet under the tongue every 5 minutes as needed for Chest pain up to max of 3 total doses. If no relief after 1 dose, call 911. 11/23/20  Yes Eren Garrett MD   pantoprazole (PROTONIX) 40 MG tablet Take 1 tablet by mouth 2 times daily (before meals) 11/23/20  Yes Eren Garrett MD   clopidogrel (PLAVIX) 75 MG tablet Take 1 tablet by mouth daily  Patient taking differently: Take 75 mg by mouth daily Last dose taken 5/7 for upcoming surgery per surgeon's paperwork. Pt. Notifying cardiologist today that he stopped per surgeon's instruction on paperwork. 4/19/21   Randy Lowe MD   Blood Pressure Monitor KIT 1 each by Does not apply route 2 times daily 3/16/21   Juan Gaxiola MD   Misc. Devices ([employer] WEIGHT SCALE) MISC 1 each by Does not apply route as needed (weigh once a day and record) 3/16/21   Juan Gaxiola MD   Elastic Bandages & Supports (JOBST KNEE HIGH COMPRESSION SM) MISC 2 each by Does not apply route daily Wear q day. Remove q hs. Diagnosis: Chronic venous insufficiency 8/12/20   LUDY Ovalles CNP       Allergies:  Patient has no known allergies.     Social History:    Social History     Socioeconomic History    Marital status:      Spouse name: Not on file    Number of children: Not on file    Years of education: Not on file    Highest education level: Not on file   Occupational History    Not on file   Social Needs    Financial resource strain: Not on file    Food insecurity     Worry: Not on file     Inability: Not on file    Transportation needs     Medical: Not on file     Non-medical: Not on file   Tobacco Use    Smoking status: Never Smoker    Smokeless tobacco: Never Used   Substance and Sexual Activity    Alcohol use: Not Currently     Comment: stopped 1991    Drug use: Not Currently    Sexual activity: Not Currently   Lifestyle    Physical activity     Days per week: Not on file     Minutes per session: Not on file    Stress: Not on file   Relationships    Social connections     Talks on phone: Not on file     Gets together: Not on file     Attends Jewish service: Not on file     Active member of club or organization: Not on file     Attends meetings of clubs or organizations: Not on file     Relationship status: Not on file    Intimate partner violence     Fear of current or ex partner: Not on file     Emotionally abused: Not on file     Physically abused: Not on file     Forced sexual activity: Not on file   Other Topics Concern    Not on file   Social History Narrative    Not on file       Family History:    Family History   Problem Relation Age of Onset    Alcohol Abuse Maternal Uncle     Heart Attack Maternal Uncle     Cancer Mother     Alcohol Abuse Father        REVIEW OF SYSTEMS:  Constitutional: negative  Eyes: negative  Respiratory: negative  Cardiovascular: negative  Gastrointestinal: negative  Genitourinary: no acute issues  Musculoskeletal: negative  Skin: negative   Neurological: negative  Hematological/Lymphatic: negative  Psychological: negative    PHYSICAL EXAM:    No data found. Constitutional: Patient in NAD  Neuro: Alert and oriented to person, place, and time  Psych: Mood and affect normal  Skin: Clean, dry, intact   Lungs: Respiratory effort normal, CTA  Cardiovascular:  Normal peripheral pulses; no murmur. Normal rhythm  Abdomen: Soft, non-tender, non-distended, no hepatosplenomegaly or hernia, No CVA tenderness   Bladder: Non-tender and non-disdended   : Non-tender, skin intact, no lesions       LABS:   No results for input(s): WBC, HGB, HCT, MCV, PLT in the last 72 hours.   No results for input(s): NA, K, CL, CO2, PHOS, BUN, time.        Bettina Humphrey PA-C  9:46 AM 5/14/2021

## 2021-05-24 DIAGNOSIS — R09.82 POST-NASAL DRIP: ICD-10-CM

## 2021-05-24 DIAGNOSIS — R05.3 CHRONIC COUGH: ICD-10-CM

## 2021-05-24 RX ORDER — LORATADINE 10 MG/1
10 TABLET ORAL DAILY
Qty: 30 TABLET | Refills: 2 | Status: SHIPPED | OUTPATIENT
Start: 2021-05-24 | End: 2021-09-01

## 2021-05-24 NOTE — TELEPHONE ENCOUNTER
Please address the medication refill and close the encounter. If I can be of assistance, please route to the applicable pool. Thank you.       Last visit: 4/23/21  Last Med refill: 3-8-21  Does patient have enough medication for 72 hours: No:     Next Visit Date:  Future Appointments   Date Time Provider Taj Flores   6/9/2021 11:00 AM [facility]F CLINIC RM 1 [facility] CHF CLI [facility]   6/11/2021  9:20 AM SCHEDULE, [facility] UROLOGY [facility]   6/14/2021  3:00 PM Deborah Guerrero MD [address] Maintenance   Topic Date Due    Lipid screen  03/03/2022    Potassium monitoring  05/14/2022    Creatinine monitoring  05/14/2022    Pneumococcal 0-64 years Vaccine (2 of 2) 08/20/2025    DTaP/Tdap/Td vaccine (2 - Td) 08/20/2030    Colon cancer screen colonoscopy  01/12/2031    Flu vaccine  Completed    Shingles Vaccine  Completed    COVID-19 Vaccine  Completed    Hepatitis C screen  Completed    HIV screen  Completed    Hepatitis A vaccine  Aged Out    Hepatitis B vaccine  Aged Out    Hib vaccine  Aged Out    Meningococcal (ACWY) vaccine  Aged Out       Hemoglobin A1C (%)   Date Value   12/10/2019 5.6   10/18/2019 5.8             ( goal A1C is < 7)   No results found for: LABMICR  LDL Cholesterol (mg/dL)   Date Value   03/03/2021 54   05/26/2020 45       (goal LDL is <100)   AST (U/L)   Date Value   03/16/2021 15     ALT (U/L)   Date Value   03/16/2021 14     BUN (mg/dL)   Date Value   03/22/2021 28 (H)     BP Readings from Last 3 Encounters:   05/14/21 127/73   05/14/21 124/76   05/12/21 128/82          (goal 120/80)    All Future Testing planned in [facility]  Lab Frequency Next Occurrence   COLONOSCOPY (Diagnostic) Once 01/11/2021   COVID-19 Once 01/05/2021   US URINARY BLADDER LIMITED Once 12/01/2021   Creatinine, Random Urine Once 03/14/2021   Sodium, Urine, Random Once 03/14/2021   Urea Nitrogen, Urine Once 03/14/2021   US RETROPERITONEAL COMPLETE Once 03/14/2021   Basic Metabolic Panel Once 02/89/7868   Basic Metabolic Panel Once 23/63/7758   COVID-19 Once 03/30/2021   COVID-19 Once 05/07/2021               Patient Active Problem List:     Atrial flutter ([facility]     Sleep-related breathing disorder     Hypokalemia     Atrial fibrillation ([facility]     Ischemic cardiomyopathy     Combined systolic and diastolic congestive heart failure ([facility]     Acute cystitis without hematuria     Hx of CABG     Heart failure, systolic and diastolic, chronic (HCC)     Bilateral hand numbness     Right thigh pain     Left leg weakness     Coronary artery disease     Chronic cough     Gastroesophageal reflux disease without esophagitis     Overflow incontinence of urine     Polyp of colon     Post-void dribbling     Post-nasal drip     SYDNEY (acute kidney injury) ([facility]     Acute CHF (congestive heart failure) ([facility])     Hyperkalemia     Hypotension     Overactive bladder     Hemorrhoids     COVID-19 virus RNA test result indeterminate     HARPREET (obstructive sleep apnea)

## 2021-06-01 DIAGNOSIS — I50.42 HEART FAILURE, SYSTOLIC AND DIASTOLIC, CHRONIC (HCC): ICD-10-CM

## 2021-06-01 RX ORDER — ISOSORBIDE MONONITRATE 30 MG/1
30 TABLET, EXTENDED RELEASE ORAL DAILY
Qty: 30 TABLET | Refills: 1 | Status: SHIPPED | OUTPATIENT
Start: 2021-06-01 | End: 2021-08-02 | Stop reason: SDUPTHER

## 2021-06-01 RX ORDER — ATORVASTATIN CALCIUM 40 MG/1
40 TABLET, FILM COATED ORAL NIGHTLY
Qty: 30 TABLET | Refills: 1 | Status: SHIPPED | OUTPATIENT
Start: 2021-06-01 | End: 2021-08-02 | Stop reason: SDUPTHER

## 2021-06-01 NOTE — TELEPHONE ENCOUNTER
Please address the medication refill and close the encounter. If I can be of assistance, please route to the applicable pool. Thank you.       Last visit: 4/23/21  Last Med refill: 2/25/21 atorvastatin    3-8-21 isosorbide mononitrate  Does patient have enough medication for 72 hours: No:     Next Visit Date:  Future Appointments   Date Time Provider Taj Flores   6/9/2021 11:00 AM STV CHF CLINIC RM 1 [facility] CHF CLI [facility]   6/11/2021  9:20 AM SCHEDULE, [facility] UROLOGY [facility]   6/14/2021  3:00 PM Bisi Horn MD [address] Maintenance   Topic Date Due    Lipid screen  03/03/2022    Potassium monitoring  05/14/2022    Creatinine monitoring  05/14/2022    Pneumococcal 0-64 years Vaccine (2 of 2) 08/20/2025    DTaP/Tdap/Td vaccine (2 - Td) 08/20/2030    Colon cancer screen colonoscopy  01/12/2031    Flu vaccine  Completed    Shingles Vaccine  Completed    COVID-19 Vaccine  Completed    Hepatitis C screen  Completed    HIV screen  Completed    Hepatitis A vaccine  Aged Out    Hepatitis B vaccine  Aged Out    Hib vaccine  Aged Out    Meningococcal (ACWY) vaccine  Aged Out       Hemoglobin A1C (%)   Date Value   12/10/2019 5.6   10/18/2019 5.8             ( goal A1C is < 7)   No results found for: LABMICR  LDL Cholesterol (mg/dL)   Date Value   03/03/2021 54   05/26/2020 45       (goal LDL is <100)   AST (U/L)   Date Value   03/16/2021 15     ALT (U/L)   Date Value   03/16/2021 14     BUN (mg/dL)   Date Value   03/22/2021 28 (H)     BP Readings from Last 3 Encounters:   05/14/21 127/73   05/14/21 124/76   05/12/21 128/82          (goal 120/80)    All Future Testing planned in [facility]  Lab Frequency Next Occurrence   COLONOSCOPY (Diagnostic) Once 01/11/2021   COVID-19 Once 01/05/2021   US URINARY BLADDER LIMITED Once 12/01/2021   Creatinine, Random Urine Once 03/14/2021   Sodium, Urine, Random Once 03/14/2021   Urea Nitrogen, Urine Once 03/14/2021   US RETROPERITONEAL COMPLETE Once 82/04/7080   Basic Metabolic Panel Once 77/18/8409   Basic Metabolic Panel Once 25/80/0346   COVID-19 Once 03/30/2021   COVID-19 Once 05/07/2021               Patient Active Problem List:     Atrial flutter (HCC)     Sleep-related breathing disorder     Hypokalemia     Atrial fibrillation ([facility]     Ischemic cardiomyopathy     Combined systolic and diastolic congestive heart failure ([address]     Acute cystitis without hematuria     Hx of CABG     Heart failure, systolic and diastolic, chronic (HCC)     Bilateral hand numbness     Right thigh pain     Left leg weakness     Coronary artery disease     Chronic cough     Gastroesophageal reflux disease without esophagitis     Overflow incontinence of urine     Polyp of colon     Post-void dribbling     Post-nasal drip     SYDNEY (acute kidney injury) ([address]     Acute CHF (congestive heart failure) (HCC)     Hyperkalemia     Hypotension     Overactive bladder     Hemorrhoids     COVID-19 virus RNA test result indeterminate     HARPREET (obstructive sleep apnea)

## 2021-06-08 ENCOUNTER — HOSPITAL ENCOUNTER (OUTPATIENT)
Dept: OTHER | Age: 66
Discharge: HOME | End: 2021-06-08
Payer: MEDICAID

## 2021-06-08 VITALS
DIASTOLIC BLOOD PRESSURE: 60 MMHG | OXYGEN SATURATION: 98 % | RESPIRATION RATE: 28 BRPM | WEIGHT: 308 LBS | SYSTOLIC BLOOD PRESSURE: 110 MMHG | HEART RATE: 74 BPM | BODY MASS INDEX: 51.25 KG/M2

## 2021-06-08 DIAGNOSIS — I50.42 HEART FAILURE, SYSTOLIC AND DIASTOLIC, CHRONIC (HCC): Primary | ICD-10-CM

## 2021-06-08 PROCEDURE — 99212 OFFICE O/P EST SF 10 MIN: CPT

## 2021-06-08 NOTE — PROGRESS NOTES
Date:  2021  Time:  1:27 PM    CHF Clinic at [address] [facility]    Office: [phone] Fax: [phone]    Re:  Kraig Carty   Patient : 1955    Vital Signs: /60   Pulse 74   Resp 28   Wt (!) 308 lb (139.7 kg)   SpO2 98%   BMI 51.25 kg/m²                                                   No results for input(s): CBC, HGB, HCT, WBC, PLATELET, NA, K, CL, CO2, BUN, CREATININE, GLUCOSE, BNP, INR in the last 72 hours. Respiratory:    Assessment  Charting Type: Reassessment    Breath Sounds  Right Upper Lobe: Clear  Right Middle Lobe: Clear  Right Lower Lobe: Clear  Left Upper Lobe: Clear  Left Lower Lobe: Clear              Peripheral Vascular  RLE Edema: +1, Non-pitting  LLE Edema: +1, Non-pitting      Complaints: No new complaints    Physician Orders Discussed with Cecilio Guallpa CNP patients labs from 2021 K + was 5.5 POC test gave patient a slip to get BMP in next week. And to continue on Potassium 20meq per day along with his Lasix 60mg PO BID Patient verbilizes understanding. Comment : Weight is down 6 pounds from last visit. Denies any SOB or chest pain today. Has follow up with urology next week and GI doctor in July . Has +1 edema not pitting today. Has been following a strict fluid restriction of 64 ounces per day and following low sodium of 2000mg per day. We will see in 1 month 2021.     Electronically signed by Linsey Mccabe RN on 2021 at 1:27 PM

## 2021-06-11 ENCOUNTER — OFFICE VISIT (OUTPATIENT)
Dept: UROLOGY | Age: 66
End: 2021-06-11
Payer: MEDICAID

## 2021-06-11 VITALS
BODY MASS INDEX: 50.82 KG/M2 | WEIGHT: 305 LBS | SYSTOLIC BLOOD PRESSURE: 110 MMHG | HEIGHT: 65 IN | DIASTOLIC BLOOD PRESSURE: 71 MMHG | HEART RATE: 90 BPM

## 2021-06-11 DIAGNOSIS — N39.490 OVERFLOW INCONTINENCE OF URINE: Primary | ICD-10-CM

## 2021-06-11 PROCEDURE — 99213 OFFICE O/P EST LOW 20 MIN: CPT | Performed by: UROLOGY

## 2021-06-11 PROCEDURE — 1036F TOBACCO NON-USER: CPT | Performed by: UROLOGY

## 2021-06-11 PROCEDURE — 51798 US URINE CAPACITY MEASURE: CPT | Performed by: UROLOGY

## 2021-06-11 PROCEDURE — 3017F COLORECTAL CA SCREEN DOC REV: CPT | Performed by: UROLOGY

## 2021-06-11 PROCEDURE — G8427 DOCREV CUR MEDS BY ELIG CLIN: HCPCS | Performed by: UROLOGY

## 2021-06-11 PROCEDURE — G8417 CALC BMI ABV UP PARAM F/U: HCPCS | Performed by: UROLOGY

## 2021-06-11 NOTE — PROGRESS NOTES
Per physician verbal order PVR obtained via bladder scanner post void 54 residual volume. Patient tolerated procedure well.

## 2021-06-11 NOTE — PROGRESS NOTES
Dr. Arabella Aquino M.D., Dr. Doyle Crabtree M.D., Dr. Nick Simon M.D., Dr. Shireen Rogers M.D., Dr. Johanna Reyes M.D.  [facility] Urology Clinic Progress Note    Patient:  Vashti Holland  YOB: 1955  Date: 6/11/2021     HISTORY OF PRESENT ILLNESS:   The patient is a 63year old male who presents today for follow-up for the following problem(s): Overactive bladder, BPH with outflow obstruction  Overall the problem(s) : show no change. Associated Symptoms: No dysuria, gross hematuria. Pain Severity: Pain Score:   0 - No pain    Summary of old records:   -Underwent urodynamics and cystoscopy on 3/26/21 which showed uninhibited bladder contractions, overactive bladder and obstructing kissing lobes  -Underwent urolift on 5/14/21: 5 implants inserted and clip placed in bladder had to be removed and fulgurated. Additional History:   6/11/21  Still having leakage since procedure, which he states is uncontrolled and bothersome. Uses two pads per day, heavy soaked   Taking Ditropan XL 5mg daily, has not helped with leaking. Denies any headache, dry mouth, constipation   Denies any burning with urination  Having urgency and frequency, does take lasix 60 mg twice daily due to CHF      Last several PSA's:  Lab Results   Component Value Date    PSA 0.67 09/22/2020       Last total testosterone:  No results found for: TESTOSTERONE    Urinalysis today:  No results found for this visit on 06/11/21.     Last BUN and creatinine:  Lab Results   Component Value Date    BUN 28 (H) 03/22/2021     Lab Results   Component Value Date    CREATININE 0.83 05/14/2021       Imaging Reviewed during this Office Visit:   (results were independently reviewed by physician and radiology report verified)    PAST MEDICAL, FAMILY AND SOCIAL HISTORY UPDATE:  Past Medical History:   Diagnosis Date    Arthritis     back    Atrial fibrillation ([address] 10/2019    Dr. Jose Clemons BPH (benign prostatic hyperplasia)     Cellulitis     Cervical disc disease     CHF (congestive heart failure) [facility])     cardiologist Dr. Jaycee Mcelroy. s CHF clinic    Coronary artery disease 10/2019     CABG 2019 [facility]    Dr. Temo Yi Cardiology last seen within the last year.     CPAP (continuous positive airway pressure) dependence     GERD (gastroesophageal reflux disease)     on rx    History of incarceration     released 2019    History of incarceration     Hyperlipidemia     Dr. Roz Grewal Hypertension     [facility] CHF clinic     Dr. Roz Grewal Myocardial infarct [facility])     Dr. Roz Grewal Obesity     Sleep apnea     C-pap    Wears reading eyeglasses     Wellness examination     Dr. Shira Hawthorne 4/2021     Past Surgical History:   Procedure Laterality Date    ABDOMINAL EXPLORATION SURGERY      CARDIAC SURGERY      2019    CARDIOVERSION  10/16/2019    CARPAL TUNNEL RELEASE Bilateral 2008    CERVICAL SPINE SURGERY      2-5    COLONOSCOPY N/A 10/29/2020    COLONOSCOPY WITH BIOPSY performed by Ashlie Francis MD at [address]  10/29/2020    COLONOSCOPY SUBMUCOSAL/BOTOX INJECTION performed by Ashlie Francis MD at [address]  10/29/2020    COLONOSCOPY POLYPECTOMY SNARE/COLD BIOPSY performed by Ashlie Francis MD at [address] N/A 01/12/2021    COLONOSCOPY POLYPECTOMY HOT SNARE, COLD SNARE POLPECTOMY performed by Gilles Boast, MD at [address] [facility] N/A 10/21/2019    CABG CORONARY ARTERY BYPASS GRAFT X1, LIMA-LAD, BROWN 4 MAZE PROCEDURE, 50MM ATRICURE ATRIAL CLIP RIGID INTERNAL FIXATION PLATES X 3   SCREWS X 13 performed by Rufus Cabello MD at [address] 03/26/2021    URODYNAMICS performed by Jacob Cole MD at [address]  03/26/2021    CYSTOSCOPY FLEXIBLE performed by Jacob Cole MD at [address]  05/14/2021    CYSTOSCOPY, UROLIFT, FULGURATION    CYSTOSCOPY N/A 5/14/2021    CYSTOSCOPY, UROLIFT, FULGURATION performed by Mandeep Laboy Johnnie Zhang MD at [address]      cataract surgery 2020    FRACTURE SURGERY      Left leg    TRANSESOPHAGEAL ECHOCARDIOGRAM  10/16/2019     Family History   Problem Relation Age of Onset    Alcohol Abuse Maternal Uncle     Heart Attack Maternal Uncle     Cancer Mother     Alcohol Abuse Father      Outpatient Medications Marked as Taking for the 6/11/21 encounter (Office Visit) with Karol Moncada MD   Medication Sig Dispense Refill    atorvastatin (LIPITOR) 40 MG tablet Take 1 tablet by mouth nightly 30 tablet 1    isosorbide mononitrate (IMDUR) 30 MG extended release tablet Take 1 tablet by mouth daily 30 tablet 1    loratadine (CLARITIN) 10 MG tablet Take 1 tablet by mouth daily 30 tablet 2    potassium chloride (KLOR-CON M) 20 MEQ extended release tablet TAKE 1 TABLET BY MOUTH DAILY 30 tablet 1    sucralfate (CARAFATE) 1 GM tablet TAKE 1 TABLET BY MOUTH 4 TIMES DAILY 120 tablet 3    Prenatal Vit-Fe Fumarate-FA (NIVA-PLUS) 27-1 MG TABS       clopidogrel (PLAVIX) 75 MG tablet Take 1 tablet by mouth daily (Patient taking differently: Take 75 mg by mouth daily Last dose taken 5/7 for upcoming surgery per surgeon's paperwork. Pt. Notifying cardiologist today that he stopped per surgeon's instruction on paperwork.) 90 tablet 0    azelastine HCl 0.15 % SOLN by Nasal route      Multiple Vitamins-Minerals (PRESERVISION AREDS 2) CAPS       losartan (COZAAR) 25 MG tablet Take 1 tablet by mouth daily 30 tablet 3    oxybutynin (DITROPAN XL) 5 MG extended release tablet Take 1 tablet by mouth daily 30 tablet 3    furosemide (LASIX) 20 MG tablet Take 3 tablets by mouth 2 times daily 180 tablet 1    Blood Pressure Monitor KIT 1 each by Does not apply route 2 times daily 1 kit 0    Misc.  Devices (Gaikai WEIGHT SCALE) MISC 1 each by Does not apply route as needed (weigh once a day and record) 1 each 0    tamsulosin (FLOMAX) 0.4 MG capsule Take 1 capsule by mouth daily 30 capsule 5    nitroGLYCERIN

## 2021-06-14 ENCOUNTER — OFFICE VISIT (OUTPATIENT)
Dept: GASTROENTEROLOGY | Age: 66
End: 2021-06-14
Payer: MEDICAID

## 2021-06-14 VITALS — BODY MASS INDEX: 49.26 KG/M2 | SYSTOLIC BLOOD PRESSURE: 120 MMHG | DIASTOLIC BLOOD PRESSURE: 75 MMHG | WEIGHT: 296 LBS

## 2021-06-14 DIAGNOSIS — K21.9 GASTROESOPHAGEAL REFLUX DISEASE WITHOUT ESOPHAGITIS: Primary | ICD-10-CM

## 2021-06-14 PROCEDURE — 1036F TOBACCO NON-USER: CPT | Performed by: INTERNAL MEDICINE

## 2021-06-14 PROCEDURE — G8427 DOCREV CUR MEDS BY ELIG CLIN: HCPCS | Performed by: INTERNAL MEDICINE

## 2021-06-14 PROCEDURE — 3017F COLORECTAL CA SCREEN DOC REV: CPT | Performed by: INTERNAL MEDICINE

## 2021-06-14 PROCEDURE — 99213 OFFICE O/P EST LOW 20 MIN: CPT | Performed by: INTERNAL MEDICINE

## 2021-06-14 PROCEDURE — G8417 CALC BMI ABV UP PARAM F/U: HCPCS | Performed by: INTERNAL MEDICINE

## 2021-06-14 ASSESSMENT — ENCOUNTER SYMPTOMS
GASTROINTESTINAL NEGATIVE: 1
APNEA: 1
SHORTNESS OF BREATH: 1
ALLERGIC/IMMUNOLOGIC NEGATIVE: 1
EYES NEGATIVE: 1
CHEST TIGHTNESS: 1
COUGH: 1

## 2021-06-14 NOTE — PROGRESS NOTES
GI FOLLOW UP    INTERVAL HISTORY:       GERD symptoms controlled    Chief Complaint   Patient presents with    Follow-up     3 month follow up     Gastroesophageal Reflux     Patient taking protonix BID and carafate TID. States he has not had any symptoms since last visit. 1. Gastroesophageal reflux disease without esophagitis          HISTORY OF PRESENT ILLNESS: Mr.Andrew YUN Berg is a 60 y. o. male with a past history remarkable for HARPREET, A. fib on Eliquis, morbid obesity, history of CABG in October 2019 on dual antiplatelet therapy (aspirin and Plavix), referred for evaluation of nonproductive postprandial cough. This appears to be most consistent with the patient's chronic GERD symptoms. He appears to be on maximum PPI therapy     Recent colonoscopy which identified tubular adenomas and sessile serrated adenomas which were removed aside from an isolated flat lesion that will require EMR.  Findings discussed with the patient. Past Medical,Family, and Social History reviewed and does contribute to the patient presenting condition. Patient's PMH/PSH,SH,PSYCH Hx, MEDs, ALLERGIES, and ROS were all reviewed and updated in the appropriate sections. PAST MEDICAL HISTORY:  Past Medical History:   Diagnosis Date    Arthritis     back    Atrial fibrillation ([address] 10/2019    Dr. Severiano Basset BPH (benign prostatic hyperplasia)     Cellulitis     Cervical disc disease     CHF [facility]congestive heart failure) [facility])     cardiologist Dr. Vanessa Montez. [facility]    Coronary artery disease 10/2019     CABG 2019 [facility]    Dr. Tamika Garcia Cardiology last seen within the last year.     CPAP (continuous positive airway pressure) dependence     GERD (gastroesophageal reflux disease)     on rx    History of incarceration     released 2019    History of incarceration     Hyperlipidemia 1    loratadine (CLARITIN) 10 MG tablet, Take 1 tablet by mouth daily, Disp: 30 tablet, Rfl: 2    potassium chloride (KLOR-CON M) 20 MEQ extended release tablet, TAKE 1 TABLET BY MOUTH DAILY, Disp: 30 tablet, Rfl: 1    sucralfate (CARAFATE) 1 GM tablet, TAKE 1 TABLET BY MOUTH 4 TIMES DAILY, Disp: 120 tablet, Rfl: 3    Prenatal Vit-Fe Fumarate-FA (NIVA-PLUS) 27-1 MG TABS, , Disp: , Rfl:     clopidogrel (PLAVIX) 75 MG tablet, Take 1 tablet by mouth daily (Patient taking differently: Take 75 mg by mouth daily Last dose taken 5/7 for upcoming surgery per surgeon's paperwork. Pt. Notifying cardiologist today that he stopped per surgeon's instruction on paperwork.), Disp: 90 tablet, Rfl: 0    azelastine HCl 0.15 % SOLN, by Nasal route, Disp: , Rfl:     Multiple Vitamins-Minerals (PRESERVISION AREDS 2) CAPS, , Disp: , Rfl:     losartan (COZAAR) 25 MG tablet, Take 1 tablet by mouth daily, Disp: 30 tablet, Rfl: 3    oxybutynin (DITROPAN XL) 5 MG extended release tablet, Take 1 tablet by mouth daily, Disp: 30 tablet, Rfl: 3    furosemide (LASIX) 20 MG tablet, Take 3 tablets by mouth 2 times daily, Disp: 180 tablet, Rfl: 1    Blood Pressure Monitor KIT, 1 each by Does not apply route 2 times daily, Disp: 1 kit, Rfl: 0    Misc. Devices (PHARMAJET WEIGHT SCALE) MISC, 1 each by Does not apply route as needed (weigh once a day and record), Disp: 1 each, Rfl: 0    tamsulosin (FLOMAX) 0.4 MG capsule, Take 1 capsule by mouth daily, Disp: 30 capsule, Rfl: 5    nitroGLYCERIN (NITROSTAT) 0.4 MG SL tablet, Place 1 tablet under the tongue every 5 minutes as needed for Chest pain up to max of 3 total doses. If no relief after 1 dose, call 911., Disp: 25 tablet, Rfl: 1    pantoprazole (PROTONIX) 40 MG tablet, Take 1 tablet by mouth 2 times daily (before meals), Disp: 60 tablet, Rfl: 5    Elastic Bandages & Supports (JOBST KNEE HIGH COMPRESSION SM) MISC, 2 each by Does not apply route daily Wear q day. Remove q hs.   Diagnosis: Chronic venous insufficiency, Disp: 2 each, Rfl: 0    ALLERGIES:   No Known Allergies    FAMILY HISTORY:       Problem Relation Age of Onset    Alcohol Abuse Maternal Uncle     Heart Attack Maternal Uncle     Cancer Mother     Alcohol Abuse Father          SOCIAL HISTORY:   Social History     Socioeconomic History    Marital status:      Spouse name: Not on file    Number of children: Not on file    Years of education: Not on file    Highest education level: Not on file   Occupational History    Not on file   Tobacco Use    Smoking status: Never Smoker    Smokeless tobacco: Never Used   Vaping Use    Vaping Use: Never used   Substance and Sexual Activity    Alcohol use: Not Currently     Comment: stopped 1991    Drug use: Not Currently    Sexual activity: Not Currently   Other Topics Concern    Not on file   Social History Narrative    Not on file     Social Determinants of Health     Financial Resource Strain:     Difficulty of Paying Living Expenses:    Food Insecurity:     Worried About Running Out of Food in the Last Year:     Ran Out of Food in the Last Year:    Transportation Needs:     Lack of Transportation (Medical):  Lack of Transportation (Non-Medical):    Physical Activity:     Days of Exercise per Week:     Minutes of Exercise per Session:    Stress:     Feeling of Stress :    Social Connections:     Frequency of Communication with Friends and Family:     Frequency of Social Gatherings with Friends and Family:     Attends Restoration[employer] Services:     Active Member of Clubs or Organizations:     Attends Club or Organization Meetings:     Marital Status:    Intimate Partner Violence:     Fear of Current or Ex-Partner:     Emotionally Abused:     Physically Abused:     Sexually Abused:        REVIEW OF SYSTEMS: A 12-point review of systems was obtained and pertinent positives and negatives were listed below.      REVIEW OF SYSTEMS:     Constitutional: No fever, no chills, no lethargy, no weakness. HEENT:  No headache, otalgia, itchy eyes, nasal discharge or sore throat. Cardiac:  No chest pain, dyspnea, orthopnea or PND. Chest:   No cough, phlegm or wheezing. Abdomen:      Detailed by MA   Neuro:  No focal weakness, abnormal movements or seizure like activity. Skin:   No rashes, no itching. :   No hematuria, no pyuria, no dysuria, no flank pain. Extremities:  No swelling or joint pains. ROS was otherwise negative    Review of Systems   Constitutional: Negative. HENT: Negative. Eyes: Negative. Respiratory: Positive for apnea, cough, chest tightness and shortness of breath. Cardiovascular: Negative. Gastrointestinal: Negative. Endocrine: Negative. Genitourinary: Negative. Musculoskeletal: Positive for gait problem. Skin: Negative. Allergic/Immunologic: Negative. Hematological: Bruises/bleeds easily. Psychiatric/Behavioral: Positive for sleep disturbance. All other systems reviewed and are negative. PHYSICAL EXAMINATION: Vital signs reviewed per the nursing documentation. /75   Wt 296 lb (134.3 kg)   BMI 49.26 kg/m²   Body mass index is 49.26 kg/m². Physical Exam    Physical Exam   Constitutional: Patient is oriented to person, place, and time. Patient appears well-developed and well-nourished. HENT:   Head: Normocephalic and atraumatic. Eyes: Pupils are equal, round, and reactive to light. EOM are normal.   Neck: Normal range of motion. Neck supple. No JVD present. No tracheal deviation present. No thyromegaly present. Cardiovascular: Normal rate, regular rhythm, normal heart sounds and intact distal pulses. Pulmonary/Chest: Effort normal and breath sounds normal. No stridor. No respiratory distress. He has no wheezes. He has no rales. He exhibits no tenderness. Abdominal: Soft. Bowel sounds are normal. He exhibits no distension and no mass. There is no tenderness. There is no rebound and no guarding.  No hernia. Musculoskeletal: Normal range of motion. Lymphadenopathy:    Patient has no cervical adenopathy. Neurological: Patient is alert and oriented to person, place, and time. Psychiatric: Patient has a normal mood and affect. Patient behavior is normal.       LABORATORY DATA: Reviewed  Lab Results   Component Value Date    WBC 7.6 03/16/2021    HGB 10.5 (L) 03/16/2021    HCT 34.6 (L) 03/16/2021    MCV 93.3 03/16/2021     03/16/2021     03/22/2021    K 4.6 03/22/2021     03/22/2021    CO2 25 03/22/2021    BUN 28 (H) 03/22/2021    CREATININE 0.83 05/14/2021    LABALBU 3.8 03/16/2021    BILITOT 0.29 (L) 03/16/2021    ALKPHOS 65 03/16/2021    AST 15 03/16/2021    ALT 14 03/16/2021    INR 1.0 03/08/2021         Lab Results   Component Value Date    RBC 3.71 (L) 03/16/2021    HGB 10.5 (L) 03/16/2021    MCV 93.3 03/16/2021    MCH 28.3 03/16/2021    MCHC 30.3 03/16/2021    RDW 15.3 (H) 03/16/2021    MPV 9.6 03/16/2021    BASOPCT 1 03/16/2021    LYMPHSABS 1.01 (L) 03/16/2021    MONOSABS 0.74 03/16/2021    NEUTROABS 5.37 03/16/2021    EOSABS 0.28 03/16/2021    BASOSABS 0.05 03/16/2021         DIAGNOSTIC TESTING:     No results found. IMPRESSION: Mr.Andrew YUN Berg is a 60 y. o. male with a past history remarkable for HARPREET, A. fib on Eliquis, morbid obesity, history of CABG in October 2019 on dual antiplatelet therapy (aspirin and Plavix), referred for evaluation of nonproductive postprandial cough. This appears to be most consistent with the patient's chronic GERD symptoms. He appears to be on maximum PPI therapy      Assessment  1. Gastroesophageal reflux disease without esophagitis        PLAN:    1) typical GERD-Protonix 40mg BID to be continued. DC carafate 1 qid. Patient advised to avoid dietary triggers    2) chronic anemia without any overt GI blood loss-repeat CBC and iron studies. Clinically doing well from a GI standpoint. 3) RTC in 4-6 weeks.      Thank you for allowing me to participate in the care of Mr. Ashia Rooney. For any further questions please do not hesitate to contact me. I have reviewed and agree with the ROS entered by the MA/LPN from today's encounter documented in a separate note. Chelsea Amin MD, MPH   [facility] Gastroenterology  Office #: [phone]    this note is created with the assistance of a speech recognition program.  While intending to generate a document that actually reflects the content of the visit, the document can still have some errors including those of syntax and sound a like substitutions which may escape proof reading. It such instances, actual meaning can be extrapolated by contextual diversion.

## 2021-06-17 ENCOUNTER — HOSPITAL ENCOUNTER (OUTPATIENT)
Age: 66
Setting detail: SPECIMEN
Discharge: HOME | End: 2021-06-17
Payer: MEDICAID

## 2021-06-17 DIAGNOSIS — N17.9 AKI (ACUTE KIDNEY INJURY) (HCC): ICD-10-CM

## 2021-06-17 DIAGNOSIS — K21.9 GASTROESOPHAGEAL REFLUX DISEASE WITHOUT ESOPHAGITIS: ICD-10-CM

## 2021-06-17 LAB
ABSOLUTE EOS #: 0.25 K/UL (ref 0–0.44)
ABSOLUTE IMMATURE GRANULOCYTE: 0.08 K/UL (ref 0–0.3)
ABSOLUTE LYMPH #: 1.33 K/UL (ref 1.1–3.7)
ABSOLUTE MONO #: 1.17 K/UL (ref 0.1–1.2)
ANION GAP SERPL CALCULATED.3IONS-SCNC: 20 MMOL/L (ref 9–17)
BASOPHILS # BLD: 1 % (ref 0–2)
BASOPHILS ABSOLUTE: 0.06 K/UL (ref 0–0.2)
BUN BLDV-MCNC: 11 MG/DL (ref 8–23)
BUN/CREAT BLD: ABNORMAL (ref 9–20)
CALCIUM SERPL-MCNC: 9.2 MG/DL (ref 8.6–10.4)
CHLORIDE BLD-SCNC: 102 MMOL/L (ref 98–107)
CO2: 22 MMOL/L (ref 20–31)
CREAT SERPL-MCNC: 0.77 MG/DL (ref 0.7–1.2)
DIFFERENTIAL TYPE: ABNORMAL
EOSINOPHILS RELATIVE PERCENT: 2 % (ref 1–4)
GFR AFRICAN AMERICAN: >60 ML/MIN
GFR NON-AFRICAN AMERICAN: >60 ML/MIN
GFR SERPL CREATININE-BSD FRML MDRD: ABNORMAL ML/MIN/{1.73_M2}
GFR SERPL CREATININE-BSD FRML MDRD: ABNORMAL ML/MIN/{1.73_M2}
GLUCOSE BLD-MCNC: 109 MG/DL (ref 70–99)
HCT VFR BLD CALC: 39.3 % (ref 40.7–50.3)
HEMOGLOBIN: 12.4 G/DL (ref 13–17)
IMMATURE GRANULOCYTES: 1 %
LYMPHOCYTES # BLD: 13 % (ref 24–43)
MCH RBC QN AUTO: 28.5 PG (ref 25.2–33.5)
MCHC RBC AUTO-ENTMCNC: 31.6 G/DL (ref 28.4–34.8)
MCV RBC AUTO: 90.3 FL (ref 82.6–102.9)
MONOCYTES # BLD: 11 % (ref 3–12)
NRBC AUTOMATED: 0 PER 100 WBC
PDW BLD-RTO: 15 % (ref 11.8–14.4)
PLATELET # BLD: 289 K/UL (ref 138–453)
PLATELET ESTIMATE: ABNORMAL
PMV BLD AUTO: 10.1 FL (ref 8.1–13.5)
POTASSIUM SERPL-SCNC: 3.6 MMOL/L (ref 3.7–5.3)
RBC # BLD: 4.35 M/UL (ref 4.21–5.77)
RBC # BLD: ABNORMAL 10*6/UL
SEG NEUTROPHILS: 72 % (ref 36–65)
SEGMENTED NEUTROPHILS ABSOLUTE COUNT: 7.6 K/UL (ref 1.5–8.1)
SODIUM BLD-SCNC: 144 MMOL/L (ref 135–144)
WBC # BLD: 10.5 K/UL (ref 3.5–11.3)
WBC # BLD: ABNORMAL 10*3/UL

## 2021-06-28 DIAGNOSIS — K21.9 GASTROESOPHAGEAL REFLUX DISEASE WITHOUT ESOPHAGITIS: ICD-10-CM

## 2021-06-28 RX ORDER — POTASSIUM CHLORIDE 20 MEQ/1
20 TABLET, EXTENDED RELEASE ORAL DAILY
Qty: 30 TABLET | Refills: 1 | Status: SHIPPED | OUTPATIENT
Start: 2021-06-28 | End: 2021-09-20

## 2021-06-28 RX ORDER — PANTOPRAZOLE SODIUM 40 MG/1
40 TABLET, DELAYED RELEASE ORAL
Qty: 60 TABLET | Refills: 5 | Status: SHIPPED | OUTPATIENT
Start: 2021-06-28 | End: 2021-10-29 | Stop reason: SDUPTHER

## 2021-06-28 NOTE — TELEPHONE ENCOUNTER
E-scribe request for med refills. Please review and e-scribe if applicable.      Last Visit Date:  05/11/2021  Next Visit Date:  Visit date not found    Hemoglobin A1C (%)   Date Value   12/10/2019 5.6   10/18/2019 5.8             ( goal A1C is < 7)   No results found for: LABMICR  LDL Cholesterol (mg/dL)   Date Value   03/03/2021 54       (goal LDL is <100)   AST (U/L)   Date Value   03/16/2021 15     ALT (U/L)   Date Value   03/16/2021 14     BUN (mg/dL)   Date Value   06/17/2021 11     BP Readings from Last 3 Encounters:   06/14/21 120/75   06/11/21 110/71   06/08/21 110/60          (goal 120/80)        Patient Active Problem List:     Atrial flutter (HCC)     Sleep-related breathing disorder     Hypokalemia     Atrial fibrillation (HCC)     Ischemic cardiomyopathy     Combined systolic and diastolic congestive heart failure (HCC)     Acute cystitis without hematuria     Hx of CABG     Heart failure, systolic and diastolic, chronic (HCC)     Bilateral hand numbness     Right thigh pain     Left leg weakness     Coronary artery disease     Chronic cough     Gastroesophageal reflux disease without esophagitis     Overflow incontinence of urine     Polyp of colon     Post-void dribbling     Post-nasal drip     SYDNEY (acute kidney injury) ([address]     Acute CHF (congestive heart failure) (HCC)     Hyperkalemia     Hypotension     Overactive bladder     Hemorrhoids     COVID-19 virus RNA test result indeterminate     HARPREET (obstructive sleep apnea)      ----Camron Baker

## 2021-07-06 ENCOUNTER — HOSPITAL ENCOUNTER (OUTPATIENT)
Dept: OTHER | Age: 66
Discharge: HOME | End: 2021-07-06
Payer: MEDICAID

## 2021-07-06 VITALS
RESPIRATION RATE: 20 BRPM | DIASTOLIC BLOOD PRESSURE: 72 MMHG | WEIGHT: 299.4 LBS | HEART RATE: 61 BPM | BODY MASS INDEX: 49.82 KG/M2 | OXYGEN SATURATION: 97 % | SYSTOLIC BLOOD PRESSURE: 108 MMHG

## 2021-07-06 PROCEDURE — 99212 OFFICE O/P EST SF 10 MIN: CPT

## 2021-07-06 NOTE — PROGRESS NOTES
Date:  2021  Time:  9:18 AM    CHF Clinic at [facility]    Office: [phone] Fax: [phone]    Re:  Fred Enter   Patient : 1955    Vital Signs: /72   Pulse 61   Resp 20   Wt 299 lb 6.4 oz (135.8 kg)   SpO2 97%   BMI 49.82 kg/m²                       O2 Device: None (Room air)                           No results for input(s): CBC, HGB, HCT, WBC, PLATELET, NA, K, CL, CO2, BUN, CREATININE, GLUCOSE, BNP, INR in the last 72 hours. Respiratory:    Assessment  Charting Type: Reassessment    Breath Sounds  Right Upper Lobe: Clear  Right Middle Lobe: Clear  Right Lower Lobe: Clear  Left Upper Lobe: Clear  Left Lower Lobe: Clear    Cough/Sputum  Cough: Productive  Frequency: Infrequent  Sputum Amount: Small  Sputum Color: Clear         Peripheral Vascular  RLE Edema: +1, Pitting  LLE Edema: +1, Pitting     Complaints: Open sores to his lower leg    Comment : Patient here ambulatory for routine visit. Weight down 9 lbs in one month. Patient states he has been careful with his diet, eating more fruits and veggies. No new or acute s/s CHF. Left lateral calf area has one quarter sized and one dime sized open sores/skin tears. No drainage noted but are surrounded by reddened skin. Patient states he will put an antibiotic cream on them. Writer phoned and made appt with PCP for 3 days 2021 for this. Lower leg edema continues. Patient has support hose on. Reminded of low salt diet and fluid limits. States compliance with medications. Next visit here 4 weeks 8/3/2021.     Electronically signed by Janae Saldana RN on 2021 at 9:18 AM

## 2021-07-09 ENCOUNTER — OFFICE VISIT (OUTPATIENT)
Dept: FAMILY MEDICINE CLINIC | Age: 66
End: 2021-07-09
Payer: MEDICAID

## 2021-07-09 VITALS
SYSTOLIC BLOOD PRESSURE: 118 MMHG | DIASTOLIC BLOOD PRESSURE: 69 MMHG | HEART RATE: 72 BPM | WEIGHT: 301.2 LBS | BODY MASS INDEX: 50.18 KG/M2 | HEIGHT: 65 IN

## 2021-07-09 DIAGNOSIS — L03.116 CELLULITIS OF LEFT LOWER EXTREMITY: Primary | ICD-10-CM

## 2021-07-09 PROBLEM — K21.9 LARYNGOPHARYNGEAL REFLUX: Status: ACTIVE | Noted: 2020-09-21

## 2021-07-09 PROCEDURE — 99213 OFFICE O/P EST LOW 20 MIN: CPT

## 2021-07-09 PROCEDURE — G8427 DOCREV CUR MEDS BY ELIG CLIN: HCPCS

## 2021-07-09 PROCEDURE — 3017F COLORECTAL CA SCREEN DOC REV: CPT

## 2021-07-09 PROCEDURE — 1036F TOBACCO NON-USER: CPT

## 2021-07-09 PROCEDURE — G8417 CALC BMI ABV UP PARAM F/U: HCPCS

## 2021-07-09 RX ORDER — AMOXICILLIN AND CLAVULANATE POTASSIUM 875; 125 MG/1; MG/1
1 TABLET, FILM COATED ORAL 2 TIMES DAILY
Qty: 14 TABLET | Refills: 0 | Status: SHIPPED | OUTPATIENT
Start: 2021-07-09 | End: 2021-07-16

## 2021-07-09 RX ORDER — SPIRONOLACTONE 25 MG/1
TABLET ORAL
COMMUNITY
Start: 2021-04-26 | End: 2021-08-16 | Stop reason: ALTCHOICE

## 2021-07-09 RX ORDER — BACITRACIN ZINC AND POLYMYXIN B SULFATE 500; 10000 [USP'U]/G; [USP'U]/G
OINTMENT OPHTHALMIC
Qty: 2 G | Refills: 1 | Status: SHIPPED | OUTPATIENT
Start: 2021-07-09 | End: 2021-07-19

## 2021-07-09 ASSESSMENT — ENCOUNTER SYMPTOMS
DIARRHEA: 0
VOMITING: 0
NAUSEA: 0
COLOR CHANGE: 1
COUGH: 0
ABDOMINAL PAIN: 0

## 2021-07-09 NOTE — PROGRESS NOTES
Visit Information    Have you changed or started any medications since your last visit including any over-the-counter medicines, vitamins, or herbal medicines? no   Are you having any side effects from any of your medications? -  no  Have you stopped taking any of your medications? Is so, why? -  no    Have you seen any other physician or provider since your last visit? No  Have you had any other diagnostic tests since your last visit? No  Have you been seen in the emergency room and/or had an admission to a hospital since we last saw you? No  Have you had your routine dental cleaning in the past 6 months? no    Have you activated your [employer] account? If not, what are your barriers?  No: declined     Patient Care Team:  Betsy Cabello MD as PCP - General (Family Medicine)  Malcolm Kathleen MD as Consulting Physician (Gastroenterology)  Jadyn Murrell MD as Consulting Physician (Urology)    Medical History Review  Past Medical, Family, and Social History reviewed and does not contribute to the patient presenting condition    Health Maintenance   Topic Date Due    Flu vaccine (1) 09/01/2021    Lipid screen  03/03/2022    Potassium monitoring  06/17/2022    Creatinine monitoring  06/17/2022    Diabetes screen  12/10/2022    Pneumococcal 0-64 years Vaccine (2 of 2 - PPSV23) 08/20/2025    DTaP/Tdap/Td vaccine (2 - Td or Tdap) 08/20/2030    Colon cancer screen colonoscopy  01/12/2031    Shingles Vaccine  Completed    COVID-19 Vaccine  Completed    Hepatitis C screen  Completed    HIV screen  Completed    Hepatitis A vaccine  Aged Out    Hepatitis B vaccine  Aged Out    Hib vaccine  Aged Out    Meningococcal (ACWY) vaccine  Aged Out   ,

## 2021-07-09 NOTE — PROGRESS NOTES
BMI 50.12 kg/m²    BP Readings from Last 3 Encounters:   07/09/21 118/69   07/06/21 108/72   06/14/21 120/75       Physical Exam  Cardiovascular:      Rate and Rhythm: Normal rate and regular rhythm. Pulses: Normal pulses. Heart sounds: Normal heart sounds. No murmur heard. No friction rub. No gallop. Pulmonary:      Effort: Pulmonary effort is normal.      Breath sounds: Normal breath sounds. No wheezing. Abdominal:      Tenderness: There is no abdominal tenderness. Musculoskeletal:      Right lower leg: Edema present. Left lower leg: Edema present. Skin:     General: Skin is warm. Findings: Bruising, erythema, lesion and rash present. Comments: Left-sided cellulitic lesion on the lower leg with no apparent demarcation or borders of the lower leg infection with 2 small wounds which are slightly bleeding but without any pus or drainage visible         Lab Results   Component Value Date    WBC 10.5 06/17/2021    HGB 12.4 (L) 06/17/2021    HCT 39.3 (L) 06/17/2021     06/17/2021    CHOL 112 05/26/2020    TRIG 53 05/26/2020    HDL 47 03/03/2021    ALT 14 03/16/2021    AST 15 03/16/2021     06/17/2021    K 3.6 (L) 06/17/2021     06/17/2021    CREATININE 0.77 06/17/2021    BUN 11 06/17/2021    CO2 22 06/17/2021    TSH 1.48 01/24/2020    PSA 0.67 09/22/2020    INR 1.0 03/08/2021    LABA1C 5.6 12/10/2019     Lab Results   Component Value Date    CALCIUM 9.2 06/17/2021    PHOS 4.0 03/08/2021     Lab Results   Component Value Date    LDLCHOLESTEROL 54 03/03/2021       Assessment and Plan:    1. Cellulitis of left lower extremity  -His leg wounds were cleaned and topical bacitracin applied in the clinic  Put gauze and gauze bandage to cover the bandage and put compression stockings upon them  - bacitracin-polymyxin b (POLYSPORIN) 500-66357 UNIT/GM ophthalmic ointment; Every 12 hours.   Dispense: 2 g; Refill: 1  - amoxicillin-clavulanate (AUGMENTIN) 875-125 MG per tablet; Take 1 tablet by mouth 2 times daily for 7 days  Dispense: 14 tablet; Refill: 0  -We will have the patient come back exactly in a week to see the progress if condition still not better or if is getting worse we will send him to either wound care or hospital to get IV antibiotics          Requested Prescriptions     Signed Prescriptions Disp Refills    bacitracin-polymyxin b (POLYSPORIN) 500-37648 UNIT/GM ophthalmic ointment 2 g 1     Sig: Every 12 hours.  amoxicillin-clavulanate (AUGMENTIN) 875-125 MG per tablet 14 tablet 0     Sig: Take 1 tablet by mouth 2 times daily for 7 days       There are no discontinued medications. Santiago Lobos received counseling on the following healthy behaviors: nutrition, exercise and medication adherence    Discussed use,benefit, and side effects of prescribed medications. Barriers to medication compliance addressed. All patient questions answered. Pt voiced understanding. Return in about 1 week (around 7/16/2021) for Cellulitis follow up. Disclaimer: Some orall of this note was transcribed using voice-recognition software. This may cause typographical errors occasionally. Although all effort is made to fix these errors, please do not hesitate to contact our office if there Orlando Ray concern with the understanding of this note.

## 2021-07-20 ENCOUNTER — OFFICE VISIT (OUTPATIENT)
Dept: FAMILY MEDICINE CLINIC | Age: 66
End: 2021-07-20
Payer: MEDICAID

## 2021-07-20 VITALS
SYSTOLIC BLOOD PRESSURE: 127 MMHG | WEIGHT: 292 LBS | HEART RATE: 74 BPM | HEIGHT: 65 IN | TEMPERATURE: 97.2 F | DIASTOLIC BLOOD PRESSURE: 75 MMHG | BODY MASS INDEX: 48.65 KG/M2

## 2021-07-20 DIAGNOSIS — L03.116 CELLULITIS OF LEFT LOWER EXTREMITY: Primary | ICD-10-CM

## 2021-07-20 PROCEDURE — 99213 OFFICE O/P EST LOW 20 MIN: CPT | Performed by: STUDENT IN AN ORGANIZED HEALTH CARE EDUCATION/TRAINING PROGRAM

## 2021-07-20 PROCEDURE — 3017F COLORECTAL CA SCREEN DOC REV: CPT | Performed by: STUDENT IN AN ORGANIZED HEALTH CARE EDUCATION/TRAINING PROGRAM

## 2021-07-20 PROCEDURE — 1036F TOBACCO NON-USER: CPT | Performed by: STUDENT IN AN ORGANIZED HEALTH CARE EDUCATION/TRAINING PROGRAM

## 2021-07-20 PROCEDURE — G8417 CALC BMI ABV UP PARAM F/U: HCPCS | Performed by: STUDENT IN AN ORGANIZED HEALTH CARE EDUCATION/TRAINING PROGRAM

## 2021-07-20 PROCEDURE — G8427 DOCREV CUR MEDS BY ELIG CLIN: HCPCS | Performed by: STUDENT IN AN ORGANIZED HEALTH CARE EDUCATION/TRAINING PROGRAM

## 2021-07-20 RX ORDER — OXYBUTYNIN CHLORIDE 5 MG/1
5 TABLET, EXTENDED RELEASE ORAL DAILY
Qty: 30 TABLET | Refills: 3 | Status: SHIPPED | OUTPATIENT
Start: 2021-07-20 | End: 2021-11-08

## 2021-07-20 ASSESSMENT — ENCOUNTER SYMPTOMS
ABDOMINAL PAIN: 0
SHORTNESS OF BREATH: 0
CHEST TIGHTNESS: 0

## 2021-07-20 NOTE — PROGRESS NOTES
Visit Information    Have you changed or started any medications since your last visit including any over-the-counter medicines, vitamins, or herbal medicines? no   Have you stopped taking any of your medications? Is so, why? -  no  Are you having any side effects from any of your medications? - no    Have you seen any other physician or provider since your last visit?  no   Have you had any other diagnostic tests since your last visit?  no   Have you been seen in the emergency room and/or had an admission in a hospital since we last saw you?  no   Have you had your routine dental cleaning in the past 6 months?  no     Do you have an active MyChart account? If no, what is the barrier?   No: Declined    Patient Care Team:  Lora Higginbotham MD as PCP - General (Family Medicine)  Harry Matute MD as Consulting Physician (Gastroenterology)  Juan Pablo Brush MD as Consulting Physician (Urology)    Medical History Review  Past Medical, Family, and Social History reviewed and does not contribute to the patient presenting condition    Health Maintenance   Topic Date Due    Flu vaccine (1) 09/01/2021    Lipid screen  03/03/2022    Potassium monitoring  06/17/2022    Creatinine monitoring  06/17/2022    Diabetes screen  12/10/2022    Pneumococcal 0-64 years Vaccine (2 of 2 - PPSV23) 08/20/2025    DTaP/Tdap/Td vaccine (2 - Td or Tdap) 08/20/2030    Colon cancer screen colonoscopy  01/12/2031    Shingles Vaccine  Completed    COVID-19 Vaccine  Completed    Hepatitis C screen  Completed    HIV screen  Completed    Hepatitis A vaccine  Aged Out    Hepatitis B vaccine  Aged Out    Hib vaccine  Aged Out    Meningococcal (ACWY) vaccine  Aged Out
Appearance: Normal appearance. He is well-developed. HENT:      Head: Normocephalic and atraumatic. Cardiovascular:      Rate and Rhythm: Normal rate and regular rhythm. Heart sounds: Normal heart sounds. Pulmonary:      Effort: Pulmonary effort is normal. No respiratory distress. Breath sounds: Normal breath sounds. No wheezing. Skin:     General: Skin is warm and dry. Findings: Rash present. No erythema. Comments: lower extremity stasis dermatitis bilaterally   Neurological:      Mental Status: He is alert. Lab Results   Component Value Date    WBC 10.5 06/17/2021    HGB 12.4 (L) 06/17/2021    HCT 39.3 (L) 06/17/2021     06/17/2021    CHOL 112 05/26/2020    TRIG 53 05/26/2020    HDL 47 03/03/2021    ALT 14 03/16/2021    AST 15 03/16/2021     06/17/2021    K 3.6 (L) 06/17/2021     06/17/2021    CREATININE 0.77 06/17/2021    BUN 11 06/17/2021    CO2 22 06/17/2021    TSH 1.48 01/24/2020    PSA 0.67 09/22/2020    INR 1.0 03/08/2021    LABA1C 5.6 12/10/2019     Lab Results   Component Value Date    CALCIUM 9.2 06/17/2021    PHOS 4.0 03/08/2021     Lab Results   Component Value Date    LDLCHOLESTEROL 54 03/03/2021       Assessment and Plan:    1. Cellulitis of left lower extremity  Continue to use the Ace wrapping, compression stockings          Requested Prescriptions      No prescriptions requested or ordered in this encounter       There are no discontinued medications. Return in about 3 months (around 10/20/2021) for HTN.

## 2021-07-26 DIAGNOSIS — I25.10 CORONARY ARTERY DISEASE INVOLVING NATIVE CORONARY ARTERY OF NATIVE HEART WITHOUT ANGINA PECTORIS: ICD-10-CM

## 2021-07-26 NOTE — TELEPHONE ENCOUNTER
Dorota Request for pending medication.     Last Visit Date: 7/20/21  Next Visit Date:  Future Appointments   Date Time Provider Taj Flores   8/3/2021  9:30 AM STV CHF CLINIC RM 1 STVZ CHF CLI [facility]   8/16/2021  3:30 PM Errol Cowart MD sv gr lks [facility]   9/10/2021  9:20 AM SCHEDULE, [facility] UROLOGY [facility] [address] Maintenance   Topic Date Due    Flu vaccine (1) 09/01/2021    Lipid screen  03/03/2022    Potassium monitoring  06/17/2022    Creatinine monitoring  06/17/2022    Diabetes screen  12/10/2022    Pneumococcal 0-64 years Vaccine (2 of 2 - PPSV23) 08/20/2025    DTaP/Tdap/Td vaccine (2 - Td or Tdap) 08/20/2030    Colon cancer screen colonoscopy  01/12/2031    Shingles Vaccine  Completed    COVID-19 Vaccine  Completed    Hepatitis C screen  Completed    HIV screen  Completed    Hepatitis A vaccine  Aged Out    Hepatitis B vaccine  Aged Out    Hib vaccine  Aged Out    Meningococcal (ACWY) vaccine  Aged Out       Hemoglobin A1C (%)   Date Value   12/10/2019 5.6   10/18/2019 5.8             ( goal A1C is < 7)   No results found for: LABMICR  LDL Cholesterol (mg/dL)   Date Value   03/03/2021 54       (goal LDL is <100)   AST (U/L)   Date Value   03/16/2021 15     ALT (U/L)   Date Value   03/16/2021 14     BUN (mg/dL)   Date Value   06/17/2021 11     BP Readings from Last 3 Encounters:   07/20/21 127/75   07/09/21 118/69   07/06/21 108/72          (goal 120/80)    All Future Testing planned in [facility]  Lab Frequency Next Occurrence   COLONOSCOPY (Diagnostic) Once 01/11/2021   COVID-19 Once 01/05/2021   US URINARY BLADDER LIMITED Once 12/01/2021   Creatinine, Random Urine Once 03/14/2021   Sodium, Urine, Random Once 03/14/2021   Urea Nitrogen, Urine Once 03/14/2021   US RETROPERITONEAL COMPLETE Once 27/84/3586   Basic Metabolic Panel Once 45/66/7673   COVID-19 Once 03/30/2021   COVID-19 Once 85/90/9235   Basic Metabolic Panel Once 63/42/5190       Next Visit Date:  Future Appointments   Date Time Provider Taj Flores   8/3/2021  9:30 AM [facility]   8/16/2021  3:30 PM Reinaldo Joyner MD sv gr lks [facility]   9/10/2021  9:20 AM SCHEDULE, [facility]OLOGY [facility]         Patient Active Problem List:     Atrial flutter ([address]     Sleep-related breathing disorder     Hypokalemia     Atrial fibrillation ([address]     Ischemic cardiomyopathy     Combined systolic and diastolic congestive heart failure ([address]     Cellulitis of left lower extremity     Acute cystitis without hematuria     Hx of CABG     Heart failure, systolic and diastolic, chronic (HCC)     Bilateral hand numbness     Right thigh pain     Left leg weakness     Coronary artery disease     Chronic cough     Gastroesophageal reflux disease without esophagitis     Overflow incontinence of urine     Polyp of colon     Post-void dribbling     Post-nasal drip     SYDNEY (acute kidney injury) ([address]     Acute CHF (congestive heart failure) (HCC)     Hyperkalemia     Hypotension     Overactive bladder     Hemorrhoids     COVID-19 virus RNA test result indeterminate     HARPREET (obstructive sleep apnea)     Laryngopharyngeal reflux

## 2021-07-27 RX ORDER — CLOPIDOGREL BISULFATE 75 MG/1
75 TABLET ORAL DAILY
Qty: 90 TABLET | Refills: 5 | Status: SHIPPED | OUTPATIENT
Start: 2021-07-27 | End: 2022-01-03 | Stop reason: ALTCHOICE

## 2021-07-28 ENCOUNTER — TELEPHONE (OUTPATIENT)
Dept: FAMILY MEDICINE CLINIC | Age: 66
End: 2021-07-28

## 2021-08-02 DIAGNOSIS — I50.42 HEART FAILURE, SYSTOLIC AND DIASTOLIC, CHRONIC (HCC): ICD-10-CM

## 2021-08-02 RX ORDER — ISOSORBIDE MONONITRATE 30 MG/1
30 TABLET, EXTENDED RELEASE ORAL DAILY
Qty: 30 TABLET | Refills: 1 | Status: SHIPPED | OUTPATIENT
Start: 2021-08-02 | End: 2021-09-01 | Stop reason: SDUPTHER

## 2021-08-02 RX ORDER — ATORVASTATIN CALCIUM 40 MG/1
40 TABLET, FILM COATED ORAL NIGHTLY
Qty: 30 TABLET | Refills: 1 | Status: SHIPPED | OUTPATIENT
Start: 2021-08-02 | End: 2021-09-01 | Stop reason: SDUPTHER

## 2021-08-02 NOTE — TELEPHONE ENCOUNTER
Date:  Future Appointments   Date Time Provider Taj Flores   8/3/2021  9:30 AM STV CHF CLINIC RM 1 STVZ CHF CLI [facility]   8/16/2021  3:30 PM Errol Cowart MD [facility]   9/10/2021  9:20 AM SCHEDULE, [facility] UROLOGY [facility]         Patient Active Problem List:     Atrial flutter ([address]     Sleep-related breathing disorder     Hypokalemia     Atrial fibrillation ([address]     Ischemic cardiomyopathy     Combined systolic and diastolic congestive heart failure ([address]     Cellulitis of left lower extremity     Acute cystitis without hematuria     Hx of CABG     Heart failure, systolic and diastolic, chronic (HCC)     Bilateral hand numbness     Right thigh pain     Left leg weakness     Coronary artery disease     Chronic cough     Gastroesophageal reflux disease without esophagitis     Overflow incontinence of urine     Polyp of colon     Post-void dribbling     Post-nasal drip     SYDNEY (acute kidney injury) ([address]     Acute CHF (congestive heart failure) (HCC)     Hyperkalemia     Hypotension     Overactive bladder     Hemorrhoids     COVID-19 virus RNA test result indeterminate     HARPREET (obstructive sleep apnea)     Laryngopharyngeal reflux

## 2021-08-02 NOTE — TELEPHONE ENCOUNTER
Dorota Request for pending medication.     Last Visit Date: 7/20/21  Next Visit Date:  Future Appointments   Date Time Provider Taj Flores   8/3/2021  9:30 AM STV CHF CLINIC RM 1 STVZ CHF CLI [facility]   8/16/2021  3:30 PM Rafia Wilson MD sv gr lks [facility]   9/10/2021  9:20 AM SCHEDULE, [facility] [address] Maintenance   Topic Date Due    Flu vaccine (1) 09/01/2021    Lipid screen  03/03/2022    Potassium monitoring  06/17/2022    Creatinine monitoring  06/17/2022    Diabetes screen  12/10/2022    Pneumococcal 0-64 years Vaccine (2 of 2 - PPSV23) 08/20/2025    DTaP/Tdap/Td vaccine (2 - Td or Tdap) 08/20/2030    Colon cancer screen colonoscopy  01/12/2031    Shingles Vaccine  Completed    COVID-19 Vaccine  Completed    Hepatitis C screen  Completed    HIV screen  Completed    Hepatitis A vaccine  Aged Out    Hepatitis B vaccine  Aged Out    Hib vaccine  Aged Out    Meningococcal (ACWY) vaccine  Aged Out       Hemoglobin A1C (%)   Date Value   12/10/2019 5.6   10/18/2019 5.8             ( goal A1C is < 7)   No results found for: LABMICR  LDL Cholesterol (mg/dL)   Date Value   03/03/2021 54       (goal LDL is <100)   AST (U/L)   Date Value   03/16/2021 15     ALT (U/L)   Date Value   03/16/2021 14     BUN (mg/dL)   Date Value   06/17/2021 11     BP Readings from Last 3 Encounters:   07/20/21 127/75   07/09/21 118/69   07/06/21 108/72          (goal 120/80)    All Future Testing planned in [facility]  Lab Frequency Next Occurrence   COLONOSCOPY (Diagnostic) Once 01/11/2021   COVID-19 Once 01/05/2021   US URINARY BLADDER LIMITED Once 12/01/2021   Creatinine, Random Urine Once 03/14/2021   Sodium, Urine, Random Once 03/14/2021   Urea Nitrogen, Urine Once 03/14/2021   US RETROPERITONEAL COMPLETE Once 38/57/0218   Basic Metabolic Panel Once 02/78/9755   COVID-19 Once 03/30/2021   COVID-19 Once 95/96/3699   Basic Metabolic Panel Once 18/85/8230       Next Visit Date:  Future Appointments   Date Time Provider Taj Flores   8/3/2021  9:30 AM STV CHF CLINIC RM 1 STV[facility] CHF CLI [facility]   8/16/2021  3:30 PM Janett Drake MD sv gr lks [facility]   9/10/2021  9:20 AM SCHEDULE, [facility] UROLOGY [facility]rology Maribell Wong         Patient Active Problem List:     Atrial flutter ([address]     Sleep-related breathing disorder     Hypokalemia     Atrial fibrillation ([address]     Ischemic cardiomyopathy     Combined systolic and diastolic congestive heart failure ([address]     Cellulitis of left lower extremity     Acute cystitis without hematuria     Hx of CABG     Heart failure, systolic and diastolic, chronic (HCC)     Bilateral hand numbness     Right thigh pain     Left leg weakness     Coronary artery disease     Chronic cough     Gastroesophageal reflux disease without esophagitis     Overflow incontinence of urine     Polyp of colon     Post-void dribbling     Post-nasal drip     SYDNEY (acute kidney injury) ([address]     Acute CHF (congestive heart failure) (HCC)     Hyperkalemia     Hypotension     Overactive bladder     Hemorrhoids     COVID-19 virus RNA test result indeterminate     HARPREET (obstructive sleep apnea)     Laryngopharyngeal reflux

## 2021-08-03 ENCOUNTER — HOSPITAL ENCOUNTER (OUTPATIENT)
Dept: OTHER | Age: 66
Discharge: HOME | End: 2021-08-03
Payer: MEDICAID

## 2021-08-03 VITALS
OXYGEN SATURATION: 97 % | HEART RATE: 75 BPM | BODY MASS INDEX: 49.29 KG/M2 | WEIGHT: 296.2 LBS | RESPIRATION RATE: 20 BRPM | SYSTOLIC BLOOD PRESSURE: 112 MMHG | DIASTOLIC BLOOD PRESSURE: 58 MMHG

## 2021-08-03 PROCEDURE — 99212 OFFICE O/P EST SF 10 MIN: CPT

## 2021-08-03 NOTE — PROGRESS NOTES
Date:  8/3/2021  Time:  9:15 AM    CHF Clinic at [facility]    Office: [phone] Fax: [phone]    Re:  Roman Medina   Patient : 1955    Vital Signs: BP (!) 112/58   Pulse 75   Resp 20   Wt 296 lb 3.2 oz (134.4 kg)   SpO2 97%   BMI 49.29 kg/m²                       O2 Device: None (Room air)                           No results for input(s): CBC, HGB, HCT, WBC, PLATELET, NA, K, CL, CO2, BUN, CREATININE, GLUCOSE, BNP, INR in the last 72 hours. Respiratory:    Assessment  Charting Type: Reassessment    Breath Sounds  Right Upper Lobe: Clear  Right Middle Lobe: Clear  Right Lower Lobe: Clear  Left Upper Lobe: Clear  Left Lower Lobe: Clear    Cough/Sputum  Cough: None       Peripheral Vascular  RLE Edema: +1, Non-pitting  LLE Edema: +2, Non-pitting    Complaints: Had an episode of chest pressure last week lasting 1.5 hrs. Comment : Patient here ambulatory for routine visit. Weight decreased 3 lbs in one month. No new or acute s/s CHF. States he feels well. Had an episode of chest pressure brought on by stress. Instructed to take ntg SL if it lasts more than a few minutes next time. States compliance with low salt diet, fluid limits, and medications. Next visit here 4 weeks 2021.     Electronically signed by Masood Webster RN on 8/3/2021 at 9:15 AM

## 2021-08-16 ENCOUNTER — OFFICE VISIT (OUTPATIENT)
Dept: GASTROENTEROLOGY | Age: 66
End: 2021-08-16
Payer: MEDICAID

## 2021-08-16 VITALS — DIASTOLIC BLOOD PRESSURE: 61 MMHG | BODY MASS INDEX: 50.09 KG/M2 | SYSTOLIC BLOOD PRESSURE: 109 MMHG | WEIGHT: 301 LBS

## 2021-08-16 DIAGNOSIS — K21.9 GASTROESOPHAGEAL REFLUX DISEASE WITHOUT ESOPHAGITIS: Primary | ICD-10-CM

## 2021-08-16 PROCEDURE — 1036F TOBACCO NON-USER: CPT | Performed by: INTERNAL MEDICINE

## 2021-08-16 PROCEDURE — G8417 CALC BMI ABV UP PARAM F/U: HCPCS | Performed by: INTERNAL MEDICINE

## 2021-08-16 PROCEDURE — 99213 OFFICE O/P EST LOW 20 MIN: CPT | Performed by: INTERNAL MEDICINE

## 2021-08-16 PROCEDURE — 3017F COLORECTAL CA SCREEN DOC REV: CPT | Performed by: INTERNAL MEDICINE

## 2021-08-16 PROCEDURE — G8427 DOCREV CUR MEDS BY ELIG CLIN: HCPCS | Performed by: INTERNAL MEDICINE

## 2021-08-16 ASSESSMENT — ENCOUNTER SYMPTOMS
GASTROINTESTINAL NEGATIVE: 1
CHEST TIGHTNESS: 1
ALLERGIC/IMMUNOLOGIC NEGATIVE: 1
SHORTNESS OF BREATH: 1
APNEA: 1
COUGH: 1
EYES NEGATIVE: 1

## 2021-08-16 NOTE — PROGRESS NOTES
GI FOLLOW UP    INTERVAL HISTORY:     Hemoglobin is improved to 12.4 g/dL  No signs of overt bleeding  Maintain Protonix 40 mg twice daily with intermittent use of Carafate    Chief Complaint   Patient presents with    Follow-up     2 month f/u    Gastroesophageal Reflux     Patient taking protonix BID. States carafate was DC'd at last visit but occasionally will take one when he notices GI discomfort. Patient states drinking coffee every morning but says his symtpoms are at night. 1. Gastroesophageal reflux disease without esophagitis          HISTORY OF PRESENT ILLNESS:  Mr.Andrew YUN Berg is a 60 y. o. male with a past history remarkable for HARPREET, A. fib on Eliquis, morbid obesity, history of CABG in October 2019 on dual antiplatelet therapy (aspirin and Plavix), referred for evaluation of nonproductive postprandial cough.  This appears to be most consistent with the patient's chronic GERD symptoms.  He appears to be on maximum PPI therapy     Recent colonoscopy which identified tubular adenomas and sessile serrated adenomas which were removed aside from an isolated flat lesion that will require EMR.  Findings discussed with the patient. Past Medical,Family, and Social History reviewed and does contribute to the patient presenting condition. Patient's PMH/PSH,SH,PSYCH Hx, MEDs, ALLERGIES, and ROS were all reviewed and updated in the appropriate sections.     PAST MEDICAL HISTORY:  Past Medical History:   Diagnosis Date    Arthritis     back    Atrial fibrillation ([address] 10/2019    Dr. Vern Epley BPH (benign prostatic hyperplasia)     Cellulitis     Cervical disc disease     CHF (congestive heart failure) [facility])     cardiologist Dr. Nena [facility]obar [facility]    Coronary artery disease 10/2019     CABG 2019 . 's    Dr. Taj Carlton Cardiology last seen within the last year.    CPAP (continuous positive airway pressure) dependence     GERD (gastroesophageal reflux disease)     on rx    History of incarceration     released 2019    History of incarceration     Hyperlipidemia     Dr. Ivanna Butler Hypertension     [facility] CHF clinic     Dr. Ivanna Butler Myocardial infarct [facility])     Dr. Ivanna Butler Obesity     Sleep apnea     C-pap    Wears reading eyeglasses     Wellness examination     Dr. Gaye Arriaga 4/2021       Past Surgical History:   Procedure Laterality Date    ABDOMINAL EXPLORATION SURGERY      CARDIAC SURGERY      2019    CARDIOVERSION  10/16/2019    CARPAL TUNNEL RELEASE Bilateral 2008    CERVICAL SPINE SURGERY      2-5    COLONOSCOPY N/A 10/29/2020    COLONOSCOPY WITH BIOPSY performed by Angelia Lucero MD at [address] [place] [address]  10/29/2020    COLONOSCOPY SUBMUCOSAL/BOTOX INJECTION performed by Angelia Lucero MD at [address] [place] [address]  10/29/2020    COLONOSCOPY POLYPECTOMY SNARE/COLD BIOPSY performed by Angelia Lucero MD at [address] [place] [address] N/A 01/12/2021    COLONOSCOPY POLYPECTOMY HOT SNARE, COLD SNARE POLPECTOMY performed by Samantha Henson MD at [address] [place] [address] N/A 10/21/2019    CABG CORONARY ARTERY BYPASS GRAFT X1, LIMA-LAD, BROWN 4 MAZE PROCEDURE, 50MM ATRICURE ATRIAL CLIP RIGID INTERNAL FIXATION PLATES X 3   SCREWS X 13 performed by Yuliana Bang MD at [place] [address] 03/26/2021    URODYNAMICS performed by Tu Wyman MD at [facility]  03/26/2021    CYSTOSCOPY FLEXIBLE performed by Tu Wyman MD at [facility]  05/14/2021    CYSTOSCOPY, UROLIFT, FULGURATION    CYSTOSCOPY N/A 5/14/2021    CYSTOSCOPY, UROLIFT, FULGURATION performed by Tu Wyman MD at [place] [address]      cataract surgery 2020    FRACTURE SURGERY      Left leg    TRANSESOPHAGEAL ECHOCARDIOGRAM  10/16/2019       CURRENT MEDICATIONS:    Current Outpatient Medications:    isosorbide mononitrate (IMDUR) 30 MG extended release tablet, Take 1 tablet by mouth daily, Disp: 30 tablet, Rfl: 1    atorvastatin (LIPITOR) 40 MG tablet, Take 1 tablet by mouth nightly, Disp: 30 tablet, Rfl: 1    clopidogrel (PLAVIX) 75 MG tablet, Take 1 tablet by mouth daily, Disp: 90 tablet, Rfl: 5    oxybutynin (DITROPAN-XL) 5 MG extended release tablet, TAKE 1 TABLET BY MOUTH DAILY, Disp: 30 tablet, Rfl: 3    pantoprazole (PROTONIX) 40 MG tablet, TAKE 1 TABLET BY MOUTH 2 TIMES DAILY (BEFORE MEALS), Disp: 60 tablet, Rfl: 5    potassium chloride (KLOR-CON M) 20 MEQ extended release tablet, Take 1 tablet by mouth daily, Disp: 30 tablet, Rfl: 1    loratadine (CLARITIN) 10 MG tablet, Take 1 tablet by mouth daily, Disp: 30 tablet, Rfl: 2    Prenatal Vit-Fe Fumarate-FA (NIVA-PLUS) 27-1 MG TABS, , Disp: , Rfl:     Multiple Vitamins-Minerals (PRESERVISION AREDS 2) CAPS, , Disp: , Rfl:     furosemide (LASIX) 20 MG tablet, Take 3 tablets by mouth 2 times daily, Disp: 180 tablet, Rfl: 1    Blood Pressure Monitor KIT, 1 each by Does not apply route 2 times daily, Disp: 1 kit, Rfl: 0    Misc. Devices ([employer] WEIGHT SCALE) MISC, 1 each by Does not apply route as needed (weigh once a day and record), Disp: 1 each, Rfl: 0    tamsulosin (FLOMAX) 0.4 MG capsule, Take 1 capsule by mouth daily, Disp: 30 capsule, Rfl: 5    nitroGLYCERIN (NITROSTAT) 0.4 MG SL tablet, Place 1 tablet under the tongue every 5 minutes as needed for Chest pain up to max of 3 total doses. If no relief after 1 dose, call 911., Disp: 25 tablet, Rfl: 1    Elastic Bandages & Supports (JOBST KNEE HIGH COMPRESSION SM) MISC, 2 each by Does not apply route daily Wear q day. Remove q hs.   Diagnosis: Chronic venous insufficiency, Disp: 2 each, Rfl: 0    losartan (COZAAR) 25 MG tablet, Take 1 tablet by mouth daily, Disp: 30 tablet, Rfl: 3    ALLERGIES:   No Known Allergies    FAMILY HISTORY:       Problem Relation Age of Onset    Alcohol Abuse Maternal Uncle     Heart Attack Maternal Uncle     Cancer Mother     Alcohol Abuse Father          SOCIAL HISTORY:   Social History     Socioeconomic History    Marital status:      Spouse name: Not on file    Number of children: Not on file    Years of education: Not on file    Highest education level: Not on file   Occupational History    Not on file   Tobacco Use    Smoking status: Never Smoker    Smokeless tobacco: Never Used   Vaping Use    Vaping Use: Never used   Substance and Sexual Activity    Alcohol use: Not Currently     Comment: stopped 1991    Drug use: Not Currently    Sexual activity: Not Currently   Other Topics Concern    Not on file   Social History Narrative    Not on file     Social Determinants of Health     Financial Resource Strain:     Difficulty of Paying Living Expenses:    Food Insecurity:     Worried About Running Out of Food in the Last Year:     [address] in the Last Year:    Transportation Needs:     Lack of Transportation (Medical):  Lack of Transportation (Non-Medical):    Physical Activity:     Days of Exercise per Week:     Minutes of Exercise per Session:    Stress:     Feeling of Stress :    Social Connections:     Frequency of Communication with Friends and Family:     Frequency of Social Gatherings with Friends and Family:     Attends [employer] Services:     Active Member of Clubs or Organizations:     Attends Club or Organization Meetings:     Marital Status:    Intimate Partner Violence:     Fear of Current or Ex-Partner:     Emotionally Abused:     Physically Abused:     Sexually Abused:        REVIEW OF SYSTEMS: A 12-point review of systems was obtained and pertinent positives and negatives were listed below. REVIEW OF SYSTEMS:     Constitutional: No fever, no chills, no lethargy, no weakness. HEENT:  No headache, otalgia, itchy eyes, nasal discharge or sore throat.   Cardiac:  No chest pain, dyspnea, orthopnea or PND.  Chest:   No cough, phlegm or wheezing. Abdomen:      Detailed by MA   Neuro:  No focal weakness, abnormal movements or seizure like activity. Skin:   No rashes, no itching. :   No hematuria, no pyuria, no dysuria, no flank pain. Extremities:  No swelling or joint pains. ROS was otherwise negative    Review of Systems   Constitutional: Negative. HENT: Negative. Eyes: Negative. Respiratory: Positive for apnea, cough, chest tightness and shortness of breath. Cardiovascular: Negative. Gastrointestinal: Negative. Endocrine: Negative. Genitourinary: Negative. Musculoskeletal: Positive for gait problem. Skin: Negative. Allergic/Immunologic: Negative. Hematological: Bruises/bleeds easily. Psychiatric/Behavioral: Positive for sleep disturbance. All other systems reviewed and are negative. PHYSICAL EXAMINATION: Vital signs reviewed per the nursing documentation. /61   Wt (!) 301 lb (136.5 kg)   BMI 50.09 kg/m²   Body mass index is 50.09 kg/m². Physical Exam    Physical Exam   Constitutional: Patient is oriented to person, place, and time. Patient appears well-developed and well-nourished. HENT:   Head: Normocephalic and atraumatic. Eyes: Pupils are equal, round, and reactive to light. EOM are normal.   Neck: Normal range of motion. Neck supple. No JVD present. No tracheal deviation present. No thyromegaly present. Cardiovascular: Normal rate, regular rhythm, normal heart sounds and intact distal pulses. Pulmonary/Chest: Effort normal and breath sounds normal. No stridor. No respiratory distress. He has no wheezes. He has no rales. He exhibits no tenderness. Abdominal: Soft. Bowel sounds are normal. He exhibits no distension and no mass. There is no tenderness. There is no rebound and no guarding. No hernia. Musculoskeletal: Normal range of motion. Lymphadenopathy:    Patient has no cervical adenopathy.    Neurological: Patient is alert and oriented to person, place, and time. Psychiatric: Patient has a normal mood and affect. Patient behavior is normal.       LABORATORY DATA: Reviewed  Lab Results   Component Value Date    WBC 10.5 06/17/2021    HGB 12.4 (L) 06/17/2021    HCT 39.3 (L) 06/17/2021    MCV 90.3 06/17/2021     06/17/2021     06/17/2021    K 3.6 (L) 06/17/2021     06/17/2021    CO2 22 06/17/2021    BUN 11 06/17/2021    CREATININE 0.77 06/17/2021    LABALBU 3.8 03/16/2021    BILITOT 0.29 (L) 03/16/2021    ALKPHOS 65 03/16/2021    AST 15 03/16/2021    ALT 14 03/16/2021    INR 1.0 03/08/2021         Lab Results   Component Value Date    RBC 4.35 06/17/2021    HGB 12.4 (L) 06/17/2021    MCV 90.3 06/17/2021    MCH 28.5 06/17/2021    MCHC 31.6 06/17/2021    RDW 15.0 (H) 06/17/2021    MPV 10.1 06/17/2021    BASOPCT 1 06/17/2021    LYMPHSABS 1.33 06/17/2021    MONOSABS 1.17 06/17/2021    NEUTROABS 7.60 06/17/2021    EOSABS 0.25 06/17/2021    BASOSABS 0.06 06/17/2021         DIAGNOSTIC TESTING:     No results found. IMPRESSION:Mr. Gilda Hagan a 60 y. o. male with a past history remarkable for HARPREET, A. fib on Eliquis, morbid obesity, history of CABG in October 2019 on dual antiplatelet therapy (aspirin and Plavix), referred for evaluation of nonproductive postprandial cough.  This appears to be most consistent with the patient's chronic GERD symptoms.  He appears to be on maximum PPI therapy      Assessment  1. Gastroesophageal reflux disease without esophagitis        PLAN:    1) Chronic obscure anemia--- hemoglobin proved to 12.4 g/dL. We will repeat CBC along with iron studies to evaluate hemoglobin trend. If there is ongoing anemia that remains an obscure source, will consider endoscopic evaluation. Denies any overt bleeding at this time. 2) chronic GERD-encouraged weight loss, continue with protonic 40 mg twice daily with intermittent use of Carafate.     3) follow-up in 2 to 3 months    Thank you for allowing me to participate in the care of Mr. Elizabeth Seals. For any further questions please do not hesitate to contact me. I have reviewed and agree with the ROS entered by the MA/LPN from today's encounter documented in a separate note. Manuel Cornejo MD, MPH   [facility]  Office #: [phone]    this note is created with the assistance of a speech recognition program.  While intending to generate a document that actually reflects the content of the visit, the document can still have some errors including those of syntax and sound a like substitutions which may escape proof reading. It such instances, actual meaning can be extrapolated by contextual diversion.

## 2021-08-17 DIAGNOSIS — N39.490 OVERFLOW INCONTINENCE OF URINE: ICD-10-CM

## 2021-08-17 RX ORDER — TAMSULOSIN HYDROCHLORIDE 0.4 MG/1
0.4 CAPSULE ORAL DAILY
Qty: 30 CAPSULE | Refills: 5 | Status: SHIPPED | OUTPATIENT
Start: 2021-08-17 | End: 2022-01-31

## 2021-08-19 ENCOUNTER — HOSPITAL ENCOUNTER (OUTPATIENT)
Age: 66
Setting detail: SPECIMEN
Discharge: HOME | End: 2021-08-19
Payer: MEDICAID

## 2021-08-19 DIAGNOSIS — K21.9 GASTROESOPHAGEAL REFLUX DISEASE WITHOUT ESOPHAGITIS: ICD-10-CM

## 2021-08-19 LAB
ABSOLUTE EOS #: 0.31 K/UL (ref 0–0.44)
ABSOLUTE IMMATURE GRANULOCYTE: 0.08 K/UL (ref 0–0.3)
ABSOLUTE LYMPH #: 1.46 K/UL (ref 1.1–3.7)
ABSOLUTE MONO #: 0.91 K/UL (ref 0.1–1.2)
ANION GAP SERPL CALCULATED.3IONS-SCNC: 13 MMOL/L (ref 9–17)
BASOPHILS # BLD: 1 % (ref 0–2)
BASOPHILS ABSOLUTE: 0.05 K/UL (ref 0–0.2)
BUN BLDV-MCNC: 14 MG/DL (ref 8–23)
BUN/CREAT BLD: ABNORMAL (ref 9–20)
CALCIUM SERPL-MCNC: 9 MG/DL (ref 8.6–10.4)
CHLORIDE BLD-SCNC: 99 MMOL/L (ref 98–107)
CO2: 25 MMOL/L (ref 20–31)
CREAT SERPL-MCNC: 0.77 MG/DL (ref 0.7–1.2)
DIFFERENTIAL TYPE: ABNORMAL
EOSINOPHILS RELATIVE PERCENT: 3 % (ref 1–4)
GFR AFRICAN AMERICAN: >60 ML/MIN
GFR NON-AFRICAN AMERICAN: >60 ML/MIN
GFR SERPL CREATININE-BSD FRML MDRD: ABNORMAL ML/MIN/{1.73_M2}
GFR SERPL CREATININE-BSD FRML MDRD: ABNORMAL ML/MIN/{1.73_M2}
GLUCOSE BLD-MCNC: 115 MG/DL (ref 70–99)
HCT VFR BLD CALC: 39 % (ref 40.7–50.3)
HEMOGLOBIN: 12 G/DL (ref 13–17)
IMMATURE GRANULOCYTES: 1 %
IRON SATURATION: 23 % (ref 20–55)
IRON: 64 UG/DL (ref 59–158)
LYMPHOCYTES # BLD: 15 % (ref 24–43)
MCH RBC QN AUTO: 28.4 PG (ref 25.2–33.5)
MCHC RBC AUTO-ENTMCNC: 30.8 G/DL (ref 28.4–34.8)
MCV RBC AUTO: 92.4 FL (ref 82.6–102.9)
MONOCYTES # BLD: 9 % (ref 3–12)
NRBC AUTOMATED: 0 PER 100 WBC
PDW BLD-RTO: 16.2 % (ref 11.8–14.4)
PLATELET # BLD: 252 K/UL (ref 138–453)
PLATELET ESTIMATE: ABNORMAL
PMV BLD AUTO: 10.5 FL (ref 8.1–13.5)
POTASSIUM SERPL-SCNC: 3.9 MMOL/L (ref 3.7–5.3)
RBC # BLD: 4.22 M/UL (ref 4.21–5.77)
RBC # BLD: ABNORMAL 10*6/UL
SEG NEUTROPHILS: 71 % (ref 36–65)
SEGMENTED NEUTROPHILS ABSOLUTE COUNT: 6.83 K/UL (ref 1.5–8.1)
SODIUM BLD-SCNC: 137 MMOL/L (ref 135–144)
TOTAL IRON BINDING CAPACITY: 282 UG/DL (ref 250–450)
UNSATURATED IRON BINDING CAPACITY: 218 UG/DL (ref 112–347)
WBC # BLD: 9.6 K/UL (ref 3.5–11.3)
WBC # BLD: ABNORMAL 10*3/UL

## 2021-08-20 ENCOUNTER — TELEPHONE (OUTPATIENT)
Dept: FAMILY MEDICINE CLINIC | Age: 66
End: 2021-08-20

## 2021-08-20 NOTE — TELEPHONE ENCOUNTER
Received message from patient. Stating he talked to [address] [place] [address] a couple weeks ago about his hands getting worse, states home nursing care was recommended. Please advise. Thank you.

## 2021-08-20 NOTE — TELEPHONE ENCOUNTER
----- Message from Gautam Coello sent at 8/20/2021  8:10 AM EDT -----  Subject: Message to Provider    QUESTIONS  Information for Provider? talked to [address] [place] [address] Se a couple weeks ago about his   hands getting worse,  recommended home nursing care. Would like someone   to follow up with him about this? Was told by the office to give us a call   sometime this week.   ---------------------------------------------------------------------------  --------------  CALL BACK INFO  What is the best way for the office to contact you? Do not leave any   message, patient will call back for answer  Preferred Call Back Phone Number? [phone]  ---------------------------------------------------------------------------  --------------  SCRIPT ANSWERS  Relationship to Patient?  Self

## 2021-08-25 DIAGNOSIS — R09.82 POST-NASAL DRIP: ICD-10-CM

## 2021-08-25 DIAGNOSIS — I50.42 HEART FAILURE, SYSTOLIC AND DIASTOLIC, CHRONIC (HCC): ICD-10-CM

## 2021-08-25 DIAGNOSIS — R05.3 CHRONIC COUGH: ICD-10-CM

## 2021-08-25 NOTE — TELEPHONE ENCOUNTER
Writer called patient and explained to him Dr. Jamison Danielson note.  Patient voiced understanding and states he will follow up with cardiology first.

## 2021-08-25 NOTE — TELEPHONE ENCOUNTER
E-scribe request for allergy relief. Please review and e-scribe if applicable.      Last Visit Date:  07/20/2021  Next Visit Date:  Visit date not found    Hemoglobin A1C (%)   Date Value   12/10/2019 5.6   10/18/2019 5.8             ( goal A1C is < 7)   No results found for: LABMICR  LDL Cholesterol (mg/dL)   Date Value   03/03/2021 54       (goal LDL is <100)   AST (U/L)   Date Value   03/16/2021 15     ALT (U/L)   Date Value   03/16/2021 14     BUN (mg/dL)   Date Value   08/19/2021 14     BP Readings from Last 3 Encounters:   08/16/21 109/61   08/03/21 (!) 112/58   07/20/21 127/75          (goal 120/80)        Patient Active Problem List:     Atrial flutter (HCC)     Sleep-related breathing disorder     Hypokalemia     Atrial fibrillation (HCC)     Ischemic cardiomyopathy     Combined systolic and diastolic congestive heart failure (HCC)     Cellulitis of left lower extremity     Acute cystitis without hematuria     Hx of CABG     Heart failure, systolic and diastolic, chronic (HCC)     Bilateral hand numbness     Right thigh pain     Left leg weakness     Coronary artery disease     Chronic cough     Gastroesophageal reflux disease without esophagitis     Overflow incontinence of urine     Polyp of colon     Post-void dribbling     Post-nasal drip     SYDNEY (acute kidney injury) ([address]     Acute CHF (congestive heart failure) (HCC)     Hyperkalemia     Hypotension     Overactive bladder     Hemorrhoids     COVID-19 virus RNA test result indeterminate     HARPREET (obstructive sleep apnea)     Laryngopharyngeal reflux      ----Lenka Alves

## 2021-08-25 NOTE — TELEPHONE ENCOUNTER
Patient has had bilateral hand swelling but that is related to his congestive heart failure. Patient already follows up with CHF clinic and gets diuretics as well. I am not sure if nursing home visit will help him with his hands because it is mainly fluid related. Patient recently saw CHF clinic but has not seen his cardiologist.  The last time I saw the patient he had mention he will be making an appointment with his cardiologist and I highly recommended it because patient has history of atrial fibrillation and is not on any anticoagulation.   If patient's condition gets worse have him make an appointment to come see us in the clinic but I would rather have him see his heart doctor first.  Thank you

## 2021-08-31 ENCOUNTER — HOSPITAL ENCOUNTER (OUTPATIENT)
Dept: OTHER | Age: 66
Discharge: HOME | End: 2021-08-31
Payer: MEDICAID

## 2021-08-31 VITALS
SYSTOLIC BLOOD PRESSURE: 120 MMHG | OXYGEN SATURATION: 98 % | WEIGHT: 293.8 LBS | RESPIRATION RATE: 20 BRPM | BODY MASS INDEX: 48.89 KG/M2 | HEART RATE: 75 BPM | DIASTOLIC BLOOD PRESSURE: 60 MMHG

## 2021-08-31 PROCEDURE — 99212 OFFICE O/P EST SF 10 MIN: CPT

## 2021-08-31 ASSESSMENT — PAIN SCALES - GENERAL: PAINLEVEL_OUTOF10: 0

## 2021-08-31 NOTE — TELEPHONE ENCOUNTER
Spoke to patient explain to patient provider would like him to see cardiologist, if not to schedule with us.

## 2021-08-31 NOTE — PROGRESS NOTES
Date:  2021  Time:  9:17 AM    CHF Clinic at [facility]    Office: [phone] Fax: [phone]    Re:  Cuco Chanel   Patient : 1955    Vital Signs: /60   Pulse 75   Resp 20   Wt 293 lb 12.8 oz (133.3 kg)   SpO2 98%   BMI 48.89 kg/m²                       O2 Device: None (Room air)                           No results for input(s): CBC, HGB, HCT, WBC, PLATELET, NA, K, CL, CO2, BUN, CREATININE, GLUCOSE, BNP, INR in the last 72 hours. Respiratory:    Assessment  Charting Type: Reassessment    Breath Sounds  Right Upper Lobe: Clear  Right Middle Lobe: Clear  Right Lower Lobe: Clear  Left Upper Lobe: Clear  Left Lower Lobe: Clear    Cough/Sputum  Cough: Non-productive  Frequency: Occasional         Peripheral Vascular  RLE Edema: +1, Non-pitting  LLE Edema: +1, Non-pitting      Complaints: None    Physician Orders None    Comment : Arrived for scheduled visit per ambulatory with cane assist. His weight is down 2.4 lbs from last month. No new complaints, says his breathing has been good and denies any chest pain. Ongoing lower leg, ankle and pedal and hand edema noted, no change from last month. States compliance with taking all medications. Saw Cardiologist on 21 and will need to follow up in 6 months. Last Echo was  and shows improvement in EF to > 55%. Reinforced low sodium diet and fluid restrictions. No s/s acute chf at this time. Next CHF Clinic visit in 4 weeks.     Electronically signed by Caroline Lux RN on 2021 at 9:17 AM

## 2021-08-31 NOTE — PROGRESS NOTES
Verbally reviewed medication list with patient; patient verbalized understanding. Discussed 2000mg/day sodium restricted diet; patient verbalized understanding. Moderate daily exercise encouraged as tolerated. Discussed rest breaks as needed; patient verbalized understanding. Patient instructed to weigh self at the same time of each day, using same clothes and same scale; reinforced teaching to monitor for 3-5 lb weight increase over 1-2 days, and to notify the CHF clinic at [phone] or physician office if weight change noted. Patient verbalized understanding. Risks of smoking discussed with the patient if applicable; patient strongly discouraged to smoke. Patient verbalized understanding. Signs and symptoms of CHF discussed with patient, such as feeling more tired than normal, feeling short of breath, coughing that increases when you lie down, sudden weight gain, swelling of your feet, legs or belly. Patient verbalized understanding to notify the CHF clinic at [phone] or physician office if these symptoms occur. Compliance with plan of care and further disease process causes discussed with patient, patient encouraged to keep all follow up appointments. Patient verbalized understanding.

## 2021-09-01 RX ORDER — ISOSORBIDE MONONITRATE 30 MG/1
30 TABLET, EXTENDED RELEASE ORAL DAILY
Qty: 30 TABLET | Refills: 1 | Status: SHIPPED | OUTPATIENT
Start: 2021-09-01 | End: 2021-09-27

## 2021-09-01 RX ORDER — LORATADINE 10 MG/1
TABLET ORAL
Qty: 30 TABLET | Refills: 2 | Status: SHIPPED | OUTPATIENT
Start: 2021-09-01 | End: 2021-12-06

## 2021-09-01 RX ORDER — ATORVASTATIN CALCIUM 40 MG/1
40 TABLET, FILM COATED ORAL NIGHTLY
Qty: 30 TABLET | Refills: 1 | Status: SHIPPED | OUTPATIENT
Start: 2021-09-01 | End: 2021-11-18 | Stop reason: SDUPTHER

## 2021-09-10 ENCOUNTER — OFFICE VISIT (OUTPATIENT)
Dept: UROLOGY | Age: 66
End: 2021-09-10
Payer: MEDICAID

## 2021-09-10 VITALS
SYSTOLIC BLOOD PRESSURE: 118 MMHG | WEIGHT: 292 LBS | DIASTOLIC BLOOD PRESSURE: 66 MMHG | BODY MASS INDEX: 48.59 KG/M2 | HEART RATE: 69 BPM

## 2021-09-10 DIAGNOSIS — N39.43 POST-VOID DRIBBLING: ICD-10-CM

## 2021-09-10 DIAGNOSIS — N39.490 OVERFLOW INCONTINENCE OF URINE: Primary | ICD-10-CM

## 2021-09-10 LAB
APPEARANCE FLUID: CLEAR
BILIRUBIN, POC: ABNORMAL
BLOOD URINE, POC: ABNORMAL
CLARITY, POC: CLEAR
COLOR, POC: YELLOW
GLUCOSE URINE, POC: ABNORMAL
KETONES, POC: ABNORMAL
LEUKOCYTE EST, POC: ABNORMAL
NITRITE, POC: ABNORMAL
PH, POC: 6
PROTEIN, POC: ABNORMAL
SPECIFIC GRAVITY, POC: >=1.03
UROBILINOGEN, POC: 1

## 2021-09-10 PROCEDURE — 81002 URINALYSIS NONAUTO W/O SCOPE: CPT | Performed by: UROLOGY

## 2021-09-10 PROCEDURE — 1036F TOBACCO NON-USER: CPT | Performed by: UROLOGY

## 2021-09-10 PROCEDURE — G8427 DOCREV CUR MEDS BY ELIG CLIN: HCPCS | Performed by: UROLOGY

## 2021-09-10 PROCEDURE — 99214 OFFICE O/P EST MOD 30 MIN: CPT | Performed by: UROLOGY

## 2021-09-10 PROCEDURE — 3017F COLORECTAL CA SCREEN DOC REV: CPT | Performed by: UROLOGY

## 2021-09-10 PROCEDURE — 99212 OFFICE O/P EST SF 10 MIN: CPT | Performed by: UROLOGY

## 2021-09-10 PROCEDURE — G8417 CALC BMI ABV UP PARAM F/U: HCPCS | Performed by: UROLOGY

## 2021-09-10 NOTE — PROGRESS NOTES
PSA 0.67 09/22/2020       Last total testosterone:  No results found for: TESTOSTERONE    Urinalysis today:  Results for POC orders placed in visit on 09/10/21   POCT Urine Dipstick   Result Value Ref Range    Color, UA yellow     Clarity, UA clear     Glucose, UA POC neg     Bilirubin, UA ng     Ketones, UA neg     Spec Grav, UA >=1.030     Blood, UA POC neg     pH, UA 6.0     Protein, UA POC trace     Urobilinogen, UA 1.0     Leukocytes, UA trace     Nitrite, UA neg     Appearance, Fluid Clear Clear, Slightly Cloudy       Last BUN and creatinine:  Lab Results   Component Value Date    BUN 14 08/19/2021     Lab Results   Component Value Date    CREATININE 0.77 08/19/2021       Imaging Reviewed during this Office Visit:   (results were independently reviewed by physician and radiology report verified)    PAST MEDICAL, FAMILY AND SOCIAL HISTORY UPDATE:  Past Medical History:   Diagnosis Date    Arthritis     back    Atrial fibrillation ([facility] [address] 10/2019    Dr. William Hathaway BPH (benign prostatic hyperplasia)     Cellulitis     Cervical disc disease     CHF (congestive heart failure) [facility])     cardiologist Dr. Philipp Kelley s CHF clinic    Coronary artery disease 10/2019     CABG 2019 [facility]    Dr. Halle Dmuont Cardiology last seen within the last year.     CPAP (continuous positive airway pressure) dependence     GERD (gastroesophageal reflux disease)     on rx    History of incarceration     released 2019    History of incarceration     Hyperlipidemia     Dr. Ivanna Butler Hypertension     [facility] CHF clinic     Dr. Ivanna Butler Myocardial infarct [facility])     Dr. Ivanna Butler Obesity     Sleep apnea     C-pap    Wears reading eyeglasses     Wellness examination     Dr. Gaye Arriaga 4/2021     Past Surgical History:   Procedure Laterality Date    ABDOMINAL EXPLORATION SURGERY      CARDIAC SURGERY      2019    CARDIOVERSION  10/16/2019    CARPAL TUNNEL RELEASE Bilateral 2008    CERVICAL SPINE SURGERY      2-5    COLONOSCOPY N/A 10/29/2020    COLONOSCOPY WITH BIOPSY performed by Malcolm Kathleen MD at [address] [place] [address]  10/29/2020    COLONOSCOPY SUBMUCOSAL/BOTOX INJECTION performed by Malcolm Kathleen MD at [address] [place] [address]  10/29/2020    COLONOSCOPY POLYPECTOMY SNARE/COLD BIOPSY performed by Malcolm Kathleen MD at [address] [place] [address] N/A 01/12/2021    COLONOSCOPY POLYPECTOMY HOT SNARE, COLD SNARE POLPECTOMY performed by Rubens Goldberg MD at [facility] N/A 10/21/2019    CABG CORONARY ARTERY BYPASS GRAFT X1, LIMA-LAD, BROWN 4 MAZE PROCEDURE, 50MM ATRICURE ATRIAL CLIP RIGID INTERNAL FIXATION PLATES X 3   SCREWS X 13 performed by Chava Dawkins MD at [place] [address] 03/26/2021    URODYNAMICS performed by Jadyn Murrell MD at [facility] 9  03/26/2021    CYSTOSCOPY FLEXIBLE performed by Jadyn Murrell MD at [place] [address]  05/14/2021    CYSTOSCOPY, UROLIFT, FULGURATION    CYSTOSCOPY N/A 5/14/2021    CYSTOSCOPY, UROLIFT, FULGURATION performed by Jadyn Murrell MD at [facility] [address]      cataract surgery 2020    FRACTURE SURGERY      Left leg    TRANSESOPHAGEAL ECHOCARDIOGRAM  10/16/2019     Family History   Problem Relation Age of Onset    Alcohol Abuse Maternal Uncle     Heart Attack Maternal Uncle     Cancer Mother     Alcohol Abuse Father      Outpatient Medications Marked as Taking for the 9/10/21 encounter (Office Visit) with Jadyn Murrell MD   Medication Sig Dispense Refill    ALLERGY RELIEF 10 MG tablet TAKE 1 TABLET BY MOUTH DAILY 30 tablet 2    isosorbide mononitrate (IMDUR) 30 MG extended release tablet Take 1 tablet by mouth daily 30 tablet 1    atorvastatin (LIPITOR) 40 MG tablet Take 1 tablet by mouth nightly 30 tablet 1    tamsulosin (FLOMAX) 0.4 MG capsule TAKE 1 CAPSULE BY MOUTH DAILY 30 capsule 5    clopidogrel (PLAVIX) 75 MG tablet Take 1 tablet by mouth daily 90 tablet 5  oxybutynin (DITROPAN-XL) 5 MG extended release tablet TAKE 1 TABLET BY MOUTH DAILY 30 tablet 3    pantoprazole (PROTONIX) 40 MG tablet TAKE 1 TABLET BY MOUTH 2 TIMES DAILY (BEFORE MEALS) 60 tablet 5    potassium chloride (KLOR-CON M) 20 MEQ extended release tablet Take 1 tablet by mouth daily 30 tablet 1    Prenatal Vit-Fe Fumarate-FA (NIVA-PLUS) 27-1 MG TABS       Multiple Vitamins-Minerals (PRESERVISION AREDS 2) CAPS       furosemide (LASIX) 20 MG tablet Take 3 tablets by mouth 2 times daily 180 tablet 1    Blood Pressure Monitor KIT 1 each by Does not apply route 2 times daily 1 kit 0    Misc. Devices ([employer] WEIGHT SCALE) MISC 1 each by Does not apply route as needed (weigh once a day and record) 1 each 0    nitroGLYCERIN (NITROSTAT) 0.4 MG SL tablet Place 1 tablet under the tongue every 5 minutes as needed for Chest pain up to max of 3 total doses. If no relief after 1 dose, call 911. 25 tablet 1    Elastic Bandages & Supports (JOBST KNEE HIGH COMPRESSION SM) MISC 2 each by Does not apply route daily Wear q day. Remove q hs. Diagnosis: Chronic venous insufficiency 2 each 0       Patient has no known allergies. Social History     Tobacco Use   Smoking Status Never Smoker   Smokeless Tobacco Never Used       Social History     Substance and Sexual Activity   Alcohol Use Not Currently    Comment: stopped 1991       REVIEW OF SYSTEMS:  Constitutional: negative  Eyes: negative  Respiratory: negative  Cardiovascular: negative  Gastrointestinal: negative  Musculoskeletal: negative  Genitourinary: see HPI  Skin: negative   Neurological: negative  Hematological/Lymphatic: negative  Psychological: negative    Physical Exam:      Vitals:    09/10/21 0911   BP: 118/66   Pulse: 69     NAD, no acute distress  RR  Psych mood appropriate  Abdomen: soft, nontender, nondistended, obese   Extremities: compression socks in place, edema noted     Assessment and Plan      1. Overflow incontinence of urine    2. Post-void dribbling    3. BPH with obstruction       Plan:   Patient having overflow incontinence of urine due to lasix, would likely continue to have symptoms due to his medical co morbidities   BPH has improved and stable UroLift  Continue oxybutynin 5 mg ER for overactive bladder. ED - offered treatment options, but he would like to see cardiologist to request clearance for PDE5i (viagra). Would need to be off NGT.         Charlyne Ahumada, DO, PGY-2  Urology Resident

## 2021-09-14 NOTE — TELEPHONE ENCOUNTER
E-scribe request for med refills. Please review and e-scribe if applicable.      Last Visit Date:  08/20/2021  Next Visit Date:  Visit date not found    Hemoglobin A1C (%)   Date Value   12/10/2019 5.6   10/18/2019 5.8             ( goal A1C is < 7)   No results found for: LABMICR  LDL Cholesterol (mg/dL)   Date Value   03/03/2021 54       (goal LDL is <100)   AST (U/L)   Date Value   03/16/2021 15     ALT (U/L)   Date Value   03/16/2021 14     BUN (mg/dL)   Date Value   08/19/2021 14     BP Readings from Last 3 Encounters:   09/10/21 118/66   08/31/21 120/60   08/16/21 109/61          (goal 120/80)        Patient Active Problem List:     Atrial flutter (HCC)     Sleep-related breathing disorder     Hypokalemia     Atrial fibrillation (HCC)     Ischemic cardiomyopathy     Combined systolic and diastolic congestive heart failure (HCC)     Cellulitis of left lower extremity     Acute cystitis without hematuria     Hx of CABG     Heart failure, systolic and diastolic, chronic (HCC)     Bilateral hand numbness     Right thigh pain     Left leg weakness     Coronary artery disease     Chronic cough     Gastroesophageal reflux disease without esophagitis     Overflow incontinence of urine     Polyp of colon     Post-void dribbling     Post-nasal drip     SYDNEY (acute kidney injury) ([address]     Acute CHF (congestive heart failure) (HCC)     Hyperkalemia     Hypotension     Overactive bladder     Hemorrhoids     COVID-19 virus RNA test result indeterminate     HARPREET (obstructive sleep apnea)     Laryngopharyngeal reflux      ----Zack Ion

## 2021-09-20 ENCOUNTER — TELEPHONE (OUTPATIENT)
Dept: FAMILY MEDICINE CLINIC | Age: 66
End: 2021-09-20

## 2021-09-20 RX ORDER — POTASSIUM CHLORIDE 20 MEQ/1
20 TABLET, EXTENDED RELEASE ORAL DAILY
Qty: 30 TABLET | Refills: 1 | Status: SHIPPED | OUTPATIENT
Start: 2021-09-20 | End: 2021-09-21 | Stop reason: SDUPTHER

## 2021-09-20 NOTE — TELEPHONE ENCOUNTER
Patient is [facility] for appt 9-       Patient was requesting home health care for his balance, falling and misuse with his hands. Please advise. Thank you. Patient needs compression stockings. Please advise. Thank you.

## 2021-09-20 NOTE — TELEPHONE ENCOUNTER
Patient is needed his mediation to be refilled, not due for appointment according to last office notes until 10/2021. Please advise. Thank you. Patient is out of the potassium.

## 2021-09-20 NOTE — TELEPHONE ENCOUNTER
Potassium chloride pending for refill     Health Maintenance   Topic Date Due    Flu vaccine (1) 09/01/2021    Lipid screen  03/03/2022    Potassium monitoring  08/19/2022    Creatinine monitoring  08/19/2022    Diabetes screen  12/10/2022    Pneumococcal 0-64 years Vaccine (2 of 2 - PPSV23) 08/20/2025    DTaP/Tdap/Td vaccine (2 - Td or Tdap) 08/20/2030    Colon cancer screen colonoscopy  01/12/2031    Shingles Vaccine  Completed    COVID-19 Vaccine  Completed    Hepatitis C screen  Completed    HIV screen  Completed    Hepatitis A vaccine  Aged Out    Hepatitis B vaccine  Aged Out    Hib vaccine  Aged Out    Meningococcal (ACWY) vaccine  Aged Out             (applicable per patient's age: Cancer Screenings, Depression Screening, Fall Risk Screening, Immunizations)    Hemoglobin A1C (%)   Date Value   12/10/2019 5.6   10/18/2019 5.8     LDL Cholesterol (mg/dL)   Date Value   03/03/2021 54     AST (U/L)   Date Value   03/16/2021 15     ALT (U/L)   Date Value   03/16/2021 14     BUN (mg/dL)   Date Value   08/19/2021 14      (goal A1C is < 7)   (goal LDL is <100) need 30-50% reduction from baseline     BP Readings from Last 3 Encounters:   09/10/21 118/66   08/31/21 120/60   08/16/21 109/61    (goal /80)      All Future Testing planned in [facility]:  Lab Frequency Next Occurrence   COLONOSCOPY (Diagnostic) Once 01/11/2021   COVID-19 Once 01/05/2021   US URINARY BLADDER LIMITED Once 12/01/2021   Creatinine, Random Urine Once 03/14/2021   Sodium, Urine, Random Once 03/14/2021   Urea Nitrogen, Urine Once 03/14/2021   US RETROPERITONEAL COMPLETE Once 33/36/2878   Basic Metabolic Panel Once 73/73/7463   COVID-19 Once 03/30/2021   COVID-19 Once 35/79/0063   Basic Metabolic Panel Once 48/55/0435       Next Visit Date:  Future Appointments   Date Time Provider Taj Flores   9/21/2021  3:15 PM MD Imelda Baeza [facility]   9/30/2021  9:30 AM [facility] CLINIC  1 [facility] CHF CLI [facility] 10/29/2021  9:30 AM Vanda Loo MD sv gr [facility]ks [facility]   1/14/2022  9:20 AM SCHEDULE, [facility] UROLOGY [facility] Maria Esthergenia Esquivel            Patient Active Problem List:     Atrial flutter (HCC)     Sleep-related breathing disorder     Hypokalemia     Atrial fibrillation (HCC)     Ischemic cardiomyopathy     Combined systolic and diastolic congestive heart failure (HCC)     Cellulitis of left lower extremity     Acute cystitis without hematuria     Hx of CABG     Heart failure, systolic and diastolic, chronic (HCC)     Bilateral hand numbness     Right thigh pain     Left leg weakness     Coronary artery disease     Chronic cough     Gastroesophageal reflux disease without esophagitis     Overflow incontinence of urine     Polyp of colon     Post-void dribbling     Post-nasal drip     SYDNEY (acute kidney injury) ([address]     Acute CHF (congestive heart failure) (HCC)     Hyperkalemia     Hypotension     Overactive bladder     Hemorrhoids     COVID-19 virus RNA test result indeterminate     HARPREET (obstructive sleep apnea)     Laryngopharyngeal reflux

## 2021-09-21 ENCOUNTER — OFFICE VISIT (OUTPATIENT)
Dept: FAMILY MEDICINE CLINIC | Age: 66
End: 2021-09-21
Payer: MEDICAID

## 2021-09-21 VITALS
DIASTOLIC BLOOD PRESSURE: 60 MMHG | TEMPERATURE: 97 F | WEIGHT: 293.8 LBS | HEART RATE: 67 BPM | BODY MASS INDEX: 48.95 KG/M2 | SYSTOLIC BLOOD PRESSURE: 100 MMHG | HEIGHT: 65 IN

## 2021-09-21 DIAGNOSIS — I50.42 HEART FAILURE, SYSTOLIC AND DIASTOLIC, CHRONIC (HCC): ICD-10-CM

## 2021-09-21 DIAGNOSIS — E87.6 HYPOKALEMIA: ICD-10-CM

## 2021-09-21 DIAGNOSIS — M79.662 PAIN OF LEFT CALF: Primary | ICD-10-CM

## 2021-09-21 DIAGNOSIS — I87.8 CHRONIC VENOUS STASIS: ICD-10-CM

## 2021-09-21 DIAGNOSIS — I95.0 IDIOPATHIC HYPOTENSION: ICD-10-CM

## 2021-09-21 PROCEDURE — 1036F TOBACCO NON-USER: CPT

## 2021-09-21 PROCEDURE — 3017F COLORECTAL CA SCREEN DOC REV: CPT

## 2021-09-21 PROCEDURE — 99213 OFFICE O/P EST LOW 20 MIN: CPT

## 2021-09-21 PROCEDURE — 90686 IIV4 VACC NO PRSV 0.5 ML IM: CPT | Performed by: STUDENT IN AN ORGANIZED HEALTH CARE EDUCATION/TRAINING PROGRAM

## 2021-09-21 PROCEDURE — 99211 OFF/OP EST MAY X REQ PHY/QHP: CPT | Performed by: STUDENT IN AN ORGANIZED HEALTH CARE EDUCATION/TRAINING PROGRAM

## 2021-09-21 PROCEDURE — G8417 CALC BMI ABV UP PARAM F/U: HCPCS

## 2021-09-21 PROCEDURE — G8428 CUR MEDS NOT DOCUMENT: HCPCS

## 2021-09-21 RX ORDER — FUROSEMIDE 20 MG/1
40 TABLET ORAL 2 TIMES DAILY
Qty: 60 TABLET | Refills: 1 | Status: SHIPPED | OUTPATIENT
Start: 2021-09-21 | End: 2021-09-23

## 2021-09-21 RX ORDER — POTASSIUM CHLORIDE 20 MEQ/1
20 TABLET, EXTENDED RELEASE ORAL DAILY
Qty: 30 TABLET | Refills: 1 | Status: SHIPPED | OUTPATIENT
Start: 2021-09-21 | End: 2021-12-27 | Stop reason: SDUPTHER

## 2021-09-21 ASSESSMENT — ENCOUNTER SYMPTOMS
COLOR CHANGE: 1
SHORTNESS OF BREATH: 0
ABDOMINAL PAIN: 0
APNEA: 0
VOMITING: 0

## 2021-09-21 NOTE — PROGRESS NOTES
Attending Physician Statement  I have discussed the care of Ori Guzman 61 y.o. male, including pertinent history and exam findings, with the resident Dr. Nory Langford MD.    History and Exam:   Chief Complaint   Patient presents with    Orders     compression stockings, home health services    Medication Refill       Past Medical History:   Diagnosis Date    Arthritis     back    Atrial fibrillation ([address] 10/2019    Dr. Gillian Oswald BPH (benign prostatic hyperplasia)     Cellulitis     Cervical disc disease     CHF (congestive heart failure) [facility])     cardiologist Dr. Grazyna Soriano. [facility] CHF clinic    Coronary artery disease 10/2019     CABG 2019 [facility]    Dr. Torres [place] [facility] last seen within the last year.  CPAP (continuous positive airway pressure) dependence     GERD (gastroesophageal reflux disease)     on rx    History of incarceration     released 2019    History of incarceration     Hyperlipidemia     Dr. Penny Hill Hypertension     [facility] CHF clinic     Dr. Penny Hill Myocardial infarct [facility])     Dr. Penny Hill Obesity     Sleep apnea     C-pap    Wears reading eyeglasses     Wellness examination     Dr. Fatou Dover 4/2021     No Known Allergies   I have seen and examined the patient and the key elements of the encounter have been performed by me.   BP Readings from Last 3 Encounters:   09/21/21 100/60   09/10/21 118/66   08/31/21 120/60     /60 (Site: Left Lower Arm, Position: Sitting, Cuff Size: Large Adult) Comment: manual  Pulse 67   Temp 97 °F (36.1 °C) (Temporal)   Ht 5' 5\" (1.651 m)   Wt 293 lb 12.8 oz (133.3 kg)   BMI 48.89 kg/m²   Lab Results   Component Value Date    WBC 9.6 08/19/2021    HGB 12.0 (L) 08/19/2021    HCT 39.0 (L) 08/19/2021     08/19/2021    CHOL 112 05/26/2020    TRIG 53 05/26/2020    HDL 47 03/03/2021    ALT 14 03/16/2021    AST 15 03/16/2021     08/19/2021    K 3.9 08/19/2021    CL 99 08/19/2021    CREATININE 0.77 08/19/2021 BUN 14 08/19/2021    CO2 25 08/19/2021    TSH 1.48 01/24/2020    PSA 0.67 09/22/2020    INR 1.0 03/08/2021    LABA1C 5.6 12/10/2019     Lab Results   Component Value Date    LABALBU 3.8 03/16/2021     Lab Results   Component Value Date    IRON 64 08/19/2021    TIBC 282 08/19/2021    FERRITIN 143 12/10/2019     Lab Results   Component Value Date    LDLCHOLESTEROL 54 03/03/2021     I agree with the assessment, plan and the diagnosis of    Diagnosis Orders   1. Pain of left calf  US DUP LOWER EXTREMITY LEFT KATELYN   2. Chronic venous stasis  [facility] for [facility] Determinants of [facility]    Compression Stocking   3. Hypokalemia  potassium chloride (KLOR-CON M) 20 MEQ extended release tablet   4. Heart failure, systolic and diastolic, chronic (HCC)  furosemide (LASIX) 20 MG tablet   5. Idiopathic hypotension      . I agree with orders as documented by the resident. More than 25 minutes spent  in face to face encounter with the patient and more than half in counseling. Patient's questions were answered. Patient Voiced understanding to the counseling. Return in about 1 month (around 10/21/2021) for hypotension check.    (GC Modifier)-Dr. Cary Royal MD

## 2021-09-21 NOTE — PATIENT INSTRUCTIONS
Thank you for letting us take care of you today. We hope all your questions were addressed. If a question was overlooked or something else comes to mind after you return home, please contact a member of your Care Team listed below. Your Care Team at [place] [address] is Team #4  Jackie Sylvester MD (Faculty)  Linder Bloch, MD (Resident)  Vickie Alvarado MD (Resident)  Deja Elizalde MD (Resident)  Lindy Sanchez MD (Resident)  PERRY Morocho, DELIA Banks, Miesha Painter., Myra Carlton [facility] office)  Kenyetta Brooke, [address] (Clinical Practice Manager)  Yue Duran [facility] (Clinical Pharmacist)       Office phone number: [phone]    If you need to get in right away due to illness, please be advised we have \"Same Day\" appointments available Monday-Friday. Please call us at [phone] option #3 to schedule your \"Same Day\" appointment. Patient Education        Influenza (Flu) Vaccine (Inactivated or Recombinant): What You Need to Know  Why get vaccinated? Influenza vaccine can prevent influenza (flu). Flu is a contagious disease that spreads around the [facility] every year, usually between October and May. Anyone can get the flu, but it is more dangerous for some people. Infants and young children, people 72years of age and older, pregnant women, and people with certain health conditions or a weakened immune system are at greatest risk of flu complications. Pneumonia, bronchitis, sinus infections and ear infections are examples of flu-related complications. If you have a medical condition, such as heart disease, cancer or diabetes, flu can make it worse. Flu can cause fever and chills, sore throat, muscle aches, fatigue, cough, headache, and runny or stuffy nose. Some people may have vomiting and diarrhea, though this is more common in children than adults. Each year, thousands of people in the [facility] die from flu, and many more are hospitalized.  Flu vaccine prevents millions of illnesses and flu-related visits to the doctor each year. Influenza vaccine  CDC recommends everyone 10months of age and older get vaccinated every flu season. Children 6 months through 6years of age may need 2 doses during a single flu season. Everyone else needs only 1 dose each flu season. It takes about 2 weeks for protection to develop after vaccination. There are many flu viruses, and they are always changing. Each year a new flu vaccine is made to protect against three or four viruses that are likely to cause disease in the upcoming flu season. Even when the vaccine doesn't exactly match these viruses, it may still provide some protection. Influenza vaccine does not cause flu. Influenza vaccine may be given at the same time as other vaccines. Talk with your health care provider  Tell your vaccine provider if the person getting the vaccine:  · Has had an allergic reaction after a previous dose of influenza vaccine, or has any severe, life-threatening allergies. · Has ever had Guillain-Barré Syndrome (also called GBS). In some cases, your health care provider may decide to postpone influenza vaccination to a future visit. People with minor illnesses, such as a cold, may be vaccinated. People who are moderately or severely ill should usually wait until they recover before getting influenza vaccine. Your health care provider can give you more information. Risks of a vaccine reaction  · Soreness, redness, and swelling where shot is given, fever, muscle aches, and headache can happen after influenza vaccine. · There may be a very small increased risk of Guillain-Barré Syndrome (GBS) after inactivated influenza vaccine (the flu shot). iDma Burns children who get the flu shot along with pneumococcal vaccine (PCV13), and/or DTaP vaccine at the same time might be slightly more likely to have a seizure caused by fever.  Tell your health care provider if a child who is getting flu vaccine has ever had a seizure. People sometimes faint after medical procedures, including vaccination. Tell your provider if you feel dizzy or have vision changes or ringing in the ears. As with any medicine, there is a very remote chance of a vaccine causing a severe allergic reaction, other serious injury, or death. What if there is a serious problem? An allergic reaction could occur after the vaccinated person leaves the clinic. If you see signs of a severe allergic reaction (hives, swelling of the face and throat, difficulty breathing, a fast heartbeat, dizziness, or weakness), call 9-1-1 and get the person to the nearest hospital.  For other signs that concern you, call your health care provider. Adverse reactions should be reported to the Vaccine Adverse Event Reporting System (VAERS). Your health care provider will usually file this report, or you can do it yourself. Visit the VAERS website at www.vaers. hhs.gov or call 0-472.775.4616. VAERS is only for reporting reactions, and VAERS staff do not give medical advice. The National Vaccine Injury Compensation Program  The National Vaccine Injury Compensation Program (VICP) is a federal program that was created to compensate people who may have been injured by certain vaccines. Visit the VICP website at www.hrsa.gov/vaccinecompensation or call 2-456.650.1785 to learn about the program and about filing a claim. There is a time limit to file a claim for compensation. How can I learn more? · Ask your healthcare provider. · Call your local or state health department. · Contact the Centers for Disease Control and Prevention (CDC):  ? Call 3-393.517.3505 (1-800-CDC-INFO) or  ? Visit CDC's website at www.cdc.gov/flu  Vaccine Information Statement (Interim)  Inactivated Influenza Vaccine  8/15/2019  42 J CARLOS Jocelyn Plasenciajaime [id number]  Department of Health and Human Services  Centers for Disease Control and Prevention  Many Vaccine Information Statements are available in [place] and other languages. See www.immunize.org/vis. Muchas hojas de información sobre vacunas están disponibles en español y en otros idiomas. Visite www.immunize.org/vis. Care instructions adapted under license by [facility] [address] If you have questions about a medical condition or this instruction, always ask your healthcare professional. [place] [address] any warranty or liability for your use of this information. Patient Education        Influenza (Flu) Vaccine (Inactivated or Recombinant): What You Need to Know  Why get vaccinated? Influenza vaccine can prevent influenza (flu). Flu is a contagious disease that spreads around the [facility] every year, usually between October and May. Anyone can get the flu, but it is more dangerous for some people. Infants and young children, people 72years of age and older, pregnant women, and people with certain health conditions or a weakened immune system are at greatest risk of flu complications. Pneumonia, bronchitis, sinus infections and ear infections are examples of flu-related complications. If you have a medical condition, such as heart disease, cancer or diabetes, flu can make it worse. Flu can cause fever and chills, sore throat, muscle aches, fatigue, cough, headache, and runny or stuffy nose. Some people may have vomiting and diarrhea, though this is more common in children than adults. Each year, thousands of people in the [facility] die from flu, and many more are hospitalized. Flu vaccine prevents millions of illnesses and flu-related visits to the doctor each year. Influenza vaccine  CDC recommends everyone 10months of age and older get vaccinated every flu season. Children 6 months through 6years of age may need 2 doses during a single flu season. Everyone else needs only 1 dose each flu season. It takes about 2 weeks for protection to develop after vaccination. There are many flu viruses, and they are always changing.  Each year a new flu vaccine is made to protect against three or four viruses that are likely to cause disease in the upcoming flu season. Even when the vaccine doesn't exactly match these viruses, it may still provide some protection. Influenza vaccine does not cause flu. Influenza vaccine may be given at the same time as other vaccines. Talk with your health care provider  Tell your vaccine provider if the person getting the vaccine:  · Has had an allergic reaction after a previous dose of influenza vaccine, or has any severe, life-threatening allergies. · Has ever had Guillain-Barré Syndrome (also called GBS). In some cases, your health care provider may decide to postpone influenza vaccination to a future visit. People with minor illnesses, such as a cold, may be vaccinated. People who are moderately or severely ill should usually wait until they recover before getting influenza vaccine. Your health care provider can give you more information. Risks of a vaccine reaction  · Soreness, redness, and swelling where shot is given, fever, muscle aches, and headache can happen after influenza vaccine. · There may be a very small increased risk of Guillain-Barré Syndrome (GBS) after inactivated influenza vaccine (the flu shot). [facility] children who get the flu shot along with pneumococcal vaccine (PCV13), and/or DTaP vaccine at the same time might be slightly more likely to have a seizure caused by fever. Tell your health care provider if a child who is getting flu vaccine has ever had a seizure. People sometimes faint after medical procedures, including vaccination. Tell your provider if you feel dizzy or have vision changes or ringing in the ears. As with any medicine, there is a very remote chance of a vaccine causing a severe allergic reaction, other serious injury, or death. What if there is a serious problem? An allergic reaction could occur after the vaccinated person leaves the clinic.  If you see signs of a severe allergic reaction (hives, swelling of the face and throat, difficulty breathing, a fast heartbeat, dizziness, or weakness), call 9-1-1 and get the person to the nearest hospital.  For other signs that concern you, call your health care provider. Adverse reactions should be reported to the Vaccine Adverse Event Reporting System (VAERS). Your health care provider will usually file this report, or you can do it yourself. Visit the VAERS website at www.vaers. [facility].gov or call 8-500.370.1439. VAERS is only for reporting reactions, and VAERS staff do not give medical advice. The National Vaccine Injury Compensation Program  The National Vaccine Injury Compensation Program (VICP) is a federal program that was created to compensate people who may have been injured by certain vaccines. Visit the VICP website at www.[facility].gov/vaccinecompensation or call 9-528.953.8665 to learn about the program and about filing a claim. There is a time limit to file a claim for compensation. How can I learn more? · Ask your healthcare provider. · Call your local or state health department. · Contact the Centers for Disease Control and Prevention (CDC):  ? Call 8-185.980.4347 (1-800-CDC-INFO) or  ? Visit CDC's website at www.cdc.gov/flu  Vaccine Information Statement (Interim)  Inactivated Influenza Vaccine  8/15/2019  42 U. Verl Sprung [id number]-33  Department of Health and Human Services  Centers for Disease Control and Prevention  Many Vaccine Information Statements are available in [place] and other languages. See www.immunize.org/vis. Muchas hojas de información sobre vacunas están disponibles en español y en otros idiomas. Visite www.immunize.org/vis. Care instructions adapted under license by [facility] ([facility]). If you have questions about a medical condition or this instruction, always ask your healthcare professional. [place] [address] any warranty or liability for your use of this information.

## 2021-09-21 NOTE — PROGRESS NOTES
Visit Information    Have you changed or started any medications since your last visit including any over-the-counter medicines, vitamins, or herbal medicines? no   Have you stopped taking any of your medications? Is so, why? -  no  Are you having any side effects from any of your medications? - no    Have you seen any other physician or provider since your last visit?  no   Have you had any other diagnostic tests since your last visit?  no   Have you been seen in the emergency room and/or had an admission in a hospital since we last saw you?  no   Have you had your routine dental cleaning in the past 6 months?  no     Do you have an active MyChart account? If no, what is the barrier?   No: declined    Patient Care Team:  Asher Cooney MD as PCP - General (Family Medicine)  Vanda Loo MD as Consulting Physician (Gastroenterology)  Casimiro Marcial MD as Consulting Physician (Urology)    Medical History Review  Past Medical, Family, and Social History reviewed and does not contribute to the patient presenting condition    Health Maintenance   Topic Date Due    Flu vaccine (1) 09/01/2021    Lipid screen  03/03/2022    Potassium monitoring  08/19/2022    Creatinine monitoring  08/19/2022    Diabetes screen  12/10/2022    Pneumococcal 0-64 years Vaccine (2 of 2 - PPSV23) 08/20/2025    DTaP/Tdap/Td vaccine (2 - Td or Tdap) 08/20/2030    Colon cancer screen colonoscopy  01/12/2031    Shingles Vaccine  Completed    COVID-19 Vaccine  Completed    Hepatitis C screen  Completed    HIV screen  Completed    Hepatitis A vaccine  Aged Out    Hepatitis B vaccine  Aged Out    Hib vaccine  Aged Out    Meningococcal (ACWY) vaccine  Aged Out

## 2021-09-21 NOTE — PROGRESS NOTES
Vaccine Information Sheet, \"Influenza - Inactivated\"  given to Crystal Bernal, or parent/legal guardian of  Crystal Bernal and verbalized understanding. Patient responses:    Have you ever had a reaction to a flu vaccine? No  Do you have any current illness? No  Have you ever had Guillian [place] Syndrome? No  Do you have a serious allergy to any of the following: Neomycin, Polymyxin, Thimerosal, eggs or egg products? No    Flu vaccine given per order. Please see immunization tab. Risks and benefits explained. Current VIS given.

## 2021-09-21 NOTE — PROGRESS NOTES
Subjective: Suzanne Fabry is a 61 y.o. male with  has a past medical history of Arthritis, Atrial fibrillation ([address] BPH (benign prostatic hyperplasia), Cellulitis, Cervical disc disease, CHF (congestive heart failure) ([address] Coronary artery disease, CPAP (continuous positive airway pressure) dependence, GERD (gastroesophageal reflux disease), History of incarceration, History of incarceration, Hyperlipidemia, Hypertension, Myocardial infarct ([address] Obesity, Sleep apnea, Wears reading eyeglasses, and Wellness examination. Presented to the office today for:  Chief Complaint   Patient presents with    Orders     compression stockings, home health services    Medication Refill       HPI    30-year-old male came to the clinic concerned about lower leg edema  Patient has history of congestive heart failure and venous chronic stasis  Patient is asking about refill for compression stockings   And home health help due to bilateral hand pain and difficulty ambulating at home    Patient has history of left left calf cellulitis in the past which had resolved after antibiotic course  Today the lesion looks good without any pus or drainage  Due to chronic venous stasis it is erythematous but not warm to touch and patient denies any fever or chills or other symptoms related to the infection  No fevers   He does report some left-sided calf pain  We will do an ultrasound to rule out DVT    Refilled lasix and potassium today    Paper work for Bariatric surgery day    Hypotension  Blood pressure 100/60  Will take losartan off the list  No dizziness reported     Review of Systems   Constitutional: Negative for activity change, appetite change and fever. Respiratory: Negative for apnea and shortness of breath. Cardiovascular: Positive for leg swelling. Negative for chest pain. Gastrointestinal: Negative for abdominal pain and vomiting. Musculoskeletal: Positive for arthralgias.         Bilateral hand pain   Skin: Positive for color change and wound. Neurological: Negative for dizziness and weakness. Psychiatric/Behavioral: Negative for agitation, behavioral problems and confusion. The patient has a   Family History   Problem Relation Age of Onset    Alcohol Abuse Maternal Uncle     Heart Attack Maternal Uncle     Cancer Mother     Alcohol Abuse Father        Objective:    BP (!) 92/55 (Site: Left Lower Arm, Position: Sitting, Cuff Size: Large Adult) Comment: machine  Pulse 67   Temp 97 °F (36.1 °C) (Temporal)   Ht 5' 5\" (1.651 m)   Wt 293 lb 12.8 oz (133.3 kg)   BMI 48.89 kg/m²    BP Readings from Last 3 Encounters:   09/21/21 (!) 92/55   09/10/21 118/66   08/31/21 120/60       Physical Exam  Constitutional:       General: He is not in acute distress. Appearance: He is obese. He is not ill-appearing, toxic-appearing or diaphoretic. Cardiovascular:      Rate and Rhythm: Normal rate and regular rhythm. Pulses: Normal pulses. Heart sounds: Normal heart sounds. No murmur heard. No friction rub. No gallop. Pulmonary:      Effort: Pulmonary effort is normal.      Breath sounds: No wheezing. Abdominal:      General: There is no distension. Tenderness: There is no abdominal tenderness. Musculoskeletal:         General: Tenderness present. Normal range of motion. Right lower leg: Edema present. Left lower leg: Edema present. Comments: Left calf tenderness present   Skin:     General: Skin is warm. Findings: Erythema present. Comments: Erythema on bilateral lower leg more so on the left side  Erythema likely due to chronic venous status getting better   Neurological:      Mental Status: He is alert and oriented to person, place, and time. Mental status is at baseline.    Psychiatric:         Mood and Affect: Mood normal.         Lab Results   Component Value Date    WBC 9.6 08/19/2021    HGB 12.0 (L) 08/19/2021    HCT 39.0 (L) 08/19/2021     08/19/2021    CHOL 112 05/26/2020    TRIG 53 05/26/2020    HDL 47 03/03/2021    ALT 14 03/16/2021    AST 15 03/16/2021     08/19/2021    K 3.9 08/19/2021    CL 99 08/19/2021    CREATININE 0.77 08/19/2021    BUN 14 08/19/2021    CO2 25 08/19/2021    TSH 1.48 01/24/2020    PSA 0.67 09/22/2020    INR 1.0 03/08/2021    LABA1C 5.6 12/10/2019     Lab Results   Component Value Date    CALCIUM 9.0 08/19/2021    PHOS 4.0 03/08/2021     Lab Results   Component Value Date    LDLCHOLESTEROL 54 03/03/2021       Assessment and Plan:    1. Chronic venous stasis  - [facility] Referral for Social Determinants of Health  - Compression Stocking    2. Hypokalemia  -Last labs reviewed and potassium levels were normal but on the lower side  Since patient will be taking Lasix it is okay for him to have some potassium pills  - potassium chloride (KLOR-CON M) 20 MEQ extended release tablet; Take 1 tablet by mouth daily  Dispense: 30 tablet; Refill: 1    3. Heart failure, systolic and diastolic, chronic (HCC)  - furosemide (LASIX) 20 MG tablet; Take 2 tablets by mouth 2 times daily  Dispense: 60 tablet; Refill: 1    4. Pain of left calf  -No concern about new skin infection  - US DUP LOWER EXTREMITY LEFT KATELYN; Future    5.  Idiopathic hypotension  Blood pressure 100/60  Took him off of losartan  We will see the patient again in a month to monitor her blood pressure again at the blood pressure still are stable are on this measurement we will not continue losartan          Requested Prescriptions     Signed Prescriptions Disp Refills    furosemide (LASIX) 20 MG tablet 60 tablet 1     Sig: Take 2 tablets by mouth 2 times daily    potassium chloride (KLOR-CON M) 20 MEQ extended release tablet 30 tablet 1     Sig: Take 1 tablet by mouth daily       Medications Discontinued During This Encounter   Medication Reason    losartan (COZAAR) 25 MG tablet Side effects    furosemide (LASIX) 20 MG tablet REORDER    potassium chloride (KLOR-CON M) 20

## 2021-09-23 ENCOUNTER — TELEPHONE (OUTPATIENT)
Dept: FAMILY MEDICINE CLINIC | Age: 66
End: 2021-09-23

## 2021-09-23 DIAGNOSIS — I50.42 HEART FAILURE, SYSTOLIC AND DIASTOLIC, CHRONIC (HCC): ICD-10-CM

## 2021-09-23 RX ORDER — FUROSEMIDE 40 MG/1
60 TABLET ORAL 2 TIMES DAILY
Qty: 30 TABLET | Refills: 2 | Status: SHIPPED | OUTPATIENT
Start: 2021-09-23 | End: 2021-11-01 | Stop reason: SDUPTHER

## 2021-09-23 NOTE — TELEPHONE ENCOUNTER
I've reordered it as 40 mg, 1.5 pills twice daily  This way he can take his 60 mg twice daily in lesser number of pills.  Thank you marilu

## 2021-09-23 NOTE — TELEPHONE ENCOUNTER
Patient called in regarding his medication stating there was a mistake on his furosemide, he is requesting it to be changed to 60 mg twice a day, he is requesting this soon due to not having anymore.     Please advise

## 2021-09-24 ENCOUNTER — TELEPHONE (OUTPATIENT)
Dept: FAMILY MEDICINE CLINIC | Age: 66
End: 2021-09-24

## 2021-09-24 NOTE — TELEPHONE ENCOUNTER
Called patient informed DME order was faxed to [facility] for compression stockings provided contact info.  [phone]

## 2021-09-27 ENCOUNTER — TELEPHONE (OUTPATIENT)
Dept: FAMILY MEDICINE CLINIC | Age: 66
End: 2021-09-27

## 2021-09-27 RX ORDER — ISOSORBIDE MONONITRATE 30 MG/1
30 TABLET, EXTENDED RELEASE ORAL DAILY
Qty: 30 TABLET | Refills: 1 | Status: SHIPPED | OUTPATIENT
Start: 2021-09-27 | End: 2022-01-04 | Stop reason: SDUPTHER

## 2021-09-27 NOTE — TELEPHONE ENCOUNTER
Called to inform the patient that fraud forms must be submitted online if not over the phone. Left VM asking for a call back.     Babak Gordon

## 2021-09-27 NOTE — TELEPHONE ENCOUNTER
Dorota Request for pending medication.     Last Visit Date: 9/21/21  Next Visit Date:  Future Appointments   Date Time Provider Taj Arvizui   9/28/2021  1:30 PM STV VASCULAR RM [facility] VASC [facility]   9/30/2021  9:30 AM STV CHF CLINIC RM 1 [facility]VZ CHF CLI [facility]   10/22/2021  2:00 PM Shorty Willams MD [facility] [address] [facility]   10/29/2021  9:30 AM Jenny Ashford MD sv gr lks [facility]   1/14/2022  9:20 AM SCHEDULE, [facility] [address] Maintenance   Topic Date Due    Lipid screen  03/03/2022    Potassium monitoring  08/19/2022    Creatinine monitoring  08/19/2022    Diabetes screen  12/10/2022    Pneumococcal 0-64 years Vaccine (2 of 2 - PPSV23) 08/20/2025    DTaP/Tdap/Td vaccine (2 - Td or Tdap) 08/20/2030    Colon cancer screen colonoscopy  01/12/2031    Flu vaccine  Completed    Shingles Vaccine  Completed    COVID-19 Vaccine  Completed    Hepatitis C screen  Completed    HIV screen  Completed    Hepatitis A vaccine  Aged Out    Hepatitis B vaccine  Aged Out    Hib vaccine  Aged Out    Meningococcal (ACWY) vaccine  Aged Out       Hemoglobin A1C (%)   Date Value   12/10/2019 5.6   10/18/2019 5.8             ( goal A1C is < 7)   No results found for: LABMICR  LDL Cholesterol (mg/dL)   Date Value   03/03/2021 54       (goal LDL is <100)   AST (U/L)   Date Value   03/16/2021 15     ALT (U/L)   Date Value   03/16/2021 14     BUN (mg/dL)   Date Value   08/19/2021 14     BP Readings from Last 3 Encounters:   09/21/21 100/60   09/10/21 118/66   08/31/21 120/60          (goal 120/80)    All Future Testing planned in [facility]  Lab Frequency Next Occurrence   COLONOSCOPY (Diagnostic) Once 01/11/2021   COVID-19 Once 01/05/2021   US URINARY BLADDER LIMITED Once 12/01/2021   Creatinine, Random Urine Once 03/14/2021   Sodium, Urine, Random Once 03/14/2021   Urea Nitrogen, Urine Once 03/14/2021   US RETROPERITONEAL COMPLETE Once 84/92/5328   Basic Metabolic Panel Once 10/50/0629 COVID-19 Once 03/30/2021   COVID-19 Once 75/68/3335   Basic Metabolic Panel Once 89/14/1923   US DUP LOWER EXTREMITY LEFT KATELYN Once 09/21/2021       Next Visit Date:  Future Appointments   Date Time Provider Taj Flores   9/28/2021  1:30 PM STV VASCULAR RM STVZ VASC [facility]   9/30/2021  9:30 AM STV CHF CLINIC RM 1 STVZ CHF CLI [facility]   10/22/2021  2:00 PM Zhao Laws MD [facility] J[facility]isa Mullen   10/29/2021  9:30 AM Taylor Garcia MD  gr s [facility]   1/14/2022  9:20 AM SCHEDULE, [facility] [place] Júnior Hermosilloi         Patient Active Problem List:     Atrial flutter (HCC)     Sleep-related breathing disorder     Hypokalemia     Atrial fibrillation (HCC)     Ischemic cardiomyopathy     Combined systolic and diastolic congestive heart failure (HCC)     Cellulitis of left lower extremity     Acute cystitis without hematuria     Hx of CABG     Heart failure, systolic and diastolic, chronic (HCC)     Bilateral hand numbness     Right thigh pain     Left leg weakness     Coronary artery disease     Chronic cough     Gastroesophageal reflux disease without esophagitis     Overflow incontinence of urine     Polyp of colon     Post-void dribbling     Post-nasal drip     SYDNEY (acute kidney injury) ([address]     Acute CHF (congestive heart failure) (HCC)     Hyperkalemia     Hypotension     Overactive bladder     Hemorrhoids     COVID-19 virus RNA test result indeterminate     HARPREET (obstructive sleep apnea)     Laryngopharyngeal reflux

## 2021-09-27 NOTE — TELEPHONE ENCOUNTER
Called sister Marino Cortes for address regarding PASSPORT application for emergency contact.      Anam Srinivasan
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
## 2021-09-28 ENCOUNTER — HOSPITAL ENCOUNTER (OUTPATIENT)
Dept: VASCULAR LAB | Age: 66
Discharge: HOME | End: 2021-09-28
Payer: MEDICAID

## 2021-09-28 DIAGNOSIS — M79.662 PAIN OF LEFT CALF: ICD-10-CM

## 2021-09-28 PROCEDURE — 93971 EXTREMITY STUDY: CPT

## 2021-09-28 NOTE — TELEPHONE ENCOUNTER
Patient returned call. Discussed the followin. Fraud dispute has been placed. Patient should receive a refund within 20 days. 2. Explained the resources collected within the packet currently in the mail. 3. Patient indicated needing a new pair of glasses and being uncertain of if his insurance will cover it. The writer looked into [employer] Blind[employer] and found the online application for a free pair of glasses. The patent provided relevant information while on the phone. Patient did not know date of last prescription. The writer called .[facility] ([phone]) to collect relevant information; left VM. Will follow-up with patient next Tuesday or Wednesday.      Tootie Robertson

## 2021-09-29 NOTE — TELEPHONE ENCOUNTER
Patient left VM. He contacted eye consultants and has an appointment for an eye exam and new glasses.      Alicia Jones

## 2021-09-29 NOTE — TELEPHONE ENCOUNTER
Patient called about status of utilizing glasses Prevent Blind[employer] program for free glasses. Patient will call  [facility] ([phone]) to collect relevant information for the writer.      Mary Carty

## 2021-09-30 ENCOUNTER — HOSPITAL ENCOUNTER (OUTPATIENT)
Dept: OTHER | Age: 66
Discharge: HOME | End: 2021-09-30
Payer: MEDICAID

## 2021-09-30 ENCOUNTER — TELEPHONE (OUTPATIENT)
Dept: FAMILY MEDICINE CLINIC | Age: 66
End: 2021-09-30

## 2021-09-30 VITALS
BODY MASS INDEX: 49.06 KG/M2 | WEIGHT: 294.8 LBS | OXYGEN SATURATION: 97 % | HEART RATE: 78 BPM | SYSTOLIC BLOOD PRESSURE: 110 MMHG | DIASTOLIC BLOOD PRESSURE: 64 MMHG | RESPIRATION RATE: 20 BRPM

## 2021-09-30 DIAGNOSIS — I50.32 CHRONIC DIASTOLIC (CONGESTIVE) HEART FAILURE (HCC): Primary | ICD-10-CM

## 2021-09-30 PROBLEM — Z20.822: Status: RESOLVED | Noted: 2021-03-31 | Resolved: 2021-09-30

## 2021-09-30 PROBLEM — I50.40 COMBINED SYSTOLIC AND DIASTOLIC CONGESTIVE HEART FAILURE (HCC): Status: RESOLVED | Noted: 2020-01-15 | Resolved: 2021-09-30

## 2021-09-30 PROBLEM — I50.9 ACUTE CHF (CONGESTIVE HEART FAILURE) (HCC): Status: RESOLVED | Noted: 2021-03-08 | Resolved: 2021-09-30

## 2021-09-30 PROCEDURE — 99213 OFFICE O/P EST LOW 20 MIN: CPT | Performed by: NURSE PRACTITIONER

## 2021-09-30 PROCEDURE — 99212 OFFICE O/P EST SF 10 MIN: CPT

## 2021-09-30 ASSESSMENT — ENCOUNTER SYMPTOMS
COUGH: 0
ABDOMINAL PAIN: 0
EYE DISCHARGE: 0
BLOOD IN STOOL: 0
SHORTNESS OF BREATH: 1

## 2021-09-30 ASSESSMENT — PAIN DESCRIPTION - PAIN TYPE: TYPE: ACUTE PAIN

## 2021-09-30 ASSESSMENT — PAIN SCALES - GENERAL: PAINLEVEL_OUTOF10: 3

## 2021-09-30 ASSESSMENT — PAIN DESCRIPTION - DESCRIPTORS: DESCRIPTORS: ACHING

## 2021-09-30 ASSESSMENT — PAIN DESCRIPTION - LOCATION: LOCATION: LEG

## 2021-09-30 ASSESSMENT — PAIN DESCRIPTION - ORIENTATION: ORIENTATION: LEFT

## 2021-09-30 NOTE — TELEPHONE ENCOUNTER
Nurse Mi called in from CHF clinic stating the patient has an active case of cellulitis, warmth of the leg. She is asking for an antibiotic to be called in to the pharmacy for this.     Please advise    CHF clinic # [phone]

## 2021-09-30 NOTE — PROGRESS NOTES
Verbally reviewed medication list with patient; patient verbalized understanding. Discussed 2000mg/day sodium restricted diet; patient verbalized understanding. Moderate daily exercise encouraged as tolerated. Discussed rest breaks as needed; patient verbalized understanding. Patient instructed to weigh self at the same time of each day, using same clothes and same scale; reinforced teaching to monitor for 3-5 lb weight increase over 1-2 days, and to notify the CHF clinic at [phone] or physician office if weight change noted. Patient verbalized understanding. Risks of smoking discussed with the patient if applicable; patient strongly discouraged to smoke. Patient verbalized understanding. Signs and symptoms of CHF discussed with patient, such as feeling more tired than normal, feeling short of breath, coughing that increases when you lie down, sudden weight gain, swelling of your feet, legs or belly. Patient verbalized understanding to notify the CHF clinic at [phone] or physician office if these symptoms occur. Compliance with plan of care and further disease process causes discussed with patient, patient encouraged to keep all follow up appointments. Patient verbalized understanding.

## 2021-09-30 NOTE — PROGRESS NOTES
CHF Clinic at [facility]    Office: [phone] Fax: [phone] [facility] CHF CLINIC  [phone]  Dept: [phone]  Loc: [phone]    Tu Abdul is a 61 y.o. male who presents today for CHF evaluation. HPI:     +  shortness of breath  +  Fatigue  Both at baseline  +  Edema , had tenderness posterior L LE, pcp ordered US was neg for DVT, one week later , + erythema , raised skin. Did not call pcp office back. Denies fever  Denies chest pain   Denies  palpitations     Pt has h/o cellulitis        Past Medical History:   Diagnosis Date    Acute CHF (congestive heart failure) [facility] 3/8/2021    Arthritis     back    Atrial fibrillation ([facility] 10/2019    Dr. Emre Ortega BPH (benign prostatic hyperplasia)     Cellulitis     Cervical disc disease     CHF (congestive heart failure) [facility])     cardiologist Dr. Lilian Castaneda. [facility] CHF clinic    Combined systolic and diastolic congestive heart failure ([facility] 1/15/2020    Coronary artery disease 10/2019     CABG 2019 [facility]    Dr. Jennifer Terry Cardiology last seen within the last year.     COVID-19 virus RNA test result indeterminate 3/31/2021    CPAP (continuous positive airway pressure) dependence     GERD (gastroesophageal reflux disease)     on rx    History of incarceration     released 2019    History of incarceration     Hyperlipidemia     Dr. Kirstin Cole Hypertension     [facility] CHF clinic     Dr. Kirstin Cole Myocardial infarct [facility])     Dr. Kirstin Cole Obesity     Sleep apnea     C-pap    Wears reading eyeglasses     Wellness examination     Dr. Patricia Lozada 4/2021     Past Surgical History:   Procedure Laterality Date    ABDOMINAL EXPLORATION SURGERY      CARDIAC SURGERY      2019    CARDIOVERSION  10/16/2019    CARPAL TUNNEL RELEASE Bilateral 2008    CERVICAL SPINE SURGERY      2-5    COLONOSCOPY N/A 10/29/2020 BY MOUTH DAILY 30 tablet 2    atorvastatin (LIPITOR) 40 MG tablet Take 1 tablet by mouth nightly 30 tablet 1    tamsulosin (FLOMAX) 0.4 MG capsule TAKE 1 CAPSULE BY MOUTH DAILY 30 capsule 5    clopidogrel (PLAVIX) 75 MG tablet Take 1 tablet by mouth daily 90 tablet 5    oxybutynin (DITROPAN-XL) 5 MG extended release tablet TAKE 1 TABLET BY MOUTH DAILY 30 tablet 3    pantoprazole (PROTONIX) 40 MG tablet TAKE 1 TABLET BY MOUTH 2 TIMES DAILY (BEFORE MEALS) 60 tablet 5    Prenatal Vit-Fe Fumarate-FA (NIVA-PLUS) 27-1 MG TABS       Multiple Vitamins-Minerals (PRESERVISION AREDS 2) CAPS       Blood Pressure Monitor KIT 1 each by Does not apply route 2 times daily 1 kit 0    Misc. Devices (Synthorx WEIGHT SCALE) MISC 1 each by Does not apply route as needed (weigh once a day and record) 1 each 0    nitroGLYCERIN (NITROSTAT) 0.4 MG SL tablet Place 1 tablet under the tongue every 5 minutes as needed for Chest pain up to max of 3 total doses. If no relief after 1 dose, call 911. 25 tablet 1    Elastic Bandages & Supports (JOBST KNEE HIGH COMPRESSION SM) MISC 2 each by Does not apply route daily Wear q day. Remove q hs. Diagnosis: Chronic venous insufficiency 2 each 0     No current facility-administered medications for this encounter. No Known Allergies      Subjective:      Review of Systems   Constitutional: Positive for fatigue. Negative for activity change, chills and fever. Eyes: Negative for discharge and visual disturbance. Respiratory: Positive for shortness of breath. Negative for cough. Cardiovascular: Positive for leg swelling. Negative for chest pain. Gastrointestinal: Negative for abdominal pain and blood in stool. Endocrine: Negative for cold intolerance and heat intolerance. Genitourinary: Negative for dysuria and flank pain. Musculoskeletal: Negative for joint swelling and myalgias. Skin: Negative for pallor and rash. Neurological: Negative for dizziness and headaches. Psychiatric/Behavioral: Negative for hallucinations and suicidal ideas. Objective:     Physical Exam  Vitals and nursing note reviewed. Constitutional:       Appearance: He is obese. Comments:      HENT:      Head: Normocephalic and atraumatic. Eyes:      General: No scleral icterus. Conjunctiva/sclera: Conjunctivae normal.   Cardiovascular:      Rate and Rhythm: Normal rate and regular rhythm. Heart sounds: Normal heart sounds. Pulmonary:      Effort: Pulmonary effort is normal.      Breath sounds: Normal breath sounds. No wheezing or rales. Abdominal:      General: Bowel sounds are normal.      Palpations: Abdomen is soft. Musculoskeletal:      Cervical back: Normal range of motion. Right lower leg: Edema present. Left lower leg: Edema present. Comments: L leg 2+ non pitting; R leg 1+ non pitting. Using cane. Wearing comp hose   Skin:     General: Skin is warm and dry. Comments: Skin of Lower  L leg, + warmth,  erythema, edema. Neurological:      Mental Status: He is alert and oriented to person, place, and time. /64   Pulse 78   Resp 20   Wt 294 lb 12.8 oz (133.7 kg)   SpO2 97%   BMI 49.06 kg/m²   O2 Device: None (Room air)    T = 98.3*         Lower Extremity Measurements in cm.    R Calf Circumference (cm): 50 cm  L Calf Circumference (cm): 51.5 cm  R Ankle Circumference (cm): 29 cm  L Ankle Circumference (cm): 29 cm    CBC:   Lab Results   Component Value Date    WBC 9.6 08/19/2021    RBC 4.22 08/19/2021    HGB 12.0 08/19/2021    HCT 39.0 08/19/2021    MCV 92.4 08/19/2021    MCH 28.4 08/19/2021    MCHC 30.8 08/19/2021    RDW 16.2 08/19/2021     08/19/2021    MPV 10.5 08/19/2021     CMP:    Lab Results   Component Value Date     08/19/2021    K 3.9 08/19/2021    CL 99 08/19/2021    CO2 25 08/19/2021    BUN 14 08/19/2021    CREATININE 0.77 08/19/2021    GFRAA >60 08/19/2021    LABGLOM >60 08/19/2021    GLUCOSE 115 08/19/2021 PROT 6.9 03/16/2021    LABALBU 3.8 03/16/2021    CALCIUM 9.0 08/19/2021    BILITOT 0.29 03/16/2021    ALKPHOS 65 03/16/2021    AST 15 03/16/2021    ALT 14 03/16/2021     Lab Results   Component Value Date    LABA1C 5.6 12/10/2019     CONCLUSIONS     Summary  Normal LV size and wall thickness. No obvious wall motion abnormality seen. Normal LV systolic function with LVEF >55%. Normal RV size and function. RV systolic pressure 29 mmHg  LA and RA appears normal in size. No obvious significant structural valvular abnormality noted. No significant valvular stenosis or regurgitation noted. Normal aortic root dimension. No significant pericardial effusion noted. No obvious intra-cardiac mass or shunt noted. IVC normal diameter and inspiratory collapse indicating normal RA filling  pressure.     Signature  ----------------------------------------------------------------------------   Electronically signed by Kevin Fiore(Interpreting physician) on   03/04/2021 12:22 PM      :Assessment      1. Chronic diastolic (congestive) heart failure ([address]        :Plan      1. Chronic diastolic (congestive) heart failure (HCC)         leg measurements greater on affected L leg. Pt has chronic edema. Wt stable, up one lbs. Concern for active cellulitis of L lower leg. Tested and neg for DVT, symptoms increased after neg  imaging and pt did not alert PCP of new symptoms. . Pt is afebrile. PCP office contacted. They plan on seeing pt tomorrow, 10/1,  in the office. Pt will f/u with  CHF Clinic in 2 weeks. Gauze and bandages provided to pt per  CHF Clinic RN. No orders of the defined types were placed in this encounter. No orders of the defined types were placed in this encounter. Patientgiven verbal and/or written educational instructions. Follow up as directed. I have reviewed and agree with the nursing documentation.   Verbally reviewed medication list with patient; patient verbalized

## 2021-09-30 NOTE — PROGRESS NOTES
Date:  2021  Time:  9:54 AM    CHF Clinic at [facility]    Office: [phone] Fax: [phone]    Re:  Orlando Collet   Patient : 1955    Vital Signs: /64   Pulse 78   Resp 20   Wt 294 lb 12.8 oz (133.7 kg)   SpO2 97%   BMI 49.06 kg/m²                       O2 Device: None (Room air)                           No results for input(s): CBC, HGB, HCT, WBC, PLATELET, NA, K, CL, CO2, BUN, CREATININE, GLUCOSE, BNP, INR in the last 72 hours. Respiratory:    Assessment  Charting Type: Reassessment    Breath Sounds  Right Upper Lobe: Clear  Right Middle Lobe: Clear  Right Lower Lobe: Clear  Left Upper Lobe: Clear  Left Lower Lobe: Clear    Cough/Sputum  Cough: Productive  Frequency: Occasional  Sputum Amount: Small  Sputum Color: Clear         Peripheral Vascular  RLE Edema: +1, Non-pitting  LLE Edema: +2, Non-pitting      Complaints: Patient c/o increased redness and swelling to left lower leg    Physician Orders None    Comment : Patient here for scheduled visit per ambulatory with cane assist. His weight is up one pound from last month. Says his breathing is good at this time, minimal cough noted. Ongoing lower leg, ankle and pedal edema with an increase in both legs since last visit. Left leg is very edematous, tight and reddened to lower anterior leg with two small open areas. Says he saw PCP early last week and was complaining of calf tenderness to left leg but did not have the redness or increased swelling. Temperature checked and patient afebrile with temp of 98.3. Patient says that's started about 3 days after seeing pcp. He has been putting antibiotic ointment on open areas and keeping the area covered but it is getting worse. Leyda Mcdowell cnp with clinic in to see and assess pt. She contacted PCP office and notified them of the condition to left leg worsening. Office will notify attending physician of above and call back with any new orders.  Encouraged patient to continue to keep area clean, dry and covered and notify PCP or chf clinic if condition worsens. Will contact patient by tomorrow afternoon with new orders from PCP office or from Ryne Martínez. Next scheduled chf clinic appt.  In 2 weeks on 10/14/21@ 1100 am.    Electronically signed by Kristopher Vaughn RN on 9/30/2021 at 9:54 AM

## 2021-10-01 ENCOUNTER — OFFICE VISIT (OUTPATIENT)
Dept: FAMILY MEDICINE CLINIC | Age: 66
End: 2021-10-01
Payer: MEDICAID

## 2021-10-01 VITALS
BODY MASS INDEX: 48.68 KG/M2 | WEIGHT: 292.2 LBS | HEART RATE: 69 BPM | HEIGHT: 65 IN | DIASTOLIC BLOOD PRESSURE: 75 MMHG | TEMPERATURE: 97.9 F | SYSTOLIC BLOOD PRESSURE: 128 MMHG

## 2021-10-01 DIAGNOSIS — L03.116 CELLULITIS OF LEFT LOWER EXTREMITY: Primary | ICD-10-CM

## 2021-10-01 PROCEDURE — 99211 OFF/OP EST MAY X REQ PHY/QHP: CPT

## 2021-10-01 PROCEDURE — 99214 OFFICE O/P EST MOD 30 MIN: CPT

## 2021-10-01 PROCEDURE — G8482 FLU IMMUNIZE ORDER/ADMIN: HCPCS

## 2021-10-01 PROCEDURE — 1036F TOBACCO NON-USER: CPT

## 2021-10-01 PROCEDURE — G8427 DOCREV CUR MEDS BY ELIG CLIN: HCPCS

## 2021-10-01 PROCEDURE — 3017F COLORECTAL CA SCREEN DOC REV: CPT

## 2021-10-01 PROCEDURE — G8417 CALC BMI ABV UP PARAM F/U: HCPCS

## 2021-10-01 RX ORDER — DOXYCYCLINE HYCLATE 100 MG
100 TABLET ORAL 2 TIMES DAILY
Qty: 14 TABLET | Refills: 0 | Status: SHIPPED | OUTPATIENT
Start: 2021-10-01 | End: 2021-10-08

## 2021-10-01 ASSESSMENT — ENCOUNTER SYMPTOMS
SHORTNESS OF BREATH: 0
WHEEZING: 0
SORE THROAT: 0
DIARRHEA: 0
ABDOMINAL PAIN: 0
VOMITING: 0
EYE PAIN: 0
ALLERGIC/IMMUNOLOGIC NEGATIVE: 1
NAUSEA: 0
COUGH: 0
CONSTIPATION: 0

## 2021-10-01 NOTE — PROGRESS NOTES
Saulo Glover (:  1955) is a 61 y.o. male,Established patient, here for evaluation of the following chief complaint(s):  Cellulitis (Left Calf)    ASSESSMENT/PLAN:  1. Cellulitis of left lower extremity  -     doxycycline hyclate (VIBRA-TABS) 100 MG tablet; Take 1 tablet by mouth 2 times daily for 7 days, Disp-14 tablet, R-0Normal  -     [facility]    Return in about 1 week (around 10/8/2021) for follow up cellulitis. Subjective   SUBJECTIVE/OBJECTIVE:  HPI    Mable Rodriguez, 82-year-old male, with multiple past comorbidities including CHF, CAD, history of CABG. Presenting to the clinic today for follow-up of left leg venous stasis dermatitis versus cellulitis. Patient was last seen in the clinic on 2021, discharged with compression stockings and advised to continue topical antibiotics at that time. Was sent for venous Doppler ultrasound of lower extremities due to mild calf tenderness at that time. Doppler ultrasound performed on 2021 for bilateral lower extremities was negative for clots. Patient is following with CHF clinic as well. Last saw them on 2021. Was advised to see his primary care provider at that time for the rash. Patient seen today. Left lower extremity rash has been present for a long time. Patient has seen as in the past for this complaint as well. Today, his left lower leg rash is getting slightly worse. Patient complained of rash breaking apart. No oozing of pus. Patient mentions some blood was discharged. No fever. No nausea or vomiting. Review of Systems   Constitutional: Negative for activity change, appetite change and fever. HENT: Negative for congestion and sore throat. Eyes: Negative for pain and visual disturbance. Respiratory: Negative for cough, shortness of breath and wheezing. Cardiovascular: Positive for leg swelling. Negative for chest pain.    Gastrointestinal: Negative for abdominal pain, constipation, diarrhea, nausea and vomiting. Endocrine: Negative. Genitourinary: Negative for difficulty urinating and dysuria. Musculoskeletal: Negative. Skin: Positive for rash. Allergic/Immunologic: Negative. Neurological: Negative for syncope, weakness and headaches. Hematological: Does not bruise/bleed easily. Psychiatric/Behavioral: Negative for agitation and behavioral problems. Objective   Physical Exam  Vitals and nursing note reviewed. Constitutional:       General: He is not in acute distress. Appearance: Normal appearance. He is obese. He is not ill-appearing or toxic-appearing. HENT:      Right Ear: Tympanic membrane normal.      Left Ear: Tympanic membrane normal.      Nose: Nose normal.      Mouth/Throat:      Mouth: Mucous membranes are moist.      Pharynx: Oropharynx is clear. Eyes:      Pupils: Pupils are equal, round, and reactive to light. Cardiovascular:      Rate and Rhythm: Normal rate and regular rhythm. Heart sounds: Normal heart sounds. No murmur heard. Pulmonary:      Effort: No respiratory distress. Breath sounds: Normal breath sounds. Abdominal:      Palpations: Abdomen is soft. Tenderness: There is no abdominal tenderness. There is no guarding or rebound. Musculoskeletal:         General: Swelling present. Right lower leg: Edema present. Left lower leg: Edema present. Skin:     Findings: Rash (Medial anterior aspect of left lower extremity. Measuring around 15 x 15 cm. Indurated. Some bruising present. No wheezing. No bleeding.) present. Neurological:      General: No focal deficit present. Mental Status: He is alert and oriented to person, place, and time.    Psychiatric:         Mood and Affect: Mood normal.         Behavior: Behavior normal.       On this date 10/1/2021 I have spent 30 minutes reviewing previous notes, test results and face to face with the patient discussing the diagnosis and importance of compliance with the treatment plan as well as documenting on the day of the visit. An electronic signature was used to authenticate this note.     --Giancarlo Inman MD

## 2021-10-01 NOTE — PROGRESS NOTES
Attending Physician Statement  I have seen and discussed the care of Jensen Myles 61 y.o. male, including pertinent history and exam findings, with the resident Dr. Jania Gruber MD.    History and Exam:   Chief Complaint   Patient presents with    Cellulitis     Left Calf       Past Medical History:   Diagnosis Date    Acute CHF (congestive heart failure) ([facility] 3/8/2021    Arthritis     back    Atrial fibrillation ([facility] 10/2019    Dr. Tony Rockwell BPH (benign prostatic hyperplasia)     Cellulitis     Cervical disc disease     CHF (congestive heart failure) [facility])     [facility]    Combined systolic and diastolic congestive heart failure ([facility] 1/15/2020    Coronary artery disease 10/2019     CABG 2019 [facility]    Dr. Jonathon Juares Cardiology last seen within the last year.  COVID-19 virus RNA test result indeterminate 3/31/2021    CPAP (continuous positive airway pressure) dependence     GERD (gastroesophageal reflux disease)     on rx    History of incarceration     released 2019    History of incarceration     Hyperlipidemia     Dr. Dian Poe Hypertension     [facility] CHF clinic     Dr. Dian Poe Myocardial infarct [facility])     Dr. Dian Poe Obesity     Sleep apnea     C-pap    Wears reading eyeglasses     Wellness examination     Dr. Luis Angel Rubi 4/2021     No Known Allergies   I have seen and examined the patient and the key elements of the encounter have been performed by me.   BP Readings from Last 3 Encounters:   10/01/21 128/75   09/30/21 110/64   09/21/21 100/60     /75 (Site: Left Lower Arm, Position: Sitting, Cuff Size: Medium Adult) Comment: machine  Pulse 69   Temp 97.9 °F (36.6 °C) (Temporal)   Ht 5' 5\" (1.651 m)   Wt 292 lb 3.2 oz (132.5 kg)   BMI 48.62 kg/m²   Lab Results   Component Value Date    WBC 9.6 08/19/2021    HGB 12.0 (L) 08/19/2021    HCT 39.0 (L) 08/19/2021     08/19/2021    CHOL 112 05/26/2020    TRIG 53 05/26/2020    HDL 47 03/03/2021    ALT 14 03/16/2021    AST 15 03/16/2021     08/19/2021    K 3.9 08/19/2021    CL 99 08/19/2021    CREATININE 0.77 08/19/2021    BUN 14 08/19/2021    CO2 25 08/19/2021    TSH 1.48 01/24/2020    PSA 0.67 09/22/2020    INR 1.0 03/08/2021    LABA1C 5.6 12/10/2019     Lab Results   Component Value Date    LABALBU 3.8 03/16/2021     Lab Results   Component Value Date    IRON 64 08/19/2021    TIBC 282 08/19/2021    FERRITIN 143 12/10/2019     Lab Results   Component Value Date    LDLCHOLESTEROL 54 03/03/2021     I agree with the assessment, plan and the diagnosis of    Diagnosis Orders   1. Cellulitis of left lower extremity  doxycycline hyclate (VIBRA-TABS) 100 MG tablet    [address]    . I agree with orders as documented by the resident. More than 25 minutes spent  in face to face encounter with the patient and more than half in counseling. Patient's questions were answered. Patient Voiced understanding to the counseling. Return in about 1 week (around 10/8/2021) for follow up cellulitis.    (GC Modifier)-Dr. Sussy Huizar MD

## 2021-10-01 NOTE — TELEPHONE ENCOUNTER
Patient call this morning for check-in. Discussed the following:     Appointment for eye exam went well; Insurance will cover glasses. Patient has not heard anything back yet about fraud dispute. Has not received a new debit card yet. Patient will be following up with [employer]. Ebony had the application for [employer] and has made copies of Patient's documents. Arrived while on the phone with patient to collect applications and submit to [employer] for Edmar Gomes. Went down to [employer] and they only provided assistance for [employer]. Patient has not yet received packet in mail. The writer informed the patient of the second letter that was mailed yesterday outlining food pantries in his area that offer personal supplies. The writer also informed the patient that the [address] has been contacted- left . Will call [employer] again today. Will check in with patient next week.      Susan Harris

## 2021-10-01 NOTE — PATIENT INSTRUCTIONS
Thank you for letting us take care of you today. We hope all your questions were addressed. If a question was overlooked or something else comes to mind after you return home, please contact a member of your Care Team listed below. Your Care Team at [place] [address] is Team #3  Albert Shi MD (Faculty)  Rodríguez Barbour MD (Faculty  Carolynvirginia Jarvis MD (Resident)  Tereso Granados (Resident)   Emelia Tariq MD (Resident)  Bebeto Woods MD (Resident)  Earlene Oliver., PERRY Lobo., PERRY Mao., Carlos Latif., Yash Navarro ([facility])  Alex Tsang, [address] (Clinical Practice Manager)  Fatemeh Gamble, [facility] (Clinical Pharmacist)     Office phone number: [phone]    If you need to get in right away due to illness, please be advised we have \"Same Day\" appointments available Monday-Friday. Please call us at [phone] option #3 to schedule your \"Same Day\" appointment.

## 2021-10-05 NOTE — TELEPHONE ENCOUNTER
Received a call from Bonny at [employer] [facility]- she cannot get through to [employer]. Attempted to phone the patient and could not get through either. Called sister Sonal Allison and left a VM asking if she knew if there was anything wrong with Mickey's phone.

## 2021-10-06 ENCOUNTER — HOSPITAL ENCOUNTER (OUTPATIENT)
Dept: WOUND CARE | Age: 66
Discharge: HOME | End: 2021-10-06
Payer: MEDICAID

## 2021-10-06 VITALS
WEIGHT: 292 LBS | BODY MASS INDEX: 48.65 KG/M2 | HEART RATE: 70 BPM | RESPIRATION RATE: 18 BRPM | HEIGHT: 65 IN | SYSTOLIC BLOOD PRESSURE: 118 MMHG | DIASTOLIC BLOOD PRESSURE: 71 MMHG | TEMPERATURE: 97.5 F

## 2021-10-06 DIAGNOSIS — I89.0 LYMPHEDEMA OF BOTH LOWER EXTREMITIES: ICD-10-CM

## 2021-10-06 DIAGNOSIS — I87.2 VENOUS INSUFFICIENCY OF BOTH LOWER EXTREMITIES: ICD-10-CM

## 2021-10-06 DIAGNOSIS — L97.921 SKIN ULCER OF LEFT LOWER LEG, LIMITED TO BREAKDOWN OF SKIN (HCC): ICD-10-CM

## 2021-10-06 DIAGNOSIS — R60.0 BILATERAL EDEMA OF LOWER EXTREMITY: ICD-10-CM

## 2021-10-06 PROBLEM — L03.116 CELLULITIS OF LEFT LOWER EXTREMITY: Status: RESOLVED | Noted: 2020-01-23 | Resolved: 2021-10-06

## 2021-10-06 PROCEDURE — 99203 OFFICE O/P NEW LOW 30 MIN: CPT | Performed by: NURSE PRACTITIONER

## 2021-10-06 PROCEDURE — 99213 OFFICE O/P EST LOW 20 MIN: CPT

## 2021-10-06 ASSESSMENT — PAIN SCALES - GENERAL: PAINLEVEL_OUTOF10: 0

## 2021-10-06 NOTE — PROGRESS NOTES
Width (cm) 0.1 cm 10/06/21 0858   Post-Procedure Depth (cm) 0.1 cm 10/06/21 0858   Post-Procedure Surface Area (cm^2) 0.01 cm^2 10/06/21 0858   Post-Procedure Volume (cm^3) 0.001 cm^3 10/06/21 0858   Wound Assessment Slough 10/06/21 0858   Drainage Amount Moderate 10/06/21 0858   Drainage Description Serosanguinous 10/06/21 0858   Odor None 10/06/21 0858   Jennifer-wound Assessment Blanchable erythema 10/06/21 0858   Margins Defined edges 10/06/21 0858   Number of days: 0       Right Leg Measurements: (ALL measurements are in cm)  Right Leg Edema Point of Measurement  Great toe to forefoot: 10 cm  Heel to ankle: 10 cm  Heel to calf: 30  Leg circumference: 47 cm  Ankle circumference: 27 cm  Foot circumference: 26 cm  Compression Therapy: Yes, Ace wrap  Stockings Compression Pressure: Medium  Ankle to Knee 40cm  Left Leg Measurements: (ALL measurements are in cm)  Left Leg Edema Point of Measurement  Great toe to forefoot: 10 cm  Heel to ankle: 10 cm  Heel to calf: 30  Leg circumference: 50 cm  Ankle circumference: 28 cm  Foot circumference: 25 cm  Compression Therapy: Yes, Ace wrap  Stockings Compression Pressure: Medium  Ankle to Knee 40cm  Supplies Requested :     Medicare Requirements  Patient must have a qualifying Active Venus Ulcer if ordering Bilateral Compression Wounds MUST be present on both legs for Medicare Coverage. The patient can Not be on home health or have had a Medicare part A stay in the past 24 hours. Patient Wound(s) Debrided: [] Yes if yes please add date  [x] No    Debribement Type:  Other No debridement today    Patient currently being seen by Home Health: [] Yes   [x] No     Compression Type: Circaid Juxtalite, HD, 30-40 mm/Hg, BILATERAL lower legs     Provider Information:      PROVIDER'S NAME: Lexis MENDIETA    IEI-[id number]

## 2021-10-06 NOTE — PROGRESS NOTES
Katja Wynn examination     Dr. Hoda Fraser 4/2021       PAST SURGICAL HISTORY    Past Surgical History:   Procedure Laterality Date    ABDOMINAL EXPLORATION SURGERY      CARDIAC SURGERY      2019    CARDIOVERSION  10/16/2019    CARPAL TUNNEL RELEASE Bilateral 2008    CERVICAL SPINE SURGERY      2-5    COLONOSCOPY N/A 10/29/2020    COLONOSCOPY WITH BIOPSY performed by Lovely Morris MD at [address]  10/29/2020    COLONOSCOPY SUBMUCOSAL/BOTOX INJECTION performed by Lovely Morris MD at [address]  10/29/2020    COLONOSCOPY POLYPECTOMY SNARE/COLD BIOPSY performed by Lovely Morris MD at [address] N/A 01/12/2021    COLONOSCOPY POLYPECTOMY HOT SNARE, COLD SNARE POLPECTOMY performed by Nevaeh Ng MD at [address] [facility] N/A 10/21/2019    CABG CORONARY ARTERY BYPASS GRAFT X1, LIMA-LAD, BROWN 4 MAZE PROCEDURE, 50MM ATRICURE ATRIAL CLIP RIGID INTERNAL FIXATION PLATES X 3   SCREWS X 13 performed by Ryan Angelo MD at [place] [address] 03/26/2021    URODYNAMICS performed by Ruba Sagastume MD at [address] [place] [address]  03/26/2021    CYSTOSCOPY FLEXIBLE performed by Ruba Sagastume MD at [address] [place] [address] [place]  05/14/2021    CYSTOSCOPY, UROLIFT, FULGURATION    CYSTOSCOPY N/A 5/14/2021    CYSTOSCOPY, UROLIFT, FULGURATION performed by Ruba Sagastume MD at [address] [facility]      cataract surgery 2020    FRACTURE SURGERY      Left leg    TRANSESOPHAGEAL ECHOCARDIOGRAM  10/16/2019       FAMILY HISTORY    Family History   Problem Relation Age of Onset    Alcohol Abuse Maternal Uncle     Heart Attack Maternal Uncle     Cancer Mother     Alcohol Abuse Father        SOCIAL HISTORY    Social History     Tobacco Use    Smoking status: Never Smoker    Smokeless tobacco: Never Used   Vaping Use    Vaping Use: Never used   Substance Use Topics    Alcohol use: Not Currently     Comment: stopped 1991    Drug use: Not Currently       ALLERGIES    No Known Allergies    MEDICATIONS    Current Outpatient Medications on File Prior to Encounter   Medication Sig Dispense Refill    doxycycline hyclate (VIBRA-TABS) 100 MG tablet Take 1 tablet by mouth 2 times daily for 7 days 14 tablet 0    isosorbide mononitrate (IMDUR) 30 MG extended release tablet TAKE 1 TABLET BY MOUTH DAILY 30 tablet 1    furosemide (LASIX) 40 MG tablet Take 1.5 tablets by mouth 2 times daily 30 tablet 2    potassium chloride (KLOR-CON M) 20 MEQ extended release tablet Take 1 tablet by mouth daily 30 tablet 1    ALLERGY RELIEF 10 MG tablet TAKE 1 TABLET BY MOUTH DAILY 30 tablet 2    atorvastatin (LIPITOR) 40 MG tablet Take 1 tablet by mouth nightly 30 tablet 1    tamsulosin (FLOMAX) 0.4 MG capsule TAKE 1 CAPSULE BY MOUTH DAILY 30 capsule 5    clopidogrel (PLAVIX) 75 MG tablet Take 1 tablet by mouth daily 90 tablet 5    oxybutynin (DITROPAN-XL) 5 MG extended release tablet TAKE 1 TABLET BY MOUTH DAILY 30 tablet 3    pantoprazole (PROTONIX) 40 MG tablet TAKE 1 TABLET BY MOUTH 2 TIMES DAILY (BEFORE MEALS) 60 tablet 5    Prenatal Vit-Fe Fumarate-FA (NIVA-PLUS) 27-1 MG TABS       Multiple Vitamins-Minerals (PRESERVISION AREDS 2) CAPS       Blood Pressure Monitor KIT 1 each by Does not apply route 2 times daily 1 kit 0    Misc. Devices ([employer] WEIGHT SCALE) MISC 1 each by Does not apply route as needed (weigh once a day and record) 1 each 0    nitroGLYCERIN (NITROSTAT) 0.4 MG SL tablet Place 1 tablet under the tongue every 5 minutes as needed for Chest pain up to max of 3 total doses. If no relief after 1 dose, call 911. 25 tablet 1    Elastic Bandages & Supports (JOBST KNEE HIGH COMPRESSION SM) MISC 2 each by Does not apply route daily Wear q day. Remove q hs. Diagnosis: Chronic venous insufficiency 2 each 0     No current facility-administered medications on file prior to encounter.        REVIEW OF SYSTEMS    Constitutional: negative  Eyes: negative  Ears, nose, mouth, throat, and face: negative  Respiratory: negative  Cardiovascular: negative except for lower extremity edema  Gastrointestinal: negative  Genitourinary:negative  Integument/breast: negative except for left lower leg wound  Hematologic/lymphatic: negative  Musculoskeletal:negative  Neurological: negative  Behavioral/Psych: negative  Endocrine: negative  Allergic/Immunologic: negative    Objective:      /71   Pulse 70   Temp 97.5 °F (36.4 °C) (Tympanic)   Resp 18   Ht 5' 5\" (1.651 m)   Wt 292 lb (132.5 kg)   BMI 48.59 kg/m²     Wt Readings from Last 3 Encounters:   10/06/21 292 lb (132.5 kg)   10/01/21 292 lb 3.2 oz (132.5 kg)   09/30/21 294 lb 12.8 oz (133.7 kg)       PHYSICAL EXAM    General Appearance: alert and oriented to person, place and time, well developed and obese, in no acute distress  Skin: warm and dry, no rash or erythema, left lower leg wound  Head: normocephalic and atraumatic  Eyes: pupils equal, round, extraocular eye movements intact, conjunctivae normal  Pulmonary/Chest: clear to auscultation bilaterally- no wheezes, rales or rhonchi, normal air movement, no respiratory distress  Cardiovascular: normal rate, regular rhythm, normal S1 and S2, no murmurs  Abdomen: soft, non-tender, non-distended, normal bowel sounds  Extremities: no cyanosis, clubbing.  Bilateral lower leg edema   Musculoskeletal: no joint swelling, deformity or tenderness  Neurologic: gait, coordination and speech normal      Assessment:     Problem List Items Addressed This Visit     Bilateral edema of lower extremity    Relevant Orders    Reflux study/Reflux Venous Insufficiency Bilateral    Ambulatory Referral to Lymphedema Clinic    Lymphedema of both lower extremities    Relevant Orders    Ambulatory Referral to Lymphedema Clinic    Skin ulcer of left lower leg, limited to breakdown of skin (HCC)    Venous insufficiency of both lower extremities Relevant Orders    Reflux study/Reflux Venous Insufficiency Bilateral    Ambulatory Referral to Lymphedema Clinic           Procedure Note  Indications:  Based on my examination of this patient's wound(s)/ulcer(s) today, debridement is not required to promote healing and evaluate the wound base. Wound Measurements:  Wound/Ulcer Descriptions are Pre Debridement except measurements:    Wound 10/06/21 Pretibial Left;Lateral #1 cluster (Active)   Wound Image   10/06/21 0858   Wound Etiology Venous 10/06/21 0858   Dressing Status New drainage noted; Old drainage noted 10/06/21 0858   Wound Cleansed Soap and water 10/06/21 0858   Wound Length (cm) 0.1 cm 10/06/21 0858   Wound Width (cm) 0.1 cm 10/06/21 0858   Wound Depth (cm) 0.1 cm 10/06/21 0858   Wound Surface Area (cm^2) 0.01 cm^2 10/06/21 0858   Wound Volume (cm^3) 0.001 cm^3 10/06/21 0858   Post-Procedure Length (cm) 0.1 cm 10/06/21 0858   Post-Procedure Width (cm) 0.1 cm 10/06/21 0858   Post-Procedure Depth (cm) 0.1 cm 10/06/21 0858   Post-Procedure Surface Area (cm^2) 0.01 cm^2 10/06/21 0858   Post-Procedure Volume (cm^3) 0.001 cm^3 10/06/21 0858   Wound Assessment Slough 10/06/21 0858   Drainage Amount Moderate 10/06/21 0858   Drainage Description Serosanguinous 10/06/21 0858   Odor None 10/06/21 0858   Jennifer-wound Assessment Blanchable erythema 10/06/21 0858   Margins Defined edges 10/06/21 0858   Number of days: 0            Plan:     Treatment Note please see Discharge Instructions    Written patient dismissal instructions given to patient and signed by patient or POA.            Electronically signed by LUDY Vilchis CNP on 10/6/2021 at 10:16 AM

## 2021-10-08 NOTE — TELEPHONE ENCOUNTER
Patient called in. Phone was not operable since Monday. Minutes were  although he should have had some left. Phone would not accept or send calls. Came back on Friday. 5/3 [employer] called; ran up a bill of $600 from the female who he had relations with. Unless he can pay it all in full they will not accept payment. Applied to [employer]. Unsure of status due to phone being broken all week. Informed patient to call and inform them of what happened in case they attempted to reach out to him.      Instructed to call [address] [place], [place] [address]owen Mccloudman

## 2021-10-12 ENCOUNTER — HOSPITAL ENCOUNTER (OUTPATIENT)
Dept: VASCULAR LAB | Age: 66
Discharge: HOME | End: 2021-10-12
Payer: MEDICAID

## 2021-10-12 PROCEDURE — 93970 EXTREMITY STUDY: CPT

## 2021-10-13 ENCOUNTER — HOSPITAL ENCOUNTER (OUTPATIENT)
Dept: WOUND CARE | Age: 66
Discharge: HOME | End: 2021-10-13
Payer: MEDICAID

## 2021-10-13 VITALS
SYSTOLIC BLOOD PRESSURE: 127 MMHG | HEART RATE: 65 BPM | TEMPERATURE: 97.7 F | RESPIRATION RATE: 18 BRPM | DIASTOLIC BLOOD PRESSURE: 68 MMHG

## 2021-10-13 DIAGNOSIS — I87.2 VENOUS INSUFFICIENCY OF BOTH LOWER EXTREMITIES: Primary | ICD-10-CM

## 2021-10-13 PROCEDURE — 99212 OFFICE O/P EST SF 10 MIN: CPT

## 2021-10-13 PROCEDURE — 99213 OFFICE O/P EST LOW 20 MIN: CPT | Performed by: NURSE PRACTITIONER

## 2021-10-13 ASSESSMENT — PAIN SCALES - GENERAL: PAINLEVEL_OUTOF10: 0

## 2021-10-13 NOTE — PLAN OF CARE
Problem: Venous:  Goal: Signs of wound healing will improve  Description: Signs of wound healing will improve  Outcome: Ongoing     Problem: Pain:  Goal: Pain level will decrease  Description: Pain level will decrease  Outcome: Ongoing  Goal: Control of acute pain  Description: Control of acute pain  Outcome: Ongoing  Goal: Control of chronic pain  Description: Control of chronic pain  Outcome: Ongoing

## 2021-10-13 NOTE — PROGRESS NOTES
[address]   Progress Note and Procedure Note      [address] [facility] RECORD NUMBER:  [id number]  AGE: 61 y.o. GENDER: male  : 1955  EPISODE DATE:  10/13/2021    Subjective:     Chief Complaint   Patient presents with    Wound Check     left pretib         HISTORY of PRESENT ILLNESS HPI     Manju Gibson is a 61 y.o. male who presents today for wound/ulcer evaluation. History of Wound Context: here for follow up on right lower leg wound that is closed today. Reviewed venous reflux studies which showed venous reflux in bilateral legs. Did refer to Dr Mendel Barajas for further evaluation. Will follow in wound clinic only as needed. Wound/Ulcer Pain Timing/Severity: none  Quality of pain: N/A  Severity:  0 / 10   Modifying Factors: None  Associated Signs/Symptoms: none    Ulcer Identification:  Ulcer Type: venous  Contributing Factors: edema, venous stasis, lymphedema, decreased mobility and obesity    Wound: N/A        PAST MEDICAL HISTORY        Diagnosis Date    Acute CHF (congestive heart failure) ([facility]) 3/8/2021    Arthritis     back    Atrial fibrillation ([facility] [address] 10/2019    Dr. Ava Sotomayor BPH (benign prostatic hyperplasia)     Cellulitis     Cervical disc disease     CHF (congestive heart failure) [facility])     cardiologist Dr. Everette Cervantes. s CHF clinic    Combined systolic and diastolic congestive heart failure ([facility] [address] 1/15/2020    Coronary artery disease 10/2019     CABG [facility]    Dr. Uli Jones Cardiology last seen within the last year.     COVID-19 virus RNA test result indeterminate 3/31/2021    CPAP (continuous positive airway pressure) dependence     GERD (gastroesophageal reflux disease)     on rx    History of incarceration     released 2019    History of incarceration     Hyperlipidemia     Dr. Jennifer Bobo Hypertension     [facility] CHF clinic     Dr. Jennifer Bobo Myocardial infarct [facility])     Dr. Jennifer Bobo Obesity     Sleep apnea     C-pap  Wears reading eyeglasses     Wellness examination     Dr. Issa Nolan 4/2021       PAST SURGICAL HISTORY    Past Surgical History:   Procedure Laterality Date    ABDOMINAL EXPLORATION SURGERY      CARDIAC SURGERY      2019    CARDIOVERSION  10/16/2019    CARPAL TUNNEL RELEASE Bilateral 2008    CERVICAL SPINE SURGERY      2-5    COLONOSCOPY N/A 10/29/2020    COLONOSCOPY WITH BIOPSY performed by Norberto Severs, MD at [address]  10/29/2020    COLONOSCOPY SUBMUCOSAL/BOTOX INJECTION performed by Norberto Severs, MD at [address]  10/29/2020    COLONOSCOPY POLYPECTOMY SNARE/COLD BIOPSY performed by Norberto Severs, MD at [address] N/A 01/12/2021    COLONOSCOPY POLYPECTOMY HOT SNARE, COLD SNARE POLPECTOMY performed by Tabatha Ball MD at [address] [facility] N/A 10/21/2019    CABG CORONARY ARTERY BYPASS GRAFT X1, LIMA-LAD, BROWN 4 MAZE PROCEDURE, 50MM ATRICURE ATRIAL CLIP RIGID INTERNAL FIXATION PLATES X 3   SCREWS X 13 performed by Kal Cruz MD at [facility] 03/26/2021    URODYNAMICS performed by Maria T Corado MD at [address] [place]  03/26/2021    CYSTOSCOPY FLEXIBLE performed by Maria T Corado MD at [address] [place] [address] [place]  05/14/2021    CYSTOSCOPY, UROLIFT, FULGURATION    CYSTOSCOPY N/A 5/14/2021    CYSTOSCOPY, UROLIFT, FULGURATION performed by Maria T Corado MD at [address] [facility]      cataract surgery 2020    FRACTURE SURGERY      Left leg    TRANSESOPHAGEAL ECHOCARDIOGRAM  10/16/2019       FAMILY HISTORY    Family History   Problem Relation Age of Onset    Alcohol Abuse Maternal Uncle     Heart Attack Maternal Uncle     Cancer Mother     Alcohol Abuse Father        SOCIAL HISTORY    Social History     Tobacco Use    Smoking status: Never Smoker    Smokeless tobacco: Never Used   Vaping Use    Vaping Use: Never used   Substance Use Topics    Alcohol use: Not Currently Comment: stopped 1991    Drug use: Not Currently       ALLERGIES    No Known Allergies    MEDICATIONS    Current Outpatient Medications on File Prior to Encounter   Medication Sig Dispense Refill    isosorbide mononitrate (IMDUR) 30 MG extended release tablet TAKE 1 TABLET BY MOUTH DAILY 30 tablet 1    furosemide (LASIX) 40 MG tablet Take 1.5 tablets by mouth 2 times daily 30 tablet 2    potassium chloride (KLOR-CON M) 20 MEQ extended release tablet Take 1 tablet by mouth daily 30 tablet 1    ALLERGY RELIEF 10 MG tablet TAKE 1 TABLET BY MOUTH DAILY 30 tablet 2    atorvastatin (LIPITOR) 40 MG tablet Take 1 tablet by mouth nightly 30 tablet 1    tamsulosin (FLOMAX) 0.4 MG capsule TAKE 1 CAPSULE BY MOUTH DAILY 30 capsule 5    clopidogrel (PLAVIX) 75 MG tablet Take 1 tablet by mouth daily 90 tablet 5    oxybutynin (DITROPAN-XL) 5 MG extended release tablet TAKE 1 TABLET BY MOUTH DAILY 30 tablet 3    pantoprazole (PROTONIX) 40 MG tablet TAKE 1 TABLET BY MOUTH 2 TIMES DAILY (BEFORE MEALS) 60 tablet 5    Prenatal Vit-Fe Fumarate-FA (NIVA-PLUS) 27-1 MG TABS       Multiple Vitamins-Minerals (PRESERVISION AREDS 2) CAPS       Blood Pressure Monitor KIT 1 each by Does not apply route 2 times daily 1 kit 0    Misc. Devices ([employer] WEIGHT SCALE) MISC 1 each by Does not apply route as needed (weigh once a day and record) 1 each 0    nitroGLYCERIN (NITROSTAT) 0.4 MG SL tablet Place 1 tablet under the tongue every 5 minutes as needed for Chest pain up to max of 3 total doses. If no relief after 1 dose, call 911. 25 tablet 1    Elastic Bandages & Supports ([employer] KNEE HIGH COMPRESSION SM) MISC 2 each by Does not apply route daily Wear q day. Remove q hs. Diagnosis: Chronic venous insufficiency 2 each 0     No current facility-administered medications on file prior to encounter.        REVIEW OF SYSTEMS    Constitutional: negative  Eyes: negative  Ears, nose, mouth, throat, and face: negative  Respiratory: negative  Cardiovascular: negative except for lower extremity edema  Gastrointestinal: negative  Genitourinary:negative  Integument/breast: negative  Hematologic/lymphatic: negative  Musculoskeletal:negative  Neurological: negative  Behavioral/Psych: negative  Endocrine: negative  Allergic/Immunologic: negative    Objective:      /68   Pulse 65   Temp 97.7 °F (36.5 °C) (Tympanic)   Resp 18     Wt Readings from Last 3 Encounters:   10/06/21 292 lb (132.5 kg)   10/01/21 292 lb 3.2 oz (132.5 kg)   09/30/21 294 lb 12.8 oz (133.7 kg)       PHYSICAL EXAM    General Appearance: alert and oriented to person, place and time, well-developed and obese in no acute distress  Skin: warm and dry, no rash or erythema  Head: normocephalic and atraumatic  Eyes: pupils equal, round, extraocular eye movements intact, and conjunctivae normal  Pulmonary/Chest: normal air movement, no respiratory distress  Extremities: no cyanosis and no clubbing  Musculoskeletal: no joint swelling, deformity or tenderness  Neurologic: gait, coordination normal and speech normal      Assessment:      Problem List Items Addressed This Visit     Venous insufficiency of both lower extremities - Primary    Relevant Orders    Amb External Referral To Vascular Surgery           Wound 10/06/21 Pretibial Left;Lateral #1 cluster (Active)   Wound Image   10/13/21 0859   Wound Etiology Venous 10/13/21 0859   Dressing Status Clean;Dry; Intact 10/13/21 0859   Wound Cleansed Soap and water 10/06/21 0858   Wound Length (cm) 0 cm 10/13/21 0859   Wound Width (cm) 0 cm 10/13/21 0859   Wound Depth (cm) 0 cm 10/13/21 0859   Wound Surface Area (cm^2) 0 cm^2 10/13/21 0859   Change in Wound Size % (l*w) 100 10/13/21 0859   Wound Volume (cm^3) 0 cm^3 10/13/21 0859   Wound Healing % 100 10/13/21 0859   Post-Procedure Length (cm) 0 cm 10/13/21 0859   Post-Procedure Width (cm) 0 cm 10/13/21 0859   Post-Procedure Depth (cm) 0 cm 10/13/21 0859   Post-Procedure Surface Area (cm^2) 0 cm^2 10/13/21 0859   Post-Procedure Volume (cm^3) 0 cm^3 10/13/21 0859   Wound Assessment Epithelialization 10/13/21 0859   Drainage Amount Moderate 10/06/21 0858   Drainage Description Serosanguinous 10/06/21 0858   Odor None 10/06/21 0858   Jennifer-wound Assessment Blanchable erythema 10/06/21 0858   Margins Defined edges 10/06/21 0858   Number of days: 7          Wound is healed    Plan:     Treatment Note please see Discharge Instructions    Written patient dismissal instructions given to patient and signed by patient or POA.            Electronically signed by LUDY Iglesias CNP on 10/13/2021 at 9:45 AM

## 2021-10-14 ENCOUNTER — HOSPITAL ENCOUNTER (OUTPATIENT)
Dept: OTHER | Age: 66
Discharge: HOME | End: 2021-10-14
Payer: MEDICAID

## 2021-10-14 VITALS
SYSTOLIC BLOOD PRESSURE: 110 MMHG | HEART RATE: 78 BPM | RESPIRATION RATE: 20 BRPM | OXYGEN SATURATION: 97 % | WEIGHT: 296.6 LBS | DIASTOLIC BLOOD PRESSURE: 74 MMHG | BODY MASS INDEX: 49.36 KG/M2

## 2021-10-14 PROCEDURE — 99212 OFFICE O/P EST SF 10 MIN: CPT

## 2021-10-14 ASSESSMENT — PAIN SCALES - GENERAL: PAINLEVEL_OUTOF10: 0

## 2021-10-14 NOTE — PROGRESS NOTES
Date:  10/14/2021  Time:  11:06 AM    CHF Clinic at [facility]    Office: [phone] Fax: [phone]    Re:  Rufus Stauffer   Patient : 1955    Vital Signs: /74   Pulse 78   Resp 20   Wt 296 lb 9.6 oz (134.5 kg)   SpO2 97%   BMI 49.36 kg/m²                       O2 Device: None (Room air)                           No results for input(s): CBC, HGB, HCT, WBC, PLATELET, NA, K, CL, CO2, BUN, CREATININE, GLUCOSE, BNP, INR in the last 72 hours. Respiratory:         Breath Sounds  Right Upper Lobe: Clear  Right Middle Lobe: Clear  Right Lower Lobe: Clear, Diminished  Left Upper Lobe: Clear  Left Lower Lobe: Clear, Diminished    Cough/Sputum  Cough: None         Peripheral Vascular  RLE Edema: +1, Non-pitting  LLE Edema: +2, Non-pitting      Complaints: no new complaints      Comment : pt here per ambulation with a cane from registration to the CHF Clinic. Pt states he saw his PCP  for cellulitis of left lower extremity and was placed on an antibiotic   For 7 days and went to [facility]. \" They had my legs wrapped for awhile. Lower extremity redness  improved from last visit. Still has usual chronic swelling. Wearing knee high compression stockings now which he just received two days ago. He states his cellulitis is improving and he has a f/u with his PCP tomorrow. He also states he had vascular studies done recently. Showing reflux venous insufficiency. Pt states \" I need to go see a vascular doctor now\". He denies increased shortness of breath, chest pain or dizziness. Current diuretic is Lasix 40 mg tablet, he takes one and one half tablet ora total of 60 mg BID. He states dyspnea and cough have improved. He's feeling good. No other acute S/S  of CHF noted today. Reminded pt to follow a low sodium diet and fluid limits of 2 liters daily. Next f/u here in one month.  Pt instructed to call for appt sooner if increased S/S of CHF occur prior to next scheduled appointment.      Electronically signed by Marilou Mccracken RN on 10/14/2021 at 11:06 AM

## 2021-10-19 ENCOUNTER — TELEPHONE (OUTPATIENT)
Dept: FAMILY MEDICINE CLINIC | Age: 66
End: 2021-10-19

## 2021-10-22 ENCOUNTER — OFFICE VISIT (OUTPATIENT)
Dept: FAMILY MEDICINE CLINIC | Age: 66
End: 2021-10-22
Payer: MEDICAID

## 2021-10-22 VITALS
SYSTOLIC BLOOD PRESSURE: 106 MMHG | TEMPERATURE: 98.9 F | WEIGHT: 291 LBS | HEART RATE: 78 BPM | BODY MASS INDEX: 48.42 KG/M2 | DIASTOLIC BLOOD PRESSURE: 64 MMHG

## 2021-10-22 DIAGNOSIS — G89.29 CHRONIC LOW BACK PAIN WITH LEFT-SIDED SCIATICA, UNSPECIFIED BACK PAIN LATERALITY: ICD-10-CM

## 2021-10-22 DIAGNOSIS — M54.42 CHRONIC LOW BACK PAIN WITH LEFT-SIDED SCIATICA, UNSPECIFIED BACK PAIN LATERALITY: ICD-10-CM

## 2021-10-22 DIAGNOSIS — I89.0 LYMPHEDEMA OF BOTH LOWER EXTREMITIES: Primary | ICD-10-CM

## 2021-10-22 DIAGNOSIS — I95.9 HYPOTENSION, UNSPECIFIED HYPOTENSION TYPE: ICD-10-CM

## 2021-10-22 DIAGNOSIS — E66.01 CLASS 3 SEVERE OBESITY DUE TO EXCESS CALORIES WITH SERIOUS COMORBIDITY AND BODY MASS INDEX (BMI) OF 45.0 TO 49.9 IN ADULT (HCC): ICD-10-CM

## 2021-10-22 PROCEDURE — 99213 OFFICE O/P EST LOW 20 MIN: CPT

## 2021-10-22 PROCEDURE — 1036F TOBACCO NON-USER: CPT

## 2021-10-22 PROCEDURE — 3017F COLORECTAL CA SCREEN DOC REV: CPT

## 2021-10-22 PROCEDURE — G8482 FLU IMMUNIZE ORDER/ADMIN: HCPCS

## 2021-10-22 PROCEDURE — G8427 DOCREV CUR MEDS BY ELIG CLIN: HCPCS

## 2021-10-22 PROCEDURE — G8417 CALC BMI ABV UP PARAM F/U: HCPCS

## 2021-10-22 PROCEDURE — 99211 OFF/OP EST MAY X REQ PHY/QHP: CPT

## 2021-10-22 RX ORDER — LIDOCAINE 4 G/G
1 PATCH TOPICAL DAILY
Qty: 30 PATCH | Refills: 0 | Status: SHIPPED | OUTPATIENT
Start: 2021-10-22 | End: 2021-11-11 | Stop reason: ALTCHOICE

## 2021-10-22 ASSESSMENT — ENCOUNTER SYMPTOMS
CONSTIPATION: 0
NAUSEA: 0
SHORTNESS OF BREATH: 0
APNEA: 0
WHEEZING: 0
COUGH: 1
BACK PAIN: 1
DIARRHEA: 0
VOMITING: 0
ABDOMINAL PAIN: 0

## 2021-10-22 NOTE — PROGRESS NOTES
Visit Information    Have you changed or started any medications since your last visit including any over-the-counter medicines, vitamins, or herbal medicines? no   Have you stopped taking any of your medications? Is so, why? -  no  Are you having any side effects from any of your medications? - no    Have you seen any other physician or provider since your last visit?  no   Have you had any other diagnostic tests since your last visit?  no   Have you been seen in the emergency room and/or had an admission in a hospital since we last saw you?  no   Have you had your routine dental cleaning in the past 6 months?  no     Do you have an active MyChart account? If no, what is the barrier?   No:     Patient Care Team:  Emiliano Gold MD as PCP - General (Family Medicine)  Leatha Bui MD as Consulting Physician (Gastroenterology)  Coretta Kincaid MD as Consulting Physician (Urology)    Medical History Review  Past Medical, Family, and Social History reviewed and does not contribute to the patient presenting condition    Health Maintenance   Topic Date Due    COVID-19 Vaccine (3 - [employer] booster) 10/30/2021    Lipid screen  03/03/2022    Potassium monitoring  08/19/2022    Creatinine monitoring  08/19/2022    Diabetes screen  12/10/2022    Pneumococcal 0-64 years Vaccine (2 of 2 - PPSV23) 08/20/2025    DTaP/Tdap/Td vaccine (2 - Td or Tdap) 08/20/2030    Colon cancer screen colonoscopy  01/12/2031    Flu vaccine  Completed    Shingles Vaccine  Completed    Hepatitis C screen  Completed    HIV screen  Completed    Hepatitis A vaccine  Aged Out    Hepatitis B vaccine  Aged Out    Hib vaccine  Aged Out    Meningococcal (ACWY) vaccine  Aged Out

## 2021-10-22 NOTE — PATIENT INSTRUCTIONS
Thank you for letting us take care of you today. We hope all your questions were addressed. If a question was overlooked or something else comes to mind after you return home, please contact a member of your Care Team listed below. Your Care Team at [place] [address] is Team #4  Paramjit Yanes MD (Faculty)  Domi Lopez MD (Resident)  Landen Cohn MD (Resident)  Rangel Garcia MD (Resident)  Vilma Coleman MD (Resident)  DELIA Lovell RMA Algie Page[address] [facility] office)  Molly Best, [address] (Clinical Practice Manager)  Maria Teresa Abbott, [facility] (Clinical Pharmacist)       Office phone number: [phone]    If you need to get in right away due to illness, please be advised we have \"Same Day\" appointments available Monday-Friday. Please call us at [phone] option #3 to schedule your \"Same Day\" appointment.

## 2021-10-22 NOTE — PROGRESS NOTES
Subjective: Ervin Peterson is a 61 y.o. male with  has a past medical history of Acute CHF (congestive heart failure) ([address] Arthritis, Atrial fibrillation ([address] BPH (benign prostatic hyperplasia), Cellulitis, Cervical disc disease, CHF (congestive heart failure) ([address] Combined systolic and diastolic congestive heart failure ([address] Coronary artery disease, COVID-19 virus RNA test result indeterminate, CPAP (continuous positive airway pressure) dependence, GERD (gastroesophageal reflux disease), History of incarceration, History of incarceration, Hyperlipidemia, Hypertension, Myocardial infarct ([address] Obesity, Sleep apnea, Wears reading eyeglasses, and Wellness examination. Presented to the office today for:  Chief Complaint   Patient presents with    1 Month Follow-Up     patient states his right arm has numbiness feeling    Back Pain     patient states having back pain       HPI    Patient was seen on October 1 for venous stasis ulcer/dermatitis/cellulitis and was given a week worth of antibiotic course    Patient has had bilateral venous insufficiency follows up with wound care  Patient requires bilateral compression stockings, patient has had recent skin ulcer in the left lower leg required no debridement per wound care patient was referred to lymphedema clinic. No fever or chills reported    Patient recently had bilateral lower leg Doppler studies to rule out venous thrombosis which showed no signs of clots but it showed venous reflux in bilateral saphenous veins. On Monday patient has an appointment with Vascular specialist.    For his obesity patient recently saw general surgery for prebariatric surgery evaluation and is planned to have a esophageal gastroduodenoscopy on January 14, 2022    Patient recently has been hypotensive in a month ago when I saw him I took him off of losartan.   Will assess today  Pressure seems better than the last few times  Blood pressure 106/64  Has been off of losartan since last time but is still currently  On Lasix  Imdur  And flomax     Patient reporting back pain with shooting pain on the left side. No fever, chills, urinary issues reported. Hasn't had imaging recently. Review of Systems   Constitutional: Positive for activity change. Negative for appetite change, chills, fatigue, fever and unexpected weight change. Respiratory: Positive for cough. Negative for apnea, shortness of breath and wheezing. Dry cough is much better and mentions 80% has resolved   Cardiovascular: Positive for leg swelling. Negative for chest pain and palpitations. Gastrointestinal: Negative for abdominal pain, constipation, diarrhea, nausea and vomiting. Musculoskeletal: Positive for back pain. Skin: Positive for wound. Wound is getting better on the left lower leg   Neurological: Positive for numbness. Negative for dizziness and weakness. Chronic numbness and tingling in bilateral hands post upper cervical surgery    Psychiatric/Behavioral: Negative for agitation, behavioral problems, confusion, decreased concentration and dysphoric mood. The patient has a   Family History   Problem Relation Age of Onset    Alcohol Abuse Maternal Uncle     Heart Attack Maternal Uncle     Cancer Mother     Alcohol Abuse Father        Objective:    /64 (Site: Left Upper Arm, Position: Sitting, Cuff Size: Large Adult)   Pulse 78   Temp 98.9 °F (37.2 °C) (Temporal)   Wt 291 lb (132 kg)   BMI 48.42 kg/m²    BP Readings from Last 3 Encounters:   10/22/21 106/64   10/14/21 110/74   10/13/21 127/68       Physical Exam  Constitutional:       General: He is not in acute distress. Appearance: Normal appearance. He is obese. He is not ill-appearing, toxic-appearing or diaphoretic. HENT:      Head: Normocephalic and atraumatic. Cardiovascular:      Rate and Rhythm: Normal rate and regular rhythm. Pulses: Normal pulses. Heart sounds:  No murmur heard. Pulmonary:      Effort: Pulmonary effort is normal.      Breath sounds: Normal breath sounds. No wheezing. Abdominal:      General: There is no distension. Tenderness: There is no abdominal tenderness. Musculoskeletal:         General: No swelling or tenderness. Normal range of motion. Right lower leg: Edema present. Left lower leg: Edema present. Comments: Lateral lower leg edema is controlled with compression stockings   Skin:     General: Skin is warm. Coloration: Skin is not jaundiced. Findings: Bruising present. No erythema. Comments: She had a left lower leg wound which is getting better   Neurological:      Mental Status: He is alert and oriented to person, place, and time. Motor: No weakness. Psychiatric:         Mood and Affect: Mood normal.         Behavior: Behavior normal.         Thought Content: Thought content normal.         Judgment: Judgment normal.         Lab Results   Component Value Date    WBC 9.6 08/19/2021    HGB 12.0 (L) 08/19/2021    HCT 39.0 (L) 08/19/2021     08/19/2021    CHOL 112 05/26/2020    TRIG 53 05/26/2020    HDL 47 03/03/2021    ALT 14 03/16/2021    AST 15 03/16/2021     08/19/2021    K 3.9 08/19/2021    CL 99 08/19/2021    CREATININE 0.77 08/19/2021    BUN 14 08/19/2021    CO2 25 08/19/2021    TSH 1.48 01/24/2020    PSA 0.67 09/22/2020    INR 1.0 03/08/2021    LABA1C 5.6 12/10/2019     Lab Results   Component Value Date    CALCIUM 9.0 08/19/2021    PHOS 4.0 03/08/2021     Lab Results   Component Value Date    LDLCHOLESTEROL 54 03/03/2021       Assessment and Plan:    1. Lymphedema of both lower extremities  Controlled with compression stockings  Follows up with lymphedema clinic  We will be following up with vascular as well  Patient imaging positive for venous reflux but no blood clots noted    2.  Hypotension, unspecified hypotension type  Able currently  Blood pressure 106/64  Currently off of losartan  Patient is on Imdur, Lasix Flomax  No dizziness reported    3. Class 3 severe obesity due to excess calories with serious comorbidity and body mass index (BMI) of 45.0 to 49.9 in adult [facility])  Patient in the process of getting bariatric surgery done and is trying to lose weight    4. Chronic low back pain with left-sided sciatica, unspecified back pain laterality  - lidocaine 4 % external patch; Place 1 patch onto the skin daily  Dispense: 30 patch; Refill: 0    Patient also takes Plavix but has never had any stenting placed in he did have a coronary bypass surgery done about a year and a half ago and also has had history of A. fib for which she needs to follow-up with cardiology to see further need for anticoagulation and the discontinuation of Plavix    Requested Prescriptions     Signed Prescriptions Disp Refills    lidocaine 4 % external patch 30 patch 0     Sig: Place 1 patch onto the skin daily       There are no discontinued medications. Fausto Pena received counseling on the following healthy behaviors: nutrition, exercise and medication adherence    Discussed use,benefit, and side effects of prescribed medications. Barriers to medication compliance addressed. All patient questions answered. Pt voiced understanding. Return in about 3 months (around 1/22/2022). Disclaimer: Some orall of this note was transcribed using voice-recognition software. This may cause typographical errors occasionally. Although all effort is made to fix these errors, please do not hesitate to contact our office if there Konstantin Rojas concern with the understanding of this note.

## 2021-10-22 NOTE — PROGRESS NOTES
Attending Physician Statement  I  have discussed the care of Kathe Briseno including pertinent history and exam findings with the resident. I agree with the assessment, plan and orders as documented by the resident. /64 (Site: Left Upper Arm, Position: Sitting, Cuff Size: Large Adult)   Pulse 78   Temp 98.9 °F (37.2 °C) (Temporal)   Wt 291 lb (132 kg)   BMI 48.42 kg/m²    BP Readings from Last 3 Encounters:   10/22/21 106/64   10/14/21 110/74   10/13/21 127/68     Wt Readings from Last 3 Encounters:   10/22/21 291 lb (132 kg)   10/14/21 296 lb 9.6 oz (134.5 kg)   10/06/21 292 lb (132.5 kg)          Diagnosis Orders   1. Lymphedema of both lower extremities     2. Hypotension, unspecified hypotension type     3. Class 3 severe obesity due to excess calories with serious comorbidity and body mass index (BMI) of 45.0 to 49.9 in adult ([address]     4. Left arm numbness     5. Chronic low back pain with left-sided sciatica, unspecified back pain laterality  lidocaine 4 % external patch       See orders. RTO as noted, discussed care plan with patient and Resident Physician. Questions answered. Call results - if indicated to patient.       Dolly Carrera DO 10/22/2021 4:58 PM

## 2021-10-27 ENCOUNTER — TELEPHONE (OUTPATIENT)
Dept: FAMILY MEDICINE CLINIC | Age: 66
End: 2021-10-27

## 2021-10-27 NOTE — TELEPHONE ENCOUNTER
The writer is leaving [facility] ([facility]) 10/29/21. Closing referral; needs have been addressed.      Yohana Santiago

## 2021-10-29 ENCOUNTER — HOSPITAL ENCOUNTER (OUTPATIENT)
Age: 66
Discharge: HOME | End: 2021-10-29
Payer: MEDICAID

## 2021-10-29 ENCOUNTER — OFFICE VISIT (OUTPATIENT)
Dept: GASTROENTEROLOGY | Age: 66
End: 2021-10-29
Payer: MEDICAID

## 2021-10-29 VITALS — DIASTOLIC BLOOD PRESSURE: 68 MMHG | BODY MASS INDEX: 49.26 KG/M2 | WEIGHT: 296 LBS | SYSTOLIC BLOOD PRESSURE: 113 MMHG

## 2021-10-29 DIAGNOSIS — K21.9 GASTROESOPHAGEAL REFLUX DISEASE WITHOUT ESOPHAGITIS: Primary | ICD-10-CM

## 2021-10-29 DIAGNOSIS — K21.9 GASTROESOPHAGEAL REFLUX DISEASE WITHOUT ESOPHAGITIS: ICD-10-CM

## 2021-10-29 LAB
ABSOLUTE EOS #: 0.23 K/UL (ref 0–0.44)
ABSOLUTE IMMATURE GRANULOCYTE: 0.1 K/UL (ref 0–0.3)
ABSOLUTE LYMPH #: 1.16 K/UL (ref 1.1–3.7)
ABSOLUTE MONO #: 0.87 K/UL (ref 0.1–1.2)
ANION GAP SERPL CALCULATED.3IONS-SCNC: 11 MMOL/L (ref 9–17)
BASOPHILS # BLD: 1 % (ref 0–2)
BASOPHILS ABSOLUTE: 0.04 K/UL (ref 0–0.2)
BUN BLDV-MCNC: 14 MG/DL (ref 8–23)
BUN/CREAT BLD: ABNORMAL (ref 9–20)
CALCIUM SERPL-MCNC: 9 MG/DL (ref 8.6–10.4)
CHLORIDE BLD-SCNC: 106 MMOL/L (ref 98–107)
CO2: 26 MMOL/L (ref 20–31)
CREAT SERPL-MCNC: 0.8 MG/DL (ref 0.7–1.2)
DIFFERENTIAL TYPE: ABNORMAL
EOSINOPHILS RELATIVE PERCENT: 3 % (ref 1–4)
GFR AFRICAN AMERICAN: >60 ML/MIN
GFR NON-AFRICAN AMERICAN: >60 ML/MIN
GFR SERPL CREATININE-BSD FRML MDRD: ABNORMAL ML/MIN/{1.73_M2}
GFR SERPL CREATININE-BSD FRML MDRD: ABNORMAL ML/MIN/{1.73_M2}
GLUCOSE BLD-MCNC: 110 MG/DL (ref 70–99)
HCT VFR BLD CALC: 39.5 % (ref 40.7–50.3)
HEMOGLOBIN: 12.3 G/DL (ref 13–17)
IMMATURE GRANULOCYTES: 1 %
LYMPHOCYTES # BLD: 15 % (ref 24–43)
MCH RBC QN AUTO: 29.6 PG (ref 25.2–33.5)
MCHC RBC AUTO-ENTMCNC: 31.1 G/DL (ref 28.4–34.8)
MCV RBC AUTO: 95.2 FL (ref 82.6–102.9)
MONOCYTES # BLD: 11 % (ref 3–12)
NRBC AUTOMATED: 0 PER 100 WBC
PDW BLD-RTO: 15.9 % (ref 11.8–14.4)
PLATELET # BLD: 222 K/UL (ref 138–453)
PLATELET ESTIMATE: ABNORMAL
PMV BLD AUTO: 10 FL (ref 8.1–13.5)
POTASSIUM SERPL-SCNC: 4.2 MMOL/L (ref 3.7–5.3)
RBC # BLD: 4.15 M/UL (ref 4.21–5.77)
RBC # BLD: ABNORMAL 10*6/UL
SEG NEUTROPHILS: 69 % (ref 36–65)
SEGMENTED NEUTROPHILS ABSOLUTE COUNT: 5.23 K/UL (ref 1.5–8.1)
SODIUM BLD-SCNC: 143 MMOL/L (ref 135–144)
WBC # BLD: 7.6 K/UL (ref 3.5–11.3)
WBC # BLD: ABNORMAL 10*3/UL

## 2021-10-29 PROCEDURE — 80048 BASIC METABOLIC PNL TOTAL CA: CPT

## 2021-10-29 PROCEDURE — 1036F TOBACCO NON-USER: CPT | Performed by: INTERNAL MEDICINE

## 2021-10-29 PROCEDURE — 36415 COLL VENOUS BLD VENIPUNCTURE: CPT

## 2021-10-29 PROCEDURE — G8482 FLU IMMUNIZE ORDER/ADMIN: HCPCS | Performed by: INTERNAL MEDICINE

## 2021-10-29 PROCEDURE — G8427 DOCREV CUR MEDS BY ELIG CLIN: HCPCS | Performed by: INTERNAL MEDICINE

## 2021-10-29 PROCEDURE — 99213 OFFICE O/P EST LOW 20 MIN: CPT | Performed by: INTERNAL MEDICINE

## 2021-10-29 PROCEDURE — G8417 CALC BMI ABV UP PARAM F/U: HCPCS | Performed by: INTERNAL MEDICINE

## 2021-10-29 PROCEDURE — 3017F COLORECTAL CA SCREEN DOC REV: CPT | Performed by: INTERNAL MEDICINE

## 2021-10-29 PROCEDURE — 85025 COMPLETE CBC W/AUTO DIFF WBC: CPT

## 2021-10-29 RX ORDER — PANTOPRAZOLE SODIUM 40 MG/1
40 TABLET, DELAYED RELEASE ORAL
Qty: 60 TABLET | Refills: 5 | Status: SHIPPED | OUTPATIENT
Start: 2021-10-29 | End: 2022-05-23 | Stop reason: SDUPTHER

## 2021-10-29 ASSESSMENT — ENCOUNTER SYMPTOMS
GASTROINTESTINAL NEGATIVE: 1
ALLERGIC/IMMUNOLOGIC NEGATIVE: 1
APNEA: 1
SHORTNESS OF BREATH: 1
CHEST TIGHTNESS: 1
COUGH: 1
EYES NEGATIVE: 1

## 2021-10-29 NOTE — PROGRESS NOTES
GI FOLLOW UP    INTERVAL HISTORY:       No referring provider defined for this encounter. Chief Complaint   Patient presents with    Follow-up     lab follow up     Gastroesophageal Reflux     Patient taking protonix BID. States it controls his symptoms, if he has any breakthrough heartburn he will use carafate. 1. Gastroesophageal reflux disease without esophagitis          HISTORY OF PRESENT ILLNESS: Mr.Andrew YUN Berg is a 60 y. o. male with a past history remarkable for HARPREET, A. fib on Eliquis, morbid obesity, history of CABG in October 2019 on dual antiplatelet therapy (aspirin and Plavix), referred for evaluation of nonproductive postprandial cough.  This appears to be most consistent with the patient's chronic GERD symptoms.  He appears to be on maximum PPI therapy     Recent colonoscopy which identified tubular adenomas and sessile serrated adenomas which were removed aside from an isolated flat lesion that will require EMR.  Findings discussed with the patient. Past Medical,Family, and Social History reviewed and does contribute to the patient presenting condition. Patient's PMH/PSH,SH,PSYCH Hx, MEDs, ALLERGIES, and ROS were all reviewed and updated in the appropriate sections. PAST MEDICAL HISTORY:  Past Medical History:   Diagnosis Date    Acute CHF (congestive heart failure) ([facility] [address] 3/8/2021    Arthritis     back    Atrial fibrillation ([facility] [address] 10/2019    Dr. Sarahi Grace BPH (benign prostatic hyperplasia)     Cellulitis     Cervical disc disease     CHF (congestive heart failure) [facility])     cardiologist Dr. Renée Yu. s CHF clinic    Combined systolic and diastolic congestive heart failure ([facility] [address] 1/15/2020    Coronary artery disease 10/2019     CABG 2019 [facility]    Dr. Merle Mortimer Cardiology last seen within the last year.     COVID-19 virus RNA test result indeterminate 3/31/2021    CPAP (continuous positive airway pressure) dependence     GERD (gastroesophageal reflux disease)     on rx    History of incarceration     released 2019    History of incarceration     Hyperlipidemia     Dr. Morena Boswell Hypertension     [facility] CHF clinic     Dr. Morena Boswell Myocardial infarct [facility])     Dr. Morena Boswell Obesity     Sleep apnea     C-pap    Wears reading eyeglasses     Wellness examination     Dr. Kamala Dickens 4/2021       Past Surgical History:   Procedure Laterality Date    ABDOMINAL EXPLORATION SURGERY      CARDIAC SURGERY      2019    CARDIOVERSION  10/16/2019    CARPAL TUNNEL RELEASE Bilateral 2008    CERVICAL SPINE SURGERY      2-5    COLONOSCOPY N/A 10/29/2020    COLONOSCOPY WITH BIOPSY performed by Pal Dinero MD at [place] [address]  10/29/2020    COLONOSCOPY SUBMUCOSAL/BOTOX INJECTION performed by Pal Dinero MD at [place] [address]  10/29/2020    COLONOSCOPY POLYPECTOMY SNARE/COLD BIOPSY performed by Pal Dinero MD at [place] [address] N/A 01/12/2021    COLONOSCOPY POLYPECTOMY HOT SNARE, COLD SNARE POLPECTOMY performed by Kris Barfield MD at [facility] N/A 10/21/2019    CABG CORONARY ARTERY BYPASS GRAFT X1, LIMA-LAD, BROWN 4 MAZE PROCEDURE, 50MM ATRICURE ATRIAL CLIP RIGID INTERNAL FIXATION PLATES X 3   SCREWS X 13 performed by Lul Frederick MD at [facility] 03/26/2021    URODYNAMICS performed by Eduardo Elkins MD at [address] [facility]  03/26/2021    CYSTOSCOPY FLEXIBLE performed by Eduardo Elkins MD at [address] [place] [address]  05/14/2021    CYSTOSCOPY, UROLIFT, FULGURATION    CYSTOSCOPY N/A 5/14/2021    CYSTOSCOPY, UROLIFT, FULGURATION performed by Eduardo Elkins MD at [address]      cataract surgery 2020    FRACTURE SURGERY      Left leg    TRANSESOPHAGEAL ECHOCARDIOGRAM  10/16/2019       CURRENT MEDICATIONS:    Current Outpatient Medications:     lidocaine 4 % external patch, Place 1 patch onto the skin daily, Disp: 30 patch, Rfl: 0    isosorbide mononitrate (IMDUR) 30 MG extended release tablet, TAKE 1 TABLET BY MOUTH DAILY, Disp: 30 tablet, Rfl: 1    furosemide (LASIX) 40 MG tablet, Take 1.5 tablets by mouth 2 times daily, Disp: 30 tablet, Rfl: 2    potassium chloride (KLOR-CON M) 20 MEQ extended release tablet, Take 1 tablet by mouth daily, Disp: 30 tablet, Rfl: 1    ALLERGY RELIEF 10 MG tablet, TAKE 1 TABLET BY MOUTH DAILY, Disp: 30 tablet, Rfl: 2    atorvastatin (LIPITOR) 40 MG tablet, Take 1 tablet by mouth nightly, Disp: 30 tablet, Rfl: 1    tamsulosin (FLOMAX) 0.4 MG capsule, TAKE 1 CAPSULE BY MOUTH DAILY, Disp: 30 capsule, Rfl: 5    clopidogrel (PLAVIX) 75 MG tablet, Take 1 tablet by mouth daily, Disp: 90 tablet, Rfl: 5    oxybutynin (DITROPAN-XL) 5 MG extended release tablet, TAKE 1 TABLET BY MOUTH DAILY, Disp: 30 tablet, Rfl: 3    pantoprazole (PROTONIX) 40 MG tablet, TAKE 1 TABLET BY MOUTH 2 TIMES DAILY (BEFORE MEALS), Disp: 60 tablet, Rfl: 5    Prenatal Vit-Fe Fumarate-FA (NIVA-PLUS) 27-1 MG TABS, , Disp: , Rfl:     Multiple Vitamins-Minerals (PRESERVISION AREDS 2) CAPS, , Disp: , Rfl:     Blood Pressure Monitor KIT, 1 each by Does not apply route 2 times daily, Disp: 1 kit, Rfl: 0    Misc. Devices ([employer] WEIGHT SCALE) MISC, 1 each by Does not apply route as needed (weigh once a day and record), Disp: 1 each, Rfl: 0    nitroGLYCERIN (NITROSTAT) 0.4 MG SL tablet, Place 1 tablet under the tongue every 5 minutes as needed for Chest pain up to max of 3 total doses. If no relief after 1 dose, call 911., Disp: 25 tablet, Rfl: 1    Elastic Bandages & Supports (JOBST KNEE HIGH COMPRESSION SM) MISC, 2 each by Does not apply route daily Wear q day. Remove q hs.   Diagnosis: Chronic venous insufficiency, Disp: 2 each, Rfl: 0    ALLERGIES:   No Known Allergies    FAMILY HISTORY:       Problem Relation Age of Onset    Alcohol Abuse Maternal Uncle     Heart Attack Maternal Uncle     Cancer Mother     Alcohol Abuse Father          SOCIAL HISTORY:   Social History     Socioeconomic History    Marital status:      Spouse name: Not on file    Number of children: Not on file    Years of education: Not on file    Highest education level: Not on file   Occupational History    Not on file   Tobacco Use    Smoking status: Never Smoker    Smokeless tobacco: Never Used   Vaping Use    Vaping Use: Never used   Substance and Sexual Activity    Alcohol use: Not Currently     Comment: stopped 1991    Drug use: Not Currently    Sexual activity: Not Currently   Other Topics Concern    Not on file   Social History Narrative    Not on file     Social Determinants of Health     Financial Resource Strain:     Difficulty of Paying Living Expenses:    Food Insecurity:     Worried About Running Out of Food in the Last Year:     [address] in the Last Year:    Transportation Needs:     Lack of Transportation (Medical):  Lack of Transportation (Non-Medical):    Physical Activity:     Days of Exercise per Week:     Minutes of Exercise per Session:    Stress:     Feeling of Stress :    Social Connections:     Frequency of Communication with Friends and Family:     Frequency of Social Gatherings with Friends and Family:     Attends [employer] Services:     Active Member of Clubs or Organizations:     Attends Club or Organization Meetings:     Marital Status:    Intimate Partner Violence:     Fear of Current or Ex-Partner:     Emotionally Abused:     Physically Abused:     Sexually Abused:        REVIEW OF SYSTEMS: A 12-point review of systems was obtained and pertinent positives and negatives were listed below. REVIEW OF SYSTEMS:     Constitutional: No fever, no chills, no lethargy, no weakness. HEENT:  No headache, otalgia, itchy eyes, nasal discharge or sore throat.   Cardiac:  No chest pain, dyspnea, orthopnea or PND. Chest:   No cough, phlegm or wheezing. Abdomen:      Detailed by MA   Neuro:  No focal weakness, abnormal movements or seizure like activity. Skin:   No rashes, no itching. :   No hematuria, no pyuria, no dysuria, no flank pain. Extremities:  No swelling or joint pains. ROS was otherwise negative    Review of Systems   Constitutional: Negative. HENT: Negative. Eyes: Negative. Respiratory: Positive for apnea, cough, chest tightness and shortness of breath. Cardiovascular: Negative. Gastrointestinal: Negative. Endocrine: Negative. Genitourinary: Negative. Musculoskeletal: Positive for gait problem. Skin: Negative. Allergic/Immunologic: Negative. Hematological: Bruises/bleeds easily. Psychiatric/Behavioral: Positive for sleep disturbance. All other systems reviewed and are negative. PHYSICAL EXAMINATION: Vital signs reviewed per the nursing documentation. /68   Wt 296 lb (134.3 kg)   BMI 49.26 kg/m²   Body mass index is 49.26 kg/m². Physical Exam    Physical Exam   Constitutional: Patient is oriented to person, place, and time. Patient appears well-developed and well-nourished. HENT:   Head: Normocephalic and atraumatic. Eyes: Pupils are equal, round, and reactive to light. EOM are normal.   Neck: Normal range of motion. Neck supple. No JVD present. No tracheal deviation present. No thyromegaly present. Cardiovascular: Normal rate, regular rhythm, normal heart sounds and intact distal pulses. Pulmonary/Chest: Effort normal and breath sounds normal. No stridor. No respiratory distress. He has no wheezes. He has no rales. He exhibits no tenderness. Abdominal: Soft. Bowel sounds are normal. He exhibits no distension and no mass. There is no tenderness. There is no rebound and no guarding. No hernia. Musculoskeletal: Normal range of motion. Lymphadenopathy:    Patient has no cervical adenopathy.    Neurological: Patient is alert and oriented to person, place, and time. Psychiatric: Patient has a normal mood and affect. Patient behavior is normal.       LABORATORY DATA: Reviewed  Lab Results   Component Value Date    WBC 9.6 08/19/2021    HGB 12.0 (L) 08/19/2021    HCT 39.0 (L) 08/19/2021    MCV 92.4 08/19/2021     08/19/2021     08/19/2021    K 3.9 08/19/2021    CL 99 08/19/2021    CO2 25 08/19/2021    BUN 14 08/19/2021    CREATININE 0.77 08/19/2021    LABALBU 3.8 03/16/2021    BILITOT 0.29 (L) 03/16/2021    ALKPHOS 65 03/16/2021    AST 15 03/16/2021    ALT 14 03/16/2021    INR 1.0 03/08/2021         Lab Results   Component Value Date    RBC 4.22 08/19/2021    HGB 12.0 (L) 08/19/2021    MCV 92.4 08/19/2021    MCH 28.4 08/19/2021    MCHC 30.8 08/19/2021    RDW 16.2 (H) 08/19/2021    MPV 10.5 08/19/2021    BASOPCT 1 08/19/2021    LYMPHSABS 1.46 08/19/2021    MONOSABS 0.91 08/19/2021    NEUTROABS 6.83 08/19/2021    EOSABS 0.31 08/19/2021    BASOSABS 0.05 08/19/2021         DIAGNOSTIC TESTING:     VL Lower Extremity Bilateral Venous Duplex    Result Date: 10/12/2021    [address] [place] [address]  Vascular Lower Extremities Venous Insufficiency and Lower Extremities DVT  Study Procedure   Patient Name [address]    Date of Study         10/12/2021               Valiant Tesfaye   Date of      1955  Gender                Male  Birth   Age          61 year(s)  Race                     Room Number   Corporate ID [id number]  #   Patient Saralyskarlie [de-identified]  #   MR #         [id number]      Sonographer           Roxann Major   Accession #  [id number]  Interpreting          Austin Harris                           Physician   Referring                Referring Physician   Chuck Escudero. Nurse Keyon                                          APRN-CNP  Practitioner  Procedure Type of Study:   Veins: Lower Extremities Venous Insufficiency, Lower Extremities DVT  Study, Venous Scan Lower Bilateral.  Indications for Study:Venous insufficiency and Edema. Patient Status:Out Patient. Technical Quality:Adequate visualization. Conclusions   Summary   No evidence of superficial or deep venous thrombosis in both lower  extremities. Reflux with hemodynamic significance is noted in the bilateral great  saphenous vein. Signature   ----------------------------------------------------------------  Electronically signed by Rowdy Dodd(Sonographer) on  10/12/2021 10:51 AM  ----------------------------------------------------------------   ----------------------------------------------------------------  Electronically signed by Austin Harris(Interpreting physician)  on 10/12/2021 09:39 PM  ----------------------------------------------------------------  Findings:   Right Impression:                    Left Impression:  The common femoral, femoral,         The common femoral, femoral,  popliteal and tibial veins           popliteal and tibial veins  demonstrate normal compressibility. demonstrate normal compressibility. Deep vein reflux time <1000 msec. Deep vein reflux time <1000 msec. Normal compressibility of the great  Normal compressibility of the great  saphenous vein. saphenous vein. SFJ with multilevel GSV reflux time  SFJ with multilevel GSV reflux time  >500 msec.                           >500 msec. Normal compressibility of the small  Normal compressibility of the small  saphenous vein. saphenous vein. SPJ and SSV reflux time <500 msec. SPJ and SSV reflux time <500 msec. Distal perforating vein with reflux  Distal perforating vein with reflux  >500 msec.                           >500 msec. Risk Factors History +-----------------------+----------+---------------------------------------+ ! Diagnosis              ! Date      ! Comments                               ! +-----------------------+----------+---------------------------------------+ ! Previous Scan          !11/11/2019! DAIJA ! +-----------------------+----------+---------------------------------------+ ! CAD                    ! !S/P CABG 10-21-19                      ! ! !          !Stent                                  ! +-----------------------+----------+---------------------------------------+ ! Previous Scan          !12/11/2019! venous-wnl, limited                    ! +-----------------------+----------+---------------------------------------+ Allergies   - Allergy:*No Known Allergies(Miscellaneous). Velocities are measured in cm/s ; Diameters are measured in cm Right Doppler Measurements +---------------------------------------+------+------+--------------------+ ! Location                               ! Signal!Reflux! Reflux (msec)       ! +---------------------------------------+------+------+--------------------+ ! Common Femoral                         !Phasic! No    !340                 ! +---------------------------------------+------+------+--------------------+ ! Prox Femoral                           !Phasic! No    !360                 ! +---------------------------------------+------+------+--------------------+ ! Mid Femoral                            !Phasic! No    !360                 ! +---------------------------------------+------+------+--------------------+ ! Popliteal                              !Phasic! No    !500                 ! +---------------------------------------+------+------+--------------------+ ! Sapheno Femoral Junction               ! Phasic! Yes   ! 660                 ! +---------------------------------------+------+------+--------------------+ ! GSV Thigh                              ! Phasic! Yes   ! 560                 ! +---------------------------------------+------+------+--------------------+ ! GSV Knee                               ! Phasic! No    !380                 ! +---------------------------------------+------+------+--------------------+ ! GSV Calf !Phasic! Yes   !1770                ! +---------------------------------------+------+------+--------------------+ ! GSV Ankle                              ! Phasic! Yes   ! 570                 ! +---------------------------------------+------+------+--------------------+ ! SSV                                    ! Phasic! No    !270                 ! +---------------------------------------+------+------+--------------------+ Left Doppler Measurements +---------------------------------------+------+------+--------------------+ ! Location                               ! Signal!Reflux! Reflux (msec)       ! +---------------------------------------+------+------+--------------------+ ! Common Femoral                         !Phasic! No    !550                 ! +---------------------------------------+------+------+--------------------+ ! Prox Femoral                           !Phasic! No    !100                 ! +---------------------------------------+------+------+--------------------+ ! Mid Femoral                            !Phasic! No    !100                 ! +---------------------------------------+------+------+--------------------+ ! Popliteal                              !Phasic! No    !940                 ! +---------------------------------------+------+------+--------------------+ ! Sapheno Femoral Junction               ! Phasic! Yes   ! 640                 ! +---------------------------------------+------+------+--------------------+ ! GSV Thigh                              ! Phasic! Yes   ! 1060                ! +---------------------------------------+------+------+--------------------+ ! GSV Knee                               ! Phasic! Yes   ! 530                 ! +---------------------------------------+------+------+--------------------+ ! GSV Calf                               ! Phasic! No    !340                 ! +---------------------------------------+------+------+--------------------+ ! GSV Ankle !Phasic! No    !360                 ! +---------------------------------------+------+------+--------------------+ ! SSV                                    ! Phasic! No    !200                 ! +---------------------------------------+------+------+--------------------+ Number of Perforators +--------------------------------++----------+------+----+----------+------+ ! ! !Right     ! ! Left!          !      ! +--------------------------------++----------+------+----+----------+------+ ! Location                        ! !Competency! Number! !Competency! Number! +--------------------------------++----------+------+----+----------+------+ ! Above Ankle                     ! !No        !810   ! ! No        !670   ! +--------------------------------++----------+------+----+----------+------+ Velocities are measured in cm/s ; Diameters are measured in cm Right Lower Extremities DVT Study Measurements Right 2D Measurements +------------------------------------+----------+---------------+----------+ ! Location                            ! Visualized! Compressibility! Thrombosis! +------------------------------------+----------+---------------+----------+ ! Common Femoral                      !Yes       ! Yes            ! None      ! +------------------------------------+----------+---------------+----------+ ! Prox Femoral                        !Yes       ! Yes            ! None      ! +------------------------------------+----------+---------------+----------+ ! Mid Femoral                         !Yes       ! Yes            ! None      ! +------------------------------------+----------+---------------+----------+ ! Dist Femoral                        !Yes       ! Yes            ! None      ! +------------------------------------+----------+---------------+----------+ ! Cesar                           !Yes       ! Yes            ! None      ! +------------------------------------+----------+---------------+----------+ ! Sapheno Femoral Junction            ! Yes       ! Yes            ! None      ! +------------------------------------+----------+---------------+----------+ ! PTV                                 ! Yes       ! Yes            ! None      ! +------------------------------------+----------+---------------+----------+ ! Peroneal                            !Yes       ! Yes            ! None      ! +------------------------------------+----------+---------------+----------+ ! Gastroc                             ! Yes       ! Yes            ! None      ! +------------------------------------+----------+---------------+----------+ ! GSV Thigh                           ! Yes       ! Yes            ! None      ! +------------------------------------+----------+---------------+----------+ ! GSV Knee                            ! Yes       ! Yes            ! None      ! +------------------------------------+----------+---------------+----------+ ! GSV Ankle                           ! Yes       ! Yes            ! None      ! +------------------------------------+----------+---------------+----------+ ! SSV                                 ! Yes       ! Yes            ! None      ! +------------------------------------+----------+---------------+----------+ Right Doppler Measurements +---------------------------+------+------+--------------------------------+ ! Location                   ! Signal!Reflux! Reflux (msec)                   ! +---------------------------+------+------+--------------------------------+ ! Common Femoral             !Phasic! No    !340                             ! +---------------------------+------+------+--------------------------------+ ! Prox Femoral               !Phasic! No    !360                             ! +---------------------------+------+------+--------------------------------+ ! Popliteal                  !Phasic! No    !500 ! +---------------------------+------+------+--------------------------------+ Left Lower Extremities DVT Study Measurements Left 2D Measurements +------------------------------------+----------+---------------+----------+ ! Location                            ! Visualized! Compressibility! Thrombosis! +------------------------------------+----------+---------------+----------+ ! Common Femoral                      !Yes       ! Yes            ! None      ! +------------------------------------+----------+---------------+----------+ ! Prox Femoral                        !Yes       ! Yes            ! None      ! +------------------------------------+----------+---------------+----------+ ! Mid Femoral                         !Yes       ! Yes            ! None      ! +------------------------------------+----------+---------------+----------+ ! Dist Femoral                        !Yes       ! Yes            ! None      ! +------------------------------------+----------+---------------+----------+ ! Popliteal                           !Yes       ! Yes            ! None      ! +------------------------------------+----------+---------------+----------+ ! Sapheno Femoral Junction            ! Yes       ! Yes            ! None      ! +------------------------------------+----------+---------------+----------+ ! PTV                                 ! Yes       ! Yes            ! None      ! +------------------------------------+----------+---------------+----------+ ! Peroneal                            !Yes       ! Yes            ! None      ! +------------------------------------+----------+---------------+----------+ ! Gastroc                             ! Yes       ! Yes            ! None      ! +------------------------------------+----------+---------------+----------+ ! GSV Thigh                           ! Yes       ! Yes            ! None      ! +------------------------------------+----------+---------------+----------+ ! GSV Knee educated on appropriate food choices. 3) Obscure anemia, non-overt--repeat lab values. May consider capsule endoscopy in the future. Thank you for allowing me to participate in the care of Mr. Yuliana Johnston. For any further questions please do not hesitate to contact me. I have reviewed and agree with the ROS entered by the MA/LPN from today's encounter documented in a separate note. Heidi Roque MD, MPH   [facility]  Office #: [phone]    this note is created with the assistance of a speech recognition program.  While intending to generate a document that actually reflects the content of the visit, the document can still have some errors including those of syntax and sound a like substitutions which may escape proof reading. It such instances, actual meaning can be extrapolated by contextual diversion.

## 2021-11-01 DIAGNOSIS — I89.0 LYMPHEDEMA OF BOTH LOWER EXTREMITIES: Primary | ICD-10-CM

## 2021-11-01 DIAGNOSIS — I50.42 HEART FAILURE, SYSTOLIC AND DIASTOLIC, CHRONIC (HCC): ICD-10-CM

## 2021-11-01 DIAGNOSIS — R26.2 DIFFICULTY IN WALKING: ICD-10-CM

## 2021-11-01 RX ORDER — FUROSEMIDE 40 MG/1
60 TABLET ORAL 2 TIMES DAILY
Qty: 30 TABLET | Refills: 2 | Status: SHIPPED | OUTPATIENT
Start: 2021-11-01 | End: 2022-01-03 | Stop reason: SDUPTHER

## 2021-11-08 ENCOUNTER — TELEPHONE (OUTPATIENT)
Dept: FAMILY MEDICINE CLINIC | Age: 66
End: 2021-11-08

## 2021-11-08 RX ORDER — OXYBUTYNIN CHLORIDE 5 MG/1
5 TABLET, EXTENDED RELEASE ORAL DAILY
Qty: 30 TABLET | Refills: 3 | Status: SHIPPED | OUTPATIENT
Start: 2021-11-08 | End: 2022-02-11

## 2021-11-11 ENCOUNTER — HOSPITAL ENCOUNTER (OUTPATIENT)
Dept: OTHER | Age: 66
Discharge: HOME | End: 2021-11-11
Payer: MEDICAID

## 2021-11-11 VITALS
RESPIRATION RATE: 20 BRPM | SYSTOLIC BLOOD PRESSURE: 120 MMHG | WEIGHT: 291 LBS | DIASTOLIC BLOOD PRESSURE: 76 MMHG | BODY MASS INDEX: 48.42 KG/M2 | OXYGEN SATURATION: 97 % | HEART RATE: 65 BPM

## 2021-11-11 DIAGNOSIS — I50.32 CHRONIC DIASTOLIC (CONGESTIVE) HEART FAILURE (HCC): Primary | ICD-10-CM

## 2021-11-11 PROCEDURE — 99213 OFFICE O/P EST LOW 20 MIN: CPT | Performed by: NURSE PRACTITIONER

## 2021-11-11 PROCEDURE — 99212 OFFICE O/P EST SF 10 MIN: CPT

## 2021-11-11 ASSESSMENT — ENCOUNTER SYMPTOMS
COUGH: 0
EYE DISCHARGE: 0
ABDOMINAL PAIN: 0
SHORTNESS OF BREATH: 1
BLOOD IN STOOL: 0

## 2021-11-11 NOTE — PROGRESS NOTES
 CARPAL TUNNEL RELEASE Bilateral 2008    CERVICAL SPINE SURGERY      2-5    COLONOSCOPY N/A 10/29/2020    COLONOSCOPY WITH BIOPSY performed by Caryn Damon MD at [address] [place] [address]  10/29/2020    COLONOSCOPY SUBMUCOSAL/BOTOX INJECTION performed by Caryn Damon MD at [address] [place] [address]  10/29/2020    COLONOSCOPY POLYPECTOMY SNARE/COLD BIOPSY performed by Caryn Damon MD at [address] [place] [address] N/A 01/12/2021    COLONOSCOPY POLYPECTOMY HOT SNARE, COLD SNARE POLPECTOMY performed by Elsie Snellen, MD at [address] [place] [address] N/A 10/21/2019    CABG CORONARY ARTERY BYPASS GRAFT X1, LIMA-LAD, BROWN 4 MAZE PROCEDURE, 50MM ATRICURE ATRIAL CLIP RIGID INTERNAL FIXATION PLATES X 3   SCREWS X 13 performed by William Fragoso MD at [place] [address] 03/26/2021    URODYNAMICS performed by Oneyda Noriega MD at [facility]  03/26/2021    CYSTOSCOPY FLEXIBLE performed by Oneyda Noriega MD at [address] [place] [address]  05/14/2021    CYSTOSCOPY, UROLIFT, FULGURATION    CYSTOSCOPY N/A 5/14/2021    CYSTOSCOPY, UROLIFT, FULGURATION performed by Oneyda Noriega MD at [address] [facility]      cataract surgery 2020    FRACTURE SURGERY      Left leg    TRANSESOPHAGEAL ECHOCARDIOGRAM  10/16/2019       Family History   Problem Relation Age of Onset    Alcohol Abuse Maternal Uncle     Heart Attack Maternal Uncle     Cancer Mother     Alcohol Abuse Father        Social History     Tobacco Use    Smoking status: Never Smoker    Smokeless tobacco: Never Used   Substance Use Topics    Alcohol use: Not Currently     Comment: stopped 1991      Current Outpatient Medications   Medication Sig Dispense Refill    oxybutynin (DITROPAN-XL) 5 MG extended release tablet TAKE 1 TABLET BY MOUTH DAILY 30 tablet 3    furosemide (LASIX) 40 MG tablet Take 1.5 tablets by mouth 2 times daily 30 tablet 2    pantoprazole (PROTONIX) 40 MG tablet Take 1 tablet by mouth 2 times daily (before meals) 60 tablet 5    isosorbide mononitrate (IMDUR) 30 MG extended release tablet TAKE 1 TABLET BY MOUTH DAILY 30 tablet 1    potassium chloride (KLOR-CON M) 20 MEQ extended release tablet Take 1 tablet by mouth daily 30 tablet 1    ALLERGY RELIEF 10 MG tablet TAKE 1 TABLET BY MOUTH DAILY 30 tablet 2    atorvastatin (LIPITOR) 40 MG tablet Take 1 tablet by mouth nightly 30 tablet 1    tamsulosin (FLOMAX) 0.4 MG capsule TAKE 1 CAPSULE BY MOUTH DAILY 30 capsule 5    clopidogrel (PLAVIX) 75 MG tablet Take 1 tablet by mouth daily 90 tablet 5    Prenatal Vit-Fe Fumarate-FA (NIVA-PLUS) 27-1 MG TABS       Multiple Vitamins-Minerals (PRESERVISION AREDS 2) CAPS       Blood Pressure Monitor KIT 1 each by Does not apply route 2 times daily 1 kit 0    Misc. Devices (IMT WEIGHT SCALE) MISC 1 each by Does not apply route as needed (weigh once a day and record) 1 each 0    Elastic Bandages & Supports (JOBST KNEE HIGH COMPRESSION SM) MISC 2 each by Does not apply route daily Wear q day. Remove q hs. Diagnosis: Chronic venous insufficiency 2 each 0    nitroGLYCERIN (NITROSTAT) 0.4 MG SL tablet Place 1 tablet under the tongue every 5 minutes as needed for Chest pain up to max of 3 total doses. If no relief after 1 dose, call 911. 25 tablet 1     No current facility-administered medications for this encounter. No Known Allergies      Subjective:      Review of Systems   Constitutional: Positive for fatigue. Negative for activity change, chills and fever. Eyes: Negative for discharge and visual disturbance. Respiratory: Positive for shortness of breath. Negative for cough. Cardiovascular: Positive for leg swelling. Negative for chest pain. Gastrointestinal: Negative for abdominal pain and blood in stool. Endocrine: Negative for cold intolerance and heat intolerance. Genitourinary: Negative for dysuria and flank pain.    Musculoskeletal: Negative for joint swelling and myalgias. Skin: Negative for pallor and rash. Neurological: Negative for dizziness and headaches. Psychiatric/Behavioral: Negative for hallucinations and suicidal ideas. Objective:     Physical Exam  Vitals and nursing note reviewed. Constitutional:       Appearance: He is obese. Comments:      HENT:      Head: Normocephalic and atraumatic. Eyes:      General: No scleral icterus. Conjunctiva/sclera: Conjunctivae normal.   Cardiovascular:      Rate and Rhythm: Normal rate and regular rhythm. Heart sounds: Normal heart sounds. Pulmonary:      Effort: Pulmonary effort is normal.      Breath sounds: Normal breath sounds. No wheezing or rales. Abdominal:      General: Bowel sounds are normal.      Palpations: Abdomen is soft. Musculoskeletal:         General: Normal range of motion. Cervical back: Normal range of motion. Right lower leg: Edema (1+ edema b/l ankles) present. Left lower leg: Edema present. Skin:     General: Skin is warm and dry. Neurological:      Mental Status: He is alert and oriented to person, place, and time. /76   Pulse 65   Resp 20   Wt 291 lb (132 kg)   SpO2 97%   BMI 48.42 kg/m²   O2 Device: None (Room air)       Lower Extremity Measurements in cm.    R Calf Circumference (cm): 49 cm  L Calf Circumference (cm): 48.5 cm  R Ankle Circumference (cm): 29 cm  L Ankle Circumference (cm): 29 cm    CBC:   Lab Results   Component Value Date    WBC 7.6 10/29/2021    RBC 4.15 10/29/2021    HGB 12.3 10/29/2021    HCT 39.5 10/29/2021    MCV 95.2 10/29/2021    MCH 29.6 10/29/2021    MCHC 31.1 10/29/2021    RDW 15.9 10/29/2021     10/29/2021    MPV 10.0 10/29/2021     CMP:    Lab Results   Component Value Date     10/29/2021    K 4.2 10/29/2021     10/29/2021    CO2 26 10/29/2021    BUN 14 10/29/2021    CREATININE 0.80 10/29/2021    GFRAA >60 10/29/2021    LABGLOM >60 10/29/2021    GLUCOSE 110 10/29/2021    PROT 6.9 03/16/2021    LABALBU 3.8 03/16/2021    CALCIUM 9.0 10/29/2021    BILITOT 0.29 03/16/2021    ALKPHOS 65 03/16/2021    AST 15 03/16/2021    ALT 14 03/16/2021     Lab Results   Component Value Date    LABA1C 5.6 12/10/2019     CONCLUSIONS     Summary  Normal LV size and wall thickness. No obvious wall motion abnormality seen. Normal LV systolic function with LVEF >55%. Normal RV size and function. RV systolic pressure 29 mmHg  LA and RA appears normal in size. No obvious significant structural valvular abnormality noted. No significant valvular stenosis or regurgitation noted. Normal aortic root dimension. No significant pericardial effusion noted. No obvious intra-cardiac mass or shunt noted. IVC normal diameter and inspiratory collapse indicating normal RA filling  pressure.     Signature  ----------------------------------------------------------------------------   Electronically signed by Kevin Fiore(Interpreting physician) on   03/04/2021 12:22 PM      :Assessment      1. Chronic diastolic (congestive) heart failure ([address]        :Plan      1. Chronic diastolic (congestive) heart failure (HCC)        Wt is down 5 lbs.    leg measurements are down. .  Pt has h/o Diastolic Dysfunction and h/o systolic fx. Last echo EF >69% and no Diastolic Dysfunction mentioned. Pt trying to lose wt for gastric bypass surgery. Advised to continue with excellent effort to monitor diet and fluids. No orders of the defined types were placed in this encounter. No orders of the defined types were placed in this encounter. Patientgiven verbal and/or written educational instructions. Follow up as directed. I have reviewed and agree with the nursing documentation. Verbally reviewed medication list with patient; patient verbalized understanding. Discussed 2000mg/day sodium restricted diet; patient verbalized understanding. Moderate daily exercise encouraged as tolerated.  Discussed rest breaks as needed; patient verbalized understanding. Patient instructed to weigh self at the same time of each day, using same clothes and same scale; reinforced teaching to monitor for 3-5 lb weight increase over 1-2 days, and to notify the CHF clinic at [phone] or physician office if weight change noted. Patient verbalized understanding. Risks of smoking discussed with the patient if applicable; patient strongly discouraged to smoke. Patient verbalized understanding. Signs and symptoms of CHF discussed with patient, such as feeling more tired than normal, feeling short of breath, coughing that increases when you lie down, sudden weight gain, swelling of your feet, legs or belly. Patient verbalized understanding to notify the CHF clinic at [phone] or physician office if these symptoms occur. Compliance with plan of care and further disease process causes discussed with patient, patient encouraged to keep all follow up appointments. Patient verbalized understanding. Echocardiogram reviewed. Labs reviewed. Medications reviewed.       Electronically signedby LUDY Berrios CNP on 11/11/2021 at 10:28 AM

## 2021-11-17 ENCOUNTER — HOSPITAL ENCOUNTER (OUTPATIENT)
Dept: OCCUPATIONAL THERAPY | Age: 66
Setting detail: THERAPIES SERIES
Discharge: HOME | End: 2021-11-17
Payer: MEDICAID

## 2021-11-17 PROCEDURE — 97166 OT EVAL MOD COMPLEX 45 MIN: CPT

## 2021-11-17 PROCEDURE — 97535 SELF CARE MNGMENT TRAINING: CPT

## 2021-11-17 NOTE — CONSULTS
TREATMENT LOCATION:   [x] [address]   [facility]: [phone]  F: [phone] [] [address]: [phone]  F: [phone]      Lymphedema Services - Initial Evaluation for Lower Extremity    Date:  2021  Patient: Su Seen  : 1955             MRN: [id number]  Referring Physician: LUDY Lopez*       Phone: [phone]  Fax: [phone]  Insurance: [employer] / [employer]gu[employer] AFTER EVAL  Medical Diagnosis: R60.0 (ICD-10-CM) - Bilateral edema of lower extremity    I89.0 (ICD-10-CM) - Lymphedema of both lower extremities    I87.2 (ICD-10-CM) - Venous insufficiency of both lower extremities  Rehab Codes: I89.0   Onset Date: 10/6/21  Visit# / total visits: 1/3    Allergies:  [x] None       [] Latex       [] Adhesive tape       [] Medications    [] Other  Medications: See charted information in Epic  Past Medical History: See charted information in Epic     Restrictions: None  Fall Risk:   [x] No    [] Yes   If yes, intervention:       Overview: Patient is a 61year old male referred to the Lymphedema Clinic with a diagnosis of bilateral Lower Extremity Lymphedema. Pt states that in , while he was incarcerated he started to get a red ring around his leg. During this time he tried to stay active, however he was limited. It was shortly after that, that it then broke open. During this same time he also started to loose function of his L LE. He noticed it was getting weak and he was no longer able to lead with his L leg when he was stepping or walking. He also noticed edema starting at this time. He noticed it would get worse, before it would get better. He was released in  and staying in a penitentiary house. A supervisor saw his leg at this time and he was taken to the clinic to have it checked out.  His HR was 146 BMP, from there he was sent to the ER and he had emergency heart surgery for fixing the  maker. Pt states that he did not notice any difference in the edema after the surgery. He has attempted to wear compression stockings ever since and also trying to elevate his LE. He will notice that the edema will improve when he lays down flat now/ he does not have to elevate. As of recently he started to get a wound on the back of his L calf. He had another infection. He was sent to the wound care clinic where they helped him heal up his wounds before referring to us. Pt just received new compression stockings and also received the vasopneuatmic pump. He states that he has not taken it out of the box yet. OT strongly encourages pt to do so. PHYSICIAN NOTES from 10/13/21: Stevie Emery is a 61 y.o. male who presents today for wound/ulcer evaluation. History of Wound Context: here for follow up on right lower leg wound that is closed today. Reviewed venous reflux studies which showed venous reflux in bilateral legs. Did refer to Dr Cameron Isaac for further evaluation. Will follow in wound clinic only as needed.      Hx of Complete Decongestive Therapy:[]Yes - Date:        [x]No   Rate of onset:   [x] Slow   [] Rapid     [] Acute   Progression: [] Improving   [x]  Worsening  [] Consistent   Conservative Treatments Utilized in the Past:  [] None  [x] Compression Garments   [] Bandaging  [x] Elevation   [] Exercise  []Self MLD  [] Other/comments:      Current Lymphedmea Treatments:   [] None  [x] Compression Garments   [] Bandaging  [x] Elevation  [] Exercise   []Self MLD  [] Other/comments:          Aggravating factors:  [] None   [x] Activity  [] Stress  [] Sleep Position [] Travel [] Hot Temperatures [] Diet   []  Other/comments:    Relieving factors:   [] None [] Elevation  [] Activity  [] Compression  [x] Rest  [] Cold Temperatures [] Diet   []  Other/comments:    Comments:      Pain:  [] YES    [x] NO   Location: Back     Pain Rating: ( 0-10 scale) : 7/10  Comments: Pt states that he has back pain, but no pain in the LE. Pt states that he does not take any medications for it at this time. History of Cancer: []Yes [x]No     Comorbidities:   [] Obesity [] Dialysis  [] Other:   [x] Asthma/COPD [] Dementia [] Other:   [] Stroke [] Sleep apnea [] Other:   [x] Vascular disease [] Rheumatic disease [] Other:     Absolute Lymphedema Contraindications - treatement [] NONE     [x] CHF (only if patient is un-medicated or edema is solely d/t cardiac failure)    [x] Acute Skin Infection ( e.g. Cellulitis, Ersipelas)  ( HX OF - 2021)   [x] Cardiac Arrhythmia-  ( HX of AFIB in 2019)  Absolute Contraindications Regarding the Deep Abdomen: [x] NONE   Relative Lymphedema Contraindications[][x] Age 60+       Home/Work Environment  Patient lives with: Pt did have roommates, however, they are possibly moving   In what type of home []  One story   [] Two story   [] Split level  [x] Apartment   Number of stairs to enter 4 outside    With handrail on the [x]  Right to enter   [] Left to enter   Bathroom has a []  Tub only  [x] Tub/shower combo   [] Walk in shower    []  Grab bars   Washing machine is on []  Laundry Mat   Employer    Job Status []  Normal duty   [] Light duty   [] Off due to condition    []  Retired   [] Not employed   [x] Disability  [] Other:  []  Return to work:    Work activities/duties        ADL/IADL Previous level of function Current level of function Who currently assists the patient with task   Bathing  [x] Independent  [] Assist [x] Independent  [] Assist    Dress/grooming [x] Independent  [] Assist [x] Independent  [] Assist    Transfer/mobility [x] Independent  [] Assist [x] Independent  [] Assist    Feeding [x] Independent  [] Assist [x] Independent  [] Assist    Toileting [x] Independent  [] Assist [x] Independent  [] Assist    Driving [x] Independent  [] Assist [x] Independent  [] Assist    Housekeeping [x] Independent  [] Assist [x] Independent  [] Assist    Grocery shop/meal prep [x] Independent  [] Assist [x] Independent  [] Assist      Gait Prior level of function Current level of function    [x] Independent  [] Assist [x] Independent  [] Assist   Device: [x] Independent [x] Independent    [x] Straight Cane [] Quad cane [x] Straight Cane [] Quad cane    [] Standard walker [] Rolling walker   [] 4 wheeled walker [] Standard walker [] Rolling walker   [] 4 wheeled walker    [] Wheelchair [] Wheelchair     OBJECTIVE  Presentation of Affected Areas  Location: bilateral Lower Extremity    Description: Hyperpigmentation  Hemosiderin Staining  Hyperkeratosis  Firm/Fibrotic     Scars No   Wounds None   Sensation Numbness   Additional Comments:        Circumferential Measurements  Measurements taken from nail base of D2 digit. Measurements (cm) Right Left   Dorsum    27.0 25.4   Inframalleolar       35.0 35.0   Supramalleolar     27.0 27.5   Lower Calf 38.0 40.0   Mid Calf 48.0 47.5   Upper Calf 49.0 49.0   Knee 50.0 48.0   10 cm above knee     Thigh-widest     Hip-widest     Initial Total 11/17/2021: 274.0 272.4      ASSESSMENT: Patient would benefit from skilled occupational therapy services in order to: Decrease edema that has accumulated in the extremity, decrease risk of infection, improve limb ROM, and improve overall quality of life. Pt is provided with a folder which included educational hand out on the basics of lymphedema, program details and what to expect for further review at home. Writer educates on an impaired lymphatic system vs. a healthy lymphatic system and how it relates to swelling and skin integrity. Pt is also educated, in more detail, on the purpose of lymphedema treatments and a POC that would be of benefit to them.  This may include:     []Bandaging   []Self-Bandaging/Caregiver Training   []MLD    []Self-MLD   [x] Therapeutic Exercise    [x] Education/Instruction in home management program  [] Education and trial of a Vasopneumatic Pump    [x] Education and transition to long term management devices such as velcro compression garments and/or daytime compression sleeve/stocking(s)   [x]Discharge to Home Program     Response to treatment: Pt verbalizes and is agreeable to the instructions/ POC established at today's evaluation. PLAN FOR NEXT VISIT: Initiate CDT, educate risk reduction techniques,     Lymphedema Stage per ISL Guidelines    [] 0: Subclinical with no evidence on physical exam  [] 1:  Early onset, swelling/heaviness, pitting edema, subsides with limb elevation  [] 2:  More advanced, fibrosis resulting in non-pitting edema, does not respond to elevation, thickening of the tissues  [x] 3: Elephantiasis, pitting absent, huge limbs with dry/scaly, papillomatosis, hyperkeratosis, fluid may be leaking with recurrent cellulitis. Acanthosis (deep body folds). Elevation &   diuretics ineffective. STG - To be addressed within 1 visits    Pt will demonstrate compliance of maintaining lymphedema precautions to reduce the risks of infection and further exacerbations. Pt will demonstrate independence with decongestive exercise program in order to expedite fluid rerouting. LTG To be adressed within 2 visits   Pt/Caregiver will demonstrate independence with donning/doffing and wearing schedule for compression garments/ devices to maintain decreased size upon discharge. Pt will demonstrate compliance with skin care routine for improved overall skin integrity and decreased risk of wounds and infection. Pt to compliant with CDT in order to reduce edema in the B LE by 3+ cm.      Patient's Goal: \" I would like to get rid of this swelling in my legs and the discoloration.'     Response to Education Provided:  [x] Verbalized understanding   [] Demonstrates/verbalizes HEP/Education previously given      [] Needs continued review            [] No understanding   Learner(s): [x] Patient  [] Spouse [] Family  [] Other:   Method(s): [x] Verbal [] Demonstration [x] Handouts [] Exercise booklet   Family available to assist with home program if needed: [] Yes [x] No    FREQUENCY: Pt will be seen 1 x/ week for 2 visits and will follow up as appropriate. Focus is to remain on short and long term goals listed above. Rehabilitation Potential/Commitment: [] Good [x] Fair     [] Poor    Functional Assessment Used: Lymphedema Life Impact Scale (LLIS) Score: [x] Eval 54%   [] 10th visit____  [] D/C ____     Clearance needed:  []Yes    [x]No     Physicians/specialties giving clearance:                Treatment Charges   Minutes   Units   Evaluation                                                             $95.15 / $75.40            Low   (38609)            Moderate   (82632) 45 1          High   (13606)     Manual Therapy (22942):                                      $26.92 / $21.34     Therapeutic activities (82389):                             $37.46/ $26.79     Therapeutic Exercise (47393)                              $29.21/$ 22.84     Self care/home mgmt (45122)                              $32.39 / $24.43 25 2   Vasopneumatic Device (45701)                           $9.21     Total Treatment Time    70 3     Time In:  8:30 AM  Time Out: 9:40      Electronically signed by Keven Engel OT on 11/17/2021 at 8:18 AM      Physician Signature: _________________________        Date: _______________  By signing above or cosigning this note, I have reviewed this plan of care and certify a need to continue medically necessary rehabilitation services.       *PLEASE SIGN ABOVE AND FAX BACK [phone] WITH ALL PAGES FOR CONSENT TO CONTINUE LYMPHEDEMA TREATMENT*

## 2021-11-18 DIAGNOSIS — I50.42 HEART FAILURE, SYSTOLIC AND DIASTOLIC, CHRONIC (HCC): ICD-10-CM

## 2021-11-18 RX ORDER — ATORVASTATIN CALCIUM 40 MG/1
40 TABLET, FILM COATED ORAL NIGHTLY
Qty: 30 TABLET | Refills: 1 | Status: SHIPPED | OUTPATIENT
Start: 2021-11-18 | End: 2022-01-18

## 2021-11-18 NOTE — TELEPHONE ENCOUNTER
Basic Metabolic Panel Once 01/71/6491   COVID-19 Once 03/30/2021   COVID-19 Once 38/72/2573   Basic Metabolic Panel Once 53/58/2676   Reflux study/Reflux Venous Insufficiency Bilateral Once 10/06/2021       Next Visit Date:  Future Appointments   Date Time Provider Taj Sandra   12/9/2021 10:00 AM [facility] RM 1 [facility]   12/15/2021  1:00 PM Ronak Ion, OT [facility]   1/12/2022 10:30 AM Ronak Ion, OT [facility]   1/14/2022  9:20 AM SCHEDULE, [facility] UROLOGY [facility] [place]   1/28/2022  9:30 AM Nichelle Laurent MD  gr [facility]         Patient Active Problem List:     Atrial flutter (HCC)     Sleep-related breathing disorder     Hypokalemia     Atrial fibrillation (HCC)     Ischemic cardiomyopathy     Acute cystitis without hematuria     Hx of CABG     Chronic diastolic (congestive) heart failure (HCC)     Bilateral hand numbness     Right thigh pain     Left leg weakness     Coronary artery disease     Chronic cough     Gastroesophageal reflux disease without esophagitis     Overflow incontinence of urine     Polyp of colon     Post-void dribbling     Post-nasal drip     SYDNEY (acute kidney injury) (HCC)     Hyperkalemia     Hypotension     Overactive bladder     Hemorrhoids     HARPREET (obstructive sleep apnea)     Laryngopharyngeal reflux     Skin ulcer of left lower leg, limited to breakdown of skin (HCC)     Bilateral edema of lower extremity     Lymphedema of both lower extremities     Venous insufficiency of both lower extremities

## 2021-11-19 DIAGNOSIS — I50.42 HEART FAILURE, SYSTOLIC AND DIASTOLIC, CHRONIC (HCC): ICD-10-CM

## 2021-11-19 RX ORDER — FUROSEMIDE 40 MG/1
60 TABLET ORAL 2 TIMES DAILY
Qty: 90 TABLET | Refills: 2 | Status: CANCELLED | OUTPATIENT
Start: 2021-11-19

## 2021-11-19 NOTE — TELEPHONE ENCOUNTER
Patient called because quantity for  Pending mediation was not correct.  Please sign pending medication

## 2021-11-24 NOTE — PRE-CERTIFICATION NOTE
[] [address]  [address]  [phone]  F: [phone] [] [address]  [facility]   [address]  P: [phone]  F: [phone] [] [place]  [address]  P: [phone]  F: [phone] [] [address]  [facility]   [address]   [place]: [phone]  F: [phone]  [x] Taj Nadir   Outpatient Occupational Therapy  [address]: [phone]  F: [phone]          Therapy Pre-certification Note      11/24/2021    [facility]  1955   [id number]      Insurance approval was received for Occupational Therapy from [facility] on 11/23/21. Approval was received for 4 visits, from 12/15/21 to 3/14/22. Authorization number [id number]. Approved  [id number] 16 units  27171 16 units  18443 16 units  09464 16 units  72235 4 units    Patient is scheduled.       Electronically signed by Lisa Shultz PTA on 11/24/2021 at 7:32 AM

## 2021-12-06 DIAGNOSIS — R09.82 POST-NASAL DRIP: ICD-10-CM

## 2021-12-06 DIAGNOSIS — R05.3 CHRONIC COUGH: ICD-10-CM

## 2021-12-06 RX ORDER — LORATADINE 10 MG/1
TABLET ORAL
Qty: 30 TABLET | Refills: 2 | Status: SHIPPED | OUTPATIENT
Start: 2021-12-06 | End: 2022-02-28

## 2021-12-06 NOTE — TELEPHONE ENCOUNTER
Last visit: 10/22/21   Last Med refill: 11/22/21  Does patient have enough medication for 72 hours: Yes    Next Visit Date:  Future Appointments   Date Time Provider Taj Arvizui   12/9/2021 10:00 AM [facility]F CLINIC RM 1 [facility]F CLI [facility]   [facility]  [facility]   12/15/2021  1:00 PM Светлана Rogerss, OT [facility] OT [facility]   1/12/2022 10:30 AM Светлана Rogerss, OT [facility] OT [facility]   1/14/2022  9:20 AM SCHEDULE, [facility] UROLOGY [facility]   1/28/2022  9:30 AM Lex Mg MD sv gr lks [address] Maintenance   Topic Date Due    COVID-19 Vaccine (3 - Booster for Pfizer series) 10/30/2021    Lipid screen  03/03/2022    Potassium monitoring  10/29/2022    Creatinine monitoring  10/29/2022    Diabetes screen  12/10/2022    Pneumococcal 0-64 years Vaccine (2 of 2 - PPSV23) 08/20/2025    DTaP/Tdap/Td vaccine (2 - Td or Tdap) 08/20/2030    Colon cancer screen colonoscopy  01/12/2031    Flu vaccine  Completed    Shingles Vaccine  Completed    Hepatitis C screen  Completed    HIV screen  Completed    Hepatitis A vaccine  Aged Out    Hepatitis B vaccine  Aged Out    Hib vaccine  Aged Out    Meningococcal (ACWY) vaccine  Aged Out       Hemoglobin A1C (%)   Date Value   12/10/2019 5.6   10/18/2019 5.8             ( goal A1C is < 7)   No results found for: LABMICR  LDL Cholesterol (mg/dL)   Date Value   03/03/2021 54   05/26/2020 45       (goal LDL is <100)   AST (U/L)   Date Value   03/16/2021 15     ALT (U/L)   Date Value   03/16/2021 14     BUN (mg/dL)   Date Value   10/29/2021 14     BP Readings from Last 3 Encounters:   11/11/21 120/76   10/29/21 113/68   10/22/21 106/64          (goal 120/80)    All Future Testing planned in [facility]  Lab Frequency Next Occurrence   COLONOSCOPY (Diagnostic) Once 01/11/2021   COVID-19 Once 01/05/2021   [address] [facility] [address] Once 12/01/2021   Creatinine, Random Urine Once 03/14/2021   Sodium, Urine, Random Once 03/14/2021   Urea Nitrogen, Urine Once 03/14/2021   US RETROPERITONEAL COMPLETE Once 69/71/8771   Basic Metabolic Panel Once 25/57/5288   COVID-19 Once 03/30/2021   COVID-19 Once 72/78/0668   Basic Metabolic Panel Once 49/96/2467   Reflux study/Reflux Venous Insufficiency Bilateral Once 10/06/2021               Patient Active Problem List:     Atrial flutter (HCC)     Sleep-related breathing disorder     Hypokalemia     Atrial fibrillation (HCC)     Ischemic cardiomyopathy     Acute cystitis without hematuria     Hx of CABG     Chronic diastolic (congestive) heart failure (HCC)     Bilateral hand numbness     Right thigh pain     Left leg weakness     Coronary artery disease     Chronic cough     Gastroesophageal reflux disease without esophagitis     Overflow incontinence of urine     Polyp of colon     Post-void dribbling     Post-nasal drip     SYDNEY (acute kidney injury) ([facility] [address]     Hyperkalemia     Hypotension     Overactive bladder     Hemorrhoids     HARPREET (obstructive sleep apnea)     Laryngopharyngeal reflux     Skin ulcer of left lower leg, limited to breakdown of skin (HCC)     Bilateral edema of lower extremity     Lymphedema of both lower extremities     Venous insufficiency of both lower extremities

## 2021-12-09 ENCOUNTER — HOSPITAL ENCOUNTER (OUTPATIENT)
Dept: OTHER | Age: 66
Discharge: HOME | End: 2021-12-09
Payer: MEDICAID

## 2021-12-09 VITALS
RESPIRATION RATE: 20 BRPM | WEIGHT: 298 LBS | HEART RATE: 73 BPM | DIASTOLIC BLOOD PRESSURE: 72 MMHG | BODY MASS INDEX: 49.59 KG/M2 | SYSTOLIC BLOOD PRESSURE: 110 MMHG | OXYGEN SATURATION: 95 %

## 2021-12-09 PROCEDURE — 99212 OFFICE O/P EST SF 10 MIN: CPT

## 2021-12-09 ASSESSMENT — PAIN DESCRIPTION - ORIENTATION: ORIENTATION: RIGHT

## 2021-12-09 ASSESSMENT — PAIN DESCRIPTION - LOCATION: LOCATION: KNEE;OTHER (COMMENT)

## 2021-12-09 ASSESSMENT — PAIN SCALES - GENERAL: PAINLEVEL_OUTOF10: 4

## 2021-12-09 ASSESSMENT — PAIN DESCRIPTION - DESCRIPTORS: DESCRIPTORS: ACHING

## 2021-12-09 NOTE — PROGRESS NOTES
Verbally reviewed medication list with patient; patient verbalized understanding. Discussed 2000mg/day sodium restricted diet; patient verbalized understanding. Moderate daily exercise encouraged as tolerated. Discussed rest breaks as needed; patient verbalized understanding. Patient instructed to weigh self at the same time of each day, using same clothes and same scale; reinforced teaching to monitor for 3-5 lb weight increase over 1-2 days, and to notify the CHF clinic at [phone] or physician office if weight change noted. Patient verbalized understanding. Risks of smoking discussed with the patient if applicable; patient strongly discouraged to smoke. Patient verbalized understanding. Signs and symptoms of CHF discussed with patient, such as feeling more tired than normal, feeling short of breath, coughing that increases when you lie down, sudden weight gain, swelling of your feet, legs or belly. Patient verbalized understanding to notify the CHF clinic at [phone] or physician office if these symptoms occur. Compliance with plan of care and further disease process causes discussed with patient, patient encouraged to keep all follow up appointments. Patient verbalized understanding.

## 2021-12-09 NOTE — PROGRESS NOTES
Date:  2021  Time:  10:04 AM    CHF Clinic at [address] [facility]    Office: [phone] Fax: [phone]    Re:  Manju Gibson   Patient : 1955    Vital Signs: /72   Pulse 73   Resp 20   Wt 298 lb (135.2 kg)   SpO2 95%   BMI 49.59 kg/m²                       O2 Device: None (Room air)                           No results for input(s): CBC, HGB, HCT, WBC, PLATELET, NA, K, CL, CO2, BUN, CREATININE, GLUCOSE, BNP, INR in the last 72 hours. Respiratory:    Assessment  Charting Type: Reassessment    Breath Sounds  Right Upper Lobe: Clear  Right Middle Lobe: Clear  Right Lower Lobe: Clear  Left Upper Lobe: Clear  Left Lower Lobe: Clear    Cough/Sputum  Cough: Dry         Peripheral Vascular  RLE Edema: +1, Pitting  LLE Edema: +1, Pitting      Complaints: Patient c/o right knee and upper leg pain but improving    Physician Orders None    Comment : Arrived for scheduled visit per ambulatory with cane assist. His weight is up 2 lbs from last month. Admits to eating more over the iday and over the last few days. Denies any increase in dyspnea with exertion or chest pain. Ongoing lower leg, ankle and pedal edema noted with a slight increase from last month. Continues to wear compression stockings daily to help with the swelling. Medication list reviewed and updated. Reinforced low sodium diet and fluid restrictions. No s/s acute chf at this time. Next CHF Clinic appt. In 4 weeks. Has appt. With PCP next week.     Electronically signed by Lamar Holloway RN on 2021 at 10:04 AM

## 2021-12-13 ENCOUNTER — OFFICE VISIT (OUTPATIENT)
Dept: FAMILY MEDICINE CLINIC | Age: 66
End: 2021-12-13
Payer: MEDICAID

## 2021-12-13 VITALS
TEMPERATURE: 98.1 F | BODY MASS INDEX: 49.42 KG/M2 | WEIGHT: 297 LBS | DIASTOLIC BLOOD PRESSURE: 65 MMHG | SYSTOLIC BLOOD PRESSURE: 103 MMHG | HEART RATE: 69 BPM

## 2021-12-13 DIAGNOSIS — M25.561 ACUTE PAIN OF RIGHT KNEE: Primary | ICD-10-CM

## 2021-12-13 PROCEDURE — G8427 DOCREV CUR MEDS BY ELIG CLIN: HCPCS

## 2021-12-13 PROCEDURE — 1036F TOBACCO NON-USER: CPT

## 2021-12-13 PROCEDURE — 3017F COLORECTAL CA SCREEN DOC REV: CPT

## 2021-12-13 PROCEDURE — G8482 FLU IMMUNIZE ORDER/ADMIN: HCPCS

## 2021-12-13 PROCEDURE — 99213 OFFICE O/P EST LOW 20 MIN: CPT

## 2021-12-13 PROCEDURE — G8417 CALC BMI ABV UP PARAM F/U: HCPCS

## 2021-12-13 ASSESSMENT — ENCOUNTER SYMPTOMS
APNEA: 0
SHORTNESS OF BREATH: 0
CONSTIPATION: 0
ABDOMINAL PAIN: 0
DIARRHEA: 0
VOMITING: 0
WHEEZING: 0
BLOOD IN STOOL: 0

## 2021-12-13 NOTE — PROGRESS NOTES
Subjective: Prasad Murphy is a 61 y.o. male with  has a past medical history of Acute CHF (congestive heart failure) ([address] Arthritis, Atrial fibrillation ([address] BPH (benign prostatic hyperplasia), Cellulitis, Cervical disc disease, CHF (congestive heart failure) ([address] Combined systolic and diastolic congestive heart failure ([address] Coronary artery disease, COVID-19 virus RNA test result indeterminate, CPAP (continuous positive airway pressure) dependence, GERD (gastroesophageal reflux disease), History of incarceration, History of incarceration, Hyperlipidemia, Hypertension, Myocardial infarct ([address] Obesity, Sleep apnea, Wears reading eyeglasses, and Wellness examination. Presented to the office today for:  Chief Complaint   Patient presents with   [facility] Check-Up     patient is having pain in right leg       HPI  26-year-old male came to the clinic complaining of  Right leg pain after pneumoatic compression use which was ordered by his vascular doctor. Patient describes no swelling, erythema, fever or chills or any other symptoms  He is still able to walk but is having a slight limp due to the pain in the right knee and the right thigh  Patient does have a an upcoming appointment with a vascular physician in 2 days and I recommended him to use a pneumatic compression machine with less duration as he was initially doing but talk to his vascular physician first.    His bilateral knee pain is attributed to his obesity with BMI of 49.42  Does follow-up with weight loss management and  Needs to come down to 278 lbs to get Bariatrtic surgery  Today's weight is 297    He is not complaining of any chest pain, shortness of breath or any other symptoms at this time          Review of Systems   Constitutional: Positive for activity change. Negative for fatigue and fever. Respiratory: Negative for apnea, shortness of breath and wheezing. Cardiovascular: Negative for chest pain, palpitations and leg swelling. Gastrointestinal: Negative for abdominal pain, blood in stool, constipation, diarrhea and vomiting. Musculoskeletal: Positive for arthralgias and gait problem. Neurological: Negative for dizziness and weakness. Hematological: Negative for adenopathy. Psychiatric/Behavioral: Negative for agitation, behavioral problems, confusion and decreased concentration. The patient has a   Family History   Problem Relation Age of Onset    Alcohol Abuse Maternal Uncle     Heart Attack Maternal Uncle     Cancer Mother     Alcohol Abuse Father        Objective:    /65 (Site: Left Upper Arm, Position: Sitting, Cuff Size: Large Adult)   Pulse 69   Temp 98.1 °F (36.7 °C) (Temporal)   Wt 297 lb (134.7 kg)   BMI 49.42 kg/m²    BP Readings from Last 3 Encounters:   12/13/21 103/65   12/09/21 110/72   11/11/21 120/76       Physical Exam  Constitutional:       General: He is not in acute distress. Appearance: He is obese. He is not ill-appearing, toxic-appearing or diaphoretic. Cardiovascular:      Rate and Rhythm: Normal rate and regular rhythm. Heart sounds: No murmur heard. Pulmonary:      Effort: Pulmonary effort is normal.      Breath sounds: No wheezing. Musculoskeletal:         General: No swelling or tenderness. Normal range of motion. Right lower leg: No edema. Left lower leg: No edema. Skin:     General: Skin is warm. Findings: No erythema. Neurological:      Mental Status: He is alert and oriented to person, place, and time. Motor: No weakness. Comments: Patient does have a slight limp on the right side due to the acute right knee pain   Psychiatric:         Mood and Affect: Mood normal.         Behavior: Behavior normal.         Thought Content:  Thought content normal.         Judgment: Judgment normal.         Lab Results   Component Value Date    WBC 7.6 10/29/2021    HGB 12.3 (L) 10/29/2021    HCT 39.5 (L) 10/29/2021     10/29/2021 CHOL 112 05/26/2020    TRIG 53 05/26/2020    HDL 47 03/03/2021    ALT 14 03/16/2021    AST 15 03/16/2021     10/29/2021    K 4.2 10/29/2021     10/29/2021    CREATININE 0.80 10/29/2021    BUN 14 10/29/2021    CO2 26 10/29/2021    TSH 1.48 01/24/2020    PSA 0.67 09/22/2020    INR 1.0 03/08/2021    LABA1C 5.6 12/10/2019     Lab Results   Component Value Date    CALCIUM 9.0 10/29/2021    PHOS 4.0 03/08/2021     Lab Results   Component Value Date    LDLCHOLESTEROL 54 03/03/2021       Assessment and Plan:    1. Acute pain of right knee  No swelling, erythema  No need for acute imaging at the moment  Recommend to use over-the-counter topical analgesics versus p.o. analgesics for now  We will monitor  Recommended the patient to ask his vascular doctor if he can use the pneumatic compression device with less duration as that seems to have started the pain in the right thigh/right knee  Highly recommend the patient to start losing weight and gave him some tips and ideas how he can do that  Patient needs to lose about 15 pounds before he can get the bariatric surgery per weight management clinic          Requested Prescriptions      No prescriptions requested or ordered in this encounter       There are no discontinued medications. Sheryl Langley received counseling on the following healthy behaviors: nutrition, exercise and medication adherence    Discussed use,benefit, and side effects of prescribed medications. Barriers to medication compliance addressed. All patient questions answered. Pt voiced understanding. Return in about 3 months (around 3/13/2022) for CHF. Disclaimer: Some orall of this note was transcribed using voice-recognition software. This may cause typographical errors occasionally. Although all effort is made to fix these errors, please do not hesitate to contact our office if there Dennis Rogers concern with the understanding of this note.

## 2021-12-13 NOTE — PROGRESS NOTES
Visit Information    Have you changed or started any medications since your last visit including any over-the-counter medicines, vitamins, or herbal medicines? no   Have you stopped taking any of your medications? Is so, why? -  no  Are you having any side effects from any of your medications? - no    Have you seen any other physician or provider since your last visit?  no   Have you had any other diagnostic tests since your last visit?  no   Have you been seen in the emergency room and/or had an admission in a hospital since we last saw you?  no   Have you had your routine dental cleaning in the past 6 months?  no     Do you have an active MyChart account? If no, what is the barrier?   No:     Patient Care Team:  Kathy Romero MD as PCP - General (Family Medicine)  Georgie Mcardle, MD as Consulting Physician (Gastroenterology)  Selena Hyman MD as Consulting Physician (Urology)    Medical History Review  Past Medical, Family, and Social History reviewed and does not contribute to the patient presenting condition    Health Maintenance   Topic Date Due    COVID-19 Vaccine (3 - Booster for Cole Peter series) 10/30/2021    Lipid screen  03/03/2022    Potassium monitoring  10/29/2022    Creatinine monitoring  10/29/2022    Diabetes screen  12/10/2022    Pneumococcal 0-64 years Vaccine (2 of 2 - PPSV23) 08/20/2025    DTaP/Tdap/Td vaccine (2 - Td or Tdap) 08/20/2030    Colon cancer screen colonoscopy  01/12/2031    Flu vaccine  Completed    Shingles Vaccine  Completed    Hepatitis C screen  Completed    HIV screen  Completed    Hepatitis A vaccine  Aged Out    Hepatitis B vaccine  Aged Out    Hib vaccine  Aged Out    Meningococcal (ACWY) vaccine  Aged Out

## 2021-12-13 NOTE — PROGRESS NOTES
Attending Physician Statement  I have discussed the care of Dawson Brittle, including pertinent history and exam findings,  with the resident. I have reviewed the key elements of all parts of the encounter with the resident. I agree with the assessment, plan and orders as documented by the resident.   (Kina Renee)    Ekta David MD

## 2021-12-13 NOTE — PATIENT INSTRUCTIONS
Thank you for letting us take care of you today. We hope all your questions were addressed. If a question was overlooked or something else comes to mind after you return home, please contact a member of your Care Team listed below. Your Care Team at [place] [address] is Team #4  Guillermina Noel MD (Faculty)  Elenita Delacruz MD (Resident)  Denis Vigil MD (Resident)  Naima Doan MD (Resident)  Deya Dempsey MD (Resident)  DELIA CortesNITIN Ardon., [facility] office)  Daylene Hodgkins, [facility] (Clinical Practice Manager)  Sheldon Figueredo [facility] (Clinical Pharmacist)       Office phone number: [phone]    If you need to get in right away due to illness, please be advised we have \"Same Day\" appointments available Monday-Friday. Please call us at [phone] option #3 to schedule your \"Same Day\" appointment. Schedule a Vaccine  When you qualify to receive the vaccine, call the [facility]) COVID-19 Vaccination Hotline to schedule your appointment or to get additional information about the [facility]) locations which are offering the COVID-19 vaccine. To be 94% effective, it's important that you receive two doses of one of the COVID-19 vaccines. -If you are receiving the Cole Peter vaccine, your second shot will be scheduled as close to 21 days after the first shot as possible. -If you are receiving the Moderna vaccine, your second shot will be scheduled as close to 28 days after the first shot as possible. [facility]) COVID-19 Vaccination Hotline: [phone]    Links to [facility]) website and [facility] website:    [email or web address]    [email or web address]

## 2021-12-15 ENCOUNTER — HOSPITAL ENCOUNTER (OUTPATIENT)
Dept: OCCUPATIONAL THERAPY | Age: 66
Setting detail: THERAPIES SERIES
Discharge: HOME | End: 2021-12-15
Payer: MEDICAID

## 2021-12-15 PROCEDURE — 97535 SELF CARE MNGMENT TRAINING: CPT

## 2021-12-15 NOTE — FLOWSHEET NOTE
TREATMENT LOCATION:   [x] [facility] [address]: [phone]  F: [phone] [] [address]: [phone]  F: [phone]        Lymphedema Services - Treatment Note of the Lower Extremity    Date:  12/15/2021  Patient: Cooper Franz  : 1955             MRN: [id number]  Referring Physician: LUDY Bangura*       Phone: [phone]  Fax: [phone]   Insurance: [employer] was received for 4 visits, from 12/15/21 to 3/14/22. Authorization number [id number]. Medical Diagnosis: R60.0 (ICD-10-CM) - Bilateral edema of lower extremity    I89.0 (ICD-10-CM) - Lymphedema of both lower extremities    I87.2 (ICD-10-CM) - Venous insufficiency of both lower extremities  Rehab Codes: I89.0     Onset Date: 10/6/21  Visit# / total visits: 2/3    Absolute Lymphedema Contraindications - treatement []? NONE                [x]? CHF (only if patient is un-medicated or edema is solely d/t cardiac failure)               [x]? Acute Skin Infection ( e.g. Cellulitis, Ersipelas)  ( HX OF - )              [x]? Cardiac Arrhythmia-  ( HX of AFIB in )  Absolute Contraindications Regarding the Deep Abdomen: [x]? NONE   Relative Lymphedema Contraindications[]? [x]? Age 60+                            Subjective: \" I am wearing my compression stockings daily, I feel like they help, but at the same time they do not keep my legs down. \"     Pain: [x] YES  [] NO  Location: Thigh/Knee- R LE  Pain Rating: ( 0-10 scale) : 4/10  Comments:     Plan for next session: Follow up on      Objective:   [x] Measurement [] Bandaging [] MLD [] Skin care   [x] Education [] Self-bandaging [] Self-MLD [] Wound Care   [] Exercise [] Caregiver training [] Kinesiotaping [] Other:    [] Nutrition [] Garment fitting/training [] Vasopneumatic Pump           Circumferential Measurements  Measurements taken from nail base of D2 and wearing schedule for compression garments/ devices to maintain decreased size upon discharge.     Pt will demonstrate compliance with skin care routine for improved overall skin integrity and decreased risk of wounds and infection.       Pt to compliant with CDT in order to reduce edema in the B LE by 3+ cm.      Patient's Goal: \" I would like to get rid of this swelling in my legs and the discoloration.'       Pt. Education:  [x] Yes  [] No  [x] Reviewed Prior HEP/Ed  Method of Education: [x] Verbal  [x] Demo  [x] Written  Comprehension of Education:  [x] Verbalizes understanding. [x] Demonstrates understanding. [x] Needs review. [] No understanding  Knowledge of home program:  [x] Good [] Fair [] Poor [] With assist from family/caregiver    Plan:   [x] Continue current frequency toward long and short term goals.       Treatment Charges Minutes   Units   Re-evaluation (32321):$   64.01/$49.52                                           Manual Therapy (67283):             $26.92 / $21.34     Therapeutic activities (89576):   $37.46/ $26.79     Therapeutic Exercise (17962):   $29.21/$ 22.84     Self care/home mgmt (45250):    $32.39 / $24.43 75 5   Vasopneumatic Device (81135):   $9.21     Total Treatment Time    75 5     Approved / USED   60794 16 units  51009 16 units  10044 16 units  72133 16 units/5  46153 4 units    Time In:  1:15  Time Out: 2:30      Electronically signed by Radha Aguilera OT on 12/15/2021 at 1:19 PM

## 2021-12-16 ENCOUNTER — TELEPHONE (OUTPATIENT)
Dept: FAMILY MEDICINE CLINIC | Age: 66
End: 2021-12-16

## 2021-12-16 NOTE — TELEPHONE ENCOUNTER
Liv from [facility] wants office to start PA for patient leg compression wraps. Message sent to [facility] and [employer] to begin PA.

## 2021-12-16 NOTE — TELEPHONE ENCOUNTER
Tried to do a PA for Compression leg wraps and nothing is coming up in covermymeds. Can we please get a order for these per [facility] from [facility].  Please advise

## 2021-12-20 RX ORDER — POTASSIUM CHLORIDE 20 MEQ/1
20 TABLET, EXTENDED RELEASE ORAL DAILY
Qty: 30 TABLET | Refills: 1 | OUTPATIENT
Start: 2021-12-20

## 2021-12-20 RX ORDER — FUROSEMIDE 20 MG/1
60 TABLET ORAL 2 TIMES DAILY
Qty: 180 TABLET | Refills: 1 | OUTPATIENT
Start: 2021-12-20

## 2021-12-27 DIAGNOSIS — E87.6 HYPOKALEMIA: ICD-10-CM

## 2021-12-27 RX ORDER — POTASSIUM CHLORIDE 20 MEQ/1
20 TABLET, EXTENDED RELEASE ORAL DAILY
Qty: 30 TABLET | Refills: 1 | Status: SHIPPED | OUTPATIENT
Start: 2021-12-27 | End: 2022-02-21

## 2021-12-27 NOTE — TELEPHONE ENCOUNTER
Last visit:   Last Med refill:   Does patient have enough medication for 72 hours: No:     Next Visit Date:  Future Appointments   Date Time Provider Taj Sandra   1/5/2022 11:30 AM STV CHF CLINIC RM 1 [facility]F [facility]   1/12/2022 10:30 AM Artist RUPERT Steiner [facility]Z OT [facility]   1/14/2022  9:20 AM SCHEDULE, [facility] UROLOGY [facility] Urology [facility]   1/28/2022  9:30 AM Pal Dinero MD sv gr lks [address] Maintenance   Topic Date Due    COVID-19 Vaccine (3 - Booster for Pfizer series) 10/30/2021    Lipid screen  03/03/2022    Potassium monitoring  10/29/2022    Creatinine monitoring  10/29/2022    Diabetes screen  12/10/2022    Pneumococcal 0-64 years Vaccine (2 of 2 - PPSV23) 08/20/2025    DTaP/Tdap/Td vaccine (2 - Td or Tdap) 08/20/2030    Colon cancer screen colonoscopy  01/12/2031    Flu vaccine  Completed    Shingles Vaccine  Completed    Hepatitis C screen  Completed    HIV screen  Completed    Hepatitis A vaccine  Aged Out    Hepatitis B vaccine  Aged Out    Hib vaccine  Aged Out    Meningococcal (ACWY) vaccine  Aged Out       Hemoglobin A1C (%)   Date Value   12/10/2019 5.6   10/18/2019 5.8             ( goal A1C is < 7)   No results found for: LABMICR  LDL Cholesterol (mg/dL)   Date Value   03/03/2021 54   05/26/2020 45       (goal LDL is <100)   AST (U/L)   Date Value   03/16/2021 15     ALT (U/L)   Date Value   03/16/2021 14     BUN (mg/dL)   Date Value   10/29/2021 14     BP Readings from Last 3 Encounters:   12/13/21 103/65   12/09/21 110/72   11/11/21 120/76          (goal 120/80)    All Future Testing planned in [facility]  Lab Frequency Next Occurrence   COVID-19 Once 01/05/2021   US URINARY BLADDER LIMITED Once 12/01/2021   Creatinine, Random Urine Once 03/14/2021   Sodium, Urine, Random Once 03/14/2021   Urea Nitrogen, Urine Once 03/14/2021   US RETROPERITONEAL COMPLETE Once 96/37/9568   Basic Metabolic Panel Once 55/85/7172   COVID-19 Once 03/30/2021 COVID-19 Once 61/07/3175   Basic Metabolic Panel Once 78/72/6974   Reflux study/Reflux Venous Insufficiency Bilateral Once 10/06/2021               Patient Active Problem List:     Atrial flutter (HCC)     Sleep-related breathing disorder     Hypokalemia     Atrial fibrillation (HCC)     Ischemic cardiomyopathy     Acute cystitis without hematuria     Hx of CABG     Chronic diastolic (congestive) heart failure (HCC)     Bilateral hand numbness     Right thigh pain     Left leg weakness     Coronary artery disease     Chronic cough     Gastroesophageal reflux disease without esophagitis     Overflow incontinence of urine     Polyp of colon     Post-void dribbling     Post-nasal drip     SYDNEY (acute kidney injury) ([facility] [address]     Hyperkalemia     Hypotension     Overactive bladder     Hemorrhoids     HARPREET (obstructive sleep apnea)     Laryngopharyngeal reflux     Skin ulcer of left lower leg, limited to breakdown of skin (HCC)     Bilateral edema of lower extremity     Lymphedema of both lower extremities     Venous insufficiency of both lower extremities     Acute pain of right knee           Please address the medication refill and close the encounter. If I can be of assistance, please route to the applicable pool. Thank you.

## 2022-01-03 DIAGNOSIS — I50.42 HEART FAILURE, SYSTOLIC AND DIASTOLIC, CHRONIC (HCC): ICD-10-CM

## 2022-01-03 RX ORDER — FUROSEMIDE 40 MG/1
60 TABLET ORAL 2 TIMES DAILY
Qty: 30 TABLET | Refills: 2 | Status: CANCELLED | OUTPATIENT
Start: 2022-01-03

## 2022-01-03 RX ORDER — FUROSEMIDE 40 MG/1
60 TABLET ORAL 2 TIMES DAILY
Qty: 30 TABLET | Refills: 2 | Status: SHIPPED | OUTPATIENT
Start: 2022-01-03 | End: 2022-01-11 | Stop reason: SDUPTHER

## 2022-01-03 RX ORDER — MULTIVIT-MIN/IRON FUM/FOLIC AC 7.5 MG-4
1 TABLET ORAL DAILY
Qty: 30 TABLET | Refills: 3 | Status: SHIPPED | OUTPATIENT
Start: 2022-01-03

## 2022-01-04 RX ORDER — METFORMIN HYDROCHLORIDE 500 MG/1
1 TABLET, EXTENDED RELEASE ORAL DAILY
Qty: 30 TABLET | Refills: 0 | OUTPATIENT
Start: 2022-01-04

## 2022-01-04 RX ORDER — ISOSORBIDE MONONITRATE 30 MG/1
30 TABLET, EXTENDED RELEASE ORAL DAILY
Qty: 30 TABLET | Refills: 1 | Status: SHIPPED | OUTPATIENT
Start: 2022-01-04 | End: 2022-02-28

## 2022-01-05 ENCOUNTER — HOSPITAL ENCOUNTER (OUTPATIENT)
Dept: OTHER | Age: 67
Discharge: HOME | End: 2022-01-05
Payer: MEDICAID

## 2022-01-05 VITALS
RESPIRATION RATE: 16 BRPM | HEART RATE: 79 BPM | SYSTOLIC BLOOD PRESSURE: 142 MMHG | OXYGEN SATURATION: 95 % | WEIGHT: 294 LBS | BODY MASS INDEX: 48.92 KG/M2 | DIASTOLIC BLOOD PRESSURE: 90 MMHG

## 2022-01-05 PROCEDURE — 99212 OFFICE O/P EST SF 10 MIN: CPT

## 2022-01-05 RX ORDER — ASPIRIN 81 MG/1
81 TABLET, CHEWABLE ORAL DAILY
COMMUNITY
End: 2022-02-11

## 2022-01-05 NOTE — PROGRESS NOTES
Date:  2022  Time:  11:27 AM    CHF Clinic at [facility]    Office: [phone] Fax: [phone]    Re:  Angelia Mattson   Patient : 1955    Vital Signs: BP (!) 142/90   Pulse 79   Resp 16   Wt 294 lb (133.4 kg)   SpO2 95%   BMI 48.92 kg/m²                                                   No results for input(s): CBC, HGB, HCT, WBC, PLATELET, NA, K, CL, CO2, BUN, CREATININE, GLUCOSE, BNP, INR in the last 72 hours. Respiratory:    Assessment  Charting Type: Reassessment    Breath Sounds  Right Upper Lobe: Clear  Right Middle Lobe: Clear  Right Lower Lobe: Clear  Left Upper Lobe: Clear  Left Lower Lobe: Clear    Cough/Sputum  Cough: Dry,Non-productive         Peripheral Vascular  RLE Edema: Trace  LLE Edema: Non-pitting      Complaints: No new complaints     Physician Orders No new orders    Comment : Weight is down 4 pounds from last months visit. Denies any SOB or chest pain. Discussed in detail low sodium diet 2000mg per day and fluid restriction of 64 ounces per day. We will see in 1 month 2022.     Electronically signed by Bridgett Riedel, RN on 2022 at 11:27 AM

## 2022-01-11 ENCOUNTER — HOSPITAL ENCOUNTER (OUTPATIENT)
Dept: GENERAL RADIOLOGY | Age: 67
Discharge: HOME | End: 2022-01-13
Payer: MEDICAID

## 2022-01-11 ENCOUNTER — OFFICE VISIT (OUTPATIENT)
Dept: FAMILY MEDICINE CLINIC | Age: 67
End: 2022-01-11
Payer: MEDICAID

## 2022-01-11 ENCOUNTER — HOSPITAL ENCOUNTER (OUTPATIENT)
Age: 67
Discharge: HOME | End: 2022-01-13
Payer: MEDICAID

## 2022-01-11 VITALS
OXYGEN SATURATION: 96 % | SYSTOLIC BLOOD PRESSURE: 118 MMHG | WEIGHT: 302 LBS | DIASTOLIC BLOOD PRESSURE: 74 MMHG | TEMPERATURE: 96.8 F | HEART RATE: 65 BPM | BODY MASS INDEX: 50.26 KG/M2

## 2022-01-11 DIAGNOSIS — G89.29 CHRONIC PAIN OF RIGHT KNEE: Primary | ICD-10-CM

## 2022-01-11 DIAGNOSIS — M25.561 CHRONIC PAIN OF RIGHT KNEE: ICD-10-CM

## 2022-01-11 DIAGNOSIS — R05.3 CHRONIC COUGH: ICD-10-CM

## 2022-01-11 DIAGNOSIS — I50.42 HEART FAILURE, SYSTOLIC AND DIASTOLIC, CHRONIC (HCC): ICD-10-CM

## 2022-01-11 DIAGNOSIS — G89.29 CHRONIC PAIN OF RIGHT KNEE: ICD-10-CM

## 2022-01-11 DIAGNOSIS — M25.561 CHRONIC PAIN OF RIGHT KNEE: Primary | ICD-10-CM

## 2022-01-11 PROCEDURE — G8427 DOCREV CUR MEDS BY ELIG CLIN: HCPCS

## 2022-01-11 PROCEDURE — G8482 FLU IMMUNIZE ORDER/ADMIN: HCPCS

## 2022-01-11 PROCEDURE — G8417 CALC BMI ABV UP PARAM F/U: HCPCS

## 2022-01-11 PROCEDURE — 99211 OFF/OP EST MAY X REQ PHY/QHP: CPT | Performed by: STUDENT IN AN ORGANIZED HEALTH CARE EDUCATION/TRAINING PROGRAM

## 2022-01-11 PROCEDURE — 99213 OFFICE O/P EST LOW 20 MIN: CPT

## 2022-01-11 PROCEDURE — 73562 X-RAY EXAM OF KNEE 3: CPT

## 2022-01-11 PROCEDURE — 3017F COLORECTAL CA SCREEN DOC REV: CPT

## 2022-01-11 PROCEDURE — 1036F TOBACCO NON-USER: CPT

## 2022-01-11 RX ORDER — FUROSEMIDE 40 MG/1
60 TABLET ORAL 2 TIMES DAILY
Qty: 30 TABLET | Refills: 2 | Status: SHIPPED | OUTPATIENT
Start: 2022-01-11 | End: 2022-02-01 | Stop reason: SDUPTHER

## 2022-01-11 RX ORDER — GABAPENTIN 600 MG/1
600 TABLET ORAL DAILY
Qty: 30 TABLET | Refills: 0 | Status: CANCELLED | OUTPATIENT
Start: 2022-01-11 | End: 2022-02-10

## 2022-01-11 RX ORDER — GABAPENTIN 300 MG/1
300 CAPSULE ORAL DAILY
Qty: 30 CAPSULE | Refills: 0 | Status: SHIPPED | OUTPATIENT
Start: 2022-01-11 | End: 2022-03-02 | Stop reason: ALTCHOICE

## 2022-01-11 ASSESSMENT — ENCOUNTER SYMPTOMS
ABDOMINAL PAIN: 0
DIARRHEA: 0
BACK PAIN: 1
VOMITING: 0
COUGH: 1
NAUSEA: 0

## 2022-01-11 NOTE — PROGRESS NOTES
Subjective: Satya Hsu is a 61 y.o. male with  has a past medical history of Acute CHF (congestive heart failure) ([address] Arthritis, Atrial fibrillation ([address] BPH (benign prostatic hyperplasia), Cellulitis, Cervical disc disease, CHF (congestive heart failure) ([address] Combined systolic and diastolic congestive heart failure ([address] Coronary artery disease, COVID-19 virus RNA test result indeterminate, CPAP (continuous positive airway pressure) dependence, GERD (gastroesophageal reflux disease), History of incarceration, History of incarceration, Hyperlipidemia, Hypertension, Myocardial infarct ([address] Obesity, Sleep apnea, Wears reading eyeglasses, and Wellness examination.     Presented to the office today for:  Chief Complaint   Patient presents with    Referral - General     patient has trap paperwork to be filled out       HPI    73-year-old male came to the clinic to follow-up for chronic cough and acute on chronic right knee pain      States that he has some SOB but is not complaining of any new changes  He has a history history of obstructive sleep apnea  Uses CPAP at night    His chronic cough has been worked up thoroughly in the past  Will be starting possible gabapentin today  This year he Was referred to ENT who tried Flonase without much relief  Was referred to pulmonology as well in the past  Try a trial off loratadine without much relief    Back pain chronic  We will try some lidocaine patches in the future    Obesity  BMI 50  Right knee chornic pain  Patient needs to lose about 15 pounds before he can get the bariatric surgery per weight management clinic  Has not had any x-rays recently  Is open about steroid injections if needed    Came back from [facility] last week  Refill for Lasix 40 mg 1.5 tab BID per chf clinic  Patient cardiologist discontinued Plavix and currently is on aspirin alone as per the patient    Depression screen soon    Here for paperwork regarding TARTA renewal    Review of Systems   Constitutional: Negative for activity change. Respiratory: Positive for cough. Gastrointestinal: Negative for abdominal pain, diarrhea, nausea and vomiting. Musculoskeletal: Positive for arthralgias and back pain. Neurological: Negative for dizziness and weakness. Psychiatric/Behavioral: Negative for agitation. The patient has a   Family History   Problem Relation Age of Onset    Alcohol Abuse Maternal Uncle     Heart Attack Maternal Uncle     Cancer Mother     Alcohol Abuse Father        Objective:    /74 (Site: Left Upper Arm, Position: Sitting, Cuff Size: Large Adult)   Pulse 65   Temp 96.8 °F (36 °C) (Temporal)   Wt (!) 302 lb (137 kg)   SpO2 96%   BMI 50.26 kg/m²    BP Readings from Last 3 Encounters:   01/11/22 118/74   01/05/22 (!) 142/90   12/13/21 103/65       Physical Exam  Constitutional:       General: He is not in acute distress. Appearance: He is obese. He is not ill-appearing, toxic-appearing or diaphoretic. Cardiovascular:      Rate and Rhythm: Normal rate and regular rhythm. Pulses: Normal pulses. Heart sounds: No murmur heard. Pulmonary:      Effort: Pulmonary effort is normal.      Breath sounds: Normal breath sounds. No wheezing. Abdominal:      General: There is no distension. Tenderness: There is no abdominal tenderness. Musculoskeletal:         General: Tenderness present. Comments: Tenderness right knee with ambulation  Patient uses a cane but is able to bear weight   Neurological:      Mental Status: He is alert and oriented to person, place, and time. Motor: No weakness.    Psychiatric:         Behavior: Behavior normal.         Lab Results   Component Value Date    WBC 7.6 10/29/2021    HGB 12.3 (L) 10/29/2021    HCT 39.5 (L) 10/29/2021     10/29/2021    CHOL 112 05/26/2020    TRIG 53 05/26/2020    HDL 47 03/03/2021    ALT 14 03/16/2021    AST 15 03/16/2021     10/29/2021    K 4.2 10/29/2021     10/29/2021    CREATININE 0.80 10/29/2021    BUN 14 10/29/2021    CO2 26 10/29/2021    TSH 1.48 01/24/2020    PSA 0.67 09/22/2020    INR 1.0 03/08/2021    LABA1C 5.6 12/10/2019     Lab Results   Component Value Date    CALCIUM 9.0 10/29/2021    PHOS 4.0 03/08/2021     Lab Results   Component Value Date    LDLCHOLESTEROL 54 03/03/2021       Assessment and Plan:    1. Chronic pain of right knee  Looking for arthritis  Patient is open to getting steroid injections we will have a follow-up visit with him for that after reviewing the imaging  - XR KNEE RIGHT (3 VIEWS); Future    2. Chronic cough  - gabapentin (NEURONTIN) 300 MG capsule; Take 1 capsule by mouth daily for 30 days. Dispense: 30 capsule; Refill: 0    3. Heart failure, systolic and diastolic, chronic ([address]  Patient recently saw CHF clinic  No new lower leg edema  - furosemide (LASIX) 40 MG tablet; Take 1.5 tablets by mouth 2 times daily  Dispense: 30 tablet; Refill: 2           Requested Prescriptions     Signed Prescriptions Disp Refills    gabapentin (NEURONTIN) 300 MG capsule 30 capsule 0     Sig: Take 1 capsule by mouth daily for 30 days.  furosemide (LASIX) 40 MG tablet 30 tablet 2     Sig: Take 1.5 tablets by mouth 2 times daily       Medications Discontinued During This Encounter   Medication Reason    furosemide (LASIX) 40 MG tablet Eligio Traore received counseling on the following healthy behaviors: nutrition, exercise and medication adherence    Discussed use,benefit, and side effects of prescribed medications. Barriers to medication compliance addressed. All patient questions answered. Pt voiced understanding. Return in about 1 day (around 1/12/2022) for knee injection with Dr. Renate Alvarez. Disclaimer: Some orall of this note was transcribed using voice-recognition software. This may cause typographical errors occasionally.  Although all effort is made to fix these errors, please do not hesitate to contact our office if there isany concern with the understanding of this note.

## 2022-01-11 NOTE — PROGRESS NOTES
Attending Physician Statement  I have discussed the care of Madelyn Baig 61 y.o. male, including pertinent history and exam findings, with the resident Dr. Eulalio Blakely MD.    History and Exam:   Chief Complaint   Patient presents with    Referral - General     patient has trap paperwork to be filled out       Past Medical History:   Diagnosis Date    Acute [facility] (congestive heart failure) ([facility] 3/8/2021    Arthritis     back    Atrial fibrillation ([facility] 10/2019    Dr. Niya Ha BPH (benign prostatic hyperplasia)     Cellulitis     Cervical disc disease     CHF (congestive heart failure) [facility])     cardiologist Dr. Osei Mcmahon. [facility] CHF clinic    Combined systolic and diastolic congestive heart failure ([facility] 1/15/2020    Coronary artery disease 10/2019     [facility]G [facility]    Dr. Radha Caal Cardiology last seen within the last year.  COVID-19 virus RNA test result indeterminate 3/31/2021    CPAP (continuous positive airway pressure) dependence     GERD (gastroesophageal reflux disease)     on rx    History of incarceration     released 2019    History of incarceration     Hyperlipidemia     Dr. Faye Frankel Hypertension     [facility] CHF clinic     Dr. Faye Frankel Myocardial infarct [facility])     Dr. Faye Frankel Obesity     Sleep apnea     C-pap    Wears reading eyeglasses     Wellness examination     Dr. Ashanti Yang 4/2021     No Known Allergies   I have seen and examined the patient and the key elements of the encounter have been performed by me.   BP Readings from Last 3 Encounters:   01/12/22 (!) 103/55   01/11/22 118/74   01/05/22 (!) 142/90     /74 (Site: Left Upper Arm, Position: Sitting, Cuff Size: Large Adult)   Pulse 65   Temp 96.8 °F (36 °C) (Temporal)   Wt (!) 302 lb (137 kg)   SpO2 96%   BMI 50.26 kg/m²   Lab Results   Component Value Date    WBC 7.6 10/29/2021    HGB 12.3 (L) 10/29/2021    HCT 39.5 (L) 10/29/2021     10/29/2021    CHOL 112 05/26/2020    TRIG 53 05/26/2020 HDL 47 03/03/2021    ALT 14 03/16/2021    AST 15 03/16/2021     10/29/2021    K 4.2 10/29/2021     10/29/2021    CREATININE 0.80 10/29/2021    BUN 14 10/29/2021    CO2 26 10/29/2021    TSH 1.48 01/24/2020    PSA 0.67 09/22/2020    INR 1.0 03/08/2021    LABA1C 5.6 12/10/2019     Lab Results   Component Value Date    LABALBU 3.8 03/16/2021     Lab Results   Component Value Date    IRON 64 08/19/2021    TIBC 282 08/19/2021    FERRITIN 143 12/10/2019     Lab Results   Component Value Date    LDLCHOLESTEROL 54 03/03/2021     I agree with the assessment, plan and the diagnosis of    Diagnosis Orders   1. Chronic pain of right knee  XR KNEE RIGHT (3 VIEWS)   2. Chronic cough  gabapentin (NEURONTIN) 300 MG capsule   3. Heart failure, systolic and diastolic, chronic (HCC)  furosemide (LASIX) 40 MG tablet    . I agree with orders as documented by the resident. More than 25 minutes spent  in face to face encounter with the patient and more than half in counseling. Patient's questions were answered. Patient Voiced understanding to the counseling. Return in about 1 day (around 1/12/2022) for knee injection with Dr. Lamont Echavarria.    (GC Modifier)-Dr. Jacquelin Mcnari MD

## 2022-01-11 NOTE — PATIENT INSTRUCTIONS
Thank you for letting us take care of you today. We hope all your questions were addressed. If a question was overlooked or something else comes to mind after you return home, please contact a member of your Care Team listed below. Your Care Team at [place] [address] is Team #4  Sonam Baeza MD (Faculty)  Fili Norwood MD (Resident)  Kristin Renner MD (Resident)  Vel Campa MD (Resident)  Theodore Palafox MD (Resident)  DELIA Marshall RMA Jennett Converse., [facility] office)  Isha Kinsey, [address] [facility][[address] (Clinical Practice Manager)  Fred Rueda [facility] (Clinical Pharmacist)       Office phone number: [phone]    If you need to get in right away due to illness, please be advised we have \"Same Day\" appointments available Monday-Friday. Please call us at [phone] option #3 to schedule your \"Same Day\" appointment. Schedule a Vaccine  When you qualify to receive the vaccine, call the [employer] COVID-19 Vaccination Hotline to schedule your appointment or to get additional information about the [facility] locations which are offering the COVID-19 vaccine. To be 94% effective, it's important that you receive two doses of one of the COVID-19 vaccines. -If you are receiving the Cole Peter vaccine, your second shot will be scheduled as close to 21 days after the first shot as possible. -If you are receiving the Moderna vaccine, your second shot will be scheduled as close to 28 days after the first shot as possible. [facility] COVID-19 Vaccination Hotline: [phone]    Links to [facility] website and [facility] website:    [email or web address]    [email or web address]

## 2022-01-11 NOTE — PROGRESS NOTES
Visit Information    Have you changed or started any medications since your last visit including any over-the-counter medicines, vitamins, or herbal medicines? no   Have you stopped taking any of your medications? Is so, why? -  no  Are you having any side effects from any of your medications? - no    Have you seen any other physician or provider since your last visit?  no   Have you had any other diagnostic tests since your last visit?  no   Have you been seen in the emergency room and/or had an admission in a hospital since we last saw you?  no   Have you had your routine dental cleaning in the past 6 months?  no     Do you have an active MyChart account? If no, what is the barrier?   No:     Patient Care Team:  Santos Ng MD as PCP - General (Family Medicine)  Aletha Soto MD as Consulting Physician (Gastroenterology)  Emerson Brasher MD as Consulting Physician (Urology)    Medical History Review  Past Medical, Family, and Social History reviewed and does not contribute to the patient presenting condition    Health Maintenance   Topic Date Due    COVID-19 Vaccine (3 - Booster for Cole Peter series) 10/30/2021    Depression Screen  01/19/2022    Lipid screen  03/03/2022    Potassium monitoring  10/29/2022    Creatinine monitoring  10/29/2022    Diabetes screen  12/10/2022    Pneumococcal 0-64 years Vaccine (2 of 2 - PPSV23) 08/20/2025    DTaP/Tdap/Td vaccine (2 - Td or Tdap) 08/20/2030    Colon cancer screen colonoscopy  01/12/2031    Flu vaccine  Completed    Shingles Vaccine  Completed    Hepatitis C screen  Completed    HIV screen  Completed    Hepatitis A vaccine  Aged Out    Hepatitis B vaccine  Aged Out    Hib vaccine  Aged Out    Meningococcal (ACWY) vaccine  Aged Out

## 2022-01-12 ENCOUNTER — OFFICE VISIT (OUTPATIENT)
Dept: FAMILY MEDICINE CLINIC | Age: 67
End: 2022-01-12
Payer: MEDICAID

## 2022-01-12 ENCOUNTER — HOSPITAL ENCOUNTER (OUTPATIENT)
Dept: OCCUPATIONAL THERAPY | Age: 67
Setting detail: THERAPIES SERIES
Discharge: HOME | End: 2022-01-12
Payer: MEDICAID

## 2022-01-12 VITALS
SYSTOLIC BLOOD PRESSURE: 103 MMHG | HEART RATE: 68 BPM | DIASTOLIC BLOOD PRESSURE: 55 MMHG | BODY MASS INDEX: 50.39 KG/M2 | WEIGHT: 302.8 LBS

## 2022-01-12 DIAGNOSIS — M17.11 TRICOMPARTMENT OSTEOARTHRITIS OF RIGHT KNEE: ICD-10-CM

## 2022-01-12 DIAGNOSIS — M25.561 CHRONIC PAIN OF RIGHT KNEE: Primary | ICD-10-CM

## 2022-01-12 DIAGNOSIS — G89.29 CHRONIC PAIN OF RIGHT KNEE: Primary | ICD-10-CM

## 2022-01-12 PROCEDURE — 20610 DRAIN/INJ JOINT/BURSA W/O US: CPT | Performed by: FAMILY MEDICINE

## 2022-01-12 PROCEDURE — 6360000002 HC RX W HCPCS

## 2022-01-12 PROCEDURE — 3017F COLORECTAL CA SCREEN DOC REV: CPT | Performed by: FAMILY MEDICINE

## 2022-01-12 PROCEDURE — G8427 DOCREV CUR MEDS BY ELIG CLIN: HCPCS | Performed by: FAMILY MEDICINE

## 2022-01-12 PROCEDURE — G8482 FLU IMMUNIZE ORDER/ADMIN: HCPCS | Performed by: FAMILY MEDICINE

## 2022-01-12 PROCEDURE — G8417 CALC BMI ABV UP PARAM F/U: HCPCS | Performed by: FAMILY MEDICINE

## 2022-01-12 PROCEDURE — 99213 OFFICE O/P EST LOW 20 MIN: CPT | Performed by: FAMILY MEDICINE

## 2022-01-12 PROCEDURE — 1036F TOBACCO NON-USER: CPT | Performed by: FAMILY MEDICINE

## 2022-01-12 PROCEDURE — 97535 SELF CARE MNGMENT TRAINING: CPT

## 2022-01-12 RX ORDER — TRIAMCINOLONE ACETONIDE 40 MG/ML
40 INJECTION, SUSPENSION INTRA-ARTICULAR; INTRAMUSCULAR ONCE
Status: COMPLETED | OUTPATIENT
Start: 2022-01-12 | End: 2022-01-12

## 2022-01-12 RX ORDER — BUPIVACAINE HYDROCHLORIDE 5 MG/ML
4 INJECTION, SOLUTION PERINEURAL ONCE
Status: COMPLETED | OUTPATIENT
Start: 2022-01-12 | End: 2022-01-12

## 2022-01-12 RX ADMIN — BUPIVACAINE HYDROCHLORIDE 20 MG: 5 INJECTION, SOLUTION PERINEURAL at 16:02

## 2022-01-12 RX ADMIN — TRIAMCINOLONE ACETONIDE 40 MG: 40 INJECTION, SUSPENSION INTRA-ARTICULAR; INTRAMUSCULAR at 16:02

## 2022-01-12 NOTE — PROGRESS NOTES
Sports Medicine Consultation     CHIEF COMPLAINT:  New Patient (right knee pain)      HPI:  Yan Ray is a 61y.o. year old male who is a  established patient being seen for regarding follow up of a pre-existing problem right knee pain. The pain has been present for a year   The patient recalls a no injury. The patient has tried OTC Nsaids without much improvement. The pain is described as achy. There is  pain on weightbearing. The knee does not swell. There is is not painful popping and clicking. The knee does not catch or lock. It has not given out. It is  stiff upon arising from sitting. It is  painful to go up and down stairs and sit for a prolonged period of time. he has a past medical history of Acute CHF (congestive heart failure) ([address] Arthritis, Atrial fibrillation ([address] BPH (benign prostatic hyperplasia), Cellulitis, Cervical disc disease, CHF (congestive heart failure) ([address] Combined systolic and diastolic congestive heart failure ([address] Coronary artery disease, COVID-19 virus RNA test result indeterminate, CPAP (continuous positive airway pressure) dependence, GERD (gastroesophageal reflux disease), History of incarceration, History of incarceration, Hyperlipidemia, Hypertension, Myocardial infarct ([address] Obesity, Sleep apnea, Wears reading eyeglasses, and Wellness examination. he has a past surgical history that includes transesophageal echocardiogram (10/16/2019); Cardioversion (10/16/2019); Coronary Artery Bypass Graft Maze Procedure (N/A, 10/21/2019); Carpal tunnel release (Bilateral, 2008); fracture surgery; Cervical spine surgery; Abdominal exploration surgery; Colonoscopy (N/A, 10/29/2020); Colonoscopy (10/29/2020); Colonoscopy (10/29/2020); Colonoscopy (N/A, 01/12/2021); eye surgery; Cystography (N/A, 03/26/2021); Cystoscopy (03/26/2021); Cardiac surgery; Cystocopy (05/14/2021); and Cystoscopy (N/A, 5/14/2021).     family history includes Alcohol Abuse in his father and maternal uncle; Cancer in his mother; Heart Attack in his maternal uncle. Social History     Socioeconomic History    Marital status:      Spouse name: Not on file    Number of children: Not on file    Years of education: Not on file    Highest education level: Not on file   Occupational History    Not on file   Tobacco Use    Smoking status: Never Smoker    Smokeless tobacco: Never Used   Vaping Use    Vaping Use: Never used   Substance and Sexual Activity    Alcohol use: Not Currently     Comment: stopped 1991    Drug use: Not Currently    Sexual activity: Not Currently   Other Topics Concern    Not on file   Social History Narrative    Not on file     Social Determinants of Health     Financial Resource Strain:     Difficulty of Paying Living Expenses: Not on file   Food Insecurity:     Worried About [address] in the Last Year: Not on file    [address] [place] in the Last Year: Not on file   Transportation Needs:     Lack of Transportation (Medical): Not on file    Lack of Transportation (Non-Medical):  Not on file   Physical Activity:     Days of Exercise per Week: Not on file    Minutes of Exercise per Session: Not on file   Stress:     Feeling of Stress : Not on file   Social Connections:     Frequency of Communication with Friends and Family: Not on file    Frequency of Social Gatherings with Friends and Family: Not on file    Attends Rastafari Services: Not on file    Active Member of Clubs or Organizations: Not on file    Attends Club or Organization Meetings: Not on file    Marital Status: Not on file   Intimate Partner Violence:     Fear of Current or Ex-Partner: Not on file    Emotionally Abused: Not on file    Physically Abused: Not on file    Sexually Abused: Not on file   Housing Stability:     Unable to Pay for Housing in the Last Year: Not on file    Number of [place] in the Last Year: Not on file    Unstable Housing in the Last Year: Not on file       Current Outpatient Medications   Medication Sig Dispense Refill    gabapentin (NEURONTIN) 300 MG capsule Take 1 capsule by mouth daily for 30 days. 30 capsule 0    furosemide (LASIX) 40 MG tablet Take 1.5 tablets by mouth 2 times daily 30 tablet 2    aspirin 81 MG chewable tablet Take 81 mg by mouth daily      isosorbide mononitrate (IMDUR) 30 MG extended release tablet Take 1 tablet by mouth daily 30 tablet 1    Multiple Vitamins-Minerals (MULTIVITAMIN WITH MINERALS) tablet Take 1 tablet by mouth daily 30 tablet 3    potassium chloride (KLOR-CON M) 20 MEQ extended release tablet Take 1 tablet by mouth daily 30 tablet 1    ALLERGY RELIEF 10 MG tablet TAKE 1 TABLET BY MOUTH DAILY 30 tablet 2    atorvastatin (LIPITOR) 40 MG tablet Take 1 tablet by mouth nightly 30 tablet 1    oxybutynin (DITROPAN-XL) 5 MG extended release tablet TAKE 1 TABLET BY MOUTH DAILY 30 tablet 3    pantoprazole (PROTONIX) 40 MG tablet Take 1 tablet by mouth 2 times daily (before meals) 60 tablet 5    tamsulosin (FLOMAX) 0.4 MG capsule TAKE 1 CAPSULE BY MOUTH DAILY 30 capsule 5    Prenatal Vit-Fe Fumarate-FA (NIVA-PLUS) 27-1 MG TABS       Multiple Vitamins-Minerals (PRESERVISION AREDS 2) CAPS       Blood Pressure Monitor KIT 1 each by Does not apply route 2 times daily 1 kit 0    Misc. Devices ([employer] WEIGHT SCALE) MISC 1 each by Does not apply route as needed (weigh once a day and record) 1 each 0    nitroGLYCERIN (NITROSTAT) 0.4 MG SL tablet Place 1 tablet under the tongue every 5 minutes as needed for Chest pain up to max of 3 total doses. If no relief after 1 dose, call 911. 25 tablet 1    Elastic Bandages & Supports (JOBST KNEE HIGH COMPRESSION SM) MISC 2 each by Does not apply route daily Wear q day. Remove q hs. Diagnosis: Chronic venous insufficiency 2 each 0     No current facility-administered medications for this visit. Allergies:  hehas No Known Allergies.     ROS:  CV:  Denies chest pain; palpitations; shortness of breath; swelling of feet, ankles; and loss of consciousness. CON: Denies fever and dizziness. ENT:  Denies hearing loss / ringing, ear infections hoarseness, and swallowing problems. RESP: c/o chronic cough without spitting up blood, and asthma/wheezing. NEURO:  Denies headache, memory loss, sleep disturbance, and tremor or movement disorder. PHYSICAL EXAM:   BP (!) 103/55 (Site: Left Upper Arm, Position: Sitting, Cuff Size: Medium Adult)   Pulse 68   Wt (!) 302 lb 12.8 oz (137.3 kg)   BMI 50.39 kg/m²   GENERAL: Augustin Antonio is a 61 y.o. male who is alert and oriented and sitting comfortably in our office. SKIN:  Intact without rashes, lesions or ulcerations. NEURO: Sensation to the extremity is intact. VASC:  Capillary refill is less than 3 seconds. Distal pulses are palpable. There is no lymphadenopathy but general edema noted lower and upper bilateral extremities     Knee Exam  Musculoskeletal/Neurologic:  Inspection-Swelling: none, Ecchymosis: no  Palpation-Tenderness:none  Pain with patellar grind: no  ROM-normal  Strength- WNL  Sensation-normal to light touch    Special Tests-  Varus Laxity: negative   Valgus Laxity:  negative   Anterior Drawer: negative   Posterior Drawer: negative  Lachman's: negative  Guru's:negative  Thessaly: negative    Single Leg Squat: Comments: Was not able to assess   Deep Squat: Unable to Assess  Gait: normal    PSYCH:  Good fund of knowledge and displays understanding of exam.    RADIOLOGY: XR KNEE RIGHT (3 VIEWS)    Result Date: 1/11/2022  1. Tricompartmental osteoarthrosis. Diffuse osteopenia. Moderate narrowing of the patellofemoral and medial compartments. Small joint effusion. Small loose bodies in the posterior joint recess measuring up to 9 mm. 2. No acute fracture or dislocation. 4 views of the right  knee were ordered, independently visualized by me, and discussed with patient.   Findings: Tricompartmental osteoarthrosis.  Diffuse osteopenia.  Moderate narrowing   of the patellofemoral and medial compartments.  Small joint effusion.  Small   loose bodies in the posterior joint recess measuring up to 9 mm. 2. No acute fracture or dislocation. IMPRESSION:     1. Chronic pain of right knee    2. Tricompartment osteoarthritis of right knee          PLAN:   We discussed some of the etiologies and natural histories of     ICD-10-CM    1. Chronic pain of right knee  M25.561 bupivacaine (MARCAINE) 0.5 % injection 20 mg    G89.29 triamcinolone acetonide (KENALOG-40) injection 40 mg   2. Tricompartment osteoarthritis of right knee  M17.11    . We discussed the various treatment alternatives including anti-inflammatory medications, physical therapy, injections, further imaging studies and as a last resort surgery. KNEE INJECTION PROCEDURE NOTE:  The patient was identified. The Right  knee was confirmed with the patient. Consent was obtained and a time out was performed. After a sterile prep with Betadine followed by an alcohol wipe the knee was injected using a bent knee lateral joint line approach with a mixture of 4 mL of 0.5% Marcaine and 40 mg of Kenalog. Patient tolerated the procedure well without post injection complications. I instructed the patient to call our office immediately if they have any swelling or increased pain at the injection site. Return to clinic in Return in about 3 months (around 4/12/2022) for Knee arthritis . Fermin Fang Please be aware portions of this note were completed using voice recognition software and unforeseen errors may have occurred    Electronically signed by Gloria Penn DO, FAOASM  on 1/12/22 at 3:28 PM EST        No orders of the defined types were placed in this encounter.

## 2022-01-12 NOTE — PROGRESS NOTES
Visit Information    Have you changed or started any medications since your last visit including any over-the-counter medicines, vitamins, or herbal medicines? no   Are you having any side effects from any of your medications? -  no  Have you stopped taking any of your medications? Is so, why? -  no    Have you seen any other physician or provider since your last visit? No  Have you had any other diagnostic tests since your last visit? No  Have you been seen in the emergency room and/or had an admission to a hospital since we last saw you? No  Have you had your routine dental cleaning in the past 6 months? no    Have you activated your [employer] account? If not, what are your barriers?  No:      Patient Care Team:  Zoe Landeros MD as PCP - General (Family Medicine)  Ileana Asencio MD as Consulting Physician (Gastroenterology)  Rupert Lennon MD as Consulting Physician (Urology)    Medical History Review  Past Medical, Family, and Social History reviewed and does not contribute to the patient presenting condition    Health Maintenance   Topic Date Due    COVID-19 Vaccine (3 - Booster for [place] series) 10/30/2021    Depression Screen  01/19/2022    Lipid screen  03/03/2022    Potassium monitoring  10/29/2022    Creatinine monitoring  10/29/2022    Diabetes screen  12/10/2022    Pneumococcal 0-64 years Vaccine (2 of 2 - PPSV23) 08/20/2025    DTaP/Tdap/Td vaccine (2 - Td or Tdap) 08/20/2030    Colon cancer screen colonoscopy  01/12/2031    Flu vaccine  Completed    Shingles Vaccine  Completed    Hepatitis C screen  Completed    HIV screen  Completed    Hepatitis A vaccine  Aged Out    Hepatitis B vaccine  Aged Out    Hib vaccine  Aged Out    Meningococcal (ACWY) vaccine  Aged Out

## 2022-01-12 NOTE — FLOWSHEET NOTE
TREATMENT LOCATION:   [x] [facility] 329: [phone]  F: [phone] [] [address]: [phone]  F: [phone]        Lymphedema Services - Treatment Note of the Lower Extremity    Date:  2022  Patient: Edel Welsh  : 1955             MRN: [id number]  Referring Physician: LUDY Zepeda*       Phone: [phone]  Fax: [phone]   Insurance: [place] 250-    4 visits, from 12/15/21 to 3/14/22. Authorization number [id number]. Medical Diagnosis: R60.0 (ICD-10-CM) - Bilateral edema of lower extremity    I89.0 (ICD-10-CM) - Lymphedema of both lower extremities    I87.2 (ICD-10-CM) - Venous insufficiency of both lower extremities  Rehab Codes: I89.0     Onset Date: 10/6/21  Visit# / total visits: 3/3     Absolute Lymphedema Contraindications - treatement []? NONE                [x]? CHF (only if patient is un-medicated or edema is solely d/t cardiac failure)               [x]? Acute Skin Infection ( e.g. Cellulitis, Ersipelas)  ( HX OF - )              [x]? Cardiac Arrhythmia-  ( HX of AFIB in )  Absolute Contraindications Regarding the Deep Abdomen: [x]? NONE   Relative Lymphedema Contraindications[]? [x]? Age 60+                            Subjective: \" I ma having some trouble with my knee, I am going to get a steroid shot in my R knee today. The swelling in my legs are doing pretty good. I never heard anything about the wraps since then. I couldn't find where I laid the paperwork to bring it with me today. \"      Pain: [x] YES  [] NO  Location: Thigh/Knee- R LE  Pain Rating: ( 0-10 scale) : 7/10  Comments:     Plan for next session:     Objective:   [x] Measurement [] Bandaging [] MLD [] Skin care   [x] Education [] Self-bandaging [] Self-MLD [] Wound Care   [] Exercise [] Caregiver training [] Kinesiotaping [] Other:    [] Nutrition [] Garment fitting/training [] Vasopneumatic Pump           Circumferential Measurements  Measurements taken from nail base of D2 digit. Measurements (cm) Right Left   Dorsum    26.5 25.4   Inframalleolar       35.0 35.0   Supramalleolar     27.0 27.8   Lower Calf 40.5 44.5   Mid Calf 49.5 51.0   Upper Calf 47.5 46.5   Knee 51.5 47.5   10 cm above knee       Thigh-widest       Hip-widest       Current Total: 12/15/21    Initial Total 11/17/2021: 277.5    274.0 277.7    272.4       Assessment:  [x] Progressing toward goals. Pt arrives with new compression stockings in place. Pt states that he will wear these daily. Pt received vasopneuamtic pump - 12/1/21 basic ( only approved for basic)    Pt states that he has tried to use the vasopneumatic pump 3-4x since he was last here. He was scared or nervous from the pain in the R thigh afer the previous use. He states that he has no pains this time thorough. Pt used pumps for approx 1 hour. Pt states that he has gotten compression stockings previously though [facility] that the wound care clinic ordered for him. OT assisted in following up on the order as pt states he still has not received them from when he attempted to place the order. [address]  [phone]  [place]    Ordered another new pair of stockings as well as  Juxtalite HD - SIZE LARGE, LENGTH: LONG- PLACED WITH EDGE [place] ON 1/12/22-  ORDER # [id number]      Pt is educated on decongestive exercises to help drain fluid from the tissue. Patient demonstrates fair+ understanding of exercises with verbal, visual, and tactile cuing. See below for more details/ hand out that was provided to patient for home follow through. Exercise Program Guidelines for Patients with Lymphedema  (Please read these IMPORTANT guidelines before beginning your exercises program.)  - Perform your exercises while wearing compression garments.    - DO NOT wear tight or restricting clothing   - Try to exercise 2x daily for 5-10 reps and increase the length of the exercise session SLOWLY over a comfortable period of time. - Perform each exercise in a slow controlled manner.   - Begin and end each exercise session with breathing exercises. (Diaphragmatic breathing)   - WAIT 20-30 min after doing self MLD and/or using your pump to complete exercises. - It is recommended that you rest at least 15-20 minutes with your limb elevated after you complete your exercise. ** Do NOT perform any movements that induce pain. ** Is it important for these exercises to be performed in the order they are listed in order to best aid in decongestion of the limb(s). Neck Exercises:  1. Head Turn and Stretches: Turn head and look right; return to normal position; repeat to left side   2. Chin Up and Down: Bend chin down toward chest, then look up toward ceiling  3. Ear to shoulder: Try to touch ear to shoulder, return to normal position, repeat to left side    Trunk Exercise:   1. Torso Twist: Turn shoulders as far as you can to the right, return to start, complete to the left  2. Bend forward at waist, return to starting position, bend slightly backwards. 3. Bend sideways to the Right, bend sideways to the left. Arm Exercises:   1. Shoulder Shrugs: Shrug both shoulders and inhale, Lower both shoulders and exhale. 2. Shoulder Kiester: Rotate shoulders forward (like rowing a boat), then rotate shoulders backwards  3. Climb to kenzie: Hold arms above head, grasp imaginary ladder, alternate using both arms  4. Kaiser Dec: Lift both arms out to the side and over your head then lower. Leg Exercises:   1. Marches: Bring right knee to chest, then lower. Bring Left knee to chest, then lower. 2. Hip Abduction: Slide R leg out to the side - toes point to ceiling and knee straight, return to start. Then complete on left side. 3. Foot Flexion: Pump ankles up and down  4.  Foot circles: Rotate foot making a [place] inward and then outward. [] No change. [] Other:  [x] Patient would continue to benefit from skilled occupational therapy services in order to reduce edema and address the following goals below. STG - To be addressed within 1 visits     Pt will demonstrate compliance of maintaining lymphedema precautions to reduce the risks of infection and further exacerbations.     Pt will demonstrate independence with decongestive exercise program in order to expedite fluid rerouting.     LTG To be adressed within 2 visits   Pt/Caregiver will demonstrate independence with donning/doffing and wearing schedule for compression garments/ devices to maintain decreased size upon discharge.     Pt will demonstrate compliance with skin care routine for improved overall skin integrity and decreased risk of wounds and infection.       Pt to compliant with CDT in order to reduce edema in the B LE by 3+ cm.      Patient's Goal: \" I would like to get rid of this swelling in my legs and the discoloration.'       Pt. Education:  [x] Yes  [] No  [x] Reviewed Prior HEP/Ed  Method of Education: [x] Verbal  [x] Demo  [x] Written  Comprehension of Education:  [x] Verbalizes understanding. [x] Demonstrates understanding. [x] Needs review. [] No understanding  Knowledge of home program:  [x] Good [] Fair [] Poor [] With assist from family/caregiver    Plan:   [x] Continue current frequency toward long and short term goals.       Treatment Charges Minutes   Units   Re-evaluation (83268):$   64.01/$49.52                                           Manual Therapy (70063):             $26.92 / $21.34     Therapeutic activities (82136):   $37.46/ $26.79     Therapeutic Exercise (33598):   $29.21/$ 22.84     Self care/home mgmt (77876):    $32.39 / $24.43 80 5   Vasopneumatic Device (89825):   $9.21     Total Treatment Time    80 5     Approved / USED   14453 16 units  49751 16 units  [phone] units  43448 16 units/5  40858 4 units    Time In: 10:30  Time Out: 11:50      Electronically signed by Mary Villar OT on 1/12/2022 at 10:15 AM

## 2022-01-13 NOTE — PROGRESS NOTES
I performed a history and physical examination of the patient and discussed management with the resident. I reviewed the residents note and agree with the documented findings and plan of care. Any areas of disagreement are noted on the chart. I have personally evaluated this patient and have completed at least one if not all key elements of the E/M (history, physical exam, and MDM). Additional findings are as noted. I agree with the chief complaint, past medical history, past surgical history, allergies, medications, social and family history as documented unless otherwise noted below.      Electronically signed by Shanel العلي DO on 1/13/2022 at 9:13 AM

## 2022-01-17 DIAGNOSIS — I50.42 HEART FAILURE, SYSTOLIC AND DIASTOLIC, CHRONIC (HCC): ICD-10-CM

## 2022-01-18 RX ORDER — ATORVASTATIN CALCIUM 40 MG/1
40 TABLET, FILM COATED ORAL NIGHTLY
Qty: 30 TABLET | Refills: 1 | Status: SHIPPED | OUTPATIENT
Start: 2022-01-18 | End: 2022-04-11

## 2022-01-18 NOTE — TELEPHONE ENCOUNTER
lipitor pending for refill     Health Maintenance   Topic Date Due    COVID-19 Vaccine (3 - Booster for Pfizer series) 10/30/2021    Depression Screen  01/19/2022    Lipid screen  03/03/2022    Potassium monitoring  10/29/2022    Creatinine monitoring  10/29/2022    Diabetes screen  12/10/2022    Pneumococcal 0-64 years Vaccine (2 of 2 - PPSV23) 08/20/2025    DTaP/Tdap/Td vaccine (2 - Td or Tdap) 08/20/2030    Colon cancer screen colonoscopy  01/12/2031    Flu vaccine  Completed    Shingles Vaccine  Completed    Hepatitis C screen  Completed    HIV screen  Completed    Hepatitis A vaccine  Aged Out    Hepatitis B vaccine  Aged Out    Hib vaccine  Aged Out    Meningococcal (ACWY) vaccine  Aged Out             (applicable per patient's age: Cancer Screenings, Depression Screening, Fall Risk Screening, Immunizations)    Hemoglobin A1C (%)   Date Value   12/10/2019 5.6   10/18/2019 5.8     LDL Cholesterol (mg/dL)   Date Value   03/03/2021 54     AST (U/L)   Date Value   03/16/2021 15     ALT (U/L)   Date Value   03/16/2021 14     BUN (mg/dL)   Date Value   10/29/2021 14      (goal A1C is < 7)   (goal LDL is <100) need 30-50% reduction from baseline     BP Readings from Last 3 Encounters:   01/12/22 (!) 103/55   01/11/22 118/74   01/05/22 (!) 142/90    (goal /80)      All Future Testing planned in [facility]:  Lab Frequency Next Occurrence   US URINARY BLADDER LIMITED Once 12/01/2021   Creatinine, Random Urine Once 03/14/2021   Sodium, Urine, Random Once 03/14/2021   Urea Nitrogen, Urine Once 03/14/2021   US RETROPERITONEAL COMPLETE Once 36/76/7483   Basic Metabolic Panel Once 37/73/9712   COVID-19 Once 03/30/2021   COVID-19 Once 44/41/9459   Basic Metabolic Panel Once 26/85/0702   Reflux study/Reflux Venous Insufficiency Bilateral Once 10/06/2021       Next Visit Date:  Future Appointments   Date Time Provider Taj Flores   1/24/2022  1:00 PM Ruchi Antunez OT [facility]

## 2022-01-24 ENCOUNTER — HOSPITAL ENCOUNTER (OUTPATIENT)
Dept: OCCUPATIONAL THERAPY | Age: 67
Setting detail: THERAPIES SERIES
Discharge: HOME | End: 2022-01-24
Payer: MEDICAID

## 2022-01-24 PROCEDURE — 97535 SELF CARE MNGMENT TRAINING: CPT

## 2022-01-24 NOTE — FLOWSHEET NOTE
TREATMENT LOCATION:   [x] [facility] 329: [phone]  F: [phone] [] [address]: [phone]  F: [phone]        Lymphedema Services - Treatment Note of the Lower Extremity    Date:  2022  Patient: Sonia Schlatter  : 1955             MRN: [id number]  Referring Physician: LUDY Hayden*       Phone: [phone]  Fax: [phone]   Insurance: [place] 250-    4 visits, from 12/15/21 to 3/14/22. Authorization number [id number]. Medical Diagnosis: R60.0 (ICD-10-CM) - Bilateral edema of lower extremity    I89.0 (ICD-10-CM) - Lymphedema of both lower extremities    I87.2 (ICD-10-CM) - Venous insufficiency of both lower extremities  Rehab Codes: I89.0     Onset Date: 10/6/21  Visit# / total visits: 4     Absolute Lymphedema Contraindications - treatement []? NONE                [x]? CHF (only if patient is un-medicated or edema is solely d/t cardiac failure)               [x]? Acute Skin Infection ( e.g. Cellulitis, Ersipelas)  ( HX OF - )              [x]? Cardiac Arrhythmia-  ( HX of AFIB in 2019)  Absolute Contraindications Regarding the Deep Abdomen: [x]? NONE   Relative Lymphedema Contraindications[]? [x]? Age 60+                            Subjective: \" I am having some trouble with my knee, I had the steroid shot in my R knee it seems to of helped. The swelling in my legs don't seem to be to bad, but I may not be seeing it like others do. I did get my wraps in the mail, I did not try them on yet though. \"     Pain: [x] YES  [] NO  Location: Thigh/Knee- B LE  Pain Rating: ( 0-10 scale) :4 /10  Comments: Pt states the L LE is not as bad as the R LE. Started on a nerve pill on Friday that has been helpful for the feet only.      Plan for next session:     Objective:   [x] Measurement [] Bandaging [] MLD [] Skin care   [x] Education [] Self-bandaging [] Self-MLD [] Wound Care   [] Exercise [] Caregiver training [] Kinesiotaping [] Other:    [] Nutrition [] Garment fitting/training [] Vasopneumatic Pump           Circumferential Measurements  Measurements taken from nail base of D2 digit. Measurements (cm) Right Left   Dorsum    27.0 26.7   Inframalleolar       35.5 36.0   Supramalleolar     27.5 27.5   Lower Calf 34.0 39.0   Mid Calf 47.0 48.5   Upper Calf 48.5 47.0   Knee 51.5 47.5   10 cm above knee       Thigh-widest       Hip-widest       Current Total: 12/15/21    Initial Total 11/17/2021: 271.0    277.5    274.0 272.2    277.7    272.4       Assessment:  [x] Progressing toward goals. Pt arrives with new compression stockings in place. Pt states that he will wear these daily. Pt received vasopneuamtic pump - 12/1/21 basic ( only approved for basic)    Pt states that he has tried to use the vasopneumatic pump, however he is having trouble finding time on using it. He states that he has been shy with using it and needs to find a routine to use it. He states the last time he used it was aprox last week that he used 1x. Pt states that he will make more of an effort to use it. Pt received the compression items that were ordered for him and he brought with him today. Pt is educated on how to properly don and doff the equipment as well as how to use the measurement guide. Pt states that he does not have any other questions. New measurements are taken, results noted above. Pt states that he has been doing the decongestive exercises and has found them helpful as they do give him some relief. He is working on doing these daily. For reference:   [address]  [phone]  [facility]    Ordered another new pair of stockings as well as  Juxtalite HD - SIZE LARGE, LENGTH: LONG- PLACED WITH EDGE [address] ON 1/12/22-  ORDER # [id number]                            [] No change.                           [] Other:  [x] Patient would continue to benefit from skilled occupational therapy services in order to reduce edema and address the following goals below. STG - To be addressed within 1 visits     Pt will demonstrate compliance of maintaining lymphedema precautions to reduce the risks of infection and further exacerbations. - GOAL MET- 1/24/2022     Pt will demonstrate independence with decongestive exercise program in order to expedite fluid rerouting. GOAL MET - 1/24/2022     LTG To be adressed within 2 visits   Pt/Caregiver will demonstrate independence with donning/doffing and wearing schedule for compression garments/ devices to maintain decreased size upon discharge. - EDUCATED TODAY - STATES GOOD UNDERSTANDING - 1/24/2022        Pt will demonstrate compliance with skin care routine for improved overall skin integrity and decreased risk of wounds and infection.  - GOAL MET - 1/24/2022    Pt to compliant with CDT in order to reduce edema in the B LE by 3+ cm. - PROGRESSING - 1/24/2022       Patient's Goal: \" I would like to get rid of this swelling in my legs and the discoloration.'       Pt. Education:  [x] Yes  [] No  [x] Reviewed Prior HEP/Ed  Method of Education: [x] Verbal  [x] Demo  [x] Written  Comprehension of Education:  [x] Verbalizes understanding. [x] Demonstrates understanding. [x] Needs review. [] No understanding  Knowledge of home program:  [x] Good [] Fair [] Poor [] With assist from family/caregiver    Plan:   [x] Continue current frequency toward long and short term goals.       Treatment Charges Minutes   Units   Re-evaluation (80394):$   64.01/$49.52                                           Manual Therapy (34958):             $26.92 / $21.34     Therapeutic activities (50605):   $37.46/ $26.79     Therapeutic Exercise (01781):   $29.21/$ 22.84     Self care/home mgmt (82527):    $32.39 / $24.43 50 3   Vasopneumatic Device (63671):   $9.21     Total Treatment Time    50 3     Approved / USED 32040 16 units  10716 16 units  04109 16 units  48470 16 units/115  43268 4 units    Time In: 1:00  Time Out:  1:50       Electronically signed by Joseph Elise OT on 1/24/2022 at 1:10 PM

## 2022-01-31 ENCOUNTER — OFFICE VISIT (OUTPATIENT)
Dept: GASTROENTEROLOGY | Age: 67
End: 2022-01-31
Payer: MEDICAID

## 2022-01-31 VITALS — DIASTOLIC BLOOD PRESSURE: 81 MMHG | BODY MASS INDEX: 50.26 KG/M2 | WEIGHT: 302 LBS | SYSTOLIC BLOOD PRESSURE: 126 MMHG

## 2022-01-31 DIAGNOSIS — R05.9 COUGH: ICD-10-CM

## 2022-01-31 DIAGNOSIS — N39.490 OVERFLOW INCONTINENCE OF URINE: ICD-10-CM

## 2022-01-31 DIAGNOSIS — K21.9 GASTROESOPHAGEAL REFLUX DISEASE WITHOUT ESOPHAGITIS: Primary | ICD-10-CM

## 2022-01-31 PROCEDURE — G8427 DOCREV CUR MEDS BY ELIG CLIN: HCPCS | Performed by: INTERNAL MEDICINE

## 2022-01-31 PROCEDURE — G8417 CALC BMI ABV UP PARAM F/U: HCPCS | Performed by: INTERNAL MEDICINE

## 2022-01-31 PROCEDURE — 1036F TOBACCO NON-USER: CPT | Performed by: INTERNAL MEDICINE

## 2022-01-31 PROCEDURE — 3017F COLORECTAL CA SCREEN DOC REV: CPT | Performed by: INTERNAL MEDICINE

## 2022-01-31 PROCEDURE — G8482 FLU IMMUNIZE ORDER/ADMIN: HCPCS | Performed by: INTERNAL MEDICINE

## 2022-01-31 PROCEDURE — 99213 OFFICE O/P EST LOW 20 MIN: CPT | Performed by: INTERNAL MEDICINE

## 2022-01-31 RX ORDER — TAMSULOSIN HYDROCHLORIDE 0.4 MG/1
0.4 CAPSULE ORAL DAILY
Qty: 30 CAPSULE | Refills: 5 | Status: SHIPPED | OUTPATIENT
Start: 2022-01-31 | End: 2022-08-15

## 2022-01-31 ASSESSMENT — ENCOUNTER SYMPTOMS
APNEA: 1
EYES NEGATIVE: 1
COUGH: 1
SHORTNESS OF BREATH: 1
GASTROINTESTINAL NEGATIVE: 1
ALLERGIC/IMMUNOLOGIC NEGATIVE: 1

## 2022-01-31 NOTE — PROGRESS NOTES
GI FOLLOW UP    INTERVAL HISTORY:     Protonix BID currently  No signs of recurrent bleeding  Clinically doing well from GI standpoint        Chief Complaint   Patient presents with    Follow-up     3 month lab follow up    Gastroesophageal Reflux     Taking protonix BID. Denies having any heartburn/reflux. Patient states still coughing almost daily. Every couple of week he will start coughing followed by extreme gagging. 1. Gastroesophageal reflux disease without esophagitis    2. Cough          HISTORY OF PRESENT ILLNESS: Mr.Andrew YNU Berg is a 60 y. o. male with a past history remarkable for HARPREET, A. fib on Eliquis, morbid obesity, history of CABG in October 2019 on dual antiplatelet therapy (aspirin and Plavix), referred for evaluation of nonproductive postprandial cough.  This appears to be most consistent with the patient's chronic GERD symptoms.  He appears to be on maximum PPI therapy     Recent colonoscopy which identified tubular adenomas and sessile serrated adenomas which were removed aside from an isolated flat lesion that will require EMR.  Findings discussed with the patient. Past Medical,Family, and Social History reviewed and does contribute to the patient presenting condition. Patient's PMH/PSH,SH,PSYCH Hx, MEDs, ALLERGIES, and ROS were all reviewed and updated in the appropriate sections.     PAST MEDICAL HISTORY:  Past Medical History:   Diagnosis Date    Acute CHF (congestive heart failure) ([address] 3/8/2021    Arthritis     back    Atrial fibrillation ([address] 10/2019    Dr. Lenny Barrett BPH (benign prostatic hyperplasia)     Cellulitis     Cervical disc disease     CHF (congestive heart failure) [facility])     cardiologist Dr. Mady Ceballos. 's CHF clinic    Combined systolic and diastolic congestive heart failure ([address] 1/15/2020    Coronary artery disease 10/2019     CABG 2019 [facility]    Dr. Estela Sibley Cardiology last seen within the last year.     COVID-19 virus RNA test result indeterminate 3/31/2021    CPAP (continuous positive airway pressure) dependence     GERD (gastroesophageal reflux disease)     on rx    History of incarceration     released 2019    History of incarceration     Hyperlipidemia     Dr. Milena Mallory Hypertension     [facility] CHF clinic     Dr. Milena Mallory Myocardial infarct [facility])     Dr. Milena Mallory Obesity     Sleep apnea     C-pap    Wears reading eyeglasses     Wellness examination     Dr. Price  4/2021       Past Surgical History:   Procedure Laterality Date    ABDOMINAL EXPLORATION SURGERY      CARDIAC SURGERY      2019    CARDIOVERSION  10/16/2019    CARPAL TUNNEL RELEASE Bilateral 2008    CERVICAL SPINE SURGERY      2-5    COLONOSCOPY N/A 10/29/2020    COLONOSCOPY WITH BIOPSY performed by Nabil Roque MD at [place] [address]  10/29/2020    COLONOSCOPY SUBMUCOSAL/BOTOX INJECTION performed by Nabil Roque MD at [place] [address]  10/29/2020    COLONOSCOPY POLYPECTOMY SNARE/COLD BIOPSY performed by Nabil Roque MD at [place] [address] N/A 01/12/2021    COLONOSCOPY POLYPECTOMY HOT SNARE, COLD SNARE POLPECTOMY performed by Steven Gordillo MD at [facility] N/A 10/21/2019    CABG CORONARY ARTERY BYPASS GRAFT X1, LIMA-LAD, BROWN 4 MAZE PROCEDURE, 50MM ATRICURE ATRIAL CLIP RIGID INTERNAL FIXATION PLATES X 3   SCREWS X 13 performed by Nena Nation MD at [facility] 03/26/2021    URODYNAMICS performed by Padma Salcedo MD at [facility] 9  03/26/2021    CYSTOSCOPY FLEXIBLE performed by Padma Salcedo MD at [facility] 9  05/14/2021    CYSTOSCOPY, UROLIFT, FULGURATION    CYSTOSCOPY N/A 5/14/2021    CYSTOSCOPY, UROLIFT, FULGURATION performed by Padma Salcedo MD at [address]      cataract surgery 2020    FRACTURE SURGERY      Left leg    TRANSESOPHAGEAL ECHOCARDIOGRAM  10/16/2019       CURRENT MEDICATIONS:    Current Outpatient Medications:     tamsulosin (FLOMAX) 0.4 MG capsule, TAKE 1 CAPSULE BY MOUTH DAILY, Disp: 30 capsule, Rfl: 5    atorvastatin (LIPITOR) 40 MG tablet, TAKE 1 TABLET BY MOUTH NIGHTLY, Disp: 30 tablet, Rfl: 1    gabapentin (NEURONTIN) 300 MG capsule, Take 1 capsule by mouth daily for 30 days. , Disp: 30 capsule, Rfl: 0    furosemide (LASIX) 40 MG tablet, Take 1.5 tablets by mouth 2 times daily, Disp: 30 tablet, Rfl: 2    aspirin 81 MG chewable tablet, Take 81 mg by mouth daily, Disp: , Rfl:     isosorbide mononitrate (IMDUR) 30 MG extended release tablet, Take 1 tablet by mouth daily, Disp: 30 tablet, Rfl: 1    Multiple Vitamins-Minerals (MULTIVITAMIN WITH MINERALS) tablet, Take 1 tablet by mouth daily, Disp: 30 tablet, Rfl: 3    potassium chloride (KLOR-CON M) 20 MEQ extended release tablet, Take 1 tablet by mouth daily, Disp: 30 tablet, Rfl: 1    ALLERGY RELIEF 10 MG tablet, TAKE 1 TABLET BY MOUTH DAILY, Disp: 30 tablet, Rfl: 2    oxybutynin (DITROPAN-XL) 5 MG extended release tablet, TAKE 1 TABLET BY MOUTH DAILY, Disp: 30 tablet, Rfl: 3    pantoprazole (PROTONIX) 40 MG tablet, Take 1 tablet by mouth 2 times daily (before meals), Disp: 60 tablet, Rfl: 5    Prenatal Vit-Fe Fumarate-FA (NIVA-PLUS) 27-1 MG TABS, , Disp: , Rfl:     Multiple Vitamins-Minerals (PRESERVISION AREDS 2) CAPS, , Disp: , Rfl:     Blood Pressure Monitor KIT, 1 each by Does not apply route 2 times daily, Disp: 1 kit, Rfl: 0    Misc. Devices ([employer] WEIGHT SCALE) MISC, 1 each by Does not apply route as needed (weigh once a day and record), Disp: 1 each, Rfl: 0    nitroGLYCERIN (NITROSTAT) 0.4 MG SL tablet, Place 1 tablet under the tongue every 5 minutes as needed for Chest pain up to max of 3 total doses.  If no relief after 1 dose, call 911., Disp: 25 tablet, Rfl: 1    Elastic Bandages & Supports (JOBST KNEE Not on file    Emotionally Abused: Not on file    Physically Abused: Not on file    Sexually Abused: Not on file   Housing Stability:     Unable to Pay for Housing in the Last Year: Not on file    Number of Places Lived in the Last Year: Not on file    Unstable Housing in the Last Year: Not on file       REVIEW OF SYSTEMS: A 12-point review of systems was obtained and pertinent positives and negatives were listed below. REVIEW OF SYSTEMS:     Constitutional: No fever, no chills, no lethargy, no weakness. HEENT:  No headache, otalgia, itchy eyes, nasal discharge or sore throat. Cardiac:  No chest pain, dyspnea, orthopnea or PND. Chest:   No cough, phlegm or wheezing. Abdomen:      Detailed by MA   Neuro:  No focal weakness, abnormal movements or seizure like activity. Skin:   No rashes, no itching. :   No hematuria, no pyuria, no dysuria, no flank pain. Extremities:  No swelling or joint pains. ROS was otherwise negative    Review of Systems   Constitutional: Negative. HENT: Negative. Eyes: Negative. Respiratory: Positive for apnea, cough and shortness of breath. Cardiovascular: Negative. Gastrointestinal: Negative. Endocrine: Negative. Genitourinary: Negative. Musculoskeletal: Positive for gait problem. Skin: Negative. Allergic/Immunologic: Negative. Hematological: Bruises/bleeds easily. Psychiatric/Behavioral: Positive for sleep disturbance. All other systems reviewed and are negative. PHYSICAL EXAMINATION: Vital signs reviewed per the nursing documentation. /81   Wt (!) 302 lb (137 kg)   BMI 50.26 kg/m²   Body mass index is 50.26 kg/m². Physical Exam    Physical Exam   Constitutional: Patient is oriented to person, place, and time. Patient appears well-developed and well-nourished. HENT:   Head: Normocephalic and atraumatic. Eyes: Pupils are equal, round, and reactive to light. EOM are normal.   Neck: Normal range of motion.  Neck supple. No JVD present. No tracheal deviation present. No thyromegaly present. Cardiovascular: Normal rate, regular rhythm, normal heart sounds and intact distal pulses. Pulmonary/Chest: Effort normal and breath sounds normal. No stridor. No respiratory distress. He has no wheezes. He has no rales. He exhibits no tenderness. Abdominal: Soft. Bowel sounds are normal. He exhibits no distension and no mass. There is no tenderness. There is no rebound and no guarding. No hernia. Musculoskeletal: Normal range of motion. Lymphadenopathy:    Patient has no cervical adenopathy. Neurological: Patient is alert and oriented to person, place, and time. Psychiatric: Patient has a normal mood and affect. Patient behavior is normal.       LABORATORY DATA: Reviewed  Lab Results   Component Value Date    WBC 7.6 10/29/2021    HGB 12.3 (L) 10/29/2021    HCT 39.5 (L) 10/29/2021    MCV 95.2 10/29/2021     10/29/2021     10/29/2021    K 4.2 10/29/2021     10/29/2021    CO2 26 10/29/2021    BUN 14 10/29/2021    CREATININE 0.80 10/29/2021    LABALBU 3.8 03/16/2021    BILITOT 0.29 (L) 03/16/2021    ALKPHOS 65 03/16/2021    AST 15 03/16/2021    ALT 14 03/16/2021    INR 1.0 03/08/2021         Lab Results   Component Value Date    RBC 4.15 (L) 10/29/2021    HGB 12.3 (L) 10/29/2021    MCV 95.2 10/29/2021    MCH 29.6 10/29/2021    MCHC 31.1 10/29/2021    RDW 15.9 (H) 10/29/2021    MPV 10.0 10/29/2021    BASOPCT 1 10/29/2021    LYMPHSABS 1.16 10/29/2021    MONOSABS 0.87 10/29/2021    NEUTROABS 5.23 10/29/2021    EOSABS 0.23 10/29/2021    BASOSABS 0.04 10/29/2021         DIAGNOSTIC TESTING:     XR KNEE RIGHT (3 VIEWS)    Result Date: 1/11/2022  EXAMINATION: THREE XRAY VIEWS OF THE RIGHT KNEE 1/11/2022 10:57 am COMPARISON: None.  HISTORY: ORDERING SYSTEM PROVIDED HISTORY: Chronic pain of right knee TECHNOLOGIST PROVIDED HISTORY: arthritis workup Reason for Exam: pain x 4-5 months, no specific injury 19-year-old male with chronic right knee pain for 4-5 months; no specific injury FINDINGS: Mild tricompartmental osteophyte spurring. Moderate narrowing of the patellofemoral and medial compartments. Small joint effusion. Diffuse osteopenia. Osseous alignment is normal.  No acute fracture or dislocation. No suspicious osteolytic or osteoblastic lesions. Small loose bodies in the posterior joint recess measuring up to 9 mm. 1. Tricompartmental osteoarthrosis. Diffuse osteopenia. Moderate narrowing of the patellofemoral and medial compartments. Small joint effusion. Small loose bodies in the posterior joint recess measuring up to 9 mm. 2. No acute fracture or dislocation. IMPRESSION: Mr. Raine Boyer is a 61 y.o. male with history of HARPREET, A. fib previously on Eliquis, morbid obesity, history of CABG in 2019, previously on dual antiplatelet therapy, currently on monotherapy, initially referred for postprandial cough and anemia, underwent colonoscopy with removal of colon polyps, repeat recommended and 3 years. Ongoing obscure anemia, upper endoscopy failed to disclose any obvious source of bleeding or blood loss. Denies any melena, hematochezia, bright blood per rectum. No active GI symptoms. Assessment  1. Gastroesophageal reflux disease without esophagitis    2. Cough        PLAN:    1) typical GERD--- Protonix 40mg to be transitioned over to once daily. We will repeat hemoglobin at this appears to be stable at 12 g/dL. 2) Obscure anemia-- may consider capsule if repeat Hg declines or the patient remains anemic. Clinically appears to be asymptomatic from GI standpoint. 3) RTC in 2-3 months. Thank you for allowing me to participate in the care of Mr. Raine Boyer. For any further questions please do not hesitate to contact me. I have reviewed and agree with the ROS entered by the MA/LPN from today's encounter documented in a separate note.         Dariana Dumont MD, MPH   [facility]  Office #: [phone]    this note is created with the assistance of a speech recognition program.  While intending to generate a document that actually reflects the content of the visit, the document can still have some errors including those of syntax and sound a like substitutions which may escape proof reading. It such instances, actual meaning can be extrapolated by contextual diversion.

## 2022-02-01 DIAGNOSIS — I50.42 HEART FAILURE, SYSTOLIC AND DIASTOLIC, CHRONIC (HCC): ICD-10-CM

## 2022-02-01 RX ORDER — FUROSEMIDE 40 MG/1
60 TABLET ORAL 2 TIMES DAILY
Qty: 90 TABLET | Refills: 2 | Status: SHIPPED | OUTPATIENT
Start: 2022-02-01 | End: 2022-04-13 | Stop reason: SDUPTHER

## 2022-02-01 NOTE — TELEPHONE ENCOUNTER
Writer spoke with pharmacy who says according to directions for Lasix patient is only receiving a 10 day supply of medication. They need a new script for 90 tablets sent to the pharmacy. Please advise.

## 2022-02-02 ENCOUNTER — HOSPITAL ENCOUNTER (OUTPATIENT)
Dept: OTHER | Age: 67
Discharge: HOME | End: 2022-02-02
Payer: MEDICAID

## 2022-02-02 ENCOUNTER — HOSPITAL ENCOUNTER (OUTPATIENT)
Age: 67
Discharge: HOME | End: 2022-02-02
Payer: MEDICAID

## 2022-02-02 VITALS
BODY MASS INDEX: 47.73 KG/M2 | DIASTOLIC BLOOD PRESSURE: 82 MMHG | RESPIRATION RATE: 20 BRPM | HEART RATE: 91 BPM | OXYGEN SATURATION: 96 % | SYSTOLIC BLOOD PRESSURE: 122 MMHG | WEIGHT: 286.8 LBS

## 2022-02-02 DIAGNOSIS — R05.9 COUGH: ICD-10-CM

## 2022-02-02 DIAGNOSIS — K21.9 GASTROESOPHAGEAL REFLUX DISEASE WITHOUT ESOPHAGITIS: ICD-10-CM

## 2022-02-02 LAB
ABSOLUTE EOS #: 0.27 K/UL (ref 0–0.44)
ABSOLUTE IMMATURE GRANULOCYTE: 0.08 K/UL (ref 0–0.3)
ABSOLUTE LYMPH #: 1.55 K/UL (ref 1.1–3.7)
ABSOLUTE MONO #: 0.92 K/UL (ref 0.1–1.2)
ANION GAP SERPL CALCULATED.3IONS-SCNC: 13 MMOL/L (ref 9–17)
BASOPHILS # BLD: 1 % (ref 0–2)
BASOPHILS ABSOLUTE: 0.07 K/UL (ref 0–0.2)
BUN BLDV-MCNC: 15 MG/DL (ref 8–23)
BUN/CREAT BLD: NORMAL (ref 9–20)
CALCIUM SERPL-MCNC: 8.8 MG/DL (ref 8.6–10.4)
CHLORIDE BLD-SCNC: 105 MMOL/L (ref 98–107)
CO2: 23 MMOL/L (ref 20–31)
CREAT SERPL-MCNC: 0.91 MG/DL (ref 0.7–1.2)
DIFFERENTIAL TYPE: ABNORMAL
EOSINOPHILS RELATIVE PERCENT: 3 % (ref 1–4)
GFR AFRICAN AMERICAN: >60 ML/MIN
GFR NON-AFRICAN AMERICAN: >60 ML/MIN
GFR SERPL CREATININE-BSD FRML MDRD: NORMAL ML/MIN/{1.73_M2}
GFR SERPL CREATININE-BSD FRML MDRD: NORMAL ML/MIN/{1.73_M2}
GLUCOSE BLD-MCNC: 94 MG/DL (ref 70–99)
HCT VFR BLD CALC: 42.8 % (ref 40.7–50.3)
HEMOGLOBIN: 13.9 G/DL (ref 13–17)
IMMATURE GRANULOCYTES: 1 %
IRON SATURATION: 22 % (ref 20–55)
IRON: 53 UG/DL (ref 59–158)
LYMPHOCYTES # BLD: 17 % (ref 24–43)
MCH RBC QN AUTO: 30.2 PG (ref 25.2–33.5)
MCHC RBC AUTO-ENTMCNC: 32.5 G/DL (ref 28.4–34.8)
MCV RBC AUTO: 93 FL (ref 82.6–102.9)
MONOCYTES # BLD: 10 % (ref 3–12)
NRBC AUTOMATED: 0 PER 100 WBC
PDW BLD-RTO: 14.2 % (ref 11.8–14.4)
PLATELET # BLD: 293 K/UL (ref 138–453)
PLATELET ESTIMATE: ABNORMAL
PMV BLD AUTO: 9.8 FL (ref 8.1–13.5)
POTASSIUM SERPL-SCNC: 3.7 MMOL/L (ref 3.7–5.3)
RBC # BLD: 4.6 M/UL (ref 4.21–5.77)
RBC # BLD: ABNORMAL 10*6/UL
SEG NEUTROPHILS: 68 % (ref 36–65)
SEGMENTED NEUTROPHILS ABSOLUTE COUNT: 6.48 K/UL (ref 1.5–8.1)
SODIUM BLD-SCNC: 141 MMOL/L (ref 135–144)
TOTAL IRON BINDING CAPACITY: 243 UG/DL (ref 250–450)
UNSATURATED IRON BINDING CAPACITY: 190 UG/DL (ref 112–347)
WBC # BLD: 9.4 K/UL (ref 3.5–11.3)
WBC # BLD: ABNORMAL 10*3/UL

## 2022-02-02 PROCEDURE — 99212 OFFICE O/P EST SF 10 MIN: CPT

## 2022-02-02 PROCEDURE — 83550 IRON BINDING TEST: CPT

## 2022-02-02 PROCEDURE — 80048 BASIC METABOLIC PNL TOTAL CA: CPT

## 2022-02-02 PROCEDURE — 36415 COLL VENOUS BLD VENIPUNCTURE: CPT

## 2022-02-02 PROCEDURE — 83540 ASSAY OF IRON: CPT

## 2022-02-02 PROCEDURE — 85025 COMPLETE CBC W/AUTO DIFF WBC: CPT

## 2022-02-02 NOTE — PROGRESS NOTES
Verbally reviewed medication list with patient; patient verbalized understanding. Discussed 2000mg/day sodium restricted diet; patient verbalized understanding. Moderate daily exercise encouraged as tolerated. Discussed rest breaks as needed; patient verbalized understanding. Patient instructed to weigh self at the same time of each day, using same clothes and same scale; reinforced teaching to monitor for 3-5 lb weight increase over 1-2 days, and to notify the CHF clinic at [phone] or physician office if weight change noted. Patient verbalized understanding. Risks of smoking discussed with the patient if applicable; patient strongly discouraged to smoke. Patient verbalized understanding. Signs and symptoms of CHF discussed with patient, such as feeling more tired than normal, feeling short of breath, coughing that increases when you lie down, sudden weight gain, swelling of your feet, legs or belly. Patient verbalized understanding to notify the CHF clinic at [phone] or physician office if these symptoms occur. Compliance with plan of care and further disease process causes discussed with patient, patient encouraged to keep all follow up appointments. Patient verbalized understanding.

## 2022-02-02 NOTE — PROGRESS NOTES
Date:  2022  Time:  12:04 PM    CHF Clinic at [address] [facility]    Office: [phone] Fax: [phone]    Re:  Jacek Alfaro   Patient : 1955    Vital Signs: /82   Pulse 91   Resp 20   Wt 286 lb 12.8 oz (130.1 kg)   SpO2 96%   BMI 47.73 kg/m²                       O2 Device: None (Room air)                           No results for input(s): CBC, HGB, HCT, WBC, PLATELET, NA, K, CL, CO2, BUN, CREATININE, GLUCOSE, BNP, INR in the last 72 hours. Respiratory:    Assessment  Charting Type: Reassessment    Breath Sounds  Right Upper Lobe: Clear  Right Middle Lobe: Clear  Right Lower Lobe: Clear  Left Upper Lobe: Clear  Left Lower Lobe: Clear    Cough/Sputum  Cough: Productive  Frequency: Occasional  Sputum Amount: Small  Sputum Color: Clear         Peripheral Vascular  RLE Edema: Non-pitting  LLE Edema: Non-pitting      Complaints: No new complaints at this time    Physician Orders None    Comment : Arrived for scheduled visit per ambulatory with cane assist. His weight is down 7.8 lbs from last visit. Denies any increase in dyspnea with exertion. Says he walked home from an appt. Yesterday and only had to stop once, felt good while walking. Non-pitting edema noted to lower legs, ankles and feet bilaterally. Wears compression stockings as well as lower leg wraps. Moderate decrease to swelling in legs bilaterally. Medication list reviewed and updated. Reinforced low sodium diet and fluid restrictions. Has order to get lab work from [facility] Patella get them drawn later today. No s/s acute chf at this time. Next CHF Clinic visit on 22.     Electronically signed by Ning Frances RN on 2022 at 12:04 PM

## 2022-02-11 ENCOUNTER — OFFICE VISIT (OUTPATIENT)
Dept: UROLOGY | Age: 67
End: 2022-02-11
Payer: MEDICAID

## 2022-02-11 VITALS
DIASTOLIC BLOOD PRESSURE: 70 MMHG | SYSTOLIC BLOOD PRESSURE: 134 MMHG | HEART RATE: 69 BPM | WEIGHT: 286 LBS | BODY MASS INDEX: 47.65 KG/M2 | HEIGHT: 65 IN

## 2022-02-11 DIAGNOSIS — N13.8 BENIGN PROSTATIC HYPERPLASIA WITH URINARY OBSTRUCTION: ICD-10-CM

## 2022-02-11 DIAGNOSIS — N39.43 POST-VOID DRIBBLING: ICD-10-CM

## 2022-02-11 DIAGNOSIS — N40.1 BENIGN PROSTATIC HYPERPLASIA WITH URINARY OBSTRUCTION: ICD-10-CM

## 2022-02-11 DIAGNOSIS — N39.490 OVERFLOW INCONTINENCE OF URINE: Primary | ICD-10-CM

## 2022-02-11 PROCEDURE — G8482 FLU IMMUNIZE ORDER/ADMIN: HCPCS | Performed by: UROLOGY

## 2022-02-11 PROCEDURE — 99214 OFFICE O/P EST MOD 30 MIN: CPT | Performed by: UROLOGY

## 2022-02-11 PROCEDURE — 1036F TOBACCO NON-USER: CPT | Performed by: UROLOGY

## 2022-02-11 PROCEDURE — 3017F COLORECTAL CA SCREEN DOC REV: CPT | Performed by: UROLOGY

## 2022-02-11 PROCEDURE — G8417 CALC BMI ABV UP PARAM F/U: HCPCS | Performed by: UROLOGY

## 2022-02-11 PROCEDURE — G8427 DOCREV CUR MEDS BY ELIG CLIN: HCPCS | Performed by: UROLOGY

## 2022-02-11 RX ORDER — OXYBUTYNIN CHLORIDE 10 MG/1
10 TABLET, EXTENDED RELEASE ORAL DAILY
Qty: 30 TABLET | Refills: 3 | Status: SHIPPED | OUTPATIENT
Start: 2022-02-11 | End: 2022-06-06

## 2022-02-11 RX ORDER — ASPIRIN 81 MG/1
TABLET, DELAYED RELEASE ORAL
COMMUNITY
Start: 2022-01-31

## 2022-02-11 NOTE — PROGRESS NOTES
Desirae Raygoza Catherene Rondo, Richard Nielsen. Urology Progress Note      Patient:  Arnie Marr  YOB: 1955  Date: 2/11/2022     HISTORY OF PRESENT ILLNESS:   The patient is a 63year old male who presents today for follow-up for the following problem(s): Overactive bladder, BPH with outflow obstruction  Overall the problem(s) : show no change. Associated Symptoms: No dysuria, gross hematuria. Pain Severity:  no pain     Today's visit  2/11/22   The patient follows with BPH with LUTs. His problem is chronic and improved after Urolift. 1 pad per day, mild-moderately soaked by the end of the day which is improved since Urolift. . Not soaked when waking up in the mornings. Post void dribbling has not improved. Frequency has not improved, on diuretics. Still taking oxybutynin 5mg XL daily     Overactive bladder - chronic, improved s/p Urolift   Has erectile dysfunction, which is bothersome to the patient, requests medications. We discuss the patient has PMH which increases risk of therapy with PDE5i, and is on nitroglycerine tabs, which is a contraindication to therapy. - We offer the patient Trimix injections, MUSE, and IPP, but he is not interested at this time. Summary of old records:   -Underwent urodynamics and cystoscopy on 3/26/21 which showed uninhibited bladder contractions, overactive bladder and obstructing kissing lobes  -Underwent urolift on 5/14/21: 5 implants inserted and clip placed in bladder had to be removed and fulgurated. Additional History:   6/11/21  Still having leakage since procedure, which he states is uncontrolled and bothersome. Uses two pads per day, heavy soaked   Taking Ditropan XL 5mg daily, has not helped with leaking.  Denies any headache, dry mouth, constipation   Denies any burning with urination  Having urgency and frequency, does take lasix 60 mg twice daily due to CHF      Last several PSA's:  Lab Results   Component Value Date    PSA 0.67 09/22/2020       Last total testosterone:  No results found for: TESTOSTERONE    Urinalysis today:  No results found for this visit on 02/11/22. Last BUN and creatinine:  Lab Results   Component Value Date    BUN 15 02/02/2022     Lab Results   Component Value Date    CREATININE 0.91 02/02/2022       Imaging Reviewed during this Office Visit:   (results were independently reviewed by physician and radiology report verified)    PAST MEDICAL, FAMILY AND SOCIAL HISTORY UPDATE:  Past Medical History:   Diagnosis Date    Acute [facility]F (congestive heart failure) ([facility] 3/8/2021    Arthritis     back    Atrial fibrillation ([facility] 10/2019    Dr. Adrianne Billy BPH (benign prostatic hyperplasia)     Cellulitis     Cervical disc disease     CHF (congestive heart failure) [facility])     cardiologist Dr. Brenna Soria. [facility] CHF clinic    Combined systolic and diastolic congestive heart failure ([facility] 1/15/2020    Coronary artery disease 10/2019     CABG 2019 [facility]    Dr. Osiris Kim Cardiology last seen within the last year.     COVID-19 virus RNA test result indeterminate 3/31/2021    CPAP (continuous positive airway pressure) dependence     GERD (gastroesophageal reflux disease)     on rx    History of incarceration     released 2019    History of incarceration     Hyperlipidemia     Dr. Dixie Gutierrez Hypertension     [facility] CHF clinic     Dr. Dixie Gutierrez Myocardial infarct [facility])     Dr. Dixie Gutierrez Obesity     Sleep apnea     C-pap    Wears reading eyeglasses     Wellness examination     Dr. Maribel Leonard 4/2021     Past Surgical History:   Procedure Laterality Date    ABDOMINAL EXPLORATION SURGERY      CARDIAC SURGERY      2019    CARDIOVERSION  10/16/2019    CARPAL TUNNEL RELEASE Bilateral 2008    CERVICAL SPINE SURGERY      2-5    COLONOSCOPY N/A 10/29/2020    COLONOSCOPY WITH BIOPSY performed by Susanna Gonzalez MD at [address] [place] [address]  10/29/2020    COLONOSCOPY SUBMUCOSAL/BOTOX INJECTION performed by Logan Vargas MD at [address] [place] [address]  10/29/2020    COLONOSCOPY POLYPECTOMY SNARE/COLD BIOPSY performed by Logan Vargas MD at [address] [place] [address] N/A 01/12/2021    COLONOSCOPY POLYPECTOMY HOT SNARE, COLD SNARE POLPECTOMY performed by Rayna Payne MD at [facility] N/A 10/21/2019    CABG CORONARY ARTERY BYPASS GRAFT X1, LIMA-LAD, BROWN 4 MAZE PROCEDURE, 50MM ATRICURE ATRIAL CLIP RIGID INTERNAL FIXATION PLATES X 3   SCREWS X 13 performed by Hermelinda Keys MD at [place] [address] 03/26/2021    URODYNAMICS performed by Debbie Merrill MD at [address] [place] [address] Drive  03/26/2021    CYSTOSCOPY FLEXIBLE performed by Debbie Merrill MD at [address] [place] [address] Drive  05/14/2021    CYSTOSCOPY, UROLIFT, FULGURATION    CYSTOSCOPY N/A 5/14/2021    CYSTOSCOPY, UROLIFT, FULGURATION performed by Debbie Merrill MD at [place] [address]      cataract surgery 2020    FRACTURE SURGERY      Left leg    TRANSESOPHAGEAL ECHOCARDIOGRAM  10/16/2019     Family History   Problem Relation Age of Onset    Alcohol Abuse Maternal Uncle     Heart Attack Maternal Uncle     Cancer Mother     Alcohol Abuse Father      Outpatient Medications Marked as Taking for the 2/11/22 encounter (Office Visit) with Debbie Merrill MD   Medication Sig Dispense Refill    HM ASPIRIN EC LOW DOSE 81 MG EC tablet       furosemide (LASIX) 40 MG tablet Take 1.5 tablets by mouth 2 times daily 90 tablet 2    tamsulosin (FLOMAX) 0.4 MG capsule TAKE 1 CAPSULE BY MOUTH DAILY 30 capsule 5    atorvastatin (LIPITOR) 40 MG tablet TAKE 1 TABLET BY MOUTH NIGHTLY 30 tablet 1    isosorbide mononitrate (IMDUR) 30 MG extended release tablet Take 1 tablet by mouth daily 30 tablet 1    Multiple Vitamins-Minerals (MULTIVITAMIN WITH MINERALS) tablet Take 1 tablet by mouth daily 30 tablet 3    potassium chloride (KLOR-CON M) 20 MEQ extended release tablet Take 1 tablet by mouth daily 30 tablet 1    ALLERGY RELIEF 10 MG tablet TAKE 1 TABLET BY MOUTH DAILY 30 tablet 2    oxybutynin (DITROPAN-XL) 5 MG extended release tablet TAKE 1 TABLET BY MOUTH DAILY 30 tablet 3    pantoprazole (PROTONIX) 40 MG tablet Take 1 tablet by mouth 2 times daily (before meals) 60 tablet 5    Prenatal Vit-Fe Fumarate-FA (NIVA-PLUS) 27-1 MG TABS       Multiple Vitamins-Minerals (PRESERVISION AREDS 2) CAPS       Blood Pressure Monitor KIT 1 each by Does not apply route 2 times daily 1 kit 0    Misc. Devices ([employer] WEIGHT SCALE) MISC 1 each by Does not apply route as needed (weigh once a day and record) 1 each 0    nitroGLYCERIN (NITROSTAT) 0.4 MG SL tablet Place 1 tablet under the tongue every 5 minutes as needed for Chest pain up to max of 3 total doses. If no relief after 1 dose, call 911. 38 tablet 1    Elastic Bandages & Supports (JOBST KNEE HIGH COMPRESSION SM) MISC 2 each by Does not apply route daily Wear q day. Remove q hs. Diagnosis: Chronic venous insufficiency 2 each 0       Patient has no known allergies. Social History     Tobacco Use   Smoking Status Never Smoker   Smokeless Tobacco Never Used       Social History     Substance and Sexual Activity   Alcohol Use Not Currently    Comment: stopped 1991       REVIEW OF SYSTEMS:  Constitutional: negative  Eyes: negative  Respiratory: negative  Cardiovascular: negative  Gastrointestinal: negative  Musculoskeletal: negative  Genitourinary: see HPI  Skin: negative   Neurological: negative  Hematological/Lymphatic: negative  Psychological: negative    Physical Exam:      Vitals:    02/11/22 0830   BP: 134/70   Pulse: 69     NAD, no acute distress  RR  Psych mood appropriate  Abdomen: soft, nontender, nondistended, obese   Extremities: compression socks in place, edema noted     Assessment and Plan      1. Overflow incontinence of urine    2. Post-void dribbling    3. Benign prostatic hyperplasia with urinary obstruction    3.  BPH with obstruction       Plan:   Patient having overflow incontinence of urine due to lasix, would likely continue to have symptoms due to his medical co morbidities   BPH has improved and stable UroLift  Continue oxybutynin 5 mg ER for overactive bladder - not at treatment goal.  - timed voiding  - urethral stripping  - Increase dose of oxybutynin 10 mg     ED - offered treatment options, but he would like to see cardiologist to request clearance for PDE5i (viagra). Would need to be off NGT.          Martir Bucio MD

## 2022-02-21 DIAGNOSIS — E87.6 HYPOKALEMIA: ICD-10-CM

## 2022-02-21 RX ORDER — POTASSIUM CHLORIDE 20 MEQ/1
20 TABLET, EXTENDED RELEASE ORAL DAILY
Qty: 30 TABLET | Refills: 1 | Status: SHIPPED | OUTPATIENT
Start: 2022-02-21 | End: 2022-04-18

## 2022-02-21 NOTE — TELEPHONE ENCOUNTER
Last visit: 1/12/2022  Last Med refill: 1/27/2022  Does patient have enough medication for 72 hours: Yes    Next Visit Date:  Future Appointments   Date Time Provider Taj Arvizui   3/2/2022 11:30 AM STV CHF CLINIC RM 1 STVZ CHF CLI [facility]   3/2/2022  1:45 PM [facility]   5/2/2022  2:30 PM Keshawn Liu MD [facility]   5/13/2022  8:40 AM SCHEDULE, [facility] [address] Urology [address] Maintenance   Topic Date Due    Depression Screen  Never done    COVID-19 Vaccine (3 - Booster for Pfizer series) 09/30/2021    Lipid screen  03/03/2022    Potassium monitoring  02/02/2023    Creatinine monitoring  02/02/2023    Pneumococcal 0-64 years Vaccine (2 of 2 - PPSV23) 08/20/2025    DTaP/Tdap/Td vaccine (2 - Td or Tdap) 08/20/2030    Colorectal Cancer Screen  01/12/2031    Flu vaccine  Completed    Shingles Vaccine  Completed    Hepatitis C screen  Completed    HIV screen  Completed    Hepatitis A vaccine  Aged Out    Hepatitis B vaccine  Aged Out    Hib vaccine  Aged Out    Meningococcal (ACWY) vaccine  Aged Out       Hemoglobin A1C (%)   Date Value   12/10/2019 5.6   10/18/2019 5.8             ( goal A1C is < 7)   No results found for: LABMICR  LDL Cholesterol (mg/dL)   Date Value   03/03/2021 54   05/26/2020 45       (goal LDL is <100)   AST (U/L)   Date Value   03/16/2021 15     ALT (U/L)   Date Value   03/16/2021 14     BUN (mg/dL)   Date Value   02/02/2022 15     BP Readings from Last 3 Encounters:   02/11/22 134/70   02/02/22 122/82   01/31/22 126/81          (goal 120/80)    All Future Testing planned in [facility]  Lab Frequency Next Occurrence   Creatinine, Random Urine Once 03/14/2021   Sodium, Urine, Random Once 03/14/2021   Urea Nitrogen, Urine Once 03/14/2021   US RETROPERITONEAL COMPLETE Once 65/97/4534   Basic Metabolic Panel Once 58/50/0784   COVID-19 Once 03/30/2021   COVID-19 Once 97/10/2185   Basic Metabolic Panel Once 38/88/8901   Reflux study/Reflux Venous Insufficiency Bilateral Once 10/06/2021               Patient Active Problem List:     [facility]     Sleep-related breathing disorder     Hypokalemia     Atrial fibrillation (HCC)     Ischemic cardiomyopathy     Acute cystitis without hematuria     Hx of CABG     Chronic diastolic (congestive) heart failure (HCC)     Bilateral hand numbness     Right thigh pain     Left leg weakness     Coronary artery disease     Chronic cough     Gastroesophageal reflux disease without esophagitis     Overflow incontinence of urine     Polyp of colon     Post-void dribbling     Post-nasal drip     SYDNEY (acute kidney injury) ([facility]     Hyperkalemia     Hypotension     Overactive bladder     Hemorrhoids     HARPREET (obstructive sleep apnea)     Laryngopharyngeal reflux     Skin ulcer of left lower leg, limited to breakdown of skin (HCC)     Bilateral edema of lower extremity     Lymphedema of both lower extremities     Venous insufficiency of both lower extremities     Acute pain of right knee

## 2022-02-21 NOTE — TELEPHONE ENCOUNTER
Refilled. However, recent potassium values have been within normal limits. Should be taken off of it at follow up.

## 2022-02-28 DIAGNOSIS — R09.82 POST-NASAL DRIP: ICD-10-CM

## 2022-02-28 DIAGNOSIS — R05.3 CHRONIC COUGH: ICD-10-CM

## 2022-02-28 RX ORDER — ISOSORBIDE MONONITRATE 30 MG/1
30 TABLET, EXTENDED RELEASE ORAL DAILY
Qty: 30 TABLET | Refills: 1 | Status: SHIPPED | OUTPATIENT
Start: 2022-02-28 | End: 2022-05-11 | Stop reason: SDUPTHER

## 2022-02-28 RX ORDER — LORATADINE 10 MG/1
TABLET ORAL
Qty: 30 TABLET | Refills: 2 | Status: SHIPPED | OUTPATIENT
Start: 2022-02-28 | End: 2022-05-24

## 2022-02-28 RX ORDER — OXYBUTYNIN CHLORIDE 5 MG/1
5 TABLET, EXTENDED RELEASE ORAL DAILY
Qty: 30 TABLET | Refills: 3 | OUTPATIENT
Start: 2022-02-28

## 2022-02-28 NOTE — TELEPHONE ENCOUNTER
Allergy relief pending for refill     Health Maintenance   Topic Date Due    Depression Screen  Never done    COVID-19 Vaccine (3 - Booster for Pfizer series) 09/30/2021    Lipid screen  03/03/2022    Potassium monitoring  02/02/2023    Creatinine monitoring  02/02/2023    Pneumococcal 0-64 years Vaccine (2 of 2 - PPSV23) 08/20/2025    DTaP/Tdap/Td vaccine (2 - Td or Tdap) 08/20/2030    Colorectal Cancer Screen  01/12/2031    Flu vaccine  Completed    Shingles Vaccine  Completed    Hepatitis C screen  Completed    HIV screen  Completed    Hepatitis A vaccine  Aged Out    Hepatitis B vaccine  Aged Out    Hib vaccine  Aged Out    Meningococcal (ACWY) vaccine  Aged Out             (applicable per patient's age: Cancer Screenings, Depression Screening, Fall Risk Screening, Immunizations)    Hemoglobin A1C (%)   Date Value   12/10/2019 5.6   10/18/2019 5.8     LDL Cholesterol (mg/dL)   Date Value   03/03/2021 54     AST (U/L)   Date Value   03/16/2021 15     ALT (U/L)   Date Value   03/16/2021 14     BUN (mg/dL)   Date Value   02/02/2022 15      (goal A1C is < 7)   (goal LDL is <100) need 30-50% reduction from baseline     BP Readings from Last 3 Encounters:   02/11/22 134/70   02/02/22 122/82   01/31/22 126/81    (goal /80)      All Future Testing planned in [facility]:  Lab Frequency Next Occurrence   Creatinine, Random Urine Once 03/14/2021   Sodium, Urine, Random Once 03/14/2021   Urea Nitrogen, Urine Once 03/14/2021   US RETROPERITONEAL COMPLETE Once 30/13/0515   Basic Metabolic Panel Once 10/82/2014   COVID-19 Once 03/30/2021   COVID-19 Once 07/89/6657   Basic Metabolic Panel Once 85/17/8043   Reflux study/Reflux Venous Insufficiency Bilateral Once 10/06/2021       Next Visit Date:  Future Appointments   Date Time Provider Taj Flores   3/2/2022 11:30 AM STV CHF CLINIC RM 1 STVZ CHF CLI [facility]   3/2/2022  1:45 PM Maynor Reardon MD [facility]   5/2/2022  2:30 PM Ron Castellon MD sv gr lks [facility]   5/13/2022  8:40 AM SCHEDULE, [facility] UROLOGY [facility] Urology [address] [facility]            Patient Active Problem List:     Atrial flutter (HCC)     Sleep-related breathing disorder     Hypokalemia     Atrial fibrillation (HCC)     Ischemic cardiomyopathy     Acute cystitis without hematuria     Hx of CABG     Chronic diastolic (congestive) heart failure (HCC)     Bilateral hand numbness     Right thigh pain     Left leg weakness     Coronary artery disease     Chronic cough     Gastroesophageal reflux disease without esophagitis     Overflow incontinence of urine     Polyp of colon     Post-void dribbling     Post-nasal drip     SYDNEY (acute kidney injury) ([facility]     Hyperkalemia     Hypotension     Overactive bladder     Hemorrhoids     HARPREET (obstructive sleep apnea)     Laryngopharyngeal reflux     Skin ulcer of left lower leg, limited to breakdown of skin (HCC)     Bilateral edema of lower extremity     Lymphedema of both lower extremities     Venous insufficiency of both lower extremities     Acute pain of right knee

## 2022-02-28 NOTE — TELEPHONE ENCOUNTER
IMDUR pending for refill     Health Maintenance   Topic Date Due    Depression Screen  Never done    COVID-19 Vaccine (3 - Booster for Pfizer series) 09/30/2021    Lipid screen  03/03/2022    Potassium monitoring  02/02/2023    Creatinine monitoring  02/02/2023    Pneumococcal 0-64 years Vaccine (2 of 2 - PPSV23) 08/20/2025    DTaP/Tdap/Td vaccine (2 - Td or Tdap) 08/20/2030    Colorectal Cancer Screen  01/12/2031    Flu vaccine  Completed    Shingles Vaccine  Completed    Hepatitis C screen  Completed    HIV screen  Completed    Hepatitis A vaccine  Aged Out    Hepatitis B vaccine  Aged Out    Hib vaccine  Aged Out    Meningococcal (ACWY) vaccine  Aged Out             (applicable per patient's age: Cancer Screenings, Depression Screening, Fall Risk Screening, Immunizations)    Hemoglobin A1C (%)   Date Value   12/10/2019 5.6   10/18/2019 5.8     LDL Cholesterol (mg/dL)   Date Value   03/03/2021 54     AST (U/L)   Date Value   03/16/2021 15     ALT (U/L)   Date Value   03/16/2021 14     BUN (mg/dL)   Date Value   02/02/2022 15      (goal A1C is < 7)   (goal LDL is <100) need 30-50% reduction from baseline     BP Readings from Last 3 Encounters:   02/11/22 134/70   02/02/22 122/82   01/31/22 126/81    (goal /80)      All Future Testing planned in [facility]:  Lab Frequency Next Occurrence   Creatinine, Random Urine Once 03/14/2021   Sodium, Urine, Random Once 03/14/2021   Urea Nitrogen, Urine Once 03/14/2021   US RETROPERITONEAL COMPLETE Once 71/40/6696   Basic Metabolic Panel Once 04/67/1059   COVID-19 Once 03/30/2021   COVID-19 Once 01/09/1658   Basic Metabolic Panel Once 33/75/8603   Reflux study/Reflux Venous Insufficiency Bilateral Once 10/06/2021       Next Visit Date:  Future Appointments   Date Time Provider Taj Flores   3/2/2022 11:30 AM STV CHF CLINIC RM 1 STVZ CHF CLI [facility]   3/2/2022  1:45 PM Angelica Leyva MD [facility]   5/2/2022  2:30 PM Charles Bran MD sv gr lks [facility]   5/13/2022  8:40 AM SCHEDULE, [facility] UROLOGY [facility] Urology [facility]            Patient Active Problem List:     Atrial flutter (HCC)     Sleep-related breathing disorder     Hypokalemia     Atrial fibrillation (HCC)     Ischemic cardiomyopathy     Acute cystitis without hematuria     Hx of CABG     Chronic diastolic (congestive) heart failure (HCC)     Bilateral hand numbness     Right thigh pain     Left leg weakness     Coronary artery disease     Chronic cough     Gastroesophageal reflux disease without esophagitis     Overflow incontinence of urine     Polyp of colon     Post-void dribbling     Post-nasal drip     SYDNEY (acute kidney injury) ([facility])     Hyperkalemia     Hypotension     Overactive bladder     Hemorrhoids     HARPREET (obstructive sleep apnea)     Laryngopharyngeal reflux     Skin ulcer of left lower leg, limited to breakdown of skin ([facility])     Bilateral edema of lower extremity     Lymphedema of both lower extremities     Venous insufficiency of both lower extremities     Acute pain of right knee

## 2022-03-02 ENCOUNTER — OFFICE VISIT (OUTPATIENT)
Dept: FAMILY MEDICINE CLINIC | Age: 67
End: 2022-03-02
Payer: MEDICAID

## 2022-03-02 ENCOUNTER — HOSPITAL ENCOUNTER (OUTPATIENT)
Dept: OTHER | Age: 67
Discharge: HOME | End: 2022-03-02
Payer: MEDICAID

## 2022-03-02 VITALS
BODY MASS INDEX: 46.89 KG/M2 | WEIGHT: 281.8 LBS | RESPIRATION RATE: 20 BRPM | SYSTOLIC BLOOD PRESSURE: 120 MMHG | OXYGEN SATURATION: 96 % | DIASTOLIC BLOOD PRESSURE: 78 MMHG | HEART RATE: 99 BPM

## 2022-03-02 VITALS
HEART RATE: 71 BPM | WEIGHT: 281 LBS | BODY MASS INDEX: 46.82 KG/M2 | TEMPERATURE: 97.5 F | DIASTOLIC BLOOD PRESSURE: 64 MMHG | SYSTOLIC BLOOD PRESSURE: 108 MMHG | HEIGHT: 65 IN

## 2022-03-02 DIAGNOSIS — Z13.220 SCREENING FOR HYPERLIPIDEMIA: ICD-10-CM

## 2022-03-02 DIAGNOSIS — R20.0 BILATERAL HAND NUMBNESS: Primary | ICD-10-CM

## 2022-03-02 DIAGNOSIS — I50.32 CHRONIC DIASTOLIC (CONGESTIVE) HEART FAILURE (HCC): ICD-10-CM

## 2022-03-02 DIAGNOSIS — R05.3 CHRONIC COUGH: ICD-10-CM

## 2022-03-02 PROCEDURE — 1036F TOBACCO NON-USER: CPT

## 2022-03-02 PROCEDURE — G8427 DOCREV CUR MEDS BY ELIG CLIN: HCPCS

## 2022-03-02 PROCEDURE — 99212 OFFICE O/P EST SF 10 MIN: CPT

## 2022-03-02 PROCEDURE — 99213 OFFICE O/P EST LOW 20 MIN: CPT

## 2022-03-02 PROCEDURE — G8482 FLU IMMUNIZE ORDER/ADMIN: HCPCS

## 2022-03-02 PROCEDURE — G8417 CALC BMI ABV UP PARAM F/U: HCPCS

## 2022-03-02 PROCEDURE — 3017F COLORECTAL CA SCREEN DOC REV: CPT

## 2022-03-02 RX ORDER — GABAPENTIN 300 MG/1
300 CAPSULE ORAL DAILY
Qty: 30 CAPSULE | Refills: 0 | Status: CANCELLED | OUTPATIENT
Start: 2022-03-02 | End: 2022-04-01

## 2022-03-02 RX ORDER — GABAPENTIN 400 MG/1
400 CAPSULE ORAL DAILY
Qty: 30 CAPSULE | Refills: 1 | Status: SHIPPED | OUTPATIENT
Start: 2022-03-02 | End: 2022-05-09 | Stop reason: SDUPTHER

## 2022-03-02 ASSESSMENT — ENCOUNTER SYMPTOMS
NAUSEA: 0
COUGH: 1
CONSTIPATION: 0
DIARRHEA: 0
WHEEZING: 0
ABDOMINAL PAIN: 0
APNEA: 0
SHORTNESS OF BREATH: 0

## 2022-03-02 ASSESSMENT — PATIENT HEALTH QUESTIONNAIRE - PHQ9
SUM OF ALL RESPONSES TO PHQ9 QUESTIONS 1 & 2: 0
SUM OF ALL RESPONSES TO PHQ QUESTIONS 1-9: 0
2. FEELING DOWN, DEPRESSED OR HOPELESS: 0
1. LITTLE INTEREST OR PLEASURE IN DOING THINGS: 0
SUM OF ALL RESPONSES TO PHQ QUESTIONS 1-9: 0

## 2022-03-02 NOTE — PROGRESS NOTES
Attending Physician Statement  I have discussed the care of Shadi Sams 61 y.o. male, including pertinent history and exam findings, with the resident Dr. Scot Ladd MD.    History and Exam:   Chief Complaint   Patient presents with    Discuss Medications     gabapentin     Fall       Past Medical History:   Diagnosis Date    Acute CHF (congestive heart failure) ([facility] 3/8/2021    Arthritis     back    Atrial fibrillation ([facility] 10/2019    Dr. Jaiden Isbell BPH (benign prostatic hyperplasia)     Cellulitis     Cervical disc disease     CHF (congestive heart failure) [facility])     cardiologist Dr. Juan Aguilar. [facility] CHF clinic    Combined systolic and diastolic congestive heart failure ([facility] 1/15/2020    Coronary artery disease 10/2019     CABG 2019 [facility]    Dr. Marlee Bertrand Cardiology last seen within the last year.  COVID-19 virus RNA test result indeterminate 3/31/2021    CPAP (continuous positive airway pressure) dependence     GERD (gastroesophageal reflux disease)     on rx    History of incarceration     released 2019    History of incarceration     Hyperlipidemia     Dr. Pringle Friday Hypertension     [facility] CHF clinic     Dr. Pringle Friday Myocardial infarct [facility])     Dr. Pringle Friday Obesity     Sleep apnea     C-pap    Wears reading eyeglasses     Wellness examination     Dr. Lesly Matamoros 4/2021     No Known Allergies   I have seen and examined the patient and the key elements of the encounter have been performed by me.   BP Readings from Last 3 Encounters:   03/02/22 120/78   03/02/22 108/64   02/11/22 134/70     /64 (Site: Left Lower Arm, Position: Sitting, Cuff Size: Medium Adult) Comment: machine  Pulse 71   Temp 97.5 °F (36.4 °C) (Temporal)   Ht 5' 5\" (1.651 m)   Wt 281 lb (127.5 kg)   BMI 46.76 kg/m²   Lab Results   Component Value Date    WBC 9.4 02/02/2022    HGB 13.9 02/02/2022    HCT 42.8 02/02/2022     02/02/2022    CHOL 112 05/26/2020    TRIG 53 05/26/2020    HDL 47 03/03/2021    ALT 14 03/16/2021    AST 15 03/16/2021     02/02/2022    K 3.7 02/02/2022     02/02/2022    CREATININE 0.91 02/02/2022    BUN 15 02/02/2022    CO2 23 02/02/2022    TSH 1.48 01/24/2020    PSA 0.67 09/22/2020    INR 1.0 03/08/2021    LABA1C 5.6 12/10/2019     Lab Results   Component Value Date    LABALBU 3.8 03/16/2021     Lab Results   Component Value Date    IRON 53 (L) 02/02/2022    TIBC 243 (L) 02/02/2022    FERRITIN 143 12/10/2019     Lab Results   Component Value Date    LDLCHOLESTEROL 54 03/03/2021     I agree with the assessment, plan and the diagnosis of    Diagnosis Orders   1. Bilateral hand numbness  gabapentin (NEURONTIN) 400 MG capsule    Imelda - Everett White, DO, Neurosurgery, [facility]   2. Chronic cough     3. Screening for hyperlipidemia  Lipid Panel   4. Chronic diastolic (congestive) heart failure ([address]      . I agree with orders as documented by the resident. More than 25 minutes spent  in face to face encounter with the patient and more than half in counseling. Patient's questions were answered. Patient Voiced understanding to the counseling. Return in about 6 weeks (around 4/13/2022) for Right knee osteoarthritis follow up .    (GC Modifier)-Dr. Rosangela Smith MD

## 2022-03-02 NOTE — PATIENT INSTRUCTIONS
Thank you for letting us take care of you today. We hope all your questions were addressed. If a question was overlooked or something else comes to mind after you return home, please contact a member of your Care Team listed below. Your Care Team at [place] [id number] is Team #4  Gideon Toribio MD (Faculty)  Nicholas Ortiz MD (Resident)  Eloise Frances MD (Resident)  Robel Blanco MD (Resident)  Santos Ng MD (Resident)  PERRY Morocho, DELIA Gonzalez, Dorina Bustillos, [facility] office)  Carl Oliver [place] (Clinical Practice Manager)  Vida Eng, [address] [place] [address] (Clinical Pharmacist)       Office phone number: [phone]    If you need to get in right away due to illness, please be advised we have \"Same Day\" appointments available Monday-Friday. Please call us at [phone] option #3 to schedule your \"Same Day\" appointment. Return To Clinic 4/13/2022. After Visit Summary  given and reviewed.  --

## 2022-03-02 NOTE — PROGRESS NOTES
Visit Information    Have you changed or started any medications since your last visit including any over-the-counter medicines, vitamins, or herbal medicines? no   Have you stopped taking any of your medications? Is so, why? -  no  Are you having any side effects from any of your medications? - no    Have you seen any other physician or provider since your last visit? Heart provider   Have you had any other diagnostic tests since your last visit?  no   Have you been seen in the emergency room and/or had an admission in a hospital since we last saw you?  no   Have you had your routine dental cleaning in the past 6 months?  no     Do you have an active MyChart account? If no, what is the barrier?   No: declined    Patient Care Team:  Ailyn Diaz MD as PCP - General (Family Medicine)  Ismael Johns MD as Consulting Physician (Gastroenterology)  Edu Rojo MD as Consulting Physician (Urology)    Medical History Review  Past Medical, Family, and Social History reviewed and does not contribute to the patient presenting condition    Health Maintenance   Topic Date Due    Depression Screen  Never done    COVID-19 Vaccine (3 - Booster for Cole Peter series) 09/30/2021    Lipid screen  03/03/2022    Potassium monitoring  02/02/2023    Creatinine monitoring  02/02/2023    Pneumococcal 0-64 years Vaccine (2 of 2 - PPSV23) 08/20/2025    DTaP/Tdap/Td vaccine (2 - Td or Tdap) 08/20/2030    Colorectal Cancer Screen  01/12/2031    Flu vaccine  Completed    Shingles Vaccine  Completed    Hepatitis C screen  Completed    HIV screen  Completed    Hepatitis A vaccine  Aged Out    Hepatitis B vaccine  Aged Out    Hib vaccine  Aged Out    Meningococcal (ACWY) vaccine  Aged Out

## 2022-03-02 NOTE — PROGRESS NOTES
Subjective: Jacquelyn Manning is a 61 y.o. male with  has a past medical history of Acute CHF (congestive heart failure) ([address] Arthritis, Atrial fibrillation ([address] BPH (benign prostatic hyperplasia), Cellulitis, Cervical disc disease, CHF (congestive heart failure) ([address] Combined systolic and diastolic congestive heart failure ([address] Coronary artery disease, COVID-19 virus RNA test result indeterminate, CPAP (continuous positive airway pressure) dependence, GERD (gastroesophageal reflux disease), History of incarceration, History of incarceration, Hyperlipidemia, Hypertension, Myocardial infarct ([address] Obesity, Sleep apnea, Wears reading eyeglasses, and Wellness examination. Presented to the office today for:  Chief Complaint   Patient presents with    Discuss Medications     gabapentin     Fall       HPI    Overflow incontinence w/ BPH  Follows Urology  Just had an appointment on February 11  Symptoms are stable  Patient to continue oxybutynin 10 mg extended release    GERD with cough  Patient follows up with GI  Patient to continue Protonix 40 mg once daily  Otherwise patient is stable  Patient is here to follow-up with GI on January 31    Knee pain  Patient has chronic right knee tricompartment osteoarthritis  Patient had a sterile injection given on the January 12, 2022  Patient will be returning April 12 as a follow-up and to see if he needs another injection or not    Obesity  BMI 46.76  Patient follows up with bariatric surgery clinic w/ [facility]   As last visit was December 16, 2021  Patient was still 22 pounds from weight loss goal    Blood pressure  108/64  Patient's losartan was discontinued during one of my last visit with him    Chronic lower leg edema  Patient goes to lymphedema/CHF clinic  Patient was referred to [facility] but unsure if he has seen him  Currently is on Lasix and Imdur  Coronary artery disease with CABG in 2019  Aspirin? Plavix? Use treatment spironolactone?   Patient also has history of paroxysmal atrial fibrillation with [id number] score of 3  Patient is currently not on Eliquis  Currently not on rate or rhythm control      Depression screening    Lipid screening done today  Patient currently takes 40 mg Lipitor nightly                  Review of Systems   Constitutional: Negative for activity change. Respiratory: Positive for cough. Negative for apnea, shortness of breath and wheezing. Patient still has a chronic cough   Cardiovascular: Negative for chest pain. Gastrointestinal: Negative for abdominal pain, constipation, diarrhea and nausea. Genitourinary: Positive for urgency. Musculoskeletal: Positive for arthralgias. Neurological: Positive for numbness. Negative for dizziness, weakness and headaches. Psychiatric/Behavioral: Negative for agitation and behavioral problems. The patient has a   Family History   Problem Relation Age of Onset    Alcohol Abuse Maternal Uncle     Heart Attack Maternal Uncle     Cancer Mother     Alcohol Abuse Father        Objective:    /64 (Site: Left Lower Arm, Position: Sitting, Cuff Size: Medium Adult) Comment: machine  Pulse 71   Temp 97.5 °F (36.4 °C) (Temporal)   Ht 5' 5\" (1.651 m)   Wt 281 lb (127.5 kg)   BMI 46.76 kg/m²    BP Readings from Last 3 Encounters:   03/02/22 108/64   03/02/22 120/78   02/11/22 134/70       Physical Exam  Constitutional:       General: He is not in acute distress. Appearance: He is obese. He is not ill-appearing, toxic-appearing or diaphoretic. Cardiovascular:      Rate and Rhythm: Normal rate and regular rhythm. Pulses: Normal pulses. Heart sounds: No murmur heard. Pulmonary:      Effort: Pulmonary effort is normal.      Breath sounds: Normal breath sounds. No wheezing. Musculoskeletal:         General: No swelling. Normal range of motion. Skin:     General: Skin is warm. Findings: Bruising present.       Comments: Has chronic status dermatitis which has gotten better  No signs of cellulitis or other skin infection  Lower leg edema is also a lot better   Neurological:      Mental Status: He is alert and oriented to person, place, and time. Psychiatric:         Mood and Affect: Mood normal.         Lab Results   Component Value Date    WBC 9.4 02/02/2022    HGB 13.9 02/02/2022    HCT 42.8 02/02/2022     02/02/2022    CHOL 112 05/26/2020    TRIG 53 05/26/2020    HDL 47 03/03/2021    ALT 14 03/16/2021    AST 15 03/16/2021     02/02/2022    K 3.7 02/02/2022     02/02/2022    CREATININE 0.91 02/02/2022    BUN 15 02/02/2022    CO2 23 02/02/2022    TSH 1.48 01/24/2020    PSA 0.67 09/22/2020    INR 1.0 03/08/2021    LABA1C 5.6 12/10/2019     Lab Results   Component Value Date    CALCIUM 8.8 02/02/2022    PHOS 4.0 03/08/2021     Lab Results   Component Value Date    LDLCHOLESTEROL 54 03/03/2021       Assessment and Plan:    1. Chronic cough  Likely secondary to the GERD symptoms that he gets  Currently is on Protonix and saw GI    2. Screening for hyperlipidemia  - Lipid Panel; Future    3. Chronic diastolic (congestive) heart failure [facility])  Patient will have his cardiology appointment in July  Currently visit CHF clinic  No longer on any beta blockers or spironolactone  Still takes Lasix 60 mg p.o. twice daily  Her leg edema is a lot better  No complains of shortness of breath    4. Bilateral hand numbness  His symptoms started after cervical spine surgery and patient is requesting referral to neurosurgeon  I increase his gabapentin dose today from 300 mg daily to 400 mg daily  - gabapentin (NEURONTIN) 400 MG capsule; Take 1 capsule by mouth daily for 31 days. Dispense: 30 capsule; Refill: 1  - Chastity Adler DO, Neurosurgery, [facility]          Requested Prescriptions     Signed Prescriptions Disp Refills    gabapentin (NEURONTIN) 400 MG capsule 30 capsule 1     Sig: Take 1 capsule by mouth daily for 31 days. Medications Discontinued During This Encounter   Medication Reason    gabapentin (NEURONTIN) 300 MG capsule Therapy completed       Hilary King received counseling on the following healthy behaviors: nutrition, exercise and medication adherence    Discussed use,benefit, and side effects of prescribed medications. Barriers to medication compliance addressed. All patient questions answered. Pt voiced understanding. Return in about 6 weeks (around 4/13/2022) for Right knee osteoarthritis follow up . Disclaimer: Some orall of this note was transcribed using voice-recognition software. This may cause typographical errors occasionally. Although all effort is made to fix these errors, please do not hesitate to contact our office if there Gilda Fake concern with the understanding of this note.

## 2022-03-02 NOTE — PROGRESS NOTES
Date:  3/2/2022  Time:  11:27 AM    CHF Clinic at [facility]    Office: [phone] Fax: [phone]    Re:  Velma Dunbar   Patient : 1955    Vital Signs: /78   Pulse 99   Resp 20   Wt 281 lb 12.8 oz (127.8 kg)   SpO2 96%   BMI 46.89 kg/m²                       O2 Device: None (Room air)                           No results for input(s): CBC, HGB, HCT, WBC, PLATELET, NA, K, CL, CO2, BUN, CREATININE, GLUCOSE, BNP, INR in the last 72 hours. Respiratory:    Assessment  Charting Type: Reassessment    Breath Sounds  Right Upper Lobe: Clear  Right Middle Lobe: Clear  Right Lower Lobe: Clear,Diminished  Left Upper Lobe: Clear  Left Lower Lobe: Clear,Diminished    Cough/Sputum  Cough: Productive  Frequency: Infrequent  Sputum Amount: Small  Sputum Color: Clear       Peripheral Vascular  RLE Edema: Non-pitting  LLE Edema: Non-pitting    Complaints: Has fallen 2x in last 2 weeks. Slipped on ice . Lucendia Clink in bus leaning forward. Comment : Patient here ambulatory for routine visit. Weight decreased 4 lbs in one month. Total weight loss 34 lbs in one year. No new or acute s/s CHF. Discussed low salt diet and fluid limits. States compliance with medications. Lasix is 60 mg 2x day. Cardiology visit due in July. Next visit here 4 weeks 3/30/2022 11:30 a.     Electronically signed by Luis Wallace RN on 3/2/2022 at 11:27 AM

## 2022-03-08 ENCOUNTER — HOSPITAL ENCOUNTER (OUTPATIENT)
Age: 67
Setting detail: SPECIMEN
Discharge: HOME | End: 2022-03-08
Payer: MEDICAID

## 2022-03-08 ENCOUNTER — HOSPITAL ENCOUNTER (OUTPATIENT)
Age: 67
Setting detail: SPECIMEN
Discharge: HOME | End: 2022-03-08

## 2022-03-08 DIAGNOSIS — Z13.220 SCREENING FOR HYPERLIPIDEMIA: ICD-10-CM

## 2022-03-08 LAB
CHOLESTEROL, FASTING: 121 MG/DL
CHOLESTEROL/HDL RATIO: 2.5
CHOLESTEROL/HDL RATIO: 2.5
CHOLESTEROL: 121 MG/DL
HDLC SERPL-MCNC: 49 MG/DL
HDLC SERPL-MCNC: 49 MG/DL
LDL CHOLESTEROL: 56 MG/DL (ref 0–130)
LDL CHOLESTEROL: 56 MG/DL (ref 0–130)
REASON FOR REJECTION: NORMAL
TRIGL SERPL-MCNC: 79 MG/DL
TRIGLYCERIDE, FASTING: 79 MG/DL
ZZ NTE CLEAN UP: ORDERED TEST: NORMAL
ZZ NTE WITH NAME CLEAN UP: SPECIMEN SOURCE: NORMAL

## 2022-03-08 PROCEDURE — 36415 COLL VENOUS BLD VENIPUNCTURE: CPT

## 2022-03-08 PROCEDURE — 80061 LIPID PANEL: CPT

## 2022-03-30 ENCOUNTER — HOSPITAL ENCOUNTER (OUTPATIENT)
Dept: OTHER | Age: 67
Discharge: HOME | End: 2022-03-30
Payer: MEDICAID

## 2022-03-30 VITALS
SYSTOLIC BLOOD PRESSURE: 130 MMHG | WEIGHT: 279.4 LBS | OXYGEN SATURATION: 95 % | RESPIRATION RATE: 20 BRPM | BODY MASS INDEX: 46.49 KG/M2 | DIASTOLIC BLOOD PRESSURE: 82 MMHG | HEART RATE: 64 BPM

## 2022-03-30 PROCEDURE — 99212 OFFICE O/P EST SF 10 MIN: CPT

## 2022-03-30 ASSESSMENT — PAIN DESCRIPTION - DESCRIPTORS: DESCRIPTORS: DISCOMFORT

## 2022-03-30 ASSESSMENT — PAIN DESCRIPTION - LOCATION: LOCATION: BACK

## 2022-03-30 ASSESSMENT — PAIN SCALES - GENERAL: PAINLEVEL_OUTOF10: 7

## 2022-03-30 ASSESSMENT — PAIN DESCRIPTION - PAIN TYPE: TYPE: CHRONIC PAIN

## 2022-03-30 ASSESSMENT — PAIN DESCRIPTION - ORIENTATION: ORIENTATION: LOWER;RIGHT

## 2022-03-30 NOTE — PROGRESS NOTES
Verbally reviewed medication list with patient; patient verbalized understanding. Discussed 2000mg/day sodium restricted diet; patient verbalized understanding. Moderate daily exercise encouraged as tolerated. Discussed rest breaks as needed; patient verbalized understanding. Patient instructed to weigh self at the same time of each day, using same clothes and same scale; reinforced teaching to monitor for 3-5 lb weight increase over 1-2 days, and to notify the CHF clinic at [phone] or physician office if weight change noted. Patient verbalized understanding. Risks of smoking discussed with the patient if applicable; patient strongly discouraged to smoke. Patient verbalized understanding. Signs and symptoms of CHF discussed with patient, such as feeling more tired than normal, feeling short of breath, coughing that increases when you lie down, sudden weight gain, swelling of your feet, legs or belly. Patient verbalized understanding to notify the CHF clinic at [phone] or physician office if these symptoms occur. Compliance with plan of care and further disease process causes discussed with patient, patient encouraged to keep all follow up appointments. Patient verbalized understanding.

## 2022-03-30 NOTE — PROGRESS NOTES
Date:  3/30/2022  Time:  11:46 AM    CHF Clinic at [address] [facility]    Office: [phone] Fax: [phone]    Re:  Ernestine Mojica   Patient : 1955    Vital Signs: /82   Pulse 64   Resp 20   Wt 279 lb 6.4 oz (126.7 kg)   SpO2 95%   BMI 46.49 kg/m²                       O2 Device: None (Room air)                           No results for input(s): CBC, HGB, HCT, WBC, PLATELET, NA, K, CL, CO2, BUN, CREATININE, GLUCOSE, BNP, INR in the last 72 hours. Respiratory:    Assessment  Charting Type: Reassessment    Breath Sounds  Right Upper Lobe: Clear  Right Middle Lobe: Clear  Right Lower Lobe: Clear  Left Upper Lobe: Clear  Left Lower Lobe: Clear    Cough/Sputum  Cough: Productive  Frequency: Occasional  Sputum Amount: Small  Sputum Color: Clear         Peripheral Vascular  RLE Edema: +1,Non-pitting  LLE Edema: +1,Non-pitting      Complaints: No new complaints at this time    Physician Orders: None    Comment : Patient here for scheduled visit per ambulatory with cane assist. His weight is down 2.4lbs since last month. He denies any increase in dyspnea with exertion or chest pain. Ongoing lower leg, ankle and pedal edema noted, slight increase in swelling to calves from last month. Has 2 areas on his left lower shin, one is scabbed over the other is open, no s/s infection at this time. Continues to wear his compression stockings daily. Encouraged patient to keep the open area covered with a small dressing until it is healed. Medication list reviewed and updated. Reinforced low sodium diet and fluid restrictions with patient. No s/s acute chf at this time. Next CHF Clinic visit on 22.     Electronically signed by Mariel Still RN on 3/30/2022 at 11:46 AM Fax received today from [employer]. INR 2.01. Pt. Currently taking 0 mg every W; 2.5 mg (5 mg x 0.5) all other days. Pt. INR goal 2.0-3.02

## 2022-04-01 PROBLEM — Z13.220 SCREENING FOR HYPERLIPIDEMIA: Status: RESOLVED | Noted: 2022-03-02 | Resolved: 2022-04-01

## 2022-04-11 DIAGNOSIS — I50.42 HEART FAILURE, SYSTOLIC AND DIASTOLIC, CHRONIC (HCC): ICD-10-CM

## 2022-04-11 RX ORDER — ATORVASTATIN CALCIUM 40 MG/1
40 TABLET, FILM COATED ORAL NIGHTLY
Qty: 30 TABLET | Refills: 1 | Status: SHIPPED | OUTPATIENT
Start: 2022-04-11 | End: 2022-08-31

## 2022-04-11 NOTE — TELEPHONE ENCOUNTER
E-scribe request for med refill. Please review and e-scribe if applicable.      Last Visit Date:  03/02/2022  Next Visit Date:  4/13/2022    Hemoglobin A1C (%)   Date Value   12/10/2019 5.6   10/18/2019 5.8             ( goal A1C is < 7)   No results found for: LABMICR  LDL Cholesterol (mg/dL)   Date Value   03/08/2022 56   03/08/2022 56       (goal LDL is <100)   AST (U/L)   Date Value   03/16/2021 15     ALT (U/L)   Date Value   03/16/2021 14     BUN (mg/dL)   Date Value   02/02/2022 15     BP Readings from Last 3 Encounters:   03/30/22 130/82   03/02/22 120/78   03/02/22 108/64          (goal 120/80)        Patient Active Problem List:     Atrial flutter (HCC)     Sleep-related breathing disorder     Hypokalemia     Atrial fibrillation (HCC)     Ischemic cardiomyopathy     Acute cystitis without hematuria     Hx of CABG     Chronic diastolic (congestive) heart failure (HCC)     Bilateral hand numbness     Right thigh pain     Left leg weakness     Coronary artery disease     Chronic cough     Gastroesophageal reflux disease without esophagitis     Overflow incontinence of urine     Polyp of colon     Post-void dribbling     Post-nasal drip     SYDNEY (acute kidney injury) ([address]     Hyperkalemia     Hypotension     Overactive bladder     Hemorrhoids     HARPREET (obstructive sleep apnea)     Laryngopharyngeal reflux     Skin ulcer of left lower leg, limited to breakdown of skin ([address]     Bilateral edema of lower extremity     Lymphedema of both lower extremities     Venous insufficiency of both lower extremities     Acute pain of right knee      ----Chrys Goltz

## 2022-04-13 ENCOUNTER — OFFICE VISIT (OUTPATIENT)
Dept: NEUROSURGERY | Age: 67
End: 2022-04-13
Payer: MEDICAID

## 2022-04-13 ENCOUNTER — HOSPITAL ENCOUNTER (OUTPATIENT)
Age: 67
Discharge: HOME | End: 2022-04-15
Payer: MEDICAID

## 2022-04-13 ENCOUNTER — HOSPITAL ENCOUNTER (OUTPATIENT)
Dept: GENERAL RADIOLOGY | Age: 67
Discharge: HOME | End: 2022-04-15
Payer: MEDICAID

## 2022-04-13 ENCOUNTER — OFFICE VISIT (OUTPATIENT)
Dept: FAMILY MEDICINE CLINIC | Age: 67
End: 2022-04-13
Payer: MEDICAID

## 2022-04-13 VITALS
BODY MASS INDEX: 46.15 KG/M2 | TEMPERATURE: 97.2 F | OXYGEN SATURATION: 97 % | HEART RATE: 79 BPM | WEIGHT: 277 LBS | RESPIRATION RATE: 22 BRPM | SYSTOLIC BLOOD PRESSURE: 138 MMHG | HEIGHT: 65 IN | DIASTOLIC BLOOD PRESSURE: 73 MMHG

## 2022-04-13 VITALS
TEMPERATURE: 97.2 F | SYSTOLIC BLOOD PRESSURE: 123 MMHG | HEIGHT: 65 IN | WEIGHT: 277.8 LBS | BODY MASS INDEX: 46.28 KG/M2 | DIASTOLIC BLOOD PRESSURE: 68 MMHG | HEART RATE: 89 BPM

## 2022-04-13 DIAGNOSIS — Z98.1 HISTORY OF FUSION OF CERVICAL SPINE: ICD-10-CM

## 2022-04-13 DIAGNOSIS — R20.0 BILATERAL HAND NUMBNESS: ICD-10-CM

## 2022-04-13 DIAGNOSIS — M48.02 STENOSIS OF CERVICAL SPINE WITH MYELOPATHY (HCC): Primary | ICD-10-CM

## 2022-04-13 DIAGNOSIS — I50.42 HEART FAILURE, SYSTOLIC AND DIASTOLIC, CHRONIC (HCC): ICD-10-CM

## 2022-04-13 DIAGNOSIS — G99.2 STENOSIS OF CERVICAL SPINE WITH MYELOPATHY (HCC): Primary | ICD-10-CM

## 2022-04-13 DIAGNOSIS — M17.11 OSTEOARTHRITIS OF RIGHT KNEE, UNSPECIFIED OSTEOARTHRITIS TYPE: Primary | ICD-10-CM

## 2022-04-13 DIAGNOSIS — G99.2 STENOSIS OF CERVICAL SPINE WITH MYELOPATHY (HCC): ICD-10-CM

## 2022-04-13 DIAGNOSIS — R07.89 OTHER CHEST PAIN: ICD-10-CM

## 2022-04-13 DIAGNOSIS — M48.02 STENOSIS OF CERVICAL SPINE WITH MYELOPATHY (HCC): ICD-10-CM

## 2022-04-13 PROCEDURE — 1036F TOBACCO NON-USER: CPT | Performed by: NURSE PRACTITIONER

## 2022-04-13 PROCEDURE — 3017F COLORECTAL CA SCREEN DOC REV: CPT

## 2022-04-13 PROCEDURE — G8427 DOCREV CUR MEDS BY ELIG CLIN: HCPCS

## 2022-04-13 PROCEDURE — G8417 CALC BMI ABV UP PARAM F/U: HCPCS | Performed by: NURSE PRACTITIONER

## 2022-04-13 PROCEDURE — 90732 PPSV23 VACC 2 YRS+ SUBQ/IM: CPT | Performed by: FAMILY MEDICINE

## 2022-04-13 PROCEDURE — 3017F COLORECTAL CA SCREEN DOC REV: CPT | Performed by: NURSE PRACTITIONER

## 2022-04-13 PROCEDURE — 99211 OFF/OP EST MAY X REQ PHY/QHP: CPT

## 2022-04-13 PROCEDURE — G8427 DOCREV CUR MEDS BY ELIG CLIN: HCPCS | Performed by: NURSE PRACTITIONER

## 2022-04-13 PROCEDURE — 99213 OFFICE O/P EST LOW 20 MIN: CPT

## 2022-04-13 PROCEDURE — G8417 CALC BMI ABV UP PARAM F/U: HCPCS

## 2022-04-13 PROCEDURE — 1036F TOBACCO NON-USER: CPT

## 2022-04-13 PROCEDURE — 72040 X-RAY EXAM NECK SPINE 2-3 VW: CPT

## 2022-04-13 RX ORDER — METFORMIN HYDROCHLORIDE 500 MG/1
TABLET, EXTENDED RELEASE ORAL
Qty: 30 TABLET | OUTPATIENT
Start: 2022-04-13

## 2022-04-13 RX ORDER — FUROSEMIDE 40 MG/1
60 TABLET ORAL 2 TIMES DAILY
Qty: 90 TABLET | Refills: 2 | Status: SHIPPED | OUTPATIENT
Start: 2022-04-13 | End: 2022-07-05 | Stop reason: SDUPTHER

## 2022-04-13 SDOH — ECONOMIC STABILITY: FOOD INSECURITY: WITHIN THE PAST 12 MONTHS, THE FOOD YOU BOUGHT JUST DIDN'T LAST AND YOU DIDN'T HAVE MONEY TO GET MORE.: NEVER TRUE

## 2022-04-13 SDOH — ECONOMIC STABILITY: FOOD INSECURITY: WITHIN THE PAST 12 MONTHS, YOU WORRIED THAT YOUR FOOD WOULD RUN OUT BEFORE YOU GOT MONEY TO BUY MORE.: NEVER TRUE

## 2022-04-13 ASSESSMENT — ENCOUNTER SYMPTOMS
WHEEZING: 0
BLOOD IN STOOL: 0
COLOR CHANGE: 0
APNEA: 0
DIARRHEA: 0
ABDOMINAL PAIN: 0
SHORTNESS OF BREATH: 0
COUGH: 0
CONSTIPATION: 0

## 2022-04-13 ASSESSMENT — SOCIAL DETERMINANTS OF HEALTH (SDOH): HOW HARD IS IT FOR YOU TO PAY FOR THE VERY BASICS LIKE FOOD, HOUSING, MEDICAL CARE, AND HEATING?: VERY HARD

## 2022-04-13 NOTE — PROGRESS NOTES
[address]  [facility] [address] [facility] [address]  Dept: [phone]    Patient:  Cory Jackson  YOB: 1955  Date: 4/13/22    The patient is a 61 y.o. male who presents today for consult of the following problems:     Chief Complaint   Patient presents with    New Patient         HPI:     Cory Jackson is a 61 y.o. male on whom neurosurgical consultation was requested by Dacia Ramírez MD for management of neck pain and arm symptoms. Patient with complaints of persistent numbness and tingling to both hands, none dermatomal distribution. Reports hand clumsiness, has stopped using glass as he frequently drops things and they will shatter. Also has to use a large spoon to eat so that he does not drop food everywhere. This has been an ongoing, progressive issue for him. He does have a history significant for bilateral carpal tunnel release around 2008, and subsequent cervical decompression both anterior and posterior around 2018. States that symptoms initially slightly improved following surgery but returned and have progressed. No recent cervical imaging. Has attempted 3 to 4 months of physical therapy with no significant improvement. Does have some gait instability. Uses a large spoon to eat, doesn't use glass. Carpal tunnel [id number]  Cervical surgery in 2018-[facility], anterior and posterior fusion    MYELOPATHY    Frequently dropping things: Yes  Difficulty with buttoning clothes, using zipper, putting on watch OR jewelry: Yes  Changes in handwriting: Yes  Numbness or tingling: Yes    LIMITATIONS    Pain significantly limiting on a daily basis   Daily pharmacologic pain control include: gabapentin  Neck : Arm pain: no neck/arm pain    MANAGEMENT     Prior Surgery: Yes  Prior to 1yr ago:   In the last year:    Physical Therapy: Yes-not helpful, completed 3-4 months   Chiropractic Interventions: No   Injections: No;  Improvement: n/a      History:     Past Medical History:   Diagnosis Date    Acute CH[facility]congestive heart failure) ([facility]) 3/8/2021    Arthritis     back    Atrial fibrillation ([facility] 10/2019    Dr. Jorge Gordillo BPH (benign prostatic hyperplasia)     Cellulitis     Cervical disc disease     CHF (congestive heart failure) [facility])     cardiologist Dr. Valerie Quesada. [facility] CHF clinic    Combined systolic and diastolic congestive heart failure ([facility] 1/15/2020    Coronary artery disease 10/2019     CABG [facility]    Dr. Nora Hastings Cardiology last seen within the last year.     COVID-19 virus RNA test result indeterminate 3/31/2021    CPAP (continuous positive airway pressure) dependence     GERD (gastroesophageal reflux disease)     on rx    History of incarceration     released 2019    History of incarceration     Hyperlipidemia     Dr. Molly Karimi Hypertension     [facility] CHF clinic     Dr. Molly Karimi Myocardial infarct [facility])     Dr. Molly Karimi Obesity     Sleep apnea     C-pap    Wears reading eyeglasses     Wellness examination     Dr. Scott Coker 4/2021     Past Surgical History:   Procedure Laterality Date    ABDOMINAL EXPLORATION SURGERY      CARDIAC SURGERY      2019    CARDIOVERSION  10/16/2019    CARPAL TUNNEL RELEASE Bilateral 2008    CERVICAL SPINE SURGERY      2-5    COLONOSCOPY N/A 10/29/2020    COLONOSCOPY WITH BIOPSY performed by Tien Martin MD at [place] [address]  10/29/2020    COLONOSCOPY SUBMUCOSAL/BOTOX INJECTION performed by Tien Martin MD at [place] [address]  10/29/2020    COLONOSCOPY POLYPECTOMY SNARE/COLD BIOPSY performed by Tien Martin MD at [place] [address] N/A 01/12/2021    COLONOSCOPY POLYPECTOMY HOT SNARE, COLD SNARE POLPECTOMY performed by Jose Martin Larson MD at [address] [place] [address] N/A 10/21/2019    CABG CORONARY ARTERY BYPASS GRAFT X1, CANNON-LAD, BROWN 4 MAZE PROCEDURE, 50MM ATRICURE ATRIAL CLIP RIGID INTERNAL FIXATION PLATES X 3   SCREWS X 13 performed by Annalise Diop MD at [facility] 03/26/2021    URODYNAMICS performed by Amanda Ellis MD at [address] [place] [address].  03/26/2021    CYSTOSCOPY FLEXIBLE performed by Amanda Ellis MD at [address] [place] [address].  05/14/2021    CYSTOSCOPY, UROLIFT, FULGURATION    CYSTOSCOPY N/A 5/14/2021    CYSTOSCOPY, UROLIFT, FULGURATION performed by Amanda Ellis MD at [address] [facility]      cataract surgery 2020    FRACTURE SURGERY      Left leg    TRANSESOPHAGEAL ECHOCARDIOGRAM  10/16/2019     Family History   Problem Relation Age of Onset    Alcohol Abuse Maternal Uncle     Heart Attack Maternal Uncle     Cancer Mother     Alcohol Abuse Father      Current Outpatient Medications on File Prior to Visit   Medication Sig Dispense Refill    atorvastatin (LIPITOR) 40 MG tablet TAKE 1 TABLET BY MOUTH NIGHTLY 30 tablet 1    ALLERGY RELIEF 10 MG tablet TAKE 1 TABLET BY MOUTH DAILY 30 tablet 2    isosorbide mononitrate (IMDUR) 30 MG extended release tablet TAKE 1 TABLET BY MOUTH DAILY 30 tablet 1    potassium chloride (KLOR-CON M) 20 MEQ extended release tablet TAKE 1 TABLET BY MOUTH DAILY 30 tablet 1    HM ASPIRIN EC LOW DOSE 81 MG EC tablet       oxybutynin (DITROPAN XL) 10 MG extended release tablet Take 1 tablet by mouth daily 30 tablet 3    tamsulosin (FLOMAX) 0.4 MG capsule TAKE 1 CAPSULE BY MOUTH DAILY 30 capsule 5    Multiple Vitamins-Minerals (MULTIVITAMIN WITH MINERALS) tablet Take 1 tablet by mouth daily 30 tablet 3    pantoprazole (PROTONIX) 40 MG tablet Take 1 tablet by mouth 2 times daily (before meals) 60 tablet 5    Prenatal Vit-Fe Fumarate-FA (NIVA-PLUS) 27-1 MG TABS       Multiple Vitamins-Minerals (PRESERVISION AREDS 2) CAPS       Blood Pressure Monitor KIT 1 each by Does not apply route 2 times daily 1 kit 0    Misc.  Devices (CHINTAN WEIGHT SCALE) MISC 1 each by Does not apply route as needed (weigh once a day and record) 1 each 0    Elastic Bandages & Supports (JOBST KNEE HIGH COMPRESSION SM) MISC 2 each by Does not apply route daily Wear q day. Remove q hs. Diagnosis: Chronic venous insufficiency 2 each 0    furosemide (LASIX) 40 MG tablet Take 1.5 tablets by mouth 2 times daily 90 tablet 2    gabapentin (NEURONTIN) 400 MG capsule Take 1 capsule by mouth daily for 31 days. 30 capsule 1    nitroGLYCERIN (NITROSTAT) 0.4 MG SL tablet Place 1 tablet under the tongue every 5 minutes as needed for Chest pain up to max of 3 total doses. If no relief after 1 dose, call 911. (Patient not taking: Reported on 3/30/2022) 25 tablet 1     No current facility-administered medications on file prior to visit. Social History     Tobacco Use    Smoking status: Never Smoker    Smokeless tobacco: Never Used   Vaping Use    Vaping Use: Never used   Substance Use Topics    Alcohol use: Not Currently     Comment: stopped 1991    Drug use: Not Currently       No Known Allergies    Review of Systems  Constitutional: Negative for activity change and appetite change. HENT: Negative for ear pain and facial swelling. Eyes: Negative for discharge and itching. Respiratory: Negative for choking and chest tightness. Cardiovascular: Negative for chest pain and leg swelling. Gastrointestinal: Negative for nausea and abdominal pain. Endocrine: Negative for cold intolerance and heat intolerance. Genitourinary: Negative for frequency and flank pain. Musculoskeletal: Negative for myalgias and joint swelling. Skin: Negative for rash and wound. Allergic/Immunologic: Negative for environmental allergies and food allergies. Hematological: Negative for adenopathy. Does not bruise/bleed easily. Psychiatric/Behavioral: Negative for self-injury. The patient is not nervous/anxious.       Physical Exam:      /73 (Site: Left Upper Arm, Position: Sitting, Cuff Size: Large Adult)   Pulse 79   Temp 97.2 °F (36.2 °C) (Temporal)   Resp 22   Ht 5' 5\" (1.651 m)   Wt 277 lb (125.6 kg)   SpO2 97%   BMI 46.10 kg/m²   Estimated body mass index is 46.1 kg/m² as calculated from the following:    Height as of this encounter: 5' 5\" (1.651 m). Weight as of this encounter: 277 lb (125.6 kg). General:  Scott Mensah is a 61y.o. year old male who appears his stated age. HEENT: Normocephalic atraumatic. Neck supple. Chest: regular rate; pulses equal  Abdomen: Soft nontender nondistended. Ext: DP and PT pulses 2+, good cap refill  Neuro    Mentation  Appropriate affect  Registration intact  Orientation intact  Judgment intact to situation    Cranial Nerves:   Pupils equal and reactive to light  Extraocular motion intact  Face and shrug symmetric  Tongue midline  No dysarthria  v1-3 sensation symmetric, masseter tone symmetric  Hearing symmetric and intact    Sensation: decreased to both hands  Ring finger split: neg    Motor  L deltoid 5/5; R deltoid 5/5  L biceps 5/5; R biceps 5/5  L triceps 5/5; R triceps 5/5  L wrist extension 5/5; R wrist extension 5/5  L intrinsics 4/5; R intrinsics 4/5     L iliopsoas 5/5 , R iliopsoas 5/5  L quadriceps 5/5; R quadriceps 5/5  L Dorsiflexion 5/5; R dorsiflexion 5/5  L Plantarflexion 5/5; R plantarflexion 5/5  L EHL 5/5; R EHL 5/5    Reflexes  L Brachioradialis 3+/4; R brachioradialis 2+/4  L Biceps 3+/4; R Biceps 2+/4  L Triceps 2+/4; R Triceps 2+/4  L Patellar 2+/4: R Patellar 2+/4  L Achilles 2+/4; R Achilles 2+/4    hoffmans L: pos  hoffmans R: neg  Clonus L: neg  Clonus R: neg  Babinski L: neg  Babinski R: neg      Studies Review:     PCP notes reviewed    Assessment and Plan:      1. Stenosis of cervical spine with myelopathy ([address]    2. History of fusion of cervical spine          Plan:  Will attempt to obtain outside records of cervical surgery in 2018 through [facility].  Recommend obtaining updated MRI to rule out cord compression contributing to progressive symptoms. Will obtain upright x-rays to evaluate hardware/alignment. Patient to return in 4 weeks for reevaluation after completion of additional imaging. Followup: Return in about 4 weeks (around 5/11/2022), or if symptoms worsen or fail to improve. Prescriptions Ordered:  No orders of the defined types were placed in this encounter. Orders Placed:  Orders Placed This Encounter   Procedures    XR CERVICAL SPINE (2-3 VIEWS)     Standing Status:   Future     Number of Occurrences:   1     Standing Expiration Date:   4/13/2023     Scheduling Instructions:      Standing AP/lateral     Order Specific Question:   Reason for exam:     Answer:   eval prior fusions    MRI CERVICAL SPINE WO CONTRAST     Standing Status:   Future     Standing Expiration Date:   4/13/2023     Order Specific Question:   Reason for exam:     Answer:   r/o cord compression        Electronically signed by LUDY Darden CNP on 4/18/2022 at 7:18 AM    Please note that this chart was generated using voice recognition Dragon dictation software. Although every effort was made to ensure the accuracy of this automated transcription, some errors in transcription may have occurred.

## 2022-04-13 NOTE — PROGRESS NOTES
Visit Information    Have you changed or started any medications since your last visit including any over-the-counter medicines, vitamins, or herbal medicines? no   Have you stopped taking any of your medications? Is so, why? -  no  Are you having any side effects from any of your medications? - no    Have you seen any other physician or provider since your last visit?  no   Have you had any other diagnostic tests since your last visit? yes - Labs   Have you been seen in the emergency room and/or had an admission in a hospital since we last saw you?  no   Have you had your routine dental cleaning in the past 6 months?  no     Do you have an active MyChart account? If no, what is the barrier?   No: Decliend    Patient Care Team:  Zhao Laws MD as PCP - General (Family Medicine)  Taylor Garcia MD as Consulting Physician (Gastroenterology)  Becky Trivedi MD as Consulting Physician (Urology)    Medical History Review  Past Medical, Family, and Social History reviewed and does not contribute to the patient presenting condition    Health Maintenance   Topic Date Due    Pneumococcal 0-64 years Vaccine (2 - PCV) 08/20/2021    COVID-19 Vaccine (3 - Booster for Cole Peter series) 09/30/2021    Potassium monitoring  02/02/2023    Creatinine monitoring  02/02/2023    Depression Screen  03/02/2023    Lipid screen  03/08/2023    DTaP/Tdap/Td vaccine (2 - Td or Tdap) 08/20/2030    Colorectal Cancer Screen  01/12/2031    Flu vaccine  Completed    Shingles Vaccine  Completed    Hepatitis C screen  Completed    HIV screen  Completed    Hepatitis A vaccine  Aged Out    Hepatitis B vaccine  Aged Out    Hib vaccine  Aged Out    Meningococcal (ACWY) vaccine  Aged Out

## 2022-04-13 NOTE — PATIENT INSTRUCTIONS
Thank you for letting us take care of you today. We hope all your questions were addressed. If a question was overlooked or something else comes to mind after you return home, please contact a member of your Care Team listed below. Your Care Team at [place] [address] is Team #4  Giselle Hernández MD (Faculty)  Margarito Hillman MD (Resident)  Elyssa Liriano MD (Resident)  Lorenzo Urbano MD (Resident)  El Agrawal MD (Resident)  PERRY Morocho., Bette Collazo., Cindy Bell [facility] office)  Rubén Mcintosh, [address] [employer] (Clinical Practice Manager)  Selwyn Zhang [facility] (Clinical Pharmacist)       Office phone number: [phone]    If you need to get in right away due to illness, please be advised we have \"Same Day\" appointments available Monday-Friday. Please call us at [phone] option #3 to schedule your \"Same Day\" appointment. Patient Education        Pneumococcal Polysaccharide Vaccine: What You Need to Know  Why get vaccinated? Pneumococcal polysaccharide vaccine (PPSV23) can prevent pneumococcal disease. Pneumococcal disease refers to any illness caused by pneumococcal bacteria. These bacteria can cause many types of illnesses, including pneumonia, which is an infection ofthe lungs. Pneumococcal bacteria are one of the most common causes of pneumonia. Besides pneumonia, pneumococcal bacteria can also cause:   Ear infections,   Sinus infections   Meningitis (infection of the tissue covering the brain and spinal cord)   Bacteremia (bloodstream infection)  Anyone can get pneumococcal disease, but children under 3years of age, people with certain medical conditions, adults 72 years or older, and cigarettesmokers are at the highest risk. Most pneumococcal infections are mild. However, some can result in long-term problems, such as brain damage or hearing loss.  Meningitis, bacteremia, andpneumonia caused by pneumococcal disease can be fatal.  PPSV23  PPSV23 protects against 23 types provider. Adverse reactions should be reported to the Vaccine Adverse Event Reporting System (VAERS). Your health care provider will usually file this report, or you can do it yourself. Visit the VAERS website at www.vaers. hhs.gov at www.vaers. hhs.gov or call 6-424.326.7901. VAERS is only for reporting reactions, and VAERS staff do not give medical advice. How can I learn more?  Ask your health care provider.  Call your local or state health department.  Contact the Centers for Disease Control and Prevention (CDC):  ? Call 9-189.919.6442 (1-800-CDC-INFO) or  ? Visit CDC's website at www.cdc.gov/vaccines  Vaccine Information Statement  PPSV23 Vaccine  10/30/2019  [facility] and [facility] for Disease Control and Prevention  Many Vaccine Information Statements are available in Ukrainian and other languages. See www.immunize.org/vis. Hojas de información Sobre Vacunas están disponibles en español y en muchos otros idiomas. Visite Kaci.si. Care instructions adapted under license by [facility] ([facility]). If you have questions about a medical condition or this instruction, always ask your healthcare professional. [place] [address] any warranty or liability for your use of this information.

## 2022-04-13 NOTE — PROGRESS NOTES
Attending Physician Statement    Wt Readings from Last 3 Encounters:   04/13/22 277 lb 12.8 oz (126 kg)   04/13/22 277 lb (125.6 kg)   03/30/22 279 lb 6.4 oz (126.7 kg)     Temp Readings from Last 3 Encounters:   04/13/22 97.2 °F (36.2 °C) (Temporal)   04/13/22 97.2 °F (36.2 °C) (Temporal)   03/02/22 97.5 °F (36.4 °C) (Temporal)     BP Readings from Last 3 Encounters:   04/13/22 123/68   04/13/22 138/73   03/30/22 130/82     Pulse Readings from Last 3 Encounters:   04/13/22 89   04/13/22 79   03/30/22 64         I have discussed the care of Shaina Boogie, including pertinent history and exam findings with the resident. I have reviewed the key elements of all parts of the encounter with the resident. I agree with the assessment, plan and orders as documented by the resident.   (GE Modifier)

## 2022-04-13 NOTE — TELEPHONE ENCOUNTER
Last visit: 4/13/2022  Last Med refill: 4/2021  Does patient have enough medication for 72 hours: No:     Next Visit Date:  Future Appointments   Date Time Provider Taj Flores   4/20/2022  7:15 AM STV MRI RM 1 (1.5T) ST[facility] MRI [facility]   4/28/2022 11:00 AM STV CHF CLINIC RM 1 STVZ CHF CLI [facility]   5/2/2022  2:30 PM Calista Long MD [facility]   5/13/2022  8:40 AM SCHEDULE, P ACC UROLOGY [facility]   5/17/2022  9:30 AM LUDY Lorenz - CNP [facility] [address] Maintenance   Topic Date Due    COVID-19 Vaccine (3 - Booster for Pfizer series) 09/30/2021    Potassium monitoring  02/02/2023    Creatinine monitoring  02/02/2023    Depression Screen  03/02/2023    Lipid screen  03/08/2023    Pneumococcal 0-64 years Vaccine (2 - PCV) 04/13/2023    DTaP/Tdap/Td vaccine (2 - Td or Tdap) 08/20/2030    Colorectal Cancer Screen  01/12/2031    Flu vaccine  Completed    Shingles Vaccine  Completed    Hepatitis C screen  Completed    HIV screen  Completed    Hepatitis A vaccine  Aged Out    Hepatitis B vaccine  Aged Out    Hib vaccine  Aged Out    Meningococcal (ACWY) vaccine  Aged Out       Hemoglobin A1C (%)   Date Value   12/10/2019 5.6   10/18/2019 5.8             ( goal A1C is < 7)   No results found for: LABMICR  LDL Cholesterol (mg/dL)   Date Value   03/08/2022 56   03/08/2022 56       (goal LDL is <100)   AST (U/L)   Date Value   03/16/2021 15     ALT (U/L)   Date Value   03/16/2021 14     BUN (mg/dL)   Date Value   02/02/2022 15     BP Readings from Last 3 Encounters:   04/13/22 123/68   04/13/22 138/73   03/30/22 130/82          (goal 120/80)    All Future Testing planned in [facility]  Lab Frequency Next Occurrence   COVID-19 Once 31/88/0490   Basic Metabolic Panel Once 25/87/0620   Reflux study/Reflux Venous Insufficiency Bilateral Once 10/06/2021   MRI CERVICAL SPINE WO CONTRAST Once 04/13/2022               Patient Active Problem List:     Atrial flutter ([facility] [address]     Sleep-related breathing disorder     Hypokalemia     Atrial fibrillation (HCC)     Ischemic cardiomyopathy     Acute cystitis without hematuria     Hx of CABG     Chronic diastolic (congestive) heart failure (HCC)     Bilateral hand numbness     Right thigh pain     Left leg weakness     Coronary artery disease     Chronic cough     Gastroesophageal reflux disease without esophagitis     Overflow incontinence of urine     Polyp of colon     Post-void dribbling     Post-nasal drip     SYDNEY (acute kidney injury) ([facility])     Hyperkalemia     Hypotension     Overactive bladder     Hemorrhoids     HARPRETE (obstructive sleep apnea)     Laryngopharyngeal reflux     Skin ulcer of left lower leg, limited to breakdown of skin (HCC)     Bilateral edema of lower extremity     Lymphedema of both lower extremities     Venous insufficiency of both lower extremities     Acute pain of right knee

## 2022-04-13 NOTE — PROGRESS NOTES
Subjective: Zulema Bass is a 61 y.o. male with  has a past medical history of Acute CHF (congestive heart failure) ([address] Arthritis, Atrial fibrillation ([address] BPH (benign prostatic hyperplasia), Cellulitis, Cervical disc disease, CHF (congestive heart failure) ([address] Combined systolic and diastolic congestive heart failure ([address] Coronary artery disease, COVID-19 virus RNA test result indeterminate, CPAP (continuous positive airway pressure) dependence, GERD (gastroesophageal reflux disease), History of incarceration, History of incarceration, Hyperlipidemia, Hypertension, Myocardial infarct ([address] Obesity, Sleep apnea, Wears reading eyeglasses, and Wellness examination. Presented to the office today for:  Chief Complaint   Patient presents with    Arthritis     Osteoarthritis follow up    Medication Refill     [facility]       HPI    72-year-old male came to the clinic follow-up for  Knee pain  Patient has chronic right knee tricompartment osteoarthritis  Patient had a sterile injection given on the January 12, 2022  Xray 1/11/2022 shows  Tricompartmental osteoarthrosis.  Diffuse osteopenia.  Moderate narrowing   of the patellofemoral and medial compartments.  Small joint effusion.  Small   loose bodies in the posterior joint recess measuring up to 9 mm. 2. No acute fracture or dislocation.    Right knee is doing a lot better now     Obesity  BMI 46.76  Patient follows up with bariatric surgery clinic w/ [facility]   Today weight is 277 pounds from 300 from January 2022    Bilateral hand numbness secondary to Cervical spinal stenosis   Gabapentin was increased last time  Patient had a visit w/ Neurosurgery today   Plan is to get an x-ray and an MRI and neurosurgical follow-up    Chest pain  Right side of the chest  Nonradiating  Comes and goes  Sharp in nature  Not associated with shortness of breath, nausea, diaphoresis  Not exertional  Seems atypical atypical      Review of Systems Respiratory: Negative for apnea, cough, shortness of breath and wheezing. Cardiovascular: Positive for chest pain and leg swelling. Gastrointestinal: Negative for abdominal pain, blood in stool, constipation and diarrhea. Musculoskeletal: Positive for arthralgias. Skin: Negative for color change. Neurological: Positive for weakness and numbness. Negative for dizziness. Psychiatric/Behavioral: Negative for agitation. The patient has a   Family History   Problem Relation Age of Onset    Alcohol Abuse Maternal Uncle     Heart Attack Maternal Uncle     Cancer Mother     Alcohol Abuse Father        Objective:    /68 (Site: Right Upper Arm, Position: Sitting, Cuff Size: Large Adult) Comment: machine  Pulse 89   Temp 97.2 °F (36.2 °C) (Temporal)   Ht 5' 5\" (1.651 m)   Wt 277 lb 12.8 oz (126 kg)   BMI 46.23 kg/m²    BP Readings from Last 3 Encounters:   04/13/22 123/68   04/13/22 138/73   03/30/22 130/82       Physical Exam  Constitutional:       General: He is not in acute distress. Appearance: He is obese. He is not ill-appearing, toxic-appearing or diaphoretic. Cardiovascular:      Rate and Rhythm: Normal rate. Pulses: Normal pulses. Heart sounds: No murmur heard. Pulmonary:      Effort: Pulmonary effort is normal.      Breath sounds: Normal breath sounds. No wheezing. Abdominal:      General: Abdomen is flat. Tenderness: There is no abdominal tenderness. Musculoskeletal:         General: Swelling present. No tenderness. Right lower leg: Edema present. Left lower leg: Edema present. Comments: Edema is a lot better than in the past   Skin:     General: Skin is warm. Findings: No erythema. Neurological:      Mental Status: He is alert and oriented to person, place, and time. Motor: No weakness. Psychiatric:         Mood and Affect: Mood normal.         Behavior: Behavior normal.         Thought Content:  Thought content normal.         Judgment: Judgment normal.         Lab Results   Component Value Date    WBC 9.4 02/02/2022    HGB 13.9 02/02/2022    HCT 42.8 02/02/2022     02/02/2022    CHOL 121 03/08/2022    TRIG 79 03/08/2022    HDL 49 03/08/2022    HDL 49 03/08/2022    ALT 14 03/16/2021    AST 15 03/16/2021     02/02/2022    K 3.7 02/02/2022     02/02/2022    CREATININE 0.91 02/02/2022    BUN 15 02/02/2022    CO2 23 02/02/2022    TSH 1.48 01/24/2020    PSA 0.67 09/22/2020    INR 1.0 03/08/2021    LABA1C 5.6 12/10/2019     Lab Results   Component Value Date    CALCIUM 8.8 02/02/2022    PHOS 4.0 03/08/2021     Lab Results   Component Value Date    LDLCHOLESTEROL 56 03/08/2022    LDLCHOLESTEROL 56 03/08/2022       Assessment and Plan:    1. Heart failure, systolic and diastolic, chronic (HCC)  No new shortness of breath or orthopnea reported  Patient follows up with CHF clinic  - furosemide (LASIX) 40 MG tablet; Take 1.5 tablets by mouth 2 times daily  Dispense: 90 tablet; Refill: 2    2. Osteoarthritis of right knee, unspecified osteoarthritis type  Knee pain is a lot better after cortisone injection in January 2022  No need for an injection at this time    3. Other chest pain  Right side of the chest  Nonradiating  Comes and goes  Sharp in nature  Not associated with shortness of breath, nausea, diaphoresis  Not exertional  Seems atypical atypical  I reassured the patient and told him to go to the hospital or call us again if the chest pain gets worse or he starts exhibiting other symptoms    4.  Bilateral hand numbness  Bilateral hand numbness secondary to Cervical spinal stenosis   Gabapentin was increased last time  Patient had a visit w/ Neurosurgery today   Plan is to get an x-ray and an MRI and neurosurgical follow-up          Requested Prescriptions     Signed Prescriptions Disp Refills    furosemide (LASIX) 40 MG tablet 90 tablet 2     Sig: Take 1.5 tablets by mouth 2 times daily       Medications Discontinued During This Encounter   Medication Reason    furosemide (LASIX) 40 MG tablet Darlene Rivera received counseling on the following healthy behaviors: nutrition, exercise and medication adherence    Discussed use,benefit, and side effects of prescribed medications. Barriers to medication compliance addressed. All patient questions answered. Pt voiced understanding. Return in about 3 months (around 7/13/2022) for Right knee Osteoarthritis and Hba1c. Disclaimer: Some orall of this note was transcribed using voice-recognition software. This may cause typographical errors occasionally. Although all effort is made to fix these errors, please do not hesitate to contact our office if there [place] concern with the understanding of this note.

## 2022-04-18 DIAGNOSIS — E87.6 HYPOKALEMIA: ICD-10-CM

## 2022-04-18 RX ORDER — POTASSIUM CHLORIDE 20 MEQ/1
20 TABLET, EXTENDED RELEASE ORAL DAILY
Qty: 30 TABLET | Refills: 1 | Status: SHIPPED | OUTPATIENT
Start: 2022-04-18 | End: 2022-08-04 | Stop reason: SDUPTHER

## 2022-04-18 NOTE — TELEPHONE ENCOUNTER
E-scribe request for med refill. Please review and e-scribe if applicable.      Last Visit Date:  04/13/2022  Next Visit Date:  Visit date not found    Hemoglobin A1C (%)   Date Value   12/10/2019 5.6   10/18/2019 5.8             ( goal A1C is < 7)   No results found for: LABMICR  LDL Cholesterol (mg/dL)   Date Value   03/08/2022 56   03/08/2022 56       (goal LDL is <100)   AST (U/L)   Date Value   03/16/2021 15     ALT (U/L)   Date Value   03/16/2021 14     BUN (mg/dL)   Date Value   02/02/2022 15     BP Readings from Last 3 Encounters:   04/13/22 123/68   04/13/22 138/73   03/30/22 130/82          (goal 120/80)        Patient Active Problem List:     Atrial flutter (HCC)     Sleep-related breathing disorder     Hypokalemia     Atrial fibrillation (HCC)     Ischemic cardiomyopathy     Acute cystitis without hematuria     Hx of CABG     Chronic diastolic (congestive) heart failure (HCC)     Bilateral hand numbness     Right thigh pain     Left leg weakness     Coronary artery disease     Chronic cough     Gastroesophageal reflux disease without esophagitis     Overflow incontinence of urine     Polyp of colon     Post-void dribbling     Post-nasal drip     SYDNEY (acute kidney injury) ([address]     Hyperkalemia     Hypotension     Overactive bladder     Hemorrhoids     HARPREET (obstructive sleep apnea)     Laryngopharyngeal reflux     Skin ulcer of left lower leg, limited to breakdown of skin ([address]     Bilateral edema of lower extremity     Lymphedema of both lower extremities     Venous insufficiency of both lower extremities     Acute pain of right knee      ----Ashish De León

## 2022-04-28 ENCOUNTER — HOSPITAL ENCOUNTER (OUTPATIENT)
Dept: OTHER | Age: 67
Discharge: HOME | End: 2022-04-28
Payer: MEDICAID

## 2022-04-28 VITALS
RESPIRATION RATE: 16 BRPM | SYSTOLIC BLOOD PRESSURE: 120 MMHG | BODY MASS INDEX: 45.7 KG/M2 | HEART RATE: 79 BPM | DIASTOLIC BLOOD PRESSURE: 80 MMHG | OXYGEN SATURATION: 96 % | WEIGHT: 274.6 LBS

## 2022-04-28 PROCEDURE — 99212 OFFICE O/P EST SF 10 MIN: CPT

## 2022-04-28 NOTE — PROGRESS NOTES
Date:  2022  Time:  11:12 AM    CHF Clinic at [facility]    Office: [phone] Fax: [phone]    Re:  Crystal Bernal   Patient : 1955    Vital Signs: /80   Pulse 79   Resp 16   Wt 274 lb 9.6 oz (124.6 kg)   SpO2 96%   BMI 45.70 kg/m²                                                   No results for input(s): CBC, HGB, HCT, WBC, PLATELET, NA, K, CL, CO2, BUN, CREATININE, GLUCOSE, BNP, INR in the last 72 hours. Respiratory:    Assessment  Charting Type: Reassessment    Breath Sounds  Right Upper Lobe: Clear  Right Middle Lobe: Clear  Right Lower Lobe: Clear  Left Upper Lobe: Clear  Left Lower Lobe: Clear    Cough/Sputum  Cough: None         Peripheral Vascular  RLE Edema: Trace  LLE Edema: +1      Complaints: No new complaints     Physician Orders No new orders     Comment : weight is down 5 popunds from last months visit has been wearing compressions wraps to lower legs states has helped a lot. Has trace edema to legs. Lungs are clear has slight SOB with to much activity relieved with rest.Saw [facility] in January not due to see until July. Currently does not have a phone. Discussed in detail low sodium diet 2000mg per day and fluid restriction of 64 ounces per day. We will see in 1 month 2022.     Electronically signed by Cathy Field RN on 2022 at 11:12 AM

## 2022-05-04 ENCOUNTER — APPOINTMENT (OUTPATIENT)
Dept: GENERAL RADIOLOGY | Age: 67
End: 2022-05-04
Payer: MEDICAID

## 2022-05-04 ENCOUNTER — HOSPITAL ENCOUNTER (EMERGENCY)
Age: 67
Discharge: HOME | End: 2022-05-04
Attending: EMERGENCY MEDICINE
Payer: MEDICAID

## 2022-05-04 VITALS
SYSTOLIC BLOOD PRESSURE: 135 MMHG | HEART RATE: 79 BPM | TEMPERATURE: 97.8 F | DIASTOLIC BLOOD PRESSURE: 83 MMHG | OXYGEN SATURATION: 95 % | RESPIRATION RATE: 18 BRPM

## 2022-05-04 DIAGNOSIS — T14.8XXA BITE BY ANIMAL: Primary | ICD-10-CM

## 2022-05-04 PROCEDURE — 73130 X-RAY EXAM OF HAND: CPT

## 2022-05-04 PROCEDURE — 99284 EMERGENCY DEPT VISIT MOD MDM: CPT

## 2022-05-04 PROCEDURE — 90715 TDAP VACCINE 7 YRS/> IM: CPT | Performed by: STUDENT IN AN ORGANIZED HEALTH CARE EDUCATION/TRAINING PROGRAM

## 2022-05-04 PROCEDURE — 6370000000 HC RX 637 (ALT 250 FOR IP): Performed by: STUDENT IN AN ORGANIZED HEALTH CARE EDUCATION/TRAINING PROGRAM

## 2022-05-04 PROCEDURE — 6360000002 HC RX W HCPCS: Performed by: STUDENT IN AN ORGANIZED HEALTH CARE EDUCATION/TRAINING PROGRAM

## 2022-05-04 PROCEDURE — 90471 IMMUNIZATION ADMIN: CPT | Performed by: STUDENT IN AN ORGANIZED HEALTH CARE EDUCATION/TRAINING PROGRAM

## 2022-05-04 PROCEDURE — 12002 RPR S/N/AX/GEN/TRNK2.6-7.5CM: CPT

## 2022-05-04 RX ORDER — AMOXICILLIN AND CLAVULANATE POTASSIUM 875; 125 MG/1; MG/1
1 TABLET, FILM COATED ORAL EVERY 12 HOURS SCHEDULED
Status: DISCONTINUED | OUTPATIENT
Start: 2022-05-04 | End: 2022-05-04

## 2022-05-04 RX ORDER — ACETAMINOPHEN 325 MG/1
650 TABLET ORAL ONCE
Status: COMPLETED | OUTPATIENT
Start: 2022-05-04 | End: 2022-05-04

## 2022-05-04 RX ORDER — IBUPROFEN 400 MG/1
400 TABLET ORAL ONCE
Status: COMPLETED | OUTPATIENT
Start: 2022-05-04 | End: 2022-05-04

## 2022-05-04 RX ORDER — AMOXICILLIN AND CLAVULANATE POTASSIUM 875; 125 MG/1; MG/1
1 TABLET, FILM COATED ORAL 2 TIMES DAILY
Qty: 20 TABLET | Refills: 0 | Status: SHIPPED | OUTPATIENT
Start: 2022-05-04 | End: 2022-05-14

## 2022-05-04 RX ORDER — AMOXICILLIN AND CLAVULANATE POTASSIUM 875; 125 MG/1; MG/1
1 TABLET, FILM COATED ORAL EVERY 12 HOURS SCHEDULED
Status: DISCONTINUED | OUTPATIENT
Start: 2022-05-04 | End: 2022-05-04 | Stop reason: HOSPADM

## 2022-05-04 RX ADMIN — IBUPROFEN 400 MG: 400 TABLET, FILM COATED ORAL at 01:53

## 2022-05-04 RX ADMIN — ACETAMINOPHEN 650 MG: 325 TABLET ORAL at 01:53

## 2022-05-04 RX ADMIN — AMOXICILLIN AND CLAVULANATE POTASSIUM 1 TABLET: 875; 125 TABLET, FILM COATED ORAL at 02:37

## 2022-05-04 RX ADMIN — TETANUS TOXOID, REDUCED DIPHTHERIA TOXOID AND ACELLULAR PERTUSSIS VACCINE, ADSORBED 0.5 ML: 5; 2.5; 8; 8; 2.5 SUSPENSION INTRAMUSCULAR at 01:53

## 2022-05-04 ASSESSMENT — ENCOUNTER SYMPTOMS
ABDOMINAL PAIN: 0
BACK PAIN: 0
NAUSEA: 0
SHORTNESS OF BREATH: 0
VOMITING: 0

## 2022-05-04 NOTE — ED PROVIDER NOTES
[facility]  FACULTY HANDOFF       Handoff taken on the following patient from prior Attending Physician:  Pt Name: Joel Senior  PCP:  Margarita Marshall MD    Attestation  I was available and discussed any additional care issues that arose and coordinated the management plans with the resident(s) caring for the patient during my duty period. Any areas of disagreement with resident's documentation of care or procedures are noted on the chart. I was personally present for the key portions of any/all procedures during my duty period. I have documented in the chart those procedures where I was not present during the key portions. CHIEF COMPLAINT       Chief Complaint   Patient presents with    Animal Bite         CURRENT MEDICATIONS     Previous Medications  Previous Medications    ALLERGY RELIEF 10 MG TABLET    TAKE 1 TABLET BY MOUTH DAILY    ATORVASTATIN (LIPITOR) 40 MG TABLET    TAKE 1 TABLET BY MOUTH NIGHTLY    BLOOD PRESSURE MONITOR KIT    1 each by Does not apply route 2 times daily    ELASTIC BANDAGES & SUPPORTS (JOBST KNEE HIGH COMPRESSION SM) MISC    2 each by Does not apply route daily Wear q day. Remove q hs. Diagnosis: Chronic venous insufficiency    FUROSEMIDE (LASIX) 40 MG TABLET    Take 1.5 tablets by mouth 2 times daily    GABAPENTIN (NEURONTIN) 400 MG CAPSULE    Take 1 capsule by mouth daily for 31 days. HM ASPIRIN EC LOW DOSE 81 MG EC TABLET        ISOSORBIDE MONONITRATE (IMDUR) 30 MG EXTENDED RELEASE TABLET    TAKE 1 TABLET BY MOUTH DAILY    MISC. DEVICES (proteonomix WEIGHT SCALE) MISC    1 each by Does not apply route as needed (weigh once a day and record)    MULTIPLE VITAMINS-MINERALS (MULTIVITAMIN WITH MINERALS) TABLET    Take 1 tablet by mouth daily    MULTIPLE VITAMINS-MINERALS (PRESERVISION AREDS 2) CAPS        NITROGLYCERIN (NITROSTAT) 0.4 MG SL TABLET    Place 1 tablet under the tongue every 5 minutes as needed for Chest pain up to max of 3 total doses.  If no relief after 1 dose, call 911. OXYBUTYNIN (DITROPAN XL) 10 MG EXTENDED RELEASE TABLET    Take 1 tablet by mouth daily    PANTOPRAZOLE (PROTONIX) 40 MG TABLET    Take 1 tablet by mouth 2 times daily (before meals)    POTASSIUM CHLORIDE (KLOR-CON M) 20 MEQ EXTENDED RELEASE TABLET    TAKE 1 TABLET BY MOUTH DAILY    PRENATAL VIT-FE FUMARATE-FA (NIVA-PLUS) 27-1 MG TABS        TAMSULOSIN (FLOMAX) 0.4 MG CAPSULE    TAKE 1 CAPSULE BY MOUTH DAILY       Encounter Medications  Orders Placed This Encounter   Medications    Tetanus-Diphth-Acell Pertussis (BOOSTRIX) injection 0.5 mL    amoxicillin-clavulanate (AUGMENTIN) 875-125 MG per tablet 1 tablet     Order Specific Question:   Antimicrobial Indications     Answer:   Skin and Soft Tissue Infection    acetaminophen (TYLENOL) tablet 650 mg    ibuprofen (ADVIL;MOTRIN) tablet 400 mg       ALLERGIES     has No Known Allergies. RECENT VITALS:   Temp: 97.8 °F (36.6 °C),  Pulse: 79, Resp: 18, BP: 135/83    RADIOLOGY:   XR HAND RIGHT (MIN 3 VIEWS)    (Results Pending)       LABS:  Labs Reviewed - No data to display    Bit by his dog to the finger. Imaging pending. Plan nerve block and cleansing and suturing. Will discharge on antibiotics    PLAN/ TASKS OUTSTANDING       Imaging, wound repair, disposition    (Please note that portions of this note were completed with a voice recognition program.  Efforts were made to edit the dictations but occasionally words are mis-transcribed. )    Ophelia Michel MD,, MD, F.A.C.E.P.   Attending Emergency Physician       Ophelia Michel MD  05/04/22 [id number]

## 2022-05-04 NOTE — Clinical Note
Jerry Morris was seen and treated in our emergency department on 5/4/2022. He may return to work on 05/05/2022. If you have any questions or concerns, please don't hesitate to call.       Steph Medina MD

## 2022-05-04 NOTE — ED NOTES
arranged transportation for patient through [address] [place] [address] where pt reports having an account through [address] [place] [address]. ETA 1-3 hours. Pt going to address on file.       AMINATA James  05/04/22 [id number]

## 2022-05-04 NOTE — ED PROVIDER NOTES
[facility]     Emergency Department     Faculty Attestation    I performed a history and physical examination of the patient and discussed management with the resident. I reviewed the residents note and agree with the documented findings including all diagnostic interpretations and plan of care. Any areas of disagreement are noted on the chart. I was personally present for the key portions of any procedures. I have documented in the chart those procedures where I was not present during the key portions. I have reviewed the emergency nurses triage note. I agree with the chief complaint, past medical history, past surgical history, allergies, medications, social and family history as documented unless otherwise noted below. Documentation of the HPI, Physical Exam and Medical Decision Making performed by [email or web address] is based on my personal performance of the HPI, PE and MDM. For Physician Assistant/ Nurse Practitioner cases/documentation I have personally evaluated this patient and have completed at least one if not all key elements of the E/M (history, physical exam, and MDM). Additional findings are as noted. This patient was evaluated in the Emergency Department for symptoms described in the history of present illness. He/she was evaluated in the context of the global COVID-19 pandemic, which necessitated consideration that the patient might be at risk for infection with the SARS-CoV-2 virus that causes COVID-19. Institutional protocols and algorithms that pertain to the evaluation of patients at risk for COVID-19 are in a state of rapid change based on information released by regulatory bodies including the CDC and federal and state organizations. These policies and algorithms were followed during the patient's care in the ED. Primary Care Physician: Michael Daniels MD    History:  This is a 61 y.o. male who presents to the Emergency Department with complaint of finger bite. 3month-old pitbull. Provoked as dog was caught under a recliner and patient was attempting to get the dog out. Patient is uncertain of tetanus status. Physical:     temperature is 97.8 °F (36.6 °C). His blood pressure is 135/83 and his pulse is 79. His respiration is 18 and oxygen saturation is 95%.    61 y.o. male no acute distress, middle finger shows 3 cm gaping laceration over the ulnar aspect of the middle finger overlying the first phalanx. Full range of motion with the fingers normal sensation distal to the wound. Impression: Dog bite    Plan: X-ray for foreign body, washout, repair with absolute minimum sutures necessary for wound approximation. Patient apprised on potential risks of this wound repair and instructions for monitoring for signs of infection. Augmentin. Update tetanus.       Bala Helm MD, Lianet Conklin  Attending Emergency Physician         Higinio Martinez MD  05/04/22 [id number]

## 2022-05-04 NOTE — ED PROVIDER NOTES
[facility] ED  Emergency Department Encounter  Emergency Medicine Resident     Pt Name: Suzanne Fabry  MRN: [id number]  [place] 1955  Date of evaluation: 5/4/22  PCP:  Gabriella Menezes MD    [address] [place] [address]       Chief Complaint   Patient presents with   [address] [place] [address]  (Location/Symptom, Timing/Onset, Context/Setting, Quality, Duration, Modifying Factors,Severity.)      Suzanne Fabry is a 61 y. o.yo male who presents with dog bite. Patient here with dog bite to the right hand, particularly to the right middle finger, is right-handed, states he has a pitbull puppy that bit his right hand. Denies any other traumatic injuries, state he is able to move the finger is able to flex it and extend it. States the pain is 7 out of 10 in severity located on the right hand. Denies headache vision changes denies being on blood thinners. Has no allergies according to the patient. Does not know her last tetanus status. PAST MEDICAL / SURGICAL / SOCIAL / FAMILY HISTORY      has a past medical history of Acute CHF (congestive heart failure) ([address] Arthritis, Atrial fibrillation ([address] BPH (benign prostatic hyperplasia), Cellulitis, Cervical disc disease, CHF (congestive heart failure) ([address] Combined systolic and diastolic congestive heart failure ([address] Coronary artery disease, COVID-19 virus RNA test result indeterminate, CPAP (continuous positive airway pressure) dependence, GERD (gastroesophageal reflux disease), History of incarceration, History of incarceration, Hyperlipidemia, Hypertension, Myocardial infarct ([address] Obesity, Sleep apnea, Wears reading eyeglasses, and Wellness examination. has a past surgical history that includes transesophageal echocardiogram (10/16/2019); Cardioversion (10/16/2019); Coronary Artery Bypass Graft Maze Procedure (N/A, 10/21/2019); Carpal tunnel release (Bilateral, 2008); fracture surgery;  Cervical spine surgery; Abdominal exploration surgery; Colonoscopy (N/A, 10/29/2020); Colonoscopy (10/29/2020); Colonoscopy (10/29/2020); Colonoscopy (N/A, 01/12/2021); eye surgery; Cystography (N/A, 03/26/2021); Cystoscopy (03/26/2021); Cardiac surgery; Cystocopy (05/14/2021); and Cystoscopy (N/A, 5/14/2021). Social History     Socioeconomic History    Marital status:      Spouse name: Not on file    Number of children: Not on file    Years of education: Not on file    Highest education level: Not on file   Occupational History    Not on file   Tobacco Use    Smoking status: Never Smoker    Smokeless tobacco: Never Used   Vaping Use    Vaping Use: Never used   Substance and Sexual Activity    Alcohol use: Not Currently     Comment: stopped 1991    Drug use: Not Currently    Sexual activity: Not Currently   Other Topics Concern    Not on file   Social History Narrative    Not on file     Social Determinants of Health     Financial Resource Strain: High Risk    Difficulty of Paying Living Expenses: Very hard   Food Insecurity: No Food Insecurity    Worried About Running Out of Food in the Last Year: Never true    Edison of Food in the Last Year: Never true   Transportation Needs:     Lack of Transportation (Medical): Not on file    Lack of Transportation (Non-Medical):  Not on file   Physical Activity:     Days of Exercise per Week: Not on file    Minutes of Exercise per Session: Not on file   Stress:     Feeling of Stress : Not on file   Social Connections:     Frequency of Communication with Friends and Family: Not on file    Frequency of Social Gatherings with Friends and Family: Not on file    Attends [employer] Services: Not on file    Active Member of Clubs or Organizations: Not on file    Attends Club or Organization Meetings: Not on file    Marital Status: Not on file   Intimate Partner Violence:     Fear of Current or Ex-Partner: Not on file    Emotionally Abused: Not on file    Physically Abused: Not on file    Sexually Abused: Not on file   Housing Stability:     Unable to Pay for Housing in the Last Year: Not on file    Number of Places Lived in the Last Year: Not on file    Unstable Housing in the Last Year: Not on file       Family History   Problem Relation Age of Onset    Alcohol Abuse Maternal Uncle     Heart Attack Maternal Uncle     Cancer Mother     Alcohol Abuse Father         Allergies:  Patient has no known allergies. Home Medications:  Prior to Admission medications    Medication Sig Start Date End Date Taking? Authorizing Provider   amoxicillin-clavulanate (AUGMENTIN) 875-125 MG per tablet Take 1 tablet by mouth 2 times daily for 10 days 5/4/22 5/14/22 Yes Callie Dickens MD   potassium chloride (KLOR-CON M) 20 MEQ extended release tablet TAKE 1 TABLET BY MOUTH DAILY 4/18/22   Stanton Salas MD   furosemide (LASIX) 40 MG tablet Take 1.5 tablets by mouth 2 times daily 4/13/22   Farheen Prince MD   atorvastatin (LIPITOR) 40 MG tablet TAKE 1 TABLET BY MOUTH NIGHTLY 4/11/22   Farheen Prince MD   gabapentin (NEURONTIN) 400 MG capsule Take 1 capsule by mouth daily for 31 days.  3/2/22 4/2/22  Farheen Prince MD   ALLERGY RELIEF 10 MG tablet TAKE 1 TABLET BY MOUTH DAILY 2/28/22   Kerrie Rodas MD   isosorbide mononitrate (IMDUR) 30 MG extended release tablet TAKE 1 TABLET BY MOUTH DAILY 2/28/22   Kerrie Rodas MD   HM ASPIRIN EC LOW DOSE 81 MG EC tablet  1/31/22   Historical Provider, MD   oxybutynin (DITROPAN XL) 10 MG extended release tablet Take 1 tablet by mouth daily 2/11/22   Chidi Canales MD   tamsulosin [facility]) 0.4 MG capsule TAKE 1 CAPSULE BY MOUTH DAILY 1/31/22   Chidi Canales MD   Multiple Vitamins-Minerals (MULTIVITAMIN WITH MINERALS) tablet Take 1 tablet by mouth daily 1/3/22   Kerrie Rodas MD   pantoprazole (PROTONIX) 40 MG tablet Take 1 tablet by mouth 2 times daily (before meals) 10/29/21   Stefani Eugene MD   Prenatal Vit-Fe Fumarate-FA (NIVA-PLUS) 27-1 MG TABS  4/2/21   Historical Provider, MD   Multiple Vitamins-Minerals (PRESERVISION AREDS 2) CAPS  3/26/21   Historical Provider, MD   Blood Pressure Monitor KIT 1 each by Does not apply route 2 times daily 3/16/21   Fern Morrell MD   Misc. Devices ([employer] WEIGHT SCALE) MISC 1 each by Does not apply route as needed (weigh once a day and record) 3/16/21   Fern Morrell MD   nitroGLYCERIN (NITROSTAT) 0.4 MG SL tablet Place 1 tablet under the tongue every 5 minutes as needed for Chest pain up to max of 3 total doses. If no relief after 1 dose, call 911. Patient not taking: Reported on 3/30/2022 11/23/20   Asher Cooney MD   Elastic Bandages & Supports (JOBST KNEE HIGH COMPRESSION SM) MISC 2 each by Does not apply route daily Wear q day. Remove q hs. Diagnosis: Chronic venous insufficiency 8/12/20   Jennifer Langley, APRN - CNP       REVIEW OFSYSTEMS    (2-9 systems for level 4, 10 or more for level 5)      Review of Systems   Constitutional: Negative for diaphoresis and fever. HENT: Negative for congestion. Eyes: Negative for visual disturbance. Respiratory: Negative for shortness of breath. Cardiovascular: Negative for chest pain. Gastrointestinal: Negative for abdominal pain, nausea and vomiting. Endocrine: Negative for polyuria. Genitourinary: Negative for dysuria. Musculoskeletal: Negative for back pain. Skin: Positive for wound. Neurological: Negative for headaches. Psychiatric/Behavioral: Negative for confusion. PHYSICAL EXAM   (up to 7 for level 4, 8 or more forlevel 5)      ED TRIAGE VITALS BP: 135/83, Temp: 97.8 °F (36.6 °C), Pulse: 79, Resp: 18, SpO2: 95 %    Vitals:    05/04/22 0118   BP: 135/83   Pulse: 79   Resp: 18   Temp: 97.8 °F (36.6 °C)   SpO2: 95%       Physical Exam  HENT:      Head: Normocephalic. Cardiovascular:      Rate and Rhythm: Normal rate.    Pulmonary:      Effort: Pulmonary effort is normal.   Abdominal:      Palpations: Abdomen is soft.   Musculoskeletal:         General: Swelling and tenderness present. Hands:    Neurological:      Mental Status: He is alert. DIFFERENTIAL  DIAGNOSIS     PLAN (LABS / IMAGING / EKG):  Orders Placed This Encounter   Procedures    LACERATION REPAIR    XR HAND RIGHT (MIN 3 VIEWS)       MEDICATIONS ORDERED:  Orders Placed This Encounter   Medications    Tetanus-Diphth-Acell Pertussis (BOOSTRIX) injection 0.5 mL    DISCONTD: amoxicillin-clavulanate (AUGMENTIN) 875-125 MG per tablet 1 tablet     Order Specific Question:   Antimicrobial Indications     Answer:   Skin and Soft Tissue Infection    acetaminophen (TYLENOL) tablet 650 mg    ibuprofen (ADVIL;MOTRIN) tablet 400 mg    amoxicillin-clavulanate (AUGMENTIN) 875-125 MG per tablet 1 tablet     Order Specific Question:   Antimicrobial Indications     Answer:   Skin and Soft Tissue Infection    amoxicillin-clavulanate (AUGMENTIN) 875-125 MG per tablet     Sig: Take 1 tablet by mouth 2 times daily for 10 days     Dispense:  20 tablet     Refill:  0       DDX:     Dog bite    Initial MDM/Plan: 61 y.o. male who presents with dog bite      Here with dog bite to right hand, neurovascular intact, good range of motion, no tendon involvement  Tetanus updated  Pain control  Started on Augmentin  Washed out with a 1000 cc of normal saline  X-ray negative for retained foreign bodies  Sutured for gaping 3 cm laceration to the right middle finger, sutured with 4-0 nylon, 2 sutures placed patient tolerated procedure well  Plan for outpatient follow-up with plastics  Strict return precautions    Disposition:  Discharged with outpatient follow-up, antibiotics        DIAGNOSTIC RESULTS / EMERGENCYDEPARTMENT COURSE / MDM     LABS:  No results found for this visit on 05/04/22. RADIOLOGY:  XR HAND RIGHT (MIN 3 VIEWS)   Final Result   No soft tissue gas or foreign body. No acute fracture.                  EMERGENCY DEPARTMENT COURSE:  ED Course as of 05/04/22 0303   Wed May 04, 2022   0130 Patient seen and assessed in the emergency department no acute respiratory cardiovascular distress. Patient here with dog bite to the right hand, particularly to the right middle finger, is right-handed, states he has a pitbull puppy that bit his right hand. Denies any other traumatic injuries, state he is able to move the finger is able to flex it and extend it. States the pain is 7 out of 10 in severity located on the right hand. Denies headache vision changes denies being on blood thinners. Has no allergies according to the patient. Does not know her last tetanus status. [PS]   0231 2 sutures placed  4-0 nylon  Loose placement  [PS]      ED Course User Index  [PS] Rosalinda Alvarez MD          PROCEDURES:  Lac Repair    Date/Time: 5/4/2022 3:02 AM  Performed by: Rosalinda Alvarez MD  Authorized by: Charolotte Severance, MD     Consent:     Consent obtained:  Verbal  Laceration details:     Location:  Finger    Finger location:  R long finger    Length (cm):  3  Repair type:     Repair type:  Simple  Exploration:     Wound exploration: wound explored through full range of motion    Treatment:     Amount of cleaning:  Extensive    Irrigation solution:  Sterile saline    Irrigation method:  Pressure wash  Skin repair:     Repair method:  Sutures    Suture size:  4-0    Suture material:  Nylon    Number of sutures:  2  Approximation:     Approximation:  Loose  Post-procedure details:     Dressing:  Antibiotic ointment    Patient tolerance of procedure: Tolerated well, no immediate complications        CONSULTS:  None    CRITICAL CARE:  Please see attending note    FINAL IMPRESSION      1. Bite by animal          DISPOSITION / PLAN     DISPOSITION Decision To Discharge 05/04/2022 02:58:56 AM   discharge     PATIENT REFERRED TO:  Marvel Dancer, MD  [address]   [place]  [address]  [phone]    In 3 days  For wound re-check    [facility]  [address] [facility] [address] [address]  [phone]    As needed, If symptoms worsen    Bell Webster MD  [place] [address] [address]  [phone]      Make an appointment soon as possible      DISCHARGE MEDICATIONS:  New Prescriptions    AMOXICILLIN-CLAVULANATE (AUGMENTIN) 875-125 MG PER TABLET    Take 1 tablet by mouth 2 times daily for 10 days       Socorro Montenegro MD  Emergency Medicine Resident    (Please note that portions of this note were completed with a voice recognition program.Efforts were made to edit the dictations but occasionally words are mis-transcribed.)       Socorro Montenegro MD  Resident  05/04/22 1086

## 2022-05-04 NOTE — ED NOTES
Pt to ED for dog bite to right hand in between middle and pinky finger. Pt states his [de-identified] puppy got caught in the couch and was in pain and reacted to it with retaliation. Bleeding controlled, pt has gauze to protect the area. Pt able to wiggle fingers and still has sensation. He is alert and oriented x4, RR even and nonlabored. He denies any further needs at this time.       Sharlyne Duverney, RN  05/04/22 2879

## 2022-05-05 DIAGNOSIS — R20.0 BILATERAL HAND NUMBNESS: ICD-10-CM
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
## 2022-05-05 NOTE — TELEPHONE ENCOUNTER
----- Message from Armond Escobar sent at 5/4/2022  4:10 PM EDT -----  Subject: Refill Request    QUESTIONS  Name of Medication? gabapentin (NEURONTIN) 400 MG capsule  Patient-reported dosage and instructions? once a day   How many days do you have left? 2  Preferred Pharmacy? [address] [facility] phone number (if available)? [phone]  ---------------------------------------------------------------------------  --------------  CALL BACK INFO  What is the best way for the office to contact you? OK to leave message on   voicemail  Preferred Call Back Phone Number? [phone]  ---------------------------------------------------------------------------  --------------  SCRIPT ANSWERS  Relationship to Patient?  Self

## 2022-05-09 ENCOUNTER — OFFICE VISIT (OUTPATIENT)
Dept: FAMILY MEDICINE CLINIC | Age: 67
End: 2022-05-09
Payer: MEDICAID

## 2022-05-09 VITALS
TEMPERATURE: 97 F | BODY MASS INDEX: 46.15 KG/M2 | DIASTOLIC BLOOD PRESSURE: 59 MMHG | SYSTOLIC BLOOD PRESSURE: 107 MMHG | HEART RATE: 79 BPM | HEIGHT: 65 IN | WEIGHT: 277 LBS

## 2022-05-09 DIAGNOSIS — S61.219D FINGER LACERATION, SUBSEQUENT ENCOUNTER: Primary | ICD-10-CM

## 2022-05-09 PROCEDURE — G8417 CALC BMI ABV UP PARAM F/U: HCPCS | Performed by: STUDENT IN AN ORGANIZED HEALTH CARE EDUCATION/TRAINING PROGRAM

## 2022-05-09 PROCEDURE — 3017F COLORECTAL CA SCREEN DOC REV: CPT | Performed by: STUDENT IN AN ORGANIZED HEALTH CARE EDUCATION/TRAINING PROGRAM

## 2022-05-09 PROCEDURE — 99213 OFFICE O/P EST LOW 20 MIN: CPT | Performed by: STUDENT IN AN ORGANIZED HEALTH CARE EDUCATION/TRAINING PROGRAM

## 2022-05-09 PROCEDURE — 1036F TOBACCO NON-USER: CPT | Performed by: STUDENT IN AN ORGANIZED HEALTH CARE EDUCATION/TRAINING PROGRAM

## 2022-05-09 PROCEDURE — G8427 DOCREV CUR MEDS BY ELIG CLIN: HCPCS | Performed by: STUDENT IN AN ORGANIZED HEALTH CARE EDUCATION/TRAINING PROGRAM

## 2022-05-09 RX ORDER — GABAPENTIN 400 MG/1
400 CAPSULE ORAL DAILY
Qty: 30 CAPSULE | Refills: 1 | Status: SHIPPED | OUTPATIENT
Start: 2022-05-09 | End: 2022-08-04 | Stop reason: SDUPTHER

## 2022-05-09 ASSESSMENT — PATIENT HEALTH QUESTIONNAIRE - PHQ9
SUM OF ALL RESPONSES TO PHQ QUESTIONS 1-9: 0
2. FEELING DOWN, DEPRESSED OR HOPELESS: 0
SUM OF ALL RESPONSES TO PHQ QUESTIONS 1-9: 0
SUM OF ALL RESPONSES TO PHQ9 QUESTIONS 1 & 2: 0
SUM OF ALL RESPONSES TO PHQ QUESTIONS 1-9: 0
SUM OF ALL RESPONSES TO PHQ QUESTIONS 1-9: 0
1. LITTLE INTEREST OR PLEASURE IN DOING THINGS: 0

## 2022-05-09 ASSESSMENT — ENCOUNTER SYMPTOMS: SHORTNESS OF BREATH: 0

## 2022-05-09 NOTE — PROGRESS NOTES
I have reviewed and discussed key elements of Guillermo Dumont with the resident including plan of care and follow up and agree with the care yogesh plan. Diagnosis Orders   1.  Finger laceration, subsequent encounter

## 2022-05-09 NOTE — PROGRESS NOTES
Visit Information    Have you changed or started any medications since your last visit including any over-the-counter medicines, vitamins, or herbal medicines? no   Have you stopped taking any of your medications? Is so, why? -  no  Are you having any side effects from any of your medications? - no    Have you seen any other physician or provider since your last visit?  no   Have you had any other diagnostic tests since your last visit?  no   Have you been seen in the emergency room and/or had an admission in a hospital since we last saw you?  yes - 5-4-22    Have you had your routine dental cleaning in the past 6 months?  no     Do you have an active MyChart account? If no, what is the barrier?   No: declined    Patient Care Team:  Jerry Kohli MD as PCP - General (Family Medicine)  Edgar Tovar MD as Consulting Physician (Gastroenterology)  Flavio Jaramillo MD as Consulting Physician (Urology)    Medical History Review  Past Medical, Family, and Social History reviewed and does not contribute to the patient presenting condition    Health Maintenance   Topic Date Due    COVID-19 Vaccine (3 - Booster for Cole Garry series) 09/30/2021    Potassium  02/02/2023    Creatinine  02/02/2023    Depression Screen  03/02/2023    Lipids  03/08/2023    Pneumococcal 0-64 years Vaccine (2 - PCV) 04/13/2023    Colorectal Cancer Screen  01/12/2031    DTaP/Tdap/Td vaccine (3 - Td or Tdap) 05/04/2032    Flu vaccine  Completed    Shingles vaccine  Completed    Hepatitis C screen  Completed    HIV screen  Completed    Hepatitis A vaccine  Aged Out    Hepatitis B vaccine  Aged Out    Hib vaccine  Aged Out    Meningococcal (ACWY) vaccine  Aged Out

## 2022-05-09 NOTE — PROGRESS NOTES
Subjective: Lizette Omer is a 61 y.o. male with  has a past medical history of Acute CHF (congestive heart failure) ([address] Arthritis, Atrial fibrillation ([address] BPH (benign prostatic hyperplasia), Cellulitis, Cervical disc disease, CHF (congestive heart failure) ([address] Combined systolic and diastolic congestive heart failure ([address] Coronary artery disease, COVID-19 virus RNA test result indeterminate, CPAP (continuous positive airway pressure) dependence, GERD (gastroesophageal reflux disease), History of incarceration, History of incarceration, Hyperlipidemia, Hypertension, Myocardial infarct ([address] Obesity, Sleep apnea, Wears reading eyeglasses, and Wellness examination. Family History   Problem Relation Age of Onset    Alcohol Abuse Maternal Uncle     Heart Attack Maternal Uncle     Cancer Mother     Alcohol Abuse Father        Presented tothe office today for:  Chief Complaint   Patient presents with    Animal Bite     right hand wound check     Back Pain    Leg Pain     right leg pain having alot of trouble lately pt states        HPI 61year old male with a past medical history of CHF, hypertension and recent dog bite here for follow up. Dog Bite  - 5 days ago  - Laceration about 2cm on right middle finger lateral aspect  - 2 sutures were placed in the ED  - Patient was discharged with augmentin  - Here today for follow up  - Denies any pain, discharge  - Did get a tetanus shot  - No other symptoms    Review of Systems   Constitutional: Negative for chills and fever. Respiratory: Negative for shortness of breath. Cardiovascular: Negative for chest pain. Neurological: Negative for weakness and numbness. All other systems reviewed and are negative.       Objective:    BP (!) 107/59 (Site: Left Upper Arm, Position: Sitting, Cuff Size: Large Adult) Comment: machine  Pulse 79   Temp 97 °F (36.1 °C) (Temporal)   Ht 5' 5\" (1.651 m)   Wt 277 lb (125.6 kg)   BMI 46.10 kg/m²    BP Readings from Last 3 Encounters:   05/09/22 (!) 107/59   05/04/22 135/83   04/28/22 120/80     Physical Exam  Vitals reviewed. Constitutional:       General: He is awake. Appearance: He is obese. Cardiovascular:      Rate and Rhythm: Normal rate and regular rhythm. Heart sounds: Normal heart sounds. Musculoskeletal:        Hands:    Neurological:      Mental Status: He is alert. Psychiatric:         Behavior: Behavior is cooperative. Lab Results   Component Value Date    WBC 9.4 02/02/2022    HGB 13.9 02/02/2022    HCT 42.8 02/02/2022     02/02/2022    CHOL 121 03/08/2022    TRIG 79 03/08/2022    HDL 49 03/08/2022    HDL 49 03/08/2022    ALT 14 03/16/2021    AST 15 03/16/2021     02/02/2022    K 3.7 02/02/2022     02/02/2022    CREATININE 0.91 02/02/2022    BUN 15 02/02/2022    CO2 23 02/02/2022    TSH 1.48 01/24/2020    PSA 0.67 09/22/2020    INR 1.0 03/08/2021    LABA1C 5.6 12/10/2019     Lab Results   Component Value Date    CALCIUM 8.8 02/02/2022    PHOS 4.0 03/08/2021     Lab Results   Component Value Date    LDLCHOLESTEROL 56 03/08/2022    LDLCHOLESTEROL 56 03/08/2022       Assessment and Plan:    1. Finger laceration, subsequent encounter  - Sutures removed  - No other complaints  - Healing appropriately          Requested Prescriptions      No prescriptions requested or ordered in this encounter       There are no discontinued medications. Return in about 3 months (around 8/9/2022) for FU HTN.

## 2022-05-09 NOTE — PATIENT INSTRUCTIONS
Thank you for letting us take care of you today. We hope all your questions were addressed. If a question was overlooked or something else comes to mind after you return home, please contact a member of your Care Team listed below. Your Care Team at [place] [address] is Team #5  Phil Ponce MD (Faculty)  Catia Ortega MD (Resident)  Yina Busby MD (Resident)  Jorge Rosas MD (Resident)  Marcie Blair MD (Resident)  Arnie Arevalo., ALONSO Mccormack., DELIA Joseph., Frank Faith., Michael Gowers [facility] office)  Kristal Reddy, [address] (Clinical Practice Manager)  Maximiliano Valdez [facility] (Clinical Pharmacist)       Office phone number: [phone]    If you need to get in right away due to illness, please be advised we have \"Same Day\" appointments available Monday-Friday. Please call us at [phone] option #3 to schedule your \"Same Day\" appointment.

## 2022-05-11 RX ORDER — ISOSORBIDE MONONITRATE 30 MG/1
30 TABLET, EXTENDED RELEASE ORAL DAILY
Qty: 30 TABLET | Refills: 1 | Status: SHIPPED | OUTPATIENT
Start: 2022-05-11 | End: 2022-07-05

## 2022-05-11 NOTE — TELEPHONE ENCOUNTER
E-scribe request for med refills. Please review and e-scribe if applicable.      Last Visit Date:  5/10/2022  Next Visit Date:  Visit date not found    Hemoglobin A1C (%)   Date Value   12/10/2019 5.6   10/18/2019 5.8             ( goal A1C is < 7)   No results found for: LABMICR  LDL Cholesterol (mg/dL)   Date Value   03/08/2022 56   03/08/2022 56       (goal LDL is <100)   AST (U/L)   Date Value   03/16/2021 15     ALT (U/L)   Date Value   03/16/2021 14     BUN (mg/dL)   Date Value   02/02/2022 15     BP Readings from Last 3 Encounters:   05/09/22 (!) 107/59   05/04/22 135/83   04/28/22 120/80          (goal 120/80)        Patient Active Problem List:     Atrial flutter (HCC)     Sleep-related breathing disorder     Hypokalemia     Atrial fibrillation (HCC)     Ischemic cardiomyopathy     Acute cystitis without hematuria     Hx of CABG     Chronic diastolic (congestive) heart failure (HCC)     Bilateral hand numbness     Right thigh pain     Left leg weakness     Coronary artery disease     Chronic cough     Gastroesophageal reflux disease without esophagitis     Overflow incontinence of urine     Polyp of colon     Post-void dribbling     Post-nasal drip     SYDNEY (acute kidney injury) ([address]     Hyperkalemia     Hypotension     Overactive bladder     Hemorrhoids     HARPREET (obstructive sleep apnea)     Laryngopharyngeal reflux     Skin ulcer of left lower leg, limited to breakdown of skin ([address]     Bilateral edema of lower extremity     Lymphedema of both lower extremities     Venous insufficiency of both lower extremities     Acute pain of right knee     Finger laceration, subsequent encounter      ----Miah Gonzalez

## 2022-05-13 ENCOUNTER — OFFICE VISIT (OUTPATIENT)
Dept: UROLOGY | Age: 67
End: 2022-05-13
Payer: MEDICAID

## 2022-05-13 VITALS
HEIGHT: 65 IN | WEIGHT: 284 LBS | HEART RATE: 68 BPM | DIASTOLIC BLOOD PRESSURE: 72 MMHG | SYSTOLIC BLOOD PRESSURE: 132 MMHG | BODY MASS INDEX: 47.32 KG/M2

## 2022-05-13 DIAGNOSIS — N13.8 BENIGN PROSTATIC HYPERPLASIA WITH URINARY OBSTRUCTION: Primary | ICD-10-CM

## 2022-05-13 DIAGNOSIS — N39.41 URGE INCONTINENCE: ICD-10-CM

## 2022-05-13 DIAGNOSIS — N39.43 POST-VOID DRIBBLING: ICD-10-CM

## 2022-05-13 DIAGNOSIS — N40.1 BENIGN PROSTATIC HYPERPLASIA WITH URINARY OBSTRUCTION: Primary | ICD-10-CM

## 2022-05-13 PROCEDURE — 99214 OFFICE O/P EST MOD 30 MIN: CPT | Performed by: UROLOGY

## 2022-05-13 PROCEDURE — 1036F TOBACCO NON-USER: CPT | Performed by: UROLOGY

## 2022-05-13 PROCEDURE — G8427 DOCREV CUR MEDS BY ELIG CLIN: HCPCS | Performed by: UROLOGY

## 2022-05-13 PROCEDURE — 3017F COLORECTAL CA SCREEN DOC REV: CPT | Performed by: UROLOGY

## 2022-05-13 PROCEDURE — G8417 CALC BMI ABV UP PARAM F/U: HCPCS | Performed by: UROLOGY

## 2022-05-13 NOTE — PROGRESS NOTES
Desirae Lay, Kelsie Leggett, Richard Bojorquez. Urology Progress Note      Patient:  Saul Rodriguez  YOB: 1955  Date: 5/13/2022     HISTORY OF PRESENT ILLNESS:   The patient is a 63year old male who presents today for follow-up for the following problem(s): Overactive bladder, BPH with outflow obstruction  Overall the problem(s) : show no change. Associated Symptoms: No dysuria, gross hematuria. Pain Severity: Pain Score:   0 - No pain no pain     Today's visit  5/13/22  Continues to have incontinence throughout the day. He wears 2 pads which are usually soaked when changed. Increasing oxybutynin to 10mg did not help. He has gained weight at this visit although he states he has been trying to eat less and walk more. He spoke with his cardiologist about viagra and was told that given his cardiac issues, this is not an option for him at this time. He is open to trying botox. Discussed urethral stripping for post void dribbling    Last visit  2/11/22   The patient follows with BPH with LUTs. His problem is chronic and improved after Urolift. 1 pad per day, mild-moderately soaked by the end of the day which is improved since Urolift. . Not soaked when waking up in the mornings. Post void dribbling has not improved. Frequency has not improved, on diuretics. Still taking oxybutynin 5mg XL daily     Overactive bladder - chronic, improved s/p Urolift   Has erectile dysfunction, which is bothersome to the patient, requests medications. We discuss the patient has PMH which increases risk of therapy with PDE5i, and is on nitroglycerine tabs, which is a contraindication to therapy. - We offer the patient Trimix injections, MUSE, and IPP, but he is not interested at this time.        Summary of old records:   -Underwent urodynamics and cystoscopy on 3/26/21 which showed uninhibited bladder contractions, overactive bladder and obstructing kissing lobes  -Underwent urolift on 5/14/21: 5 implants inserted and clip placed in bladder had to be removed and fulgurated. Additional History:   6/11/21  Still having leakage since procedure, which he states is uncontrolled and bothersome. Uses two pads per day, heavy soaked   Taking Ditropan XL 5mg daily, has not helped with leaking. Denies any headache, dry mouth, constipation   Denies any burning with urination  Having urgency and frequency, does take lasix 60 mg twice daily due to CHF      Last several PSA's:  Lab Results   Component Value Date    PSA 0.67 09/22/2020       Last total testosterone:  No results found for: TESTOSTERONE    Urinalysis today:  No results found for this visit on 05/13/22. Last BUN and creatinine:  Lab Results   Component Value Date    BUN 15 02/02/2022     Lab Results   Component Value Date    CREATININE 0.91 02/02/2022       Imaging Reviewed during this Office Visit:   (results were independently reviewed by physician and radiology report verified)    PAST MEDICAL, FAMILY AND SOCIAL HISTORY UPDATE:  Past Medical History:   Diagnosis Date    Acute CHF (congestive heart failure) ([facility] 3/8/2021    Arthritis     back    Atrial fibrillation ([facility] 10/2019    Dr. Anuradha Walsh BPH (benign prostatic hyperplasia)     Cellulitis     Cervical disc disease     CHF (congestive heart failure) [facility])     cardiologist Dr. Jorge Alberto Conley. s CHF clinic    Combined systolic and diastolic congestive heart failure ([facility] 1/15/2020    Coronary artery disease 10/2019     CABG 2019 [facility]    Dr. Iwona Nieves Cardiology last seen within the last year.     COVID-19 virus RNA test result indeterminate 3/31/2021    CPAP (continuous positive airway pressure) dependence     GERD (gastroesophageal reflux disease)     on rx    History of incarceration     released 2019    History of incarceration     Hyperlipidemia     Dr. Dunia Ferrer Hypertension     [facility] CHF clinic     Dr. Dunia Ferrer Myocardial infarct [facility])     Dr. Dunia Ferrer Obesity     Sleep apnea     C-pap    Wears reading eyeglasses     Wellness examination      Julieta  4/2021     Past Surgical History:   Procedure Laterality Date    ABDOMINAL EXPLORATION SURGERY      CARDIAC SURGERY      2019    CARDIOVERSION  10/16/2019    CARPAL TUNNEL RELEASE Bilateral 2008    CERVICAL SPINE SURGERY      2-5    COLONOSCOPY N/A 10/29/2020    COLONOSCOPY WITH BIOPSY performed by Linda Ponce MD at [address]  10/29/2020    COLONOSCOPY SUBMUCOSAL/BOTOX INJECTION performed by Linda Ponce MD at [address]  10/29/2020    COLONOSCOPY POLYPECTOMY SNARE/COLD BIOPSY performed by Linda Ponce MD at [address] N/A 01/12/2021    COLONOSCOPY POLYPECTOMY HOT SNARE, COLD SNARE POLPECTOMY performed by Karla Gibbs MD at [address] [facility] N/A 10/21/2019    CABG CORONARY ARTERY BYPASS GRAFT X1, LIMA-LAD, BROWN 4 MAZE PROCEDURE, 50MM ATRICURE ATRIAL CLIP RIGID INTERNAL FIXATION PLATES X 3   SCREWS X 13 performed by Jessica Ramsey MD at [place] [address] 03/26/2021    URODYNAMICS performed by Jose Nicole MD at [facility]  03/26/2021    CYSTOSCOPY FLEXIBLE performed by Jose Nicole MD at [facility]  05/14/2021    CYSTOSCOPY, UROLIFT, FULGURATION    CYSTOSCOPY N/A 5/14/2021    CYSTOSCOPY, UROLIFT, FULGURATION performed by Jose Nicole MD at [address]      cataract surgery 2020    FRACTURE SURGERY      Left leg    TRANSESOPHAGEAL ECHOCARDIOGRAM  10/16/2019     Family History   Problem Relation Age of Onset    Alcohol Abuse Maternal Uncle     Heart Attack Maternal Uncle     Cancer Mother     Alcohol Abuse Father      No outpatient medications have been marked as taking for the 5/13/22 encounter (Office Visit) with Ale Allison MD.       Patient has no known allergies.   Social History     Tobacco Use   Smoking Status Never Smoker Smokeless Tobacco Never Used       Social History     Substance and Sexual Activity   Alcohol Use Not Currently    Comment: stopped 1991       REVIEW OF SYSTEMS:  Constitutional: negative  Eyes: negative  Respiratory: negative  Cardiovascular: negative  Gastrointestinal: negative  Musculoskeletal: negative  Genitourinary: see HPI  Skin: negative   Neurological: negative  Hematological/Lymphatic: negative  Psychological: negative    Physical Exam:      Vitals:    05/13/22 0808   BP: 132/72   Pulse: 68     NAD, no acute distress  RR  Psych mood appropriate  Abdomen: soft, nontender, nondistended, obese   Extremities: compression socks in place, edema noted     Assessment and Plan   1. Overflow incontinence  2. Post void dridbbling  3. Benign prostatic hyperplasia with urinary obstruction      Plan:   Continue oxybutynin 10mg ER  Plan for cystoscopy with botox 100U  BPH improved s/p UroLift  Continue timed voiding and urethral stripping    I have discussed the care of Nanette Smith including pertinent history and exam findings, with the resident, PA, and or NP. I have personally seen and examined the patient, including the key elements of all parts of the encounter, which been performed by me. I agree with the assessment, plan and orders as documented by the resident, PA, and or NP (GC modifier). Please don't hesitate to call with any questions. Thank you for involving us in the care of this pleasant patient.     Dr. Mara Green MD, MD

## 2022-05-16 RX ORDER — MULTIVITAMIN
TABLET ORAL
Qty: 30 TABLET | Refills: 3 | Status: SHIPPED | OUTPATIENT
Start: 2022-05-16

## 2022-05-16 NOTE — TELEPHONE ENCOUNTER
E-scribe request for med refill. Please review and e-scribe if applicable.      Last Visit Date:  05/09/2022  Next Visit Date:  Visit date not found    Hemoglobin A1C (%)   Date Value   12/10/2019 5.6   10/18/2019 5.8             ( goal A1C is < 7)   No results found for: LABMICR  LDL Cholesterol (mg/dL)   Date Value   03/08/2022 56   03/08/2022 56       (goal LDL is <100)   AST (U/L)   Date Value   03/16/2021 15     ALT (U/L)   Date Value   03/16/2021 14     BUN (mg/dL)   Date Value   02/02/2022 15     BP Readings from Last 3 Encounters:   05/13/22 132/72   05/09/22 (!) 107/59   05/04/22 135/83          (goal 120/80)        Patient Active Problem List:     Atrial flutter (HCC)     Sleep-related breathing disorder     Hypokalemia     Atrial fibrillation (HCC)     Ischemic cardiomyopathy     Acute cystitis without hematuria     Hx of CABG     Chronic diastolic (congestive) heart failure (HCC)     Bilateral hand numbness     Right thigh pain     Left leg weakness     Coronary artery disease     Chronic cough     Gastroesophageal reflux disease without esophagitis     Overflow incontinence of urine     Polyp of colon     Post-void dribbling     Post-nasal drip     SYDNEY (acute kidney injury) ([address]     Hyperkalemia     Hypotension     Overactive bladder     Hemorrhoids     HARPREET (obstructive sleep apnea)     Laryngopharyngeal reflux     Skin ulcer of left lower leg, limited to breakdown of skin ([address]     Bilateral edema of lower extremity     Lymphedema of both lower extremities     Venous insufficiency of both lower extremities     Acute pain of right knee     Finger laceration, subsequent encounter      ----Mckenna Ramirez

## 2022-05-21 ENCOUNTER — HOSPITAL ENCOUNTER (OUTPATIENT)
Dept: MRI IMAGING | Age: 67
Discharge: HOME | End: 2022-05-23
Payer: MEDICAID

## 2022-05-21 DIAGNOSIS — M48.02 STENOSIS OF CERVICAL SPINE WITH MYELOPATHY (HCC): ICD-10-CM

## 2022-05-21 DIAGNOSIS — G99.2 STENOSIS OF CERVICAL SPINE WITH MYELOPATHY (HCC): ICD-10-CM

## 2022-05-21 PROCEDURE — 72141 MRI NECK SPINE W/O DYE: CPT

## 2022-05-23 ENCOUNTER — OFFICE VISIT (OUTPATIENT)
Dept: GASTROENTEROLOGY | Age: 67
End: 2022-05-23
Payer: MEDICAID

## 2022-05-23 VITALS
SYSTOLIC BLOOD PRESSURE: 119 MMHG | HEART RATE: 78 BPM | HEIGHT: 66 IN | BODY MASS INDEX: 43.97 KG/M2 | WEIGHT: 273.6 LBS | DIASTOLIC BLOOD PRESSURE: 63 MMHG

## 2022-05-23 DIAGNOSIS — R05.3 CHRONIC COUGH: ICD-10-CM

## 2022-05-23 DIAGNOSIS — R09.82 POST-NASAL DRIP: ICD-10-CM

## 2022-05-23 DIAGNOSIS — K21.9 GASTROESOPHAGEAL REFLUX DISEASE WITHOUT ESOPHAGITIS: Primary | ICD-10-CM

## 2022-05-23 PROCEDURE — G8417 CALC BMI ABV UP PARAM F/U: HCPCS | Performed by: INTERNAL MEDICINE

## 2022-05-23 PROCEDURE — G8427 DOCREV CUR MEDS BY ELIG CLIN: HCPCS | Performed by: INTERNAL MEDICINE

## 2022-05-23 PROCEDURE — 3017F COLORECTAL CA SCREEN DOC REV: CPT | Performed by: INTERNAL MEDICINE

## 2022-05-23 PROCEDURE — 1036F TOBACCO NON-USER: CPT | Performed by: INTERNAL MEDICINE

## 2022-05-23 PROCEDURE — 99213 OFFICE O/P EST LOW 20 MIN: CPT | Performed by: INTERNAL MEDICINE

## 2022-05-23 RX ORDER — PANTOPRAZOLE SODIUM 40 MG/1
40 TABLET, DELAYED RELEASE ORAL
Qty: 60 TABLET | Refills: 3 | Status: SHIPPED | OUTPATIENT
Start: 2022-05-23 | End: 2022-06-14

## 2022-05-23 ASSESSMENT — ENCOUNTER SYMPTOMS
ABDOMINAL DISTENTION: 0
VOMITING: 0
COUGH: 0
VOICE CHANGE: 0
BLOOD IN STOOL: 0
DIARRHEA: 0
COLOR CHANGE: 0
SORE THROAT: 0
SHORTNESS OF BREATH: 0
APNEA: 0
WHEEZING: 0
ANAL BLEEDING: 0
TROUBLE SWALLOWING: 0
CHOKING: 0
NAUSEA: 0
RECTAL PAIN: 0
CONSTIPATION: 0
ABDOMINAL PAIN: 0

## 2022-05-23 NOTE — PROGRESS NOTES
GI FOLLOW UP    INTERVAL HISTORY:       GERD-refractory GERD symptoms after reducing to PPI once daily. Chief Complaint   Patient presents with    Follow-up    Gastroesophageal Reflux     severe chest burning x 2 weeks       1. Gastroesophageal reflux disease without esophagitis          HISTORY OF PRESENT ILLNESS: Mr.Andrew YUN Berg is a 60 y. o. male with a past history remarkable for HARPREET, A. fib on Eliquis, morbid obesity, history of CABG in October 2019 on dual antiplatelet therapy (aspirin and Plavix), referred for evaluation of nonproductive postprandial cough.  This appears to be most consistent with the patient's chronic GERD symptoms.  He appears to be on maximum PPI therapy     Recent colonoscopy which identified tubular adenomas and sessile serrated adenomas which were removed aside from an isolated flat lesion that will require EMR.  Findings discussed with the patient. Past Medical,Family, and Social History reviewed and does contribute to the patient presenting condition. Patient's PMH/PSH,SH,PSYCH Hx, MEDs, ALLERGIES, and ROS were all reviewed and updated in the appropriate sections. PAST MEDICAL HISTORY:  Past Medical History:   Diagnosis Date    Acute CHF (congestive heart failure) ([facility] [address] 3/8/2021    Arthritis     back    Atrial fibrillation ([facility] [address] 10/2019    Dr. Carmen Siegel BPH (benign prostatic hyperplasia)     Cellulitis     Cervical disc disease     CHF (congestive heart failure) [facility])     cardiologist Dr. Rupert Kraus. s CHF clinic    Combined systolic and diastolic congestive heart failure ([facility] [address] 1/15/2020    Coronary artery disease 10/2019     CABG 2019 [facility]    Dr. Olga Castellon Cardiology last seen within the last year.     COVID-19 virus RNA test result indeterminate 3/31/2021    CPAP (continuous positive airway pressure) dependence     GERD (gastroesophageal reflux disease)     on rx    History of incarceration     released 2019    History of incarceration     Hyperlipidemia     Dr. Jeanine Sandoval Hypertension     [facility] CHF clinic     Dr. Jeanine Sandoval Myocardial infarct [facility])     Dr. Jeanine Sandoval Obesity     Sleep apnea     C-pap    Wears reading eyeglasses     Wellness examination     Dr. Bhanu Miller 4/2021       Past Surgical History:   Procedure Laterality Date    ABDOMINAL EXPLORATION SURGERY      CARDIAC SURGERY      2019 - AtriClip 1.5 or 3T safe Immediately    CARDIOVERSION  10/16/2019    CARPAL TUNNEL RELEASE Bilateral 2008    CERVICAL SPINE SURGERY      2-5    COLONOSCOPY N/A 10/29/2020    COLONOSCOPY WITH BIOPSY performed by Celestina Campa MD at [place] [address]  10/29/2020    COLONOSCOPY SUBMUCOSAL/BOTOX INJECTION performed by Celestina Campa MD at [place] [address]  10/29/2020    COLONOSCOPY POLYPECTOMY SNARE/COLD BIOPSY performed by Celestina Campa MD at [place] [address] N/A 01/12/2021    COLONOSCOPY POLYPECTOMY HOT SNARE, COLD SNARE POLPECTOMY performed by Ab Rios MD at [facility] N/A 10/21/2019    CABG CORONARY ARTERY BYPASS GRAFT X1, LIMA-LAD, BROWN 4 MAZE PROCEDURE, 50MM ATRICURE ATRIAL CLIP RIGID INTERNAL FIXATION PLATES X 3   SCREWS X 13 performed by Edgar Rothman MD at [place] [address] 03/26/2021    URODYNAMICS performed by Denise Cantrell MD at [address] [place] [address].  03/26/2021    CYSTOSCOPY FLEXIBLE performed by Denise Cantrell MD at [address] [place] [address].  05/14/2021    CYSTOSCOPY, UROLIFT, FULGURATION    CYSTOSCOPY N/A 05/14/2021    CYSTOSCOPY, UROLIFT, FULGURATION performed by Denise Cantrell MD at [address]      cataract surgery 2020    FRACTURE SURGERY      Left leg    TRANSESOPHAGEAL ECHOCARDIOGRAM  10/16/2019       CURRENT MEDICATIONS:    Current Outpatient Medications:     pantoprazole (PROTONIX) 40 MG tablet, Take 1 tablet by mouth 2 times daily (before meals), Disp: 60 tablet, Rfl: 3    Multiple Vitamin (MULTIVITAMIN) TABS, TAKE 1 TABLET BY MOUTH DAILY, Disp: 30 tablet, Rfl: 3    isosorbide mononitrate (IMDUR) 30 MG extended release tablet, Take 1 tablet by mouth daily, Disp: 30 tablet, Rfl: 1    gabapentin (NEURONTIN) 400 MG capsule, Take 1 capsule by mouth daily for 31 days. , Disp: 30 capsule, Rfl: 1    potassium chloride (KLOR-CON M) 20 MEQ extended release tablet, TAKE 1 TABLET BY MOUTH DAILY, Disp: 30 tablet, Rfl: 1    furosemide (LASIX) 40 MG tablet, Take 1.5 tablets by mouth 2 times daily, Disp: 90 tablet, Rfl: 2    atorvastatin (LIPITOR) 40 MG tablet, TAKE 1 TABLET BY MOUTH NIGHTLY, Disp: 30 tablet, Rfl: 1    ALLERGY RELIEF 10 MG tablet, TAKE 1 TABLET BY MOUTH DAILY, Disp: 30 tablet, Rfl: 2    HM ASPIRIN EC LOW DOSE 81 MG EC tablet, , Disp: , Rfl:     oxybutynin (DITROPAN XL) 10 MG extended release tablet, Take 1 tablet by mouth daily, Disp: 30 tablet, Rfl: 3    tamsulosin (FLOMAX) 0.4 MG capsule, TAKE 1 CAPSULE BY MOUTH DAILY, Disp: 30 capsule, Rfl: 5    Multiple Vitamins-Minerals (MULTIVITAMIN WITH MINERALS) tablet, Take 1 tablet by mouth daily, Disp: 30 tablet, Rfl: 3    Prenatal Vit-Fe Fumarate-FA (NIVA-PLUS) 27-1 MG TABS, , Disp: , Rfl:     Multiple Vitamins-Minerals (PRESERVISION AREDS 2) CAPS, , Disp: , Rfl:     Blood Pressure Monitor KIT, 1 each by Does not apply route 2 times daily, Disp: 1 kit, Rfl: 0    Misc. Devices ([employer] WEIGHT SCALE) MISC, 1 each by Does not apply route as needed (weigh once a day and record), Disp: 1 each, Rfl: 0    nitroGLYCERIN (NITROSTAT) 0.4 MG SL tablet, Place 1 tablet under the tongue every 5 minutes as needed for Chest pain up to max of 3 total doses. If no relief after 1 dose, call 911., Disp: 25 tablet, Rfl: 1    Elastic Bandages & Supports (JOBST KNEE HIGH COMPRESSION SM) MISC, 2 each by Does not apply route daily Wear q day. Remove q hs. Diagnosis: Chronic venous insufficiency, Disp: 2 each, Rfl: 0    ALLERGIES:   No Known Allergies    FAMILY HISTORY:       Problem Relation Age of Onset    Alcohol Abuse Maternal Uncle     Heart Attack Maternal Uncle     Cancer Mother     Alcohol Abuse Father          SOCIAL HISTORY:   Social History     Socioeconomic History    Marital status:      Spouse name: Not on file    Number of children: Not on file    Years of education: Not on file    Highest education level: Not on file   Occupational History    Not on file   Tobacco Use    Smoking status: Never Smoker    Smokeless tobacco: Never Used   Vaping Use    Vaping Use: Never used   Substance and Sexual Activity    Alcohol use: Not Currently     Comment: stopped 1991    Drug use: Not Currently    Sexual activity: Not Currently   Other Topics Concern    Not on file   Social History Narrative    Not on file     Social Determinants of Health     Financial Resource Strain: High Risk    Difficulty of Paying Living Expenses: Very hard   Food Insecurity: No Food Insecurity    Worried About Running Out of Food in the Last Year: Never true    Edison of Food in the Last Year: Never true   Transportation Needs:     Lack of Transportation (Medical): Not on file    Lack of Transportation (Non-Medical):  Not on file   Physical Activity:     Days of Exercise per Week: Not on file    Minutes of Exercise per Session: Not on file   Stress:     Feeling of Stress : Not on file   Social Connections:     Frequency of Communication with Friends and Family: Not on file    Frequency of Social Gatherings with Friends and Family: Not on file    Attends Scientologist Services: Not on file    Active Member of Clubs or Organizations: Not on file    Attends Club or Organization Meetings: Not on file    Marital Status: Not on file   Intimate Partner Violence:     Fear of Current or Ex-Partner: Not on file    Emotionally Abused: Not on file    Physically Abused: Not on file    Sexually Abused: Not on file   Housing Stability:     Unable to Pay for Housing in the Last Year: Not on file    Number of Places Lived in the Last Year: Not on file    Unstable Housing in the Last Year: Not on file       REVIEW OF SYSTEMS: A 12-point review of systems was obtained and pertinent positives and negatives were listed below. REVIEW OF SYSTEMS:     Constitutional: No fever, no chills, no lethargy, no weakness. HEENT:  No headache, otalgia, itchy eyes, nasal discharge or sore throat. Cardiac:  No chest pain, dyspnea, orthopnea or PND. Chest:   No cough, phlegm or wheezing. Abdomen:      Detailed by MA   Neuro:  No focal weakness, abnormal movements or seizure like activity. Skin:   No rashes, no itching. :   No hematuria, no pyuria, no dysuria, no flank pain. Extremities:  No swelling or joint pains. ROS was otherwise negative    Review of Systems   Constitutional: Negative for appetite change, fatigue, fever and unexpected weight change. HENT: Negative for sore throat, trouble swallowing and voice change. Eyes: Negative for visual disturbance. Respiratory: Negative for apnea, cough, choking, shortness of breath and wheezing. Cardiovascular: Positive for chest pain (severe burning x 2 weeks). Negative for palpitations and leg swelling. Gastrointestinal: Negative for abdominal distention, abdominal pain, anal bleeding, blood in stool, constipation, diarrhea, nausea, rectal pain and vomiting. Genitourinary: Negative for difficulty urinating. Skin: Negative for color change and rash. Neurological: Negative for dizziness, seizures, weakness, light-headedness, numbness and headaches. Hematological: Does not bruise/bleed easily. Psychiatric/Behavioral: Negative for confusion and sleep disturbance. The patient is not nervous/anxious. PHYSICAL EXAMINATION: Vital signs reviewed per the nursing documentation.      /63   Pulse 78   Ht 5' 6\" (1.676 m)   Wt 273 lb 9.6 oz (124.1 kg)   BMI 44.16 kg/m²   Body mass index is 44.16 kg/m². Physical Exam    Physical Exam   Constitutional: Patient is oriented to person, place, and time. Patient appears well-developed and well-nourished. HENT:   Head: Normocephalic and atraumatic. Eyes: Pupils are equal, round, and reactive to light. EOM are normal.   Neck: Normal range of motion. Neck supple. No JVD present. No tracheal deviation present. No thyromegaly present. Cardiovascular: Normal rate, regular rhythm, normal heart sounds and intact distal pulses. Pulmonary/Chest: Effort normal and breath sounds normal. No stridor. No respiratory distress. He has no wheezes. He has no rales. He exhibits no tenderness. Abdominal: Soft. Bowel sounds are normal. He exhibits no distension and no mass. There is no tenderness. There is no rebound and no guarding. No hernia. Musculoskeletal: Normal range of motion. Lymphadenopathy:    Patient has no cervical adenopathy. Neurological: Patient is alert and oriented to person, place, and time. Psychiatric: Patient has a normal mood and affect.  Patient behavior is normal.       LABORATORY DATA: Reviewed  Lab Results   Component Value Date    WBC 9.4 02/02/2022    HGB 13.9 02/02/2022    HCT 42.8 02/02/2022    MCV 93.0 02/02/2022     02/02/2022     02/02/2022    K 3.7 02/02/2022     02/02/2022    CO2 23 02/02/2022    BUN 15 02/02/2022    CREATININE 0.91 02/02/2022    LABALBU 3.8 03/16/2021    BILITOT 0.29 (L) 03/16/2021    ALKPHOS 65 03/16/2021    AST 15 03/16/2021    ALT 14 03/16/2021    INR 1.0 03/08/2021         Lab Results   Component Value Date    RBC 4.60 02/02/2022    HGB 13.9 02/02/2022    MCV 93.0 02/02/2022    MCH 30.2 02/02/2022    MCHC 32.5 02/02/2022    RDW 14.2 02/02/2022    MPV 9.8 02/02/2022    BASOPCT 1 02/02/2022    LYMPHSABS 1.55 02/02/2022    MONOSABS 0.92 02/02/2022    NEUTROABS 6.48 02/02/2022    EOSABS 0.27 02/02/2022 HERIBERTOOSABS 0.07 02/02/2022         DIAGNOSTIC TESTING:     XR HAND RIGHT (MIN 3 VIEWS)    Result Date: 5/4/2022  EXAMINATION: THREE XRAY VIEWS OF THE RIGHT HAND 5/4/2022 1:57 am COMPARISON: None. HISTORY: ORDERING SYSTEM PROVIDED HISTORY: eval for FB TECHNOLOGIST PROVIDED HISTORY: eval for FB Reason for Exam: Dog bite FINDINGS: Degenerative arthritic changes are identified. Carpal bones appear in appropriate alignment. Hook like osteophytes are seen off the metacarpal heads which can be seen in the setting of hemochromatosis and CPPD arthropathy. Osteoarthritic changes seen within the interphalangeal joints. Well corticated osseous density adjacent to the ulnar styloid tip which may be related to a remote injury. No radiopaque foreign body is identified. No significant soft tissue gas is seen. No soft tissue gas or foreign body. No acute fracture. MRI CERVICAL SPINE WO CONTRAST    Result Date: 5/23/2022  EXAMINATION: MRI OF THE CERVICAL SPINE WITHOUT CONTRAST 5/21/2022 8:19 am TECHNIQUE: Multiplanar multisequence MRI of the cervical spine was performed without the administration of intravenous contrast. COMPARISON: Cervical spine plain film series dated 04/13/2022. HISTORY: ORDERING SYSTEM PROVIDED HISTORY: Stenosis of cervical spine with myelopathy ([facility] [address] TECHNOLOGIST PROVIDED HISTORY: r/o cord compression Reason for Exam: R/O Cord compression, Stenosis of cervical spine with myelopathy. Additional signs and symptoms: Previous Cervial Spine surgey with Hardware in place. Hx - DDD (cervical). FINDINGS: BONES/ALIGNMENT: There is normal alignment of the spine. The vertebral body heights are maintained. The bone marrow signal appears unremarkable. The patient is status post mature appearing ACDF from C4 to C7. There has been posterior instrumented fusion with pedicle screws and rods from C3 to C7. Posterior decompression from C3-4 to C7-T1.  SPINAL CORD: Patchy T2 hyperintensity in a thinned cord is present at the C4-5 level extending to the mid C5 level. Cord thinning appears to continue to the C6-7 level. SOFT TISSUES: No paraspinal mass identified. C2-C3: Mild disc space narrowing and posterior disc bulge. Mild left facet hypertrophy. Mild left foraminal stenosis. C3-C4: Fused. Mild disc space narrowing and posterior disc bulge. Mild facet hypertrophy. Mild central canal stenosis. Bilateral moderate foraminal stenosis. Mild central canal stenosis with cord deformity but no cord signal abnormality. C4-C5: Fused. Bilateral foraminal stenosis moderate on the right and moderate/severe on the left. C5-C6: Fused. Mild bilateral foraminal stenosis. C6-C7: Fused. Mild bilateral foraminal stenosis. C7-T1: There is no significant disc protrusion, spinal canal stenosis or neural foraminal narrowing. No acute abnormality evident. Fusion and posterior decompression as indicated above. Multilevel degenerative change with resulting foraminal and central canal stenosis as detailed above. Chronic posttraumatic changes to the cord. IMPRESSION: Mr. Carol Finley is a 61 y.o. male with history of HARPREET, A. fib previously on Eliquis, morbid obesity, history of CABG in 2019, previously on dual antiplatelet therapy, currently on monotherapy, initially referred for postprandial cough and anemia, underwent colonoscopy with removal of colon polyps, repeat recommended and 3 years. Ongoing obscure anemia, upper endoscopy failed to disclose any obvious source of bleeding or blood loss. Denies any melena, hematochezia, bright blood per rectum. No active GI symptoms.         Assessment  1. Gastroesophageal reflux disease without esophagitis        Ti Perches was seen today for follow-up and gastroesophageal reflux. Diagnoses and all orders for this visit:    Gastroesophageal reflux disease without esophagitis- refractory and rebound symptoms after reducing to PPI once daily. We will return to Protonix 40 mg twice daily. Modify dietary restrictions as previously instructed. Denies any dysphagia symptoms no strong indication for upper endoscopy at this time. We will attempt to optimize the patient's medications currently. -     pantoprazole (PROTONIX) 40 MG tablet; Take 1 tablet by mouth 2 times daily (before meals)        RTC: 2 to 3 months. Additional comments: Thank you for allowing me to participate in the care of Mr. José Miguel Sim. For any further questions please do not hesitate to contact me. I have reviewed and agree with the ROS entered by the MA/LPN from today's encounter documented in a separate note. Linda Ponce MD, MPH   Board Certified in Gastroenterology  Board Certified in [address] [place] [address] #: [phone]    this note is created with the assistance of a speech recognition program.  While intending to generate a document that actually reflects the content of the visit, the document can still have some errors including those of syntax and sound a like substitutions which may escape proof reading. It such instances, actual meaning can be extrapolated by contextual diversion.

## 2022-05-24 ENCOUNTER — OFFICE VISIT (OUTPATIENT)
Dept: BURN CARE | Age: 67
End: 2022-05-24
Payer: MEDICARE

## 2022-05-24 VITALS
HEIGHT: 66 IN | BODY MASS INDEX: 43.87 KG/M2 | DIASTOLIC BLOOD PRESSURE: 82 MMHG | WEIGHT: 273 LBS | HEART RATE: 64 BPM | SYSTOLIC BLOOD PRESSURE: 146 MMHG
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
## 2022-05-24 PROCEDURE — 99202 OFFICE O/P NEW SF 15 MIN: CPT | Performed by: PLASTIC SURGERY

## 2022-05-24 PROCEDURE — G8427 DOCREV CUR MEDS BY ELIG CLIN: HCPCS | Performed by: PLASTIC SURGERY

## 2022-05-24 PROCEDURE — 1036F TOBACCO NON-USER: CPT | Performed by: PLASTIC SURGERY

## 2022-05-24 PROCEDURE — G8417 CALC BMI ABV UP PARAM F/U: HCPCS | Performed by: PLASTIC SURGERY

## 2022-05-24 PROCEDURE — 3017F COLORECTAL CA SCREEN DOC REV: CPT | Performed by: PLASTIC SURGERY

## 2022-05-24 RX ORDER — LORATADINE 10 MG/1
TABLET ORAL
Qty: 30 TABLET | Refills: 3 | Status: SHIPPED | OUTPATIENT
Start: 2022-05-24 | End: 2022-10-07

## 2022-05-24 NOTE — PROGRESS NOTES
[facility] New Patient Visit      CHIEF COMPLAINT:    Chief Complaint   Patient presents with    Wound Check     Dog bite       HISTORY OFPRESENT ILLNESS:      The patient is a 61 y.o. male who is being seen for consultation and evaluation of dog bit to the right hand. He was bitten by a baby pitbull. He states that he was washed out in the ED and closed with 2 stitches. He denies any drainage. He states he has had no problems and the wounds have completely healed. He took his antibiotics as prescribed. Denies any further injury. Past Medical History:    Past Medical History:   Diagnosis Date    Acute CHF (congestive heart failure) ([facility] [facility] 3/8/2021    Arthritis     back    Atrial fibrillation ([facility] [address] [facility] 10/2019    Dr. Kiara Shelley BPH (benign prostatic hyperplasia)     Cellulitis     Cervical disc disease     CHF (congestive heart failure) [facility])     cardiologist Dr. Becker Le[facility] CHF clinic    Combined systolic and diastolic congestive heart failure ([facility] [address] 1/15/2020    Coronary artery disease 10/2019     CABG [facility]    Dr. Soco King Cardiology last seen within the last year.     COVID-19 virus RNA test result indeterminate 3/31/2021    CPAP (continuous positive airway pressure) dependence     GERD (gastroesophageal reflux disease)     on rx    History of incarceration     released 2019    History of incarceration     Hyperlipidemia     Dr. Libby Puliod Hypertension     [facility] CHF clinic     Dr. Libby Pulido Myocardial infarct [facility])     Dr. Libby Pulido Obesity     Sleep apnea     C-pap    Wears reading eyeglasses     Wellness examination     Dr. Franko Valdez 4/2021       Past Surgical History:    Past Surgical History:   Procedure Laterality Date    ABDOMINAL EXPLORATION SURGERY      CARDIAC SURGERY      2019 - AtriClip 1.5 or 3T safe Immediately    CARDIOVERSION  10/16/2019    CARPAL TUNNEL RELEASE Bilateral 2008    CERVICAL SPINE SURGERY      2-5    COLONOSCOPY N/A 10/29/2020 COLONOSCOPY WITH BIOPSY performed by Vanda Loo MD at [place] [address]  10/29/2020    COLONOSCOPY SUBMUCOSAL/BOTOX INJECTION performed by Vanda Loo MD at [place] [address]  10/29/2020    COLONOSCOPY POLYPECTOMY SNARE/COLD BIOPSY performed by Vanda Loo MD at [place] [address] N/A 01/12/2021    COLONOSCOPY POLYPECTOMY HOT SNARE, COLD SNARE POLPECTOMY performed by Roberto Ramey MD at [address] [facility] N/A 10/21/2019    CABG CORONARY ARTERY BYPASS GRAFT X1, LIMA-LAD, BROWN 4 MAZE PROCEDURE, 50MM ATRICURE ATRIAL CLIP RIGID INTERNAL FIXATION PLATES X 3   SCREWS X 13 performed by Felecia Bruno MD at [place] [address] 03/26/2021    URODYNAMICS performed by Casimiro Marcial MD at [address]  03/26/2021    CYSTOSCOPY FLEXIBLE performed by Casimiro Marcial MD at [address]  05/14/2021    CYSTOSCOPY, UROLIFT, FULGURATION    CYSTOSCOPY N/A 05/14/2021    CYSTOSCOPY, UROLIFT, FULGURATION performed by Casimiro Marcial MD at [address]      cataract surgery 2020    FRACTURE SURGERY      Left leg    TRANSESOPHAGEAL ECHOCARDIOGRAM  10/16/2019       Current Medications:   Current Outpatient Medications   Medication Sig Dispense Refill    pantoprazole (PROTONIX) 40 MG tablet Take 1 tablet by mouth 2 times daily (before meals) 60 tablet 3    Multiple Vitamin (MULTIVITAMIN) TABS TAKE 1 TABLET BY MOUTH DAILY 30 tablet 3    isosorbide mononitrate (IMDUR) 30 MG extended release tablet Take 1 tablet by mouth daily 30 tablet 1    gabapentin (NEURONTIN) 400 MG capsule Take 1 capsule by mouth daily for 31 days.  30 capsule 1    potassium chloride (KLOR-CON M) 20 MEQ extended release tablet TAKE 1 TABLET BY MOUTH DAILY 30 tablet 1    furosemide (LASIX) 40 MG tablet Take 1.5 tablets by mouth 2 times daily 90 tablet 2    atorvastatin (LIPITOR) 40 MG tablet TAKE 1 TABLET BY MOUTH NIGHTLY 30 tablet 1  ALLERGY RELIEF 10 MG tablet TAKE 1 TABLET BY MOUTH DAILY 30 tablet 2    HM ASPIRIN EC LOW DOSE 81 MG EC tablet       oxybutynin (DITROPAN XL) 10 MG extended release tablet Take 1 tablet by mouth daily 30 tablet 3    tamsulosin (FLOMAX) 0.4 MG capsule TAKE 1 CAPSULE BY MOUTH DAILY 30 capsule 5    Multiple Vitamins-Minerals (MULTIVITAMIN WITH MINERALS) tablet Take 1 tablet by mouth daily 30 tablet 3    Prenatal Vit-Fe Fumarate-FA (NIVA-PLUS) 27-1 MG TABS       Multiple Vitamins-Minerals (PRESERVISION AREDS 2) CAPS       Blood Pressure Monitor KIT 1 each by Does not apply route 2 times daily 1 kit 0    Misc. Devices (Brandcast WEIGHT SCALE) MISC 1 each by Does not apply route as needed (weigh once a day and record) 1 each 0    nitroGLYCERIN (NITROSTAT) 0.4 MG SL tablet Place 1 tablet under the tongue every 5 minutes as needed for Chest pain up to max of 3 total doses. If no relief after 1 dose, call 911. 25 tablet 1    Elastic Bandages & Supports (JOBST KNEE HIGH COMPRESSION SM) MISC 2 each by Does not apply route daily Wear q day. Remove q hs. Diagnosis: Chronic venous insufficiency 2 each 0     No current facility-administered medications for this visit. Allergies:    Patient has no known allergies.     Social History:   Social History     Socioeconomic History    Marital status:      Spouse name: Not on file    Number of children: Not on file    Years of education: Not on file    Highest education level: Not on file   Occupational History    Not on file   Tobacco Use    Smoking status: Never Smoker    Smokeless tobacco: Never Used   Vaping Use    Vaping Use: Never used   Substance and Sexual Activity    Alcohol use: Not Currently     Comment: stopped 1991    Drug use: Not Currently    Sexual activity: Not Currently   Other Topics Concern    Not on file   Social History Narrative    Not on file     Social Determinants of Health     Financial Resource Strain: High Risk    Difficulty of Paying Living Expenses: Very hard   Food Insecurity: No Food Insecurity    Worried About Running Out of Food in the Last Year: Never true    Ran Out of Food in the Last Year: Never true   Transportation Needs:     Lack of Transportation (Medical): Not on file    Lack of Transportation (Non-Medical): Not on file   Physical Activity:     Days of Exercise per Week: Not on file    Minutes of Exercise per Session: Not on file   Stress:     Feeling of Stress : Not on file   Social Connections:     Frequency of Communication with Friends and Family: Not on file    Frequency of Social Gatherings with Friends and Family: Not on file    Attends [employer] Services: Not on file    Active Member of [address] [place] [address] [employer] or Organizations: Not on file    Attends Club or Organization Meetings: Not on file    Marital Status: Not on file   Intimate Partner Violence:     Fear of Current or Ex-Partner: Not on file    Emotionally Abused: Not on file    Physically Abused: Not on file    Sexually Abused: Not on file   Housing Stability:     Unable to Pay for Housing in the Last Year: Not on file    Number of [place] in the Last Year: Not on file    Unstable Housing in the Last Year: Not on file       Family History:  Family History   Problem Relation Age of Onset    Alcohol Abuse Maternal Uncle     Heart Attack Maternal Uncle     Cancer Mother     Alcohol Abuse Father        REVIEW OF SYSTEMS:  Review of Systems    I have reviewed theCC, HPI, ROS, PMH, FHX, Social History. I agree with the documentation provided by other staff, residents, and/or medical students and have reviewed their documentation prior to providing my signature indicating agreement.     PHYSICAL EXAM:  BP (!) 146/82   Pulse 64   Ht 5' 6\" (1.676 m)   Wt 273 lb (123.8 kg)   BMI 44.06 kg/m²   Physical Exam  Gen: AAOx3, NAD, well-nourished, appears stated age  Psych: affect appropriate, normal mood, expressesunderstanding of treatment plan  Head: normocephalic, atraumatic   Chest: unlabored respirations, symmetric chest rise bilaterally, no audible wheezes  Skin: RUE: Well healed wounds to the index and ring fingers. No erythema, drainage or sign of infection. Compartments soft. Med/Rad/Ulnar/AIN/PIN motor intact. Baseline dysesthesias otherwise, Axil/MSC/Med/Rad/Ulnar n sensation intact to light touch. Radial pulse 2+. Radiology:     No images taken in office today    ASSESSMENT:     Diagnosis Orders   1. Dog bite, initial encounter         PLAN:    - He is completely healed. - He has finished antibiotics  - Educated on signs of infection  - Follow up PRN. Tamara Garcia DO  Orthopedic Surgery Resident PGY-1  [address] [facility]      Attending Physician Statement  I have discussed the case, including pertinent history and exam findings with the resident. I have seen and examined the patient and the key elements of all parts of the encounter have been performed by me. I agree with the assessment, plan and orders as documented by the resident.   Thurmon Schlatter, MD on5/24/2022 on 8:46 AM

## 2022-05-24 NOTE — TELEPHONE ENCOUNTER
Last visit: 5/9/22  Last Med refill: 2/2022  Does patient have enough medication for 72 hours: Yes    Next Visit Date:  Future Appointments   Date Time Provider Taj Arvizui   5/26/2022 10:00 AM [facility] RM 1 [facility]   6/13/2022  9:30 AM LUDY Raphael - CNP [facility]   8/15/2022  1:15 PM Randall Salazar MD [facility] [address] Maintenance   Topic Date Due    Prostate Specific Antigen (PSA) Screening or Monitoring  09/22/2021    COVID-19 Vaccine (3 - Booster for Pfizer series) 09/30/2021    Lipids  03/08/2023    Pneumococcal 0-64 years Vaccine (2 - PCV) 04/13/2023    Depression Screen  05/09/2023    Colorectal Cancer Screen  01/12/2031    DTaP/Tdap/Td vaccine (3 - Td or Tdap) 05/04/2032    Flu vaccine  Completed    Shingles vaccine  Completed    Hepatitis C screen  Completed    HIV screen  Completed    Hepatitis A vaccine  Aged Out    Hepatitis B vaccine  Aged Out    Hib vaccine  Aged Out    Meningococcal (ACWY) vaccine  Aged Out       Hemoglobin A1C (%)   Date Value   12/10/2019 5.6   10/18/2019 5.8             ( goal A1C is < 7)   No results found for: LABMICR  LDL Cholesterol (mg/dL)   Date Value   03/08/2022 56   03/08/2022 56       (goal LDL is <100)   AST (U/L)   Date Value   03/16/2021 15     ALT (U/L)   Date Value   03/16/2021 14     BUN (mg/dL)   Date Value   02/02/2022 15     BP Readings from Last 3 Encounters:   05/24/22 (!) 146/82   05/23/22 119/63   05/13/22 132/72          (goal 120/80)    All Future Testing planned in [facility]  Lab Frequency Next Occurrence   Basic Metabolic Panel Once 99/83/3298   Reflux study/Reflux Venous Insufficiency Bilateral Once 10/06/2021               Patient Active Problem List:     Atrial flutter (HCC)     Sleep-related breathing disorder     Hypokalemia     Atrial fibrillation (HCC)     Ischemic cardiomyopathy     Acute cystitis without hematuria     Hx of CABG     Chronic diastolic (congestive) heart failure ([address]     Bilateral hand numbness     Right thigh pain     Left leg weakness     Coronary artery disease     Chronic cough     Gastroesophageal reflux disease without esophagitis     Overflow incontinence of urine     Polyp of colon     Post-void dribbling     Post-nasal drip     SYDNEY (acute kidney injury) (HCC)     Hyperkalemia     Hypotension     Overactive bladder     Hemorrhoids     HARPREET (obstructive sleep apnea)     Laryngopharyngeal reflux     Skin ulcer of left lower leg, limited to breakdown of skin (HCC)     Bilateral edema of lower extremity     Lymphedema of both lower extremities     Venous insufficiency of both lower extremities     Acute pain of right knee     Finger laceration, subsequent encounter

## 2022-05-26 ENCOUNTER — HOSPITAL ENCOUNTER (OUTPATIENT)
Dept: OTHER | Age: 67
Discharge: HOME | End: 2022-05-26
Payer: MEDICAID

## 2022-05-26 VITALS
SYSTOLIC BLOOD PRESSURE: 120 MMHG | BODY MASS INDEX: 45.42 KG/M2 | WEIGHT: 281.4 LBS | HEART RATE: 70 BPM | DIASTOLIC BLOOD PRESSURE: 72 MMHG | OXYGEN SATURATION: 97 %

## 2022-05-26 DIAGNOSIS — I50.32 CHRONIC DIASTOLIC (CONGESTIVE) HEART FAILURE (HCC): Primary | ICD-10-CM

## 2022-05-26 DIAGNOSIS — I25.5 ISCHEMIC CARDIOMYOPATHY: ICD-10-CM

## 2022-05-26 DIAGNOSIS — I87.8 CHRONIC VENOUS STASIS: ICD-10-CM

## 2022-05-26 PROCEDURE — 99212 OFFICE O/P EST SF 10 MIN: CPT

## 2022-05-26 PROCEDURE — 99215 OFFICE O/P EST HI 40 MIN: CPT | Performed by: NURSE PRACTITIONER

## 2022-05-26 ASSESSMENT — ENCOUNTER SYMPTOMS
SHORTNESS OF BREATH: 0
EYE DISCHARGE: 0
COUGH: 0
ABDOMINAL PAIN: 0
BLOOD IN STOOL: 0

## 2022-05-26 NOTE — PROGRESS NOTES
CHF Clinic at [facility]    Office: [phone] Fax: [phone] [facility]F [facility]IN[facility]  [phone]  Dept: [phone]  Loc: [phone]    Saul Rodriguez is a 61 y.o. male who presents today for CHF evaluation. HPI:     Denies shortness of breath, takes long walks with out issue.   +   Fatigue, has HARPREET, has cpap, not able to use d/t mask issues. He plans to call back   + \"stable\"   Edema , pt uses wraps/socks qd. Has not noticed any change . Denies chest pain   Denies  palpitations   Pt has not been weighing himself bc felt he was  \"doing good. \"\" Pt is surprised by wt gain today. With investigation it is learned pt is consuming braunshweiger, kraft mac and cheese,  and hot dogs, as examples of what he has been eating. Past Medical History:   Diagnosis Date    Acute CHF (congestive heart failure) ([facility] 03/08/2021    Arthritis     back    Atrial fibrillation ([facility] 10/2019    Dr. Mónica William BPH (benign prostatic hyperplasia)     Cellulitis     Cervical disc disease     CHF (congestive heart failure) [facility])     cardiologist Dr. Paty Parkinson. s CHF clinic    Combined systolic and diastolic congestive heart failure ([facility] 01/15/2020    Coronary artery disease 10/2019     CABG 2019 [facility]    Dr. Gamal Osman Cardiology last seen within the last year.     COVID-19 virus RNA test result indeterminate 03/31/2021    CPAP (continuous positive airway pressure) dependence     GERD (gastroesophageal reflux disease)     on rx    History of incarceration     released 2019    History of incarceration     Hyperlipidemia     Dr. Jeanine Sandoval Hypertension     [facility] CHF clinic     Dr. Jeanine Sandoval Hypokalemia     Myocardial infarct [facility])     Dr. Jeanine Sandoval Obesity     Sleep apnea     C-pap    Wears reading eyeglasses     Wellness examination     Dr. Bhanu Miller 4/2021     Past Surgical History:   Procedure Laterality Date    ABDOMINAL EXPLORATION SURGERY      CARDIAC SURGERY      2019 - AtriClip 1.5 or 3T safe Immediately    CARDIOVERSION  10/16/2019    CARPAL TUNNEL RELEASE Bilateral 2008    CERVICAL SPINE SURGERY      2-5    COLONOSCOPY N/A 10/29/2020    COLONOSCOPY WITH BIOPSY performed by Linda Ponce MD at [place] [address]  10/29/2020    COLONOSCOPY SUBMUCOSAL/BOTOX INJECTION performed by Linda Ponce MD at [place] [address]  10/29/2020    COLONOSCOPY POLYPECTOMY SNARE/COLD BIOPSY performed by Linda Ponce MD at [place] [address] N/A 01/12/2021    COLONOSCOPY POLYPECTOMY HOT SNARE, COLD SNARE POLPECTOMY performed by Karla Gibbs MD at [address] [facility] N/A 10/21/2019    CABG CORONARY ARTERY BYPASS GRAFT X1, LIMA-LAD, BROWN 4 MAZE PROCEDURE, 50MM ATRICURE ATRIAL CLIP RIGID INTERNAL FIXATION PLATES X 3   SCREWS X 13 performed by Jessica Ramsey MD at [facility] 03/26/2021    URODYNAMICS performed by Jose Nicole MD at [address] [place]  03/26/2021    CYSTOSCOPY FLEXIBLE performed by Jose Nicole MD at [address] [place]  05/14/2021    CYSTOSCOPY, UROLIFT, FULGURATION    CYSTOSCOPY N/A 05/14/2021    CYSTOSCOPY, UROLIFT, FULGURATION performed by Jose Nicole MD at [address]      cataract surgery 2020    FRACTURE SURGERY      Left leg    TRANSESOPHAGEAL ECHOCARDIOGRAM  10/16/2019       Family History   Problem Relation Age of Onset    Alcohol Abuse Maternal Uncle     Heart Attack Maternal Uncle     Cancer Mother     Alcohol Abuse Father        Social History     Tobacco Use    Smoking status: Never Smoker    Smokeless tobacco: Never Used   Substance Use Topics    Alcohol use: Not Currently     Comment: stopped 1991      Current Outpatient Medications   Medication Sig Dispense Refill    ALLERGY RELIEF 10 MG tablet TAKE 1 TABLET BY MOUTH DAILY 30 tablet 3    pantoprazole (PROTONIX) 40 MG tablet Take 1 tablet by mouth 2 times daily (before meals) 60 tablet 3    Multiple Vitamin (MULTIVITAMIN) TABS TAKE 1 TABLET BY MOUTH DAILY 30 tablet 3    isosorbide mononitrate (IMDUR) 30 MG extended release tablet Take 1 tablet by mouth daily 30 tablet 1    gabapentin (NEURONTIN) 400 MG capsule Take 1 capsule by mouth daily for 31 days. 30 capsule 1    potassium chloride (KLOR-CON M) 20 MEQ extended release tablet TAKE 1 TABLET BY MOUTH DAILY 30 tablet 1    furosemide (LASIX) 40 MG tablet Take 1.5 tablets by mouth 2 times daily 90 tablet 2    atorvastatin (LIPITOR) 40 MG tablet TAKE 1 TABLET BY MOUTH NIGHTLY 30 tablet 1    HM ASPIRIN EC LOW DOSE 81 MG EC tablet       oxybutynin (DITROPAN XL) 10 MG extended release tablet Take 1 tablet by mouth daily 30 tablet 3    tamsulosin (FLOMAX) 0.4 MG capsule TAKE 1 CAPSULE BY MOUTH DAILY 30 capsule 5    Multiple Vitamins-Minerals (MULTIVITAMIN WITH MINERALS) tablet Take 1 tablet by mouth daily 30 tablet 3    Prenatal Vit-Fe Fumarate-FA (NIVA-PLUS) 27-1 MG TABS       Multiple Vitamins-Minerals (PRESERVISION AREDS 2) CAPS       Blood Pressure Monitor KIT 1 each by Does not apply route 2 times daily 1 kit 0    Misc. Devices (Awesome.me WEIGHT SCALE) MISC 1 each by Does not apply route as needed (weigh once a day and record) 1 each 0    nitroGLYCERIN (NITROSTAT) 0.4 MG SL tablet Place 1 tablet under the tongue every 5 minutes as needed for Chest pain up to max of 3 total doses. If no relief after 1 dose, call 911. 25 tablet 1    Elastic Bandages & Supports (JOBST KNEE HIGH COMPRESSION SM) MISC 2 each by Does not apply route daily Wear q day. Remove q hs. Diagnosis: Chronic venous insufficiency 2 each 0     No current facility-administered medications for this encounter. No Known Allergies      Subjective:      Review of Systems   Constitutional: Positive for fatigue.  Negative for activity change, chills and fever.   Eyes: Negative for discharge and visual disturbance. Respiratory: Negative for cough and shortness of breath. Cardiovascular: Positive for leg swelling (unchanged ). Negative for chest pain. Gastrointestinal: Negative for abdominal pain and blood in stool. Endocrine: Negative for cold intolerance and heat intolerance. Genitourinary: Negative for dysuria and flank pain. Musculoskeletal: Negative for joint swelling and myalgias. Skin: Negative for pallor and rash. Neurological: Negative for dizziness and headaches. Psychiatric/Behavioral: Negative for hallucinations and suicidal ideas. Objective:     CONCLUSIONS     Summary  Normal LV size and wall thickness. No obvious wall motion abnormality seen. Normal LV systolic function with LVEF >55%. Normal RV size and function. RV systolic pressure 29 mmHg  LA and RA appears normal in size. No obvious significant structural valvular abnormality noted. No significant valvular stenosis or regurgitation noted. Normal aortic root dimension. No significant pericardial effusion noted. No obvious intra-cardiac mass or shunt noted. IVC normal diameter and inspiratory collapse indicating normal RA filling  pressure.     Signature  ----------------------------------------------------------------------------   Electronically signed by Kevin Fiore(Interpreting physician) on   03/04/2021 12:22 PM            Physical Exam  Vitals and nursing note reviewed. Constitutional:       Appearance: He is obese. Comments:      HENT:      Head: Normocephalic and atraumatic. Eyes:      General: No scleral icterus. Conjunctiva/sclera: Conjunctivae normal.   Cardiovascular:      Rate and Rhythm: Normal rate and regular rhythm. Heart sounds: Normal heart sounds. Pulmonary:      Effort: Pulmonary effort is normal.      Breath sounds: Normal breath sounds. No wheezing or rales.    Abdominal:      General: Bowel sounds are normal. Palpations: Abdomen is soft. Musculoskeletal:         General: Normal range of motion. Cervical back: Normal range of motion. Right lower leg: Edema present. Left lower leg: Edema present. Comments: 1+ edema b/l LE. Pt wearing knit athhletic typy compression socks and velcro wraps around legs. Skin:     General: Skin is warm and dry. Neurological:      Mental Status: He is alert and oriented to person, place, and time. /72   Pulse 70   Wt 281 lb 6.4 oz (127.6 kg)   SpO2 97%   BMI 45.42 kg/m²   O2 Device: None (Room air)       Lower Extremity Measurements in cm. R Calf Circumference (cm): 49 cm  L Calf Circumference (cm): 50 cm  R Ankle Circumference (cm): 29 cm  L Ankle Circumference (cm): 29 cm    CBC:   Lab Results   Component Value Date    WBC 9.4 02/02/2022    RBC 4.60 02/02/2022    HGB 13.9 02/02/2022    HCT 42.8 02/02/2022    MCV 93.0 02/02/2022    MCH 30.2 02/02/2022    MCHC 32.5 02/02/2022    RDW 14.2 02/02/2022     02/02/2022    MPV 9.8 02/02/2022     CMP:    Lab Results   Component Value Date     02/02/2022    K 3.7 02/02/2022     02/02/2022    CO2 23 02/02/2022    BUN 15 02/02/2022    CREATININE 0.91 02/02/2022    GFRAA >60 02/02/2022    LABGLOM >60 02/02/2022    GLUCOSE 94 02/02/2022    PROT 6.9 03/16/2021    LABALBU 3.8 03/16/2021    CALCIUM 8.8 02/02/2022    BILITOT 0.29 03/16/2021    ALKPHOS 65 03/16/2021    AST 15 03/16/2021    ALT 14 03/16/2021     Lab Results   Component Value Date    LABA1C 5.6 12/10/2019           :Assessment      1. Chronic diastolic (congestive) heart failure ([address]    2. Ischemic cardiomyopathy    3. Chronic venous stasis        :Plan      1. Chronic diastolic (congestive) heart failure ([address]    2. Ischemic cardiomyopathy    3. Chronic venous stasis      Wt is up.  leg measurements are up in calves. H/o chronic syst/diastolic HF. EF improvement since revascularization/ CABG.       On Guideline-Directed Medication Therapy:     Diuretic     Pt not on BB, but currently echo reads no syst/ diast chf. His bp is 120/72 before any am meds. Concerns for HOTN with aggressive lasix dose, with an increase this week,  therefore , will not add BB back to his regimen at this time. Will consider in the future based upon BP readings. For wt gain and increased  leg measurements will increase diuretic for 3 d. Advised significant change   Pt wearing knit compression hose. Given  Hand written RX for medical grade [employer] hose today. Pt states he will not pay for if his insurance does not cover. continue leg elevation t/o day. Increase lasix to 80 mg in am and 60 mg in pm x 3 d; then return to 60 mg BID. Patient verbalizes understanding and repeats plan  back to writer the plan. Pt reminded to follow a low sodium diet , 2000 mg/ day. Very lengthy discussion today on his recent diet and the Na+ content of the foods he has been choosing to eat. Pt apparently does not eat frequently but his food selections have been very high Na+. Detailed instructions on better food choices given. CHF educational materials given again to pt. Encouraged daily wt taking, despite how \"well\" he feels, or despite how \"good\" he thinks he is doing with his food choice. RTC 1 week, labs at that time. Orders Placed This Encounter   Procedures    Basic Metabolic Panel     Standing Status:   Future     Standing Expiration Date:   5/26/2023     No orders of the defined types were placed in this encounter. Patientgiven verbal and/or written educational instructions. Follow up as directed. I have reviewed and agree with the nursing documentation. Verbally reviewed medication list with patient; patient verbalized understanding. Discussed 2000mg/day sodium restricted diet; patient verbalized understanding. Moderate daily exercise encouraged as tolerated. Discussed rest breaks as needed; patient verbalized understanding.      Patient instructed to weigh self at the same time of each day, using same clothes and same scale; reinforced teaching to monitor for 3-5 lb weight increase over 1-2 days, and to notify the CHF clinic at [phone] or physician office if weight change noted. Patient verbalized understanding. Risks of smoking discussed with the patient if applicable; patient strongly discouraged to smoke. Patient verbalized understanding. Signs and symptoms of CHF discussed with patient, such as feeling more tired than normal, feeling short of breath, coughing that increases when you lie down, sudden weight gain, swelling of your feet, legs or belly. Patient verbalized understanding to notify the CHF clinic at [phone] or physician office if these symptoms occur. Compliance with plan of care and further disease process causes discussed with patient, patient encouraged to keep all follow up appointments. Patient verbalized understanding. Echocardiogram reviewed. Labs reviewed. Medications reviewed. Medications adjusted  Labs ordered     Patient was seen with total face to face time of  > 40 minutes. More than 50% of this visit was counseling and education, & coordination of care.            Electronically signedby LUDY Sheth CNP on 5/26/2022 at 10:17 AM

## 2022-06-01 ENCOUNTER — TELEPHONE (OUTPATIENT)
Dept: UROLOGY | Age: 67
End: 2022-06-01

## 2022-06-01 ENCOUNTER — HOSPITAL ENCOUNTER (OUTPATIENT)
Age: 67
Discharge: HOME | End: 2022-06-01
Payer: MEDICAID

## 2022-06-01 LAB
ANION GAP SERPL CALCULATED.3IONS-SCNC: 13 MMOL/L (ref 9–17)
BUN BLDV-MCNC: 18 MG/DL (ref 8–23)
CALCIUM SERPL-MCNC: 9.4 MG/DL (ref 8.6–10.4)
CHLORIDE BLD-SCNC: 102 MMOL/L (ref 98–107)
CO2: 21 MMOL/L (ref 20–31)
CREAT SERPL-MCNC: 0.64 MG/DL (ref 0.7–1.2)
GFR AFRICAN AMERICAN: >60 ML/MIN
GFR NON-AFRICAN AMERICAN: >60 ML/MIN
GFR SERPL CREATININE-BSD FRML MDRD: ABNORMAL ML/MIN/{1.73_M2}
GLUCOSE BLD-MCNC: 108 MG/DL (ref 70–99)
POTASSIUM SERPL-SCNC: 3.7 MMOL/L (ref 3.7–5.3)
SODIUM BLD-SCNC: 136 MMOL/L (ref 135–144)

## 2022-06-01 PROCEDURE — 80048 BASIC METABOLIC PNL TOTAL CA: CPT

## 2022-06-01 PROCEDURE — 36415 COLL VENOUS BLD VENIPUNCTURE: CPT

## 2022-06-01 NOTE — TELEPHONE ENCOUNTER
Patient is scheduled for surgery on 7/8 at 7:30AM.  Left voicemail with date, time, instructions and to call back to confirm. Letter mailed out today.

## 2022-06-02 ENCOUNTER — HOSPITAL ENCOUNTER (OUTPATIENT)
Dept: OTHER | Age: 67
Discharge: HOME | End: 2022-06-02
Payer: MEDICAID

## 2022-06-02 VITALS
RESPIRATION RATE: 16 BRPM | DIASTOLIC BLOOD PRESSURE: 80 MMHG | HEART RATE: 76 BPM | BODY MASS INDEX: 43.58 KG/M2 | WEIGHT: 270 LBS | SYSTOLIC BLOOD PRESSURE: 130 MMHG | OXYGEN SATURATION: 95 %

## 2022-06-02 PROCEDURE — 99212 OFFICE O/P EST SF 10 MIN: CPT

## 2022-06-02 NOTE — PROGRESS NOTES
Date:  2022  Time:  11:04 AM    CHF Clinic at [facility] [facility]    Office: [phone] Fax: [phone]    Re:  Cristi Garibay   Patient : 1955    Vital Signs: /80   Pulse 76   Resp 16   Wt 270 lb (122.5 kg)   SpO2 95%   BMI 43.58 kg/m²                                                   Recent Labs     22  1125      K 3.7      CO2 21   BUN 18   CREATININE 0.64*   GLUCOSE 108*        Respiratory:    Assessment  Charting Type: Reassessment    Breath Sounds  Right Upper Lobe: Clear  Right Middle Lobe: Clear  Right Lower Lobe: Clear  Left Upper Lobe: Clear  Left Lower Lobe: Clear              Peripheral Vascular  RLE Edema: +1,Non-pitting  LLE Edema: +1,Non-pitting      Complaints: No new complaints     Physician Orders No new orders     Comment :Patient was her on 2022 we increased his LAsix for 3 days today he is down 11.5 pounds Lab work from 2022 all WNL He is to go back to original LAsix dose 60mg PO BID and KCL 20 meq PO q day per Luz Sinha CNP. HAs no SOB or chest pain legs much improved +1 non pitting edema bilaterally. Has compression stockings on.  We will see in 1 month 2022    Electronically signed by Diana Schilling RN on 2022 at 11:04 AM

## 2022-06-06 RX ORDER — OXYBUTYNIN CHLORIDE 10 MG/1
10 TABLET, EXTENDED RELEASE ORAL DAILY
Qty: 30 TABLET | Refills: 3 | Status: SHIPPED | OUTPATIENT
Start: 2022-06-06

## 2022-06-13 ENCOUNTER — OFFICE VISIT (OUTPATIENT)
Dept: NEUROSURGERY | Age: 67
End: 2022-06-13
Payer: MEDICAID

## 2022-06-13 VITALS
OXYGEN SATURATION: 96 % | HEART RATE: 62 BPM | TEMPERATURE: 97.4 F | WEIGHT: 271.4 LBS | SYSTOLIC BLOOD PRESSURE: 115 MMHG | BODY MASS INDEX: 43.62 KG/M2 | HEIGHT: 66 IN | DIASTOLIC BLOOD PRESSURE: 73 MMHG

## 2022-06-13 DIAGNOSIS — R26.81 GAIT INSTABILITY: ICD-10-CM

## 2022-06-13 DIAGNOSIS — K21.9 GASTROESOPHAGEAL REFLUX DISEASE WITHOUT ESOPHAGITIS: ICD-10-CM

## 2022-06-13 DIAGNOSIS — Z98.1 HISTORY OF FUSION OF CERVICAL SPINE: ICD-10-CM

## 2022-06-13 DIAGNOSIS — M48.02 STENOSIS OF CERVICAL SPINE WITH MYELOPATHY (HCC): Primary | ICD-10-CM

## 2022-06-13 DIAGNOSIS — G99.2 STENOSIS OF CERVICAL SPINE WITH MYELOPATHY (HCC): Primary | ICD-10-CM

## 2022-06-13 PROCEDURE — 1036F TOBACCO NON-USER: CPT | Performed by: NURSE PRACTITIONER

## 2022-06-13 PROCEDURE — 99214 OFFICE O/P EST MOD 30 MIN: CPT | Performed by: NURSE PRACTITIONER

## 2022-06-13 PROCEDURE — G8417 CALC BMI ABV UP PARAM F/U: HCPCS | Performed by: NURSE PRACTITIONER

## 2022-06-13 PROCEDURE — G8427 DOCREV CUR MEDS BY ELIG CLIN: HCPCS | Performed by: NURSE PRACTITIONER

## 2022-06-13 PROCEDURE — 3017F COLORECTAL CA SCREEN DOC REV: CPT | Performed by: NURSE PRACTITIONER

## 2022-06-13 NOTE — PROGRESS NOTES
[address]  [facility] [place] [facility] [address] [facility] [address]  Dept: [phone]    Patient:  Cristi Garibay  YOB: 1955  Date: 4/13/22    The patient is a 61 y.o. male who presents today for consult of the following problems:     Chief Complaint   Patient presents with    Other     stenosis of cervical spine with myelopathy; results MRI         HPI:     Cristi Garibay is a 61 y.o. male on whom neurosurgical consultation was requested by Diane Burk MD for management of neck pain and arm symptoms. Patient with complaints of persistent numbness and tingling to both hands, none dermatomal distribution. Reports hand clumsiness, has stopped using glass as he frequently drops things and they will shatter. Also has to use a large spoon to eat so that he does not drop food everywhere. This has been an ongoing, progressive issue for him. He does have a history significant for bilateral carpal tunnel release around 2008, and subsequent cervical decompression both anterior and posterior around 2018. Has attempted 3 to 4 months of physical therapy with no significant improvement. Does have some gait instability. Uses a large spoon to eat, doesn't use glass. Symptoms are significantly bothersome on a daily basis. Does have complaints of gait instability. Carpal tunnel [id number]  Cervical surgery in [facility], anterior and posterior fusion-ports having resolution of symptoms for approximately 2 to 3 months postoperatively, with gradual return. Patient was incarcerated at the time of his surgery and did not have any rehabilitation postoperatively.     MYELOPATHY    Frequently dropping things: Yes  Difficulty with buttoning clothes, using zipper, putting on watch OR jewelry: Yes  Changes in handwriting: Yes  Numbness or tingling: Yes    LIMITATIONS    Pain significantly limiting on a daily basis   Daily pharmacologic pain control include: gabapentin  Neck : Arm pain: no neck/arm pain    MANAGEMENT     Prior Surgery: Yes-ACDF C4-C7; Posterior fusion C3-C7  Prior to 1yr ago: In the last year:    Physical Therapy: Yes-not helpful, completed 3-4 months   Chiropractic Interventions: No   Injections: No;  Improvement: n/a      History:     Past Medical History:   Diagnosis Date    Acute CHF (congestive heart failure) ([facility] 03/08/2021    Arthritis     back    [facility]rial fibrillation [facility] 10/2019    Dr. Moreno Gold BPH (benign prostatic hyperplasia)     Cellulitis     Cervical disc disease     CHF (congestive heart failure) [facility])     cardiologist Dr. Salome Henry. [facility] CHF clinic    Combined systolic and diastolic congestive heart failure ([facility] 01/15/2020    Coronary artery disease 10/2019     CABG 2019 [facility]    Dr. Lele Huizar Cardiology last seen within the last year.     COVID-19 virus RNA test result indeterminate 03/31/2021    CPAP (continuous positive airway pressure) dependence     GERD (gastroesophageal reflux disease)     on rx    History of incarceration     released 2019    History of incarceration     Hyperlipidemia     Dr. Josué Dalton Hypertension     [facility] CHF clinic     Dr. Josué Dalton Hypokalemia     Myocardial infarct [facility])     Dr. Josué Dalton Obesity     Sleep apnea     C-pap    Wears reading eyeglasses     Wellness examination     Dr. Andria Mendez 4/2021     Past Surgical History:   Procedure Laterality Date    ABDOMINAL EXPLORATION SURGERY      CARDIAC SURGERY      2019 - AtriClip 1.5 or 3T safe Immediately    CARDIOVERSION  10/16/2019    CARPAL TUNNEL RELEASE Bilateral 2008    CERVICAL SPINE SURGERY      2-5    COLONOSCOPY N/A 10/29/2020    COLONOSCOPY WITH BIOPSY performed by Calista Long MD at [address]  10/29/2020    COLONOSCOPY SUBMUCOSAL/BOTOX INJECTION performed by Calista Long MD at [address]  10/29/2020 COLONOSCOPY POLYPECTOMY SNARE/COLD BIOPSY performed by Vanda Loo MD at [address] [facility] COLONOSCOPY N/A 01/12/2021    COLONOSCOPY POLYPECTOMY HOT SNARE, COLD SNARE POLPECTOMY performed by Roberto Ramey MD at [address] [facility] N/A 10/21/2019    CABG CORONARY ARTERY BYPASS GRAFT X1, LIMA-LAD, BROWN 4 MAZE PROCEDURE, 50MM ATRICURE ATRIAL CLIP RIGID INTERNAL FIXATION PLATES X 3   SCREWS X 13 performed by Felecia Bruno MD at [place] [address] 03/26/2021    URODYNAMICS performed by Casimiro Marcial MD at [address] [place] [address]  03/26/2021    CYSTOSCOPY FLEXIBLE performed by Casimiro Marcial MD at [address] [place] [address]  05/14/2021    CYSTOSCOPY, UROLIFT, FULGURATION    CYSTOSCOPY N/A 05/14/2021    CYSTOSCOPY, UROLIFT, FULGURATION performed by Casimiro Marcial MD at [address]      cataract surgery 2020    FRACTURE SURGERY      Left leg    TRANSESOPHAGEAL ECHOCARDIOGRAM  10/16/2019     Family History   Problem Relation Age of Onset    Alcohol Abuse Maternal Uncle     Heart Attack Maternal Uncle     Cancer Mother     Alcohol Abuse Father      Current Outpatient Medications on File Prior to Visit   Medication Sig Dispense Refill    oxybutynin (DITROPAN-XL) 10 mg extended release tablet TAKE 1 TABLET BY MOUTH DAILY 30 tablet 3    ALLERGY RELIEF 10 MG tablet TAKE 1 TABLET BY MOUTH DAILY 30 tablet 3    pantoprazole (PROTONIX) 40 MG tablet Take 1 tablet by mouth 2 times daily (before meals) 60 tablet 3    Multiple Vitamin (MULTIVITAMIN) TABS TAKE 1 TABLET BY MOUTH DAILY 30 tablet 3    isosorbide mononitrate (IMDUR) 30 MG extended release tablet Take 1 tablet by mouth daily 30 tablet 1    gabapentin (NEURONTIN) 400 MG capsule Take 1 capsule by mouth daily for 31 days.  30 capsule 1    potassium chloride (KLOR-CON M) 20 MEQ extended release tablet TAKE 1 TABLET BY MOUTH DAILY 30 tablet 1    furosemide (LASIX) 40 MG tablet Take 1.5 tablets by mouth 2 times daily 90 tablet 2    atorvastatin (LIPITOR) 40 MG tablet TAKE 1 TABLET BY MOUTH NIGHTLY 30 tablet 1    HM ASPIRIN EC LOW DOSE 81 MG EC tablet       tamsulosin (FLOMAX) 0.4 MG capsule TAKE 1 CAPSULE BY MOUTH DAILY 30 capsule 5    Multiple Vitamins-Minerals (MULTIVITAMIN WITH MINERALS) tablet Take 1 tablet by mouth daily 30 tablet 3    Prenatal Vit-Fe Fumarate-FA (NIVA-PLUS) 27-1 MG TABS       Multiple Vitamins-Minerals (PRESERVISION AREDS 2) CAPS       Blood Pressure Monitor KIT 1 each by Does not apply route 2 times daily 1 kit 0    Misc. Devices (PassportParking WEIGHT SCALE) MISC 1 each by Does not apply route as needed (weigh once a day and record) 1 each 0    nitroGLYCERIN (NITROSTAT) 0.4 MG SL tablet Place 1 tablet under the tongue every 5 minutes as needed for Chest pain up to max of 3 total doses. If no relief after 1 dose, call 911. 25 tablet 1    Elastic Bandages & Supports (JOBST KNEE HIGH COMPRESSION SM) MISC 2 each by Does not apply route daily Wear q day. Remove q hs. Diagnosis: Chronic venous insufficiency 2 each 0     No current facility-administered medications on file prior to visit. Social History     Tobacco Use    Smoking status: Never Smoker    Smokeless tobacco: Never Used   Vaping Use    Vaping Use: Never used   Substance Use Topics    Alcohol use: Not Currently     Comment: stopped 1991    Drug use: Not Currently       No Known Allergies    Review of Systems  Constitutional: Negative for activity change and appetite change. HENT: Negative for ear pain and facial swelling. Eyes: Negative for discharge and itching. Respiratory: Negative for choking and chest tightness. Cardiovascular: Negative for chest pain and leg swelling. Gastrointestinal: Negative for nausea and abdominal pain. Endocrine: Negative for cold intolerance and heat intolerance. Genitourinary: Negative for frequency and flank pain.    Musculoskeletal: Negative for myalgias and joint swelling. Skin: Negative for rash and wound. Allergic/Immunologic: Negative for environmental allergies and food allergies. Hematological: Negative for adenopathy. Does not bruise/bleed easily. Psychiatric/Behavioral: Negative for self-injury. The patient is not nervous/anxious. Physical Exam:      /73   Pulse 62   Temp 97.4 °F (36.3 °C)   Ht 5' 6\" (1.676 m)   Wt 271 lb 6.4 oz (123.1 kg)   SpO2 96%   BMI 43.81 kg/m²   Estimated body mass index is 43.81 kg/m² as calculated from the following:    Height as of this encounter: 5' 6\" (1.676 m). Weight as of this encounter: 271 lb 6.4 oz (123.1 kg). General:  Cuco Chanel is a 61y.o. year old male who appears his stated age. HEENT: Normocephalic atraumatic. Neck supple. Chest: regular rate; pulses equal  Abdomen: Soft nontender nondistended.    Ext: DP and PT pulses 2+, good cap refill  Neuro    Mentation  Appropriate affect  Registration intact  Orientation intact  Judgment intact to situation    Cranial Nerves:   Pupils equal and reactive to light  Extraocular motion intact  Face and shrug symmetric  Tongue midline  No dysarthria  v1-3 sensation symmetric, masseter tone symmetric  Hearing symmetric and intact    Sensation: decreased to both hands diffusely  Ring finger split: neg    Motor  L deltoid 5/5; R deltoid 5/5  L biceps 5/5; R biceps 5/5  L triceps 5/5; R triceps 5/5  L wrist extension 5/5; R wrist extension 5/5  L intrinsics 4/5; R intrinsics 4/5     L iliopsoas 5/5 , R iliopsoas 5/5  L quadriceps 5/5; R quadriceps 5/5  L Dorsiflexion 5/5; R dorsiflexion 5/5  L Plantarflexion 5/5; R plantarflexion 5/5  L EHL 5/5; R EHL 5/5    Reflexes  L Brachioradialis 3+/4; R brachioradialis 2+/4  L Biceps 3+/4; R Biceps 2+/4  L Triceps 2+/4; R Triceps 2+/4  L Patellar 2+/4: R Patellar 2+/4  L Achilles 2+/4; R Achilles 2+/4    hoffmans L: pos  hoffmans R: neg  Clonus L: neg  Clonus R: neg  Babinski L: neg  Babinski R: neg      Studies Review:     MRI cervical spine 5/21/2022:  FINDINGS:   BONES/ALIGNMENT: There is normal alignment of the spine. The vertebral body   heights are maintained. The bone marrow signal appears unremarkable.  The   patient is status post mature appearing ACDF from C4 to C7.  There has been   posterior instrumented fusion with pedicle screws and rods from C3 to C7. Posterior decompression from C3-4 to C7-T1.       SPINAL CORD: Patchy T2 hyperintensity in a thinned cord is present at the   C4-5 level extending to the mid C5 level.  Cord thinning appears to continue   to the C6-7 level.       SOFT TISSUES: No paraspinal mass identified.       C2-C3: Mild disc space narrowing and posterior disc bulge.  Mild left facet   hypertrophy.  Mild left foraminal stenosis.       C3-C4: Fused.  Mild disc space narrowing and posterior disc bulge.  Mild   facet hypertrophy.  Mild central canal stenosis.  Bilateral moderate   foraminal stenosis.  Mild central canal stenosis with cord deformity but no   cord signal abnormality.       C4-C5: Fused.  Bilateral foraminal stenosis moderate on the right and   moderate/severe on the left.       C5-C6: Fused.  Mild bilateral foraminal stenosis.       C6-C7: Fused.  Mild bilateral foraminal stenosis.       C7-T1: There is no significant disc protrusion, spinal canal stenosis or   neural foraminal narrowing. X-ray cervical spine 4/13/2022:  FINDINGS:   The patient is status post anterior fusion from C4 through C7.  The patient   is status post posterior fusion of C3 through C7.  No perihardware lucency. Alignment is anatomic.           Assessment and Plan:      1. Stenosis of cervical spine with myelopathy ([address]    2. History of fusion of cervical spine    3. Gait instability          Plan: MRI findings reviewed with patient, persistent central stenosis and cord deformity. Patient with continued issues with hand function, dexterity, gait stability.   Symptoms are present on a daily basis, have been persistent for the last 3 to 4 years. Recommend obtaining cervical CT to evaluate prior fusions, follow-up with surgeon after additional imaging for review. Followup: Return in about 3 weeks (around 7/4/2022), or if symptoms worsen or fail to improve. Prescriptions Ordered:  No orders of the defined types were placed in this encounter. Orders Placed:  Orders Placed This Encounter   Procedures    CT CERVICAL SPINE WO CONTRAST     Standing Status:   Future     Standing Expiration Date:   9/11/2022     Order Specific Question:   Reason for exam:     Answer:   eval prior ant/post fusion        Electronically signed by LUDY Cohen CNP on 6/13/2022 at 9:46 AM    Please note that this chart was generated using voice recognition Dragon dictation software. Although every effort was made to ensure the accuracy of this automated transcription, some errors in transcription may have occurred.

## 2022-06-14 RX ORDER — PANTOPRAZOLE SODIUM 40 MG/1
40 TABLET, DELAYED RELEASE ORAL
Qty: 60 TABLET | Refills: 5 | Status: SHIPPED | OUTPATIENT
Start: 2022-06-14

## 2022-06-17 ENCOUNTER — HOSPITAL ENCOUNTER (OUTPATIENT)
Dept: CT IMAGING | Age: 67
Discharge: HOME | End: 2022-06-19
Payer: MEDICAID

## 2022-06-17 DIAGNOSIS — M48.02 STENOSIS OF CERVICAL SPINE WITH MYELOPATHY (HCC): ICD-10-CM

## 2022-06-17 DIAGNOSIS — G99.2 STENOSIS OF CERVICAL SPINE WITH MYELOPATHY (HCC): ICD-10-CM

## 2022-06-17 DIAGNOSIS — Z98.1 HISTORY OF FUSION OF CERVICAL SPINE: ICD-10-CM

## 2022-06-17 PROCEDURE — 72125 CT NECK SPINE W/O DYE: CPT

## 2022-06-24 ENCOUNTER — CLINICAL DOCUMENTATION (OUTPATIENT)
Dept: OCCUPATIONAL THERAPY | Age: 67
End: 2022-06-24

## 2022-06-24 NOTE — DISCHARGE SUMMARY
TREATMENT LOCATION:   [x] [facility] [address]: [phone]  F: [phone] [] [address]: [phone]  F: [phone]     Lymphedema Therapy Discharge Note    Date: 2022      Patient: Meño Thompson  : 1955  MRN: [id number]       Total visits attended: 4        Cancels/No shows: 0  Date of initial visit: 21                Date of final visit: 22      Subjective:  Refer to initial evaluation/ treatment notes. Objective:  Refer to initial evaluation/treatment notes. Assessment:  Refer to initial evaluation/ treatment notes. Treatment to Date:  [] Therapeutic Exercise           [x] Instruction in Home Program                       [] Manual Therapy  [x] Self-Care/Home Management  [] Vasopneumatic Pump             [] Other:    Discharge Status:   [] Treatment goals were met. [] Pt received maximum benefit. No further therapy indicated at this time. [] Pt to continue exercise/home instructions independently  [x] Pt has not called or returned to clinic in over 90 days with additional concerns. Comments:      Electronically signed by Bridgett Colón OT on 2022 at 1:06 PM    The patient is being discharged due to the status listed above. If patient would like to return to the clinic for additional therapy a new referral will be necessary. Thank you again for your referral and allowing us to assist in the care of this patient! For any questions or concerns, please don't hesitate to call us at [phone].

## 2022-07-01 ENCOUNTER — HOSPITAL ENCOUNTER (OUTPATIENT)
Dept: OTHER | Age: 67
Discharge: HOME | End: 2022-07-01
Payer: MEDICARE

## 2022-07-01 VITALS
DIASTOLIC BLOOD PRESSURE: 72 MMHG | OXYGEN SATURATION: 96 % | SYSTOLIC BLOOD PRESSURE: 110 MMHG | WEIGHT: 275.2 LBS | BODY MASS INDEX: 44.42 KG/M2 | RESPIRATION RATE: 20 BRPM | HEART RATE: 71 BPM

## 2022-07-01 PROCEDURE — 99212 OFFICE O/P EST SF 10 MIN: CPT

## 2022-07-01 NOTE — PROGRESS NOTES
Verbally reviewed medication list with patient; patient verbalized understanding. Discussed 2000mg/day sodium restricted diet; patient verbalized understanding. Moderate daily exercise encouraged as tolerated. Discussed rest breaks as needed; patient verbalized understanding. Patient instructed to weigh self at the same time of each day, using same clothes and same scale; reinforced teaching to monitor for 3-5 lb weight increase over 1-2 days, and to notify the CHF clinic at [phone] or physician office if weight change noted. Patient verbalized understanding. Risks of smoking discussed with the patient if applicable; patient strongly discouraged to smoke. Patient verbalized understanding. Signs and symptoms of CHF discussed with patient, such as feeling more tired than normal, feeling short of breath, coughing that increases when you lie down, sudden weight gain, swelling of your feet, legs or belly. Patient verbalized understanding to notify the CHF clinic at [phone] or physician office if these symptoms occur. Compliance with plan of care and further disease process causes discussed with patient, patient encouraged to keep all follow up appointments. Patient verbalized understanding.

## 2022-07-01 NOTE — PROGRESS NOTES
Date:  2022  Time:  10:38 AM    CHF Clinic at [facility]    Office: [phone] Fax: [phone]    Re:  Dawson Brittle   Patient : 1955    Vital Signs: /72   Pulse 71   Resp 20   Wt 275 lb 3.2 oz (124.8 kg)   SpO2 96%   BMI 44.42 kg/m²                       O2 Device: None (Room air)                           No results for input(s): CBC, HGB, HCT, WBC, PLATELET, NA, K, CL, CO2, BUN, CREATININE, GLUCOSE, BNP, INR in the last 72 hours. Respiratory:    Assessment  Charting Type: Reassessment    Breath Sounds  Right Upper Lobe: Clear  Right Middle Lobe: Clear  Right Lower Lobe: Clear  Left Upper Lobe: Clear  Left Lower Lobe: Clear    Cough/Sputum  Cough: None         Peripheral Vascular  RLE Edema: +1,Non-pitting  LLE Edema: +1,Non-pitting      Complaints: None at this time    Physician Orders None    Comment : Arrived for scheduled visit per ambulatory with cane assist. His weight is up 5.2 lbs from last month. Missed 2 days of water pills due to being away from home. Encouraged patient to take him medication once he gets home today and he verbalized understanding. Denies any increase in dyspnea with exertion or chest pain. Ongoing lower leg, ankle and pedal edema noted with an increase noted from last visit. Wears him compression stockings daily, did not have the leg wraps on today. Medication list reviewed as well as low sodium diet and fluid restrictions. Has cystoscopy scheduled for next week. No s/s acute chf at this time. Next CHF Clinic visit 22.     Electronically signed by Fabi Cook RN on 2022 at 10:38 AM

## 2022-07-05 DIAGNOSIS — I50.42 HEART FAILURE, SYSTOLIC AND DIASTOLIC, CHRONIC (HCC): ICD-10-CM

## 2022-07-05 RX ORDER — FUROSEMIDE 40 MG/1
TABLET ORAL
Qty: 90 TABLET | Refills: 2 | OUTPATIENT
Start: 2022-07-05

## 2022-07-05 RX ORDER — FUROSEMIDE 40 MG/1
60 TABLET ORAL 2 TIMES DAILY
Qty: 90 TABLET | Refills: 2 | Status: SHIPPED | OUTPATIENT
Start: 2022-07-05

## 2022-07-05 RX ORDER — ISOSORBIDE MONONITRATE 30 MG/1
30 TABLET, EXTENDED RELEASE ORAL DAILY
Qty: 30 TABLET | Refills: 1 | Status: SHIPPED | OUTPATIENT
Start: 2022-07-05

## 2022-07-05 NOTE — TELEPHONE ENCOUNTER
E-scribe request for med refill. Please review and e-scribe if applicable.      Last Visit Date:  05/09/2022  Next Visit Date:  Visit date not found    Hemoglobin A1C (%)   Date Value   12/10/2019 5.6   10/18/2019 5.8             ( goal A1C is < 7)   No results found for: LABMICR  LDL Cholesterol (mg/dL)   Date Value   03/08/2022 56   03/08/2022 56       (goal LDL is <100)   AST (U/L)   Date Value   03/16/2021 15     ALT (U/L)   Date Value   03/16/2021 14     BUN (mg/dL)   Date Value   06/01/2022 18     BP Readings from Last 3 Encounters:   07/01/22 110/72   06/13/22 115/73   06/02/22 130/80          (goal 120/80)        Patient Active Problem List:     Atrial flutter (HCC)     Sleep-related breathing disorder     Hypokalemia     Atrial fibrillation (HCC)     Ischemic cardiomyopathy     Acute cystitis without hematuria     Hx of CABG     Chronic diastolic (congestive) heart failure (HCC)     Bilateral hand numbness     Right thigh pain     Left leg weakness     Coronary artery disease     Chronic cough     Gastroesophageal reflux disease without esophagitis     Overflow incontinence of urine     Polyp of colon     Post-void dribbling     Post-nasal drip     SYDNEY (acute kidney injury) ([address]     Hyperkalemia     Hypotension     Overactive bladder     Hemorrhoids     HARPREET (obstructive sleep apnea)     Laryngopharyngeal reflux     Skin ulcer of left lower leg, limited to breakdown of skin ([address]     Bilateral edema of lower extremity     Lymphedema of both lower extremities     Venous insufficiency of both lower extremities     Acute pain of right knee     Finger laceration, subsequent encounter     Chronic venous stasis      ----Devorah Nation

## 2022-07-07 NOTE — H&P
Julio Hatch  Urology History & Physical      Patient:  Apoorva Lewis  MRN: [id number]  YOB: 1955    CHIEF COMPLAINT:  Overactive bladder    HISTORY OF PRESENT ILLNESS:   The patient is a 61 y.o. male with overactive bladder and urinary incontinence, using 2 pads daily, who is s/p urolift. He is currently on oxybutynin 5mg ER daily and symptoms have not improved. He presents today for cystoscopy with botox treatment. Patient's old records, notes and chart reviewed and summarized above. Past Medical History:    Past Medical History:   Diagnosis Date    Acute CHF (congestive heart failure) ([facility] [address] 03/08/2021    Arthritis     back    Atrial fibrillation ([facility] [address] 10/2019    Dr. Mary Gaspar BPH (benign prostatic hyperplasia)     Cellulitis     Cervical disc disease     CHF (congestive heart failure) [facility])     cardiologist Dr. Marianne Barthel. [facility] CHF clinic    Combined systolic and diastolic congestive heart failure ([facility] [address] 01/15/2020    Coronary artery disease 10/2019     CABG 2019 [facility]    Dr. Cletus Habermann Cardiology last seen within the last year.     COVID-19 virus RNA test result indeterminate 03/31/2021    CPAP (continuous positive airway pressure) dependence     GERD (gastroesophageal reflux disease)     on rx    History of incarceration     released 2019    Hyperlipidemia     Dr. Tim Parisi Hypertension     [facility] CHF clinic     Dr. Tim Parisi Hypokalemia     Myocardial infarct [facility])     Dr. Tim Parisi Obesity     Overactive bladder     Sleep apnea     C-pap    Wears reading eyeglasses     Wellness examination     Dr. Ontiveros Rom 4/2021       Past Surgical History:    Past Surgical History:   Procedure Laterality Date    ABDOMINAL EXPLORATION SURGERY      CARDIAC SURGERY      2019 - AtriClip 1.5 or 3T safe Immediately    CARDIOVERSION  10/16/2019    CARPAL TUNNEL RELEASE Bilateral 2008    CERVICAL SPINE SURGERY      2-5    COLONOSCOPY N/A 10/29/2020    COLONOSCOPY WITH BIOPSY performed by Georgie Mcardle, MD at [place] [address]  10/29/2020    COLONOSCOPY SUBMUCOSAL/BOTOX INJECTION performed by Georgie Mcardle, MD at [place] [address]  10/29/2020    COLONOSCOPY POLYPECTOMY SNARE/COLD BIOPSY performed by Georgie Mcardle, MD at [place] [address] N/A 01/12/2021    COLONOSCOPY POLYPECTOMY HOT SNARE, COLD SNARE POLPECTOMY performed by Mariola Vazquez MD at [address] [place] [address] N/A 10/21/2019    CABG CORONARY ARTERY BYPASS GRAFT X1, LIMA-LAD, BROWN 4 MAZE PROCEDURE, 50MM ATRICURE ATRIAL CLIP RIGID INTERNAL FIXATION PLATES X 3   SCREWS X 13 performed by Christie Gaytan MD at [place] [address] 03/26/2021    URODYNAMICS performed by Selena Hyman MD at [place] [address]  03/26/2021    CYSTOSCOPY FLEXIBLE performed by Selena Hyman MD at [place] [address]  05/14/2021    CYSTOSCOPY, UROLIFT, FULGURATION    CYSTOSCOPY N/A 05/14/2021    CYSTOSCOPY, UROLIFT, FULGURATION performed by Selena Hyman MD at [address]      cataract surgery 2020    FRACTURE SURGERY      Left leg    TRANSESOPHAGEAL ECHOCARDIOGRAM  10/16/2019       Medications:    No current facility-administered medications for this encounter.     Current Outpatient Medications:     isosorbide mononitrate (IMDUR) 30 MG extended release tablet, TAKE 1 TABLET BY MOUTH DAILY, Disp: 30 tablet, Rfl: 1    furosemide (LASIX) 40 MG tablet, Take 1.5 tablets by mouth 2 times daily, Disp: 90 tablet, Rfl: 2    pantoprazole (PROTONIX) 40 MG tablet, TAKE 1 TABLET BY MOUTH 2 TIMES DAILY (BEFORE MEALS), Disp: 60 tablet, Rfl: 5    oxybutynin (DITROPAN-XL) 10 mg extended release tablet, TAKE 1 TABLET BY MOUTH DAILY, Disp: 30 tablet, Rfl: 3    ALLERGY RELIEF 10 MG tablet, TAKE 1 TABLET BY MOUTH DAILY, Disp: 30 tablet, Rfl: 3    Multiple Vitamin (MULTIVITAMIN) TABS, TAKE 1 TABLET BY MOUTH DAILY, Disp: 30 tablet, Rfl: 3    gabapentin (NEURONTIN) 400 MG capsule, Take 1 capsule by mouth daily for 31 days. , Disp: 30 capsule, Rfl: 1    potassium chloride (KLOR-CON M) 20 MEQ extended release tablet, TAKE 1 TABLET BY MOUTH DAILY, Disp: 30 tablet, Rfl: 1    atorvastatin (LIPITOR) 40 MG tablet, TAKE 1 TABLET BY MOUTH NIGHTLY, Disp: 30 tablet, Rfl: 1    HM ASPIRIN EC LOW DOSE 81 MG EC tablet, , Disp: , Rfl:     tamsulosin (FLOMAX) 0.4 MG capsule, TAKE 1 CAPSULE BY MOUTH DAILY, Disp: 30 capsule, Rfl: 5    Multiple Vitamins-Minerals (MULTIVITAMIN WITH MINERALS) tablet, Take 1 tablet by mouth daily, Disp: 30 tablet, Rfl: 3    Prenatal Vit-Fe Fumarate-FA (NIVA-PLUS) 27-1 MG TABS, , Disp: , Rfl:     Multiple Vitamins-Minerals (PRESERVISION AREDS 2) CAPS, , Disp: , Rfl:     Blood Pressure Monitor KIT, 1 each by Does not apply route 2 times daily, Disp: 1 kit, Rfl: 0    Misc. Devices ([employer] WEIGHT SCALE) MISC, 1 each by Does not apply route as needed (weigh once a day and record), Disp: 1 each, Rfl: 0    nitroGLYCERIN (NITROSTAT) 0.4 MG SL tablet, Place 1 tablet under the tongue every 5 minutes as needed for Chest pain up to max of 3 total doses. If no relief after 1 dose, call 911., Disp: 25 tablet, Rfl: 1    Elastic Bandages & Supports (JOBST KNEE HIGH COMPRESSION SM) MISC, 2 each by Does not apply route daily Wear q day. Remove q hs.   Diagnosis: Chronic venous insufficiency, Disp: 2 each, Rfl: 0    Allergies:  No Known Allergies    Social History:   Social History     Socioeconomic History    Marital status:      Spouse name: Not on file    Number of children: Not on file    Years of education: Not on file    Highest education level: Not on file   Occupational History    Not on file   Tobacco Use    Smoking status: Never Smoker    Smokeless tobacco: Never Used   Vaping Use    Vaping Use: Never used   Substance and Sexual Activity    Alcohol use: Not Currently     Comment: stopped 1991    Drug use: Not Currently    Sexual activity: Not Currently   Other Topics Concern    Not on file   Social History Narrative    Not on file     Social Determinants of Health     Financial Resource Strain: High Risk    Difficulty of Paying Living Expenses: Very hard   Food Insecurity: No Food Insecurity    Worried About Running Out of Food in the Last Year: Never true    Edison of Food in the Last Year: Never true   Transportation Needs:     Lack of Transportation (Medical): Not on file    Lack of Transportation (Non-Medical): Not on file   Physical Activity:     Days of Exercise per Week: Not on file    Minutes of Exercise per Session: Not on file   Stress:     Feeling of Stress : Not on file   Social Connections:     Frequency of Communication with Friends and Family: Not on file    Frequency of Social Gatherings with Friends and Family: Not on file    Attends Gnosticism Services: Not on file    Active Member of [address] [place] [address] [employer] or Organizations: Not on file    Attends Club or Organization Meetings: Not on file    Marital Status: Not on file   Intimate Partner Violence:     Fear of Current or Ex-Partner: Not on file    Emotionally Abused: Not on file    Physically Abused: Not on file    Sexually Abused: Not on file   Housing Stability:     Unable to Pay for Housing in the Last Year: Not on file    Number of [place] in the Last Year: Not on file    Unstable Housing in the Last Year: Not on file       Family History:    Family History   Problem Relation Age of Onset    Alcohol Abuse Maternal Uncle     Heart Attack Maternal Uncle     Cancer Mother     Alcohol Abuse Father        REVIEW OF SYSTEMS:  A comprehensive 14 point review of systems was obtained. Constitutional: No fatigue  Eyes: No blurry vision  Ears, nose, mouth, throat, face: No ringing in the ears; no facial droop. Respiratory: No cough or cold. Cardiovascular: No palpitations  Gastrointestinal: No diarrhea or constipation.   Genitourinary: No burning with urination  Integument/Skin: No rashes  Hematologic/Lymphatic: No easy bruising  Musculoskeletal: No muscle pains  Neurologic: No weakness in the extremities. Psychiatric: No depression or suicidal thoughts. Endocrine: No heat or cold intolerances. Allergic/Immunologic: No current seasonal allergies; no skin hives. Physical Exam:    This a 61 y.o. male   There were no vitals filed for this visit. Constitutional: Patient in no acute distress. Neuro: alert and oriented to person place and time. Psych: Mood and affect normal.   Head: Atraumatic and normocephalic. Neck: Trachea midline   Skin: No rashes or bruising present. Lungs: Respiratory effort normal.  Cardiovascular:  Heart rate regular. Positive radial pulses bilaterally  Abdomen: Soft, non-tender, non-distended. Neither side has CVA tenderness on exam.  Bladder non-tender and not distended. Lymphatics: no palpable lymphadenopathy. Labs:  No results for input(s): WBC, HGB, HCT, MCV, PLT in the last 72 hours. No results for input(s): NA, K, CL, CO2, PHOS, BUN, CREATININE, CA in the last 72 hours. No results for input(s): COLORU, PHUR, LABCAST, WBCUA, RBCUA, MUCUS, TRICHOMONAS, YEAST, BACTERIA, CLARITYU, SPECGRAV, LEUKOCYTESUR, UROBILINOGEN, Mavis Budd in the last 72 hours. Invalid input(s): NITRATE, GLUCOSEUKETONESUAMORPHOUS    Culture Results:  @[facility]@      -----------------------------------------------------------------  Imaging Results:  No results found.     Assessment and Plan   Impression:    Overactive bladder and urinary incontinence refractory to medical therapy    Plan:   Cystoscopy with botox injection    [facility],   Urology Resident, PGY3  4:32 PM 7/7/2022

## 2022-07-07 NOTE — TELEPHONE ENCOUNTER
Talked to patient's sister, Bharathi Maya, and she stated that she will be bringing patient to his surgery tomorrow.

## 2022-07-08 ENCOUNTER — HOSPITAL ENCOUNTER (OUTPATIENT)
Age: 67
Setting detail: OUTPATIENT SURGERY
Discharge: HOME | End: 2022-07-08
Attending: UROLOGY | Admitting: UROLOGY
Payer: MEDICARE

## 2022-07-08 ENCOUNTER — ANESTHESIA EVENT (OUTPATIENT)
Dept: OPERATING ROOM | Age: 67
End: 2022-07-08
Payer: MEDICARE

## 2022-07-08 ENCOUNTER — ANESTHESIA (OUTPATIENT)
Dept: OPERATING ROOM | Age: 67
End: 2022-07-08
Payer: MEDICARE

## 2022-07-08 VITALS
DIASTOLIC BLOOD PRESSURE: 72 MMHG | HEART RATE: 53 BPM | RESPIRATION RATE: 16 BRPM | OXYGEN SATURATION: 95 % | TEMPERATURE: 97.2 F | SYSTOLIC BLOOD PRESSURE: 129 MMHG

## 2022-07-08 LAB
GFR NON-AFRICAN AMERICAN: >60 ML/MIN
GFR SERPL CREATININE-BSD FRML MDRD: >60 ML/MIN
GFR SERPL CREATININE-BSD FRML MDRD: NORMAL ML/MIN/{1.73_M2}
GLUCOSE BLD-MCNC: 99 MG/DL (ref 74–100)
POC BUN: 12 MG/DL (ref 8–26)
POC CHLORIDE: 106 MMOL/L (ref 98–107)
POC CREATININE: 0.76 MG/DL (ref 0.51–1.19)
POC POTASSIUM: 3.4 MMOL/L (ref 3.5–4.5)
POC SODIUM: 144 MMOL/L (ref 138–146)

## 2022-07-08 PROCEDURE — 3700000000 HC ANESTHESIA ATTENDED CARE: Performed by: UROLOGY

## 2022-07-08 PROCEDURE — 6360000002 HC RX W HCPCS: Performed by: UROLOGY

## 2022-07-08 PROCEDURE — 82565 ASSAY OF CREATININE: CPT

## 2022-07-08 PROCEDURE — 3600000002 HC SURGERY LEVEL 2 BASE: Performed by: UROLOGY

## 2022-07-08 PROCEDURE — 3600000012 HC SURGERY LEVEL 2 ADDTL 15MIN: Performed by: UROLOGY

## 2022-07-08 PROCEDURE — 7100000041 HC SPAR PHASE II RECOVERY - ADDTL 15 MIN: Performed by: UROLOGY

## 2022-07-08 PROCEDURE — 82435 ASSAY OF BLOOD CHLORIDE: CPT

## 2022-07-08 PROCEDURE — 84520 ASSAY OF UREA NITROGEN: CPT

## 2022-07-08 PROCEDURE — 2580000003 HC RX 258: Performed by: NURSE ANESTHETIST, CERTIFIED REGISTERED

## 2022-07-08 PROCEDURE — 6360000002 HC RX W HCPCS: Performed by: NURSE ANESTHETIST, CERTIFIED REGISTERED

## 2022-07-08 PROCEDURE — 2500000003 HC RX 250 WO HCPCS: Performed by: NURSE ANESTHETIST, CERTIFIED REGISTERED

## 2022-07-08 PROCEDURE — 2709999900 HC NON-CHARGEABLE SUPPLY: Performed by: UROLOGY

## 2022-07-08 PROCEDURE — 6360000002 HC RX W HCPCS: Performed by: STUDENT IN AN ORGANIZED HEALTH CARE EDUCATION/TRAINING PROGRAM

## 2022-07-08 PROCEDURE — 84295 ASSAY OF SERUM SODIUM: CPT

## 2022-07-08 PROCEDURE — 82947 ASSAY GLUCOSE BLOOD QUANT: CPT

## 2022-07-08 PROCEDURE — 2580000003 HC RX 258: Performed by: UROLOGY

## 2022-07-08 PROCEDURE — 3700000001 HC ADD 15 MINUTES (ANESTHESIA): Performed by: UROLOGY

## 2022-07-08 PROCEDURE — 7100000040 HC SPAR PHASE II RECOVERY - FIRST 15 MIN: Performed by: UROLOGY

## 2022-07-08 PROCEDURE — 84132 ASSAY OF SERUM POTASSIUM: CPT

## 2022-07-08 RX ORDER — MEPERIDINE HYDROCHLORIDE 50 MG/ML
12.5 INJECTION INTRAMUSCULAR; INTRAVENOUS; SUBCUTANEOUS EVERY 5 MIN PRN
Status: CANCELLED | OUTPATIENT
Start: 2022-07-08

## 2022-07-08 RX ORDER — DIPHENHYDRAMINE HYDROCHLORIDE 50 MG/ML
12.5 INJECTION INTRAMUSCULAR; INTRAVENOUS
Status: CANCELLED | OUTPATIENT
Start: 2022-07-08 | End: 2022-07-08

## 2022-07-08 RX ORDER — SODIUM CHLORIDE, SODIUM LACTATE, POTASSIUM CHLORIDE, CALCIUM CHLORIDE 600; 310; 30; 20 MG/100ML; MG/100ML; MG/100ML; MG/100ML
INJECTION, SOLUTION INTRAVENOUS CONTINUOUS PRN
Status: DISCONTINUED | OUTPATIENT
Start: 2022-07-08 | End: 2022-07-08 | Stop reason: SDUPTHER

## 2022-07-08 RX ORDER — SULFAMETHOXAZOLE AND TRIMETHOPRIM 800; 160 MG/1; MG/1
1 TABLET ORAL 2 TIMES DAILY
Qty: 10 TABLET | Refills: 0 | Status: SHIPPED | OUTPATIENT
Start: 2022-07-08 | End: 2022-07-13

## 2022-07-08 RX ORDER — DROPERIDOL 2.5 MG/ML
0.62 INJECTION, SOLUTION INTRAMUSCULAR; INTRAVENOUS
Status: CANCELLED | OUTPATIENT
Start: 2022-07-08 | End: 2022-07-08

## 2022-07-08 RX ORDER — PROPOFOL 10 MG/ML
INJECTION, EMULSION INTRAVENOUS PRN
Status: DISCONTINUED | OUTPATIENT
Start: 2022-07-08 | End: 2022-07-08 | Stop reason: SDUPTHER

## 2022-07-08 RX ORDER — FENTANYL CITRATE 50 UG/ML
INJECTION, SOLUTION INTRAMUSCULAR; INTRAVENOUS PRN
Status: DISCONTINUED | OUTPATIENT
Start: 2022-07-08 | End: 2022-07-08 | Stop reason: SDUPTHER

## 2022-07-08 RX ORDER — LABETALOL HYDROCHLORIDE 5 MG/ML
10 INJECTION, SOLUTION INTRAVENOUS
Status: CANCELLED | OUTPATIENT
Start: 2022-07-08

## 2022-07-08 RX ORDER — MAGNESIUM HYDROXIDE 1200 MG/15ML
LIQUID ORAL PRN
Status: DISCONTINUED | OUTPATIENT
Start: 2022-07-08 | End: 2022-07-08 | Stop reason: ALTCHOICE

## 2022-07-08 RX ORDER — MAGNESIUM HYDROXIDE 1200 MG/15ML
LIQUID ORAL CONTINUOUS PRN
Status: COMPLETED | OUTPATIENT
Start: 2022-07-08 | End: 2022-07-08

## 2022-07-08 RX ORDER — DEXAMETHASONE SODIUM PHOSPHATE 4 MG/ML
4 INJECTION, SOLUTION INTRA-ARTICULAR; INTRALESIONAL; INTRAMUSCULAR; INTRAVENOUS; SOFT TISSUE
Status: CANCELLED | OUTPATIENT
Start: 2022-07-08 | End: 2022-07-08

## 2022-07-08 RX ORDER — SODIUM CHLORIDE 0.9 % (FLUSH) 0.9 %
5-40 SYRINGE (ML) INJECTION EVERY 12 HOURS SCHEDULED
Status: CANCELLED | OUTPATIENT
Start: 2022-07-08

## 2022-07-08 RX ORDER — MIDAZOLAM HYDROCHLORIDE 2 MG/2ML
2 INJECTION, SOLUTION INTRAMUSCULAR; INTRAVENOUS
Status: CANCELLED | OUTPATIENT
Start: 2022-07-08 | End: 2022-07-08

## 2022-07-08 RX ORDER — SODIUM CHLORIDE 9 MG/ML
INJECTION, SOLUTION INTRAVENOUS PRN
Status: CANCELLED | OUTPATIENT
Start: 2022-07-08

## 2022-07-08 RX ORDER — LIDOCAINE HYDROCHLORIDE 10 MG/ML
INJECTION, SOLUTION EPIDURAL; INFILTRATION; INTRACAUDAL; PERINEURAL PRN
Status: DISCONTINUED | OUTPATIENT
Start: 2022-07-08 | End: 2022-07-08 | Stop reason: SDUPTHER

## 2022-07-08 RX ORDER — SODIUM CHLORIDE 0.9 % (FLUSH) 0.9 %
5-40 SYRINGE (ML) INJECTION PRN
Status: CANCELLED | OUTPATIENT
Start: 2022-07-08

## 2022-07-08 RX ORDER — FENTANYL CITRATE 50 UG/ML
25 INJECTION, SOLUTION INTRAMUSCULAR; INTRAVENOUS EVERY 5 MIN PRN
Status: CANCELLED | OUTPATIENT
Start: 2022-07-08

## 2022-07-08 RX ORDER — SODIUM CHLORIDE 0.9 % (FLUSH) 0.9 %
SYRINGE (ML) INJECTION PRN
Status: DISCONTINUED | OUTPATIENT
Start: 2022-07-08 | End: 2022-07-08 | Stop reason: ALTCHOICE

## 2022-07-08 RX ORDER — IPRATROPIUM BROMIDE AND ALBUTEROL SULFATE 2.5; .5 MG/3ML; MG/3ML
1 SOLUTION RESPIRATORY (INHALATION)
Status: CANCELLED | OUTPATIENT
Start: 2022-07-08 | End: 2022-07-08

## 2022-07-08 RX ADMIN — PROPOFOL 20 MG: 10 INJECTION, EMULSION INTRAVENOUS at 07:49

## 2022-07-08 RX ADMIN — LIDOCAINE HYDROCHLORIDE 50 MG: 10 INJECTION, SOLUTION EPIDURAL; INFILTRATION; INTRACAUDAL; PERINEURAL at 07:41

## 2022-07-08 RX ADMIN — SODIUM CHLORIDE, POTASSIUM CHLORIDE, SODIUM LACTATE AND CALCIUM CHLORIDE: 600; 310; 30; 20 INJECTION, SOLUTION INTRAVENOUS at 07:37

## 2022-07-08 RX ADMIN — PROPOFOL 20 MG: 10 INJECTION, EMULSION INTRAVENOUS at 07:52

## 2022-07-08 RX ADMIN — PROPOFOL 20 MG: 10 INJECTION, EMULSION INTRAVENOUS at 07:55

## 2022-07-08 RX ADMIN — PROPOFOL 50 MG: 10 INJECTION, EMULSION INTRAVENOUS at 07:41

## 2022-07-08 RX ADMIN — PROPOFOL 20 MG: 10 INJECTION, EMULSION INTRAVENOUS at 07:44

## 2022-07-08 RX ADMIN — FENTANYL CITRATE 100 MCG: 50 INJECTION, SOLUTION INTRAMUSCULAR; INTRAVENOUS at 07:38

## 2022-07-08 RX ADMIN — Medication 3000 MG: at 07:52

## 2022-07-08 RX ADMIN — PROPOFOL 20 MG: 10 INJECTION, EMULSION INTRAVENOUS at 07:46

## 2022-07-08 ASSESSMENT — PAIN DESCRIPTION - ORIENTATION: ORIENTATION: LOWER

## 2022-07-08 ASSESSMENT — PAIN SCALES - GENERAL: PAINLEVEL_OUTOF10: 7

## 2022-07-08 ASSESSMENT — PAIN DESCRIPTION - DESCRIPTORS: DESCRIPTORS: DISCOMFORT;SORE

## 2022-07-08 ASSESSMENT — PAIN DESCRIPTION - PAIN TYPE: TYPE: CHRONIC PAIN

## 2022-07-08 ASSESSMENT — PAIN DESCRIPTION - LOCATION: LOCATION: BACK

## 2022-07-08 NOTE — PROGRESS NOTES
Patient discharged with all belongings, via wheelchair, with family to private residence. Patient had discharge paperwork, prescriptions and all questions asked were answered.

## 2022-07-08 NOTE — ANESTHESIA PRE PROCEDURE
Department of Anesthesiology  Preprocedure Note       Name:  Sonia Jeffery   Age:  61 y.o.  :  1955                                          MRN:  [id number]         Date:  2022      Surgeon: Lord Block):  Maria T Corado MD    Procedure: Procedure(s):  CYSTOSCOPY, BOTOX INJECTION  (100 UNITS)    Medications prior to admission:   Prior to Admission medications    Medication Sig Start Date End Date Taking? Authorizing Provider   isosorbide mononitrate (IMDUR) 30 MG extended release tablet TAKE 1 TABLET BY MOUTH DAILY 22   Fay Gaytan MD   furosemide (LASIX) 40 MG tablet Take 1.5 tablets by mouth 2 times daily 22   Rosie Christianson MD   pantoprazole (PROTONIX) 40 MG tablet TAKE 1 TABLET BY MOUTH 2 TIMES DAILY (BEFORE MEALS) 22   Norberto Severs, MD   oxybutynin (DITROPAN-XL) 10 mg extended release tablet TAKE 1 TABLET BY MOUTH DAILY 22   Maria T Coardo MD   ALLERGY RELIEF 10 MG tablet TAKE 1 TABLET BY MOUTH DAILY 22   Sharan Carrizales MD   Multiple Vitamin (MULTIVITAMIN) TABS TAKE 1 TABLET BY MOUTH DAILY 22   Edi Davis MD   gabapentin (NEURONTIN) 400 MG capsule Take 1 capsule by mouth daily for 31 days.  22  Ned Bass MD   potassium chloride (KLOR-CON M) 20 MEQ extended release tablet TAKE 1 TABLET BY MOUTH DAILY 22   Edi Davis MD   atorvastatin (LIPITOR) 40 MG tablet TAKE 1 TABLET BY MOUTH NIGHTLY 22   Ned Bass MD   HM ASPIRIN EC LOW DOSE 81 MG EC tablet  22   Historical Provider, MD   tamsulosin (FLOMAX) 0.4 MG capsule TAKE 1 CAPSULE BY MOUTH DAILY 22   Maria T Corado MD   Multiple Vitamins-Minerals (MULTIVITAMIN WITH MINERALS) tablet Take 1 tablet by mouth daily 1/3/22   Linden Veloz MD   Prenatal Vit-Fe Fumarate-FA (NIVA-PLUS) 27-1 MG TABS  21   Historical Provider, MD   Multiple Vitamins-Minerals (PRESERVISION AREDS 2) CAPS  3/26/21   Historical Provider, MD   Blood Pressure Monitor KIT 1 each by Does not apply route 2 times daily 3/16/21   Huy Prince MD   [facility]. Devices ([employer] WEIGHT SCALE) MIS 1 each by Does not apply route as needed (weigh once a day and record) 3/16/21   Huy Prince MD   nitroGLYCERIN (NITROSTAT) 0.4 MG SL tablet Place 1 tablet under the tongue every 5 minutes as needed for Chest pain up to max of 3 total doses. If no relief after 1 dose, call 911. 11/23/20   Laura Tyson MD   Elastic Bandages & Supports (JOBST KNEE HIGH COMPRESSION SM) MISC 2 each by Does not apply route daily Wear q day. Remove q hs. Diagnosis: Chronic venous insufficiency 8/12/20   LUDY Gallegos CNP       Current medications:    No current facility-administered medications for this encounter.        Allergies:  No Known Allergies    Problem List:    Patient Active Problem List   Diagnosis Code    Atrial flutter ([facility]) I48.92    Sleep-related breathing disorder G47.30    Hypokalemia E87.6    Atrial fibrillation (HCC) I48.91    Ischemic cardiomyopathy I25.5    Acute cystitis without hematuria N30.00    Hx of CABG Z95.1    Chronic diastolic (congestive) heart failure (HCC) I50.32    Bilateral hand numbness R20.0    Right thigh pain M79.651    Left leg weakness R29.898    Coronary artery disease I25.10    Chronic cough R05.3    Gastroesophageal reflux disease without esophagitis K21.9    Overflow incontinence of urine N39.490    Polyp of colon K63.5    Post-void dribbling N39.43    Post-nasal drip R09.82    SYDNEY (acute kidney injury) ([facility] [address] N17.9    Hyperkalemia E87.5    Hypotension I95.9    Overactive bladder N32.81    Hemorrhoids K64.9    HARPREET (obstructive sleep apnea) G47.33    Laryngopharyngeal reflux K21.9    Skin ulcer of left lower leg, limited to breakdown of skin ([facility]) L97.921    Bilateral edema of lower extremity R60.0    Lymphedema of both lower extremities I89.0    Venous insufficiency of both lower extremities I87.2    Acute pain of right knee M25.561    Finger laceration, subsequent encounter S61.219D    Chronic venous stasis I87.8       Past Medical History:        Diagnosis Date    Acute CHF (congestive heart failure) ([facility] [address] 03/08/2021    Arthritis     back    Atrial fibrillation ([facility] [address] 10/2019    Dr. Ava Sotomayor BPH (benign prostatic hyperplasia)     Cellulitis     Cervical disc disease     CHF (congestive heart failure) [facility])     cardiologist Dr. Everette Cervantes. [facility] CHF clinic    Combined systolic and diastolic congestive heart failure ([facility] [address] 01/15/2020    Coronary artery disease 10/2019     CABG 2019 [facility]    Dr. Uli Jones Cardiology last seen within the last year.     COVID-19 virus RNA test result indeterminate 03/31/2021    CPAP (continuous positive airway pressure) dependence     GERD (gastroesophageal reflux disease)     on rx    History of incarceration     released 2019    Hyperlipidemia     Dr. Jennifer Bobo Hypertension     [facility] CHF clinic     Dr. Jennifer Bobo Hypokalemia     Myocardial infarct [facility])     Dr. Jennifer Bobo Obesity     Overactive bladder     Sleep apnea     C-pap    Wears reading eyeglasses     Wellness examination     Dr. Laretta Bumpers 4/2021       Past Surgical History:        Procedure Laterality Date    ABDOMINAL EXPLORATION SURGERY      CARDIAC SURGERY      2019 - AtriClip 1.5 or 3T safe Immediately    CARDIOVERSION  10/16/2019    CARPAL TUNNEL RELEASE Bilateral 2008    CERVICAL SPINE SURGERY      2-5    COLONOSCOPY N/A 10/29/2020    COLONOSCOPY WITH BIOPSY performed by Nelson Morgan MD at [address] [place]  10/29/2020    COLONOSCOPY SUBMUCOSAL/BOTOX INJECTION performed by Nelson Morgan MD at [address] [place]  10/29/2020    COLONOSCOPY POLYPECTOMY SNARE/COLD BIOPSY performed by Nelson Morgan MD at [address] [place] N/A 01/12/2021    COLONOSCOPY POLYPECTOMY HOT SNARE, COLD SNARE POLPECTOMY performed by Fide Estrada MD at [facility] N/A 10/21/2019 293 02/02/2022 12:35 PM       CMP:   Lab Results   Component Value Date/Time     06/01/2022 11:25 AM    K 3.7 06/01/2022 11:25 AM     06/01/2022 11:25 AM    CO2 21 06/01/2022 11:25 AM    BUN 18 06/01/2022 11:25 AM    CREATININE 0.76 07/08/2022 06:39 AM    CREATININE 0.64 06/01/2022 11:25 AM    GFRAA >60 06/01/2022 11:25 AM    LABGLOM >60 07/08/2022 06:39 AM    GLUCOSE 108 06/01/2022 11:25 AM    PROT 6.9 03/16/2021 05:32 AM    CALCIUM 9.4 06/01/2022 11:25 AM    BILITOT 0.29 03/16/2021 05:32 AM    ALKPHOS 65 03/16/2021 05:32 AM    AST 15 03/16/2021 05:32 AM    ALT 14 03/16/2021 05:32 AM       POC Tests:   Recent Labs     07/08/22  0639   POCGLU 99   POCNA 144   POCK 3.4*   POCCL 106   POCBUN 12       Coags:   Lab Results   Component Value Date/Time    PROTIME 10.6 03/08/2021 11:22 AM    INR 1.0 03/08/2021 11:22 AM    APTT 29.9 01/22/2020 06:10 PM       HCG (If Applicable): No results found for: PREGTESTUR, PREGSERUM, HCG, HCGQUANT     ABGs: No results found for: PHART, PO2ART, KHQ6RBX, EKL5WWB, BEART, L1BTXBSH     Type & Screen (If Applicable):  No results found for: LABABO, LABRH    Drug/Infectious Status (If Applicable):  Lab Results   Component Value Date/Time    HEPCAB NONREACTIVE 09/22/2020 09:00 AM       COVID-19 Screening (If Applicable):   Lab Results   Component Value Date/Time    COVID19 Not Detected 05/10/2021 12:43 PM           Anesthesia Evaluation  Patient summary reviewed and Nursing notes reviewed  Airway: Mallampati: I  TM distance: >3 FB   Neck ROM: full  Mouth opening: > = 3 FB   Dental: normal exam         Pulmonary:normal exam    (+) sleep apnea: on CPAP,                             Cardiovascular:  Exercise tolerance: poor (<4 METS),   (+) hypertension: moderate, past MI: no interval change, CAD: non-obstructive, CABG/stent:, CHF: systolic and diastolic,                ROS comment: Pedal edema     Neuro/Psych:               GI/Hepatic/Renal:   (+) GERD: well controlled, morbid obesity Endo/Other:                     Abdominal:   (+) obese,           Vascular: Other Findings:           Anesthesia Plan      MAC     ASA 4       Induction: intravenous. Anesthetic plan and risks discussed with patient. Use of blood products discussed with patient whom consented to blood products. Plan discussed with CRNA.                     Esteban Councilman, MD   7/8/2022

## 2022-07-08 NOTE — ANESTHESIA POSTPROCEDURE EVALUATION
Department of Anesthesiology  Postprocedure Note    Patient: Sonia Jeffery  MRN: [id number]  YOB: 1955  Date of evaluation: 7/8/2022      Procedure Summary     Date: 07/08/22 Room / Location: [address]    Anesthesia Start: 2428 Anesthesia Stop: [place] [place]    Procedure: CYSTOSCOPY, BOTOX INJECTION  (100 UNITS) (N/A ) Diagnosis:       Overactive bladder      (OVERACTIVE BLADDER)    Surgeons: Maria T Corado MD Responsible Provider: Arelis Pedersen MD    Anesthesia Type: MAC ASA Status: 4          Anesthesia Type: No value filed.     Jeni Phase I:      Jeni Phase II: Jeni Score: 9      Anesthesia Post Evaluation    Patient location during evaluation: PACU  Patient participation: complete - patient participated  Level of consciousness: awake and alert  Airway patency: patent  Nausea & Vomiting: no nausea and no vomiting  Complications: no  Cardiovascular status: blood pressure returned to baseline  Respiratory status: acceptable  Hydration status: euvolemic

## 2022-07-08 NOTE — OP NOTE
FACILITY: [facility] [address] [facility]  1955  [id number]    DATE:  07/08/22  SURGEON:  Dr. Jessica Bartlett MD , M.D.  Sky Howell:  Dr. Jessiac Bartlett MD M.D. Preoperative diagnosis: overactive bladder  Postoperative diagnosis: Same  Procedure: Cystoscopy Botox injection (100 units)  Anesthesia: MAC  Specimen: none  Drains: None  EBL: 1 cc  Follow-up: 4-6 weeks     Indications: Jakub Gage is a 61 y.o. male with history of overactive bladder. The patient is here for Cystoscopy Botox injection, and has not had botox before. Risks benefits alternatives goals and possible complications of the procedure were explained to the patient and informed consent was obtained he elected to proceed. Details of procedure: The patient was brought back to the operating room. They were  laid in the supine position and induced under MAC anesthesia. The genitals were prepped and draped in usual sterile surgical fashion after being placed in dorsal lithotomy position. A timeout was taken per protocol with everyone in agreement. Rigid cystoscope was assembled using a 30 degree lens and passed per the urethra. The urethra was normal without any lesions. The bladder was entered with ease and inspected thoroughly and systematically which did not show any lesions or tumors, stone, fistulous tracts, diverticula. Both ureteral orifices were normal with clear efflux of urine. At this time we mixed 100 units of botox, and injected the solution into the bladder wall with 0.5 ml increments at each site along the posterior bladder wall. Hemostasis was persistent. The trigone and ureteral orifices were avoided. The patient tolerated the procedure well. The scope was removed intact and without any issues. This concluded the case. The patient was taken to recovery period and discharged home in stable condition. Plan:   They will be asked to void before discharge  Follow up in 4-6 weeks

## 2022-07-19 DIAGNOSIS — E87.6 HYPOKALEMIA: ICD-10-CM

## 2022-07-19 RX ORDER — POTASSIUM CHLORIDE 20 MEQ/1
20 TABLET, EXTENDED RELEASE ORAL DAILY
Qty: 30 TABLET | Refills: 1 | OUTPATIENT
Start: 2022-07-19

## 2022-07-19 NOTE — TELEPHONE ENCOUNTER
Please address the medication refill and close the encounter. If I can be of assistance, please route to the applicable pool. Thank you.       Last visit: 5-9-22  Last Med refill: 6-7-22  Does patient have enough medication for 72 hours: No:     Next Visit Date:  Future Appointments   Date Time Provider Taj Flores   7/25/2022  9:00 AM Girish Kilpatrick  [facility]   7/29/2022 11:00 AM STV CHF CLINIC RM 1 [facility] CHF CLI [facility]   8/4/2022 10:30 AM [facility]   8/12/2022 10:00 AM SCHEDULE, P ACC UROLOGY [facility]   8/15/2022  1:15 PM Val Archer MD [address] [facility]   Topic Date Due    Annual Wellness Visit (AWV)  Never done    Prostate Specific Antigen (PSA) Screening or Monitoring  09/22/2021    COVID-19 Vaccine (3 - Booster for Pfizer series) 09/30/2021    Flu vaccine (1) 09/01/2022    Diabetes screen  12/10/2022    Lipids  03/08/2023    Pneumococcal 65+ years Vaccine (2 - PCV) 04/13/2023    Depression Screen  05/09/2023    Colorectal Cancer Screen  01/12/2031    DTaP/Tdap/Td vaccine (3 - Td or Tdap) 05/04/2032    Shingles vaccine  Completed    Hepatitis C screen  Completed    HIV screen  Completed    Hepatitis A vaccine  Aged Out    Hepatitis B vaccine  Aged Out    Hib vaccine  Aged Out    Meningococcal (ACWY) vaccine  Aged Out       Hemoglobin A1C (%)   Date Value   12/10/2019 5.6   10/18/2019 5.8             ( goal A1C is < 7)   No results found for: LABMICR  LDL Cholesterol (mg/dL)   Date Value   03/08/2022 56   03/08/2022 56       (goal LDL is <100)   AST (U/L)   Date Value   03/16/2021 15     ALT (U/L)   Date Value   03/16/2021 14     BUN (mg/dL)   Date Value   06/01/2022 18     BP Readings from Last 3 Encounters:   07/08/22 129/72   07/01/22 110/72   06/13/22 115/73          (goal 120/80)    All Future Testing planned in [facility]  Lab Frequency Next Occurrence   Reflux study/Reflux Venous Insufficiency Bilateral Once 72/10/1811   Basic Metabolic Panel Once 46/02/6434               Patient Active Problem List:     Atrial flutter ([facility] [address]     Sleep-related breathing disorder     Hypokalemia     Atrial fibrillation (HCC)     Ischemic cardiomyopathy     Acute cystitis without hematuria     Hx of CABG     Chronic diastolic (congestive) heart failure (HCC)     Bilateral hand numbness     Right thigh pain     Left leg weakness     Coronary artery disease     Chronic cough     Gastroesophageal reflux disease without esophagitis     Overflow incontinence of urine     Polyp of colon     Post-void dribbling     Post-nasal drip     SYD[facility] (acute kidney injury) [facility]     Hyperkalemia     Hypotension     Overactive bladder     Hemorrhoids     HARPREET (obstructive sleep apnea)     Laryngopharyngeal reflux     Skin ulcer of left lower leg, limited to breakdown of skin (HCC)     Bilateral edema of lower extremity     Lymphedema of both lower extremities     Venous insufficiency of both lower extremities     Acute pain of right knee     Finger laceration, subsequent encounter     Chronic venous stasis

## 2022-07-25 ENCOUNTER — OFFICE VISIT (OUTPATIENT)
Dept: NEUROSURGERY | Age: 67
End: 2022-07-25
Payer: MEDICARE

## 2022-07-25 VITALS
SYSTOLIC BLOOD PRESSURE: 117 MMHG | HEIGHT: 66 IN | BODY MASS INDEX: 44.2 KG/M2 | WEIGHT: 275 LBS | HEART RATE: 76 BPM | DIASTOLIC BLOOD PRESSURE: 74 MMHG | TEMPERATURE: 97.2 F | OXYGEN SATURATION: 98 %

## 2022-07-25 DIAGNOSIS — E66.01 MORBID OBESITY (HCC): ICD-10-CM

## 2022-07-25 DIAGNOSIS — M48.02 STENOSIS OF CERVICAL SPINE WITH MYELOPATHY (HCC): Primary | ICD-10-CM

## 2022-07-25 DIAGNOSIS — S12.9XXA CERVICAL PSEUDOARTHROSIS, INITIAL ENCOUNTER (HCC): ICD-10-CM

## 2022-07-25 DIAGNOSIS — G99.2 STENOSIS OF CERVICAL SPINE WITH MYELOPATHY (HCC): Primary | ICD-10-CM

## 2022-07-25 PROCEDURE — 99214 OFFICE O/P EST MOD 30 MIN: CPT | Performed by: NEUROLOGICAL SURGERY

## 2022-07-25 PROCEDURE — 1123F ACP DISCUSS/DSCN MKR DOCD: CPT | Performed by: NEUROLOGICAL SURGERY

## 2022-07-25 NOTE — PROGRESS NOTES
[address] [facility]  [address], [address] [address]  Dept: [phone]    Patient:  Deacon Ball  YOB: 1955  Date: 7/25/22    The patient is a 74 y.o. male who presents today for consult of the following problems:     Chief Complaint   Patient presents with    Neurologic Problem     Stenosis of cervical spine with myelopathy              HPI:     Deacon Ball is a 74 y.o. male on whom neurosurgical consultation was requested by Michele Romo MD for management of cervical stenosis with myelopathy. The patient has had a very complicated history of a prolonged incarceration during which it was discovered that he had severe stenosis with cervical myelopathy. He underwent an anterior and posterior cervical surgery in 2018 and prior to that had already undergone carpal tunnel surgery in 2010. States that after his surgery for approximately 3 months he did notice a significant improvement in his sensation of his hands as well as dexterity. Unfortunately after approximately 3 months he started to regress and most of his symptoms started to come back to the point where he had not noticed any improvement from surgery. Since approximately May 2018 and now he has had relatively stable symptoms with no improvement or decline. His primary issues are that he has [place] no sensation in his hands as well as a significant difficulty with dexterity and holding things. He states that they will drop off of his hands without notice. He does state that he has good strength but has a very difficult time maneuvering with his hands and utilizing ice creams on a daily basis. No distinct falls but he has been dependent on the use of a cane due to significant difficulties with ambulation and balance.   Has been having some leakage of his bladder and is following with urology as of now.      History:     Past Medical History:   Diagnosis Date    Acute CHF (congestive heart failure) ([facility] 03/08/2021    Arthritis     back    Atrial fibrillation ([facility] [address] 10/2019    Dr. Pricila Oneal    BPH (benign prostatic hyperplasia)     Cellulitis     Cervical disc disease     CHF (congestive heart failure) [facility])     cardiologist Dr. Gulshan Enciso. [facility] CHF clinic    Combined systolic and diastolic congestive heart failure ([facility] [address] 01/15/2020    Coronary artery disease 10/2019     CABG 2019 [facility]    Dr. Aleida Ortiz Cardiology last seen within the last year.     COVID-19 virus RNA test result indeterminate 03/31/2021    CPAP (continuous positive airway pressure) dependence     GERD (gastroesophageal reflux disease)     on rx    History of incarceration     released 2019    Hyperlipidemia     Dr. Katia Hunter    Hypertension     [facility] CHF clinic     Dr. Katia Hunter    Hypokalemia     Myocardial infarct [facility])     Dr. Katia Hunter    Obesity     Overactive bladder     Sleep apnea     C-pap    Wears reading eyeglasses     Wellness examination     Dr. Daniele Napier 4/2021     Past Surgical History:   Procedure Laterality Date    ABDOMINAL EXPLORATION SURGERY      CARDIAC SURGERY      2019 - AtriClip 1.5 or 3T safe Immediately    CARDIOVERSION  10/16/2019    CARPAL TUNNEL RELEASE Bilateral 2008    CERVICAL SPINE SURGERY      2-5    COLONOSCOPY N/A 10/29/2020    COLONOSCOPY WITH BIOPSY performed by Emiliano Navarro MD at [address]  10/29/2020    COLONOSCOPY SUBMUCOSAL/BOTOX INJECTION performed by Emiliano Navarro MD at [address]  10/29/2020    COLONOSCOPY POLYPECTOMY SNARE/COLD BIOPSY performed by Emiliano Navarro MD at [address] N/A 01/12/2021    COLONOSCOPY POLYPECTOMY HOT SNARE, COLD SNARE POLPECTOMY performed by José Miguel Sandoval MD at [address] N/A 10/21/2019    CABG CORONARY ARTERY BYPASS GRAFT X1, LIMA-LAD, RBOWN 4 MAZE PROCEDURE, 50MM ATRICURE ATRIAL CLIP RIGID INTERNAL FIXATION PLATES X 3   SCREWS X 13 performed by Khoa Zimmerman MD at [address] [facility] N/A 03/26/2021    URODYNAMICS performed by Martha Cohen MD at [place] [address]  03/26/2021    CYSTOSCOPY FLEXIBLE performed by Martha Cohen MD at [place] [address]  05/14/2021    CYSTOSCOPY, UROLIFT, FULGURATION    CYSTOSCOPY N/A 05/14/2021    CYSTOSCOPY, UROLIFT, FULGURATION performed by Martha Cohen MD at [place] [address]  07/08/2022    CYSTOSCOPY, BOTOX INJECTION  (100 UNITS    EYE SURGERY      cataract surgery 2020    FRACTURE SURGERY      Left leg    TRANSESOPHAGEAL ECHOCARDIOGRAM  10/16/2019    URETHRAL SURGERY N/A 7/8/2022    CYSTOSCOPY, BOTOX INJECTION  (100 UNITS) performed by Martha Cohen MD at [facility] History   Problem Relation Age of Onset    Alcohol Abuse Maternal Uncle     Heart Attack Maternal Uncle     Cancer Mother     Alcohol Abuse Father      Current Outpatient Medications on File Prior to Visit   Medication Sig Dispense Refill    Elastic Bandages & Supports (JOBST OPAQUE KNEE 20-30MMHG XL) MISC Dispense two pair closed toe knee high 20 to 30 mmHg Jobst compression stockings 2 each 0    isosorbide mononitrate (IMDUR) 30 MG extended release tablet TAKE 1 TABLET BY MOUTH DAILY 30 tablet 1    furosemide (LASIX) 40 MG tablet Take 1.5 tablets by mouth 2 times daily 90 tablet 2    pantoprazole (PROTONIX) 40 MG tablet TAKE 1 TABLET BY MOUTH 2 TIMES DAILY (BEFORE MEALS) 60 tablet 5    oxybutynin (DITROPAN-XL) 10 mg extended release tablet TAKE 1 TABLET BY MOUTH DAILY 30 tablet 3    ALLERGY RELIEF 10 MG tablet TAKE 1 TABLET BY MOUTH DAILY 30 tablet 3    Multiple Vitamin (MULTIVITAMIN) TABS TAKE 1 TABLET BY MOUTH DAILY 30 tablet 3    potassium chloride (KLOR-CON M) 20 MEQ extended release tablet TAKE 1 TABLET BY MOUTH DAILY 30 tablet 1    atorvastatin (LIPITOR) 40 MG tablet TAKE 1 TABLET BY MOUTH NIGHTLY 30 tablet 1    HM ASPIRIN EC LOW DOSE 81 MG EC tablet       tamsulosin (FLOMAX) 0.4 MG capsule TAKE 1 CAPSULE BY MOUTH DAILY 30 capsule 5    Multiple Vitamins-Minerals (MULTIVITAMIN WITH MINERALS) tablet Take 1 tablet by mouth daily 30 tablet 3    Prenatal Vit-Fe Fumarate-FA (NIVA-PLUS) 27-1 MG TABS       Multiple Vitamins-Minerals (PRESERVISION AREDS 2) CAPS       Blood Pressure Monitor KIT 1 each by Does not apply route 2 times daily 1 kit 0    Misc. Devices ([employer] WEIGHT SCALE) MISC 1 each by Does not apply route as needed (weigh once a day and record) 1 each 0    nitroGLYCERIN (NITROSTAT) 0.4 MG SL tablet Place 1 tablet under the tongue every 5 minutes as needed for Chest pain up to max of 3 total doses. If no relief after 1 dose, call 911. 25 tablet 1    Elastic Bandages & Supports (JOBST KNEE HIGH COMPRESSION SM) MISC 2 each by Does not apply route daily Wear q day. Remove q hs. Diagnosis: Chronic venous insufficiency 2 each 0    gabapentin (NEURONTIN) 400 MG capsule Take 1 capsule by mouth daily for 31 days. 30 capsule 1     No current facility-administered medications on file prior to visit. Social History     Tobacco Use    Smoking status: Never    Smokeless tobacco: Never   Vaping Use    Vaping Use: Never used   Substance Use Topics    Alcohol use: Not Currently     Comment: stopped 1991    Drug use: Not Currently       No Known Allergies    Review of Systems  ROS: numbness, incoordination    Physical Exam:      /74   Pulse 76   Temp 97.2 °F (36.2 °C)   Ht 5' 6\" (1.676 m)   Wt 275 lb (124.7 kg)   SpO2 98%   BMI 44.39 kg/m²   Estimated body mass index is 44.39 kg/m² as calculated from the following:    Height as of this encounter: 5' 6\" (1.676 m). Weight as of this encounter: 275 lb (124.7 kg). General:  Khoa Jay is a 74y.o. year old male who appears his stated age. HEENT: Normocephalic atraumatic. Neck supple. Chest: regular rate; pulses equal. Equal chest rise and fall  Abdomen: Soft nondistended.    Ext: DP equal with good capillary refill  Neuro    Mentation  Appropriate affect   oriented    Cranial Nerves:   Pupils equal and reactive to light  Extraocular motion intact  Face symmetric  No dysarthria  v1-3 sensation symmetric, masseter tone symmetric  Hearing symmetric and intact to finger rub    Sensation:   Decreased sensation UE    Motor  L deltoid 5/5; R deltoid 5/5  L biceps 5/5; R biceps 5/5  L triceps 5/5; R triceps 5/5  L wrist extension 5/5; R wrist extension 5/5   intrinsics 4/55    L iliopsoas 5/5 , R iliopsoas 5/5  L quadriceps 5/5; R quadriceps 5/5  L Dorsiflexion 5/5; R dorsiflexion 5/5  L Plantarflexion 5/5; R plantarflexion 5/5  L EHL 5/5; R EHL 5/5    Reflexes  3/4 patellars, achilles, bic.tric  +cary/  2 beat clonus    Studies Review:     MRI does reveal severe stenosis at C5 from the cage to cage with cord distortion and significant change. CT does show solid osseous fusion from C4-C7 both anterior and posterior. Assessment and Plan:      1. Stenosis of cervical spine with myelopathy (HCC)    2. Cervical pseudoarthrosis, initial encounter ([facility] [address]    3. Morbid obesity ([facility] [address]          Plan: I did extensive discussion with the patient regarding clinical findings and MRI and CT. Patient does have significant stenosis with signal change. However clinically I am concerned that he is at relatively stable and poor neurological function since 2018. Considering the duration of his symptoms I am concerned that it is unlikely he will see any recovery. It is very difficult in the setting of a prior decompression to truly elicit whether or not the symptoms are from ongoing stenosis or they are from a static injury at some point time despite the appearance of ongoing compression. Nonetheless considering the severity of his symptoms I have offered him a direct decompression which would involve a C5 corpectomy with explantation of anterior hardware.   In addition I would perform an anterior cervical discectomy and fusion at the C3-4 level considering that there is a pseudoarthrosis based on CT imaging. Procedure will take 3 hours and there is significant risk of dysphagia as well as hoarseness considering prior scar and requirement of extensive dissection. Pt to consider surgical option and will call us. Followup: No follow-ups on file. Prescriptions Ordered:  No orders of the defined types were placed in this encounter. Orders Placed:  No orders of the defined types were placed in this encounter. Electronically signed by Philomena Tapia DO on 7/25/2022 at 9:43 AM    Please note that this chart was generated using voice recognition Dragon dictation software. Although every effort was made to ensure the accuracy of this automated transcription, some errors in transcription may have occurred.

## 2022-07-26 ENCOUNTER — TELEPHONE (OUTPATIENT)
Dept: FAMILY MEDICINE CLINIC | Age: 67
End: 2022-07-26

## 2022-07-28 NOTE — TELEPHONE ENCOUNTER
Per [facility] Denied Allergy Relief-Over-the-counter (OTC) or non-prescription drugs are excluded from coverage under Medicare  rules.  Please Advise

## 2022-07-29 ENCOUNTER — HOSPITAL ENCOUNTER (OUTPATIENT)
Dept: OTHER | Age: 67
Discharge: HOME | End: 2022-07-29
Payer: MEDICARE

## 2022-07-29 VITALS
BODY MASS INDEX: 42.8 KG/M2 | HEART RATE: 75 BPM | OXYGEN SATURATION: 96 % | SYSTOLIC BLOOD PRESSURE: 132 MMHG | WEIGHT: 265.2 LBS | RESPIRATION RATE: 20 BRPM | DIASTOLIC BLOOD PRESSURE: 74 MMHG

## 2022-07-29 PROCEDURE — 99212 OFFICE O/P EST SF 10 MIN: CPT

## 2022-07-29 ASSESSMENT — PAIN DESCRIPTION - LOCATION: LOCATION: BACK;NECK

## 2022-07-29 ASSESSMENT — PAIN SCALES - GENERAL: PAINLEVEL_OUTOF10: 7

## 2022-07-29 ASSESSMENT — PAIN DESCRIPTION - ORIENTATION: ORIENTATION: LOWER

## 2022-07-29 ASSESSMENT — PAIN DESCRIPTION - DESCRIPTORS: DESCRIPTORS: DISCOMFORT

## 2022-07-29 NOTE — PROGRESS NOTES
Verbally reviewed medication list with patient; patient verbalized understanding. Discussed 2000mg/day sodium restricted diet; patient verbalized understanding. Moderate daily exercise encouraged as tolerated. Discussed rest breaks as needed; patient verbalized understanding. Patient instructed to weigh self at the same time of each day, using same clothes and same scale; reinforced teaching to monitor for 3-5 lb weight increase over 1-2 days, and to notify the CHF clinic at [phone] or physician office if weight change noted. Patient verbalized understanding. Risks of smoking discussed with the patient if applicable; patient strongly discouraged to smoke. Patient verbalized understanding. Signs and symptoms of CHF discussed with patient, such as feeling more tired than normal, feeling short of breath, coughing that increases when you lie down, sudden weight gain, swelling of your feet, legs or belly. Patient verbalized understanding to notify the CHF clinic at [phone] or physician office if these symptoms occur. Compliance with plan of care and further disease process causes discussed with patient, patient encouraged to keep all follow up appointments. Patient verbalized understanding.

## 2022-07-29 NOTE — PROGRESS NOTES
Date:  2022  Time:  11:47 AM    CHF Clinic at [address] [facility]    Office: [phone] Fax: [phone]    Re:  Apryl Bob   Patient : 1955    Vital Signs: /74   Pulse 75   Resp 20   Wt 265 lb 3.2 oz (120.3 kg)   SpO2 96%   BMI 42.80 kg/m²                       O2 Device: None (Room air)                           No results for input(s): CBC, HGB, HCT, WBC, PLATELET, NA, K, CL, CO2, BUN, CREATININE, GLUCOSE, BNP, INR in the last 72 hours. Respiratory:    Assessment  Charting Type: Reassessment    Breath Sounds  Right Upper Lobe: Clear  Right Middle Lobe: Clear  Right Lower Lobe: Clear  Left Upper Lobe: Clear  Left Lower Lobe: Clear    Cough/Sputum  Cough: None         Peripheral Vascular  RLE Edema: +1, Non-pitting  LLE Edema: +1, Non-pitting      Complaints: None    Physician Orders None    Comment : Patient here for scheduled visit per ambulatory with cane assist. His weight is down 10 lbs from last month. Denies any dyspnea with exertion or chest pain at this time. Ongoing lower leg, ankle and pedal edema noted with a decrease in swelling from last month. Continues to wear compression stockings during the day. Medication list reviewed and updated. Reinforced low sodium diet and fluid restrictions with patient. Has appt. With PCP next week. No s/s acute chf at this time. Next CHF Clinic visit 22.     Electronically signed by Mendel Lips, RN on 2022 at 11:47 AM

## 2022-08-04 ENCOUNTER — OFFICE VISIT (OUTPATIENT)
Dept: FAMILY MEDICINE CLINIC | Age: 67
End: 2022-08-04
Payer: MEDICARE

## 2022-08-04 VITALS
HEART RATE: 62 BPM | SYSTOLIC BLOOD PRESSURE: 116 MMHG | DIASTOLIC BLOOD PRESSURE: 72 MMHG | TEMPERATURE: 96.8 F | WEIGHT: 265 LBS | BODY MASS INDEX: 42.59 KG/M2 | HEIGHT: 66 IN

## 2022-08-04 DIAGNOSIS — E87.6 HYPOKALEMIA: ICD-10-CM

## 2022-08-04 DIAGNOSIS — I95.9 HYPOTENSION, UNSPECIFIED HYPOTENSION TYPE: Primary | ICD-10-CM

## 2022-08-04 DIAGNOSIS — R20.0 BILATERAL HAND NUMBNESS: ICD-10-CM

## 2022-08-04 DIAGNOSIS — Z12.5 PROSTATE CANCER SCREENING: ICD-10-CM

## 2022-08-04 PROCEDURE — 1123F ACP DISCUSS/DSCN MKR DOCD: CPT

## 2022-08-04 PROCEDURE — 1036F TOBACCO NON-USER: CPT

## 2022-08-04 PROCEDURE — 3017F COLORECTAL CA SCREEN DOC REV: CPT

## 2022-08-04 PROCEDURE — G8417 CALC BMI ABV UP PARAM F/U: HCPCS

## 2022-08-04 PROCEDURE — G8427 DOCREV CUR MEDS BY ELIG CLIN: HCPCS

## 2022-08-04 PROCEDURE — 99213 OFFICE O/P EST LOW 20 MIN: CPT

## 2022-08-04 RX ORDER — POTASSIUM CHLORIDE 20 MEQ/1
20 TABLET, EXTENDED RELEASE ORAL DAILY
Qty: 30 TABLET | Refills: 1 | Status: SHIPPED | OUTPATIENT
Start: 2022-08-04

## 2022-08-04 RX ORDER — GABAPENTIN 400 MG/1
400 CAPSULE ORAL DAILY
Qty: 30 CAPSULE | Refills: 2 | Status: SHIPPED | OUTPATIENT
Start: 2022-08-04 | End: 2022-10-03

## 2022-08-04 ASSESSMENT — ENCOUNTER SYMPTOMS
SHORTNESS OF BREATH: 0
COUGH: 0

## 2022-08-04 NOTE — PROGRESS NOTES
HYPERTENSION visit     BP Readings from Last 3 Encounters:   07/29/22 132/74   07/25/22 117/74   07/08/22 129/72       LDL Cholesterol (mg/dL)   Date Value   03/08/2022 56   03/08/2022 56     HDL (mg/dL)   Date Value   03/08/2022 49   03/08/2022 49     BUN (mg/dL)   Date Value   06/01/2022 18     Creatinine (mg/dL)   Date Value   06/01/2022 0.64 (L)     POC Creatinine (mg/dL)   Date Value   07/08/2022 0.76     Glucose (mg/dL)   Date Value   06/01/2022 108 (H)              Have you changed or started any medications since your last visit including any over-the-counter medicines, vitamins, or herbal medicines? no   Have you stopped taking any of your medications? Is so, why? -  no  Are you having any side effects from any of your medications? - no  How often do you miss doses of your medication? rare      Have you seen any other physician or provider since your last visit? yes - Urology, Oral Sox, Burn Surgery, Neuro   Have you had any other diagnostic tests since your last visit? yes - Labs, CT   Have you been seen in the emergency room and/or had an admission in a hospital since we last saw you?  yes - [facility]   Have you had your routine dental cleaning in the past 6 months?  no     Do you have an active MyChart account? If no, what is the barrier?   No: Declines    Patient Care Team:  Randy Varma MD as PCP - General (Family Medicine)  Elvira Valdez MD as Consulting Physician (Gastroenterology)  Bakari Flynn MD as Consulting Physician (Urology)    Medical History Review  Past Medical, Family, and Social History reviewed and does contribute to the patient presenting condition    Health Maintenance   Topic Date Due    Annual Wellness Visit (AWV)  Never done    Prostate Specific Antigen (PSA) Screening or Monitoring  09/22/2021    COVID-19 Vaccine (3 - Booster for Q-Layer series) 09/30/2021    Flu vaccine (1) 09/01/2022    Diabetes screen  12/10/2022    Lipids  03/08/2023    Pneumococcal 65+ years Vaccine (2 - PCV) 04/13/2023    Depression Screen  05/09/2023    Colorectal Cancer Screen  01/12/2031    DTaP/Tdap/Td vaccine (3 - Td or Tdap) 05/04/2032    Shingles vaccine  Completed    Hepatitis C screen  Completed    HIV screen  Completed    Hepatitis A vaccine  Aged Out    Hepatitis B vaccine  Aged Out    Hib vaccine  Aged Out    Meningococcal (ACWY) vaccine  Aged Out

## 2022-08-04 NOTE — PROGRESS NOTES
Manju Gibson (:  1955) is a 74 y.o. male,Established patient, here for evaluation of the following chief complaint(s):  Hypertension (Follow up no headaches)         ASSESSMENT/PLAN:  1. Hypotension, unspecified hypotension type  -Blood pressure is within normal range, patient is asymptomatic  2. Hypokalemia       -last potassium 3.6  -     Basic Metabolic Panel; Future  -     potassium chloride (KLOR-CON M) 20 MEQ extended release tablet; Take 1 tablet by mouth in the morning., Disp-30 tablet, [id number]. Refill Due on 22. Queued by [employer] . Normal  3. Prostate cancer screening  -     PSA Screening; Future  4. Bilateral hand numbness  -     gabapentin (NEURONTIN) 400 MG capsule; Take 1 capsule by mouth in the morning for 60 days. , Disp-30 capsule, R-2Normal      Return in about 3 months (around 2022). Subjective   SUBJECTIVE/OBJECTIVE:  74years old male patient with past ministry of hypertension came for hypertension follow-up. Patient was told to come back for blood pressure check. According, his blood pressure last time was on the lower side. He checks his blood pressure at home, he said that it is within normal range. Denies any dizziness, lightheadedness, chest pain, shortness of breath, weakness, numbness, bowel or urinary habit changes. Patient's blood pressure today is 116/72. Review of Systems   Constitutional:  Negative for fatigue and fever. Respiratory:  Negative for cough and shortness of breath. Cardiovascular:  Negative for chest pain, palpitations and leg swelling. Neurological:  Negative for dizziness, weakness, light-headedness, numbness and headaches. Psychiatric/Behavioral:  Negative for agitation and confusion. Objective   Physical Exam  Constitutional:       General: He is not in acute distress. Appearance: Normal appearance. HENT:      Head: Normocephalic and atraumatic.    Cardiovascular:      Rate and Rhythm: Normal rate and regular rhythm. Pulses: Normal pulses. Heart sounds: Normal heart sounds. No murmur heard. Pulmonary:      Effort: Pulmonary effort is normal.      Breath sounds: Normal breath sounds. No wheezing, rhonchi or rales. Abdominal:      General: Abdomen is flat. Bowel sounds are normal. There is no distension. Palpations: Abdomen is soft. Tenderness: There is no abdominal tenderness. Neurological:      Mental Status: He is alert. On this date 8/4/2022 I have spent 25 minutes reviewing previous notes, test results and face to face with the patient discussing the diagnosis and importance of compliance with the treatment plan as well as documenting on the day of the visit. An electronic signature was used to authenticate this note.     --Melanie Garrett MD

## 2022-08-12 ENCOUNTER — HOSPITAL ENCOUNTER (OUTPATIENT)
Age: 67
Discharge: HOME | End: 2022-08-12
Payer: MEDICARE

## 2022-08-12 ENCOUNTER — OFFICE VISIT (OUTPATIENT)
Dept: UROLOGY | Age: 67
End: 2022-08-12
Payer: MEDICARE

## 2022-08-12 VITALS
HEART RATE: 74 BPM | HEIGHT: 65 IN | DIASTOLIC BLOOD PRESSURE: 77 MMHG | WEIGHT: 265 LBS | BODY MASS INDEX: 44.15 KG/M2 | SYSTOLIC BLOOD PRESSURE: 138 MMHG

## 2022-08-12 DIAGNOSIS — E87.6 HYPOKALEMIA: ICD-10-CM

## 2022-08-12 DIAGNOSIS — N39.41 URGE INCONTINENCE: Primary | ICD-10-CM

## 2022-08-12 DIAGNOSIS — N32.81 OVERACTIVE BLADDER: ICD-10-CM

## 2022-08-12 DIAGNOSIS — Z12.5 PROSTATE CANCER SCREENING: ICD-10-CM

## 2022-08-12 LAB
ANION GAP SERPL CALCULATED.3IONS-SCNC: 11 MMOL/L (ref 9–17)
BUN BLDV-MCNC: 15 MG/DL (ref 8–23)
CALCIUM SERPL-MCNC: 9.2 MG/DL (ref 8.6–10.4)
CHLORIDE BLD-SCNC: 101 MMOL/L (ref 98–107)
CO2: 24 MMOL/L (ref 20–31)
CREAT SERPL-MCNC: 0.73 MG/DL (ref 0.7–1.2)
GFR AFRICAN AMERICAN: >60 ML/MIN
GFR NON-AFRICAN AMERICAN: >60 ML/MIN
GFR SERPL CREATININE-BSD FRML MDRD: ABNORMAL ML/MIN/{1.73_M2}
GLUCOSE BLD-MCNC: 107 MG/DL (ref 70–99)
POTASSIUM SERPL-SCNC: 3.8 MMOL/L (ref 3.7–5.3)
PROSTATE SPECIFIC ANTIGEN: 0.49 NG/ML
SODIUM BLD-SCNC: 136 MMOL/L (ref 135–144)

## 2022-08-12 PROCEDURE — G0103 PSA SCREENING: HCPCS

## 2022-08-12 PROCEDURE — 99213 OFFICE O/P EST LOW 20 MIN: CPT | Performed by: UROLOGY

## 2022-08-12 PROCEDURE — 36415 COLL VENOUS BLD VENIPUNCTURE: CPT

## 2022-08-12 PROCEDURE — 1123F ACP DISCUSS/DSCN MKR DOCD: CPT | Performed by: UROLOGY

## 2022-08-12 PROCEDURE — 1036F TOBACCO NON-USER: CPT | Performed by: UROLOGY

## 2022-08-12 PROCEDURE — G8417 CALC BMI ABV UP PARAM F/U: HCPCS | Performed by: UROLOGY

## 2022-08-12 PROCEDURE — 3017F COLORECTAL CA SCREEN DOC REV: CPT | Performed by: UROLOGY

## 2022-08-12 PROCEDURE — 51798 US URINE CAPACITY MEASURE: CPT | Performed by: UROLOGY

## 2022-08-12 PROCEDURE — 80048 BASIC METABOLIC PNL TOTAL CA: CPT

## 2022-08-12 PROCEDURE — G8427 DOCREV CUR MEDS BY ELIG CLIN: HCPCS | Performed by: UROLOGY

## 2022-08-12 RX ORDER — OXYBUTYNIN CHLORIDE 10 MG/1
10 TABLET, EXTENDED RELEASE ORAL DAILY
Qty: 60 TABLET | Refills: 3 | Status: SHIPPED | OUTPATIENT
Start: 2022-08-12 | End: 2022-10-06

## 2022-08-12 NOTE — PROGRESS NOTES
has PMH which increases risk of therapy with PDE5i, and is on nitroglycerine tabs, which is a contraindication to therapy. - We offer the patient Trimix injections, MUSE, and IPP, but he is not interested at this time. Summary of old records:   -Underwent urodynamics and cystoscopy on 3/26/21 which showed uninhibited bladder contractions, overactive bladder and obstructing kissing lobes  -Underwent urolift on 5/14/21: 5 implants inserted and clip placed in bladder had to be removed and fulgurated. Additional History:   6/11/21  Still having leakage since procedure, which he states is uncontrolled and bothersome. Uses two pads per day, heavy soaked   Taking Ditropan XL 5mg daily, has not helped with leaking. Denies any headache, dry mouth, constipation   Denies any burning with urination  Having urgency and frequency, does take lasix 60 mg twice daily due to CHF      Last several PSA's:  Lab Results   Component Value Date    PSA 0.67 09/22/2020       Last total testosterone:  No results found for: TESTOSTERONE    Urinalysis today:  No results found for this visit on 08/12/22.     Last BUN and creatinine:  Lab Results   Component Value Date    BUN 18 06/01/2022     Lab Results   Component Value Date    CREATININE 0.76 07/08/2022       Imaging Reviewed during this Office Visit:   (results were independently reviewed by physician and radiology report verified)    PAST MEDICAL, FAMILY AND SOCIAL HISTORY UPDATE:  Past Medical History:   Diagnosis Date    Acute CHF (congestive heart failure) ([facility] [address] 03/08/2021    Arthritis     back    Atrial fibrillation ([address] 10/2019    Dr. Pricila Oneal    BPH (benign prostatic hyperplasia)     Cellulitis     Cervical disc disease     CHF (congestive heart failure) [facility])     cardiologist Dr. Gulshan Enciso. s CHF clinic    Combined systolic and diastolic congestive heart failure ([facility] [address] 01/15/2020    Coronary artery disease 10/2019     CABG 2019 [facility]    Dr. Aleida Ortiz EYE SURGERY      cataract surgery 2020    FRACTURE SURGERY      Left leg    TRANSESOPHAGEAL ECHOCARDIOGRAM  10/16/2019    URETHRAL SURGERY N/A 7/8/2022    CYSTOSCOPY, BOTOX INJECTION  (100 UNITS) performed by Oneyda Noriega MD at [facility] History   Problem Relation Age of Onset    Alcohol Abuse Maternal Uncle     Heart Attack Maternal Uncle     Cancer Mother     Alcohol Abuse Father      Outpatient Medications Marked as Taking for the 8/12/22 encounter (Office Visit) with Oneyda Noriega MD   Medication Sig Dispense Refill    gabapentin (NEURONTIN) 400 MG capsule Take 1 capsule by mouth in the morning for 60 days. 30 capsule 2    potassium chloride (KLOR-CON M) 20 MEQ extended release tablet Take 1 tablet by mouth in the morning.  30 tablet 1    Elastic Bandages & Supports (JOBST OPAQUE KNEE 20-30MMHG XL) MISC Dispense two pair closed toe knee high 20 to 30 mmHg Jobst compression stockings 2 each 0    isosorbide mononitrate (IMDUR) 30 MG extended release tablet TAKE 1 TABLET BY MOUTH DAILY 30 tablet 1    furosemide (LASIX) 40 MG tablet Take 1.5 tablets by mouth 2 times daily 90 tablet 2    pantoprazole (PROTONIX) 40 MG tablet TAKE 1 TABLET BY MOUTH 2 TIMES DAILY (BEFORE MEALS) 60 tablet 5    oxybutynin (DITROPAN-XL) 10 mg extended release tablet TAKE 1 TABLET BY MOUTH DAILY 30 tablet 3    ALLERGY RELIEF 10 MG tablet TAKE 1 TABLET BY MOUTH DAILY 30 tablet 3    Multiple Vitamin (MULTIVITAMIN) TABS TAKE 1 TABLET BY MOUTH DAILY 30 tablet 3    atorvastatin (LIPITOR) 40 MG tablet TAKE 1 TABLET BY MOUTH NIGHTLY 30 tablet 1    HM ASPIRIN EC LOW DOSE 81 MG EC tablet       tamsulosin (FLOMAX) 0.4 MG capsule TAKE 1 CAPSULE BY MOUTH DAILY 30 capsule 5    Multiple Vitamins-Minerals (MULTIVITAMIN WITH MINERALS) tablet Take 1 tablet by mouth daily 30 tablet 3    Prenatal Vit-Fe Fumarate-FA (NIVA-PLUS) 27-1 MG TABS       Multiple Vitamins-Minerals (PRESERVISION AREDS 2) CAPS       Blood Pressure Monitor KIT 1 each by Does not apply route 2 times daily 1 kit 0    Misc. Devices (Creditera WEIGHT SCALE) MISC 1 each by Does not apply route as needed (weigh once a day and record) 1 each 0    nitroGLYCERIN (NITROSTAT) 0.4 MG SL tablet Place 1 tablet under the tongue every 5 minutes as needed for Chest pain up to max of 3 total doses. If no relief after 1 dose, call 911. 25 tablet 1    Elastic Bandages & Supports (JOBST KNEE HIGH COMPRESSION SM) MISC 2 each by Does not apply route daily Wear q day. Remove q hs. Diagnosis: Chronic venous insufficiency 2 each 0       Patient has no known allergies. Social History     Tobacco Use   Smoking Status Never   Smokeless Tobacco Never       Social History     Substance and Sexual Activity   Alcohol Use Not Currently    Comment: stopped 1991       REVIEW OF SYSTEMS:  Constitutional: negative  Eyes: negative  Respiratory: negative  Cardiovascular: negative  Gastrointestinal: negative  Musculoskeletal: negative  Genitourinary: see HPI  Skin: negative   Neurological: negative  Hematological/Lymphatic: negative  Psychological: negative    Physical Exam:      Vitals:    08/12/22 1001   BP: 138/77   Pulse: 74     NAD, no acute distress  RR  Psych mood appropriate  Abdomen: soft, nontender, nondistended, obese   Extremities: compression socks in place, edema noted     Assessment and Plan   1. Urge continence  2. Overactive bladder       Plan:   Cystoscopy, Botox injection did not really help with his overactive bladder symptoms. We once again discussed importance of weight loss. Also, advised him to cut down on his coffee intake  Continue oxybutynin 10mg ER  BPH improved s/p UroLift  Continue timed voiding and urethral stripping    Jose Covington   PGY5 URO     Please don't hesitate to call with any questions. Thank you for involving us in the care of this pleasant patient.     I have discussed the care of Apryl Bob including pertinent history and exam findings, with the resident, PA, and or NP. I have personally seen and examined the patient, including the key elements of all parts of the encounter, which been performed by me. I agree with the assessment, plan and orders as documented by the resident, PA, and or NP (GC modifier). We discuss that there are not many other options for him at this point, but if he continues to lose weight we can consider interstim and PFPT. Please don't hesitate to call with any questions. Thank you for involving us in the care of this pleasant patient.     Dr. Ary Verma MD, MD

## 2022-08-15 ENCOUNTER — OFFICE VISIT (OUTPATIENT)
Dept: GASTROENTEROLOGY | Age: 67
End: 2022-08-15
Payer: MEDICARE

## 2022-08-15 VITALS
HEIGHT: 65 IN | WEIGHT: 257 LBS | BODY MASS INDEX: 42.82 KG/M2 | SYSTOLIC BLOOD PRESSURE: 126 MMHG | HEART RATE: 73 BPM | DIASTOLIC BLOOD PRESSURE: 74 MMHG

## 2022-08-15 DIAGNOSIS — K21.9 GASTROESOPHAGEAL REFLUX DISEASE WITHOUT ESOPHAGITIS: Primary | ICD-10-CM

## 2022-08-15 DIAGNOSIS — N39.490 OVERFLOW INCONTINENCE OF URINE: ICD-10-CM

## 2022-08-15 PROCEDURE — 1036F TOBACCO NON-USER: CPT | Performed by: INTERNAL MEDICINE

## 2022-08-15 PROCEDURE — G8427 DOCREV CUR MEDS BY ELIG CLIN: HCPCS | Performed by: INTERNAL MEDICINE

## 2022-08-15 PROCEDURE — 3017F COLORECTAL CA SCREEN DOC REV: CPT | Performed by: INTERNAL MEDICINE

## 2022-08-15 PROCEDURE — 1123F ACP DISCUSS/DSCN MKR DOCD: CPT | Performed by: INTERNAL MEDICINE

## 2022-08-15 PROCEDURE — 99213 OFFICE O/P EST LOW 20 MIN: CPT | Performed by: INTERNAL MEDICINE

## 2022-08-15 PROCEDURE — G8417 CALC BMI ABV UP PARAM F/U: HCPCS | Performed by: INTERNAL MEDICINE

## 2022-08-15 RX ORDER — TAMSULOSIN HYDROCHLORIDE 0.4 MG/1
0.4 CAPSULE ORAL DAILY
Qty: 30 CAPSULE | Refills: 5 | Status: SHIPPED | OUTPATIENT
Start: 2022-08-15

## 2022-08-15 ASSESSMENT — ENCOUNTER SYMPTOMS
CONSTIPATION: 0
RECTAL PAIN: 0
ABDOMINAL PAIN: 0
TROUBLE SWALLOWING: 0
ANAL BLEEDING: 0
NAUSEA: 0
VOICE CHANGE: 0
VOMITING: 0
COLOR CHANGE: 0
BLOOD IN STOOL: 0
SORE THROAT: 0
COUGH: 0
CHOKING: 0
SHORTNESS OF BREATH: 0
DIARRHEA: 0
APNEA: 0
ABDOMINAL DISTENTION: 0
WHEEZING: 0

## 2022-08-15 NOTE — PROGRESS NOTES
GI FOLLOW UP    INTERVAL HISTORY:     Chronic GERD-doing well with PPI therapy twice daily    Chief Complaint   Patient presents with    Follow-up     Gerd       1. Gastroesophageal reflux disease without esophagitis          HISTORY OF PRESENT ILLNESS: Nico Cai is a 60 y.o. male with a past history remarkable for HARPREET, A. fib on Eliquis, morbid obesity, history of CABG in October 2019 on dual antiplatelet therapy (aspirin and Plavix), referred for evaluation of nonproductive postprandial cough. This appears to be most consistent with the patient's chronic GERD symptoms. He appears to be on maximum PPI therapy     Recent colonoscopy which identified tubular adenomas and sessile serrated adenomas which were removed aside from an isolated flat lesion that will require EMR. Findings discussed with the patient. Past Medical,Family, and Social History reviewed and does contribute to the patient presenting condition. Patient's PMH/PSH,SH,PSYCH Hx, MEDs, ALLERGIES, and ROS were all reviewed and updated in the appropriate sections. PAST MEDICAL HISTORY:  Past Medical History:   Diagnosis Date    Acute CHF (congestive heart failure) ([facility] [address] 03/08/2021    Arthritis     back    Atrial fibrillation ([facility] [address] 10/2019    Dr. Eliana Maldonado    BPH (benign prostatic hyperplasia)     Cellulitis     Cervical disc disease     CHF (congestive heart failure) [facility])     cardiologist Dr. Gerry Stanley. 's CHF clinic    Combined systolic and diastolic congestive heart failure ([facility] [address] 01/15/2020    Coronary artery disease 10/2019     CABG 2019 [facility]    Dr. Reyes Everts [facility] last seen within the last year.     COVID-19 virus RNA test result indeterminate 03/31/2021    CPAP (continuous positive airway pressure) dependence     GERD (gastroesophageal reflux disease)     on rx    History of incarceration released 2019    Hyperlipidemia     Dr. Hilary Thorpe    Hypertension     [facility] CHF clinic     Dr. Hilary Thorpe    Hypokalemia     Myocardial infarct [facility])     Dr. Hilary Thorpe    Obesity     Overactive bladder     Sleep apnea     C-pap    Wears reading eyeglasses     Wellness examination     Dr. Jon Shelley 4/2021       Past Surgical History:   Procedure Laterality Date    ABDOMINAL EXPLORATION SURGERY      CARDIAC SURGERY      2019 - AtriClip 1.5 or 3T safe Immediately    CARDIOVERSION  10/16/2019    CARPAL TUNNEL RELEASE Bilateral 2008    CERVICAL SPINE SURGERY      2-5    COLONOSCOPY N/A 10/29/2020    COLONOSCOPY WITH BIOPSY performed by Rico Watts MD at [address] [place] [address]  10/29/2020    COLONOSCOPY SUBMUCOSAL/BOTOX INJECTION performed by Rico Watts MD at [address] [place] [address]  10/29/2020    COLONOSCOPY POLYPECTOMY SNARE/COLD BIOPSY performed by Rico Watts MD at [address] [place] [address] N/A 01/12/2021    COLONOSCOPY POLYPECTOMY HOT SNARE, COLD SNARE POLPECTOMY performed by Shima Petty MD at [address] N/A 10/21/2019    CABG CORONARY ARTERY BYPASS GRAFT X1, LIMA-LAD, BROWN 4 MAZE PROCEDURE, 50MM ATRICURE ATRIAL CLIP RIGID INTERNAL FIXATION PLATES X 3   SCREWS X 13 performed by Dana Boinlla MD at [facility] 03/26/2021    URODYNAMICS performed by Rajan Daniels MD at [place] [address]  03/26/2021    CYSTOSCOPY FLEXIBLE performed by Rajan Daniels MD at [place] [address]  05/14/2021    CYSTOSCOPY, UROLIFT, FULGURATION    CYSTOSCOPY N/A 05/14/2021    CYSTOSCOPY, UROLIFT, FULGURATION performed by Rajan Daniels MD at [facility] 07/08/2022    CYSTOSCOPY, BOTOX INJECTION  (100 UNITS    EYE SURGERY      cataract surgery 2020    FRACTURE SURGERY      Left leg    TRANSESOPHAGEAL ECHOCARDIOGRAM  10/16/2019    URETHRAL SURGERY N/A 7/8/2022    CYSTOSCOPY, BOTOX INJECTION  (100 UNITS) performed by Rajan Daniels MD at [facility] OR       CURRENT MEDICATIONS:    Current Outpatient Medications:     oxybutynin (DITROPAN XL) 10 MG extended release tablet, Take 1 tablet by mouth in the morning., Disp: 60 tablet, Rfl: 3    gabapentin (NEURONTIN) 400 MG capsule, Take 1 capsule by mouth in the morning for 60 days. , Disp: 30 capsule, Rfl: 2    potassium chloride (KLOR-CON M) 20 MEQ extended release tablet, Take 1 tablet by mouth in the morning., Disp: 30 tablet, Rfl: 1    Elastic Bandages & Supports (JOBST OPAQUE KNEE 20-30MMHG XL) MISC, Dispense two pair closed toe knee high 20 to 30 mmHg Jobst compression stockings, Disp: 2 each, Rfl: 0    isosorbide mononitrate (IMDUR) 30 MG extended release tablet, TAKE 1 TABLET BY MOUTH DAILY, Disp: 30 tablet, Rfl: 1    furosemide (LASIX) 40 MG tablet, Take 1.5 tablets by mouth 2 times daily, Disp: 90 tablet, Rfl: 2    pantoprazole (PROTONIX) 40 MG tablet, TAKE 1 TABLET BY MOUTH 2 TIMES DAILY (BEFORE MEALS), Disp: 60 tablet, Rfl: 5    oxybutynin (DITROPAN-XL) 10 mg extended release tablet, TAKE 1 TABLET BY MOUTH DAILY, Disp: 30 tablet, Rfl: 3    ALLERGY RELIEF 10 MG tablet, TAKE 1 TABLET BY MOUTH DAILY, Disp: 30 tablet, Rfl: 3    Multiple Vitamin (MULTIVITAMIN) TABS, TAKE 1 TABLET BY MOUTH DAILY, Disp: 30 tablet, Rfl: 3    atorvastatin (LIPITOR) 40 MG tablet, TAKE 1 TABLET BY MOUTH NIGHTLY, Disp: 30 tablet, Rfl: 1    HM ASPIRIN EC LOW DOSE 81 MG EC tablet, , Disp: , Rfl:     tamsulosin (FLOMAX) 0.4 MG capsule, TAKE 1 CAPSULE BY MOUTH DAILY, Disp: 30 capsule, Rfl: 5    Multiple Vitamins-Minerals (MULTIVITAMIN WITH MINERALS) tablet, Take 1 tablet by mouth daily, Disp: 30 tablet, Rfl: 3    Prenatal Vit-Fe Fumarate-FA (NIVA-PLUS) 27-1 MG TABS, , Disp: , Rfl:     Multiple Vitamins-Minerals (PRESERVISION AREDS 2) CAPS, , Disp: , Rfl:     Blood Pressure Monitor KIT, 1 each by Does not apply route 2 times daily, Disp: 1 kit, Rfl: 0    Misc.  Devices ([facility]) MISC, 1 each by Does not apply route as needed (weigh once a day and record), Disp: 1 each, Rfl: 0    nitroGLYCERIN (NITROSTAT) 0.4 MG SL tablet, Place 1 tablet under the tongue every 5 minutes as needed for Chest pain up to max of 3 total doses. If no relief after 1 dose, call 911., Disp: 25 tablet, Rfl: 1    Elastic Bandages & Supports (JOBST KNEE HIGH COMPRESSION SM) MISC, 2 each by Does not apply route daily Wear q day. Remove q hs. Diagnosis: Chronic venous insufficiency, Disp: 2 each, Rfl: 0    ALLERGIES:   No Known Allergies    FAMILY HISTORY:       Problem Relation Age of Onset    Alcohol Abuse Maternal Uncle     Heart Attack Maternal Uncle     Cancer Mother     Alcohol Abuse Father          SOCIAL HISTORY:   Social History     Socioeconomic History    Marital status:      Spouse name: Not on file    Number of children: Not on file    Years of education: Not on file    Highest education level: Not on file   Occupational History    Not on file   Tobacco Use    Smoking status: Never    Smokeless tobacco: Never   Vaping Use    Vaping Use: Never used   Substance and Sexual Activity    Alcohol use: Not Currently     Comment: stopped 1991    Drug use: Not Currently    Sexual activity: Not Currently   Other Topics Concern    Not on file   Social History Narrative    Not on file     Social Determinants of Health     Financial Resource Strain: High Risk    Difficulty of Paying Living Expenses: Very hard   Food Insecurity: No Food Insecurity    Worried About Running Out of Food in the Last Year: Never true    Ran Out of Food in the Last Year: Never true   Transportation Needs: Not on file   Physical Activity: Not on file   Stress: Not on file   Social Connections: Not on file   Intimate Partner Violence: Not on file   Housing Stability: Not on file       REVIEW OF SYSTEMS: A 12-point review of systems was obtained and pertinent positives and negatives were listed below.      REVIEW OF SYSTEMS:     Constitutional: No fever, no chills, no lethargy, no weakness. HEENT:  No headache, otalgia, itchy eyes, nasal discharge or sore throat. Cardiac:  No chest pain, dyspnea, orthopnea or PND. Chest:   No cough, phlegm or wheezing. Abdomen:      Detailed by MA   Neuro:  No focal weakness, abnormal movements or seizure like activity. Skin:   No rashes, no itching. :   No hematuria, no pyuria, no dysuria, no flank pain. Extremities:  No swelling or joint pains. ROS was otherwise negative    Review of Systems   Constitutional:  Negative for appetite change, fatigue, fever and unexpected weight change. HENT:  Negative for sore throat, trouble swallowing and voice change. Eyes:  Negative for visual disturbance. Respiratory:  Negative for apnea, cough, choking, shortness of breath and wheezing. Cardiovascular:  Positive for leg swelling. Negative for chest pain and palpitations. Gastrointestinal:  Negative for abdominal distention, abdominal pain, anal bleeding, blood in stool, constipation, diarrhea, nausea, rectal pain and vomiting. Genitourinary:  Negative for difficulty urinating. Skin:  Negative for color change and rash. Neurological:  Negative for dizziness, seizures, weakness, light-headedness, numbness and headaches. Hematological:  Does not bruise/bleed easily. Psychiatric/Behavioral:  Negative for confusion and sleep disturbance. The patient is not nervous/anxious. PHYSICAL EXAMINATION: Vital signs reviewed per the nursing documentation. /74   Pulse 73   Ht 5' 5\" (1.651 m)   Wt 257 lb (116.6 kg)   BMI 42.77 kg/m²   Body mass index is 42.77 kg/m². Physical Exam    Physical Exam   Constitutional: Patient is oriented to person, place, and time. Patient appears well-developed and well-nourished. HENT:   Head: Normocephalic and atraumatic. Eyes: Pupils are equal, round, and reactive to light. EOM are normal.   Neck: Normal range of motion. Neck supple. No JVD present. No tracheal deviation present.  No thyromegaly present. Cardiovascular: Normal rate, regular rhythm, normal heart sounds and intact distal pulses. Pulmonary/Chest: Effort normal and breath sounds normal. No stridor. No respiratory distress. He has no wheezes. He has no rales. He exhibits no tenderness. Abdominal: Soft. Bowel sounds are normal. He exhibits no distension and no mass. There is no tenderness. There is no rebound and no guarding. No hernia. Musculoskeletal: Normal range of motion. Lymphadenopathy:    Patient has no cervical adenopathy. Neurological: Patient is alert and oriented to person, place, and time. Psychiatric: Patient has a normal mood and affect. Patient behavior is normal.       LABORATORY DATA: Reviewed  Lab Results   Component Value Date    WBC 9.4 02/02/2022    HGB 13.9 02/02/2022    HCT 42.8 02/02/2022    MCV 93.0 02/02/2022     02/02/2022     08/12/2022    K 3.8 08/12/2022     08/12/2022    CO2 24 08/12/2022    BUN 15 08/12/2022    CREATININE 0.73 08/12/2022    LABALBU 3.8 03/16/2021    BILITOT 0.29 (L) 03/16/2021    ALKPHOS 65 03/16/2021    AST 15 03/16/2021    ALT 14 03/16/2021    INR 1.0 03/08/2021         Lab Results   Component Value Date    RBC 4.60 02/02/2022    HGB 13.9 02/02/2022    MCV 93.0 02/02/2022    MCH 30.2 02/02/2022    MCHC 32.5 02/02/2022    RDW 14.2 02/02/2022    MPV 9.8 02/02/2022    BASOPCT 1 02/02/2022    LYMPHSABS 1.55 02/02/2022    MONOSABS 0.92 02/02/2022    NEUTROABS 6.48 02/02/2022    EOSABS 0.27 02/02/2022    BASOSABS 0.07 02/02/2022         DIAGNOSTIC TESTING:     No results found. IMPRESSION: Mr. Felipe Qureshi is a 61 y.o. male with history of HARPREET, A. fib previously on Eliquis, morbid obesity, history of CABG in 2019, previously on dual antiplatelet therapy, currently on monotherapy, initially referred for postprandial cough and anemia, underwent colonoscopy with removal of colon polyps, repeat recommended and 3 years.   Ongoing obscure anemia, upper endoscopy failed to disclose any obvious source of bleeding or blood loss. Denies any melena, hematochezia, bright blood per rectum. No active GI symptoms. Assessment  1. Gastroesophageal reflux disease without esophagitis        Brigette Tarango was seen today for follow-up. Diagnoses and all orders for this visit:    Gastroesophageal reflux disease without esophagitis       Patient has intentional weight loss with dietary modifications and doing well  Protonix 40mg BID to be continued, avoid dietary triggers. 2021-repeat colonoscopy in 2024    RTC: 3 months. May consider taper PPI therapy on next visit. Additional comments: Thank you for allowing me to participate in the care of Mr. Key Back. For any further questions please do not hesitate to contact me. I have reviewed and agree with the ROS entered by the MA/LPN from today's encounter documented in a separate note. Arianna Ramos MD, MPH   Board Certified in Gastroenterology  Board Certified in [address] [place] [address] #: [phone]    this note is created with the assistance of a speech recognition program.  While intending to generate a document that actually reflects the content of the visit, the document can still have some errors including those of syntax and sound a like substitutions which may escape proof reading. It such instances, actual meaning can be extrapolated by contextual diversion.

## 2022-08-25 ENCOUNTER — HOSPITAL ENCOUNTER (OUTPATIENT)
Dept: OTHER | Age: 67
Discharge: HOME | End: 2022-08-25
Payer: MEDICARE

## 2022-08-25 VITALS
BODY MASS INDEX: 43.08 KG/M2 | WEIGHT: 258.9 LBS | OXYGEN SATURATION: 94 % | HEART RATE: 66 BPM | RESPIRATION RATE: 20 BRPM | DIASTOLIC BLOOD PRESSURE: 80 MMHG | SYSTOLIC BLOOD PRESSURE: 110 MMHG

## 2022-08-25 PROCEDURE — 99212 OFFICE O/P EST SF 10 MIN: CPT

## 2022-08-25 NOTE — PROGRESS NOTES
Date:  2022  Time:  11:32 AM    CHF Clinic at [facility]    Office: [phone] Fax: [phone]    Re:  Babs Choi   Patient : 1955    Vital Signs: /80   Pulse 66   Resp 20   Wt 258 lb 14.4 oz (117.4 kg)   SpO2 94%   BMI 43.08 kg/m²                       O2 Device: None (Room air)                           No results for input(s): CBC, HGB, HCT, WBC, PLATELET, NA, K, CL, CO2, BUN, CREATININE, GLUCOSE, BNP, INR in the last 72 hours. Respiratory:         Breath Sounds  Right Upper Lobe: Clear  Right Middle Lobe: Clear  Right Lower Lobe: Clear  Left Upper Lobe: Clear  Left Lower Lobe: Clear    Cough/Sputum  Cough: None         Peripheral Vascular  RLE Edema: Non-pitting, Trace  LLE Edema: Non-pitting, Trace      Complaints: complains of UTI symptoms x 2 weeks. No complaints of CHF symptoms    Comment : Pt ambulated from registration to the CHF Clinic. Wt is down 6 lbs in one month. He's eating fine, No n/v , or abdominal pain. He denies c/o shortness of breath, OCAMPO, fatigue, chest pain or dizziness. His main complaint is burning with urination the past couple of weeks. He denies fever, chills, flank pain , or any other complaint. Pt stated he called his urologist but was unable to be seen until \" late September\" . States \" I'm thinking I might head to the ER after I'm done here\". Writer inquired whether he checked for appt with PCP first. Writer called his PCP office and set up follow there tomorrow at 1030 a.m. for above c/o. Pt also has not been seen by [facility] since 2022 and was due for next  f/u in 6 months. He has not made the appt yet. Writer contacted [facility] and appt made for pt to see Dr Adry Orr on  at 8:45 a.m [facility] st office. Pt was given copy of these appts dates and times. Medication list reviewed with pt. He is currently taking Lasix 40 mg tablets, takes 1.5 tabs or 60 mg BID. Has had labs completed by PCP on 22. BUN was 15 , Creatinine 0.73, K+3.8. No acute S/S of CHF noted today. Reminded pt to follow a low 2 gm sodium diet and fluid limits of 2 liters daily. Next f/u here in one month.      Electronically signed by Vidal Leggett RN on 8/25/2022 at 11:32 AM

## 2022-08-26 ENCOUNTER — HOSPITAL ENCOUNTER (OUTPATIENT)
Age: 67
Setting detail: SPECIMEN
Discharge: HOME | End: 2022-08-26

## 2022-08-26 ENCOUNTER — OFFICE VISIT (OUTPATIENT)
Dept: FAMILY MEDICINE CLINIC | Age: 67
End: 2022-08-26
Payer: MEDICARE

## 2022-08-26 VITALS
DIASTOLIC BLOOD PRESSURE: 72 MMHG | SYSTOLIC BLOOD PRESSURE: 123 MMHG | WEIGHT: 260.2 LBS | BODY MASS INDEX: 43.35 KG/M2 | TEMPERATURE: 97.5 F | HEIGHT: 65 IN | HEART RATE: 61 BPM

## 2022-08-26 DIAGNOSIS — N39.0 URINARY TRACT INFECTION WITHOUT HEMATURIA, SITE UNSPECIFIED: Primary | ICD-10-CM

## 2022-08-26 DIAGNOSIS — N39.0 URINARY TRACT INFECTION WITHOUT HEMATURIA, SITE UNSPECIFIED: ICD-10-CM

## 2022-08-26 LAB
BILIRUBIN, POC: ABNORMAL
BLOOD URINE, POC: ABNORMAL
CLARITY, POC: ABNORMAL
COLOR, POC: ABNORMAL
GLUCOSE URINE, POC: ABNORMAL
KETONES, POC: ABNORMAL
LEUKOCYTE EST, POC: ABNORMAL
NITRITE, POC: ABNORMAL
PH, POC: 8.5
PROTEIN, POC: ABNORMAL
SPECIFIC GRAVITY, POC: 1.02
UROBILINOGEN, POC: 1

## 2022-08-26 PROCEDURE — 1123F ACP DISCUSS/DSCN MKR DOCD: CPT

## 2022-08-26 PROCEDURE — 1036F TOBACCO NON-USER: CPT

## 2022-08-26 PROCEDURE — G8427 DOCREV CUR MEDS BY ELIG CLIN: HCPCS

## 2022-08-26 PROCEDURE — 99211 OFF/OP EST MAY X REQ PHY/QHP: CPT | Performed by: FAMILY MEDICINE

## 2022-08-26 PROCEDURE — G8417 CALC BMI ABV UP PARAM F/U: HCPCS

## 2022-08-26 PROCEDURE — 3017F COLORECTAL CA SCREEN DOC REV: CPT

## 2022-08-26 PROCEDURE — 99213 OFFICE O/P EST LOW 20 MIN: CPT

## 2022-08-26 PROCEDURE — 81002 URINALYSIS NONAUTO W/O SCOPE: CPT

## 2022-08-26 RX ORDER — SULFAMETHOXAZOLE AND TRIMETHOPRIM 800; 160 MG/1; MG/1
1 TABLET ORAL 2 TIMES DAILY
Qty: 10 TABLET | Refills: 0 | Status: SHIPPED | OUTPATIENT
Start: 2022-08-26 | End: 2022-08-31

## 2022-08-26 ASSESSMENT — ENCOUNTER SYMPTOMS
RHINORRHEA: 0
NAUSEA: 0
WHEEZING: 0
COUGH: 0
CONSTIPATION: 0
BACK PAIN: 0
SHORTNESS OF BREATH: 0
ABDOMINAL DISTENTION: 0
CHEST TIGHTNESS: 0
VOMITING: 0
DIARRHEA: 0

## 2022-08-26 NOTE — PROGRESS NOTES
Subjective: Orlando Collet is a 74 y.o. male with  has a past medical history of Acute CHF (congestive heart failure) ([address] Arthritis, Atrial fibrillation ([address] BPH (benign prostatic hyperplasia), Cellulitis, Cervical disc disease, CHF (congestive heart failure) ([address] Combined systolic and diastolic congestive heart failure ([address] Coronary artery disease, COVID-19 virus RNA test result indeterminate, CPAP (continuous positive airway pressure) dependence, GERD (gastroesophageal reflux disease), History of incarceration, Hyperlipidemia, Hypertension, Hypokalemia, Myocardial infarct ([address] Obesity, Overactive bladder, Sleep apnea, Wears reading eyeglasses, and Wellness examination. Presented to the office today for:  Chief Complaint   Patient presents with    Urinary Tract Infection     Follow up on UTI, not better, has seen Urology       HPI    74year old male history of hypokalemia, hypotension, obesity, congestive heart failure, urinary incontinence presents to the clinic for dysuria, urinary frequency and urgency for the past 3-4 weeks. The patient follows with a Urologist, and recently had Botox injection procedure for his urinary incontinence, however continues to have bladder leak. The patient endorses continued bilateral hand numbness and loss of dexterity. He denies headache, dizziness, syncope, chest pain, shortness of breath, cough, abdominal pain, nausea, vomiting, constipation and diarrhea. Review of Systems   Constitutional:  Negative for activity change, chills, diaphoresis, fatigue and fever. HENT:  Negative for congestion and rhinorrhea. Respiratory:  Negative for cough, chest tightness, shortness of breath and wheezing. Cardiovascular:  Negative for chest pain, palpitations and leg swelling. Gastrointestinal:  Negative for abdominal distention, constipation, diarrhea, nausea and vomiting. Endocrine: Negative for cold intolerance and heat intolerance.    Genitourinary: Positive for dysuria, frequency and urgency. Musculoskeletal:  Negative for arthralgias, back pain and joint swelling. Skin:  Negative for pallor and rash. Neurological:  Negative for dizziness, tremors, syncope, light-headedness and headaches. Psychiatric/Behavioral:  Negative for agitation and confusion. The patient has a   Family History   Problem Relation Age of Onset    Alcohol Abuse Maternal Uncle     Heart Attack Maternal Uncle     Cancer Mother     Alcohol Abuse Father        Objective:    /72 (Site: Left Lower Arm, Position: Sitting, Cuff Size: Medium Adult)   Pulse 61   Temp 97.5 °F (36.4 °C) (Temporal)   Ht 5' 5\" (1.651 m)   Wt 260 lb 3.2 oz (118 kg)   BMI 43.30 kg/m²    BP Readings from Last 3 Encounters:   08/26/22 123/72   08/25/22 110/80   08/15/22 126/74       Physical Exam  Constitutional:       General: He is not in acute distress. Appearance: Normal appearance. HENT:      Head: Normocephalic and atraumatic. Nose: No congestion or rhinorrhea. Eyes:      Extraocular Movements: Extraocular movements intact. Conjunctiva/sclera: Conjunctivae normal.      Pupils: Pupils are equal, round, and reactive to light. Cardiovascular:      Rate and Rhythm: Normal rate and regular rhythm. Pulses: Normal pulses. Heart sounds: Normal heart sounds. No murmur heard. No friction rub. No gallop. Pulmonary:      Effort: Pulmonary effort is normal. No respiratory distress. Breath sounds: Normal breath sounds. No wheezing or rales. Abdominal:      General: Abdomen is flat. Bowel sounds are normal. There is no distension. Tenderness: There is no abdominal tenderness. There is no guarding. Musculoskeletal:      Right lower leg: No edema. Left lower leg: No edema. Skin:     Capillary Refill: Capillary refill takes less than 2 seconds. Coloration: Skin is not jaundiced or pale. Neurological:      General: No focal deficit present. Mental Status: He is alert and oriented to person, place, and time. Sensory: No sensory deficit. Motor: No weakness. Psychiatric:         Mood and Affect: Mood normal.         Assessment and Plan:    1. Urinary tract infection   - POCT Urinalysis no Micro: positive for large leukocytes and nitrites  - Culture, Urine; Future  -Start Bactrim 800 160 mg tablet BID for 5 days   -patient encouraged to follow up with urologist and heart failure clinic in order to adjust/change lasix  -follow up in 8 weeks           Requested Prescriptions     Signed Prescriptions Disp Refills    sulfamethoxazole-trimethoprim (BACTRIM DS;SEPTRA DS) 800-160 MG per tablet 10 tablet 0     Sig: Take 1 tablet by mouth 2 times daily for 5 days       There are no discontinued medications. All patient questions answered. Pt voiced understanding. Return in about 8 weeks (around 10/21/2022).

## 2022-08-26 NOTE — PROGRESS NOTES
Attending Physician Statement  I have discussed the case, including pertinent history and exam findings with the resident. I have seen and examined the patient and the key elements of the encounter have been performed by me. I agree with the assessment, plan and orders as documented by the resident.         /72 (Site: Left Lower Arm, Position: Sitting, Cuff Size: Medium Adult)   Pulse 61   Temp 97.5 °F (36.4 °C) (Temporal)   Ht 5' 5\" (1.651 m)   Wt 260 lb 3.2 oz (118 kg)   BMI 43.30 kg/m²    BP Readings from Last 3 Encounters:   08/26/22 123/72   08/25/22 110/80   08/15/22 126/74     Wt Readings from Last 3 Encounters:   08/26/22 260 lb 3.2 oz (118 kg)   08/25/22 258 lb 14.4 oz (117.4 kg)   08/15/22 257 lb (116.6 kg)         Susana Doshi DO 8/26/2022 11:13 AM

## 2022-08-26 NOTE — PATIENT INSTRUCTIONS
Thank you for letting us take care of you today. We hope all your questions were addressed. If a question was overlooked or something else comes to mind after you return home, please contact a member of your Care Team listed below. Your Care Team at [place] [address] is Team #5  Kiki Taylor MD (Faculty)  Sadia Baird MD (Resident)  Tasha Hickman MD (Resident)  Jamir Martinez MD (Resident)  Margarita Tijerina MD, (Resident)  Nenita Tena., ALONSO Lazcano., RMA  Ganga Gabriel.,  LPN  Caitlin Dobbins., Ector Esposito., Cheryl Officer [facility] office)  Randy Rose, [address] (Clinical Practice Manager)  [facility] (Clinical Pharmacist)       Office phone number: [phone]    If you need to get in right away due to illness, please be advised we have \"Same Day\" appointments available Monday-Friday. Please call us at [phone] option #3 to schedule your \"Same Day\" appointment.

## 2022-08-26 NOTE — PROGRESS NOTES
Visit Information    Have you changed or started any medications since your last visit including any over-the-counter medicines, vitamins, or herbal medicines? no   Have you stopped taking any of your medications? Is so, why? -  no  Are you having any side effects from any of your medications? - no    Have you seen any other physician or provider since your last visit? yes - Urology, [facility]   Have you had any other diagnostic tests since your last visit? yes - XR   Have you been seen in the emergency room and/or had an admission in a hospital since we last saw you?  no   Have you had your routine dental cleaning in the past 6 months?  no     Do you have an active MyChart account? If no, what is the barrier?   No: Declines    Patient Care Team:  Nanette Mendieta MD as PCP - Dru Abdi MD as Consulting Physician (Gastroenterology)  Clark Peralta MD as Consulting Physician (Urology)    Medical History Review  Past Medical, Family, and Social History reviewed and does not contribute to the patient presenting condition    Health Maintenance   Topic Date Due    Annual Wellness Visit (AWV)  Never done    COVID-19 Vaccine (3 - Booster for [facility] series) 09/30/2021    Flu vaccine (1) 09/01/2022    Diabetes screen  12/10/2022    Lipids  03/08/2023    Pneumococcal 65+ years Vaccine (2 - PCV) 04/13/2023    Depression Screen  05/09/2023    Colorectal Cancer Screen  01/12/2031    DTaP/Tdap/Td vaccine (3 - Td or Tdap) 05/04/2032    Shingles vaccine  Completed    Hepatitis C screen  Completed    HIV screen  Completed    Hepatitis A vaccine  Aged Out    Hepatitis B vaccine  Aged Out    Hib vaccine  Aged Out    Meningococcal (ACWY) vaccine  Aged Out

## 2022-08-28 LAB
CULTURE: ABNORMAL
SPECIMEN DESCRIPTION: ABNORMAL

## 2022-08-31 DIAGNOSIS — I50.42 HEART FAILURE, SYSTOLIC AND DIASTOLIC, CHRONIC (HCC): ICD-10-CM

## 2022-08-31 RX ORDER — ATORVASTATIN CALCIUM 40 MG/1
40 TABLET, FILM COATED ORAL NIGHTLY
Qty: 30 TABLET | Refills: 1 | Status: SHIPPED | OUTPATIENT
Start: 2022-08-31 | End: 2022-09-28 | Stop reason: SDUPTHER

## 2022-08-31 NOTE — TELEPHONE ENCOUNTER
E-scribe request for med refill. Please review and e-scribe if applicable.      Last Visit Date:  08/26/2022  Next Visit Date:  10/27/2022    Hemoglobin A1C (%)   Date Value   12/10/2019 5.6   10/18/2019 5.8             ( goal A1C is < 7)   No results found for: LABMICR  LDL Cholesterol (mg/dL)   Date Value   03/08/2022 56   03/08/2022 56       (goal LDL is <100)   AST (U/L)   Date Value   03/16/2021 15     ALT (U/L)   Date Value   03/16/2021 14     BUN (mg/dL)   Date Value   08/12/2022 15     BP Readings from Last 3 Encounters:   08/26/22 123/72   08/25/22 110/80   08/15/22 126/74          (goal 120/80)        Patient Active Problem List:     Atrial flutter (HCC)     Sleep-related breathing disorder     Hypokalemia     Atrial fibrillation (HCC)     Ischemic cardiomyopathy     Acute cystitis without hematuria     Hx of CABG     Chronic diastolic (congestive) heart failure (HCC)     Bilateral hand numbness     Right thigh pain     Left leg weakness     Coronary artery disease     Chronic cough     Gastroesophageal reflux disease without esophagitis     Overflow incontinence of urine     Polyp of colon     Post-void dribbling     Post-nasal drip     SYDNEY (acute kidney injury) ([address]     Hyperkalemia     Hypotension     Overactive bladder     Hemorrhoids     HARPREET (obstructive sleep apnea)     Laryngopharyngeal reflux     Skin ulcer of left lower leg, limited to breakdown of skin ([address]     Bilateral edema of lower extremity     Lymphedema of both lower extremities     Venous insufficiency of both lower extremities     Acute pain of right knee     Finger laceration, subsequent encounter     Chronic venous stasis      ----Sergey Vidal

## 2022-09-28 ENCOUNTER — HOSPITAL ENCOUNTER (OUTPATIENT)
Dept: OTHER | Age: 67
Discharge: HOME | End: 2022-09-28
Payer: MEDICARE

## 2022-09-28 VITALS
SYSTOLIC BLOOD PRESSURE: 112 MMHG | DIASTOLIC BLOOD PRESSURE: 70 MMHG | RESPIRATION RATE: 20 BRPM | OXYGEN SATURATION: 97 % | BODY MASS INDEX: 42.13 KG/M2 | HEART RATE: 64 BPM | WEIGHT: 253.2 LBS

## 2022-09-28 DIAGNOSIS — I50.42 HEART FAILURE, SYSTOLIC AND DIASTOLIC, CHRONIC (HCC): ICD-10-CM

## 2022-09-28 PROCEDURE — 99212 OFFICE O/P EST SF 10 MIN: CPT

## 2022-09-28 RX ORDER — ATORVASTATIN CALCIUM 40 MG/1
40 TABLET, FILM COATED ORAL NIGHTLY
Qty: 30 TABLET | Refills: 1 | Status: SHIPPED | OUTPATIENT
Start: 2022-09-28 | End: 2022-10-20

## 2022-09-28 NOTE — PROGRESS NOTES
Date:  2022  Time:  10:16 AM    CHF Clinic at [address] [facility]    Office: [phone] Fax: [phone]    Re:  Rufus Stauffer   Patient : 1955    Vital Signs: /70   Pulse 64   Resp 20   Wt 253 lb 3.2 oz (114.9 kg)   SpO2 97%   BMI 42.13 kg/m²                       O2 Device: None (Room air)                           No results for input(s): CBC, HGB, HCT, WBC, PLATELET, NA, K, CL, CO2, BUN, CREATININE, GLUCOSE, BNP, INR in the last 72 hours. Respiratory:    Assessment  Charting Type: Reassessment    Breath Sounds  Right Upper Lobe: Clear  Right Middle Lobe: Clear  Right Lower Lobe: Clear  Left Upper Lobe: Clear  Left Lower Lobe: Clear    Cough/Sputum  Cough: None       Peripheral Vascular  RLE Edema: Trace  LLE Edema: Trace    Complaints: Nothing new    Comment : Patient here ambulatory for routine visit. Weight down 5 lbs in one month, Total of 40 lbs in one year. No new or acute s/s CHF. Patient states he feels well. Discussed low salt diet and fluid limits. States compliance with meds. Lasix is 60 mg 2x day. Seeing cardiology Oct 3. Next visit here 6 weeks 2022. Instructed to call here for any increased s/s CHF.       Electronically signed by Paige Chavez RN on 2022 at 10:16 AM

## 2022-09-28 NOTE — TELEPHONE ENCOUNTER
Hypokalemia     Atrial fibrillation (HCC)     Ischemic cardiomyopathy     Acute cystitis without hematuria     Hx of CABG     Chronic diastolic (congestive) heart failure (HCC)     Bilateral hand numbness     Right thigh pain     Left leg weakness     Coronary artery disease     Chronic cough     Gastroesophageal reflux disease without esophagitis     Overflow incontinence of urine     Polyp of colon     Post-void dribbling     Post-nasal drip     SYDNEY (acute kidney injury) (HCC)     Hyperkalemia     Hypotension     Overactive bladder     Hemorrhoids     HARPREET (obstructive sleep apnea)     Laryngopharyngeal reflux     Skin ulcer of left lower leg, limited to breakdown of skin (HCC)     Bilateral edema of lower extremity     Lymphedema of both lower extremities     Venous insufficiency of both lower extremities     Acute pain of right knee     Finger laceration, subsequent encounter     Chronic venous stasis           Please address the medication refill and close the encounter. If I can be of assistance, please route to the applicable pool. Thank you.

## 2022-10-03 ENCOUNTER — APPOINTMENT (OUTPATIENT)
Dept: GENERAL RADIOLOGY | Age: 67
End: 2022-10-03
Payer: MEDICARE

## 2022-10-03 ENCOUNTER — HOSPITAL ENCOUNTER (EMERGENCY)
Age: 67
Discharge: HOME | End: 2022-10-03
Attending: EMERGENCY MEDICINE
Payer: MEDICARE

## 2022-10-03 VITALS
HEART RATE: 62 BPM | TEMPERATURE: 98.1 F | RESPIRATION RATE: 18 BRPM | DIASTOLIC BLOOD PRESSURE: 71 MMHG | SYSTOLIC BLOOD PRESSURE: 124 MMHG | OXYGEN SATURATION: 95 %
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
## 2022-10-03 DIAGNOSIS — R07.81 RIB PAIN ON LEFT SIDE: Primary | ICD-10-CM

## 2022-10-03 PROCEDURE — 6360000002 HC RX W HCPCS: Performed by: STUDENT IN AN ORGANIZED HEALTH CARE EDUCATION/TRAINING PROGRAM

## 2022-10-03 PROCEDURE — 71046 X-RAY EXAM CHEST 2 VIEWS: CPT

## 2022-10-03 PROCEDURE — 6370000000 HC RX 637 (ALT 250 FOR IP): Performed by: STUDENT IN AN ORGANIZED HEALTH CARE EDUCATION/TRAINING PROGRAM

## 2022-10-03 PROCEDURE — 96372 THER/PROPH/DIAG INJ SC/IM: CPT

## 2022-10-03 PROCEDURE — 99282 EMERGENCY DEPT VISIT SF MDM: CPT

## 2022-10-03 PROCEDURE — 93005 ELECTROCARDIOGRAM TRACING: CPT | Performed by: STUDENT IN AN ORGANIZED HEALTH CARE EDUCATION/TRAINING PROGRAM

## 2022-10-03 RX ORDER — IBUPROFEN 800 MG/1
800 TABLET ORAL EVERY 8 HOURS PRN
Qty: 21 TABLET | Refills: 0 | Status: SHIPPED | OUTPATIENT
Start: 2022-10-03

## 2022-10-03 RX ORDER — KETOROLAC TROMETHAMINE 30 MG/ML
30 INJECTION, SOLUTION INTRAMUSCULAR; INTRAVENOUS ONCE
Status: COMPLETED | OUTPATIENT
Start: 2022-10-03 | End: 2022-10-03

## 2022-10-03 RX ORDER — METHOCARBAMOL 500 MG/1
750 TABLET, FILM COATED ORAL ONCE
Status: COMPLETED | OUTPATIENT
Start: 2022-10-03 | End: 2022-10-03

## 2022-10-03 RX ORDER — METHOCARBAMOL 750 MG/1
750 TABLET, FILM COATED ORAL 3 TIMES DAILY
Qty: 21 TABLET | Refills: 0 | Status: SHIPPED | OUTPATIENT
Start: 2022-10-03 | End: 2022-10-10

## 2022-10-03 RX ORDER — METHOCARBAMOL 500 MG/1
750 TABLET, FILM COATED ORAL 4 TIMES DAILY
Status: DISCONTINUED | OUTPATIENT
Start: 2022-10-03 | End: 2022-10-03

## 2022-10-03 RX ORDER — LIDOCAINE 4 G/G
1 PATCH TOPICAL ONCE
Status: DISCONTINUED | OUTPATIENT
Start: 2022-10-03 | End: 2022-10-03 | Stop reason: HOSPADM

## 2022-10-03 RX ORDER — LIDOCAINE 50 MG/G
1 PATCH TOPICAL DAILY
Qty: 30 PATCH | Refills: 0 | Status: SHIPPED | OUTPATIENT
Start: 2022-10-03

## 2022-10-03 RX ADMIN — KETOROLAC TROMETHAMINE 30 MG: 30 INJECTION, SOLUTION INTRAMUSCULAR; INTRAVENOUS at 10:26

## 2022-10-03 RX ADMIN — METHOCARBAMOL 750 MG: 500 TABLET ORAL at 11:14

## 2022-10-03 ASSESSMENT — ENCOUNTER SYMPTOMS
RHINORRHEA: 0
DIARRHEA: 0
COUGH: 0
BACK PAIN: 0
CONSTIPATION: 0
SHORTNESS OF BREATH: 0
SORE THROAT: 0
ABDOMINAL PAIN: 0
VOMITING: 0
NAUSEA: 0

## 2022-10-03 ASSESSMENT — PAIN SCALES - GENERAL
PAINLEVEL_OUTOF10: 10
PAINLEVEL_OUTOF10: 9

## 2022-10-03 NOTE — ED PROVIDER NOTES
[facility]  ED  Emergency Department Encounter  Emergency Medicine Resident     Pt Name: Trino Fay  MRN: [id number]  [place] 1955  Date of evaluation: 10/3/22  PCP:  Servando Aragon MD    CHIEF COMPLAINT       Chief Complaint   Patient presents with    Rib Pain     Left sided, fell yesterday       HISTORY OFPRESENT ILLNESS  (Location/Symptom, Timing/Onset, Context/Setting, Quality, Duration, Modifying Factors,Severity.)      Trino Fay is a 74 y.o. male with past medical history of CHF, coronary artery disease, CABG, atrial fibrillation, hypertension, hyperlipidemia who presents with left lateral rib pain status post mechanical fall yesterday. Patient reports that he was walking down his steps when he tripped over the second to last step and landed on his left side. Patient was unable to get himself up but his friends helped him up. He has been able to walk since. Denies hitting his head or losing consciousness. Is only complaining of left lateral rib pain, worse with breathing and coughing. He is not taking any blood thinners. Denies symptoms prior to the fall. Patient has not been taking any pain medication because he does not have anything at home. Patient saw his cardiologist this morning in the office and they performed an EKG which was reportedly normal.  He was sent by his cardiologist for evaluation.     PAST MEDICAL / SURGICAL / SOCIAL / FAMILY HISTORY      has a past medical history of Acute CHF (congestive heart failure) ([address] Arthritis, Atrial fibrillation ([address] BPH (benign prostatic hyperplasia), Cellulitis, Cervical disc disease, CHF (congestive heart failure) ([address] Combined systolic and diastolic congestive heart failure ([address] Coronary artery disease, COVID-19 virus RNA test result indeterminate, CPAP (continuous positive airway pressure) dependence, GERD (gastroesophageal reflux disease), History of incarceration, Hyperlipidemia, Hypertension, Hypokalemia, Myocardial infarct ([facility] Obesity, Overactive bladder, Sleep apnea, Wears reading eyeglasses, and Wellness examination. has a past surgical history that includes transesophageal echocardiogram (10/16/2019); Cardioversion (10/16/2019); Coronary Artery Bypass Graft Maze Procedure (N/A, 10/21/2019); Carpal tunnel release (Bilateral, 2008); fracture surgery; Cervical spine surgery; Abdominal exploration surgery; Colonoscopy (N/A, 10/29/2020); Colonoscopy (10/29/2020); Colonoscopy (10/29/2020); Colonoscopy (N/A, 01/12/2021); eye surgery; Cystography (N/A, 03/26/2021); Cystoscopy (03/26/2021); Cardiac surgery; Cystocopy (05/14/2021); Cystoscopy (N/A, 05/14/2021); Cystoscopy (07/08/2022); and Urethral Surgery (N/A, 7/8/2022). Social:  reports that he has never smoked. He has never used smokeless tobacco. He reports that he does not currently use alcohol. He reports that he does not currently use drugs. Family Hx:   Family History   Problem Relation Age of Onset    Alcohol Abuse Maternal Uncle     Heart Attack Maternal Uncle     Cancer Mother     Alcohol Abuse Father         Allergies:  Patient has no known allergies. Home Medications:  Prior to Admission medications    Medication Sig Start Date End Date Taking? Authorizing Provider   ibuprofen (IBU) 800 MG tablet Take 1 tablet by mouth every 8 hours as needed for Pain 10/3/22  Yes Elida Toure DO   methocarbamol (ROBAXIN-750) 750 MG tablet Take 1 tablet by mouth 3 times daily for 7 days 10/3/22 10/10/22 Yes Elida Toure DO   lidocaine (LIDODERM) 5 % Place 1 patch onto the skin daily 12 hours on, 12 hours off. 10/3/22  Yes Elida Toure DO   atorvastatin (LIPITOR) 40 MG tablet Take 1 tablet by mouth nightly 9/28/22   Nick Hartman MD   tamsulosin (FLOMAX) 0.4 MG capsule TAKE 1 CAPSULE BY MOUTH DAILY 8/15/22   Tiffanie Cristobal MD   gabapentin (NEURONTIN) 400 MG capsule Take 1 capsule by mouth in the morning for 60 days.  8/4/22 10/3/22  Nick Hartman MD   potassium chloride (KLOR-CON M) 20 MEQ extended release tablet Take 1 tablet by mouth in the morning. 8/4/22   Nick Hartman MD   Elastic Bandages & Supports (JOBST OPAQUE KNEE 20-30MMHG XL) MISC Dispense two pair closed toe knee high 20 to 30 mmHg Jobst compression stockings 7/11/22   Robinson Diop MD   isosorbide mononitrate (IMDUR) 30 MG extended release tablet TAKE 1 TABLET BY MOUTH DAILY 7/5/22   Fay Cortez MD   furosemide (LASIX) 40 MG tablet Take 1.5 tablets by mouth 2 times daily 7/5/22   Jesusita Rahman MD   pantoprazole (PROTONIX) 40 MG tablet TAKE 1 TABLET BY MOUTH 2 TIMES DAILY (BEFORE MEALS) 6/14/22   Nidia Mendez MD   oxybutynin (DITROPAN-XL) 10 mg extended release tablet TAKE 1 TABLET BY MOUTH DAILY 6/6/22   Kathy Gill MD   ALLERGY RELIEF 10 MG tablet TAKE 1 TABLET BY MOUTH DAILY 5/24/22   Sharan Hoffmann MD   Multiple Vitamin (MULTIVITAMIN) TABS TAKE 1 TABLET BY MOUTH DAILY 5/16/22   Andreea Wu MD    ASPIRIN EC LOW DOSE 81 MG EC tablet  1/31/22   Historical Provider, MD   Multiple Vitamins-Minerals (MULTIVITAMIN WITH MINERALS) tablet Take 1 tablet by mouth daily 1/3/22   Ghislaine Mercado MD   Prenatal Vit-Fe Fumarate-FA (NIVA-PLUS) 27-1 MG TABS  4/2/21   Historical Provider, MD   Multiple Vitamins-Minerals (PRESERVISION AREDS 2) CAPS  3/26/21   Historical Provider, MD   Blood Pressure Monitor KIT 1 each by Does not apply route 2 times daily 3/16/21   Jocelyn Jackson MD   [facility]. Devices (Exent WEIGHT SCALE) [facility] 1 each by Does not apply route as needed (weigh once a day and record) 3/16/21   Jocelyn Jackson MD   nitroGLYCERIN (NITROSTAT) 0.4 MG SL tablet Place 1 tablet under the tongue every 5 minutes as needed for Chest pain up to max of 3 total doses. If no relief after 1 dose, call 911. 11/23/20   Robinson Diop MD   Elastic Bandages & Supports (JOBST KNEE HIGH COMPRESSION SM) MISC 2 each by Does not apply route daily Wear q day. Remove q hs. Diagnosis: Chronic venous insufficiency 8/12/20   Mani Watters, APRN - CNP       REVIEW OFSYSTEMS    (2-9 systems for level 4, 10 or more for level 5)      Review of Systems   Constitutional:  Negative for chills and fever. HENT:  Negative for congestion, rhinorrhea and sore throat. Respiratory:  Negative for cough and shortness of breath. Cardiovascular:  Negative for chest pain and leg swelling. Gastrointestinal:  Negative for abdominal pain, constipation, diarrhea, nausea and vomiting. Genitourinary:  Negative for decreased urine volume, difficulty urinating and hematuria. Musculoskeletal:  Positive for arthralgias (rib pain). Negative for back pain and neck pain. Skin:  Negative for rash. Neurological:  Negative for dizziness, numbness and headaches. All other systems reviewed and are negative. PHYSICAL EXAM   (up to 7 for level 4, 8 or more forlevel 5)      INITIAL VITALS:   Vitals:    10/03/22 0957   BP: 124/71   Pulse: 62   Resp: 18   Temp: 98.1 °F (36.7 °C)   SpO2: 95%        Physical Exam  Vitals and nursing note reviewed. Constitutional:       General: He is not in acute distress. Comments: Adult male sitting in stretcher in no acute distress. He speaks in full sentences and answers questions appropriately   HENT:      Head: Normocephalic and atraumatic. Mouth/Throat:      Mouth: Mucous membranes are moist.      Pharynx: Oropharynx is clear. Eyes:      Pupils: Pupils are equal, round, and reactive to light. Cardiovascular:      Rate and Rhythm: Normal rate and regular rhythm. Pulmonary:      Effort: Pulmonary effort is normal. No respiratory distress. Breath sounds: Normal breath sounds. Abdominal:      General: There is no distension. Palpations: Abdomen is soft. Tenderness: There is no abdominal tenderness. Musculoskeletal:         General: Tenderness present. Cervical back: Neck supple. No rigidity.       Comments: Tenderness to palpation of left anterior and lateral rib over rib space 6-8. No overlying erythema, edema, ecchymosis   Skin:     General: Skin is warm and dry. Findings: No rash. Neurological:      Mental Status: He is alert. Psychiatric:         Mood and Affect: Mood normal.         Behavior: Behavior normal.       DIFFERENTIAL  DIAGNOSIS     DDX: Fall, rib contusion, rib fracture, pneumothorax    Initial MDM/Plan: 74 y.o. male past medical history of CHF, coronary artery disease, CABG, atrial fibrillation who presents with left rib pain status post mechanical fall down 2 stairs yesterday. Patient is complaining of left rib pain worse with breathing and cough. Denies other injuries. On physical exam, patient is nontoxic-appearing with normal vital signs. He has bilateral breath sounds. He has tenderness to palpation of the left lateral ribs 6-8. Concern for rib contusion versus fracture. Doubt pneumothorax, however he could possibly have a small pneumothorax. O2 sats 95-97% on room air. Will obtain chest x-ray and EKG to evaluate and treat symptomatically for pain    DIAGNOSTIC RESULTS / EMERGENCYDEPARTMENT COURSE / MDM     LABS:  Results for orders placed or performed during the hospital encounter of 10/03/22   EKG 12 Lead   Result Value Ref Range    Ventricular Rate 57 BPM    Atrial Rate 51 BPM    QRS Duration 88 ms    Q-T Interval 456 ms    QTc Calculation (Bazett) 443 ms    R Axis 14 degrees    T Axis 43 degrees         RADIOLOGY:  XR CHEST (2 VW)    Result Date: 10/3/2022  EXAMINATION: TWO XRAY VIEWS OF THE CHEST 10/3/2022 10:38 am COMPARISON: Portable chest dated 15 March 2021. HISTORY: ORDERING SYSTEM PROVIDED HISTORY: fall, left rib pain TECHNOLOGIST PROVIDED HISTORY: fall, left rib pain FINDINGS: Cervical fixation, thoracic fixation, median sternotomy wires and atrial appendage clip re-identified, stable. No displaced rib fractures are seen.   Lungs are well inflated without focal consolidation, pneumothorax. Cardiomediastinal contour is unchanged. No displaced rib fractures seen. No underlying pulmonary abnormality. EKG  Atrial fibrillation, rate: 57  MA: None  QRS: 88  QT/QTc: 450/443  No ST elevation or depression  No T wave abnormality      All EKG's are interpreted by the Emergency Department Physician who either signs or Co-signs this chart in the absence of a cardiologist.      MEDICATIONS ORDERED:  Orders Placed This Encounter   Medications    ketorolac (TORADOL) injection 30 mg    DISCONTD: methocarbamol (ROBAXIN) tablet 750 mg    DISCONTD: lidocaine 4 % external patch 1 patch    methocarbamol (ROBAXIN) tablet 750 mg    ibuprofen (IBU) 800 MG tablet     Sig: Take 1 tablet by mouth every 8 hours as needed for Pain     Dispense:  21 tablet     Refill:  0    methocarbamol (ROBAXIN-750) 750 MG tablet     Sig: Take 1 tablet by mouth 3 times daily for 7 days     Dispense:  21 tablet     Refill:  0    lidocaine (LIDODERM) 5 %     Sig: Place 1 patch onto the skin daily 12 hours on, 12 hours off. Dispense:  30 patch     Refill:  0         PROCEDURES:  None      CONSULTS:  None      EMERGENCY DEPARTMENT COURSE:  1120: Patient's chest x-ray showed no significant rib fractures or pneumothorax. Stable for discharge at this time with prescriptions for Motrin, Robaxin and lidocaine patches. Instructions to use heat and light stretching were given. Patient was offered incentive spirometer but declined. He was advised to follow-up with his cardiologist and primary provider and to return for any worsening symptoms. FINAL IMPRESSION      1. Rib pain on left side    2. Fall, initial encounter          DISPOSITION / PLAN     DISPOSITION Decision To Discharge 10/03/2022 11:20:25 AM      PATIENT REFERRED TO:  Sona Pearson MD  [address]   [address]  [address]  [phone]    Schedule an appointment as soon as possible for a visit       [facility]  [address]   [phone]    If symptoms worsen    DISCHARGE MEDICATIONS:  Discharge Medication List as of 10/3/2022 11:23 AM        START taking these medications    Details   ibuprofen (IBU) 800 MG tablet Take 1 tablet by mouth every 8 hours as needed for Pain, Disp-21 tablet, R-0Print      methocarbamol (ROBAXIN-750) 750 MG tablet Take 1 tablet by mouth 3 times daily for 7 days, Disp-21 tablet, R-0Print      lidocaine (LIDODERM) 5 % Place 1 patch onto the skin daily 12 hours on, 12 hours off., Disp-30 patch, R-0Print             Yoana Hilton DO  Emergency Medicine Resident    (Please note that portions of this note were completed with a voice recognition program.Efforts were made to edit the dictations but occasionally words are mis-transcribed.)       Yoana Hilton DO  Resident  10/06/22 8534

## 2022-10-03 NOTE — ED PROVIDER NOTES
[facility]     Emergency Department     Faculty Attestation    I performed a history and physical examination of the patient and discussed management with the resident. I reviewed the resident´s note and agree with the documented findings and plan of care. Any areas of disagreement are noted on the chart. I was personally present for the key portions of any procedures. I have documented in the chart those procedures where I was not present during the key portions. I have reviewed the emergency nurses triage note. I agree with the chief complaint, past medical history, past surgical history, allergies, medications, social and family history as documented unless otherwise noted below. For Physician Assistant/ Nurse Practitioner cases/documentation I have personally evaluated this patient and have completed at least one if not all key elements of the E/M (history, physical exam, and MDM). Additional findings are as noted. Had a fall last evening striking his left chest, on exam now he is tender over the anterior chest wall in the left inframammary region, equal breath sounds, heart tones normal, trachea midline. Patient is in no distress.      Zuhair Chin MD  10/03/22 1029

## 2022-10-03 NOTE — DISCHARGE INSTRUCTIONS
For your symptoms, we checked an EKG which did not show any abnormalities of your heart. We also checked a chest x-ray which did not show any broken ribs or collapsed lung. Your symptoms are likely due to bruised ribs from your fall. To treat yourself at home, get lots of rest and drink lots of fluids  Take ibuprofen 800 mg every 8 hours as needed for pain  Take Robaxin 750 mg every 8 hours as needed for pain and muscle spasms  Place a lidocaine patch to your left ribs in the morning and wear it all day long. You then take it off at night and do not sleep with it. You place a new patch in the morning  You can use heat 20 minutes on then take off several times a day. You can also alternate this with ice 20 minutes on then take off several times a day  Avoid heavy lifting until your pain improves    If you develop any worsening of her symptoms, severe pain, chest pain, trouble breathing, passing out, fevers, vomiting or other worrisome symptoms, please return to the emergency department immediately. Please follow all instructions given to you by your cardiologist today and follow-up with your primary care provider soon as possible.

## 2022-10-04 LAB
EKG ATRIAL RATE: 51 BPM
EKG Q-T INTERVAL: 456 MS
EKG QRS DURATION: 88 MS
EKG QTC CALCULATION (BAZETT): 443 MS
EKG R AXIS: 14 DEGREES
EKG T AXIS: 43 DEGREES
EKG VENTRICULAR RATE: 57 BPM

## 2022-10-06 ENCOUNTER — OFFICE VISIT (OUTPATIENT)
Dept: FAMILY MEDICINE CLINIC | Age: 67
End: 2022-10-06
Payer: MEDICARE

## 2022-10-06 ENCOUNTER — HOSPITAL ENCOUNTER (OUTPATIENT)
Age: 67
Setting detail: SPECIMEN
Discharge: HOME | End: 2022-10-06

## 2022-10-06 VITALS
HEIGHT: 65 IN | DIASTOLIC BLOOD PRESSURE: 73 MMHG | TEMPERATURE: 97.1 F | HEART RATE: 68 BPM | BODY MASS INDEX: 43.42 KG/M2 | SYSTOLIC BLOOD PRESSURE: 127 MMHG | WEIGHT: 260.6 LBS

## 2022-10-06 DIAGNOSIS — Z09 HOSPITAL DISCHARGE FOLLOW-UP: ICD-10-CM

## 2022-10-06 DIAGNOSIS — N39.0 URINARY TRACT INFECTION WITHOUT HEMATURIA, SITE UNSPECIFIED: ICD-10-CM

## 2022-10-06 DIAGNOSIS — R39.89 URINE DISCOLORATION: ICD-10-CM

## 2022-10-06 DIAGNOSIS — R39.89 URINE DISCOLORATION: Primary | ICD-10-CM

## 2022-10-06 LAB
BACTERIA: ABNORMAL
BILIRUBIN URINE: NEGATIVE
CASTS UA: ABNORMAL /LPF (ref 0–8)
COLOR: YELLOW
EPITHELIAL CELLS UA: ABNORMAL /HPF (ref 0–5)
GLUCOSE URINE: NEGATIVE
KETONES, URINE: NEGATIVE
LEUKOCYTE ESTERASE, URINE: ABNORMAL
NITRITE, URINE: POSITIVE
PH UA: 6 (ref 5–8)
PROTEIN UA: NEGATIVE
RBC UA: ABNORMAL /HPF (ref 0–4)
SPECIFIC GRAVITY UA: 1.02 (ref 1–1.03)
TURBIDITY: ABNORMAL
URINE HGB: ABNORMAL
UROBILINOGEN, URINE: NORMAL
WBC UA: ABNORMAL /HPF (ref 0–5)

## 2022-10-06 PROCEDURE — 3017F COLORECTAL CA SCREEN DOC REV: CPT

## 2022-10-06 PROCEDURE — 90686 IIV4 VACC NO PRSV 0.5 ML IM: CPT | Performed by: FAMILY MEDICINE

## 2022-10-06 PROCEDURE — G8417 CALC BMI ABV UP PARAM F/U: HCPCS

## 2022-10-06 PROCEDURE — 1111F DSCHRG MED/CURRENT MED MERGE: CPT

## 2022-10-06 PROCEDURE — G8427 DOCREV CUR MEDS BY ELIG CLIN: HCPCS

## 2022-10-06 PROCEDURE — G8482 FLU IMMUNIZE ORDER/ADMIN: HCPCS

## 2022-10-06 PROCEDURE — 99213 OFFICE O/P EST LOW 20 MIN: CPT

## 2022-10-06 PROCEDURE — 1036F TOBACCO NON-USER: CPT

## 2022-10-06 PROCEDURE — 1123F ACP DISCUSS/DSCN MKR DOCD: CPT

## 2022-10-06 RX ORDER — CEPHALEXIN 500 MG/1
500 CAPSULE ORAL 2 TIMES DAILY
Qty: 14 CAPSULE | Refills: 0 | Status: SHIPPED | OUTPATIENT
Start: 2022-10-06 | End: 2022-10-07 | Stop reason: ALTCHOICE

## 2022-10-06 ASSESSMENT — ENCOUNTER SYMPTOMS
WHEEZING: 0
ABDOMINAL PAIN: 0
NAUSEA: 0
VOMITING: 0
COUGH: 0
SHORTNESS OF BREATH: 0
DIARRHEA: 0
CONSTIPATION: 0

## 2022-10-06 NOTE — PROGRESS NOTES
Visit Information    Have you changed or started any medications since your last visit including any over-the-counter medicines, vitamins, or herbal medicines? no   Have you stopped taking any of your medications? Is so, why? -  no  Are you having any side effects from any of your medications? - no    Have you seen any other physician or provider since your last visit?  no   Have you had any other diagnostic tests since your last visit?  no   Have you been seen in the emergency room and/or had an admission in a hospital since we last saw you?  yes - 10/03/2022 [facility]   Have you had your routine dental cleaning in the past 6 months?  no     Do you have an active MyChart account? If no, what is the barrier?   No: pending    Patient Care Team:  Ming Paiz MD as PCP - General  Lilliam Abreu MD as Consulting Physician (Gastroenterology)  Rosey Soriano MD as Consulting Physician (Urology)    Medical History Review  Past Medical, Family, and Social History reviewed and does not contribute to the patient presenting condition    Health Maintenance   Topic Date Due    COVID-19 Vaccine (3 - Booster for Cole Peter series) 09/30/2021    Annual Wellness Visit (AWV)  Never done    Flu vaccine (1) 08/01/2022    Diabetes screen  12/10/2022    Lipids  03/08/2023    Pneumococcal 65+ years Vaccine (2 - PCV) 04/13/2023    Depression Screen  05/09/2023    Colorectal Cancer Screen  01/12/2031    DTaP/Tdap/Td vaccine (3 - Td or Tdap) 05/04/2032    Shingles vaccine  Completed    Hepatitis C screen  Completed    HIV screen  Completed    Hepatitis A vaccine  Aged Out    Hepatitis B vaccine  Aged Out    Hib vaccine  Aged Out    Meningococcal (ACWY) vaccine  Aged Out

## 2022-10-06 NOTE — PROGRESS NOTES
Patient urinalysis showed UTI. I tried to call the patient to update him with the results. However, patient did not answer his phone. An order of Keflex for 7 days was ordered and sent to his pharmacy. I will try to call the patient again later on.

## 2022-10-06 NOTE — PROGRESS NOTES
Attending Physician Statement  I  have discussed the care of Samanta Cheek including pertinent history and exam findings with the resident. I agree with the assessment, plan and orders as documented by the resident. /73 (Site: Right Upper Arm, Position: Sitting, Cuff Size: Large Adult) Comment: machine  Pulse 68   Temp 97.1 °F (36.2 °C) (Oral)   Ht 5' 5\" (1.651 m)   Wt 260 lb 9.6 oz (118.2 kg)   BMI 43.37 kg/m²    BP Readings from Last 3 Encounters:   10/06/22 127/73   10/03/22 124/71   09/28/22 112/70     Wt Readings from Last 3 Encounters:   10/06/22 260 lb 9.6 oz (118.2 kg)   09/28/22 253 lb 3.2 oz (114.9 kg)   08/26/22 260 lb 3.2 oz (118 kg)          Diagnosis Orders   1. Urine discoloration  Urinalysis with Microscopic      2. Urinary tract infection without hematuria, site unspecified        3.  Hospital discharge follow-up  LA DISCHARGE MEDS RECONCILED W/ CURRENT OUTPATIENT MED LIST          Ty Machado DO 10/6/2022 12:03 PM

## 2022-10-06 NOTE — PROGRESS NOTES
Shanon Arriola (:  1955) is a 74 y.o. male,Established patient, here for evaluation of the following chief complaint(s):  Follow-Up from Hospital (Bruised ribs)         ASSESSMENT/PLAN:  1. Urine discoloration  -     Urinalysis with Microscopic; Future  2. Urinary tract infection without hematuria, site unspecified  -Symptoms resolved  3. Hospital discharge follow-up  -     MD DISCHARGE MEDS RECONCILED W/ CURRENT OUTPATIENT MED LIST  -Patient went to the ED for rib pain after he fell. Chest x-ray showed no fractures. Patient was instructed to take Tylenol, Motrin as needed for pain      Return in about 3 months (around 2023). Subjective   SUBJECTIVE/OBJECTIVE:  66years old male patient with past ministry of CHF, hypertension came to the office for UTI follow-up. Patient was treated for UTI. Patient reported symptoms are resolving with no dysuria frequency or urgency. However, he still reporting dark urine discoloration. He said that he drinks adequate amount of fluids and he takes Lasix as well. Denies any chest pain, shortness of breath, weakness, numbness, fever or chills. Review of Systems   Constitutional:  Negative for fatigue and fever. Respiratory:  Negative for cough, shortness of breath and wheezing. Cardiovascular:  Negative for chest pain, palpitations and leg swelling. Gastrointestinal:  Negative for abdominal pain, constipation, diarrhea, nausea and vomiting. Genitourinary:  Negative for dysuria, flank pain, frequency, hematuria and urgency. Darker urine   Neurological:  Positive for numbness. Negative for dizziness, weakness and headaches. Residual hand numbness due to carpal tunnel syndrome         Objective   Physical Exam  Constitutional:       General: He is not in acute distress. Appearance: Normal appearance. HENT:      Head: Normocephalic and atraumatic. Cardiovascular:      Rate and Rhythm: Normal rate and regular rhythm. Pulses: Normal pulses. Heart sounds: Normal heart sounds. No murmur heard. Pulmonary:      Effort: Pulmonary effort is normal.      Breath sounds: Normal breath sounds. No wheezing, rhonchi or rales. Musculoskeletal:      Right lower leg: No edema. Left lower leg: No edema. Neurological:      General: No focal deficit present. Mental Status: He is alert and oriented to person, place, and time. On this date 10/6/2022 I have spent 25 minutes reviewing previous notes, test results and face to face with the patient discussing the diagnosis and importance of compliance with the treatment plan as well as documenting on the day of the visit. An electronic signature was used to authenticate this note.     --Julieta Mccray MD

## 2022-10-06 NOTE — PATIENT INSTRUCTIONS
Thank you for letting us take care of you today. We hope all your questions were addressed. If a question was overlooked or something else comes to mind after you return home, please contact a member of your Care Team listed below. Your Care Team at [place] [address] is Team #4  Nina Bailey MD (Faculty)  Heaven Glover MD (Resident)  Yosef Anthony MD (Resident)  Aleah Feldman MD (Resident)  Mattie Aviles MD (Resident)  PERRY Morocho., DELIA Brody., Jhony Cerrato., [facility] office)  [facility], [address] (Clinical Practice Manager)  Gianlucayasmin Shaina, [address] [facility] (Clinical Pharmacist)       Office phone number: [phone]    If you need to get in right away due to illness, please be advised we have \"Same Day\" appointments available Monday-Friday. Please call us at [phone] option #3 to schedule your \"Same Day\" appointment.

## 2022-10-07 DIAGNOSIS — R05.3 CHRONIC COUGH: ICD-10-CM

## 2022-10-07 DIAGNOSIS — R09.82 POST-NASAL DRIP: ICD-10-CM

## 2022-10-07 DIAGNOSIS — I50.42 HEART FAILURE, SYSTOLIC AND DIASTOLIC, CHRONIC (HCC): ICD-10-CM

## 2022-10-07 RX ORDER — ATORVASTATIN CALCIUM 40 MG/1
40 TABLET, FILM COATED ORAL NIGHTLY
Qty: 30 TABLET | Refills: 1 | OUTPATIENT
Start: 2022-10-07

## 2022-10-07 RX ORDER — CIPROFLOXACIN 250 MG/1
250 TABLET, FILM COATED ORAL 2 TIMES DAILY
Qty: 14 TABLET | Refills: 0 | Status: SHIPPED | OUTPATIENT
Start: 2022-10-07 | End: 2022-10-14

## 2022-10-07 RX ORDER — LORATADINE 10 MG/1
TABLET ORAL
Qty: 30 TABLET | Refills: 3 | Status: SHIPPED | OUTPATIENT
Start: 2022-10-07

## 2022-10-07 NOTE — TELEPHONE ENCOUNTER
Last visit: 10/6/22  Last Med refill:   Does patient have enough medication for 72 hours: Yes    Next Visit Date:  Future Appointments   Date Time Provider Taj Sandra   10/27/2022  9:00 AM SCHEDULE, [facility]   11/8/2022 10:00 AM [facility] RM 1 [facility]   11/18/2022  2:00 PM Lilliam Abreu MD [facility]   2/10/2023 10:00 AM SCHEDULE, [facility] [address] Maintenance   Topic Date Due    COVID-19 Vaccine (3 - Booster for Pfizer series) 09/30/2021    Annual Wellness Visit (AWV)  Never done    Diabetes screen  12/10/2022    Lipids  03/08/2023    Pneumococcal 65+ years Vaccine (2 - PCV) 04/13/2023    Depression Screen  05/09/2023    Colorectal Cancer Screen  01/12/2031    DTaP/Tdap/Td vaccine (3 - Td or Tdap) 05/04/2032    Flu vaccine  Completed    Shingles vaccine  Completed    Hepatitis C screen  Completed    HIV screen  Completed    Hepatitis A vaccine  Aged Out    Hib vaccine  Aged Out    Meningococcal (ACWY) vaccine  Aged Out       Hemoglobin A1C (%)   Date Value   12/10/2019 5.6   10/18/2019 5.8             ( goal A1C is < 7)   No results found for: LABMICR  LDL Cholesterol (mg/dL)   Date Value   03/08/2022 56   03/08/2022 56       (goal LDL is <100)   AST (U/L)   Date Value   03/16/2021 15     ALT (U/L)   Date Value   03/16/2021 14     BUN (mg/dL)   Date Value   08/12/2022 15     BP Readings from Last 3 Encounters:   10/06/22 127/73   10/03/22 124/71   09/28/22 112/70          (goal 120/80)    All Future Testing planned in [facility]  Lab Frequency Next Occurrence   Basic Metabolic Panel Once 54/88/3859               Patient Active Problem List:     Atrial flutter (HCC)     Sleep-related breathing disorder     Hypokalemia     Atrial fibrillation (HCC)     Ischemic cardiomyopathy     Acute cystitis without hematuria     Hx of CABG     Chronic diastolic (congestive) heart failure (HCC)     Bilateral hand numbness     Right thigh pain     Left leg weakness     Coronary artery disease     Chronic cough     Gastroesophageal reflux disease without esophagitis     Overflow incontinence of urine     Polyp of colon     Post-void dribbling     Post-nasal drip     SYDNEY (acute kidney injury) ([facility])     Hyperkalemia     Hypotension     Overactive bladder     Hemorrhoids     HARPREET (obstructive sleep apnea)     Laryngopharyngeal reflux     Skin ulcer of left lower leg, limited to breakdown of skin ([facility])     Bilateral edema of lower extremity     Lymphedema of both lower extremities     Venous insufficiency of both lower extremities     Acute pain of right knee     Finger laceration, subsequent encounter     Chronic venous stasis

## 2022-10-20 DIAGNOSIS — I50.42 HEART FAILURE, SYSTOLIC AND DIASTOLIC, CHRONIC (HCC): ICD-10-CM

## 2022-10-20 RX ORDER — ATORVASTATIN CALCIUM 40 MG/1
40 TABLET, FILM COATED ORAL NIGHTLY
Qty: 30 TABLET | Refills: 1 | Status: SHIPPED | OUTPATIENT
Start: 2022-10-20

## 2022-10-20 NOTE — TELEPHONE ENCOUNTER
E-scribe request for med refills. Please review and e-scribe if applicable.      Last Visit Date:  10/06/22  Next Visit Date:  10/27/2022    Hemoglobin A1C (%)   Date Value   12/10/2019 5.6   10/18/2019 5.8             ( goal A1C is < 7)   No results found for: LABMICR  LDL Cholesterol (mg/dL)   Date Value   03/08/2022 56   03/08/2022 56       (goal LDL is <100)   AST (U/L)   Date Value   03/16/2021 15     ALT (U/L)   Date Value   03/16/2021 14     BUN (mg/dL)   Date Value   08/12/2022 15     BP Readings from Last 3 Encounters:   10/06/22 127/73   10/03/22 124/71   09/28/22 112/70          (goal 120/80)        Patient Active Problem List:     Atrial flutter (HCC)     Sleep-related breathing disorder     Hypokalemia     Atrial fibrillation (HCC)     Ischemic cardiomyopathy     Acute cystitis without hematuria     Hx of CABG     Chronic diastolic (congestive) heart failure (HCC)     Bilateral hand numbness     Right thigh pain     Left leg weakness     Coronary artery disease     Chronic cough     Gastroesophageal reflux disease without esophagitis     Overflow incontinence of urine     Polyp of colon     Post-void dribbling     Post-nasal drip     SYDNEY (acute kidney injury) ([address]     Hyperkalemia     Hypotension     Overactive bladder     Hemorrhoids     HARPREET (obstructive sleep apnea)     Laryngopharyngeal reflux     Skin ulcer of left lower leg, limited to breakdown of skin ([address]     Bilateral edema of lower extremity     Lymphedema of both lower extremities     Venous insufficiency of both lower extremities     Acute pain of right knee     Finger laceration, subsequent encounter     Chronic venous stasis      ----Nawaf Ontiveros

## 2022-10-27 ENCOUNTER — OFFICE VISIT (OUTPATIENT)
Dept: FAMILY MEDICINE CLINIC | Age: 67
End: 2022-10-27
Payer: MEDICARE

## 2022-10-27 VITALS
HEART RATE: 69 BPM | SYSTOLIC BLOOD PRESSURE: 103 MMHG | BODY MASS INDEX: 42.55 KG/M2 | WEIGHT: 255.4 LBS | HEIGHT: 65 IN | DIASTOLIC BLOOD PRESSURE: 66 MMHG

## 2022-10-27 DIAGNOSIS — Z00.00 INITIAL MEDICARE ANNUAL WELLNESS VISIT: Primary | ICD-10-CM

## 2022-10-27 PROCEDURE — G0438 PPPS, INITIAL VISIT: HCPCS | Performed by: FAMILY MEDICINE

## 2022-10-27 PROCEDURE — 3017F COLORECTAL CA SCREEN DOC REV: CPT | Performed by: FAMILY MEDICINE

## 2022-10-27 PROCEDURE — 1123F ACP DISCUSS/DSCN MKR DOCD: CPT | Performed by: FAMILY MEDICINE

## 2022-10-27 PROCEDURE — G8482 FLU IMMUNIZE ORDER/ADMIN: HCPCS | Performed by: FAMILY MEDICINE

## 2022-10-27 ASSESSMENT — PATIENT HEALTH QUESTIONNAIRE - PHQ9
1. LITTLE INTEREST OR PLEASURE IN DOING THINGS: 0
SUM OF ALL RESPONSES TO PHQ QUESTIONS 1-9: 0
SUM OF ALL RESPONSES TO PHQ QUESTIONS 1-9: 0
SUM OF ALL RESPONSES TO PHQ9 QUESTIONS 1 & 2: 0
2. FEELING DOWN, DEPRESSED OR HOPELESS: 0
SUM OF ALL RESPONSES TO PHQ QUESTIONS 1-9: 0
SUM OF ALL RESPONSES TO PHQ QUESTIONS 1-9: 0

## 2022-10-27 ASSESSMENT — LIFESTYLE VARIABLES
HOW OFTEN DO YOU HAVE A DRINK CONTAINING ALCOHOL: NEVER
HOW MANY STANDARD DRINKS CONTAINING ALCOHOL DO YOU HAVE ON A TYPICAL DAY: PATIENT DOES NOT DRINK

## 2022-10-27 NOTE — PROGRESS NOTES
Medicare Annual Wellness Visit    Misha Plasencia is here for Medicare AWV    Assessment & Plan   Initial Medicare annual wellness visit    Recommendations for Preventive Services Due: see orders and patient instructions/AVS.  Recommended screening schedule for the next 5-10 years is provided to the patient in written form: see Patient Instructions/AVS.     No follow-ups on file. Subjective   The following acute and/or chronic problems were also addressed today:  hypotension    Patient's complete Health Risk Assessment and screening values have been reviewed and are found in Flowsheets. The following problems were reviewed today and where indicated follow up appointments were made and/or referrals ordered.     Positive Risk Factor Screenings with Interventions:    Fall Risk:  Do you feel unsteady or are you worried about falling? : (!) yes  2 or more falls in past year?: (!) yes (fell on left side)  Fall with injury in past year?: no   Fall Risk Interventions:    Home safety tips provided    Cognitive:  Clock Drawing Test (CDT): Normal  Total Score Interpretation: Abnormal Mini-Cog  Did the patient refuse to take the cognition test?: No  Cognitive Impairment Interventions:  Patient declines any further evaluation/treatment for cognitive impairment           General Health and ACP:  General  In general, how would you say your health is?: Good  In the past 7 days, have you experienced any of the following: New or Increased Pain, New or Increased Fatigue, Loneliness, Social Isolation, Stress or Anger?: (!) Yes  Select all that apply: (!) New or Increased Pain (right ribs, hand and wrist)  Do you get the social and emotional support that you need?: Yes  Do you have a Living Will?: (!) No    Advance Directives       Power of  Living Will ACP-Advance Directive ACP-Power of     Not on File Not on File Not on File Not on File          General Health Risk Interventions:  No Living Will: Patient declines ACP discussion/assistance    Health Habits/Nutrition:  Physical Activity: Sufficiently Active    Days of Exercise per Week: 7 days    Minutes of Exercise per Session: 40 min     Have you lost any weight without trying in the past 3 months?: No  Body mass index: (!) 42.5  Have you seen the dentist within the past year?: Yes  Health Habits/Nutrition Interventions:  Nutritional issues:  patient supplements by using food pantry     Safety:  Do you have working smoke detectors?: Yes  Do you have any tripping hazards - loose or unsecured carpets or rugs?: No  Do you have any tripping hazards - clutter in doorways, halls, or stairs?: No  Do you have either shower bars, grab bars, non-slip mats or non-slip surfaces in your shower or bathtub?: Yes  Do all of your stairways have a railing or banister?: Yes  Do you always fasten your seatbelt when you are in a car?: (!) No  Safety Interventions:  Patient declines any further evaluation/treatment for this issue           Objective   Vitals:    10/27/22 0943   BP: 103/66   Site: Right Upper Arm   Position: Sitting   Cuff Size: Large Adult   Pulse: 69   Weight: 255 lb 6.4 oz (115.8 kg)   Height: 5' 5\" (1.651 m)      Body mass index is 42.5 kg/m². No Known Allergies  Prior to Visit Medications    Medication Sig Taking? Authorizing Provider   atorvastatin (LIPITOR) 40 MG tablet TAKE 1 TABLET BY MOUTH NIGHTLY Yes Gabe Roth MD   ALLERGY RELIEF 10 MG tablet TAKE 1 TABLET BY MOUTH DAILY Yes Jacki Early MD   tamsulosin (FLOMAX) 0.4 MG capsule TAKE 1 CAPSULE BY MOUTH DAILY Yes Salo Gilbert MD   potassium chloride (KLOR-CON M) 20 MEQ extended release tablet Take 1 tablet by mouth in the morning.  Yes Nick Hartman MD   Elastic Bandages & Supports (JOBST OPAQUE KNEE 20-30MMHG XL) MISC Dispense two pair closed toe knee high 20 to 30 mmHg Jobst compression stockings Yes Robin Persaud MD   isosorbide mononitrate (IMDUR) 30 MG extended release tablet TAKE 1 TABLET BY MOUTH DAILY Yes Fay Dominguez MD   furosemide (LASIX) 40 MG tablet Take 1.5 tablets by mouth 2 times daily Yes Fay Dominguez MD   pantoprazole (PROTONIX) 40 MG tablet TAKE 1 TABLET BY MOUTH 2 TIMES DAILY (BEFORE MEALS) Yes Kianna Nieves MD   oxybutynin (DITROPAN-XL) 10 mg extended release tablet TAKE 1 TABLET BY MOUTH DAILY Yes Kenyetta Márquez MD   Blood Pressure Monitor KIT 1 each by Does not apply route 2 times daily Yes Kp Orellana MD   Misc. Devices ([employer] WEIGHT SCALE) MISC 1 each by Does not apply route as needed (weigh once a day and record) Yes Kp Orellana MD   nitroGLYCERIN (NITROSTAT) 0.4 MG SL tablet Place 1 tablet under the tongue every 5 minutes as needed for Chest pain up to max of 3 total doses. If no relief after 1 dose, call 911. Yes Yunier Borrero MD   Elastic Bandages & Supports (JOBST KNEE HIGH COMPRESSION SM) MISC 2 each by Does not apply route daily Wear q day. Remove q hs. Diagnosis: Chronic venous insufficiency Yes Pam Blount APRN - CNP   ibuprofen (IBU) 800 MG tablet Take 1 tablet by mouth every 8 hours as needed for Pain  Patient not taking: Reported on 10/27/2022  Nicole Juarez DO   lidocaine (LIDODERM) 5 % Place 1 patch onto the skin daily 12 hours on, 12 hours off. Patient not taking: Reported on 10/27/2022  Nicole Juarez DO   gabapentin (NEURONTIN) 400 MG capsule Take 1 capsule by mouth in the morning for 60 days.   Nick Hartman MD   Multiple Vitamin (MULTIVITAMIN) TABS TAKE 1 TABLET BY MOUTH DAILY  Patient not taking: Reported on 10/27/2022  Jose A Chun MD   HM ASPIRIN EC LOW DOSE 81 MG EC tablet   Historical Provider, MD   Multiple Vitamins-Minerals (MULTIVITAMIN WITH MINERALS) tablet Take 1 tablet by mouth daily  Patient not taking: Reported on 10/27/2022  Awilda Grover MD   Prenatal Vit-Fe Fumarate-FA (NIVA-PLUS) 27-1 MG TABS   Historical Provider, MD   Multiple Vitamins-Minerals (PRESERVISION AREDS 2) CAPS   Historical Provider, MD       CareTeam (Including outside providers/suppliers regularly involved in providing care):   Patient Care Team:  Ming Paiz MD as PCP - Tamia Celestin MD as Consulting Physician (Gastroenterology)  Rosey Soriano MD as Consulting Physician (Urology)     Reviewed and updated this visit:  Tobacco  Allergies  Meds  Med Hx  Surg Hx  Soc Hx  Fam Hx            I, Ras Cage LPN, 36/[birth year]75, performed the documented evaluation under the direct supervision of the attending physician.

## 2022-10-27 NOTE — PATIENT INSTRUCTIONS
Personalized Preventive Plan for Poly Zepeda - 10/27/2022  Medicare offers a range of preventive health benefits. Some of the tests and screenings are paid in full while other may be subject to a deductible, co-insurance, and/or copay. Some of these benefits include a comprehensive review of your medical history including lifestyle, illnesses that may run in your family, and various assessments and screenings as appropriate. After reviewing your medical record and screening and assessments performed today your provider may have ordered immunizations, labs, imaging, and/or referrals for you. A list of these orders (if applicable) as well as your Preventive Care list are included within your After Visit Summary for your review. Other Preventive Recommendations:    A preventive eye exam performed by an eye specialist is recommended every 1-2 years to screen for glaucoma; cataracts, macular degeneration, and other eye disorders. A preventive dental visit is recommended every 6 months. Try to get at least 150 minutes of exercise per week or 10,000 steps per day on a pedometer . Order or download the FREE \"Exercise & Physical Activity: Your Everyday Guide\" from [employer]. Call 3-750.405.9123 or search [employer] online. You need 4010-7009 mg of calcium and 2214-7184 IU of vitamin D per day. It is possible to meet your calcium requirement with diet alone, but a vitamin D supplement is usually necessary to meet this goal.  When exposed to the sun, use a sunscreen that protects against both UVA and UVB radiation with an SPF of 30 or greater. Reapply every 2 to 3 hours or after sweating, drying off with a towel, or swimming. Always wear a seat belt when traveling in a car. Always wear a helmet when riding a bicycle or motorcycle.

## 2022-10-28 ENCOUNTER — HOSPITAL ENCOUNTER (EMERGENCY)
Age: 67
Discharge: HOME | End: 2022-10-28
Attending: EMERGENCY MEDICINE
Payer: MEDICARE

## 2022-10-28 VITALS
RESPIRATION RATE: 15 BRPM | DIASTOLIC BLOOD PRESSURE: 77 MMHG | SYSTOLIC BLOOD PRESSURE: 117 MMHG | OXYGEN SATURATION: 92 % | TEMPERATURE: 97 F | HEART RATE: 65 BPM

## 2022-10-28 DIAGNOSIS — N30.00 ACUTE CYSTITIS WITHOUT HEMATURIA: Primary | ICD-10-CM

## 2022-10-28 LAB
BILIRUBIN URINE: NEGATIVE
COLOR: YELLOW
EPITHELIAL CELLS UA: NORMAL /HPF (ref 0–5)
GLUCOSE BLD-MCNC: 130 MG/DL (ref 75–110)
GLUCOSE URINE: NEGATIVE
KETONES, URINE: NEGATIVE
LEUKOCYTE ESTERASE, URINE: ABNORMAL
NITRITE, URINE: NEGATIVE
PH UA: 6 (ref 5–8)
PROTEIN UA: NEGATIVE
RBC UA: NORMAL /HPF (ref 0–4)
SPECIFIC GRAVITY UA: 1 (ref 1–1.03)
TURBIDITY: CLEAR
URINE HGB: NEGATIVE
UROBILINOGEN, URINE: NORMAL
WBC UA: NORMAL /HPF (ref 0–5)

## 2022-10-28 PROCEDURE — 87086 URINE CULTURE/COLONY COUNT: CPT

## 2022-10-28 PROCEDURE — 81001 URINALYSIS AUTO W/SCOPE: CPT

## 2022-10-28 PROCEDURE — 6370000000 HC RX 637 (ALT 250 FOR IP): Performed by: STUDENT IN AN ORGANIZED HEALTH CARE EDUCATION/TRAINING PROGRAM

## 2022-10-28 PROCEDURE — 99283 EMERGENCY DEPT VISIT LOW MDM: CPT

## 2022-10-28 PROCEDURE — 82947 ASSAY GLUCOSE BLOOD QUANT: CPT

## 2022-10-28 RX ORDER — CEPHALEXIN 500 MG/1
500 CAPSULE ORAL 3 TIMES DAILY
Qty: 30 CAPSULE | Refills: 0 | Status: SHIPPED | OUTPATIENT
Start: 2022-10-28 | End: 2022-10-28 | Stop reason: SDUPTHER

## 2022-10-28 RX ORDER — CEPHALEXIN 500 MG/1
500 CAPSULE ORAL ONCE
Status: COMPLETED | OUTPATIENT
Start: 2022-10-28 | End: 2022-10-28

## 2022-10-28 RX ORDER — CEPHALEXIN 500 MG/1
500 CAPSULE ORAL 3 TIMES DAILY
Qty: 30 CAPSULE | Refills: 0 | Status: SHIPPED | OUTPATIENT
Start: 2022-10-28 | End: 2022-11-07

## 2022-10-28 RX ADMIN — CEPHALEXIN 500 MG: 500 CAPSULE ORAL at 13:58

## 2022-10-28 ASSESSMENT — ENCOUNTER SYMPTOMS
SHORTNESS OF BREATH: 0
NAUSEA: 0
ABDOMINAL PAIN: 0
RHINORRHEA: 0
EYE REDNESS: 0
DIARRHEA: 0
VOMITING: 0

## 2022-10-28 NOTE — ED PROVIDER NOTES
[facility] ED     Emergency Department     Faculty Attestation    I performed a history and physical examination of the patient and discussed management with the resident. I reviewed the residents note and agree with the documented findings and plan of care. Any areas of disagreement are noted on the chart. I was personally present for the key portions of any procedures. I have documented in the chart those procedures where I was not present during the key portions. I have reviewed the emergency nurses triage note. I agree with the chief complaint, past medical history, past surgical history, allergies, medications, social and family history as documented unless otherwise noted below. For Physician Assistant/ Nurse Practitioner cases/documentation I have personally evaluated this patient and have completed at least one if not all key elements of the E/M (history, physical exam, and MDM). Additional findings are as noted. Patient here with urinary frequency began today. No recent instrumentation or procedures. No blood.   Has had UTIs before no fever no back pain no abdominal pain we will check urine, blood sugar, plan discharge      Critical Care     none    Annmarie Luong MD, Vikas Cheung  Attending Emergency  Physician            Annmarie Luong MD  10/28/22 7295

## 2022-10-28 NOTE — ED PROVIDER NOTES
[facility]  ED  Emergency Department Encounter  Emergency Medicine Resident     Pt Name: Estefania Herrera  MRN: [id number]  Biancagfashly 1955  Date of evaluation: 10/28/22  PCP:  Rosalind Stark MD    CHIEF COMPLAINT       Chief Complaint   Patient presents with    Urinary Frequency     HISTORY OFPRESENT ILLNESS  (Location/Symptom, Timing/Onset, Context/Setting, Quality, Duration, Modifying Factors,Severity.)      Estefania Herrera is a 74 y.o. male who presents with increased urinary frequency today. Patient states that he is going to the bathroom approximately 20 times today. He states that he woke around 7, frequency began around 730. He is on a \"water pill\" which he stated he did not take until 10 AM this morning. Patient states that he has not been taking his water pill over the last several days as he has been \"out and about and did not want to urinate frequently. He has been seen in the past for urinary continence and has received Botox injections. No dysuria. No hematuria. Patient denies history of diabetes. Denies any increased thirst.  Patient was recently on Keflex which was then changed to ciprofloxacin for a UTI diagnosed on 10/6/2022. He states that he did complete the full course of the antibiotics prescribed to him.     PAST MEDICAL / SURGICAL / SOCIAL / FAMILY HISTORY      has a past medical history of Acute CHF (congestive heart failure) ([address] Arthritis, Atrial fibrillation ([address] BPH (benign prostatic hyperplasia), Cellulitis, Cervical disc disease, CHF (congestive heart failure) ([address] Combined systolic and diastolic congestive heart failure ([address] Coronary artery disease, COVID-19 virus RNA test result indeterminate, CPAP (continuous positive airway pressure) dependence, GERD (gastroesophageal reflux disease), History of incarceration, Hyperlipidemia, Hypertension, Hypokalemia, Myocardial infarct ([address] Obesity, Overactive bladder, Sleep apnea, Wears reading eyeglasses, and Wellness examination. has a past surgical history that includes transesophageal echocardiogram (10/16/2019); Cardioversion (10/16/2019); Coronary Artery Bypass Graft Maze Procedure (N/A, 10/21/2019); Carpal tunnel release (Bilateral, 2008); fracture surgery; Cervical spine surgery; Abdominal exploration surgery; Colonoscopy (N/A, 10/29/2020); Colonoscopy (10/29/2020); Colonoscopy (10/29/2020); Colonoscopy (N/A, 01/12/2021); eye surgery; Cystography (N/A, 03/26/2021); Cystoscopy (03/26/2021); Cardiac surgery; Cystocopy (05/14/2021); Cystoscopy (N/A, 05/14/2021); Cystoscopy (07/08/2022); and Urethral Surgery (N/A, 7/8/2022).     Social History     Socioeconomic History    Marital status:      Spouse name: Not on file    Number of children: Not on file    Years of education: Not on file    Highest education level: Not on file   Occupational History    Not on file   Tobacco Use    Smoking status: Never    Smokeless tobacco: Never   Vaping Use    Vaping Use: Never used   Substance and Sexual Activity    Alcohol use: Not Currently     Comment: stopped 1991    Drug use: Not Currently    Sexual activity: Not Currently   Other Topics Concern    Not on file   Social History Narrative    Not on file     Social Determinants of Health     Financial Resource Strain: High Risk    Difficulty of Paying Living Expenses: Very hard   Food Insecurity: No Food Insecurity    Worried About Running Out of Food in the Last Year: Never true    Ran Out of Food in the Last Year: Never true   Transportation Needs: Not on file   Physical Activity: Sufficiently Active    Days of Exercise per Week: 7 days    Minutes of Exercise per Session: 40 min   Stress: Not on file   Social Connections: Not on file   Intimate Partner Violence: Not on file   Housing Stability: Not on file     Family History   Problem Relation Age of Onset    Alcohol Abuse Maternal Uncle     Heart Attack Maternal Uncle     Cancer Mother     Alcohol Abuse Father      Allergies:  Patient has no known allergies. Home Medications:  Prior to Admission medications    Medication Sig Start Date End Date Taking? Authorizing Provider   cephALEXin (KEFLEX) 500 MG capsule Take 1 capsule by mouth 3 times daily for 10 days 10/28/22 11/7/22 Yes Newtno Raymundo MD   atorvastatin (LIPITOR) 40 MG tablet TAKE 1 TABLET BY MOUTH NIGHTLY 10/20/22   Gabe Roth MD   ALLERGY RELIEF 10 MG tablet TAKE 1 TABLET BY MOUTH DAILY 10/7/22   Roz Garcia MD   ibuprofen (IBU) 800 MG tablet Take 1 tablet by mouth every 8 hours as needed for Pain  Patient not taking: Reported on 10/27/2022 10/3/22   Linh Mckeon DO   lidocaine (LIDODERM) 5 % Place 1 patch onto the skin daily 12 hours on, 12 hours off. Patient not taking: Reported on 10/27/2022 10/3/22   Linh Mckeon DO   tamsulosin (FLOMAX) 0.4 MG capsule TAKE 1 CAPSULE BY MOUTH DAILY 8/15/22   Salo Gilbert MD   gabapentin (NEURONTIN) 400 MG capsule Take 1 capsule by mouth in the morning for 60 days. 8/4/22 10/3/22  Nick Hartman MD   potassium chloride (KLOR-CON M) 20 MEQ extended release tablet Take 1 tablet by mouth in the morning.  8/4/22   Nick Hartman MD   Elastic Bandages & Supports (JOBST OPAQUE KNEE 20-30MMHG XL) MISC Dispense two pair closed toe knee high 20 to 30 mmHg Jobst compression stockings 7/11/22   Robin Persaud MD   isosorbide mononitrate (IMDUR) 30 MG extended release tablet TAKE 1 TABLET BY MOUTH DAILY 7/5/22   Muhaddis Rawland Cranker, MD   furosemide (LASIX) 40 MG tablet Take 1.5 tablets by mouth 2 times daily 7/5/22   Marii Rivero MD   pantoprazole (PROTONIX) 40 MG tablet TAKE 1 TABLET BY MOUTH 2 TIMES DAILY (BEFORE MEALS) 6/14/22   Deb Echeverria MD   oxybutynin (DITROPAN-XL) 10 mg extended release tablet TAKE 1 TABLET BY MOUTH DAILY 6/6/22   Salo Gilbert MD   Multiple Vitamin (MULTIVITAMIN) TABS TAKE 1 TABLET BY MOUTH DAILY  Patient not taking: Reported on 10/27/2022 5/16/22 Veronica Bauer MD    ASPIRIN EC LOW DOSE 81 MG EC tablet  1/31/22   Historical Provider, MD   Multiple Vitamins-Minerals (MULTIVITAMIN WITH MINERALS) tablet Take 1 tablet by mouth daily  Patient not taking: Reported on 10/27/2022 1/3/22   Edmar Eisenberg MD   Prenatal Vit-Fe Fumarate-FA (NIVA-PLUS) 27-1 MG TABS  4/2/21   Historical Provider, MD   Multiple Vitamins-Minerals (PRESERVISION AREDS 2) CAPS  3/26/21   Historical Provider, MD   Blood Pressure Monitor KIT 1 each by Does not apply route 2 times daily 3/16/21   Skinny Drummond MD   Misc. Devices (Prescription Eyewear WEIGHT SCALE) MISC 1 each by Does not apply route as needed (weigh once a day and record) 3/16/21   Skinny Drummond MD   nitroGLYCERIN (NITROSTAT) 0.4 MG SL tablet Place 1 tablet under the tongue every 5 minutes as needed for Chest pain up to max of 3 total doses. If no relief after 1 dose, call 911. 11/23/20   Millie Stern MD   Elastic Bandages & Supports (JOBST KNEE HIGH COMPRESSION SM) MISC 2 each by Does not apply route daily Wear q day. Remove q hs. Diagnosis: Chronic venous insufficiency 8/12/20   Jeramy Lana, APRN - CNP     REVIEW OF SYSTEMS    (2-9 systems for level 4, 10 or more for level 5)      Review of Systems   Constitutional:  Negative for fever. HENT:  Negative for congestion and rhinorrhea. Eyes:  Negative for redness. Respiratory:  Negative for shortness of breath. Cardiovascular:  Negative for chest pain. Gastrointestinal:  Negative for abdominal pain, diarrhea, nausea and vomiting. Genitourinary:  Positive for frequency. Negative for dysuria, hematuria, penile discharge, penile pain, penile swelling, scrotal swelling and testicular pain. Musculoskeletal:  Negative for joint swelling. Skin:  Negative for wound. Neurological:  Negative for headaches. PHYSICAL EXAM   (up to 7 for level 4, 8 or more for level 5)     INITIAL VITALS:    temperature is 97 °F (36.1 °C).  His blood pressure is 117/77 and his pulse is 65. His respiration is 15 and oxygen saturation is 92%. Physical Exam  Constitutional:       General: He is not in acute distress. Appearance: Normal appearance. HENT:      Head: Normocephalic and atraumatic. Nose: Nose normal.      Mouth/Throat:      Mouth: Mucous membranes are moist.   Eyes:      Extraocular Movements: Extraocular movements intact. Pupils: Pupils are equal, round, and reactive to light. Cardiovascular:      Rate and Rhythm: Normal rate and regular rhythm. Pulses: Normal pulses. Heart sounds: Normal heart sounds. No murmur heard. Pulmonary:      Effort: Pulmonary effort is normal. No respiratory distress. Breath sounds: Normal breath sounds. No wheezing. Abdominal:      General: There is no distension. Palpations: Abdomen is soft. Tenderness: There is no abdominal tenderness. There is no guarding or rebound. Musculoskeletal:         General: Normal range of motion. Cervical back: Normal range of motion. Right lower leg: No edema. Left lower leg: No edema. Neurological:      General: No focal deficit present. Mental Status: He is alert and oriented to person, place, and time.        DIFFERENTIAL  DIAGNOSIS     PLAN (LABS / IMAGING / EKG):  Orders Placed This Encounter   Procedures    Culture, Urine    Urinalysis with Reflex to Culture    Microscopic Urinalysis    POCT glucose    POC Glucose Fingerstick     MEDICATIONS ORDERED:  Orders Placed This Encounter   Medications    cephALEXin (KEFLEX) capsule 500 mg     Order Specific Question:   Antimicrobial Indications     Answer:   Urinary Tract Infection    DISCONTD: cephALEXin (KEFLEX) 500 MG capsule     Sig: Take 1 capsule by mouth 3 times daily for 10 days     Dispense:  30 capsule     Refill:  0    cephALEXin (KEFLEX) 500 MG capsule     Sig: Take 1 capsule by mouth 3 times daily for 10 days     Dispense:  30 capsule     Refill:  0     DDX: UTI, urination secondary to furosemide    Initial MDM/Plan: 74 y.o. male who presents with increased urinary frequency. Patient does have history of and has had Botox injections in the past.  Patient was on Cipro for UTI couple weeks ago. Will check urinalysis as well as plan of care glucose given increased urinary frequency. Anticipate discharge home    DIAGNOSTIC RESULTS / EMERGENCY DEPARTMENT COURSE / MDM     LABS:  Labs Reviewed   URINALYSIS WITH REFLEX TO CULTURE - Abnormal; Notable for the following components:       Result Value    Leukocyte Esterase, Urine TRACE (*)     All other components within normal limits   POC GLUCOSE FINGERSTICK - Abnormal; Notable for the following components:    POC Glucose 130 (*)     All other components within normal limits   CULTURE, URINE   MICROSCOPIC URINALYSIS   POCT GLUCOSE       RADIOLOGY:  No results found. EKG  Not indicated    All EKG's are interpreted by the Emergency Department Physician who either signs or Co-signs this chart in the absence of a cardiologist.          Powers Coma Scale  Eye Opening: Spontaneous  Best Verbal Response: Oriented  Best Motor Response: Obeys commands  Powers Coma Scale Score: 15    EMERGENCY DEPARTMENT COURSE:       Patient visiting the emergency department with complaints of increased urinary frequency, recently treated for UTI however symptoms. Urinalysis showed trace leukocyte esterase with slight elevation of WBCs. Will treat with Keflex. Patient had prior urinary culture which did show sensitivity to Keflex. Recommended follow-up with primary care physician. Plan of care glucose 130 not significantly elevated, not concerned for element of diabetes at this time. Discussed with patient and patient agreeable at this time. Will give first dose of antibiotic in the emergency department and send prescription to preferred pharmacy. PROCEDURES:  None    CONSULTS:  None    CRITICAL CARE:  Please see attending note    FINAL IMPRESSION      1.  Acute cystitis without hematuria        DISPOSITION / PLAN     DISPOSITION Decision To Discharge 10/28/2022 01:37:02 PM      PATIENTREFERRED TO:  Greg Gan MD  [address]   [address]  [address]  [phone]    Schedule an appointment as soon as possible for a visit       DISCHARGE MEDICATIONS:  Discharge Medication List as of 10/28/2022  1:38 PM        START taking these medications    Details   cephALEXin (KEFLEX) 500 MG capsule Take 1 capsule by mouth 3 times daily for 10 days, Disp-30 capsule, R-0Print             Marcello Godfrey MD  Emergency Medicine Resident    (Please note that portions of this note were completed with a voice recognition program.  Efforts were made to edit the dictations but occasionally words are mis-transcribed.)       Toshia Cross MD  Resident  10/28/22 [id number]

## 2022-10-28 NOTE — Clinical Note
Dante Panchal was seen and treated in our emergency department on 10/28/2022. He may return to work on 10/29/2022. If you have any questions or concerns, please don't hesitate to call.       Raoul Anderson MD

## 2022-10-28 NOTE — ED TRIAGE NOTES
Pt reports since waking this morning he has been urinating multiple times and large amounts of urine each time. Writer is extended triage nurse. Assessment and treatment for this patient was primarily performed by resident and attending with extended triage nurse performing tasks as directed. Pt seen primarily by resident and attending physicians. RN assessment deferred to physician assessment.

## 2022-10-29 LAB
CULTURE: NO GROWTH
SPECIMEN DESCRIPTION: NORMAL

## 2022-11-08 ENCOUNTER — HOSPITAL ENCOUNTER (OUTPATIENT)
Dept: OTHER | Age: 67
Discharge: HOME | End: 2022-11-08
Payer: MEDICARE

## 2022-11-08 VITALS
DIASTOLIC BLOOD PRESSURE: 64 MMHG | BODY MASS INDEX: 41 KG/M2 | WEIGHT: 246.4 LBS | SYSTOLIC BLOOD PRESSURE: 110 MMHG | RESPIRATION RATE: 20 BRPM | OXYGEN SATURATION: 96 % | HEART RATE: 78 BPM

## 2022-11-08 PROCEDURE — 99212 OFFICE O/P EST SF 10 MIN: CPT

## 2022-11-08 ASSESSMENT — PAIN DESCRIPTION - ORIENTATION: ORIENTATION: RIGHT

## 2022-11-08 ASSESSMENT — PAIN DESCRIPTION - PAIN TYPE: TYPE: ACUTE PAIN

## 2022-11-08 ASSESSMENT — PAIN DESCRIPTION - LOCATION: LOCATION: RIB CAGE

## 2022-11-08 ASSESSMENT — PAIN SCALES - GENERAL: PAINLEVEL_OUTOF10: 6

## 2022-11-08 ASSESSMENT — PAIN DESCRIPTION - DESCRIPTORS: DESCRIPTORS: DISCOMFORT

## 2022-11-08 NOTE — PROGRESS NOTES
Date:  2022  Time:  10:23 AM    CHF Clinic at [facility]    Office: [phone] Fax: [phone]    Re:  Wilfred Singh   Patient : 1955    Vital Signs: /64   Pulse 78   Resp 20   Wt 246 lb 6.4 oz (111.8 kg)   SpO2 96%   BMI 41.00 kg/m²                       O2 Device: None (Room air)                           No results for input(s): CBC, HGB, HCT, WBC, PLATELET, NA, K, CL, CO2, BUN, CREATININE, GLUCOSE, BNP, INR in the last 72 hours. Respiratory:    Assessment  Charting Type: Reassessment    Breath Sounds  Right Upper Lobe: Clear  Right Middle Lobe: Clear  Right Lower Lobe: Clear  Left Upper Lobe: Clear  Left Lower Lobe: Clear    Cough/Sputum  Cough: None         Peripheral Vascular  RLE Edema: Trace  LLE Edema: Trace      Complaints: None at this time    Physician Orders None    Comment : Arrived for scheduled visit per ambulatory with cane assist. Weight is down 6.8 lbs from last month. He denies any increase in dyspnea with exertion or chest pain. Ongoing lower leg, ankle and pedal edema noted but down from last visit. Says he fell twice in the last month. Was evaluated in ER after fall. Medication list reviewed, unable to afford OTC vitamins at this time. Insurance will only cover so much of his OTC charges. Reinforced low sodium diet and fluid restrictions. Saw cardiology last month. Next CHF Clinic visit 22.     Electronically signed by Tonia Garcia RN on 2022 at 10:23 AM

## 2022-11-09 ENCOUNTER — OFFICE VISIT (OUTPATIENT)
Dept: FAMILY MEDICINE CLINIC | Age: 67
End: 2022-11-09
Payer: MEDICARE

## 2022-11-09 VITALS
SYSTOLIC BLOOD PRESSURE: 109 MMHG | WEIGHT: 247.6 LBS | TEMPERATURE: 97.2 F | HEART RATE: 76 BPM | BODY MASS INDEX: 41.25 KG/M2 | HEIGHT: 65 IN | DIASTOLIC BLOOD PRESSURE: 64 MMHG

## 2022-11-09 DIAGNOSIS — I50.42 HEART FAILURE, SYSTOLIC AND DIASTOLIC, CHRONIC (HCC): ICD-10-CM

## 2022-11-09 DIAGNOSIS — R05.3 CHRONIC COUGH: ICD-10-CM

## 2022-11-09 DIAGNOSIS — R09.82 POST-NASAL DRIP: ICD-10-CM

## 2022-11-09 PROCEDURE — G8427 DOCREV CUR MEDS BY ELIG CLIN: HCPCS

## 2022-11-09 PROCEDURE — 3017F COLORECTAL CA SCREEN DOC REV: CPT

## 2022-11-09 PROCEDURE — 99213 OFFICE O/P EST LOW 20 MIN: CPT

## 2022-11-09 PROCEDURE — 1123F ACP DISCUSS/DSCN MKR DOCD: CPT

## 2022-11-09 PROCEDURE — 1036F TOBACCO NON-USER: CPT

## 2022-11-09 PROCEDURE — G8417 CALC BMI ABV UP PARAM F/U: HCPCS

## 2022-11-09 PROCEDURE — G8482 FLU IMMUNIZE ORDER/ADMIN: HCPCS

## 2022-11-09 RX ORDER — LORATADINE 10 MG/1
TABLET ORAL
Qty: 30 TABLET | Refills: 0 | Status: SHIPPED | OUTPATIENT
Start: 2022-11-09

## 2022-11-09 ASSESSMENT — PATIENT HEALTH QUESTIONNAIRE - PHQ9
SUM OF ALL RESPONSES TO PHQ QUESTIONS 1-9: 6
SUM OF ALL RESPONSES TO PHQ9 QUESTIONS 1 & 2: 6
1. LITTLE INTEREST OR PLEASURE IN DOING THINGS: 3
SUM OF ALL RESPONSES TO PHQ QUESTIONS 1-9: 6
SUM OF ALL RESPONSES TO PHQ QUESTIONS 1-9: 6
2. FEELING DOWN, DEPRESSED OR HOPELESS: 3
SUM OF ALL RESPONSES TO PHQ QUESTIONS 1-9: 6

## 2022-11-09 ASSESSMENT — ENCOUNTER SYMPTOMS
ABDOMINAL PAIN: 0
VOMITING: 0
SHORTNESS OF BREATH: 0
CONSTIPATION: 0
DIARRHEA: 0
NAUSEA: 0

## 2022-11-09 NOTE — PROGRESS NOTES
Attending Physician Statement  I  have discussed the care of Maxx Peña including pertinent history and exam findings with the resident. I agree with the assessment, plan and orders as documented by the resident. /64 (Site: Left Upper Arm, Position: Sitting, Cuff Size: Large Adult)   Pulse 76   Temp 97.2 °F (36.2 °C) (Oral)   Ht 5' 5\" (1.651 m)   Wt 247 lb 9.6 oz (112.3 kg)   BMI 41.20 kg/m²    BP Readings from Last 3 Encounters:   11/09/22 109/64   11/08/22 110/64   10/28/22 117/77     Wt Readings from Last 3 Encounters:   11/09/22 247 lb 9.6 oz (112.3 kg)   11/08/22 246 lb 6.4 oz (111.8 kg)   10/27/22 255 lb 6.4 oz (115.8 kg)          Diagnosis Orders   1. Chronic cough  loratadine (ALLERGY RELIEF) 10 MG tablet      2. Post-nasal drip  loratadine (ALLERGY RELIEF) 10 MG tablet      3.  Heart failure, systolic and diastolic, chronic ([facility]            Bettina Krabbe, DO 11/9/2022 4:05 PM

## 2022-11-09 NOTE — PROGRESS NOTES
Subjective: Ingris Perry is a 74 y.o. male with  has a past medical history of Acute CHF (congestive heart failure) ([address] Arthritis, Atrial fibrillation ([address] BPH (benign prostatic hyperplasia), Cellulitis, Cervical disc disease, CHF (congestive heart failure) ([address] Combined systolic and diastolic congestive heart failure ([address] Coronary artery disease, COVID-19 virus RNA test result indeterminate, CPAP (continuous positive airway pressure) dependence, GERD (gastroesophageal reflux disease), History of incarceration, Hyperlipidemia, Hypertension, Hypokalemia, Myocardial infarct ([address] Obesity, Overactive bladder, Sleep apnea, Wears reading eyeglasses, and Wellness examination. Presented to the office today for:  Chief Complaint   Patient presents with    Rib Pain (injury)     Right rib pain, fall a few weeks ago     Forms     Tarps     Urinary Frequency     Ed follow up Ada Hint 10/28/22       Urinary Frequency   Pertinent negatives include no chills, frequency, nausea or vomiting. Patient is a 82-year-old male with history of A. fib, ischemic cardiomyopathy, CHF presenting to the clinic today for follow-up on his UTI. Patient was recently at [facility] 2 weeks ago for a UTI, finished 2 courses of antibiotics including Keflex which was then changed to Cipro for UTI. Patient finished both courses of antibiotics. Patient states he is symptom-free now. Urine culture did not show any bacteria. Patient denies any urinary symptoms. Patient has no other concerns or complaints at this time. Review of Systems   Constitutional:  Negative for chills and fever. Respiratory:  Negative for shortness of breath. Cardiovascular:  Negative for chest pain. Gastrointestinal:  Negative for abdominal pain, constipation, diarrhea, nausea and vomiting. Genitourinary:  Negative for frequency. Neurological:  Negative for dizziness, light-headedness and headaches.                The patient has a Family History   Problem Relation Age of Onset    Alcohol Abuse Maternal Uncle     Heart Attack Maternal Uncle     Cancer Mother     Alcohol Abuse Father        Objective:    /64 (Site: Left Upper Arm, Position: Sitting, Cuff Size: Large Adult)   Pulse 76   Temp 97.2 °F (36.2 °C) (Oral)   Ht 5' 5\" (1.651 m)   Wt 247 lb 9.6 oz (112.3 kg)   BMI 41.20 kg/m²    BP Readings from Last 3 Encounters:   11/09/22 109/64   11/08/22 110/64   10/28/22 117/77       Physical Exam  Constitutional:       Appearance: Normal appearance. HENT:      Head: Normocephalic and atraumatic. Cardiovascular:      Rate and Rhythm: Normal rate and regular rhythm. Pulses: Normal pulses. Heart sounds: Normal heart sounds. No murmur heard. No friction rub. No gallop. Pulmonary:      Effort: Pulmonary effort is normal. No respiratory distress. Breath sounds: Normal breath sounds. No stridor. No wheezing or rales. Abdominal:      General: Abdomen is flat. There is no distension. Palpations: Abdomen is soft. There is no mass. Tenderness: There is no abdominal tenderness. There is no guarding or rebound. Neurological:      Mental Status: He is alert. Lab Results   Component Value Date    WBC 9.4 02/02/2022    HGB 13.9 02/02/2022    HCT 42.8 02/02/2022     02/02/2022    CHOL 121 03/08/2022    TRIG 79 03/08/2022    HDL 49 03/08/2022    HDL 49 03/08/2022    ALT 14 03/16/2021    AST 15 03/16/2021     08/12/2022    K 3.8 08/12/2022     08/12/2022    CREATININE 0.73 08/12/2022    BUN 15 08/12/2022    CO2 24 08/12/2022    TSH 1.48 01/24/2020    PSA 0.49 08/12/2022    INR 1.0 03/08/2021    LABA1C 5.6 12/10/2019     Lab Results   Component Value Date    CALCIUM 9.2 08/12/2022    PHOS 4.0 03/08/2021     Lab Results   Component Value Date    LDLCHOLESTEROL 56 03/08/2022    LDLCHOLESTEROL 56 03/08/2022       Assessment and Plan:    1.  Post nasal drip    - loratadine (ALLERGY RELIEF) 10 MG tablet; TAKE 1 TABLET BY MOUTH DAILY  Dispense: 30 tablet; Refill: 0    2. UTI  -Urine culture was negative  -Patient completed course of Keflex and Cipro  -Patient asymptomatic            Requested Prescriptions     Signed Prescriptions Disp Refills    loratadine (ALLERGY RELIEF) 10 MG tablet 30 tablet 0     Sig: TAKE 1 TABLET BY MOUTH DAILY       Medications Discontinued During This Encounter   Medication Reason    ALLERGY RELIEF 10 MG tablet Ginny Vera received counseling on the following healthy behaviors: nutrition, exercise and medication adherence    Discussed use,benefit, and side effects of prescribed medications. Barriers to medication compliance addressed. All patient questions answered. Pt voiced understanding. Return in about 3 months (around 2/9/2023) for routine follow up. Disclaimer: Some orall of this note was transcribed using voice-recognition software. This may cause typographical errors occasionally. Although all effort is made to fix these errors, please do not hesitate to contact our office if there [place] concern with the understanding of this note.

## 2022-11-09 NOTE — ANESTHESIA POSTPROCEDURE EVALUATION
Department of Anesthesiology  Postprocedure Note    Patient: Edel Welsh  MRN: [id number]  YOB: 1955  Date of evaluation: 5/14/2021  Time:  1:47 PM     Procedure Summary     Date: 05/14/21 Room / Location: [address]    Anesthesia Start: 2686 Anesthesia Stop: 8154    Procedure: Rulon Reeves, FULGURATION (N/A ) Diagnosis: (OVERACTIVE BLADDER)    Surgeons: Sharyle Massy., MD Responsible Provider: Sudhakar Taylor MD    Anesthesia Type: MAC ASA Status: 4          Anesthesia Type: MAC    Jeni Phase I:      Jeni Phase II: Jeni Score: 8    Last vitals: Reviewed and per EMR flowsheets.        Anesthesia Post Evaluation    Patient location during evaluation: PACU  Patient participation: complete - patient participated  Level of consciousness: awake and alert  Pain score: 3  Airway patency: patent  Nausea & Vomiting: no vomiting and no nausea  Complications: no  Cardiovascular status: hemodynamically stable  Respiratory status: acceptable  Hydration status: stable Neuropathy

## 2022-11-09 NOTE — PATIENT INSTRUCTIONS
Thank you for letting us take care of you today. We hope all your questions were addressed. If a question was overlooked or something else comes to mind after you return home, please contact a member of your Care Team listed below. Your Care Team at [place] [address] is Team #2  Tejas Marshall DO (Faculty)  Magalie Muñoz (Faculty)  Favian Millard MD (Resident)  Kameron Goetz MD (Resident)  Bre Hensley MD (Resident)  Treasure Zuniga MD (Resident)  Lori Sutherland., CMA  Ulisses Stiles.,  MA  Derek Laughlin., LPN  Regi Drake., [facility] office)  Lizette Artis, [address] (Clinical Practice Manager)  Wende Sicard, [facility] (Clinical Pharmacist)     Office phone number: [phone]    If you need to get in right away due to illness, please be advised we have \"Same Day\" appointments available Monday-Friday. Please call us at [phone] option #3 to schedule your \"Same Day\" appointment.

## 2022-11-18 ENCOUNTER — OFFICE VISIT (OUTPATIENT)
Dept: GASTROENTEROLOGY | Age: 67
End: 2022-11-18
Payer: MEDICARE

## 2022-11-18 VITALS
DIASTOLIC BLOOD PRESSURE: 71 MMHG | HEART RATE: 53 BPM | HEIGHT: 65 IN | WEIGHT: 254 LBS | BODY MASS INDEX: 42.32 KG/M2 | SYSTOLIC BLOOD PRESSURE: 113 MMHG

## 2022-11-18 DIAGNOSIS — K21.9 GASTROESOPHAGEAL REFLUX DISEASE WITHOUT ESOPHAGITIS: Primary | ICD-10-CM

## 2022-11-18 PROCEDURE — 99213 OFFICE O/P EST LOW 20 MIN: CPT | Performed by: INTERNAL MEDICINE

## 2022-11-18 PROCEDURE — G8482 FLU IMMUNIZE ORDER/ADMIN: HCPCS | Performed by: INTERNAL MEDICINE

## 2022-11-18 PROCEDURE — G8428 CUR MEDS NOT DOCUMENT: HCPCS | Performed by: INTERNAL MEDICINE

## 2022-11-18 PROCEDURE — 1036F TOBACCO NON-USER: CPT | Performed by: INTERNAL MEDICINE

## 2022-11-18 PROCEDURE — 1123F ACP DISCUSS/DSCN MKR DOCD: CPT | Performed by: INTERNAL MEDICINE

## 2022-11-18 PROCEDURE — 3017F COLORECTAL CA SCREEN DOC REV: CPT | Performed by: INTERNAL MEDICINE

## 2022-11-18 PROCEDURE — G8417 CALC BMI ABV UP PARAM F/U: HCPCS | Performed by: INTERNAL MEDICINE

## 2022-11-18 RX ORDER — PANTOPRAZOLE SODIUM 40 MG/1
40 TABLET, DELAYED RELEASE ORAL EVERY EVENING
Qty: 30 TABLET | Refills: 5 | Status: SHIPPED | OUTPATIENT
Start: 2022-11-18

## 2022-11-18 ASSESSMENT — ENCOUNTER SYMPTOMS
SORE THROAT: 0
COUGH: 0
CONSTIPATION: 0
VOMITING: 0
NAUSEA: 0
SHORTNESS OF BREATH: 0
RECTAL PAIN: 0
VOICE CHANGE: 0
CHOKING: 0
COLOR CHANGE: 0
APNEA: 0
BLOOD IN STOOL: 0
ANAL BLEEDING: 0
ABDOMINAL PAIN: 0
ABDOMINAL DISTENTION: 0
WHEEZING: 0
DIARRHEA: 0
TROUBLE SWALLOWING: 0

## 2022-11-18 NOTE — PROGRESS NOTES
GI FOLLOW UP    INTERVAL HISTORY:     Chronic GERD-doing well with PPI therapy twice daily    Chief Complaint   Patient presents with    Gastroesophageal Reflux       1. Gastroesophageal reflux disease without esophagitis          HISTORY OF PRESENT ILLNESS: Theodore May is a 60 y.o. male with a past history remarkable for HARPREET, A. fib on Eliquis, morbid obesity, history of CABG in October 2019 on dual antiplatelet therapy (aspirin and Plavix), referred for evaluation of nonproductive postprandial cough. This appears to be most consistent with the patient's chronic GERD symptoms. He appears to be on maximum PPI therapy     Recent colonoscopy which identified tubular adenomas and sessile serrated adenomas which were removed aside from an isolated flat lesion that will require EMR. Findings discussed with the patient. Past Medical,Family, and Social History reviewed and does contribute to the patient presenting condition. Patient's PMH/PSH,SH,PSYCH Hx, MEDs, ALLERGIES, and ROS were all reviewed and updated in the appropriate sections. PAST MEDICAL HISTORY:  Past Medical History:   Diagnosis Date    Acute CHF (congestive heart failure) ([facility] 03/08/2021    Arthritis     back    Atrial fibrillation ([facility] 10/2019    Dr. Vicky Galeana    BPH (benign prostatic hyperplasia)     Cellulitis     Cervical disc disease     CHF (congestive heart failure) [facility])     cardiologist Dr. Antonio Sharp. s CHF clinic    Combined systolic and diastolic congestive heart failure ([facility] 01/15/2020    Coronary artery disease 10/2019     CABG 2019 [facility]    Dr. Madi Arevalo Cardiology last seen within the last year.     COVID-19 virus RNA test result indeterminate 03/31/2021    CPAP (continuous positive airway pressure) dependence     GERD (gastroesophageal reflux disease)     on rx    History of incarceration released 2019    Hyperlipidemia     Dr. Kim Irene    Hypertension     [facility] CHF clinic     Dr. Kim Irene    Hypokalemia     Myocardial infarct [facility])     Dr. Kim Irene    Obesity     Overactive bladder     Sleep apnea     C-pap    Wears reading eyeglasses     Wellness examination     Dr. Mayela Bob 4/2021       Past Surgical History:   Procedure Laterality Date    ABDOMINAL EXPLORATION SURGERY      CARDIAC SURGERY      2019 - AtriClip 1.5 or 3T safe Immediately    CARDIOVERSION  10/16/2019    CARPAL TUNNEL RELEASE Bilateral 2008    CERVICAL SPINE SURGERY      2-5    COLONOSCOPY N/A 10/29/2020    COLONOSCOPY WITH BIOPSY performed by Sheldon Echavarria MD at [place] [address]  10/29/2020    COLONOSCOPY SUBMUCOSAL/BOTOX INJECTION performed by Sheldon Echavarria MD at [place] [address]  10/29/2020    COLONOSCOPY POLYPECTOMY SNARE/COLD BIOPSY performed by Sheldon Echavarria MD at [place] [address] N/A 01/12/2021    COLONOSCOPY POLYPECTOMY HOT SNARE, COLD SNARE POLPECTOMY performed by Modesto Hobson MD at [address] N/A 10/21/2019    CABG CORONARY ARTERY BYPASS GRAFT X1, LIMA-LAD, BROWN 4 MAZE PROCEDURE, 50MM ATRICURE ATRIAL CLIP RIGID INTERNAL FIXATION PLATES X 3   SCREWS X 13 performed by Luigi Beltrán MD at [facility] 03/26/2021    URODYNAMICS performed by Nona Eduardo MD at [address] [place] [address]  03/26/2021    CYSTOSCOPY FLEXIBLE performed by Nona Eduardo MD at [address] [place] [address]  05/14/2021    CYSTOSCOPY, UROLIFT, FULGURATION    CYSTOSCOPY N/A 05/14/2021    CYSTOSCOPY, UROLIFT, FULGURATION performed by Nona Eduardo MD at [address] [place] [address]  07/08/2022    CYSTOSCOPY, BOTOX INJECTION  (100 UNITS    EYE SURGERY      cataract surgery 2020    FRACTURE SURGERY      Left leg    TRANSESOPHAGEAL ECHOCARDIOGRAM  10/16/2019    URETHRAL SURGERY N/A 7/8/2022    CYSTOSCOPY, BOTOX INJECTION  (100 UNITS) performed by Nona Eduardo MD at [facility] OR       CURRENT MEDICATIONS:    Current Outpatient Medications:     pantoprazole (PROTONIX) 40 MG tablet, Take 1 tablet by mouth every evening, Disp: 30 tablet, Rfl: 5    loratadine (ALLERGY RELIEF) 10 MG tablet, TAKE 1 TABLET BY MOUTH DAILY, Disp: 30 tablet, Rfl: 0    atorvastatin (LIPITOR) 40 MG tablet, TAKE 1 TABLET BY MOUTH NIGHTLY, Disp: 30 tablet, Rfl: 1    tamsulosin (FLOMAX) 0.4 MG capsule, TAKE 1 CAPSULE BY MOUTH DAILY, Disp: 30 capsule, Rfl: 5    potassium chloride (KLOR-CON M) 20 MEQ extended release tablet, Take 1 tablet by mouth in the morning., Disp: 30 tablet, Rfl: 1    Elastic Bandages & Supports (JOBST OPAQUE KNEE 20-30MMHG XL) MISC, Dispense two pair closed toe knee high 20 to 30 mmHg Jobst compression stockings, Disp: 2 each, Rfl: 0    isosorbide mononitrate (IMDUR) 30 MG extended release tablet, TAKE 1 TABLET BY MOUTH DAILY, Disp: 30 tablet, Rfl: 1    furosemide (LASIX) 40 MG tablet, Take 1.5 tablets by mouth 2 times daily, Disp: 90 tablet, Rfl: 2    oxybutynin (DITROPAN-XL) 10 mg extended release tablet, TAKE 1 TABLET BY MOUTH DAILY, Disp: 30 tablet, Rfl: 3    Multiple Vitamin (MULTIVITAMIN) TABS, TAKE 1 TABLET BY MOUTH DAILY, Disp: 30 tablet, Rfl: 3    HM ASPIRIN EC LOW DOSE 81 MG EC tablet, , Disp: , Rfl:     Multiple Vitamins-Minerals (MULTIVITAMIN WITH MINERALS) tablet, Take 1 tablet by mouth daily, Disp: 30 tablet, Rfl: 3    Prenatal Vit-Fe Fumarate-FA (NIVA-PLUS) 27-1 MG TABS, , Disp: , Rfl:     Multiple Vitamins-Minerals (PRESERVISION AREDS 2) CAPS, , Disp: , Rfl:     Blood Pressure Monitor KIT, 1 each by Does not apply route 2 times daily, Disp: 1 kit, Rfl: 0    Misc. Devices (TechPepper WEIGHT SCALE) MISC, 1 each by Does not apply route as needed (weigh once a day and record), Disp: 1 each, Rfl: 0    nitroGLYCERIN (NITROSTAT) 0.4 MG SL tablet, Place 1 tablet under the tongue every 5 minutes as needed for Chest pain up to max of 3 total doses.  If no relief after 1 dose, call 911., Disp: 25 tablet, Rfl: 1    Elastic Bandages & Supports (JOBST KNEE HIGH COMPRESSION SM) MISC, 2 each by Does not apply route daily Wear q day. Remove q hs. Diagnosis: Chronic venous insufficiency, Disp: 2 each, Rfl: 0    ibuprofen (IBU) 800 MG tablet, Take 1 tablet by mouth every 8 hours as needed for Pain (Patient not taking: No sig reported), Disp: 21 tablet, Rfl: 0    lidocaine (LIDODERM) 5 %, Place 1 patch onto the skin daily 12 hours on, 12 hours off. (Patient not taking: No sig reported), Disp: 30 patch, Rfl: 0    gabapentin (NEURONTIN) 400 MG capsule, Take 1 capsule by mouth in the morning for 60 days. , Disp: 30 capsule, Rfl: 2    ALLERGIES:   No Known Allergies    FAMILY HISTORY:       Problem Relation Age of Onset    Alcohol Abuse Maternal Uncle     Heart Attack Maternal Uncle     Cancer Mother     Alcohol Abuse Father          SOCIAL HISTORY:   Social History     Socioeconomic History    Marital status:      Spouse name: Not on file    Number of children: Not on file    Years of education: Not on file    Highest education level: Not on file   Occupational History    Not on file   Tobacco Use    Smoking status: Never    Smokeless tobacco: Never   Vaping Use    Vaping Use: Never used   Substance and Sexual Activity    Alcohol use: Not Currently     Comment: stopped 1991    Drug use: Not Currently    Sexual activity: Not Currently   Other Topics Concern    Not on file   Social History Narrative    Not on file     Social Determinants of Health     Financial Resource Strain: High Risk    Difficulty of Paying Living Expenses: Very hard   Food Insecurity: No Food Insecurity    Worried About Running Out of Food in the Last Year: Never true    Ran Out of Food in the Last Year: Never true   Transportation Needs: Not on file   Physical Activity: Sufficiently Active    Days of Exercise per Week: 7 days    Minutes of Exercise per Session: 40 min   Stress: Not on file   Social Connections: Not on file   Intimate Partner Violence: Not on file   Housing Stability: Not on file       REVIEW OF SYSTEMS: A 12-point review of systems was obtained and pertinent positives and negatives were listed below. REVIEW OF SYSTEMS:     Constitutional: No fever, no chills, no lethargy, no weakness. HEENT:  No headache, otalgia, itchy eyes, nasal discharge or sore throat. Cardiac:  No chest pain, dyspnea, orthopnea or PND. Chest:   No cough, phlegm or wheezing. Abdomen:      Detailed by MA   Neuro:  No focal weakness, abnormal movements or seizure like activity. Skin:   No rashes, no itching. :   No hematuria, no pyuria, no dysuria, no flank pain. Extremities:  No swelling or joint pains. ROS was otherwise negative    Review of Systems   Constitutional:  Negative for appetite change, fatigue, fever and unexpected weight change. HENT:  Negative for sore throat, trouble swallowing and voice change. Eyes:  Negative for visual disturbance. Respiratory:  Negative for apnea, cough, choking, shortness of breath and wheezing. Cardiovascular:  Positive for leg swelling. Negative for chest pain and palpitations. Gastrointestinal:  Negative for abdominal distention, abdominal pain, anal bleeding, blood in stool, constipation, diarrhea, nausea, rectal pain and vomiting. Genitourinary:  Negative for difficulty urinating. Skin:  Negative for color change and rash. Neurological:  Negative for dizziness, seizures, weakness, light-headedness, numbness and headaches. Hematological:  Does not bruise/bleed easily. Psychiatric/Behavioral:  Negative for confusion and sleep disturbance. The patient is not nervous/anxious. PHYSICAL EXAMINATION: Vital signs reviewed per the nursing documentation. /71 (Site: Right Upper Arm, Position: Sitting, Cuff Size: Large Adult)   Pulse 53   Ht 5' 5\" (1.651 m)   Wt 254 lb (115.2 kg)   BMI 42.27 kg/m²   Body mass index is 42.27 kg/m².    Physical Exam    Physical Exam Constitutional: Patient is oriented to person, place, and time. Patient appears well-developed and well-nourished. HENT:   Head: Normocephalic and atraumatic. Eyes: Pupils are equal, round, and reactive to light. EOM are normal.   Neck: Normal range of motion. Neck supple. No JVD present. No tracheal deviation present. No thyromegaly present. Cardiovascular: Normal rate, regular rhythm, normal heart sounds and intact distal pulses. Pulmonary/Chest: Effort normal and breath sounds normal. No stridor. No respiratory distress. He has no wheezes. He has no rales. He exhibits no tenderness. Abdominal: Soft. Bowel sounds are normal. He exhibits no distension and no mass. There is no tenderness. There is no rebound and no guarding. No hernia. Musculoskeletal: Normal range of motion. Lymphadenopathy:    Patient has no cervical adenopathy. Neurological: Patient is alert and oriented to person, place, and time. Psychiatric: Patient has a normal mood and affect. Patient behavior is normal.       LABORATORY DATA: Reviewed  Lab Results   Component Value Date    WBC 9.4 02/02/2022    HGB 13.9 02/02/2022    HCT 42.8 02/02/2022    MCV 93.0 02/02/2022     02/02/2022     08/12/2022    K 3.8 08/12/2022     08/12/2022    CO2 24 08/12/2022    BUN 15 08/12/2022    CREATININE 0.73 08/12/2022    LABALBU 3.8 03/16/2021    BILITOT 0.29 (L) 03/16/2021    ALKPHOS 65 03/16/2021    AST 15 03/16/2021    ALT 14 03/16/2021    INR 1.0 03/08/2021         Lab Results   Component Value Date    RBC 4.60 02/02/2022    HGB 13.9 02/02/2022    MCV 93.0 02/02/2022    MCH 30.2 02/02/2022    MCHC 32.5 02/02/2022    RDW 14.2 02/02/2022    MPV 9.8 02/02/2022    BASOPCT 1 02/02/2022    LYMPHSABS 1.55 02/02/2022    MONOSABS 0.92 02/02/2022    NEUTROABS 6.48 02/02/2022    EOSABS 0.27 02/02/2022    BASOSABS 0.07 02/02/2022         DIAGNOSTIC TESTING:     No results found.            IMPRESSION: Mr. Gregoria Askew is a 61 y.o. male with history of HARPREET, A. fib previously on Eliquis, morbid obesity, history of CABG in 2019, previously on dual antiplatelet therapy, currently on monotherapy, initially referred for postprandial cough and anemia, underwent colonoscopy with removal of colon polyps, repeat recommended and 3 years. Ongoing obscure anemia, upper endoscopy failed to disclose any obvious source of bleeding or blood loss. Denies any melena, hematochezia, bright blood per rectum. No active GI symptoms. Assessment  1. Gastroesophageal reflux disease without esophagitis        Gato Maya was seen today for gastroesophageal reflux. Diagnoses and all orders for this visit:    Gastroesophageal reflux disease without esophagitis-currently doing well with Protonix 40 mg twice daily, will change to once daily and attempt to taper off in future. -     pantoprazole (PROTONIX) 40 MG tablet; Take 1 tablet by mouth every evening       Patient has intentional weight loss with dietary modifications and doing well      2021-repeat colonoscopy in 2024    RTC: 3 months. Switch evening daily dose of 40mg and attempt to taper. Additional comments: Thank you for allowing me to participate in the care of Mr. Gregoria Askew. For any further questions please do not hesitate to contact me. I have reviewed and agree with the ROS entered by the MA/LPN from today's encounter documented in a separate note. Starla Sen MD, MPH   Board Certified in Gastroenterology  Board Certified in [address] [place] [address] #: [phone]    this note is created with the assistance of a speech recognition program.  While intending to generate a document that actually reflects the content of the visit, the document can still have some errors including those of syntax and sound a like substitutions which may escape proof reading. It such instances, actual meaning can be extrapolated by contextual diversion.

## 2022-12-08 ENCOUNTER — HOSPITAL ENCOUNTER (OUTPATIENT)
Dept: OTHER | Age: 67
Discharge: HOME | End: 2022-12-08

## 2022-12-21 ENCOUNTER — HOSPITAL ENCOUNTER (OUTPATIENT)
Dept: OTHER | Age: 67
Discharge: HOME | End: 2022-12-21
Payer: MEDICARE

## 2022-12-21 VITALS
RESPIRATION RATE: 20 BRPM | WEIGHT: 242.6 LBS | BODY MASS INDEX: 40.37 KG/M2 | OXYGEN SATURATION: 97 % | SYSTOLIC BLOOD PRESSURE: 100 MMHG | HEART RATE: 61 BPM | DIASTOLIC BLOOD PRESSURE: 64 MMHG

## 2022-12-21 PROCEDURE — 99212 OFFICE O/P EST SF 10 MIN: CPT

## 2022-12-21 NOTE — PROGRESS NOTES
Verbally reviewed medication list with patient; patient verbalized understanding. Discussed 2000mg/day sodium restricted diet; patient verbalized understanding. Moderate daily exercise encouraged as tolerated. Discussed rest breaks as needed; patient verbalized understanding. Patient instructed to weigh self at the same time of each day, using same clothes and same scale; reinforced teaching to monitor for 3-5 lb weight increase over 1-2 days, and to notify the CHF clinic at [phone] or physician office if weight change noted. Patient verbalized understanding. Risks of smoking discussed with the patient if applicable; patient strongly discouraged to smoke. Patient verbalized understanding. Signs and symptoms of CHF discussed with patient, such as feeling more tired than normal, feeling short of breath, coughing that increases when you lie down, sudden weight gain, swelling of your feet, legs or belly. Patient verbalized understanding to notify the CHF clinic at [phone] or physician office if these symptoms occur. Compliance with plan of care and further disease process causes discussed with patient, patient encouraged to keep all follow up appointments. Patient verbalized understanding.

## 2022-12-21 NOTE — PROGRESS NOTES
Date:  2022  Time:  10:17 AM    CHF Clinic at [address] [facility]    Office: [phone] Fax: [phone]    Re:  Nati Low   Patient : 1955    Vital Signs: /64   Pulse 61   Resp 20   Wt 242 lb 9.6 oz (110 kg)   SpO2 97%   BMI 40.37 kg/m²                       O2 Device: None (Room air)                           No results for input(s): CBC, HGB, HCT, WBC, PLATELET, NA, K, CL, CO2, BUN, CREATININE, GLUCOSE, BNP, INR in the last 72 hours. Respiratory:    Assessment  Charting Type: Reassessment    Breath Sounds  Right Upper Lobe: Clear  Right Middle Lobe: Clear  Right Lower Lobe: Clear  Left Upper Lobe: Clear  Left Lower Lobe: Clear    Cough/Sputum  Cough: None         Peripheral Vascular  RLE Edema: Trace  LLE Edema: Trace      Complaints: No new complaints at this time    Physician Orders None    Comment : Patient here for scheduled visit in chf clinic. His weight is down 3.8 lbs from last month. Denies any chest pain or shortness of breath at this time. Mild lower leg, ankle and pedal edema noted. Continues to wear his compressions stockings every day. Medication list reviewed with patient, states compliance with taking medications. Reinforced low sodium diet and fluid restrictions. No signs fluid overload at this time. Next chf visit 23.     Electronically signed by Darian Rojas RN on 2022 at 10:17 AM

## 2023-01-03 DIAGNOSIS — I50.42 HEART FAILURE, SYSTOLIC AND DIASTOLIC, CHRONIC (HCC): ICD-10-CM

## 2023-01-03 RX ORDER — ATORVASTATIN CALCIUM 40 MG/1
40 TABLET, FILM COATED ORAL NIGHTLY
Qty: 30 TABLET | Refills: 1 | Status: SHIPPED | OUTPATIENT
Start: 2023-01-03

## 2023-01-03 NOTE — TELEPHONE ENCOUNTER
Atorvastatin pending refill        Health Maintenance   Topic Date Due    COVID-19 Vaccine (3 - Booster for Pfizer series) 06/25/2021    Diabetes screen  12/10/2022    Lipids  03/08/2023    Pneumococcal 65+ years Vaccine (2 - PCV) 04/13/2023    Annual Wellness Visit (AWV)  10/28/2023    Depression Screen  11/09/2023    Colorectal Cancer Screen  01/12/2031    DTaP/Tdap/Td vaccine (3 - Td or Tdap) 05/04/2032    Flu vaccine  Completed    Shingles vaccine  Completed    Hepatitis C screen  Completed    HIV screen  Completed    Hepatitis A vaccine  Aged Out    Hib vaccine  Aged Out    Meningococcal (ACWY) vaccine  Aged Out             (applicable per patient's age: Cancer Screenings, Depression Screening, Fall Risk Screening, Immunizations)    Hemoglobin A1C (%)   Date Value   12/10/2019 5.6   10/18/2019 5.8     LDL Cholesterol (mg/dL)   Date Value   03/08/2022 56   03/08/2022 56     AST (U/L)   Date Value   03/16/2021 15     ALT (U/L)   Date Value   03/16/2021 14     BUN (mg/dL)   Date Value   08/12/2022 15      (goal A1C is < 7)   (goal LDL is <100) need 30-50% reduction from baseline     BP Readings from Last 3 Encounters:   12/21/22 100/64   11/18/22 113/71   11/09/22 109/64    (goal /80)      All Future Testing planned in [facility]:  Lab Frequency Next Occurrence   Basic Metabolic Panel Once 69/17/3441       Next Visit Date:  Future Appointments   Date Time Provider Taj Flores   1/18/2023 11:00 AM [facility] CHF CLINIC RM 1 [facility]F CLI [facility]   2/10/2023 10:00 AM SCHEDULE, [facility]   2/20/2023  1:45 PM Ele Fonseca MD [facility] MAGNO Simmons   10/30/2023 10:30 AM SCHEDULE, [facility] LPN Imelda Simmons            Patient Active Problem List:     Atrial flutter ([address]     Sleep-related breathing disorder     Hypokalemia     Atrial fibrillation ([address]     Ischemic cardiomyopathy     Acute cystitis without hematuria     Hx of CABG     Chronic diastolic (congestive) heart failure ([address]     Bilateral hand numbness     Right thigh pain     Left leg weakness     Coronary artery disease     Chronic cough     Gastroesophageal reflux disease without esophagitis     Overflow incontinence of urine     Polyp of colon     Post-void dribbling     Post-nasal drip     SYDNEY (acute kidney injury) ([facility])     Hyperkalemia     Hypotension     Overactive bladder     Hemorrhoids     HARPREET (obstructive sleep apnea)     Laryngopharyngeal reflux     Skin ulcer of left lower leg, limited to breakdown of skin ([facility])     Bilateral edema of lower extremity     Lymphedema of both lower extremities     Venous insufficiency of both lower extremities     Acute pain of right knee     Finger laceration, subsequent encounter     Chronic venous stasis

## 2023-01-20 DIAGNOSIS — I50.42 HEART FAILURE, SYSTOLIC AND DIASTOLIC, CHRONIC (HCC): ICD-10-CM

## 2023-01-20 DIAGNOSIS — E87.6 HYPOKALEMIA: ICD-10-CM

## 2023-01-23 RX ORDER — POTASSIUM CHLORIDE 20 MEQ/1
20 TABLET, EXTENDED RELEASE ORAL DAILY
Qty: 30 TABLET | Refills: 1 | Status: SHIPPED | OUTPATIENT
Start: 2023-01-23

## 2023-01-23 RX ORDER — FUROSEMIDE 40 MG/1
60 TABLET ORAL 2 TIMES DAILY
Qty: 90 TABLET | Refills: 2 | Status: SHIPPED | OUTPATIENT
Start: 2023-01-23

## 2023-01-23 NOTE — TELEPHONE ENCOUNTER
E-scribe request for med refills. Please review and e-scribe if applicable.      Last Visit Date:  11/9/22  Next Visit Date:  Visit date not found    Hemoglobin A1C (%)   Date Value   12/10/2019 5.6   10/18/2019 5.8             ( goal A1C is < 7)   No results found for: LABMICR  LDL Cholesterol (mg/dL)   Date Value   03/08/2022 56   03/08/2022 56       (goal LDL is <100)   AST (U/L)   Date Value   03/16/2021 15     ALT (U/L)   Date Value   03/16/2021 14     BUN (mg/dL)   Date Value   08/12/2022 15     BP Readings from Last 3 Encounters:   12/21/22 100/64   11/18/22 113/71   11/09/22 109/64          (goal 120/80)        Patient Active Problem List:     Atrial flutter (HCC)     Sleep-related breathing disorder     Hypokalemia     Atrial fibrillation (HCC)     Ischemic cardiomyopathy     Acute cystitis without hematuria     Hx of CABG     Chronic diastolic (congestive) heart failure (HCC)     Bilateral hand numbness     Right thigh pain     Left leg weakness     Coronary artery disease     Chronic cough     Gastroesophageal reflux disease without esophagitis     Overflow incontinence of urine     Polyp of colon     Post-void dribbling     Post-nasal drip     SYDNEY (acute kidney injury) ([address]     Hyperkalemia     Hypotension     Overactive bladder     Hemorrhoids     HARPREET (obstructive sleep apnea)     Laryngopharyngeal reflux     Skin ulcer of left lower leg, limited to breakdown of skin ([address]     Bilateral edema of lower extremity     Lymphedema of both lower extremities     Venous insufficiency of both lower extremities     Acute pain of right knee     Finger laceration, subsequent encounter     Chronic venous stasis      ----Kevin Romero

## 2023-01-23 NOTE — TELEPHONE ENCOUNTER
E-scribe request for med refills. Please review and e-scribe if applicable.      Last Visit Date:  11/9/22  Next Visit Date:  1/20/2023    Hemoglobin A1C (%)   Date Value   12/10/2019 5.6   10/18/2019 5.8             ( goal A1C is < 7)   No results found for: LABMICR  LDL Cholesterol (mg/dL)   Date Value   03/08/2022 56   03/08/2022 56       (goal LDL is <100)   AST (U/L)   Date Value   03/16/2021 15     ALT (U/L)   Date Value   03/16/2021 14     BUN (mg/dL)   Date Value   08/12/2022 15     BP Readings from Last 3 Encounters:   12/21/22 100/64   11/18/22 113/71   11/09/22 109/64          (goal 120/80)        Patient Active Problem List:     Atrial flutter (HCC)     Sleep-related breathing disorder     Hypokalemia     Atrial fibrillation (HCC)     Ischemic cardiomyopathy     Acute cystitis without hematuria     Hx of CABG     Chronic diastolic (congestive) heart failure (HCC)     Bilateral hand numbness     Right thigh pain     Left leg weakness     Coronary artery disease     Chronic cough     Gastroesophageal reflux disease without esophagitis     Overflow incontinence of urine     Polyp of colon     Post-void dribbling     Post-nasal drip     SYDNEY (acute kidney injury) ([address]     Hyperkalemia     Hypotension     Overactive bladder     Hemorrhoids     HARPREET (obstructive sleep apnea)     Laryngopharyngeal reflux     Skin ulcer of left lower leg, limited to breakdown of skin ([address]     Bilateral edema of lower extremity     Lymphedema of both lower extremities     Venous insufficiency of both lower extremities     Acute pain of right knee     Finger laceration, subsequent encounter     Chronic venous stasis      ----Sergey Vidal

## 2023-01-24 ENCOUNTER — HOSPITAL ENCOUNTER (OUTPATIENT)
Dept: OTHER | Age: 68
Discharge: HOME | End: 2023-01-24
Payer: MEDICARE

## 2023-01-24 ENCOUNTER — HOSPITAL ENCOUNTER (EMERGENCY)
Age: 68
Discharge: HOME | End: 2023-01-24
Attending: EMERGENCY MEDICINE
Payer: MEDICARE

## 2023-01-24 ENCOUNTER — APPOINTMENT (OUTPATIENT)
Dept: GENERAL RADIOLOGY | Age: 68
End: 2023-01-24
Payer: MEDICARE

## 2023-01-24 VITALS
HEART RATE: 84 BPM | WEIGHT: 246.8 LBS | BODY MASS INDEX: 41.07 KG/M2 | DIASTOLIC BLOOD PRESSURE: 70 MMHG | RESPIRATION RATE: 20 BRPM | SYSTOLIC BLOOD PRESSURE: 120 MMHG | OXYGEN SATURATION: 96 %

## 2023-01-24 VITALS
HEART RATE: 64 BPM | DIASTOLIC BLOOD PRESSURE: 67 MMHG | OXYGEN SATURATION: 99 % | RESPIRATION RATE: 16 BRPM | TEMPERATURE: 98.1 F | SYSTOLIC BLOOD PRESSURE: 118 MMHG

## 2023-01-24 DIAGNOSIS — I50.32 CHRONIC DIASTOLIC (CONGESTIVE) HEART FAILURE (HCC): ICD-10-CM

## 2023-01-24 DIAGNOSIS — R26.89 BALANCE PROBLEM: ICD-10-CM

## 2023-01-24 DIAGNOSIS — M79.89 LEFT LEG SWELLING: Primary | ICD-10-CM

## 2023-01-24 DIAGNOSIS — M79.605 LEFT LEG PAIN: Primary | ICD-10-CM

## 2023-01-24 LAB
ABSOLUTE EOS #: 0.24 K/UL (ref 0–0.44)
ABSOLUTE IMMATURE GRANULOCYTE: 0.05 K/UL (ref 0–0.3)
ABSOLUTE LYMPH #: 1.1 K/UL (ref 1.1–3.7)
ABSOLUTE MONO #: 0.77 K/UL (ref 0.1–1.2)
ANION GAP SERPL CALCULATED.3IONS-SCNC: 9 MMOL/L (ref 9–17)
BASOPHILS # BLD: 1 % (ref 0–2)
BASOPHILS ABSOLUTE: 0.04 K/UL (ref 0–0.2)
BUN BLDV-MCNC: 13 MG/DL (ref 8–23)
CALCIUM SERPL-MCNC: 9.3 MG/DL (ref 8.6–10.4)
CHLORIDE BLD-SCNC: 103 MMOL/L (ref 98–107)
CO2: 27 MMOL/L (ref 20–31)
CREAT SERPL-MCNC: 0.59 MG/DL (ref 0.7–1.2)
EOSINOPHILS RELATIVE PERCENT: 3 % (ref 1–4)
GFR SERPL CREATININE-BSD FRML MDRD: >60 ML/MIN/1.73M2
GLUCOSE BLD-MCNC: 96 MG/DL (ref 70–99)
HCT VFR BLD CALC: 45.7 % (ref 40.7–50.3)
HEMOGLOBIN: 14.8 G/DL (ref 13–17)
IMMATURE GRANULOCYTES: 1 %
LYMPHOCYTES # BLD: 14 % (ref 24–43)
MCH RBC QN AUTO: 30.8 PG (ref 25.2–33.5)
MCHC RBC AUTO-ENTMCNC: 32.4 G/DL (ref 28.4–34.8)
MCV RBC AUTO: 95.2 FL (ref 82.6–102.9)
MONOCYTES # BLD: 10 % (ref 3–12)
NRBC AUTOMATED: 0 PER 100 WBC
PDW BLD-RTO: 13.1 % (ref 11.8–14.4)
PLATELET # BLD: 269 K/UL (ref 138–453)
PMV BLD AUTO: 9.3 FL (ref 8.1–13.5)
POTASSIUM SERPL-SCNC: 4.2 MMOL/L (ref 3.7–5.3)
PRO-BNP: 111 PG/ML
RBC # BLD: 4.8 M/UL (ref 4.21–5.77)
SEG NEUTROPHILS: 71 % (ref 36–65)
SEGMENTED NEUTROPHILS ABSOLUTE COUNT: 5.68 K/UL (ref 1.5–8.1)
SODIUM BLD-SCNC: 139 MMOL/L (ref 135–144)
TROPONIN, HIGH SENSITIVITY: 9 NG/L (ref 0–22)
TROPONIN, HIGH SENSITIVITY: 9 NG/L (ref 0–22)
WBC # BLD: 7.9 K/UL (ref 3.5–11.3)

## 2023-01-24 PROCEDURE — 99212 OFFICE O/P EST SF 10 MIN: CPT

## 2023-01-24 PROCEDURE — 71046 X-RAY EXAM CHEST 2 VIEWS: CPT

## 2023-01-24 PROCEDURE — 84484 ASSAY OF TROPONIN QUANT: CPT

## 2023-01-24 PROCEDURE — 85025 COMPLETE CBC W/AUTO DIFF WBC: CPT

## 2023-01-24 PROCEDURE — 93005 ELECTROCARDIOGRAM TRACING: CPT | Performed by: STUDENT IN AN ORGANIZED HEALTH CARE EDUCATION/TRAINING PROGRAM

## 2023-01-24 PROCEDURE — 99215 OFFICE O/P EST HI 40 MIN: CPT | Performed by: NURSE PRACTITIONER

## 2023-01-24 PROCEDURE — 80048 BASIC METABOLIC PNL TOTAL CA: CPT

## 2023-01-24 PROCEDURE — 93970 EXTREMITY STUDY: CPT

## 2023-01-24 PROCEDURE — 6370000000 HC RX 637 (ALT 250 FOR IP): Performed by: STUDENT IN AN ORGANIZED HEALTH CARE EDUCATION/TRAINING PROGRAM

## 2023-01-24 PROCEDURE — 99285 EMERGENCY DEPT VISIT HI MDM: CPT

## 2023-01-24 PROCEDURE — 83880 ASSAY OF NATRIURETIC PEPTIDE: CPT

## 2023-01-24 RX ORDER — DOXYCYCLINE HYCLATE 100 MG
100 TABLET ORAL 2 TIMES DAILY
Qty: 14 TABLET | Refills: 0 | Status: SHIPPED | OUTPATIENT
Start: 2023-01-24 | End: 2023-01-31

## 2023-01-24 RX ORDER — DOXYCYCLINE HYCLATE 100 MG
100 TABLET ORAL ONCE
Status: COMPLETED | OUTPATIENT
Start: 2023-01-24 | End: 2023-01-24

## 2023-01-24 RX ORDER — GABAPENTIN 400 MG/1
400 CAPSULE ORAL DAILY
Qty: 14 CAPSULE | Refills: 0 | Status: SHIPPED | OUTPATIENT
Start: 2023-01-24 | End: 2023-02-07

## 2023-01-24 RX ADMIN — DOXYCYCLINE HYCLATE 100 MG: 100 TABLET, COATED ORAL at 14:57

## 2023-01-24 ASSESSMENT — ENCOUNTER SYMPTOMS
ABDOMINAL PAIN: 0
VOMITING: 0
COUGH: 0
COUGH: 0
SHORTNESS OF BREATH: 0
BACK PAIN: 0
SORE THROAT: 0
SHORTNESS OF BREATH: 0
BLOOD IN STOOL: 0
EYE DISCHARGE: 0
ABDOMINAL PAIN: 0

## 2023-01-24 ASSESSMENT — PAIN SCALES - GENERAL
PAINLEVEL_OUTOF10: 5
PAINLEVEL_OUTOF10: 7

## 2023-01-24 ASSESSMENT — PAIN DESCRIPTION - ORIENTATION: ORIENTATION: LEFT

## 2023-01-24 ASSESSMENT — PAIN DESCRIPTION - ONSET: ONSET: ON-GOING

## 2023-01-24 ASSESSMENT — PAIN - FUNCTIONAL ASSESSMENT: PAIN_FUNCTIONAL_ASSESSMENT: 0-10

## 2023-01-24 ASSESSMENT — PAIN DESCRIPTION - DESCRIPTORS: DESCRIPTORS: ACHING

## 2023-01-24 ASSESSMENT — PAIN DESCRIPTION - PAIN TYPE: TYPE: CHRONIC PAIN

## 2023-01-24 ASSESSMENT — PAIN DESCRIPTION - LOCATION: LOCATION: BACK;LEG

## 2023-01-24 NOTE — ED NOTES
The following labs were labeled with appropriate pt sticker and tubed to lab:     [x] Blue     [x] Lavender   [] on ice  [x] Green/yellow  [x] Green/black [] on ice  [] Nithya Greener  [] on ice  [] Yellow  [] Red  [] Type/ Screen  [] ABG  [] VBG    [] COVID-19 swab    [] Rapid  [] PCR  [] Flu swab  [] Peds Viral Panel     [] Urine Sample  [] Fecal Sample  [] Pelvic Cultures  [] Blood Cultures  [] X 2  [] STREP Cultures         Gemma Olvera RN  01/24/23 0503

## 2023-01-24 NOTE — DISCHARGE INSTRUCTIONS
Take your medication as indicated.  For pain use ibuprofen (Motrin / Advil) or acetaminophen (Tylenol), unless prescribed medications that have acetaminophen in it.  You can take over the counter acetaminophen tablets (1 - 2 tablets of the 500-mg strength every 6 hours) or ibuprofen tablets (2 tablets every 4 hours).    If you have not had a stress test in over a year your primary care physician may order this test as further work-up for your chest pain.  If you have a cardiologist, then you should also call them to discuss further treatment options.    PLEASE RETURN TO THE EMERGENCY DEPARTMENT IMMEDIATELY for worsening symptoms of increasing pain, shortness of breath, feeling of your heart fluttering or racing, swelling to your feet, unable to lay flat, or if you develop any concerning symptoms such as: high fever not relieved by acetaminophen (Tylenol) and/or ibuprofen (Motrin / Advil), chills, persistent nausea and/or vomiting, loss of consciousness, numbness, weakness or tingling in the arms or legs or change in color of the extremities, changes in mental status, persistent headache, blurry vision, loss of bladder / bowel control, unable to follow up with your physician, or other any other care or concern.

## 2023-01-24 NOTE — ED NOTES
The following labs were labeled with appropriate pt sticker and tubed to lab:     [] Blue     [] Lavender   [] on ice  [x] Green/yellow  [] Green/black [] on ice  [] Tretha Shark  [] on ice  [] Yellow  [] Red  [] Type/ Screen  [] ABG  [] VBG    [] COVID-19 swab    [] Rapid  [] PCR  [] Flu swab  [] Peds Viral Panel     [] Urine Sample  [] Fecal Sample  [] Pelvic Cultures  [] Blood Cultures  [] X 2  [] STREP Cultures         Billie Johnson RN  01/24/23 [id number]

## 2023-01-24 NOTE — ED PROVIDER NOTES
[facility] ED     Emergency Department     Faculty Attestation    I performed a history and physical examination of the patient and discussed management with the resident. I reviewed the residents note and agree with the documented findings and plan of care. Any areas of disagreement are noted on the chart. I was personally present for the key portions of any procedures. I have documented in the chart those procedures where I was not present during the key portions. I have reviewed the emergency nurses triage note. I agree with the chief complaint, past medical history, past surgical history, allergies, medications, social and family history as documented unless otherwise noted below. For Physician Assistant/ Nurse Practitioner cases/documentation I have personally evaluated this patient and have completed at least one if not all key elements of the E/M (history, physical exam, and MDM). Additional findings are as noted. Patient referred to ED from the CHF clinic for evaluation of left lower extremity swelling redness. Out of his Lasix for the last 2 to 3 weeks. Independent history from chart review from the CHF clinic report. Denies any chest pain shortness of breath states that is baseline for him. No history of DVT or PE. On exam speaking full sentences no respiratory distress not hypoxic on room air. Significant visible asymmetry left lower extremity edema versus the right with anterior redness violaceous changes. Will order DVT study full cardiac work-up anticipate admit. EKG interpretation: Fibrillation, rate controlled ventricular to 68.   Normal intervals normal axis no acute ST or T changes no acute findings    Critical Care     none    Jean Marie Kang MD, Domingo Ambrosio  Attending Emergency  Physician           Jean Marie Kang MD  01/24/23 9887

## 2023-01-24 NOTE — ED NOTES
Patient presents to the ED with c/o RODRI leg swelling, increased in the left leg. Patient was sent by CHF clinic for increase in weight of 3.2lbs/left leg swelling. Patient denies chest pain or SOB. Rates pain in his left thigh as a 5/10. Patient is alert and oriented x4, answering questions appropriately. Patient is changed into a gown, placed on cardiac monitor, EKG done, IV established, will continue to monitor. On assessment, patient's left leg appears to be red/swollen, 2+ pedal pulses bilaterally, full sensation, <3 seconds cap refill.            Young Arrieta RN  01/24/23 6773

## 2023-01-24 NOTE — PROGRESS NOTES
CHF Clinic at [facility]    Office: [phone] Fax: [phone] [facility]  [phone]  Dept: [phone]  Loc: [phone]    Deacon Ball is a 74 y.o. male who presents today for CHF evaluation. HPI:     Denies shortness of breath, takes long walks with out issue. Has reduced pain with walking. Walked here from [facility].   +   Fatigue, has HARPREET, has cpap, not able to use d/t mask issues. \"Messed up in the move\"  + \"stable\"   Edema , pt uses comp knit socks. Out of lasix for at least  2-3 w. \"This girl has them\"   L leg felling numb for several months. On gabapentin he says for hand pain  . Pulse is dopplerable in L and R foot   Denies chest pain   Denies  palpitations   Pt wt gain  4 lbs  today since last appt. Pt reports compliance with fluids <= 2L . The patient reports compliance with low Na+ intake. No ASA, x 1 mos. Upper L anterior   leg pain , x 3-4 days. Rates 8/10. \"Unbearable, occurs with rest, not with walking\"  Walking relieves pain down to 4/10. Did not notice lower L leg coolness until this am.   Out of gabapentin , d/t issue with girl, none for 3-4 w. Back pain occurring too, 6/10     No h/o clot. L left used to 'give out\" but not recently, uses cane for balnce  Fell x 2 in past mth. First fall d/t tripping over shoe string, 2nd fall d/t balance.        Past Medical History:   Diagnosis Date    Acute CHF (congestive heart failure) ([address] 03/08/2021    Arthritis     back    Atrial fibrillation ([address] 10/2019    Dr. Anthony Gillis    BPH (benign prostatic hyperplasia)     Cellulitis     Cervical disc disease     CHF (congestive heart failure) [facility])     cardiologist Dr. Clyde Fernandez. s CHF clinic    Combined systolic and diastolic congestive heart failure ([address] 01/15/2020    Coronary artery disease 10/2019     CABG 2019 [facility]    Dr. Juan Fang [facility] last seen within the last year.     COVID-19 virus RNA test result indeterminate 03/31/2021    CPAP (continuous positive airway pressure) dependence     GERD (gastroesophageal reflux disease)     on rx    History of incarceration     released 2019    Hyperlipidemia     Dr. Zenia Perez    Hypertension     [facility] CHF clinic     Dr. Zenia Perez    Hypokalemia     Myocardial infarct [facility])     Dr. Zenia Perez    Obesity     Overactive bladder     Sleep apnea     C-pap    Wears reading eyeglasses     Wellness examination     Dr. Geralynn Harada 4/2021     Past Surgical History:   Procedure Laterality Date    ABDOMINAL EXPLORATION SURGERY      CARDIAC SURGERY      2019 - AtriClip 1.5 or 3T safe Immediately    CARDIOVERSION  10/16/2019    CARPAL TUNNEL RELEASE Bilateral 2008    CERVICAL SPINE SURGERY      2-5    COLONOSCOPY N/A 10/29/2020    COLONOSCOPY WITH BIOPSY performed by Randall Salazar MD at [address] [place] [address],[address]  10/29/2020    COLONOSCOPY SUBMUCOSAL/BOTOX INJECTION performed by Randall Salazar MD at [address] [place] [address],[address]  10/29/2020    COLONOSCOPY POLYPECTOMY SNARE/COLD BIOPSY performed by Randall Salazar MD at [address] [place] [address],[address] N/A 01/12/2021    COLONOSCOPY POLYPECTOMY HOT SNARE, COLD SNARE POLPECTOMY performed by Melvin Alejo MD at [address] N/A 10/21/2019    CABG CORONARY ARTERY BYPASS GRAFT X1, LIMA-LAD, BROWN 4 MAZE PROCEDURE, 50MM ATRICURE ATRIAL CLIP RIGID INTERNAL FIXATION PLATES X 3   SCREWS X 13 performed by Martha Rodgers MD at [facility] 03/26/2021    URODYNAMICS performed by Shikha Conley MD at [address] [place] [address]  03/26/2021    CYSTOSCOPY FLEXIBLE performed by Shikha Conley MD at [address] [facility]  05/14/2021    CYSTOSCOPY, UROLIFT, FULGURATION    CYSTOSCOPY N/A 05/14/2021    CYSTOSCOPY, UROLIFT, FULGURATION performed by Shikha Conley MD at [address] [facility]  07/08/2022    CYSTOSCOPY, BOTOX INJECTION  (100 UNITS    EYE SURGERY      cataract surgery 2020    FRACTURE SURGERY      Left leg    TRANSESOPHAGEAL ECHOCARDIOGRAM  10/16/2019    URETHRAL SURGERY N/A 7/8/2022    CYSTOSCOPY, BOTOX INJECTION  (100 UNITS) performed by Emily Duran MD at [place] History   Problem Relation Age of Onset    Alcohol Abuse Maternal Uncle     Heart Attack Maternal Uncle     Cancer Mother     Alcohol Abuse Father        Social History     Tobacco Use    Smoking status: Never    Smokeless tobacco: Never   Substance Use Topics    Alcohol use: Not Currently     Comment: stopped 1991      Current Outpatient Medications   Medication Sig Dispense Refill    potassium chloride (KLOR-CON M) 20 MEQ extended release tablet TAKE 1 TABLET BY MOUTH IN THE MORNING. 30 tablet 1    furosemide (LASIX) 40 MG tablet TAKE 1.5 TABLETS BY MOUTH 2 TIMES DAILY 90 tablet 2    atorvastatin (LIPITOR) 40 MG tablet TAKE 1 TABLET BY MOUTH NIGHTLY 30 tablet 1    pantoprazole (PROTONIX) 40 MG tablet Take 1 tablet by mouth every evening 30 tablet 5    loratadine (ALLERGY RELIEF) 10 MG tablet TAKE 1 TABLET BY MOUTH DAILY 30 tablet 0    ibuprofen (IBU) 800 MG tablet Take 1 tablet by mouth every 8 hours as needed for Pain (Patient not taking: No sig reported) 21 tablet 0    lidocaine (LIDODERM) 5 % Place 1 patch onto the skin daily 12 hours on, 12 hours off. (Patient not taking: No sig reported) 30 patch 0    tamsulosin (FLOMAX) 0.4 MG capsule TAKE 1 CAPSULE BY MOUTH DAILY 30 capsule 5    gabapentin (NEURONTIN) 400 MG capsule Take 1 capsule by mouth in the morning for 60 days.  30 capsule 2    Elastic Bandages & Supports (JOBST OPAQUE KNEE 20-30MMHG XL) MISC Dispense two pair closed toe knee high 20 to 30 mmHg Jobst compression stockings 2 each 0    isosorbide mononitrate (IMDUR) 30 MG extended release tablet TAKE 1 TABLET BY MOUTH DAILY 30 tablet 1    oxybutynin (DITROPAN-XL) 10 mg extended release tablet TAKE 1 TABLET BY MOUTH DAILY 30 tablet 3 Multiple Vitamin (MULTIVITAMIN) TABS TAKE 1 TABLET BY MOUTH DAILY (Patient not taking: Reported on 1/24/2023) 30 tablet 3    HM ASPIRIN EC LOW DOSE 81 MG EC tablet  (Patient not taking: Reported on 1/24/2023)      Multiple Vitamins-Minerals (MULTIVITAMIN WITH MINERALS) tablet Take 1 tablet by mouth daily (Patient not taking: Reported on 12/21/2022) 30 tablet 3    Prenatal Vit-Fe Fumarate-FA (NIVA-PLUS) 27-1 MG TABS  (Patient not taking: Reported on 12/21/2022)      Multiple Vitamins-Minerals (PRESERVISION AREDS 2) CAPS  (Patient not taking: Reported on 12/21/2022)      Blood Pressure Monitor KIT 1 each by Does not apply route 2 times daily 1 kit 0    Misc. Devices ([employer] WEIGHT SCALE) MISC 1 each by Does not apply route as needed (weigh once a day and record) 1 each 0    nitroGLYCERIN (NITROSTAT) 0.4 MG SL tablet Place 1 tablet under the tongue every 5 minutes as needed for Chest pain up to max of 3 total doses. If no relief after 1 dose, call 911. 25 tablet 1    Elastic Bandages & Supports (JOBST KNEE HIGH COMPRESSION SM) MISC 2 each by Does not apply route daily Wear q day. Remove q hs. Diagnosis: Chronic venous insufficiency 2 each 0     No current facility-administered medications for this encounter. No Known Allergies      Subjective:      Review of Systems   Constitutional:  Positive for fatigue. Negative for activity change, chills and fever. Eyes:  Negative for discharge and visual disturbance. Respiratory:  Negative for cough and shortness of breath. Cardiovascular:  Positive for leg swelling (unchanged ). Negative for chest pain and palpitations. Gastrointestinal:  Negative for abdominal pain and blood in stool. Endocrine: Negative for cold intolerance and heat intolerance. Genitourinary:  Negative for dysuria and flank pain. Musculoskeletal:  Positive for arthralgias (neck and shoulders). Negative for joint swelling and myalgias. Pain L upper leg.  Uses cane for balance    Skin:  Negative for pallor and rash.   Neurological:  Positive for numbness (new L leg, and b/l Hands (this is chronic)) and headaches. Negative for dizziness.   Psychiatric/Behavioral:  Negative for hallucinations and suicidal ideas.      Objective:     CONCLUSIONS     Summary  Normal LV size and wall thickness.  No obvious wall motion abnormality seen.  Normal LV systolic function with LVEF >55%.  Normal RV size and function. RV systolic pressure 29 mmHg  LA and RA appears normal in size.  No obvious significant structural valvular abnormality noted.  No significant valvular stenosis or regurgitation noted.  Normal aortic root dimension.  No significant pericardial effusion noted.  No obvious intra-cardiac mass or shunt noted.  IVC normal diameter and inspiratory collapse indicating normal RA filling  pressure.     Signature  ----------------------------------------------------------------------------   Electronically signed by Kevin Fiore(Interpreting physician) on   03/04/2021 12:22 PM    CONCLUSIONS     Summary  Normal LV size and wall thickness.  No obvious wall motion abnormality seen.  Normal LV systolic function with LVEF >55%.  Normal RV size and function. RV systolic pressure 29 mmHg  LA and RA appears normal in size.  No obvious significant structural valvular abnormality noted.  No significant valvular stenosis or regurgitation noted.  Normal aortic root dimension.  No significant pericardial effusion noted.  No obvious intra-cardiac mass or shunt noted.  IVC normal diameter and inspiratory collapse indicating normal RA filling  pressure.     Signature  ----------------------------------------------------------------------------   Electronically signed by Kevni Fiore(Interpreting physician) on   03/04/2021 12:22 PM        Physical Exam  Vitals and nursing note reviewed.   Constitutional:       Appearance: He is obese.      Comments:      HENT:      Head: Normocephalic and atraumatic.   Eyes:      General:  No scleral icterus. Conjunctiva/sclera: Conjunctivae normal.   Cardiovascular:      Rate and Rhythm: Normal rate and regular rhythm. Heart sounds: Normal heart sounds. Pulmonary:      Effort: Pulmonary effort is normal.      Breath sounds: Normal breath sounds. No wheezing or rales. Abdominal:      Palpations: Abdomen is soft. Musculoskeletal:         General: Normal range of motion. Cervical back: Normal range of motion. Right lower leg: Edema (1+) present. Left lower leg: Edema (2+) present. Comments: R anterior thigh pain is not increased with palpation. Skin:     General: Skin is dry. Comments: L calf ant/post is cool to touch and pale in color compared to R LE. L foot is warm. L dorsalis pedis pulse obtained. R LE is warm to touch. Neurological:      Mental Status: He is alert and oriented to person, place, and time. /70   Pulse 84   Resp 20   Wt 246 lb 12.8 oz (111.9 kg)   SpO2 96%   BMI 41.07 kg/m²   O2 Device: None (Room air)       Lower Extremity Measurements in cm.    R Calf Circumference (cm): 46 cm  L Calf Circumference (cm): 52 cm  R Ankle Circumference (cm): 30 cm  L Ankle Circumference (cm): 32 cm    CBC:   Lab Results   Component Value Date/Time    WBC 9.4 02/02/2022 12:35 PM    RBC 4.60 02/02/2022 12:35 PM    HGB 13.9 02/02/2022 12:35 PM    HCT 42.8 02/02/2022 12:35 PM    MCV 93.0 02/02/2022 12:35 PM    MCH 30.2 02/02/2022 12:35 PM    MCHC 32.5 02/02/2022 12:35 PM    RDW 14.2 02/02/2022 12:35 PM     02/02/2022 12:35 PM    MPV 9.8 02/02/2022 12:35 PM     CMP:    Lab Results   Component Value Date/Time     08/12/2022 11:13 AM    K 3.8 08/12/2022 11:13 AM     08/12/2022 11:13 AM    CO2 24 08/12/2022 11:13 AM    BUN 15 08/12/2022 11:13 AM    CREATININE 0.73 08/12/2022 11:13 AM    GFRAA >60 08/12/2022 11:13 AM    LABGLOM >60 08/12/2022 11:13 AM    GLUCOSE 107 08/12/2022 11:13 AM    PROT 6.9 03/16/2021 05:32 AM    LABALBU 3.8 03/16/2021 05:32 AM    CALCIUM 9.2 08/12/2022 11:13 AM    BILITOT 0.29 03/16/2021 05:32 AM    ALKPHOS 65 03/16/2021 05:32 AM    AST 15 03/16/2021 05:32 AM    ALT 14 03/16/2021 05:32 AM     Lab Results   Component Value Date    LABA1C 5.6 12/10/2019           :Assessment      1. Left leg pain    2. Chronic diastolic (congestive) heart failure (HCC)    3. Balance problem        :Plan      1. Left leg pain    2. Chronic diastolic (congestive) heart failure (HCC)    3. Balance problem      Wt is up.  leg measurements are up in calves. H/o chronic syst/diastolic HF. EF improvement since revascularization/ CABG. Out of lasix for several weeks. Leg pain occurring for several, 3-4 days. Leg coolness noticed this am.     Concern with leg pain change and concern for lower L leg coolness. Advised to ER . Patient verbalizes understanding. No h/o clot. On Guideline-Directed Medication Therapy:     Diuretic     Pt not on BB, but currently echo reads no syst/ diast chf.   Pt compliant with TCC appts. Pt reminded of the importance of taking all meds as ordered. Pt reminded to follow a low sodium diet , 2000 mg/ day, and fluid limits of 2 liters daily. Will call pt to make f/u appt after ER visit. No orders of the defined types were placed in this encounter. No orders of the defined types were placed in this encounter. Patientgiven verbal and/or written educational instructions. Follow up as directed. I have reviewed and agree with the nursing documentation. Verbally reviewed medication list with patient; patient verbalized understanding. Discussed 2000mg/day sodium restricted diet; patient verbalized understanding. Moderate daily exercise encouraged as tolerated. Discussed rest breaks as needed; patient verbalized understanding.      Patient instructed to weigh self at the same time of each day, using same clothes and same scale; reinforced teaching to monitor for 3-5 lb weight increase over 1-2 days, and to notify the CHF clinic at [phone] or physician office if weight change noted. Patient verbalized understanding. Risks of smoking discussed with the patient if applicable; patient strongly discouraged to smoke. Patient verbalized understanding. Signs and symptoms of CHF discussed with patient, such as feeling more tired than normal, feeling short of breath, coughing that increases when you lie down, sudden weight gain, swelling of your feet, legs or belly. Patient verbalized understanding to notify the CHF clinic at [phone] or physician office if these symptoms occur. Compliance with plan of care and further disease process causes discussed with patient, patient encouraged to keep all follow up appointments. Patient verbalized understanding. Echocardiogram reviewed. Labs reviewed. Medications reviewed. Patient was seen with total face to face time of  > 40 minutes. More than 50% of this visit was counseling and education, & coordination of care. Advised to ER for emergent concern.           Electronically signedby LUDY Sims CNP on 1/24/2023 at 11:43 AM

## 2023-01-24 NOTE — ED PROVIDER NOTES
[facility]  ED  Emergency Department Encounter  Emergency Medicine Resident     Pt Name:Mickey Kent  MRN: [id number]  [place] 1955  Date of evaluation: 1/24/23  PCP:  Verneta Dubin, MD  Note Started: 12:16 PM EST      CHIEF COMPLAINT       Chief Complaint   Patient presents with    Leg Swelling     Bilateral legs, increased in left     HISTORY OF PRESENT ILLNESS  (Location/Symptom, Timing/Onset, Context/Setting, Quality, Duration, Modifying Factors, Severity.)      Jonas Herrera is a 74 y.o. male who presents with multiple day history of left lower extremity pain, redness and swelling. Patient was sent by the CHF clinic to the emergency department to rule out a DVT. Patient states that he has been going to CHF clinic ever since his open heart surgery in 2019. Denies chest pain, shortness of breath, lightheadedness at this time. No other symptoms. Patient denies a history of diabetes, however states that he is been on antibiotics for cellulitis of the left lower extremity in the past.  No history of DVT. No chemical AC. No other symptoms otherwise. Denies fevers, chills, nausea, vomiting, abdominal pain. PAST MEDICAL / SURGICAL / SOCIAL / FAMILY HISTORY      has a past medical history of Acute CHF (congestive heart failure) ([address] Arthritis, Atrial fibrillation ([address] BPH (benign prostatic hyperplasia), Cellulitis, Cervical disc disease, CHF (congestive heart failure) ([address] Combined systolic and diastolic congestive heart failure ([address] Coronary artery disease, COVID-19 virus RNA test result indeterminate, CPAP (continuous positive airway pressure) dependence, GERD (gastroesophageal reflux disease), History of incarceration, Hyperlipidemia, Hypertension, Hypokalemia, Myocardial infarct ([address] Obesity, Overactive bladder, Sleep apnea, Wears reading eyeglasses, and Wellness examination.      has a past surgical history that includes transesophageal echocardiogram (10/16/2019); Cardioversion (10/16/2019); Coronary Artery Bypass Graft Maze Procedure (N/A, 10/21/2019); Carpal tunnel release (Bilateral, 2008); fracture surgery; Cervical spine surgery; Abdominal exploration surgery; Colonoscopy (N/A, 10/29/2020); Colonoscopy (10/29/2020); Colonoscopy (10/29/2020); Colonoscopy (N/A, 01/12/2021); eye surgery; Cystography (N/A, 03/26/2021); Cystoscopy (03/26/2021); Cardiac surgery; Cystocopy (05/14/2021); Cystoscopy (N/A, 05/14/2021); Cystoscopy (07/08/2022); and Urethral Surgery (N/A, 7/8/2022). Social History     Socioeconomic History    Marital status:      Spouse name: Not on file    Number of children: Not on file    Years of education: Not on file    Highest education level: Not on file   Occupational History    Not on file   Tobacco Use    Smoking status: Never    Smokeless tobacco: Never   Vaping Use    Vaping Use: Never used   Substance and Sexual Activity    Alcohol use: Not Currently     Comment: stopped 1991    Drug use: Not Currently    Sexual activity: Not Currently   Other Topics Concern    Not on file   Social History Narrative    Not on file     Social Determinants of Health     Financial Resource Strain: High Risk    Difficulty of Paying Living Expenses: Very hard   Food Insecurity: No Food Insecurity    Worried About Running Out of Food in the Last Year: Never true    Ran Out of Food in the Last Year: Never true   Transportation Needs: Not on file   Physical Activity: Sufficiently Active    Days of Exercise per Week: 7 days    Minutes of Exercise per Session: 40 min   Stress: Not on file   Social Connections: Not on file   Intimate Partner Violence: Not on file   Housing Stability: Not on file       Family History   Problem Relation Age of Onset    Alcohol Abuse Maternal Uncle     Heart Attack Maternal Uncle     Cancer Mother     Alcohol Abuse Father        Allergies:  Patient has no known allergies.     Home Medications:  Prior to Admission medications Medication Sig Start Date End Date Taking? Authorizing Provider   doxycycline hyclate (VIBRA-TABS) 100 MG tablet Take 1 tablet by mouth 2 times daily for 7 days 1/24/23 1/31/23 Yes Diana Peterson MD   gabapentin (NEURONTIN) 400 MG capsule Take 1 capsule by mouth daily for 14 days. 1/24/23 2/7/23 Yes Diana Peterson MD   potassium chloride (KLOR-CON M) 20 MEQ extended release tablet TAKE 1 TABLET BY MOUTH IN THE MORNING. 1/23/23   Quratulayuniel Barrera DO   furosemide (LASIX) 40 MG tablet TAKE 1.5 TABLETS BY MOUTH 2 TIMES DAILY 1/23/23   Quratlynette Barrera DO   atorvastatin (LIPITOR) 40 MG tablet TAKE 1 TABLET BY MOUTH NIGHTLY 1/3/23   Elaine Hagan MD   pantoprazole (PROTONIX) 40 MG tablet Take 1 tablet by mouth every evening 11/18/22   Chandni Reardon MD   loratadine (ALLERGY RELIEF) 10 MG tablet TAKE 1 TABLET BY MOUTH DAILY 11/9/22   Racheal Gilbert MD   ibuprofen (IBU) 800 MG tablet Take 1 tablet by mouth every 8 hours as needed for Pain  Patient not taking: No sig reported 10/3/22   Cherelle Estevez DO   lidocaine (LIDODERM) 5 % Place 1 patch onto the skin daily 12 hours on, 12 hours off.   Patient not taking: No sig reported 10/3/22   Cherelle Estevez DO   tamsulosin (FLOMAX) 0.4 MG capsule TAKE 1 CAPSULE BY MOUTH DAILY 8/15/22   Martha Cohen MD   Elastic Bandages & Supports (JOBST OPAQUE KNEE 20-30MMHG XL) MISC Dispense two pair closed toe knee high 20 to 30 mmHg Jobst compression stockings 7/11/22   Stephanie Real MD   isosorbide mononitrate (IMDUR) 30 MG extended release tablet TAKE 1 TABLET BY MOUTH DAILY 7/5/22   Cristofer Ward MD   oxybutynin (DITROPAN-XL) 10 mg extended release tablet TAKE 1 TABLET BY MOUTH DAILY 6/6/22   Martha Cohen MD   Multiple Vitamin (MULTIVITAMIN) TABS TAKE 1 TABLET BY MOUTH DAILY  Patient not taking: Reported on 1/24/2023 5/16/22   Britt Cole MD   HM ASPIRIN EC LOW DOSE 81 MG EC tablet  1/31/22   Historical Provider, MD   Multiple Vitamins-Minerals (MULTIVITAMIN WITH MINERALS) tablet Take 1 tablet by mouth daily  Patient not taking: Reported on 12/21/2022 1/3/22   Melvina Hernandez MD   Prenatal Vit-Fe Fumarate-FA (NIVA-PLUS) 27-1 MG TABS  4/2/21   Historical Provider, MD   Multiple Vitamins-Minerals (PRESERVISION AREDS 2) CAPS  3/26/21   Historical Provider, MD   Blood Pressure Monitor KIT 1 each by Does not apply route 2 times daily 3/16/21   Dominic Lemus MD   Misc. Devices ([employer] WEIGHT SCALE) MISC 1 each by Does not apply route as needed (weigh once a day and record) 3/16/21   Dominic Lemus MD   nitroGLYCERIN (NITROSTAT) 0.4 MG SL tablet Place 1 tablet under the tongue every 5 minutes as needed for Chest pain up to max of 3 total doses. If no relief after 1 dose, call 911. 11/23/20   Harmeet Pulliam MD   Elastic Bandages & Supports (JOBST KNEE HIGH COMPRESSION SM) MISC 2 each by Does not apply route daily Wear q day. Remove q hs. Diagnosis: Chronic venous insufficiency 8/12/20   Mary Driscoll, APRN - CNP     REVIEW OF SYSTEMS       Review of Systems   Constitutional:  Negative for chills and fever. HENT:  Negative for sore throat. Eyes:  Negative for visual disturbance. Respiratory:  Negative for cough and shortness of breath. Cardiovascular:  Negative for chest pain and palpitations. Gastrointestinal:  Negative for abdominal pain and vomiting. Endocrine: Negative for polyuria. Genitourinary:  Negative for dysuria and hematuria. Musculoskeletal:  Negative for back pain. Skin:  Negative for rash. Neurological:  Negative for light-headedness and headaches. Psychiatric/Behavioral:  Negative for confusion. PHYSICAL EXAM      INITIAL VITALS:   /67   Pulse 64   Temp 98.1 °F (36.7 °C) (Oral)   Resp 16   SpO2 99%     Physical Exam  Constitutional:       Appearance: Normal appearance. HENT:      Head: Normocephalic. Nose: Nose normal.      Mouth/Throat:      Mouth: Mucous membranes are moist.      Pharynx: Oropharynx is clear. Eyes:      Extraocular Movements: Extraocular movements intact. Pupils: Pupils are equal, round, and reactive to light. Cardiovascular:      Rate and Rhythm: Normal rate and regular rhythm. Pulses: Normal pulses. Heart sounds: Normal heart sounds. Pulmonary:      Effort: Pulmonary effort is normal.      Breath sounds: Normal breath sounds. Abdominal:      Palpations: Abdomen is soft. Tenderness: There is no abdominal tenderness. There is no right CVA tenderness or left CVA tenderness. Musculoskeletal:      Right lower leg: Edema present. Left lower leg: Edema present. Comments: Patient does have marked edema to the left lower extremity with circumferential erythema above the ankle. Slight tenderness to palpation, no point tenderness to palpation of the calf  Neurovascularly intact distally   Skin:     General: Skin is warm. Capillary Refill: Capillary refill takes less than 2 seconds. Neurological:      General: No focal deficit present. Mental Status: He is alert and oriented to person, place, and time. Mental status is at baseline. DDX/DIAGNOSTIC RESULTS / EMERGENCY DEPARTMENT COURSE / MDM     Medical Decision Making  DVT, CHF exacerbation, cellulitis, chronic venous stasis, ulcer    57-year-old gentleman presents to the emergency department with left lower extremity tenderness, swelling and erythema. Was sent over by CHF clinic to rule out a DVT. No history of DVT, no chemical AC. Denies chest pain or shortness of breath. Vital signs stable, physical exam does show left lower extremity edema more than the right, left lower extremity erythema and slight tenderness to palpation. Neurovascularly intact. Cardiac work-up stable. DVT study unremarkable. First dose of doxycycline given in the emergency department for likely cellulitis. Discussed with patient with regards to follow-up with primary care physician and for strict return precautions.   Patient verbalized agreement and understanding. Stable for discharge. Amount and/or Complexity of Data Reviewed  Labs: ordered. Radiology: ordered. ECG/medicine tests: ordered. Risk  Prescription drug management. EMERGENCY DEPARTMENT COURSE:  ED Course as of 01/24/23 1456   Tue Jan 24, 2023   1431 DVT study neg [EM]   2882 CXR neg [EM]      ED Course User Index  [EM] Vidya Porter MD       PROCEDURES:  None    CONSULTS:  None    FINAL IMPRESSION      1. Left leg swelling          DISPOSITION / PLAN     DISPOSITION Decision To Discharge 01/24/2023 02:49:42 PM      PATIENT REFERRED TO:  Mimi Jones MD  [address]  [address]  [phone]    Schedule an appointment as soon as possible for a visit   For follow up    [facility]  [address] [facility] [address]  [phone]  Go to   As needed    DISCHARGE MEDICATIONS:  New Prescriptions    DOXYCYCLINE HYCLATE (VIBRA-TABS) 100 MG TABLET    Take 1 tablet by mouth 2 times daily for 7 days    GABAPENTIN (NEURONTIN) 400 MG CAPSULE    Take 1 capsule by mouth daily for 14 days.        Vidya Porter MD  Emergency Medicine Resident    (Please note that portions of thisnote were completed with a voice recognition program.  Efforts were made to edit the dictations but occasionally words are mis-transcribed.)        Vidya Porter MD  Resident  01/24/23 0366

## 2023-01-25 LAB
EKG ATRIAL RATE: 74 BPM
EKG Q-T INTERVAL: 428 MS
EKG QRS DURATION: 80 MS
EKG QTC CALCULATION (BAZETT): 455 MS
EKG R AXIS: 36 DEGREES
EKG T AXIS: 69 DEGREES
EKG VENTRICULAR RATE: 68 BPM

## 2023-01-25 PROCEDURE — 93010 ELECTROCARDIOGRAM REPORT: CPT | Performed by: INTERNAL MEDICINE

## 2023-01-31 DIAGNOSIS — I50.42 HEART FAILURE, SYSTOLIC AND DIASTOLIC, CHRONIC (HCC): ICD-10-CM

## 2023-01-31 RX ORDER — ATORVASTATIN CALCIUM 40 MG/1
40 TABLET, FILM COATED ORAL NIGHTLY
Qty: 30 TABLET | Refills: 1 | Status: SHIPPED | OUTPATIENT
Start: 2023-01-31

## 2023-01-31 NOTE — TELEPHONE ENCOUNTER
Please address the medication refill and close the encounter. If I can be of assistance, please route to the applicable pool. Thank you.       Last visit: 11-9-22  Last Med refill: 1-3-23  Does patient have enough medication for 72 hours: No:     Next Visit Date:  Future Appointments   Date Time Provider Taj Flores   2/3/2023 11:30 AM [facility]INIC RM 1 [facility]F [facility]   2/10/2023 10:00 AM SCHEDULE, [facility]   2/20/2023  1:45 PM Chcio Mobley MD [facility] [address] [facility]   10/30/2023 10:30 AM SCHEDULE, [facility]  [facility] Maintenance   Topic Date Due    COVID-19 Vaccine (3 - Booster for Pfizer series) 06/25/2021    Lipids  03/08/2023    Pneumococcal 65+ years Vaccine (2 - PCV) 04/13/2023    Annual Wellness Visit (AWV)  10/28/2023    Depression Screen  11/09/2023    Colorectal Cancer Screen  01/12/2031    DTaP/Tdap/Td vaccine (3 - Td or Tdap) 05/04/2032    Flu vaccine  Completed    Shingles vaccine  Completed    Hepatitis C screen  Completed    HIV screen  Completed    Hepatitis A vaccine  Aged Out    Hib vaccine  Aged Out    Meningococcal (ACWY) vaccine  Aged Out       Hemoglobin A1C (%)   Date Value   12/10/2019 5.6   10/18/2019 5.8             ( goal A1C is < 7)   No results found for: LABMICR  LDL Cholesterol (mg/dL)   Date Value   03/08/2022 56   03/08/2022 56       (goal LDL is <100)   AST (U/L)   Date Value   03/16/2021 15     ALT (U/L)   Date Value   03/16/2021 14     BUN (mg/dL)   Date Value   01/24/2023 13     BP Readings from Last 3 Encounters:   01/24/23 118/67   01/24/23 120/70   12/21/22 100/64          (goal 120/80)    All Future Testing planned in [facility]  Lab Frequency Next Occurrence   Basic Metabolic Panel Once 07/47/5803               Patient Active Problem List:     Atrial flutter (HCC)     Sleep-related breathing disorder     Hypokalemia     Atrial fibrillation ([address]     Ischemic cardiomyopathy     Acute cystitis without hematuria     Hx of CABG     Chronic diastolic (congestive) heart failure ([facility])     Bilateral hand numbness     Right thigh pain     Left leg weakness     Coronary artery disease     Chronic cough     Gastroesophageal reflux disease without esophagitis     Overflow incontinence of urine     Polyp of colon     Post-void dribbling     Post-nasal drip     SYDNEY (acute kidney injury) ([facility])     Hyperkalemia     Hypotension     Overactive bladder     Hemorrhoids     HARPREET (obstructive sleep apnea)     Laryngopharyngeal reflux     Skin ulcer of left lower leg, limited to breakdown of skin ([facility])     Bilateral edema of lower extremity     Lymphedema of both lower extremities     Venous insufficiency of both lower extremities     Acute pain of right knee     Finger laceration, subsequent encounter     Chronic venous stasis     Balance problem     Left leg pain

## 2023-02-09 ENCOUNTER — HOSPITAL ENCOUNTER (INPATIENT)
Age: 68
LOS: 3 days | Discharge: HOME | DRG: 603 | End: 2023-02-13
Attending: EMERGENCY MEDICINE | Admitting: EMERGENCY MEDICINE
Payer: MEDICARE

## 2023-02-09 ENCOUNTER — APPOINTMENT (OUTPATIENT)
Dept: GENERAL RADIOLOGY | Age: 68
DRG: 603 | End: 2023-02-09
Payer: MEDICARE

## 2023-02-09 DIAGNOSIS — R05.3 CHRONIC COUGH: ICD-10-CM

## 2023-02-09 DIAGNOSIS — K21.9 GASTROESOPHAGEAL REFLUX DISEASE WITHOUT ESOPHAGITIS: ICD-10-CM

## 2023-02-09 DIAGNOSIS — E87.6 HYPOKALEMIA: ICD-10-CM

## 2023-02-09 DIAGNOSIS — R09.82 POST-NASAL DRIP: ICD-10-CM

## 2023-02-09 DIAGNOSIS — N39.490 OVERFLOW INCONTINENCE OF URINE: ICD-10-CM

## 2023-02-09 DIAGNOSIS — I50.42 HEART FAILURE, SYSTOLIC AND DIASTOLIC, CHRONIC (HCC): ICD-10-CM

## 2023-02-09 DIAGNOSIS — L03.116 CELLULITIS OF LEFT LOWER EXTREMITY: Primary | ICD-10-CM

## 2023-02-09 DIAGNOSIS — I25.10 CORONARY ARTERY DISEASE INVOLVING NATIVE CORONARY ARTERY OF NATIVE HEART WITHOUT ANGINA PECTORIS: ICD-10-CM

## 2023-02-09 PROBLEM — L03.90 CELLULITIS: Status: ACTIVE | Noted: 2023-02-09

## 2023-02-09 LAB
ABSOLUTE EOS #: 0.2 K/UL (ref 0–0.44)
ABSOLUTE IMMATURE GRANULOCYTE: 0.04 K/UL (ref 0–0.3)
ABSOLUTE LYMPH #: 0.93 K/UL (ref 1.1–3.7)
ABSOLUTE MONO #: 0.87 K/UL (ref 0.1–1.2)
ANION GAP SERPL CALCULATED.3IONS-SCNC: 11 MMOL/L (ref 9–17)
BASOPHILS # BLD: 1 % (ref 0–2)
BASOPHILS ABSOLUTE: 0.04 K/UL (ref 0–0.2)
BNP SERPL-MCNC: 231 PG/ML
BUN SERPL-MCNC: 16 MG/DL (ref 8–23)
CALCIUM SERPL-MCNC: 8.6 MG/DL (ref 8.6–10.4)
CHLORIDE SERPL-SCNC: 101 MMOL/L (ref 98–107)
CO2 SERPL-SCNC: 27 MMOL/L (ref 20–31)
CREAT SERPL-MCNC: 0.66 MG/DL (ref 0.7–1.2)
CRP SERPL HS-MCNC: 17.2 MG/L (ref 0–5)
EOSINOPHILS RELATIVE PERCENT: 3 % (ref 1–4)
ERYTHROCYTE [SEDIMENTATION RATE] IN BLOOD BY WESTERGREN METHOD: 41 MM/HR (ref 0–20)
GFR SERPL CREATININE-BSD FRML MDRD: >60 ML/MIN/1.73M2
GLUCOSE SERPL-MCNC: 84 MG/DL (ref 70–99)
HCT VFR BLD AUTO: 42.3 % (ref 40.7–50.3)
HGB BLD-MCNC: 13.5 G/DL (ref 13–17)
IMMATURE GRANULOCYTES: 1 %
LYMPHOCYTES # BLD: 12 % (ref 24–43)
MCH RBC QN AUTO: 30.3 PG (ref 25.2–33.5)
MCHC RBC AUTO-ENTMCNC: 31.9 G/DL (ref 28.4–34.8)
MCV RBC AUTO: 95.1 FL (ref 82.6–102.9)
MONOCYTES # BLD: 11 % (ref 3–12)
NRBC AUTOMATED: 0 PER 100 WBC
PDW BLD-RTO: 13.1 % (ref 11.8–14.4)
PLATELET # BLD AUTO: 261 K/UL (ref 138–453)
PMV BLD AUTO: 9.3 FL (ref 8.1–13.5)
POTASSIUM SERPL-SCNC: 3.7 MMOL/L (ref 3.7–5.3)
RBC # BLD: 4.45 M/UL (ref 4.21–5.77)
SARS-COV-2 RDRP RESP QL NAA+PROBE: NOT DETECTED
SEG NEUTROPHILS: 72 % (ref 36–65)
SEGMENTED NEUTROPHILS ABSOLUTE COUNT: 5.75 K/UL (ref 1.5–8.1)
SODIUM SERPL-SCNC: 139 MMOL/L (ref 135–144)
SPECIMEN DESCRIPTION: NORMAL
TROPONIN I SERPL DL<=0.01 NG/ML-MCNC: 12 NG/L (ref 0–22)
TROPONIN I SERPL DL<=0.01 NG/ML-MCNC: 12 NG/L (ref 0–22)
WBC # BLD AUTO: 7.8 K/UL (ref 3.5–11.3)

## 2023-02-09 PROCEDURE — G0378 HOSPITAL OBSERVATION PER HR: HCPCS

## 2023-02-09 PROCEDURE — 96365 THER/PROPH/DIAG IV INF INIT: CPT

## 2023-02-09 PROCEDURE — 96375 TX/PRO/DX INJ NEW DRUG ADDON: CPT

## 2023-02-09 PROCEDURE — 85652 RBC SED RATE AUTOMATED: CPT

## 2023-02-09 PROCEDURE — 87635 SARS-COV-2 COVID-19 AMP PRB: CPT

## 2023-02-09 PROCEDURE — 6360000002 HC RX W HCPCS

## 2023-02-09 PROCEDURE — 86140 C-REACTIVE PROTEIN: CPT

## 2023-02-09 PROCEDURE — 84484 ASSAY OF TROPONIN QUANT: CPT

## 2023-02-09 PROCEDURE — 6370000000 HC RX 637 (ALT 250 FOR IP): Performed by: STUDENT IN AN ORGANIZED HEALTH CARE EDUCATION/TRAINING PROGRAM

## 2023-02-09 PROCEDURE — 83880 ASSAY OF NATRIURETIC PEPTIDE: CPT

## 2023-02-09 PROCEDURE — 85025 COMPLETE CBC W/AUTO DIFF WBC: CPT

## 2023-02-09 PROCEDURE — 93005 ELECTROCARDIOGRAM TRACING: CPT | Performed by: STUDENT IN AN ORGANIZED HEALTH CARE EDUCATION/TRAINING PROGRAM

## 2023-02-09 PROCEDURE — 80048 BASIC METABOLIC PNL TOTAL CA: CPT

## 2023-02-09 PROCEDURE — 71045 X-RAY EXAM CHEST 1 VIEW: CPT

## 2023-02-09 PROCEDURE — 6360000002 HC RX W HCPCS: Performed by: STUDENT IN AN ORGANIZED HEALTH CARE EDUCATION/TRAINING PROGRAM

## 2023-02-09 PROCEDURE — 99285 EMERGENCY DEPT VISIT HI MDM: CPT

## 2023-02-09 PROCEDURE — 2580000003 HC RX 258: Performed by: STUDENT IN AN ORGANIZED HEALTH CARE EDUCATION/TRAINING PROGRAM

## 2023-02-09 RX ORDER — KETOROLAC TROMETHAMINE 30 MG/ML
30 INJECTION, SOLUTION INTRAMUSCULAR; INTRAVENOUS ONCE
Status: COMPLETED | OUTPATIENT
Start: 2023-02-09 | End: 2023-02-09

## 2023-02-09 RX ORDER — PANTOPRAZOLE SODIUM 40 MG/1
40 TABLET, DELAYED RELEASE ORAL EVERY EVENING
Status: DISCONTINUED | OUTPATIENT
Start: 2023-02-09 | End: 2023-02-13 | Stop reason: HOSPADM

## 2023-02-09 RX ORDER — OXYBUTYNIN CHLORIDE 10 MG/1
10 TABLET, EXTENDED RELEASE ORAL DAILY
Status: DISCONTINUED | OUTPATIENT
Start: 2023-02-10 | End: 2023-02-13 | Stop reason: HOSPADM

## 2023-02-09 RX ORDER — CETIRIZINE HYDROCHLORIDE 10 MG/1
10 TABLET ORAL DAILY
Status: DISCONTINUED | OUTPATIENT
Start: 2023-02-10 | End: 2023-02-13 | Stop reason: HOSPADM

## 2023-02-09 RX ORDER — TAMSULOSIN HYDROCHLORIDE 0.4 MG/1
0.4 CAPSULE ORAL DAILY
Status: DISCONTINUED | OUTPATIENT
Start: 2023-02-10 | End: 2023-02-13 | Stop reason: HOSPADM

## 2023-02-09 RX ORDER — POTASSIUM CHLORIDE 20 MEQ/1
20 TABLET, EXTENDED RELEASE ORAL DAILY
Status: DISCONTINUED | OUTPATIENT
Start: 2023-02-10 | End: 2023-02-13 | Stop reason: HOSPADM

## 2023-02-09 RX ORDER — ISOSORBIDE MONONITRATE 30 MG/1
30 TABLET, EXTENDED RELEASE ORAL DAILY
Status: DISCONTINUED | OUTPATIENT
Start: 2023-02-10 | End: 2023-02-13 | Stop reason: HOSPADM

## 2023-02-09 RX ORDER — ATORVASTATIN CALCIUM 40 MG/1
40 TABLET, FILM COATED ORAL NIGHTLY
Status: DISCONTINUED | OUTPATIENT
Start: 2023-02-09 | End: 2023-02-13 | Stop reason: HOSPADM

## 2023-02-09 RX ORDER — FUROSEMIDE 10 MG/ML
60 INJECTION INTRAMUSCULAR; INTRAVENOUS ONCE
Status: COMPLETED | OUTPATIENT
Start: 2023-02-09 | End: 2023-02-09

## 2023-02-09 RX ADMIN — CEFTRIAXONE SODIUM 1000 MG: 1 INJECTION, POWDER, FOR SOLUTION INTRAMUSCULAR; INTRAVENOUS at 21:37

## 2023-02-09 RX ADMIN — KETOROLAC TROMETHAMINE 30 MG: 30 INJECTION, SOLUTION INTRAMUSCULAR; INTRAVENOUS at 23:30

## 2023-02-09 RX ADMIN — DESMOPRESSIN ACETATE 40 MG: 0.2 TABLET ORAL at 23:30

## 2023-02-09 RX ADMIN — FUROSEMIDE 60 MG: 10 INJECTION, SOLUTION INTRAMUSCULAR; INTRAVENOUS at 23:29

## 2023-02-09 ASSESSMENT — ENCOUNTER SYMPTOMS
CONSTIPATION: 0
CHEST TIGHTNESS: 0
VOMITING: 0
NAUSEA: 0
COLOR CHANGE: 1
DIARRHEA: 0
ABDOMINAL PAIN: 0
COUGH: 0
SHORTNESS OF BREATH: 1

## 2023-02-09 ASSESSMENT — PAIN DESCRIPTION - LOCATION
LOCATION: LEG
LOCATION: LEG

## 2023-02-09 ASSESSMENT — PAIN DESCRIPTION - ORIENTATION
ORIENTATION: LEFT
ORIENTATION: RIGHT;LEFT

## 2023-02-09 ASSESSMENT — PAIN - FUNCTIONAL ASSESSMENT: PAIN_FUNCTIONAL_ASSESSMENT: 0-10

## 2023-02-09 ASSESSMENT — PAIN SCALES - GENERAL: PAINLEVEL_OUTOF10: 7

## 2023-02-09 ASSESSMENT — PAIN DESCRIPTION - DESCRIPTORS: DESCRIPTORS: THROBBING

## 2023-02-09 NOTE — ED PROVIDER NOTES
[address] [facility]     Emergency Department     Faculty Attestation        I performed a history and physical examination of the patient and discussed management with the resident. I reviewed the residents note and agree with the documented findings and plan of care. Any areas of disagreement are noted on the chart. I was personally present for the key portions of any procedures. I have documented in the chart those procedures where I was not present during the key portions. I have reviewed the emergency nurses triage note. I agree with the chief complaint, past medical history, past surgical history, allergies, medications, social and family history as documented unless otherwise noted below. For mid-level providers such as nurse practitioners as well as physicians assistants:    I have personally seen and evaluated the patient. I find the patient's history and physical exam are consistent with NP/PA documentation. I agree with the care provided, treatment rendered, disposition, & follow-up plan. Additional findings are as noted. Vital Signs: /79   Pulse 82   Temp 97.9 °F (36.6 °C) (Oral)   Resp 20   Ht 5' 5\" (1.651 m)   Wt 252 lb (114.3 kg)   SpO2 96%   BMI 41.93 kg/m²   PCP:  Kathleen Oneal MD    Pertinent Comments:     Patient presents with worsening left lower extremity cellulitis was seen you recently had negative Doppler routine labs, prior admission for IV antibiotics for failed treatment as outpatient.       Critical Care  None          Janette Mast MD    Attending Emergency Medicine Physician            Sumit Mccall MD  02/09/23 [id number]

## 2023-02-09 NOTE — ED PROVIDER NOTES
[facility] OBSERVATION  Emergency Department Encounter  Emergency Medicine Resident     Pt Name:Mickey Slaughter  MRN: [id number]  [place] 1955  Date of evaluation: 2/9/23  PCP:  Steven Nieto MD  Note Started: 5:22 PM EST      CHIEF COMPLAINT       No chief complaint on file. HISTORY OF PRESENT ILLNESS  (Location/Symptom, Timing/Onset, Context/Setting, Quality, Duration, Modifying Factors, Severity.)      Liliane Henry is a 74 y.o. male who presents with worsening left lower leg swelling and tenderness. Patient has a history of CHF and is on Lasix 60 mg twice daily. He chronically has lower extremity pitting edema however the left leg has been chronically worsening over the past several days. He was seen here at [facility] a couple days ago and was started on a course of antibiotics to treat for cellulitis. LE doppler showed no DVT. He was placed on doxycycline and completed the full course. He states the pain and redness seem to worsen. There is no joint involvement he is able to ambulate with a cane which is at his baseline. He lives at home alone. Still complaining of worsening shortness of breath that has been slowly progressing over the past several weeks. Right-sided chest pain that he noticed after he fell off of his chair yesterday due to the wheels giving out. Denies hitting his head no loss of consciousness no syncopal episode that precipitated this fall. He is on no anticoagulation. No history of DVTs or PEs.     PAST MEDICAL / SURGICAL / SOCIAL / FAMILY HISTORY      has a past medical history of Acute CHF (congestive heart failure) ([address] Arthritis, Atrial fibrillation ([address] BPH (benign prostatic hyperplasia), Cellulitis, Cervical disc disease, CHF (congestive heart failure) ([address] Combined systolic and diastolic congestive heart failure ([address] Coronary artery disease, COVID-19 virus RNA test result indeterminate, CPAP (continuous positive airway pressure) dependence, GERD (gastroesophageal reflux disease), History of incarceration, Hyperlipidemia, Hypertension, Hypokalemia, Myocardial infarct ([facility] [address] [address] Obesity, Overactive bladder, Sleep apnea, Wears reading eyeglasses, and Wellness examination. has a past surgical history that includes transesophageal echocardiogram (10/16/2019); Cardioversion (10/16/2019); Coronary Artery Bypass Graft Maze Procedure (N/A, 10/21/2019); Carpal tunnel release (Bilateral, 2008); fracture surgery; Cervical spine surgery; Abdominal exploration surgery; Colonoscopy (N/A, 10/29/2020); Colonoscopy (10/29/2020); Colonoscopy (10/29/2020); Colonoscopy (N/A, 01/12/2021); eye surgery; Cystography (N/A, 03/26/2021); Cystoscopy (03/26/2021); Cardiac surgery; Cystocopy (05/14/2021); Cystoscopy (N/A, 05/14/2021); Cystoscopy (07/08/2022); and Urethral Surgery (N/A, 7/8/2022).       Social History     Socioeconomic History    Marital status:      Spouse name: Not on file    Number of children: Not on file    Years of education: Not on file    Highest education level: Not on file   Occupational History    Not on file   Tobacco Use    Smoking status: Never    Smokeless tobacco: Never   Vaping Use    Vaping Use: Never used   Substance and Sexual Activity    Alcohol use: Not Currently     Comment: stopped 1991    Drug use: Not Currently    Sexual activity: Not Currently   Other Topics Concern    Not on file   Social History Narrative    Not on file     Social Determinants of Health     Financial Resource Strain: High Risk    Difficulty of Paying Living Expenses: Very hard   Food Insecurity: No Food Insecurity    Worried About Running Out of Food in the Last Year: Never true    Ran Out of Food in the Last Year: Never true   Transportation Needs: Not on file   Physical Activity: Sufficiently Active    Days of Exercise per Week: 7 days    Minutes of Exercise per Session: 40 min   Stress: Not on file   Social Connections: Not on file   Intimate Partner Violence: Not on file   Housing Stability: Not on file       Family History   Problem Relation Age of Onset    Alcohol Abuse Maternal Uncle     Heart Attack Maternal Uncle     Cancer Mother     Alcohol Abuse Father        Allergies:  Patient has no known allergies. Home Medications:  Prior to Admission medications    Medication Sig Start Date End Date Taking? Authorizing Provider   gabapentin (NEURONTIN) 400 MG capsule Take 1 capsule by mouth daily for 14 days. 1/24/23 2/7/23  Mya Mackenzie MD   potassium chloride (KLOR-CON M) 20 MEQ extended release tablet TAKE 1 TABLET BY MOUTH IN THE MORNING. 1/23/23   Quratulain Bruce,    furosemide (LASIX) 40 MG tablet TAKE 1.5 TABLETS BY MOUTH 2 TIMES DAILY 1/23/23   Quratulain Bruce, DO   pantoprazole (PROTONIX) 40 MG tablet Take 1 tablet by mouth every evening 11/18/22   Sammy Farrell MD   loratadine (ALLERGY RELIEF) 10 MG tablet TAKE 1 TABLET BY MOUTH DAILY 11/9/22   Mena Mcgarry MD   tamsulosin (FLOMAX) 0.4 MG capsule TAKE 1 CAPSULE BY MOUTH DAILY 8/15/22   Mckenna Gilliland MD   Elastic Bandages & Supports (JOBST OPAQUE KNEE 20-30MMHG XL) MISC Dispense two pair closed toe knee high 20 to 30 mmHg Jobst compression stockings 7/11/22   Mango Guzman MD   isosorbide mononitrate (IMDUR) 30 MG extended release tablet TAKE 1 TABLET BY MOUTH DAILY 7/5/22   Sarah Martinez MD   oxybutynin (DITROPAN-XL) 10 mg extended release tablet TAKE 1 TABLET BY MOUTH DAILY 6/6/22   Mckenna Gilliland MD   Blood Pressure Monitor KIT 1 each by Does not apply route 2 times daily 3/16/21   Amrita Agee MD   [facility]. Devices ([employer] WEIGHT SCALE) [facility] 1 each by Does not apply route as needed (weigh once a day and record) 3/16/21   Amrita Agee MD   nitroGLYCERIN (NITROSTAT) 0.4 MG SL tablet Place 1 tablet under the tongue every 5 minutes as needed for Chest pain up to max of 3 total doses.  If no relief after 1 dose, call 911. 11/23/20   Mango Guzman MD   Elastic [address] HIGH COMPRESSION SM) MISC 2 each by Does not apply route daily Wear q day. Remove q hs. Diagnosis: Chronic venous insufficiency 8/12/20   LUDY Miller - CNP         REVIEW OF SYSTEMS       Review of Systems   Constitutional:  Negative for diaphoresis, fatigue and fever. Respiratory:  Positive for shortness of breath. Negative for cough and chest tightness. Cardiovascular:  Positive for chest pain. Negative for leg swelling. Gastrointestinal:  Negative for abdominal pain, constipation, diarrhea, nausea and vomiting. Genitourinary:  Negative for dysuria, flank pain, frequency and urgency. Musculoskeletal:  Negative for arthralgias, myalgias and neck stiffness. Skin:  Positive for color change. Negative for pallor and rash. Neurological:  Negative for dizziness, syncope, weakness, light-headedness, numbness and headaches. PHYSICAL EXAM      INITIAL VITALS:   /68   Pulse 77   Temp 97.7 °F (36.5 °C) (Oral)   Resp 19   Ht 5' 5\" (1.651 m)   Wt 252 lb (114.3 kg)   SpO2 97%   BMI 41.93 kg/m²     Physical Exam  Constitutional:       Appearance: He is obese. He is not ill-appearing. Pulmonary:      Effort: Pulmonary effort is normal.      Breath sounds: Normal breath sounds. Abdominal:      General: Abdomen is flat. Palpations: Abdomen is soft. Skin:     Comments: Left lower leg circumferential redness 2+ pitting edema to bilateral legs left calf is appears larger than right. Tenderness to posterior calf. Distal pulses intact sensation intact   Neurological:      Mental Status: He is alert. DDX/DIAGNOSTIC RESULTS / EMERGENCY DEPARTMENT COURSE / MDM     Medical Decision Making  72-year-old male with history of CHF and chronic lower extremity edema presenting with worsening left lower extremity swelling and redness and soreness.   There is a concern for DVT versus cellulitis versus vascular insufficiency, he was recently seen on 1/24 in the ED which a venous Doppler ultrasound was obtained which showed no DVT. He was sent home with a course of doxycycline that he completed. Pain and swelling has been slightly worsening since then. Redness is limited to the mid shin and calf circumferential.  There is no joint involvement of the ankle or the knee. He is afebrile and does not meet SIRS criteria at this time. Will evaluate for leukocytosis CRP ESR and consider admission for IV antibiotics. Also complaining of right-sided chest pain after mechanical fall when the wheels gave out in his chair earlier today. Also had to be more short of breath over the past several weeks. History of heart failure on Lasix. Given his significant comorbidities and heart history will obtain cardiac work-up. Also obtain BNP and compare to prior. Amount and/or Complexity of Data Reviewed  Labs: ordered. Radiology: ordered. ECG/medicine tests: ordered. Risk  OTC drugs. Prescription drug management. Decision regarding hospitalization. EMERGENCY DEPARTMENT COURSE:      ED Course as of 02/09/23 2319   Thu Feb 09, 2023   1906 Chest x-ray negative [QC]   2013 We will admit patient as he has failed outpatient therapy. We will give IV Rocephin and place in [facility]. Discussed with patient about placing obs is agreeable to admission [QC]   2116 Admitted to observation unit, medications reconciled. We will give a dose of 60 mg Lasix IV to help offload some fluid. Started antibiotic regimen for the next 3 days. [QC]      ED Course User Index  [QC] Nancy Morris MD       PROCEDURES:  NOne    CONSULTS:  None    CRITICAL CARE:  There was significant risk of life threatening deterioration of patient's condition requiring my direct management. Critical care time 0 minutes, excluding any documented procedures. FINAL IMPRESSION      1.  Cellulitis of left lower extremity          DISPOSITION / PLAN     DISPOSITION Admitted 02/09/2023 08:13:42 PM      PATIENT REFERRED TO:  No follow-up provider specified.     DISCHARGE MEDICATIONS:  Current Discharge Medication List          Marianna Cheney MD  Emergency Medicine Resident    (Please note that portions of thisnote were completed with a voice recognition program.  Efforts were made to edit the dictations but occasionally words are mis-transcribed.)       Marianna Cheney MD  Resident  02/09/23 1723

## 2023-02-10 LAB
EKG ATRIAL RATE: 81 BPM
EKG Q-T INTERVAL: 424 MS
EKG QRS DURATION: 82 MS
EKG QTC CALCULATION (BAZETT): 483 MS
EKG R AXIS: 25 DEGREES
EKG T AXIS: 61 DEGREES
EKG VENTRICULAR RATE: 78 BPM

## 2023-02-10 PROCEDURE — 6370000000 HC RX 637 (ALT 250 FOR IP): Performed by: STUDENT IN AN ORGANIZED HEALTH CARE EDUCATION/TRAINING PROGRAM

## 2023-02-10 PROCEDURE — 6360000002 HC RX W HCPCS: Performed by: STUDENT IN AN ORGANIZED HEALTH CARE EDUCATION/TRAINING PROGRAM

## 2023-02-10 PROCEDURE — 1200000000 HC SEMI PRIVATE

## 2023-02-10 PROCEDURE — 6360000002 HC RX W HCPCS: Performed by: EMERGENCY MEDICINE

## 2023-02-10 PROCEDURE — 93010 ELECTROCARDIOGRAM REPORT: CPT | Performed by: INTERNAL MEDICINE

## 2023-02-10 PROCEDURE — 96376 TX/PRO/DX INJ SAME DRUG ADON: CPT

## 2023-02-10 PROCEDURE — 2580000003 HC RX 258: Performed by: STUDENT IN AN ORGANIZED HEALTH CARE EDUCATION/TRAINING PROGRAM

## 2023-02-10 PROCEDURE — 6370000000 HC RX 637 (ALT 250 FOR IP)

## 2023-02-10 RX ORDER — KETOROLAC TROMETHAMINE 30 MG/ML
15 INJECTION, SOLUTION INTRAMUSCULAR; INTRAVENOUS ONCE
Status: COMPLETED | OUTPATIENT
Start: 2023-02-10 | End: 2023-02-10

## 2023-02-10 RX ORDER — ACETAMINOPHEN 500 MG
1000 TABLET ORAL ONCE
Status: COMPLETED | OUTPATIENT
Start: 2023-02-10 | End: 2023-02-10

## 2023-02-10 RX ADMIN — OXYBUTYNIN CHLORIDE 10 MG: 10 TABLET, EXTENDED RELEASE ORAL at 09:01

## 2023-02-10 RX ADMIN — TAMSULOSIN HYDROCHLORIDE 0.4 MG: 0.4 CAPSULE ORAL at 09:01

## 2023-02-10 RX ADMIN — FUROSEMIDE 60 MG: 40 TABLET ORAL at 09:02

## 2023-02-10 RX ADMIN — POTASSIUM CHLORIDE 20 MEQ: 1500 TABLET, EXTENDED RELEASE ORAL at 09:01

## 2023-02-10 RX ADMIN — PANTOPRAZOLE SODIUM 40 MG: 40 TABLET, DELAYED RELEASE ORAL at 21:24

## 2023-02-10 RX ADMIN — DESMOPRESSIN ACETATE 40 MG: 0.2 TABLET ORAL at 21:24

## 2023-02-10 RX ADMIN — ISOSORBIDE MONONITRATE 30 MG: 30 TABLET, EXTENDED RELEASE ORAL at 09:01

## 2023-02-10 RX ADMIN — ACETAMINOPHEN 1000 MG: 500 TABLET ORAL at 22:12

## 2023-02-10 RX ADMIN — KETOROLAC TROMETHAMINE 15 MG: 30 INJECTION, SOLUTION INTRAMUSCULAR; INTRAVENOUS at 05:13

## 2023-02-10 RX ADMIN — CEFTRIAXONE SODIUM 1000 MG: 10 INJECTION, POWDER, FOR SOLUTION INTRAVENOUS at 11:40

## 2023-02-10 RX ADMIN — CETIRIZINE HYDROCHLORIDE 10 MG: 10 TABLET ORAL at 09:02

## 2023-02-10 ASSESSMENT — PAIN DESCRIPTION - LOCATION
LOCATION: LEG

## 2023-02-10 ASSESSMENT — PAIN DESCRIPTION - FREQUENCY
FREQUENCY: INTERMITTENT
FREQUENCY: INTERMITTENT

## 2023-02-10 ASSESSMENT — PAIN DESCRIPTION - DESCRIPTORS
DESCRIPTORS: ACHING;THROBBING
DESCRIPTORS: THROBBING
DESCRIPTORS: ACHING

## 2023-02-10 ASSESSMENT — PAIN DESCRIPTION - ORIENTATION
ORIENTATION: LEFT

## 2023-02-10 ASSESSMENT — PAIN - FUNCTIONAL ASSESSMENT
PAIN_FUNCTIONAL_ASSESSMENT: PREVENTS OR INTERFERES SOME ACTIVE ACTIVITIES AND ADLS
PAIN_FUNCTIONAL_ASSESSMENT: PREVENTS OR INTERFERES SOME ACTIVE ACTIVITIES AND ADLS

## 2023-02-10 ASSESSMENT — PAIN SCALES - GENERAL
PAINLEVEL_OUTOF10: 6
PAINLEVEL_OUTOF10: 0
PAINLEVEL_OUTOF10: 0
PAINLEVEL_OUTOF10: 6
PAINLEVEL_OUTOF10: 7

## 2023-02-10 ASSESSMENT — PAIN SCALES - WONG BAKER
WONGBAKER_NUMERICALRESPONSE: 0
WONGBAKER_NUMERICALRESPONSE: 0
WONGBAKER_NUMERICALRESPONSE: 2

## 2023-02-10 ASSESSMENT — ENCOUNTER SYMPTOMS
DIARRHEA: 0
SORE THROAT: 0
VOMITING: 0
SHORTNESS OF BREATH: 1

## 2023-02-10 ASSESSMENT — PAIN DESCRIPTION - ONSET
ONSET: ON-GOING
ONSET: ON-GOING

## 2023-02-10 ASSESSMENT — PAIN DESCRIPTION - PAIN TYPE
TYPE: CHRONIC PAIN
TYPE: CHRONIC PAIN
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
## 2023-02-10 NOTE — PROGRESS NOTES
[address]  CDU / OBSERVATION ENCOUNTER  ATTENDING NOTE       I performed a history and physical examination of the patient and discussed management with the resident or midlevel provider. I reviewed the resident or midlevel provider's note and agree with the documented findings and plan of care. Any areas of disagreement are noted on the chart. I was personally present for the key portions of any procedures. I have documented in the chart those procedures where I was not present during the key portions. I have reviewed the nurses notes. I agree with the chief complaint, past medical history, past surgical history, allergies, medications, social and family history as documented unless otherwise noted below. The Family history, social history, and ROS are effectively unchanged since admission unless noted elsewhere in the chart. This patient was placed in the observation unit for reevaluation for possible admission to the hospital    Patient with venous stasis changes to lower extremity. Patient also with evidence of cellulitis with acute changes noted as well. Patient has erythema and warmth to the extremity beyond what simple venous stasis would do. Patient also with elevation of CRP and ESR. Patient on IV antibiotics. Patient for another 24 hours of IV antibiotics. Patient is requiring admission due to extension of cellulitis and duration of symptoms. Patient with wound healing risk factors including obesity, venous insufficiency, presumed arterial insufficiency. Patient has controlled pain and is otherwise comfortable.     Lisandra Klein MD  Attending Emergency  Physician

## 2023-02-10 NOTE — PROGRESS NOTES
congestive heart failure  Congestive Heart Failure Education completed and charted. CHF booklet given. Patient was receptive to education. Discussed the  importance of medication compliance. Discussed the importance of a heart healthy diet. Discussed 2000 mg sodium-restricted daily diet. Patient instructed to limit fluid intake to  1.5 to 2 liters per day. Patient instructed to weigh self at the same time of each day each morning, reinforced teaching to monitor for 3-5 lb weight increase over 1-2 days notify physician if change noted. Signs and symptoms of CHF discussed with patient, such as feeling more tired than normal, feeling short of breath, coughing that increases when lying down, sudden weight gain, swelling of the feet, legs or belly. Patient verbalized understanding to notify physician office if these symptoms occur.   EF 61%   Pt is established with  [facility] , next Appt  2/22/23

## 2023-02-10 NOTE — CARE COORDINATION
Case Management Assessment  Initial Evaluation    Date/Time of Evaluation: 2/10/2023 1:54 PM  Assessment Completed by: Raven Junior RN    If patient is discharged prior to next notation, then this note serves as note for discharge by case management. Patient Name: Zenaida Jesus                   YOB: 1955  Diagnosis: Cellulitis [L03.90]                   Date / Time: 2/9/2023  5:19 PM    Patient Admission Status: Observation   Readmission Risk (Low < 19, Mod (19-27), High > 27): No data recorded  Current PCP: Shira Junior MD  PCP verified by CM? (P) Yes    Chart Reviewed: Yes      History Provided by: (P) Patient  Patient Orientation: (P) Alert and Oriented    Patient Cognition: (P) Alert    Hospitalization in the last 30 days (Readmission):  No    If yes, Readmission Assessment in  Navigator will be completed. Advance Directives:      Code Status: Prior   Patient's Primary Decision Maker is:        Discharge Planning:    Patient lives with: (P) Friends Type of Home: Apartment  Primary Care Giver: (P) Self  Patient Support Systems include: (P) Friends/Neighbors   Current Financial resources:    Current community resources:    Current services prior to admission: (P) Durable Medical Equipment            Current DME: (P) Cane            Type of Home Care services:  None    ADLS  Prior functional level: (P) Independent in ADLs/IADLs  Current functional level: (P) Independent in ADLs/IADLs    PT AM-PAC:   /24  OT AM-PAC:   /24    Family can provide assistance at DC: (P) Yes  Would you like Case Management to discuss the discharge plan with any other family members/significant others, and if so, who?     Plans to Return to Present Housing: (P) Yes  Other Identified Issues/Barriers to RETURNING to current housing: Homeless - going to stay with a friend   Potential Assistance needed at discharge: (P) N/A            Potential DME:    Patient expects to discharge to: [address] [place] [address] for transportation at discharge:      Financial    Payor: Pilar Baptiste / Plan: Harry Peer / Product Type: *No Product type* /     Does insurance require precert for SNF: Yes    Potential assistance Purchasing Medications: Yes  Meds-to-Beds request: Yes      [address] [place], [place] [address] [facility] [phone]  [place] [address]  Phone: [phone] Fax: [phone]      Notes:    Factors facilitating achievement of predicted outcomes: Friend support and Cooperative    Barriers to discharge: Pain    Additional Case Management Notes: plans to stay with friend     The Plan for Transition of Care is related to the following treatment goals of Cellulitis [M48.58]    IF APPLICABLE: The Patient and/or patient representative Srinath Bradshaw and his family were provided with a choice of provider and agrees with the discharge plan. Freedom of choice list with basic dialogue that supports the patient's individualized plan of care/goals and shares the quality data associated with the providers was provided to:     Patient Representative Name:       The Patient and/or Patient Representative Agree with the Discharge Plan?       Eldon Calle RN  Case Management Department  Ph: [phone]

## 2023-02-10 NOTE — H&P
[address]  CDU / OBSERVATION ENCOUNTER  RESIDENT NOTE     Pt Name: Tu Abdul  MRN: [id number]  [place] 1955  Date of evaluation: 2/10/23  Patient's PCP is :  Mimi Jones MD    CHIEF COMPLAINT     No chief complaint on file. This history was taken in part by review of the emergency medical record. When possible, information was gathered by discussion with ER attendings, residents, and nurses. HISTORY OF PRESENT ILLNESS    Tu Abdul is a 74 y.o. male who presents with a past medical history of chronic heart failure as well as chronic lower extremity edema. He presented to the emergency department with swelling in his left lower extremity that included soreness and redness which was new. Concerns between his DVT and cellulitis or vascular insufficiency were worked up. Prior venous Doppler was negative. He was initially sent home with a course of doxycycline that was completed. The patient stated that his pain and swelling has been slightly worse since then and he returned to the emergency department for further work-up. He was worked up for CHF exacerbation which was negative. The patient was started on IV Rocephin and admitted to the observation service for IV antibiotics. Location/Symptom: Left leg  Timing/Onset: Chronic unknown per patient  Provocation: Began after he left incarceration  Quality: Pain in the posterior aspect of his calf  Radiation: None  Severity: 6-10 out of 10  Timing/Duration: Unknown, constant  Modifying Factors: Raising the legs improves his pain. REVIEW OF SYSTEMS       Review of Systems   Constitutional:  Negative for chills and fever. HENT:  Negative for sore throat. Respiratory:  Positive for shortness of breath (resolved at the moment but has been ongoing on and off for a month). Cardiovascular:  Negative for chest pain (resolved from ED). Gastrointestinal:  Negative for diarrhea and vomiting. Allergic/Immunologic: Negative for environmental allergies and food allergies. Neurological:  Negative for dizziness, syncope and weakness. Psychiatric/Behavioral:  Negative for self-injury and suicidal ideas. (PQRS) Advance directives on face sheet per hospital policy. No change unless specifically mentioned in chart    [address]    has a past medical history of Acute CHF (congestive heart failure) ([address] Arthritis, Atrial fibrillation ([address] BPH (benign prostatic hyperplasia), Cellulitis, Cervical disc disease, CHF (congestive heart failure) ([address] Combined systolic and diastolic congestive heart failure ([address] Coronary artery disease, COVID-19 virus RNA test result indeterminate, CPAP (continuous positive airway pressure) dependence, GERD (gastroesophageal reflux disease), History of incarceration, Hyperlipidemia, Hypertension, Hypokalemia, Myocardial infarct ([address] Obesity, Overactive bladder, Sleep apnea, Wears reading eyeglasses, and Wellness examination. I have reviewed the past medical history with the patient and it is pertinent to this complaint. SURGICAL HISTORY      has a past surgical history that includes transesophageal echocardiogram (10/16/2019); Cardioversion (10/16/2019); Coronary Artery Bypass Graft Maze Procedure (N/A, 10/21/2019); Carpal tunnel release (Bilateral, 2008); fracture surgery; Cervical spine surgery; Abdominal exploration surgery; Colonoscopy (N/A, 10/29/2020); Colonoscopy (10/29/2020); Colonoscopy (10/29/2020); Colonoscopy (N/A, 01/12/2021); eye surgery; Cystography (N/A, 03/26/2021); Cystoscopy (03/26/2021); Cardiac surgery; Cystocopy (05/14/2021); Cystoscopy (N/A, 05/14/2021); Cystoscopy (07/08/2022); and Urethral Surgery (N/A, 7/8/2022).   I have reviewed and agree with Surgical History entered and it is    CURRENT MEDICATIONS     atorvastatin (LIPITOR) tablet 40 mg, Nightly  furosemide (LASIX) tablet 60 mg, BID  isosorbide mononitrate (IMDUR) extended release tablet 30 mg, Daily  cetirizine (ZYRTEC) tablet 10 mg, Daily  oxybutynin (DITROPAN-XL) extended release tablet 10 mg, Daily  pantoprazole (PROTONIX) tablet 40 mg, QPM  potassium chloride (KLOR-CON M) extended release tablet 20 mEq, Daily  tamsulosin (FLOMAX) capsule 0.4 mg, Daily  cefTRIAXone (ROCEPHIN) 1,000 mg in sterile water 10 mL IV syringe, Q24H        All medication charted and reviewed. ALLERGIES     has No Known Allergies. FAMILY HISTORY     He indicated that his mother is . He indicated that his father is . He indicated that the status of his maternal uncle is unknown.     family history includes Alcohol Abuse in his father and maternal uncle; Cancer in his mother; Heart Attack in his maternal uncle. The patient denies any pertinent family history. I have reviewed and agree with the family history entered. I have reviewed the Family History and it is not significant to the case    SOCIAL HISTORY      reports that he has never smoked. He has never used smokeless tobacco. He reports that he does not currently use alcohol. He reports that he does not currently use drugs. I have reviewed and agree with all Social.  There are no concerns for substance abuse/use. PHYSICAL EXAM     INITIAL VITALS:  height is 5' 5\" (1.651 m) and weight is 252 lb (114.3 kg). His oral temperature is 97.7 °F (36.5 °C). His blood pressure is 110/68 and his pulse is 77. His respiration is 19 and oxygen saturation is 97%. Physical Exam  Constitutional:       Appearance: He is obese. He is not ill-appearing. Pulmonary:      Effort: Pulmonary effort is normal.      Breath sounds: Normal breath sounds. Abdominal:      General: Abdomen is flat. Palpations: Abdomen is soft. Skin:     Comments: Left lower leg circumferential redness 2+ pitting edema to bilateral legs left calf is appears larger than right. Tenderness to posterior calf.   Distal pulses intact sensation intact Neurological:      Mental Status: He is alert. DIFFERENTIAL DIAGNOSIS/MDM:     DDx: DVT, venous insufficiency, Cellulitis    DIAGNOSTIC RESULTS     EKG: All EKG's are interpreted by the Observation Physician who either signs or Co-signs this chart in the absence of a cardiologist.    EKG Interpretation    Interpreted by observation physician        Rhythm: normal sinus   Rate: normal  Axis: normal  Ectopy: none  Conduction: prolonger Qtc  ST Segments: no acute change  T Waves: no acute change  Q Waves: none    Clinical Impression: non-specific EKG with high noise and prolonged qtc    Adiel Segovia MD        RADIOLOGY:   I directly visualized the following  images and reviewed the radiologist interpretations:    XR CHEST PORTABLE    Result Date: 2/9/2023  EXAMINATION: ONE X-RAY VIEW OF THE CHEST 2/9/2023 5:55 pm COMPARISON: 01/24/2023 HISTORY: ORDERING SYSTEM PROVIDED HISTORY: SOB, right side chest pain TECHNOLOGIST PROVIDED HISTORY: SOB, right side chest pain Reason for Exam: port Upright FINDINGS: Mild cardiomegaly is unchanged. No significant vascular congestion. Median sternotomy wires and left atrial appendage clip are noted. No focal airspace consolidation, pneumothorax, or pleural effusion. No free air beneath the diaphragm. Stable radiographic appearance of the chest without evidence of acute process. LABS:  I have reviewed and interpreted all available lab results.   Labs Reviewed   CBC WITH AUTO DIFFERENTIAL - Abnormal; Notable for the following components:       Result Value    Seg Neutrophils 72 (*)     Lymphocytes 12 (*)     Immature Granulocytes 1 (*)     Absolute Lymph # 0.93 (*)     All other components within normal limits   BASIC METABOLIC PANEL W/ REFLEX TO MG FOR LOW K - Abnormal; Notable for the following components:    Creatinine 0.66 (*)     All other components within normal limits   C-REACTIVE PROTEIN - Abnormal; Notable for the following components:    CRP 17.2 (*) All other components within normal limits   SEDIMENTATION RATE - Abnormal; Notable for the following components:    Sed Rate 41 (*)     All other components within normal limits   COVID-19, RAPID   TROPONIN   TROPONIN   BRAIN NATRIURETIC PEPTIDE       SCREENING TOOLS:    HEART Risk Score for Chest Pain Patients  History and Physical Exam Suspicion Level  (Nausea, Vomiting, Diaphoresis, Radiation, Exertion)  Slightly Suspicious (0 pts)  Moderately Suspicious (1 pt)  Highly Suspicious (2 pts)  EKG Interpretation  Normal (0 pts)  Non-Specific Repolarization Disturbance (1 pt)  Significant ST-Depression (2 pts)  Age of Patient (in years)  = 39 (0 pts)  46-64 (1 pt)  = 65 (2 pts)  Risk Factors  No Risk Factors (0 pts)  1-2 Risk Factors (1 pt)  = 3 Risk Factors (2 pts)  Risk Factors Include:  Hypercholesterolemia  Hypertension  Diabetes Mellitus  Cigarette smoking  Positive family history  Obesity  CAD  (SLE, CKDz, HIV, Cocaine abuse)  Troponin Levels  = Normal Limit (0 pts)  1-3 Times Normal Limit (1 pt)  > 3 Times Normal Limit (2 pts)  TOTAL:    Percent Risk for Major Adverse Cardiac Event (MACE)  0-3 pts indicates low risk for MACE   2.5% (DISCHARGE)   4-7 pts indicates moderate risk for MACE  20.3% (OBS)  8-10 pts indicates high risk for MACE  72.7% (EARLY INVASIVE TX)    CDU IMPRESSION / PLAN      Caroline Nguyen is a 74 y.o. male who presents with assymetric leg swelling, redness, and tenderness, secondary to possible cellulitis    Swelling of left leg  Doppler study of legs on 1/24 negative  IV rocephin q24h  Lasix 60mg BID    Continue home medications and pain control  Monitor vitals, labs, and imaging  DISPO: pending consults and clinical improvement    CONSULTS:    None    PROCEDURES:  Not indicated       PATIENT REFERRED TO:    No follow-up provider specified. --  Yuliana Hilton MD   Emergency Medicine Resident     This dictation was generated by voice recognition computer software.   Although all attempts are made to edit the dictation for accuracy, there may be errors in the transcription that are not intended.

## 2023-02-10 NOTE — PLAN OF CARE
Problem: Pain  Goal: Verbalizes/displays adequate comfort level or baseline comfort level  2/10/2023 1156 by Kary Genao RN  Outcome: Progressing  2/10/2023 0225 by Mara Butcher RN  Outcome: Progressing     Problem: Safety - Adult  Goal: Free from fall injury  2/10/2023 1156 by Kary Genao RN  Outcome: Progressing  2/10/2023 0225 by Mara Butcher RN  Outcome: Progressing

## 2023-02-10 NOTE — ED NOTES
The following labs labeled with pt sticker and tubed to lab:     [] Blue     [] Lavender   [] on ice  [x] Green/yellow  [] Green/black [] on ice  [] Yellow  [] Red  [] Pink      [x] COVID-19 swab    [x] Rapid  [] PCR  [] Flu Swab  [] Strep Swab  [] Peds Viral Panel     [] Urine Sample  [] Pelvic Cultures  [] Blood Cultures   [] Wound Cultures          Anahy Karimi RN  02/09/23 [id number]

## 2023-02-11 PROCEDURE — 1200000000 HC SEMI PRIVATE

## 2023-02-11 PROCEDURE — 6370000000 HC RX 637 (ALT 250 FOR IP)

## 2023-02-11 PROCEDURE — 2580000003 HC RX 258: Performed by: STUDENT IN AN ORGANIZED HEALTH CARE EDUCATION/TRAINING PROGRAM

## 2023-02-11 PROCEDURE — 6370000000 HC RX 637 (ALT 250 FOR IP): Performed by: STUDENT IN AN ORGANIZED HEALTH CARE EDUCATION/TRAINING PROGRAM

## 2023-02-11 PROCEDURE — 93005 ELECTROCARDIOGRAM TRACING: CPT | Performed by: EMERGENCY MEDICINE

## 2023-02-11 PROCEDURE — 6360000002 HC RX W HCPCS: Performed by: STUDENT IN AN ORGANIZED HEALTH CARE EDUCATION/TRAINING PROGRAM

## 2023-02-11 RX ORDER — ACETAMINOPHEN 325 MG/1
650 TABLET ORAL ONCE
Status: COMPLETED | OUTPATIENT
Start: 2023-02-11 | End: 2023-02-11

## 2023-02-11 RX ORDER — MAGNESIUM HYDROXIDE/ALUMINUM HYDROXICE/SIMETHICONE 120; 1200; 1200 MG/30ML; MG/30ML; MG/30ML
30 SUSPENSION ORAL ONCE
Status: COMPLETED | OUTPATIENT
Start: 2023-02-11 | End: 2023-02-11

## 2023-02-11 RX ADMIN — PANTOPRAZOLE SODIUM 40 MG: 40 TABLET, DELAYED RELEASE ORAL at 17:13

## 2023-02-11 RX ADMIN — OXYBUTYNIN CHLORIDE 10 MG: 10 TABLET, EXTENDED RELEASE ORAL at 08:35

## 2023-02-11 RX ADMIN — FUROSEMIDE 60 MG: 40 TABLET ORAL at 20:30

## 2023-02-11 RX ADMIN — POTASSIUM CHLORIDE 20 MEQ: 1500 TABLET, EXTENDED RELEASE ORAL at 08:35

## 2023-02-11 RX ADMIN — ACETAMINOPHEN 650 MG: 325 TABLET ORAL at 07:34

## 2023-02-11 RX ADMIN — CETIRIZINE HYDROCHLORIDE 10 MG: 10 TABLET ORAL at 08:35

## 2023-02-11 RX ADMIN — DESMOPRESSIN ACETATE 40 MG: 0.2 TABLET ORAL at 20:31

## 2023-02-11 RX ADMIN — ALUMINUM HYDROXIDE, MAGNESIUM HYDROXIDE, AND SIMETHICONE 30 ML: 200; 200; 20 SUSPENSION ORAL at 07:34

## 2023-02-11 RX ADMIN — ISOSORBIDE MONONITRATE 30 MG: 30 TABLET, EXTENDED RELEASE ORAL at 08:35

## 2023-02-11 RX ADMIN — TAMSULOSIN HYDROCHLORIDE 0.4 MG: 0.4 CAPSULE ORAL at 08:35

## 2023-02-11 RX ADMIN — CEFTRIAXONE SODIUM 1000 MG: 10 INJECTION, POWDER, FOR SOLUTION INTRAVENOUS at 09:48

## 2023-02-11 ASSESSMENT — PAIN SCALES - WONG BAKER
WONGBAKER_NUMERICALRESPONSE: 0;2
WONGBAKER_NUMERICALRESPONSE: 0
WONGBAKER_NUMERICALRESPONSE: 0;2
WONGBAKER_NUMERICALRESPONSE: 0
WONGBAKER_NUMERICALRESPONSE: 0;2
WONGBAKER_NUMERICALRESPONSE: 0
WONGBAKER_NUMERICALRESPONSE: 0;2
WONGBAKER_NUMERICALRESPONSE: 0

## 2023-02-11 ASSESSMENT — PAIN DESCRIPTION - LOCATION
LOCATION: CHEST
LOCATION: CHEST
LOCATION: LEG

## 2023-02-11 ASSESSMENT — PAIN SCALES - GENERAL
PAINLEVEL_OUTOF10: 0
PAINLEVEL_OUTOF10: 3
PAINLEVEL_OUTOF10: 0
PAINLEVEL_OUTOF10: 7
PAINLEVEL_OUTOF10: 4
PAINLEVEL_OUTOF10: 0
PAINLEVEL_OUTOF10: 6
PAINLEVEL_OUTOF10: 0
PAINLEVEL_OUTOF10: 0

## 2023-02-11 ASSESSMENT — PAIN DESCRIPTION - ONSET
ONSET: SUDDEN
ONSET: ON-GOING

## 2023-02-11 ASSESSMENT — PAIN DESCRIPTION - FREQUENCY
FREQUENCY: INTERMITTENT
FREQUENCY: INTERMITTENT

## 2023-02-11 ASSESSMENT — PAIN DESCRIPTION - ORIENTATION
ORIENTATION: RIGHT
ORIENTATION: RIGHT
ORIENTATION: LEFT

## 2023-02-11 ASSESSMENT — PAIN DESCRIPTION - PAIN TYPE
TYPE: ACUTE PAIN
TYPE: ACUTE PAIN

## 2023-02-11 ASSESSMENT — PAIN DESCRIPTION - DIRECTION
RADIATING_TOWARDS: DENIES
RADIATING_TOWARDS: DENIES

## 2023-02-11 ASSESSMENT — PAIN DESCRIPTION - DESCRIPTORS
DESCRIPTORS: DULL
DESCRIPTORS: OTHER (COMMENT)

## 2023-02-11 NOTE — PROGRESS NOTES
OBS/CDU   RESIDENT NOTE      Patients PCP is:  Dustin Garcia MD        SUBJECTIVE      No acute events overnight. Feeling better than yesterday. Chest pain this morning has resolved. No SOB. No fever or chills. Has been able to tolerate a full diet without nausea or vomiting. The patient is urinating on his own and is passing flatus. Denies fever, chills, nausea, vomiting, shortness of breath, abdominal pain, focal weakness, numbness, tingling, urinary/bowel symptoms, vision changes, visual hallucinations, or headache. PHYSICAL EXAM      General: NAD, AO X 3  Heent: EMOI, PERRL  Neck: SUPPLE, NO JVD  Cardiovascular: RRR, S1S2  Pulmonary: CTAB, NO SOB  Abdomen: SOFT, NTTP, ND, +BS  Extremities: +2/4 PULSES DISTAL, BLE swelling with venous stasis changes BL and cellulitic changes on the left calf extending out from the stasis dermatitis, blanchable throughout, warm compared to surrounding  Neuro / Psych: NO NUMBNESS OR TINGLING, MENTATION AT BASELINE    PERTINENT TEST /EXAMS      I have reviewed all available laboratory results. MEDICATIONS CURRENT   atorvastatin (LIPITOR) tablet 40 mg, Nightly  furosemide (LASIX) tablet 60 mg, BID  isosorbide mononitrate (IMDUR) extended release tablet 30 mg, Daily  cetirizine (ZYRTEC) tablet 10 mg, Daily  oxybutynin (DITROPAN-XL) extended release tablet 10 mg, Daily  pantoprazole (PROTONIX) tablet 40 mg, QPM  potassium chloride (KLOR-CON M) extended release tablet 20 mEq, Daily  tamsulosin (FLOMAX) capsule 0.4 mg, Daily  cefTRIAXone (ROCEPHIN) 1,000 mg in sterile water 10 mL IV syringe, Q24H      All medication charted and reviewed.     CONSULTS      IP CONSULT TO SOCIAL WORK    ASSESSMENT/PLAN       Manju Gibson is a 74 y.o. male who presents with assymetric leg swelling, redness, and tenderness, secondary to possible cellulitis    LLE Cellulitis: improving  Afebrile, pain improving  Cont IV rocephin q24h  BLE swelling:  Lasix 60mg BID  Reduce salt intake  BLE venous stasis:  Compression socks  Elevate LE while in bed  Continue home medications and pain control  Monitor vitals, labs, and imaging  DISPO: pending clinical improvement, likely DC tomorrow    --  Jase Puri MD  Emergency Medicine Resident Physician     This dictation was generated by voice recognition computer software. Although all attempts are made to edit the dictation for accuracy, there may be errors in the transcription that are not intended.

## 2023-02-11 NOTE — PLAN OF CARE
Problem: Chronic Conditions and Co-morbidities  Goal: Patient's chronic conditions and co-morbidity symptoms are monitored and maintained or improved  2/11/2023 1649 by Charlie Agee RN  Outcome: Progressing  2/11/2023 1649 by Charlie Agee RN  Outcome: Progressing     Problem: Pain  Goal: Verbalizes/displays adequate comfort level or baseline comfort level  2/11/2023 1649 by Charlie Agee RN  Outcome: Progressing  2/11/2023 1649 by Charlie Agee RN  Outcome: Progressing     Problem: Safety - Adult  Goal: Free from fall injury  2/11/2023 1649 by Charlie Agee RN  Outcome: Progressing  2/11/2023 1649 by Charlie Agee RN  Outcome: Progressing

## 2023-02-11 NOTE — PROGRESS NOTES
2/10/23 9:30 PM  [phone] From: Weston Watson [address] RE: Yarely [place] room 348-2 bp 97/56, pulse 69. do you want me to hold off on lasix and do you want fluids at all    Read 9:51 PM  2/10/23 9:32 PM  also asking for something for pain, pain 6 out of 10 and not even tylenol ordered    Read 9:51 PM  2/10/23 9:52 PM  Ill order Tylenol. You can hold off on lasix for now. And no fluids needed if he is tolerating po    2/10/23 9:58 PM  thank you    Read 9:58 PM  2/11/23 2:59 AM  Patietn sleeping comfortable with no complaints althought telemetry is showing he has gone into afib and is going loki down to lowest of 37 but now is back to 64    Read 3:09 AM  2/11/23 3:15 AM  Whats BP?    2/11/23 3:16 AM  last taken it was 97/56    Read 3:16 AM  2/11/23 3:17 AM  If his pulse gets to the thirties again, please recheck BP    2/11/23 3:21 AM  full set retaken. 36.2, 96/61, 93% room air, pulse 72, rr16    Read 3:28 AM  2/11/23 3:25 AM  stated he has slight chest pain on right side about a 2-3 but states he doesn't need anything for it right now and then he fell back to sleep    Read 3:28 AM    No response at this time. Will continue to monitor patient at this time.

## 2023-02-11 NOTE — PLAN OF CARE
Problem: Chronic Conditions and Co-morbidities  Goal: Patient's chronic conditions and co-morbidity symptoms are monitored and maintained or improved  2/10/2023 2009 by Lis Duffy RN  Outcome: Progressing  2/10/2023 1156 by Valery Matute RN  Outcome: Progressing     Problem: Pain  Goal: Verbalizes/displays adequate comfort level or baseline comfort level  2/10/2023 2009 by Lis Duffy RN  Outcome: Progressing  2/10/2023 1156 by Valery Matute RN  Outcome: Progressing     Problem: Safety - Adult  Goal: Free from fall injury  2/10/2023 2009 by Lis Duffy RN  Outcome: Progressing  2/10/2023 1156 by Valery Matute RN  Outcome: Progressing

## 2023-02-12 PROCEDURE — 6370000000 HC RX 637 (ALT 250 FOR IP)

## 2023-02-12 PROCEDURE — 6360000002 HC RX W HCPCS: Performed by: STUDENT IN AN ORGANIZED HEALTH CARE EDUCATION/TRAINING PROGRAM

## 2023-02-12 PROCEDURE — 1200000000 HC SEMI PRIVATE

## 2023-02-12 PROCEDURE — 6370000000 HC RX 637 (ALT 250 FOR IP): Performed by: STUDENT IN AN ORGANIZED HEALTH CARE EDUCATION/TRAINING PROGRAM

## 2023-02-12 PROCEDURE — 2580000003 HC RX 258: Performed by: STUDENT IN AN ORGANIZED HEALTH CARE EDUCATION/TRAINING PROGRAM

## 2023-02-12 RX ORDER — CEPHALEXIN 500 MG/1
500 CAPSULE ORAL 4 TIMES DAILY
Status: DISCONTINUED | OUTPATIENT
Start: 2023-02-12 | End: 2023-02-13 | Stop reason: HOSPADM

## 2023-02-12 RX ADMIN — FUROSEMIDE 60 MG: 40 TABLET ORAL at 22:28

## 2023-02-12 RX ADMIN — CEPHALEXIN 500 MG: 500 CAPSULE ORAL at 17:31

## 2023-02-12 RX ADMIN — CETIRIZINE HYDROCHLORIDE 10 MG: 10 TABLET ORAL at 09:35

## 2023-02-12 RX ADMIN — POTASSIUM CHLORIDE 20 MEQ: 1500 TABLET, EXTENDED RELEASE ORAL at 09:35

## 2023-02-12 RX ADMIN — CEFTRIAXONE SODIUM 1000 MG: 10 INJECTION, POWDER, FOR SOLUTION INTRAVENOUS at 09:36

## 2023-02-12 RX ADMIN — TAMSULOSIN HYDROCHLORIDE 0.4 MG: 0.4 CAPSULE ORAL at 09:35

## 2023-02-12 RX ADMIN — OXYBUTYNIN CHLORIDE 10 MG: 10 TABLET, EXTENDED RELEASE ORAL at 09:35

## 2023-02-12 RX ADMIN — PANTOPRAZOLE SODIUM 40 MG: 40 TABLET, DELAYED RELEASE ORAL at 17:31

## 2023-02-12 RX ADMIN — FUROSEMIDE 60 MG: 40 TABLET ORAL at 09:35

## 2023-02-12 RX ADMIN — CEPHALEXIN 500 MG: 500 CAPSULE ORAL at 23:04

## 2023-02-12 RX ADMIN — ISOSORBIDE MONONITRATE 30 MG: 30 TABLET, EXTENDED RELEASE ORAL at 09:35

## 2023-02-12 RX ADMIN — DESMOPRESSIN ACETATE 40 MG: 0.2 TABLET ORAL at 22:29

## 2023-02-12 NOTE — PLAN OF CARE
Problem: Chronic Conditions and Co-morbidities  Goal: Patient's chronic conditions and co-morbidity symptoms are monitored and maintained or improved  2/12/2023 0409 by Rivera Proctor RN  Outcome: Progressing  2/11/2023 1649 by Marcelina Rust RN  Outcome: Progressing  2/11/2023 1649 by Marcelina Rust RN  Outcome: Progressing     Problem: Pain  Goal: Verbalizes/displays adequate comfort level or baseline comfort level  2/12/2023 0409 by Rivera Proctor RN  Outcome: Progressing  2/11/2023 1649 by Marcelina Rust RN  Outcome: Progressing  2/11/2023 1649 by Marcelina Rust RN  Outcome: Progressing     Problem: Safety - Adult  Goal: Free from fall injury  2/12/2023 0409 by Rivera Proctor RN  Outcome: Progressing  2/11/2023 1649 by Marcelina Rust RN  Outcome: Progressing  2/11/2023 1649 by Marcelina Rust RN  Outcome: Progressing     Problem: Discharge Planning  Goal: Discharge to home or other facility with appropriate resources  Outcome: Progressing

## 2023-02-12 NOTE — PROGRESS NOTES
Handoff report given to BARBIE erazo. All questions answered. Patient transported to 2c room 243-1 with all personal belongings, chart and medications in hand, per wheelchair. Telemetry removed and placed back In nurses station.

## 2023-02-12 NOTE — PROGRESS NOTES
[facility]  CDU / OBSERVATION ENCOUNTER  ATTENDING NOTE       I performed a history and physical examination of the patient and discussed management with the resident or midlevel provider. I reviewed the resident or midlevel provider's note and agree with the documented findings and plan of care. Any areas of disagreement are noted on the chart. I was personally present for the key portions of any procedures. I have documented in the chart those procedures where I was not present during the key portions. I have reviewed the nurses notes. I agree with the chief complaint, past medical history, past surgical history, allergies, medications, social and family history as documented unless otherwise noted below. The Family history, social history, and ROS are effectively unchanged since admission unless noted elsewhere in the chart. This patient was placed in the observation unit for reevaluation for possible admission to the hospital    Patient with improvement. Patient still requiring IV antibiotics given extent of cellulitis. Patient with ongoing pain. Patient with ongoing tenderness but an improvement in erythema.   I assess another 24 hours of airway antibiotics will be necessary patient can then be attempted on oral trial.    Rand Sigala MD  Attending Emergency  Physician

## 2023-02-12 NOTE — PROGRESS NOTES
OBS/CDU   RESIDENT NOTE      Patients PCP is:  Elias Desai MD        SUBJECTIVE      No acute events overnight. Feels a little better, able to ambulate without pain. No fever or chills. Endorses not feeling like he is ready to go home. Has been able to tolerate a full diet without nausea or vomiting. The patient is urinating on his own and is passing flatus. Denies fever, chills, nausea, vomiting, shortness of breath, abdominal pain, focal weakness, numbness, tingling, urinary/bowel symptoms, vision changes, visual hallucinations, or headache. PHYSICAL EXAM      General: NAD, AO X 3  Heent: EMOI, PERRL  Neck: SUPPLE, NO JVD  Cardiovascular: RRR, S1S2  Pulmonary: CTAB, NO SOB  Abdomen: SOFT, NTTP, ND, +BS  Extremities: +2/4 PULSES DISTAL, BLE swelling with venous stasis changes BL and cellulitic changes on the left calf extending out from the stasis dermatitis, blanchable throughout, warm compared to surrounding, compression stockings in place, mild improvement from yesterday but still cellulitic and swollen  Neuro / Psych: NO NUMBNESS OR TINGLING, MENTATION AT BASELINE    PERTINENT TEST /EXAMS      I have reviewed all available laboratory results. MEDICATIONS CURRENT   atorvastatin (LIPITOR) tablet 40 mg, Nightly  furosemide (LASIX) tablet 60 mg, BID  isosorbide mononitrate (IMDUR) extended release tablet 30 mg, Daily  cetirizine (ZYRTEC) tablet 10 mg, Daily  oxybutynin (DITROPAN-XL) extended release tablet 10 mg, Daily  pantoprazole (PROTONIX) tablet 40 mg, QPM  potassium chloride (KLOR-CON M) extended release tablet 20 mEq, Daily  tamsulosin (FLOMAX) capsule 0.4 mg, Daily  cefTRIAXone (ROCEPHIN) 1,000 mg in sterile water 10 mL IV syringe, Q24H      All medication charted and reviewed.     CONSULTS      IP CONSULT TO SOCIAL WORK    ASSESSMENT/PLAN       Cooper Franz is a 74 y.o. male who presents with assymetric leg swelling, redness, and tenderness, secondary to possible cellulitis    LLE Cellulitis: improving  Afebrile, pain improving  Switch to PO Keflex, total abx time 10 days  2/10-2/19  BLE swelling:  ECHO 3/2021 LVEF 55%  Lasix 60mg BID  Reduce salt intake  BLE venous stasis:  Compression socks  Elevate LE while in bed  Continue home medications and pain control  Monitor vitals, labs, and imaging  DISPO: pending clinical improvement, likely 2/13    --  Patricia Fleming MD  Emergency Medicine Resident Physician     This dictation was generated by voice recognition computer software. Although all attempts are made to edit the dictation for accuracy, there may be errors in the transcription that are not intended.

## 2023-02-13 VITALS
HEIGHT: 65 IN | BODY MASS INDEX: 43.09 KG/M2 | DIASTOLIC BLOOD PRESSURE: 66 MMHG | OXYGEN SATURATION: 95 % | HEART RATE: 72 BPM | SYSTOLIC BLOOD PRESSURE: 107 MMHG | WEIGHT: 258.6 LBS | TEMPERATURE: 97.4 F | RESPIRATION RATE: 22 BRPM

## 2023-02-13 LAB
EKG ATRIAL RATE: 57 BPM
EKG Q-T INTERVAL: 462 MS
EKG QRS DURATION: 86 MS
EKG QTC CALCULATION (BAZETT): 449 MS
EKG R AXIS: 16 DEGREES
EKG T AXIS: 43 DEGREES
EKG VENTRICULAR RATE: 57 BPM

## 2023-02-13 PROCEDURE — 93010 ELECTROCARDIOGRAM REPORT: CPT | Performed by: INTERNAL MEDICINE

## 2023-02-13 PROCEDURE — 6370000000 HC RX 637 (ALT 250 FOR IP): Performed by: STUDENT IN AN ORGANIZED HEALTH CARE EDUCATION/TRAINING PROGRAM

## 2023-02-13 PROCEDURE — 6370000000 HC RX 637 (ALT 250 FOR IP)

## 2023-02-13 RX ORDER — OXYBUTYNIN CHLORIDE 10 MG/1
10 TABLET, EXTENDED RELEASE ORAL DAILY
Qty: 30 TABLET | Refills: 3 | Status: SHIPPED | OUTPATIENT
Start: 2023-02-13

## 2023-02-13 RX ORDER — POTASSIUM CHLORIDE 20 MEQ/1
20 TABLET, EXTENDED RELEASE ORAL DAILY
Qty: 30 TABLET | Refills: 1 | Status: SHIPPED | OUTPATIENT
Start: 2023-02-13

## 2023-02-13 RX ORDER — FUROSEMIDE 40 MG/1
60 TABLET ORAL 2 TIMES DAILY
Qty: 90 TABLET | Refills: 2 | Status: SHIPPED | OUTPATIENT
Start: 2023-02-13

## 2023-02-13 RX ORDER — TAMSULOSIN HYDROCHLORIDE 0.4 MG/1
0.4 CAPSULE ORAL DAILY
Qty: 30 CAPSULE | Refills: 5 | Status: SHIPPED | OUTPATIENT
Start: 2023-02-13

## 2023-02-13 RX ORDER — LORATADINE 10 MG/1
TABLET ORAL
Qty: 30 TABLET | Refills: 0 | Status: SHIPPED | OUTPATIENT
Start: 2023-02-13

## 2023-02-13 RX ORDER — PANTOPRAZOLE SODIUM 40 MG/1
40 TABLET, DELAYED RELEASE ORAL EVERY EVENING
Qty: 30 TABLET | Refills: 5 | Status: SHIPPED | OUTPATIENT
Start: 2023-02-13

## 2023-02-13 RX ORDER — CEPHALEXIN 500 MG/1
500 CAPSULE ORAL 4 TIMES DAILY
Qty: 28 CAPSULE | Refills: 0 | Status: SHIPPED | OUTPATIENT
Start: 2023-02-13 | End: 2023-02-20

## 2023-02-13 RX ORDER — GABAPENTIN 400 MG/1
400 CAPSULE ORAL DAILY
Qty: 14 CAPSULE | Refills: 0 | Status: SHIPPED | OUTPATIENT
Start: 2023-02-13 | End: 2023-02-27

## 2023-02-13 RX ORDER — ISOSORBIDE MONONITRATE 30 MG/1
30 TABLET, EXTENDED RELEASE ORAL DAILY
Qty: 30 TABLET | Refills: 1 | Status: SHIPPED | OUTPATIENT
Start: 2023-02-13

## 2023-02-13 RX ORDER — NITROGLYCERIN 0.4 MG/1
0.4 TABLET SUBLINGUAL EVERY 5 MIN PRN
Qty: 25 TABLET | Refills: 1 | Status: SHIPPED | OUTPATIENT
Start: 2023-02-13

## 2023-02-13 RX ADMIN — PANTOPRAZOLE SODIUM 40 MG: 40 TABLET, DELAYED RELEASE ORAL at 16:56

## 2023-02-13 RX ADMIN — TAMSULOSIN HYDROCHLORIDE 0.4 MG: 0.4 CAPSULE ORAL at 08:46

## 2023-02-13 RX ADMIN — CEPHALEXIN 500 MG: 500 CAPSULE ORAL at 16:56

## 2023-02-13 RX ADMIN — POTASSIUM CHLORIDE 20 MEQ: 1500 TABLET, EXTENDED RELEASE ORAL at 08:46

## 2023-02-13 RX ADMIN — CEPHALEXIN 500 MG: 500 CAPSULE ORAL at 08:47

## 2023-02-13 RX ADMIN — FUROSEMIDE 60 MG: 40 TABLET ORAL at 15:47

## 2023-02-13 RX ADMIN — CEPHALEXIN 500 MG: 500 CAPSULE ORAL at 12:33

## 2023-02-13 RX ADMIN — ISOSORBIDE MONONITRATE 30 MG: 30 TABLET, EXTENDED RELEASE ORAL at 08:47

## 2023-02-13 RX ADMIN — FUROSEMIDE 60 MG: 40 TABLET ORAL at 08:47

## 2023-02-13 RX ADMIN — CETIRIZINE HYDROCHLORIDE 10 MG: 10 TABLET ORAL at 08:47

## 2023-02-13 RX ADMIN — OXYBUTYNIN CHLORIDE 10 MG: 10 TABLET, EXTENDED RELEASE ORAL at 08:47

## 2023-02-13 NOTE — PROGRESS NOTES
Discharge instructions given, all questions answered. Pt discharged via wheelchair to [facility] with cane and all personal belongings to [facility].

## 2023-02-13 NOTE — DISCHARGE SUMMARY
\  CDU Discharge Summary        Patient:  Caroline Nguyen  YOB: 1955    MRN: [id number]   Acct: [de-identified]    Primary Care Physician: Bridger Espinoza MD    Admit date:  2/9/2023  5:19 PM  Discharge date: 2/13/2023    Discharge Diagnoses:     1.)  Cellulitis left lower extremity. Treated with IV antibiotics. Outpatient treatment failure. 2.  Underlying venous stasis and vascular insufficiency complicating wound healing      Follow-up:  Call today/tomorrow for a follow up appointment with Bridger Espinoza MD , or return to the Emergency Room with worsening symptoms    Stressed to patient the importance of following up with primary care doctor for further workup/management of symptoms. Pt verbalizes understanding and agrees with plan. Discharge Medication Changes:       Medication List        START taking these medications      cephALEXin 500 MG capsule  Commonly known as: KEFLEX  Take 1 capsule by mouth in the morning, at noon, in the evening, and at bedtime for 7 days            CONTINUE taking these medications      Blood Pressure Monitor Kit  1 each by Does not apply route 2 times daily     furosemide 40 MG tablet  Commonly known as: LASIX  Take 1.5 tablets by mouth 2 times daily     gabapentin 400 MG capsule  Commonly known as: Neurontin  Take 1 capsule by mouth daily for 14 days. Sid Weight Scale Misc  1 each by Does not apply route as needed (weigh once a day and record)     isosorbide mononitrate 30 MG extended release tablet  Commonly known as: IMDUR  Take 1 tablet by mouth daily     * JOBST KNEE HIGH COMPRESSION SM Misc  2 each by Does not apply route daily Wear q day. Remove q hs. Diagnosis: Chronic venous insufficiency     * Jobst Opaque Knee 20-30mmHg XL Misc  Dispense two pair closed toe knee high 20 to 30 mmHg Jobst compression stockings     loratadine 10 MG tablet  Commonly known as:  Allergy Relief  TAKE 1 TABLET BY MOUTH DAILY     nitroGLYCERIN 0.4 MG SL tablet  Commonly known as: NITROSTAT  Place 1 tablet under the tongue every 5 minutes as needed for Chest pain up to max of 3 total doses. If no relief after 1 dose, call 911. oxybutynin 10 MG extended release tablet  Commonly known as: DITROPAN-XL  Take 1 tablet by mouth daily     pantoprazole 40 MG tablet  Commonly known as: PROTONIX  Take 1 tablet by mouth every evening     potassium chloride 20 MEQ extended release tablet  Commonly known as: KLOR-CON M  Take 1 tablet by mouth daily     tamsulosin 0.4 MG capsule  Commonly known as: FLOMAX  Take 1 capsule by mouth daily           * This list has 2 medication(s) that are the same as other medications prescribed for you. Read the directions carefully, and ask your doctor or other care provider to review them with you. Where to Get Your Medications        You can get these medications from any pharmacy    Bring a paper prescription for each of these medications  cephALEXin 500 MG capsule  furosemide 40 MG tablet  gabapentin 400 MG capsule  isosorbide mononitrate 30 MG extended release tablet  loratadine 10 MG tablet  nitroGLYCERIN 0.4 MG SL tablet  oxybutynin 10 MG extended release tablet  pantoprazole 40 MG tablet  potassium chloride 20 MEQ extended release tablet  tamsulosin 0.4 MG capsule         Diet:  ADULT DIET; Regular, advance as tolerated     Activity:  As tolerated    Consultants: IP CONSULT TO SOCIAL WORK    Procedures:  Not indicated      Diagnostic Test:   Results for orders placed or performed during the hospital encounter of 02/09/23   COVID-19, Rapid    Specimen: Nasopharyngeal Swab   Result Value Ref Range    Specimen Description . NASOPHARYNGEAL SWAB     SARS-CoV-2, Rapid Not Detected Not Detected   CBC with Auto Differential   Result Value Ref Range    WBC 7.8 3.5 - 11.3 k/uL    RBC 4.45 4.21 - 5.77 m/uL    Hemoglobin 13.5 13.0 - 17.0 g/dL    Hematocrit 42.3 40.7 - 50.3 %    MCV 95.1 82.6 - 102.9 fL    MCH 30.3 25.2 - 33.5 pg MCHC 31.9 28.4 - 34.8 g/dL    RDW 13.1 11.8 - 14.4 %    Platelets 649 566 - 487 k/uL    MPV 9.3 8.1 - 13.5 fL    NRBC Automated 0.0 0.0 per 100 WBC    Seg Neutrophils 72 (H) 36 - 65 %    Lymphocytes 12 (L) 24 - 43 %    Monocytes 11 3 - 12 %    Eosinophils % 3 1 - 4 %    Basophils 1 0 - 2 %    Immature Granulocytes 1 (H) 0 %    Segs Absolute 5.75 1.50 - 8.10 k/uL    Absolute Lymph # 0.93 (L) 1.10 - 3.70 k/uL    Absolute Mono # 0.87 0.10 - 1.20 k/uL    Absolute Eos # 0.20 0.00 - 0.44 k/uL    Basophils Absolute 0.04 0.00 - 0.20 k/uL    Absolute Immature Granulocyte 0.04 0.00 - 0.30 k/uL   Basic Metabolic Panel w/ Reflex to MG   Result Value Ref Range    Glucose 84 70 - 99 mg/dL    BUN 16 8 - 23 mg/dL    Creatinine 0.66 (L) 0.70 - 1.20 mg/dL    Est, Glom Filt Rate >60 >60 mL/min/1.73m2    Calcium 8.6 8.6 - 10.4 mg/dL    Sodium 139 135 - 144 mmol/L    Potassium 3.7 3.7 - 5.3 mmol/L    Chloride 101 98 - 107 mmol/L    CO2 27 20 - 31 mmol/L    Anion Gap 11 9 - 17 mmol/L   C-Reactive Protein   Result Value Ref Range    CRP 17.2 (H) 0.0 - 5.0 mg/L   Sedimentation Rate   Result Value Ref Range    Sed Rate 41 (H) 0 - 20 mm/Hr   Troponin   Result Value Ref Range    Troponin, High Sensitivity 12 0 - 22 ng/L   Troponin   Result Value Ref Range    Troponin, High Sensitivity 12 0 - 22 ng/L   Brain Natriuretic Peptide   Result Value Ref Range    Pro- <300 pg/mL   EKG 12 Lead   Result Value Ref Range    Ventricular Rate 78 BPM    Atrial Rate 81 BPM    QRS Duration 82 ms    Q-T Interval 424 ms    QTc Calculation (Bazett) 483 ms    R Axis 25 degrees    T Axis 61 degrees   EKG 12 Lead   Result Value Ref Range    Ventricular Rate 57 BPM    Atrial Rate 57 BPM    QRS Duration 86 ms    Q-T Interval 462 ms    QTc Calculation (Bazett) 449 ms    R Axis 16 degrees    T Axis 43 degrees     XR CHEST PORTABLE    Result Date: 2/9/2023  EXAMINATION: ONE X-RAY VIEW OF THE CHEST 2/9/2023 5:55 pm COMPARISON: 01/24/2023 HISTORY: ORDERING SYSTEM PROVIDED HISTORY: SOB, right side chest pain TECHNOLOGIST PROVIDED HISTORY: SOB, right side chest pain Reason for Exam: port Upright FINDINGS: Mild cardiomegaly is unchanged. No significant vascular congestion. Median sternotomy wires and left atrial appendage clip are noted. No focal airspace consolidation, pneumothorax, or pleural effusion. No free air beneath the diaphragm. Stable radiographic appearance of the chest without evidence of acute process. Physical Exam:    General appearance - NAD, AOx 3    Lungs -CTAB, no R/R/R  Heart - RRR, no M/R/G  Abdomen - Soft, NT/ND  Neurological:  MAEx4, No focal motor deficit, sensory loss  Extremities - Cap refil <2 sec in all ext., no edema  Skin -warm, dry, healing cellulitis at time of discharge      Hospital Course:  Clinical course has improved, labs and imaging reviewed. Cooper Franz originally presented to the hospital on 2/9/2023  5:19 PM with cellulitis. Outpatient treatment failure. At that time it was determined that He required further observation and IV antibiotics. Patient improved over the course of his stay with a reasonable progression. Patient had days worth of IV antibiotics switched over to orals. Patient was doing well at the time of discharge and felt to be appropriate for outpatient oral therapy. We will continue antibiotics for another 7 days to complete a 10-day course. Patient discharged in good condition. . Labs and imaging were followed daily. Imaging results as above. He is medically stable to be discharged. Disposition: Home    Patient stated that they will not drive themselves home from the hospital if they have gotten pain killers/ narcotics earlier that day and that they will arrange for transportation on their own or work with the  for a ride. Patient counseled NOT to drive while under the influence of narcotics/ pain killers.      Condition: Good    Patient stable and ready for discharge home. I have discussed plan of care with patient and they are in understanding. They were instructed to read discharge paperwork. All of their questions and concerns were addressed. Time Spent: 3 day      --  Crystal Almeida MD  Emergency Medicine Attending Physician    This dictation was generated by voice recognition computer software. Although all attempts are made to edit the dictation for accuracy, there may be errors in the transcription that are not intended.

## 2023-02-13 NOTE — PROGRESS NOTES
[address]  CDU / OBSERVATION ENCOUNTER  ATTENDING NOTE       I performed a history and physical examination of the patient and discussed management with the resident or midlevel provider. I reviewed the resident or midlevel provider's note and agree with the documented findings and plan of care. Any areas of disagreement are noted on the chart. I was personally present for the key portions of any procedures. I have documented in the chart those procedures where I was not present during the key portions. I have reviewed the nurses notes. I agree with the chief complaint, past medical history, past surgical history, allergies, medications, social and family history as documented unless otherwise noted below. The Family history, social history, and ROS are effectively unchanged since admission unless noted elsewhere in the chart. This patient was placed in the observation unit for reevaluation for possible admission to the hospital    Patient with significant improvement today. Nearly fully resolved in terms of erythema. No induration. Pain controlled. Will need refills on some medications. Patient ready for home and ongoing outpatient treatment. We will follow-up with PCP for recheck.     Crystal Almeida MD  Attending Emergency  Physician

## 2023-02-13 NOTE — PROGRESS NOTES
OBS/CDU   RESIDENT NOTE      Patients PCP is:  Kristen Kirk MD        SUBJECTIVE      No acute events overnight. Feels his leg is feeling better and is ready to go home. Requesting refill of his home medications as he is in between homes and has not been able to make to the pharmacy. Has been able to tolerate a full diet without nausea or vomiting. The patient is urinating on his own and is passing flatus. Denies fever, chills, nausea, vomiting, shortness of breath, abdominal pain, focal weakness, numbness, tingling, urinary/bowel symptoms, vision changes, visual hallucinations, or headache. PHYSICAL EXAM      General: NAD, AO X 3  Heent: EMOI, PERRL  Neck: SUPPLE, NO JVD  Cardiovascular: RRR, S1S2  Pulmonary: CTAB, NO SOB  Abdomen: SOFT, NTTP, ND, +BS  Extremities: +2/4 PULSES DISTAL, BLE swelling with venous stasis changes BL and cellulitic changes on the left calf extending out from the stasis dermatitis, blanchable throughout, warm compared to surrounding, compression stockings in place, mild improvement from yesterday but still cellulitic and swollen  Neuro / Psych: NO NUMBNESS OR TINGLING, MENTATION AT BASELINE    PERTINENT TEST /EXAMS      I have reviewed all available laboratory results. MEDICATIONS CURRENT   cephALEXin (KEFLEX) capsule 500 mg, 4x daily  atorvastatin (LIPITOR) tablet 40 mg, Nightly  furosemide (LASIX) tablet 60 mg, BID  isosorbide mononitrate (IMDUR) extended release tablet 30 mg, Daily  cetirizine (ZYRTEC) tablet 10 mg, Daily  oxybutynin (DITROPAN-XL) extended release tablet 10 mg, Daily  pantoprazole (PROTONIX) tablet 40 mg, QPM  potassium chloride (KLOR-CON M) extended release tablet 20 mEq, Daily  tamsulosin (FLOMAX) capsule 0.4 mg, Daily      All medication charted and reviewed.     CONSULTS      IP CONSULT TO SOCIAL WORK    ASSESSMENT/PLAN       Frankie Garcia is a 74 y.o. male who presents with assymetric leg swelling, redness, and tenderness, secondary to possible cellulitis    LLE Cellulitis: improving  Afebrile, pain improving  PO Keflex, total abx time 10 days  2/10-2/19  BLE swelling:  ECHO 3/2021 LVEF 55%  Lasix 60mg BID  Reduce salt intake  BLE venous stasis:  Compression socks  Elevate LE while in bed  Continue home medications and pain control  Monitor vitals, labs, and imaging  DISPO: Discharge today    --  Edd Boss MD  Emergency Medicine Resident Physician     This dictation was generated by voice recognition computer software. Although all attempts are made to edit the dictation for accuracy, there may be errors in the transcription that are not intended.

## 2023-02-13 NOTE — PROGRESS NOTES
CLINICAL PHARMACY NOTE: MEDS TO BEDS    Total # of Prescriptions Filled: 7   The following medications were delivered to the patient:  NITRO   OXYBUTYNIN ER 10   PROTONIX 40  FLOMAX  0.4    KEFLEX 500  GABAPENTIN 400  CLARITIN 10    Additional Documentation:

## 2023-02-13 NOTE — DISCHARGE INSTRUCTIONS
You were admitted to the hospital for a short time for management of a skin infection. You were started on antibiotics and should continue the oral antibiotics prescribed to you until they are completely gone. You should follow-up with your primary care provider within the next week to ensure your symptoms continue to resolve. You should go to your next scheduled appointment with the CHF clinic to ensure your swelling continues to improve. Please take your previously prescribed medications as prescribed and discuss any medication changes with your primary care provider before changing anything on your own. If you begin to experience worsening pain, redness, numbness, tingling in your leg, or begin to have a significant fever and chills, you should return to the emergency department for further evaluation.

## 2023-02-13 NOTE — PROGRESS NOTES
[address] [facility]  CDU / OBSERVATION eNCOUnter  Attending NOte       I performed a history and physical examination of the patient and discussed management with the resident. This patient was placed in the observation unit for reevaluation for possible admission to the hospital. I reviewed the residents note and agree with the documented findings and plan of care. Any areas of disagreement are noted on the chart. I was personally present for the key portions of any procedures. I have documented in the chart those procedures where I was not present during the key portions. I have reviewed the nurses notes. I agree with the chief complaint, past medical history, past surgical history, allergies, medications, social and family history as documented unless otherwise noted below. The patient was placed in the observation unit for reevaluation for possible admission to the hospital.      The Family history, social history, and ROS are effectively unchanged since admission unless noted elsewhere in the chart. 72-year-old male admitted for lower extremity cellulitis. Patient did have a course of doxycycline as an outpatient prior to his admission which failed. He feels that he is improving. Currently on IV Rocephin. Erythema is improved on the right lower extremity, and improving on the left lower extremity. Patient still reports pain. On physical exam, he does have compression stockings in place. The left lower extremity in particular over the anterior lower leg is erythematous and warm to the touch. I believe the patient would benefit from at least 1 more day of IV antibiotics. Plan to reassess in the danny Stern MD  Attending Emergency  Physician

## 2023-02-22 ENCOUNTER — HOSPITAL ENCOUNTER (OUTPATIENT)
Dept: OTHER | Age: 68
Discharge: HOME | End: 2023-02-22
Payer: MEDICARE

## 2023-02-22 VITALS
BODY MASS INDEX: 44.1 KG/M2 | DIASTOLIC BLOOD PRESSURE: 60 MMHG | SYSTOLIC BLOOD PRESSURE: 102 MMHG | WEIGHT: 265 LBS | HEART RATE: 65 BPM | OXYGEN SATURATION: 97 % | RESPIRATION RATE: 16 BRPM

## 2023-02-22 DIAGNOSIS — I50.32 CHRONIC DIASTOLIC (CONGESTIVE) HEART FAILURE (HCC): Primary | ICD-10-CM

## 2023-02-22 PROCEDURE — 99212 OFFICE O/P EST SF 10 MIN: CPT

## 2023-02-22 PROCEDURE — 99213 OFFICE O/P EST LOW 20 MIN: CPT | Performed by: NURSE PRACTITIONER

## 2023-02-22 ASSESSMENT — ENCOUNTER SYMPTOMS
EYE DISCHARGE: 0
BLOOD IN STOOL: 0
COUGH: 0
SHORTNESS OF BREATH: 1
ABDOMINAL PAIN: 0

## 2023-02-22 NOTE — PROGRESS NOTES
CHF Clinic at [address] [facility]    Office: [phone] Fax: [phone] [facility] CHF CLINIC  [phone]  Dept: [phone]  Loc: [phone]    Patricia العراقي is a 74 y.o. male who presents today for CHF evaluation. HPI:      LIVING with friends on [address]  Has had 2 admits for cellulitis    Pt says not given lasix at time of d/c last admit. States at [employer] to [place] time, he got ABX, but 3 they did not have for him (including lasix) and was told to come back to pharm in 2 days to . He did not make it back so has not been on lasix at all, since d/c. Eating whatever is at his house he's staying at. Says he will get his SNAP card for food next week. Intends to shop low Na+.           + \" very Little shortness of breath,  nothing major\"    +   Fatigue, has HARPREET, has cpap, not able to use d/t mask issues. \"Messed up in the move\"  + Edema , pt uses comp knit socks. Out of lasix since d/c, says none given to him at d/c and did not go . L leg felling numb , this present for several years. (H/o leg paralysis)  States neuro unable to figure out why. States all finger numb '; h/o neck surgery to improve this. Denies chest pain , out of imdur   Denies  palpitations   Pt wt gain  lbs  today since last appt here. Wt up 7 lbs from last admit wt. Pt reports compliance with fluids <= 2L . The patient reports compliance with low Na+ intake. Leg pain has improved. Back of calf is . Pt was sent to ER to r/o DVT for this issue last appt, and scan was neg. Pt has been treated for cellulitis x 2 since then. Out of gabapentin , d/t issue with girl, none for 3-4 w.    Back pain occurring too, 6/10         Past Medical History:   Diagnosis Date    Acute CHF (congestive heart failure) ([address] 03/08/2021    Arthritis     back    Atrial fibrillation ([address] 10/2019    Dr. Panda Connor BPH (benign prostatic hyperplasia)     Cellulitis     Cervical disc disease     CHF (congestive heart failure) [facility])     cardiologist Dr. Perkins [facility] CHF clinic    Combined systolic and diastolic congestive heart failure ([address] 01/15/2020    Coronary artery disease 10/2019     CABG 2019 [facility]    Dr. Jessa Campos Cardiology last seen within the last year.     COVID-19 virus RNA test result indeterminate 03/31/2021    CPAP (continuous positive airway pressure) dependence     GERD (gastroesophageal reflux disease)     on rx    History of incarceration     released 2019    Hyperlipidemia     Dr. Lomax Class    Hypertension     [facility] CHF clinic     Dr. Lomax Class    Hypokalemia     Myocardial infarct [facility])     Dr. Lomax Class    Obesity     Overactive bladder     Sleep apnea     C-pap    Wears reading eyeglasses     Wellness examination     Dr. Kashif Powell 4/2021     Past Surgical History:   Procedure Laterality Date    ABDOMINAL EXPLORATION SURGERY      CARDIAC SURGERY      2019 - AtriClip 1.5 or 3T safe Immediately    CARDIOVERSION  10/16/2019    CARPAL TUNNEL RELEASE Bilateral 2008    CERVICAL SPINE SURGERY      2-5    COLONOSCOPY N/A 10/29/2020    COLONOSCOPY WITH BIOPSY performed by Joel Sprague MD at [address] [place] [address]  10/29/2020    COLONOSCOPY SUBMUCOSAL/BOTOX INJECTION performed by Joel Sprague MD at [address] [place] [address]  10/29/2020    COLONOSCOPY POLYPECTOMY SNARE/COLD BIOPSY performed by Joel Sprague MD at [address] [place] [address] N/A 01/12/2021    COLONOSCOPY POLYPECTOMY HOT SNARE, COLD SNARE POLPECTOMY performed by Jameel Arboleda MD at [email or web address] N/A 10/21/2019    CABG CORONARY ARTERY BYPASS GRAFT X1, LIMA-LAD, BROWN 4 MAZE PROCEDURE, 50MM ATRICURE ATRIAL CLIP RIGID INTERNAL FIXATION PLATES X 3   SCREWS X 13 performed by Fabián Galdamez MD at [address] N/A 03/26/2021    URODYNAMICS performed by Sade Worrell MD at [place] [address] CYSTOSCOPY  03/26/2021    CYSTOSCOPY FLEXIBLE performed by Helen Horton MD at [address]  05/14/2021    CYSTOSCOPY, UROLIFT, FULGURATION    CYSTOSCOPY N/A 05/14/2021    CYSTOSCOPY, UROLIFT, FULGURATION performed by Helen Horton MD at [address]  07/08/2022    CYSTOSCOPY, BOTOX INJECTION  (100 UNITS    EYE SURGERY      cataract surgery 2020    FRACTURE SURGERY      Left leg    TRANSESOPHAGEAL ECHOCARDIOGRAM  10/16/2019    URETHRAL SURGERY N/A 7/8/2022    CYSTOSCOPY, BOTOX INJECTION  (100 UNITS) performed by Helen Horton MD at [place] History   Problem Relation Age of Onset    Alcohol Abuse Maternal Uncle     Heart Attack Maternal Uncle     Cancer Mother     Alcohol Abuse Father        Social History     Tobacco Use    Smoking status: Never    Smokeless tobacco: Never   Substance Use Topics    Alcohol use: Not Currently     Comment: stopped 1991      Current Outpatient Medications   Medication Sig Dispense Refill    isosorbide mononitrate (IMDUR) 30 MG extended release tablet Take 1 tablet by mouth daily (Patient not taking: Reported on 2/22/2023) 30 tablet 1    nitroGLYCERIN (NITROSTAT) 0.4 MG SL tablet Place 1 tablet under the tongue every 5 minutes as needed for Chest pain up to max of 3 total doses. If no relief after 1 dose, call 911. 25 tablet 1    gabapentin (NEURONTIN) 400 MG capsule Take 1 capsule by mouth daily for 14 days.  14 capsule 0    loratadine (ALLERGY RELIEF) 10 MG tablet TAKE 1 TABLET BY MOUTH DAILY 30 tablet 0    furosemide (LASIX) 40 MG tablet Take 1.5 tablets by mouth 2 times daily 90 tablet 2    potassium chloride (KLOR-CON M) 20 MEQ extended release tablet Take 1 tablet by mouth daily (Patient not taking: Reported on 2/22/2023) 30 tablet 1    tamsulosin (FLOMAX) 0.4 MG capsule Take 1 capsule by mouth daily 30 capsule 5    pantoprazole (PROTONIX) 40 MG tablet Take 1 tablet by mouth every evening 30 tablet 5    oxybutynin (DITROPAN-XL) 10 MG extended release tablet Take 1 tablet by mouth daily 30 tablet 3    Elastic Bandages & Supports (JOBST OPA[employer] KNEE 20-30MMHG XL) MISC Dispense two pair closed toe knee high 20 to 30 mmHg Jobst compression stockings 2 each 0    Blood Pressure Monitor KIT 1 each by Does not apply route 2 times daily 1 kit 0    Misc. Devices ([employer] WEIGHT SCALE) MISC 1 each by Does not apply route as needed (weigh once a day and record) 1 each 0    Elastic Bandages & Supports (JOBST KNEE HIGH COMPRESSION SM) MISC 2 each by Does not apply route daily Wear q day. Remove q hs. Diagnosis: Chronic venous insufficiency 2 each 0     No current facility-administered medications for this encounter. No Known Allergies      Subjective:      Review of Systems   Constitutional:  Positive for fatigue. Negative for activity change, chills and fever. Eyes:  Negative for discharge and visual disturbance. Respiratory:  Positive for shortness of breath. Negative for cough. Cardiovascular:  Positive for leg swelling (worsening , out of lasix). Negative for chest pain and palpitations. Gastrointestinal:  Negative for abdominal pain and blood in stool. Endocrine: Negative for cold intolerance and heat intolerance. Genitourinary:  Negative for dysuria and flank pain. Musculoskeletal:  Positive for arthralgias (neck and shoulders). Negative for joint swelling and myalgias. Pain L upper leg. Uses cane for balance    Skin:  Negative for pallor and rash. Neurological:  Positive for numbness (new L leg, and b/l Hands (this is chronic)). Negative for dizziness and headaches. Psychiatric/Behavioral:  Negative for hallucinations and suicidal ideas. Objective:     CONCLUSIONS     Summary  Normal LV size and wall thickness. No obvious wall motion abnormality seen. Normal LV systolic function with LVEF >55%. Normal RV size and function. RV systolic pressure 29 mmHg  LA and RA appears normal in size.   No obvious significant structural valvular abnormality noted. No significant valvular stenosis or regurgitation noted. Normal aortic root dimension. No significant pericardial effusion noted. No obvious intra-cardiac mass or shunt noted. IVC normal diameter and inspiratory collapse indicating normal RA filling  pressure. Signature  ----------------------------------------------------------------------------   Electronically signed by Kevin Fiore(Interpreting physician) on   03/04/2021 12:22 PM    CONCLUSIONS     Summary  Normal LV size and wall thickness. No obvious wall motion abnormality seen. Normal LV systolic function with LVEF >55%. Normal RV size and function. RV systolic pressure 29 mmHg  LA and RA appears normal in size. No obvious significant structural valvular abnormality noted. No significant valvular stenosis or regurgitation noted. Normal aortic root dimension. No significant pericardial effusion noted. No obvious intra-cardiac mass or shunt noted. IVC normal diameter and inspiratory collapse indicating normal RA filling  pressure. Signature  ----------------------------------------------------------------------------   Electronically signed by Kevin Fiore(Interpreting physician) on   03/04/2021 12:22 PM        Physical Exam  Vitals and nursing note reviewed. Constitutional:       Appearance: He is obese. Comments:      HENT:      Head: Normocephalic and atraumatic. Eyes:      General: No scleral icterus. Conjunctiva/sclera: Conjunctivae normal.   Cardiovascular:      Rate and Rhythm: Normal rate and regular rhythm. Heart sounds: Normal heart sounds. Pulmonary:      Effort: Pulmonary effort is normal.      Breath sounds: Normal breath sounds. No wheezing or rales. Abdominal:      Palpations: Abdomen is soft. Musculoskeletal:         General: Normal range of motion. Cervical back: Normal range of motion. Right lower leg: Edema (3+ pitting) present.       Left lower leg: Edema (3-4+ pitting ; red and scaly/dry;) present. Comments: R anterior thigh pain is not increased with palpation. Skin:     General: Skin is dry. Comments: L calf ant/post is cool to touch and pale in color compared to R LE. L foot is warm. L dorsalis pedis pulse obtained. R LE is warm to touch. Neurological:      Mental Status: He is alert and oriented to person, place, and time. /60   Pulse 65   Resp 16   Wt 265 lb (120.2 kg)   SpO2 97%   BMI 44.10 kg/m²           Lower Extremity Measurements in cm. R Calf Circumference (cm): 47 cm  L Calf Circumference (cm): 57 cm  R Ankle Circumference (cm): 35 cm  L Ankle Circumference (cm): 31 cm    CBC:   Lab Results   Component Value Date/Time    WBC 7.8 02/09/2023 06:55 PM    RBC 4.45 02/09/2023 06:55 PM    HGB 13.5 02/09/2023 06:55 PM    HCT 42.3 02/09/2023 06:55 PM    MCV 95.1 02/09/2023 06:55 PM    MCH 30.3 02/09/2023 06:55 PM    MCHC 31.9 02/09/2023 06:55 PM    RDW 13.1 02/09/2023 06:55 PM     02/09/2023 06:55 PM    MPV 9.3 02/09/2023 06:55 PM     CMP:    Lab Results   Component Value Date/Time     02/09/2023 06:55 PM    K 3.7 02/09/2023 06:55 PM     02/09/2023 06:55 PM    CO2 27 02/09/2023 06:55 PM    BUN 16 02/09/2023 06:55 PM    CREATININE 0.66 02/09/2023 06:55 PM    GFRAA >60 08/12/2022 11:13 AM    LABGLOM >60 02/09/2023 06:55 PM    GLUCOSE 84 02/09/2023 06:55 PM    PROT 6.9 03/16/2021 05:32 AM    LABALBU 3.8 03/16/2021 05:32 AM    CALCIUM 8.6 02/09/2023 06:55 PM    BILITOT 0.29 03/16/2021 05:32 AM    ALKPHOS 65 03/16/2021 05:32 AM    AST 15 03/16/2021 05:32 AM    ALT 14 03/16/2021 05:32 AM     Lab Results   Component Value Date    LABA1C 5.6 12/10/2019           :Assessment      1. Chronic diastolic (congestive) heart failure ([facility]          :Plan      1. Chronic diastolic (congestive) heart failure (HCC)        Wt is up.  leg measurements are up in calves. H/o chronic syst/diastolic HF.  EF improvement since revascularization/ CABG. Out of lasix since d/c. Pharm called and lasix will be available for him to  shortly today. On Guideline-Directed Medication Therapy:     Diuretic     Pt not on BB, but currently echo reads no syst/ diast chf. Not taking meds. Pt non compliant with [facility] appts. Missed last. Appt made for him today in March. Will see pt back in 1 w. Hopefully edema improved and will add MRA for pt. Pt needs to return the meds he is on before adding more to his regimen. Denies social work or care coordinator. A office on Aging said they would help him find a place to live. He is going to try them back today. Will ask Eliana PIRES if she can ask pt to her roster of pts. Recommended:  . Leg elevation. Compression hose use       Pt reminded of the importance of taking all meds as ordered. Pt reminded to follow a low sodium diet , 2000 mg/ day, and fluid limits of 2 liters daily. Will call pt to make f/u appt after ER visit. No orders of the defined types were placed in this encounter. No orders of the defined types were placed in this encounter. Patientgiven verbal and/or written educational instructions. Follow up as directed. I have reviewed and agree with the nursing documentation. Verbally reviewed medication list with patient; patient verbalized understanding. Discussed 2000mg/day sodium restricted diet; patient verbalized understanding. Moderate daily exercise encouraged as tolerated. Discussed rest breaks as needed; patient verbalized understanding. Patient instructed to weigh self at the same time of each day, using same clothes and same scale; reinforced teaching to monitor for 3-5 lb weight increase over 1-2 days, and to notify the CHF clinic at [phone] or physician office if weight change noted. Patient verbalized understanding.      Risks of smoking discussed with the patient if applicable; patient strongly discouraged to smoke. Patient verbalized understanding. Signs and symptoms of CHF discussed with patient, such as feeling more tired than normal, feeling short of breath, coughing that increases when you lie down, sudden weight gain, swelling of your feet, legs or belly. Patient verbalized understanding to notify the CHF clinic at [phone] or physician office if these symptoms occur. Compliance with plan of care and further disease process causes discussed with patient, patient encouraged to keep all follow up appointments. Patient verbalized understanding. Echocardiogram reviewed. Labs reviewed. Medications reviewed. Patient was seen with total face to face time of  >  20 minutes. More than 50% of this visit was counseling and education  & coordination of care. Advised to ER for emergent concern.           Electronically signedby LUDY Edmonds CNP on 2/22/2023 at 11:43 AM

## 2023-03-03 ENCOUNTER — HOSPITAL ENCOUNTER (OUTPATIENT)
Dept: OTHER | Age: 68
Discharge: HOME | End: 2023-03-03
Payer: MEDICARE

## 2023-03-03 VITALS
SYSTOLIC BLOOD PRESSURE: 114 MMHG | WEIGHT: 241.4 LBS | HEART RATE: 66 BPM | OXYGEN SATURATION: 97 % | BODY MASS INDEX: 40.17 KG/M2 | DIASTOLIC BLOOD PRESSURE: 68 MMHG

## 2023-03-03 PROCEDURE — 99213 OFFICE O/P EST LOW 20 MIN: CPT

## 2023-03-03 NOTE — PROGRESS NOTES
Date:  3/3/2023  Time:  3:12 PM    CHF Clinic at [address] [place] [address]    Office: [phone] Fax: [phone]    Re:  Nadeem Luong   Patient : 1955    Vital Signs: /68   Pulse 66   Wt 241 lb 6.4 oz (109.5 kg)   SpO2 97%   BMI 40.17 kg/m²                       O2 Device: None (Room air)                           No results for input(s): CBC, HGB, HCT, WBC, PLATELET, NA, K, CL, CO2, BUN, CREATININE, GLUCOSE, BNP, INR in the last 72 hours. Respiratory:    Assessment  Charting Type: Reassessment    Breath Sounds  Right Upper Lobe: Clear  Right Middle Lobe: Clear  Right Lower Lobe: Clear  Left Upper Lobe: Clear  Left Lower Lobe: Clear    Cough/Sputum  Cough: None       Peripheral Vascular  RLE Edema: +1, Non-pitting  LLE Edema: +2, Non-pitting    Complaints: Feeling improved      Comment : Patient here per wheelchair for routine visit. Weight down 24 lbs in one week. Patient was missing most of his meds at last visit. Today he has his pill bottles with him. Missing is his atorvastatin that he states he used to be on. Not on Epic med list. Writer phoned and left message for [facility] to refill this med. He is seeing cardiology 2023 Today no new or acute s/s CHF. All pill bottles from [facility] V's given at most recent discharge. Patient had recently moved and lost track of his old bottles. He also has phone number of [facility] [phone]. Phone calls placed there. He is in their system and writer provided all his doctor phone numbers and faxed a recent med list to them. This service is new for him. Reminded of low salt diet and fluid limits. Discussed med compliance. Next visit here 3/29/2023.      Electronically signed by Fredy Miramontes RN on 3/3/2023 at 3:12 PM

## 2023-03-06 ENCOUNTER — TELEPHONE (OUTPATIENT)
Dept: FAMILY MEDICINE CLINIC | Age: 68
End: 2023-03-06

## 2023-03-06 ENCOUNTER — OFFICE VISIT (OUTPATIENT)
Dept: FAMILY MEDICINE CLINIC | Age: 68
End: 2023-03-06
Payer: MEDICARE

## 2023-03-06 VITALS
BODY MASS INDEX: 40.15 KG/M2 | SYSTOLIC BLOOD PRESSURE: 128 MMHG | HEIGHT: 65 IN | HEART RATE: 61 BPM | DIASTOLIC BLOOD PRESSURE: 75 MMHG | WEIGHT: 241 LBS

## 2023-03-06 DIAGNOSIS — L03.116 CELLULITIS OF LEFT LOWER EXTREMITY: ICD-10-CM

## 2023-03-06 DIAGNOSIS — M48.02 FORAMINAL STENOSIS OF CERVICAL REGION: Primary | ICD-10-CM

## 2023-03-06 PROCEDURE — 1123F ACP DISCUSS/DSCN MKR DOCD: CPT

## 2023-03-06 PROCEDURE — G8427 DOCREV CUR MEDS BY ELIG CLIN: HCPCS

## 2023-03-06 PROCEDURE — 1111F DSCHRG MED/CURRENT MED MERGE: CPT

## 2023-03-06 PROCEDURE — 1036F TOBACCO NON-USER: CPT

## 2023-03-06 PROCEDURE — G8417 CALC BMI ABV UP PARAM F/U: HCPCS

## 2023-03-06 PROCEDURE — 99213 OFFICE O/P EST LOW 20 MIN: CPT

## 2023-03-06 PROCEDURE — 3017F COLORECTAL CA SCREEN DOC REV: CPT

## 2023-03-06 PROCEDURE — G8482 FLU IMMUNIZE ORDER/ADMIN: HCPCS

## 2023-03-06 SDOH — ECONOMIC STABILITY: HOUSING INSECURITY
IN THE LAST 12 MONTHS, WAS THERE A TIME WHEN YOU DID NOT HAVE A STEADY PLACE TO SLEEP OR SLEPT IN A SHELTER (INCLUDING NOW)?: YES

## 2023-03-06 SDOH — ECONOMIC STABILITY: FOOD INSECURITY: WITHIN THE PAST 12 MONTHS, YOU WORRIED THAT YOUR FOOD WOULD RUN OUT BEFORE YOU GOT MONEY TO BUY MORE.: SOMETIMES TRUE

## 2023-03-06 SDOH — ECONOMIC STABILITY: FOOD INSECURITY: WITHIN THE PAST 12 MONTHS, THE FOOD YOU BOUGHT JUST DIDN'T LAST AND YOU DIDN'T HAVE MONEY TO GET MORE.: SOMETIMES TRUE

## 2023-03-06 SDOH — ECONOMIC STABILITY: INCOME INSECURITY: HOW HARD IS IT FOR YOU TO PAY FOR THE VERY BASICS LIKE FOOD, HOUSING, MEDICAL CARE, AND HEATING?: SOMEWHAT HARD

## 2023-03-06 ASSESSMENT — ENCOUNTER SYMPTOMS
SHORTNESS OF BREATH: 0
NAUSEA: 0
CONSTIPATION: 0
CHEST TIGHTNESS: 0
ABDOMINAL PAIN: 0
VOMITING: 0

## 2023-03-06 ASSESSMENT — PATIENT HEALTH QUESTIONNAIRE - PHQ9
SUM OF ALL RESPONSES TO PHQ QUESTIONS 1-9: 0
2. FEELING DOWN, DEPRESSED OR HOPELESS: 0
SUM OF ALL RESPONSES TO PHQ QUESTIONS 1-9: 0
1. LITTLE INTEREST OR PLEASURE IN DOING THINGS: 0
SUM OF ALL RESPONSES TO PHQ9 QUESTIONS 1 & 2: 0

## 2023-03-06 NOTE — TELEPHONE ENCOUNTER
Received a call from a woman named Scar, who says she works for [employer]. She states she spoke with the patient on Friday and he agreed to switch to their pharmacy, and she was needing the correct spelling of patient's PCP. Writer informed Scar, that [facility] is not listed as patient's pharmacy and that Esvin Abdiaziz will need to speak to patient. Scar asked for writer to call her back at [phone], after speaking to patient. Writer attempted to contact patient, and neither number in patient's chart is a working number. Called patient's sister Ashley Conner, and she said patient had new number. Unfortunately, both numbers sister gave are numbers already on file in patient's chart. Sister will have patient call office as soon as she speaks to him. No change in pharmacy will take place prior to speaking to patient.

## 2023-03-06 NOTE — PATIENT INSTRUCTIONS
Referral was placed for Orthopedics at Dr. Berlin Baeza office, information provided. Return to office on previously scheduled date, Monday October 30th at 10:30am.    Thank you for letting us take care of you today. We hope all your questions were addressed. If a question was overlooked or something else comes to mind after you return home, please contact the office at the number listed below. Office phone number: [phone]    If you need to get in right away due to illness, please be advised we have \"Same Day\" appointments available Monday-Friday. Please call us at [phone] option #3 to schedule your \"Same Day\" appointment.

## 2023-03-06 NOTE — PROGRESS NOTES
[facility] - Outpatient Note      Subjective: Rosy Reed is a 74 y.o. male with  has a past medical history of Acute CHF (congestive heart failure) ([address] Arthritis, Atrial fibrillation ([address] BPH (benign prostatic hyperplasia), Cellulitis, Cervical disc disease, CHF (congestive heart failure) ([address] Combined systolic and diastolic congestive heart failure ([address] Coronary artery disease, COVID-19 virus RNA test result indeterminate, CPAP (continuous positive airway pressure) dependence, GERD (gastroesophageal reflux disease), History of incarceration, Hyperlipidemia, Hypertension, Hypokalemia, Myocardial infarct ([address] Obesity, Overactive bladder, Sleep apnea, Wears reading eyeglasses, and Wellness examination. Presented to the office today for:  Chief Complaint   Patient presents with    Follow-Up from Hospital     Pt was seen in [facility] 2/9 to 2/14 for CAD, Heart Failure       HPI    Patient is a 74year old male presenting to the clinic for hospital follow up. Patient was hospitalized from 2/9/23-2/13/23 for cellulitis of the left lower extremity requiring IV antibiotics, pt was discharged with oral antibiotics and says he feels better now. There is still some erythema but patient says it looks significantly better and denies any pain. Patient also has concerns of bilaterally numbness/tingling in hands bilaterally. Says this is a chronic problem and he had carpal tunnel release years ago which did not fix the problem, it was then attributed to his neck and he underwent surgery for apparently DJD in cervical spine. MRI in cervical spine from 6 months ago does not show and any issues with the hardware, C4-5 is fused but is remarkable for foraminal stenosis. Patient says after initial surgery the numbness and tingling did go away for quite some time but now its back and quite bothersome. Patient denies any weakness.        Review of Systems Constitutional:  Negative for chills, fatigue and fever. Respiratory:  Negative for chest tightness and shortness of breath. Cardiovascular:  Negative for chest pain and palpitations. Gastrointestinal:  Negative for abdominal pain, constipation, nausea and vomiting. Skin:         Mild erythema of left lower extremity   Neurological:  Positive for numbness. Negative for dizziness and weakness. The patient has a   Family History   Problem Relation Age of Onset    Alcohol Abuse Maternal Uncle     Heart Attack Maternal Uncle     Cancer Mother     Alcohol Abuse Father        Objective:    /75 (Site: Right Upper Arm, Position: Sitting, Cuff Size: Large Adult)   Pulse 61   Ht 5' 5\" (1.651 m)   Wt 241 lb (109.3 kg)   BMI 40.10 kg/m²    BP Readings from Last 3 Encounters:   03/06/23 128/75   03/03/23 114/68   02/22/23 102/60       Physical Exam  Constitutional:       Appearance: Normal appearance. He is obese. Cardiovascular:      Rate and Rhythm: Normal rate and regular rhythm. Pulses: Normal pulses. Heart sounds: Normal heart sounds. No murmur heard. No gallop. Pulmonary:      Effort: Pulmonary effort is normal.      Breath sounds: Normal breath sounds. No wheezing, rhonchi or rales. Musculoskeletal:      Right hand: Normal.      Left hand: Normal.      Comments: Sensation intact currently in hands bilaterally   No weakness in hands   Skin:     General: Skin is warm. Comments: Mild erythema of left lower extremity   Chronic skin changes from venous stasis present bilaterally    Neurological:      Mental Status: He is alert.        Lab Results   Component Value Date    WBC 7.8 02/09/2023    HGB 13.5 02/09/2023    HCT 42.3 02/09/2023     02/09/2023    CHOL 121 03/08/2022    TRIG 79 03/08/2022    HDL 49 03/08/2022    HDL 49 03/08/2022    ALT 14 03/16/2021    AST 15 03/16/2021     02/09/2023    K 3.7 02/09/2023     02/09/2023    CREATININE 0.66 (L) 02/09/2023 BUN 16 02/09/2023    CO2 27 02/09/2023    TSH 1.48 01/24/2020    PSA 0.49 08/12/2022    INR 1.0 03/08/2021    LABA1C 5.6 12/10/2019     Lab Results   Component Value Date    CALCIUM 8.6 02/09/2023    PHOS 4.0 03/08/2021     Lab Results   Component Value Date    LDLCHOLESTEROL 56 03/08/2022    LDLCHOLESTEROL 56 03/08/2022       Assessment and Plan:    1. Foraminal stenosis of cervical region  Patient has had fusion of cervical spine in the past, now having numbness/tingling in hands bilaterally again. Recent MRI did show foraminal stenosis. Patient requesting further evaluation by Ortho for possible surgical intervention.  - [facility], Karolina Boateng DO, Orthopedic Surgery, [facility]    2. Cellulitis of left lower extremity  Admitted to [facility] for left lower extremity cellulitis, patient says it has significantly improved. Requested Prescriptions      No prescriptions requested or ordered in this encounter       Medications Discontinued During This Encounter   Medication Reason    Elastic Bandages & Supports ([place] 20-30MMHG XL) MISC LIST CLEANUP    Elastic Bandages & Supports ([employer]OBST KNEE HIGH COMPRESSION SM) MISC LIST CLEANUP    Blood Pressure Monitor KIT LIST CLEANUP       Jesse Love received counseling on the following healthy behaviors: nutrition, exercise and medication adherence    Discussed use,benefit, and side effects of prescribed medications. Barriers to medication compliance addressed. All patient questions answered. Pt voiced understanding. No follow-ups on file. Disclaimer: Some orall of this note was transcribed using voice-recognition software. This may cause typographical errors occasionally. Although all effort is made to fix these errors, please do not hesitate to contact our office if there Carol Chard concern with the understanding of this note.

## 2023-03-06 NOTE — PROGRESS NOTES
Visit Information    Have you changed or started any medications since your last visit including any over-the-counter medicines, vitamins, or herbal medicines? no   Have you stopped taking any of your medications? Is so, why? -  no  Are you having any side effects from any of your medications? - no    Have you seen any other physician or provider since your last visit? yes - Pt saw Dr. Lucía Ching in [facility] GI on 11/18/22   Have you had any other diagnostic tests since your last visit? yes - Pt had Labs/imaging done on 2/9/23   Have you been seen in the emergency room and/or had an admission in a hospital since we last saw you?  yes - Pt was seen in [facility] 2/9 to 2/14 for CAD, Heart Failure   Have you had your routine dental cleaning in the past 6 months?  no     Do you have an active MyChart account? If no, what is the barrier?   No: Pending    Patient Care Team:  Milton Myles MD as PCP - Manav Laughlin MD as Consulting Physician (Gastroenterology)  Coretta Kincaid MD as Consulting Physician (Urology)    Medical History Review  Past Medical, Family, and Social History reviewed and does contribute to the patient presenting condition    Health Maintenance   Topic Date Due    COVID-19 Vaccine (3 - Booster for Cole Peter series) 06/25/2021    Pneumococcal 65+ years Vaccine (2 - PCV) 04/13/2023    Annual Wellness Visit (AWV)  10/28/2023    Depression Screen  11/09/2023    Lipids  03/08/2027    Colorectal Cancer Screen  01/12/2031    DTaP/Tdap/Td vaccine (3 - Td or Tdap) 05/04/2032    Flu vaccine  Completed    Shingles vaccine  Completed    Hepatitis C screen  Completed    HIV screen  Completed    Hepatitis A vaccine  Aged Out    Hib vaccine  Aged Out    Meningococcal (ACWY) vaccine  Aged Out

## 2023-03-06 NOTE — PROGRESS NOTES
Attending Physician Statement  I have discussed the care of Hollie Dang 74 y.o. male, including pertinent history and exam findings, with the resident Dr. Marge Angelo MD.    History and Exam:   Chief Complaint   Patient presents with    Follow-Up from Hospital     Pt was seen in [facility] 2/9 to 2/14 for CAD, Heart Failure       Past Medical History:   Diagnosis Date    Acute CH[facility] (congestive heart failure) ([facility] 03/08/2021    Arthritis     back    Atrial fibrillation ([facility] 10/2019    Dr. Musa Resendiz    BPH (benign prostatic hyperplasia)     Cellulitis     Cervical disc disease     CHF (congestive heart failure) [facility])     cardiologist Dr. Rogelio Lomeli. [facility] CHF clinic    Combined systolic and diastolic congestive heart failure ([facility] 01/15/2020    Coronary artery disease 10/2019     CABG 2019 [facility]    Dr. Baljinder Cheng Cardiology last seen within the last year. COVID-19 virus RNA test result indeterminate 03/31/2021    CPAP (continuous positive airway pressure) dependence     GERD (gastroesophageal reflux disease)     on rx    History of incarceration     released 2019    Hyperlipidemia     Dr. Edmund Parnell    Hypertension     [facility] CHF clinic     Dr. Edmund Parnell    Hypokalemia     Myocardial infarct [facility])     Dr. Edmund Parnell    Obesity     Overactive bladder     Sleep apnea     C-pap    Wears reading eyeglasses     Wellness examination     Dr. Arian Hercules 4/2021     No Known Allergies   I have seen and examined the patient and the key elements of the encounter have been performed by me.   BP Readings from Last 3 Encounters:   03/06/23 128/75   03/03/23 114/68   02/22/23 102/60     /75 (Site: Right Upper Arm, Position: Sitting, Cuff Size: Large Adult)   Pulse 61   Ht 5' 5\" (1.651 m)   Wt 241 lb (109.3 kg)   BMI 40.10 kg/m²   Lab Results   Component Value Date    WBC 7.8 02/09/2023    HGB 13.5 02/09/2023    HCT 42.3 02/09/2023     02/09/2023    CHOL 121 03/08/2022    TRIG 79 03/08/2022    HDL 49 03/08/2022    HDL 49 03/08/2022    ALT 14 03/16/2021    AST 15 03/16/2021     02/09/2023    K 3.7 02/09/2023     02/09/2023    CREATININE 0.66 (L) 02/09/2023    BUN 16 02/09/2023    CO2 27 02/09/2023    TSH 1.48 01/24/2020    PSA 0.49 08/12/2022    INR 1.0 03/08/2021    LABA1C 5.6 12/10/2019     Lab Results   Component Value Date    LABALBU 3.8 03/16/2021     Lab Results   Component Value Date    IRON 53 (L) 02/02/2022    TIBC 243 (L) 02/02/2022    FERRITIN 143 12/10/2019     Lab Results   Component Value Date    LDLCHOLESTEROL 56 03/08/2022    LDLCHOLESTEROL 56 03/08/2022     I agree with the assessment, plan and the diagnosis of    Diagnosis Orders   1. Foraminal stenosis of cervical region  [facility], Bin Jay DO, Orthopedic Surgery, [facility]      2. Cellulitis of left lower extremity         . I agree with orders as documented by the resident. More than 25 minutes spent  in face to face encounter with the patient and more than half in counseling. Patient's questions were answered. Patient Voiced understanding to the counseling. Return if symptoms worsen or fail to improve.    (GC Modifier)-Dr. Fei Keen MD

## 2023-03-10 ENCOUNTER — OFFICE VISIT (OUTPATIENT)
Dept: UROLOGY | Age: 68
End: 2023-03-10
Payer: MEDICAID

## 2023-03-10 VITALS
DIASTOLIC BLOOD PRESSURE: 68 MMHG | BODY MASS INDEX: 40.15 KG/M2 | SYSTOLIC BLOOD PRESSURE: 115 MMHG | HEART RATE: 70 BPM | WEIGHT: 241 LBS | HEIGHT: 65 IN

## 2023-03-10 DIAGNOSIS — N32.81 OVERACTIVE BLADDER: Primary | ICD-10-CM

## 2023-03-10 PROCEDURE — 1123F ACP DISCUSS/DSCN MKR DOCD: CPT | Performed by: UROLOGY

## 2023-03-10 PROCEDURE — 99214 OFFICE O/P EST MOD 30 MIN: CPT | Performed by: UROLOGY

## 2023-03-10 NOTE — PROGRESS NOTES
Jada Booth, [address] [facility], [place]. Urology Progress Note      Patient:  Babs Choi  YOB: 1955  Date: 3/10/2023     HISTORY OF PRESENT ILLNESS:   The patient is a 74year old male who presents today for follow-up for the following problem(s):   Postoperative follow-up after cystoscopy, Botox injection  Overactive bladder  Overall the problem(s) : show no change. Associated Symptoms: No dysuria, gross hematuria. Pain Severity:   0/10    3/10/23   He reports that cystoscopy and Botox injection (100 u) without successful control of symptoms. Continues to have urgency and urgency incontinence, which is bothersome. We discuss that there are not many other options for him at this point, but if he continues to lose weight we can consider interstim and PFPT. He has lost 100 lbs recently. He also reports frequency, but he attributes this to water pills. PVR 32 cc     Last visit  5/13/22  Continues to have incontinence throughout the day. He wears 2 pads which are usually soaked when changed. Increasing oxybutynin to 10mg did not help. He has gained weight at this visit although he states he has been trying to eat less and walk more. He spoke with his cardiologist about viagra and was told that given his cardiac issues, this is not an option for him at this time. He is open to trying botox. Discussed urethral stripping for post void dribbling      2/11/22   The patient follows with BPH with LUTs. His problem is chronic and improved after Urolift. 1 pad per day, mild-moderately soaked by the end of the day which is improved since Urolift. . Not soaked when waking up in the mornings. Post void dribbling has not improved. Frequency has not improved, on diuretics. Still taking oxybutynin 5mg XL daily     Overactive bladder - chronic, improved s/p Urolift   Has erectile dysfunction, which is bothersome to the patient, requests medications.   We discuss the patient has PMH which increases risk of therapy with PDE5i, and is on nitroglycerine tabs, which is a contraindication to therapy. - We offer the patient Trimix injections, MUSE, and IPP, but he is not interested at this time. Summary of old records:   -Underwent urodynamics and cystoscopy on 3/26/21 which showed uninhibited bladder contractions, overactive bladder and obstructing kissing lobes  -Underwent urolift on 5/14/21: 5 implants inserted and clip placed in bladder had to be removed and fulgurated. Additional History:   6/11/21  Still having leakage since procedure, which he states is uncontrolled and bothersome. Uses two pads per day, heavy soaked   Taking Ditropan XL 5mg daily, has not helped with leaking. Denies any headache, dry mouth, constipation   Denies any burning with urination  Having urgency and frequency, does take lasix 60 mg twice daily due to CHF      Last several PSA's:  Lab Results   Component Value Date    PSA 0.49 08/12/2022    PSA 0.67 09/22/2020       Last total testosterone:  No results found for: TESTOSTERONE    Urinalysis today:  No results found for this visit on 03/10/23.     Last BUN and creatinine:  Lab Results   Component Value Date    BUN 16 02/09/2023     Lab Results   Component Value Date    CREATININE 0.66 (L) 02/09/2023       Imaging Reviewed during this Office Visit:   (results were independently reviewed by physician and radiology report verified)    PAST MEDICAL, FAMILY AND SOCIAL HISTORY UPDATE:  Past Medical History:   Diagnosis Date    Acute CHF (congestive heart failure) ([facility] 03/08/2021    Arthritis     back    Atrial fibrillation ([address] 10/2019    Dr. Pastora Bailey    BPH (benign prostatic hyperplasia)     Cellulitis     Cervical disc disease     CHF (congestive heart failure) [facility])     cardiologist Dr. Calvin Barry. 's CHF clinic    Combined systolic and diastolic congestive heart failure ([facility] 01/15/2020    Coronary artery disease 10/2019     [facility]ABG 2019 [facility] last seen within the last year.     COVID-19 virus RNA test result indeterminate 03/31/2021    CPAP (continuous positive airway pressure) dependence     GERD (gastroesophageal reflux disease)     on rx    History of incarceration     released 2019    Hyperlipidemia     Dr. Marcell Still    Hypertension     [facility] CHF clinic     Dr. Marcell Still    Hypokalemia     Myocardial infarct [facility])     Dr. Marcell Still    Obesity     Overactive bladder     Sleep apnea     C-pap    Wears reading eyeglasses     Wellness examination     Dr. Timothy Abdi 4/2021     Past Surgical History:   Procedure Laterality Date    ABDOMINAL EXPLORATION SURGERY      CARDIAC SURGERY      2019 - AtriClip 1.5 or 3T safe Immediately    CARDIOVERSION  10/16/2019    CARPAL TUNNEL RELEASE Bilateral 2008    CERVICAL SPINE SURGERY      2-5    COLONOSCOPY N/A 10/29/2020    COLONOSCOPY WITH BIOPSY performed by Lazarus Sayers, MD at [address] [place] [address]  10/29/2020    COLONOSCOPY SUBMUCOSAL/BOTOX INJECTION performed by Lazarus Sayers, MD at [address] [place] [address]  10/29/2020    COLONOSCOPY POLYPECTOMY SNARE/COLD BIOPSY performed by Lazarus Sayers, MD at [address] [place] [address] N/A 01/12/2021    COLONOSCOPY POLYPECTOMY HOT SNARE, COLD SNARE POLPECTOMY performed by Jesse Minor MD at [address] N/A 10/21/2019    CABG CORONARY ARTERY BYPASS GRAFT X1, LIMA-LAD, BROWN 4 MAZE PROCEDURE, 50MM ATRICURE ATRIAL CLIP RIGID INTERNAL FIXATION PLATES X 3   SCREWS X 13 performed by Kuldeep Lam MD at [facility] 03/26/2021    URODYNAMICS performed by Beltran Borjas MD at [place] [address]  03/26/2021    CYSTOSCOPY FLEXIBLE performed by Beltran Borjas MD at [place] [address]  05/14/2021    CYSTOSCOPY, UROLIFT, FULGURATION    CYSTOSCOPY N/A 05/14/2021    CYSTOSCOPY, UROLIFT, FULGURATION performed by Beltran Borjas MD at [place] [address]  07/08/2022    CYSTOSCOPY, BOTOX INJECTION  (100 UNITS    EYE SURGERY      cataract surgery 2020    FRACTURE SURGERY      Left leg    TRANSESOPHAGEAL ECHOCARDIOGRAM  10/16/2019    URETHRAL SURGERY N/A 7/8/2022    CYSTOSCOPY, BOTOX INJECTION  (100 UNITS) performed by Giorgio Morrison Jr., MD at [facility] OR     Family History   Problem Relation Age of Onset    Alcohol Abuse Maternal Uncle     Heart Attack Maternal Uncle     Cancer Mother     Alcohol Abuse Father      Outpatient Medications Marked as Taking for the 3/10/23 encounter (Office Visit) with Giorgio Morrison Jr., MD   Medication Sig Dispense Refill    nitroGLYCERIN (NITROSTAT) 0.4 MG SL tablet Place 1 tablet under the tongue every 5 minutes as needed for Chest pain up to max of 3 total doses. If no relief after 1 dose, call 911. 25 tablet 1    loratadine (ALLERGY RELIEF) 10 MG tablet TAKE 1 TABLET BY MOUTH DAILY 30 tablet 0    furosemide (LASIX) 40 MG tablet Take 1.5 tablets by mouth 2 times daily 90 tablet 2    potassium chloride (KLOR-CON M) 20 MEQ extended release tablet Take 1 tablet by mouth daily 30 tablet 1    tamsulosin (FLOMAX) 0.4 MG capsule Take 1 capsule by mouth daily 30 capsule 5    pantoprazole (PROTONIX) 40 MG tablet Take 1 tablet by mouth every evening 30 tablet 5    oxybutynin (DITROPAN-XL) 10 MG extended release tablet Take 1 tablet by mouth daily 30 tablet 3    Misc. Devices (SCHAD WEIGHT SCALE) MISC 1 each by Does not apply route as needed (weigh once a day and record) 1 each 0       Patient has no known allergies.  Social History     Tobacco Use   Smoking Status Never   Smokeless Tobacco Never       Social History     Substance and Sexual Activity   Alcohol Use Not Currently    Comment: stopped 1991       REVIEW OF SYSTEMS:  Constitutional: negative  Eyes: negative  Respiratory: negative  Cardiovascular: negative  Gastrointestinal: negative  Musculoskeletal: negative  Genitourinary: see HPI  Skin: negative   Neurological: negative  Hematological/Lymphatic:  negative  Psychological: negative    Physical Exam:      Vitals:    03/10/23 1050   BP: 115/68   Pulse: 70     NAD, no acute distress  RR  Psych mood appropriate  Abdomen: soft, nontender, nondistended, obese   Extremities: compression socks in place, edema noted     Assessment and Plan   1. Urge continence  2. Overactive bladder       Plan:   Cystoscopy, Botox injection (200 units) for overactive bladder symptoms. We once again discussed importance of weight loss.    Also, advised him to cut down on his coffee intake  Continue oxybutynin 10mg ER  BPH improved s/p UroLift  Continue timed voiding and urethral stripping

## 2023-03-17 ENCOUNTER — TELEPHONE (OUTPATIENT)
Dept: UROLOGY | Age: 68
End: 2023-03-17

## 2023-03-17 NOTE — TELEPHONE ENCOUNTER
Patient is scheduled for a cystoscopy/Botox injection on 4/14 at 1:30PM.  Talked to patient and gave him the date, time to arrive and instructions. Patient confirmed and verbalized understanding. Letter mailed out today.

## 2023-03-29 ENCOUNTER — HOSPITAL ENCOUNTER (OUTPATIENT)
Age: 68
Discharge: HOME | End: 2023-03-29
Payer: MEDICARE

## 2023-03-29 ENCOUNTER — HOSPITAL ENCOUNTER (OUTPATIENT)
Dept: PHARMACY | Age: 68
Setting detail: THERAPIES SERIES
Discharge: HOME | End: 2023-03-29

## 2023-03-29 ENCOUNTER — HOSPITAL ENCOUNTER (OUTPATIENT)
Dept: OTHER | Age: 68
Discharge: HOME | End: 2023-03-29
Payer: MEDICARE

## 2023-03-29 VITALS
SYSTOLIC BLOOD PRESSURE: 112 MMHG | DIASTOLIC BLOOD PRESSURE: 80 MMHG | OXYGEN SATURATION: 97 % | BODY MASS INDEX: 39.74 KG/M2 | HEART RATE: 69 BPM | WEIGHT: 238.8 LBS

## 2023-03-29 DIAGNOSIS — I89.0 LYMPHEDEMA: ICD-10-CM

## 2023-03-29 DIAGNOSIS — G47.33 OSA (OBSTRUCTIVE SLEEP APNEA): ICD-10-CM

## 2023-03-29 DIAGNOSIS — I89.0 LYMPHEDEMA OF BOTH LOWER EXTREMITIES: ICD-10-CM

## 2023-03-29 DIAGNOSIS — I87.8 CHRONIC VENOUS STASIS: ICD-10-CM

## 2023-03-29 DIAGNOSIS — I50.32 CHRONIC DIASTOLIC (CONGESTIVE) HEART FAILURE (HCC): Primary | ICD-10-CM

## 2023-03-29 LAB
ALT SERPL-CCNC: 19 U/L (ref 5–41)
AST SERPL-CCNC: 26 U/L
CHOLEST SERPL-MCNC: 196 MG/DL
CHOLESTEROL/HDL RATIO: 3.8
HDLC SERPL-MCNC: 52 MG/DL
LDLC SERPL CALC-MCNC: 131 MG/DL (ref 0–130)
TRIGL SERPL-MCNC: 65 MG/DL

## 2023-03-29 PROCEDURE — 99214 OFFICE O/P EST MOD 30 MIN: CPT | Performed by: NURSE PRACTITIONER

## 2023-03-29 PROCEDURE — 84460 ALANINE AMINO (ALT) (SGPT): CPT

## 2023-03-29 PROCEDURE — 99212 OFFICE O/P EST SF 10 MIN: CPT

## 2023-03-29 PROCEDURE — 84450 TRANSFERASE (AST) (SGOT): CPT

## 2023-03-29 PROCEDURE — 80061 LIPID PANEL: CPT

## 2023-03-29 RX ORDER — ATORVASTATIN CALCIUM 40 MG/1
40 TABLET, FILM COATED ORAL EVERY EVENING
COMMUNITY

## 2023-03-29 NOTE — PROGRESS NOTES
Medication Management  Pharmacist  Heart Failure    [address], [address] [facility]  Ph:  [phone]  Fax:  [phone]    NAME: Daly Almeida [place] RECORD NUMBER:  [id number]  AGE: 74 y.o. GENDER: male  : 1955  EPISODE DATE:  3/29/2023    Patient visit conducted in CHF clinic. ECHO EF%: 55 Date: 3/3/2021           Thorough Medication Assessment        Current Heart Failure Medications:      Get With The Guidelines:  Mr. Yuliana Johnston is on a Beta-Blocker for (HFrEF) (systolic) EF </= 93%  No: low BP currently    Mr. Yuliana Johnston is on an [employer] / [facility] for (HFrEF) (systolic) EF </= 43% No:       Mr. Yuliana Johnston is on a Aldosterone Receptor Antagonist for (HFrEF) (systolic) EF </= 05% or EF </= 40% with MI (Okay to use if SCr </= 2.5mg/dL in men, SCr </= 2mg/dL in women; Potassium < 5.0meq/L) No:       Mr. Yuliana Johnston is on a Diuretic Yes    Medication Reconciliation Discrepancies:   Patient reports that he does not have Lipitor at home. RN called cardiology since notes indicate patient should be on Lipitor therapy but they did not place an order for it. They agreed to place order for Lipitor. I added this to med list today. Medication Discussion:  Reviewed importance of medication adherence, provided information related to diagnosis foe use of each medication and dosing regimen. Recommendations / Notes to the physician/ CNP:  Patient is experiencing left leg swelling, CNP to address. Current AB=907/80 today, I would not add any additional therapy at this time. CLINICAL PHARMACY CONSULT: MED RECONCILIATION/REVIEW ADDENDUM    For Pharmacy Admin Tracking Only    CPA in place:  Yes  Recommendation Provided To:    Intervention Detail: New Rx: 1, reason: Needs Additional Therapy  Gap Closed?: Yes   Total # of Interventions Recommended: 1  Total # of Interventions Accepted: 1  Intervention Accepted By: Patient/Caregiver: 1  Time Spent (min): 15

## 2023-03-29 NOTE — PROGRESS NOTES
CHF Clinic at [facility]    Office: [phone] Fax: [phone] [facility]  [phone]  Dept: [phone]  Loc: [phone]    Apryl Bob is a 74 y.o. male who presents today for CHF evaluation. HPI:      Here after admit in Feb for cellulitis. LIVING with friends on [address]  Has had 2 admits for cellulitis        Pt reports compliance with fluids <= 2L . The patient reports compliance with low Na+ intake. Denies shortness of breath   +   Fatigue, at baseline,  has HARPREET, has cpap, not able to use d/t mask issues. \"Messed up in the move\"    + Edema , was evicted and no longer has hose . Willing to buy comp hose. Pt is given hardcopy order slip     Denies chest pain  Denies  palpitations   Pt wt down 2  lbs  today  Pt reports compliance with fluids <= 2L . The patient reports compliance with low Na+ intake. Red erythematous L leg, x 1.5 weeks, Getting \" as bad as it was when he was admitted. \"  No longer seeing wound care . Was referred to lymphedema clinic in the past. No longer seeing. Past Medical History:   Diagnosis Date    Acute CHF (congestive heart failure) ([address] 03/08/2021    Arthritis     back    Atrial fibrillation ([address] 10/2019    Dr. Nomi Orozco    BPH (benign prostatic hyperplasia)     Cellulitis     Cervical disc disease     CHF (congestive heart failure) [facility])     cardiologist Dr. Nury Carballo [facility] CHF clinic    Combined systolic and diastolic congestive heart failure ([facility] 01/15/2020    Coronary artery disease 10/2019     CABG 2019 [facility]    Dr. Rosemarie Najera Cardiology last seen within the last year.     COVID-19 virus RNA test result indeterminate 03/31/2021    CPAP (continuous positive airway pressure) dependence     GERD (gastroesophageal reflux disease)     on rx    History of incarceration     released 2019    Hyperlipidemia
Hypokalemia     Myocardial infarct ([facility]     Dr. Hayley Andrew Obesity     Overactive bladder     Sleep apnea     C-pap    Wears reading eyeglasses     Wellness examination     Dr. Negin Norton 4/2021     Past Surgical History:   Procedure Laterality Date    ABDOMINAL EXPLORATION SURGERY      CARDIAC SURGERY      2019 - AtriClip 1.5 or 3T safe Immediately    CARDIOVERSION  10/16/2019    CARPAL TUNNEL RELEASE Bilateral 2008    CERVICAL SPINE SURGERY      2-5    COLONOSCOPY N/A 10/29/2020    COLONOSCOPY WITH BIOPSY performed by Stanton Lynn MD at [facility] [address]  10/29/2020    COLONOSCOPY SUBMUCOSAL/BOTOX INJECTION performed by Stanton Lynn MD at [facility] [address]  10/29/2020    COLONOSCOPY POLYPECTOMY SNARE/COLD BIOPSY performed by Stanton Lynn MD at [facility] [address] N/A 01/12/2021    COLONOSCOPY POLYPECTOMY HOT SNARE, COLD SNARE POLPECTOMY performed by Alka Martinez MD at [facility] N/A 10/21/2019    CABG CORONARY ARTERY BYPASS GRAFT X1, LIMA-LAD, BROWN 4 MAZE PROCEDURE, 50MM ATRICURE ATRIAL CLIP RIGID INTERNAL FIXATION PLATES X 3   SCREWS X 13 performed by Chemo Norris MD at [place] [address] 03/26/2021    URODYNAMICS performed by Almaz Zhang MD at [address] [place] [address]  03/26/2021    CYSTOSCOPY FLEXIBLE performed by Almaz Zhang MD at [address] [place] [address]  05/14/2021    CYSTOSCOPY, UROLIFT, FULGURATION    CYSTOSCOPY N/A 05/14/2021    CYSTOSCOPY, UROLIFT, FULGURATION performed by Almaz Zhang MD at [address] [place] [address] [place]  07/08/2022    CYSTOSCOPY, BOTOX INJECTION  (100 UNITS    EYE SURGERY      cataract surgery 2020    FRACTURE SURGERY      Left leg    TRANSESOPHAGEAL ECHOCARDIOGRAM  10/16/2019    URETHRAL SURGERY N/A 7/8/2022    CYSTOSCOPY, BOTOX INJECTION  (100 UNITS) performed by Almaz Zhang MD at [place] History   Problem Relation Age of Onset    Alcohol Abuse

## 2023-03-30 ENCOUNTER — OFFICE VISIT (OUTPATIENT)
Dept: FAMILY MEDICINE CLINIC | Age: 68
End: 2023-03-30
Payer: MEDICARE

## 2023-03-30 VITALS — HEART RATE: 63 BPM | SYSTOLIC BLOOD PRESSURE: 106 MMHG | DIASTOLIC BLOOD PRESSURE: 68 MMHG

## 2023-03-30 DIAGNOSIS — R63.4 WEIGHT LOSS: ICD-10-CM

## 2023-03-30 DIAGNOSIS — I87.2 CHRONIC VENOUS INSUFFICIENCY: ICD-10-CM

## 2023-03-30 DIAGNOSIS — L03.116 CELLULITIS OF LEFT LOWER EXTREMITY: Primary | ICD-10-CM

## 2023-03-30 PROCEDURE — 1123F ACP DISCUSS/DSCN MKR DOCD: CPT | Performed by: STUDENT IN AN ORGANIZED HEALTH CARE EDUCATION/TRAINING PROGRAM

## 2023-03-30 PROCEDURE — G8427 DOCREV CUR MEDS BY ELIG CLIN: HCPCS | Performed by: STUDENT IN AN ORGANIZED HEALTH CARE EDUCATION/TRAINING PROGRAM

## 2023-03-30 PROCEDURE — G8482 FLU IMMUNIZE ORDER/ADMIN: HCPCS | Performed by: STUDENT IN AN ORGANIZED HEALTH CARE EDUCATION/TRAINING PROGRAM

## 2023-03-30 PROCEDURE — 3017F COLORECTAL CA SCREEN DOC REV: CPT | Performed by: STUDENT IN AN ORGANIZED HEALTH CARE EDUCATION/TRAINING PROGRAM

## 2023-03-30 PROCEDURE — G8417 CALC BMI ABV UP PARAM F/U: HCPCS | Performed by: STUDENT IN AN ORGANIZED HEALTH CARE EDUCATION/TRAINING PROGRAM

## 2023-03-30 PROCEDURE — 99213 OFFICE O/P EST LOW 20 MIN: CPT | Performed by: STUDENT IN AN ORGANIZED HEALTH CARE EDUCATION/TRAINING PROGRAM

## 2023-03-30 PROCEDURE — 1036F TOBACCO NON-USER: CPT | Performed by: STUDENT IN AN ORGANIZED HEALTH CARE EDUCATION/TRAINING PROGRAM

## 2023-03-30 ASSESSMENT — ENCOUNTER SYMPTOMS
SHORTNESS OF BREATH: 0
COLOR CHANGE: 1
COUGH: 0
WHEEZING: 0

## 2023-03-30 NOTE — PROGRESS NOTES
check and weight check. Disclaimer: Some orall of this note was transcribed using voice-recognition software. This may cause typographical errors occasionally. Although all effort is made to fix these errors, please do not hesitate to contact our office if there Arya Roque concern with the understanding of this note.

## 2023-04-03 ENCOUNTER — TELEPHONE (OUTPATIENT)
Dept: VASCULAR SURGERY | Age: 68
End: 2023-04-03

## 2023-04-07 DIAGNOSIS — E87.6 HYPOKALEMIA: ICD-10-CM

## 2023-04-07 DIAGNOSIS — R05.3 CHRONIC COUGH: ICD-10-CM

## 2023-04-07 DIAGNOSIS — R09.82 POST-NASAL DRIP: ICD-10-CM

## 2023-04-10 RX ORDER — POTASSIUM CHLORIDE 20 MEQ/1
TABLET, EXTENDED RELEASE ORAL
Qty: 30 TABLET | Refills: 1 | OUTPATIENT
Start: 2023-04-10

## 2023-04-10 RX ORDER — POTASSIUM CHLORIDE 20 MEQ/1
20 TABLET, EXTENDED RELEASE ORAL DAILY
Qty: 30 TABLET | Refills: 1 | OUTPATIENT
Start: 2023-04-10

## 2023-04-10 RX ORDER — LORATADINE 10 MG/1
TABLET ORAL
Qty: 30 TABLET | Refills: 0 | Status: SHIPPED | OUTPATIENT
Start: 2023-04-10 | End: 2023-05-26 | Stop reason: SDUPTHER

## 2023-04-12 NOTE — H&P
Julio Madrid, Tuan Frey, Twyla Cool, Desirae & William   Urology History & Physical      Patient:  Jerson Ennis  MRN: [id number]  YOB: 1955    HISTORY OF PRESENT ILLNESS:   The patient is a 74 y.o. male who presents with urinary urge incontinence refractory to medical treatment. He underwent cystoscopy Botox injection in the past with successful control of symptoms. Presents today again for Botox injection. Patient's old records, notes and chart reviewed and summarized above. Past Medical History:    Past Medical History:   Diagnosis Date    Acute CHF (congestive heart failure) ([facility] 03/08/2021    Arthritis     back    [facility] 10[facility]2019    Dr. Natalie Medel    BPH (benign prostatic hyperplasia)     Cellulitis     Cervical disc disease     CHF (congestive heart failure) [facility])     cardiologist Dr. Munira Cabrera. [facility] CHF clinic    Combined systolic and diastolic congestive heart failure ([facility] 01/15/2020    Coronary artery disease 10/2019     CABG 2019 [facility]    Dr. oRlando Waddell Cardiology last seen within the last year.     COVID-19 virus RNA test result indeterminate 03/31/2021    CPAP (continuous positive airway pressure) dependence     GERD (gastroesophageal reflux disease)     on rx    History of incarceration     released 2019    Hyperlipidemia     Dr. Heather Allen    Hypertension     [facility] CHF clinic     Dr. Heather Allen    Hypokalemia     Myocardial infarct [facility])     Dr. Heather Allen    Obesity     Overactive bladder     Sleep apnea     C-pap    Wears reading eyeglasses     Wellness examination     Dr. Josue Loaiza 4/2021       Past Surgical History:    Past Surgical History:   Procedure Laterality Date    ABDOMINAL EXPLORATION SURGERY      CARDIAC SURGERY      2019 - AtriClip 1.5 or 3T safe Immediately    CARDIOVERSION  10/16/2019    CARPAL TUNNEL RELEASE Bilateral 2008    CERVICAL SPINE SURGERY      2-5    COLONOSCOPY N/A 10/29/2020    COLONOSCOPY WITH BIOPSY performed by Alyse Payne

## 2023-04-13 RX ORDER — ANTIOX #8/OM3/DHA/EPA/LUT/ZEAX 250-2.5 MG
CAPSULE ORAL
COMMUNITY
Start: 2023-03-06 | End: 2023-04-13

## 2023-04-14 ENCOUNTER — HOSPITAL ENCOUNTER (OUTPATIENT)
Age: 68
Setting detail: OUTPATIENT SURGERY
Discharge: HOME | End: 2023-04-14
Attending: UROLOGY | Admitting: UROLOGY
Payer: MEDICARE

## 2023-04-14 VITALS
BODY MASS INDEX: 39.49 KG/M2 | TEMPERATURE: 97.5 F | RESPIRATION RATE: 17 BRPM | SYSTOLIC BLOOD PRESSURE: 121 MMHG | DIASTOLIC BLOOD PRESSURE: 76 MMHG | WEIGHT: 237 LBS | HEART RATE: 49 BPM | OXYGEN SATURATION: 98 % | HEIGHT: 65 IN

## 2023-04-14 PROCEDURE — 7100000040 HC SPAR PHASE II RECOVERY - FIRST 15 MIN: Performed by: UROLOGY

## 2023-04-14 PROCEDURE — 3600000002 HC SURGERY LEVEL 2 BASE: Performed by: UROLOGY

## 2023-04-14 PROCEDURE — 2580000003 HC RX 258: Performed by: UROLOGY

## 2023-04-14 PROCEDURE — 6360000002 HC RX W HCPCS: Performed by: UROLOGY

## 2023-04-14 PROCEDURE — 7100000041 HC SPAR PHASE II RECOVERY - ADDTL 15 MIN: Performed by: UROLOGY

## 2023-04-14 PROCEDURE — 2709999900 HC NON-CHARGEABLE SUPPLY: Performed by: UROLOGY

## 2023-04-14 PROCEDURE — 2500000003 HC RX 250 WO HCPCS: Performed by: UROLOGY

## 2023-04-14 PROCEDURE — 3600000012 HC SURGERY LEVEL 2 ADDTL 15MIN: Performed by: UROLOGY

## 2023-04-14 PROCEDURE — 6370000000 HC RX 637 (ALT 250 FOR IP): Performed by: UROLOGY

## 2023-04-14 RX ORDER — LIDOCAINE HYDROCHLORIDE 20 MG/ML
JELLY TOPICAL ONCE
Status: COMPLETED | OUTPATIENT
Start: 2023-04-14 | End: 2023-04-14

## 2023-04-14 RX ORDER — DIPHENHYDRAMINE HYDROCHLORIDE 50 MG/ML
12.5 INJECTION INTRAMUSCULAR; INTRAVENOUS
Status: CANCELLED | OUTPATIENT
Start: 2023-04-14 | End: 2023-04-15

## 2023-04-14 RX ORDER — METOCLOPRAMIDE HYDROCHLORIDE 5 MG/ML
10 INJECTION INTRAMUSCULAR; INTRAVENOUS
Status: CANCELLED | OUTPATIENT
Start: 2023-04-14 | End: 2023-04-15

## 2023-04-14 RX ORDER — MAGNESIUM HYDROXIDE 1200 MG/15ML
LIQUID ORAL CONTINUOUS PRN
Status: COMPLETED | OUTPATIENT
Start: 2023-04-14 | End: 2023-04-14

## 2023-04-14 RX ORDER — SODIUM CHLORIDE 0.9 % (FLUSH) 0.9 %
5-40 SYRINGE (ML) INJECTION PRN
Status: CANCELLED | OUTPATIENT
Start: 2023-04-14

## 2023-04-14 RX ORDER — MEPERIDINE HYDROCHLORIDE 50 MG/ML
12.5 INJECTION INTRAMUSCULAR; INTRAVENOUS; SUBCUTANEOUS EVERY 5 MIN PRN
Status: CANCELLED | OUTPATIENT
Start: 2023-04-14

## 2023-04-14 RX ORDER — DROPERIDOL 2.5 MG/ML
0.62 INJECTION, SOLUTION INTRAMUSCULAR; INTRAVENOUS
Status: CANCELLED | OUTPATIENT
Start: 2023-04-14 | End: 2023-04-15

## 2023-04-14 RX ORDER — PHENAZOPYRIDINE HYDROCHLORIDE 100 MG/1
100 TABLET, FILM COATED ORAL 3 TIMES DAILY PRN
Qty: 15 TABLET | Refills: 0 | Status: SHIPPED | OUTPATIENT
Start: 2023-04-14 | End: 2023-04-19

## 2023-04-14 RX ORDER — SODIUM CHLORIDE 0.9 % (FLUSH) 0.9 %
5-40 SYRINGE (ML) INJECTION EVERY 12 HOURS SCHEDULED
Status: CANCELLED | OUTPATIENT
Start: 2023-04-14

## 2023-04-14 RX ORDER — HYDRALAZINE HYDROCHLORIDE 20 MG/ML
10 INJECTION INTRAMUSCULAR; INTRAVENOUS
Status: CANCELLED | OUTPATIENT
Start: 2023-04-14

## 2023-04-14 RX ORDER — LIDOCAINE HYDROCHLORIDE 40 MG/ML
15 INJECTION, SOLUTION RETROBULBAR; TOPICAL ONCE
Status: COMPLETED | OUTPATIENT
Start: 2023-04-14 | End: 2023-04-14

## 2023-04-14 RX ORDER — SODIUM CHLORIDE 9 MG/ML
25 INJECTION, SOLUTION INTRAVENOUS PRN
Status: CANCELLED | OUTPATIENT
Start: 2023-04-14

## 2023-04-14 RX ADMIN — LIDOCAINE HYDROCHLORIDE 15 ML: 40 INJECTION, SOLUTION RETROBULBAR; TOPICAL at 14:30

## 2023-04-14 RX ADMIN — LIDOCAINE HYDROCHLORIDE: 20 JELLY TOPICAL at 14:38

## 2023-04-14 ASSESSMENT — PAIN - FUNCTIONAL ASSESSMENT
PAIN_FUNCTIONAL_ASSESSMENT: 0-10
PAIN_FUNCTIONAL_ASSESSMENT: 0-10

## 2023-04-14 ASSESSMENT — PAIN SCALES - GENERAL
PAINLEVEL_OUTOF10: 3
PAINLEVEL_OUTOF10: 2

## 2023-04-14 NOTE — DISCHARGE INSTRUCTIONS
Pt ok to discharge home in good condition  No heavy lifting, >10 lbs for today  Pt should avoid strenuous activity for today  Pt should walk moderately at home  Pt ok to shower   Pt may resume diet as tolerated  Please call attending physician or hospital  with questions  Call or Present to ED if fever (> 101F), intractable nausea vomiting or pain.   Rx in chart     Pt should follow up with Dr. Jesus Alberto Fleming, in 4 weeks, call to confirm appointment for Post Void Residual check

## 2023-04-18 NOTE — OP NOTE
FACILITY: [address] [place] [address]  1955  [id number]    DATE:  04/18/23  SURGEON:  Dr. Dorian Licona MD , M.D.  [place]:  Dr. Dorian Licona MD M.D. Preoperative diagnosis: overactive bladder  Postoperative diagnosis: Same  Procedure: Cystoscopy Botox injection (200 units)  Anesthesia: Local  Specimen: none  Drains: None  Follow-up: 4-6 weeks     Indications: Joann Mccarthy is a 74 y.o. male with history of overactive bladder. The patient is here for Cystoscopy Botox injection, and has had botox before. Risks benefits alternatives goals and possible complications of the procedure were explained to the patient and informed consent was obtained he elected to proceed. Details of procedure: The patient was brought back to the operating room. They were  laid in the supine position and Urojet lidocaine jelly was injected. The genitals were prepped and draped in usual sterile surgical fashion after being placed in dorsal lithotomy position. A timeout was taken per protocol with everyone in agreement. Flex cystoscope was assembled  and passed per the urethra. The urethra was normal without any lesions. The bladder was entered with ease and inspected thoroughly and systematically which did not show any lesions or tumors, stone, fistulous tracts, diverticula. Both ureteral orifices were normal with clear efflux of urine. At this time we mixed 200 units of botox, and injected the solution into the bladder wall with 0.5 ml increments at each site along the posterior bladder wall. Hemostasis was persistent. The trigone and ureteral orifices were avoided. The patient tolerated the procedure well. The scope was removed intact and without any issues. This concluded the case. The patient was taken to recovery period and discharged home in stable condition. Plan:   They will be asked to void before discharge  Follow up in 4-6 weeks

## 2023-04-19 ENCOUNTER — HOSPITAL ENCOUNTER (OUTPATIENT)
Dept: OCCUPATIONAL THERAPY | Age: 68
Setting detail: THERAPIES SERIES
Discharge: HOME | End: 2023-04-19
Payer: MEDICARE

## 2023-04-19 PROCEDURE — 97535 SELF CARE MNGMENT TRAINING: CPT

## 2023-04-19 PROCEDURE — 97165 OT EVAL LOW COMPLEX 30 MIN: CPT

## 2023-04-19 NOTE — CONSULTS
bilateral Lower Extremity Lymphedema and other significant dx include recent cellulitis, chronic venous stasis, CHF. He has a hx of lymphedema treatment in 2021. Presents at this eval with wound to left anterior calf. Referred to Shireen Lopez. 56.. Advised to make an appt ASAP and that he could self-refer. Recommended podiatrists appt to address B LE nail trimming and care. Sleeps sitting up in chair. Moves  homes frequently. Relevant provider notes from 11/17/2021 [place] OT:   Patient is a 61year old male referred to the Lymphedema Clinic with a diagnosis of bilateral Lower Extremity Lymphedema. Pt states that in 2010, while he was incarcerated he started to get a red ring around his leg. During this time he tried to stay active, however he was limited. It was shortly after that, that it then broke open. During this same time he also started to loose function of his L LE. He noticed it was getting weak and he was no longer able to lead with his L leg when he was stepping or walking. He also noticed edema starting at this time. He noticed it would get worse, before it would get better. He was released in 2019 and staying in a jail house. A supervisor saw his leg at this time and he was taken to the clinic to have it checked out. His HR was 146 BMP, from there he was sent to the ER and he had emergency heart surgery for fixing the  maker. Pt states that he did not notice any difference in the edema after the surgery. He has attempted to wear compression stockings ever since and also trying to elevate his LE. He will notice that the edema will improve when he lays down flat now/ he does not have to elevate. As of recently he started to get a wound on the back of his L calf. He had another infection. He was sent to the wound care clinic where they helped him heal up his wounds before referring to us.        Pt just received new compression stockings and also received the

## 2023-04-20 ENCOUNTER — HOSPITAL ENCOUNTER (OUTPATIENT)
Dept: VASCULAR LAB | Age: 68
Discharge: HOME | End: 2023-04-20
Payer: MEDICARE

## 2023-04-20 PROCEDURE — 93970 EXTREMITY STUDY: CPT

## 2023-04-24 ENCOUNTER — HOSPITAL ENCOUNTER (OUTPATIENT)
Dept: WOUND CARE | Age: 68
Discharge: HOME | End: 2023-04-24

## 2023-04-25 ENCOUNTER — INITIAL CONSULT (OUTPATIENT)
Dept: VASCULAR SURGERY | Age: 68
End: 2023-04-25
Payer: MEDICARE

## 2023-04-25 VITALS
HEIGHT: 65 IN | SYSTOLIC BLOOD PRESSURE: 102 MMHG | DIASTOLIC BLOOD PRESSURE: 65 MMHG | RESPIRATION RATE: 17 BRPM | HEART RATE: 82 BPM | BODY MASS INDEX: 39.49 KG/M2 | WEIGHT: 237 LBS | TEMPERATURE: 97.2 F | OXYGEN SATURATION: 97 %

## 2023-04-25 DIAGNOSIS — I89.0 LYMPHEDEMA OF BOTH LOWER EXTREMITIES: ICD-10-CM

## 2023-04-25 DIAGNOSIS — I87.2 VENOUS INSUFFICIENCY OF BOTH LOWER EXTREMITIES: Primary | ICD-10-CM

## 2023-04-25 PROCEDURE — 1036F TOBACCO NON-USER: CPT | Performed by: STUDENT IN AN ORGANIZED HEALTH CARE EDUCATION/TRAINING PROGRAM

## 2023-04-25 PROCEDURE — 99204 OFFICE O/P NEW MOD 45 MIN: CPT | Performed by: STUDENT IN AN ORGANIZED HEALTH CARE EDUCATION/TRAINING PROGRAM

## 2023-04-25 PROCEDURE — 3017F COLORECTAL CA SCREEN DOC REV: CPT | Performed by: STUDENT IN AN ORGANIZED HEALTH CARE EDUCATION/TRAINING PROGRAM

## 2023-04-25 PROCEDURE — 1124F ACP DISCUSS-NO DSCNMKR DOCD: CPT | Performed by: STUDENT IN AN ORGANIZED HEALTH CARE EDUCATION/TRAINING PROGRAM

## 2023-04-25 PROCEDURE — G8417 CALC BMI ABV UP PARAM F/U: HCPCS | Performed by: STUDENT IN AN ORGANIZED HEALTH CARE EDUCATION/TRAINING PROGRAM

## 2023-04-25 PROCEDURE — G8427 DOCREV CUR MEDS BY ELIG CLIN: HCPCS | Performed by: STUDENT IN AN ORGANIZED HEALTH CARE EDUCATION/TRAINING PROGRAM

## 2023-04-25 ASSESSMENT — ENCOUNTER SYMPTOMS
ABDOMINAL PAIN: 0
COLOR CHANGE: 0
VOMITING: 0
TROUBLE SWALLOWING: 0
VOICE CHANGE: 0
ABDOMINAL DISTENTION: 0
CHEST TIGHTNESS: 0
EYE PAIN: 0
COUGH: 0

## 2023-04-27 ENCOUNTER — HOSPITAL ENCOUNTER (OUTPATIENT)
Dept: WOUND CARE | Age: 68
Discharge: HOME | End: 2023-04-27
Payer: MEDICARE

## 2023-04-27 VITALS
RESPIRATION RATE: 18 BRPM | BODY MASS INDEX: 38.82 KG/M2 | WEIGHT: 233 LBS | TEMPERATURE: 97.5 F | HEIGHT: 65 IN | DIASTOLIC BLOOD PRESSURE: 53 MMHG | HEART RATE: 66 BPM | SYSTOLIC BLOOD PRESSURE: 112 MMHG

## 2023-04-27 DIAGNOSIS — I83.022 VENOUS STASIS ULCER OF LEFT CALF WITH FAT LAYER EXPOSED, UNSPECIFIED WHETHER VARICOSE VEINS PRESENT (HCC): Primary | ICD-10-CM

## 2023-04-27 DIAGNOSIS — I83.012 VENOUS STASIS ULCER OF RIGHT CALF WITH FAT LAYER EXPOSED, UNSPECIFIED WHETHER VARICOSE VEINS PRESENT (HCC): ICD-10-CM

## 2023-04-27 DIAGNOSIS — L97.222 VENOUS STASIS ULCER OF LEFT CALF WITH FAT LAYER EXPOSED, UNSPECIFIED WHETHER VARICOSE VEINS PRESENT (HCC): Primary | ICD-10-CM

## 2023-04-27 DIAGNOSIS — L97.212 VENOUS STASIS ULCER OF RIGHT CALF WITH FAT LAYER EXPOSED, UNSPECIFIED WHETHER VARICOSE VEINS PRESENT (HCC): ICD-10-CM

## 2023-04-27 PROCEDURE — 99214 OFFICE O/P EST MOD 30 MIN: CPT

## 2023-04-27 PROCEDURE — 99213 OFFICE O/P EST LOW 20 MIN: CPT

## 2023-04-27 PROCEDURE — 11042 DBRDMT SUBQ TIS 1ST 20SQCM/<: CPT

## 2023-04-27 PROCEDURE — 29580 STRAPPING UNNA BOOT: CPT

## 2023-04-27 RX ORDER — LIDOCAINE HYDROCHLORIDE 20 MG/ML
JELLY TOPICAL ONCE
OUTPATIENT
Start: 2023-04-27 | End: 2023-04-27

## 2023-04-27 RX ORDER — BETAMETHASONE DIPROPIONATE 0.05 %
OINTMENT (GRAM) TOPICAL ONCE
OUTPATIENT
Start: 2023-04-27 | End: 2023-04-27

## 2023-04-27 RX ORDER — BACITRACIN, NEOMYCIN, POLYMYXIN B 400; 3.5; 5 [USP'U]/G; MG/G; [USP'U]/G
OINTMENT TOPICAL ONCE
OUTPATIENT
Start: 2023-04-27 | End: 2023-04-27

## 2023-04-27 RX ORDER — LIDOCAINE 40 MG/G
CREAM TOPICAL ONCE
OUTPATIENT
Start: 2023-04-27 | End: 2023-04-27

## 2023-04-27 RX ORDER — GINSENG 100 MG
CAPSULE ORAL ONCE
OUTPATIENT
Start: 2023-04-27 | End: 2023-04-27

## 2023-04-27 RX ORDER — LIDOCAINE HYDROCHLORIDE 40 MG/ML
SOLUTION TOPICAL ONCE
OUTPATIENT
Start: 2023-04-27 | End: 2023-04-27

## 2023-04-27 RX ORDER — CLOBETASOL PROPIONATE 0.5 MG/G
OINTMENT TOPICAL ONCE
OUTPATIENT
Start: 2023-04-27 | End: 2023-04-27

## 2023-04-27 RX ORDER — BACITRACIN ZINC AND POLYMYXIN B SULFATE 500; 1000 [USP'U]/G; [USP'U]/G
OINTMENT TOPICAL ONCE
OUTPATIENT
Start: 2023-04-27 | End: 2023-04-27

## 2023-04-27 RX ORDER — GENTAMICIN SULFATE 1 MG/G
OINTMENT TOPICAL ONCE
OUTPATIENT
Start: 2023-04-27 | End: 2023-04-27

## 2023-04-27 RX ORDER — LIDOCAINE 50 MG/G
OINTMENT TOPICAL ONCE
OUTPATIENT
Start: 2023-04-27 | End: 2023-04-27

## 2023-04-27 ASSESSMENT — PAIN SCALES - GENERAL: PAINLEVEL_OUTOF10: 0

## 2023-04-27 NOTE — PROGRESS NOTES
Compression [address] for Compression Stockings:     [employer] [id number] [address] p: [phone] f: [phone]     Ordering Center:     [facility]  Jimi Hernandez [address] [facility]  [phone]  WOUND CARE Dept: [address] NUMBER [phone]    Patient Information:      Castillo Mtz  [address] [facility]  [address]   [phone]   : 1955  AGE: 74 y.o. GENDER: male   TODAYS DATE:  2023    Insurance:      PRIMARY INSURANCE:  Plan: Bella Brown COMPLETE  Coverage: [facility] MEDICARE  Effective Date: 3/1/2023  Group Number: [unfilled]  Subscriber Number: [id number] - (Medicare Managed)    Payer/Plan Subscr  Sex Relation Sub. Ins. ID Effective Group Num   1. [address] Olvin Font H 1955 Male Self [id number] 3/1/23 [id number]                                   [address] [address]   2.  [place] 1955 Male Self [id number]                                    [address]       Patient Information:      Problem List Items Addressed This Visit          Other    Venous stasis ulcer of left calf with fat layer exposed ([address]    Venous stasis ulcer of right calf with fat layer exposed ([address]       Wound 23 Pretibial Left #1 (Active)   Wound Etiology Venous 23 1326   Dressing Status New drainage noted 23 1326   Wound Cleansed Irrigated with saline 23 1326   Post-Procedure Length (cm) 0.5 cm 23 1326   Post-Procedure Width (cm) 0.6 cm 23 1326   Post-Procedure Depth (cm) 0.1 cm 23 132   Post-Procedure Surface Area (cm^2) 0.3 cm^2 23 1326   Post-Procedure Volume (cm^3) 0.03 cm^3 23 1326   Wound Assessment Pink/red;Bleeding 23 132   Drainage Amount Moderate 23 132   Drainage Description Serosanguinous 23 1326   Odor None 23 132   Jennifer-wound Assessment Blanchable erythema 23 1326   Margins
[place] Application   Below Knee    NAME:  Je Abbott OF BIRTH:  1955  MEDICAL RECORD NUMBER:  [id number]  DATE:  4/27/2023    [x] Applied moisturizing agent to dry skin as needed. [x] Appied primary and secondary dressing as ordered    [x] Applied Unna roll from toes to knee overlapping each time. [x] Applied ace wrap or coban from toes to below the knee. [x] Secured with tape and/or metal clips covered with tape. [x] Instructed patient/caregiver to keep dressing dry and intact. DO NOT REMOVE DRESSING. [x] Instructed pt/family/caregiver to report excessive draining, loose bandage, wet dressing, severe pain or tingling in toes. [x] Applied [place] dressing below the knee to Bilateral lower leg(s)       Unna Boot(s) were applied per  Guidelines.      Electronically signed by Robert Del Angel RN on 4/27/2023 at 2:08 PM
Post-Procedure Depth (cm) 0.2 cm 04/27/23 1326   Post-Procedure Surface Area (cm^2) 0.04 cm^2 04/27/23 1326   Post-Procedure Volume (cm^3) 0.008 cm^3 04/27/23 1326   Wound Assessment Pink/red 04/27/23 1326   Drainage Amount Moderate 04/27/23 1326   Drainage Description Serous 04/27/23 1326   Odor None 04/27/23 1326   Jennifer-wound Assessment Blanchable erythema 04/27/23 1326   Margins Defined edges 04/27/23 1326   Wound Thickness Description not for Pressure Injury Full thickness 04/27/23 1326   Number of days: 0          Percent of Wound(s)/Ulcer(s) Debrided: 100%    Total Surface Area Debrided:  0.34 sq cm     Diabetic/Pressure/Non Pressure Ulcers only:  Ulcer: Non-Pressure ulcer, fat layer exposed      Estimated Blood Loss:  Minimal    Hemostasis Achieved:  by pressure    Procedural Pain:  2  / 10     Post Procedural Pain:  0 / 10     Response to treatment:  Well tolerated by patient. Addressed wound healing factors:    [x] Diabetes: n/a       [x] CHF: yes, goes to CHF clinic monthly        [x] Infection: n/a       [x] Debridement: yes, both lower leg wounds  [x] Compliance; compression/offloading: not currently wearing compression, but willing to    [x] Diagnostics: venous study done and managed by Dr. Cedrick Devlin  [x] Smoking: no             Plan:     -Begin bilateral unna boot     -Order juxtalite compression     -Encouraged compliance with diuretic     -Leg elevation, sleeping in bed instead of chair, CMP exercises encouraged.    -I have reviewed instructions with the patient, answering all questions to satisfaction.    -Patient to call or return to clinic as needed if wound symptoms worsen or fail to improve as anticipated.    -See Discharge Instructions for wound management orders. Written patient dismissal instructions given to patient and signed by patient or POA.            Electronically signed by LUDY Candelario CNP on 4/27/2023 at 3:46 PM

## 2023-04-27 NOTE — DISCHARGE INSTRUCTIONS
[address] [facility],5Th Floor -Phone: [phone] Fax: [phone]   Visit  Discharge Instructions / Physician Orders    DATE:     Home Care: n/a     SUPPLIES ORDERED THRU: n/a     Wound Location: Bilateral lower legs     Cleanse with: Liquid antibacterial soap and water, rinse well      Dressing Orders:  Fibracol, silvercel, calamine unna boot. Frequency: keep dry and intact     Additional Orders: Increase protein to diet (meat, cheese, eggs, fish, peanut butter, nuts and beans)  ELEVATE LEGS AS MUCH AS POSSIBLE    Your next appointment with [email or web address] is in 1 week     (Please note your next appointment above and if you are unable to keep, kindly give a 24 hour notice. Thank you.)  If more than 15 min late we cannot guarantee you will be seen due to clinician schedule  Per Policy, Excessive cancellation will call for dismissal from program.     If you experience any of the following, please call the [employer] during business hours:  [phone]  Your Phone call may be forwarded to [employer] during business hours that [employer] is closed. * Increase in Pain  * Temperature over 101  * Increase in drainage from your wound  * Drainage with a foul odor  * Bleeding  * Increase in swelling  * Need for compression bandage changes due to slippage, breakthrough drainage. If you need medical attention outside of the business hours of the [employer] please contact your PCP or go to the nearest emergency room. The information contained in the After Visit Summary has been reviewed with me, the patient and/or responsible adult, by my health care provider(s). I had the opportunity to ask questions regarding this information.  I have elected to receive;      []After Visit Summary  [x]Comprehensive Discharge Instruction      Patient signature______________________________________Date:________   Electronically signed by Tod Perry RN on 4/27/2023 at 1:43 PM

## 2023-05-01 ENCOUNTER — HOSPITAL ENCOUNTER (OUTPATIENT)
Dept: WOUND CARE | Age: 68
Discharge: HOME | End: 2023-05-01
Payer: MEDICARE

## 2023-05-01 VITALS
RESPIRATION RATE: 16 BRPM | HEART RATE: 70 BPM | BODY MASS INDEX: 38.77 KG/M2 | SYSTOLIC BLOOD PRESSURE: 107 MMHG | DIASTOLIC BLOOD PRESSURE: 57 MMHG | TEMPERATURE: 96.6 F | WEIGHT: 233 LBS

## 2023-05-01 DIAGNOSIS — L97.222 VENOUS STASIS ULCER OF LEFT CALF WITH FAT LAYER EXPOSED, UNSPECIFIED WHETHER VARICOSE VEINS PRESENT (HCC): Primary | ICD-10-CM

## 2023-05-01 DIAGNOSIS — I83.012 VENOUS STASIS ULCER OF RIGHT CALF WITH FAT LAYER EXPOSED, UNSPECIFIED WHETHER VARICOSE VEINS PRESENT (HCC): ICD-10-CM

## 2023-05-01 DIAGNOSIS — I83.022 VENOUS STASIS ULCER OF LEFT CALF WITH FAT LAYER EXPOSED, UNSPECIFIED WHETHER VARICOSE VEINS PRESENT (HCC): Primary | ICD-10-CM

## 2023-05-01 DIAGNOSIS — L97.212 VENOUS STASIS ULCER OF RIGHT CALF WITH FAT LAYER EXPOSED, UNSPECIFIED WHETHER VARICOSE VEINS PRESENT (HCC): ICD-10-CM

## 2023-05-01 PROCEDURE — 99213 OFFICE O/P EST LOW 20 MIN: CPT

## 2023-05-01 PROCEDURE — 99213 OFFICE O/P EST LOW 20 MIN: CPT | Performed by: NURSE PRACTITIONER

## 2023-05-01 RX ORDER — BACITRACIN, NEOMYCIN, POLYMYXIN B 400; 3.5; 5 [USP'U]/G; MG/G; [USP'U]/G
OINTMENT TOPICAL ONCE
OUTPATIENT
Start: 2023-05-01 | End: 2023-05-01

## 2023-05-01 RX ORDER — BACITRACIN ZINC AND POLYMYXIN B SULFATE 500; 1000 [USP'U]/G; [USP'U]/G
OINTMENT TOPICAL ONCE
OUTPATIENT
Start: 2023-05-01 | End: 2023-05-01

## 2023-05-01 RX ORDER — GENTAMICIN SULFATE 1 MG/G
OINTMENT TOPICAL ONCE
OUTPATIENT
Start: 2023-05-01 | End: 2023-05-01

## 2023-05-01 RX ORDER — LIDOCAINE HYDROCHLORIDE 20 MG/ML
JELLY TOPICAL ONCE
Status: DISCONTINUED | OUTPATIENT
Start: 2023-05-01 | End: 2023-05-02 | Stop reason: HOSPADM

## 2023-05-01 RX ORDER — LIDOCAINE HYDROCHLORIDE 20 MG/ML
JELLY TOPICAL ONCE
OUTPATIENT
Start: 2023-05-01 | End: 2023-05-01

## 2023-05-01 RX ORDER — LIDOCAINE 50 MG/G
OINTMENT TOPICAL ONCE
OUTPATIENT
Start: 2023-05-01 | End: 2023-05-01

## 2023-05-01 RX ORDER — LIDOCAINE 40 MG/G
CREAM TOPICAL ONCE
OUTPATIENT
Start: 2023-05-01 | End: 2023-05-01

## 2023-05-01 RX ORDER — LIDOCAINE HYDROCHLORIDE 40 MG/ML
SOLUTION TOPICAL ONCE
OUTPATIENT
Start: 2023-05-01 | End: 2023-05-01

## 2023-05-01 RX ORDER — BETAMETHASONE DIPROPIONATE 0.05 %
OINTMENT (GRAM) TOPICAL ONCE
OUTPATIENT
Start: 2023-05-01 | End: 2023-05-01

## 2023-05-01 RX ORDER — ISOSORBIDE MONONITRATE 30 MG/1
30 TABLET, EXTENDED RELEASE ORAL DAILY
COMMUNITY
Start: 2023-04-18

## 2023-05-01 RX ORDER — CLOBETASOL PROPIONATE 0.5 MG/G
OINTMENT TOPICAL ONCE
OUTPATIENT
Start: 2023-05-01 | End: 2023-05-01

## 2023-05-01 RX ORDER — GINSENG 100 MG
CAPSULE ORAL ONCE
OUTPATIENT
Start: 2023-05-01 | End: 2023-05-01

## 2023-05-01 ASSESSMENT — ENCOUNTER SYMPTOMS
VOMITING: 0
DIARRHEA: 0
SHORTNESS OF BREATH: 0
RHINORRHEA: 0
COUGH: 0
NAUSEA: 0

## 2023-05-01 NOTE — PROGRESS NOTES
[place] Application   Below Knee    NAME:  Ez Justin  YOB: 1955  MEDICAL RECORD NUMBER:  [id number]  DATE:  5/1/2023    [x] Applied moisturizing agent to dry skin as needed. [x] Appied primary and secondary dressing as ordered    [x] Applied Unna roll from toes to knee overlapping each time. [x] Applied ace wrap or coban from toes to below the knee. [x] Secured with tape and/or metal clips covered with tape. [x] Instructed patient/caregiver to keep dressing dry and intact. DO NOT REMOVE DRESSING. [x] Instructed pt/family/caregiver to report excessive draining, loose bandage, wet dressing, severe pain or tingling in toes. [x] Applied [place] dressing below the knee to Bilateral lower leg(s)       Unna Boot(s) were applied per  Guidelines.      Electronically signed by Rudi Olea RN on 5/1/2023 at 2:03 PM

## 2023-05-01 NOTE — PROGRESS NOTES
[place] Application   Below Knee    NAME:  Marlin Andino  YOB: 1955  MEDICAL RECORD NUMBER:  [id number]  DATE:  5/1/2023    [x] Applied moisturizing agent to dry skin as needed. [x] Appied primary and secondary dressing as ordered    [x] Applied Unna roll from toes to knee overlapping each time. [x] Applied ace wrap or coban from toes to below the knee. [x] Secured with tape and/or metal clips covered with tape. [x] Instructed patient/caregiver to keep dressing dry and intact. DO NOT REMOVE DRESSING. [x] Instructed pt/family/caregiver to report excessive draining, loose bandage, wet dressing, severe pain or tingling in toes. [x] Applied [place] dressing below the knee to Bilateral lower leg(s)       Unna Boot(s) were applied per  Guidelines.      Electronically signed by Gladys Kilpatrick RN on 5/1/2023 at 1:50 PM

## 2023-05-01 NOTE — DISCHARGE INSTRUCTIONS
[address] [facility],5Th Floor -Phone: [phone] Fax: [phone]    Visit  Discharge Instructions / Physician Orders     DATE: 5/1/2023     Home Care: N/A     SUPPLIES ORDERED THRU: N/A     Wound Location: Bilateral lower legs     Cleanse with: Keep Dry and Intact     Dressing Orders:  calamine unna boots     Frequency: keep dry and intact     Additional Orders: Increase protein to diet (meat, cheese, eggs, fish, peanut butter, nuts and beans)  ELEVATE LEGS AS MUCH AS POSSIBLE     [facility] is in 1 week with Johanna Haley     (Please note your next appointment above and if you are unable to keep, kindly give a 24 hour notice. Thank you.)  If more than 15 min late we cannot guarantee you will be seen due to clinician schedule  Per Policy, Excessive cancellation will call for dismissal from program.     If you experience any of the following, please call the [employer] [employer] during business hours:  [phone]  Your Phone call may be forwarded to [employer] during business hours that [employer] is closed. * Increase in Pain  * Temperature over 101  * Increase in drainage from your wound  * Drainage with a foul odor  * Bleeding  * Increase in swelling  * Need for compression bandage changes due to slippage, breakthrough drainage. If you need medical attention outside of the business hours of the [employer] [employer] please contact your PCP or go to the nearest emergency room. The information contained in the After Visit Summary has been reviewed with me, the patient and/or responsible adult, by my health care provider(s). I had the opportunity to ask questions regarding this information.  I have elected to receive;      []After Visit Summary  [x]Comprehensive Discharge Instruction        Patient signature______________________________________Date:________   Electronically signed by Ethan Riley RN on 5/1/2023 at 1:38 PM   Electronically signed by Arthur Dai

## 2023-05-01 NOTE — PROGRESS NOTES
[address]   Progress Note and Procedure Note      [address] [facility] RECORD NUMBER:  [id number]  AGE: 74 y.o. GENDER: male  : 1955  EPISODE DATE:  2023    Subjective:     Chief Complaint   Patient presents with    Wound Check     legs         HISTORY of PRESENT ILLNESS HPI     Augustina Healy is a 74 y.o. male who presents today for wound/ulcer evaluation. History of Wound Context: here to follow up on bilateral lower leg ulcerations that have healed since starting unna boot last week. Ordered juxta lite wraps however he has not gotten them yet. Will wrap one more time and have him bring wraps with him to next appointment to transition to if ulcerations remain healed.    Wound/Ulcer Pain Timing/Severity: none  Quality of pain: N/A  Severity:  0 / 10   Modifying Factors: None  Associated Signs/Symptoms: none    Ulcer Identification:  Ulcer Type: venous  Contributing Factors: edema, venous stasis, lymphedema, decreased mobility, and obesity    Wound: N/A        PAST MEDICAL HISTORY        Diagnosis Date    Arthritis     back    Atrial fibrillation (HCC)     BPH (benign prostatic hyperplasia)     Cellulitis 2023    left leg    Cervical disc disease     Chronic venous insufficiency     Combined systolic and diastolic congestive heart failure ([facility] 01/15/2020    Coronary artery disease 10/2019    s/p CABG x 1 ; RBI GARDUNO II.VIERTEL to LAD    GERD (gastroesophageal reflux disease)     on rx    History of incarceration     released     History of recent hospitalization 2023    til 23 with Cellulitis    Hyperlipidemia     Hypertension     Ischemic cardiomyopathy     Myocardial infarct [facility])     Obesity     Overactive bladder     Sleep apnea treated with continuous positive airway pressure (CPAP)     Under care of team     Urology, Dr. Deborah Liriano, last seen 3/10/2023    Under care of team     GI, dr. Mitra Dodd, last seen 2022    Under care of team     Cardiology,

## 2023-05-04 DIAGNOSIS — K21.9 GASTROESOPHAGEAL REFLUX DISEASE WITHOUT ESOPHAGITIS: ICD-10-CM

## 2023-05-04 DIAGNOSIS — E87.6 HYPOKALEMIA: ICD-10-CM

## 2023-05-04 DIAGNOSIS — N39.490 OVERFLOW INCONTINENCE OF URINE: ICD-10-CM

## 2023-05-04 DIAGNOSIS — I50.42 HEART FAILURE, SYSTOLIC AND DIASTOLIC, CHRONIC (HCC): ICD-10-CM

## 2023-05-04 RX ORDER — TAMSULOSIN HYDROCHLORIDE 0.4 MG/1
0.4 CAPSULE ORAL DAILY
Qty: 30 CAPSULE | Refills: 5 | Status: SHIPPED | OUTPATIENT
Start: 2023-05-04

## 2023-05-04 RX ORDER — FUROSEMIDE 40 MG/1
60 TABLET ORAL 2 TIMES DAILY
Qty: 90 TABLET | Refills: 2 | Status: SHIPPED | OUTPATIENT
Start: 2023-05-04

## 2023-05-04 RX ORDER — POTASSIUM CHLORIDE 20 MEQ/1
TABLET, EXTENDED RELEASE ORAL
Qty: 30 TABLET | Refills: 1 | Status: SHIPPED | OUTPATIENT
Start: 2023-05-04

## 2023-05-04 NOTE — TELEPHONE ENCOUNTER
E-scribe request for Lasix and Klor-Con . Please review and e-scribe if applicable.      Last Visit Date:  [id number]  Next Visit Date:  6/2/2023    Hemoglobin A1C (%)   Date Value   04/13/2023 5.5   12/10/2019 5.6   10/18/2019 5.8             ( goal A1C is < 7)   No results found for: LABMICR  LDL Cholesterol (mg/dL)   Date Value   03/29/2023 131 (H)       (goal LDL is <100)   AST (U/L)   Date Value   03/29/2023 26     ALT (U/L)   Date Value   03/29/2023 19     BUN (mg/dL)   Date Value   02/09/2023 16     BP Readings from Last 3 Encounters:   05/01/23 (!) 107/57   04/27/23 (!) 112/53   04/26/23 98/66          (goal 120/80)        Patient Active Problem List:     Atrial flutter (HCC)     Sleep-related breathing disorder     Hypokalemia     Atrial fibrillation (HCC)     Ischemic cardiomyopathy     Acute cystitis without hematuria     Hx of CABG     Chronic diastolic (congestive) heart failure (HCC)     Bilateral hand numbness     Right thigh pain     Left leg weakness     Coronary artery disease     Chronic cough     Gastroesophageal reflux disease without esophagitis     Overflow incontinence of urine     Polyp of colon     Post-void dribbling     Post-nasal drip     SYDNEY (acute kidney injury) ([address]     Hyperkalemia     Hypotension     Overactive bladder     Hemorrhoids     HARPREET (obstructive sleep apnea)     Laryngopharyngeal reflux     Skin ulcer of left lower leg, limited to breakdown of skin ([address]     Bilateral edema of lower extremity     Lymphedema of both lower extremities     Venous insufficiency of both lower extremities     Acute pain of right knee     Finger laceration, subsequent encounter     Chronic venous stasis     Balance problem     Left leg pain     Cellulitis     Venous stasis ulcer of left calf with fat layer exposed ([address]     Venous stasis ulcer of right calf with fat layer exposed ([address]

## 2023-05-05 RX ORDER — PANTOPRAZOLE SODIUM 40 MG/1
TABLET, DELAYED RELEASE ORAL
Qty: 60 TABLET | Refills: 5 | Status: SHIPPED | OUTPATIENT
Start: 2023-05-05

## 2023-05-11 ENCOUNTER — HOSPITAL ENCOUNTER (OUTPATIENT)
Dept: WOUND CARE | Age: 68
Discharge: HOME | End: 2023-05-11
Payer: MEDICARE

## 2023-05-11 ENCOUNTER — HOSPITAL ENCOUNTER (OUTPATIENT)
Dept: OTHER | Age: 68
Discharge: HOME | End: 2023-05-11
Payer: MEDICARE

## 2023-05-11 VITALS
OXYGEN SATURATION: 98 % | BODY MASS INDEX: 39.44 KG/M2 | DIASTOLIC BLOOD PRESSURE: 72 MMHG | WEIGHT: 237 LBS | RESPIRATION RATE: 16 BRPM | SYSTOLIC BLOOD PRESSURE: 114 MMHG | HEART RATE: 74 BPM

## 2023-05-11 DIAGNOSIS — I83.012 VENOUS STASIS ULCER OF RIGHT CALF WITH FAT LAYER EXPOSED, UNSPECIFIED WHETHER VARICOSE VEINS PRESENT (HCC): ICD-10-CM

## 2023-05-11 DIAGNOSIS — L97.222 VENOUS STASIS ULCER OF LEFT CALF WITH FAT LAYER EXPOSED, UNSPECIFIED WHETHER VARICOSE VEINS PRESENT (HCC): Primary | ICD-10-CM

## 2023-05-11 DIAGNOSIS — I83.022 VENOUS STASIS ULCER OF LEFT CALF WITH FAT LAYER EXPOSED, UNSPECIFIED WHETHER VARICOSE VEINS PRESENT (HCC): Primary | ICD-10-CM

## 2023-05-11 DIAGNOSIS — L97.212 VENOUS STASIS ULCER OF RIGHT CALF WITH FAT LAYER EXPOSED, UNSPECIFIED WHETHER VARICOSE VEINS PRESENT (HCC): ICD-10-CM

## 2023-05-11 PROCEDURE — 99212 OFFICE O/P EST SF 10 MIN: CPT

## 2023-05-11 PROCEDURE — 99211 OFF/OP EST MAY X REQ PHY/QHP: CPT

## 2023-05-11 RX ORDER — LIDOCAINE 40 MG/G
CREAM TOPICAL ONCE
OUTPATIENT
Start: 2023-05-11 | End: 2023-05-11

## 2023-05-11 RX ORDER — GINSENG 100 MG
CAPSULE ORAL ONCE
OUTPATIENT
Start: 2023-05-11 | End: 2023-05-11

## 2023-05-11 RX ORDER — BACITRACIN, NEOMYCIN, POLYMYXIN B 400; 3.5; 5 [USP'U]/G; MG/G; [USP'U]/G
OINTMENT TOPICAL ONCE
OUTPATIENT
Start: 2023-05-11 | End: 2023-05-11

## 2023-05-11 RX ORDER — LIDOCAINE HYDROCHLORIDE 40 MG/ML
SOLUTION TOPICAL ONCE
OUTPATIENT
Start: 2023-05-11 | End: 2023-05-11

## 2023-05-11 RX ORDER — LIDOCAINE HYDROCHLORIDE 20 MG/ML
JELLY TOPICAL ONCE
OUTPATIENT
Start: 2023-05-11 | End: 2023-05-11

## 2023-05-11 RX ORDER — CLOBETASOL PROPIONATE 0.5 MG/G
OINTMENT TOPICAL ONCE
OUTPATIENT
Start: 2023-05-11 | End: 2023-05-11

## 2023-05-11 RX ORDER — BACITRACIN ZINC AND POLYMYXIN B SULFATE 500; 1000 [USP'U]/G; [USP'U]/G
OINTMENT TOPICAL ONCE
OUTPATIENT
Start: 2023-05-11 | End: 2023-05-11

## 2023-05-11 RX ORDER — LIDOCAINE 50 MG/G
OINTMENT TOPICAL ONCE
OUTPATIENT
Start: 2023-05-11 | End: 2023-05-11

## 2023-05-11 RX ORDER — LIDOCAINE HYDROCHLORIDE 20 MG/ML
JELLY TOPICAL ONCE
Status: DISCONTINUED | OUTPATIENT
Start: 2023-05-11 | End: 2023-05-12 | Stop reason: HOSPADM

## 2023-05-11 RX ORDER — NYSTATIN 100000 [USP'U]/G
1 POWDER TOPICAL PRN
COMMUNITY
Start: 2023-05-05

## 2023-05-11 RX ORDER — BETAMETHASONE DIPROPIONATE 0.05 %
OINTMENT (GRAM) TOPICAL ONCE
OUTPATIENT
Start: 2023-05-11 | End: 2023-05-11

## 2023-05-11 RX ORDER — GENTAMICIN SULFATE 1 MG/G
OINTMENT TOPICAL ONCE
OUTPATIENT
Start: 2023-05-11 | End: 2023-05-11

## 2023-05-11 NOTE — PLAN OF CARE
Problem: Chronic Conditions and Co-morbidities  Goal: Patient's chronic conditions and co-morbidity symptoms are monitored and maintained or improved  Outcome: Progressing     Problem: Discharge Planning  Goal: Discharge to home or other facility with appropriate resources  Outcome: Progressing     Problem: Safety - Adult  Goal: Free from fall injury  Outcome: Progressing     Problem: Wound:  Goal: Will show signs of wound healing; wound closure and no evidence of infection  Description: Will show signs of wound healing; wound closure and no evidence of infection  Outcome: Progressing

## 2023-05-11 NOTE — DISCHARGE INSTRUCTIONS
[address] [facility],5Th Floor -Phone: [phone] Fax: [phone]    Visit  Discharge Instructions / Physician Orders     DATE: 5/11/2023     Home Care: N/A     SUPPLIES ORDERED THRU: N/A     Wound Location: Bilateral lower legs     Cleanse with: as normal     Dressing Orders:  Compression Wraps on during the day, off at night while in bed     Frequency: daily     Additional Orders: Increase protein to diet (meat, cheese, eggs, fish, peanut butter, nuts and beans)  ELEVATE LEGS AS MUCH AS POSSIBLE       [facility] needed     (Please note your next appointment above and if you are unable to keep, kindly give a 24 hour notice. Thank you.)  If more than 15 min late we cannot guarantee you will be seen due to clinician schedule  Per Policy, Excessive cancellation will call for dismissal from program.     If you experience any of the following, please call the [employer] during business hours:  [phone]  Your Phone call may be forwarded to [employer] during business hours that [facility] is closed. * Increase in Pain  * Temperature over 101  * Increase in drainage from your wound  * Drainage with a foul odor  * Bleeding  * Increase in swelling  * Need for compression bandage changes due to slippage, breakthrough drainage. If you need medical attention outside of the business hours of the [employer] please contact your PCP or go to the nearest emergency room. The information contained in the After Visit Summary has been reviewed with me, the patient and/or responsible adult, by my health care provider(s). I had the opportunity to ask questions regarding this information.  I have elected to receive;      []After Visit Summary  [x]Comprehensive Discharge Instruction        Patient signature______________________________________Date:________   Electronically signed by Carolyn Buenrostro RN on 5/11/2023 at 3:10 PM  Electronically

## 2023-05-11 NOTE — PROGRESS NOTES
Date:  2023  Time:  1:27 PM    CHF Clinic at [facility]    Office: [phone] Fax: [phone]    Re:  Imelda Yates   Patient : 1955    Vital Signs: /72   Pulse 74   Resp 16   Wt 237 lb (107.5 kg)   SpO2 98%   BMI 39.44 kg/m²                                                   No results for input(s): CBC, HGB, HCT, WBC, PLATELET, NA, K, CL, CO2, BUN, CREATININE, GLUCOSE, BNP, INR in the last 72 hours.      Respiratory:    Assessment  Charting Type: Reassessment    Breath Sounds  Right Upper Lobe: Clear  Right Middle Lobe: Clear  Right Lower Lobe: Clear  Left Upper Lobe: Clear  Left Lower Lobe: Clear              Peripheral Vascular  RLE Edema: +2, Pitting  LLE Edema: +3, Pitting    Future Appointments   Date Time Provider Taj Sandra   2023  2:30 PM Fransisco Mcmahon, APRN - CNP [facility] WND CA Jayce Francis   5/15/2023 11:00 AM Jay Manning, OT [facility]   2023  9:45 AM SCHEDULE, AFL TCC KHAN ECHO AFL TCC TOLE AFL KHAN C   2023 11:00 AM Jay Manning OT [facility]   2023 11:00 AM Jay Manning, OT [facility]   2023 11:00 AM Jay Manning OT [facility]   2023 11:00 AM Jay Manning OT [facility]   2023  8:30 AM [facility]   2023 11:00 AM Jay Manning, OT [facility]   2023  1:45 PM Lynette Mcdonnell MD ST [facility]   2023 11:00 AM Jay Manning OT [facility]   2023  1:30 PM STV CHF CLINIC RM 1 [facility] CHF CLI [facility]   2023  9:50 AM SCHEDULE, MHP ACC UROLOGY [facility]   10/2/2023  8:45 AM Obed Carolina MD AFL TCC TOLE AFL KHAN C   10/30/2023 10:30 AM SCHEDULE, M[facility]X [facility] AWV LPN [facility]      Complaints: No new complaints     Physician Orders No new orders     Comment : weight is the same as last visit but leg measurements are down significantly has been going to wound clinic has wraps on both legs

## 2023-05-11 NOTE — PROGRESS NOTES
[address]   Progress Note and Procedure Note      [address] [facility] with [facility] RECORD NUMBER:  [id number]  AGE: 74 y.o. GENDER: male  : 1955  EPISODE DATE:  2023    Subjective:     Chief Complaint   Patient presents with    Wound Check     Legs         HISTORY of PRESENT ILLNESS HPI     Fabiano Saez is a 74 y.o. male who presents today for wound/ulcer evaluation. History of Wound Context: the patient presents today with very edematous legs and venous ulcerations noted to both lower legs. The ulcers are chronic in nature and he was recently seen by Dr. Chiquita Cueto. Recent venous study shows reflux in SFJ/GSW bilat and right CFV reflux. He was referred to wound care and lymphedema clinic. Interval history: wound was healed last week and one more week of unna boot compression was felt to be necessary. Wounds remain closed this week. Of note, the patient is moving to a new apartment today and has not taken his diuretics for past 2 days. We did talk about this and he was made aware this is a bad idea. He agrees to take meds as directed beginning tomorrow. He has received the Juxtalite compression garments, but was unable to bring them with him today. Will place on as soon as he gets home today.      Wound/Ulcer Pain Timing/Severity: none     Wound/Ulcer Identification:  Ulcer Type: venous  Contributing Factors: edema, venous stasis, lymphedema, decreased mobility, and obesity          PAST MEDICAL HISTORY        Diagnosis Date    Arthritis     back    Atrial fibrillation (HCC)     BPH (benign prostatic hyperplasia)     Cellulitis 2023    left leg    Cervical disc disease     Chronic venous insufficiency     Combined systolic and diastolic congestive heart failure ([address] 01/15/2020    Coronary artery disease 10/2019    s/p CABG x 1 ; BRI GARDUNO II.VIERTEL to LAD    GERD (gastroesophageal reflux disease)     on rx    History of incarceration     released     History of recent

## 2023-05-17 ENCOUNTER — HOSPITAL ENCOUNTER (OUTPATIENT)
Dept: OCCUPATIONAL THERAPY | Age: 68
Setting detail: THERAPIES SERIES
Discharge: HOME | End: 2023-05-17
Payer: MEDICARE

## 2023-05-17 PROCEDURE — 97140 MANUAL THERAPY 1/> REGIONS: CPT

## 2023-05-17 PROCEDURE — 97535 SELF CARE MNGMENT TRAINING: CPT

## 2023-05-17 NOTE — FLOWSHEET NOTE
TREATMENT LOCATION:   [x] [facility] [address], [address]  P: [phone]  F: [phone] [] [address]: [phone]  F: [phone]        Lymphedema Services - Treatment Note of the B Extremity    Visit# / total visits: 2/8 scheduled; Progress note due at visit 10 per POC. ( Certification Dates: 2023 - 2023)    Date:  2023  Patient: Sj Nicholson  : 1955             MRN: [id number]  Referring Provider: Lurena Jeans*       Phone: [phone]  Fax: [phone]  Insurance:   Primary: [facility] MEDICARE [place] ([id number]) - [OT BOMN]  Secondary MEDICAID [place] MEDICAID [facility] DEPT OF [facility] (ID: [id number])     Medical Diagnosis: I89.0  Rehab Codes: I89.0,    Onset Date: 3/29/2023       Info for garment VOB: if needed  [address]  [phone]       Overview of Evaluation dated 2023:  Patient is a 74year old male referred to the Lymphedema Clinic with a diagnosis of bilateral Lower Extremity Lymphedema and other significant dx include recent cellulitis, chronic venous stasis, CHF. He has a hx of lymphedema treatment in . Presents at this eval with wound to left anterior calf. Referred to [facility] Advised to make an appt ASAP and that he could self-refer. Recommended podiatrists appt to address B LE nail trimming and care. Sleeps sitting up in chair. Moves  homes frequently. Contraindications/Precautions:   Gordillo Fall Scale: 54 High Risk   Age 60+   _____________________________________________________________________    Pain: [] YES    [x] NO     Location: na      Pain Rating: ( 0-10 scale) : 0/10  Comments:     Subjective:   2023 Goes by Stephany David. Discharged from  [facility] May 11, 2023 for wounds to PRANAY HARMAN Brings in velcro compression obtained from Wound Care. After discharging from  [facility] the legs skin changed. Now

## 2023-05-22 ENCOUNTER — HOSPITAL ENCOUNTER (OUTPATIENT)
Dept: OCCUPATIONAL THERAPY | Age: 68
Setting detail: THERAPIES SERIES
Discharge: HOME | End: 2023-05-22
Payer: MEDICARE

## 2023-05-22 PROCEDURE — 97140 MANUAL THERAPY 1/> REGIONS: CPT

## 2023-05-22 NOTE — FLOWSHEET NOTE
TREATMENT LOCATION:   [x] [facility] [address]  P: [phone]  F: [phone] [] [address]: [phone]  F: [phone]        Lymphedema Services - Treatment Note of the B Extremity    Visit# / total visits: 3/8 scheduled; Progress note due at visit 10 per POC. ( Certification Dates: 2023 - 2023)    Date:  2023  Patient: Opal Shah  : 1955             MRN: [id number]  Referring Provider: Andrea Morris*       Phone: [phone]  Fax: [phone]  Insurance:   Primary: [facility] MEDICARE [place] ([id number]) - [OT BOMN]  Secondary MED[place]ID [place] MEDICA[place] [facility] DEPT OF [facility] (ID: [id number])     Medical Diagnosis: I89.0  Rehab Codes: I89.0,    Onset Date: 3/29/2023       Info for garment VOB: if needed  [address]  [phone]       Overview of Evaluation dated 2023:  Patient is a 74year old male referred to the Lymphedema Clinic with a diagnosis of bilateral Lower Extremity Lymphedema and other significant dx include recent cellulitis, chronic venous stasis, CHF. He has a hx of lymphedema treatment in . Presents at this eval with wound to left anterior calf. Referred to [facility] Advised to make an appt ASAP and that he could self-refer. Recommended podiatrists appt to address B LE nail trimming and care. Sleeps sitting up in chair. Moves  homes frequently. Contraindications/Precautions:   Kel Jessie Fall Scale: 54 High Risk   Age 60+   _____________________________________________________________________    Pain: [x] YES    [] NO     Location: left shin     Pain Rating: ( 0-10 scale) : 6/10  Comments:     Subjective:   2023 Arrived 15 minutes late cab took him to the wrong address. fter appt   changed the wraps twice. Difficulty getting wraps on and off. Got some help but he can not depend on them.

## 2023-05-24 ENCOUNTER — HOSPITAL ENCOUNTER (OUTPATIENT)
Dept: OCCUPATIONAL THERAPY | Age: 68
Setting detail: THERAPIES SERIES
End: 2023-05-24
Payer: MEDICARE

## 2023-05-24 ENCOUNTER — OFFICE VISIT (OUTPATIENT)
Dept: PODIATRY | Age: 68
End: 2023-05-24
Payer: MEDICARE

## 2023-05-24 ENCOUNTER — HOSPITAL ENCOUNTER (OUTPATIENT)
Dept: WOUND CARE | Age: 68
Discharge: HOME | End: 2023-05-24

## 2023-05-24 VITALS
HEIGHT: 65 IN | HEART RATE: 60 BPM | DIASTOLIC BLOOD PRESSURE: 70 MMHG | BODY MASS INDEX: 39.49 KG/M2 | WEIGHT: 237 LBS | SYSTOLIC BLOOD PRESSURE: 118 MMHG

## 2023-05-24 DIAGNOSIS — M79.672 PAIN IN BOTH FEET: ICD-10-CM

## 2023-05-24 DIAGNOSIS — I73.9 PVD (PERIPHERAL VASCULAR DISEASE) (HCC): ICD-10-CM

## 2023-05-24 DIAGNOSIS — B35.1 DERMATOPHYTOSIS OF NAIL: ICD-10-CM

## 2023-05-24 DIAGNOSIS — B35.1 ONYCHOMYCOSIS: Primary | ICD-10-CM

## 2023-05-24 DIAGNOSIS — M79.671 PAIN IN BOTH FEET: ICD-10-CM

## 2023-05-24 PROCEDURE — 3017F COLORECTAL CA SCREEN DOC REV: CPT | Performed by: STUDENT IN AN ORGANIZED HEALTH CARE EDUCATION/TRAINING PROGRAM

## 2023-05-24 PROCEDURE — 11721 DEBRIDE NAIL 6 OR MORE: CPT | Performed by: STUDENT IN AN ORGANIZED HEALTH CARE EDUCATION/TRAINING PROGRAM

## 2023-05-24 PROCEDURE — 99203 OFFICE O/P NEW LOW 30 MIN: CPT | Performed by: STUDENT IN AN ORGANIZED HEALTH CARE EDUCATION/TRAINING PROGRAM

## 2023-05-24 PROCEDURE — G8427 DOCREV CUR MEDS BY ELIG CLIN: HCPCS | Performed by: STUDENT IN AN ORGANIZED HEALTH CARE EDUCATION/TRAINING PROGRAM

## 2023-05-24 PROCEDURE — 1036F TOBACCO NON-USER: CPT | Performed by: STUDENT IN AN ORGANIZED HEALTH CARE EDUCATION/TRAINING PROGRAM

## 2023-05-24 PROCEDURE — G8417 CALC BMI ABV UP PARAM F/U: HCPCS | Performed by: STUDENT IN AN ORGANIZED HEALTH CARE EDUCATION/TRAINING PROGRAM

## 2023-05-24 PROCEDURE — 99204 OFFICE O/P NEW MOD 45 MIN: CPT | Performed by: STUDENT IN AN ORGANIZED HEALTH CARE EDUCATION/TRAINING PROGRAM

## 2023-05-24 PROCEDURE — 1124F ACP DISCUSS-NO DSCNMKR DOCD: CPT | Performed by: STUDENT IN AN ORGANIZED HEALTH CARE EDUCATION/TRAINING PROGRAM

## 2023-05-25 NOTE — TELEPHONE ENCOUNTER
ERROR Insertion of Nexplanon    Date/Time: 5/25/2023 10:15 AM  Performed by: Karley Hudson MD  Authorized by: Karley Hudson MD     Consent obtained:  Written  Consent given by:  Patient  Patient questions answered: yes    Patient agrees, verbalizes understanding, and wants to proceed: yes    Educational handouts given: yes    Instructions and paperwork completed: yes    Pre-procedure timeout performed: yes    Prepped with: alcohol 70% and povidone-iodine    Local anesthetic:  Lidocaine 1%  The site was cleaned and prepped in a sterile fashion: yes    Small stab incision was made in arm: yes    Left/right:  Left   68 mg etonogestreL 68 mg  Preloaded Implanon trocar was placed subdermally: yes    Visualization of implant was obtained: yes    Nexplanon was inserted and trocar removed: yes    Visualization of notch in stilette and palpitation of device: yes    Palpitation confirms placement by provider and patient: yes    Site was closed with steri-strips and pressure bandage applied: yes

## 2023-05-26 DIAGNOSIS — R09.82 POST-NASAL DRIP: ICD-10-CM

## 2023-05-26 DIAGNOSIS — R05.3 CHRONIC COUGH: ICD-10-CM

## 2023-05-26 RX ORDER — LORATADINE 10 MG/1
10 TABLET ORAL DAILY
Qty: 30 TABLET | Refills: 0 | Status: SHIPPED | OUTPATIENT
Start: 2023-05-26

## 2023-05-26 NOTE — TELEPHONE ENCOUNTER
Refill Request of Claritin received from [address] [place] [address]. Medication Pended. Pt last seen on 4/13/23, Next appt is 6/2/23.     Health Maintenance   Topic Date Due    COVID-19 Vaccine (3 - Booster for Pfizer series) 06/25/2021    Pneumococcal 65+ years Vaccine (2 - PCV) 04/13/2023    Annual Wellness Visit (AWV)  10/28/2023    Depression Screen  03/06/2024    Lipids  03/29/2024    Colorectal Cancer Screen  01/12/2031    DTaP/Tdap/Td vaccine (3 - Td or Tdap) 05/04/2032    Flu vaccine  Completed    Shingles vaccine  Completed    Hepatitis C screen  Completed    HIV screen  Completed    Hepatitis A vaccine  Aged Out    Hib vaccine  Aged Out    Meningococcal (ACWY) vaccine  Aged Out    Pneumococcal 0-64 years Vaccine  Discontinued    Diabetes screen  Discontinued    Prostate Specific Antigen (PSA) Screening or Monitoring  Discontinued       Hemoglobin A1C (%)   Date Value   04/13/2023 5.5   12/10/2019 5.6   10/18/2019 5.8             ( goal A1C is < 7)   No results found for: LABMICR  LDL Cholesterol (mg/dL)   Date Value   03/29/2023 131 (H)       (goal LDL is <100)   AST (U/L)   Date Value   03/29/2023 26     ALT (U/L)   Date Value   03/29/2023 19     BUN (mg/dL)   Date Value   02/09/2023 16     BP Readings from Last 3 Encounters:   05/24/23 118/70   05/11/23 114/72   05/01/23 (!) 107/57          (goal 120/80)    All Future Testing planned in [facility]  Lab Frequency Next Occurrence   Basic Metabolic Panel Once 69/91/3076   Basic Metabolic Panel Once 20/83/2182   Reflux study/Reflux Venous Insufficiency Bilateral Once 04/27/2023   ELECTROCARDIOGRAM, TRACING Once 04/26/2023   ECHO COMPLETE STUDY Once 04/26/2023       Next Visit Date:  Future Appointments   Date Time Provider Taj Flores   5/30/2023  9:45 AM MILENA HamptonD [place] Jeny Dickerson   5/31/2023 11:00 AM Isadora Cheung OT [facility]   6/2/2023  8:30 AM Robyn Recinos MD [facility]   6/5/2023 11:00 AM Cary Tenorio

## 2023-05-30 ENCOUNTER — HOSPITAL ENCOUNTER (OUTPATIENT)
Dept: WOUND CARE | Age: 68
Discharge: HOME | End: 2023-05-30
Payer: MEDICARE

## 2023-05-30 VITALS
SYSTOLIC BLOOD PRESSURE: 119 MMHG | TEMPERATURE: 97.8 F | HEART RATE: 68 BPM | DIASTOLIC BLOOD PRESSURE: 70 MMHG | RESPIRATION RATE: 18 BRPM

## 2023-05-30 DIAGNOSIS — L97.222 VENOUS STASIS ULCER OF LEFT CALF WITH FAT LAYER EXPOSED, UNSPECIFIED WHETHER VARICOSE VEINS PRESENT (HCC): Primary | ICD-10-CM

## 2023-05-30 DIAGNOSIS — L97.212 VENOUS STASIS ULCER OF RIGHT CALF WITH FAT LAYER EXPOSED, UNSPECIFIED WHETHER VARICOSE VEINS PRESENT (HCC): ICD-10-CM

## 2023-05-30 DIAGNOSIS — I83.012 VENOUS STASIS ULCER OF RIGHT CALF WITH FAT LAYER EXPOSED, UNSPECIFIED WHETHER VARICOSE VEINS PRESENT (HCC): ICD-10-CM

## 2023-05-30 DIAGNOSIS — L97.922 NON-PRESSURE CHRONIC ULCER OF LEFT LOWER LEG WITH FAT LAYER EXPOSED (HCC): ICD-10-CM

## 2023-05-30 DIAGNOSIS — I83.022 VENOUS STASIS ULCER OF LEFT CALF WITH FAT LAYER EXPOSED, UNSPECIFIED WHETHER VARICOSE VEINS PRESENT (HCC): Primary | ICD-10-CM

## 2023-05-30 PROCEDURE — 11042 DBRDMT SUBQ TIS 1ST 20SQCM/<: CPT

## 2023-05-30 RX ORDER — BETAMETHASONE DIPROPIONATE 0.05 %
OINTMENT (GRAM) TOPICAL ONCE
OUTPATIENT
Start: 2023-05-30 | End: 2023-05-30

## 2023-05-30 RX ORDER — BACITRACIN ZINC AND POLYMYXIN B SULFATE 500; 1000 [USP'U]/G; [USP'U]/G
OINTMENT TOPICAL ONCE
OUTPATIENT
Start: 2023-05-30 | End: 2023-05-30

## 2023-05-30 RX ORDER — BACITRACIN, NEOMYCIN, POLYMYXIN B 400; 3.5; 5 [USP'U]/G; MG/G; [USP'U]/G
OINTMENT TOPICAL ONCE
OUTPATIENT
Start: 2023-05-30 | End: 2023-05-30

## 2023-05-30 RX ORDER — LIDOCAINE 50 MG/G
OINTMENT TOPICAL ONCE
OUTPATIENT
Start: 2023-05-30 | End: 2023-05-30

## 2023-05-30 RX ORDER — GENTAMICIN SULFATE 1 MG/G
OINTMENT TOPICAL ONCE
OUTPATIENT
Start: 2023-05-30 | End: 2023-05-30

## 2023-05-30 RX ORDER — LIDOCAINE HYDROCHLORIDE 20 MG/ML
JELLY TOPICAL ONCE
OUTPATIENT
Start: 2023-05-30 | End: 2023-05-30

## 2023-05-30 RX ORDER — LIDOCAINE HYDROCHLORIDE 40 MG/ML
SOLUTION TOPICAL ONCE
OUTPATIENT
Start: 2023-05-30 | End: 2023-05-30

## 2023-05-30 RX ORDER — LIDOCAINE 40 MG/G
CREAM TOPICAL ONCE
OUTPATIENT
Start: 2023-05-30 | End: 2023-05-30

## 2023-05-30 RX ORDER — GINSENG 100 MG
CAPSULE ORAL ONCE
OUTPATIENT
Start: 2023-05-30 | End: 2023-05-30

## 2023-05-30 RX ORDER — CLOBETASOL PROPIONATE 0.5 MG/G
OINTMENT TOPICAL ONCE
OUTPATIENT
Start: 2023-05-30 | End: 2023-05-30

## 2023-05-30 RX ORDER — LIDOCAINE HYDROCHLORIDE 20 MG/ML
JELLY TOPICAL ONCE
Status: COMPLETED | OUTPATIENT
Start: 2023-05-30 | End: 2023-05-30

## 2023-05-30 RX ADMIN — LIDOCAINE HYDROCHLORIDE 6 ML: 20 JELLY TOPICAL at 10:00

## 2023-05-30 ASSESSMENT — PAIN DESCRIPTION - FREQUENCY: FREQUENCY: CONTINUOUS

## 2023-05-30 ASSESSMENT — PAIN DESCRIPTION - LOCATION: LOCATION: LEG

## 2023-05-30 ASSESSMENT — PAIN SCALES - GENERAL: PAINLEVEL_OUTOF10: 7

## 2023-05-30 ASSESSMENT — PAIN - FUNCTIONAL ASSESSMENT: PAIN_FUNCTIONAL_ASSESSMENT: ACTIVITIES ARE NOT PREVENTED

## 2023-05-30 ASSESSMENT — PAIN DESCRIPTION - DESCRIPTORS: DESCRIPTORS: ACHING;SHOOTING

## 2023-05-30 ASSESSMENT — PAIN DESCRIPTION - ORIENTATION: ORIENTATION: LEFT

## 2023-05-30 NOTE — PROGRESS NOTES
[employer] Application   Below Knee    NAME:  Danica Sainz  YOB: 1955  MEDICAL RECORD NUMBER:  [id number]  DATE:  5/30/2023    [x] Applied moisturizing agent to dry skin as needed. [x] Appied primary and secondary dressing as ordered    [x] Applied Unna roll from toes to knee overlapping each time. [x] Applied ace wrap or coban from toes to below the knee. [x] Secured with tape and/or metal clips covered with tape. [x] Instructed patient/caregiver to keep dressing dry and intact. DO NOT REMOVE DRESSING. [x] Instructed pt/family/caregiver to report excessive draining, loose bandage, wet dressing, severe pain or tingling in toes. [x] Applied [place] dressing below the knee to Left lower leg(s)       Unna Boot(s) were applied per  Guidelines.      Electronically signed by Leora Esposito RN on 5/30/2023 at 10:28 AM
every morning 30 tablet 3    nitroGLYCERIN (NITROSTAT) 0.4 MG SL tablet Place 1 tablet under the tongue every 5 minutes as needed for Chest pain up to max of 3 total doses. If no relief after 1 dose, call 911. 25 tablet 1    oxybutynin (DITROPAN-XL) 10 MG extended release tablet Take 1 tablet by mouth daily 30 tablet 3    Misc. Devices ([facility]) MISC 1 each by Does not apply route as needed (weigh once a day and record) 1 each 0     No current facility-administered medications on file prior to encounter. REVIEW OF SYSTEMS    Constitutional: negative for chills and fevers  Respiratory: negative for cough and shortness of breath  Cardiovascular: positive for lower extremity edema, negative for chest pain and palpitations  Gastrointestinal: negative for abdominal pain and nausea  Genitourinary: negative  Integument:  BLE wounds  Musculoskeletal:negative  Behavioral/Psych: negative  Endocrine: negative  Hematologic/Immunologic: negative    Objective:      /70   Pulse 68   Temp 97.8 °F (36.6 °C) (Tympanic)   Resp 18     Wt Readings from Last 3 Encounters:   05/24/23 237 lb (107.5 kg)   05/11/23 237 lb (107.5 kg)   05/01/23 233 lb (105.7 kg)       PHYSICAL EXAM    General Appearance: alert and oriented to person, place and time, well-developed and well-nourished, in no acute distress  Skin: . Some mild venous stasis dermatitis noted bilaterally. OPEN WOUND LEFT LEG  Head: normocephalic and atraumatic  Pulmonary: normal air movement, no respiratory distress  Cardiovascular/Circulation: BLE 2+ edema, no cyanosis, palpable peripheral pulses  Extremities: no cyanosis and no clubbing   Musculoskeletal: no joint swelling, deformity or tenderness  Neurologic: gait, coordination normal and speech normal      Procedure Note  Indications: Based on my examination of this patient's wound(s)/ulcer(s) today, debridement is required to promote healing and evaluate the wound base.     Debridement: Excisional

## 2023-05-30 NOTE — PLAN OF CARE
Problem: Chronic Conditions and Co-morbidities  Goal: Patient's chronic conditions and co-morbidity symptoms are monitored and maintained or improved  Outcome: Progressing     Problem: Discharge Planning  Goal: Discharge to home or other facility with appropriate resources  Outcome: Progressing     Problem: Pain  Goal: Verbalizes/displays adequate comfort level or baseline comfort level  Outcome: Progressing     Problem: Safety - Adult  Goal: Free from fall injury  Outcome: Progressing     Problem: ABCDS Injury Assessment  Goal: Absence of physical injury  Outcome: Progressing     Problem: Wound:  Goal: Will show signs of wound healing; wound closure and no evidence of infection  Description: Will show signs of wound healing; wound closure and no evidence of infection  Outcome: Progressing

## 2023-05-31 ENCOUNTER — HOSPITAL ENCOUNTER (OUTPATIENT)
Dept: OCCUPATIONAL THERAPY | Age: 68
Setting detail: THERAPIES SERIES
Discharge: HOME | End: 2023-05-31
Payer: MEDICARE

## 2023-05-31 NOTE — SIGNIFICANT EVENT
[x] [address] Occupational Therapy       [address] [facility], 1st [address]       Phone: [phone]       Fax: [phone] [] [facility] at [address] [place] [address]. [place], [place]       Phone: [phone]       Fax: [phone]          Outpatient Lymphedema Occupational Therapy Cancel/No Show note    Date: 2023  Patient: Piedad Sands  : 1955  MRN: [id number]    Cancels/No Shows to date: 2    For today's appointment patient:    [x]  Cancelled    [] Rescheduled appointment    [] No-show     Reason given by patient:    []  Patient ill    []  Conflicting appointment    [] No transportation      [] Conflict with work    [] No reason given    [] Weather related    [] [id number]    [x] Other:   IN Jan Courts from Wound Care   Comments:       [] Next appointment was confirmed    [] Left message informing of missed visit    [] Unable to contact         Electronically signed by:  Darius Bartlett OT

## 2023-06-02 ENCOUNTER — OFFICE VISIT (OUTPATIENT)
Dept: FAMILY MEDICINE CLINIC | Age: 68
End: 2023-06-02
Payer: MEDICARE

## 2023-06-02 VITALS
OXYGEN SATURATION: 98 % | HEIGHT: 65 IN | BODY MASS INDEX: 37.82 KG/M2 | HEART RATE: 68 BPM | DIASTOLIC BLOOD PRESSURE: 72 MMHG | WEIGHT: 227 LBS | SYSTOLIC BLOOD PRESSURE: 119 MMHG

## 2023-06-02 DIAGNOSIS — I10 ESSENTIAL HYPERTENSION: Primary | ICD-10-CM

## 2023-06-02 DIAGNOSIS — Z23 NEED FOR VACCINATION AGAINST STREPTOCOCCUS PNEUMONIAE: ICD-10-CM

## 2023-06-02 DIAGNOSIS — I87.2 CHRONIC VENOUS INSUFFICIENCY: ICD-10-CM

## 2023-06-02 PROCEDURE — 90677 PCV20 VACCINE IM: CPT | Performed by: FAMILY MEDICINE

## 2023-06-02 ASSESSMENT — ENCOUNTER SYMPTOMS
BACK PAIN: 0
COLOR CHANGE: 1
WHEEZING: 0
SHORTNESS OF BREATH: 0
COUGH: 0
GASTROINTESTINAL NEGATIVE: 1

## 2023-06-02 NOTE — PROGRESS NOTES
Attending Physician Statement  I have discussed the care of Iman Novak, including pertinent history and exam findings,  with the resident. I have reviewed the key elements of all parts of the encounter with the resident. I agree with the assessment, plan and orders as documented by the resident.   ([place])    Addie Mcguire MD

## 2023-06-02 NOTE — PROGRESS NOTES
Minh Dennis (:  1955) is a 74 y.o. male,Established patient, here for evaluation of the following chief complaint(s):  Results (Patient here to follow up on results)         ASSESSMENT/PLAN:  1. Essential hypertension  -stable  2. Chronic venous insufficiency  -stable  -following up with lymphedema clinic, vascular surgery and wound care   3. Need for vaccination against Streptococcus pneumoniae  -     Pneumococcal, PCV20, PREVNAR 20, (age 25 yrs+), IM, PF      Return in about 14 weeks (around 2023). Subjective   SUBJECTIVE/OBJECTIVE:  74years old male patient with past ministry of hypertension, diabetes, CHF, A-fib, chronic venous insufficiency came to the office for follow-up. Patient blood pressure today is stable. Last A1c was 6.6 last month. Patient was referred last time for vascular surgery and lymphedema clinic for further evaluation of the venous insufficiency. Patient is following up with wound care as well and is feeling better. Vascular surgery recommended following up with lymphedema clinic without surgical intervention. Review of Systems   Constitutional:  Negative for diaphoresis, fatigue and fever. Respiratory:  Negative for cough, shortness of breath and wheezing. Cardiovascular:  Positive for leg swelling. Negative for chest pain and palpitations. Gastrointestinal: Negative. Genitourinary:  Negative for dysuria, flank pain, frequency and urgency. Musculoskeletal:  Negative for back pain. Skin:  Positive for color change and wound. Negative for rash. Neurological:  Negative for dizziness, weakness and headaches. Objective   Physical Exam  Constitutional:       General: He is not in acute distress. Appearance: Normal appearance. HENT:      Head: Normocephalic and atraumatic. Cardiovascular:      Rate and Rhythm: Normal rate and regular rhythm. Pulses: Normal pulses. Heart sounds: Normal heart sounds.  No murmur

## 2023-06-02 NOTE — PROGRESS NOTES
Visit Information    Have you changed or started any medications since your last visit including any over-the-counter medicines, vitamins, or herbal medicines? no   Have you stopped taking any of your medications? Is so, why? -  no  Are you having any side effects from any of your medications? - no    Have you seen any other physician or provider since your last visit? yes - Cardiology , Vascular surgery , OT   Have you had any other diagnostic tests since your last visit? yes - EKG , Ekg , Vascular lab Reflux Study Vascular    Have you been seen in the emergency room and/or had an admission in a hospital since we last saw you?  yes - [facility]    Have you had your routine dental cleaning in the past 6 months?  no     Do you have an active MyChart account? If no, what is the barrier?   No:     Patient Care Team:  Alan Salas MD as PCP - Laura Figueroa MD as Consulting Physician (Gastroenterology)  John Rankin MD as Consulting Physician (Urology)    Medical History Review  Past Medical, Family, and Social History reviewed and does not contribute to the patient presenting condition    Health Maintenance   Topic Date Due    COVID-19 Vaccine (3 - Booster for [place] series) 2021    Pneumococcal 65+ years Vaccine (2 - PCV) 2023    Annual Wellness Visit (AWV)  10/28/2023    Depression Screen  2024    Lipids  2024    Colorectal Cancer Screen  2031    DTaP/Tdap/Td vaccine (3 - Td or Tdap) 2032    Flu vaccine  Completed    Shingles vaccine  Completed    Hepatitis C screen  Completed    HIV screen  Completed    Hepatitis A vaccine  Aged Out    Hib vaccine  Aged Out    Meningococcal (ACWY) vaccine  Aged Out    Pneumococcal 0-64 years Vaccine  Discontinued    Diabetes screen  Discontinued    Prostate Specific Antigen (PSA) Screening or Monitoring  Discontinued

## 2023-06-05 ENCOUNTER — OFFICE VISIT (OUTPATIENT)
Dept: GASTROENTEROLOGY | Age: 68
End: 2023-06-05
Payer: MEDICARE

## 2023-06-05 VITALS
DIASTOLIC BLOOD PRESSURE: 67 MMHG | WEIGHT: 221.8 LBS | SYSTOLIC BLOOD PRESSURE: 102 MMHG | HEIGHT: 65 IN | BODY MASS INDEX: 36.96 KG/M2 | HEART RATE: 67 BPM

## 2023-06-05 DIAGNOSIS — R93.89 ABNORMAL FINDINGS ON DIAGNOSTIC IMAGING OF OTHER SPECIFIED BODY STRUCTURES: ICD-10-CM

## 2023-06-05 DIAGNOSIS — K21.9 GASTROESOPHAGEAL REFLUX DISEASE WITHOUT ESOPHAGITIS: Primary | ICD-10-CM

## 2023-06-05 DIAGNOSIS — D12.6 ADENOMATOUS POLYP OF COLON, UNSPECIFIED PART OF COLON: ICD-10-CM

## 2023-06-05 PROCEDURE — 99214 OFFICE O/P EST MOD 30 MIN: CPT | Performed by: INTERNAL MEDICINE

## 2023-06-05 PROCEDURE — 3017F COLORECTAL CA SCREEN DOC REV: CPT | Performed by: INTERNAL MEDICINE

## 2023-06-05 PROCEDURE — G8427 DOCREV CUR MEDS BY ELIG CLIN: HCPCS | Performed by: INTERNAL MEDICINE

## 2023-06-05 PROCEDURE — 1124F ACP DISCUSS-NO DSCNMKR DOCD: CPT | Performed by: INTERNAL MEDICINE

## 2023-06-05 PROCEDURE — 3074F SYST BP LT 130 MM HG: CPT | Performed by: INTERNAL MEDICINE

## 2023-06-05 PROCEDURE — 1036F TOBACCO NON-USER: CPT | Performed by: INTERNAL MEDICINE

## 2023-06-05 PROCEDURE — G8417 CALC BMI ABV UP PARAM F/U: HCPCS | Performed by: INTERNAL MEDICINE

## 2023-06-05 PROCEDURE — 3078F DIAST BP <80 MM HG: CPT | Performed by: INTERNAL MEDICINE

## 2023-06-05 ASSESSMENT — ENCOUNTER SYMPTOMS: BACK PAIN: 1

## 2023-06-05 NOTE — PROGRESS NOTES
Avoidance of GERD triggers. Clinically much improved. -     CBC with Auto Differential; Future  -     Basic Metabolic Panel; Future  -     TSH With Reflex Ft4; Future    Abnormal findings on diagnostic imaging of other specified body structures-patient reported some unintentional weight loss but mainly intentional.  Clinically much improved from from the GI standpoint. We will send for TSH and basic labs. Repeat colonoscopy in 2024.  -     TSH With Reflex Ft4; Future       Advance polyp-repeat colonoscopy in 2024. No significant lower GI symptoms. RTC:    Additional comments: Thank you for allowing me to participate in the care of Mr. Marcell Thompson. For any further questions please do not hesitate to contact me. I have reviewed and agree with the ROS entered by the MA/LPN from today's encounter documented in a separate note. Nidia Mendez MD, MPH   Board Certified in Gastroenterology  Board Certified in [address] [place] [address] #: [phone]    this note is created with the assistance of a speech recognition program.  While intending to generate a document that actually reflects the content of the visit, the document can still have some errors including those of syntax and sound a like substitutions which may escape proof reading. It such instances, actual meaning can be extrapolated by contextual diversion.

## 2023-06-06 ENCOUNTER — HOSPITAL ENCOUNTER (OUTPATIENT)
Dept: WOUND CARE | Age: 68
Discharge: HOME | End: 2023-06-06
Payer: MEDICARE

## 2023-06-06 VITALS
BODY MASS INDEX: 36.82 KG/M2 | HEART RATE: 54 BPM | SYSTOLIC BLOOD PRESSURE: 110 MMHG | RESPIRATION RATE: 17 BRPM | HEIGHT: 65 IN | TEMPERATURE: 95.2 F | WEIGHT: 221 LBS | DIASTOLIC BLOOD PRESSURE: 61 MMHG

## 2023-06-06 DIAGNOSIS — I83.012 VENOUS STASIS ULCER OF RIGHT CALF WITH FAT LAYER EXPOSED, UNSPECIFIED WHETHER VARICOSE VEINS PRESENT (HCC): ICD-10-CM

## 2023-06-06 DIAGNOSIS — I83.022 VENOUS STASIS ULCER OF LEFT CALF WITH FAT LAYER EXPOSED, UNSPECIFIED WHETHER VARICOSE VEINS PRESENT (HCC): ICD-10-CM

## 2023-06-06 DIAGNOSIS — L97.212 VENOUS STASIS ULCER OF RIGHT CALF WITH FAT LAYER EXPOSED, UNSPECIFIED WHETHER VARICOSE VEINS PRESENT (HCC): ICD-10-CM

## 2023-06-06 DIAGNOSIS — L97.922 NON-PRESSURE CHRONIC ULCER OF LEFT LOWER LEG WITH FAT LAYER EXPOSED (HCC): Primary | ICD-10-CM

## 2023-06-06 DIAGNOSIS — L97.222 VENOUS STASIS ULCER OF LEFT CALF WITH FAT LAYER EXPOSED, UNSPECIFIED WHETHER VARICOSE VEINS PRESENT (HCC): ICD-10-CM

## 2023-06-06 PROCEDURE — 11042 DBRDMT SUBQ TIS 1ST 20SQCM/<: CPT

## 2023-06-06 RX ORDER — LIDOCAINE 50 MG/G
OINTMENT TOPICAL ONCE
OUTPATIENT
Start: 2023-06-06 | End: 2023-06-06

## 2023-06-06 RX ORDER — GINSENG 100 MG
CAPSULE ORAL ONCE
OUTPATIENT
Start: 2023-06-06 | End: 2023-06-06

## 2023-06-06 RX ORDER — CLOBETASOL PROPIONATE 0.5 MG/G
OINTMENT TOPICAL ONCE
OUTPATIENT
Start: 2023-06-06 | End: 2023-06-06

## 2023-06-06 RX ORDER — LIDOCAINE HYDROCHLORIDE 20 MG/ML
JELLY TOPICAL ONCE
Status: DISCONTINUED | OUTPATIENT
Start: 2023-06-06 | End: 2023-06-07 | Stop reason: HOSPADM

## 2023-06-06 RX ORDER — LIDOCAINE HYDROCHLORIDE 20 MG/ML
JELLY TOPICAL ONCE
OUTPATIENT
Start: 2023-06-06 | End: 2023-06-06

## 2023-06-06 RX ORDER — LIDOCAINE HYDROCHLORIDE 40 MG/ML
SOLUTION TOPICAL ONCE
OUTPATIENT
Start: 2023-06-06 | End: 2023-06-06

## 2023-06-06 RX ORDER — BETAMETHASONE DIPROPIONATE 0.05 %
OINTMENT (GRAM) TOPICAL ONCE
OUTPATIENT
Start: 2023-06-06 | End: 2023-06-06

## 2023-06-06 RX ORDER — IBUPROFEN 200 MG
TABLET ORAL ONCE
OUTPATIENT
Start: 2023-06-06 | End: 2023-06-06

## 2023-06-06 RX ORDER — LIDOCAINE 40 MG/G
CREAM TOPICAL ONCE
OUTPATIENT
Start: 2023-06-06 | End: 2023-06-06

## 2023-06-06 RX ORDER — BACITRACIN ZINC AND POLYMYXIN B SULFATE 500; 1000 [USP'U]/G; [USP'U]/G
OINTMENT TOPICAL ONCE
OUTPATIENT
Start: 2023-06-06 | End: 2023-06-06

## 2023-06-06 RX ORDER — GENTAMICIN SULFATE 1 MG/G
OINTMENT TOPICAL ONCE
OUTPATIENT
Start: 2023-06-06 | End: 2023-06-06
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
## 2023-06-08 ENCOUNTER — HOSPITAL ENCOUNTER (OUTPATIENT)
Dept: OTHER | Age: 68
Discharge: HOME | End: 2023-06-08
Payer: MEDICARE

## 2023-06-08 VITALS
HEART RATE: 64 BPM | OXYGEN SATURATION: 98 % | SYSTOLIC BLOOD PRESSURE: 120 MMHG | WEIGHT: 233 LBS | BODY MASS INDEX: 38.77 KG/M2 | DIASTOLIC BLOOD PRESSURE: 70 MMHG | RESPIRATION RATE: 16 BRPM

## 2023-06-08 PROCEDURE — 99212 OFFICE O/P EST SF 10 MIN: CPT

## 2023-06-08 NOTE — PROGRESS NOTES
Date:  2023  Time:  1:38 PM    CHF Clinic at [facility]    Office: [phone] Fax: [phone]    Re:  Estefania Herrera   Patient : 1955    Vital Signs: /70   Pulse 64   Resp 16   Wt 233 lb (105.7 kg)   SpO2 98%   BMI 38.77 kg/m²                                                   No results for input(s): CBC, HGB, HCT, WBC, PLATELET, NA, K, CL, CO2, BUN, CREATININE, GLUCOSE, BNP, INR in the last 72 hours. Respiratory:    Assessment  Charting Type: Reassessment    Breath Sounds  Right Upper Lobe: Clear  Right Middle Lobe: Clear  Right Lower Lobe: Clear  Left Upper Lobe: Clear  Left Lower Lobe: Clear              Future Appointments   Date Time Provider Taj Sandra   2023  9:50 AM SCHEDULE, [facility] UROLOGY [facility]   2023 10:00 AM Cait Jin DPM [facility]   2023 11:00 AM STV CHF CLINIC RM 1 [facility] CHF CLI [facility]   8/3/2023  8:30 AM [facility]   2023  1:30 PM Blu Avery MD ST [facility]   10/2/2023  8:45 AM Astrid Holland MD AFL TCC TOLE AFL K[facility]AN C   10/30/2023 10:30 AM SCHEDULE, [facility]   2023  2:15 PM Rosaura Iqbal DPM [facility] Podiatry [facility]      Complaints: No new complaints     Physician Orders No new orders     Comment : Weight is down 4 pounds from last visit. Has been seeing wound care for wraps of bilateral legs. Measurements are down significantly +1 non pitting edema. Denies any SOB or chest pain,lungs are clear. Gave AVS paperwork of all his upcoming appts. Also needs to schedule his ECHO number given to him. Discussed in detail low sodium diet 2000mg per day and 64 ounces of fluid per day. We will see 23.     Electronically signed by Samantha Amaya RN on 2023 at 1:38 PM

## 2023-06-09 ENCOUNTER — HOSPITAL ENCOUNTER (OUTPATIENT)
Age: 68
Discharge: HOME | End: 2023-06-09
Payer: MEDICARE

## 2023-06-09 ENCOUNTER — OFFICE VISIT (OUTPATIENT)
Dept: UROLOGY | Age: 68
End: 2023-06-09
Payer: MEDICARE

## 2023-06-09 VITALS
DIASTOLIC BLOOD PRESSURE: 83 MMHG | HEART RATE: 71 BPM | BODY MASS INDEX: 38.82 KG/M2 | HEIGHT: 65 IN | SYSTOLIC BLOOD PRESSURE: 133 MMHG | WEIGHT: 233 LBS

## 2023-06-09 DIAGNOSIS — R93.89 ABNORMAL FINDINGS ON DIAGNOSTIC IMAGING OF OTHER SPECIFIED BODY STRUCTURES: ICD-10-CM

## 2023-06-09 DIAGNOSIS — K21.9 GASTROESOPHAGEAL REFLUX DISEASE WITHOUT ESOPHAGITIS: ICD-10-CM

## 2023-06-09 DIAGNOSIS — N39.41 URGE INCONTINENCE: Primary | ICD-10-CM

## 2023-06-09 LAB
ANION GAP SERPL CALCULATED.3IONS-SCNC: 10 MMOL/L (ref 9–17)
BASOPHILS # BLD: 0.05 K/UL (ref 0–0.2)
BASOPHILS NFR BLD: 1 % (ref 0–2)
BUN SERPL-MCNC: 10 MG/DL (ref 8–23)
CALCIUM SERPL-MCNC: 9.3 MG/DL (ref 8.6–10.4)
CHLORIDE SERPL-SCNC: 106 MMOL/L (ref 98–107)
CO2 SERPL-SCNC: 25 MMOL/L (ref 20–31)
CREAT SERPL-MCNC: 0.55 MG/DL (ref 0.7–1.2)
EOSINOPHIL # BLD: 0.18 K/UL (ref 0–0.44)
EOSINOPHILS RELATIVE PERCENT: 3 % (ref 1–4)
ERYTHROCYTE [DISTWIDTH] IN BLOOD BY AUTOMATED COUNT: 13.8 % (ref 11.8–14.4)
GFR SERPL CREATININE-BSD FRML MDRD: >60 ML/MIN/1.73M2
GLUCOSE SERPL-MCNC: 87 MG/DL (ref 70–99)
HCT VFR BLD AUTO: 43.1 % (ref 40.7–50.3)
HGB BLD-MCNC: 13.7 G/DL (ref 13–17)
IMM GRANULOCYTES # BLD AUTO: 0.04 K/UL (ref 0–0.3)
IMM GRANULOCYTES NFR BLD: 1 %
LYMPHOCYTES # BLD: 13 % (ref 24–43)
LYMPHOCYTES NFR BLD: 0.87 K/UL (ref 1.1–3.7)
MCH RBC QN AUTO: 30.8 PG (ref 25.2–33.5)
MCHC RBC AUTO-ENTMCNC: 31.8 G/DL (ref 28.4–34.8)
MCV RBC AUTO: 96.9 FL (ref 82.6–102.9)
MONOCYTES NFR BLD: 0.61 K/UL (ref 0.1–1.2)
MONOCYTES NFR BLD: 9 % (ref 3–12)
NEUTROPHILS NFR BLD: 73 % (ref 36–65)
NEUTS SEG NFR BLD: 4.79 K/UL (ref 1.5–8.1)
NRBC AUTOMATED: 0 PER 100 WBC
PLATELET # BLD AUTO: 229 K/UL (ref 138–453)
PMV BLD AUTO: 9.7 FL (ref 8.1–13.5)
POTASSIUM SERPL-SCNC: 4.4 MMOL/L (ref 3.7–5.3)
RBC # BLD AUTO: 4.45 M/UL (ref 4.21–5.77)
SODIUM SERPL-SCNC: 141 MMOL/L (ref 135–144)
TSH SERPL-MCNC: 1.71 UIU/ML (ref 0.3–5)
WBC OTHER # BLD: 6.5 K/UL (ref 3.5–11.3)

## 2023-06-09 PROCEDURE — 80048 BASIC METABOLIC PNL TOTAL CA: CPT

## 2023-06-09 PROCEDURE — 3017F COLORECTAL CA SCREEN DOC REV: CPT | Performed by: UROLOGY

## 2023-06-09 PROCEDURE — 84443 ASSAY THYROID STIM HORMONE: CPT

## 2023-06-09 PROCEDURE — 1124F ACP DISCUSS-NO DSCNMKR DOCD: CPT | Performed by: UROLOGY

## 2023-06-09 PROCEDURE — 3075F SYST BP GE 130 - 139MM HG: CPT | Performed by: UROLOGY

## 2023-06-09 PROCEDURE — 99214 OFFICE O/P EST MOD 30 MIN: CPT | Performed by: UROLOGY

## 2023-06-09 PROCEDURE — 85027 COMPLETE CBC AUTOMATED: CPT

## 2023-06-09 PROCEDURE — 3079F DIAST BP 80-89 MM HG: CPT | Performed by: UROLOGY

## 2023-06-09 PROCEDURE — G8417 CALC BMI ABV UP PARAM F/U: HCPCS | Performed by: UROLOGY

## 2023-06-09 PROCEDURE — 36415 COLL VENOUS BLD VENIPUNCTURE: CPT

## 2023-06-09 PROCEDURE — 1036F TOBACCO NON-USER: CPT | Performed by: UROLOGY

## 2023-06-09 PROCEDURE — G8427 DOCREV CUR MEDS BY ELIG CLIN: HCPCS | Performed by: UROLOGY

## 2023-06-09 NOTE — PROGRESS NOTES
Kaye Jones, [address] [facility], [address], [place], Richard Nielsen. Urology Progress Note      Patient:  Ra Harris  YOB: 1955  Date: 6/9/2023     HISTORY OF PRESENT ILLNESS:   The patient is a 74year old male who presents today for follow-up for the following problem(s):   Postoperative follow-up after cystoscopy - Botox injection  Overactive bladder  Overall the problem(s) : improving. Associated Symptoms: No dysuria, gross hematuria. Pain Severity:   0/10    6/9/23  Underwent cystoscopy with Botox injection (200 u) with better control of symptoms. Still does have urgency and urgency incontinence, down to one pad per day. He is overall happy with his symptom control and not desiring Interstim. Remains on flomax 0.4mg nightly and Ditropan-XL 10mg daily, tolerating without side effects. 3/10/23   He reports that cystoscopy and Botox injection (100 u) without successful control of symptoms. Continues to have urgency and urgency incontinence, which is bothersome. We discuss that there are not many other options for him at this point, but if he continues to lose weight we can consider interstim and PFPT. He has lost 100 lbs recently. He also reports frequency, but he attributes this to water pills. PVR 32 cc     Last visit  5/13/22  Continues to have incontinence throughout the day. He wears 2 pads which are usually soaked when changed. Increasing oxybutynin to 10mg did not help. He has gained weight at this visit although he states he has been trying to eat less and walk more. He spoke with his cardiologist about viagra and was told that given his cardiac issues, this is not an option for him at this time. He is open to trying botox. Discussed urethral stripping for post void dribbling      2/11/22   The patient follows with BPH with LUTs. His problem is chronic and improved after Urolift.    1 pad per day, mild-moderately soaked by the end of the day which is improved since

## 2023-06-10 LAB
EKG ATRIAL RATE: 55 BPM
EKG Q-T INTERVAL: 494 MS
EKG QRS DURATION: 88 MS
EKG QTC CALCULATION (BAZETT): 472 MS
EKG R AXIS: 44 DEGREES
EKG T AXIS: 57 DEGREES
EKG VENTRICULAR RATE: 55 BPM

## 2023-06-13 PROBLEM — L97.921 NON-PRESSURE CHRONIC ULCER OF LEFT LOWER LEG, LIMITED TO BREAKDOWN OF SKIN (HCC): Status: ACTIVE | Noted: 2023-05-30

## 2023-06-25 ENCOUNTER — HOSPITAL ENCOUNTER (INPATIENT)
Age: 68
LOS: 2 days | Discharge: HOME | DRG: 603 | End: 2023-06-28
Attending: EMERGENCY MEDICINE | Admitting: EMERGENCY MEDICINE
Payer: MEDICARE

## 2023-06-25 DIAGNOSIS — L03.116 CELLULITIS OF LEFT LOWER EXTREMITY: Primary | ICD-10-CM

## 2023-06-25 LAB
ANION GAP SERPL CALCULATED.3IONS-SCNC: 13 MMOL/L (ref 9–17)
BASOPHILS # BLD: 0.03 K/UL (ref 0–0.2)
BASOPHILS NFR BLD: 0 % (ref 0–2)
BUN SERPL-MCNC: 11 MG/DL (ref 8–23)
CALCIUM SERPL-MCNC: 8.9 MG/DL (ref 8.6–10.4)
CHLORIDE SERPL-SCNC: 101 MMOL/L (ref 98–107)
CO2 SERPL-SCNC: 23 MMOL/L (ref 20–31)
CREAT SERPL-MCNC: 0.59 MG/DL (ref 0.7–1.2)
CRP SERPL HS-MCNC: 31.5 MG/L (ref 0–5)
EOSINOPHIL # BLD: 0.13 K/UL (ref 0–0.44)
EOSINOPHILS RELATIVE PERCENT: 2 % (ref 1–4)
ERYTHROCYTE [DISTWIDTH] IN BLOOD BY AUTOMATED COUNT: 13.3 % (ref 11.8–14.4)
ERYTHROCYTE [SEDIMENTATION RATE] IN BLOOD BY WESTERGREN METHOD: 47 MM/HR (ref 0–20)
GFR SERPL CREATININE-BSD FRML MDRD: >60 ML/MIN/1.73M2
GLUCOSE SERPL-MCNC: 84 MG/DL (ref 70–99)
HCT VFR BLD AUTO: 40.4 % (ref 40.7–50.3)
HGB BLD-MCNC: 13.2 G/DL (ref 13–17)
IMM GRANULOCYTES # BLD AUTO: 0.04 K/UL (ref 0–0.3)
IMM GRANULOCYTES NFR BLD: 1 %
LYMPHOCYTES # BLD: 14 % (ref 24–43)
LYMPHOCYTES NFR BLD: 1.04 K/UL (ref 1.1–3.7)
MCH RBC QN AUTO: 30.3 PG (ref 25.2–33.5)
MCHC RBC AUTO-ENTMCNC: 32.7 G/DL (ref 28.4–34.8)
MCV RBC AUTO: 92.7 FL (ref 82.6–102.9)
MONOCYTES NFR BLD: 0.78 K/UL (ref 0.1–1.2)
MONOCYTES NFR BLD: 10 % (ref 3–12)
NEUTROPHILS NFR BLD: 73 % (ref 36–65)
NEUTS SEG NFR BLD: 5.65 K/UL (ref 1.5–8.1)
NRBC BLD-RTO: 0 PER 100 WBC
PLATELET # BLD AUTO: 220 K/UL (ref 138–453)
PMV BLD AUTO: 10 FL (ref 8.1–13.5)
POTASSIUM SERPL-SCNC: 3.6 MMOL/L (ref 3.7–5.3)
RBC # BLD AUTO: 4.36 M/UL (ref 4.21–5.77)
SARS-COV-2 RDRP RESP QL NAA+PROBE: ABNORMAL
SARS-COV-2 RDRP RESP QL NAA+PROBE: NOT DETECTED
SODIUM SERPL-SCNC: 137 MMOL/L (ref 135–144)
SPECIMEN DESCRIPTION: ABNORMAL
SPECIMEN DESCRIPTION: NORMAL
WBC OTHER # BLD: 7.7 K/UL (ref 3.5–11.3)

## 2023-06-25 PROCEDURE — 6360000002 HC RX W HCPCS

## 2023-06-25 PROCEDURE — 6370000000 HC RX 637 (ALT 250 FOR IP)

## 2023-06-25 PROCEDURE — 85027 COMPLETE CBC AUTOMATED: CPT

## 2023-06-25 PROCEDURE — 2580000003 HC RX 258

## 2023-06-25 PROCEDURE — G0378 HOSPITAL OBSERVATION PER HR: HCPCS

## 2023-06-25 PROCEDURE — 85652 RBC SED RATE AUTOMATED: CPT

## 2023-06-25 PROCEDURE — 87635 SARS-COV-2 COVID-19 AMP PRB: CPT

## 2023-06-25 PROCEDURE — 86140 C-REACTIVE PROTEIN: CPT

## 2023-06-25 PROCEDURE — 99285 EMERGENCY DEPT VISIT HI MDM: CPT

## 2023-06-25 PROCEDURE — 80048 BASIC METABOLIC PNL TOTAL CA: CPT

## 2023-06-25 RX ORDER — SODIUM CHLORIDE 0.9 % (FLUSH) 0.9 %
5-40 SYRINGE (ML) INJECTION EVERY 12 HOURS SCHEDULED
Status: DISCONTINUED | OUTPATIENT
Start: 2023-06-25 | End: 2023-06-28 | Stop reason: HOSPADM

## 2023-06-25 RX ORDER — OXYBUTYNIN CHLORIDE 10 MG/1
10 TABLET, EXTENDED RELEASE ORAL DAILY
Status: DISCONTINUED | OUTPATIENT
Start: 2023-06-25 | End: 2023-06-28 | Stop reason: HOSPADM

## 2023-06-25 RX ORDER — SODIUM CHLORIDE 9 MG/ML
INJECTION, SOLUTION INTRAVENOUS PRN
Status: DISCONTINUED | OUTPATIENT
Start: 2023-06-25 | End: 2023-06-28 | Stop reason: HOSPADM

## 2023-06-25 RX ORDER — ISOSORBIDE MONONITRATE 30 MG/1
30 TABLET, EXTENDED RELEASE ORAL DAILY
Status: DISCONTINUED | OUTPATIENT
Start: 2023-06-25 | End: 2023-06-28 | Stop reason: HOSPADM

## 2023-06-25 RX ORDER — ACETAMINOPHEN 325 MG/1
650 TABLET ORAL EVERY 4 HOURS PRN
Status: DISCONTINUED | OUTPATIENT
Start: 2023-06-25 | End: 2023-06-28 | Stop reason: HOSPADM

## 2023-06-25 RX ORDER — PANTOPRAZOLE SODIUM 40 MG/1
40 TABLET, DELAYED RELEASE ORAL
Status: DISCONTINUED | OUTPATIENT
Start: 2023-06-26 | End: 2023-06-28 | Stop reason: HOSPADM

## 2023-06-25 RX ORDER — TAMSULOSIN HYDROCHLORIDE 0.4 MG/1
0.4 CAPSULE ORAL DAILY
Status: DISCONTINUED | OUTPATIENT
Start: 2023-06-25 | End: 2023-06-28 | Stop reason: HOSPADM

## 2023-06-25 RX ORDER — SODIUM CHLORIDE 0.9 % (FLUSH) 0.9 %
5-40 SYRINGE (ML) INJECTION PRN
Status: DISCONTINUED | OUTPATIENT
Start: 2023-06-25 | End: 2023-06-28 | Stop reason: HOSPADM

## 2023-06-25 RX ORDER — POTASSIUM CHLORIDE 20 MEQ/1
20 TABLET, EXTENDED RELEASE ORAL EVERY MORNING
Status: DISCONTINUED | OUTPATIENT
Start: 2023-06-26 | End: 2023-06-28 | Stop reason: HOSPADM

## 2023-06-25 RX ORDER — ONDANSETRON 4 MG/1
4 TABLET, ORALLY DISINTEGRATING ORAL EVERY 8 HOURS PRN
Status: DISCONTINUED | OUTPATIENT
Start: 2023-06-25 | End: 2023-06-28 | Stop reason: HOSPADM

## 2023-06-25 RX ORDER — ASPIRIN 81 MG/1
81 TABLET ORAL DAILY
Status: DISCONTINUED | OUTPATIENT
Start: 2023-06-25 | End: 2023-06-28 | Stop reason: HOSPADM

## 2023-06-25 RX ORDER — ENOXAPARIN SODIUM 100 MG/ML
30 INJECTION SUBCUTANEOUS 2 TIMES DAILY
Status: DISCONTINUED | OUTPATIENT
Start: 2023-06-25 | End: 2023-06-28 | Stop reason: HOSPADM

## 2023-06-25 RX ORDER — ONDANSETRON 2 MG/ML
4 INJECTION INTRAMUSCULAR; INTRAVENOUS EVERY 6 HOURS PRN
Status: DISCONTINUED | OUTPATIENT
Start: 2023-06-25 | End: 2023-06-28 | Stop reason: HOSPADM

## 2023-06-25 RX ORDER — ATORVASTATIN CALCIUM 40 MG/1
40 TABLET, FILM COATED ORAL EVERY EVENING
Status: DISCONTINUED | OUTPATIENT
Start: 2023-06-25 | End: 2023-06-28 | Stop reason: HOSPADM

## 2023-06-25 RX ADMIN — SODIUM CHLORIDE, PRESERVATIVE FREE 10 ML: 5 INJECTION INTRAVENOUS at 19:47

## 2023-06-25 RX ADMIN — ACETAMINOPHEN 650 MG: 325 TABLET ORAL at 23:07

## 2023-06-25 RX ADMIN — CEFTRIAXONE SODIUM 1000 MG: 1 INJECTION, POWDER, FOR SOLUTION INTRAMUSCULAR; INTRAVENOUS at 17:54

## 2023-06-25 RX ADMIN — ENOXAPARIN SODIUM 30 MG: 30 INJECTION SUBCUTANEOUS at 23:08

## 2023-06-25 RX ADMIN — DESMOPRESSIN ACETATE 40 MG: 0.2 TABLET ORAL at 19:47

## 2023-06-25 RX ADMIN — FUROSEMIDE 60 MG: 40 TABLET ORAL at 19:46

## 2023-06-25 ASSESSMENT — PAIN SCALES - WONG BAKER
WONGBAKER_NUMERICALRESPONSE: 0
WONGBAKER_NUMERICALRESPONSE: 2
WONGBAKER_NUMERICALRESPONSE: 0
WONGBAKER_NUMERICALRESPONSE: 0

## 2023-06-25 ASSESSMENT — PAIN DESCRIPTION - ONSET: ONSET: SUDDEN

## 2023-06-25 ASSESSMENT — PAIN - FUNCTIONAL ASSESSMENT
PAIN_FUNCTIONAL_ASSESSMENT: 0-10
PAIN_FUNCTIONAL_ASSESSMENT: 0-10
PAIN_FUNCTIONAL_ASSESSMENT: PREVENTS OR INTERFERES SOME ACTIVE ACTIVITIES AND ADLS

## 2023-06-25 ASSESSMENT — PAIN SCALES - GENERAL
PAINLEVEL_OUTOF10: 7
PAINLEVEL_OUTOF10: 0
PAINLEVEL_OUTOF10: 10
PAINLEVEL_OUTOF10: 0

## 2023-06-25 ASSESSMENT — PAIN DESCRIPTION - LOCATION
LOCATION: LEG
LOCATION: LEG

## 2023-06-25 ASSESSMENT — PAIN DESCRIPTION - PAIN TYPE: TYPE: ACUTE PAIN

## 2023-06-25 ASSESSMENT — LIFESTYLE VARIABLES
HOW MANY STANDARD DRINKS CONTAINING ALCOHOL DO YOU HAVE ON A TYPICAL DAY: PATIENT DOES NOT DRINK
HOW OFTEN DO YOU HAVE A DRINK CONTAINING ALCOHOL: NEVER

## 2023-06-25 ASSESSMENT — PAIN DESCRIPTION - ORIENTATION
ORIENTATION: LEFT
ORIENTATION: LEFT

## 2023-06-25 ASSESSMENT — PAIN DESCRIPTION - DESCRIPTORS: DESCRIPTORS: ACHING;THROBBING

## 2023-06-25 ASSESSMENT — PAIN DESCRIPTION - FREQUENCY: FREQUENCY: INTERMITTENT

## 2023-06-26 PROCEDURE — 6360000002 HC RX W HCPCS

## 2023-06-26 PROCEDURE — 2580000003 HC RX 258

## 2023-06-26 PROCEDURE — 6370000000 HC RX 637 (ALT 250 FOR IP)

## 2023-06-26 PROCEDURE — 1200000000 HC SEMI PRIVATE

## 2023-06-26 RX ADMIN — OXYBUTYNIN CHLORIDE 10 MG: 10 TABLET, EXTENDED RELEASE ORAL at 08:22

## 2023-06-26 RX ADMIN — TAMSULOSIN HYDROCHLORIDE 0.4 MG: 0.4 CAPSULE ORAL at 08:23

## 2023-06-26 RX ADMIN — Medication 81 MG: at 08:20

## 2023-06-26 RX ADMIN — ENOXAPARIN SODIUM 30 MG: 30 INJECTION SUBCUTANEOUS at 22:53

## 2023-06-26 RX ADMIN — SODIUM CHLORIDE, PRESERVATIVE FREE 10 ML: 5 INJECTION INTRAVENOUS at 08:23

## 2023-06-26 RX ADMIN — FUROSEMIDE 60 MG: 40 TABLET ORAL at 22:53

## 2023-06-26 RX ADMIN — DESMOPRESSIN ACETATE 40 MG: 0.2 TABLET ORAL at 16:58

## 2023-06-26 RX ADMIN — PANTOPRAZOLE SODIUM 40 MG: 40 TABLET, DELAYED RELEASE ORAL at 16:58

## 2023-06-26 RX ADMIN — SODIUM CHLORIDE, PRESERVATIVE FREE 10 ML: 5 INJECTION INTRAVENOUS at 16:12

## 2023-06-26 RX ADMIN — ACETAMINOPHEN 650 MG: 325 TABLET ORAL at 08:20

## 2023-06-26 RX ADMIN — CEFTRIAXONE SODIUM 1000 MG: 10 INJECTION, POWDER, FOR SOLUTION INTRAVENOUS at 16:10

## 2023-06-26 RX ADMIN — POTASSIUM CHLORIDE 20 MEQ: 1500 TABLET, EXTENDED RELEASE ORAL at 08:22

## 2023-06-26 RX ADMIN — ACETAMINOPHEN 650 MG: 325 TABLET ORAL at 22:53

## 2023-06-26 RX ADMIN — PANTOPRAZOLE SODIUM 40 MG: 40 TABLET, DELAYED RELEASE ORAL at 06:11

## 2023-06-26 RX ADMIN — ENOXAPARIN SODIUM 30 MG: 30 INJECTION SUBCUTANEOUS at 08:20

## 2023-06-26 ASSESSMENT — PAIN SCALES - GENERAL
PAINLEVEL_OUTOF10: 0
PAINLEVEL_OUTOF10: 7
PAINLEVEL_OUTOF10: 0
PAINLEVEL_OUTOF10: 7
PAINLEVEL_OUTOF10: 0

## 2023-06-26 ASSESSMENT — PAIN DESCRIPTION - DESCRIPTORS
DESCRIPTORS: ACHING
DESCRIPTORS: THROBBING

## 2023-06-26 ASSESSMENT — PAIN SCALES - WONG BAKER: WONGBAKER_NUMERICALRESPONSE: 0

## 2023-06-26 ASSESSMENT — PAIN DESCRIPTION - ORIENTATION
ORIENTATION: LEFT;RIGHT
ORIENTATION: LEFT

## 2023-06-26 ASSESSMENT — PAIN DESCRIPTION - LOCATION
LOCATION: LEG
LOCATION: LEG

## 2023-06-27 PROCEDURE — 6370000000 HC RX 637 (ALT 250 FOR IP): Performed by: EMERGENCY MEDICINE

## 2023-06-27 PROCEDURE — 6360000002 HC RX W HCPCS

## 2023-06-27 PROCEDURE — 2580000003 HC RX 258

## 2023-06-27 PROCEDURE — 1200000000 HC SEMI PRIVATE

## 2023-06-27 PROCEDURE — 6370000000 HC RX 637 (ALT 250 FOR IP)

## 2023-06-27 RX ADMIN — Medication 81 MG: at 09:26

## 2023-06-27 RX ADMIN — POTASSIUM CHLORIDE 20 MEQ: 1500 TABLET, EXTENDED RELEASE ORAL at 09:26

## 2023-06-27 RX ADMIN — ENOXAPARIN SODIUM 30 MG: 30 INJECTION SUBCUTANEOUS at 09:27

## 2023-06-27 RX ADMIN — ENOXAPARIN SODIUM 30 MG: 30 INJECTION SUBCUTANEOUS at 22:07

## 2023-06-27 RX ADMIN — FUROSEMIDE 60 MG: 40 TABLET ORAL at 09:26

## 2023-06-27 RX ADMIN — FUROSEMIDE 60 MG: 40 TABLET ORAL at 22:07

## 2023-06-27 RX ADMIN — TAMSULOSIN HYDROCHLORIDE 0.4 MG: 0.4 CAPSULE ORAL at 09:26

## 2023-06-27 RX ADMIN — CEFTRIAXONE SODIUM 1000 MG: 10 INJECTION, POWDER, FOR SOLUTION INTRAVENOUS at 17:00

## 2023-06-27 RX ADMIN — PANTOPRAZOLE SODIUM 40 MG: 40 TABLET, DELAYED RELEASE ORAL at 06:57

## 2023-06-27 RX ADMIN — OXYBUTYNIN CHLORIDE 10 MG: 10 TABLET, EXTENDED RELEASE ORAL at 09:26

## 2023-06-27 RX ADMIN — MUPIROCIN: 20 OINTMENT TOPICAL at 12:42

## 2023-06-27 RX ADMIN — SODIUM CHLORIDE, PRESERVATIVE FREE 10 ML: 5 INJECTION INTRAVENOUS at 22:08

## 2023-06-27 RX ADMIN — ISOSORBIDE MONONITRATE 30 MG: 30 TABLET, EXTENDED RELEASE ORAL at 09:26

## 2023-06-27 RX ADMIN — SODIUM CHLORIDE, PRESERVATIVE FREE 10 ML: 5 INJECTION INTRAVENOUS at 09:26

## 2023-06-27 RX ADMIN — PANTOPRAZOLE SODIUM 40 MG: 40 TABLET, DELAYED RELEASE ORAL at 17:01

## 2023-06-27 RX ADMIN — DESMOPRESSIN ACETATE 40 MG: 0.2 TABLET ORAL at 17:01

## 2023-06-28 VITALS
BODY MASS INDEX: 38.2 KG/M2 | OXYGEN SATURATION: 95 % | DIASTOLIC BLOOD PRESSURE: 58 MMHG | TEMPERATURE: 97.4 F | HEART RATE: 63 BPM | SYSTOLIC BLOOD PRESSURE: 106 MMHG | WEIGHT: 229.28 LBS | HEIGHT: 65 IN | RESPIRATION RATE: 16 BRPM

## 2023-06-28 LAB — CRP SERPL HS-MCNC: 20.4 MG/L (ref 0–5)

## 2023-06-28 PROCEDURE — 36415 COLL VENOUS BLD VENIPUNCTURE: CPT

## 2023-06-28 PROCEDURE — 6360000002 HC RX W HCPCS

## 2023-06-28 PROCEDURE — 6370000000 HC RX 637 (ALT 250 FOR IP)

## 2023-06-28 PROCEDURE — 2580000003 HC RX 258

## 2023-06-28 PROCEDURE — 86140 C-REACTIVE PROTEIN: CPT

## 2023-06-28 RX ORDER — DOXYCYCLINE HYCLATE 100 MG
100 TABLET ORAL 2 TIMES DAILY
Qty: 14 TABLET | Refills: 0 | Status: SHIPPED | OUTPATIENT
Start: 2023-06-28 | End: 2023-07-05

## 2023-06-28 RX ORDER — AMOXICILLIN AND CLAVULANATE POTASSIUM 875; 125 MG/1; MG/1
1 TABLET, FILM COATED ORAL 2 TIMES DAILY
Qty: 14 TABLET | Refills: 0 | Status: SHIPPED | OUTPATIENT
Start: 2023-06-28 | End: 2023-07-05

## 2023-06-28 RX ADMIN — Medication 81 MG: at 08:35

## 2023-06-28 RX ADMIN — PANTOPRAZOLE SODIUM 40 MG: 40 TABLET, DELAYED RELEASE ORAL at 06:25

## 2023-06-28 RX ADMIN — MUPIROCIN: 20 OINTMENT TOPICAL at 08:35

## 2023-06-28 RX ADMIN — SODIUM CHLORIDE, PRESERVATIVE FREE 10 ML: 5 INJECTION INTRAVENOUS at 08:41

## 2023-06-28 RX ADMIN — FUROSEMIDE 60 MG: 40 TABLET ORAL at 08:35

## 2023-06-28 RX ADMIN — ISOSORBIDE MONONITRATE 30 MG: 30 TABLET, EXTENDED RELEASE ORAL at 08:35

## 2023-06-28 RX ADMIN — ACETAMINOPHEN 650 MG: 325 TABLET ORAL at 00:29

## 2023-06-28 RX ADMIN — POTASSIUM CHLORIDE 20 MEQ: 1500 TABLET, EXTENDED RELEASE ORAL at 08:35

## 2023-06-28 RX ADMIN — TAMSULOSIN HYDROCHLORIDE 0.4 MG: 0.4 CAPSULE ORAL at 08:35

## 2023-06-28 RX ADMIN — OXYBUTYNIN CHLORIDE 10 MG: 10 TABLET, EXTENDED RELEASE ORAL at 08:35

## 2023-06-28 RX ADMIN — ENOXAPARIN SODIUM 30 MG: 30 INJECTION SUBCUTANEOUS at 08:35

## 2023-06-28 ASSESSMENT — PAIN DESCRIPTION - LOCATION: LOCATION: LEG

## 2023-06-28 ASSESSMENT — PAIN DESCRIPTION - ORIENTATION: ORIENTATION: LEFT

## 2023-06-28 ASSESSMENT — PAIN SCALES - GENERAL: PAINLEVEL_OUTOF10: 8

## 2023-06-28 ASSESSMENT — PAIN DESCRIPTION - DESCRIPTORS: DESCRIPTORS: ACHING

## 2023-06-29 ENCOUNTER — TELEPHONE (OUTPATIENT)
Dept: OTHER | Age: 68
End: 2023-06-29

## 2023-06-29 ENCOUNTER — CARE COORDINATION (OUTPATIENT)
Dept: CASE MANAGEMENT | Age: 68
End: 2023-06-29

## 2023-06-29 ENCOUNTER — TELEPHONE (OUTPATIENT)
Dept: FAMILY MEDICINE CLINIC | Age: 68
End: 2023-06-29

## 2023-06-30 ENCOUNTER — CARE COORDINATION (OUTPATIENT)
Dept: CASE MANAGEMENT | Age: 68
End: 2023-06-30

## 2023-07-06 ENCOUNTER — HOSPITAL ENCOUNTER (OUTPATIENT)
Dept: OTHER | Age: 68
Discharge: HOME | End: 2023-07-06
Payer: MEDICARE

## 2023-07-06 VITALS
WEIGHT: 231 LBS | RESPIRATION RATE: 16 BRPM | HEART RATE: 69 BPM | SYSTOLIC BLOOD PRESSURE: 104 MMHG | OXYGEN SATURATION: 96 % | BODY MASS INDEX: 38.44 KG/M2 | DIASTOLIC BLOOD PRESSURE: 60 MMHG

## 2023-07-06 PROCEDURE — 99212 OFFICE O/P EST SF 10 MIN: CPT

## 2023-07-07 ENCOUNTER — OFFICE VISIT (OUTPATIENT)
Dept: FAMILY MEDICINE CLINIC | Age: 68
End: 2023-07-07
Payer: MEDICARE

## 2023-07-07 VITALS
DIASTOLIC BLOOD PRESSURE: 59 MMHG | BODY MASS INDEX: 38.72 KG/M2 | TEMPERATURE: 96.9 F | SYSTOLIC BLOOD PRESSURE: 97 MMHG | HEIGHT: 65 IN | WEIGHT: 232.4 LBS | HEART RATE: 59 BPM

## 2023-07-07 DIAGNOSIS — I95.2 HYPOTENSION DUE TO DRUGS: ICD-10-CM

## 2023-07-07 DIAGNOSIS — Z09 HOSPITAL DISCHARGE FOLLOW-UP: ICD-10-CM

## 2023-07-07 DIAGNOSIS — I50.42 HEART FAILURE, SYSTOLIC AND DIASTOLIC, CHRONIC (HCC): Primary | ICD-10-CM

## 2023-07-07 DIAGNOSIS — I87.2 CHRONIC VENOUS INSUFFICIENCY: ICD-10-CM

## 2023-07-07 PROCEDURE — 3074F SYST BP LT 130 MM HG: CPT

## 2023-07-07 PROCEDURE — 3017F COLORECTAL CA SCREEN DOC REV: CPT

## 2023-07-07 PROCEDURE — 1036F TOBACCO NON-USER: CPT

## 2023-07-07 PROCEDURE — 3078F DIAST BP <80 MM HG: CPT

## 2023-07-07 PROCEDURE — 1124F ACP DISCUSS-NO DSCNMKR DOCD: CPT

## 2023-07-07 PROCEDURE — 1111F DSCHRG MED/CURRENT MED MERGE: CPT

## 2023-07-07 PROCEDURE — 99213 OFFICE O/P EST LOW 20 MIN: CPT

## 2023-07-07 PROCEDURE — G8427 DOCREV CUR MEDS BY ELIG CLIN: HCPCS

## 2023-07-07 PROCEDURE — G8417 CALC BMI ABV UP PARAM F/U: HCPCS

## 2023-07-07 ASSESSMENT — ENCOUNTER SYMPTOMS
NAUSEA: 0
VOMITING: 0
SHORTNESS OF BREATH: 0
ABDOMINAL PAIN: 0
CONSTIPATION: 0
COLOR CHANGE: 1
DIARRHEA: 0
COUGH: 0
BLOOD IN STOOL: 0
WHEEZING: 0

## 2023-07-07 ASSESSMENT — PATIENT HEALTH QUESTIONNAIRE - PHQ9
SUM OF ALL RESPONSES TO PHQ QUESTIONS 1-9: 6
1. LITTLE INTEREST OR PLEASURE IN DOING THINGS: 3
2. FEELING DOWN, DEPRESSED OR HOPELESS: 3
SUM OF ALL RESPONSES TO PHQ QUESTIONS 1-9: 6
SUM OF ALL RESPONSES TO PHQ9 QUESTIONS 1 & 2: 6

## 2023-07-17 ENCOUNTER — HOSPITAL ENCOUNTER (OUTPATIENT)
Dept: OCCUPATIONAL THERAPY | Age: 68
Setting detail: THERAPIES SERIES
Discharge: HOME | End: 2023-07-17
Payer: MEDICARE

## 2023-07-17 PROCEDURE — 97535 SELF CARE MNGMENT TRAINING: CPT

## 2023-07-17 PROCEDURE — 97165 OT EVAL LOW COMPLEX 30 MIN: CPT

## 2023-07-17 NOTE — CONSULTS
TREATMENT LOCATION:   [x] [facility]  [address], [address]  P: [phone]  F: [phone] [] [address]   [address] [facility] [place]: [phone]  F: [phone]      Lymphedema Services - Initial Evaluation for B Lower Extremity    Date:  2023  Patient: Annika Blake  : 1955             MRN: [id number]  Referring Provider: Lora Miranda       Phone: [phone]  Fax: [phone]  Insurance: [address] ([id number]) Sosa Shade restrictions/#of visits/etc here AUTH AFTER EVAL]  SECONDARY: [place] ([id number])    2023-2023  Insurance approval was received for Occupational Therapy from [employer] on 23. Approval was received for 10 visits, from 23 to 23. Authorization number Authorization [id number]  [facility] [id number].     [id number]  [id number]  [id number]  [id number]  [id number]  Medical Diagnosis: I87.2 CVI, I50.42 Heart failure, systolic and diastolic, chronic  Rehab Codes: I89.0,    Onset Date: 2023  Visit# / total visits: 1/5 scheduled; Progress note due at visit 10 per POC.  (Certification Dates: 2023 - 2023)    Demographic info for garment/pump VOB:   [address]  [phone]  No email  2023 Benefit check [employer]  2023 Benefit check [employer]    Allergies:   Other: seasonal  Medications: See charted information in Epic    Past Medical History: See charted information in [facility]   Active Ambulatory Problems     Diagnosis Date Noted    Atrial flutter ([address]) 10/15/2019    Sleep-related breathing disorder     Hypokalemia 01/15/2020    Atrial fibrillation ([address]) 01/15/2020    Ischemic cardiomyopathy 01/15/2020    Acute cystitis without hematuria 2020    Hx of CABG 2020    Chronic diastolic (congestive) heart failure ([address]) 2020    Bilateral hand numbness 2020    Right thigh pain 2020    Left leg weakness

## 2023-07-24 ENCOUNTER — HOSPITAL ENCOUNTER (EMERGENCY)
Age: 68
Discharge: HOME | End: 2023-07-24
Attending: EMERGENCY MEDICINE
Payer: MEDICARE

## 2023-07-24 ENCOUNTER — APPOINTMENT (OUTPATIENT)
Dept: OCCUPATIONAL THERAPY | Age: 68
End: 2023-07-24
Payer: MEDICARE

## 2023-07-24 VITALS
SYSTOLIC BLOOD PRESSURE: 99 MMHG | BODY MASS INDEX: 38.75 KG/M2 | TEMPERATURE: 97 F | RESPIRATION RATE: 14 BRPM | OXYGEN SATURATION: 97 % | DIASTOLIC BLOOD PRESSURE: 59 MMHG | WEIGHT: 232.59 LBS | HEART RATE: 99 BPM | HEIGHT: 65 IN
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
## 2023-07-24 PROCEDURE — 90471 IMMUNIZATION ADMIN: CPT | Performed by: EMERGENCY MEDICINE

## 2023-07-24 PROCEDURE — 6360000002 HC RX W HCPCS: Performed by: EMERGENCY MEDICINE

## 2023-07-24 PROCEDURE — 96372 THER/PROPH/DIAG INJ SC/IM: CPT

## 2023-07-24 PROCEDURE — 99284 EMERGENCY DEPT VISIT MOD MDM: CPT

## 2023-07-24 PROCEDURE — 90715 TDAP VACCINE 7 YRS/> IM: CPT | Performed by: EMERGENCY MEDICINE

## 2023-07-24 RX ORDER — AMOXICILLIN AND CLAVULANATE POTASSIUM 875; 125 MG/1; MG/1
1 TABLET, FILM COATED ORAL 2 TIMES DAILY
Qty: 14 TABLET | Refills: 0 | Status: SHIPPED | OUTPATIENT
Start: 2023-07-24 | End: 2023-07-31

## 2023-07-24 RX ORDER — AMOXICILLIN AND CLAVULANATE POTASSIUM 875; 125 MG/1; MG/1
1 TABLET, FILM COATED ORAL EVERY 12 HOURS SCHEDULED
Status: DISCONTINUED | OUTPATIENT
Start: 2023-07-24 | End: 2023-07-24 | Stop reason: HOSPADM

## 2023-07-24 RX ADMIN — TETANUS TOXOID, REDUCED DIPHTHERIA TOXOID AND ACELLULAR PERTUSSIS VACCINE, ADSORBED 0.5 ML: 5; 2.5; 8; 8; 2.5 SUSPENSION INTRAMUSCULAR at 16:06

## 2023-07-24 ASSESSMENT — PAIN SCALES - GENERAL: PAINLEVEL_OUTOF10: 7

## 2023-07-24 ASSESSMENT — ENCOUNTER SYMPTOMS
GASTROINTESTINAL NEGATIVE: 1
RESPIRATORY NEGATIVE: 1

## 2023-07-24 ASSESSMENT — PAIN - FUNCTIONAL ASSESSMENT: PAIN_FUNCTIONAL_ASSESSMENT: 0-10

## 2023-07-24 ASSESSMENT — PAIN DESCRIPTION - LOCATION: LOCATION: HAND

## 2023-07-24 NOTE — DISCHARGE INSTRUCTIONS
Wash thumb twice daily with mild soap and apply bacitracin and dressing or Band-Aid.   Take your Augmentin twice daily  Tylenol as needed for pain  Call your doctor for follow-up appointment next week  Return immediately if increased pain, redness, swelling, foul-smelling or yellow drainage, red streak, fevers chills or sweats

## 2023-07-24 NOTE — ED PROVIDER NOTES
[address] [facility]  eMERGENCY dEPARTMENT eNCOUnter      Pt Name: Lyndsay Bob  MRN: [id number]  [address] [facility] 1955  Date of evaluation: 7/24/2023  PCP:    Melody Mon MD      CHIEF COMPLAINT       Chief Complaint   Patient presents with    Animal Bite     Right right finger          HISTORY OF PRESENT ILLNESS    Lyndsay Bob is a 79 y.o. male who presents after breaking up a fight between his puppies had a bite from sharp teeth to the right ring finger pad distal to the DIP joint. There is also superficial abrasions from there claws to his left arm. He is diabetic. He is not up-to-date on tetanus. He is concerned for persistent bleeding which appears to stop with his dressing. The wound occurred last night. No fever chills or sweats. There is mild swelling. Pain is improved with simple analgesics with elevation. Its worse with palpation. Pain intensity is moderate          REVIEW OF SYSTEMS       Review of Systems   Constitutional:  Negative for chills, diaphoresis and fever. HENT: Negative. Respiratory: Negative. Cardiovascular: Negative. Gastrointestinal: Negative. Endocrine: Negative for polydipsia and polyuria. Genitourinary: Negative. Musculoskeletal: Negative. Skin:  Positive for wound. Neurological: Negative. Psychiatric/Behavioral: Negative.           PAST MEDICAL HISTORY    has a past medical history of Arthritis, Atrial fibrillation ([address]), BPH (benign prostatic hyperplasia), Cellulitis, Cervical disc disease, Chronic venous insufficiency, Combined systolic and diastolic congestive heart failure ([address]), Coronary artery disease, GERD (gastroesophageal reflux disease), History of incarceration, History of recent hospitalization, Hyperlipidemia, Hypertension, Ischemic cardiomyopathy, Myocardial infarct ([address]), Obesity, Overactive bladder, Sleep apnea treated with continuous positive airway pressure (CPAP), Under care of team, Under care of team,

## 2023-07-31 ENCOUNTER — HOSPITAL ENCOUNTER (OUTPATIENT)
Dept: OCCUPATIONAL THERAPY | Age: 68
Setting detail: THERAPIES SERIES
Discharge: HOME | End: 2023-07-31
Payer: MEDICARE

## 2023-07-31 PROCEDURE — 97535 SELF CARE MNGMENT TRAINING: CPT

## 2023-07-31 NOTE — FLOWSHEET NOTE
TREATMENT LOCATION:   [x] [facility]  [address], [address]  P: [phone]  F: [phone] [] [address]   [address] [facility] [place]: [phone]  F: [phone]        Lymphedema Services - Treatment Note of the B Lower Extremity    Visit# / total visits: 2/5 scheduled; Progress note due at visit 10 per POC.  (Certification Dates: 2023 - 2023)  2023 cx OT in ER previous day for dog bite to hand    Date:  2023  Patient: Rebeka Castaneda  : 1955             MRN: [id number]  Referring Provider: Houston Angel       Phone: [phone]  Fax: [phone]  Insurance: Angélica Shukla ([id number]) Bruna Epley restrictions/#of visits/etc here AUTH AFTER EVAL]  SECOND[place]: [place] (BR:[id number])     2023-2023  Insurance approval was received for Occupational Therapy from [employer] on 23. Approval was received for 10 visits, from 23 to 23. Authorization number Authorization [id number]  Tracking [id number].     [id number]  [id number]  [id number]  [id number]  [id number]  Medical Diagnosis: I87.2 CVI, I50.42 Heart failure, systolic and diastolic, chronic  Rehab Codes: I89.0,    Onset Date: 2023       Demographic info for garment/pump VOB:   [address]  [phone]  No email  2023 Benefit check [employer]  2023 Benefit check [employer]  2023 Re-sent for specific benefits for [employer]  Response: [facility] which is what he has, is now only in network with one DME supplier; unfortunately, that is not us nor is it [employer]. This goes into effect 2023 so we are trying to get one more signature to submit before the deadline hits. We are waiting on LUANNE Pan to sign off and then we can submit as is. He is covered though but I will let you know the outcome.      Overview of Evaluation dated 2023:  Patient is a 79year old male referred to

## 2023-08-01 ENCOUNTER — HOSPITAL ENCOUNTER (OUTPATIENT)
Dept: OTHER | Age: 68
Discharge: HOME | End: 2023-08-01
Payer: MEDICARE

## 2023-08-01 ENCOUNTER — TELEPHONE (OUTPATIENT)
Dept: FAMILY MEDICINE CLINIC | Age: 68
End: 2023-08-01

## 2023-08-01 VITALS
OXYGEN SATURATION: 97 % | DIASTOLIC BLOOD PRESSURE: 66 MMHG | BODY MASS INDEX: 37.84 KG/M2 | WEIGHT: 227.4 LBS | HEART RATE: 70 BPM | SYSTOLIC BLOOD PRESSURE: 118 MMHG | RESPIRATION RATE: 20 BRPM

## 2023-08-01 PROCEDURE — 99212 OFFICE O/P EST SF 10 MIN: CPT

## 2023-08-01 NOTE — PROGRESS NOTES
Verbally reviewed medication list with patient; patient verbalized understanding. Discussed 2000mg/day sodium restricted diet; patient verbalized understanding. Moderate daily exercise encouraged as tolerated. Discussed rest breaks as needed; patient verbalized understanding. Patient instructed to weigh self at the same time of each day, using same clothes and same scale; reinforced teaching to monitor for 3-5 lb weight increase over 1-2 days, and to notify the CHF clinic at [phone] or physician office if weight change noted. Patient verbalized understanding. Risks of smoking discussed with the patient if applicable; patient strongly discouraged to smoke. Patient verbalized understanding. Signs and symptoms of CHF discussed with patient, such as feeling more tired than normal, feeling short of breath, coughing that increases when you lie down, sudden weight gain, swelling of your feet, legs or belly. Patient verbalized understanding to notify the CHF clinic at [phone] or physician office if these symptoms occur. Compliance with plan of care and further disease process causes discussed with patient, patient encouraged to keep all follow up appointments. Patient verbalized understanding.

## 2023-08-01 NOTE — TELEPHONE ENCOUNTER
LVM for return call for patient to reschedule appointment due to provider not being in that day 08/04/2023

## 2023-08-02 ENCOUNTER — HOSPITAL ENCOUNTER (OUTPATIENT)
Dept: OCCUPATIONAL THERAPY | Age: 68
Setting detail: THERAPIES SERIES
Discharge: HOME | End: 2023-08-02
Payer: MEDICARE

## 2023-08-02 PROCEDURE — 97535 SELF CARE MNGMENT TRAINING: CPT

## 2023-08-02 PROCEDURE — 97140 MANUAL THERAPY 1/> REGIONS: CPT

## 2023-08-02 NOTE — FLOWSHEET NOTE
$35.63/ $25.68     Therapeutic Exercise (16227)                              $28.50/$ 22.29     Self care/home mgmt (49443)                              $31.61/ $23.84 40 3   Vasopneumatic Device (07234)                           $8.99     Total Treatment Time    60 4          Medicare Tracking - $2,230 cap Totals   Today 107.12   Previous  222.35   Grand Total 329.47      VISIT 3 of 10 7/17/2023 - 9/30/2023    Time In:  0915   Time Out: 1015      Electronically signed by Ortiz Galarza OT on 8/2/2023 at 9:16 AM

## 2023-08-08 NOTE — TELEPHONE ENCOUNTER
Confirmed with patient. Patient is taking wellbutrin and zoloft. Patient will need refilled prior to appt on 8/16/23.        Jerrica Moore called requesting a refill of the below medication which has been pended for you:     Requested Prescriptions     Pending Prescriptions Disp Refills    sertraline (ZOLOFT) 50 MG tablet [Pharmacy Med Name: Sertraline HCl 50 MG Oral Tablet] 90 tablet 0     Sig: Take 1 tablet by mouth nightly    buPROPion (WELLBUTRIN) 75 MG tablet 180 tablet 1     Sig: Take 1 tablet by mouth twice daily       Last Appointment Date: 5/16/2023  Next Appointment Date: 8/16/2023    No Known Allergies Writer sent out new referral since the previous provider has moved. Writer sent referral to [address] office. Patient called.  LVM notifying him of new referral.

## 2023-08-11 ENCOUNTER — OFFICE VISIT (OUTPATIENT)
Dept: FAMILY MEDICINE CLINIC | Age: 68
End: 2023-08-11

## 2023-08-11 DIAGNOSIS — B35.9 RINGWORM: ICD-10-CM

## 2023-08-11 DIAGNOSIS — I89.0 LYMPHEDEMA OF BOTH LOWER EXTREMITIES: Primary | ICD-10-CM

## 2023-08-11 RX ORDER — CLOTRIMAZOLE 1 %
CREAM (GRAM) TOPICAL
Qty: 1 EACH | Refills: 1 | Status: SHIPPED | OUTPATIENT
Start: 2023-08-11 | End: 2023-08-18

## 2023-08-11 ASSESSMENT — ENCOUNTER SYMPTOMS
CONSTIPATION: 0
SHORTNESS OF BREATH: 0
COUGH: 0
DIARRHEA: 0
WHEEZING: 0
NAUSEA: 0
VOMITING: 0
ABDOMINAL PAIN: 0

## 2023-08-11 NOTE — PROGRESS NOTES
Attending Physician Statement  I  have discussed the care of Hollie Chavira including pertinent history and exam findings with the resident. I agree with the assessment, plan and orders as documented by the resident. BP (!) (P) 104/59   Pulse (P) 75    BP Readings from Last 3 Encounters:   08/11/23 (!) (P) 104/59   08/01/23 118/66   07/24/23 (!) 99/59     Wt Readings from Last 3 Encounters:   08/01/23 227 lb 6.4 oz (103.1 kg)   07/24/23 232 lb 9.4 oz (105.5 kg)   07/07/23 232 lb 6.4 oz (105.4 kg)          Diagnosis Orders   1. Lymphedema of both lower extremities        2.  Ringworm  clotrimazole (LOTRIMIN AF) 1 % cream              Gorge Ashraf MD 8/11/2023 5:13 PM

## 2023-08-11 NOTE — PROGRESS NOTES
Visit Information    Have you changed or started any medications since your last visit including any over-the-counter medicines, vitamins, or herbal medicines? no   Have you stopped taking any of your medications? Is so, why? -  no  Are you having any side effects from any of your medications? - no    Have you seen any other physician or provider since your last visit?  no   Have you had any other diagnostic tests since your last visit?  no   Have you been seen in the emergency room and/or had an admission in a hospital since we last saw you? Yes, [facility]  Have you had your routine dental cleaning in the past 6 months?  no     Do you have an active MyChart account? If no, what is the barrier?   No: Pending    Patient Care Team:  oRnaldo Hernandez MD as PCP - Katy Brown MD as Consulting Physician (Gastroenterology)  Justin Pelayo MD as Consulting Physician (Urology)    Medical History Review  Past Medical, Family, and Social History reviewed and does not contribute to the patient presenting condition    Health Maintenance   Topic Date Due    COVID-19 Vaccine (3 - Booster for [employer] series) 06/25/2021    Flu vaccine (1) 08/01/2023    Lipids  03/29/2024    Depression Screen  07/07/2024    Colorectal Cancer Screen  01/12/2031    DTaP/Tdap/Td vaccine (4 - Td or Tdap) 07/24/2033    Shingles vaccine  Completed    Pneumococcal 65+ years Vaccine  Completed    Hepatitis C screen  Completed    Hepatitis A vaccine  Aged Out    Hib vaccine  Aged Out    Meningococcal (ACWY) vaccine  Aged Out    Pneumococcal 0-64 years Vaccine  Discontinued    Diabetes screen  Discontinued    HIV screen  Discontinued    Prostate Specific Antigen (PSA) Screening or Monitoring  Discontinued

## 2023-08-11 NOTE — PROGRESS NOTES
Reymundo Hu (:  1955) is a 79 y.o. male,Established patient, here for evaluation of the following chief complaint(s): Other (Left leg swollen has been in and out of the hospital for it and has something on back of left calf (looks like ringworm) and would like it looked at.)         ASSESSMENT/PLAN:  1. Lymphedema of both lower extremities  2. Ringworm  -     clotrimazole (LOTRIMIN AF) 1 % cream; Apply topically 2 times daily until clinical resolution, Disp-1 each, R-1, Normal  -Patient in media. Will treat with Lotrimin cream and follow-up in 4 weeks    Return in about 4 weeks (around 2023). Subjective   SUBJECTIVE/OBJECTIVE:  79years old male patient with past medical's of heart failure, chronic venous insufficiency, lymphedema of the lower limbs came to the office complaining of having skin rash behind his left leg. Patient said that he was told that he has a ringworm when he was at the lymphedema clinic. Patient did not notice that. He denies any itching or pain. Denies any other rash anywhere else. That his legs looks much better. Review of Systems   Constitutional:  Negative for diaphoresis, fatigue and fever. Respiratory:  Negative for cough, shortness of breath and wheezing. Cardiovascular:  Negative for chest pain, palpitations and leg swelling. Gastrointestinal:  Negative for abdominal pain, constipation, diarrhea, nausea and vomiting. Skin:  Positive for rash. Objective   Physical Exam  Constitutional:       General: He is not in acute distress. Appearance: Normal appearance. Cardiovascular:      Rate and Rhythm: Normal rate and regular rhythm. Pulses: Normal pulses. Heart sounds: Normal heart sounds. No murmur heard. Pulmonary:      Effort: Pulmonary effort is normal.      Breath sounds: Normal breath sounds. No wheezing. Musculoskeletal:      Right lower leg: Edema present. Left lower leg: Edema present.    Skin:

## 2023-08-15 ENCOUNTER — TELEPHONE (OUTPATIENT)
Dept: FAMILY MEDICINE CLINIC | Age: 68
End: 2023-08-15

## 2023-08-15 RX ORDER — CLOTRIMAZOLE AND BETAMETHASONE DIPROPIONATE 10; .64 MG/G; MG/G
CREAM TOPICAL
Qty: 1 EACH | Refills: 1 | Status: SHIPPED | OUTPATIENT
Start: 2023-08-15 | End: 2023-09-20 | Stop reason: SDUPTHER

## 2023-08-15 NOTE — TELEPHONE ENCOUNTER
Patient called office stating the medication cream the was ordered for him at his last appt is not covered by his insurance. Patient said they told him to try Nystatin.  Please advise

## 2023-08-16 ENCOUNTER — TELEPHONE (OUTPATIENT)
Dept: OTHER | Age: 68
End: 2023-08-16

## 2023-08-16 NOTE — TELEPHONE ENCOUNTER
Writer spoke with Peggy King at [facility] regarding order for compression devices (DME)  for lymphedema. Peggy King was updated most recent referral to lymphedema clinic was pt's PCP,  Ronaldo Hernandez MD. His phone number was obtained and she will contact their office re: need for a signed order.    [address] JESSY BOSTON CNP   [address] [facility]  CHF Clinic

## 2023-08-27 NOTE — PATIENT INSTRUCTIONS
Thank you for letting us take care of you today. We hope all your questions were addressed. If a question was overlooked or something else comes to mind after you return home, please contact a member of your Care Team listed below. Your Care Team at [place] [address] is Team #4  Liam Villareal MD (Faculty)  Sugar Chahal MD (Resident)  Greg Quintero MD (Resident)  Hanna Anaya MD (Resident)  Girma Thomas MD (Resident)  PERRY Morocho., DELIA Mao., Carlos Latif., [facility])  Alex Tsang, [address] (Clinical Practice Manager)  Fatemeh Gamble [facility] (Clinical Pharmacist)       Office phone number: [phone]    If you need to get in right away due to illness, please be advised we have \"Same Day\" appointments available Monday-Friday. Please call us at [phone] option #3 to schedule your \"Same Day\" appointment.
Female

## 2023-08-30 ENCOUNTER — HOSPITAL ENCOUNTER (OUTPATIENT)
Dept: OTHER | Age: 68
Discharge: HOME | End: 2023-08-30
Payer: MEDICARE

## 2023-08-30 ENCOUNTER — HOSPITAL ENCOUNTER (OUTPATIENT)
Dept: PHARMACY | Age: 68
Setting detail: THERAPIES SERIES
Discharge: HOME | End: 2023-08-30

## 2023-08-30 ENCOUNTER — TELEPHONE (OUTPATIENT)
Dept: FAMILY MEDICINE CLINIC | Age: 68
End: 2023-08-30

## 2023-08-30 VITALS
OXYGEN SATURATION: 97 % | RESPIRATION RATE: 20 BRPM | BODY MASS INDEX: 37.84 KG/M2 | DIASTOLIC BLOOD PRESSURE: 80 MMHG | WEIGHT: 227.4 LBS | HEART RATE: 75 BPM | SYSTOLIC BLOOD PRESSURE: 122 MMHG

## 2023-08-30 DIAGNOSIS — I48.91 ATRIAL FIBRILLATION, UNSPECIFIED TYPE (HCC): ICD-10-CM

## 2023-08-30 DIAGNOSIS — M25.511 ACUTE PAIN OF RIGHT SHOULDER: Primary | ICD-10-CM

## 2023-08-30 PROCEDURE — 99212 OFFICE O/P EST SF 10 MIN: CPT

## 2023-08-30 ASSESSMENT — PAIN SCALES - GENERAL: PAINLEVEL_OUTOF10: 7

## 2023-08-30 ASSESSMENT — PAIN DESCRIPTION - FREQUENCY: FREQUENCY: CONTINUOUS

## 2023-08-30 ASSESSMENT — PAIN DESCRIPTION - ONSET: ONSET: ON-GOING

## 2023-08-30 ASSESSMENT — PAIN DESCRIPTION - ORIENTATION: ORIENTATION: RIGHT

## 2023-08-30 ASSESSMENT — PAIN DESCRIPTION - LOCATION: LOCATION: SHOULDER

## 2023-08-30 ASSESSMENT — PAIN DESCRIPTION - DESCRIPTORS: DESCRIPTORS: DISCOMFORT;SHARP

## 2023-08-30 NOTE — TELEPHONE ENCOUNTER
Patient is requesting an x-ray for his right shoulder, patient fell off his porch he fell on his right side, patient is having sharp pain in his shoulder with every movement, please advise.

## 2023-08-30 NOTE — TELEPHONE ENCOUNTER
Patient returned phone call - message given he voiced understanding and had no further concerns at this time.

## 2023-08-31 ENCOUNTER — HOSPITAL ENCOUNTER (OUTPATIENT)
Dept: GENERAL RADIOLOGY | Age: 68
Discharge: HOME | End: 2023-09-02
Payer: MEDICARE

## 2023-08-31 ENCOUNTER — HOSPITAL ENCOUNTER (OUTPATIENT)
Age: 68
Discharge: HOME | End: 2023-09-02
Payer: MEDICARE

## 2023-08-31 ENCOUNTER — HOSPITAL ENCOUNTER (OUTPATIENT)
Age: 68
Discharge: HOME | End: 2023-08-31
Payer: MEDICARE

## 2023-08-31 DIAGNOSIS — M25.511 ACUTE PAIN OF RIGHT SHOULDER: ICD-10-CM

## 2023-08-31 PROCEDURE — 73030 X-RAY EXAM OF SHOULDER: CPT

## 2023-08-31 PROCEDURE — 93005 ELECTROCARDIOGRAM TRACING: CPT

## 2023-09-01 DIAGNOSIS — I25.10 CORONARY ARTERY DISEASE INVOLVING NATIVE CORONARY ARTERY OF NATIVE HEART WITHOUT ANGINA PECTORIS: ICD-10-CM

## 2023-09-01 LAB
EKG ATRIAL RATE: 58 BPM
EKG P-R INTERVAL: 224 MS
EKG Q-T INTERVAL: 456 MS
EKG QRS DURATION: 84 MS
EKG QTC CALCULATION (BAZETT): 447 MS
EKG R AXIS: 29 DEGREES
EKG T AXIS: 49 DEGREES
EKG VENTRICULAR RATE: 58 BPM

## 2023-09-01 RX ORDER — NITROGLYCERIN 0.4 MG/1
0.4 TABLET SUBLINGUAL EVERY 5 MIN PRN
Qty: 25 TABLET | Refills: 1 | Status: SHIPPED | OUTPATIENT
Start: 2023-09-01

## 2023-09-01 NOTE — TELEPHONE ENCOUNTER
Please address the medication refill and close the encounter. If I can be of assistance, please route to the applicable pool. Thank you.     Last visit: 8-11-23  Last Med refill: 2-13-23  Does patient have enough medication for 72 hours: No:     Next Visit Date:  Future Appointments   Date Time Provider [address]   9/8/2023  1:30 PM Xochitl Rodríguez MD [facility]   9/20/2023  2:15 PM MD Imelda Rausch FP [facility]   9/27/2023 11:00 AM ST[facility] CHF CLINIC RM 1 [facility] CHF CLI [facility]   10/2/2023  8:45 AM Cass Guzman MD AFL TCC TOLE AFL K[facility]AN C   10/30/2023 10:30 AM SCHEDULE, [facility]   11/29/2023  2:15 PM Pro Drake  [place] Maintenance   Topic Date Due    COVID-19 Vaccine (3 - Booster for [employer] series) 06/25/2021    Flu vaccine (1) 08/01/2023    Lipids  03/29/2024    Depression Screen  07/07/2024    Colorectal Cancer Screen  01/12/2031    DTaP/Tdap/Td vaccine (4 - Td or Tdap) 07/24/2033    Shingles vaccine  Completed    Pneumococcal 65+ years Vaccine  Completed    Hepatitis C screen  Completed    Hepatitis A vaccine  Aged Out    Hib vaccine  Aged Out    Meningococcal (ACWY) vaccine  Aged Out    Pneumococcal 0-64 years Vaccine  Discontinued    Diabetes screen  Discontinued    HIV screen  Discontinued    Prostate Specific Antigen (PSA) Screening or Monitoring  Discontinued       Hemoglobin A1C (%)   Date Value   04/13/2023 5.5   12/10/2019 5.6   10/18/2019 5.8             ( goal A1C is < 7)   No components found for: LABMICR  LDL Cholesterol (mg/dL)   Date Value   03/29/2023 131 (H)   03/08/2022 56   03/08/2022 56       (goal LDL is <100)   AST (U/L)   Date Value   03/29/2023 26     ALT (U/L)   Date Value   03/29/2023 19     BUN (mg/dL)   Date Value   06/25/2023 11     BP Readings from Last 3 Encounters:   08/30/23 122/80   08/11/23 (!) (P) 104/59   08/01/23 118/66          (goal 120/80)    All Future Testing planned in

## 2023-09-02 DIAGNOSIS — E87.6 HYPOKALEMIA: ICD-10-CM

## 2023-09-05 RX ORDER — OXYBUTYNIN CHLORIDE 10 MG/1
TABLET, EXTENDED RELEASE ORAL
Qty: 30 TABLET | Refills: 3 | Status: SHIPPED | OUTPATIENT
Start: 2023-09-05 | End: 2023-09-07 | Stop reason: SDUPTHER

## 2023-09-05 RX ORDER — POTASSIUM CHLORIDE 20 MEQ/1
TABLET, EXTENDED RELEASE ORAL
Qty: 30 TABLET | Refills: 1 | Status: SHIPPED | OUTPATIENT
Start: 2023-09-05

## 2023-09-05 NOTE — TELEPHONE ENCOUNTER
E-scribe request for [employer]. Please review and e-scribe if applicable.      Last Visit Date:  8/11/2023  Next Visit Date:  9/20/2023    Hemoglobin A1C (%)   Date Value   04/13/2023 5.5   12/10/2019 5.6   10/18/2019 5.8             ( goal A1C is < 7)   No components found for: LABMICR  LDL Cholesterol (mg/dL)   Date Value   03/29/2023 131 (H)       (goal LDL is <100)   AST (U/L)   Date Value   03/29/2023 26     ALT (U/L)   Date Value   03/29/2023 19     BUN (mg/dL)   Date Value   06/25/2023 11     BP Readings from Last 3 Encounters:   08/30/23 122/80   08/11/23 (!) (P) 104/59   08/01/23 118/66          (goal 120/80)        Patient Active Problem List:     Atrial flutter (HCC)     Sleep-related breathing disorder     Hypokalemia     Atrial fibrillation (HCC)     Ischemic cardiomyopathy     Acute cystitis without hematuria     Hx of CABG     Chronic diastolic (congestive) heart failure (HCC)     Bilateral hand numbness     Right thigh pain     Left leg weakness     Coronary artery disease     Chronic cough     Gastroesophageal reflux disease without esophagitis     Overflow incontinence of urine     Polyp of colon     Post-void dribbling     Post-nasal drip     SYDNEY (acute kidney injury) ([address])     Hyperkalemia     Hypotension     Overactive bladder     Hemorrhoids     HARPREET (obstructive sleep apnea)     Laryngopharyngeal reflux     Skin ulcer of left lower leg, limited to breakdown of skin ([address])     Bilateral edema of lower extremity     Lymphedema of both lower extremities     Chronic venous insufficiency     Acute pain of right knee     Finger laceration, subsequent encounter     Chronic venous stasis     Balance problem     Left leg pain     Cellulitis     Venous stasis ulcer of left calf with fat layer exposed ([address])     Venous stasis ulcer of right calf with fat layer exposed ([address])     Non-pressure chronic ulcer of left lower leg, limited to breakdown of skin ([address])     Essential hypertension     Need for

## 2023-09-06 ENCOUNTER — TELEPHONE (OUTPATIENT)
Dept: FAMILY MEDICINE CLINIC | Age: 68
End: 2023-09-06

## 2023-09-07 RX ORDER — OXYBUTYNIN CHLORIDE 10 MG/1
10 TABLET, EXTENDED RELEASE ORAL EVERY MORNING
Qty: 30 TABLET | Refills: 3 | Status: SHIPPED | OUTPATIENT
Start: 2023-09-07 | End: 2024-01-05

## 2023-09-20 ENCOUNTER — OFFICE VISIT (OUTPATIENT)
Dept: FAMILY MEDICINE CLINIC | Age: 68
End: 2023-09-20
Payer: MEDICARE

## 2023-09-20 VITALS
HEIGHT: 65 IN | SYSTOLIC BLOOD PRESSURE: 107 MMHG | BODY MASS INDEX: 38.32 KG/M2 | HEART RATE: 70 BPM | WEIGHT: 230 LBS | DIASTOLIC BLOOD PRESSURE: 63 MMHG

## 2023-09-20 DIAGNOSIS — Z23 ENCOUNTER FOR VACCINATION: ICD-10-CM

## 2023-09-20 DIAGNOSIS — M25.511 ACUTE PAIN OF RIGHT SHOULDER: Primary | ICD-10-CM

## 2023-09-20 PROCEDURE — 3074F SYST BP LT 130 MM HG: CPT

## 2023-09-20 PROCEDURE — 3017F COLORECTAL CA SCREEN DOC REV: CPT

## 2023-09-20 PROCEDURE — G8417 CALC BMI ABV UP PARAM F/U: HCPCS

## 2023-09-20 PROCEDURE — 3078F DIAST BP <80 MM HG: CPT

## 2023-09-20 PROCEDURE — 1123F ACP DISCUSS/DSCN MKR DOCD: CPT

## 2023-09-20 PROCEDURE — 1036F TOBACCO NON-USER: CPT

## 2023-09-20 PROCEDURE — 90686 IIV4 VACC NO PRSV 0.5 ML IM: CPT

## 2023-09-20 PROCEDURE — 99213 OFFICE O/P EST LOW 20 MIN: CPT

## 2023-09-20 PROCEDURE — G8427 DOCREV CUR MEDS BY ELIG CLIN: HCPCS

## 2023-09-20 RX ORDER — TIZANIDINE 4 MG/1
2 TABLET ORAL NIGHTLY PRN
Qty: 15 TABLET | Refills: 0 | Status: SHIPPED | OUTPATIENT
Start: 2023-09-20

## 2023-09-20 RX ORDER — CLOTRIMAZOLE AND BETAMETHASONE DIPROPIONATE 10; .64 MG/G; MG/G
CREAM TOPICAL
Qty: 1 EACH | Refills: 1 | Status: ON HOLD | OUTPATIENT
Start: 2023-09-20 | End: 2023-10-17 | Stop reason: HOSPADM

## 2023-09-20 RX ORDER — NAPROXEN 500 MG/1
500 TABLET ORAL 2 TIMES DAILY PRN
Qty: 60 TABLET | Refills: 0 | Status: ON HOLD | OUTPATIENT
Start: 2023-09-20 | End: 2023-10-17 | Stop reason: HOSPADM

## 2023-09-20 ASSESSMENT — ENCOUNTER SYMPTOMS
COUGH: 0
WHEEZING: 0
SHORTNESS OF BREATH: 0

## 2023-09-20 NOTE — PROGRESS NOTES
Attending Physician Statement  I have discussed the care of MickeyJurovcikincluding pertinent history and exam findings,  with the resident. I have reviewed the key elements of all parts of the encounter with the resident. I agree with the assessment, plan and orders as documented by the resident.   (GE Modifier)    R Shoulder pain- S/P fall Xray WNL- NSAIDs/PT/ May need an MRI

## 2023-09-20 NOTE — PROGRESS NOTES
Visit Information    Have you changed or started any medications since your last visit including any over-the-counter medicines, vitamins, or herbal medicines? no   Have you stopped taking any of your medications? Is so, why? -  no  Are you having any side effects from any of your medications? - no    Have you seen any other physician or provider since your last visit?  no   Have you had any other diagnostic tests since your last visit? yes - Labs - EKG   Have you been seen in the emergency room and/or had an admission in a hospital since we last saw you?  no   Have you had your routine dental cleaning in the past 6 months?  no     Do you have an active MyChart account? If no, what is the barrier?   No: Pending    Patient Care Team:  Juan Daniel العلي MD as PCP - General Driss Medina MD as Consulting Physician (Gastroenterology)  Erasto Harding MD as Consulting Physician (Urology)    Medical History Review  Past Medical, Family, and Social History reviewed and does not contribute to the patient presenting condition    Health Maintenance   Topic Date Due    COVID-19 Vaccine (3 - [place] series) 06/25/2021    Flu vaccine (1) 08/01/2023    Annual Wellness Visit (AWV)  10/28/2023    Lipids  03/29/2024    Depression Screen  07/07/2024    Colorectal Cancer Screen  01/12/2031    DTaP/Tdap/Td vaccine (4 - Td or Tdap) 07/24/2033    Shingles vaccine  Completed    Pneumococcal 65+ years Vaccine  Completed    Hepatitis C screen  Completed    Hepatitis A vaccine  Aged Out    Hepatitis B vaccine  Aged Out    Hib vaccine  Aged Out    Meningococcal (ACWY) vaccine  Aged Out    Pneumococcal 0-64 years Vaccine  Discontinued    Diabetes screen  Discontinued    HIV screen  Discontinued    Prostate Specific Antigen (PSA) Screening or Monitoring  Discontinued

## 2023-09-20 NOTE — PROGRESS NOTES
Nataliya Thomson (:  1955) is a 79 y.o. male,Established patient, here for evaluation of the following chief complaint(s):  Follow-up (Left leg swelling - ringworm )         ASSESSMENT/PLAN:  1. Acute pain of right shoulder  -     naproxen (NAPROSYN) 500 MG tablet; Take 1 tablet by mouth 2 times daily as needed for Pain, Disp-60 tablet, R-0Normal  -     [facility]  -We will treat with NSAIDs, muscle relaxant and physical therapy. If symptoms persist, will consider doing an MRI  2. Encounter for vaccination  -     Influenza, FLUARIX, (age 10 mo+),  IM, Preservative Free, 0.5 mL      Return in about 6 weeks (around 11/3/2023) for Shoulder pain. Subjective   SUBJECTIVE/OBJECTIVE:  79years old male patient with past medical history of CHF, chronic venous stasis, HARPREET came to the office complaining of right shoulder pain after a fall last month. Patient said he fell off the porch and he landed on his right shoulder. Since then, patient has been having severe right shoulder pain and limited range of motion due to pain. Patient had a negative x-ray for the right shoulder for any fracture or dislocation. Patient still experiencing pain. Patient did not take any medications for the pain. Review of Systems   Constitutional:  Negative for diaphoresis, fatigue and fever. Respiratory:  Negative for cough, shortness of breath and wheezing. Cardiovascular:  Positive for leg swelling. Negative for chest pain and palpitations. Musculoskeletal:         Right shoulder pain    Neurological:  Negative for dizziness, weakness, light-headedness, numbness and headaches. Objective   Physical Exam  Constitutional:       General: He is not in acute distress. HENT:      Head: Normocephalic and atraumatic. Cardiovascular:      Rate and Rhythm: Normal rate and regular rhythm. Pulses: Normal pulses. Heart sounds: Normal heart sounds.  No murmur

## 2023-09-26 NOTE — PROGRESS NOTES
[facility]  Medication Management  Pharmacist  Heart Failure FOLLOW UP      NAME: Patrick Villela RECORD NUMBER:  [id number]  AGE: 79 y.o. GENDER: male  : 1955  EPISODE DATE:  2023    Visit conducted in conjunction with CHF clinic appointment    Heart Failure Management by BECKY Estrada    Recommendations from last visit:  Sent message to PCP regarding need for anticoagulation therapy, response:  none. Resent message today. May need to send to cardiology. ECHO EF%: 55             Date: 3/3/2021    Recent hospital admit for CHF:  No    eGFR: > 60 on 23      Significant findings / plans changes:  recent fall, Xray no significant findings    Subjective     Significant findings per nurse assessment:    Patient Symptoms   Non-productive cough. Otherwise no significant complaints or symptoms associated with CHF. He reports a fall last night, re-injuring his right shoulder. Reports recent Flu vaccination, up to date on others.     Objective / Assessment     Wt Readings from Last 4 Encounters:   23 227 lb 9.6 oz (103.2 kg)   23 230 lb (104.3 kg)   23 227 lb 6.4 oz (103.1 kg)   23 227 lb 6.4 oz (103.1 kg)    and   Ht Readings from Last 1 Encounters:   23 5' 5\" (1.651 m)              HR:  76  O2Sat: 97%   BP Readings from Last 3 Encounters:   23 120/60   23 107/63   23 122/80        BMP:   Lab Results   Component Value Date/Time     2023 04:30 PM    K 3.6 2023 04:30 PM     2023 04:30 PM    CO2 23 2023 04:30 PM    BUN 11 2023 04:30 PM    CREATININE 0.59 2023 04:30 PM     Lab Results   Component Value Date/Time    K 3.6 2023 04:30 PM    K 4.4 2023 11:26 AM    K 3.7 2023 06:55 PM     Lab Results   Component Value Date/Time    MG 2.4 2021 11:22 AM    MG 2.5 2020 08:16 AM    MG 2.8 2020 05:59 AM     Lab Results   Component Value Date/Time

## 2023-09-27 ENCOUNTER — HOSPITAL ENCOUNTER (OUTPATIENT)
Dept: OTHER | Age: 68
Discharge: HOME | End: 2023-09-27
Payer: MEDICARE

## 2023-09-27 ENCOUNTER — HOSPITAL ENCOUNTER (OUTPATIENT)
Dept: PHARMACY | Age: 68
Setting detail: THERAPIES SERIES
Discharge: HOME | End: 2023-09-27

## 2023-09-27 VITALS
WEIGHT: 227.6 LBS | OXYGEN SATURATION: 97 % | HEART RATE: 76 BPM | SYSTOLIC BLOOD PRESSURE: 120 MMHG | BODY MASS INDEX: 37.87 KG/M2 | RESPIRATION RATE: 20 BRPM | DIASTOLIC BLOOD PRESSURE: 60 MMHG

## 2023-09-27 PROCEDURE — 99212 OFFICE O/P EST SF 10 MIN: CPT

## 2023-09-27 ASSESSMENT — PAIN DESCRIPTION - FREQUENCY: FREQUENCY: INTERMITTENT

## 2023-09-27 ASSESSMENT — PAIN SCALES - GENERAL: PAINLEVEL_OUTOF10: 7

## 2023-09-27 ASSESSMENT — PAIN DESCRIPTION - ORIENTATION
ORIENTATION_2: RIGHT
ORIENTATION: RIGHT

## 2023-09-27 ASSESSMENT — PAIN DESCRIPTION - DESCRIPTORS: DESCRIPTORS: ACHING

## 2023-09-27 ASSESSMENT — PAIN DESCRIPTION - PAIN TYPE: TYPE: ACUTE PAIN;CHRONIC PAIN

## 2023-09-27 ASSESSMENT — PAIN DESCRIPTION - LOCATION: LOCATION: SHOULDER

## 2023-09-28 ENCOUNTER — HOSPITAL ENCOUNTER (OUTPATIENT)
Dept: PHYSICAL THERAPY | Age: 68
Setting detail: THERAPIES SERIES
Discharge: HOME | End: 2023-09-28
Payer: MEDICARE

## 2023-09-28 ENCOUNTER — TELEPHONE (OUTPATIENT)
Dept: FAMILY MEDICINE CLINIC | Age: 68
End: 2023-09-28

## 2023-09-28 DIAGNOSIS — I89.0 LYMPHEDEMA OF BOTH LOWER EXTREMITIES: Primary | ICD-10-CM

## 2023-09-28 PROCEDURE — 97162 PT EVAL MOD COMPLEX 30 MIN: CPT

## 2023-09-28 NOTE — PROGRESS NOTES
[x] [facility]) [facility]  [facility]  [address].  [phone]  F: [phone]             Physical Therapy Upper Extremity Evaluation    Date:  2023  Patient: Georgiana Lama" : 1955  MRN: [id number]  Physician: Padma Gómez MD; Gurpreet Justin MD   Insurance: Primary Ins: [employer] MEDICARE GOLD PLUS, Secondary Ins. [place] MEDICAID; Authorization after Eval  Medical Diagnosis:  Acute pain of right shoulder (M25.511 [ICD-10-CM])  Rehab Codes: M25.511, M25.611, M79.621, M62.511, M62.521  Onset Date: 2023   Next 's appt: 2023    Subjective:   CC: Pt reports to PT stating that his leg got caught in the threshold of his front door and fell on R shoulder injuring himself about a month and a half ago. Reports that he did not go to hospital after falling. Reports an additional fall 2 days ago in his home, unknown cause. Using the R shoulder hurts with doing anything especially lifting objects, [place] reaching. Only stops hurting when laying down and holding R UE across stomach. Reports that he does not have good use of B hands d/t cervical DDD. HPI: Reports that he was scheduled for rotator cuff surgery on R years ago, but never had it done.       PMHx: [] Unremarkable [] Diabetes [x] HTN  [] Pacemaker   [x] MI/Heart Problems [] Cancer [x] Arthritis [] Other:              [x] Refer to full medical chart  In EPIC     Past Medical History:   Diagnosis Date    Arthritis     back    Atrial fibrillation (HCC)     BPH (benign prostatic hyperplasia)     Cellulitis 2023    left leg    Cervical disc disease     Chronic venous insufficiency     Combined systolic and diastolic congestive heart failure ([address]) 01/15/2020    Coronary artery disease 10/2019    s/p CABG x 1 ; [facility] to LAD    GERD (gastroesophageal reflux disease)     on rx    History of incarceration     released     History of recent hospitalization 2023    til 23 with Cellulitis

## 2023-09-28 NOTE — FLOWSHEET NOTE
[facility] Fall Risk Assessment    Patient Name:  Sulema Bence  : 1955    Risk Factor Scale  Score   History of Falls [x] Yes  [] No 25  0 25   Secondary Diagnosis [] Yes  [x] No 15  0 0   Ambulatory Aid [] Furniture  [x] Crutches/cane/walker  [] None/bedrest/wheelchair/nurse 30  15  0 15   IV/Heparin Lock [] Yes  [x] No 20  0 0   Gait/Transferring [] Impaired  [] Weak  [x] Normal/bedrest/immobile 20  10  0 0   Mental Status [] Forgets limitations  [x] Oriented to own ability 15  0 0      Total:40     Based on the Assessment score: check the appropriate box. []  No intervention needed   Low =   Score of 0-24    [x]  Use standard prevention interventions Moderate =  Score of 24-44   [x] Give patient handout and discuss fall prevention strategies   [x] Establish goal of education for patient/family RE: fall prevention strategies    []  Use high risk prevention interventions High = Score of 45 and higher   [] Give patient handout and discuss fall prevention strategies   [] Establish goal of education for patient/family Re: fall prevention strategies   [] Discuss lifeline / other resources    Electronically signed by:   Katina Beltrán, RICHARD Patient evaluated and treated, and note written by Katina Beltrán, Student PT under direct supervision of Erin Mix PT.     Date: 2023

## 2023-09-28 NOTE — TELEPHONE ENCOUNTER
Pt in need of new DME order for a cane to be sent to [address] [place] [address]. Order pended. Pharmacy confirmed.  Please review and advise

## 2023-09-29 NOTE — TELEPHONE ENCOUNTER
Printed order faxed to [facility] of pt choice with last office notes, will notify pt once confirmation received.

## 2023-10-12 ENCOUNTER — HOSPITAL ENCOUNTER (INPATIENT)
Age: 68
LOS: 5 days | Discharge: HOME | DRG: 603 | End: 2023-10-17
Attending: EMERGENCY MEDICINE | Admitting: INTERNAL MEDICINE
Payer: MEDICARE

## 2023-10-12 ENCOUNTER — APPOINTMENT (OUTPATIENT)
Dept: CT IMAGING | Age: 68
DRG: 603 | End: 2023-10-12
Payer: MEDICARE

## 2023-10-12 DIAGNOSIS — I50.42 HEART FAILURE, SYSTOLIC AND DIASTOLIC, CHRONIC (HCC): ICD-10-CM

## 2023-10-12 DIAGNOSIS — I83.012 VENOUS STASIS ULCER OF RIGHT CALF WITH FAT LAYER EXPOSED, UNSPECIFIED WHETHER VARICOSE VEINS PRESENT (HCC): ICD-10-CM

## 2023-10-12 DIAGNOSIS — L03.116 BILATERAL LOWER LEG CELLULITIS: Primary | ICD-10-CM

## 2023-10-12 DIAGNOSIS — I87.8 CHRONIC VENOUS STASIS: ICD-10-CM

## 2023-10-12 DIAGNOSIS — L03.115 BILATERAL LOWER LEG CELLULITIS: Primary | ICD-10-CM

## 2023-10-12 DIAGNOSIS — L03.119 CELLULITIS OF LOWER EXTREMITY, UNSPECIFIED LATERALITY: ICD-10-CM

## 2023-10-12 DIAGNOSIS — L97.212 VENOUS STASIS ULCER OF RIGHT CALF WITH FAT LAYER EXPOSED, UNSPECIFIED WHETHER VARICOSE VEINS PRESENT (HCC): ICD-10-CM

## 2023-10-12 DIAGNOSIS — I89.0 LYMPHEDEMA OF BOTH LOWER EXTREMITIES: ICD-10-CM

## 2023-10-12 LAB
ANION GAP SERPL CALCULATED.3IONS-SCNC: 8 MMOL/L (ref 9–17)
BASOPHILS # BLD: 0.05 K/UL (ref 0–0.2)
BASOPHILS NFR BLD: 1 % (ref 0–2)
BUN SERPL-MCNC: 14 MG/DL (ref 8–23)
CALCIUM SERPL-MCNC: 8.5 MG/DL (ref 8.6–10.4)
CHLORIDE SERPL-SCNC: 107 MMOL/L (ref 98–107)
CO2 SERPL-SCNC: 25 MMOL/L (ref 20–31)
CREAT SERPL-MCNC: 0.5 MG/DL (ref 0.7–1.2)
CRP SERPL HS-MCNC: 16.2 MG/L (ref 0–5)
EOSINOPHIL # BLD: 0.32 K/UL (ref 0–0.44)
EOSINOPHILS RELATIVE PERCENT: 4 % (ref 1–4)
ERYTHROCYTE [DISTWIDTH] IN BLOOD BY AUTOMATED COUNT: 14.7 % (ref 11.8–14.4)
GFR SERPL CREATININE-BSD FRML MDRD: >60 ML/MIN/1.73M2
GLUCOSE SERPL-MCNC: 101 MG/DL (ref 70–99)
HCT VFR BLD AUTO: 41.7 % (ref 40.7–50.3)
HGB BLD-MCNC: 13.5 G/DL (ref 13–17)
IMM GRANULOCYTES # BLD AUTO: 0.06 K/UL (ref 0–0.3)
IMM GRANULOCYTES NFR BLD: 1 %
LACTIC ACID, WHOLE BLOOD: 1.6 MMOL/L (ref 0.7–2.1)
LYMPHOCYTES NFR BLD: 1.12 K/UL (ref 1.1–3.7)
LYMPHOCYTES RELATIVE PERCENT: 13 % (ref 24–43)
MCH RBC QN AUTO: 30.7 PG (ref 25.2–33.5)
MCHC RBC AUTO-ENTMCNC: 32.4 G/DL (ref 28.4–34.8)
MCV RBC AUTO: 94.8 FL (ref 82.6–102.9)
MONOCYTES NFR BLD: 0.97 K/UL (ref 0.1–1.2)
MONOCYTES NFR BLD: 11 % (ref 3–12)
NEUTROPHILS NFR BLD: 70 % (ref 36–65)
NEUTS SEG NFR BLD: 6.15 K/UL (ref 1.5–8.1)
NRBC BLD-RTO: 0 PER 100 WBC
PLATELET # BLD AUTO: 266 K/UL (ref 138–453)
PMV BLD AUTO: 9.6 FL (ref 8.1–13.5)
POTASSIUM SERPL-SCNC: 4.4 MMOL/L (ref 3.7–5.3)
RBC # BLD AUTO: 4.4 M/UL (ref 4.21–5.77)
RBC # BLD: ABNORMAL 10*6/UL
SODIUM SERPL-SCNC: 140 MMOL/L (ref 135–144)
WBC OTHER # BLD: 8.7 K/UL (ref 3.5–11.3)

## 2023-10-12 PROCEDURE — 80048 BASIC METABOLIC PNL TOTAL CA: CPT

## 2023-10-12 PROCEDURE — 2580000003 HC RX 258

## 2023-10-12 PROCEDURE — 6360000002 HC RX W HCPCS

## 2023-10-12 PROCEDURE — 1200000000 HC SEMI PRIVATE

## 2023-10-12 PROCEDURE — 72125 CT NECK SPINE W/O DYE: CPT

## 2023-10-12 PROCEDURE — 83605 ASSAY OF LACTIC ACID: CPT

## 2023-10-12 PROCEDURE — 6370000000 HC RX 637 (ALT 250 FOR IP)

## 2023-10-12 PROCEDURE — 87040 BLOOD CULTURE FOR BACTERIA: CPT

## 2023-10-12 PROCEDURE — 96365 THER/PROPH/DIAG IV INF INIT: CPT

## 2023-10-12 PROCEDURE — 99285 EMERGENCY DEPT VISIT HI MDM: CPT

## 2023-10-12 PROCEDURE — 96375 TX/PRO/DX INJ NEW DRUG ADDON: CPT

## 2023-10-12 PROCEDURE — 86140 C-REACTIVE PROTEIN: CPT

## 2023-10-12 PROCEDURE — 70450 CT HEAD/BRAIN W/O DYE: CPT

## 2023-10-12 PROCEDURE — 85025 COMPLETE CBC W/AUTO DIFF WBC: CPT

## 2023-10-12 RX ORDER — SODIUM CHLORIDE 0.9 % (FLUSH) 0.9 %
5-40 SYRINGE (ML) INJECTION PRN
Status: DISCONTINUED | OUTPATIENT
Start: 2023-10-12 | End: 2023-10-17 | Stop reason: HOSPADM

## 2023-10-12 RX ORDER — MAGNESIUM SULFATE IN WATER 40 MG/ML
2000 INJECTION, SOLUTION INTRAVENOUS PRN
Status: DISCONTINUED | OUTPATIENT
Start: 2023-10-12 | End: 2023-10-17 | Stop reason: HOSPADM

## 2023-10-12 RX ORDER — ONDANSETRON 2 MG/ML
4 INJECTION INTRAMUSCULAR; INTRAVENOUS EVERY 6 HOURS PRN
Status: DISCONTINUED | OUTPATIENT
Start: 2023-10-12 | End: 2023-10-17 | Stop reason: HOSPADM

## 2023-10-12 RX ORDER — POLYETHYLENE GLYCOL 3350 17 G/17G
17 POWDER, FOR SOLUTION ORAL DAILY PRN
Status: DISCONTINUED | OUTPATIENT
Start: 2023-10-12 | End: 2023-10-17 | Stop reason: HOSPADM

## 2023-10-12 RX ORDER — POTASSIUM CHLORIDE 20 MEQ/1
40 TABLET, EXTENDED RELEASE ORAL PRN
Status: DISCONTINUED | OUTPATIENT
Start: 2023-10-12 | End: 2023-10-17 | Stop reason: HOSPADM

## 2023-10-12 RX ORDER — SODIUM CHLORIDE 9 MG/ML
INJECTION, SOLUTION INTRAVENOUS PRN
Status: DISCONTINUED | OUTPATIENT
Start: 2023-10-12 | End: 2023-10-17 | Stop reason: HOSPADM

## 2023-10-12 RX ORDER — PANTOPRAZOLE SODIUM 40 MG/1
40 TABLET, DELAYED RELEASE ORAL DAILY
Status: DISCONTINUED | OUTPATIENT
Start: 2023-10-12 | End: 2023-10-17 | Stop reason: HOSPADM

## 2023-10-12 RX ORDER — ASPIRIN 81 MG/1
81 TABLET ORAL DAILY
Status: DISCONTINUED | OUTPATIENT
Start: 2023-10-12 | End: 2023-10-17 | Stop reason: HOSPADM

## 2023-10-12 RX ORDER — ISOSORBIDE MONONITRATE 30 MG/1
30 TABLET, EXTENDED RELEASE ORAL DAILY
Status: DISCONTINUED | OUTPATIENT
Start: 2023-10-12 | End: 2023-10-17 | Stop reason: HOSPADM

## 2023-10-12 RX ORDER — POTASSIUM CHLORIDE 7.45 MG/ML
10 INJECTION INTRAVENOUS PRN
Status: DISCONTINUED | OUTPATIENT
Start: 2023-10-12 | End: 2023-10-17 | Stop reason: HOSPADM

## 2023-10-12 RX ORDER — OXYBUTYNIN CHLORIDE 10 MG/1
10 TABLET, EXTENDED RELEASE ORAL EVERY MORNING
Status: DISCONTINUED | OUTPATIENT
Start: 2023-10-13 | End: 2023-10-17 | Stop reason: HOSPADM

## 2023-10-12 RX ORDER — ACETAMINOPHEN 325 MG/1
650 TABLET ORAL EVERY 6 HOURS PRN
Status: DISCONTINUED | OUTPATIENT
Start: 2023-10-12 | End: 2023-10-17 | Stop reason: HOSPADM

## 2023-10-12 RX ORDER — ACETAMINOPHEN 650 MG/1
650 SUPPOSITORY RECTAL EVERY 6 HOURS PRN
Status: DISCONTINUED | OUTPATIENT
Start: 2023-10-12 | End: 2023-10-17 | Stop reason: HOSPADM

## 2023-10-12 RX ORDER — SODIUM CHLORIDE 0.9 % (FLUSH) 0.9 %
5-40 SYRINGE (ML) INJECTION EVERY 12 HOURS SCHEDULED
Status: DISCONTINUED | OUTPATIENT
Start: 2023-10-12 | End: 2023-10-17 | Stop reason: HOSPADM

## 2023-10-12 RX ORDER — ONDANSETRON 4 MG/1
4 TABLET, ORALLY DISINTEGRATING ORAL EVERY 8 HOURS PRN
Status: DISCONTINUED | OUTPATIENT
Start: 2023-10-12 | End: 2023-10-17 | Stop reason: HOSPADM

## 2023-10-12 RX ORDER — POTASSIUM CHLORIDE 20 MEQ/1
20 TABLET, EXTENDED RELEASE ORAL EVERY MORNING
Status: DISCONTINUED | OUTPATIENT
Start: 2023-10-13 | End: 2023-10-17 | Stop reason: HOSPADM

## 2023-10-12 RX ORDER — TAMSULOSIN HYDROCHLORIDE 0.4 MG/1
0.4 CAPSULE ORAL DAILY
Status: DISCONTINUED | OUTPATIENT
Start: 2023-10-12 | End: 2023-10-17 | Stop reason: HOSPADM

## 2023-10-12 RX ORDER — ATORVASTATIN CALCIUM 80 MG/1
80 TABLET, FILM COATED ORAL DAILY
Status: DISCONTINUED | OUTPATIENT
Start: 2023-10-12 | End: 2023-10-17 | Stop reason: HOSPADM

## 2023-10-12 RX ORDER — ENOXAPARIN SODIUM 100 MG/ML
30 INJECTION SUBCUTANEOUS 2 TIMES DAILY
Status: DISCONTINUED | OUTPATIENT
Start: 2023-10-12 | End: 2023-10-17 | Stop reason: HOSPADM

## 2023-10-12 RX ADMIN — SODIUM CHLORIDE, PRESERVATIVE FREE 10 ML: 5 INJECTION INTRAVENOUS at 23:29

## 2023-10-12 RX ADMIN — VANCOMYCIN HYDROCHLORIDE 1500 MG: 1.5 INJECTION, POWDER, LYOPHILIZED, FOR SOLUTION INTRAVENOUS at 18:22

## 2023-10-12 RX ADMIN — ENOXAPARIN SODIUM 30 MG: 100 INJECTION SUBCUTANEOUS at 23:11

## 2023-10-12 RX ADMIN — CEFTRIAXONE SODIUM 1000 MG: 1 INJECTION, POWDER, FOR SOLUTION INTRAMUSCULAR; INTRAVENOUS at 17:47

## 2023-10-12 RX ADMIN — PIPERACILLIN AND TAZOBACTAM 3375 MG: 3; .375 INJECTION, POWDER, LYOPHILIZED, FOR SOLUTION INTRAVENOUS at 22:30

## 2023-10-12 RX ADMIN — ATORVASTATIN CALCIUM 80 MG: 80 TABLET, FILM COATED ORAL at 23:11

## 2023-10-12 ASSESSMENT — ENCOUNTER SYMPTOMS
SINUS PRESSURE: 0
SORE THROAT: 0
CONSTIPATION: 0
SHORTNESS OF BREATH: 0
COLOR CHANGE: 1
COUGH: 0
SINUS PAIN: 0
NAUSEA: 0
ABDOMINAL PAIN: 0
RHINORRHEA: 0
PHOTOPHOBIA: 0
CHEST TIGHTNESS: 0
ABDOMINAL DISTENTION: 0
VOMITING: 0
DIARRHEA: 0
BACK PAIN: 0

## 2023-10-12 ASSESSMENT — PAIN SCALES - GENERAL
PAINLEVEL_OUTOF10: 0
PAINLEVEL_OUTOF10: 9

## 2023-10-12 ASSESSMENT — PAIN - FUNCTIONAL ASSESSMENT: PAIN_FUNCTIONAL_ASSESSMENT: 0-10

## 2023-10-12 NOTE — H&P
[address] [facility]  History & Physical Examination Note              Date:   10/12/2023  Patient name:  Estel Spatz  Date of admission:  10/12/2023  4:18 PM  MRN:   [id number]  YOB: 1955    CHIEF COMPLAINT:       Chief Complaint   Patient presents with    Leg Pain     Bilateral legs    Sore     Bilateral legs       History Obtained From:  Patient and chart review. HPI:     The patient is a 79 y.o.  male with history of HTN, HFpEF, HLD, HARPREET on CPAP, CAD s/p CABG x 1, A fib/flutter s/p cardioversion 1/30/2020, venous insufficiency and lymphedema who presented to the ED with complaints of bilateral lower extremity wounds which are chronic but have been worsening for about 2 weeks, says left leg started draining clear liquid about 3-4 days ago, no odor. Patient does not recall any injury to the area. Mentions erythema and swelling of left leg is chronic for him but worsened and became tender over the past 1 week. Patient also states that he had a fall about 2 weeks ago, he was going down some steps to his porch and the door was blocked with boxes that he was trying to move. Just recalls waking up on the ground. Denies any chest pain, palpitations, diaphoresis or dizziness/lightheadedness prior to and after the episode. CT head in the ED was unremarkable. Patient does follow with Cardiology and was last seen on 10/02/2023. He does have a hx of A fib/flutter but has had a cardioversion done and EKG on 8/31 showed sinus loki with 1st degree AV, no A fib. Carotid US on 10/10/2019 was <50% stenosis. CABG was done 10/21/2019 with normalization of EF. He does have a hx of mechanical falls and says he needs to use his cane, would likely benefit from PT/OT evaluation to determine if further care/walking devices are needed.      Patient is being admitted for the management of cellulitis and venous ulcers with reported several failures with PO
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
## 2023-10-13 PROBLEM — N40.0 BPH (BENIGN PROSTATIC HYPERPLASIA): Status: ACTIVE | Noted: 2023-10-13

## 2023-10-13 PROBLEM — L03.115 BILATERAL LOWER LEG CELLULITIS: Status: ACTIVE | Noted: 2020-01-23

## 2023-10-13 LAB
ANION GAP SERPL CALCULATED.3IONS-SCNC: 9 MMOL/L (ref 9–17)
BASOPHILS # BLD: 0.06 K/UL (ref 0–0.2)
BASOPHILS NFR BLD: 1 % (ref 0–2)
BUN SERPL-MCNC: 13 MG/DL (ref 8–23)
CALCIUM SERPL-MCNC: 8.4 MG/DL (ref 8.6–10.4)
CHLORIDE SERPL-SCNC: 108 MMOL/L (ref 98–107)
CO2 SERPL-SCNC: 22 MMOL/L (ref 20–31)
CREAT SERPL-MCNC: 0.6 MG/DL (ref 0.7–1.2)
EOSINOPHIL # BLD: 0.32 K/UL (ref 0–0.44)
EOSINOPHILS RELATIVE PERCENT: 5 % (ref 1–4)
ERYTHROCYTE [DISTWIDTH] IN BLOOD BY AUTOMATED COUNT: 15 % (ref 11.8–14.4)
GFR SERPL CREATININE-BSD FRML MDRD: >60 ML/MIN/1.73M2
GLUCOSE SERPL-MCNC: 100 MG/DL (ref 70–99)
HCT VFR BLD AUTO: 40 % (ref 40.7–50.3)
HGB BLD-MCNC: 12.1 G/DL (ref 13–17)
IMM GRANULOCYTES # BLD AUTO: 0.06 K/UL (ref 0–0.3)
IMM GRANULOCYTES NFR BLD: 1 %
LYMPHOCYTES NFR BLD: 1.13 K/UL (ref 1.1–3.7)
LYMPHOCYTES RELATIVE PERCENT: 18 % (ref 24–43)
MCH RBC QN AUTO: 30 PG (ref 25.2–33.5)
MCHC RBC AUTO-ENTMCNC: 30.3 G/DL (ref 28.4–34.8)
MCV RBC AUTO: 99.3 FL (ref 82.6–102.9)
MONOCYTES NFR BLD: 0.83 K/UL (ref 0.1–1.2)
MONOCYTES NFR BLD: 13 % (ref 3–12)
NEUTROPHILS NFR BLD: 62 % (ref 36–65)
NEUTS SEG NFR BLD: 3.98 K/UL (ref 1.5–8.1)
NRBC BLD-RTO: 0 PER 100 WBC
PLATELET # BLD AUTO: 223 K/UL (ref 138–453)
PMV BLD AUTO: 9.6 FL (ref 8.1–13.5)
POTASSIUM SERPL-SCNC: 4.2 MMOL/L (ref 3.7–5.3)
RBC # BLD AUTO: 4.03 M/UL (ref 4.21–5.77)
RBC # BLD: ABNORMAL 10*6/UL
SODIUM SERPL-SCNC: 139 MMOL/L (ref 135–144)
WBC OTHER # BLD: 6.4 K/UL (ref 3.5–11.3)

## 2023-10-13 PROCEDURE — 36415 COLL VENOUS BLD VENIPUNCTURE: CPT

## 2023-10-13 PROCEDURE — 6360000002 HC RX W HCPCS

## 2023-10-13 PROCEDURE — 99222 1ST HOSP IP/OBS MODERATE 55: CPT | Performed by: INTERNAL MEDICINE

## 2023-10-13 PROCEDURE — 2580000003 HC RX 258

## 2023-10-13 PROCEDURE — 80048 BASIC METABOLIC PNL TOTAL CA: CPT

## 2023-10-13 PROCEDURE — 85025 COMPLETE CBC W/AUTO DIFF WBC: CPT

## 2023-10-13 PROCEDURE — 97535 SELF CARE MNGMENT TRAINING: CPT

## 2023-10-13 PROCEDURE — 97166 OT EVAL MOD COMPLEX 45 MIN: CPT

## 2023-10-13 PROCEDURE — 99213 OFFICE O/P EST LOW 20 MIN: CPT

## 2023-10-13 PROCEDURE — 1200000000 HC SEMI PRIVATE

## 2023-10-13 PROCEDURE — 97530 THERAPEUTIC ACTIVITIES: CPT

## 2023-10-13 PROCEDURE — 6370000000 HC RX 637 (ALT 250 FOR IP)

## 2023-10-13 PROCEDURE — 2700000000 HC OXYGEN THERAPY PER DAY

## 2023-10-13 PROCEDURE — 97162 PT EVAL MOD COMPLEX 30 MIN: CPT

## 2023-10-13 PROCEDURE — 94660 CPAP INITIATION&MGMT: CPT

## 2023-10-13 RX ORDER — FUROSEMIDE 10 MG/ML
40 INJECTION INTRAMUSCULAR; INTRAVENOUS 2 TIMES DAILY
Status: DISCONTINUED | OUTPATIENT
Start: 2023-10-13 | End: 2023-10-16

## 2023-10-13 RX ADMIN — ENOXAPARIN SODIUM 30 MG: 100 INJECTION SUBCUTANEOUS at 21:42

## 2023-10-13 RX ADMIN — VANCOMYCIN HYDROCHLORIDE 1250 MG: 1.25 INJECTION, POWDER, LYOPHILIZED, FOR SOLUTION INTRAVENOUS at 18:22

## 2023-10-13 RX ADMIN — ISOSORBIDE MONONITRATE 30 MG: 30 TABLET, EXTENDED RELEASE ORAL at 08:20

## 2023-10-13 RX ADMIN — FUROSEMIDE 60 MG: 40 TABLET ORAL at 08:20

## 2023-10-13 RX ADMIN — OXYBUTYNIN CHLORIDE 10 MG: 10 TABLET, EXTENDED RELEASE ORAL at 08:20

## 2023-10-13 RX ADMIN — EMPAGLIFLOZIN 10 MG: 10 TABLET, FILM COATED ORAL at 08:20

## 2023-10-13 RX ADMIN — VANCOMYCIN HYDROCHLORIDE 1250 MG: 1.25 INJECTION, POWDER, LYOPHILIZED, FOR SOLUTION INTRAVENOUS at 05:51

## 2023-10-13 RX ADMIN — ENOXAPARIN SODIUM 30 MG: 100 INJECTION SUBCUTANEOUS at 08:20

## 2023-10-13 RX ADMIN — FUROSEMIDE 40 MG: 10 INJECTION, SOLUTION INTRAMUSCULAR; INTRAVENOUS at 18:26

## 2023-10-13 RX ADMIN — POTASSIUM CHLORIDE 20 MEQ: 1500 TABLET, EXTENDED RELEASE ORAL at 09:02

## 2023-10-13 RX ADMIN — SODIUM CHLORIDE, PRESERVATIVE FREE 10 ML: 5 INJECTION INTRAVENOUS at 08:21

## 2023-10-13 RX ADMIN — PANTOPRAZOLE SODIUM 40 MG: 40 TABLET, DELAYED RELEASE ORAL at 08:20

## 2023-10-13 RX ADMIN — TAMSULOSIN HYDROCHLORIDE 0.4 MG: 0.4 CAPSULE ORAL at 08:20

## 2023-10-13 RX ADMIN — PIPERACILLIN AND TAZOBACTAM 3375 MG: 3; .375 INJECTION, POWDER, LYOPHILIZED, FOR SOLUTION INTRAVENOUS at 09:08

## 2023-10-13 RX ADMIN — ATORVASTATIN CALCIUM 80 MG: 80 TABLET, FILM COATED ORAL at 08:20

## 2023-10-13 RX ADMIN — Medication 81 MG: at 08:21

## 2023-10-13 ASSESSMENT — ENCOUNTER SYMPTOMS
BACK PAIN: 0
DIARRHEA: 0
COUGH: 0
ABDOMINAL DISTENTION: 0
ABDOMINAL PAIN: 0
NAUSEA: 0
VOMITING: 0
SHORTNESS OF BREATH: 0
EYE DISCHARGE: 0
COLOR CHANGE: 1

## 2023-10-13 NOTE — DISCHARGE INSTRUCTIONS
Continue use of the tubular compression stockings. Call [address] and schedule an appointment asap.

## 2023-10-13 NOTE — PLAN OF CARE
Problem: Discharge Planning  Goal: Discharge to home or other facility with appropriate resources  Outcome: Progressing  Flowsheets (Taken 10/13/2023 1843)  Discharge to home or other facility with appropriate resources:   Identify barriers to discharge with patient and caregiver   Identify discharge learning needs (meds, wound care, etc)     Problem: Pain  Goal: Verbalizes/displays adequate comfort level or baseline comfort level  Outcome: Progressing  Flowsheets (Taken 10/13/2023 1843)  Verbalizes/displays adequate comfort level or baseline comfort level:   Encourage patient to monitor pain and request assistance   Assess pain using appropriate pain scale   Consider cultural and social influences on pain and pain management   Implement non-pharmacological measures as appropriate and evaluate response     Problem: Safety - Adult  Goal: Free from fall injury  Outcome: Progressing  Flowsheets (Taken 10/13/2023 1843)  Free From Fall Injury: Instruct family/caregiver on patient safety     Problem: ABCDS Injury Assessment  Goal: Absence of physical injury  Outcome: Progressing  Flowsheets (Taken 10/13/2023 1843)  Absence of Physical Injury: Implement safety measures based on patient assessment     Problem: Chronic Conditions and Co-morbidities  Goal: Patient's chronic conditions and co-morbidity symptoms are monitored and maintained or improved  Outcome: Progressing  Flowsheets (Taken 10/13/2023 1843)  Care Plan - Patient's Chronic Conditions and Co-Morbidity Symptoms are Monitored and Maintained or Improved:   Monitor and assess patient's chronic conditions and comorbid symptoms for stability, deterioration, or improvement   Collaborate with multidisciplinary team to address chronic and comorbid conditions and prevent exacerbation or deterioration   Update acute care plan with appropriate goals if chronic or comorbid symptoms are exacerbated and prevent overall improvement and discharge

## 2023-10-14 LAB
ANION GAP SERPL CALCULATED.3IONS-SCNC: 11 MMOL/L (ref 9–17)
BASOPHILS # BLD: 0.07 K/UL (ref 0–0.2)
BASOPHILS NFR BLD: 1 % (ref 0–2)
BUN SERPL-MCNC: 13 MG/DL (ref 8–23)
CALCIUM SERPL-MCNC: 8.3 MG/DL (ref 8.6–10.4)
CHLORIDE SERPL-SCNC: 105 MMOL/L (ref 98–107)
CO2 SERPL-SCNC: 22 MMOL/L (ref 20–31)
CREAT SERPL-MCNC: 0.5 MG/DL (ref 0.7–1.2)
EOSINOPHIL # BLD: 0.43 K/UL (ref 0–0.44)
EOSINOPHILS RELATIVE PERCENT: 6 % (ref 1–4)
ERYTHROCYTE [DISTWIDTH] IN BLOOD BY AUTOMATED COUNT: 15.4 % (ref 11.8–14.4)
GFR SERPL CREATININE-BSD FRML MDRD: >60 ML/MIN/1.73M2
GLUCOSE SERPL-MCNC: 95 MG/DL (ref 70–99)
HCT VFR BLD AUTO: 41.8 % (ref 40.7–50.3)
HGB BLD-MCNC: 12.3 G/DL (ref 13–17)
IMM GRANULOCYTES # BLD AUTO: 0.08 K/UL (ref 0–0.3)
IMM GRANULOCYTES NFR BLD: 1 %
LYMPHOCYTES NFR BLD: 1.06 K/UL (ref 1.1–3.7)
LYMPHOCYTES RELATIVE PERCENT: 15 % (ref 24–43)
MCH RBC QN AUTO: 30.1 PG (ref 25.2–33.5)
MCHC RBC AUTO-ENTMCNC: 29.4 G/DL (ref 28.4–34.8)
MCV RBC AUTO: 102.2 FL (ref 82.6–102.9)
MONOCYTES NFR BLD: 0.84 K/UL (ref 0.1–1.2)
MONOCYTES NFR BLD: 12 % (ref 3–12)
NEUTROPHILS NFR BLD: 65 % (ref 36–65)
NEUTS SEG NFR BLD: 4.65 K/UL (ref 1.5–8.1)
NRBC BLD-RTO: 0 PER 100 WBC
PLATELET # BLD AUTO: 215 K/UL (ref 138–453)
PMV BLD AUTO: 9.6 FL (ref 8.1–13.5)
POTASSIUM SERPL-SCNC: 4 MMOL/L (ref 3.7–5.3)
RBC # BLD AUTO: 4.09 M/UL (ref 4.21–5.77)
RBC # BLD: ABNORMAL 10*6/UL
SODIUM SERPL-SCNC: 138 MMOL/L (ref 135–144)
VANCOMYCIN SERPL-MCNC: 16.2 UG/ML
WBC OTHER # BLD: 7.1 K/UL (ref 3.5–11.3)

## 2023-10-14 PROCEDURE — 36415 COLL VENOUS BLD VENIPUNCTURE: CPT

## 2023-10-14 PROCEDURE — 85025 COMPLETE CBC W/AUTO DIFF WBC: CPT

## 2023-10-14 PROCEDURE — 6370000000 HC RX 637 (ALT 250 FOR IP)

## 2023-10-14 PROCEDURE — 80202 ASSAY OF VANCOMYCIN: CPT

## 2023-10-14 PROCEDURE — 97110 THERAPEUTIC EXERCISES: CPT

## 2023-10-14 PROCEDURE — 1200000000 HC SEMI PRIVATE

## 2023-10-14 PROCEDURE — 2580000003 HC RX 258

## 2023-10-14 PROCEDURE — 99232 SBSQ HOSP IP/OBS MODERATE 35: CPT | Performed by: INTERNAL MEDICINE

## 2023-10-14 PROCEDURE — 6360000002 HC RX W HCPCS

## 2023-10-14 PROCEDURE — 80048 BASIC METABOLIC PNL TOTAL CA: CPT

## 2023-10-14 PROCEDURE — 97116 GAIT TRAINING THERAPY: CPT

## 2023-10-14 RX ADMIN — FUROSEMIDE 40 MG: 10 INJECTION, SOLUTION INTRAMUSCULAR; INTRAVENOUS at 19:04

## 2023-10-14 RX ADMIN — ISOSORBIDE MONONITRATE 30 MG: 30 TABLET, EXTENDED RELEASE ORAL at 09:17

## 2023-10-14 RX ADMIN — ENOXAPARIN SODIUM 30 MG: 100 INJECTION SUBCUTANEOUS at 09:18

## 2023-10-14 RX ADMIN — ENOXAPARIN SODIUM 30 MG: 100 INJECTION SUBCUTANEOUS at 21:29

## 2023-10-14 RX ADMIN — POTASSIUM CHLORIDE 20 MEQ: 1500 TABLET, EXTENDED RELEASE ORAL at 09:18

## 2023-10-14 RX ADMIN — VANCOMYCIN HYDROCHLORIDE 1250 MG: 1.25 INJECTION, POWDER, LYOPHILIZED, FOR SOLUTION INTRAVENOUS at 06:27

## 2023-10-14 RX ADMIN — OXYBUTYNIN CHLORIDE 10 MG: 10 TABLET, EXTENDED RELEASE ORAL at 09:17

## 2023-10-14 RX ADMIN — EMPAGLIFLOZIN 10 MG: 10 TABLET, FILM COATED ORAL at 09:17

## 2023-10-14 RX ADMIN — Medication 81 MG: at 09:18

## 2023-10-14 RX ADMIN — VANCOMYCIN HYDROCHLORIDE 1250 MG: 1.25 INJECTION, POWDER, LYOPHILIZED, FOR SOLUTION INTRAVENOUS at 19:08

## 2023-10-14 RX ADMIN — PANTOPRAZOLE SODIUM 40 MG: 40 TABLET, DELAYED RELEASE ORAL at 09:17

## 2023-10-14 RX ADMIN — ATORVASTATIN CALCIUM 80 MG: 80 TABLET, FILM COATED ORAL at 21:29

## 2023-10-14 RX ADMIN — HYDROMORPHONE HYDROCHLORIDE 0.5 MG: 1 INJECTION, SOLUTION INTRAMUSCULAR; INTRAVENOUS; SUBCUTANEOUS at 19:04

## 2023-10-14 RX ADMIN — FUROSEMIDE 40 MG: 10 INJECTION, SOLUTION INTRAMUSCULAR; INTRAVENOUS at 09:17

## 2023-10-14 RX ADMIN — TAMSULOSIN HYDROCHLORIDE 0.4 MG: 0.4 CAPSULE ORAL at 09:17

## 2023-10-14 RX ADMIN — SODIUM CHLORIDE, PRESERVATIVE FREE 10 ML: 5 INJECTION INTRAVENOUS at 09:18

## 2023-10-14 ASSESSMENT — ENCOUNTER SYMPTOMS
COUGH: 0
WHEEZING: 0
DIARRHEA: 0
SHORTNESS OF BREATH: 0
BACK PAIN: 0
NAUSEA: 0
CONSTIPATION: 0
VOMITING: 0
ABDOMINAL PAIN: 0

## 2023-10-14 ASSESSMENT — PAIN SCALES - GENERAL: PAINLEVEL_OUTOF10: 4

## 2023-10-14 NOTE — PLAN OF CARE
Problem: Discharge Planning  Goal: Discharge to home or other facility with appropriate resources  10/14/2023 0402 by Lizet Conklin RN  Outcome: Progressing  10/13/2023 1843 by Le York RN  Outcome: Progressing  Flowsheets (Taken 10/13/2023 1843)  Discharge to home or other facility with appropriate resources:   Identify barriers to discharge with patient and caregiver   Identify discharge learning needs (meds, wound care, etc)     Problem: Pain  Goal: Verbalizes/displays adequate comfort level or baseline comfort level  10/14/2023 0402 by Lizet Conklin RN  Outcome: Progressing  10/13/2023 1843 by Le York RN  Outcome: Progressing  Flowsheets (Taken 10/13/2023 1843)  Verbalizes/displays adequate comfort level or baseline comfort level:   Encourage patient to monitor pain and request assistance   Assess pain using appropriate pain scale   Consider cultural and social influences on pain and pain management   Implement non-pharmacological measures as appropriate and evaluate response     Problem: Safety - Adult  Goal: Free from fall injury  10/14/2023 0402 by Lizet Conklin RN  Outcome: Progressing  10/13/2023 1843 by Le York RN  Outcome: Progressing  Flowsheets (Taken 10/13/2023 1843)  Free From Fall Injury: Instruct family/caregiver on patient safety     Problem: ABCDS Injury Assessment  Goal: Absence of physical injury  10/14/2023 0402 by Lizet Conklin RN  Outcome: Progressing  10/13/2023 1843 by Le York RN  Outcome: Progressing  Flowsheets (Taken 10/13/2023 1843)  Absence of Physical Injury: Implement safety measures based on patient assessment     Problem: Chronic Conditions and Co-morbidities  Goal: Patient's chronic conditions and co-morbidity symptoms are monitored and maintained or improved  10/14/2023 0402 by Lizet Conklin RN  Outcome: Progressing  10/13/2023 1843 by Le York RN  Outcome: Progressing  Flowsheets (Taken 10/13/2023

## 2023-10-15 LAB
ANION GAP SERPL CALCULATED.3IONS-SCNC: 9 MMOL/L (ref 9–17)
BASOPHILS # BLD: 0.04 K/UL (ref 0–0.2)
BASOPHILS NFR BLD: 1 % (ref 0–2)
BUN SERPL-MCNC: 16 MG/DL (ref 8–23)
CALCIUM SERPL-MCNC: 8.6 MG/DL (ref 8.6–10.4)
CHLORIDE SERPL-SCNC: 103 MMOL/L (ref 98–107)
CO2 SERPL-SCNC: 25 MMOL/L (ref 20–31)
CREAT SERPL-MCNC: 0.6 MG/DL (ref 0.7–1.2)
EOSINOPHIL # BLD: 0.46 K/UL (ref 0–0.44)
EOSINOPHILS RELATIVE PERCENT: 7 % (ref 1–4)
ERYTHROCYTE [DISTWIDTH] IN BLOOD BY AUTOMATED COUNT: 15.1 % (ref 11.8–14.4)
GFR SERPL CREATININE-BSD FRML MDRD: >60 ML/MIN/1.73M2
GLUCOSE BLD-MCNC: 100 MG/DL (ref 75–110)
GLUCOSE SERPL-MCNC: 95 MG/DL (ref 70–99)
HCT VFR BLD AUTO: 40 % (ref 40.7–50.3)
HGB BLD-MCNC: 12.5 G/DL (ref 13–17)
IMM GRANULOCYTES # BLD AUTO: 0.06 K/UL (ref 0–0.3)
IMM GRANULOCYTES NFR BLD: 1 %
LYMPHOCYTES NFR BLD: 0.91 K/UL (ref 1.1–3.7)
LYMPHOCYTES RELATIVE PERCENT: 13 % (ref 24–43)
MCH RBC QN AUTO: 29.5 PG (ref 25.2–33.5)
MCHC RBC AUTO-ENTMCNC: 31.3 G/DL (ref 28.4–34.8)
MCV RBC AUTO: 94.3 FL (ref 82.6–102.9)
MONOCYTES NFR BLD: 0.81 K/UL (ref 0.1–1.2)
MONOCYTES NFR BLD: 12 % (ref 3–12)
NEUTROPHILS NFR BLD: 67 % (ref 36–65)
NEUTS SEG NFR BLD: 4.65 K/UL (ref 1.5–8.1)
NRBC BLD-RTO: 0 PER 100 WBC
PLATELET # BLD AUTO: 232 K/UL (ref 138–453)
PMV BLD AUTO: 9.2 FL (ref 8.1–13.5)
POTASSIUM SERPL-SCNC: 3.9 MMOL/L (ref 3.7–5.3)
RBC # BLD AUTO: 4.24 M/UL (ref 4.21–5.77)
RBC # BLD: ABNORMAL 10*6/UL
SODIUM SERPL-SCNC: 137 MMOL/L (ref 135–144)
WBC OTHER # BLD: 6.9 K/UL (ref 3.5–11.3)

## 2023-10-15 PROCEDURE — 1200000000 HC SEMI PRIVATE

## 2023-10-15 PROCEDURE — 85025 COMPLETE CBC W/AUTO DIFF WBC: CPT

## 2023-10-15 PROCEDURE — 6360000002 HC RX W HCPCS

## 2023-10-15 PROCEDURE — 82947 ASSAY GLUCOSE BLOOD QUANT: CPT

## 2023-10-15 PROCEDURE — 36415 COLL VENOUS BLD VENIPUNCTURE: CPT

## 2023-10-15 PROCEDURE — 99232 SBSQ HOSP IP/OBS MODERATE 35: CPT | Performed by: INTERNAL MEDICINE

## 2023-10-15 PROCEDURE — 2580000003 HC RX 258

## 2023-10-15 PROCEDURE — 80048 BASIC METABOLIC PNL TOTAL CA: CPT

## 2023-10-15 PROCEDURE — 6370000000 HC RX 637 (ALT 250 FOR IP)

## 2023-10-15 RX ADMIN — FUROSEMIDE 40 MG: 10 INJECTION, SOLUTION INTRAMUSCULAR; INTRAVENOUS at 08:31

## 2023-10-15 RX ADMIN — ENOXAPARIN SODIUM 30 MG: 100 INJECTION SUBCUTANEOUS at 08:30

## 2023-10-15 RX ADMIN — TAMSULOSIN HYDROCHLORIDE 0.4 MG: 0.4 CAPSULE ORAL at 08:31

## 2023-10-15 RX ADMIN — ATORVASTATIN CALCIUM 80 MG: 80 TABLET, FILM COATED ORAL at 21:51

## 2023-10-15 RX ADMIN — PANTOPRAZOLE SODIUM 40 MG: 40 TABLET, DELAYED RELEASE ORAL at 08:31

## 2023-10-15 RX ADMIN — ISOSORBIDE MONONITRATE 30 MG: 30 TABLET, EXTENDED RELEASE ORAL at 08:31

## 2023-10-15 RX ADMIN — ENOXAPARIN SODIUM 30 MG: 100 INJECTION SUBCUTANEOUS at 21:51

## 2023-10-15 RX ADMIN — Medication 81 MG: at 08:31

## 2023-10-15 RX ADMIN — OXYBUTYNIN CHLORIDE 10 MG: 10 TABLET, EXTENDED RELEASE ORAL at 08:31

## 2023-10-15 RX ADMIN — VANCOMYCIN HYDROCHLORIDE 1250 MG: 1.25 INJECTION, POWDER, LYOPHILIZED, FOR SOLUTION INTRAVENOUS at 06:50

## 2023-10-15 RX ADMIN — EMPAGLIFLOZIN 10 MG: 10 TABLET, FILM COATED ORAL at 08:39

## 2023-10-15 RX ADMIN — VANCOMYCIN HYDROCHLORIDE 1250 MG: 1.25 INJECTION, POWDER, LYOPHILIZED, FOR SOLUTION INTRAVENOUS at 18:16

## 2023-10-15 RX ADMIN — HYDROMORPHONE HYDROCHLORIDE 0.5 MG: 1 INJECTION, SOLUTION INTRAMUSCULAR; INTRAVENOUS; SUBCUTANEOUS at 18:15

## 2023-10-15 RX ADMIN — SODIUM CHLORIDE, PRESERVATIVE FREE 10 ML: 5 INJECTION INTRAVENOUS at 08:31

## 2023-10-15 RX ADMIN — POTASSIUM CHLORIDE 20 MEQ: 1500 TABLET, EXTENDED RELEASE ORAL at 08:31

## 2023-10-15 ASSESSMENT — PAIN SCALES - GENERAL: PAINLEVEL_OUTOF10: 9

## 2023-10-15 ASSESSMENT — ENCOUNTER SYMPTOMS
WHEEZING: 0
COLOR CHANGE: 1
COUGH: 0
ABDOMINAL PAIN: 0
VOMITING: 0
SHORTNESS OF BREATH: 0
NAUSEA: 0
ABDOMINAL DISTENTION: 0
DIARRHEA: 0
CONSTIPATION: 0
BACK PAIN: 0

## 2023-10-15 ASSESSMENT — PAIN DESCRIPTION - LOCATION: LOCATION: GENERALIZED

## 2023-10-15 NOTE — PLAN OF CARE
Problem: Cardiovascular - Adult  Goal: Maintains optimal cardiac output and hemodynamic stability  Outcome: Progressing     Problem: Respiratory - Adult  Goal: Achieves optimal ventilation and oxygenation  Outcome: Progressing     Problem: Skin/Tissue Integrity  Goal: Absence of new skin breakdown  Description: 1. Monitor for areas of redness and/or skin breakdown  2. Assess vascular access sites hourly  3. Every 4-6 hours minimum:  Change oxygen saturation probe site  4. Every 4-6 hours:  If on nasal continuous positive airway pressure, respiratory therapy assess nares and determine need for appliance change or resting period.   Outcome: Progressing     Problem: Chronic Conditions and Co-morbidities  Goal: Patient's chronic conditions and co-morbidity symptoms are monitored and maintained or improved  Outcome: Progressing     Problem: ABCDS Injury Assessment  Goal: Absence of physical injury  Outcome: Progressing     Problem: Safety - Adult  Goal: Free from fall injury  Outcome: Progressing     Problem: Discharge Planning  Goal: Discharge to home or other facility with appropriate resources  Outcome: Progressing

## 2023-10-16 ENCOUNTER — APPOINTMENT (OUTPATIENT)
Dept: VASCULAR LAB | Age: 68
DRG: 603 | End: 2023-10-16
Attending: INTERNAL MEDICINE
Payer: MEDICARE

## 2023-10-16 LAB
ANION GAP SERPL CALCULATED.3IONS-SCNC: 8 MMOL/L (ref 9–17)
BASOPHILS # BLD: 0.04 K/UL (ref 0–0.2)
BASOPHILS NFR BLD: 1 % (ref 0–2)
BUN SERPL-MCNC: 15 MG/DL (ref 8–23)
CALCIUM SERPL-MCNC: 8.6 MG/DL (ref 8.6–10.4)
CHLORIDE SERPL-SCNC: 104 MMOL/L (ref 98–107)
CO2 SERPL-SCNC: 25 MMOL/L (ref 20–31)
CREAT SERPL-MCNC: 0.4 MG/DL (ref 0.7–1.2)
ECHO BSA: 2.22 M2
EOSINOPHIL # BLD: 0.45 K/UL (ref 0–0.44)
EOSINOPHILS RELATIVE PERCENT: 7 % (ref 1–4)
ERYTHROCYTE [DISTWIDTH] IN BLOOD BY AUTOMATED COUNT: 15.7 % (ref 11.8–14.4)
GFR SERPL CREATININE-BSD FRML MDRD: >60 ML/MIN/1.73M2
GLUCOSE SERPL-MCNC: 88 MG/DL (ref 70–99)
HCT VFR BLD AUTO: 38.8 % (ref 40.7–50.3)
HGB BLD-MCNC: 13.3 G/DL (ref 13–17)
IMM GRANULOCYTES # BLD AUTO: 0.06 K/UL (ref 0–0.3)
IMM GRANULOCYTES NFR BLD: 1 %
LYMPHOCYTES NFR BLD: 0.76 K/UL (ref 1.1–3.7)
LYMPHOCYTES RELATIVE PERCENT: 12 % (ref 24–43)
MCH RBC QN AUTO: 32.8 PG (ref 25.2–33.5)
MCHC RBC AUTO-ENTMCNC: 34.3 G/DL (ref 28.4–34.8)
MCV RBC AUTO: 95.8 FL (ref 82.6–102.9)
MONOCYTES NFR BLD: 0.67 K/UL (ref 0.1–1.2)
MONOCYTES NFR BLD: 10 % (ref 3–12)
NEUTROPHILS NFR BLD: 70 % (ref 36–65)
NEUTS SEG NFR BLD: 4.58 K/UL (ref 1.5–8.1)
NRBC BLD-RTO: 0.5 PER 100 WBC
PLATELET # BLD AUTO: 550 K/UL (ref 138–453)
PMV BLD AUTO: 10.7 FL (ref 8.1–13.5)
POTASSIUM SERPL-SCNC: 4.2 MMOL/L (ref 3.7–5.3)
RBC # BLD AUTO: 4.05 M/UL (ref 4.21–5.77)
RBC # BLD: ABNORMAL 10*6/UL
SODIUM SERPL-SCNC: 137 MMOL/L (ref 135–144)
WBC OTHER # BLD: 6.6 K/UL (ref 3.5–11.3)

## 2023-10-16 PROCEDURE — 36415 COLL VENOUS BLD VENIPUNCTURE: CPT

## 2023-10-16 PROCEDURE — 6370000000 HC RX 637 (ALT 250 FOR IP)

## 2023-10-16 PROCEDURE — 85025 COMPLETE CBC W/AUTO DIFF WBC: CPT

## 2023-10-16 PROCEDURE — 6360000002 HC RX W HCPCS

## 2023-10-16 PROCEDURE — 93970 EXTREMITY STUDY: CPT | Performed by: SURGERY

## 2023-10-16 PROCEDURE — 99232 SBSQ HOSP IP/OBS MODERATE 35: CPT | Performed by: INTERNAL MEDICINE

## 2023-10-16 PROCEDURE — 1200000000 HC SEMI PRIVATE

## 2023-10-16 PROCEDURE — 94660 CPAP INITIATION&MGMT: CPT

## 2023-10-16 PROCEDURE — 97530 THERAPEUTIC ACTIVITIES: CPT

## 2023-10-16 PROCEDURE — 2580000003 HC RX 258

## 2023-10-16 PROCEDURE — 93970 EXTREMITY STUDY: CPT

## 2023-10-16 PROCEDURE — 97110 THERAPEUTIC EXERCISES: CPT

## 2023-10-16 PROCEDURE — 80048 BASIC METABOLIC PNL TOTAL CA: CPT

## 2023-10-16 RX ORDER — AMOXICILLIN 500 MG/1
500 CAPSULE ORAL 3 TIMES DAILY
Qty: 30 CAPSULE | Refills: 0 | Status: SHIPPED | OUTPATIENT
Start: 2023-10-16 | End: 2023-10-26

## 2023-10-16 RX ORDER — FUROSEMIDE 10 MG/ML
40 INJECTION INTRAMUSCULAR; INTRAVENOUS ONCE
Status: COMPLETED | OUTPATIENT
Start: 2023-10-16 | End: 2023-10-16

## 2023-10-16 RX ADMIN — ENOXAPARIN SODIUM 30 MG: 100 INJECTION SUBCUTANEOUS at 21:15

## 2023-10-16 RX ADMIN — VANCOMYCIN HYDROCHLORIDE 1250 MG: 1.25 INJECTION, POWDER, LYOPHILIZED, FOR SOLUTION INTRAVENOUS at 21:17

## 2023-10-16 RX ADMIN — ATORVASTATIN CALCIUM 80 MG: 80 TABLET, FILM COATED ORAL at 21:15

## 2023-10-16 RX ADMIN — OXYBUTYNIN CHLORIDE 10 MG: 10 TABLET, EXTENDED RELEASE ORAL at 09:22

## 2023-10-16 RX ADMIN — PANTOPRAZOLE SODIUM 40 MG: 40 TABLET, DELAYED RELEASE ORAL at 09:22

## 2023-10-16 RX ADMIN — FUROSEMIDE 40 MG: 10 INJECTION, SOLUTION INTRAMUSCULAR; INTRAVENOUS at 09:21

## 2023-10-16 RX ADMIN — Medication 81 MG: at 09:22

## 2023-10-16 RX ADMIN — FUROSEMIDE 40 MG: 10 INJECTION, SOLUTION INTRAMUSCULAR; INTRAVENOUS at 16:29

## 2023-10-16 RX ADMIN — POTASSIUM CHLORIDE 20 MEQ: 1500 TABLET, EXTENDED RELEASE ORAL at 09:22

## 2023-10-16 RX ADMIN — ENOXAPARIN SODIUM 30 MG: 100 INJECTION SUBCUTANEOUS at 09:22

## 2023-10-16 RX ADMIN — VANCOMYCIN HYDROCHLORIDE 1250 MG: 1.25 INJECTION, POWDER, LYOPHILIZED, FOR SOLUTION INTRAVENOUS at 06:24

## 2023-10-16 RX ADMIN — EMPAGLIFLOZIN 10 MG: 10 TABLET, FILM COATED ORAL at 09:22

## 2023-10-16 RX ADMIN — ISOSORBIDE MONONITRATE 30 MG: 30 TABLET, EXTENDED RELEASE ORAL at 09:22

## 2023-10-16 RX ADMIN — TAMSULOSIN HYDROCHLORIDE 0.4 MG: 0.4 CAPSULE ORAL at 09:22

## 2023-10-16 RX ADMIN — SODIUM CHLORIDE, PRESERVATIVE FREE 10 ML: 5 INJECTION INTRAVENOUS at 21:15

## 2023-10-16 ASSESSMENT — ENCOUNTER SYMPTOMS
VOMITING: 0
FACIAL SWELLING: 0
ABDOMINAL PAIN: 0
EYE DISCHARGE: 0
COLOR CHANGE: 1
SHORTNESS OF BREATH: 0
COUGH: 0
ABDOMINAL DISTENTION: 0
BACK PAIN: 0
DIARRHEA: 0
NAUSEA: 0

## 2023-10-16 NOTE — PLAN OF CARE
Problem: Discharge Planning  Goal: Discharge to home or other facility with appropriate resources  Outcome: Progressing     Problem: Pain  Goal: Verbalizes/displays adequate comfort level or baseline comfort level  Outcome: Progressing     Problem: Safety - Adult  Goal: Free from fall injury  Outcome: Progressing     Problem: ABCDS Injury Assessment  Goal: Absence of physical injury  Outcome: Progressing     Problem: Chronic Conditions and Co-morbidities  Goal: Patient's chronic conditions and co-morbidity symptoms are monitored and maintained or improved  Outcome: Progressing     Problem: Skin/Tissue Integrity  Goal: Absence of new skin breakdown  Description: 1. Monitor for areas of redness and/or skin breakdown  2. Assess vascular access sites hourly  3. Every 4-6 hours minimum:  Change oxygen saturation probe site  4. Every 4-6 hours:  If on nasal continuous positive airway pressure, respiratory therapy assess nares and determine need for appliance change or resting period.   Outcome: Progressing     Problem: Respiratory - Adult  Goal: Achieves optimal ventilation and oxygenation  Outcome: Progressing     Problem: Cardiovascular - Adult  Goal: Maintains optimal cardiac output and hemodynamic stability  Outcome: Progressing  Goal: Absence of cardiac dysrhythmias or at baseline  Outcome: Progressing

## 2023-10-16 NOTE — CARE COORDINATION
Transition planning    Patient plan is to return home with roommate. He may need transportation if roommate is unable to pick him up.
housing: stairs  Potential Assistance needed at discharge:              Potential DME:    Patient [address] to discharge [address] for transportation at discharge: (P) Cab (may need medical cab home if friend not available to transport)    Financial    Payor: Kira [place] / Plan: [address] [place] [address] HMO / Product Type: *No Product type* /     Does insurance require precert for SNF: Yes    Potential assistance Purchasing Medications: (P) No  Meds-to-Beds request: Yes      [address], [address] [facility] [place] [address]  Phone: [phone] Fax: [phone]      Notes:    Factors facilitating achievement of predicted outcomes: Friend support and Pleasant    Barriers to discharge: Limited insight into deficits, Lower extremity weakness, and Stairs at home    Additional Case Management Notes: plans to go home w/ roommate friend; may need medical cab if roommate/friend not available    The Plan for Transition of Care is related to the following treatment goals of Cellulitis [L03.90]  Bilateral lower leg cellulitis [L03.116, C11.538]    IF APPLICABLE: The Patient and/or patient representative Guillermina Abad and his family were provided with a choice of provider and agrees with the discharge plan. Freedom of choice list with basic dialogue that supports the patient's individualized plan of care/goals and shares the quality data associated with the providers was provided to:     Patient Representative Name:       The Patient and/or Patient Representative Agree with the Discharge Plan?       Haven Simpson  Case Management Department  Ph: [phone]

## 2023-10-17 VITALS
WEIGHT: 235.89 LBS | OXYGEN SATURATION: 97 % | TEMPERATURE: 98.1 F | DIASTOLIC BLOOD PRESSURE: 54 MMHG | SYSTOLIC BLOOD PRESSURE: 95 MMHG | HEART RATE: 66 BPM | RESPIRATION RATE: 20 BRPM | HEIGHT: 65 IN | BODY MASS INDEX: 39.3 KG/M2

## 2023-10-17 LAB
ANION GAP SERPL CALCULATED.3IONS-SCNC: 6 MMOL/L (ref 9–17)
BASOPHILS # BLD: 0.06 K/UL (ref 0–0.2)
BASOPHILS NFR BLD: 1 % (ref 0–2)
BUN SERPL-MCNC: 17 MG/DL (ref 8–23)
CALCIUM SERPL-MCNC: 8.6 MG/DL (ref 8.6–10.4)
CHLORIDE SERPL-SCNC: 107 MMOL/L (ref 98–107)
CO2 SERPL-SCNC: 22 MMOL/L (ref 20–31)
CREAT SERPL-MCNC: 0.5 MG/DL (ref 0.7–1.2)
EOSINOPHIL # BLD: 0.57 K/UL (ref 0–0.44)
EOSINOPHILS RELATIVE PERCENT: 8 % (ref 1–4)
ERYTHROCYTE [DISTWIDTH] IN BLOOD BY AUTOMATED COUNT: 14.9 % (ref 11.8–14.4)
GFR SERPL CREATININE-BSD FRML MDRD: >60 ML/MIN/1.73M2
GLUCOSE SERPL-MCNC: 99 MG/DL (ref 70–99)
HCT VFR BLD AUTO: 40.2 % (ref 40.7–50.3)
HGB BLD-MCNC: 12.5 G/DL (ref 13–17)
IMM GRANULOCYTES # BLD AUTO: 0.07 K/UL (ref 0–0.3)
IMM GRANULOCYTES NFR BLD: 1 %
LYMPHOCYTES NFR BLD: 0.98 K/UL (ref 1.1–3.7)
LYMPHOCYTES RELATIVE PERCENT: 14 % (ref 24–43)
MCH RBC QN AUTO: 30.1 PG (ref 25.2–33.5)
MCHC RBC AUTO-ENTMCNC: 31.1 G/DL (ref 28.4–34.8)
MCV RBC AUTO: 96.9 FL (ref 82.6–102.9)
MICROORGANISM SPEC CULT: NORMAL
MICROORGANISM SPEC CULT: NORMAL
MONOCYTES NFR BLD: 0.88 K/UL (ref 0.1–1.2)
MONOCYTES NFR BLD: 13 % (ref 3–12)
NEUTROPHILS NFR BLD: 63 % (ref 36–65)
NEUTS SEG NFR BLD: 4.45 K/UL (ref 1.5–8.1)
NRBC BLD-RTO: 0 PER 100 WBC
PLATELET # BLD AUTO: 250 K/UL (ref 138–453)
PMV BLD AUTO: 9.1 FL (ref 8.1–13.5)
POTASSIUM SERPL-SCNC: 4.2 MMOL/L (ref 3.7–5.3)
RBC # BLD AUTO: 4.15 M/UL (ref 4.21–5.77)
RBC # BLD: ABNORMAL 10*6/UL
SERVICE CMNT-IMP: NORMAL
SERVICE CMNT-IMP: NORMAL
SODIUM SERPL-SCNC: 135 MMOL/L (ref 135–144)
SPECIMEN DESCRIPTION: NORMAL
SPECIMEN DESCRIPTION: NORMAL
WBC OTHER # BLD: 7 K/UL (ref 3.5–11.3)

## 2023-10-17 PROCEDURE — 99232 SBSQ HOSP IP/OBS MODERATE 35: CPT | Performed by: INTERNAL MEDICINE

## 2023-10-17 PROCEDURE — 6360000002 HC RX W HCPCS

## 2023-10-17 PROCEDURE — 6370000000 HC RX 637 (ALT 250 FOR IP)

## 2023-10-17 PROCEDURE — 99239 HOSP IP/OBS DSCHRG MGMT >30: CPT | Performed by: INTERNAL MEDICINE

## 2023-10-17 PROCEDURE — 94660 CPAP INITIATION&MGMT: CPT

## 2023-10-17 PROCEDURE — 36415 COLL VENOUS BLD VENIPUNCTURE: CPT

## 2023-10-17 PROCEDURE — 85025 COMPLETE CBC W/AUTO DIFF WBC: CPT

## 2023-10-17 PROCEDURE — 2580000003 HC RX 258

## 2023-10-17 PROCEDURE — 97530 THERAPEUTIC ACTIVITIES: CPT

## 2023-10-17 PROCEDURE — 80048 BASIC METABOLIC PNL TOTAL CA: CPT

## 2023-10-17 RX ORDER — FUROSEMIDE 40 MG/1
80 TABLET ORAL 2 TIMES DAILY
Qty: 120 TABLET | Refills: 0 | Status: SHIPPED | OUTPATIENT
Start: 2023-10-17

## 2023-10-17 RX ORDER — AMOXICILLIN 500 MG/1
500 CAPSULE ORAL 3 TIMES DAILY
Qty: 30 CAPSULE | Refills: 0 | Status: SHIPPED | OUTPATIENT
Start: 2023-10-17 | End: 2023-10-27

## 2023-10-17 RX ORDER — FUROSEMIDE 40 MG/1
80 TABLET ORAL 2 TIMES DAILY
Qty: 90 TABLET | Refills: 2 | Status: SHIPPED | OUTPATIENT
Start: 2023-10-17 | End: 2023-10-17 | Stop reason: SDUPTHER

## 2023-10-17 RX ADMIN — EMPAGLIFLOZIN 10 MG: 10 TABLET, FILM COATED ORAL at 08:33

## 2023-10-17 RX ADMIN — Medication 81 MG: at 08:33

## 2023-10-17 RX ADMIN — POTASSIUM CHLORIDE 20 MEQ: 1500 TABLET, EXTENDED RELEASE ORAL at 08:33

## 2023-10-17 RX ADMIN — OXYBUTYNIN CHLORIDE 10 MG: 10 TABLET, EXTENDED RELEASE ORAL at 08:33

## 2023-10-17 RX ADMIN — PANTOPRAZOLE SODIUM 40 MG: 40 TABLET, DELAYED RELEASE ORAL at 08:33

## 2023-10-17 RX ADMIN — ENOXAPARIN SODIUM 30 MG: 100 INJECTION SUBCUTANEOUS at 08:33

## 2023-10-17 RX ADMIN — TAMSULOSIN HYDROCHLORIDE 0.4 MG: 0.4 CAPSULE ORAL at 08:33

## 2023-10-17 RX ADMIN — VANCOMYCIN HYDROCHLORIDE 1250 MG: 1.25 INJECTION, POWDER, LYOPHILIZED, FOR SOLUTION INTRAVENOUS at 08:45

## 2023-10-17 RX ADMIN — ISOSORBIDE MONONITRATE 30 MG: 30 TABLET, EXTENDED RELEASE ORAL at 08:33

## 2023-10-17 ASSESSMENT — ENCOUNTER SYMPTOMS
COUGH: 0
EYE DISCHARGE: 0
VOMITING: 0
ABDOMINAL DISTENTION: 0
FACIAL SWELLING: 0
ABDOMINAL PAIN: 0
BACK PAIN: 0
SHORTNESS OF BREATH: 0
NAUSEA: 0
DIARRHEA: 0
COLOR CHANGE: 1

## 2023-10-17 NOTE — PLAN OF CARE
Problem: Discharge Planning  Goal: Discharge to home or other facility with appropriate resources  Outcome: Completed     Problem: Pain  Goal: Verbalizes/displays adequate comfort level or baseline comfort level  Outcome: Completed     Problem: Safety - Adult  Goal: Free from fall injury  Outcome: Completed     Problem: ABCDS Injury Assessment  Goal: Absence of physical injury  Outcome: Completed     Problem: Chronic Conditions and Co-morbidities  Goal: Patient's chronic conditions and co-morbidity symptoms are monitored and maintained or improved  Outcome: Completed     Problem: Skin/Tissue Integrity  Goal: Absence of new skin breakdown  Description: 1. Monitor for areas of redness and/or skin breakdown  2. Assess vascular access sites hourly  3. Every 4-6 hours minimum:  Change oxygen saturation probe site  4. Every 4-6 hours:  If on nasal continuous positive airway pressure, respiratory therapy assess nares and determine need for appliance change or resting period.   Outcome: Completed     Problem: Respiratory - Adult  Goal: Achieves optimal ventilation and oxygenation  Outcome: Completed     Problem: Cardiovascular - Adult  Goal: Maintains optimal cardiac output and hemodynamic stability  Outcome: Completed  Goal: Absence of cardiac dysrhythmias or at baseline  Outcome: Completed

## 2023-10-17 NOTE — DISCHARGE SUMMARY
[facility] [address]    Discharge Summary      NAME:  Nirav Borja  :  1955  MRN:  [id number]    Admit date:  10/12/2023  Discharge date:  10/17/2023    Admitting Physician:  Cheyanne Ocampo MD    Primary Diagnosis on Admission:   Present on Admission:   Cellulitis   Chronic venous stasis   CAD s/p CABG   Chronic diastolic (congestive) heart failure (HCC)   Gastroesophageal reflux disease without esophagitis   HARPREET (obstructive sleep apnea)   Essential hypertension   Atrial fibrillation (HCC) s/p Ablation    BPH S/P UroLift    Overactive bladder   Bilateral lower leg cellulitis   Acute bilateral venous stasis dermatitis      Secondary Diagnoses:  does not have any pertinent problems on file. Admission Condition:  fair     Discharged Condition: good    Hospital Course: The patient was admitted due to concern of bilateral cellulitis. He presented to the ED with bilateral lower extremity swelling and erythema x 2 weeks, worsened from his baseline with the presence of venous ulcers that had been draining clear fluid. He was initially started on Vancomycin and Zosyn given his recurrent cellulitis and presence of wounds, which was deescalated to Vancomycin by Infectious disease. He received 6 days of antibiotics and will be discharged on Amoxicillin for 10 days. Today on day of discharge pt feels better with no further complaints. Vitals and Labs are at pts baseline. All consultants involved during this admission are agreeable to d/c. Patient is to follow up with wound care and lymphedema clinic as an outpatient. Given patients fluid overloaded state, Lasix were also increased from 60 mg BID to 80 mg BID. Consults:  ID    Significant Diagnostic/theraputic interventions: IV lasix, abx    Disposition:   home    Instructions to Patient: Please make follow up appointment with PCP, lymphedema clinic and wound care.  Take antibiotics as prescribed and

## 2023-10-17 NOTE — PLAN OF CARE
Problem: Discharge Planning  Goal: Discharge to home or other facility with appropriate resources  10/16/2023 1634 by Izell Cowden, RN  Outcome: Progressing     Problem: Pain  Goal: Verbalizes/displays adequate comfort level or baseline comfort level  10/16/2023 1634 by Izell Cowden, RN  Outcome: Progressing     Problem: Safety - Adult  Goal: Free from fall injury  10/16/2023 1634 by Izell Cowden, RN  Outcome: Progressing     Problem: ABCDS Injury Assessment  Goal: Absence of physical injury  10/16/2023 1634 by Izell Cowden, RN  Outcome: Progressing     Problem: Chronic Conditions and Co-morbidities  Goal: Patient's chronic conditions and co-morbidity symptoms are monitored and maintained or improved  10/16/2023 1634 by Izell Cowden, RN  Outcome: Progressing     Problem: Skin/Tissue Integrity  Goal: Absence of new skin breakdown  Description: 1. Monitor for areas of redness and/or skin breakdown  2. Assess vascular access sites hourly  3. Every 4-6 hours minimum:  Change oxygen saturation probe site  4. Every 4-6 hours:  If on nasal continuous positive airway pressure, respiratory therapy assess nares and determine need for appliance change or resting period.   10/16/2023 1634 by Izell Cowden, RN  Outcome: Progressing     Problem: Respiratory - Adult  Goal: Achieves optimal ventilation and oxygenation  10/16/2023 1634 by Izell Cowden, RN  Outcome: Progressing     Problem: Cardiovascular - Adult  Goal: Maintains optimal cardiac output and hemodynamic stability  10/16/2023 1634 by Izell Cowden, RN  Outcome: Progressing  Goal: Absence of cardiac dysrhythmias or at baseline  10/16/2023 1634 by Izell Cowden, RN  Outcome: Progressing

## 2023-10-19 ENCOUNTER — TELEPHONE (OUTPATIENT)
Dept: FAMILY MEDICINE CLINIC | Age: 68
End: 2023-10-19

## 2023-10-19 NOTE — TELEPHONE ENCOUNTER
Care Transitions Initial Follow Up Call    Outreach made within 2 business days of discharge: Yes    Patient: Abraham Rogers Patient : 1955   MRN: [id number]  Reason for Admission: There are no discharge diagnoses documented for the most recent discharge. Discharge Date: 10/17/23       Spoke with:  Bad phone number      Follow Up  Future Appointments   Date Time Provider [address]   10/25/2023 10:30 AM STV CHF CLINIC RM 1 [facility]F [facility]   10/30/2023 10:30 AM SCHEDULE, [facility]   10/30/2023  2:30 PM Poncho Wolfe MD [facility]   11/3/2023  2:15 PM MD Imelda Borja FP [facility]   2023  2:15 PM Para Jose, DPM [facility] Podiatry [facility]   2024  9:00 AM MD MARTIN Gonzalez

## 2023-10-25 DIAGNOSIS — E87.6 HYPOKALEMIA: ICD-10-CM

## 2023-10-25 DIAGNOSIS — I50.32 CHRONIC DIASTOLIC (CONGESTIVE) HEART FAILURE (HCC): ICD-10-CM

## 2023-10-25 RX ORDER — POTASSIUM CHLORIDE 20 MEQ/1
20 TABLET, EXTENDED RELEASE ORAL EVERY MORNING
Qty: 30 TABLET | Refills: 1 | Status: SHIPPED | OUTPATIENT
Start: 2023-10-25

## 2023-10-25 NOTE — TELEPHONE ENCOUNTER
Last visit: 9/20/23  Last Med refill: 3/29/23  Does patient have enough medication for 72 hours: no    Next Visit Date:  Future Appointments   Date Time Provider [address]   10/30/2023 10:30 AM SCHEDULE, [facility]   10/30/2023  2:30 PM Ousmane Lopez MD ST [facility]   11/1/2023 11:00 AM Bert Bacon, OT [facility] OT [facility]   11/2/2023 10:30 AM Katlyn Arzate, APRN - CNP STCZ WND CAR [facility]   11/3/2023  2:15 PM MD Imelda Foreman FP [facility]   11/8/2023 10:00 AM STV CHF CLINIC RM 1 [facility]VZ [facility]F [facility]   11/29/2023  2:15 PM Adam [facility] Podiatry TOLP   4/8/2024  9:00 AM Sary Crorales MD AFL TCC TOLE AFL KHAN C       Health Maintenance   Topic Date Due    COVID-19 Vaccine (3 - Pfizer series) 06/25/2021    Annual Wellness Visit (AWV)  10/28/2023    Lipids  03/29/2024    Depression Screen  07/07/2024    Colorectal Cancer Screen  01/12/2031    DTaP/Tdap/Td vaccine (4 - Td or Tdap) 07/24/2033    Flu vaccine  Completed    Shingles vaccine  Completed    Pneumococcal 65+ years Vaccine  Completed    Hepatitis C screen  Completed    Hepatitis A vaccine  Aged Out    Hepatitis B vaccine  Aged Out    Hib vaccine  Aged Out    Meningococcal (ACWY) vaccine  Aged Out    Pneumococcal 0-64 years Vaccine  Discontinued    Diabetes screen  Discontinued    HIV screen  Discontinued    Prostate Specific Antigen (PSA) Screening or Monitoring  Discontinued       Hemoglobin A1C (%)   Date Value   04/13/2023 5.5   12/10/2019 5.6   10/18/2019 5.8             ( goal A1C is < 7)   No components found for: \"LABMICR\"  LDL Cholesterol (mg/dL)   Date Value   03/29/2023 131 (H)   03/08/2022 56   03/08/2022 56       (goal LDL is <100)   AST (U/L)   Date Value   03/29/2023 26     ALT (U/L)   Date Value   03/29/2023 19     BUN (mg/dL)   Date Value   10/17/2023 17     BP Readings from Last 3 Encounters:   10/17/23 (!) 95/54   10/02/23 118/70   09/27/23 120/60

## 2023-10-30 ENCOUNTER — OFFICE VISIT (OUTPATIENT)
Dept: GASTROENTEROLOGY | Age: 68
End: 2023-10-30
Payer: MEDICARE

## 2023-10-30 VITALS
WEIGHT: 234.5 LBS | DIASTOLIC BLOOD PRESSURE: 58 MMHG | SYSTOLIC BLOOD PRESSURE: 110 MMHG | HEART RATE: 61 BPM | HEIGHT: 65 IN | BODY MASS INDEX: 39.07 KG/M2

## 2023-10-30 DIAGNOSIS — K21.9 GASTROESOPHAGEAL REFLUX DISEASE WITHOUT ESOPHAGITIS: Primary | ICD-10-CM

## 2023-10-30 DIAGNOSIS — N39.490 OVERFLOW INCONTINENCE OF URINE: ICD-10-CM

## 2023-10-30 DIAGNOSIS — D12.6 ADENOMATOUS POLYP OF COLON, UNSPECIFIED PART OF COLON: ICD-10-CM

## 2023-10-30 PROCEDURE — G8482 FLU IMMUNIZE ORDER/ADMIN: HCPCS | Performed by: INTERNAL MEDICINE

## 2023-10-30 PROCEDURE — G8417 CALC BMI ABV UP PARAM F/U: HCPCS | Performed by: INTERNAL MEDICINE

## 2023-10-30 PROCEDURE — 3078F DIAST BP <80 MM HG: CPT | Performed by: INTERNAL MEDICINE

## 2023-10-30 PROCEDURE — 1123F ACP DISCUSS/DSCN MKR DOCD: CPT | Performed by: INTERNAL MEDICINE

## 2023-10-30 PROCEDURE — 99213 OFFICE O/P EST LOW 20 MIN: CPT | Performed by: INTERNAL MEDICINE

## 2023-10-30 PROCEDURE — 1111F DSCHRG MED/CURRENT MED MERGE: CPT | Performed by: INTERNAL MEDICINE

## 2023-10-30 PROCEDURE — 3074F SYST BP LT 130 MM HG: CPT | Performed by: INTERNAL MEDICINE

## 2023-10-30 PROCEDURE — 1036F TOBACCO NON-USER: CPT | Performed by: INTERNAL MEDICINE

## 2023-10-30 PROCEDURE — G8427 DOCREV CUR MEDS BY ELIG CLIN: HCPCS | Performed by: INTERNAL MEDICINE

## 2023-10-30 PROCEDURE — 3017F COLORECTAL CA SCREEN DOC REV: CPT | Performed by: INTERNAL MEDICINE

## 2023-10-30 RX ORDER — TAMSULOSIN HYDROCHLORIDE 0.4 MG/1
0.4 CAPSULE ORAL DAILY
Qty: 30 CAPSULE | Refills: 5 | Status: SHIPPED | OUTPATIENT
Start: 2023-10-30

## 2023-10-30 ASSESSMENT — ENCOUNTER SYMPTOMS
BACK PAIN: 1
TROUBLE SWALLOWING: 1

## 2023-10-30 NOTE — PROGRESS NOTES
GI FOLLOW UP    INTERVAL HISTORY:     Significant intentional weight loss, patient is clinically doing well from a GERD standpoint in the GI standpoint. Chief Complaint   Patient presents with    Follow-up    Gastroesophageal Reflux       1. Gastroesophageal reflux disease without esophagitis            HISTORY OF PRESENT ILLNESS: David Hutson is a 74 y.o. male with a past history remarkable for HARPREET, A. fib on Eliquis, morbid obesity, history of CABG in October 2019 on dual antiplatelet therapy (aspirin and Plavix), referred for evaluation of nonproductive postprandial cough. This appears to be most consistent with the patient's chronic GERD symptoms. He appears to be on maximum PPI therapy    Past Medical,Family, and Social History reviewed and does contribute to the patient presenting condition. Patient's PMH/PSH,SH,PSYCH Hx, MEDs, ALLERGIES, and ROS were all reviewed and updated in the appropriate sections.     PAST MEDICAL HISTORY:  Past Medical History:   Diagnosis Date    Arthritis     back    Atrial fibrillation (HCC)     BPH (benign prostatic hyperplasia)     Cellulitis 02/2023    left leg    Cervical disc disease     Chronic venous insufficiency     Combined systolic and diastolic congestive heart failure ([address]) 01/15/2020    Coronary artery disease 10/2019    s/p CABG x 1 ; Sole Holbrook to LAD    GERD (gastroesophageal reflux disease)     on rx    History of incarceration     released 2019    History of recent hospitalization 02/09/2023    til 2/13/23 with Cellulitis    Hyperlipidemia     Hypertension     Ischemic cardiomyopathy     Myocardial infarct [facility])     Obesity     Overactive bladder     Sleep apnea treated with continuous positive airway pressure (CPAP)     Under care of team     Urology, Dr. Carmelita De Anda, last seen 3/10/2023    Under care of team     GI, dr. Anish Mackenzie, last seen 11/18/2022

## 2023-10-30 NOTE — DISCHARGE INSTRUCTIONS
[facility] and HYPERBARIC TREATMENT  CENTER      Visit  Discharge Instructions / Physician Orders  DATE: 11/2/2023     Home Care:NONE     SUPPLIES ORDERED THRU:  NOT ORDERED                   DATE LAST SUPPLIED     Wound Location:  RIGHT AND LEFT LEG EDEMA                                  NO OPEN WOUNDS     Cleanse with: KEEP DRY AND INTACT     Dressing Orders:  Primary dressing     ZINC 4 LAYER WRAP TO BOTH LEGS   LYMPHEDEMA CLINIC TO REMOVE WRAPS ON 11/6/23  Frequency:  KEEP DRY AND INTACT     Additional Orders: Increase protein to diet (meat, cheese, eggs, fish, peanut butter, nuts and beans)  Multivitamin daily    OFFLOADING [] YES  TYPE:                  [] NA    Weekly wound care visits until determined otherwise. Antibiotic therapy-wound care related YES [] NO [x] NA[]  AMOXICILLIN FINISHED  MY CHART []     Smart Device  []     HYPERBARIC TREATMENT-                TREATMENT #                          Your next appointment with the [address] [place] [address] is CALL IF NEEDED                                             [facility]ED[facility]  [facility]6/23 AT 8 AM                                                  (Please note your next appointment above and if you are unable to keep, kindly give a 24 hour notice. Thank you.)  If more than 15 min late we cannot guarantee you will be seen due to clinician schedule  Per Policy, Excessive cancellation will call for dismissal from program.  If you experience any of the following, please call the [address] [place] [address] during business hours:  [phone]     * Increase in Pain  * Temperature over 101  * Increase in drainage from your wound  * Drainage with a foul odor  * Bleeding  * Increase in swelling  * Need for compression bandage changes due to slippage, breakthrough drainage.      If you need medical attention outside of the business hours of the [address] [place] [address] please contact

## 2023-10-31 ENCOUNTER — TELEPHONE (OUTPATIENT)
Dept: FAMILY MEDICINE CLINIC | Age: 68
End: 2023-10-31

## 2023-10-31 NOTE — TELEPHONE ENCOUNTER
Patient called to confirm his appointment on 11/03/2023 and to inform us that his DME order for cane needs to be sent to [facility]. Patient was informed that it will be refaxed to them and they will contact patient .

## 2023-11-01 ENCOUNTER — HOSPITAL ENCOUNTER (OUTPATIENT)
Dept: OCCUPATIONAL THERAPY | Age: 68
Setting detail: THERAPIES SERIES
Discharge: HOME | End: 2023-11-01

## 2023-11-01 NOTE — SIGNIFICANT EVENT
[x] [address] [facility]. Occupational Therapy       [address]       Phone: [phone]       Fax: [phone] [] [facility] at [address] [facility].  [place], [place]       Phone: [phone]       Fax: [phone]          Outpatient Lymphedema Occupational Therapy Cancel/No Show note    Date: 2023  Patient: Sol   : 1955  MRN: [id number]    Cancels/No Shows to date: 2    For today's appointment patient:    [x]  Cancelled    [] Rescheduled appointment    [] No-show     Reason given by patient:    []  Patient ill    []  Conflicting appointment    [] No transportation      [] Conflict with work    [] No reason given    [] Weather related    [] COVID-19    [x] Other: working with wound care, pt to call back when appropriate to start back with lymphedema tx   Comments:       [] Next appointment was confirmed    [] Left message informing of missed visit    [] Unable to contact         Electronically signed by: John Wilson OT

## 2023-11-02 ENCOUNTER — HOSPITAL ENCOUNTER (OUTPATIENT)
Dept: WOUND CARE | Age: 68
Discharge: HOME | End: 2023-11-02
Payer: MEDICARE

## 2023-11-02 VITALS
TEMPERATURE: 97 F | WEIGHT: 230 LBS | HEIGHT: 65 IN | DIASTOLIC BLOOD PRESSURE: 62 MMHG | HEART RATE: 65 BPM | BODY MASS INDEX: 38.32 KG/M2 | RESPIRATION RATE: 20 BRPM | SYSTOLIC BLOOD PRESSURE: 106 MMHG

## 2023-11-02 DIAGNOSIS — R60.0 BILATERAL EDEMA OF LOWER EXTREMITY: ICD-10-CM

## 2023-11-02 DIAGNOSIS — I89.0 LYMPHEDEMA OF BOTH LOWER EXTREMITIES: Primary | ICD-10-CM

## 2023-11-02 DIAGNOSIS — I87.8 CHRONIC VENOUS STASIS: ICD-10-CM

## 2023-11-02 PROBLEM — L97.212 VENOUS STASIS ULCER OF RIGHT CALF WITH FAT LAYER EXPOSED (HCC): Status: RESOLVED | Noted: 2023-04-27 | Resolved: 2023-11-02

## 2023-11-02 PROBLEM — L97.921 NON-PRESSURE CHRONIC ULCER OF LEFT LOWER LEG, LIMITED TO BREAKDOWN OF SKIN (HCC): Status: RESOLVED | Noted: 2023-05-30 | Resolved: 2023-11-02

## 2023-11-02 PROBLEM — L97.921 SKIN ULCER OF LEFT LOWER LEG, LIMITED TO BREAKDOWN OF SKIN (HCC): Status: RESOLVED | Noted: 2021-10-06 | Resolved: 2023-11-02

## 2023-11-02 PROBLEM — I83.012 VENOUS STASIS ULCER OF RIGHT CALF WITH FAT LAYER EXPOSED (HCC): Status: RESOLVED | Noted: 2023-04-27 | Resolved: 2023-11-02

## 2023-11-02 PROBLEM — L97.222 VENOUS STASIS ULCER OF LEFT CALF WITH FAT LAYER EXPOSED (HCC): Status: RESOLVED | Noted: 2023-04-27 | Resolved: 2023-11-02

## 2023-11-02 PROBLEM — S61.219D FINGER LACERATION, SUBSEQUENT ENCOUNTER: Status: RESOLVED | Noted: 2022-05-09 | Resolved: 2023-11-02

## 2023-11-02 PROBLEM — I83.022 VENOUS STASIS ULCER OF LEFT CALF WITH FAT LAYER EXPOSED (HCC): Status: RESOLVED | Noted: 2023-04-27 | Resolved: 2023-11-02

## 2023-11-02 PROCEDURE — 29581 APPL MULTLAYER CMPRN SYS LEG: CPT

## 2023-11-02 PROCEDURE — 99213 OFFICE O/P EST LOW 20 MIN: CPT | Performed by: NURSE PRACTITIONER

## 2023-11-02 ASSESSMENT — ENCOUNTER SYMPTOMS
RHINORRHEA: 0
NAUSEA: 0
DIARRHEA: 0
SHORTNESS OF BREATH: 0
COUGH: 0
VOMITING: 0

## 2023-11-02 ASSESSMENT — PAIN SCALES - GENERAL: PAINLEVEL_OUTOF10: 0

## 2023-11-02 NOTE — PROGRESS NOTES
[facility]   Progress Note and Procedure Note       [facility] RECORD NUMBER:  [id number]  AGE: 79 y.o. GENDER: male  : 1955  EPISODE DATE:  2023    Subjective:     Chief Complaint   Patient presents with    Wound Check     Left lower leg         HISTORY of PRESENT ILLNESS HPI     Dorothy Cole is a 79 y.o. male who presents today for wound/ulcer evaluation. History of Wound Context: presents for initial evaluation of bilateral lower leg wounds after admission at [facility] for cellulitis. He finished all doses of doxycycline that he was discharged home on. He was not given any dressings and did not change wound dressings since discharge from hospital on 10/17/2023. On exam today all wounds are healed. He does have edema to both lower legs. Has not been wearing compression. Known history of lymphedema and venous insufficiency. He has been seen by wound clinic at [facility] in past for similar wounds. Also, was following with lymphedema clinic. We scheduled appointment with lymphedema clinic 2023. Will wrap in 4 layer wrap to get him to this appointment.    Wound/Ulcer Pain Timing/Severity: none  Quality of pain: N/A  Severity:  0 / 10   Modifying Factors: None  Associated Signs/Symptoms: none    Ulcer Identification:  Ulcer Type: venous  Contributing Factors: edema, venous stasis, lymphedema, and obesity         PAST MEDICAL HISTORY        Diagnosis Date    Arthritis     back    Atrial fibrillation (HCC)     BPH (benign prostatic hyperplasia)     Cellulitis 2023    left leg    Cervical disc disease     Chronic venous insufficiency     Combined systolic and diastolic congestive heart failure ([address]) 01/15/2020    Coronary artery disease 10/2019    s/p CABG x 1 ; Alethia Schilder to LAD    GERD (gastroesophageal reflux disease)     on rx    History of incarceration     released 2019    History of recent hospitalization 2023    til 23 with Cellulitis

## 2023-11-02 NOTE — PROGRESS NOTES
Multilayer Compression Wrap   (Not Unna) Below the Knee    NAME:  Malcom Grimes OF BIRTH:  1955  MEDICAL RECORD NUMBER:  [id number]  DATE:  11/2/2023    Multilayer compression wrap: Removed old Multilayer wrap if indicated and wash leg with mild soap/water. Applied moisturizing agent to dry skin as needed. Applied primary and secondary dressing as ordered. Applied multilayered dressing below the knee to right lower leg. Applied multilayered dressing below the knee to left lower leg. Instructed patient/caregiver not to remove dressing and to keep it clean and dry. Instructed patient/caregiver on complications to report to provider, such as pain, numbness in toes, heavy drainage, and slippage of dressing. Instructed patient on purpose of compression dressing and on activity and exercise recommendations.   Zinc layer applied prior to 4 layer wrap bilat    Electronically signed by Frank Gonzalez RN on 11/2/2023 at 11:36 AM

## 2023-11-02 NOTE — PROGRESS NOTES
Pt was evaluated and examined. Toe nails are EXTREMELY elongated and Hypertrophic. Patient was given personalized discharge instructions. Nails 1-10 were debrided sharply in length and thickness with a nipper without incident. Proper foot hygiene and care was discussed with the pt.   Patient to follow up with Dr.N. Barry Oropeza DPM on 11/29/23 at 2:15pm. Electronically signed by Samra Nixon RN,BSN,CWS,CFCS on 11/2/2023 at 11:27 AM

## 2023-11-03 ENCOUNTER — OFFICE VISIT (OUTPATIENT)
Dept: FAMILY MEDICINE CLINIC | Age: 68
End: 2023-11-03

## 2023-11-03 VITALS
DIASTOLIC BLOOD PRESSURE: 80 MMHG | HEART RATE: 79 BPM | BODY MASS INDEX: 38.94 KG/M2 | SYSTOLIC BLOOD PRESSURE: 134 MMHG | WEIGHT: 234 LBS

## 2023-11-03 DIAGNOSIS — I50.32 CHRONIC DIASTOLIC (CONGESTIVE) HEART FAILURE (HCC): ICD-10-CM

## 2023-11-03 DIAGNOSIS — E87.6 HYPOKALEMIA: ICD-10-CM

## 2023-11-03 DIAGNOSIS — I89.0 LYMPHEDEMA OF BOTH LOWER EXTREMITIES: Primary | ICD-10-CM

## 2023-11-03 RX ORDER — POTASSIUM CHLORIDE 20 MEQ/1
20 TABLET, EXTENDED RELEASE ORAL EVERY MORNING
Qty: 30 TABLET | Refills: 1 | Status: SHIPPED | OUTPATIENT
Start: 2023-11-03

## 2023-11-03 ASSESSMENT — ENCOUNTER SYMPTOMS
SHORTNESS OF BREATH: 0
BACK PAIN: 0
WHEEZING: 0
ABDOMINAL PAIN: 0
CONSTIPATION: 0
COUGH: 0
DIARRHEA: 0
VOMITING: 0
NAUSEA: 0

## 2023-11-03 NOTE — PROGRESS NOTES
Annika Blake (:  1955) is a 79 y.o. male,Established patient, here for evaluation of the following chief complaint(s):  Shoulder Pain (Better but not completely gone ) and Medication Refill (Patient states pharmacy told him they delivered potassium and another medication but he does not have it,  unsure what to do )         ASSESSMENT/PLAN:  1. Lymphedema of both lower extremities  -     [facility] Referral for Social Determinants of Health (Primary Care Practices)  2. Hypokalemia  -     potassium chloride (KLOR-CON M) 20 MEQ extended release tablet; Take 1 tablet by mouth every morning, Disp-30 tablet, R-1Normal  3. Chronic diastolic (congestive) heart failure (HCC)  -     dapagliflozin (FARXIGA) 10 MG tablet; Take 1 tablet by mouth every morning, Disp-30 tablet, R-3DO NOT Deliver. Pt would like to ascertain cost of med after run through insurance. Thank you. Normal      No follow-ups on file. Subjective   SUBJECTIVE/OBJECTIVE:  80 yo male patient with PMH of CHF, chronic lymphedema, recurrent cellulitis came to the office for FU. Patient was recently admitted to the hospital for cellulitis. He is feeling better now. He follows up with wound care and lymphedema clinic. He is on lasix. Review of Systems   Constitutional:  Negative for diaphoresis, fatigue and fever. Respiratory:  Negative for cough, shortness of breath and wheezing. Cardiovascular:  Positive for leg swelling. Negative for chest pain and palpitations. Gastrointestinal:  Negative for abdominal pain, constipation, diarrhea, nausea and vomiting. Musculoskeletal:  Negative for arthralgias, back pain and gait problem. Right shoulder pain   Neurological:  Negative for dizziness, weakness, light-headedness, numbness and headaches. Objective   Physical Exam  Constitutional:       General: He is not in acute distress. Appearance: Normal appearance. HENT:      Head: Normocephalic and atraumatic.

## 2023-11-06 ENCOUNTER — HOSPITAL ENCOUNTER (OUTPATIENT)
Dept: OCCUPATIONAL THERAPY | Age: 68
Setting detail: THERAPIES SERIES
Discharge: HOME | End: 2023-11-06
Payer: MEDICARE

## 2023-11-06 PROCEDURE — 97535 SELF CARE MNGMENT TRAINING: CPT

## 2023-11-06 PROCEDURE — 97165 OT EVAL LOW COMPLEX 30 MIN: CPT

## 2023-11-06 NOTE — DISCHARGE SUMMARY
TREATMENT LOCATION:   [x][place] [address]  Outpatient Rehabilitation &  Therapy  [address] [place] [address] Rd, [address]  P: [phone]  F: [phone] [][facility]  Outpatient Rehabilitation &  Therapy  [facility]  P: [phone]  F: [phone] [][address]  P: [phone]  F: [phone]     Lymphedema Therapy Discharge Note    Date: 2023      Patient: Abraham Rogers  : 1955  MRN: [id number]    Referring Provider: Luzmaria Arora       Phone: [phone]             Fax: [phone]  Insurance: Angélica Shukla ([id number]) Sandra Major restrictions/#of visits/etc here AUTH AFTER EVAL]  SECONDARY: [place] (NM:[id number])  Medical Diagnosis: I87.2 CVI, I50.42 Heart failure, systolic and diastolic, chronic  Rehab Codes: I89.0,    Onset Date: 2023  Visit# / total visits: 1/5 scheduled; Progress note due at visit 10 per POC.  (Certification Dates: 2023 - 2023)      Total visits attended: 6        Cancels/No shows: 4  Date of initial visit: 2023                Date of final visit: 2023      Subjective:  Refer to initial evaluation/ treatment notes. Objective:  Refer to initial evaluation/treatment notes. Assessment:  Refer to initial evaluation/ treatment notes. Treatment to Date:  [] Therapeutic Exercise           [x] Instruction in Home Program                       [] Manual Therapy  [x] Self-Care/Home Management  [] Vasopneumatic Pump             [] Other:    Discharge Status:   [] Treatment goals were met. [] Pt received maximum benefit. No further therapy indicated at this time. [] Pt to continue exercise/home instructions independently  [x] Pt has not called or returned to clinic in over 90 days with additional concerns.    Comments:      Therapy interrupted due to:  [] Pt

## 2023-11-06 NOTE — CONSULTS
TREATMENT LOCATION:   [x] [address] [facility]  Outpatient Rehabilitation &  Therapy  [address]  P: [phone]  F: [phone] [] [facility]   Outpatient Rehabilitation &  Therapy  [address]  P: [phone]  F: [phone] [] [address] [facility].   P: [phone]  F: [phone]     Lymphedema Services - Initial Evaluation for B Lower Extremity    Date:  2023  Patient: Corey Pollard  : 1955             MRN: [id number]  Referring Provider: Ronaldo Hernandez MD       Phone: [phone]  Fax: [phone]  Insurance: [address] ([id number]) Yan Maple after eval]    Secondary: [place] (ID: [id number])  Medical Diagnosis: Venous stasis ulcer right calf with fat layer exposed unspecified whether varicose veins present I83.012, L97.212; Lymphedema B LE I89.0  Rehab Codes: I89.0, M79.662 - pain in limb, left lower leg, R26.89 - difficulty walking, R26.81 - unsteadiness on feet, M62.81 - muscle weakness, generalized, Z91.81 - at risk for falls, I87.2 - venous insufficiency (chronic) (peripheral)  Onset Date: 10/17/2023  Visit# / total visits:  scheduled; Progress note due at visit 10 per POC.  (Certification Dates: 2023 - 2024)    Demographic info for garment/pump VOB:  [address] [address]  [phone]    Allergies:   Other: seasiona  Medications: See charted information in Epic    Past Medical History: See charted information in [facility]   Active Ambulatory Problems     Diagnosis Date Noted    Atrial flutter ([address]) 10/15/2019    Sleep-related breathing disorder     Hypokalemia 01/15/2020    Atrial fibrillation ([address]) s/p Ablation  01/15/2020    Ischemic cardiomyopathy 01/15/2020    Bilateral lower leg cellulitis 2020    Acute cystitis without hematuria 2020    CAD s/p CABG

## 2023-11-07 ENCOUNTER — TELEPHONE (OUTPATIENT)
Dept: FAMILY MEDICINE CLINIC | Age: 68
End: 2023-11-07

## 2023-11-07 DIAGNOSIS — E87.6 HYPOKALEMIA: ICD-10-CM

## 2023-11-07 DIAGNOSIS — I50.32 CHRONIC DIASTOLIC (CONGESTIVE) HEART FAILURE (HCC): ICD-10-CM

## 2023-11-07 RX ORDER — ASPIRIN 81 MG/1
81 TABLET ORAL DAILY
Qty: 90 TABLET | Refills: 1 | OUTPATIENT
Start: 2023-11-07

## 2023-11-07 RX ORDER — POTASSIUM CHLORIDE 20 MEQ/1
20 TABLET, EXTENDED RELEASE ORAL EVERY MORNING
Qty: 30 TABLET | Refills: 1 | OUTPATIENT
Start: 2023-11-07

## 2023-11-07 NOTE — TELEPHONE ENCOUNTER
Demetrius Reynolds was contacted by virginia Claros for follow-up regarding SDOH related needs. Writer spoke with: Patient    Progress Notes: Writer placed call to patient offering financial assistance. Patient expressed concern because he received letter from insurance company stating he received denial for hospital stay in October. Writer called [phone] and spoke with Tez Jacome and she verified that the hospital also received the denial letter and the denial department is working with insurance company to get the hospital stay approved. Writer placed phone call to patient and relayed the information that the hospital is working on getting the hospital stay approved. Patient did not need any further assistance from myself at this time. Patient stated he is having difficulty getting his medication from the pharmacy due to error of the pharmacy stating his medication was already delivered. Patient advised to contact PCP office so they may contact the pharmacy for verification and find out how patient can receive his medication. Action steps to be completed by writer: Close referral.    Action steps to be completed by patient:  Contact PCP office regarding having medications sent to him from the pharmacy.     Patient has given verbal permission to leave detailed messages regarding SDOH referral on their phone: N/A    Patient has given verbal permission to submit applications on their behalf: N/A    April [place], [place]

## 2023-11-07 NOTE — TELEPHONE ENCOUNTER
Last visit: 11/3/2023  Last Med refill: 10/30/23  Does patient have enough medication for 72 hours: No: Patient states he never received his medication.  Writer called the pharmacy they need new scripts    Next Visit Date:  Future Appointments   Date Time Provider [address]   11/8/2023 10:00 AM STV CHF CLINIC RM 1 [facility]Z [facility]F [facility]   11/8/2023  2:00 PM Kiko Fowler, OT [facility] OT [facility]   11/13/2023 10:00 AM Pama Nation, OT [facility]   11/15/2023 10:00 AM Pama Nation, OT [facility]   11/20/2023 10:00 AM [place] Malcolm, OT [facility]   11/22/2023 10:00 AM Pama Nation, OT [facility]   11/29/2023  2:15 PM Darius Burks DPM [facility] Podiatry TOP   12/15/2023  1:45 PM MD Imelda Chase FP TOLPP   4/8/2024  9:00 AM Milady Cruz MD AFL TCC SOLOE AFL ERIN C       Health Maintenance   Topic Date Due    COVID-19 Vaccine (3 - 2023-24 season) 09/01/2023    Annual Wellness Visit (AWV)  10/28/2023    Lipids  03/29/2024    Depression Screen  07/07/2024    Colorectal Cancer Screen  01/12/2031    DTaP/Tdap/Td vaccine (4 - Td or Tdap) 07/24/2033    Flu vaccine  Completed    Shingles vaccine  Completed    Pneumococcal 65+ years Vaccine  Completed    Hepatitis C screen  Completed    Hepatitis A vaccine  Aged Out    Hepatitis B vaccine  Aged Out    Hib vaccine  Aged Out    Meningococcal (ACWY) vaccine  Aged Out    Pneumococcal 0-64 years Vaccine  Discontinued    Diabetes screen  Discontinued    HIV screen  Discontinued    Prostate Specific Antigen (PSA) Screening or Monitoring  Discontinued       Hemoglobin A1C (%)   Date Value   04/13/2023 5.5   12/10/2019 5.6   10/18/2019 5.8             ( goal A1C is < 7)   No components found for: \"LABMICR\"  LDL Cholesterol (mg/dL)   Date Value   03/29/2023 131 (H)   03/08/2022 56   03/08/2022 56       (goal LDL is <100)   AST (U/L)   Date Value   03/29/2023 26     ALT (U/L)   Date Value   03/29/2023 19

## 2023-11-07 NOTE — PRE-CERTIFICATION NOTE
[x] [facility] &  Therapy  [address]  [phone]  F: [phone] [] [address] [facility]  [address]   [address]  P: [phone]  F: [phone] [] [address]  [address]  P: [phone]  F: [phone] [] [address] [facility]  [address]   [address]   [place]: [phone]  F: [phone]  [] [facility]   Outpatient Occupational Therapy  [address] [facility]: [phone]  F: [phone]          Therapy Pre-certification Note      11/7/2023    [facility]  1955   [id number]      Insurance approval was received for Occupational Therapy from [address] [place] [address] on 11/7/2023. Approval was received for 10 visits, from 11/6/2023 to 12/29/2023. Authorization number Authorization [id number]  Tracking [id number].    [id number]  [id number]  [id number]  [id number]    Primary therapist notified.       Electronically signed by Alfreda Carlisle PT on 11/7/2023 at 4:51 AM

## 2023-11-08 ENCOUNTER — HOSPITAL ENCOUNTER (OUTPATIENT)
Dept: OTHER | Age: 68
Discharge: HOME | End: 2023-11-08
Payer: MEDICARE

## 2023-11-08 ENCOUNTER — HOSPITAL ENCOUNTER (OUTPATIENT)
Dept: OCCUPATIONAL THERAPY | Age: 68
Setting detail: THERAPIES SERIES
Discharge: HOME | End: 2023-11-08
Payer: MEDICARE

## 2023-11-08 VITALS
HEART RATE: 73 BPM | RESPIRATION RATE: 16 BRPM | DIASTOLIC BLOOD PRESSURE: 70 MMHG | SYSTOLIC BLOOD PRESSURE: 110 MMHG | OXYGEN SATURATION: 99 %

## 2023-11-08 DIAGNOSIS — E78.2 MIXED HYPERLIPIDEMIA: ICD-10-CM

## 2023-11-08 DIAGNOSIS — I50.42 HEART FAILURE, SYSTOLIC AND DIASTOLIC, CHRONIC (HCC): ICD-10-CM

## 2023-11-08 PROCEDURE — 99212 OFFICE O/P EST SF 10 MIN: CPT

## 2023-11-08 RX ORDER — FUROSEMIDE 40 MG/1
80 TABLET ORAL 2 TIMES DAILY
Qty: 120 TABLET | Refills: 0 | Status: SHIPPED | OUTPATIENT
Start: 2023-11-08

## 2023-11-08 RX ORDER — ATORVASTATIN CALCIUM 80 MG/1
80 TABLET, FILM COATED ORAL DAILY
Qty: 30 TABLET | Refills: 3 | Status: SHIPPED | OUTPATIENT
Start: 2023-11-08

## 2023-11-08 NOTE — TELEPHONE ENCOUNTER
Last visit: 11/03/2023  Last Med refill: 10/02/2023 - 10/17/2023  Does patient have enough medication for 72 hours: No:     Pharmacy is requesting a 90 day supply    Next Visit Date:  Future Appointments   Date Time Provider [address]   11/8/2023  2:00 PM Marge Sours, OT [facility]   11/13/2023 10:00 AM Hua Montgomery, OT [facility]   11/15/2023 10:00 AM Hua Montgomery, OT [facility]   11/20/2023 10:00 AM Marge Sours, OT [facility]   11/22/2023 10:00 AM Hua Montgomery, OT [facility]   11/29/2023  2:15 PM Nichelle Petersen, DPM [facility] Podiatry [facility]   12/15/2023  1:45 PM Ruba Robison MD [facility]   4/8/2024  9:00 AM Andrea Cabrera MD [facility]       Health Maintenance   Topic Date Due    COVID-19 Vaccine (3 - 2023-24 season) 09/01/2023    Annual Wellness Visit (AWV)  10/28/2023    Lipids  03/29/2024    Depression Screen  07/07/2024    Colorectal Cancer Screen  01/12/2031    DTaP/Tdap/Td vaccine (4 - Td or Tdap) 07/24/2033    Flu vaccine  Completed    Shingles vaccine  Completed    Pneumococcal 65+ years Vaccine  Completed    Hepatitis C screen  Completed    Hepatitis A vaccine  Aged Out    Hepatitis B vaccine  Aged Out    Hib vaccine  Aged Out    Meningococcal (ACWY) vaccine  Aged Out    Pneumococcal 0-64 years Vaccine  Discontinued    Diabetes screen  Discontinued    HIV screen  Discontinued    Prostate Specific Antigen (PSA) Screening or Monitoring  Discontinued       Hemoglobin A1C (%)   Date Value   04/13/2023 5.5   12/10/2019 5.6   10/18/2019 5.8             ( goal A1C is < 7)   No components found for: \"LABMICR\"  LDL Cholesterol (mg/dL)   Date Value   03/29/2023 131 (H)   03/08/2022 56   03/08/2022 56       (goal LDL is <100)   AST (U/L)   Date Value   03/29/2023 26     ALT (U/L)   Date Value   03/29/2023 19     BUN (mg/dL)   Date Value   10/17/2023 17     BP Readings from Last 3 Encounters:   11/08/23 110/70

## 2023-11-08 NOTE — PROGRESS NOTES
Date:  2023  Time:  10:37 AM    CHF Clinic at [facility]    Office: [phone] Fax: [phone]    Re:  Daxa Shows   Patient : 1955    Vital Signs: /70   Pulse 73   Resp 16   SpO2 99%                                                   No results for input(s): \"CBC\", \"HGB\", \"HCT\", \"WBC\", \"PLATELET\", \"NA\", \"K\", \"CL\", \"CO2\", \"BUN\", \"CREATININE\", \"GLUCOSE\", \"BNP\", \"INR\" in the last 72 hours. Respiratory:    Assessment  Charting Type: Reassessment    Breath Sounds  Right Upper Lobe: Clear  Right Middle Lobe: Clear  Right Lower Lobe: Clear  Left Upper Lobe: Clear  Left Lower Lobe: Diminished              Peripheral Vascular  RLE Edema: +2, Non-pitting  LLE Edema: +2, Non-pitting    Future Appointments   Date Time Provider [address]   2023  2:00 PM Chevis Willy, OT [facility]   2023 10:00 AM Greta Kristen, OT [facility]   11/15/2023 10:00 AM Greta Kristen, OT [facility]   2023 10:00 AM Chevis Willy, OT [facility]   2023 10:00 AM Greta Kristen, OT [facility]   2023  2:15 PM Eze Aguilar DPM [facility] Podiatry [facility]   12/15/2023  1:45 PM Vanessa Ugarte MD [facility]   2024  9:00 AM MD MARTIN Cleary      Complaints: No new complaints     Physician Orders No new orders     Comment : Weight is the same as last visit. He was recently hospitalized for cellulitis on IV abx and sent home on PO abx for 10 days which are completed. Has +1 edema non pitting today slightly pink but overall look great for him soft to touch. He's been watching his fluid restriction to 64 ounces per day and 2000mg low sodium diet. He starts occupational therapy today for leg strengthening .  We will see back in 1 month 23     Electronically signed by Gus Wall RN on 2023 at 10:37 AM

## 2023-11-08 NOTE — SIGNIFICANT EVENT
[][address], [address]  P: [phone]  F: [phone] [x][facility] [address]: [phone]  F: [phone] [][facility] [address]  P: [phone]  F: [phone]        Outpatient Lymphedema Occupational Therapy Cancel/No Show note    Date: 2023  Patient: Niko Valentine  : 1955  MRN: [id number]    Cancels/No Shows to date: 1    For today's appointment patient:    [x]  Cancelled    [] Rescheduled appointment    [] No-show     Reason given by patient:    []  Patient ill    []  Conflicting appointment    [x] No transportation      [] Conflict with work    [] No reason given    [] Weather related    [] COVID-19    [] Other       Comments:       [] Next appointment was confirmed    [] Left message informing of missed visit    [] Unable to contact    [] Other:            Electronically signed by:  Marge Katz OT

## 2023-11-09 ENCOUNTER — TELEPHONE (OUTPATIENT)
Dept: UROLOGY | Age: 68
End: 2023-11-09

## 2023-11-09 NOTE — TELEPHONE ENCOUNTER
Patient is scheduled for surgery on 12/4 at 8:00 AM.  Unable to reach patient. Letter mailed out today.

## 2023-11-10 DIAGNOSIS — I25.10 CORONARY ARTERY DISEASE INVOLVING NATIVE CORONARY ARTERY OF NATIVE HEART WITHOUT ANGINA PECTORIS: ICD-10-CM

## 2023-11-10 DIAGNOSIS — I50.32 CHRONIC DIASTOLIC (CONGESTIVE) HEART FAILURE (HCC): ICD-10-CM

## 2023-11-10 DIAGNOSIS — E87.6 HYPOKALEMIA: ICD-10-CM

## 2023-11-10 RX ORDER — NITROGLYCERIN 0.4 MG/1
0.4 TABLET SUBLINGUAL EVERY 5 MIN PRN
Qty: 25 TABLET | Refills: 1 | Status: SHIPPED | OUTPATIENT
Start: 2023-11-10 | End: 2023-11-14 | Stop reason: SDUPTHER

## 2023-11-10 RX ORDER — POTASSIUM CHLORIDE 20 MEQ/1
20 TABLET, EXTENDED RELEASE ORAL EVERY MORNING
Qty: 30 TABLET | Refills: 1 | OUTPATIENT
Start: 2023-11-10

## 2023-11-10 NOTE — TELEPHONE ENCOUNTER
Last visit:   Last Med refill:   Does patient have enough medication for 72 hours: No:     Next Visit Date:  Future Appointments   Date Time Provider Department Center   11/13/2023 10:00 AM Marilynn Gresham, OT [facility]   11/15/2023 10:00 AM Marilynn Gresham, OT [facility]   11/20/2023 10:00 AM Penny Manzanares, OT [facility]   11/22/2023 10:00 AM AndMarilynn plasencia, OT [facility]   11/29/2023  2:15 PM Donte Rader DPM [facility]   12/6/2023  9:30 AM STV CHF CLINIC RM 1 [facility] CHF [facility]   12/15/2023  1:45 [facility]   4/8/2024  9:00 AM Jono Conde MD [facility]       Health Maintenance   Topic Date Due    COVID-19 Vaccine (3 - 2023-24 season) 09/01/2023    Annual Wellness Visit (AWV)  10/28/2023    Lipids  03/29/2024    Depression Screen  07/07/2024    Colorectal Cancer Screen  01/12/2031    DTaP/Tdap/Td vaccine (4 - Td or Tdap) 07/24/2033    Flu vaccine  Completed    Shingles vaccine  Completed    Pneumococcal 65+ years Vaccine  Completed    Hepatitis C screen  Completed    Hepatitis A vaccine  Aged Out    Hepatitis B vaccine  Aged Out    Hib vaccine  Aged Out    Meningococcal (ACWY) vaccine  Aged Out    Pneumococcal 0-64 years Vaccine  Discontinued    Diabetes screen  Discontinued    HIV screen  Discontinued    Prostate Specific Antigen (PSA) Screening or Monitoring  Discontinued       Hemoglobin A1C (%)   Date Value   04/13/2023 5.5   12/10/2019 5.6   10/18/2019 5.8             ( goal A1C is < 7)   No components found for: \"LABMICR\"  LDL Cholesterol (mg/dL)   Date Value   03/29/2023 131 (H)   03/08/2022 56   03/08/2022 56       (goal LDL is <100)   AST (U/L)   Date Value   03/29/2023 26     ALT (U/L)   Date Value   03/29/2023 19     BUN (mg/dL)   Date Value   10/17/2023 17     BP Readings from Last 3 Encounters:   11/08/23 110/70   11/03/23 134/80   11/02/23 106/62          (goal 120/80)    All Future

## 2023-11-13 ENCOUNTER — HOSPITAL ENCOUNTER (OUTPATIENT)
Dept: OCCUPATIONAL THERAPY | Age: 68
Setting detail: THERAPIES SERIES
Discharge: HOME | End: 2023-11-13
Payer: MEDICARE

## 2023-11-13 PROCEDURE — 97535 SELF CARE MNGMENT TRAINING: CPT

## 2023-11-13 PROCEDURE — 97140 MANUAL THERAPY 1/> REGIONS: CPT

## 2023-11-13 NOTE — FLOWSHEET NOTE
management of condition, and improve overall quality of life. [] No change. [] Treatment hold:   [] No further treatment/discharge  [] Other:      Pt. Education:  [x] Yes  [] No  [x] Reviewed Prior HEP/Ed  Method of Education: [] Verbal  [] Demo  [] Written  Comprehension of Education:  [x] Verbalizes/demonstrates understanding  [] Needs review  [] No understanding  Rehab potential:  [] Good [] Fair [] Poor [] With assist from family/caregiver    Circumferential Measurements  Measurements taken from nail base of D2 digit. Measurements (cm) cm Right Left   Dorsum    10  23.5   Supramalleolar  (ankle)   17  28.0   Distal Calf    25  30.5   Mid Calf    35  40.5   Proximal Calf   45  42.5   Knee    55  45.0   Distal thigh   65  52.0   Mid thigh         Proximal Thigh         11/13/2023   262.0   11/6/2023 NEW eval with wraps from wound care removed at appt this date   266.0 267.5   7/17/2023 NEW eval   268.0 268.0   Initial Total 4/19/2023:  NA  287.0  290.0        Therapy Goals  STG - To be addressed within 10 visits     Pt will be educated on lymphedema risk reduction practices to reduce the risk of infection, progression of disease, exacerbations, and functional morbidity with use of B LE during functional mobility. Pt will demonstrate compliance with skin care routine for improved overall skin integrity to decrease risk of wounds, infection, and hospitalization. Pt will demonstrate independence with decongestive exercise program in order to promote healthy lymphatic flow by maintaining/improving functional ROM needed during ADL's. Pt will demonstrate 70% accuracy with SELF-MLD sequence in order to promote independence with home programming to functional reduce swelling to B LE for increased ease and independence with ADL. Pt/caregiver will demonstrate proper technique with bandaging to aid in reduction of swelling for active participation in pt's POC.       LTG - To be addressed within 10

## 2023-11-14 DIAGNOSIS — E87.6 HYPOKALEMIA: ICD-10-CM

## 2023-11-14 DIAGNOSIS — I50.32 CHRONIC DIASTOLIC (CONGESTIVE) HEART FAILURE (HCC): ICD-10-CM

## 2023-11-14 DIAGNOSIS — I25.10 CORONARY ARTERY DISEASE INVOLVING NATIVE CORONARY ARTERY OF NATIVE HEART WITHOUT ANGINA PECTORIS: ICD-10-CM

## 2023-11-14 RX ORDER — POTASSIUM CHLORIDE 20 MEQ/1
20 TABLET, EXTENDED RELEASE ORAL EVERY MORNING
Qty: 30 TABLET | Refills: 1 | Status: SHIPPED | OUTPATIENT
Start: 2023-11-14

## 2023-11-14 RX ORDER — NITROGLYCERIN 0.4 MG/1
0.4 TABLET SUBLINGUAL EVERY 5 MIN PRN
Qty: 25 TABLET | Refills: 1 | Status: SHIPPED | OUTPATIENT
Start: 2023-11-14

## 2023-11-14 NOTE — TELEPHONE ENCOUNTER
Last visit: 11/03/2023  Last Med refill: 11/10/2023  Does patient have enough medication for 72 hours: No:     Next Visit Date:  Future Appointments   Date Time Provider [address]   11/15/2023 10:00 AM Bhavin Surjitonesimo, OT [facility]   11/20/2023 10:00 AM Evant Moritz, OT [facility]   11/22/2023 10:00 AM Bhavin Yanalejoonesimo, OT [facility]   11/29/2023  2:15 PM Nathan Olmstead DPM [facility] Podiatry [facility]   12/6/2023  9:30 AM STV CHF CLINIC RM 1 [facility] CHF CLI [facility]   12/15/2023  1:45 PM [facility]   4/8/2024  9:00 AM Kamran Marx MD [facility]       Health Maintenance   Topic Date Due    COVID-19 Vaccine (3 - 2023-24 season) 09/01/2023    Annual Wellness Visit (AWV)  10/28/2023    Lipids  03/29/2024    Depression Screen  07/07/2024    Colorectal Cancer Screen  01/12/2031    DTaP/Tdap/Td vaccine (4 - Td or Tdap) 07/24/2033    Flu vaccine  Completed    Shingles vaccine  Completed    Pneumococcal 65+ years Vaccine  Completed    Hepatitis C screen  Completed    Hepatitis A vaccine  Aged Out    Hepatitis B vaccine  Aged Out    Hib vaccine  Aged Out    Meningococcal (ACWY) vaccine  Aged Out    Pneumococcal 0-64 years Vaccine  Discontinued    Diabetes screen  Discontinued    HIV screen  Discontinued    Prostate Specific Antigen (PSA) Screening or Monitoring  Discontinued       Hemoglobin A1C (%)   Date Value   04/13/2023 5.5   12/10/2019 5.6   10/18/2019 5.8             ( goal A1C is < 7)   No components found for: \"LABMICR\"  LDL Cholesterol (mg/dL)   Date Value   03/29/2023 131 (H)   03/08/2022 56   03/08/2022 56       (goal LDL is <100)   AST (U/L)   Date Value   03/29/2023 26     ALT (U/L)   Date Value   03/29/2023 19     BUN (mg/dL)   Date Value   10/17/2023 17     BP Readings from Last 3 Encounters:   11/08/23 110/70   11/03/23 134/80   11/02/23 106/62          (goal 120/80)    All Future Testing planned in [facility]  Lab Frequency

## 2023-11-15 ENCOUNTER — HOSPITAL ENCOUNTER (OUTPATIENT)
Dept: OCCUPATIONAL THERAPY | Age: 68
Setting detail: THERAPIES SERIES
Discharge: HOME | End: 2023-11-15
Payer: MEDICARE

## 2023-11-15 DIAGNOSIS — N39.490 OVERFLOW INCONTINENCE OF URINE: ICD-10-CM

## 2023-11-15 PROCEDURE — 97140 MANUAL THERAPY 1/> REGIONS: CPT

## 2023-11-15 PROCEDURE — 97535 SELF CARE MNGMENT TRAINING: CPT

## 2023-11-15 RX ORDER — TAMSULOSIN HYDROCHLORIDE 0.4 MG/1
0.4 CAPSULE ORAL DAILY
Qty: 30 CAPSULE | Refills: 5 | Status: SHIPPED | OUTPATIENT
Start: 2023-11-15

## 2023-11-15 NOTE — FLOWSHEET NOTE
TREATMENT LOCATION:   [x] [facility]  Outpatient Rehabilitation &  Therapy  [address], [address]  P: [phone]  F: [phone] [] [facility]   Outpatient Rehabilitation &  Therapy  [address]  P: [phone]  F: [phone] [] [facility].   P: [phone]  F: [phone]        Lymphedema Services - Treatment Note of the B Lower Extremity    Visit# / total visits:  scheduled; Progress note due at visit 10 per POC.  (Certification Dates: 2023 - 2024)   660/4649 cx transportation/forgot    Date:  11/15/2023  Patient: Remedios Ruiz  : 1955             MRN: [id number]  Referring Provider: Pollo Moctezuma MD       Phone: [phone]  Fax: [phone]  Insurance: [facility] Hilary Shukla ([id number]) [facility] Basket after eval]  Secondary: [place] (ID: [id number])  APPROVED 18 visits 10/4/2023 - 2023    Medical Diagnosis: Venous stasis ulcer right calf with fat layer exposed unspecified whether varicose veins present I83.012, L97.212; Lymphedema B LE I89.0  Rehab Codes: I89.0, M79.662 - pain in limb, left lower leg, R26.89 - difficulty walking, R26.81 - unsteadiness on feet, M62.81 - muscle weakness, generalized, Z91.81 - at risk for falls, I87.2 - venous insufficiency (chronic) (peripheral)  Onset Date: 10/17/2023     Demographic info for garment/pump VOB:  [address] [address] [phone]    Overview of Evaluation dated 2023: Patient is a 79year old male referred to the Lymphedema Clinic with a diagnosis of bilateral Lower Extremity Lymphedema. Other significant dx include recurrent cellulitis, chronic venous stasis, CHF. He has a hx of lymphedema treatment in  and twice in . Presented for OT lymphedema eval 2023 with wound to left anterior calf.    Hx of sleeps

## 2023-11-19 ENCOUNTER — TELEPHONE (OUTPATIENT)
Dept: WOUND CARE | Age: 68
End: 2023-11-19

## 2023-11-20 ENCOUNTER — HOSPITAL ENCOUNTER (OUTPATIENT)
Dept: OCCUPATIONAL THERAPY | Age: 68
Setting detail: THERAPIES SERIES
Discharge: HOME | End: 2023-11-20
Payer: MEDICARE

## 2023-11-20 PROCEDURE — 97140 MANUAL THERAPY 1/> REGIONS: CPT

## 2023-11-20 PROCEDURE — 97535 SELF CARE MNGMENT TRAINING: CPT

## 2023-11-20 NOTE — FLOWSHEET NOTE
TREATMENT LOCATION:   [x] 400 [facility]  Outpatient Rehabilitation &  Therapy  [address]  P: [phone]  F: [phone] [] [facility]10 [facility]   Outpatient Rehabilitation &  Therapy  [address]  P: [phone]  F: [phone] [] [place].   P: [phone]  F: [phone]        Lymphedema Services - Treatment Note of the B Lower Extremity    Visit# / total visits:  scheduled; Progress note due at visit 10 per POC.  (Certification Dates: 2023 - 2024)   299/2142 cx transportation/forgot    Date:  2023  Patient: Claritza Cruz  : 1955             MRN: [id number]  Referring Provider: Brooke Salgado MD       Phone: [phone]  Fax: [phone]  Insurance: [employer] Shukla ([id number]) [employer] after eval]  Secondary: [place] (ID: [id number])  APPROVED 18 visits 10/4/2023 - 2023    Medical Diagnosis: Venous stasis ulcer right calf with fat layer exposed unspecified whether varicose veins present I83.012, L97.212; Lymphedema B LE I89.0  Rehab Codes: I89.0, M79.662 - pain in limb, left lower leg, R26.89 - difficulty walking, R26.81 - unsteadiness on feet, M62.81 - muscle weakness, generalized, Z91.81 - at risk for falls, I87.2 - venous insufficiency (chronic) (peripheral)  Onset Date: 10/17/2023     Demographic info for garment/pump VOB:  [address] [facility] [address]  [phone]    Overview of Evaluation dated 2023: Patient is a 79year old male referred to the Lymphedema Clinic with a diagnosis of bilateral Lower Extremity Lymphedema. Other significant dx include recurrent cellulitis, chronic venous stasis, CHF. He has a hx of lymphedema treatment in  and twice in . Presented for OT lymphedema eval 2023 with wound to left anterior calf.    Hx of sleeps

## 2023-11-22 ENCOUNTER — HOSPITAL ENCOUNTER (OUTPATIENT)
Dept: OCCUPATIONAL THERAPY | Age: 68
Setting detail: THERAPIES SERIES
Discharge: HOME | End: 2023-11-22
Payer: MEDICARE

## 2023-11-22 PROCEDURE — 97535 SELF CARE MNGMENT TRAINING: CPT

## 2023-11-22 NOTE — FLOWSHEET NOTE
TREATMENT LOCATION:   [x] 400 [facility]  Outpatient Rehabilitation &  Therapy  [address]  P: [phone]  F: [phone] [] 910 [facility]   Outpatient Rehabilitation &  Therapy  [address]  P: [phone]  F: [phone] [] [address] [facility].   P: [phone]  F: [phone]        Lymphedema Services - Treatment Note of the B Lower Extremity    Visit# / total visits:  scheduled; Progress note due at visit 10 per POC.  (Certification Dates: 2023 - 2024)   173/2506 cx transportation/forgot  APPROVED 10 visits 2023 - 2023    Date:  2023  Patient: Daxa Turpin  : 1955             MRN: [id number]  Referring Provider: Vanessa Ugarte MD       Phone: [phone]  Fax: [phone]  Insurance: [employer] Hilary Shukla ([id number]) Keily Garsia after eval]  Secondary: [place] (ID: [id number])  APPROVED 18 visits 10/4/2023 - 2023    Medical Diagnosis: Venous stasis ulcer right calf with fat layer exposed unspecified whether varicose veins present I83.012, L97.212; Lymphedema B LE I89.0  Rehab Codes: I89.0, M79.662 - pain in limb, left lower leg, R26.89 - difficulty walking, R26.81 - unsteadiness on feet, M62.81 - muscle weakness, generalized, Z91.81 - at risk for falls, I87.2 - venous insufficiency (chronic) (peripheral)  Onset Date: 10/17/2023     Demographic info for garment/pump VOB:  [address] [facility] [address]  [phone]    Overview of Evaluation dated 2023: Patient is a 79year old male referred to the Lymphedema Clinic with a diagnosis of bilateral Lower Extremity Lymphedema. Other significant dx include recurrent cellulitis, chronic venous stasis, CHF. He has a hx of lymphedema treatment in  and twice in .   Presented for OT lymphedema eval 2023 with wound

## 2023-12-04 RX ORDER — OXYBUTYNIN CHLORIDE 10 MG/1
10 TABLET, EXTENDED RELEASE ORAL EVERY MORNING
Qty: 30 TABLET | Refills: 3 | Status: SHIPPED | OUTPATIENT
Start: 2023-12-04

## 2023-12-06 ENCOUNTER — HOSPITAL ENCOUNTER (OUTPATIENT)
Dept: OTHER | Age: 68
Discharge: HOME | End: 2023-12-06
Payer: MEDICARE

## 2023-12-06 VITALS
RESPIRATION RATE: 16 BRPM | SYSTOLIC BLOOD PRESSURE: 140 MMHG | OXYGEN SATURATION: 98 % | DIASTOLIC BLOOD PRESSURE: 80 MMHG | HEART RATE: 72 BPM

## 2023-12-06 PROCEDURE — 99212 OFFICE O/P EST SF 10 MIN: CPT

## 2023-12-06 NOTE — PROGRESS NOTES
Date:  2023  Time:  10:38 AM    CHF Clinic at [address]    Office: [phone] Fax: [phone]    Re:  Santiago Guy   Patient : 1955    Vital Signs: BP (!) 140/80   Pulse 72   Resp 16   SpO2 98%  Weight 220                                                   No results for input(s): \"CBC\", \"HGB\", \"HCT\", \"WBC\", \"PLATELET\", \"NA\", \"K\", \"CL\", \"CO2\", \"BUN\", \"CREATININE\", \"GLUCOSE\", \"BNP\", \"INR\" in the last 72 hours. Respiratory:    Assessment  Charting Type: Reassessment    Breath Sounds  Right Upper Lobe: Clear  Right Middle Lobe: Clear  Right Lower Lobe: Clear  Left Upper Lobe: Clear  Left Lower Lobe: Clear    Cough/Sputum  Cough: Productive         Peripheral Vascular  RLE Edema: +2, Non-pitting  LLE Edema: +2, Non-pitting    Future Appointments   Date Time Provider [address]   12/15/2023  1:45 PM Celestine Salamanca MD [facility]   2023 12:15 PM Jaylene Gruber DPM [facility] Podiatry [facility]   1/3/2024 10:30 AM ST CHF CLINIC RM 1 [facility] CHF I [facility]   2024  9:00 AM Ana Knowles MD [facility]      Complaints: No new complaints     Physician Orders No new orders     Comment : Weight is down 7 pounds from last visit. Denies any chest pain or SOB. Discussed in detail low sodium diet 2000mg or less and 64 ounces of fluid or less. Legs show no signs of cellulitis today no open sores measurements are down in ankles and non pitting today,states they look the best they have in a long time. Has cysto planned for ,saw  in October no medication changes. We will see in 1 month 1/3/23 appt. Made for CHF clinic.      Electronically signed by An Escobar RN on 2023 at 10:38 AM

## 2023-12-20 ENCOUNTER — OFFICE VISIT (OUTPATIENT)
Dept: PODIATRY | Age: 68
End: 2023-12-20
Payer: MEDICARE

## 2023-12-20 VITALS
HEIGHT: 65 IN | WEIGHT: 234 LBS | DIASTOLIC BLOOD PRESSURE: 66 MMHG | HEART RATE: 56 BPM | BODY MASS INDEX: 38.99 KG/M2 | SYSTOLIC BLOOD PRESSURE: 112 MMHG

## 2023-12-20 DIAGNOSIS — L03.116 CELLULITIS OF LEFT LOWER EXTREMITY: Primary | ICD-10-CM

## 2023-12-20 DIAGNOSIS — B35.1 DERMATOPHYTOSIS OF NAIL: ICD-10-CM

## 2023-12-20 DIAGNOSIS — M79.671 PAIN IN BOTH FEET: ICD-10-CM

## 2023-12-20 DIAGNOSIS — M79.672 PAIN IN BOTH FEET: ICD-10-CM

## 2023-12-20 DIAGNOSIS — I73.9 PVD (PERIPHERAL VASCULAR DISEASE) (HCC): ICD-10-CM

## 2023-12-20 DIAGNOSIS — B35.1 ONYCHOMYCOSIS: ICD-10-CM

## 2023-12-20 PROCEDURE — 99203 OFFICE O/P NEW LOW 30 MIN: CPT

## 2023-12-20 PROCEDURE — G8482 FLU IMMUNIZE ORDER/ADMIN: HCPCS

## 2023-12-20 PROCEDURE — G8417 CALC BMI ABV UP PARAM F/U: HCPCS

## 2023-12-20 PROCEDURE — 3078F DIAST BP <80 MM HG: CPT

## 2023-12-20 PROCEDURE — 1123F ACP DISCUSS/DSCN MKR DOCD: CPT

## 2023-12-20 PROCEDURE — 3074F SYST BP LT 130 MM HG: CPT

## 2023-12-20 PROCEDURE — 99203 OFFICE O/P NEW LOW 30 MIN: CPT | Performed by: PODIATRIST

## 2023-12-20 PROCEDURE — G8427 DOCREV CUR MEDS BY ELIG CLIN: HCPCS

## 2023-12-20 PROCEDURE — 1036F TOBACCO NON-USER: CPT

## 2023-12-20 PROCEDURE — 3017F COLORECTAL CA SCREEN DOC REV: CPT

## 2023-12-20 RX ORDER — CEPHALEXIN 500 MG/1
500 CAPSULE ORAL 2 TIMES DAILY
Qty: 10 CAPSULE | Refills: 0 | Status: SHIPPED | OUTPATIENT
Start: 2023-12-20 | End: 2024-01-05

## 2023-12-26 RX ORDER — NITROFURANTOIN 25; 75 MG/1; MG/1
CAPSULE ORAL
COMMUNITY
Start: 2023-12-06

## 2023-12-26 RX ORDER — ATORVASTATIN CALCIUM 40 MG/1
40 TABLET, FILM COATED ORAL NIGHTLY
COMMUNITY
Start: 2023-11-17

## 2023-12-26 NOTE — PROGRESS NOTES
Patient instructed to remove shoes and socks and instructed to sit in exam chair.  Current PCP is Nick Hartman MD and date of last visit was 11/03/2023.   Do you have a follow up visit scheduled?  Yes  If yes, the date is 01/05/2024.    
medications, social and family history as documented unless otherwise noted below. Documentation of the HPI, Physical Exam and Medical Decision Making performed by medical students or scribes is based on my personal performance of the HPI, PE and MDM. I have personally evaluated this patient and have completed at least one if not all key elements of the E/M (history, physical exam, and MDM). Additional findings are as noted.     Electronically signed by Donte Rader DPM on 1/2/2024 at 9:03 AM

## 2023-12-28 ENCOUNTER — ANESTHESIA (OUTPATIENT)
Dept: OPERATING ROOM | Age: 68
End: 2023-12-28
Payer: MEDICARE

## 2023-12-28 ENCOUNTER — ANESTHESIA EVENT (OUTPATIENT)
Dept: OPERATING ROOM | Age: 68
End: 2023-12-28
Payer: MEDICARE

## 2023-12-28 ENCOUNTER — HOSPITAL ENCOUNTER (OUTPATIENT)
Age: 68
Setting detail: OUTPATIENT SURGERY
Discharge: HOME | End: 2023-12-28
Attending: UROLOGY | Admitting: UROLOGY
Payer: MEDICARE

## 2023-12-28 VITALS
TEMPERATURE: 97 F | DIASTOLIC BLOOD PRESSURE: 66 MMHG | HEART RATE: 56 BPM | HEIGHT: 65 IN | RESPIRATION RATE: 16 BRPM | OXYGEN SATURATION: 100 % | BODY MASS INDEX: 38.32 KG/M2 | SYSTOLIC BLOOD PRESSURE: 121 MMHG | WEIGHT: 230 LBS

## 2023-12-28 LAB
BUN BLD-MCNC: 14 MG/DL (ref 8–26)
BUN BLD-MCNC: 19 MG/DL (ref 8–26)
CA-I BLD-SCNC: 0.94 MMOL/L (ref 1.15–1.33)
CA-I BLD-SCNC: 1.22 MMOL/L (ref 1.15–1.33)
CHLORIDE BLD-SCNC: 109 MMOL/L (ref 98–107)
CHLORIDE BLD-SCNC: 110 MMOL/L (ref 98–107)
EGFR, POC: >60 ML/MIN/1.73M2
EGFR, POC: >60 ML/MIN/1.73M2
GLUCOSE BLD-MCNC: 86 MG/DL (ref 74–100)
GLUCOSE BLD-MCNC: 87 MG/DL (ref 74–100)
POC CREATININE: 0.6 MG/DL (ref 0.51–1.19)
POC CREATININE: 0.6 MG/DL (ref 0.51–1.19)
POTASSIUM BLD-SCNC: 3.9 MMOL/L (ref 3.5–4.5)
POTASSIUM BLD-SCNC: >12 MMOL/L (ref 3.5–4.5)
SODIUM BLD-SCNC: 128 MMOL/L (ref 138–146)
SODIUM BLD-SCNC: 144 MMOL/L (ref 138–146)

## 2023-12-28 PROCEDURE — 6360000002 HC RX W HCPCS: Performed by: STUDENT IN AN ORGANIZED HEALTH CARE EDUCATION/TRAINING PROGRAM

## 2023-12-28 PROCEDURE — 6360000002 HC RX W HCPCS: Performed by: UROLOGY

## 2023-12-28 PROCEDURE — 6360000002 HC RX W HCPCS: Performed by: NURSE ANESTHETIST, CERTIFIED REGISTERED

## 2023-12-28 PROCEDURE — 2500000003 HC RX 250 WO HCPCS

## 2023-12-28 PROCEDURE — 3700000001 HC ADD 15 MINUTES (ANESTHESIA): Performed by: UROLOGY

## 2023-12-28 PROCEDURE — 7100000011 HC PHASE II RECOVERY - ADDTL 15 MIN: Performed by: UROLOGY

## 2023-12-28 PROCEDURE — 2709999900 HC NON-CHARGEABLE SUPPLY: Performed by: UROLOGY

## 2023-12-28 PROCEDURE — 82330 ASSAY OF CALCIUM: CPT

## 2023-12-28 PROCEDURE — 84520 ASSAY OF UREA NITROGEN: CPT

## 2023-12-28 PROCEDURE — 7100000010 HC PHASE II RECOVERY - FIRST 15 MIN: Performed by: UROLOGY

## 2023-12-28 PROCEDURE — 3600000012 HC SURGERY LEVEL 2 ADDTL 15MIN: Performed by: UROLOGY

## 2023-12-28 PROCEDURE — 82435 ASSAY OF BLOOD CHLORIDE: CPT

## 2023-12-28 PROCEDURE — 2580000003 HC RX 258: Performed by: UROLOGY

## 2023-12-28 PROCEDURE — 2580000003 HC RX 258

## 2023-12-28 PROCEDURE — 82947 ASSAY GLUCOSE BLOOD QUANT: CPT

## 2023-12-28 PROCEDURE — 84132 ASSAY OF SERUM POTASSIUM: CPT

## 2023-12-28 PROCEDURE — 82565 ASSAY OF CREATININE: CPT

## 2023-12-28 PROCEDURE — 6360000002 HC RX W HCPCS

## 2023-12-28 PROCEDURE — 84295 ASSAY OF SERUM SODIUM: CPT

## 2023-12-28 PROCEDURE — 3600000002 HC SURGERY LEVEL 2 BASE: Performed by: UROLOGY

## 2023-12-28 PROCEDURE — 3700000000 HC ANESTHESIA ATTENDED CARE: Performed by: UROLOGY

## 2023-12-28 RX ORDER — FENTANYL CITRATE 50 UG/ML
INJECTION, SOLUTION INTRAMUSCULAR; INTRAVENOUS PRN
Status: DISCONTINUED | OUTPATIENT
Start: 2023-12-28 | End: 2023-12-28 | Stop reason: SDUPTHER

## 2023-12-28 RX ORDER — DROPERIDOL 2.5 MG/ML
0.62 INJECTION, SOLUTION INTRAMUSCULAR; INTRAVENOUS
Status: DISCONTINUED | OUTPATIENT
Start: 2023-12-28 | End: 2023-12-28 | Stop reason: HOSPADM

## 2023-12-28 RX ORDER — METOCLOPRAMIDE HYDROCHLORIDE 5 MG/ML
10 INJECTION INTRAMUSCULAR; INTRAVENOUS
Status: DISCONTINUED | OUTPATIENT
Start: 2023-12-28 | End: 2023-12-28 | Stop reason: HOSPADM

## 2023-12-28 RX ORDER — SODIUM CHLORIDE 9 MG/ML
INJECTION, SOLUTION INTRAVENOUS PRN
Status: DISCONTINUED | OUTPATIENT
Start: 2023-12-28 | End: 2023-12-28 | Stop reason: HOSPADM

## 2023-12-28 RX ORDER — DIPHENHYDRAMINE HYDROCHLORIDE 50 MG/ML
12.5 INJECTION INTRAMUSCULAR; INTRAVENOUS
Status: DISCONTINUED | OUTPATIENT
Start: 2023-12-28 | End: 2023-12-28 | Stop reason: HOSPADM

## 2023-12-28 RX ORDER — HYDRALAZINE HYDROCHLORIDE 20 MG/ML
10 INJECTION INTRAMUSCULAR; INTRAVENOUS
Status: DISCONTINUED | OUTPATIENT
Start: 2023-12-28 | End: 2023-12-28 | Stop reason: HOSPADM

## 2023-12-28 RX ORDER — DEXAMETHASONE SODIUM PHOSPHATE 10 MG/ML
INJECTION INTRAMUSCULAR; INTRAVENOUS PRN
Status: DISCONTINUED | OUTPATIENT
Start: 2023-12-28 | End: 2023-12-28 | Stop reason: SDUPTHER

## 2023-12-28 RX ORDER — PROPOFOL 10 MG/ML
INJECTION, EMULSION INTRAVENOUS CONTINUOUS PRN
Status: DISCONTINUED | OUTPATIENT
Start: 2023-12-28 | End: 2023-12-28 | Stop reason: SDUPTHER

## 2023-12-28 RX ORDER — SODIUM CHLORIDE 0.9 % (FLUSH) 0.9 %
5-40 SYRINGE (ML) INJECTION EVERY 12 HOURS SCHEDULED
Status: DISCONTINUED | OUTPATIENT
Start: 2023-12-28 | End: 2023-12-28 | Stop reason: HOSPADM

## 2023-12-28 RX ORDER — SODIUM CHLORIDE 0.9 % (FLUSH) 0.9 %
5-40 SYRINGE (ML) INJECTION PRN
Status: DISCONTINUED | OUTPATIENT
Start: 2023-12-28 | End: 2023-12-28 | Stop reason: HOSPADM

## 2023-12-28 RX ORDER — MAGNESIUM HYDROXIDE 1200 MG/15ML
LIQUID ORAL PRN
Status: DISCONTINUED | OUTPATIENT
Start: 2023-12-28 | End: 2023-12-28 | Stop reason: HOSPADM

## 2023-12-28 RX ORDER — LIDOCAINE HYDROCHLORIDE 10 MG/ML
INJECTION, SOLUTION EPIDURAL; INFILTRATION; INTRACAUDAL; PERINEURAL PRN
Status: DISCONTINUED | OUTPATIENT
Start: 2023-12-28 | End: 2023-12-28 | Stop reason: SDUPTHER

## 2023-12-28 RX ORDER — SODIUM CHLORIDE, SODIUM LACTATE, POTASSIUM CHLORIDE, CALCIUM CHLORIDE 600; 310; 30; 20 MG/100ML; MG/100ML; MG/100ML; MG/100ML
INJECTION, SOLUTION INTRAVENOUS CONTINUOUS PRN
Status: DISCONTINUED | OUTPATIENT
Start: 2023-12-28 | End: 2023-12-28 | Stop reason: SDUPTHER

## 2023-12-28 RX ORDER — SULFAMETHOXAZOLE AND TRIMETHOPRIM 800; 160 MG/1; MG/1
1 TABLET ORAL 2 TIMES DAILY
Qty: 6 TABLET | Refills: 0 | Status: SHIPPED | OUTPATIENT
Start: 2023-12-28 | End: 2023-12-31

## 2023-12-28 RX ORDER — MEPERIDINE HYDROCHLORIDE 50 MG/ML
12.5 INJECTION INTRAMUSCULAR; INTRAVENOUS; SUBCUTANEOUS EVERY 5 MIN PRN
Status: DISCONTINUED | OUTPATIENT
Start: 2023-12-28 | End: 2023-12-28 | Stop reason: HOSPADM

## 2023-12-28 RX ADMIN — FENTANYL CITRATE 50 MCG: 50 INJECTION, SOLUTION INTRAMUSCULAR; INTRAVENOUS at 14:50

## 2023-12-28 RX ADMIN — LIDOCAINE HYDROCHLORIDE 50 MG: 10 INJECTION, SOLUTION EPIDURAL; INFILTRATION; INTRACAUDAL; PERINEURAL at 14:30

## 2023-12-28 RX ADMIN — DEXAMETHASONE SODIUM PHOSPHATE 10 MG: 10 INJECTION INTRAMUSCULAR; INTRAVENOUS at 14:47

## 2023-12-28 RX ADMIN — SODIUM CHLORIDE, POTASSIUM CHLORIDE, SODIUM LACTATE AND CALCIUM CHLORIDE: 600; 310; 30; 20 INJECTION, SOLUTION INTRAVENOUS at 14:27

## 2023-12-28 RX ADMIN — FENTANYL CITRATE 50 MCG: 50 INJECTION, SOLUTION INTRAMUSCULAR; INTRAVENOUS at 14:49

## 2023-12-28 RX ADMIN — Medication 2000 MG: at 14:41

## 2023-12-28 RX ADMIN — PROPOFOL 125 MCG/KG/MIN: 10 INJECTION, EMULSION INTRAVENOUS at 14:30

## 2023-12-28 NOTE — H&P
[facility]  Urology History & Physical      Patient:  Dangelo Grimm  MRN: [id number]  YOB: 1955    CHIEF COMPLAINT:  Overactive bladder    HISTORY OF PRESENT ILLNESS:   The patient is a 79 y.o. male with history of bladder outlet obstruction s/p UroLift and OAB. He presents today for cysto with botox. Patient's old records, notes and chart reviewed and summarized above. Past Medical History:    Past Medical History:   Diagnosis Date    Arthritis     back    Atrial fibrillation (HCC)     BPH (benign prostatic hyperplasia)     Cellulitis 02/2023    left leg    Cervical disc disease     Chronic venous insufficiency     Combined systolic and diastolic congestive heart failure ([address]) 01/15/2020    Coronary artery disease 10/2019    s/p CABG x 1 ; Roxana Dus to LAD    GERD (gastroesophageal reflux disease)     on rx    History of incarceration     released 2019    History of recent hospitalization 02/09/2023    til 2/13/23 with Cellulitis    Hyperlipidemia     Hypertension     Ischemic cardiomyopathy     Myocardial infarct [facility])     Obesity     Overactive bladder     Sleep apnea treated with continuous positive airway pressure (CPAP)     Under care of team 12/26/2023    Urology, Dr. Neha Acharya, last seen 3/10/2023    Under care of team 12/26/2023    GI, dr. Anam Kay, last seen 11/2023    Under care of team 12/26/2023    Cardiology, [facility], Dr. Carmelo Holley, last seen 2023    Wears reading eyeglasses     Wellness examination     PCP, Dr. Aaron Bradley , has appointment 4/13/2023       Past Surgical History:    Past Surgical History:   Procedure Laterality Date    CARPAL TUNNEL RELEASE Left 09/11/2006    [address] RELEASE Right 01/30/2007    [address] [facility]    CATARACT EXTRACTION Bilateral 2020    ?  IOL's    CERVICAL SPINE SURGERY      anterior and posterior fusion C 2- C5 ;  ? [facility] , Pt thinks 2018/2019 but I can not find it    COLONOSCOPY N/A 10/29/2020    COLONOSCOPY WITH

## 2023-12-28 NOTE — OP NOTE
Operative Note      Patient: Dangelo Grimm  YOB: 1955  MRN: [id number]    Date of Procedure: 12/28/2023    Pre-Op Diagnosis Codes:     * Overactive bladder [N32.81]    Post-Op Diagnosis: Same       Procedure(s):  CYSTOSCOPY WITH BOTOX INJECTION 200 UNITS    Surgeon(s):  Temo Olson MD    Assistant:   Resident: Sánchez Segal DO, PGY-4    Anesthesia: Monitor Anesthesia Care    Estimated Blood Loss (mL): Minimal    Complications: None    Specimens:   None    Implants:  None      Drains: None    Findings: Cystourethroscopy revealed no bladder masses or lesions. Patient does have widespread trabeculations, grade 1, and generally small bladder capacity. Indications: This is a 80-year-old male with overactive bladder who previously underwent cystoscopy with Botox injections and would like to try this therapy again to see if it helps his symptoms. Risk, effects, and alternatives to the procedure were discussed with patient in detail he elected to proceed. Informed consent was obtained. Detailed Description of Procedure:   Patient was brought back to the operating room placed on table supine position. Anesthesia was induced and preoperative antibiotics were given. He was transferred to dorsolithotomy position, prepped and draped normal sterile fashion. Cystoscope was inserted into the urethra and advanced to the bladder. Findings as above. 200 units of Botox were injected into the detrusor muscle and 5 cc increments. Irrigation was turned off and there was no significant bleeding at the end of the procedure. Hemostasis was achieved. Cystoscope was then removed. This concluded the procedure. Patient tolerated the procedure well. He was awoken in the operating room and transferred to PACU in stable condition. Dr. Neha Acharya was present and available for the entire duration of the procedure.     Plan: Patient will need to follow-up in clinic in 6 months for reevaluation of his

## 2023-12-28 NOTE — FLOWSHEET NOTE
Ran patients epoc and the labs all came back very high. Did not put in a note in epoc but did a redraw and all the labs came back better.

## 2023-12-28 NOTE — DISCHARGE INSTRUCTIONS
Pt ok to discharge home in good condition  No heavy lifting, >10 lbs for today  Pt should avoid strenuous activity for today  Pt should walk moderately at home  Pt ok to shower   Pt may resume diet as tolerated  Please call attending physician or hospital  with questions  Call or Present to ED if fever (> 101F), intractable nausea vomiting or pain. Rx in chart    Expect to see blood in the urine, you may also have dysuria, urgency and frequency. This should improve with time.      Pt should follow up with Dr. Saleem Slaughter, in 6 MONTHS, call to confirm appointment     [address]

## 2023-12-28 NOTE — ANESTHESIA POSTPROCEDURE EVALUATION
Department of Anesthesiology  Postprocedure Note    Patient: Sol Ramirez  MRN: [id number]  YOB: 1955  Date of evaluation: 12/28/2023    Procedure Summary     Date: 12/28/23 Room / Location: [address]    Anesthesia Start: 8611 Anesthesia Stop: 1794    Procedure: CYSTOSCOPY WITH BOTOX INJECTION 200 UNITS Diagnosis:       Overactive bladder      (Overactive bladder [N32.81])    Surgeons: Martínez Fishman MD Responsible Provider: Chris Quezada MD    Anesthesia Type: MAC ASA Status: 3          Anesthesia Type: No value filed. Jeni Phase I: Jeni Score: 10    Jeni Phase II: Jeni Score: 10    Anesthesia Post Evaluation    Patient location during evaluation: PACU  Patient participation: complete - patient participated  Level of consciousness: awake and alert  Pain score: 3  Airway patency: patent  Nausea & Vomiting: no nausea and no vomiting  Cardiovascular status: hemodynamically stable  Respiratory status: acceptable  Hydration status: euvolemic  Pain management: adequate        No notable events documented.

## 2023-12-28 NOTE — ANESTHESIA PRE PROCEDURE
Department of Anesthesiology  Preprocedure Note       Name:  Dorothy Cole   Age:  79 y.o.  :  1955                                          MRN:  [id number]         Date:  2023      Surgeon: Rolando Perez):  Yuridia Garcia MD    Procedure: Procedure(s):  CYSTOSCOPY, BOTOX INJECTION  (200 UNITS, FLEXIBLE SCOPE, SUPINE)    Medications prior to admission:   Prior to Admission medications    Medication Sig Start Date End Date Taking? Authorizing Provider   nitrofurantoin, macrocrystal-monohydrate, (MACROBID) 100 MG capsule TAKE 1 CAPSULE BY MOUTH TWICE A DAY FOR 7 DAYS 23  Yes Provider, MD Karlee   atorvastatin (LIPITOR) 40 MG tablet Take 1 tablet by mouth nightly 23  Yes Provider, MD Karlee   cephALEXin (KEFLEX) 500 MG capsule Take 1 capsule by mouth 2 times daily 23   Albaro Anguiano DPM   oxybutynin (DITROPAN-XL) 10 MG extended release tablet TAKE 1 TABLET BY MOUTH IN THE MORNING. 23   Shanelle Suh MD   tamsulosin (FLOMAX) 0.4 MG capsule Take 1 capsule by mouth daily 11/15/23   Yuridia Garcia MD   nitroGLYCERIN (NITROSTAT) 0.4 MG SL tablet Place 1 tablet under the tongue every 5 minutes as needed for Chest pain up to max of 3 total doses.  If no relief after 1 dose, call 911. 23   Yuliana Glaser MD   potassium chloride (KLOR-CON M) 20 MEQ extended release tablet Take 1 tablet by mouth every morning 23   Yuliana Glaser MD   dapagliflozin (FARXIGA) 10 MG tablet Take 1 tablet by mouth every morning 23   Yuliana Glaser MD   isosorbide mononitrate (IMDUR) 30 MG extended release tablet Take 1 tablet by mouth daily 23   Milady Cruz MD   furosemide (LASIX) 40 MG tablet Take 2 tablets by mouth 2 times daily 23   Yuliana Glaser MD   atorvastatin (LIPITOR) 80 MG tablet Take 1 tablet by mouth daily  Patient taking differently: Take 1 tablet by mouth at bedtime 23   Yuliana Glaser MD   diclofenac

## 2023-12-29 DIAGNOSIS — I50.42 HEART FAILURE, SYSTOLIC AND DIASTOLIC, CHRONIC (HCC): ICD-10-CM

## 2023-12-30 DIAGNOSIS — E87.6 HYPOKALEMIA: ICD-10-CM

## 2024-01-02 RX ORDER — FUROSEMIDE 40 MG/1
TABLET ORAL
Qty: 120 TABLET | Refills: 3 | Status: SHIPPED | OUTPATIENT
Start: 2024-01-02

## 2024-01-02 RX ORDER — POTASSIUM CHLORIDE 20 MEQ/1
20 TABLET, EXTENDED RELEASE ORAL EVERY MORNING
Qty: 60 TABLET | Refills: 3 | Status: SHIPPED | OUTPATIENT
Start: 2024-01-02

## 2024-01-02 NOTE — TELEPHONE ENCOUNTER
Please address the medication refill and close the encounter.  If I can be of assistance, please route to the applicable pool.      Thank you.    Last visit: 11-3-23  Last Med refill: 11-8-23  Does patient have enough medication for 72 hours: No:     Next Visit Date:  Future Appointments   Date Time Provider Department Center   1/3/2024 10:30 AM [facility] RM 1 [facility]F [facility]   1/5/2024 10:00 AM Nick Hartman MD [facility] FP [facility]   2/21/2024 12:15 PM Donte Rader DPM [facility] Pod[facility]ry [facility]   4/8/2024  9:00 AM Jono Conde MD [facility]   6/14/2024  2:00 PM [facility]       Health Maintenance   Topic Date Due    Respiratory Syncytial Virus (RSV) Pregnant or age 60 yrs+ (1 - 1-dose 60+ series) Never done    COVID-19 Vaccine (3 - 2023-24 season) 09/01/2023    Annual Wellness Visit (Medicare Advantage)  01/01/2024    Lipids  03/29/2024    Depression Screen  07/07/2024    Colorectal Cancer Screen  01/12/2031    DTaP/Tdap/Td vaccine (4 - Td or Tdap) 07/24/2033    Flu vaccine  Completed    Shingles vaccine  Completed    Pneumococcal 65+ years Vaccine  Completed    Hepatitis C screen  Completed    Hepatitis A vaccine  Aged Out    Hepatitis B vaccine  Aged Out    Hib vaccine  Aged Out    Polio vaccine  Aged Out    Meningococcal (ACWY) vaccine  Aged Out    Pneumococcal 0-64 years Vaccine  Discontinued    Diabetes screen  Discontinued    HIV screen  Discontinued    Prostate Specific Antigen (PSA) Screening or Monitoring  Discontinued       Hemoglobin A1C (%)   Date Value   04/13/2023 5.5   12/10/2019 5.6   10/18/2019 5.8             ( goal A1C is < 7)   No components found for: \"LABMICR\"  LDL Cholesterol (mg/dL)   Date Value   03/29/2023 131 (H)   03/08/2022 56   03/08/2022 56       (goal LDL is <100)   AST (U/L)   Date Value   03/29/2023 26     ALT (U/L)   Date Value   03/29/2023 19     BUN (mg/dL)   Date Value   10/17/2023 17     BP Readings from

## 2024-01-02 NOTE — TELEPHONE ENCOUNTER
Please address the medication refill and close the encounter.  If I can be of assistance, please route to the applicable pool.      Thank you.      Last visit: 11-3-23  Last Med refill: 11-14-23  Does patient have enough medication for 72 hours: No:     Next Visit Date:  Future Appointments   Date Time Provider Department Center   1/3/2024 10:30 AM [facility] RM 1 [facility]   1/5/2024 10:00 AM Nick Hartman MD [facility] FP [facility]   2/21/2024 12:15 PM Donte Rader DPM [facility] Podiatry [facility]   4/8/2024  9:00 AM Jono Conde MD [facility]   6/14/2024  2:00 PM [facility]       Health Maintenance   Topic Date Due    Respiratory Syncytial Virus (RSV) Pregnant or age 60 yrs+ (1 - 1-dose 60+ series) Never done    COVID-19 Vaccine (3 - 2023-24 season) 09/01/2023    Annual Wellness Visit (Medicare Advantage)  01/01/2024    Lipids  03/29/2024    Depression Screen  07/07/2024    Colorectal Cancer Screen  01/12/2031    DTaP/Tdap/Td vaccine (4 - Td or Tdap) 07/24/2033    Flu vaccine  Completed    Shingles vaccine  Completed    Pneumococcal 65+ years Vaccine  Completed    Hepatitis C screen  Completed    Hepatitis A vaccine  Aged Out    Hepatitis B vaccine  Aged Out    Hib vaccine  Aged Out    Polio vaccine  Aged Out    Meningococcal (ACWY) vaccine  Aged Out    Pneumococcal 0-64 years Vaccine  Discontinued    Diabetes screen  Discontinued    HIV screen  Discontinued    Prostate Specific Antigen (PSA) Screening or Monitoring  Discontinued       Hemoglobin A1C (%)   Date Value   04/13/2023 5.5   12/10/2019 5.6   10/18/2019 5.8             ( goal A1C is < 7)   No components found for: \"LABMICR\"  LDL Cholesterol (mg/dL)   Date Value   03/29/2023 131 (H)   03/08/2022 56   03/08/2022 56       (goal LDL is <100)   AST (U/L)   Date Value   03/29/2023 26     ALT (U/L)   Date Value   03/29/2023 19     BUN (mg/dL)   Date Value   10/17/2023 17     BP Readings

## 2024-01-03 ENCOUNTER — HOSPITAL ENCOUNTER (OUTPATIENT)
Dept: OTHER | Age: 69
Discharge: HOME | End: 2024-01-03
Payer: MEDICARE

## 2024-01-03 VITALS
OXYGEN SATURATION: 96 % | SYSTOLIC BLOOD PRESSURE: 120 MMHG | BODY MASS INDEX: 38.11 KG/M2 | HEART RATE: 64 BPM | WEIGHT: 229 LBS | DIASTOLIC BLOOD PRESSURE: 80 MMHG | RESPIRATION RATE: 20 BRPM

## 2024-01-03 PROCEDURE — 99212 OFFICE O/P EST SF 10 MIN: CPT

## 2024-01-03 NOTE — PROGRESS NOTES
Date:  1/3/2024  Time:  10:48 AM    CHF Clinic at [facility]    Office: [phone] Fax: [phone]    Re:  Mickey Berg   Patient : 1955    Vital Signs: /80   Pulse 64   Resp 20   Wt 103.9 kg (229 lb)   SpO2 96%   BMI 38.11 kg/m²                       O2 Device: None (Room air)                           No results for input(s): \"CBC\", \"HGB\", \"HCT\", \"WBC\", \"PLATELET\", \"NA\", \"K\", \"CL\", \"CO2\", \"BUN\", \"CREATININE\", \"GLUCOSE\", \"BNP\", \"INR\" in the last 72 hours.     Respiratory:         Breath Sounds  Right Upper Lobe: Clear  Right Middle Lobe: Clear  Right Lower Lobe: Clear  Left Upper Lobe: Clear  Left Lower Lobe: Clear    Cough/Sputum  Cough: Productive  Frequency: Occasional  Sputum Amount: Small  Sputum Color: Green, Clear         Peripheral Vascular  RLE Edema: +2, Non-pitting  LLE Edema: +2, Non-pitting    Future Appointments   Date Time Provider Department Center   2024 10:00 AM Nick Hartman MD [facility]   2024  9:45 AM Nick Hartman MD [facility]   2024 12:15 PM Donte Rader DPM [facility]   2024  9:00 AM Jono Conde MD [facility]   2024  2:00 PM Giorgio Morrison Jr., MD [facility] Urology [facility]      Complaints: wt up 9 lbs, increased portion size , eating a lot of desserts over holidays. + cough occ with small amt green sputum x1    Physician Orders    Comment : Pt arrived per ambulation from registration to the CHF Clinic without c/o. He denies increased chest pain, palpitations, shortness of breath, or OCAMPO or any other c/o . His wt is up 9 lbs in one month but admits he hasn't been doing much but  eating a lot of pies, desserts, over the holidays and had Sauerkraut on New years Day. Reminded him of the importance of  cutting down on his portion size and snacking, and to follow low 2 gm sodium diet and fluid restriction of 2 liters daily. He states he will get back on track. His

## 2024-01-05 ENCOUNTER — OFFICE VISIT (OUTPATIENT)
Dept: FAMILY MEDICINE CLINIC | Age: 69
End: 2024-01-05
Payer: MEDICARE

## 2024-01-05 ENCOUNTER — CLINICAL DOCUMENTATION (OUTPATIENT)
Dept: SPIRITUAL SERVICES | Age: 69
End: 2024-01-05

## 2024-01-05 VITALS
WEIGHT: 223 LBS | HEIGHT: 65 IN | SYSTOLIC BLOOD PRESSURE: 100 MMHG | HEART RATE: 59 BPM | BODY MASS INDEX: 37.15 KG/M2 | DIASTOLIC BLOOD PRESSURE: 59 MMHG

## 2024-01-05 DIAGNOSIS — Z00.00 MEDICARE ANNUAL WELLNESS VISIT, SUBSEQUENT: Primary | ICD-10-CM

## 2024-01-05 DIAGNOSIS — R07.89 CHEST HEAVINESS: ICD-10-CM

## 2024-01-05 DIAGNOSIS — R05.3 CHRONIC COUGH: ICD-10-CM

## 2024-01-05 DIAGNOSIS — K21.9 GASTROESOPHAGEAL REFLUX DISEASE WITHOUT ESOPHAGITIS: ICD-10-CM

## 2024-01-05 DIAGNOSIS — G47.33 OSA (OBSTRUCTIVE SLEEP APNEA): ICD-10-CM

## 2024-01-05 PROCEDURE — 1123F ACP DISCUSS/DSCN MKR DOCD: CPT

## 2024-01-05 PROCEDURE — 3017F COLORECTAL CA SCREEN DOC REV: CPT

## 2024-01-05 PROCEDURE — 3074F SYST BP LT 130 MM HG: CPT

## 2024-01-05 PROCEDURE — G8482 FLU IMMUNIZE ORDER/ADMIN: HCPCS

## 2024-01-05 PROCEDURE — G0439 PPPS, SUBSEQ VISIT: HCPCS

## 2024-01-05 PROCEDURE — 3078F DIAST BP <80 MM HG: CPT

## 2024-01-05 RX ORDER — BENZONATATE 100 MG/1
100 CAPSULE ORAL 3 TIMES DAILY PRN
Qty: 30 CAPSULE | Refills: 0 | Status: SHIPPED | OUTPATIENT
Start: 2024-01-05 | End: 2024-01-15

## 2024-01-05 ASSESSMENT — PATIENT HEALTH QUESTIONNAIRE - PHQ9
SUM OF ALL RESPONSES TO PHQ QUESTIONS 1-9: 0
2. FEELING DOWN, DEPRESSED OR HOPELESS: 0
SUM OF ALL RESPONSES TO PHQ9 QUESTIONS 1 & 2: 0
SUM OF ALL RESPONSES TO PHQ QUESTIONS 1-9: 0
1. LITTLE INTEREST OR PLEASURE IN DOING THINGS: 0
SUM OF ALL RESPONSES TO PHQ QUESTIONS 1-9: 0
SUM OF ALL RESPONSES TO PHQ QUESTIONS 1-9: 0

## 2024-01-05 NOTE — PROGRESS NOTES
Visit Information    Have you changed or started any medications since your last visit including any over-the-counter medicines, vitamins, or herbal medicines? no   Have you stopped taking any of your medications? Is so, why? -  no  Are you having any side effects from any of your medications? - no    Have you seen any other physician or provider since your last visit?  yes - CHF Clinic 1/3/24, Podiatry 12/20/23, and OT 11/22/23   Have you had any other diagnostic tests since your last visit?  yes - Labs 12/28/23   Have you been seen in the emergency room and/or had an admission in a hospital since we last saw you?  yes - [facility] 12/28/23 for Cystoscopy   Have you had your routine dental cleaning in the past 6 months?  no     Do you have an active MyChart account? If no, what is the barrier?  Yes    Patient Care Team:  Nick Hartman MD as PCP - Kwame Gooden MD as Consulting Physician (Gastroenterology)  Giorgio Morrison Jr., MD as Consulting Physician (Urology)    Medical History Review  Past Medical, Family, and Social History reviewed and does contribute to the patient presenting condition    Health Maintenance   Topic Date Due    Respiratory Syncytial Virus (RSV) Pregnant or age 60 yrs+ (1 - 1-dose 60+ series) Never done    COVID-19 Vaccine (3 - 2023-24 season) 09/01/2023    Annual Wellness Visit (Medicare Advantage)  01/01/2024    Lipids  03/29/2024    Depression Screen  07/07/2024    Colorectal Cancer Screen  01/12/2031    DTaP/Tdap/Td vaccine (4 - Td or Tdap) 07/24/2033    Flu vaccine  Completed    Shingles vaccine  Completed    Pneumococcal 65+ years Vaccine  Completed    Hepatitis C screen  Completed    Hepatitis A vaccine  Aged Out    Hepatitis B vaccine  Aged Out    Hib vaccine  Aged Out    Polio vaccine  Aged Out    Meningococcal (ACWY) vaccine  Aged Out    Pneumococcal 0-64 years Vaccine  Discontinued    Diabetes screen  Discontinued    HIV screen  Discontinued    Prostate Specific

## 2024-01-05 NOTE — PROGRESS NOTES
Attending Physician Statement  I  have discussed the care of Mickey Berg including pertinent history and exam findings with the resident. I agree with the assessment, plan and orders as documented by the resident.      BP (!) 100/59 (Site: Left Upper Arm, Position: Sitting, Cuff Size: Medium Adult)   Pulse 59   Ht 1.651 m (5' 5\")   Wt 101.2 kg (223 lb)   BMI 37.11 kg/m²    BP Readings from Last 3 Encounters:   01/05/24 (!) 100/59   01/03/24 120/80   12/28/23 121/66     Wt Readings from Last 3 Encounters:   01/05/24 101.2 kg (223 lb)   01/03/24 103.9 kg (229 lb)   12/26/23 104.3 kg (230 lb)          Diagnosis Orders   1. Medicare annual wellness visit, subsequent  [facility] Referral to [facility] Clinical Specialist    DME Order for Cane as OP      2. Chest heaviness        3. Gastroesophageal reflux disease without esophagitis        4. Chronic cough        5. HARPREET (obstructive sleep apnea)  DME Order for CPAP as OP              Donte Gonsales DO 1/5/2024 3:50 PM

## 2024-01-05 NOTE — ACP (ADVANCE CARE PLANNING)
Advance Care Planning   Ambulatory ACP Specialist Patient Outreach    Date:  1/5/2024    ACP Specialist:  AMINATA Gunter    Outreach call to patient in follow-up to ACP Specialist referral from:Nick Hartman MD    [x] PCP  [] Provider   [] Ambulatory Care Management [] Other     For:                  [x] Advance Directive Assistance              [] Complete Portable DNR order              [] Complete POST/POLST/MOST              [] Code Status Discussion             [] Discuss Goals of Care             [] Early ACP Decision-Making              [] Other (Specify)    Date Referral Received: 1/5/24    Next Step:   [x] ACP scheduled conversation  [] Outreach again in one week               [] Email / Mail ACP Info Sheets  [] Email / Mail Advance Directive   [] Closing referral.  Routing closure to referring provider/staff and to ACP Specialist .    [] Closure letter mailed to patient with invitation to contact ACP Specialist if / when ready.   [] Other (Specify here):         [x] At this time, Healthcare Decision Maker Is:   Healthcare Decision Maker:    Primary Decision Maker: JOSE CUBA - Brother/Sister - [phone]    Primary Decision Maker: FredaWai solis - Brother/Sister - [phone]         [] Primary agent named in scanned advance directive.    [x] Legal Next of Kin.     [] Unable to determine legal decision maker at this time.      Outreaches:       [x] 1st -  Date:  1/5/24               Intervention:  [x] Spoke with Patient   [] Left Voice mail [] Email / Mail    [] MyChart  [] Other (Specify) :     Outcomes: ACP Specialist spoke with patient and discussed ACP Referral. ACP Conversation was scheduled for 2/2/24 at his PCP appointment at 10 AM. Staff will coordinate this meeting with staff at PCP office.           [] 2nd -  Date:                 Intervention:  [] Spoke with Patient  [] Left Voice mail [] Email / Mail    [] MyChart  [] Other (Specify) :              Outcomes:

## 2024-01-05 NOTE — PROGRESS NOTES
Medicare Annual Wellness Visit    Mickey Berg is here for Medicare AWV (2024 Medicare AWV) and Cough (Pt states consistent cough since before the holidays)    Assessment & Plan   Medicare annual wellness visit, subsequent  -     [facility] Referral to [facility] Clinical Specialist  -     DME Order for Cane as OP  Chest heaviness  -I told the patient to take nitro and go to the ED if this happens again.  I told the patient to call his cardiologist and inform them about this chest heaviness.  Patient voiced understanding  Gastroesophageal reflux disease without esophagitis  -I told the patient restarting Protonix and follow-up in 4 weeks.  Chest heaviness and cough might be due to GERD  Chronic cough  -Will give Tessalon as needed.  Chronic cough might be due to GERD  HARPREET (obstructive sleep apnea)  -     DME Order for CPAP as OP    Recommendations for Preventive Services Due: see orders and patient instructions/AVS.  Recommended screening schedule for the next 5-10 years is provided to the patient in written form: see Patient Instructions/AVS.     Return in about 4 weeks (around 2/2/2024).     Subjective   The following acute and/or chronic problems were also addressed today:  -Chronic cough: Patient has been having cough for the last 2 months.  Cough is dry.  Denies any fever, chills, shortness of breath, sore throat or nasal congestion.  Patient reported chest heaviness intermittently that last for 10 minutes.  He also has history of GERD and used to be on Protonix.    -Chest heaviness: Patient has been having intermittent episode of chest heaviness that last for around 10 minutes, occurs at rest.  Has history of CABG, history of heart failure.  He has an appoint with his cardiologist in April.    Patient's complete Health Risk Assessment and screening values have been reviewed and are found in Flowsheets. The following problems were reviewed today and where indicated follow up appointments were made and/or referrals

## 2024-01-12 ENCOUNTER — TELEPHONE (OUTPATIENT)
Dept: FAMILY MEDICINE CLINIC | Age: 69
End: 2024-01-12

## 2024-01-19 ENCOUNTER — OFFICE VISIT (OUTPATIENT)
Dept: FAMILY MEDICINE CLINIC | Age: 69
End: 2024-01-19
Payer: MEDICARE

## 2024-01-19 VITALS
HEART RATE: 73 BPM | BODY MASS INDEX: 39.12 KG/M2 | HEIGHT: 65 IN | WEIGHT: 234.8 LBS | DIASTOLIC BLOOD PRESSURE: 66 MMHG | SYSTOLIC BLOOD PRESSURE: 103 MMHG

## 2024-01-19 DIAGNOSIS — R07.89 CHEST HEAVINESS: ICD-10-CM

## 2024-01-19 DIAGNOSIS — R05.3 CHRONIC COUGH: Primary | ICD-10-CM

## 2024-01-19 DIAGNOSIS — I50.42 HEART FAILURE, SYSTOLIC AND DIASTOLIC, CHRONIC (HCC): ICD-10-CM

## 2024-01-19 PROCEDURE — 1036F TOBACCO NON-USER: CPT

## 2024-01-19 PROCEDURE — 3078F DIAST BP <80 MM HG: CPT

## 2024-01-19 PROCEDURE — 99213 OFFICE O/P EST LOW 20 MIN: CPT

## 2024-01-19 PROCEDURE — G8482 FLU IMMUNIZE ORDER/ADMIN: HCPCS

## 2024-01-19 PROCEDURE — 3017F COLORECTAL CA SCREEN DOC REV: CPT

## 2024-01-19 PROCEDURE — G8417 CALC BMI ABV UP PARAM F/U: HCPCS

## 2024-01-19 PROCEDURE — 3074F SYST BP LT 130 MM HG: CPT

## 2024-01-19 PROCEDURE — 1123F ACP DISCUSS/DSCN MKR DOCD: CPT

## 2024-01-19 PROCEDURE — G8427 DOCREV CUR MEDS BY ELIG CLIN: HCPCS

## 2024-01-19 ASSESSMENT — ENCOUNTER SYMPTOMS
SHORTNESS OF BREATH: 0
COUGH: 0
VOMITING: 0
ABDOMINAL PAIN: 0
NAUSEA: 0
DIARRHEA: 0
CONSTIPATION: 0
WHEEZING: 0

## 2024-01-19 NOTE — PROGRESS NOTES
Visit Information    Have you changed or started any medications since your last visit including any over-the-counter medicines, vitamins, or herbal medicines? no   Are you having any side effects from any of your medications? -  no  Have you stopped taking any of your medications? Is so, why? -  no    Have you seen any other physician or provider since your last visit? No  Have you had any other diagnostic tests since your last visit? No  Have you been seen in the emergency room and/or had an admission to a hospital since we last saw you? No  Have you had your routine dental cleaning in the past 6 months? no    Have you activated your [employer] account? If not, what are your barriers? No:      Patient Care Team:  Nick Hartman MD as PCP - Kwame Gooden MD as Consulting Physician (Gastroenterology)  Giorgio Morrison Jr., MD as Consulting Physician (Urology)    Medical History Review  Past Medical, Family, and Social History reviewed and does not contribute to the patient presenting condition    Health Maintenance   Topic Date Due    Respiratory Syncytial Virus (RSV) Pregnant or age 60 yrs+ (1 - 1-dose 60+ series) Never done    COVID-19 Vaccine (3 - 2023-24 season) 09/01/2023    Lipids  03/29/2024    Depression Screen  01/05/2025    Colorectal Cancer Screen  01/12/2031    DTaP/Tdap/Td vaccine (4 - Td or Tdap) 07/24/2033    Annual Wellness Visit (Medicare Advantage)  Completed    Flu vaccine  Completed    Shingles vaccine  Completed    Pneumococcal 65+ years Vaccine  Completed    Hepatitis C screen  Completed    Hepatitis A vaccine  Aged Out    Hepatitis B vaccine  Aged Out    Hib vaccine  Aged Out    Polio vaccine  Aged Out    Meningococcal (ACWY) vaccine  Aged Out    Pneumococcal 0-64 years Vaccine  Discontinued    Diabetes screen  Discontinued    HIV screen  Discontinued    Prostate Specific Antigen (PSA) Screening or Monitoring  Discontinued

## 2024-01-19 NOTE — PROGRESS NOTES
Attending Physician Statement  I  have discussed the care of Mickey Berg including pertinent history and exam findings with the resident. I agree with the assessment, plan and orders as documented by the resident.      /66 (Site: Right Upper Arm, Position: Sitting, Cuff Size: Medium Adult)   Pulse 73   Ht 1.651 m (5' 5\")   Wt 106.5 kg (234 lb 12.8 oz)   BMI 39.07 kg/m²    BP Readings from Last 3 Encounters:   01/19/24 103/66   01/05/24 (!) 100/59   01/03/24 120/80     Wt Readings from Last 3 Encounters:   01/19/24 106.5 kg (234 lb 12.8 oz)   01/05/24 101.2 kg (223 lb)   01/03/24 103.9 kg (229 lb)          Diagnosis Orders   1. Chronic cough        2. Chest heaviness        3. Heart failure, systolic and diastolic, chronic (HCC)                Mich Sung MD 1/19/2024 3:57 PM

## 2024-01-19 NOTE — PATIENT INSTRUCTIONS
Thank you for letting us take care of you today. We hope all your questions were addressed. If a question was overlooked or something else comes to mind after you return home, please contact a member of your Care Team listed below.      Your Care Team at [facility] is Team #2  Mich Sung M.D. (Faculty)  Regla Sherwood, (Resident)  Carlo Leblanc, (Resident)  Grady Robertson, (Resident)  gInacio Hartman, (Resident)  Tate Constantino, (Resident)  dAe Hurd, [facility]  Milo Villafuerte, [facility]  Sharon Olmos, [facility]  Ashley Washburn, [facility]  Caty Clark,  [facility]  Lesli Núñez, [facility]  Terese Huitron, [facility]  Faby Jiménez, [facility]  Federico (LJ) Sam NITIN (Clinical Practice Manager)  Sophie Becerra [facility] (Clinical Pharmacist)     Office phone number: [phone]    If you need to get in right away due to illness, please be advised we have \"Same Day\" appointments available Monday-Friday. Please call us at [phone] option #3 to schedule your \"Same Day\" appointment.

## 2024-01-19 NOTE — PROGRESS NOTES
Mickey Berg (:  1955) is a 68 y.o. male,Established patient, here for evaluation of the following chief complaint(s):  Sleep Apnea (Discuss CPAP machine)         ASSESSMENT/PLAN:  1. Chronic cough  - improved  2. Chest heaviness  - No new episodes. Follow up with cardiology  3. Heart failure, systolic and diastolic, chronic (HCC)  - asymptomatic     Return in about 3 months (around 2024).         Subjective   SUBJECTIVE/OBJECTIVE:  68 years old male patient with past medical history of CHF, lymphedema came to the office for follow-up on chronic cough.  Patient said he just got his cough medication about starting it yesterday.  It has been helping the cough.  Patient has no other complaints today.  Patient did not have any episode of chest pain or tightness since the last visit.  Patient has an appoint with cardiology in April.        Review of Systems   Constitutional:  Negative for diaphoresis, fatigue and fever.   Respiratory:  Negative for cough, shortness of breath and wheezing.    Cardiovascular:  Positive for leg swelling. Negative for chest pain and palpitations.   Gastrointestinal:  Negative for abdominal pain, constipation, diarrhea, nausea and vomiting.   Neurological:  Negative for dizziness, weakness, light-headedness, numbness and headaches.          Objective   Physical Exam  Constitutional:       General: He is not in acute distress.     Appearance: Normal appearance.   HENT:      Head: Normocephalic and atraumatic.   Cardiovascular:      Rate and Rhythm: Normal rate and regular rhythm.      Heart sounds: Normal heart sounds. No murmur heard.  Pulmonary:      Effort: Pulmonary effort is normal.      Breath sounds: Normal breath sounds. No wheezing, rhonchi or rales.   Musculoskeletal:      Right lower leg: Edema present.      Left lower leg: Edema present.   Neurological:      Mental Status: He is alert.            On this date 2024 I have spent 25 minutes reviewing previous

## 2024-01-24 ENCOUNTER — HOSPITAL ENCOUNTER (OUTPATIENT)
Dept: OTHER | Age: 69
Discharge: HOME | End: 2024-01-24
Payer: MEDICARE

## 2024-01-24 VITALS
SYSTOLIC BLOOD PRESSURE: 100 MMHG | OXYGEN SATURATION: 95 % | BODY MASS INDEX: 38.54 KG/M2 | DIASTOLIC BLOOD PRESSURE: 60 MMHG | HEART RATE: 66 BPM | WEIGHT: 231.6 LBS

## 2024-01-24 DIAGNOSIS — I50.32 CHRONIC HEART FAILURE WITH PRESERVED EJECTION FRACTION (HCC): Primary | ICD-10-CM

## 2024-01-24 PROCEDURE — 99214 OFFICE O/P EST MOD 30 MIN: CPT | Performed by: NURSE PRACTITIONER

## 2024-01-24 PROCEDURE — 99212 OFFICE O/P EST SF 10 MIN: CPT

## 2024-01-24 ASSESSMENT — ENCOUNTER SYMPTOMS
BLOOD IN STOOL: 0
COUGH: 0
SHORTNESS OF BREATH: 1
EYE DISCHARGE: 0
ABDOMINAL PAIN: 0

## 2024-01-24 NOTE — PROGRESS NOTES
low Na+ diet in detail.     Multiple  social work  issues. Has seen Eliana B in the past  Pt using leg wraps and no wounds.   In the past, at Wound care, they would help him order Velcro wraps and . Compression hose   Free lunches; he has little choice in what he's eating at lunch.       No orders of the defined types were placed in this encounter.    No orders of the defined types were placed in this encounter.      Patientgiven verbal and/or written educational instructions.    Follow up as directed.  I have reviewed and agree with the nursing documentation.  Verbally reviewed medication list with patient; patient verbalized understanding.     Discussed 2000mg/day sodium restricted diet; patient verbalized understanding.     Moderate daily exercise encouraged as tolerated. Discussed rest breaks as needed; patient verbalized understanding.     Patient instructed to weigh self at the same time of each day, using same clothes and same scale; reinforced teaching to monitor for 3-5 lb weight increase over 1-2 days, and to notify the CHF clinic at [phone] or physician office if weight change noted.  Patient verbalized understanding.     Risks of smoking discussed with the patient if applicable; patient strongly discouraged to smoke.  Patient verbalized understanding.     Signs and symptoms of CHF discussed with patient, such as feeling more tired than normal, feeling short of breath, coughing that increases when you lie down, sudden weight gain, swelling of your feet, legs or belly.  Patient verbalized understanding to notify the CHF clinic at [phone] or physician office if these symptoms occur.     Compliance with plan of care and further disease process causes discussed with patient, patient encouraged to keep all follow up appointments.  Patient verbalized understanding.  Echocardiogram reviewed.   Labs reviewed.   Medications reviewed.  Educated on entresto       Patient was seen with total face to face

## 2024-02-14 ENCOUNTER — HOSPITAL ENCOUNTER (OUTPATIENT)
Dept: OTHER | Age: 69
Discharge: HOME | End: 2024-02-14
Payer: MEDICARE

## 2024-02-14 VITALS
OXYGEN SATURATION: 98 % | BODY MASS INDEX: 38.94 KG/M2 | WEIGHT: 234 LBS | RESPIRATION RATE: 20 BRPM | DIASTOLIC BLOOD PRESSURE: 70 MMHG | HEART RATE: 75 BPM | SYSTOLIC BLOOD PRESSURE: 112 MMHG

## 2024-02-14 PROCEDURE — 99212 OFFICE O/P EST SF 10 MIN: CPT

## 2024-02-14 NOTE — PROGRESS NOTES
Date:  2024  Time:  11:43 AM    CHF Clinic at [facility]    Office: [phone] Fax: [phone]    Re:  Mickey Berg   Patient : 1955    Vital Signs: /70   Pulse 75   Resp 20   Wt 106.1 kg (234 lb)   SpO2 98%   BMI 38.94 kg/m²                       O2 Device: None (Room air)                           No results for input(s): \"CBC\", \"HGB\", \"HCT\", \"WBC\", \"PLATELET\", \"NA\", \"K\", \"CL\", \"CO2\", \"BUN\", \"CREATININE\", \"GLUCOSE\", \"BNP\", \"INR\" in the last 72 hours.     Respiratory:    Assessment  Charting Type: Reassessment    Breath Sounds  Right Upper Lobe: Clear  Right Middle Lobe: Clear  Right Lower Lobe: Clear  Left Upper Lobe: Clear  Left Lower Lobe: Clear    Cough/Sputum  Cough: Dry  Frequency: Persistent         Peripheral Vascular  RLE Edema: Non-pitting  LLE Edema: Non-pitting    Future Appointments   Date Time Provider Department Center   2024 12:15 PM Donte Rader DPM [facility]   3/12/2024 10:00 AM [facility] CHF CLINIC [address] CHF [facility]   2024  9:00 AM Jono Conde MD [facility] TCC TOLE [facility]   2024  3:00 PM [facility]   2024  2:00 PM Giorgio Morrison Jr., MD [facility]      Complaints: bruised from a fall last week, lost balance carrying grocery bags up steps    Comment : weight up 2+ lbs since last visit. Lungs are clear, non pitting edema at erin legs unable to assess accurate measurements d/t leg wraps. Reviewed allergies and medications, will bring in Atorvastatin medication since unsure of the dose he is taking now. Reviewed 2 gm sodium diet which he states\" following as best I can\". States he keeps his fluids less than 64 oz and is compliant with medications.   Next appt 3/12/24    Electronically signed by Erika Motta RN on 2024 at 11:43 AM

## 2024-02-15 NOTE — PROGRESS NOTES
Patient instructed to remove shoes and socks and instructed to sit in exam chair.  Current PCP is Nick Hartman MD and date of last visit was 01/19/2024.   Do you have a follow up visit scheduled?  Yes  If yes, the date is 04/19/2024.

## 2024-02-21 ENCOUNTER — OFFICE VISIT (OUTPATIENT)
Dept: PODIATRY | Age: 69
End: 2024-02-21
Payer: MEDICARE

## 2024-02-21 VITALS
DIASTOLIC BLOOD PRESSURE: 74 MMHG | HEIGHT: 65 IN | WEIGHT: 234 LBS | SYSTOLIC BLOOD PRESSURE: 122 MMHG | BODY MASS INDEX: 38.99 KG/M2 | HEART RATE: 60 BPM

## 2024-02-21 DIAGNOSIS — M21.372 FOOT DROP, LEFT: Primary | ICD-10-CM

## 2024-02-21 DIAGNOSIS — L84 CORNS AND CALLOSITIES: ICD-10-CM

## 2024-02-21 DIAGNOSIS — B35.1 ONYCHOMYCOSIS: ICD-10-CM

## 2024-02-21 DIAGNOSIS — I87.8 VENOUS STASIS OF BOTH LOWER EXTREMITIES: ICD-10-CM

## 2024-02-21 PROCEDURE — 3074F SYST BP LT 130 MM HG: CPT

## 2024-02-21 PROCEDURE — 1036F TOBACCO NON-USER: CPT

## 2024-02-21 PROCEDURE — 3017F COLORECTAL CA SCREEN DOC REV: CPT

## 2024-02-21 PROCEDURE — 11721 DEBRIDE NAIL 6 OR MORE: CPT

## 2024-02-21 PROCEDURE — 1123F ACP DISCUSS/DSCN MKR DOCD: CPT

## 2024-02-21 PROCEDURE — G8417 CALC BMI ABV UP PARAM F/U: HCPCS

## 2024-02-21 PROCEDURE — 11055 PARING/CUTG B9 HYPRKER LES 1: CPT

## 2024-02-21 PROCEDURE — 3078F DIAST BP <80 MM HG: CPT

## 2024-02-21 PROCEDURE — 99214 OFFICE O/P EST MOD 30 MIN: CPT

## 2024-02-21 PROCEDURE — G8482 FLU IMMUNIZE ORDER/ADMIN: HCPCS

## 2024-02-21 PROCEDURE — 99213 OFFICE O/P EST LOW 20 MIN: CPT | Performed by: PODIATRIST

## 2024-02-21 PROCEDURE — G8427 DOCREV CUR MEDS BY ELIG CLIN: HCPCS
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
## 2024-02-28 ENCOUNTER — CLINICAL DOCUMENTATION (OUTPATIENT)
Dept: OCCUPATIONAL THERAPY | Age: 69
End: 2024-02-28

## 2024-02-28 NOTE — DISCHARGE SUMMARY
discharged from wound care.     Subjective:  Refer to initial evaluation/ treatment notes.     Objective:  Refer to initial evaluation/treatment notes.    Assessment:  Refer to initial evaluation/ treatment notes  11/22/2023 Has not had a chance to get the pump set up.  Getting a recliner. Working on a place to set up the pump for home use.  He needs a chair to use the pump.  He sleeps on the couch and with dogs so he can't have the pump downstairs.    Worked with pt getting set up as a consumer with the [employer] called during session as pt has no phone.  Educated on importance of elevation, skin care.  Provided  a double layer of size F tubigrip L calf ankle to below the knee with education on use and indications for removal.   Now has two sets of tubigrip to use prior to velcro wraps being delivered with double layer to left calf and single layer to right calf    .     Treatment to Date:  [] Therapeutic Exercise      [] Therapeutic Activity       [x] Instruction in Home Program                       [x] Manual Therapy  [x] Self-Care/Home Management  [x] Vasopneumatic Pump                 Discharge Status:   [] Treatment goals were met.  [] Pt received maximum benefit. No further therapy indicated at this time.  [] Pt to continue exercise/home instructions independently  [x] Pt has not called or returned to clinic in over 90 days with additional concerns.   Comments:        Lymphedema Life Impact Scale:  [] Eval: 44 at 65% functionally impaired  [x] D/C: unexpected discharge % functionally impaired                   Electronically signed by Penny Manzanares OT on 2/28/2024 at 2:38 PM    The patient is being discharged due to the status listed above. If patient would like to return to the clinic for additional therapy a new referral will be necessary. Thank you again for your referral and allowing us to assist in the care of this patient!      For any questions or concerns, please don't hesitate to call us at

## 2024-03-05 ENCOUNTER — OFFICE VISIT (OUTPATIENT)
Dept: FAMILY MEDICINE CLINIC | Age: 69
End: 2024-03-05
Payer: MEDICARE

## 2024-03-05 VITALS
DIASTOLIC BLOOD PRESSURE: 60 MMHG | HEART RATE: 80 BPM | WEIGHT: 236 LBS | SYSTOLIC BLOOD PRESSURE: 99 MMHG | BODY MASS INDEX: 39.27 KG/M2

## 2024-03-05 DIAGNOSIS — R60.0 LOWER LEG EDEMA: ICD-10-CM

## 2024-03-05 DIAGNOSIS — S81.802A WOUND OF LEFT LOWER EXTREMITY, INITIAL ENCOUNTER: ICD-10-CM

## 2024-03-05 DIAGNOSIS — S01.01XS SCALP LACERATION, SEQUELA: Primary | ICD-10-CM

## 2024-03-05 DIAGNOSIS — I95.2 HYPOTENSION DUE TO DRUGS: ICD-10-CM

## 2024-03-05 PROCEDURE — 3017F COLORECTAL CA SCREEN DOC REV: CPT | Performed by: STUDENT IN AN ORGANIZED HEALTH CARE EDUCATION/TRAINING PROGRAM

## 2024-03-05 PROCEDURE — G8427 DOCREV CUR MEDS BY ELIG CLIN: HCPCS | Performed by: STUDENT IN AN ORGANIZED HEALTH CARE EDUCATION/TRAINING PROGRAM

## 2024-03-05 PROCEDURE — G8417 CALC BMI ABV UP PARAM F/U: HCPCS | Performed by: STUDENT IN AN ORGANIZED HEALTH CARE EDUCATION/TRAINING PROGRAM

## 2024-03-05 PROCEDURE — G8482 FLU IMMUNIZE ORDER/ADMIN: HCPCS | Performed by: STUDENT IN AN ORGANIZED HEALTH CARE EDUCATION/TRAINING PROGRAM

## 2024-03-05 PROCEDURE — 99213 OFFICE O/P EST LOW 20 MIN: CPT | Performed by: STUDENT IN AN ORGANIZED HEALTH CARE EDUCATION/TRAINING PROGRAM

## 2024-03-05 PROCEDURE — 3078F DIAST BP <80 MM HG: CPT | Performed by: STUDENT IN AN ORGANIZED HEALTH CARE EDUCATION/TRAINING PROGRAM

## 2024-03-05 PROCEDURE — 1036F TOBACCO NON-USER: CPT | Performed by: STUDENT IN AN ORGANIZED HEALTH CARE EDUCATION/TRAINING PROGRAM

## 2024-03-05 PROCEDURE — 3074F SYST BP LT 130 MM HG: CPT | Performed by: STUDENT IN AN ORGANIZED HEALTH CARE EDUCATION/TRAINING PROGRAM

## 2024-03-05 PROCEDURE — 1123F ACP DISCUSS/DSCN MKR DOCD: CPT | Performed by: STUDENT IN AN ORGANIZED HEALTH CARE EDUCATION/TRAINING PROGRAM

## 2024-03-05 ASSESSMENT — ENCOUNTER SYMPTOMS
WHEEZING: 0
DIARRHEA: 0
NAUSEA: 0
SHORTNESS OF BREATH: 0
VOMITING: 0
ABDOMINAL PAIN: 0

## 2024-03-05 NOTE — PROGRESS NOTES
Subjective:    Mickey Berg is a 68 y.o. male with  has a past medical history of Arthritis, Atrial fibrillation (HCC), BPH (benign prostatic hyperplasia), Cellulitis, Cervical disc disease, Chronic venous insufficiency, Combined systolic and diastolic congestive heart failure (HCC), Coronary artery disease, GERD (gastroesophageal reflux disease), History of incarceration, History of recent hospitalization, Hyperlipidemia, Hypertension, Ischemic cardiomyopathy, Myocardial infarct (HCC), Obesity, Overactive bladder, Sleep apnea treated with continuous positive airway pressure (CPAP), Under care of team, Under care of team, Under care of team, Wears reading eyeglasses, and Wellness examination.    Presented to the office today for:  Chief Complaint   Patient presents with    Suture / Staple Removal     Back of head X 9 days        HPI    68-year-old male came to the clinic for staple removal  Patient had head trauma about 9 days ago or so and had laceration on the left side of his scalp went to the emergency department had sutures placed in  Has been complaining of headaches which are under control at this time  No new bleeding the scalp has healed  4 staples were removed today    Patient is complaining of a lower leg wound on the left side which is not currently seeping or has any drainage  Does not require oral antibiotics  No fever or chills reported  No signs of cellulitis on the left leg  Patient has chronic lower leg edema for which she takes 80 mg of Lasix daily  Possibly that is something that is contributing to his softer blood pressures  Patient will be told to keep an eye on on symptoms related to low blood pressure including dizziness, lightheadedness, falls          Review of Systems   Constitutional:  Negative for activity change.   HENT:          Left-sided head laceration which has healed   Respiratory:  Negative for shortness of breath and wheezing.    Cardiovascular:  Positive for leg swelling.

## 2024-03-20 ENCOUNTER — HOSPITAL ENCOUNTER (OUTPATIENT)
Dept: OTHER | Age: 69
Discharge: HOME | End: 2024-03-20
Payer: MEDICARE

## 2024-03-20 ENCOUNTER — HOSPITAL ENCOUNTER (OUTPATIENT)
Age: 69
Discharge: HOME | End: 2024-03-20
Payer: MEDICARE

## 2024-03-20 VITALS
HEART RATE: 61 BPM | OXYGEN SATURATION: 96 % | SYSTOLIC BLOOD PRESSURE: 104 MMHG | BODY MASS INDEX: 37.51 KG/M2 | DIASTOLIC BLOOD PRESSURE: 60 MMHG | WEIGHT: 225.4 LBS

## 2024-03-20 DIAGNOSIS — I50.32 CHRONIC HEART FAILURE WITH PRESERVED EJECTION FRACTION (HCC): ICD-10-CM

## 2024-03-20 DIAGNOSIS — N17.9 AKI (ACUTE KIDNEY INJURY) (HCC): ICD-10-CM

## 2024-03-20 DIAGNOSIS — N17.9 AKI (ACUTE KIDNEY INJURY) (HCC): Primary | ICD-10-CM

## 2024-03-20 LAB
ANION GAP SERPL CALCULATED.3IONS-SCNC: 10 MMOL/L (ref 9–16)
BUN SERPL-MCNC: 17 MG/DL (ref 8–23)
CALCIUM SERPL-MCNC: 9 MG/DL (ref 8.6–10.4)
CHLORIDE SERPL-SCNC: 107 MMOL/L (ref 98–107)
CO2 SERPL-SCNC: 27 MMOL/L (ref 20–31)
CREAT SERPL-MCNC: 0.6 MG/DL (ref 0.7–1.2)
GFR SERPL CREATININE-BSD FRML MDRD: >60 ML/MIN/1.73M2
GLUCOSE SERPL-MCNC: 98 MG/DL (ref 74–99)
POTASSIUM SERPL-SCNC: 3.6 MMOL/L (ref 3.7–5.3)
SODIUM SERPL-SCNC: 144 MMOL/L (ref 136–145)

## 2024-03-20 PROCEDURE — 36415 COLL VENOUS BLD VENIPUNCTURE: CPT

## 2024-03-20 PROCEDURE — 80048 BASIC METABOLIC PNL TOTAL CA: CPT

## 2024-03-20 PROCEDURE — 99213 OFFICE O/P EST LOW 20 MIN: CPT

## 2024-03-20 ASSESSMENT — PAIN SCALES - GENERAL: PAINLEVEL_OUTOF10: 5

## 2024-03-20 ASSESSMENT — PAIN DESCRIPTION - LOCATION: LOCATION: LEG

## 2024-03-20 ASSESSMENT — PAIN DESCRIPTION - ORIENTATION: ORIENTATION: RIGHT

## 2024-03-20 ASSESSMENT — PAIN DESCRIPTION - DESCRIPTORS: DESCRIPTORS: ACHING

## 2024-03-20 NOTE — PROGRESS NOTES
Date:  3/20/2024  Time:  10:23 AM    CHF Clinic at [facility]    Office: [phone] Fax: [phone]    Re:  Mickey Berg   Patient : 1955    Vital Signs: /60   Pulse 61   Wt 102.2 kg (225 lb 6.4 oz)   SpO2 96%   BMI 37.51 kg/m²                       O2 Device: None (Room air)                           No results for input(s): \"CBC\", \"HGB\", \"HCT\", \"WBC\", \"PLATELET\", \"NA\", \"K\", \"CL\", \"CO2\", \"BUN\", \"CREATININE\", \"GLUCOSE\", \"BNP\", \"INR\" in the last 72 hours.     Respiratory:    Assessment  Charting Type: Reassessment    Breath Sounds  Right Upper Lobe: Clear  Right Middle Lobe: Clear  Right Lower Lobe: Clear  Left Upper Lobe: Clear  Left Lower Lobe: Clear    Cough/Sputum  Cough: None         Peripheral Vascular  RLE Edema: +1, Pitting  LLE Edema: +1, Non-pitting    Future Appointments   Date Time Provider Department Center   3/20/2024 10:30 AM ST CHF CLINIC RM 2 STVZ CHF CLI [facility]   2024  9:00 AM Jono Conde MD [facility] TCC TOLE AFL [facility] C   2024 10:00 AM ST CHF CLINIC RM [facility]   2024 12:15 PM Donte Rader DPM [facility] Podiatry [facility]   2024 11:15 [facility]   2024  2:00 PM Giorgio Morrison Jr., MD [facility]      Complaints: Recent falls at home. No dizziness    Physician Orders BMP today    Comment : Patient here ambulatory for routine visit. Weight down 9 lbs in one month Patient unsure of why, states he feels well. States he has had recent falls at home. One requiring staples to a head wound. Instructions given about removing trip hazards at home. He has an order for 'nerve conduction test' from his podiatrist d/t weakness in left leg. He states he lost his paperwork and does not have a phone. Writer attempted call to physical therapy to schedule this. No answer, left message. BMP ordered today per Zenia Stark CNP d/t weight loss. States compliance with low salt

## 2024-04-10 ENCOUNTER — TELEPHONE (OUTPATIENT)
Dept: PODIATRY | Age: 69
End: 2024-04-10

## 2024-04-10 NOTE — TELEPHONE ENCOUNTER
Pt called and unsure if provider would like him to keep his follow up, despite not be able to get his EEG done until August. Please contact pt.

## 2024-04-16 DIAGNOSIS — I50.32 CHRONIC HEART FAILURE WITH PRESERVED EJECTION FRACTION (HCC): Primary | ICD-10-CM

## 2024-04-18 ENCOUNTER — TELEPHONE (OUTPATIENT)
Dept: OTHER | Age: 69
End: 2024-04-18

## 2024-04-18 NOTE — TELEPHONE ENCOUNTER
Patient phoned here to reset his no show from yesterday. Also instructed to take KCl 40 meq for 2 days. CHF clinic  Never able to reach him with these instructions after last BMP resulted.    Next appt set for 4/24/2024.

## 2024-04-24 ENCOUNTER — HOSPITAL ENCOUNTER (OUTPATIENT)
Dept: OTHER | Age: 69
Discharge: HOME | End: 2024-04-24
Payer: MEDICARE

## 2024-04-24 ENCOUNTER — TELEPHONE (OUTPATIENT)
Dept: OTHER | Age: 69
End: 2024-04-24

## 2024-04-24 ENCOUNTER — HOSPITAL ENCOUNTER (OUTPATIENT)
Age: 69
Discharge: HOME | End: 2024-04-24
Payer: MEDICARE

## 2024-04-24 VITALS
WEIGHT: 230.4 LBS | OXYGEN SATURATION: 98 % | BODY MASS INDEX: 38.34 KG/M2 | DIASTOLIC BLOOD PRESSURE: 62 MMHG | SYSTOLIC BLOOD PRESSURE: 104 MMHG | RESPIRATION RATE: 20 BRPM | HEART RATE: 61 BPM

## 2024-04-24 DIAGNOSIS — E87.6 HYPOKALEMIA: ICD-10-CM

## 2024-04-24 DIAGNOSIS — I50.32 CHRONIC HEART FAILURE WITH PRESERVED EJECTION FRACTION (HCC): ICD-10-CM

## 2024-04-24 DIAGNOSIS — I50.32 CHRONIC HEART FAILURE WITH PRESERVED EJECTION FRACTION (HCC): Primary | ICD-10-CM

## 2024-04-24 LAB
ANION GAP SERPL CALCULATED.3IONS-SCNC: 10 MMOL/L (ref 9–16)
BUN SERPL-MCNC: 20 MG/DL (ref 8–23)
CALCIUM SERPL-MCNC: 8.9 MG/DL (ref 8.6–10.4)
CHLORIDE SERPL-SCNC: 105 MMOL/L (ref 98–107)
CO2 SERPL-SCNC: 26 MMOL/L (ref 20–31)
CREAT SERPL-MCNC: 0.8 MG/DL (ref 0.7–1.2)
GFR SERPL CREATININE-BSD FRML MDRD: >90 ML/MIN/1.73M2
GLUCOSE SERPL-MCNC: 97 MG/DL (ref 74–99)
POTASSIUM SERPL-SCNC: 4.2 MMOL/L (ref 3.7–5.3)
SODIUM SERPL-SCNC: 141 MMOL/L (ref 136–145)

## 2024-04-24 PROCEDURE — 36415 COLL VENOUS BLD VENIPUNCTURE: CPT

## 2024-04-24 PROCEDURE — 99212 OFFICE O/P EST SF 10 MIN: CPT

## 2024-04-24 PROCEDURE — 80048 BASIC METABOLIC PNL TOTAL CA: CPT

## 2024-04-24 PROCEDURE — 99214 OFFICE O/P EST MOD 30 MIN: CPT | Performed by: NURSE PRACTITIONER

## 2024-04-24 RX ORDER — BUMETANIDE 2 MG/1
2 TABLET ORAL 2 TIMES DAILY
Qty: 60 TABLET | Refills: 0 | Status: SHIPPED | OUTPATIENT
Start: 2024-04-24

## 2024-04-24 ASSESSMENT — ENCOUNTER SYMPTOMS
SHORTNESS OF BREATH: 1
BLOOD IN STOOL: 0
ABDOMINAL PAIN: 0
EYE DISCHARGE: 0
COUGH: 0

## 2024-04-24 ASSESSMENT — PAIN DESCRIPTION - ONSET: ONSET: ON-GOING

## 2024-04-24 ASSESSMENT — PAIN DESCRIPTION - FREQUENCY: FREQUENCY: CONTINUOUS

## 2024-04-24 ASSESSMENT — PAIN DESCRIPTION - PAIN TYPE: TYPE: CHRONIC PAIN

## 2024-04-24 ASSESSMENT — PAIN DESCRIPTION - ORIENTATION: ORIENTATION: LEFT;RIGHT

## 2024-04-24 ASSESSMENT — PAIN DESCRIPTION - LOCATION: LOCATION: BACK;LEG

## 2024-04-24 ASSESSMENT — PAIN DESCRIPTION - DESCRIPTORS: DESCRIPTORS: ACHING

## 2024-04-24 ASSESSMENT — PAIN SCALES - GENERAL: PAINLEVEL_OUTOF10: 6

## 2024-04-24 NOTE — PROGRESS NOTES
CHF Clinic at [facility]    Office: [phone] Fax: [phone]    [facility] CHF CLINIC  [address] [address]  Dept: [phone]  Loc: [phone]    Mickey Berg is a 68 y.o. male who presents today for CHF evaluation.       HPI:     Aside from free meal , Eating frutis and veggies for dinner, free meal at the [employer]. High Na+ foods occas .     He now tries to  shop low Na+ foods.    Wt up 5.4  lbs     Denies  shortness of breath,    +   Fatigue, this is baseline . No cpap yet ; to be ordered through PCP   + Edema , but pt thinks it has been looking ok. Using knit  comp socks using wraps.   Denies  palpitations     Pt reports compliance with fluids <= 2L .  The patient reports general  compliance with low Na+ intake.  Except lunches.              Past Medical History:   Diagnosis Date    SYDNEY (acute kidney injury) (HCC)     Arthritis     back    Atrial fibrillation (HCC)     BPH (benign prostatic hyperplasia)     Cellulitis 02/2023    left leg    Cervical disc disease     Chronic venous insufficiency     Combined systolic and diastolic congestive heart failure (HCC) 01/15/2020    Coronary artery disease 10/2019    s/p CABG x 1 ; [place] to LAD    GERD (gastroesophageal reflux disease)     on rx    History of incarceration     released 2019    History of recent hospitalization 02/09/2023    til 2/13/23 with Cellulitis    Hyperlipidemia     Hypertension     Ischemic cardiomyopathy     Myocardial infarct (HCC)     Obesity     Overactive bladder     Sleep apnea treated with continuous positive airway pressure (CPAP)     pt does not have cpap    Under care of team 12/26/2023    Urology, Dr. Morrison, last seen 3/10/2023    Under care of team 12/26/2023    GI, dr. Johnston, last seen 11/2023    Under care of team 12/26/2023    Cardiology, [facility], Dr. Conde, last seen 2023    Wears reading eyeglasses     Wellness

## 2024-04-24 NOTE — TELEPHONE ENCOUNTER
Writer phoned pt to let him know Zenia Stark CNP reviewed pt's labs from today. K+ level wnl 4.2 today. Pt not at home. Writer Left message with pt's caretaker, Mala, re: above results  and as was discussed with pt by Zenia Stark CNP at his [facility] appt this morning,  Mala will remind him to d/c Lasix and will start new script ordered at [facility] for Bumex 2 mg tablet, take one tablet BID with repeat  lab BMP ordered prior to appt at [facility] on 5/1/24.

## 2024-04-29 ENCOUNTER — HOSPITAL ENCOUNTER (OUTPATIENT)
Age: 69
Discharge: HOME | End: 2024-04-29
Payer: MEDICARE

## 2024-04-29 DIAGNOSIS — I50.32 CHRONIC HEART FAILURE WITH PRESERVED EJECTION FRACTION (HCC): ICD-10-CM

## 2024-04-29 LAB
ANION GAP SERPL CALCULATED.3IONS-SCNC: 12 MMOL/L (ref 9–16)
BUN SERPL-MCNC: 16 MG/DL (ref 8–23)
CALCIUM SERPL-MCNC: 9 MG/DL (ref 8.6–10.4)
CHLORIDE SERPL-SCNC: 102 MMOL/L (ref 98–107)
CO2 SERPL-SCNC: 27 MMOL/L (ref 20–31)
CREAT SERPL-MCNC: 0.7 MG/DL (ref 0.7–1.2)
GFR SERPL CREATININE-BSD FRML MDRD: >90 ML/MIN/1.73M2
GLUCOSE SERPL-MCNC: 91 MG/DL (ref 74–99)
POTASSIUM SERPL-SCNC: 3.8 MMOL/L (ref 3.7–5.3)
SODIUM SERPL-SCNC: 141 MMOL/L (ref 136–145)

## 2024-04-29 PROCEDURE — 36415 COLL VENOUS BLD VENIPUNCTURE: CPT

## 2024-04-29 PROCEDURE — 80048 BASIC METABOLIC PNL TOTAL CA: CPT

## 2024-05-01 ENCOUNTER — HOSPITAL ENCOUNTER (OUTPATIENT)
Dept: OTHER | Age: 69
Discharge: HOME | End: 2024-05-01
Payer: MEDICARE

## 2024-05-01 VITALS
BODY MASS INDEX: 37.84 KG/M2 | WEIGHT: 227.4 LBS | HEART RATE: 61 BPM | OXYGEN SATURATION: 96 % | SYSTOLIC BLOOD PRESSURE: 102 MMHG | DIASTOLIC BLOOD PRESSURE: 64 MMHG

## 2024-05-01 DIAGNOSIS — I50.32 CHRONIC HEART FAILURE WITH PRESERVED EJECTION FRACTION (HCC): Primary | ICD-10-CM

## 2024-05-01 PROCEDURE — 99212 OFFICE O/P EST SF 10 MIN: CPT

## 2024-05-01 ASSESSMENT — PAIN SCALES - GENERAL: PAINLEVEL_OUTOF10: 7

## 2024-05-01 ASSESSMENT — PAIN DESCRIPTION - LOCATION: LOCATION: LEG;BACK;HAND

## 2024-05-01 ASSESSMENT — PAIN DESCRIPTION - PAIN TYPE: TYPE: CHRONIC PAIN

## 2024-05-01 ASSESSMENT — PAIN DESCRIPTION - ORIENTATION: ORIENTATION: LEFT;RIGHT;LOWER

## 2024-05-01 ASSESSMENT — PAIN DESCRIPTION - DESCRIPTORS: DESCRIPTORS: DISCOMFORT

## 2024-05-01 NOTE — PROGRESS NOTES
Date:  2024  Time:  11:53 AM    CHF Clinic at [facility]    Office: [phone] Fax: [phone]    Re:  Mickey Berg   Patient : 1955    Vital Signs: /64   Pulse 61   Wt 103.1 kg (227 lb 6.4 oz)   SpO2 96%   BMI 37.84 kg/m²                       O2 Device: None (Room air)                           Recent Labs     24  1100      K 3.8      CO2 27   BUN 16   CREATININE 0.7   GLUCOSE 91        Respiratory:    Assessment  Charting Type: Reassessment    Breath Sounds  Right Upper Lobe: Clear  Right Middle Lobe: Clear  Right Lower Lobe: Clear  Left Upper Lobe: Clear  Left Lower Lobe: Clear    Cough/Sputum  Cough: None         Peripheral Vascular  RLE Edema: +1, Non-pitting  LLE Edema: +1, Non-pitting    Future Appointments   Date Time Provider Department Center   2024  1:30 PM Yamil Colbert, APRN - NP [facility] TOLE AFL [facility]AN C   2024 10:30 AM STV CHF CLINIC RM 1 STVZ CHF [facility]   2024 12:15 PM Donte Rader DPM [facility] Podiatry [facility]   2024 11:15 AM [facility]   2024  2:00 PM Giorgio Morrison Jr., MD [facility] Urology [facility]   2024  8:30 AM STC EMG  STCZ EMG [place]   2024  8:30 AM Alec Tejada MD [facility] med/reha [facility]      Complaints: No new complaints at this time    Physician Orders: None    Comment : Arrived for scheduled visit per ambulatory with cane assist. Weight loss is 3 lbs from last visit one week ago. Denies any shortness of breath at this time. Still has mild leg swelling to lower legs bilaterally, but decreased moderately from last week with change in diuretic from lasix 80 mg one tablet twice daily to Bumex 2 mg one tablet twice daily. Says he is urinating more on the bumex. Medication list reviewed and updated. Reinforced low sodium diet and fluid restrictions. Notified M. Borkosky, CNP in clinic of wt. Loss and improvement in leg swelling. Orders

## 2024-05-02 ENCOUNTER — TELEPHONE (OUTPATIENT)
Dept: FAMILY MEDICINE CLINIC | Age: 69
End: 2024-05-02

## 2024-05-02 NOTE — TELEPHONE ENCOUNTER
Patient called asking which dose of Atorvastatin should be taking the 40 mg or the 80 mg .  Patient states he has been taking the 80 in the am and the 40 in the pm .  Please advise.

## 2024-05-06 ENCOUNTER — TELEPHONE (OUTPATIENT)
Dept: FAMILY MEDICINE CLINIC | Age: 69
End: 2024-05-06

## 2024-05-06 NOTE — TELEPHONE ENCOUNTER
Tried to contact patient to inform him provider will not be in clinic on 06/07/2024 . Voice mail full could not leave message

## 2024-05-13 ENCOUNTER — HOSPITAL ENCOUNTER (OUTPATIENT)
Age: 69
Discharge: HOME | End: 2024-05-13
Payer: MEDICARE

## 2024-05-13 DIAGNOSIS — I50.32 CHRONIC HEART FAILURE WITH PRESERVED EJECTION FRACTION (HCC): ICD-10-CM

## 2024-05-13 LAB
ANION GAP SERPL CALCULATED.3IONS-SCNC: 11 MMOL/L (ref 9–16)
BUN SERPL-MCNC: 14 MG/DL (ref 8–23)
CALCIUM SERPL-MCNC: 8.9 MG/DL (ref 8.6–10.4)
CHLORIDE SERPL-SCNC: 105 MMOL/L (ref 98–107)
CO2 SERPL-SCNC: 25 MMOL/L (ref 20–31)
CREAT SERPL-MCNC: 0.7 MG/DL (ref 0.7–1.2)
GFR, ESTIMATED: >90 ML/MIN/1.73M2
GLUCOSE SERPL-MCNC: 86 MG/DL (ref 74–99)
POTASSIUM SERPL-SCNC: 3.9 MMOL/L (ref 3.7–5.3)
SODIUM SERPL-SCNC: 141 MMOL/L (ref 136–145)

## 2024-05-13 PROCEDURE — 80048 BASIC METABOLIC PNL TOTAL CA: CPT

## 2024-05-13 PROCEDURE — 36415 COLL VENOUS BLD VENIPUNCTURE: CPT

## 2024-05-24 ENCOUNTER — TELEPHONE (OUTPATIENT)
Dept: FAMILY MEDICINE CLINIC | Age: 69
End: 2024-05-24

## 2024-05-24 NOTE — TELEPHONE ENCOUNTER
Office received Medical Certification form from [employer] 5/24/24. Called pt to sign a JACKELYN form no answer/no v-mail. When pt calls ask him to come into the office to sign the JACKELYN.

## 2024-05-24 NOTE — TELEPHONE ENCOUNTER
Patient called in for a medica cert for the [employer], writer informed patient to call the light company so they can fax our office the med cert. Writer gave patient office fax number.

## 2024-05-29 ENCOUNTER — HOSPITAL ENCOUNTER (OUTPATIENT)
Dept: OTHER | Age: 69
Discharge: HOME | End: 2024-05-29
Payer: MEDICARE

## 2024-05-29 ENCOUNTER — OFFICE VISIT (OUTPATIENT)
Dept: PODIATRY | Age: 69
End: 2024-05-29
Payer: MEDICARE

## 2024-05-29 VITALS
DIASTOLIC BLOOD PRESSURE: 62 MMHG | SYSTOLIC BLOOD PRESSURE: 108 MMHG | WEIGHT: 230.2 LBS | BODY MASS INDEX: 38.31 KG/M2 | OXYGEN SATURATION: 96 % | HEART RATE: 57 BPM | RESPIRATION RATE: 20 BRPM

## 2024-05-29 VITALS
HEIGHT: 65 IN | WEIGHT: 230 LBS | HEART RATE: 60 BPM | SYSTOLIC BLOOD PRESSURE: 118 MMHG | BODY MASS INDEX: 38.32 KG/M2 | DIASTOLIC BLOOD PRESSURE: 75 MMHG

## 2024-05-29 DIAGNOSIS — M79.671 PAIN IN BOTH FEET: ICD-10-CM

## 2024-05-29 DIAGNOSIS — L03.116 CELLULITIS OF LEFT LOWER EXTREMITY: ICD-10-CM

## 2024-05-29 DIAGNOSIS — I87.8 VENOUS STASIS OF BOTH LOWER EXTREMITIES: ICD-10-CM

## 2024-05-29 DIAGNOSIS — M79.672 PAIN IN BOTH FEET: ICD-10-CM

## 2024-05-29 DIAGNOSIS — M21.372 FOOT DROP, LEFT: Primary | ICD-10-CM

## 2024-05-29 DIAGNOSIS — I73.9 PVD (PERIPHERAL VASCULAR DISEASE) (HCC): ICD-10-CM

## 2024-05-29 DIAGNOSIS — I50.32 CHRONIC HEART FAILURE WITH PRESERVED EJECTION FRACTION (HCC): Primary | ICD-10-CM

## 2024-05-29 DIAGNOSIS — B35.1 ONYCHOMYCOSIS: ICD-10-CM

## 2024-05-29 DIAGNOSIS — B35.1 DERMATOPHYTOSIS OF NAIL: ICD-10-CM

## 2024-05-29 PROCEDURE — 11721 DEBRIDE NAIL 6 OR MORE: CPT

## 2024-05-29 PROCEDURE — 99212 OFFICE O/P EST SF 10 MIN: CPT

## 2024-05-29 PROCEDURE — 99213 OFFICE O/P EST LOW 20 MIN: CPT | Performed by: PODIATRIST

## 2024-05-29 RX ORDER — BUMETANIDE 2 MG/1
2 TABLET ORAL 2 TIMES DAILY
Qty: 60 TABLET | Refills: 2 | Status: SHIPPED | OUTPATIENT
Start: 2024-05-29

## 2024-05-29 NOTE — PROGRESS NOTES
Patient instructed to remove shoes and socks and instructed to sit in exam chair.  Current PCP is Nick Hartman MD and date of last visit was 03/05/2024.   Do you have a follow up visit scheduled?  No  If yes, the date is     
Nails 1-5 left and 1-5 right thickened, dystrophic and crumbly, discolored with yellow subungual debris.  Hyperkeratotic lesion noted to medial aspect of hallux IPJ bilateral with right worse than left. Hemosiderin deposition to anterior left lower extremity.      Assessment   Mickey Berg is a 68 y.o. male with     Diagnosis Orders   1. Foot drop, left        2. Venous stasis of both lower extremities        3. Onychomycosis        4. Cellulitis of left lower extremity        5. Dermatophytosis of nail        6. Pain in both feet        7. PVD (peripheral vascular disease) ([facility])               Plan   Patient examined and evaluated  Diagnosis and treatment options discussed in detail  Patient scheduled for EMG this coming August  Toenails 1-5, bilateral, debrided utilizing sterile nail nipper without incident  Patient instructed to continue compression therapy to bilateral lower extremity  Patient to return to clinic in 3 months.      No orders of the defined types were placed in this encounter.    No orders of the defined types were placed in this encounter.      Albaro Anguiano DPM   Podiatric Medicine & Surgery   5/29/2024 at 12:47 PM       I performed a history and physical examination of the patient and discussed management with the resident. I reviewed the resident’s note and agree with the documented findings and plan of care. Any areas of disagreement are noted on the chart. I was personally present for the key portions of any procedures. I have documented in the chart those procedures where I was not present during the key portions. I have reviewed the Podiatry Resident progress note. I agree with the chief complaint, past medical history, past surgical history, allergies, medications, social and family history as documented unless otherwise noted below. Documentation of the HPI, Physical Exam and Medical Decision Making performed by medical students or scribes is based on my personal performance of

## 2024-06-11 DIAGNOSIS — N39.490 OVERFLOW INCONTINENCE OF URINE: ICD-10-CM

## 2024-06-11 RX ORDER — TAMSULOSIN HYDROCHLORIDE 0.4 MG/1
0.4 CAPSULE ORAL DAILY
Qty: 30 CAPSULE | Refills: 5 | Status: SHIPPED | OUTPATIENT
Start: 2024-06-11

## 2024-06-18 ENCOUNTER — HOSPITAL ENCOUNTER (OUTPATIENT)
Age: 69
Discharge: HOME | End: 2024-06-18
Payer: MEDICARE

## 2024-06-18 DIAGNOSIS — I50.32 CHRONIC HEART FAILURE WITH PRESERVED EJECTION FRACTION (HCC): ICD-10-CM

## 2024-06-18 LAB
ANION GAP SERPL CALCULATED.3IONS-SCNC: 12 MMOL/L (ref 9–16)
BUN SERPL-MCNC: 15 MG/DL (ref 8–23)
CALCIUM SERPL-MCNC: 9 MG/DL (ref 8.6–10.4)
CHLORIDE SERPL-SCNC: 108 MMOL/L (ref 98–107)
CO2 SERPL-SCNC: 25 MMOL/L (ref 20–31)
CREAT SERPL-MCNC: 0.7 MG/DL (ref 0.7–1.2)
GFR, ESTIMATED: >90 ML/MIN/1.73M2
GLUCOSE SERPL-MCNC: 95 MG/DL (ref 74–99)
POTASSIUM SERPL-SCNC: 3.7 MMOL/L (ref 3.7–5.3)
SODIUM SERPL-SCNC: 145 MMOL/L (ref 136–145)

## 2024-06-18 PROCEDURE — 36415 COLL VENOUS BLD VENIPUNCTURE: CPT

## 2024-06-18 PROCEDURE — 80048 BASIC METABOLIC PNL TOTAL CA: CPT

## 2024-06-25 ENCOUNTER — OFFICE VISIT (OUTPATIENT)
Dept: FAMILY MEDICINE CLINIC | Age: 69
End: 2024-06-25
Payer: MEDICARE

## 2024-06-25 VITALS
SYSTOLIC BLOOD PRESSURE: 103 MMHG | HEIGHT: 65 IN | DIASTOLIC BLOOD PRESSURE: 67 MMHG | HEART RATE: 79 BPM | WEIGHT: 227.2 LBS | BODY MASS INDEX: 37.85 KG/M2

## 2024-06-25 DIAGNOSIS — R60.0 LOWER LEG EDEMA: ICD-10-CM

## 2024-06-25 DIAGNOSIS — G62.9 NEUROPATHY: Primary | ICD-10-CM

## 2024-06-25 DIAGNOSIS — Z29.9 PREVENTIVE MEASURE: ICD-10-CM

## 2024-06-25 PROCEDURE — G8417 CALC BMI ABV UP PARAM F/U: HCPCS | Performed by: STUDENT IN AN ORGANIZED HEALTH CARE EDUCATION/TRAINING PROGRAM

## 2024-06-25 PROCEDURE — 1123F ACP DISCUSS/DSCN MKR DOCD: CPT | Performed by: STUDENT IN AN ORGANIZED HEALTH CARE EDUCATION/TRAINING PROGRAM

## 2024-06-25 PROCEDURE — 3078F DIAST BP <80 MM HG: CPT | Performed by: STUDENT IN AN ORGANIZED HEALTH CARE EDUCATION/TRAINING PROGRAM

## 2024-06-25 PROCEDURE — G8427 DOCREV CUR MEDS BY ELIG CLIN: HCPCS | Performed by: STUDENT IN AN ORGANIZED HEALTH CARE EDUCATION/TRAINING PROGRAM

## 2024-06-25 PROCEDURE — 1036F TOBACCO NON-USER: CPT | Performed by: STUDENT IN AN ORGANIZED HEALTH CARE EDUCATION/TRAINING PROGRAM

## 2024-06-25 PROCEDURE — 3017F COLORECTAL CA SCREEN DOC REV: CPT | Performed by: STUDENT IN AN ORGANIZED HEALTH CARE EDUCATION/TRAINING PROGRAM

## 2024-06-25 PROCEDURE — 3074F SYST BP LT 130 MM HG: CPT | Performed by: STUDENT IN AN ORGANIZED HEALTH CARE EDUCATION/TRAINING PROGRAM

## 2024-06-25 PROCEDURE — 99213 OFFICE O/P EST LOW 20 MIN: CPT | Performed by: STUDENT IN AN ORGANIZED HEALTH CARE EDUCATION/TRAINING PROGRAM

## 2024-06-25 RX ORDER — DULOXETIN HYDROCHLORIDE 20 MG/1
20 CAPSULE, DELAYED RELEASE ORAL DAILY
Qty: 30 CAPSULE | Refills: 0 | Status: SHIPPED | OUTPATIENT
Start: 2024-06-25

## 2024-06-25 SDOH — ECONOMIC STABILITY: HOUSING INSECURITY
IN THE LAST 12 MONTHS, WAS THERE A TIME WHEN YOU DID NOT HAVE A STEADY PLACE TO SLEEP OR SLEPT IN A SHELTER (INCLUDING NOW)?: NO

## 2024-06-25 SDOH — ECONOMIC STABILITY: INCOME INSECURITY: HOW HARD IS IT FOR YOU TO PAY FOR THE VERY BASICS LIKE FOOD, HOUSING, MEDICAL CARE, AND HEATING?: NOT HARD AT ALL

## 2024-06-25 SDOH — ECONOMIC STABILITY: FOOD INSECURITY: WITHIN THE PAST 12 MONTHS, YOU WORRIED THAT YOUR FOOD WOULD RUN OUT BEFORE YOU GOT MONEY TO BUY MORE.: NEVER TRUE

## 2024-06-25 SDOH — ECONOMIC STABILITY: FOOD INSECURITY: WITHIN THE PAST 12 MONTHS, THE FOOD YOU BOUGHT JUST DIDN'T LAST AND YOU DIDN'T HAVE MONEY TO GET MORE.: NEVER TRUE

## 2024-06-25 ASSESSMENT — PATIENT HEALTH QUESTIONNAIRE - PHQ9
SUM OF ALL RESPONSES TO PHQ QUESTIONS 1-9: 0
1. LITTLE INTEREST OR PLEASURE IN DOING THINGS: NOT AT ALL
SUM OF ALL RESPONSES TO PHQ QUESTIONS 1-9: 0
SUM OF ALL RESPONSES TO PHQ QUESTIONS 1-9: 0
2. FEELING DOWN, DEPRESSED OR HOPELESS: NOT AT ALL
SUM OF ALL RESPONSES TO PHQ9 QUESTIONS 1 & 2: 0
SUM OF ALL RESPONSES TO PHQ QUESTIONS 1-9: 0

## 2024-06-25 ASSESSMENT — ENCOUNTER SYMPTOMS
DIARRHEA: 0
COUGH: 0
NAUSEA: 0
VOMITING: 0
COLOR CHANGE: 0
SHORTNESS OF BREATH: 0
ABDOMINAL PAIN: 0

## 2024-06-25 NOTE — PATIENT INSTRUCTIONS
Thank you for letting us take care of you today. We hope all your questions were addressed. If a question was overlooked or something else comes to mind after you return home, please contact a member of your Care Team listed below.      Your Care Team at [facility] is Team #2  Mich Sung M.D. (Faculty)  Carlo Leblanc M.D. (Resident)  Nicol Tinoco M.D. (Resident)  River Webber M.D. (Resident)  Regla Sherwood M.D. (Resident)  Makayla Becker M.D. (Resident)  Milo Villafuerte, [facility]  Sharon Olmos, NITIN Washburn, [facility]  Caty Clark,  [facility]  Lesli Núñez, [facility]  Terese Huitron, NITIN Jiménez, [facility]  Federico (LJ) Sam NITIN (Clinical Practice Manager)  Sophie Becerra [facility] (Clinical Pharmacist)     Office phone number: [phone]    If you need to get in right away due to illness, please be advised we have \"Same Day\" appointments available Monday-Friday. Please call us at [phone] option #3 to schedule your \"Same Day\" appointment.

## 2024-06-25 NOTE — PROGRESS NOTES
Visit Information    Have you changed or started any medications since your last visit including any over-the-counter medicines, vitamins, or herbal medicines? no   Are you having any side effects from any of your medications? -  no  Have you stopped taking any of your medications? Is so, why? -  no    Have you seen any other physician or provider since your last visit? No  Have you had any other diagnostic tests since your last visit? No  Have you been seen in the emergency room and/or had an admission to a hospital since we last saw you? No  Have you had your routine dental cleaning in the past 6 months? no    Have you activated your [employer] account? If not, what are your barriers? No:      Patient Care Team:  Nick Hartman MD as PCP - Kwame Gooden MD as Consulting Physician (Gastroenterology)  Giorgio Morrison Jr., MD as Consulting Physician (Urology)    Medical History Review  Past Medical, Family, and Social History reviewed and does not contribute to the patient presenting condition    Health Maintenance   Topic Date Due    Respiratory Syncytial Virus (RSV) Pregnant or age 60 yrs+ (1 - 1-dose 60+ series) Never done    COVID-19 Vaccine (3 - 2023-24 season) 09/01/2023    Lipids  03/29/2024    Annual Wellness Visit (Medicare)  01/05/2025    Depression Screen  01/19/2025    Colorectal Cancer Screen  01/12/2031    DTaP/Tdap/Td vaccine (5 - Td or Tdap) 02/25/2034    Flu vaccine  Completed    Shingles vaccine  Completed    Pneumococcal 65+ years Vaccine  Completed    Hepatitis C screen  Completed    Hepatitis A vaccine  Aged Out    Hepatitis B vaccine  Aged Out    Hib vaccine  Aged Out    Polio vaccine  Aged Out    Meningococcal (ACWY) vaccine  Aged Out    Pneumococcal 0-64 years Vaccine  Discontinued    Diabetes screen  Discontinued    HIV screen  Discontinued    Prostate Specific Antigen (PSA) Screening or Monitoring  Discontinued       
  Cardiovascular:  Positive for leg swelling. Negative for chest pain.   Gastrointestinal:  Negative for abdominal pain, diarrhea, nausea and vomiting.   Skin:  Negative for color change.   Neurological:  Positive for numbness.   Psychiatric/Behavioral:  Negative for agitation.                  The patient has a   Family History   Problem Relation Age of Onset    Alcohol Abuse Maternal Uncle     Heart Attack Maternal Uncle     Cancer Mother     Alcohol Abuse Father        Objective:    /67 (Site: Left Upper Arm, Position: Sitting, Cuff Size: Medium Adult)   Pulse 79   Ht 1.651 m (5' 5\")   Wt 103.1 kg (227 lb 3.2 oz)   BMI 37.81 kg/m²    BP Readings from Last 3 Encounters:   06/25/24 103/67   05/29/24 108/62   05/29/24 118/75       Physical Exam  Constitutional:       General: He is not in acute distress.     Appearance: He is obese. He is not ill-appearing, toxic-appearing or diaphoretic.   Cardiovascular:      Rate and Rhythm: Normal rate and regular rhythm.      Pulses: Normal pulses.      Heart sounds: No murmur heard.  Pulmonary:      Effort: Pulmonary effort is normal.      Breath sounds: No wheezing.   Skin:     General: Skin is warm.      Findings: No bruising or erythema.   Neurological:      Mental Status: He is alert and oriented to person, place, and time.      Motor: No weakness.   Psychiatric:         Behavior: Behavior normal.         Lab Results   Component Value Date    WBC 7.0 10/17/2023    HGB 12.5 (L) 10/17/2023    HCT 40.2 (L) 10/17/2023     10/17/2023    CHOL 196 03/29/2023    TRIG 65 03/29/2023    HDL 52 03/29/2023    ALT 19 03/29/2023    AST 26 03/29/2023     06/18/2024    K 3.7 06/18/2024     (H) 06/18/2024    CREATININE 0.7 06/18/2024    BUN 15 06/18/2024    CO2 25 06/18/2024    TSH 1.71 06/09/2023    PSA 0.49 08/12/2022    INR 1.0 03/08/2021    LABA1C 5.5 04/13/2023     Lab Results   Component Value Date    CALCIUM 9.0 06/18/2024    PHOS 4.0 03/08/2021     No

## 2024-06-26 ENCOUNTER — TELEPHONE (OUTPATIENT)
Dept: FAMILY MEDICINE CLINIC | Age: 69
End: 2024-06-26

## 2024-06-26 ENCOUNTER — HOSPITAL ENCOUNTER (OUTPATIENT)
Dept: OTHER | Age: 69
Discharge: HOME | End: 2024-06-26
Payer: MEDICARE

## 2024-06-26 VITALS
HEART RATE: 60 BPM | DIASTOLIC BLOOD PRESSURE: 86 MMHG | WEIGHT: 226.4 LBS | OXYGEN SATURATION: 97 % | BODY MASS INDEX: 37.67 KG/M2 | SYSTOLIC BLOOD PRESSURE: 121 MMHG

## 2024-06-26 PROCEDURE — 99212 OFFICE O/P EST SF 10 MIN: CPT

## 2024-06-26 NOTE — PROGRESS NOTES
Date:  2024  Time:  10:47 AM    CHF Clinic at [facility]    Office: [phone] Fax: [phone]    Re:  Mickey Berg   Patient : 1955    Vital Signs: /86   Pulse 60   Wt 102.7 kg (226 lb 6.4 oz)   SpO2 97%   BMI 37.67 kg/m²                       O2 Device: None (Room air)                           No results for input(s): \"CBC\", \"HGB\", \"HCT\", \"WBC\", \"PLATELET\", \"NA\", \"K\", \"CL\", \"CO2\", \"BUN\", \"CREATININE\", \"GLUCOSE\", \"BNP\", \"INR\" in the last 72 hours.     Respiratory:    Assessment  Charting Type: Reassessment    Breath Sounds  Right Upper Lobe: Clear  Right Middle Lobe: Clear  Right Lower Lobe: Clear  Left Upper Lobe: Clear  Left Lower Lobe: Clear    Cough/Sputum  Cough: Dry         Peripheral Vascular  RLE Edema: +2, Pitting  LLE Edema: +2, Pitting    Future Appointments   Date Time Provider Department Center   2024 10:30 AM STV CHF CLINIC RM 1 STVZ CHF CLI [facility]   2024  2:30 PM [facility]iorgio Morrison Jr., [facility]   2024  8:30 AM STC EMG  STCZ EMG [place]   2024  8:30 AM Alec Tejada MD [facility] med/reha [facility]   2024 12:15 PM Donte Rader DPM [facility]      Complaints: Nothing new      Comment : Patient here ambulatory for routine visit. Weight down 4 lbs in one month. Legs with slight increase in swelling. He states he was at his PCP yesterday and they were going to order support stockings. He lost his current pair. Writer will check with that office. States compliance with low salt diet, fluid limits and medications. States he currently has no electricity. His PCP gave him some information and will get in contact with a \"community Health Navigator\". Next visit here 4 weeks. Encouraged to call this office for any new s/s CHF.     Electronically signed by ARNEL BLISS RN on 2024 at 10:47 AM      Writer unable to reach the office at [facility]. There is only \"on hold\" music and

## 2024-06-26 NOTE — TELEPHONE ENCOUNTER
Mickey Berg was contacted by Chela Brian MA, a Community Health Navigator, regarding a Social Determinants of Health referral.     Writer unable to leave message with patient, someone answered but did not respond, will mail letter to patient.    Followup attempt

## 2024-06-27 NOTE — TELEPHONE ENCOUNTER
[address] [address]    June 27,2024    Mickey Berg  [address] [place], [place] [address]    Dear Mickey:    Your Primary Care Provider placed a referral to connect you with a Community Health Worker for help with resources, but we have been unable to reach you.     If you still need help, please call [phone] by July 12, 2024.  After this date, you will need to ask your Primary Care Provider for a new referral.    For immediate assistance, please call [employer] at 211.      Sincerely,      Chela Brian  Community Health Worker  [phone]

## 2024-07-15 NOTE — TELEPHONE ENCOUNTER
Mickey Berg was contacted by Chela Brian MA, a Community Health Navigator, regarding a Social Determinants of Health referral.     Pt has not responded to writer's 3 f/u attempts.    Will close referral.

## 2024-07-23 ENCOUNTER — HOSPITAL ENCOUNTER (OUTPATIENT)
Dept: OTHER | Age: 69
Discharge: HOME | End: 2024-07-23
Payer: MEDICARE

## 2024-07-23 VITALS
HEART RATE: 84 BPM | BODY MASS INDEX: 37.71 KG/M2 | SYSTOLIC BLOOD PRESSURE: 119 MMHG | OXYGEN SATURATION: 96 % | WEIGHT: 226.6 LBS | DIASTOLIC BLOOD PRESSURE: 64 MMHG

## 2024-07-23 PROCEDURE — 99212 OFFICE O/P EST SF 10 MIN: CPT

## 2024-07-26 DIAGNOSIS — I50.32 CHRONIC DIASTOLIC (CONGESTIVE) HEART FAILURE (HCC): ICD-10-CM

## 2024-07-26 DIAGNOSIS — K21.9 GASTROESOPHAGEAL REFLUX DISEASE WITHOUT ESOPHAGITIS: ICD-10-CM

## 2024-07-26 RX ORDER — PANTOPRAZOLE SODIUM 40 MG/1
40 TABLET, DELAYED RELEASE ORAL
Qty: 30 TABLET | Refills: 2 | Status: SHIPPED | OUTPATIENT
Start: 2024-07-26

## 2024-07-26 RX ORDER — DAPAGLIFLOZIN 10 MG/1
10 TABLET, FILM COATED ORAL EVERY MORNING
Qty: 30 TABLET | Refills: 3 | Status: SHIPPED | OUTPATIENT
Start: 2024-07-26

## 2024-07-26 NOTE — TELEPHONE ENCOUNTER
Last visit: 6/25/24  Last Med refill:   Does patient have enough medication for 72 hours: No:     Next Visit Date:  Future Appointments   Date Time Provider Department Center   8/9/2024  2:30 PM Giorgio Morrison Jr., [facility]   8/15/2024 10:30 AM STV CHF CLINIC RM 1 STVZ CHF CLI [facility]   8/20/2024  8:30 AM STC EMG  STCZ EMG [place]   8/20/2024  8:30 AM Alec Tejada MD [facility] med/reha [facility]   8/28/2024 12:15 PM Donte Rader DPM [facility] Podiatry [facility]       Health Maintenance   Topic Date Due    Respiratory Syncytial Virus (RSV) Pregnant or age 60 yrs+ (1 - 1-dose 60+ series) Never done    COVID-19 Vaccine (3 - 2023-24 season) 09/01/2023    Lipids  03/29/2024    Flu vaccine (1) 08/01/2024    Annual Wellness Visit (Medicare)  01/05/2025    Depression Screen  06/25/2025    Colorectal Cancer Screen  01/12/2031    DTaP/Tdap/Td vaccine (5 - Td or Tdap) 02/25/2034    Shingles vaccine  Completed    Pneumococcal 65+ years Vaccine  Completed    Hepatitis C screen  Completed    Hepatitis A vaccine  Aged Out    Hepatitis B vaccine  Aged Out    Hib vaccine  Aged Out    Polio vaccine  Aged Out    Meningococcal (ACWY) vaccine  Aged Out    Pneumococcal 0-64 years Vaccine  Discontinued    Diabetes screen  Discontinued    HIV screen  Discontinued    Prostate Specific Antigen (PSA) Screening or Monitoring  Discontinued       Hemoglobin A1C (%)   Date Value   04/13/2023 5.5   12/10/2019 5.6   10/18/2019 5.8             ( goal A1C is < 7)   No components found for: \"LABMICR\"  No components found for: \"LDLCHOLESTEROL\", \"LDLCALC\"    (goal LDL is <100)   AST (U/L)   Date Value   03/29/2023 26     ALT (U/L)   Date Value   03/29/2023 19     BUN (mg/dL)   Date Value   06/18/2024 15     BP Readings from Last 3 Encounters:   07/23/24 119/64   06/26/24 121/86   06/25/24 103/67          (goal 120/80)    All Future Testing planned in [facility]  Lab Frequency Next Occurrence   Nerve conduction test Once 02/21/2024

## 2024-08-05 RX ORDER — OXYBUTYNIN CHLORIDE 10 MG/1
10 TABLET, EXTENDED RELEASE ORAL EVERY MORNING
Qty: 30 TABLET | Refills: 3 | Status: SHIPPED | OUTPATIENT
Start: 2024-08-05

## 2024-08-05 NOTE — TELEPHONE ENCOUNTER
Last visit: 6/25/24  Last Med refill:   Does patient have enough medication for 72 hours: No: medication not prescribed by PCP, please advise.    Next Visit Date:  Future Appointments   Date Time Provider Department Center   8/9/2024  2:30 PM [facility] Jr[facility]   8/15/2024 10:30 AM STV CHF CLINIC RM 1 STVZ CHF CLI [facility]   8/20/2024  8:30 AM STC EMG  STCZ EMG [place]   8/20/2024  8:30 AM [facility]   8/28/2024 12:15 PM Donte Rader DPM [facility] Podiatry [facility]       Health Maintenance   Topic Date Due    Respiratory Syncytial Virus (RSV) Pregnant or age 60 yrs+ (1 - 1-dose 60+ series) Never done    COVID-19 Vaccine (3 - 2023-24 season) 09/01/2023    Lipids  03/29/2024    Flu vaccine (1) 08/01/2024    Annual Wellness Visit (Medicare)  01/05/2025    Depression Screen  06/25/2025    Colorectal Cancer Screen  01/12/2031    DTaP/Tdap/Td vaccine (5 - Td or Tdap) 02/25/2034    Shingles vaccine  Completed    Pneumococcal 65+ years Vaccine  Completed    Hepatitis C screen  Completed    Hepatitis A vaccine  Aged Out    Hepatitis B vaccine  Aged Out    Hib vaccine  Aged Out    Polio vaccine  Aged Out    Meningococcal (ACWY) vaccine  Aged Out    Pneumococcal 0-64 years Vaccine  Discontinued    Diabetes screen  Discontinued    HIV screen  Discontinued    Prostate Specific Antigen (PSA) Screening or Monitoring  Discontinued       Hemoglobin A1C (%)   Date Value   04/13/2023 5.5   12/10/2019 5.6   10/18/2019 5.8             ( goal A1C is < 7)   No components found for: \"LABMICR\"  No components found for: \"LDLCHOLESTEROL\", \"LDLCALC\"    (goal LDL is <100)   AST (U/L)   Date Value   03/29/2023 26     ALT (U/L)   Date Value   03/29/2023 19     BUN (mg/dL)   Date Value   06/18/2024 15     BP Readings from Last 3 Encounters:   07/23/24 119/64   06/26/24 121/86   06/25/24 103/67          (goal 120/80)    All Future Testing planned in [facility]  Lab Frequency Next

## 2024-08-15 ENCOUNTER — HOSPITAL ENCOUNTER (OUTPATIENT)
Dept: OTHER | Age: 69
Discharge: HOME | End: 2024-08-15
Payer: MEDICARE

## 2024-08-15 VITALS
HEART RATE: 84 BPM | SYSTOLIC BLOOD PRESSURE: 120 MMHG | RESPIRATION RATE: 18 BRPM | WEIGHT: 230.6 LBS | OXYGEN SATURATION: 97 % | DIASTOLIC BLOOD PRESSURE: 72 MMHG | BODY MASS INDEX: 38.37 KG/M2

## 2024-08-15 PROCEDURE — 99212 OFFICE O/P EST SF 10 MIN: CPT

## 2024-08-15 NOTE — PROGRESS NOTES
Date:  8/15/2024  Time:  12:49 PM    CHF Clinic at [facility]    Office: [phone] Fax: [phone]    Re:  Mickey Berg   Patient : 1955    Vital Signs: /72   Pulse 84   Resp 18   Wt 104.6 kg (230 lb 9.6 oz)   SpO2 97%   BMI 38.37 kg/m²                       O2 Device: None (Room air)                           No results for input(s): \"CBC\", \"HGB\", \"HCT\", \"WBC\", \"PLATELET\", \"NA\", \"K\", \"CL\", \"CO2\", \"BUN\", \"CREATININE\", \"GLUCOSE\", \"BNP\", \"INR\" in the last 72 hours.     Respiratory:    Assessment  Charting Type: Reassessment    Breath Sounds  Right Upper Lobe: Clear  Right Middle Lobe: Clear  Right Lower Lobe: Clear, Diminished  Left Upper Lobe: Clear  Left Lower Lobe: Clear, Diminished    Cough/Sputum  Cough: Non-productive  Frequency: Occasional         Peripheral Vascular  RLE Edema: +1, Non-pitting  LLE Edema: +1, Non-pitting    Future Appointments   Date Time Provider Department Center   2024  8:30 AM ST EMG  900 STCZ EMG [place]   2024  8:30 AM Alec Tejada MD [facility] med[facility]reha [facility]   2024 12:15 PM Donte Rader DPM [facility]   2024 10:30 AM STV CHF CLINIC RM 1 STVZ CHF CLI [facility]   2024  1:20 PM Giorgio Morrison Jr., MD [facility]      Complaints: Pt denies any concerns or complaints at this time.    Comment : Pt arrived ambulatory using cane for CHF follow-up appt. Pt denies SOB. Weight up 4 lbs since July appt. 1+ non-pitting edema noted BLE, which is down from last appt. Ankle and calf measurements decreased as well. Pt wearing compression stockings BLE. Home med list reviewed. Pt verbalized compliance with medications, 2000 mg low sodium diet and 64oz fluid restriction. Pt states he does not weigh himself daily at home. Pt educated to continue low sodium diet and fluid restriction. Pt encouraged to call clinic, PCP or Cardiologist is he develops increased edema, SOB, weight gain or any acute

## 2024-08-20 ENCOUNTER — HOSPITAL ENCOUNTER (OUTPATIENT)
Dept: NEUROLOGY | Age: 69
Discharge: HOME | End: 2024-08-20

## 2024-08-20 ENCOUNTER — TELEPHONE (OUTPATIENT)
Dept: FAMILY MEDICINE CLINIC | Age: 69
End: 2024-08-20

## 2024-08-28 ENCOUNTER — HOSPITAL ENCOUNTER (OUTPATIENT)
Age: 69
Discharge: HOME | End: 2024-08-28
Payer: MEDICARE

## 2024-08-28 ENCOUNTER — OFFICE VISIT (OUTPATIENT)
Dept: PODIATRY | Age: 69
End: 2024-08-28
Payer: MEDICARE

## 2024-08-28 VITALS — BODY MASS INDEX: 38.32 KG/M2 | HEIGHT: 65 IN | WEIGHT: 230 LBS

## 2024-08-28 DIAGNOSIS — I89.0 LYMPHEDEMA: ICD-10-CM

## 2024-08-28 DIAGNOSIS — L03.116 CELLULITIS OF LEFT LOWER EXTREMITY: ICD-10-CM

## 2024-08-28 DIAGNOSIS — G62.9 POLYNEUROPATHY: ICD-10-CM

## 2024-08-28 DIAGNOSIS — L84 CALLUS: ICD-10-CM

## 2024-08-28 DIAGNOSIS — B35.1 ONYCHOMYCOSIS: ICD-10-CM

## 2024-08-28 DIAGNOSIS — G62.9 POLYNEUROPATHY: Primary | ICD-10-CM

## 2024-08-28 DIAGNOSIS — E08.42 DIABETES MELLITUS DUE TO UNDERLYING CONDITION WITH DIABETIC POLYNEUROPATHY, UNSPECIFIED WHETHER LONG TERM INSULIN USE (HCC): ICD-10-CM

## 2024-08-28 LAB
CRP SERPL HS-MCNC: 8.5 MG/L (ref 0–5)
ERYTHROCYTE [DISTWIDTH] IN BLOOD BY AUTOMATED COUNT: 14.3 % (ref 11.8–14.4)
ERYTHROCYTE [SEDIMENTATION RATE] IN BLOOD BY PHOTOMETRIC METHOD: 32 MM/HR (ref 0–20)
EST. AVERAGE GLUCOSE BLD GHB EST-MCNC: 120 MG/DL
HBA1C MFR BLD: 5.8 % (ref 4–6)
HCT VFR BLD AUTO: 43.2 % (ref 40.7–50.3)
HGB BLD-MCNC: 13.9 G/DL (ref 13–17)
MCH RBC QN AUTO: 30.7 PG (ref 25.2–33.5)
MCHC RBC AUTO-ENTMCNC: 32.2 G/DL (ref 28.4–34.8)
MCV RBC AUTO: 95.4 FL (ref 82.6–102.9)
NRBC BLD-RTO: 0 PER 100 WBC
PLATELET # BLD AUTO: 220 K/UL (ref 138–453)
PMV BLD AUTO: 9.7 FL (ref 8.1–13.5)
RBC # BLD AUTO: 4.53 M/UL (ref 4.21–5.77)
WBC OTHER # BLD: 7.7 K/UL (ref 3.5–11.3)

## 2024-08-28 PROCEDURE — 86140 C-REACTIVE PROTEIN: CPT

## 2024-08-28 PROCEDURE — 11721 DEBRIDE NAIL 6 OR MORE: CPT

## 2024-08-28 PROCEDURE — 85652 RBC SED RATE AUTOMATED: CPT

## 2024-08-28 PROCEDURE — 97597 DBRDMT OPN WND 1ST 20 CM/<: CPT

## 2024-08-28 PROCEDURE — 85027 COMPLETE CBC AUTOMATED: CPT

## 2024-08-28 PROCEDURE — 36415 COLL VENOUS BLD VENIPUNCTURE: CPT

## 2024-08-28 PROCEDURE — 83036 HEMOGLOBIN GLYCOSYLATED A1C: CPT

## 2024-08-28 RX ORDER — AMOXICILLIN 500 MG/1
500 CAPSULE ORAL 2 TIMES DAILY
Qty: 20 CAPSULE | Refills: 0 | Status: SHIPPED | OUTPATIENT
Start: 2024-08-28 | End: 2024-09-07

## 2024-08-28 NOTE — PROGRESS NOTES
Patient instructed to remove shoes and socks and instructed to sit in exam chair.  Current PCP is Mich Sung MD and date of last visit was 6/25/24.   Do you have a follow up visit scheduled?  No  If yes, the date is unknown    
scribes is based on my personal performance of the HPI, PE and MDM. I have personally evaluated this patient and have completed at least one if not all key elements of the E/M (history, physical exam, and MDM). Additional findings are as noted.     Electronically signed by Donte Rader DPM on 9/4/2024 at 2:36 PM

## 2024-09-13 DIAGNOSIS — E87.6 HYPOKALEMIA: ICD-10-CM

## 2024-09-13 RX ORDER — POTASSIUM CHLORIDE 1500 MG/1
20 TABLET, EXTENDED RELEASE ORAL EVERY MORNING
Qty: 60 TABLET | Refills: 3 | Status: SHIPPED | OUTPATIENT
Start: 2024-09-13

## 2024-09-16 ENCOUNTER — OFFICE VISIT (OUTPATIENT)
Dept: PODIATRY | Age: 69
End: 2024-09-16

## 2024-09-16 VITALS
BODY MASS INDEX: 38.32 KG/M2 | SYSTOLIC BLOOD PRESSURE: 118 MMHG | WEIGHT: 230 LBS | HEART RATE: 71 BPM | HEIGHT: 65 IN | DIASTOLIC BLOOD PRESSURE: 71 MMHG

## 2024-09-16 DIAGNOSIS — L03.116 CELLULITIS OF LEFT LOWER EXTREMITY: Primary | ICD-10-CM

## 2024-09-16 DIAGNOSIS — I87.8 VENOUS STASIS OF BOTH LOWER EXTREMITIES: ICD-10-CM

## 2024-09-16 DIAGNOSIS — G62.9 POLYNEUROPATHY: ICD-10-CM

## 2024-09-16 DIAGNOSIS — I89.0 LYMPHEDEMA: ICD-10-CM

## 2024-09-16 RX ORDER — IBUPROFEN 800 MG/1
TABLET, FILM COATED ORAL
COMMUNITY
Start: 2024-09-14

## 2024-09-20 ENCOUNTER — HOSPITAL ENCOUNTER (OUTPATIENT)
Dept: OTHER | Age: 69
Discharge: HOME | End: 2024-09-20

## 2024-09-23 DIAGNOSIS — I50.32 CHRONIC DIASTOLIC (CONGESTIVE) HEART FAILURE (HCC): ICD-10-CM

## 2024-09-23 RX ORDER — DAPAGLIFLOZIN 10 MG/1
10 TABLET, FILM COATED ORAL EVERY MORNING
Qty: 30 TABLET | Refills: 3 | Status: SHIPPED | OUTPATIENT
Start: 2024-09-23 | End: 2024-09-26 | Stop reason: SDUPTHER

## 2024-09-26 ENCOUNTER — OFFICE VISIT (OUTPATIENT)
Dept: FAMILY MEDICINE CLINIC | Age: 69
End: 2024-09-26
Payer: MEDICARE

## 2024-09-26 VITALS
OXYGEN SATURATION: 96 % | HEIGHT: 65 IN | DIASTOLIC BLOOD PRESSURE: 63 MMHG | WEIGHT: 226.8 LBS | TEMPERATURE: 97.3 F | SYSTOLIC BLOOD PRESSURE: 102 MMHG | HEART RATE: 83 BPM | BODY MASS INDEX: 37.79 KG/M2

## 2024-09-26 DIAGNOSIS — I48.20 CHRONIC ATRIAL FIBRILLATION, UNSPECIFIED (HCC): ICD-10-CM

## 2024-09-26 DIAGNOSIS — I20.9 ANGINA PECTORIS, UNSPECIFIED (HCC): Primary | ICD-10-CM

## 2024-09-26 DIAGNOSIS — I50.32 CHRONIC DIASTOLIC (CONGESTIVE) HEART FAILURE (HCC): ICD-10-CM

## 2024-09-26 DIAGNOSIS — E66.01 SEVERE OBESITY (BMI 35.0-39.9) WITH COMORBIDITY: ICD-10-CM

## 2024-09-26 DIAGNOSIS — I50.32 CHRONIC HEART FAILURE WITH PRESERVED EJECTION FRACTION (HCC): ICD-10-CM

## 2024-09-26 PROBLEM — E11.9 TYPE 2 DIABETES MELLITUS (HCC): Status: ACTIVE | Noted: 2024-09-26

## 2024-09-26 PROBLEM — I25.119 ATHEROSCLEROTIC HEART DISEASE OF NATIVE CORONARY ARTERY WITH UNSPECIFIED ANGINA PECTORIS (HCC): Status: ACTIVE | Noted: 2024-09-26

## 2024-09-26 PROCEDURE — G8417 CALC BMI ABV UP PARAM F/U: HCPCS | Performed by: STUDENT IN AN ORGANIZED HEALTH CARE EDUCATION/TRAINING PROGRAM

## 2024-09-26 PROCEDURE — 3017F COLORECTAL CA SCREEN DOC REV: CPT | Performed by: STUDENT IN AN ORGANIZED HEALTH CARE EDUCATION/TRAINING PROGRAM

## 2024-09-26 PROCEDURE — G8427 DOCREV CUR MEDS BY ELIG CLIN: HCPCS | Performed by: STUDENT IN AN ORGANIZED HEALTH CARE EDUCATION/TRAINING PROGRAM

## 2024-09-26 PROCEDURE — 3078F DIAST BP <80 MM HG: CPT | Performed by: STUDENT IN AN ORGANIZED HEALTH CARE EDUCATION/TRAINING PROGRAM

## 2024-09-26 PROCEDURE — 3074F SYST BP LT 130 MM HG: CPT | Performed by: STUDENT IN AN ORGANIZED HEALTH CARE EDUCATION/TRAINING PROGRAM

## 2024-09-26 PROCEDURE — 90656 IIV3 VACC NO PRSV 0.5 ML IM: CPT | Performed by: STUDENT IN AN ORGANIZED HEALTH CARE EDUCATION/TRAINING PROGRAM

## 2024-09-26 PROCEDURE — 1123F ACP DISCUSS/DSCN MKR DOCD: CPT | Performed by: STUDENT IN AN ORGANIZED HEALTH CARE EDUCATION/TRAINING PROGRAM

## 2024-09-26 PROCEDURE — 99214 OFFICE O/P EST MOD 30 MIN: CPT | Performed by: STUDENT IN AN ORGANIZED HEALTH CARE EDUCATION/TRAINING PROGRAM

## 2024-09-26 PROCEDURE — 1036F TOBACCO NON-USER: CPT | Performed by: STUDENT IN AN ORGANIZED HEALTH CARE EDUCATION/TRAINING PROGRAM

## 2024-09-26 RX ORDER — ASPIRIN 81 MG/1
81 TABLET ORAL DAILY
Qty: 90 TABLET | Refills: 0 | Status: SHIPPED | OUTPATIENT
Start: 2024-09-26

## 2024-09-26 RX ORDER — BUMETANIDE 2 MG/1
2 TABLET ORAL DAILY
Qty: 60 TABLET | Refills: 2 | Status: SHIPPED | OUTPATIENT
Start: 2024-09-26

## 2024-09-26 RX ORDER — DAPAGLIFLOZIN 10 MG/1
10 TABLET, FILM COATED ORAL EVERY MORNING
Qty: 30 TABLET | Refills: 3 | Status: SHIPPED | OUTPATIENT
Start: 2024-09-26

## 2024-09-26 ASSESSMENT — ENCOUNTER SYMPTOMS
COLOR CHANGE: 1
WHEEZING: 0
SHORTNESS OF BREATH: 0

## 2024-09-30 DIAGNOSIS — I50.32 CHRONIC HEART FAILURE WITH PRESERVED EJECTION FRACTION (HCC): ICD-10-CM

## 2024-09-30 NOTE — PROGRESS NOTES
[facility] [facility] are approved from [facility], 9/23/24-9/23/25. Called [facility] and left a voicemail to schedule, provided CIS ph# as well.   Visit Information    Have you changed or started any medications since your last visit including any over-the-counter medicines, vitamins, or herbal medicines? no   Have you stopped taking any of your medications? Is so, why? -  yes - see list  Are you having any side effects from any of your medications? - no    Have you seen any other physician or provider since your last visit?  no   Have you had any other diagnostic tests since your last visit?  no   Have you been seen in the emergency room and/or had an admission in a hospital since we last saw you?  [facility]   Have you had your routine dental cleaning in the past 6 months?  no     Do you have an active MyChart account? If no, what is the barrier?   No: pending    Patient Care Team:  Darnell Jackson MD as PCP - General  Starla Sen MD as Consulting Physician (Gastroenterology)  Marcelo Zuniga MD as Consulting Physician (Urology)    Medical History Review  Past Medical, Family, and Social History reviewed and does not contribute to the patient presenting condition    Health Maintenance   Topic Date Due    COVID-19 Vaccine (3 - Booster for Cole Peter series) 06/25/2021    Diabetes screen  12/10/2022    Lipids  03/08/2023    Pneumococcal 65+ years Vaccine (2 - PCV) 04/13/2023    Depression Screen  10/27/2023    Annual Wellness Visit (AWV)  10/28/2023    Colorectal Cancer Screen  01/12/2031    DTaP/Tdap/Td vaccine (3 - Td or Tdap) 05/04/2032    Flu vaccine  Completed    Shingles vaccine  Completed    Hepatitis C screen  Completed    HIV screen  Completed    Hepatitis A vaccine  Aged Out    Hib vaccine  Aged Out    Meningococcal (ACWY) vaccine  Aged Out

## 2024-10-02 ENCOUNTER — HOSPITAL ENCOUNTER (OUTPATIENT)
Age: 69
Discharge: HOME | End: 2024-10-02
Payer: MEDICARE

## 2024-10-02 DIAGNOSIS — I50.32 CHRONIC HEART FAILURE WITH PRESERVED EJECTION FRACTION (HCC): ICD-10-CM

## 2024-10-02 DIAGNOSIS — I20.9 ANGINA PECTORIS, UNSPECIFIED (HCC): ICD-10-CM

## 2024-10-02 LAB
CHOLEST SERPL-MCNC: 101 MG/DL (ref 0–199)
CHOLESTEROL/HDL RATIO: 2
HDLC SERPL-MCNC: 45 MG/DL
LDLC SERPL CALC-MCNC: 47 MG/DL (ref 0–100)
TRIGL SERPL-MCNC: 45 MG/DL
VLDLC SERPL CALC-MCNC: 9 MG/DL

## 2024-10-02 PROCEDURE — 80061 LIPID PANEL: CPT

## 2024-10-02 PROCEDURE — 36415 COLL VENOUS BLD VENIPUNCTURE: CPT

## 2024-10-02 RX ORDER — ATORVASTATIN CALCIUM 40 MG/1
40 TABLET, FILM COATED ORAL NIGHTLY
Qty: 30 TABLET | Refills: 2 | Status: SHIPPED | OUTPATIENT
Start: 2024-10-02

## 2024-10-02 RX ORDER — BUMETANIDE 2 MG/1
2 TABLET ORAL DAILY
Qty: 60 TABLET | Refills: 2 | Status: SHIPPED | OUTPATIENT
Start: 2024-10-02

## 2024-10-04 NOTE — PROGRESS NOTES
Patient instructed to remove shoes and socks and instructed to sit in exam chair.  Current PCP is Mich Sung MD and date of last visit was 06/25/2024.   Do you have a follow up visit scheduled?  Yes  If yes, the date is 10/24/2024

## 2024-10-09 DIAGNOSIS — K21.9 GASTROESOPHAGEAL REFLUX DISEASE WITHOUT ESOPHAGITIS: ICD-10-CM

## 2024-10-09 RX ORDER — PANTOPRAZOLE SODIUM 40 MG/1
40 TABLET, DELAYED RELEASE ORAL
Qty: 30 TABLET | Refills: 2 | Status: SHIPPED | OUTPATIENT
Start: 2024-10-09

## 2024-10-09 NOTE — TELEPHONE ENCOUNTER
Last visit: 09/26/2024  Last Med refill: 07/26/2024  Does patient have enough medication for 72 hours: No:     Next Visit Date:  Future Appointments   Date Time Provider Department Center   10/14/2024 12:15 PM Tej Coffey DPM [facility] Podiatry [facility]   10/24/2024  3:30 PM Mich Sung MD [facility] DEP   11/8/2024  1:20 PM Giorgio Morrison Jr., MD [facility] Urology [facility]       Health Maintenance   Topic Date Due    Diabetic foot exam  Never done    Diabetic Alb to Cr ratio (uACR) test  Never done    Diabetic retinal exam  Never done    Respiratory Syncytial Virus (RSV) Pregnant or age 60 yrs+ (1 - 1-dose 60+ series) Never done    COVID-19 Vaccine (3 - 2023-24 season) 09/01/2024    GFR test (Diabetes, CKD 3-4, OR last GFR 15-59)  06/18/2025    Depression Screen  06/25/2025    A1C test (Diabetic or Prediabetic)  08/28/2025    Lipids  10/02/2025    Colorectal Cancer Screen  01/12/2031    DTaP/Tdap/Td vaccine (5 - Td or Tdap) 02/25/2034    Annual Wellness Visit (Medicare Advantage)  Completed    Flu vaccine  Completed    Shingles vaccine  Completed    Pneumococcal 65+ years Vaccine  Completed    Hepatitis C screen  Completed    Hepatitis A vaccine  Aged Out    Hepatitis B vaccine  Aged Out    Hib vaccine  Aged Out    Polio vaccine  Aged Out    Meningococcal (ACWY) vaccine  Aged Out    Pneumococcal 0-64 years Vaccine  Discontinued    Diabetes screen  Discontinued    HIV screen  Discontinued    Prostate Specific Antigen (PSA) Screening or Monitoring  Discontinued       Hemoglobin A1C (%)   Date Value   08/28/2024 5.8   04/13/2023 5.5   12/10/2019 5.6             ( goal A1C is < 7)   No components found for: \"LABMICR\"  No components found for: \"LDLCHOLESTEROL\", \"LDLCALC\"    (goal LDL is <100)   AST (U/L)   Date Value   03/29/2023 26     ALT (U/L)   Date Value   03/29/2023 19     BUN (mg/dL)   Date Value   06/18/2024 15     BP Readings from Last 3 Encounters:   09/26/24 102/63   09/16/24 118/71   08/15/24 120/72

## 2024-10-14 ENCOUNTER — OFFICE VISIT (OUTPATIENT)
Dept: PODIATRY | Age: 69
End: 2024-10-14
Payer: MEDICARE

## 2024-10-14 VITALS
HEART RATE: 62 BPM | BODY MASS INDEX: 37.65 KG/M2 | HEIGHT: 65 IN | DIASTOLIC BLOOD PRESSURE: 67 MMHG | SYSTOLIC BLOOD PRESSURE: 120 MMHG | WEIGHT: 226 LBS

## 2024-10-14 DIAGNOSIS — I87.8 VENOUS STASIS OF BOTH LOWER EXTREMITIES: Primary | ICD-10-CM

## 2024-10-14 DIAGNOSIS — L97.821 VENOUS STASIS ULCER OF OTHER PART OF LEFT LOWER LEG LIMITED TO BREAKDOWN OF SKIN WITHOUT VARICOSE VEINS (HCC): ICD-10-CM

## 2024-10-14 DIAGNOSIS — I87.2 VENOUS STASIS ULCER OF OTHER PART OF LEFT LOWER LEG LIMITED TO BREAKDOWN OF SKIN WITHOUT VARICOSE VEINS (HCC): ICD-10-CM

## 2024-10-14 DIAGNOSIS — I73.9 PVD (PERIPHERAL VASCULAR DISEASE) (HCC): ICD-10-CM

## 2024-10-14 PROCEDURE — 99213 OFFICE O/P EST LOW 20 MIN: CPT

## 2024-10-14 PROCEDURE — 3074F SYST BP LT 130 MM HG: CPT

## 2024-10-14 PROCEDURE — 1036F TOBACCO NON-USER: CPT

## 2024-10-14 PROCEDURE — 1123F ACP DISCUSS/DSCN MKR DOCD: CPT

## 2024-10-14 PROCEDURE — 3078F DIAST BP <80 MM HG: CPT

## 2024-10-14 PROCEDURE — G8417 CALC BMI ABV UP PARAM F/U: HCPCS

## 2024-10-14 PROCEDURE — G8427 DOCREV CUR MEDS BY ELIG CLIN: HCPCS

## 2024-10-14 PROCEDURE — 3017F COLORECTAL CA SCREEN DOC REV: CPT

## 2024-10-14 PROCEDURE — G8482 FLU IMMUNIZE ORDER/ADMIN: HCPCS
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
## 2024-10-17 ENCOUNTER — HOSPITAL ENCOUNTER (OUTPATIENT)
Dept: OTHER | Age: 69
Discharge: HOME | End: 2024-10-17
Payer: MEDICARE

## 2024-10-17 VITALS
HEART RATE: 60 BPM | SYSTOLIC BLOOD PRESSURE: 110 MMHG | WEIGHT: 235 LBS | OXYGEN SATURATION: 98 % | DIASTOLIC BLOOD PRESSURE: 68 MMHG | RESPIRATION RATE: 18 BRPM | BODY MASS INDEX: 39.11 KG/M2

## 2024-10-17 PROCEDURE — 99212 OFFICE O/P EST SF 10 MIN: CPT

## 2024-10-17 ASSESSMENT — PAIN SCALES - GENERAL: PAINLEVEL_OUTOF10: 7

## 2024-10-17 ASSESSMENT — PAIN DESCRIPTION - DESCRIPTORS: DESCRIPTORS: ACHING

## 2024-10-17 ASSESSMENT — PAIN DESCRIPTION - LOCATION: LOCATION: BACK

## 2024-10-17 ASSESSMENT — PAIN DESCRIPTION - PAIN TYPE: TYPE: CHRONIC PAIN

## 2024-10-17 ASSESSMENT — PAIN DESCRIPTION - ONSET: ONSET: ON-GOING

## 2024-10-17 ASSESSMENT — PAIN DESCRIPTION - FREQUENCY: FREQUENCY: CONTINUOUS

## 2024-10-17 NOTE — PROGRESS NOTES
Date:  10/17/2024  Time:  11:22 AM    CHF Clinic at [facility]    Office: [phone] Fax: [phone]    Re:  Mickey Berg   Patient : 1955    Vital Signs: /68   Pulse 60   Resp 18   Wt 106.6 kg (235 lb)   SpO2 98%   BMI 39.11 kg/m²                       O2 Device: None (Room air)                           No results for input(s): \"CBC\", \"HGB\", \"HCT\", \"WBC\", \"PLATELET\", \"NA\", \"K\", \"CL\", \"CO2\", \"BUN\", \"CREATININE\", \"GLUCOSE\", \"BNP\", \"INR\" in the last 72 hours.     Respiratory:    Assessment  Charting Type: Reassessment    Breath Sounds  Right Upper Lobe: Clear  Right Middle Lobe: Clear  Right Lower Lobe: Diminished  Left Upper Lobe: Clear  Left Lower Lobe: Diminished    Cough/Sputum  Cough: None (pt denies)         Peripheral Vascular  RLE Edema: +1, Non-pitting  LLE Edema: +1, Non-pitting    Future Appointments   Date Time Provider Department Center   10/23/2024  3:45 PM Donte Rader DPM [facility] Podiatry [facility]   10/24/2024  3:30 PM Mich Sung MD [facility]   2024  1:20 PM Giorgio Morrison Jr., MD [facility] Urology [facility]   2024 11:00 AM [facility] CHF CLINIC  1 [facility] CHF [facility]      Complaints: Pt denies any concerns or complaints at this time    Comment : Pt arrived ambulatory using cane for follow-up appt. Pts wt 235 lbs, which is up 5 lbs from August appt. Pt denies SOB at rest or with exertion. Lungs clear, diminished bilateral bases. 1+ non-pitting edema noted to BLE.  Ankle and calf measurements obtained and documented. Pt states \"my legs have been looking pretty good lately.\" Pt with hx LE cellulitis which he is following with Podiatry for. Small scabbed area noted to LLE. No open sores seen. No s/s acute CHF noted at this time. Home meds reviewed. Pt verbalized compliance with medications, 2000 mg low sodium diet and 64 oz fluid restriction. Pt stated he does go to a senior center near his home every day for lunch and he is unsure

## 2024-10-23 ENCOUNTER — OFFICE VISIT (OUTPATIENT)
Dept: PODIATRY | Age: 69
End: 2024-10-23
Payer: MEDICARE

## 2024-10-23 VITALS — WEIGHT: 235 LBS | BODY MASS INDEX: 39.15 KG/M2 | HEIGHT: 65 IN

## 2024-10-23 DIAGNOSIS — I83.009 VENOUS ULCER (HCC): Primary | ICD-10-CM

## 2024-10-23 DIAGNOSIS — L97.909 VENOUS ULCER (HCC): Primary | ICD-10-CM

## 2024-10-23 PROCEDURE — G8427 DOCREV CUR MEDS BY ELIG CLIN: HCPCS

## 2024-10-23 PROCEDURE — 3017F COLORECTAL CA SCREEN DOC REV: CPT

## 2024-10-23 PROCEDURE — 1123F ACP DISCUSS/DSCN MKR DOCD: CPT

## 2024-10-23 PROCEDURE — G8482 FLU IMMUNIZE ORDER/ADMIN: HCPCS

## 2024-10-23 PROCEDURE — 99213 OFFICE O/P EST LOW 20 MIN: CPT

## 2024-10-23 PROCEDURE — G8417 CALC BMI ABV UP PARAM F/U: HCPCS

## 2024-10-23 PROCEDURE — 1036F TOBACCO NON-USER: CPT

## 2024-10-23 NOTE — PROGRESS NOTES
Patient instructed to remove shoes and socks and instructed to sit in exam chair.  Current PCP is Mich Sung MD and date of last visit was 09/26/2024.   Do you have a follow up visit scheduled?  Yes  If yes, the date is 10/24/2024    
   Hypertension     Ischemic cardiomyopathy     Myocardial infarct (HCC)     Obesity     Overactive bladder     Sleep apnea treated with continuous positive airway pressure (CPAP)     pt does not have cpap    Under care of team 12/26/2023    Urology, Dr. Morrison, last seen 3/10/2023    Under care of team 12/26/2023    GI, dr. Johnston, last seen 11/2023    Under care of team 12/26/2023    Cardiology, [facility], Dr. Conde, last seen 2023    Wears reading eyeglasses     Wellness examination     PCP, Dr. Sung , has appointment 4/13/2023       Surgical History:   Past Surgical History:   Procedure Laterality Date    CARPAL TUNNEL RELEASE Left 09/11/2006    [facility]    CARPAL TUNNEL RELEASE Right 01/30/2007    [facility]    CATARACT EXTRACTION Bilateral 2020    ? IOL's    CERVICAL SPINE SURGERY      anterior and posterior fusion C 2- C5 ;  ? [facility] , Pt thinks 2018/2019 but I can not find it    COLONOSCOPY N/A 10/29/2020    COLONOSCOPY WITH BIOPSY performed by Kwame Johnston MD at [facility] OR    COLONOSCOPY  10/29/2020    COLONOSCOPY SUBMUCOSAL/BOTOX INJECTION performed by Kwame Johnston MD at [facility] OR    COLONOSCOPY  10/29/2020    COLONOSCOPY POLYPECTOMY SNARE/COLD BIOPSY performed by Kwame Johnston MD at [facility] OR    COLONOSCOPY N/A 01/12/2021    COLONOSCOPY POLYPECTOMY HOT SNARE, COLD SNARE POLPECTOMY performed by Liset Riggs MD at [facility] OR    CORONARY ARTERY BYPASS GRAFT MAZE PROCEDURE N/A 10/21/2019    CABG X1, LIMA-LAD, BROWN 4 MAZE PROCEDURE, 50MM ATRICURE ATRIAL CLIP RIGID INTERNAL FIXATION PLATES X 3   SCREWS X 13 performed by Vinay Perkins MD at [facility] CVOR    CYSTOGRAPHY N/A 03/26/2021    URODYNAMICS performed by Giorgio Morrison Jr., MD at [facility] OR    CYSTOSCOPY  03/26/2021    CYSTOSCOPY FLEXIBLE performed by Giorgio Morrison Jr., MD at [facility] OR    CYSTOSCOPY N/A 05/14/2021    CYSTOSCOPY, UROLIFT, FULGURATION performed by Giorgio Morrison Jr., MD at [facility] OR    CYSTOSCOPY  12/28/2023    CYSTOSCOPY WITH BOTOX

## 2024-10-24 ENCOUNTER — OFFICE VISIT (OUTPATIENT)
Dept: FAMILY MEDICINE CLINIC | Age: 69
End: 2024-10-24

## 2024-10-24 VITALS
HEIGHT: 65 IN | OXYGEN SATURATION: 97 % | HEART RATE: 71 BPM | BODY MASS INDEX: 38.09 KG/M2 | WEIGHT: 228.6 LBS | DIASTOLIC BLOOD PRESSURE: 59 MMHG | SYSTOLIC BLOOD PRESSURE: 110 MMHG | TEMPERATURE: 97.2 F

## 2024-10-24 DIAGNOSIS — I50.32 CHRONIC HEART FAILURE WITH PRESERVED EJECTION FRACTION (HCC): Primary | ICD-10-CM

## 2024-10-24 DIAGNOSIS — R60.0 LOWER LEG EDEMA: ICD-10-CM

## 2024-10-24 ASSESSMENT — ENCOUNTER SYMPTOMS
ABDOMINAL PAIN: 0
COUGH: 0
COLOR CHANGE: 1
SHORTNESS OF BREATH: 0

## 2024-10-24 NOTE — PATIENT INSTRUCTIONS
Thank you for letting us take care of you today. We hope all your questions were addressed. If a question was overlooked or something else comes to mind after you return home, please contact a member of your Care Team listed below.      Your Care Team at [facility] is Team #2  Mich Sung M.D. (Faculty)  Carlo Leblanc M.D. (Resident)  Nicol Tinoco M.D. (Resident)  River Webber M.D. (Resident)  Regla Sherwood M.D. (Resident)  Makayla Becker M.D. (Resident)  Milo Villafuerte, [facility]  Sharon Olmos, NITIN [place], [facility]  Caty Clark,  [facility]  Lesli Núñez, [facility]  Terese Huitron, NITIN [place], [facility]  Federico (LJ) Sam NITIN (Clinical Practice Manager)  Sophie Becerra [facility] (Clinical Pharmacist)     Office phone number: [phone]    If you need to get in right away due to illness, please be advised we have \"Same Day\" appointments available Monday-Friday. Please call us at [phone] option #3 to schedule your \"Same Day\" appointment.

## 2024-10-24 NOTE — PROGRESS NOTES
B vaccine  Aged Out    Hib vaccine  Aged Out    Polio vaccine  Aged Out    Meningococcal (ACWY) vaccine  Aged Out    Pneumococcal 0-64 years Vaccine  Discontinued    Diabetes screen  Discontinued    HIV screen  Discontinued    Prostate Specific Antigen (PSA) Screening or Monitoring  Discontinued               
06/18/2024    CO2 25 06/18/2024    TSH 1.71 06/09/2023    PSA 0.49 08/12/2022    INR 1.0 03/08/2021    LABA1C 5.8 08/28/2024     Lab Results   Component Value Date    CALCIUM 9.0 06/18/2024    PHOS 4.0 03/08/2021     No results found for: \"LDLDIRECT\"    Assessment and Plan:    1. Chronic heart failure with preserved ejection fraction (HCC)  - DME Order for Walker as OP    2. Lower leg edema  - DME Order for [place] as OP          Requested Prescriptions      No prescriptions requested or ordered in this encounter       There are no discontinued medications.    Mickey received counseling on the following healthy behaviors: nutrition, exercise and medication adherence    Discussed use,benefit, and side effects of prescribed medications.  Barriers to medication compliance addressed.      All patient questions answered.  Pt voiced understanding.     Return in about 3 months (around 1/24/2025).        Disclaimer: Some orall of this note was transcribed using voice-recognition software.This may cause typographical errors occasionally. Although all effort is made to fix these errors, please do not hesitate to contact our office if there isany concern with the understanding of this note.

## 2024-10-28 NOTE — DISCHARGE INSTRUCTIONS
[facility] -Phone: [phone] Fax: [phone]   Visit  Discharge Instructions / Physician Orders    DATE: 10/30/2024     Home Care:      SUPPLIES ORDERED THRU:      Wound Location: Bilateral Lower Legs     Cleanse with: Keep Dry and Intact     Dressing Orders: Silvercel, KerraMax, 3 Layer Compression wrap     Frequency: Keep Dry and Intact     Additional Orders: Increase protein to diet (meat, cheese, eggs, fish, peanut butter, nuts and beans)  ELEVATE LEGS AS MUCH AS POSSIBLE  Vascular Studies have been ordered, Please Call [phone] to schedule an appointment at your earliest convenience.     Your next appointment with Wound Care Center is in 1 week     (Please note your next appointment above and if you are unable to keep, kindly give a 24 hour notice. Thank you.)  If more than 15 min late we cannot guarantee you will be seen due to clinician schedule  Per Policy, Excessive cancellation will call for dismissal from program.     If you experience any of the following, please call the Wound Care Center during business hours:  [phone]     * Increase in Pain  * Temperature over 101  * Increase in drainage from your wound  * Drainage with a foul odor  * Bleeding  * Increase in swelling  * Need for compression bandage changes due to slippage, breakthrough drainage.     If you need medical attention outside of the business hours of the Wound Care Centers please contact your PCP or go to the an urgent care or emergency department     The information contained in the After Visit Summary has been reviewed with me, the patient and/or responsible adult, by my health care provider(s). I had the opportunity to ask questions regarding this information. I have elected to receive;      []After Visit Summary  [x]Comprehensive Discharge Instruction      Patient signature______________________________________Date:________  Electronically signed by Christiano Ferraro RN on 10/30/2024 at 9:47

## 2024-10-30 ENCOUNTER — HOSPITAL ENCOUNTER (OUTPATIENT)
Dept: WOUND CARE | Age: 69
Discharge: HOME | End: 2024-10-30
Payer: MEDICARE

## 2024-10-30 VITALS
DIASTOLIC BLOOD PRESSURE: 72 MMHG | RESPIRATION RATE: 16 BRPM | TEMPERATURE: 97.1 F | HEART RATE: 71 BPM | SYSTOLIC BLOOD PRESSURE: 105 MMHG

## 2024-10-30 DIAGNOSIS — I83.022 VENOUS STASIS ULCER OF LEFT CALF WITH FAT LAYER EXPOSED, UNSPECIFIED WHETHER VARICOSE VEINS PRESENT (HCC): ICD-10-CM

## 2024-10-30 DIAGNOSIS — L97.222 VENOUS STASIS ULCER OF LEFT CALF WITH FAT LAYER EXPOSED, UNSPECIFIED WHETHER VARICOSE VEINS PRESENT (HCC): ICD-10-CM

## 2024-10-30 DIAGNOSIS — L97.212 VENOUS STASIS ULCER OF RIGHT CALF WITH FAT LAYER EXPOSED, UNSPECIFIED WHETHER VARICOSE VEINS PRESENT (HCC): Primary | ICD-10-CM

## 2024-10-30 DIAGNOSIS — M79.89 LEG SWELLING: ICD-10-CM

## 2024-10-30 DIAGNOSIS — I83.012 VENOUS STASIS ULCER OF RIGHT CALF WITH FAT LAYER EXPOSED, UNSPECIFIED WHETHER VARICOSE VEINS PRESENT (HCC): Primary | ICD-10-CM

## 2024-10-30 PROCEDURE — 99214 OFFICE O/P EST MOD 30 MIN: CPT

## 2024-10-30 PROCEDURE — 11042 DBRDMT SUBQ TIS 1ST 20SQCM/<: CPT

## 2024-10-30 PROCEDURE — 11042 DBRDMT SUBQ TIS 1ST 20SQCM/<: CPT | Performed by: STUDENT IN AN ORGANIZED HEALTH CARE EDUCATION/TRAINING PROGRAM

## 2024-10-30 RX ORDER — MUPIROCIN 20 MG/G
OINTMENT TOPICAL ONCE
OUTPATIENT
Start: 2024-10-30 | End: 2024-10-30

## 2024-10-30 RX ORDER — CLOBETASOL PROPIONATE 0.5 MG/G
OINTMENT TOPICAL ONCE
OUTPATIENT
Start: 2024-10-30 | End: 2024-10-30

## 2024-10-30 RX ORDER — BACITRACIN ZINC AND POLYMYXIN B SULFATE 500; 1000 [USP'U]/G; [USP'U]/G
OINTMENT TOPICAL ONCE
OUTPATIENT
Start: 2024-10-30 | End: 2024-10-30

## 2024-10-30 RX ORDER — SODIUM CHLOR/HYPOCHLOROUS ACID 0.033 %
SOLUTION, IRRIGATION IRRIGATION ONCE
OUTPATIENT
Start: 2024-10-30 | End: 2024-10-30

## 2024-10-30 RX ORDER — SILVER SULFADIAZINE 10 MG/G
CREAM TOPICAL ONCE
OUTPATIENT
Start: 2024-10-30 | End: 2024-10-30

## 2024-10-30 RX ORDER — LIDOCAINE 40 MG/G
CREAM TOPICAL ONCE
OUTPATIENT
Start: 2024-10-30 | End: 2024-10-30

## 2024-10-30 RX ORDER — LIDOCAINE HYDROCHLORIDE 20 MG/ML
JELLY TOPICAL ONCE
OUTPATIENT
Start: 2024-10-30 | End: 2024-10-30

## 2024-10-30 RX ORDER — LIDOCAINE HYDROCHLORIDE 40 MG/ML
SOLUTION TOPICAL ONCE
OUTPATIENT
Start: 2024-10-30 | End: 2024-10-30

## 2024-10-30 RX ORDER — BETAMETHASONE DIPROPIONATE 0.5 MG/G
CREAM TOPICAL ONCE
OUTPATIENT
Start: 2024-10-30 | End: 2024-10-30

## 2024-10-30 RX ORDER — TRIAMCINOLONE ACETONIDE 1 MG/G
OINTMENT TOPICAL ONCE
OUTPATIENT
Start: 2024-10-30 | End: 2024-10-30

## 2024-10-30 RX ORDER — NEOMYCIN/BACITRACIN/POLYMYXINB 3.5-400-5K
OINTMENT (GRAM) TOPICAL ONCE
OUTPATIENT
Start: 2024-10-30 | End: 2024-10-30

## 2024-10-30 RX ORDER — GENTAMICIN SULFATE 1 MG/G
OINTMENT TOPICAL ONCE
OUTPATIENT
Start: 2024-10-30 | End: 2024-10-30

## 2024-10-30 RX ORDER — LIDOCAINE 50 MG/G
OINTMENT TOPICAL ONCE
OUTPATIENT
Start: 2024-10-30 | End: 2024-10-30

## 2024-10-30 RX ORDER — GINSENG 100 MG
CAPSULE ORAL ONCE
OUTPATIENT
Start: 2024-10-30 | End: 2024-10-30

## 2024-10-30 RX ORDER — LIDOCAINE HYDROCHLORIDE 20 MG/ML
JELLY TOPICAL ONCE
Status: COMPLETED | OUTPATIENT
Start: 2024-10-30 | End: 2024-10-30

## 2024-10-30 RX ADMIN — LIDOCAINE HYDROCHLORIDE 6 ML: 20 JELLY TOPICAL at 09:53

## 2024-10-30 ASSESSMENT — PAIN DESCRIPTION - DESCRIPTORS: DESCRIPTORS: SORE

## 2024-10-30 ASSESSMENT — PAIN DESCRIPTION - LOCATION: LOCATION: LEG

## 2024-10-30 ASSESSMENT — PAIN SCALES - GENERAL: PAINLEVEL_OUTOF10: 2

## 2024-10-30 ASSESSMENT — PAIN DESCRIPTION - ORIENTATION: ORIENTATION: LEFT

## 2024-10-30 NOTE — PROGRESS NOTES
Multilayer Compression Wrap   (Not Unna) Below the Knee    NAME:  Mickey Berg  YOB: 1955  MEDICAL RECORD NUMBER:  [id number]  DATE:  10/30/2024    Multilayer compression wrap: Removed old Multilayer wrap if indicated and wash leg with mild soap/water.  Applied moisturizing agent to dry skin as needed.   Applied primary and secondary dressing as ordered.  Applied multilayered dressing below the knee to right lower leg.  Applied multilayered dressing below the knee to left lower leg.  Instructed patient/caregiver not to remove dressing and to keep it clean and dry.   Instructed patient/caregiver on complications to report to provider, such as pain, numbness in toes, heavy drainage, and slippage of dressing.  Instructed patient on purpose of compression dressing and on activity and exercise recommendations.      Electronically signed by Jaret Rehman RN on 10/30/2024 at 9:53 AM  
  Post-Procedure Length (cm) 0.9 cm 10/30/24 0937   Post-Procedure Width (cm) 0.4 cm 10/30/24 0937   Post-Procedure Depth (cm) 0.1 cm 10/30/24 0937   Post-Procedure Surface Area (cm^2) 0.36 cm^2 10/30/24 0937   Post-Procedure Volume (cm^3) 0.036 cm^3 10/30/24 0937   Wound Assessment Devitalized tissue 10/30/24 0937   Drainage Amount Moderate (25-50%) 10/30/24 0937   Drainage Description Serosanguinous 10/30/24 0937   Odor None 10/30/24 0937   Jennifer-wound Assessment Blanchable erythema 10/30/24 0937   Margins Defined edges 10/30/24 0937   Wound Thickness Description not for Pressure Injury Full thickness 10/30/24 0937   Number of days: 0          Percent of Wound(s)/Ulcer(s) Debrided: 100%    Total Surface Area Debrided:  1.68 sq cm     Estimated Blood Loss:  Minimal    Hemostasis Achieved:  by pressure    Procedural Pain:  2  / 10     Post Procedural Pain:  1 / 10     Response to treatment:  Well tolerated by patient.       Plan:     Will wrap with 3 layer wrap. Will order venous reflux study.    Treatment Note please see Discharge Instructions    Written patient dismissal instructions given to patient and signed by patient or POA.             Electronically signed by GHADA HOLM MD on 10/30/2024 at 9:50 AM

## 2024-10-31 NOTE — DISCHARGE INSTRUCTIONS
[facility] -Phone: [phone] Fax: [phone]    Visit  Discharge Instructions / Physician Orders     DATE: 11/6/2024     Home Care:      SUPPLIES ORDERED THRU:      Wound Location: Bilateral Lower Legs     Cleanse with: Keep Dry and Intact     Dressing Orders: 3 Layer Compression wrap     Frequency: Keep Dry and Intact     Additional Orders: Increase protein to diet (meat, cheese, eggs, fish, peanut butter, nuts and beans)  ELEVATE LEGS AS MUCH AS POSSIBLE  Vascular Studies have been ordered, Please Call [phone] to schedule an appointment at your earliest convenience.      Your next appointment with Wound Care Center is in 1 week     (Please note your next appointment above and if you are unable to keep, kindly give a 24 hour notice. Thank you.)  If more than 15 min late we cannot guarantee you will be seen due to clinician schedule  Per Policy, Excessive cancellation will call for dismissal from program.     If you experience any of the following, please call the Wound Care Center during business hours:  [phone]     * Increase in Pain  * Temperature over 101  * Increase in drainage from your wound  * Drainage with a foul odor  * Bleeding  * Increase in swelling  * Need for compression bandage changes due to slippage, breakthrough drainage.     If you need medical attention outside of the business hours of the Wound Care Centers please contact your PCP or go to the an urgent care or emergency department     The information contained in the After Visit Summary has been reviewed with me, the patient and/or responsible adult, by my health care provider(s). I had the opportunity to ask questions regarding this information. I have elected to receive;      []After Visit Summary  [x]Comprehensive Discharge Instruction        Patient signature______________________________________Date:________  Electronically signed by Christiano Ferraro RN on 11/6/2024 at 9:05 AM  Electronically signed by

## 2024-11-06 ENCOUNTER — HOSPITAL ENCOUNTER (OUTPATIENT)
Dept: WOUND CARE | Age: 69
Discharge: HOME | End: 2024-11-06
Payer: MEDICARE

## 2024-11-06 VITALS
SYSTOLIC BLOOD PRESSURE: 121 MMHG | WEIGHT: 228 LBS | BODY MASS INDEX: 37.99 KG/M2 | HEART RATE: 67 BPM | HEIGHT: 65 IN | TEMPERATURE: 97 F | DIASTOLIC BLOOD PRESSURE: 74 MMHG | RESPIRATION RATE: 18 BRPM

## 2024-11-06 DIAGNOSIS — I83.022 VENOUS STASIS ULCER OF LEFT CALF WITH FAT LAYER EXPOSED, UNSPECIFIED WHETHER VARICOSE VEINS PRESENT (HCC): Primary | ICD-10-CM

## 2024-11-06 DIAGNOSIS — N39.490 OVERFLOW INCONTINENCE OF URINE: ICD-10-CM

## 2024-11-06 DIAGNOSIS — L97.222 VENOUS STASIS ULCER OF LEFT CALF WITH FAT LAYER EXPOSED, UNSPECIFIED WHETHER VARICOSE VEINS PRESENT (HCC): Primary | ICD-10-CM

## 2024-11-06 DIAGNOSIS — I83.012 VENOUS STASIS ULCER OF RIGHT CALF WITH FAT LAYER EXPOSED, UNSPECIFIED WHETHER VARICOSE VEINS PRESENT (HCC): ICD-10-CM

## 2024-11-06 DIAGNOSIS — L97.212 VENOUS STASIS ULCER OF RIGHT CALF WITH FAT LAYER EXPOSED, UNSPECIFIED WHETHER VARICOSE VEINS PRESENT (HCC): ICD-10-CM

## 2024-11-06 PROCEDURE — 29581 APPL MULTLAYER CMPRN SYS LEG: CPT

## 2024-11-06 PROCEDURE — 99212 OFFICE O/P EST SF 10 MIN: CPT | Performed by: STUDENT IN AN ORGANIZED HEALTH CARE EDUCATION/TRAINING PROGRAM

## 2024-11-06 RX ORDER — TRIAMCINOLONE ACETONIDE 1 MG/G
OINTMENT TOPICAL ONCE
OUTPATIENT
Start: 2024-11-06 | End: 2024-11-06

## 2024-11-06 RX ORDER — GENTAMICIN SULFATE 1 MG/G
OINTMENT TOPICAL ONCE
OUTPATIENT
Start: 2024-11-06 | End: 2024-11-06

## 2024-11-06 RX ORDER — LIDOCAINE HYDROCHLORIDE 40 MG/ML
SOLUTION TOPICAL ONCE
OUTPATIENT
Start: 2024-11-06 | End: 2024-11-06

## 2024-11-06 RX ORDER — BETAMETHASONE DIPROPIONATE 0.5 MG/G
CREAM TOPICAL ONCE
OUTPATIENT
Start: 2024-11-06 | End: 2024-11-06

## 2024-11-06 RX ORDER — GINSENG 100 MG
CAPSULE ORAL ONCE
OUTPATIENT
Start: 2024-11-06 | End: 2024-11-06

## 2024-11-06 RX ORDER — LIDOCAINE 40 MG/G
CREAM TOPICAL ONCE
OUTPATIENT
Start: 2024-11-06 | End: 2024-11-06

## 2024-11-06 RX ORDER — MUPIROCIN 20 MG/G
OINTMENT TOPICAL ONCE
OUTPATIENT
Start: 2024-11-06 | End: 2024-11-06

## 2024-11-06 RX ORDER — LIDOCAINE HYDROCHLORIDE 20 MG/ML
JELLY TOPICAL ONCE
Status: DISCONTINUED | OUTPATIENT
Start: 2024-11-06 | End: 2024-11-07 | Stop reason: HOSPADM

## 2024-11-06 RX ORDER — LIDOCAINE HYDROCHLORIDE 20 MG/ML
JELLY TOPICAL ONCE
OUTPATIENT
Start: 2024-11-06 | End: 2024-11-06

## 2024-11-06 RX ORDER — SODIUM CHLOR/HYPOCHLOROUS ACID 0.033 %
SOLUTION, IRRIGATION IRRIGATION ONCE
OUTPATIENT
Start: 2024-11-06 | End: 2024-11-06

## 2024-11-06 RX ORDER — CLOBETASOL PROPIONATE 0.5 MG/G
OINTMENT TOPICAL ONCE
OUTPATIENT
Start: 2024-11-06 | End: 2024-11-06

## 2024-11-06 RX ORDER — BACITRACIN ZINC AND POLYMYXIN B SULFATE 500; 1000 [USP'U]/G; [USP'U]/G
OINTMENT TOPICAL ONCE
OUTPATIENT
Start: 2024-11-06 | End: 2024-11-06

## 2024-11-06 RX ORDER — NEOMYCIN/BACITRACIN/POLYMYXINB 3.5-400-5K
OINTMENT (GRAM) TOPICAL ONCE
OUTPATIENT
Start: 2024-11-06 | End: 2024-11-06

## 2024-11-06 RX ORDER — TAMSULOSIN HYDROCHLORIDE 0.4 MG/1
0.4 CAPSULE ORAL DAILY
Qty: 30 CAPSULE | Refills: 5 | OUTPATIENT
Start: 2024-11-06

## 2024-11-06 RX ORDER — LIDOCAINE 50 MG/G
OINTMENT TOPICAL ONCE
OUTPATIENT
Start: 2024-11-06 | End: 2024-11-06

## 2024-11-06 RX ORDER — SILVER SULFADIAZINE 10 MG/G
CREAM TOPICAL ONCE
OUTPATIENT
Start: 2024-11-06 | End: 2024-11-06

## 2024-11-06 NOTE — PROGRESS NOTES
Multilayer Compression Wrap   (Not Unna) Below the Knee    NAME:  Mickey Berg  YOB: 1955  MEDICAL RECORD NUMBER:  [id number]  DATE:  11/6/2024    Multilayer compression wrap: Removed old Multilayer wrap if indicated and wash leg with mild soap/water.  Applied moisturizing agent to dry skin as needed.   Applied primary and secondary dressing as ordered.  Applied multilayered dressing below the knee to right lower leg.  Applied multilayered dressing below the knee to left lower leg.  Instructed patient/caregiver not to remove dressing and to keep it clean and dry.   Instructed patient/caregiver on complications to report to provider, such as pain, numbness in toes, heavy drainage, and slippage of dressing.  Instructed patient on purpose of compression dressing and on activity and exercise recommendations.      Electronically signed by Meredith Crespo RN on 11/6/2024 at 9:16 AM   
(cm^3) 0 cm^3 11/06/24 0826   Wound Assessment Devitalized tissue 10/30/24 0937   Drainage Amount Moderate (25-50%) 10/30/24 0937   Drainage Description Serosanguinous 10/30/24 0937   Odor None 10/30/24 0937   Jennifer-wound Assessment Blanchable erythema 10/30/24 0937   Margins Defined edges 10/30/24 0937   Wound Thickness Description not for Pressure Injury Full thickness 10/30/24 0937   Number of days: 7     Wound has healed      Plan:     3 layer wrap for one more week.    Treatment Note please see Discharge Instructions    Written patient dismissal instructions given to patient and signed by patient or POA.             Electronically signed by GHADA HOLM MD on 11/6/2024 at 9:13 AM

## 2024-11-08 ENCOUNTER — OFFICE VISIT (OUTPATIENT)
Dept: UROLOGY | Age: 69
End: 2024-11-08
Payer: MEDICARE

## 2024-11-08 VITALS — HEART RATE: 49 BPM | DIASTOLIC BLOOD PRESSURE: 64 MMHG | SYSTOLIC BLOOD PRESSURE: 111 MMHG

## 2024-11-08 DIAGNOSIS — N39.41 URGE INCONTINENCE: Primary | ICD-10-CM

## 2024-11-08 DIAGNOSIS — N32.81 OVERACTIVE BLADDER: ICD-10-CM

## 2024-11-08 LAB
BILIRUBIN, POC: ABNORMAL
BLOOD URINE, POC: ABNORMAL
CLARITY, POC: CLEAR
COLOR, POC: YELLOW
GLUCOSE URINE, POC: 100 MG/DL
KETONES, POC: ABNORMAL MG/DL
LEUKOCYTE EST, POC: ABNORMAL
NITRITE, POC: ABNORMAL
PH, POC: 6
PROTEIN, POC: ABNORMAL MG/DL
SPECIFIC GRAVITY, POC: 1.02
UROBILINOGEN, POC: 1 MG/DL

## 2024-11-08 PROCEDURE — 81002 URINALYSIS NONAUTO W/O SCOPE: CPT | Performed by: UROLOGY

## 2024-11-08 NOTE — PROGRESS NOTES
History     Substance and Sexual Activity   Alcohol Use Not Currently    Comment: stopped 1991       REVIEW OF SYSTEMS:  Constitutional: negative  Eyes: negative  Respiratory: negative  Cardiovascular: negative  Gastrointestinal: negative  Musculoskeletal: negative  Genitourinary: see HPI  Skin: negative   Neurological: negative  Hematological/Lymphatic: negative  Psychological: negative    Physical Exam:      Vitals:    11/08/24 1308   BP: 111/64   Pulse: (!) 49     NAD, no acute distress  RR  Psych mood appropriate  Abdomen: soft, nontender, nondistended, obese   Extremities: compression socks in place, edema noted     Assessment and Plan   1.  Urge continence  2.  Overactive bladder       Plan:   Plan for cystoscopy with Botox 200u in 6 months    Continue oxybutynin 10mg ER  BPH improved s/p UroLift combo with flomax  Continue timed voiding and urethral stripping    Dr. Giorgio Morrison Jr, MD, MD

## 2024-11-11 NOTE — DISCHARGE INSTRUCTIONS
[facility] -Phone: [phone] Fax: [phone]    Visit  Discharge Instructions / Physician Orders     DATE: 11/13/2024     Home Care:      SUPPLIES ORDERED THRU:      Wound Location: Bilateral Lower Legs     Cleanse with: As Normal     Dressing Orders: Compression Stockings on during the day, off at night while in bed     Frequency:      Additional Orders: Increase protein to diet (meat, cheese, eggs, fish, peanut butter, nuts and beans)  ELEVATE LEGS AS MUCH AS POSSIBLE  Vascular Studies have been ordered, Please Call [phone] to schedule an appointment at your earliest convenience.      Your next appointment with Wound Care Center is- follow up with Dr. Rico in his office     (Please note your next appointment above and if you are unable to keep, kindly give a 24 hour notice. Thank you.)  If more than 15 min late we cannot guarantee you will be seen due to clinician schedule  Per Policy, Excessive cancellation will call for dismissal from program.     If you experience any of the following, please call the Wound Care Center during business hours:  [phone]     * Increase in Pain  * Temperature over 101  * Increase in drainage from your wound  * Drainage with a foul odor  * Bleeding  * Increase in swelling  * Need for compression bandage changes due to slippage, breakthrough drainage.     If you need medical attention outside of the business hours of the Wound Care Centers please contact your PCP or go to the an urgent care or emergency department     The information contained in the After Visit Summary has been reviewed with me, the patient and/or responsible adult, by my health care provider(s). I had the opportunity to ask questions regarding this information. I have elected to receive;      []After Visit Summary  [x]Comprehensive Discharge Instruction        Patient signature______________________________________Date:________  Electronically signed by Christiano Ferraro RN on

## 2024-11-12 ENCOUNTER — HOSPITAL ENCOUNTER (OUTPATIENT)
Dept: OTHER | Age: 69
Discharge: HOME | End: 2024-11-12
Payer: MEDICARE

## 2024-11-12 VITALS
SYSTOLIC BLOOD PRESSURE: 110 MMHG | OXYGEN SATURATION: 97 % | WEIGHT: 226.6 LBS | HEART RATE: 64 BPM | DIASTOLIC BLOOD PRESSURE: 74 MMHG | BODY MASS INDEX: 37.71 KG/M2

## 2024-11-12 PROCEDURE — 99212 OFFICE O/P EST SF 10 MIN: CPT

## 2024-11-12 NOTE — PROGRESS NOTES
Date:  2024  Time:  11:03 AM    [facility] Clinic at [facility]    Office: [phone] Fax: [phone]    Re:  Mickey Berg   Patient : 1955    Vital Signs: /74   Pulse 64   Wt 102.8 kg (226 lb 9.6 oz)   SpO2 97%   BMI 37.71 kg/m²                       O2 Device: None (Room air)                           No results for input(s): \"CBC\", \"HGB\", \"HCT\", \"WBC\", \"PLATELET\", \"NA\", \"K\", \"CL\", \"CO2\", \"BUN\", \"CREATININE\", \"GLUCOSE\", \"BNP\", \"INR\" in the last 72 hours.     Respiratory:    Assessment  Charting Type: Reassessment    Breath Sounds  Right Upper Lobe: Clear  Right Middle Lobe: Clear  Right Lower Lobe: Clear  Left Upper Lobe: Clear  Left Lower Lobe: Clear    Cough/Sputum  Cough: None         Peripheral Vascular  RLE Edema: Unable to Assess (Compression wraps noted from toes to below the knee d/t cellulitis)  LLE Edema: Unable to Assess (Compression wraps noted from toes to below the knee d/t cellulitis)    Future Appointments   Date Time Provider Department Center   2024  9:00 AM Dior Rico MD [facility]   2024 11:00 AM [facility]  1 [facility] CHF I [facility]   3/4/2025  9:30 AM Jono Conde MD [facility]      Complaints: Pt denies any concerns or complaints at this time    Comment : Pt arrived ambulatory using cane for follow-up appt. Pt stated he had a fall about 2 weeks ago in the yard and \"really messed up\" his Rt leg. Pt denies seeking medical attention post fall. Pts wt 226.6 lbs, which is down 9 lbs from October appt. Lungs clear. Pt denies SOB at rest and with exertion. Pt being treated at [facility] for BLE cellulits. Unable to assess edema d/t compression ace wraps bilaterally. Pt stated swelling \"is much better than it was.\" Home meds reviewed. Pt verbalized compliance with medications and 64 oz fluid restrictions. He eats at the [facility] Monday-Friday for lunch so it is difficult to monitor

## 2024-11-13 ENCOUNTER — PREP FOR PROCEDURE (OUTPATIENT)
Dept: UROLOGY | Age: 69
End: 2024-11-13

## 2024-11-13 ENCOUNTER — HOSPITAL ENCOUNTER (OUTPATIENT)
Dept: WOUND CARE | Age: 69
Discharge: HOME | End: 2024-11-13
Payer: MEDICARE

## 2024-11-13 VITALS
SYSTOLIC BLOOD PRESSURE: 103 MMHG | HEART RATE: 67 BPM | TEMPERATURE: 97.4 F | RESPIRATION RATE: 16 BRPM | DIASTOLIC BLOOD PRESSURE: 59 MMHG

## 2024-11-13 DIAGNOSIS — L97.212 VENOUS STASIS ULCER OF RIGHT CALF WITH FAT LAYER EXPOSED, UNSPECIFIED WHETHER VARICOSE VEINS PRESENT (HCC): ICD-10-CM

## 2024-11-13 DIAGNOSIS — I83.022 VENOUS STASIS ULCER OF LEFT CALF WITH FAT LAYER EXPOSED, UNSPECIFIED WHETHER VARICOSE VEINS PRESENT (HCC): Primary | ICD-10-CM

## 2024-11-13 DIAGNOSIS — I83.012 VENOUS STASIS ULCER OF RIGHT CALF WITH FAT LAYER EXPOSED, UNSPECIFIED WHETHER VARICOSE VEINS PRESENT (HCC): ICD-10-CM

## 2024-11-13 DIAGNOSIS — L97.222 VENOUS STASIS ULCER OF LEFT CALF WITH FAT LAYER EXPOSED, UNSPECIFIED WHETHER VARICOSE VEINS PRESENT (HCC): Primary | ICD-10-CM

## 2024-11-13 PROBLEM — R32 INCONTINENCE OF URINE: Status: ACTIVE | Noted: 2024-11-13

## 2024-11-13 PROCEDURE — 99212 OFFICE O/P EST SF 10 MIN: CPT

## 2024-11-13 PROCEDURE — 99212 OFFICE O/P EST SF 10 MIN: CPT | Performed by: STUDENT IN AN ORGANIZED HEALTH CARE EDUCATION/TRAINING PROGRAM

## 2024-11-13 RX ORDER — BETAMETHASONE DIPROPIONATE 0.5 MG/G
CREAM TOPICAL ONCE
OUTPATIENT
Start: 2024-11-13 | End: 2024-11-13

## 2024-11-13 RX ORDER — LIDOCAINE HYDROCHLORIDE 20 MG/ML
JELLY TOPICAL ONCE
OUTPATIENT
Start: 2024-11-13 | End: 2024-11-13

## 2024-11-13 RX ORDER — LIDOCAINE 40 MG/G
CREAM TOPICAL ONCE
OUTPATIENT
Start: 2024-11-13 | End: 2024-11-13

## 2024-11-13 RX ORDER — LIDOCAINE 50 MG/G
OINTMENT TOPICAL ONCE
OUTPATIENT
Start: 2024-11-13 | End: 2024-11-13

## 2024-11-13 RX ORDER — LIDOCAINE HYDROCHLORIDE 40 MG/ML
SOLUTION TOPICAL ONCE
OUTPATIENT
Start: 2024-11-13 | End: 2024-11-13

## 2024-11-13 RX ORDER — TRIAMCINOLONE ACETONIDE 1 MG/G
OINTMENT TOPICAL ONCE
OUTPATIENT
Start: 2024-11-13 | End: 2024-11-13

## 2024-11-13 RX ORDER — NEOMYCIN/BACITRACIN/POLYMYXINB 3.5-400-5K
OINTMENT (GRAM) TOPICAL ONCE
OUTPATIENT
Start: 2024-11-13 | End: 2024-11-13

## 2024-11-13 RX ORDER — MUPIROCIN 20 MG/G
OINTMENT TOPICAL ONCE
OUTPATIENT
Start: 2024-11-13 | End: 2024-11-13

## 2024-11-13 RX ORDER — CLOBETASOL PROPIONATE 0.5 MG/G
OINTMENT TOPICAL ONCE
OUTPATIENT
Start: 2024-11-13 | End: 2024-11-13

## 2024-11-13 RX ORDER — GINSENG 100 MG
CAPSULE ORAL ONCE
OUTPATIENT
Start: 2024-11-13 | End: 2024-11-13

## 2024-11-13 RX ORDER — BACITRACIN ZINC AND POLYMYXIN B SULFATE 500; 1000 [USP'U]/G; [USP'U]/G
OINTMENT TOPICAL ONCE
OUTPATIENT
Start: 2024-11-13 | End: 2024-11-13

## 2024-11-13 RX ORDER — SILVER SULFADIAZINE 10 MG/G
CREAM TOPICAL ONCE
OUTPATIENT
Start: 2024-11-13 | End: 2024-11-13

## 2024-11-13 RX ORDER — SODIUM CHLOR/HYPOCHLOROUS ACID 0.033 %
SOLUTION, IRRIGATION IRRIGATION ONCE
OUTPATIENT
Start: 2024-11-13 | End: 2024-11-13

## 2024-11-13 RX ORDER — GENTAMICIN SULFATE 1 MG/G
OINTMENT TOPICAL ONCE
OUTPATIENT
Start: 2024-11-13 | End: 2024-11-13

## 2024-11-13 NOTE — PROGRESS NOTES
[facility]   Progress Note and Procedure Note      Mickey Berg  MEDICAL RECORD NUMBER:  [id number]  AGE: 69 y.o.   GENDER: male  : 1955  EPISODE DATE:  2024    Subjective:     Chief Complaint   Patient presents with    Wound Check         HISTORY of PRESENT ILLNESS HPI    Mickey Berg is a 69 y.o. male who presents today for wound/ulcer evaluation.      History of leg swelling and venous ulcers. Has bilateral venous ulcers, been there for a few weeks. No tobacco use. Matty history of DVT.     Interval history: Wound has healed.        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 Morrison, last seen 3/10/2023    Under care of team 2023    GI, dr. Johnston, last seen 2023    Under care of team 2023    Cardiology, [facility], Dr. Conde, last seen     Wears reading eyeglasses     Wellness examination     PCP, Dr. Sung , has appointment 2023       PAST SURGICAL HISTORY    Past Surgical History:   Procedure Laterality Date    CARPAL TUNNEL RELEASE Left 2006    [facility]    CARPAL TUNNEL RELEASE Right 2007    [facility]    CATARACT EXTRACTION

## 2024-11-14 ENCOUNTER — TELEPHONE (OUTPATIENT)
Dept: FAMILY MEDICINE CLINIC | Age: 69
End: 2024-11-14

## 2024-11-14 NOTE — TELEPHONE ENCOUNTER
Office has faxed patient's order for a BP Cuff to 3 different DME Supplies, and all have been returned saying they do not take patient's [employer], and when faxed to [employer]'s DME Supplier [employer], they don't carry BP Cuffs. LM for patient to call his insurance and see where order can be faxed.

## 2024-11-15 ENCOUNTER — HOSPITAL ENCOUNTER (OUTPATIENT)
Dept: VASCULAR LAB | Age: 69
Discharge: HOME | End: 2024-11-17
Attending: STUDENT IN AN ORGANIZED HEALTH CARE EDUCATION/TRAINING PROGRAM
Payer: MEDICARE

## 2024-11-15 DIAGNOSIS — M79.89 LEG SWELLING: ICD-10-CM

## 2024-11-15 PROCEDURE — 93970 EXTREMITY STUDY: CPT

## 2024-11-16 LAB
VAS LEFT GSV AT KNEE DIAM: 5 MM
VAS LEFT GSV BK MID DIAM: 3.4 MM
VAS LEFT GSV JUNC DIAM: 4 MM
VAS LEFT GSV THIGH MID DIAM: 4.9 MM
VAS LEFT GSV THIGH PROX DIAM: 7.2 MM
VAS LEFT POP RFX: 1 S
VAS LEFT SSV MID DIAM: 2.2 MM
VAS LEFT SSV PROX DIAM: 2.6 MM
VAS RIGHT GSV AT KNEE DIAM: 4.3 MM
VAS RIGHT GSV BK MID DIAM: 4.9 MM
VAS RIGHT GSV JUNC DIAM: 6.9 MM
VAS RIGHT GSV THIGH MID DIAM: 5.8 MM
VAS RIGHT GSV THIGH PROX DIAM: 7.2 MM
VAS RIGHT SSV MID DIAM: 2.9 MM
VAS RIGHT SSV PROX DIAM: 3.3 MM

## 2024-11-16 PROCEDURE — 93970 EXTREMITY STUDY: CPT | Performed by: STUDENT IN AN ORGANIZED HEALTH CARE EDUCATION/TRAINING PROGRAM

## 2024-11-26 ENCOUNTER — OFFICE VISIT (OUTPATIENT)
Dept: VASCULAR SURGERY | Age: 69
End: 2024-11-26
Payer: MEDICARE

## 2024-11-26 VITALS
HEART RATE: 68 BPM | RESPIRATION RATE: 18 BRPM | WEIGHT: 228 LBS | SYSTOLIC BLOOD PRESSURE: 117 MMHG | OXYGEN SATURATION: 95 % | BODY MASS INDEX: 37.99 KG/M2 | HEIGHT: 65 IN | DIASTOLIC BLOOD PRESSURE: 78 MMHG

## 2024-11-26 DIAGNOSIS — L97.919 VENOUS ULCER OF RIGHT LEG (HCC): ICD-10-CM

## 2024-11-26 DIAGNOSIS — I83.019 VENOUS ULCER OF RIGHT LEG (HCC): ICD-10-CM

## 2024-11-26 DIAGNOSIS — I89.0 LYMPHEDEMA: ICD-10-CM

## 2024-11-26 DIAGNOSIS — M79.89 LEG SWELLING: Primary | ICD-10-CM

## 2024-11-26 PROCEDURE — G8417 CALC BMI ABV UP PARAM F/U: HCPCS | Performed by: STUDENT IN AN ORGANIZED HEALTH CARE EDUCATION/TRAINING PROGRAM

## 2024-11-26 PROCEDURE — 1123F ACP DISCUSS/DSCN MKR DOCD: CPT | Performed by: STUDENT IN AN ORGANIZED HEALTH CARE EDUCATION/TRAINING PROGRAM

## 2024-11-26 PROCEDURE — 3074F SYST BP LT 130 MM HG: CPT | Performed by: STUDENT IN AN ORGANIZED HEALTH CARE EDUCATION/TRAINING PROGRAM

## 2024-11-26 PROCEDURE — 1159F MED LIST DOCD IN RCRD: CPT | Performed by: STUDENT IN AN ORGANIZED HEALTH CARE EDUCATION/TRAINING PROGRAM

## 2024-11-26 PROCEDURE — G8427 DOCREV CUR MEDS BY ELIG CLIN: HCPCS | Performed by: STUDENT IN AN ORGANIZED HEALTH CARE EDUCATION/TRAINING PROGRAM

## 2024-11-26 PROCEDURE — G8482 FLU IMMUNIZE ORDER/ADMIN: HCPCS | Performed by: STUDENT IN AN ORGANIZED HEALTH CARE EDUCATION/TRAINING PROGRAM

## 2024-11-26 PROCEDURE — 3017F COLORECTAL CA SCREEN DOC REV: CPT | Performed by: STUDENT IN AN ORGANIZED HEALTH CARE EDUCATION/TRAINING PROGRAM

## 2024-11-26 PROCEDURE — 99213 OFFICE O/P EST LOW 20 MIN: CPT | Performed by: STUDENT IN AN ORGANIZED HEALTH CARE EDUCATION/TRAINING PROGRAM

## 2024-11-26 PROCEDURE — 3078F DIAST BP <80 MM HG: CPT | Performed by: STUDENT IN AN ORGANIZED HEALTH CARE EDUCATION/TRAINING PROGRAM

## 2024-11-26 PROCEDURE — 1126F AMNT PAIN NOTED NONE PRSNT: CPT | Performed by: STUDENT IN AN ORGANIZED HEALTH CARE EDUCATION/TRAINING PROGRAM

## 2024-11-26 PROCEDURE — 1036F TOBACCO NON-USER: CPT | Performed by: STUDENT IN AN ORGANIZED HEALTH CARE EDUCATION/TRAINING PROGRAM

## 2024-11-26 ASSESSMENT — ENCOUNTER SYMPTOMS
ABDOMINAL PAIN: 0
COLOR CHANGE: 0
VOICE CHANGE: 0
COUGH: 0
EYE PAIN: 0
CHEST TIGHTNESS: 0
VOMITING: 0
TROUBLE SWALLOWING: 0
ABDOMINAL DISTENTION: 0

## 2024-11-26 NOTE — TELEPHONE ENCOUNTER
Please address the medication refill and close the encounter.  If I can be of assistance, please route to the applicable pool.      Thank you.      Last visit: 10-  Last Med refill: 11-9-23  Does patient have enough medication for 72 hours: No:     Next Visit Date:  Future Appointments   Date Time Provider Department Center   12/12/2024 11:00 AM [facility] CHF CLINIC RM 1 [facility] CHF CLI [facility]   3/4/2025  9:30 AM Jono Conde MD [facility]       Health Maintenance   Topic Date Due    Diabetic foot exam  Never done    Diabetic Alb to Cr ratio (uACR) test  Never done    Diabetic retinal exam  Never done    Respiratory Syncytial Virus (RSV) Pregnant or age 60 yrs+ (1 - Risk 60-74 years 1-dose series) Never done    COVID-19 Vaccine (3 - 2023-24 season) 09/01/2024    GFR test (Diabetes, CKD 3-4, OR last GFR 15-59)  06/18/2025    Depression Screen  06/25/2025    A1C test (Diabetic or Prediabetic)  08/28/2025    Lipids  10/02/2025    Colorectal Cancer Screen  01/12/2031    DTaP/Tdap/Td vaccine (5 - Td or Tdap) 02/25/2034    Annual Wellness Visit (Medicare Advantage)  Completed    Flu vaccine  Completed    Shingles vaccine  Completed    Pneumococcal 65+ years Vaccine  Completed    Hepatitis C screen  Completed    Hepatitis A vaccine  Aged Out    Hepatitis B vaccine  Aged Out    Hib vaccine  Aged Out    Polio vaccine  Aged Out    Meningococcal (ACWY) vaccine  Aged Out    Pneumococcal 0-64 years Vaccine  Discontinued    Diabetes screen  Discontinued    HIV screen  Discontinued    Prostate Specific Antigen (PSA) Screening or Monitoring  Discontinued       Hemoglobin A1C (%)   Date Value   08/28/2024 5.8   04/13/2023 5.5   12/10/2019 5.6             ( goal A1C is < 7)   No components found for: \"LABMICR\"  No components found for: \"LDLCHOLESTEROL\", \"LDLCALC\"    (goal LDL is <100)   AST (U/L)   Date Value   03/29/2023 26     ALT (U/L)   Date Value   03/29/2023 19     BUN (mg/dL)   Date Value   06/18/2024 15

## 2024-11-26 NOTE — PROGRESS NOTES
[facility]  [address]  [place] [address]  Dept: [phone]     Patient: Mickey Berg  : 1955  MRN: [id number]  DOS: 2024    HPI:  24: Mickey Berg is a 69 y.o. male who comes to the office regarding follow up for venous reflux testing. Known to me from [facility] for bilateral lower extremity venous ulcers.  Venous reflux study reviewed.  No venous reflux in the right leg.  Has deep vein reflux in the left popliteal vein.  No evidence of venous thrombosis. One of his right leg wounds opened up.    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 Morrison, last seen 3/10/2023    Under care of team 2023    GI, dr. Johnston, last seen 2023    Under care of team 2023    Cardiology, [facility], Dr. Conde, last seen     Wears reading eyeglasses     Wellness examination     PCP, Dr. Sung , has appointment 2023     Family History   Problem Relation Age of Onset    Alcohol Abuse Maternal Uncle     Heart Attack Maternal Uncle     Cancer Mother     Alcohol Abuse Father

## 2024-11-29 RX ORDER — OXYBUTYNIN CHLORIDE 10 MG/1
10 TABLET, EXTENDED RELEASE ORAL EVERY MORNING
Qty: 30 TABLET | Refills: 3 | Status: SHIPPED | OUTPATIENT
Start: 2024-11-29

## 2024-11-29 RX ORDER — ISOSORBIDE MONONITRATE 30 MG/1
30 TABLET, EXTENDED RELEASE ORAL DAILY
Qty: 90 TABLET | Refills: 3 | OUTPATIENT
Start: 2024-11-29

## 2024-12-06 ENCOUNTER — TELEPHONE (OUTPATIENT)
Dept: UROLOGY | Age: 69
End: 2024-12-06

## 2024-12-06 NOTE — TELEPHONE ENCOUNTER
Patient is scheduled for surgery on 1/10 at 12:00 PM and 1/21 at 2:10 PM. Phone number is busy.  Letter mailed out today.

## 2024-12-11 ENCOUNTER — HOSPITAL ENCOUNTER (EMERGENCY)
Age: 69
Discharge: HOME | End: 2024-12-11
Attending: EMERGENCY MEDICINE
Payer: MEDICARE

## 2024-12-11 ENCOUNTER — APPOINTMENT (OUTPATIENT)
Dept: GENERAL RADIOLOGY | Age: 69
End: 2024-12-11
Payer: MEDICARE

## 2024-12-11 VITALS
WEIGHT: 228.4 LBS | DIASTOLIC BLOOD PRESSURE: 68 MMHG | SYSTOLIC BLOOD PRESSURE: 112 MMHG | HEART RATE: 78 BPM | BODY MASS INDEX: 38.01 KG/M2 | RESPIRATION RATE: 18 BRPM | TEMPERATURE: 97.4 F | OXYGEN SATURATION: 98 %

## 2024-12-11 DIAGNOSIS — L03.116 CELLULITIS OF LEFT LOWER EXTREMITY: Primary | ICD-10-CM

## 2024-12-11 LAB
ANION GAP SERPL CALCULATED.3IONS-SCNC: 12 MMOL/L (ref 9–16)
BASOPHILS # BLD: 0.05 K/UL (ref 0–0.2)
BASOPHILS NFR BLD: 1 % (ref 0–2)
BUN SERPL-MCNC: 17 MG/DL (ref 8–23)
CALCIUM SERPL-MCNC: 9.3 MG/DL (ref 8.6–10.4)
CHLORIDE SERPL-SCNC: 100 MMOL/L (ref 98–107)
CO2 SERPL-SCNC: 26 MMOL/L (ref 20–31)
CREAT SERPL-MCNC: 0.7 MG/DL (ref 0.7–1.2)
EOSINOPHIL # BLD: 0.26 K/UL (ref 0–0.44)
EOSINOPHILS RELATIVE PERCENT: 3 % (ref 1–4)
ERYTHROCYTE [DISTWIDTH] IN BLOOD BY AUTOMATED COUNT: 13.5 % (ref 11.8–14.4)
GFR, ESTIMATED: >90 ML/MIN/1.73M2
GLUCOSE SERPL-MCNC: 96 MG/DL (ref 74–99)
HCT VFR BLD AUTO: 47.3 % (ref 40.7–50.3)
HGB BLD-MCNC: 15.2 G/DL (ref 13–17)
IMM GRANULOCYTES # BLD AUTO: 0.04 K/UL (ref 0–0.3)
IMM GRANULOCYTES NFR BLD: 1 %
LYMPHOCYTES NFR BLD: 0.92 K/UL (ref 1.1–3.7)
LYMPHOCYTES RELATIVE PERCENT: 12 % (ref 24–43)
MCH RBC QN AUTO: 30.4 PG (ref 25.2–33.5)
MCHC RBC AUTO-ENTMCNC: 32.1 G/DL (ref 28.4–34.8)
MCV RBC AUTO: 94.6 FL (ref 82.6–102.9)
MONOCYTES NFR BLD: 1.01 K/UL (ref 0.1–1.2)
MONOCYTES NFR BLD: 13 % (ref 3–12)
NEUTROPHILS NFR BLD: 70 % (ref 36–65)
NEUTS SEG NFR BLD: 5.31 K/UL (ref 1.5–8.1)
NRBC BLD-RTO: 0 PER 100 WBC
PLATELET # BLD AUTO: 221 K/UL (ref 138–453)
PMV BLD AUTO: 9.4 FL (ref 8.1–13.5)
POTASSIUM SERPL-SCNC: 3.9 MMOL/L (ref 3.7–5.3)
RBC # BLD AUTO: 5 M/UL (ref 4.21–5.77)
SODIUM SERPL-SCNC: 138 MMOL/L (ref 136–145)
WBC OTHER # BLD: 7.6 K/UL (ref 3.5–11.3)

## 2024-12-11 PROCEDURE — 73590 X-RAY EXAM OF LOWER LEG: CPT

## 2024-12-11 PROCEDURE — 85025 COMPLETE CBC W/AUTO DIFF WBC: CPT

## 2024-12-11 PROCEDURE — 99284 EMERGENCY DEPT VISIT MOD MDM: CPT

## 2024-12-11 PROCEDURE — 80048 BASIC METABOLIC PNL TOTAL CA: CPT

## 2024-12-11 PROCEDURE — 6370000000 HC RX 637 (ALT 250 FOR IP): Performed by: EMERGENCY MEDICINE

## 2024-12-11 RX ORDER — SULFAMETHOXAZOLE AND TRIMETHOPRIM 800; 160 MG/1; MG/1
1 TABLET ORAL ONCE
Status: COMPLETED | OUTPATIENT
Start: 2024-12-11 | End: 2024-12-11

## 2024-12-11 RX ORDER — CEPHALEXIN 500 MG/1
500 CAPSULE ORAL 4 TIMES DAILY
Qty: 28 CAPSULE | Refills: 0 | Status: SHIPPED | OUTPATIENT
Start: 2024-12-11 | End: 2024-12-12 | Stop reason: SDUPTHER

## 2024-12-11 RX ORDER — CEPHALEXIN 500 MG/1
500 CAPSULE ORAL ONCE
Status: COMPLETED | OUTPATIENT
Start: 2024-12-11 | End: 2024-12-11

## 2024-12-11 RX ORDER — SULFAMETHOXAZOLE AND TRIMETHOPRIM 800; 160 MG/1; MG/1
1 TABLET ORAL 2 TIMES DAILY
Qty: 14 TABLET | Refills: 0 | Status: SHIPPED | OUTPATIENT
Start: 2024-12-11 | End: 2024-12-12 | Stop reason: SDUPTHER

## 2024-12-11 RX ADMIN — SULFAMETHOXAZOLE AND TRIMETHOPRIM 1 TABLET: 800; 160 TABLET ORAL at 12:55

## 2024-12-11 RX ADMIN — CEPHALEXIN 500 MG: 500 CAPSULE ORAL at 12:55

## 2024-12-11 ASSESSMENT — PAIN SCALES - GENERAL: PAINLEVEL_OUTOF10: 7

## 2024-12-11 ASSESSMENT — ENCOUNTER SYMPTOMS
WHEEZING: 0
NAUSEA: 0
SHORTNESS OF BREATH: 0
ABDOMINAL PAIN: 0
ORTHOPNEA: 0
COUGH: 0
VOMITING: 0
DIARRHEA: 0
BACK PAIN: 0

## 2024-12-11 ASSESSMENT — PAIN - FUNCTIONAL ASSESSMENT: PAIN_FUNCTIONAL_ASSESSMENT: 0-10

## 2024-12-11 NOTE — ED NOTES
Pt arrives to ED 39 via triage.  Pt co L leg pain and swelling.   Pt states that he has a hx of cellulitis.   Pt states that he was admitted about 1 year ago for IV antibiotics for cellultitis.  Pt denies any antibiotic use at this time.   Pt respirations are even and unlabored, pt is alert and oriented X 4, speaking in complete sentences, bed is in the lowest position, call light is within reach, NAD noted.   Will continue to follow plan of care.

## 2024-12-11 NOTE — ED PROVIDER NOTES
[facility] EMERGENCY DEPARTMENT  EMERGENCY DEPARTMENT ENCOUNTER      Pt Name: Mickey Berg  MRN: [id number]  Birthdate 1955  Date of evaluation: 12/11/24  PCP:  Mich Sung MD    CHIEF COMPLAINT:   Chief Complaint   Patient presents with    Leg Pain     Left; hx of cellulitis      HISTORY OF PRESENT ILLNESS   Mickey Berg is a 69 y.o. male whopresents with lower extremity cellulitis.  He has a history of cellulitis and states he has required admission in the past.  He states been present for the past couple days and wanted to come in to make sure he did not get \"out of control\".  He denies any fevers or chills or systemic symptoms no chest pain cough or shortness of breath there is no trauma.        REVIEW OF SYSTEMS       Review of Systems   Constitutional:  Negative for chills and fever.   HENT:  Negative for nosebleeds.    Respiratory:  Negative for cough, shortness of breath and wheezing.    Cardiovascular:  Negative for chest pain, palpitations and orthopnea.   Gastrointestinal:  Negative for abdominal pain, diarrhea, nausea and vomiting.   Genitourinary:  Negative for dysuria and urgency.   Musculoskeletal:  Negative for back pain and neck pain.   Skin:  Negative for rash.   Neurological:  Negative for dizziness, loss of consciousness and headaches.         PAST MEDICAL HISTORY   PMH:  has a past medical history of SYDNEY (acute kidney injury) ([facility]), Arthritis, Atrial fibrillation (HCC), BPH (benign prostatic hyperplasia), Cellulitis, Cervical disc disease, Chronic venous insufficiency, Combined systolic and diastolic congestive heart failure (HCC), Coronary artery disease, GERD (gastroesophageal reflux disease), History of incarceration, History of recent hospitalization, Hyperlipidemia, Hypertension, Ischemic cardiomyopathy, Myocardial infarct (HCC), Obesity, Overactive bladder, Sleep apnea treated with continuous positive airway pressure (CPAP), Under care of team, Under care of team,

## 2024-12-12 ENCOUNTER — HOSPITAL ENCOUNTER (OUTPATIENT)
Dept: OTHER | Age: 69
Discharge: HOME | End: 2024-12-12
Payer: MEDICARE

## 2024-12-12 VITALS
SYSTOLIC BLOOD PRESSURE: 110 MMHG | DIASTOLIC BLOOD PRESSURE: 60 MMHG | RESPIRATION RATE: 20 BRPM | WEIGHT: 225 LBS | OXYGEN SATURATION: 98 % | HEART RATE: 65 BPM | BODY MASS INDEX: 37.44 KG/M2

## 2024-12-12 DIAGNOSIS — I50.32 CHRONIC HEART FAILURE WITH PRESERVED EJECTION FRACTION (HCC): Primary | ICD-10-CM

## 2024-12-12 DIAGNOSIS — L03.116 CELLULITIS OF LEFT LOWER EXTREMITY: ICD-10-CM

## 2024-12-12 PROCEDURE — 99215 OFFICE O/P EST HI 40 MIN: CPT | Performed by: NURSE PRACTITIONER

## 2024-12-12 PROCEDURE — 99213 OFFICE O/P EST LOW 20 MIN: CPT

## 2024-12-12 RX ORDER — ATORVASTATIN CALCIUM 40 MG/1
40 TABLET, FILM COATED ORAL NIGHTLY
Qty: 30 TABLET | Refills: 2 | Status: SHIPPED | OUTPATIENT
Start: 2024-12-12

## 2024-12-12 RX ORDER — CEPHALEXIN 500 MG/1
500 CAPSULE ORAL 4 TIMES DAILY
Qty: 28 CAPSULE | Refills: 0 | Status: SHIPPED | OUTPATIENT
Start: 2024-12-12 | End: 2024-12-19

## 2024-12-12 RX ORDER — SULFAMETHOXAZOLE AND TRIMETHOPRIM 800; 160 MG/1; MG/1
1 TABLET ORAL 2 TIMES DAILY
Qty: 14 TABLET | Refills: 0 | Status: SHIPPED | OUTPATIENT
Start: 2024-12-12 | End: 2024-12-19

## 2024-12-12 ASSESSMENT — ENCOUNTER SYMPTOMS
COUGH: 0
EYE DISCHARGE: 0
ABDOMINAL PAIN: 0
SHORTNESS OF BREATH: 1
BLOOD IN STOOL: 0
COLOR CHANGE: 1

## 2024-12-12 NOTE — PROGRESS NOTES
BOTOX INJECTION  (100 UNITS) performed by Giorgio Morrison Jr., MD at [facility] OR    URETHRAL SURGERY N/A 04/14/2023    CYSTOSCOPY, BOTOX INJECTION  (200 UNITS) performed by Giorgio Morrison Jr., MD at [facility] OR    URETHRAL SURGERY N/A 12/28/2023    CYSTOSCOPY WITH BOTOX INJECTION 200 UNITS performed by Giorgio Morrison Jr., MD at [facility] OR       Family History   Problem Relation Age of Onset    Alcohol Abuse Maternal Uncle     Heart Attack Maternal Uncle     Cancer Mother     Alcohol Abuse Father        Social History     Tobacco Use    Smoking status: Never     Passive exposure: Never    Smokeless tobacco: Never   Substance Use Topics    Alcohol use: Not Currently     Comment: stopped 1991      Current Outpatient Medications   Medication Sig Dispense Refill    cephALEXin (KEFLEX) 500 MG capsule Take 1 capsule by mouth 4 times daily for 7 days 28 capsule 0    sulfamethoxazole-trimethoprim (BACTRIM DS;SEPTRA DS) 800-160 MG per tablet Take 1 tablet by mouth 2 times daily for 7 days 14 tablet 0    oxyBUTYnin (DITROPAN-XL) 10 MG extended release tablet TAKE 1 TABLET BY MOUTH EVERY MORNING 30 tablet 3    isosorbide mononitrate (IMDUR) 30 MG extended release tablet TAKE 1 TABLET BY MOUTH ONCE A DAY 90 tablet 1    pantoprazole (PROTONIX) 40 MG tablet TAKE 1 TABLET BY MOUTH DAILY WITH SUPPER 30 tablet 2    bumetanide (BUMEX) 2 MG tablet Take 1 tablet by mouth daily 60 tablet 2    atorvastatin (LIPITOR) 40 MG tablet Take 1 tablet by mouth nightly 30 tablet 2    aspirin 81 MG EC tablet Take 1 tablet by mouth daily 90 tablet 0    dapagliflozin (FARXIGA) 10 MG tablet Take 1 tablet by mouth every morning 30 tablet 3    potassium chloride (KLOR-CON M) 20 MEQ extended release tablet Take 1 tablet by mouth every morning 60 tablet 3    Compression Stockings MISC 15-20 mmHg 4 each 2    DULoxetine (CYMBALTA) 20 MG extended release capsule Take 1 capsule by mouth daily 30 capsule 0    tamsulosin (FLOMAX) 0.4 MG capsule TAKE 1 CAPSULE BY  Simponi Counseling:  I discussed with the patient the risks of golimumab including but not limited to myelosuppression, immunosuppression, autoimmune hepatitis, demyelinating diseases, lymphoma, and serious infections.  The patient understands that monitoring is required including a PPD at baseline and must alert us or the primary physician if symptoms of infection or other concerning signs are noted.

## 2024-12-22 ENCOUNTER — HOSPITAL ENCOUNTER (OUTPATIENT)
Age: 69
Setting detail: OBSERVATION
Discharge: HOME | End: 2024-12-23
Attending: EMERGENCY MEDICINE | Admitting: EMERGENCY MEDICINE
Payer: MEDICARE

## 2024-12-22 DIAGNOSIS — L03.116 CELLULITIS OF LEFT LOWER EXTREMITY: Primary | ICD-10-CM

## 2024-12-22 LAB
ANION GAP SERPL CALCULATED.3IONS-SCNC: 10 MMOL/L (ref 9–16)
BASOPHILS # BLD: 0.03 K/UL (ref 0–0.2)
BASOPHILS NFR BLD: 0 % (ref 0–2)
BUN SERPL-MCNC: 12 MG/DL (ref 8–23)
CALCIUM SERPL-MCNC: 9.1 MG/DL (ref 8.6–10.4)
CHLORIDE SERPL-SCNC: 104 MMOL/L (ref 98–107)
CO2 SERPL-SCNC: 27 MMOL/L (ref 20–31)
CREAT SERPL-MCNC: 0.6 MG/DL (ref 0.7–1.2)
CRP SERPL HS-MCNC: 28.9 MG/L (ref 0–5)
EOSINOPHIL # BLD: 0.28 K/UL (ref 0–0.44)
EOSINOPHILS RELATIVE PERCENT: 3 % (ref 1–4)
ERYTHROCYTE [DISTWIDTH] IN BLOOD BY AUTOMATED COUNT: 13.8 % (ref 11.8–14.4)
ERYTHROCYTE [SEDIMENTATION RATE] IN BLOOD BY PHOTOMETRIC METHOD: 20 MM/HR (ref 0–20)
GFR, ESTIMATED: >90 ML/MIN/1.73M2
GLUCOSE SERPL-MCNC: 87 MG/DL (ref 74–99)
HCT VFR BLD AUTO: 45.3 % (ref 40.7–50.3)
HGB BLD-MCNC: 14.5 G/DL (ref 13–17)
IMM GRANULOCYTES # BLD AUTO: 0.06 K/UL (ref 0–0.3)
IMM GRANULOCYTES NFR BLD: 1 %
LYMPHOCYTES NFR BLD: 0.73 K/UL (ref 1.1–3.7)
LYMPHOCYTES RELATIVE PERCENT: 8 % (ref 24–43)
MCH RBC QN AUTO: 30.1 PG (ref 25.2–33.5)
MCHC RBC AUTO-ENTMCNC: 32 G/DL (ref 28.4–34.8)
MCV RBC AUTO: 94.2 FL (ref 82.6–102.9)
MONOCYTES NFR BLD: 1.01 K/UL (ref 0.1–1.2)
MONOCYTES NFR BLD: 11 % (ref 3–12)
NEUTROPHILS NFR BLD: 77 % (ref 36–65)
NEUTS SEG NFR BLD: 7.21 K/UL (ref 1.5–8.1)
NRBC BLD-RTO: 0 PER 100 WBC
PLATELET # BLD AUTO: 225 K/UL (ref 138–453)
PMV BLD AUTO: 8.9 FL (ref 8.1–13.5)
POTASSIUM SERPL-SCNC: 3.9 MMOL/L (ref 3.7–5.3)
RBC # BLD AUTO: 4.81 M/UL (ref 4.21–5.77)
SODIUM SERPL-SCNC: 141 MMOL/L (ref 136–145)
WBC OTHER # BLD: 9.3 K/UL (ref 3.5–11.3)

## 2024-12-22 PROCEDURE — 99285 EMERGENCY DEPT VISIT HI MDM: CPT

## 2024-12-22 PROCEDURE — G0378 HOSPITAL OBSERVATION PER HR: HCPCS

## 2024-12-22 PROCEDURE — 85652 RBC SED RATE AUTOMATED: CPT

## 2024-12-22 PROCEDURE — 86140 C-REACTIVE PROTEIN: CPT

## 2024-12-22 PROCEDURE — 6360000002 HC RX W HCPCS

## 2024-12-22 PROCEDURE — 96372 THER/PROPH/DIAG INJ SC/IM: CPT

## 2024-12-22 PROCEDURE — 96374 THER/PROPH/DIAG INJ IV PUSH: CPT

## 2024-12-22 PROCEDURE — 2500000003 HC RX 250 WO HCPCS

## 2024-12-22 PROCEDURE — 85025 COMPLETE CBC W/AUTO DIFF WBC: CPT

## 2024-12-22 PROCEDURE — 6370000000 HC RX 637 (ALT 250 FOR IP)

## 2024-12-22 PROCEDURE — 96376 TX/PRO/DX INJ SAME DRUG ADON: CPT

## 2024-12-22 PROCEDURE — 80048 BASIC METABOLIC PNL TOTAL CA: CPT

## 2024-12-22 RX ORDER — ENOXAPARIN SODIUM 100 MG/ML
30 INJECTION SUBCUTANEOUS 2 TIMES DAILY
Status: DISCONTINUED | OUTPATIENT
Start: 2024-12-22 | End: 2024-12-23 | Stop reason: HOSPADM

## 2024-12-22 RX ORDER — ACETAMINOPHEN 325 MG/1
650 TABLET ORAL EVERY 6 HOURS PRN
Status: DISCONTINUED | OUTPATIENT
Start: 2024-12-22 | End: 2024-12-23 | Stop reason: HOSPADM

## 2024-12-22 RX ORDER — NITROGLYCERIN 0.4 MG/1
0.4 TABLET SUBLINGUAL EVERY 5 MIN PRN
Status: DISCONTINUED | OUTPATIENT
Start: 2024-12-22 | End: 2024-12-23 | Stop reason: HOSPADM

## 2024-12-22 RX ORDER — ONDANSETRON 4 MG/1
4 TABLET, ORALLY DISINTEGRATING ORAL EVERY 8 HOURS PRN
Status: DISCONTINUED | OUTPATIENT
Start: 2024-12-22 | End: 2024-12-23 | Stop reason: HOSPADM

## 2024-12-22 RX ORDER — ISOSORBIDE MONONITRATE 30 MG/1
30 TABLET, EXTENDED RELEASE ORAL DAILY
Status: DISCONTINUED | OUTPATIENT
Start: 2024-12-22 | End: 2024-12-23 | Stop reason: HOSPADM

## 2024-12-22 RX ORDER — ONDANSETRON 2 MG/ML
4 INJECTION INTRAMUSCULAR; INTRAVENOUS EVERY 6 HOURS PRN
Status: DISCONTINUED | OUTPATIENT
Start: 2024-12-22 | End: 2024-12-23 | Stop reason: HOSPADM

## 2024-12-22 RX ORDER — PANTOPRAZOLE SODIUM 40 MG/1
40 TABLET, DELAYED RELEASE ORAL
Status: DISCONTINUED | OUTPATIENT
Start: 2024-12-22 | End: 2024-12-23 | Stop reason: HOSPADM

## 2024-12-22 RX ORDER — SODIUM CHLORIDE 0.9 % (FLUSH) 0.9 %
5-40 SYRINGE (ML) INJECTION EVERY 12 HOURS SCHEDULED
Status: DISCONTINUED | OUTPATIENT
Start: 2024-12-22 | End: 2024-12-23 | Stop reason: HOSPADM

## 2024-12-22 RX ORDER — POLYETHYLENE GLYCOL 3350 17 G/17G
17 POWDER, FOR SOLUTION ORAL DAILY PRN
Status: DISCONTINUED | OUTPATIENT
Start: 2024-12-22 | End: 2024-12-23 | Stop reason: HOSPADM

## 2024-12-22 RX ORDER — POTASSIUM CHLORIDE 7.45 MG/ML
10 INJECTION INTRAVENOUS PRN
Status: DISCONTINUED | OUTPATIENT
Start: 2024-12-22 | End: 2024-12-23 | Stop reason: HOSPADM

## 2024-12-22 RX ORDER — SODIUM CHLORIDE 9 MG/ML
INJECTION, SOLUTION INTRAVENOUS PRN
Status: DISCONTINUED | OUTPATIENT
Start: 2024-12-22 | End: 2024-12-23 | Stop reason: HOSPADM

## 2024-12-22 RX ORDER — DULOXETIN HYDROCHLORIDE 20 MG/1
20 CAPSULE, DELAYED RELEASE ORAL DAILY
Status: DISCONTINUED | OUTPATIENT
Start: 2024-12-22 | End: 2024-12-23 | Stop reason: HOSPADM

## 2024-12-22 RX ORDER — ATORVASTATIN CALCIUM 40 MG/1
40 TABLET, FILM COATED ORAL NIGHTLY
Status: DISCONTINUED | OUTPATIENT
Start: 2024-12-22 | End: 2024-12-23 | Stop reason: HOSPADM

## 2024-12-22 RX ORDER — POTASSIUM CHLORIDE 1500 MG/1
20 TABLET, EXTENDED RELEASE ORAL EVERY MORNING
Status: DISCONTINUED | OUTPATIENT
Start: 2024-12-23 | End: 2024-12-23 | Stop reason: HOSPADM

## 2024-12-22 RX ORDER — MAGNESIUM SULFATE IN WATER 40 MG/ML
2000 INJECTION, SOLUTION INTRAVENOUS PRN
Status: DISCONTINUED | OUTPATIENT
Start: 2024-12-22 | End: 2024-12-23 | Stop reason: HOSPADM

## 2024-12-22 RX ORDER — SODIUM CHLORIDE 0.9 % (FLUSH) 0.9 %
5-40 SYRINGE (ML) INJECTION PRN
Status: DISCONTINUED | OUTPATIENT
Start: 2024-12-22 | End: 2024-12-23 | Stop reason: HOSPADM

## 2024-12-22 RX ORDER — TAMSULOSIN HYDROCHLORIDE 0.4 MG/1
0.4 CAPSULE ORAL DAILY
Status: DISCONTINUED | OUTPATIENT
Start: 2024-12-22 | End: 2024-12-23 | Stop reason: HOSPADM

## 2024-12-22 RX ORDER — ASPIRIN 81 MG/1
81 TABLET ORAL DAILY
Status: DISCONTINUED | OUTPATIENT
Start: 2024-12-22 | End: 2024-12-23 | Stop reason: HOSPADM

## 2024-12-22 RX ORDER — OXYBUTYNIN CHLORIDE 10 MG/1
10 TABLET, EXTENDED RELEASE ORAL EVERY MORNING
Status: DISCONTINUED | OUTPATIENT
Start: 2024-12-23 | End: 2024-12-23 | Stop reason: HOSPADM

## 2024-12-22 RX ORDER — BUMETANIDE 1 MG/1
2 TABLET ORAL DAILY
Status: DISCONTINUED | OUTPATIENT
Start: 2024-12-22 | End: 2024-12-23 | Stop reason: HOSPADM

## 2024-12-22 RX ORDER — POTASSIUM CHLORIDE 1500 MG/1
40 TABLET, EXTENDED RELEASE ORAL PRN
Status: DISCONTINUED | OUTPATIENT
Start: 2024-12-22 | End: 2024-12-23 | Stop reason: HOSPADM

## 2024-12-22 RX ORDER — DOXYCYCLINE HYCLATE 100 MG
100 TABLET ORAL 2 TIMES DAILY
Qty: 14 TABLET | Refills: 0 | Status: SHIPPED | OUTPATIENT
Start: 2024-12-22 | End: 2024-12-29

## 2024-12-22 RX ORDER — ACETAMINOPHEN 650 MG/1
650 SUPPOSITORY RECTAL EVERY 6 HOURS PRN
Status: DISCONTINUED | OUTPATIENT
Start: 2024-12-22 | End: 2024-12-23 | Stop reason: HOSPADM

## 2024-12-22 RX ADMIN — Medication 2000 MG: at 21:40

## 2024-12-22 RX ADMIN — PANTOPRAZOLE SODIUM 40 MG: 40 TABLET, DELAYED RELEASE ORAL at 17:33

## 2024-12-22 RX ADMIN — ASPIRIN 81 MG: 81 TABLET, COATED ORAL at 17:33

## 2024-12-22 RX ADMIN — Medication 2000 MG: at 14:42

## 2024-12-22 RX ADMIN — ATORVASTATIN CALCIUM 40 MG: 40 TABLET, FILM COATED ORAL at 20:17

## 2024-12-22 RX ADMIN — SODIUM CHLORIDE, PRESERVATIVE FREE 10 ML: 5 INJECTION INTRAVENOUS at 20:17

## 2024-12-22 RX ADMIN — ACETAMINOPHEN 650 MG: 325 TABLET ORAL at 21:45

## 2024-12-22 RX ADMIN — SODIUM CHLORIDE, PRESERVATIVE FREE 10 ML: 5 INJECTION INTRAVENOUS at 21:40

## 2024-12-22 RX ADMIN — ENOXAPARIN SODIUM 30 MG: 100 INJECTION SUBCUTANEOUS at 20:17

## 2024-12-22 ASSESSMENT — PAIN SCALES - GENERAL
PAINLEVEL_OUTOF10: 8
PAINLEVEL_OUTOF10: 8
PAINLEVEL_OUTOF10: 7
PAINLEVEL_OUTOF10: 5
PAINLEVEL_OUTOF10: 7

## 2024-12-22 NOTE — PLAN OF CARE
Family medicine service was consulted for possible admission for patient    Patient had been seen in the ED 1 week ago, was prescribed antibiotics for lower limb cellulitis  Patient did not take medications  Patient has not known case of lymphedema and chronic wounds and was following up with wound care clinic    On presentation patient was hemodynamically stable, afebrile, lab workup is unremarkable except for elevated CRP.  Patient was seen and examined at bedside, appears to have cellulitis, but does not meet inpatient criteria.    Patient would benefit from antibiotics, will try to get those meds to bed before patient is discharged from the ED, patient will need to follow-up closely with PCP and wound care clinic  If antibiotics could not be sent out with patient from the ED, patient with likely need to be admitted and discharged tomorrow with antibiotics from the hospital    Will set up appointments for patient   to see patient for financial concerns  Patient was agreeable on discharge, with ED precautions  Case was discussed with attending and ED physician    Jesus Pelletier MD  Family medicine resident, PGY3  12/22/2024 at 3:45 PM
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
## 2024-12-23 VITALS
DIASTOLIC BLOOD PRESSURE: 61 MMHG | TEMPERATURE: 98.2 F | RESPIRATION RATE: 18 BRPM | HEART RATE: 61 BPM | BODY MASS INDEX: 40.26 KG/M2 | SYSTOLIC BLOOD PRESSURE: 106 MMHG | WEIGHT: 241.62 LBS | HEIGHT: 65 IN | OXYGEN SATURATION: 98 %

## 2024-12-23 PROCEDURE — 2500000003 HC RX 250 WO HCPCS

## 2024-12-23 PROCEDURE — 6370000000 HC RX 637 (ALT 250 FOR IP)

## 2024-12-23 PROCEDURE — 96376 TX/PRO/DX INJ SAME DRUG ADON: CPT

## 2024-12-23 PROCEDURE — 6360000002 HC RX W HCPCS

## 2024-12-23 PROCEDURE — G0378 HOSPITAL OBSERVATION PER HR: HCPCS

## 2024-12-23 RX ADMIN — ASPIRIN 81 MG: 81 TABLET, COATED ORAL at 07:54

## 2024-12-23 RX ADMIN — SODIUM CHLORIDE, PRESERVATIVE FREE 10 ML: 5 INJECTION INTRAVENOUS at 07:56

## 2024-12-23 RX ADMIN — DULOXETINE HYDROCHLORIDE 20 MG: 20 CAPSULE, DELAYED RELEASE ORAL at 07:55

## 2024-12-23 RX ADMIN — TAMSULOSIN HYDROCHLORIDE 0.4 MG: 0.4 CAPSULE ORAL at 07:54

## 2024-12-23 RX ADMIN — OXYBUTYNIN CHLORIDE 10 MG: 10 TABLET, EXTENDED RELEASE ORAL at 07:54

## 2024-12-23 RX ADMIN — Medication 2000 MG: at 05:56

## 2024-12-23 RX ADMIN — ISOSORBIDE MONONITRATE 30 MG: 30 TABLET, EXTENDED RELEASE ORAL at 07:54

## 2024-12-23 RX ADMIN — POTASSIUM CHLORIDE 20 MEQ: 1500 TABLET, EXTENDED RELEASE ORAL at 07:55

## 2024-12-23 RX ADMIN — BUMETANIDE 2 MG: 1 TABLET ORAL at 07:54

## 2024-12-23 RX ADMIN — EMPAGLIFLOZIN 10 MG: 10 TABLET, FILM COATED ORAL at 07:54

## 2024-12-23 ASSESSMENT — PAIN SCALES - GENERAL: PAINLEVEL_OUTOF10: 4

## 2024-12-23 ASSESSMENT — PAIN DESCRIPTION - ORIENTATION: ORIENTATION: RIGHT;LEFT

## 2024-12-23 ASSESSMENT — PAIN DESCRIPTION - LOCATION: LOCATION: LEG;FOOT

## 2024-12-23 NOTE — PROGRESS NOTES
CLINICAL PHARMACY NOTE: MEDS TO BEDS    Total # of Prescriptions Filled: 1   The following medications were delivered to the patient:  Doxycycline 100mg tabs    Additional Documentation:  Delivered to pt in room OBS 17 on 12/23 at 12:45P. No copay.

## 2024-12-23 NOTE — CARE COORDINATION
Case Management Assessment  Initial Observation Evaluation    Date/Time of Evaluation: 12/23/2024 10:58 AM  Assessment Completed by: RAPHAEL MAHONEY RN    If patient is discharged prior to next notation, then this note serves as note for discharge by case management.    Patient Name: Mickey Berg                   YOB: 1955  Diagnosis: Cellulitis [L03.90]                   Date / Time: 12/22/2024  1:06 PM      CM Services requested for transitional needs.     Patient Admission Status: Observation   Readmission Risk (Low < 19, Mod (19-27), High > 27): No data recorded  Current PCP: Mich Sung MD  PCP verified by CM? Yes       History Provided by: Patient  Patient Orientation: Alert and Oriented    Patient Cognition: Alert      ADLS  Prior functional level: Independent in ADLs/IADLs  Current functional level: Independent in ADLs/IADLs  Family can provide assistance at DC: No  Would you like Case Management to discuss the discharge plan with any other family members/significant others, and if so, who? No    Financial    Payor: [employer] MEDICARE / Plan: [employer] GOLD PLUS HMO / Product Type: *No Product type* /       Transportation/Food Security/Housing Addressed:  No issues identified.     Equipment needs:     Case Management Services Information Letter Provided []    Transition plan: home, needs cab

## 2024-12-23 NOTE — DISCHARGE INSTRUCTIONS
You were seen in the observation unit for antibiotic treatment of your skin infection.  At this time, we believe that you are medically stable for discharge, and will wrap your legs before you leave.  We have sent a prescription to our pharmacy.  We highly recommend you take the full course of the antibiotics.  We also recommend they follow-up with your primary care physician at [facility].

## 2024-12-23 NOTE — PLAN OF CARE
Problem: Discharge Planning  Goal: Discharge to home or other facility with appropriate resources  12/22/2024 2022 by Natalie Sandra RN  Outcome: Progressing  12/22/2024 1728 by Sarahi Montgomery RN  Outcome: Progressing     Problem: Safety - Adult  Goal: Free from fall injury  12/22/2024 2022 by Natalie Sandra RN  Outcome: Progressing  12/22/2024 1728 by Sarahi Montgomery RN  Outcome: Progressing     Problem: Pain  Goal: Verbalizes/displays adequate comfort level or baseline comfort level  12/22/2024 2022 by Natalie Sandra RN  Outcome: Progressing  12/22/2024 1728 by Sarahi Montgomery RN  Outcome: Progressing
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
## 2024-12-23 NOTE — DISCHARGE SUMMARY
CDU Discharge Summary        Patient:  Mickey Berg  YOB: 1955    MRN: [id number]   Acct: [id number]    Primary Care Physician: Mich Sung MD    Admit date:  12/22/2024  1:06 PM  Discharge date: No discharge date for patient encounter. 12/23/24    Discharge Diagnoses:     1.)  Patient had leg pain with gradual onset due to cellulitis.  Treated with IV ancef and doxycycline prescription .  Patient's symptoms are improving with the plan to discharge with outpatient prescription for doxycycline.     Follow-up:  Call today/tomorrow for a follow up appointment with Mich Sung MD , or return to the Emergency Room with worsening symptoms    Stressed to patient the importance of following up with primary care doctor for further workup/management of symptoms.  Pt verbalizes understanding and agrees with plan.    Discharge Medication Changes:       Medication List        START taking these medications      doxycycline hyclate 100 MG tablet  Commonly known as: VIBRA-TABS  Take 1 tablet by mouth 2 times daily for 7 days            CONTINUE taking these medications      aspirin 81 MG EC tablet  Take 1 tablet by mouth daily     atorvastatin 40 MG tablet  Commonly known as: LIPITOR  TAKE 1 TABLET BY MOUTH NIGHTLY     bumetanide 2 MG tablet  Commonly known as: BUMEX  Take 1 tablet by mouth daily     Compression Stockings Misc  15-20 mmHg     dapagliflozin 10 MG tablet  Commonly known as: Farxiga  Take 1 tablet by mouth every morning     DULoxetine 20 MG extended release capsule  Commonly known as: Cymbalta  Take 1 capsule by mouth daily     isosorbide mononitrate 30 MG extended release tablet  Commonly known as: IMDUR  TAKE 1 TABLET BY MOUTH ONCE A DAY     oxyBUTYnin 10 MG extended release tablet  Commonly known as: DITROPAN-XL  TAKE 1 TABLET BY MOUTH EVERY MORNING     pantoprazole 40 MG tablet  Commonly known as: PROTONIX  TAKE 1 TABLET BY MOUTH DAILY WITH SUPPER     potassium chloride 20 MEQ extended

## 2024-12-23 NOTE — PROGRESS NOTES
[facility]  CDU / OBSERVATION eNCOUnter  Attending NOte       I performed a history and physical examination of the patient and discussed management with the resident.  This patient was placed in the observation unit for reevaluation for possible admission to the hospital. I reviewed the resident’s note and agree with the documented findings and plan of care. Any areas of disagreement are noted on the chart. I was personally present for the key portions of any procedures. I have documented in the chart those procedures where I was not present during the key portions. I have reviewed the nurses notes. I agree with the chief complaint, past medical history, past surgical history, allergies, medications, social and family history as documented unless otherwise noted below.    The patient was placed in the observation unit for reevaluation for possible admission to the hospital.      The Family history, social history, and ROS are effectively unchanged since admission unless noted elsewhere in the chart.  69-year-old male with bilateral lower extremity cellulitis and open wounds. Having difficulty getting and taking his antibiotics in the outpatient setting. Plan today for wound assessment, meds to beds, close follow up with [facility], wound care at [facility].      Caitlin Gu MD  Attending Emergency  Physician

## 2024-12-23 NOTE — PROGRESS NOTES
Congestive Heart Failure Education note:      Discussed 2000mg/day sodium restricted diet; patient verbalized understanding.    Moderate daily exercise encouraged as tolerated. Discussed rest breaks as needed; patient verbalized understanding.    Patient instructed to weigh self at the same time of each day, using same clothes and same scale; reinforced teaching to monitor for 3-5 lb weight increase over 1-2 days notify physician if charge noted.  Patient verbalized understanding.    Patient instructed to limit fluid intake to 2 liters per day.  Patient verbalized understanding.    Signs and symptoms of CHF discussed with patient, such as feeling more tired than normal, feeling short of breath, coughing that increases when you lie down, sudden weight gain, swelling of your feet, legs or belly.  Patient verbalized understanding to notify physician office if these symptoms occur.    Compliance with plan of care and further disease process causes discussed with patient, patient encouraged to keep all follow up appointments.  Patient verbalized understanding.    CHF 1/3/25 @0930am ,

## 2024-12-24 ENCOUNTER — TELEPHONE (OUTPATIENT)
Dept: FAMILY MEDICINE CLINIC | Age: 69
End: 2024-12-24

## 2024-12-24 ENCOUNTER — CARE COORDINATION (OUTPATIENT)
Dept: CARE COORDINATION | Age: 69
End: 2024-12-24

## 2024-12-24 NOTE — CARE COORDINATION
Care Transitions Note    Initial Call - Call within 2 business days of discharge: Yes    Attempted to reach patient for transitions of care follow up. Unable to reach patient.    Outreach Attempts:   Unable to leave message. First attempt, call declined X2    Patient: Mickey Berg    Patient : 1955   MRN: [id number]    Reason for Admission: cellulitis  Discharge Date: 24  RURS: No data recorded  Last Discharge Facility       Date Complaint Diagnosis Description Type Department Provider    24 Medication Refill Cellulitis of left lower extremity ED to Hosp-Admission (Discharged) (ADMITTED) [facility] Austin Thrasher MD; Curt Gu..            Was this an external facility discharge? No    Follow Up Appointment:   Patient does not have a follow up appointment scheduled at time of call.  PCP ofc notified via chart route  Future Appointments         Provider Specialty Dept Phone    1/3/2025  9:30 AM [facility] Cardiology [phone]    3/4/2025 9:30 AM Jono Conde MD Cardiology [phone]            Plan for follow-up on next business day.      Irene Acosta, RN

## 2024-12-24 NOTE — TELEPHONE ENCOUNTER
Care Transitions Initial Follow Up Call    Outreach made within 2 business days of discharge: Yes    Patient: Mickey Berg Patient : 1955   MRN: [id number]  Reason for Admission: Cellulitis   Discharge Date: 24       Spoke with: Unable to reach patient - receiving busy signal on phone     Discharge department/facility: [place]      Sharon Olmos [facility]

## 2024-12-26 ENCOUNTER — TELEPHONE (OUTPATIENT)
Dept: FAMILY MEDICINE CLINIC | Age: 69
End: 2024-12-26

## 2024-12-26 ENCOUNTER — CARE COORDINATION (OUTPATIENT)
Dept: CARE COORDINATION | Age: 69
End: 2024-12-26

## 2024-12-26 NOTE — TELEPHONE ENCOUNTER
Care Transitions Initial Follow Up Call    Outreach made within 2 business days of discharge: Yes    Patient: Mickey Berg Patient : 1955   MRN: [id number]  Reason for Admission: Cellulitis  Discharge Date: 24       Spoke with: 2nd attempt - Still receiving busy signal    Discharge department/facility: [facility]

## 2024-12-26 NOTE — CARE COORDINATION
Care Transitions Note    Initial Call - Call within 2 business days of discharge: Yes    Attempted to reach patient for transitions of care follow up. Unable to reach patient. Second attempt, unable to reach, closing for IRVIN    Outreach Attempts:   Unable to leave message.     Patient: Mickey Berg    Patient : 1955   MRN: [id number]    Reason for Admission: cellulitis  Discharge Date: 24  RURS: No data recorded  Last Discharge Facility       Date Complaint Diagnosis Description Type Department Provider    24 Medication Refill Cellulitis of left lower extremity ED to Hosp-Admission (Discharged) (ADMITTED) [facility] Austin Thrasher MD; Curt Gu..            Was this an external facility discharge? No    Follow Up Appointment:   Patient does not have a follow up appointment scheduled at time of call.  PCP ofc notified at last call attempt  Future Appointments         Provider Specialty Dept Phone    1/3/2025  9:30 AM [facility] Cardiology [phone]    3/4/2025 9:30 AM Jono Conde MD Cardiology [phone]            No further follow-up call indicated     Irene Acosta, BARBIE

## 2024-12-27 ENCOUNTER — HOSPITAL ENCOUNTER (EMERGENCY)
Age: 69
Discharge: HOME | End: 2024-12-27
Attending: EMERGENCY MEDICINE
Payer: MEDICARE

## 2024-12-27 VITALS
WEIGHT: 236.33 LBS | TEMPERATURE: 97.9 F | RESPIRATION RATE: 18 BRPM | DIASTOLIC BLOOD PRESSURE: 74 MMHG | HEART RATE: 58 BPM | OXYGEN SATURATION: 97 % | BODY MASS INDEX: 39.33 KG/M2 | SYSTOLIC BLOOD PRESSURE: 112 MMHG

## 2024-12-27 DIAGNOSIS — L03.116 CELLULITIS OF LEFT LEG: Primary | ICD-10-CM

## 2024-12-27 PROCEDURE — 99283 EMERGENCY DEPT VISIT LOW MDM: CPT

## 2024-12-27 PROCEDURE — 6370000000 HC RX 637 (ALT 250 FOR IP)

## 2024-12-27 RX ORDER — HYDROXYZINE HYDROCHLORIDE 25 MG/1
25 TABLET, FILM COATED ORAL EVERY 6 HOURS PRN
Qty: 12 TABLET | Refills: 0 | Status: SHIPPED | OUTPATIENT
Start: 2024-12-27 | End: 2024-12-30

## 2024-12-27 RX ORDER — HYDROXYZINE HYDROCHLORIDE 10 MG/1
10 TABLET, FILM COATED ORAL ONCE
Status: COMPLETED | OUTPATIENT
Start: 2024-12-27 | End: 2024-12-27

## 2024-12-27 RX ORDER — ACETAMINOPHEN 500 MG
1000 TABLET ORAL ONCE
Status: COMPLETED | OUTPATIENT
Start: 2024-12-27 | End: 2024-12-27

## 2024-12-27 RX ORDER — ACETAMINOPHEN 500 MG
1000 TABLET ORAL 3 TIMES DAILY
Qty: 60 TABLET | Refills: 0 | Status: SHIPPED | OUTPATIENT
Start: 2024-12-27 | End: 2025-01-06

## 2024-12-27 RX ADMIN — HYDROXYZINE HYDROCHLORIDE 10 MG: 10 TABLET ORAL at 20:34

## 2024-12-27 RX ADMIN — ACETAMINOPHEN 1000 MG: 500 TABLET ORAL at 20:33

## 2024-12-27 ASSESSMENT — PAIN DESCRIPTION - ORIENTATION: ORIENTATION: LEFT

## 2024-12-27 ASSESSMENT — PAIN - FUNCTIONAL ASSESSMENT: PAIN_FUNCTIONAL_ASSESSMENT: PREVENTS OR INTERFERES WITH MANY ACTIVE NOT PASSIVE ACTIVITIES

## 2024-12-27 ASSESSMENT — PAIN DESCRIPTION - LOCATION: LOCATION: LEG

## 2024-12-27 ASSESSMENT — PAIN DESCRIPTION - DESCRIPTORS: DESCRIPTORS: BURNING

## 2024-12-27 ASSESSMENT — PAIN SCALES - GENERAL: PAINLEVEL_OUTOF10: 7

## 2024-12-28 NOTE — ED PROVIDER NOTES
[facility] EMERGENCY DEPARTMENT  Emergency Department Encounter  Emergency Medicine Resident     Pt Name:Mickey Berg  MRN: [id number]  Birthdate 1955  Date of evaluation: 12/27/24  PCP:  Mich Sung MD  Note Started: 8:45 PM EST      CHIEF COMPLAINT       Chief Complaint   Patient presents with    Cellulitis     Left shin       HISTORY OF PRESENT ILLNESS  (Location/Symptom, Timing/Onset, Context/Setting, Quality, Duration, Modifying Factors, Severity.)      Mickey Berg is a 69 y.o. male who presents with leg pain.  Patient reports recently being treated for cellulitis, is currently on antibiotics.  He feels that the wound itself is getting better but last night he started developing pain and burning in the leg.  He has not taken anything for his pain.  He also had a dressing over his leg and when he removed it there was some drainage, which caused him concern.  No fever, chills, nausea, vomiting, systemic symptoms.  No other complaints or concerns.    PAST MEDICAL / SURGICAL / SOCIAL / FAMILY HISTORY      has a past medical history of SYDNEY (acute kidney injury) (HCC), Arthritis, Atrial fibrillation (HCC), BPH (benign prostatic hyperplasia), Cellulitis, Cervical disc disease, Chronic venous insufficiency, Combined systolic and diastolic congestive heart failure (HCC), Coronary artery disease, GERD (gastroesophageal reflux disease), History of incarceration, History of recent hospitalization, Hyperlipidemia, Hypertension, Ischemic cardiomyopathy, Myocardial infarct (HCC), Obesity, Overactive bladder, Sleep apnea treated with continuous positive airway pressure (CPAP), Under care of team, Under care of team, Under care of team, Wears reading eyeglasses, and Wellness examination.     has a past surgical history that includes transesophageal echocardiogram (10/16/2019); Coronary Artery Bypass Graft Maze Procedure (N/A, 10/21/2019); Carpal tunnel release (Left, 09/11/2006); fracture surgery        PATIENT REFERRED TO:  Mich Sung MD  [address] [facility] [id number] [phone]    Schedule an appointment as soon as possible for a visit   As needed    [facility] Emergency Department  [address] [place] [address] [id number] [phone]  Go to   If symptoms worsen      DISCHARGE MEDICATIONS:  Current Discharge Medication List        START taking these medications    Details   acetaminophen (TYLENOL) 500 MG tablet Take 2 tablets by mouth 3 times daily for 10 days  Qty: 60 tablet, Refills: 0      hydrOXYzine HCl (ATARAX) 25 MG tablet Take 1 tablet by mouth every 6 hours as needed for Itching  Qty: 12 tablet, Refills: 0             Lesli oKvacs DO  Emergency Medicine Resident    (Please note that portions of thisnote were completed with a voice recognition program.  Efforts were made to edit the dictations but occasionally words are mis-transcribed.)

## 2024-12-28 NOTE — ED PROVIDER NOTES
[facility]     Emergency Department     Faculty Attestation    I performed a history and physical examination of the patient and discussed management with the resident. I reviewed the resident’s note and agree with the documented findings and plan of care. Any areas of disagreement are noted on the chart. I was personally present for the key portions of any procedures. I have documented in the chart those procedures where I was not present during the key portions. I have reviewed the emergency nurses triage note. I agree with the chief complaint, past medical history, past surgical history, allergies, medications, social and family history as documented unless otherwise noted below.        For Physician Assistant/ Nurse Practitioner cases/documentation I have personally evaluated this patient and have completed at least one if not all key elements of the E/M (history, physical exam, and MDM). Additional findings are as noted.  I have personally seen and evaluated the patient.  I find the patient's history and physical exam are consistent with the NP/PA documentation.  I agree with the care provided, treatment rendered, disposition and follow-up plan.    Patient with recently admitted for cellulitis of the left lower extremity continues to have pain and itching at the site with some drainage noted today on examination when compared to images seen in the medius section of his chart it appears that there is a much lighter hue there certainly no extension of the erythema the patient continues to have pain he has had Dopplers in the past for this leg nothing recent but at this point suspicion is primarily for cellulitis.  At this point close follow-up is advised      Critical Care     Austin Ray M.D.  Attending Emergency  Physician           Austin Ray MD  12/27/24 2030

## 2024-12-28 NOTE — ED NOTES
Pt presents to the ED c/o left shin pain and burning.  Pt states he was recently discharged a few days ago s/p admission for left LE cellulitis.  Pt states he is still on oral abx and taking them as rx'd.  Pt denies any CP, fevers, nausea or vomiting.  LLE is edematous, warm, with errythema and scabbing present, no visible drainage or odor noted upon observation, pedal and tibial pulses palpable.  Pt A&O x 4, does not appear in acute distress, RR even and unlabored.  Pt sitting on stretcher with eyes open and call light in reach.  Vital signs obtained, medical hx and allergies reviewed with pt.   Triage completed.  Dr. Kovacs at bedside for evaluation

## 2024-12-28 NOTE — ED NOTES
Patient was notified that his J[employer] account at [employer]  and needs to call and have services set back up. Patient verbalized understanding. [employer] voucher provided.

## 2024-12-28 NOTE — DISCHARGE INSTRUCTIONS
You were seen in the ER today for cellulitis of your leg.  You are on antibiotics, continue to take these as directed.  If you stop taking them before the end of the course there is risk of resistance and difficulty fighting infections in the future.  Your leg looks better than it did when you were here last.  Antibiotics appear to be working.  Take Tylenol, Motrin for pain.  You can use the Atarax for itching.    If you do not feel better or feeling worse on Monday, please return during business hours so we may obtain studies to look for a blood clot.  You can always come back earlier if you are getting worse, develop fevers, chills, nausea, or any other concerns.

## 2024-12-30 DIAGNOSIS — K21.9 GASTROESOPHAGEAL REFLUX DISEASE WITHOUT ESOPHAGITIS: ICD-10-CM

## 2024-12-31 RX ORDER — PANTOPRAZOLE SODIUM 40 MG/1
40 TABLET, DELAYED RELEASE ORAL
Qty: 30 TABLET | Refills: 2 | Status: SHIPPED | OUTPATIENT
Start: 2024-12-31

## 2025-01-09 NOTE — H&P
Pre-op History and Physical      Patient:  Mickey Berg  MRN: [id number]  YOB: 1955    HISTORY OF PRESENT ILLNESS:     The patient is a 69 y.o. male who presents with history of overactive bladder, along with bph histry s/p urolift. He has undergone bladder botox for OAB which initially worked, but now is not as successful. Here for Interstim implant stage 1.    Patient's old records, notes and chart reviewed and summarized above.     I independently reviewed the images and verified the radiology reports from:    No results found.      Past Medical History:    Past Medical History:   Diagnosis Date    SYDNEY (acute kidney injury) ([facility])     Ambulates with cane 01/08/2025    Rolator walker ordered by PCP 10/2024 but pt has not yet received.    Arthritis     back    Atrial fibrillation (HCC)     BPH (benign prostatic hyperplasia)     Cellulitis 02/2023    left leg and 12/2024    Cervical disc disease     Chronic venous insufficiency     Combined systolic and diastolic congestive heart failure (HCC) 01/15/2020    With preserved EF (12/12/2024)    Coronary artery disease 10/2019    s/p CABG x 1 ; [place] to LAD    GERD (gastroesophageal reflux disease)     on rx    History of incarceration     released 2019    Hyperlipidemia     Hypertension     Ischemic cardiomyopathy     Myocardial infarct (HCC)     Obesity     HARPREET (obstructive sleep apnea)     No machine presently - lost in eviciton (1/2025)    Overactive bladder     Under care of podiatrist     Dr. Lorie Piedra - last visit 8/2024    Under care of team 12/26/2023    Urology, Dr. Morrison, last visit 11/8/2024    Under care of team 12/26/2023    GI, dr. Johnston, last seen 11/2023    Under care of team 12/26/2023    Cardiology, [facility], Dr. Conde, last seen 2023    Under care of team     [facility]F Clinic - [facility] - last visit 12/12/2024    Under care of team     PCP - Dr. Sung - last visit 10/24/2024    Under care of team     Vascular Surgery - Dr. Rico - last

## 2025-01-09 NOTE — DISCHARGE INSTRUCTIONS
Discharge instructions: Interstim Stage 1:  NO SHOWERS OR BATHS - MUST DO SPONGE BATHS UNTIL STAGE 2 IS COMPLETED     Patient ok to discharge home in good condition  No heavy lifting, >10 lbs for today  Patient should avoid strenuous activity for today  Patient should walk moderately at home  Patient may resume diet as tolerated  Patient should take prescriptions as directed  No driving while on narcotics  Please call attending physician or hospital  with questions  Call or Present to ED if fever (> 101F), intractable nausea vomiting or pain.     Pt should follow up in 2 weeks for interstim stage 2 with Dr. Oliva.      No alcoholic beverages, no driving or operating machinery, no making important decisions for 24 hours.   You may have a normal diet but should eat lightly day of surgery.  Drink plenty of fluids.  Urinate within 8 hours after surgery, if unable to urinate call your doctor     Detail Level: Detailed

## 2025-01-10 ENCOUNTER — ANESTHESIA (OUTPATIENT)
Dept: OPERATING ROOM | Age: 70
End: 2025-01-10
Payer: MEDICARE

## 2025-01-10 ENCOUNTER — ANESTHESIA EVENT (OUTPATIENT)
Dept: OPERATING ROOM | Age: 70
End: 2025-01-10
Payer: MEDICARE

## 2025-01-10 ENCOUNTER — APPOINTMENT (OUTPATIENT)
Dept: GENERAL RADIOLOGY | Age: 70
End: 2025-01-10
Attending: UROLOGY
Payer: MEDICARE

## 2025-01-10 ENCOUNTER — HOSPITAL ENCOUNTER (OUTPATIENT)
Age: 70
Setting detail: OUTPATIENT SURGERY
Discharge: HOME | End: 2025-01-10
Attending: UROLOGY | Admitting: UROLOGY
Payer: MEDICARE

## 2025-01-10 VITALS
OXYGEN SATURATION: 99 % | RESPIRATION RATE: 20 BRPM | TEMPERATURE: 97.9 F | SYSTOLIC BLOOD PRESSURE: 103 MMHG | WEIGHT: 230 LBS | HEIGHT: 65 IN | HEART RATE: 61 BPM | BODY MASS INDEX: 38.32 KG/M2 | DIASTOLIC BLOOD PRESSURE: 66 MMHG

## 2025-01-10 LAB
GLUCOSE BLD-MCNC: 100 MG/DL (ref 74–100)
POTASSIUM BLD-SCNC: 4.2 MMOL/L (ref 3.5–4.5)

## 2025-01-10 PROCEDURE — 7100000011 HC PHASE II RECOVERY - ADDTL 15 MIN: Performed by: UROLOGY

## 2025-01-10 PROCEDURE — 3600000013 HC SURGERY LEVEL 3 ADDTL 15MIN: Performed by: UROLOGY

## 2025-01-10 PROCEDURE — 6360000002 HC RX W HCPCS: Performed by: PHYSICIAN ASSISTANT

## 2025-01-10 PROCEDURE — 84132 ASSAY OF SERUM POTASSIUM: CPT

## 2025-01-10 PROCEDURE — 2500000003 HC RX 250 WO HCPCS: Performed by: NURSE ANESTHETIST, CERTIFIED REGISTERED

## 2025-01-10 PROCEDURE — 3700000001 HC ADD 15 MINUTES (ANESTHESIA): Performed by: UROLOGY

## 2025-01-10 PROCEDURE — C1883 ADAPT/EXT, PACING/NEURO LEAD: HCPCS | Performed by: UROLOGY

## 2025-01-10 PROCEDURE — C1897 LEAD, NEUROSTIM TEST KIT: HCPCS | Performed by: UROLOGY

## 2025-01-10 PROCEDURE — 2709999900 HC NON-CHARGEABLE SUPPLY: Performed by: UROLOGY

## 2025-01-10 PROCEDURE — 82947 ASSAY GLUCOSE BLOOD QUANT: CPT

## 2025-01-10 PROCEDURE — 2580000003 HC RX 258: Performed by: NURSE ANESTHETIST, CERTIFIED REGISTERED

## 2025-01-10 PROCEDURE — 2580000003 HC RX 258: Performed by: ANESTHESIOLOGY

## 2025-01-10 PROCEDURE — C1778 LEAD, NEUROSTIMULATOR: HCPCS | Performed by: UROLOGY

## 2025-01-10 PROCEDURE — 3700000000 HC ANESTHESIA ATTENDED CARE: Performed by: UROLOGY

## 2025-01-10 PROCEDURE — 7100000010 HC PHASE II RECOVERY - FIRST 15 MIN: Performed by: UROLOGY

## 2025-01-10 PROCEDURE — 3600000003 HC SURGERY LEVEL 3 BASE: Performed by: UROLOGY

## 2025-01-10 PROCEDURE — 6360000002 HC RX W HCPCS: Performed by: NURSE ANESTHETIST, CERTIFIED REGISTERED

## 2025-01-10 PROCEDURE — 6360000002 HC RX W HCPCS: Performed by: UROLOGY

## 2025-01-10 PROCEDURE — 2500000003 HC RX 250 WO HCPCS: Performed by: UROLOGY

## 2025-01-10 DEVICE — LEAD NERVE STIM 4.32 MM INTERSTIM SURESCAN: Type: IMPLANTABLE DEVICE | Site: SPINE LUMBAR | Status: FUNCTIONAL

## 2025-01-10 RX ORDER — DIPHENHYDRAMINE HYDROCHLORIDE 50 MG/ML
12.5 INJECTION INTRAMUSCULAR; INTRAVENOUS
Status: DISCONTINUED | OUTPATIENT
Start: 2025-01-10 | End: 2025-01-10 | Stop reason: HOSPADM

## 2025-01-10 RX ORDER — PROPOFOL 10 MG/ML
INJECTION, EMULSION INTRAVENOUS
Status: DISCONTINUED | OUTPATIENT
Start: 2025-01-10 | End: 2025-01-10 | Stop reason: SDUPTHER

## 2025-01-10 RX ORDER — SODIUM CHLORIDE, SODIUM LACTATE, POTASSIUM CHLORIDE, CALCIUM CHLORIDE 600; 310; 30; 20 MG/100ML; MG/100ML; MG/100ML; MG/100ML
INJECTION, SOLUTION INTRAVENOUS
Status: DISCONTINUED | OUTPATIENT
Start: 2025-01-10 | End: 2025-01-10 | Stop reason: SDUPTHER

## 2025-01-10 RX ORDER — LIDOCAINE HYDROCHLORIDE 10 MG/ML
INJECTION, SOLUTION EPIDURAL; INFILTRATION; INTRACAUDAL; PERINEURAL
Status: DISCONTINUED | OUTPATIENT
Start: 2025-01-10 | End: 2025-01-10 | Stop reason: SDUPTHER

## 2025-01-10 RX ORDER — SODIUM CHLORIDE, SODIUM LACTATE, POTASSIUM CHLORIDE, CALCIUM CHLORIDE 600; 310; 30; 20 MG/100ML; MG/100ML; MG/100ML; MG/100ML
INJECTION, SOLUTION INTRAVENOUS CONTINUOUS
Status: DISCONTINUED | OUTPATIENT
Start: 2025-01-10 | End: 2025-01-10 | Stop reason: HOSPADM

## 2025-01-10 RX ORDER — SODIUM CHLORIDE 9 MG/ML
INJECTION, SOLUTION INTRAVENOUS PRN
Status: DISCONTINUED | OUTPATIENT
Start: 2025-01-10 | End: 2025-01-10 | Stop reason: HOSPADM

## 2025-01-10 RX ORDER — SODIUM CHLORIDE 0.9 % (FLUSH) 0.9 %
5-40 SYRINGE (ML) INJECTION PRN
Status: DISCONTINUED | OUTPATIENT
Start: 2025-01-10 | End: 2025-01-10 | Stop reason: HOSPADM

## 2025-01-10 RX ORDER — FENTANYL CITRATE 50 UG/ML
50 INJECTION, SOLUTION INTRAMUSCULAR; INTRAVENOUS EVERY 5 MIN PRN
Status: DISCONTINUED | OUTPATIENT
Start: 2025-01-10 | End: 2025-01-10 | Stop reason: HOSPADM

## 2025-01-10 RX ORDER — BUPIVACAINE HYDROCHLORIDE 2.5 MG/ML
INJECTION, SOLUTION INFILTRATION; PERINEURAL PRN
Status: DISCONTINUED | OUTPATIENT
Start: 2025-01-10 | End: 2025-01-10 | Stop reason: ALTCHOICE

## 2025-01-10 RX ORDER — ONDANSETRON 2 MG/ML
4 INJECTION INTRAMUSCULAR; INTRAVENOUS
Status: DISCONTINUED | OUTPATIENT
Start: 2025-01-10 | End: 2025-01-10 | Stop reason: HOSPADM

## 2025-01-10 RX ORDER — FENTANYL CITRATE 50 UG/ML
50 INJECTION, SOLUTION INTRAMUSCULAR; INTRAVENOUS
Status: DISCONTINUED | OUTPATIENT
Start: 2025-01-10 | End: 2025-01-10 | Stop reason: HOSPADM

## 2025-01-10 RX ORDER — ONDANSETRON 2 MG/ML
INJECTION INTRAMUSCULAR; INTRAVENOUS
Status: DISCONTINUED | OUTPATIENT
Start: 2025-01-10 | End: 2025-01-10 | Stop reason: SDUPTHER

## 2025-01-10 RX ORDER — FENTANYL CITRATE 50 UG/ML
25 INJECTION, SOLUTION INTRAMUSCULAR; INTRAVENOUS EVERY 5 MIN PRN
Status: DISCONTINUED | OUTPATIENT
Start: 2025-01-10 | End: 2025-01-10 | Stop reason: HOSPADM

## 2025-01-10 RX ORDER — SODIUM CHLORIDE 0.9 % (FLUSH) 0.9 %
5-40 SYRINGE (ML) INJECTION EVERY 12 HOURS SCHEDULED
Status: DISCONTINUED | OUTPATIENT
Start: 2025-01-10 | End: 2025-01-10 | Stop reason: HOSPADM

## 2025-01-10 RX ORDER — DEXMEDETOMIDINE HYDROCHLORIDE 100 UG/ML
INJECTION, SOLUTION INTRAVENOUS
Status: DISCONTINUED | OUTPATIENT
Start: 2025-01-10 | End: 2025-01-10 | Stop reason: SDUPTHER

## 2025-01-10 RX ORDER — FENTANYL CITRATE 50 UG/ML
25 INJECTION, SOLUTION INTRAMUSCULAR; INTRAVENOUS
Status: DISCONTINUED | OUTPATIENT
Start: 2025-01-10 | End: 2025-01-10 | Stop reason: HOSPADM

## 2025-01-10 RX ORDER — MIDAZOLAM HYDROCHLORIDE 2 MG/2ML
1 INJECTION, SOLUTION INTRAMUSCULAR; INTRAVENOUS EVERY 10 MIN PRN
Status: DISCONTINUED | OUTPATIENT
Start: 2025-01-10 | End: 2025-01-10 | Stop reason: HOSPADM

## 2025-01-10 RX ORDER — ALBUTEROL SULFATE 0.83 MG/ML
2.5 SOLUTION RESPIRATORY (INHALATION) EVERY 8 HOURS PRN
Status: DISCONTINUED | OUTPATIENT
Start: 2025-01-10 | End: 2025-01-10 | Stop reason: HOSPADM

## 2025-01-10 RX ORDER — NALOXONE HYDROCHLORIDE 0.4 MG/ML
INJECTION, SOLUTION INTRAMUSCULAR; INTRAVENOUS; SUBCUTANEOUS PRN
Status: DISCONTINUED | OUTPATIENT
Start: 2025-01-10 | End: 2025-01-10 | Stop reason: HOSPADM

## 2025-01-10 RX ORDER — LIDOCAINE HYDROCHLORIDE 10 MG/ML
1 INJECTION, SOLUTION INFILTRATION; PERINEURAL
Status: DISCONTINUED | OUTPATIENT
Start: 2025-01-10 | End: 2025-01-10 | Stop reason: HOSPADM

## 2025-01-10 RX ORDER — MIDAZOLAM HYDROCHLORIDE 1 MG/ML
INJECTION, SOLUTION INTRAMUSCULAR; INTRAVENOUS
Status: DISCONTINUED | OUTPATIENT
Start: 2025-01-10 | End: 2025-01-10 | Stop reason: SDUPTHER

## 2025-01-10 RX ORDER — FENTANYL CITRATE 50 UG/ML
INJECTION, SOLUTION INTRAMUSCULAR; INTRAVENOUS
Status: DISCONTINUED | OUTPATIENT
Start: 2025-01-10 | End: 2025-01-10 | Stop reason: SDUPTHER

## 2025-01-10 RX ADMIN — FENTANYL CITRATE 25 MCG: 50 INJECTION, SOLUTION INTRAMUSCULAR; INTRAVENOUS at 16:19

## 2025-01-10 RX ADMIN — DEXMEDETOMIDINE HYDROCHLORIDE 8 MCG: 100 INJECTION, SOLUTION INTRAVENOUS at 16:20

## 2025-01-10 RX ADMIN — SODIUM CHLORIDE, SODIUM LACTATE, POTASSIUM CHLORIDE, AND CALCIUM CHLORIDE: .6; .31; .03; .02 INJECTION, SOLUTION INTRAVENOUS at 12:54

## 2025-01-10 RX ADMIN — LIDOCAINE HYDROCHLORIDE 50 MG: 10 INJECTION, SOLUTION EPIDURAL; INFILTRATION; INTRACAUDAL; PERINEURAL at 16:13

## 2025-01-10 RX ADMIN — Medication 2000 MG: at 16:11

## 2025-01-10 RX ADMIN — MIDAZOLAM 2 MG: 1 INJECTION INTRAMUSCULAR; INTRAVENOUS at 16:07

## 2025-01-10 RX ADMIN — SODIUM CHLORIDE, POTASSIUM CHLORIDE, SODIUM LACTATE AND CALCIUM CHLORIDE: 600; 310; 30; 20 INJECTION, SOLUTION INTRAVENOUS at 16:06

## 2025-01-10 RX ADMIN — ONDANSETRON 4 MG: 2 INJECTION INTRAMUSCULAR; INTRAVENOUS at 16:37

## 2025-01-10 RX ADMIN — FENTANYL CITRATE 25 MCG: 50 INJECTION, SOLUTION INTRAMUSCULAR; INTRAVENOUS at 16:15

## 2025-01-10 RX ADMIN — PROPOFOL 20 MG: 10 INJECTION, EMULSION INTRAVENOUS at 16:22

## 2025-01-10 RX ADMIN — Medication 20 MG: at 16:20

## 2025-01-10 NOTE — H&P
[facility]  Urology H&P Note     Patient:  Mickey Berg  MRN: [id number]  YOB: 1955    ATTENDING: Giorgio Morrison Jr, MD     CHIEF COMPLAINT:  Urge incontinence    HISTORY OF PRESENT ILLNESS:   The patient is a 69 y.o. male who presents with urge incontinence    Patient's old records, notes and chart reviewed and summarized above.     Past Medical History:    Past Medical History:   Diagnosis Date    SYDNEY (acute kidney injury) ([facility])     Ambulates with cane 01/08/2025    Rolator walker ordered by PCP 10/2024 but pt has not yet received.    Arthritis     back    Atrial fibrillation ([facility])     BPH (benign prostatic hyperplasia)     Cellulitis 02/2023    left leg and 12/2024    Cervical disc disease     Chronic venous insufficiency     Combined systolic and diastolic congestive heart failure ([facility]) 01/15/2020    With preserved EF (12/12/2024)    Coronary artery disease 10/2019    s/p CABG x 1 ; [place] to LAD    GERD (gastroesophageal reflux disease)     on rx    History of incarceration     released 2019    Hyperlipidemia     Hypertension     Ischemic cardiomyopathy     Myocardial infarct ([facility])     Obesity     HARPREET (obstructive sleep apnea)     No machine presently - lost in eviciton (1/2025)    Overactive bladder     Under care of podiatrist     Dr. Lorie Piedra - last visit 8/2024    Under care of team 12/26/2023    Urology, Dr. Morrison, last visit 11/8/2024    Under care of team 12/26/2023    GI, dr. Johnston, last seen 11/2023    Under care of team 12/26/2023    Cardiology, [facility], Dr. Conde, last seen 2023    Under care of team     [facility] Clinic - [facility] - last visit 12/12/2024    Under care of team     PCP - Dr. Sung - last visit 10/24/2024    Under care of team     Vascular Surgery - Dr. Rico - last visit 11/26/2024 - F/U prn    Venous ulcers of both lower extremities ([facility]) 01/2025    Ongoing - rt leg healed, lt leg \"scabbed over\" (1/8/2025)    Wears reading eyeglasses        Past Surgical History:   Relation Age of Onset    Alcohol Abuse Maternal Uncle     Heart Attack Maternal Uncle     Cancer Mother     Alcohol Abuse Father        REVIEW OF SYSTEMS:  All systems reviewed and negative except for that already noted in the HPI.    Physical Exam:      No data found.  Constitutional: Patient in no acute distress;   Neuro: alert and oriented to person place and time.    Psych: Mood and affect normal.  Skin: Normal  Lungs: Respiratory effort normal  Cardiovascular:  Normal peripheral pulses  Abdomen: Soft, non-tender, non-distended with no CVA, flank pain, hepatosplenomegaly or hernia.    Kidneys normal.  Bladder non-tender and not distended.  Lymphatics: no palpable lymphadenopathy        Assessment and Plan   Impression:    Patient Active Problem List   Diagnosis    Atrial flutter (HCC)    Sleep-related breathing disorder    Hypokalemia    Atrial fibrillation (HCC) s/p Ablation     Ischemic cardiomyopathy    Bilateral lower leg cellulitis    Acute cystitis without hematuria    CAD s/p CABG    Chronic heart failure with preserved ejection fraction (HCC)    Bilateral hand numbness    Right thigh pain    Left leg weakness    Coronary artery disease    Chronic cough    Gastroesophageal reflux disease without esophagitis    Overflow incontinence of urine    Polyp of colon    Post-void dribbling    Post-nasal drip    SYDNEY (acute kidney injury) (HCC)    Hyperkalemia    Hypotension    Overactive bladder    Hemorrhoids    HARPREET (obstructive sleep apnea)    Laryngopharyngeal reflux    Bilateral edema of lower extremity    Lymphedema of both lower extremities    Acute bilateral venous stasis dermatitis    Acute pain of right knee    Chronic venous stasis    Balance problem    Left leg pain    Cellulitis    Venous stasis ulcer of left calf with fat layer exposed (HCC)    Venous stasis ulcer of right calf with fat layer exposed (HCC)    Essential hypertension    Need for vaccination against Streptococcus pneumoniae    BPH S/P

## 2025-01-10 NOTE — ANESTHESIA PRE PROCEDURE
Current Facility-Administered Medications   Medication Dose Route Frequency Provider Last Rate Last Admin    ceFAZolin (ANCEF) 2000 mg in sterile water 20 mL IV syringe  2,000 mg IntraVENous On Call to OR Hayley Washburn PA-C           Allergies:    Allergies   Allergen Reactions    Seasonal Other (See Comments)     Allergic rhinitis       Problem List:    Patient Active Problem List   Diagnosis Code    Atrial flutter ([facility]) I48.92    Sleep-related breathing disorder G47.30    Hypokalemia E87.6    Atrial fibrillation ([facility]) s/p Ablation  I48.91    Ischemic cardiomyopathy I25.5    Bilateral lower leg cellulitis L03.116, L03.115    Acute cystitis without hematuria N30.00    CAD s/p CABG Z95.1    Chronic heart failure with preserved ejection fraction ([facility]) I50.32    Bilateral hand numbness R20.0    Right thigh pain M79.651    Left leg weakness R29.898    Coronary artery disease I25.10    Chronic cough R05.3    Gastroesophageal reflux disease without esophagitis K21.9    Overflow incontinence of urine N39.490    Polyp of colon K63.5    Post-void dribbling N39.43    Post-nasal drip R09.82    SYDNEY (acute kidney injury) ([facility]) N17.9    Hyperkalemia E87.5    Hypotension I95.9    Overactive bladder N32.81    Hemorrhoids K64.9    HARPREET (obstructive sleep apnea) G47.33    Laryngopharyngeal reflux K21.9    Bilateral edema of lower extremity R60.0    Lymphedema of both lower extremities I89.0    Acute bilateral venous stasis dermatitis I87.2    Acute pain of right knee M25.561    Chronic venous stasis I87.8    Balance problem R26.89    Left leg pain M79.605    Cellulitis L03.90    Venous stasis ulcer of left calf with fat layer exposed ([facility]) I83.022, L97.222    Venous stasis ulcer of right calf with fat layer exposed ([facility]) I83.012, L97.212    Essential hypertension I10    Need for vaccination against Streptococcus pneumoniae Z23    BPH S/P UroLift  N40.0    Type 2 diabetes mellitus E11.9    Angina pectoris, unspecified I20.9     Atherosclerotic heart disease of native coronary artery with unspecified angina pectoris I25.119    Incontinence of urine R32       Past Medical History:        Diagnosis Date    SYDNEY (acute kidney injury) ([facility])     Ambulates with cane 01/08/2025    Rolator walker ordered by PCP 10/2024 but pt has not yet received.    Arthritis     back    Atrial fibrillation ([facility])     BPH (benign prostatic hyperplasia)     Cellulitis 02/2023    left leg and 12/2024    Cervical disc disease     Chronic venous insufficiency     Combined systolic and diastolic congestive heart failure (HCC) 01/15/2020    With preserved EF (12/12/2024)    Coronary artery disease 10/2019    s/p CABG x 1 ; [place] to LAD    GERD (gastroesophageal reflux disease)     on rx    History of incarceration     released 2019    Hyperlipidemia     Hypertension     Ischemic cardiomyopathy     Myocardial infarct ([facility])     Obesity     HARRPEET (obstructive sleep apnea)     No machine presently - lost in eviciton (1/2025)    Overactive bladder     Under care of podiatrist     Dr. Lorie Piedra - last visit 8/2024    Under care of team 12/26/2023    Urology, Dr. Morrison, last visit 11/8/2024    Under care of team 12/26/2023    GI, dr. Johnston, last seen 11/2023    Under care of team 12/26/2023    Cardiology, [facility], Dr. Conde, last seen 2023    Under care of team     CHF Clinic - [facility] - last visit 12/12/2024    Under care of team     PCP - Dr. Sung - last visit 10/24/2024    Under care of team     Vascular Surgery - Dr. Rico - last visit 11/26/2024 - F/U prn    Venous ulcers of both lower extremities ([facility]) 01/2025    Ongoing - rt leg healed, lt leg \"scabbed over\" (1/8/2025)    Wears reading eyeglasses        Past Surgical History:        Procedure Laterality Date    CARPAL TUNNEL RELEASE Left 09/11/2006    [facility]    CARPAL TUNNEL RELEASE Right 01/30/2007    [facility]    CATARACT EXTRACTION Bilateral 2020    ? IOL's    CERVICAL SPINE SURGERY      anterior and posterior

## 2025-01-10 NOTE — FLOWSHEET NOTE
Patient's friend Mala returned call and she stated she will be able to pick pt up after his procedure.

## 2025-01-10 NOTE — PROGRESS NOTES
Writer called and spoke with patient's friend/ ride home Mala.  Mala stated that she was looking for a ride to the hospital so that she could accompany the patient home via the [employer] that his insurance pays for ([employer]).  Mala stated she will call PACU when she finds a ride.

## 2025-01-10 NOTE — PROGRESS NOTES
Attempted to call patient's friend Marva @ [phone].  There was no answer but I was able to leave a message and I asked that she call the pre-op desk.   I also tried to call [phone] and that mailbox was full.

## 2025-01-11 NOTE — PROGRESS NOTES
Writer spoke with Dr. Covington re: patient's friend Mala having difficulty finding a ride to [facility] to  patient.  Patient usually uses a \"[employer] cab\" to get to and from his doctor's appointments and used this service to come to the hospital today.  Patient is able to use this service to return home however he will travel without his friend as she is unable to get here. After answering Dr. Covington's questions re: anesthesia and recovery, doctor stated she is comfortable sending patient home without his friend here to accompany him; patient does live with Mala and I verified that she will be there when he gets home and will be there overnight.

## 2025-01-11 NOTE — OP NOTE
test her fluoroscopy and noted it to be in the correct position.  Motor responses were taken, we did appreciate juliana as well as toe curl.  A wire was placed through the spinal needle, small skin incision was made with a 15 blade scalpel.  The dilator was placed over the wire and into the bone table of S3.  The inner sheath was removed.  The lead was placed through the sheath and noted to be in good position on fluoroscopy.  We then tested the lead and noted excellent motor responses at 0, 1, 2 position.  Of note, the lead did need to be pushed in farther than expected to get our responses.  We then removed the outer sheath under live fluoroscopy.  We made a small skin incision on the left buttock and dissected down to the subcutaneous fat.  The wire of the lead was tunneled under the skin to this point.  We then tunneled the temporary generator through the skin on the left side of the back to the right side, connecting the lead to this temporary generator.  This was locked in place with a hex wrench.  This was tied together with Prolene suture in buried under the skin.  We closed the skin with 3-0 Vicryl subdermally and 4-0 Monocryl subcuticular, skin glue over the incisions.  The temporary generator was plugged and, appeared dry.  The patient was awoken and taken the PACU in good condition.    Please note that Dr. Morrison was present for all critical portions of the procedure.    Plan:  Patient instructed on how to care for temporary generator.  Will follow-up in 1 to 2 weeks to discuss symptoms and decide if he would be a candidate for InterStim stage II.    Austin Sanchez MD  Urology Resident, PGY-4

## 2025-01-11 NOTE — PROGRESS NOTES
Writer spoke [employer] ([phone]), information given and they stated they would pass along the trip information and someone from the transportation company will call back to confirm the trip.

## 2025-01-14 DIAGNOSIS — N39.490 OVERFLOW INCONTINENCE OF URINE: ICD-10-CM

## 2025-01-14 RX ORDER — TAMSULOSIN HYDROCHLORIDE 0.4 MG/1
0.4 CAPSULE ORAL DAILY
Qty: 30 CAPSULE | Refills: 5 | Status: SHIPPED | OUTPATIENT
Start: 2025-01-14

## 2025-01-14 NOTE — TELEPHONE ENCOUNTER
Last visit: 10-24-24  Last Med refill:   Does patient have enough medication for 72 hours: No:     Next Visit Date:  Future Appointments   Date Time Provider Department Center   1/16/2025 10:30 AM [facility] RM 1 [facility] CHF [facility]   1/23/2025  1:30 PM Mich Sung MD [facility] DEP   3/4/2025  9:30 AM Jono Conde MD [facility]       Health Maintenance   Topic Date Due    Diabetic foot exam  Never done    Diabetic Alb to Cr ratio (uACR) test  Never done    Diabetic retinal exam  Never done    Respiratory Syncytial Virus (RSV) Pregnant or age 60 yrs+ (1 - Risk 60-74 years 1-dose series) Never done    COVID-19 Vaccine (3 - 2023-24 season) 09/01/2024    Annual Wellness Visit (Medicare Advantage)  01/01/2025    Depression Screen  06/25/2025    A1C test (Diabetic or Prediabetic)  08/28/2025    Lipids  10/02/2025    GFR test (Diabetes, CKD 3-4, OR last GFR 15-59)  12/22/2025    Colorectal Cancer Screen  01/12/2031    DTaP/Tdap/Td vaccine (5 - Td or Tdap) 02/25/2034    Flu vaccine  Completed    Shingles vaccine  Completed    Pneumococcal 65+ years Vaccine  Completed    Hepatitis C screen  Completed    Hepatitis A vaccine  Aged Out    Hepatitis B vaccine  Aged Out    Hib vaccine  Aged Out    Polio vaccine  Aged Out    Meningococcal (ACWY) vaccine  Aged Out    Pneumococcal 0-64 years Vaccine  Discontinued    Diabetes screen  Discontinued    HIV screen  Discontinued    Prostate Specific Antigen (PSA) Screening or Monitoring  Discontinued       Hemoglobin A1C (%)   Date Value   08/28/2024 5.8   04/13/2023 5.5   12/10/2019 5.6             ( goal A1C is < 7)   No components found for: \"LABMICR\"  No components found for: \"LDLCHOLESTEROL\", \"LDLCALC\"    (goal LDL is <100)   AST (U/L)   Date Value   03/29/2023 26     ALT (U/L)   Date Value   03/29/2023 19     BUN (mg/dL)   Date Value   12/22/2024 12     BP Readings from Last 3 Encounters:   01/10/25 103/66   12/27/24 112/74   12/23/24 106/61

## 2025-01-14 NOTE — ANESTHESIA POSTPROCEDURE EVALUATION
Department of Anesthesiology  Postprocedure Note    Patient: Mickey Berg  MRN: [id number]  YOB: 1955  Date of evaluation: 1/14/2025    Procedure Summary       Date: 01/10/25 Room / Location: [address]    Anesthesia Start: 1606 Anesthesia Stop: 1701    Procedure: INTERSTIM IMPLANT STAGE 1 Diagnosis:       Incontinence of urine      (Incontinence of urine [R32])    Surgeons: Giorgio Morrison Jr., MD Responsible Provider: Geovani Kimble MD    Anesthesia Type: MAC ASA Status: 3            Anesthesia Type: No value filed.    Jeni Phase I: Jeni Score: 10    Jeni Phase II: Jeni Score: 10    Anesthesia Post Evaluation    Patient location during evaluation: PACU  Patient participation: complete - patient participated  Level of consciousness: awake  Pain score: 1  Airway patency: patent  Nausea & Vomiting: no nausea and no vomiting  Cardiovascular status: blood pressure returned to baseline and hemodynamically stable  Respiratory status: acceptable  Hydration status: euvolemic  Pain management: adequate    No notable events documented.

## 2025-01-17 DIAGNOSIS — I50.32 CHRONIC DIASTOLIC (CONGESTIVE) HEART FAILURE (HCC): ICD-10-CM

## 2025-01-17 RX ORDER — ATORVASTATIN CALCIUM 40 MG/1
40 TABLET, FILM COATED ORAL DAILY
COMMUNITY

## 2025-01-17 SDOH — HEALTH STABILITY: PHYSICAL HEALTH
ON AVERAGE, HOW MANY DAYS PER WEEK DO YOU ENGAGE IN MODERATE TO STRENUOUS EXERCISE (LIKE A BRISK WALK)?: PATIENT DECLINED

## 2025-01-17 ASSESSMENT — PATIENT HEALTH QUESTIONNAIRE - PHQ9
SUM OF ALL RESPONSES TO PHQ QUESTIONS 1-9: 2
SUM OF ALL RESPONSES TO PHQ QUESTIONS 1-9: 2
1. LITTLE INTEREST OR PLEASURE IN DOING THINGS: SEVERAL DAYS
SUM OF ALL RESPONSES TO PHQ QUESTIONS 1-9: 2
2. FEELING DOWN, DEPRESSED OR HOPELESS: SEVERAL DAYS
SUM OF ALL RESPONSES TO PHQ9 QUESTIONS 1 & 2: 2
SUM OF ALL RESPONSES TO PHQ QUESTIONS 1-9: 2

## 2025-01-17 ASSESSMENT — LIFESTYLE VARIABLES
HOW OFTEN DO YOU HAVE A DRINK CONTAINING ALCOHOL: 1
HOW OFTEN DO YOU HAVE A DRINK CONTAINING ALCOHOL: NEVER
HOW OFTEN DO YOU HAVE SIX OR MORE DRINKS ON ONE OCCASION: 1
HOW MANY STANDARD DRINKS CONTAINING ALCOHOL DO YOU HAVE ON A TYPICAL DAY: 0
HOW MANY STANDARD DRINKS CONTAINING ALCOHOL DO YOU HAVE ON A TYPICAL DAY: PATIENT DOES NOT DRINK

## 2025-01-17 NOTE — TELEPHONE ENCOUNTER
Last visit: 10-24-24  Last Med refill:   Does patient have enough medication for 72 hours: No: patient [facility] is requesting a 90 days supply    Next Visit Date:  Future Appointments   Date Time Provider Department Center   1/21/2025 10:30 AM [facility] RM 1 [facility]F [facility]   1/23/2025  1:30 PM [facility] Sung MD [facility]   3/4/2025  9:30 AM Jono Conde MD [facility]       Health Maintenance   Topic Date Due    Diabetic foot exam  Never done    Diabetic Alb to Cr ratio (uACR) test  Never done    Diabetic retinal exam  Never done    Respiratory Syncytial Virus (RSV) Pregnant or age 60 yrs+ (1 - Risk 60-74 years 1-dose series) Never done    COVID-19 Vaccine (3 - 2023-24 season) 09/01/2024    Annual Wellness Visit (Medicare Advantage)  01/01/2025    Depression Screen  06/25/2025    A1C test (Diabetic or Prediabetic)  08/28/2025    Lipids  10/02/2025    GFR test (Diabetes, CKD 3-4, OR last GFR 15-59)  12/22/2025    Colorectal Cancer Screen  01/12/2031    DTaP/Tdap/Td vaccine (5 - Td or Tdap) 02/25/2034    Flu vaccine  Completed    Shingles vaccine  Completed    Pneumococcal 65+ years Vaccine  Completed    Hepatitis C screen  Completed    Hepatitis A vaccine  Aged Out    Hepatitis B vaccine  Aged Out    Hib vaccine  Aged Out    Polio vaccine  Aged Out    Meningococcal (ACWY) vaccine  Aged Out    Pneumococcal 0-64 years Vaccine  Discontinued    Diabetes screen  Discontinued    HIV screen  Discontinued    Prostate Specific Antigen (PSA) Screening or Monitoring  Discontinued       Hemoglobin A1C (%)   Date Value   08/28/2024 5.8   04/13/2023 5.5   12/10/2019 5.6             ( goal A1C is < 7)   No components found for: \"LABMICR\"  No components found for: \"LDLCHOLESTEROL\", \"LDLCALC\"    (goal LDL is <100)   AST (U/L)   Date Value   03/29/2023 26     ALT (U/L)   Date Value   03/29/2023 19     BUN (mg/dL)   Date Value   12/22/2024 12     BP Readings from Last 3 Encounters:

## 2025-01-20 RX ORDER — DAPAGLIFLOZIN 10 MG/1
10 TABLET, FILM COATED ORAL EVERY MORNING
Qty: 30 TABLET | Refills: 3 | Status: SHIPPED | OUTPATIENT
Start: 2025-01-20

## 2025-01-20 NOTE — H&P
3 total doses. If no relief after 1 dose, call 911., Disp: 25 tablet, Rfl: 1    Allergies:    Allergies   Allergen Reactions    Seasonal Other (See Comments)     Allergic rhinitis       Social History:   Social History     Socioeconomic History    Marital status:      Spouse name: Not on file    Number of children: Not on file    Years of education: Not on file    Highest education level: Not on file   Occupational History    Not on file   Tobacco Use    Smoking status: Never     Passive exposure: Never    Smokeless tobacco: Never   Vaping Use    Vaping status: Never Used   Substance and Sexual Activity    Alcohol use: Not Currently     Comment: stopped 1991    Drug use: Never    Sexual activity: Not Currently   Other Topics Concern    Not on file   Social History Narrative    Not on file     Social Determinants of Health     Financial Resource Strain: Low Risk  (6/25/2024)    Overall Financial Resource Strain (CARDIA)     Difficulty of Paying Living Expenses: Not hard at all   Food Insecurity: Food Insecurity Present (12/22/2024)    Hunger Vital Sign     Worried About Running Out of Food in the Last Year: Never true     Ran Out of Food in the Last Year: Sometimes true   Transportation Needs: Unmet Transportation Needs (12/22/2024)    PRAPARE - Transportation     Lack of Transportation (Medical): Yes     Lack of Transportation (Non-Medical): No   Physical Activity: Unknown (1/17/2025)    Exercise Vital Sign     Days of Exercise per Week: Patient declined     Minutes of Exercise per Session: Not on file   Stress: Not on file   Social Connections: Not on file   Intimate Partner Violence: Unknown (2/25/2024)    [facility], [facility]    UT Safety & Environment     Fear of Current or Ex-Partner: Not on file     Emotionally Abused: Not on file     Physically Abused: Not on file     Sexually Abused: Not on file     Physically or Sexually Abused: Not on file   Housing  \"NITRATE\", \"GLUCOSEUKETONESUAMORPHOUS\"    Culture Results:  None      -----------------------------------------------------------------  Imaging Results:  FLUORO FOR SURGICAL PROCEDURES    Result Date: 1/10/2025  Radiology exam is complete. No Radiologist dictation. Please follow up with ordering provider.       Assessment and Plan   Impression:  69-year-old male with overactive bladder, underwent InterStim stage 1 over 1-week ago with good response.  Now presenting for InterStim stage II.    Plan:  -OR for InterStim stage II      Thank you for involving us in the care of Mickey MALCOLM Vamsifer. Should you have any questions, please do not hesitate to contact us at any time.    Austin Sanchez MD  Urology Resident, PGY-4

## 2025-01-21 ENCOUNTER — ANESTHESIA (OUTPATIENT)
Dept: OPERATING ROOM | Age: 70
End: 2025-01-21
Payer: MEDICARE

## 2025-01-21 ENCOUNTER — ANESTHESIA EVENT (OUTPATIENT)
Dept: OPERATING ROOM | Age: 70
End: 2025-01-21
Payer: MEDICARE

## 2025-01-21 ENCOUNTER — HOSPITAL ENCOUNTER (OUTPATIENT)
Age: 70
Setting detail: OUTPATIENT SURGERY
Discharge: HOME | End: 2025-01-21
Attending: UROLOGY | Admitting: UROLOGY
Payer: MEDICARE

## 2025-01-21 VITALS
BODY MASS INDEX: 38.32 KG/M2 | DIASTOLIC BLOOD PRESSURE: 59 MMHG | SYSTOLIC BLOOD PRESSURE: 101 MMHG | RESPIRATION RATE: 11 BRPM | OXYGEN SATURATION: 100 % | HEIGHT: 65 IN | WEIGHT: 230 LBS | TEMPERATURE: 98.1 F | HEART RATE: 77 BPM

## 2025-01-21 LAB
BUN BLD-MCNC: 19 MG/DL (ref 8–26)
CHLORIDE BLD-SCNC: 106 MMOL/L (ref 98–107)
EGFR, POC: >90 ML/MIN/1.73M2
GLUCOSE BLD-MCNC: 103 MG/DL (ref 74–100)
GLUCOSE BLD-MCNC: 106 MG/DL (ref 75–110)
POC CREATININE: 0.6 MG/DL (ref 0.51–1.19)
POTASSIUM BLD-SCNC: 3.6 MMOL/L (ref 3.5–4.5)
SODIUM BLD-SCNC: 143 MMOL/L (ref 138–146)

## 2025-01-21 PROCEDURE — 3700000001 HC ADD 15 MINUTES (ANESTHESIA): Performed by: UROLOGY

## 2025-01-21 PROCEDURE — 82565 ASSAY OF CREATININE: CPT

## 2025-01-21 PROCEDURE — 2500000003 HC RX 250 WO HCPCS: Performed by: UROLOGY

## 2025-01-21 PROCEDURE — 6360000002 HC RX W HCPCS: Performed by: UROLOGY

## 2025-01-21 PROCEDURE — 3600000012 HC SURGERY LEVEL 2 ADDTL 15MIN: Performed by: UROLOGY

## 2025-01-21 PROCEDURE — 2709999900 HC NON-CHARGEABLE SUPPLY: Performed by: UROLOGY

## 2025-01-21 PROCEDURE — 82947 ASSAY GLUCOSE BLOOD QUANT: CPT

## 2025-01-21 PROCEDURE — C1767 GENERATOR, NEURO NON-RECHARG: HCPCS | Performed by: UROLOGY

## 2025-01-21 PROCEDURE — 6360000002 HC RX W HCPCS

## 2025-01-21 PROCEDURE — 7100000011 HC PHASE II RECOVERY - ADDTL 15 MIN: Performed by: UROLOGY

## 2025-01-21 PROCEDURE — C1787 PATIENT PROGR, NEUROSTIM: HCPCS | Performed by: UROLOGY

## 2025-01-21 PROCEDURE — 84295 ASSAY OF SERUM SODIUM: CPT

## 2025-01-21 PROCEDURE — 2580000003 HC RX 258: Performed by: ANESTHESIOLOGY

## 2025-01-21 PROCEDURE — 82435 ASSAY OF BLOOD CHLORIDE: CPT

## 2025-01-21 PROCEDURE — 3700000000 HC ANESTHESIA ATTENDED CARE: Performed by: UROLOGY

## 2025-01-21 PROCEDURE — 84132 ASSAY OF SERUM POTASSIUM: CPT

## 2025-01-21 PROCEDURE — 7100000010 HC PHASE II RECOVERY - FIRST 15 MIN: Performed by: UROLOGY

## 2025-01-21 PROCEDURE — 3600000002 HC SURGERY LEVEL 2 BASE: Performed by: UROLOGY

## 2025-01-21 PROCEDURE — C1889 IMPLANT/INSERT DEVICE, NOC: HCPCS | Performed by: UROLOGY

## 2025-01-21 PROCEDURE — 84520 ASSAY OF UREA NITROGEN: CPT

## 2025-01-21 PROCEDURE — 2500000003 HC RX 250 WO HCPCS

## 2025-01-21 DEVICE — NEUROSTIMULATOR INTERSTIM X RECHRGE FREE TORQ WRNCH PROD: Type: IMPLANTABLE DEVICE | Site: HIP | Status: FUNCTIONAL

## 2025-01-21 DEVICE — ENVELOPE NMRM6122 ABSORB MED MR
Type: IMPLANTABLE DEVICE | Site: HIP | Status: FUNCTIONAL
Brand: TYRX™

## 2025-01-21 RX ORDER — DIPHENHYDRAMINE HYDROCHLORIDE 50 MG/ML
12.5 INJECTION INTRAMUSCULAR; INTRAVENOUS
Status: DISCONTINUED | OUTPATIENT
Start: 2025-01-21 | End: 2025-01-21 | Stop reason: HOSPADM

## 2025-01-21 RX ORDER — SODIUM CHLORIDE 0.9 % (FLUSH) 0.9 %
5-40 SYRINGE (ML) INJECTION PRN
Status: DISCONTINUED | OUTPATIENT
Start: 2025-01-21 | End: 2025-01-21 | Stop reason: HOSPADM

## 2025-01-21 RX ORDER — GLYCOPYRROLATE 0.2 MG/ML
INJECTION INTRAMUSCULAR; INTRAVENOUS
Status: DISCONTINUED | OUTPATIENT
Start: 2025-01-21 | End: 2025-01-21 | Stop reason: SDUPTHER

## 2025-01-21 RX ORDER — NALOXONE HYDROCHLORIDE 0.4 MG/ML
INJECTION, SOLUTION INTRAMUSCULAR; INTRAVENOUS; SUBCUTANEOUS PRN
Status: DISCONTINUED | OUTPATIENT
Start: 2025-01-21 | End: 2025-01-21 | Stop reason: HOSPADM

## 2025-01-21 RX ORDER — FENTANYL CITRATE 50 UG/ML
INJECTION, SOLUTION INTRAMUSCULAR; INTRAVENOUS
Status: DISCONTINUED | OUTPATIENT
Start: 2025-01-21 | End: 2025-01-21 | Stop reason: SDUPTHER

## 2025-01-21 RX ORDER — LIDOCAINE HYDROCHLORIDE 20 MG/ML
INJECTION, SOLUTION EPIDURAL; INFILTRATION; INTRACAUDAL; PERINEURAL PRN
Status: DISCONTINUED | OUTPATIENT
Start: 2025-01-21 | End: 2025-01-21 | Stop reason: ALTCHOICE

## 2025-01-21 RX ORDER — DROPERIDOL 2.5 MG/ML
0.62 INJECTION, SOLUTION INTRAMUSCULAR; INTRAVENOUS
Status: DISCONTINUED | OUTPATIENT
Start: 2025-01-21 | End: 2025-01-21 | Stop reason: HOSPADM

## 2025-01-21 RX ORDER — CEFADROXIL 500 MG/1
500 CAPSULE ORAL 2 TIMES DAILY
Qty: 6 CAPSULE | Refills: 0 | Status: SHIPPED | OUTPATIENT
Start: 2025-01-21 | End: 2025-01-24

## 2025-01-21 RX ORDER — METOCLOPRAMIDE HYDROCHLORIDE 5 MG/ML
10 INJECTION INTRAMUSCULAR; INTRAVENOUS
Status: DISCONTINUED | OUTPATIENT
Start: 2025-01-21 | End: 2025-01-21 | Stop reason: HOSPADM

## 2025-01-21 RX ORDER — MIDAZOLAM HYDROCHLORIDE 1 MG/ML
INJECTION, SOLUTION INTRAMUSCULAR; INTRAVENOUS
Status: DISCONTINUED | OUTPATIENT
Start: 2025-01-21 | End: 2025-01-21 | Stop reason: SDUPTHER

## 2025-01-21 RX ORDER — SODIUM CHLORIDE 0.9 % (FLUSH) 0.9 %
5-40 SYRINGE (ML) INJECTION EVERY 12 HOURS SCHEDULED
Status: DISCONTINUED | OUTPATIENT
Start: 2025-01-21 | End: 2025-01-21 | Stop reason: HOSPADM

## 2025-01-21 RX ORDER — HYDRALAZINE HYDROCHLORIDE 20 MG/ML
10 INJECTION INTRAMUSCULAR; INTRAVENOUS
Status: DISCONTINUED | OUTPATIENT
Start: 2025-01-21 | End: 2025-01-21 | Stop reason: HOSPADM

## 2025-01-21 RX ORDER — MEPERIDINE HYDROCHLORIDE 50 MG/ML
12.5 INJECTION INTRAMUSCULAR; INTRAVENOUS; SUBCUTANEOUS EVERY 5 MIN PRN
Status: DISCONTINUED | OUTPATIENT
Start: 2025-01-21 | End: 2025-01-21 | Stop reason: HOSPADM

## 2025-01-21 RX ORDER — CEFAZOLIN SODIUM 1 G/3ML
INJECTION, POWDER, FOR SOLUTION INTRAMUSCULAR; INTRAVENOUS
Status: DISCONTINUED | OUTPATIENT
Start: 2025-01-21 | End: 2025-01-21 | Stop reason: SDUPTHER

## 2025-01-21 RX ORDER — PROPOFOL 10 MG/ML
INJECTION, EMULSION INTRAVENOUS
Status: DISCONTINUED | OUTPATIENT
Start: 2025-01-21 | End: 2025-01-21 | Stop reason: SDUPTHER

## 2025-01-21 RX ORDER — SODIUM CHLORIDE 9 MG/ML
INJECTION, SOLUTION INTRAVENOUS PRN
Status: DISCONTINUED | OUTPATIENT
Start: 2025-01-21 | End: 2025-01-21 | Stop reason: HOSPADM

## 2025-01-21 RX ORDER — SODIUM CHLORIDE, SODIUM LACTATE, POTASSIUM CHLORIDE, CALCIUM CHLORIDE 600; 310; 30; 20 MG/100ML; MG/100ML; MG/100ML; MG/100ML
INJECTION, SOLUTION INTRAVENOUS CONTINUOUS
Status: DISCONTINUED | OUTPATIENT
Start: 2025-01-21 | End: 2025-01-21 | Stop reason: HOSPADM

## 2025-01-21 RX ORDER — DEXMEDETOMIDINE HYDROCHLORIDE 100 UG/ML
INJECTION, SOLUTION INTRAVENOUS
Status: DISCONTINUED | OUTPATIENT
Start: 2025-01-21 | End: 2025-01-21 | Stop reason: SDUPTHER

## 2025-01-21 RX ADMIN — PROPOFOL 50 MCG/KG/MIN: 10 INJECTION, EMULSION INTRAVENOUS at 08:22

## 2025-01-21 RX ADMIN — PROPOFOL 20 MG: 10 INJECTION, EMULSION INTRAVENOUS at 08:49

## 2025-01-21 RX ADMIN — CEFAZOLIN 2 G: 1 INJECTION, POWDER, FOR SOLUTION INTRAMUSCULAR; INTRAVENOUS at 08:26

## 2025-01-21 RX ADMIN — MIDAZOLAM 2 MG: 1 INJECTION INTRAMUSCULAR; INTRAVENOUS at 08:14

## 2025-01-21 RX ADMIN — PROPOFOL 20 MG: 10 INJECTION, EMULSION INTRAVENOUS at 08:39

## 2025-01-21 RX ADMIN — DEXMEDETOMIDINE HYDROCHLORIDE 4 MCG: 100 INJECTION, SOLUTION INTRAVENOUS at 08:16

## 2025-01-21 RX ADMIN — SODIUM CHLORIDE, POTASSIUM CHLORIDE, SODIUM LACTATE AND CALCIUM CHLORIDE: 600; 310; 30; 20 INJECTION, SOLUTION INTRAVENOUS at 08:05

## 2025-01-21 RX ADMIN — Medication 20 MG: at 08:23

## 2025-01-21 RX ADMIN — GLYCOPYRROLATE 0.2 MG: 0.2 INJECTION INTRAMUSCULAR; INTRAVENOUS at 08:14

## 2025-01-21 RX ADMIN — FENTANYL CITRATE 25 MCG: 50 INJECTION, SOLUTION INTRAMUSCULAR; INTRAVENOUS at 08:14

## 2025-01-21 RX ADMIN — Medication 10 MG: at 08:39

## 2025-01-21 RX ADMIN — PROPOFOL 20 MG: 10 INJECTION, EMULSION INTRAVENOUS at 08:23

## 2025-01-21 ASSESSMENT — PAIN SCALES - GENERAL: PAINLEVEL_OUTOF10: 0

## 2025-01-21 ASSESSMENT — PAIN - FUNCTIONAL ASSESSMENT: PAIN_FUNCTIONAL_ASSESSMENT: NONE - DENIES PAIN

## 2025-01-21 NOTE — ADDENDUM NOTE
Addendum  created 01/21/25 0954 by Milo Lu APRN - CRNA    Attestation recorded in Intraprocedure, Flowsheet accepted, Intraprocedure Attestations filed

## 2025-01-21 NOTE — ANESTHESIA POSTPROCEDURE EVALUATION
Department of Anesthesiology  Postprocedure Note    Patient: Mickey Berg  MRN: [id number]  YOB: 1955  Date of evaluation: 1/21/2025    Procedure Summary       Date: 01/21/25 Room / Location: [address]    Anesthesia Start: 0811 Anesthesia Stop: 0902    Procedure: INTERSTIM IMPLANT STAGE 2  (C-ARM) Diagnosis:       Incontinence of urine      (Incontinence of urine [R32])    Surgeons: Samuel Oliva MD Responsible Provider: Alec Leary MD    Anesthesia Type: MAC, general ASA Status: 3            Anesthesia Type: No value filed.    Jeni Phase I: Jeni Score: 10    Jeni Phase II: Jeni Score: 10    Anesthesia Post Evaluation    Patient location during evaluation: bedside  Patient participation: complete - patient participated  Level of consciousness: awake and alert  Airway patency: patent  Nausea & Vomiting: no nausea and no vomiting  Cardiovascular status: hemodynamically stable  Respiratory status: acceptable  Hydration status: stable  Comments: BP (!) 101/59   Pulse 77   Temp 98.1 °F (36.7 °C)   Resp 11   Ht 1.651 m (5' 5\")   Wt 104.3 kg (230 lb)   SpO2 100%   BMI 38.27 kg/m²     Pain management: adequate    No notable events documented.

## 2025-01-21 NOTE — ANESTHESIA PRE PROCEDURE
Department of Anesthesiology  Preprocedure Note       Name:  Mickey Berg   Age:  69 y.o.  :  1955                                          MRN:  [id number]         Date:  2025      Surgeon: Surgeon(s):  Samuel Oliva MD    Procedure: Procedure(s):  INTERSTIM IMPLANT STAGE 2  (C-ARM)    Medications prior to admission:   Prior to Admission medications    Medication Sig Start Date End Date Taking? Authorizing Provider   atorvastatin (LIPITOR) 40 MG tablet Take 1 tablet by mouth daily   Yes Karlee Michel MD   tamsulosin (FLOMAX) 0.4 MG capsule Take 1 capsule by mouth daily 25  Yes Mich Sung MD   pantoprazole (PROTONIX) 40 MG tablet TAKE 1 TABLET BY MOUTH DAILY WITH SUPPER 24  Yes Mich Sung MD   oxyBUTYnin (DITROPAN-XL) 10 MG extended release tablet TAKE 1 TABLET BY MOUTH EVERY MORNING 24  Yes Mich Sung MD   isosorbide mononitrate (IMDUR) 30 MG extended release tablet TAKE 1 TABLET BY MOUTH ONCE A DAY 24  Yes Jono Conde MD   bumetanide (BUMEX) 2 MG tablet Take 1 tablet by mouth daily 10/2/24  Yes Mich Sung MD   aspirin 81 MG EC tablet Take 1 tablet by mouth daily 24  Yes Mich Sung MD   potassium chloride (KLOR-CON M) 20 MEQ extended release tablet Take 1 tablet by mouth every morning 24  Yes Mich Sung MD   dapagliflozin (FARXIGA) 10 MG tablet Take 1 tablet by mouth every morning 25   Mich Sung MD   Compression Stockings MISC 15-20 mmHg 24 Caren Piedra DPM   DULoxetine (CYMBALTA) 20 MG extended release capsule Take 1 capsule by mouth daily 24   Mich Sung MD   nitroGLYCERIN (NITROSTAT) 0.4 MG SL tablet Place 1 tablet under the tongue every 5 minutes as needed for Chest pain up to max of 3 total doses. If no relief after 1 dose, call 911. 23   Nick Hartman MD       Current medications:    No current facility-administered medications for this encounter.       Allergies:

## 2025-01-21 NOTE — DISCHARGE INSTRUCTIONS
General Discharge Instructions:    Pt ok to discharge home in good condition  Pt should walk moderately at home  Pt ok to shower in 24 hrs after discharge while keeping incision clean / dry.  Pt may resume diet as tolerated  Pt should take Rx as directed  No driving while on narcotics  Please call attending physician or hospital  with questions  Call or Present to ED if fever (> 101F), intractable nausea vomiting or pain, if incisions become red/swollen or drain pus/fluid, or if calves become red swollen and tender.  Pt should follow up with urology as scheduled, call to confirm appointment       No alcoholic beverages, no driving or operating machinery, no making important decisions for 24 hours.   You may have a normal diet but should eat lightly day of surgery.  Drink plenty of fluids.  Urinate within 8 hours after surgery, if unable to urinate call your doctor.

## 2025-01-23 ENCOUNTER — OFFICE VISIT (OUTPATIENT)
Dept: FAMILY MEDICINE CLINIC | Age: 70
End: 2025-01-23
Payer: MEDICARE

## 2025-01-23 VITALS
WEIGHT: 230.4 LBS | BODY MASS INDEX: 38.39 KG/M2 | SYSTOLIC BLOOD PRESSURE: 100 MMHG | OXYGEN SATURATION: 99 % | HEIGHT: 65 IN | HEART RATE: 71 BPM | TEMPERATURE: 97.8 F | DIASTOLIC BLOOD PRESSURE: 66 MMHG

## 2025-01-23 DIAGNOSIS — E66.01 MORBID (SEVERE) OBESITY DUE TO EXCESS CALORIES: ICD-10-CM

## 2025-01-23 DIAGNOSIS — L97.919 VENOUS ULCER OF RIGHT LEG (HCC): ICD-10-CM

## 2025-01-23 DIAGNOSIS — G99.2 STENOSIS OF CERVICAL SPINE WITH MYELOPATHY (HCC): ICD-10-CM

## 2025-01-23 DIAGNOSIS — I48.20 CHRONIC ATRIAL FIBRILLATION, UNSPECIFIED (HCC): ICD-10-CM

## 2025-01-23 DIAGNOSIS — I83.019 VENOUS ULCER OF RIGHT LEG (HCC): ICD-10-CM

## 2025-01-23 DIAGNOSIS — I50.32 CHRONIC HEART FAILURE WITH PRESERVED EJECTION FRACTION (HCC): ICD-10-CM

## 2025-01-23 DIAGNOSIS — R73.03 PREDIABETES: ICD-10-CM

## 2025-01-23 DIAGNOSIS — Z95.1 HX OF CABG: ICD-10-CM

## 2025-01-23 DIAGNOSIS — G47.33 OSA (OBSTRUCTIVE SLEEP APNEA): ICD-10-CM

## 2025-01-23 DIAGNOSIS — M48.02 STENOSIS OF CERVICAL SPINE WITH MYELOPATHY (HCC): ICD-10-CM

## 2025-01-23 DIAGNOSIS — Z00.00 MEDICARE ANNUAL WELLNESS VISIT, SUBSEQUENT: Primary | ICD-10-CM

## 2025-01-23 PROCEDURE — G0439 PPPS, SUBSEQ VISIT: HCPCS | Performed by: STUDENT IN AN ORGANIZED HEALTH CARE EDUCATION/TRAINING PROGRAM

## 2025-01-23 PROCEDURE — 3074F SYST BP LT 130 MM HG: CPT | Performed by: STUDENT IN AN ORGANIZED HEALTH CARE EDUCATION/TRAINING PROGRAM

## 2025-01-23 PROCEDURE — 1123F ACP DISCUSS/DSCN MKR DOCD: CPT | Performed by: STUDENT IN AN ORGANIZED HEALTH CARE EDUCATION/TRAINING PROGRAM

## 2025-01-23 PROCEDURE — 3017F COLORECTAL CA SCREEN DOC REV: CPT | Performed by: STUDENT IN AN ORGANIZED HEALTH CARE EDUCATION/TRAINING PROGRAM

## 2025-01-23 PROCEDURE — 3078F DIAST BP <80 MM HG: CPT | Performed by: STUDENT IN AN ORGANIZED HEALTH CARE EDUCATION/TRAINING PROGRAM

## 2025-01-23 RX ORDER — ASPIRIN 81 MG/1
81 TABLET ORAL DAILY
Qty: 90 TABLET | Refills: 0 | Status: SHIPPED | OUTPATIENT
Start: 2025-01-23

## 2025-01-23 ASSESSMENT — PATIENT HEALTH QUESTIONNAIRE - PHQ9
1. LITTLE INTEREST OR PLEASURE IN DOING THINGS: SEVERAL DAYS
SUM OF ALL RESPONSES TO PHQ QUESTIONS 1-9: 1
SUM OF ALL RESPONSES TO PHQ QUESTIONS 1-9: 1
SUM OF ALL RESPONSES TO PHQ9 QUESTIONS 1 & 2: 1
SUM OF ALL RESPONSES TO PHQ QUESTIONS 1-9: 1
SUM OF ALL RESPONSES TO PHQ QUESTIONS 1-9: 1
2. FEELING DOWN, DEPRESSED OR HOPELESS: NOT AT ALL

## 2025-01-23 NOTE — PROGRESS NOTES
DIABETES and HYPERTENSION visit    BP Readings from Last 3 Encounters:   01/21/25 (!) 101/59   01/10/25 103/66   12/27/24 112/74        Hemoglobin A1C (%)   Date Value   08/28/2024 5.8   04/13/2023 5.5   12/10/2019 5.6     HDL (mg/dL)   Date Value   10/02/2024 45     BUN (mg/dL)   Date Value   12/22/2024 12     Creatinine (mg/dL)   Date Value   12/22/2024 0.6 (L)     POC Creatinine (mg/dL)   Date Value   01/21/2025 0.6     Glucose (mg/dL)   Date Value   12/22/2024 87            Have you changed or started any medications since your last visit including any over-the-counter medicines, vitamins, or herbal medicines? no   Have you stopped taking any of your medications? Is so, why? -  no  Are you having any side effects from any of your medications? - no    Have you seen any other physician or provider since your last visit?  no   Have you had any other diagnostic tests since your last visit?  no   Have you been seen in the emergency room and/or had an admission in a hospital since we last saw you?  no   Have you had your routine dental cleaning in the past 6 months?  yes -      Have you had your annual diabetic retinal (eye) exam? Yes   (ensure copy of exam is in the chart)    Do you have an active MyChart account? If no, what is the barrier?  No: pending    Patient Care Team:  Mich Sung MD as PCP - General (Family Medicine)  [facility]  Kwame Johnston MD as Consulting Physician (Gastroenterology)  Giorgio Morrison Jr., MD as Consulting Physician (Urology)    Medical History Review  Past Medical, Family, and Social History reviewed and does not contribute to the patient presenting condition    Health Maintenance   Topic Date Due    Diabetic foot exam  Never done    Diabetic Alb to Cr ratio (uACR) test  Never done    Diabetic retinal exam  Never done    Respiratory Syncytial Virus (RSV) Pregnant or age 60 yrs+ (1 - Risk 60-74 years 1-dose series) Never done    COVID-19 Vaccine

## 2025-01-23 NOTE — PROGRESS NOTES
Medicare Annual Wellness Visit    Mickey Berg is here for Medicare AWV and Follow-up (Cellultis on left leg)    Assessment & Plan   Medicare annual wellness visit, subsequent  Stenosis of cervical spine with myelopathy (HCC)  Venous ulcer of right leg (HCC)  Chronic heart failure with preserved ejection fraction (HCC)  -     [facility] Referral for Social Determinants of Health (Primary Care Practices)  Chronic atrial fibrillation, unspecified (HCC)  Prediabetes  CAD s/p CABG  -     aspirin 81 MG EC tablet; Take 1 tablet by mouth daily, Disp-90 tablet, R-0Normal  HARPREET (obstructive sleep apnea)  -     DME Order for CPAP as OP  Morbid (severe) obesity due to excess calories (E66.01)  Body mass index [BMI] 38.0-38.9, adult (Z68.38)       Return in 1 year (on 1/23/2026) for Medicare AWV.     Subjective   The following acute and/or chronic problems were also addressed today:  EHFpEF  On bumex 2 mg daily  Farxiga 10 mg daily    Sees Urology for chornic urinay incontinence   Has had urolift and bladder botox  Currently on Flomax 0.4 mg as well as oxybutynin 10 mg daily    Chronic upper extremity neuropathy  Currently on Cymbalta 20 mg daily    Coronary artery disease  On aspirin 81 mg and statin Lipitor 40 mg daily    Patient has had history of atrial fibrillation  Currently rate is normal at 71 bpm  Not on rate or rhythm control at this time  Does see cardiology new advised him to discuss need for anticoagulation as well as other medications pertaining to the his atrial fibrillation  [id number] Score for Atrial Fibrillation Stroke Risk   Risk   Factors  Component Value   C CHF Yes 1   H HTN Yes 1   A2 Age >= 75 No,  (69 y.o.) 0   D DM Yes 1   S2 Prior Stroke/TIA No 0   V Vascular Disease No 0   A Age 65-74 Yes,  (69 y.o.) 1   Sc Sex male 0    [id number]  Score  4   Score last updated 1/23/25 1:46 PM EST    Click here for a link to the UpToDate guideline \"Atrial Fibrillation: Anticoagulation therapy to prevent

## 2025-01-23 NOTE — PATIENT INSTRUCTIONS
Thank you for letting us take care of you today. We hope all your questions were addressed. If a question was overlooked or something else comes to mind after you return home, please contact a member of your Care Team listed below.      Your Care Team at [facility] is Team #2  Mich Sung M.D. (Faculty)  Carlo Leblanc M.D. (Resident)  Nicol Tinoco M.D. (Resident)  River Webber M.D. (Resident)  Regla Sherwood M.D. (Resident)  Makayla Becker M.D. (Resident)  Mickey Olmos, [facility]  Ashley Washburn, [facility]  Caty Clark,  [facility]  Lesli Núñez, [facility]  Terese Huitron, [facility]  Faby Jiménez, [facility]  Monse Caballero () PERRY Vargas (Clinical Practice Manager)  Sophie Becerra [facility] (Clinical Pharmacist)     Office phone number: [phone]    If you need to get in right away due to illness, please be advised we have \"Same Day\" appointments available Monday-Friday. Please call us at [phone] option #3 to schedule your \"Same Day\" appointment.        Preventing Falls: Care Instructions  Injuries and health problems such as trouble walking or poor eyesight can increase your risk of falling. So can some medicines. But there are things you can do to help prevent falls. You can exercise to get stronger. You can also arrange your home to make it safer.    Talk to your doctor about the medicines you take. Ask if any of them increase the risk of falls and whether they can be changed or stopped.   Try to exercise regularly. It can help improve your strength and balance. This can help lower your risk of falling.         Practice fall safety and prevention.   Wear low-heeled shoes that fit well and give your feet good support. Talk to your doctor if you have foot problems that make this hard.  Carry a cellphone or wear a medical alert device that you can use to call for help.  Use stepladders instead of chairs to reach high objects. Don't climb if you're at risk for falls. Ask for help, if needed.  Wear the

## 2025-01-27 ENCOUNTER — TELEPHONE (OUTPATIENT)
Dept: FAMILY MEDICINE CLINIC | Age: 70
End: 2025-01-27

## 2025-01-27 NOTE — OP NOTE
Operative Note      Patient: Mikcey Berg  YOB: 1955  MRN: [id number]    Date of Procedure: 1/21/2025    Pre-Op Diagnosis Codes:      * Incontinence of urine [R32]    Post-Op Diagnosis: Same       Procedure(s):  INTERSTIM IMPLANT STAGE 2  (C-ARM)    Surgeon(s):  Samuel Oliva MD    Assistant:   Resident: Roman Cardona MD    Anesthesia: Monitor Anesthesia Care    Estimated Blood Loss (mL): Minimal    Complications: None    Specimens:   * No specimens in log *    Implants:  Implant Name Type Inv. Item Serial No.  Lot No. LRB No. Used Action   ENVELOPE PLSE GENRTR M W2.7XL2.5IN NEURO ANTIBACT ABSRB - [id number]  ENVELOPE PLSE GENRTR M W2.7XL2.5IN NEURO ANTIBACT ABSRB  [employer] [id number] Right 1 Implanted   NEUROSTIMULATOR INTERSTIM X RECHRGE FREE TORQ WRNCH PROD - [id number]  NEUROSTIMULATOR INTERSTIM X RECHRGE FREE TORQ WRNCH PROD [id number] [employer]  Right 1 Implanted         Drains: * No LDAs found *    Findings:  Infection Present At Time Of Surgery (PATOS) (choose all levels that have infection present):  No infection present      Detailed Description of Procedure:     INDICATIONS FOR THE PROCEDURE:  Mickey Berg is a 69 y.o. male  with a history of urinary frequency and urgency, which improved after interstim stage 1. After treatment options were discussed including risks, benefits, alternatives, goals and possible complications, the patient elected to proceed with today's procedure.      NARRATIVE SUMMARY OF THE PROCEDURE:  The patient was brought to the OR. The patient was placed in prone position. A pillow was placed underneath her pelvis area to slightly lift the pelvis up. The patient was awake, was given some MAC anesthesia. The patient's back was prepped and draped in the usual sterile fashion. We identified the previous incision over the right buttocks, with the lead and boot in place.  We anesthetized with 1% lidocaine, and then made an

## 2025-01-28 ENCOUNTER — HOSPITAL ENCOUNTER (OUTPATIENT)
Dept: OTHER | Age: 70
Discharge: HOME | End: 2025-01-28
Payer: MEDICARE

## 2025-01-28 VITALS — DIASTOLIC BLOOD PRESSURE: 61 MMHG | HEART RATE: 70 BPM | SYSTOLIC BLOOD PRESSURE: 117 MMHG | RESPIRATION RATE: 20 BRPM

## 2025-01-28 PROCEDURE — 99212 OFFICE O/P EST SF 10 MIN: CPT

## 2025-01-28 NOTE — PROGRESS NOTES
Date:  2025  Time:  10:46 AM    CHF Clinic at [facility]    Office: [phone] Fax: [phone]    Re:  Mickey Berg   Patient : 1955    Vital Signs: /61   Pulse 70   Resp 20                                                   No results for input(s): \"CBC\", \"HGB\", \"HCT\", \"WBC\", \"PLATELET\", \"NA\", \"K\", \"CL\", \"CO2\", \"BUN\", \"CREATININE\", \"GLUCOSE\", \"BNP\", \"INR\" in the last 72 hours.     Respiratory:    Assessment  Charting Type: Reassessment         Cough/Sputum  Cough: None         Peripheral Vascular  RLE Edema: +2, Pitting  LLE Edema: +2    Future Appointments   Date Time Provider Department Center   2025  1:00 PM [facility]IN[facility] CHF [facility]   2025  1:00 PM Giorgio Morrison Jr., MD [facility] Urology [facility]   3/4/2025  9:30 AM Jono Conde MD [facility]      Complaints: wt ^ 237#    Physician Orders none    Comment : wt. ^, leg measurements ^, but pt states they're better, walking better.  Meds reviewed, fluid restriction of 64 ounces, 2000 mg Na diet, need for daily wt. All reviewed and reinforced importance.  Instructed to call clinic at first sign of leg swelling and 3# wt ^ overnight.  States understanding and compliance. No S/S of acute CHF today.    Electronically signed by Ambika Marte RN on 2025 at 10:46 AM

## 2025-01-31 ENCOUNTER — TELEPHONE (OUTPATIENT)
Dept: FAMILY MEDICINE CLINIC | Age: 70
End: 2025-01-31

## 2025-01-31 NOTE — TELEPHONE ENCOUNTER
Patient called for an update regarding Dr. Sugn's referral for SDOH back on 1/23. Patient was supposed to be contacted by a Community Health Navigator in regards to his caretaker, but never received any phone call. Informed patient we would resubmit this for him and to expect a call from them sometime next week.

## 2025-02-07 NOTE — TELEPHONE ENCOUNTER
Mickey Berg was contacted by a Community Health Navigator to discuss a referral for SDOH related needs.     Writer spoke with: Caregiver and explained the services and assistance that can be provided by a Community Health Navigator.     Patient not agreeable to receiving resources and support from writer.     Intake Notes: Patient that answered the phone hung up when spoke regarding services.    Actions to be completed by writer: Close referral.    Actions to be completed by patient: Close referral.      Chela Brian MA

## 2025-02-25 ENCOUNTER — HOSPITAL ENCOUNTER (OUTPATIENT)
Dept: OTHER | Age: 70
Discharge: HOME | End: 2025-02-25
Payer: MEDICARE

## 2025-02-25 VITALS
SYSTOLIC BLOOD PRESSURE: 114 MMHG | HEART RATE: 77 BPM | DIASTOLIC BLOOD PRESSURE: 67 MMHG | RESPIRATION RATE: 16 BRPM | OXYGEN SATURATION: 94 % | WEIGHT: 233 LBS | BODY MASS INDEX: 38.77 KG/M2

## 2025-02-25 PROCEDURE — 99212 OFFICE O/P EST SF 10 MIN: CPT

## 2025-02-25 NOTE — PROGRESS NOTES
Date:  2025  Time:  12:56 PM    CHF Clinic at [facility]    Office: [phone] Fax: [phone]    Re:  Mickey Berg   Patient : 1955    Vital Signs: /67   Pulse 77   Resp 16   Wt 105.7 kg (233 lb)   SpO2 94%   BMI 38.77 kg/m²                                                   No results for input(s): \"CBC\", \"HGB\", \"HCT\", \"WBC\", \"PLATELET\", \"NA\", \"K\", \"CL\", \"CO2\", \"BUN\", \"CREATININE\", \"GLUCOSE\", \"BNP\", \"INR\" in the last 72 hours.     Respiratory:    Assessment  Charting Type: Reassessment    Breath Sounds  Right Upper Lobe: Clear  Right Middle Lobe: Clear  Right Lower Lobe: Diminished  Left Upper Lobe: Clear  Left Lower Lobe: Diminished    Cough/Sputum  Cough: None         Peripheral Vascular  RLE Edema: +1, Non-pitting  LLE Edema: +1, Non-pitting    Future Appointments   Date Time Provider Department Center   2025  1:00 PM [facility] CHF CLINIC RM 1 STV CHF CLI [facility]   2025  1:00 PM Giorgio Morrison Jr., MD [facility] Urology [facility]   3/4/2025  9:30 AM Jono Conde MD [facility]   3/4/2025 11:00 AM [facility] CHF CLINIC RM 2 STVZ CHF CLI [facility]      Complaints: Still getting over head cold did not go to PCP for treatment     Physician Orders No new orders    Comment : Patient seen last month weight 237 today 233 down 5 pounds all measurements in legs down has non-pitting edema has compression stockings states has start of wound told to clean daily and monitor for cellulitis which he's had in past so knows what to look for,instructed to keep elevated while in chair. Discussed in detail low sodium diet 2000mg per day and has not exceeded his 64 ounces of fluid per day. Gave appt. Card for TCC 3/4/25. Has all his medications, has only missed 1 dose of Bumex in the last month. Today shows no signs of acute CHF,we will see in 1 month 3/25/25.    Electronically signed by Britany Yoon RN on 2025 at 12:56

## 2025-02-28 ENCOUNTER — OFFICE VISIT (OUTPATIENT)
Dept: UROLOGY | Age: 70
End: 2025-02-28
Payer: MEDICARE

## 2025-02-28 VITALS
SYSTOLIC BLOOD PRESSURE: 105 MMHG | DIASTOLIC BLOOD PRESSURE: 63 MMHG | BODY MASS INDEX: 38.82 KG/M2 | HEIGHT: 65 IN | WEIGHT: 233 LBS | HEART RATE: 60 BPM

## 2025-02-28 DIAGNOSIS — N39.41 URGE INCONTINENCE: Primary | ICD-10-CM

## 2025-02-28 DIAGNOSIS — N32.81 OVERACTIVE BLADDER: ICD-10-CM

## 2025-02-28 PROCEDURE — 99213 OFFICE O/P EST LOW 20 MIN: CPT | Performed by: UROLOGY

## 2025-02-28 PROCEDURE — 99212 OFFICE O/P EST SF 10 MIN: CPT | Performed by: UROLOGY

## 2025-02-28 PROCEDURE — 3078F DIAST BP <80 MM HG: CPT | Performed by: UROLOGY

## 2025-02-28 PROCEDURE — 1123F ACP DISCUSS/DSCN MKR DOCD: CPT | Performed by: UROLOGY

## 2025-02-28 PROCEDURE — 3074F SYST BP LT 130 MM HG: CPT | Performed by: UROLOGY

## 2025-02-28 NOTE — PROGRESS NOTES
Tobacco Use   Smoking Status Never    Passive exposure: Never   Smokeless Tobacco Never       Social History     Substance and Sexual Activity   Alcohol Use Not Currently    Comment: stopped 1991       REVIEW OF SYSTEMS:  Constitutional: negative  Eyes: negative  Respiratory: negative  Cardiovascular: negative  Gastrointestinal: negative  Musculoskeletal: negative  Genitourinary: negative  Skin: negative   Neurological: negative  Hematological/Lymphatic: negative  Psychological: negative    Physical Exam:    This a 69 y.o. male   Vitals:    02/28/25 1308   BP: 105/63   Pulse: 60     OBJECTIVE:  /63 (Site: Left Upper Arm, Position: Sitting, Cuff Size: Large Adult)   Pulse 60   Ht 1.651 m (5' 5\")   Wt 105.7 kg (233 lb)   BMI 38.77 kg/m²     GENERAL ASSESSMENT: alert, oriented to person, place and time, no acute distress and no anxiety, depression or agitation  HEAD: normocephalic, atraumatic  ABDOMEN: soft, non tender, normal bowel sounds, no palpable masses, no organomegaly, no inguinal hernia.   LYMPH NODES: not palpable  BACK: no tenderness in spine or flanks. Well-healing surgical incision. No signs of infection.  MALE GENITAL EXAM: no bladder distension noted     Assessment and Plan      1. Urge incontinence    2. Overactive bladder           Plan:   Assessment & Plan   No follow-ups on file.  Doing great  Follow up in 4-6 months to reassess symptoms.     Rock Brady MD  [facility] Urology, PGY-2            I have discussed the care of Mickey Berg including pertinent history and exam findings, with the resident, PA, and or NP.  I agree with the assessment, plan and orders as documented by the resident, PA, and or NP (GC modifier).    Please don't hesitate to call with any questions.  Thank you for involving us in the care of this pleasant patient.    Dr. Giorgio Morrison Jr, MD, MD

## 2025-03-11 DIAGNOSIS — K21.9 GASTROESOPHAGEAL REFLUX DISEASE WITHOUT ESOPHAGITIS: ICD-10-CM

## 2025-03-12 RX ORDER — PANTOPRAZOLE SODIUM 40 MG/1
40 TABLET, DELAYED RELEASE ORAL
Qty: 30 TABLET | Refills: 2 | Status: SHIPPED | OUTPATIENT
Start: 2025-03-12

## 2025-03-12 RX ORDER — OXYBUTYNIN CHLORIDE 10 MG/1
10 TABLET, EXTENDED RELEASE ORAL EVERY MORNING
Qty: 30 TABLET | Refills: 3 | Status: SHIPPED | OUTPATIENT
Start: 2025-03-12

## 2025-03-12 NOTE — TELEPHONE ENCOUNTER
Last visit: 01/23/2025  Last Med refill: 11/29/2024  Does patient have enough medication for 72 hours: No:     Next Visit Date:  Future Appointments   Date Time Provider Department Center   3/24/2025 11:00 AM [facility] CLINIC RM 2 [facility]   9/16/2025 10:30 AM Jono Conde MD [facility]       Health Maintenance   Topic Date Due    Diabetic foot exam  Never done    Diabetic Alb to Cr ratio (uACR) test  Never done    Diabetic retinal exam  Never done    Respiratory Syncytial Virus (RSV) Pregnant or age 60 yrs+ (1 - Risk 60-74 years 1-dose series) Never done    COVID-19 Vaccine (3 - 2024-25 season) 09/01/2024    A1C test (Diabetic or Prediabetic)  08/28/2025    Lipids  10/02/2025    GFR test (Diabetes, CKD 3-4, OR last GFR 15-59)  12/22/2025    Depression Screen  01/23/2026    Colorectal Cancer Screen  01/12/2031    DTaP/Tdap/Td vaccine (5 - Td or Tdap) 02/25/2034    Annual Wellness Visit (Medicare Advantage)  Completed    Flu vaccine  Completed    Shingles vaccine  Completed    Pneumococcal 50+ years Vaccine  Completed    Hepatitis C screen  Completed    Hepatitis A vaccine  Aged Out    Hepatitis B vaccine  Aged Out    Hib vaccine  Aged Out    Polio vaccine  Aged Out    Meningococcal (ACWY) vaccine  Aged Out    Meningococcal B vaccine  Aged Out    Pneumococcal 0-49 years Vaccine  Discontinued    Diabetes screen  Discontinued    HIV screen  Discontinued    Prostate Specific Antigen (PSA) Screening or Monitoring  Discontinued       Hemoglobin A1C (%)   Date Value   08/28/2024 5.8   04/13/2023 5.5   12/10/2019 5.6             ( goal A1C is < 7)   No components found for: \"LABMICR\"  No components found for: \"LDLCHOLESTEROL\", \"LDLCALC\"    (goal LDL is <100)   AST (U/L)   Date Value   03/29/2023 26     ALT (U/L)   Date Value   03/29/2023 19     BUN (mg/dL)   Date Value   12/22/2024 12     BP Readings from Last 3 Encounters:   03/04/25 102/60   02/28/25 105/63   02/25/25 114/67

## 2025-03-21 DIAGNOSIS — I50.32 CHRONIC HEART FAILURE WITH PRESERVED EJECTION FRACTION (HCC): Primary | ICD-10-CM

## 2025-04-02 ENCOUNTER — TELEPHONE (OUTPATIENT)
Dept: PHARMACY | Facility: CLINIC | Age: 70
End: 2025-04-02

## 2025-04-02 NOTE — TELEPHONE ENCOUNTER
[facility] CLINICAL PHARMACY: ADHERENCE REVIEW  Identified care gap per [employer]: fills at Non-preferred pharmacy: [facility]: Diabetes adherence    ASSESSMENT  DIABETES ADHERENCE    Insurance Records claims through 3/21/25 (Prior Year PDC = not reported; YTD PDC = 75%; Potential Fail Date: 25):   FARXIGA  TAB 10MG last filled on 02.10.25 for 30 day supply. Next refill due: 25    Prescribed si tablet/capsule daily    Per Insurer Portal: last filled on same    Per [facility]: will get  30 day supply ready to  since past due.    Lab Results   Component Value Date    LABA1C 5.8 2024    LABA1C 5.5 2023    LABA1C 5.6 12/10/2019     NOTE: A1c not yet completed this  year    PLAN  Per insurer report, LIS-1 - may be able to receive up to a 100-day supply for the same cost as a 30-day supply.      The following are interventions that have been identified:   Patient OVERDUE refilling FARXIGA TAB 10MG and active on home medication list.     Attempting to reach patient to review - unable to leave message. Letter sent to patient.  Pharmacy getting 30 day supply ready . Copay is zero      Last Visit: 25  Next Visit: none        Xiao Ng CPhT  [facility] Clinical   [facility]  Toll Free: [phone] Option 1    For Pharmacy Admin Tracking Only    Program: [facility]  CPA in place:  No  Recommendation Provided To: Pharmacy: 1  Intervention Detail: Adherence Monitorin  Intervention Accepted By: Pharmacy: 1  Gap Closed?: Yes   Time Spent (min): 20

## 2025-04-09 ENCOUNTER — HOSPITAL ENCOUNTER (OUTPATIENT)
Dept: OTHER | Age: 70
Discharge: HOME | End: 2025-04-09
Payer: MEDICARE

## 2025-04-09 VITALS
HEART RATE: 69 BPM | DIASTOLIC BLOOD PRESSURE: 67 MMHG | OXYGEN SATURATION: 98 % | RESPIRATION RATE: 20 BRPM | SYSTOLIC BLOOD PRESSURE: 123 MMHG | BODY MASS INDEX: 38.97 KG/M2 | WEIGHT: 234.2 LBS

## 2025-04-09 DIAGNOSIS — E87.6 HYPOKALEMIA: ICD-10-CM

## 2025-04-09 PROCEDURE — 99212 OFFICE O/P EST SF 10 MIN: CPT

## 2025-04-09 NOTE — TELEPHONE ENCOUNTER
Please address the medication refill and close the encounter.  If I can be of assistance, please route to the applicable pool.      Thank you.        Last visit: 1-  Last Med refill: 9-  Does patient have enough medication for 72 hours: No:     Next Visit Date:  Future Appointments   Date Time Provider Department Center   4/9/2025  3:00 PM [facility]F CLINIC RM 2 [facility]   9/16/2025 10:30 AM Jono Conde MD [facility]       Health Maintenance   Topic Date Due    Diabetic foot exam  Never done    Diabetic Alb to Cr ratio (uACR) test  Never done    Diabetic retinal exam  Never done    Respiratory Syncytial Virus (RSV) Pregnant or age 60 yrs+ (1 - Risk 60-74 years 1-dose series) Never done    COVID-19 Vaccine (3 - 2024-25 season) 09/01/2024    A1C test (Diabetic or Prediabetic)  08/28/2025    Lipids  10/02/2025    GFR test (Diabetes, CKD 3-4, OR last GFR 15-59)  12/22/2025    Depression Screen  01/23/2026    Colorectal Cancer Screen  01/12/2031    DTaP/Tdap/Td vaccine (5 - Td or Tdap) 02/25/2034    Annual Wellness Visit (Medicare Advantage)  Completed    Flu vaccine  Completed    Shingles vaccine  Completed    Pneumococcal 50+ years Vaccine  Completed    Hepatitis C screen  Completed    Hepatitis A vaccine  Aged Out    Hepatitis B vaccine  Aged Out    Hib vaccine  Aged Out    Polio vaccine  Aged Out    Meningococcal (ACWY) vaccine  Aged Out    Meningococcal B vaccine  Aged Out    Pneumococcal 0-49 years Vaccine  Discontinued    Diabetes screen  Discontinued    HIV screen  Discontinued    Prostate Specific Antigen (PSA) Screening or Monitoring  Discontinued       Hemoglobin A1C (%)   Date Value   08/28/2024 5.8   04/13/2023 5.5   12/10/2019 5.6             ( goal A1C is < 7)   No components found for: \"LABMICR\"  No components found for: \"LDLCHOLESTEROL\", \"LDLCALC\"    (goal LDL is <100)   AST (U/L)   Date Value   03/29/2023 26     ALT (U/L)   Date Value   03/29/2023 19

## 2025-04-09 NOTE — PROGRESS NOTES
Date:  2025  Time:  3:25 PM    CHF Clinic at [facility]    Office: [phone] Fax: [phone]    Re:  Mickey Berg   Patient : 1955    Vital Signs: /67   Pulse 69   Resp 20   Wt 106.2 kg (234 lb 3.2 oz)   SpO2 98%   BMI 38.97 kg/m²                       O2 Device: None (Room air)                           No results for input(s): \"CBC\", \"HGB\", \"HCT\", \"WBC\", \"PLATELET\", \"NA\", \"K\", \"CL\", \"CO2\", \"BUN\", \"CREATININE\", \"GLUCOSE\", \"BNP\", \"INR\" in the last 72 hours.     Respiratory:    Assessment  Charting Type: Reassessment         Cough/Sputum  Cough: None         Peripheral Vascular  RLE Edema: +1, Non-pitting  LLE Edema: +2, Non-pitting    Future Appointments   Date Time Provider Department Center   2025  9:00 AM ST CHF CLINIC  1 [facility] CHF [facility]   2025 10:30 AM Jono Conde MD [facility]      Complaints: wt. 234.2    Physician Orders none    Comment : Presents ambulatory with cane.  No acute symptoms of CHF today.  Reviewed and discussed meds, upcoming appointments, daily wt and log, 2000 mg Na diet, 64 ounces fluid limit, importance of compliance, and when to call.  No concerns or questions today.  Next appointment  @ 0900.    Electronically signed by Ambika Marte RN on 2025 at 3:25 PM

## 2025-04-10 RX ORDER — POTASSIUM CHLORIDE 1500 MG/1
20 TABLET, EXTENDED RELEASE ORAL EVERY MORNING
Qty: 60 TABLET | Refills: 3 | Status: SHIPPED | OUTPATIENT
Start: 2025-04-10

## 2025-04-14 NOTE — PROGRESS NOTES
Patient instructed to remove shoes and socks and instructed to sit in exam chair.  Current PCP is Mich Sung MD and date of last visit was 1/23/25.   Do you have a follow up visit scheduled?  No  If yes, the date is unknown

## 2025-04-21 ENCOUNTER — OFFICE VISIT (OUTPATIENT)
Dept: PODIATRY | Age: 70
End: 2025-04-21
Payer: MEDICARE

## 2025-04-21 VITALS
WEIGHT: 234 LBS | SYSTOLIC BLOOD PRESSURE: 126 MMHG | BODY MASS INDEX: 38.99 KG/M2 | DIASTOLIC BLOOD PRESSURE: 60 MMHG | HEIGHT: 65 IN | HEART RATE: 64 BPM

## 2025-04-21 DIAGNOSIS — L84 CORNS AND CALLOSITIES: ICD-10-CM

## 2025-04-21 DIAGNOSIS — B35.1 PAIN DUE TO ONYCHOMYCOSIS OF TOENAIL: Primary | ICD-10-CM

## 2025-04-21 DIAGNOSIS — I73.9 PVD (PERIPHERAL VASCULAR DISEASE): ICD-10-CM

## 2025-04-21 DIAGNOSIS — I87.8 VENOUS STASIS OF BOTH LOWER EXTREMITIES: ICD-10-CM

## 2025-04-21 DIAGNOSIS — M79.676 PAIN DUE TO ONYCHOMYCOSIS OF TOENAIL: Primary | ICD-10-CM

## 2025-04-21 DIAGNOSIS — B35.1 ONYCHOMYCOSIS: ICD-10-CM

## 2025-04-21 PROCEDURE — 11721 DEBRIDE NAIL 6 OR MORE: CPT | Performed by: PODIATRIST

## 2025-04-22 PROBLEM — M79.676 PAIN DUE TO ONYCHOMYCOSIS OF TOENAIL: Status: ACTIVE | Noted: 2025-04-22

## 2025-04-22 PROBLEM — L84 CORNS AND CALLOSITIES: Status: ACTIVE | Noted: 2025-04-22

## 2025-04-22 PROBLEM — I73.9 PVD (PERIPHERAL VASCULAR DISEASE): Status: ACTIVE | Noted: 2025-04-22

## 2025-04-22 PROBLEM — B35.1 ONYCHOMYCOSIS: Status: ACTIVE | Noted: 2025-04-22

## 2025-04-22 PROBLEM — I87.8 VENOUS STASIS OF BOTH LOWER EXTREMITIES: Status: ACTIVE | Noted: 2021-10-06
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
## 2025-05-06 ENCOUNTER — TELEPHONE (OUTPATIENT)
Age: 70
End: 2025-05-06

## 2025-05-08 ENCOUNTER — HOSPITAL ENCOUNTER (EMERGENCY)
Age: 70
Discharge: HOME | End: 2025-05-08
Attending: EMERGENCY MEDICINE
Payer: MEDICARE

## 2025-05-08 VITALS
TEMPERATURE: 97.7 F | OXYGEN SATURATION: 97 % | BODY MASS INDEX: 42.01 KG/M2 | RESPIRATION RATE: 18 BRPM | WEIGHT: 252.43 LBS | HEART RATE: 81 BPM | SYSTOLIC BLOOD PRESSURE: 137 MMHG | DIASTOLIC BLOOD PRESSURE: 79 MMHG

## 2025-05-08 DIAGNOSIS — Z20.2 POSSIBLE EXPOSURE TO STD: ICD-10-CM

## 2025-05-08 DIAGNOSIS — N30.01 ACUTE CYSTITIS WITH HEMATURIA: Primary | ICD-10-CM

## 2025-05-08 LAB
BACTERIA URNS QL MICRO: ABNORMAL
BILIRUB UR QL STRIP: NEGATIVE
CASTS #/AREA URNS LPF: ABNORMAL /LPF (ref 0–8)
CLARITY UR: ABNORMAL
COLOR UR: YELLOW
EPI CELLS #/AREA URNS HPF: ABNORMAL /HPF (ref 0–5)
GLUCOSE UR STRIP-MCNC: ABNORMAL MG/DL
HGB UR QL STRIP.AUTO: ABNORMAL
HIV 1+2 AB+HIV1 P24 AG SERPL QL IA: NONREACTIVE
KETONES UR STRIP-MCNC: ABNORMAL MG/DL
LEUKOCYTE ESTERASE UR QL STRIP: ABNORMAL
NITRITE UR QL STRIP: POSITIVE
PH UR STRIP: 5.5 [PH] (ref 5–8)
PROT UR STRIP-MCNC: ABNORMAL MG/DL
RBC #/AREA URNS HPF: ABNORMAL /HPF (ref 0–4)
SP GR UR STRIP: 1.03 (ref 1–1.03)
T PALLIDUM AB SER QL IA: NONREACTIVE
UROBILINOGEN UR STRIP-ACNC: NORMAL EU/DL (ref 0–1)
WBC #/AREA URNS HPF: ABNORMAL /HPF (ref 0–5)

## 2025-05-08 PROCEDURE — 6370000000 HC RX 637 (ALT 250 FOR IP)

## 2025-05-08 PROCEDURE — 87591 N.GONORRHOEAE DNA AMP PROB: CPT

## 2025-05-08 PROCEDURE — 6360000002 HC RX W HCPCS

## 2025-05-08 PROCEDURE — 86780 TREPONEMA PALLIDUM: CPT

## 2025-05-08 PROCEDURE — 81001 URINALYSIS AUTO W/SCOPE: CPT

## 2025-05-08 PROCEDURE — 99284 EMERGENCY DEPT VISIT MOD MDM: CPT | Performed by: EMERGENCY MEDICINE

## 2025-05-08 PROCEDURE — 96372 THER/PROPH/DIAG INJ SC/IM: CPT | Performed by: EMERGENCY MEDICINE

## 2025-05-08 PROCEDURE — 87491 CHLMYD TRACH DNA AMP PROBE: CPT

## 2025-05-08 PROCEDURE — 87661 TRICHOMONAS VAGINALIS AMPLIF: CPT

## 2025-05-08 PROCEDURE — 87389 HIV-1 AG W/HIV-1&-2 AB AG IA: CPT

## 2025-05-08 PROCEDURE — 87186 SC STD MICRODIL/AGAR DIL: CPT

## 2025-05-08 PROCEDURE — 87086 URINE CULTURE/COLONY COUNT: CPT

## 2025-05-08 RX ORDER — CEFTRIAXONE 500 MG/1
500 INJECTION, POWDER, FOR SOLUTION INTRAMUSCULAR; INTRAVENOUS ONCE
Status: COMPLETED | OUTPATIENT
Start: 2025-05-08 | End: 2025-05-08

## 2025-05-08 RX ORDER — CEPHALEXIN 500 MG/1
500 CAPSULE ORAL ONCE
Status: COMPLETED | OUTPATIENT
Start: 2025-05-08 | End: 2025-05-08

## 2025-05-08 RX ORDER — METRONIDAZOLE 500 MG/1
2000 TABLET ORAL ONCE
Status: COMPLETED | OUTPATIENT
Start: 2025-05-08 | End: 2025-05-08

## 2025-05-08 RX ORDER — CEPHALEXIN 500 MG/1
500 CAPSULE ORAL 2 TIMES DAILY
Qty: 14 CAPSULE | Refills: 0 | Status: SHIPPED | OUTPATIENT
Start: 2025-05-08 | End: 2025-05-15

## 2025-05-08 RX ORDER — DOXYCYCLINE HYCLATE 100 MG
100 TABLET ORAL ONCE
Status: COMPLETED | OUTPATIENT
Start: 2025-05-08 | End: 2025-05-08

## 2025-05-08 RX ORDER — DOXYCYCLINE HYCLATE 100 MG
100 TABLET ORAL 2 TIMES DAILY
Qty: 14 TABLET | Refills: 0 | Status: SHIPPED | OUTPATIENT
Start: 2025-05-08 | End: 2025-05-15

## 2025-05-08 RX ADMIN — METRONIDAZOLE 2000 MG: 500 TABLET ORAL at 11:42

## 2025-05-08 RX ADMIN — DOXYCYCLINE HYCLATE 100 MG: 100 TABLET, COATED ORAL at 11:42

## 2025-05-08 RX ADMIN — CEPHALEXIN 500 MG: 500 CAPSULE ORAL at 11:42

## 2025-05-08 RX ADMIN — CEFTRIAXONE SODIUM 500 MG: 500 INJECTION, POWDER, FOR SOLUTION INTRAMUSCULAR; INTRAVENOUS at 11:42

## 2025-05-08 ASSESSMENT — PAIN SCALES - GENERAL: PAINLEVEL_OUTOF10: 7

## 2025-05-08 ASSESSMENT — PAIN DESCRIPTION - LOCATION: LOCATION: PENIS

## 2025-05-08 ASSESSMENT — PAIN - FUNCTIONAL ASSESSMENT: PAIN_FUNCTIONAL_ASSESSMENT: 0-10

## 2025-05-08 NOTE — ED PROVIDER NOTES
[facility]     Emergency Department     Faculty Attestation    I performed a history and physical examination of the patient and discussed management with the resident. I have reviewed and agree with the resident’s findings including all diagnostic interpretations, and treatment plans as written at the time of my review. Any areas of disagreement are noted on the chart. I was personally present for the key portions of any procedures. I have documented in the chart those procedures where I was not present during the key portions. For Physician Assistant/ Nurse Practitioner cases/documentation I have personally evaluated this patient and have completed at least one if not all key elements of the E/M (history, physical exam, and MDM). Additional findings are as noted.    PtName: Mickey Berg  MRN: [id number]  Birthdate 1955  Date of evaluation: 5/8/25  Note Started: 9:15 AM EDT    Primary Care Physician: Mich Sung MD        History: This is a 69 y.o. male who presents to the Emergency Department with complaint of penile discharge.  Patient states he hurt his sexual partner talk on the phone with concerns for gonorrhea and chlamydia.  Also complains of some dysuria.  Denies any fever, chills or sweats.    Physical:   weight is 114.5 kg (252 lb 6.8 oz). His temperature is 97.7 °F (36.5 °C). His blood pressure is 137/79 and his pulse is 81. His respiration is 18 and oxygen saturation is 97%.  Awake alert nontoxic-appearing no acute distress.   exam per the resident indicated there was no discharge noted no penile lesions noted    Impression: Dysuria, STD exposure    Plan: STD labs, antibiotic    Medical Decision Making  Problems Addressed:  Acute cystitis with hematuria: acute illness or injury  Possible exposure to STD: acute illness or injury    Amount and/or Complexity of Data Reviewed  Labs: ordered. Decision-making details documented in ED

## 2025-05-08 NOTE — ED PROVIDER NOTES
[facility] EMERGENCY DEPARTMENT  Emergency Department Encounter  Emergency Medicine Resident     Pt Name:Mickey Berg  MRN: [id number]  Birthdate 1955  Date of evaluation: 5/8/25  PCP:  Mich Sung MD  Note Started: 4:15 PM EDT      CHIEF COMPLAINT       Chief Complaint   Patient presents with    Dysuria       HISTORY OF PRESENT ILLNESS  (Location/Symptom, Timing/Onset, Context/Setting, Quality, Duration, Modifying Factors, Severity.)      Mickey Berg is a 69 y.o. male who presents with concern for STI exposure.  Patient states that he is having dysuria and penile discharge.  Patient is unsure what STI he was exposed to.  Has had a new sexual partner and does not use protection.  Denies any penile lesions, scrotal pain or swelling.  Denies any fever chills nausea vomiting or abdominal pain.  Denies any known STI exposure in the past.    PAST MEDICAL / SURGICAL / SOCIAL / FAMILY HISTORY      has a past medical history of SYDNEY (acute kidney injury), Ambulates with cane, Arthritis, Atrial fibrillation (HCC), BPH (benign prostatic hyperplasia), Cellulitis, Cervical disc disease, Chronic venous insufficiency, Combined systolic and diastolic congestive heart failure (HCC), Coronary artery disease, GERD (gastroesophageal reflux disease), History of incarceration, Hyperlipidemia, Hypertension, Ischemic cardiomyopathy, Myocardial infarct (HCC), Obesity, HARPREET (obstructive sleep apnea), Overactive bladder, Under care of podiatrist, Under care of team, Under care of team, Under care of team, Under care of team, Under care of team, Under care of team, Venous ulcers of both lower extremities (HCC), and Wears reading eyeglasses.       has a past surgical history that includes transesophageal echocardiogram (10/16/2019); Coronary Artery Bypass Graft Maze Procedure (N/A, 10/21/2019); Carpal tunnel release (Left, 09/11/2006); fracture surgery (Left, 1977); Cervical spine surgery; Colonoscopy (N/A,

## 2025-05-08 NOTE — DISCHARGE INSTRUCTIONS
In the emergency department you are found to have a urinary tract infection, in order to treat this we will give you Keflex you will need to take this twice a day for the next 7 days.  Additionally we did give you a dose of Rocephin in the emergency department which would treat possible gonorrhea.  We gave you a dose of Flagyl which would treat a possible trichomonas infection.  Additionally we will give you a prescription for doxycycline you will need to take this twice a day for the next 7 days which would treat a possible chlamydia infection.    Do not have any sexual intercourse until the test results are completed in 2 - 3 days.   If your results come back positive for a STD then make sure that any of your sexual partners are treated before having intercourse with them.  The primary means of preventing STD transmission is with the use of condoms.    PLEASE RETURN TO THE EMERGENCY DEPARTMENT IMMEDIATELY for worsening symptoms, pain with urination, discharge from your penis, or if you develop any concerning symptoms such as: high fever not relieved by acetaminophen (Tylenol) and/or ibuprofen (Motrin / Advil), chills, shortness of breath, chest pain, feeling of your heart fluttering or racing, persistent nausea and/or vomiting, vomiting up blood, blood in your stool, loss of consciousness, numbness, weakness or tingling in the arms or legs or change in color of the extremities, changes in mental status, persistent headache, blurry vision loss of bladder / bowel control, unable to follow up with your physician, or other any other care or concern.

## 2025-05-09 LAB
CHLAMYDIA DNA UR QL NAA+PROBE: NEGATIVE
N GONORRHOEA DNA UR QL NAA+PROBE: NEGATIVE
SPECIMEN DESCRIPTION: NORMAL

## 2025-05-09 ASSESSMENT — ENCOUNTER SYMPTOMS
ABDOMINAL PAIN: 0
NAUSEA: 0
VOMITING: 0

## 2025-05-10 LAB
MICROORGANISM SPEC CULT: ABNORMAL
SPECIMEN DESCRIPTION: ABNORMAL

## 2025-05-12 LAB
MICROORGANISM SPEC CULT: ABNORMAL
SOURCE: NORMAL
SPECIMEN DESCRIPTION: ABNORMAL
TRICHOMONAS VAGINALIS, MOLECULAR: NEGATIVE

## 2025-05-20 ENCOUNTER — TELEPHONE (OUTPATIENT)
Age: 70
End: 2025-05-20

## 2025-05-20 NOTE — TELEPHONE ENCOUNTER
We recievied paperwork stating patient will be getting his medications through [facility].  I called patient and he stated he was still getting his medications at [facility] .  I have scanned in the [facility] request for his medications .  Please advise and thank you.

## 2025-05-28 DIAGNOSIS — K21.9 GASTROESOPHAGEAL REFLUX DISEASE WITHOUT ESOPHAGITIS: ICD-10-CM

## 2025-05-28 NOTE — TELEPHONE ENCOUNTER
Last visit: 01/23/2025  Last Med refill: 03/12/2025  Does patient have enough medication for 72 hours: No:     Next Visit Date:  Future Appointments   Date Time Provider Department Center   5/30/2025 10:30 AM [facility] CHF CLINIC RM 1 [facility]   7/28/2025 12:15 PM Tej Coffey, DPDORCAS [facility]   9/16/2025 10:30 AM Jono Conde MD [facility]       Health Maintenance   Topic Date Due    Diabetic foot exam  Never done    Diabetic Alb to Cr ratio (uACR) test  Never done    Diabetic retinal exam  Never done    Respiratory Syncytial Virus (RSV) Pregnant or age 60 yrs+ (1 - Risk 60-74 years 1-dose series) Never done    COVID-19 Vaccine (3 - 2024-25 season) 09/01/2024    A1C test (Diabetic or Prediabetic)  08/28/2025    Lipids  10/02/2025    GFR test (Diabetes, CKD 3-4, OR last GFR 15-59)  12/22/2025    Depression Screen  01/23/2026    Annual Wellness Visit (Medicare)  01/24/2026    Colorectal Cancer Screen  01/12/2031    DTaP/Tdap/Td vaccine (5 - Td or Tdap) 02/25/2034    Flu vaccine  Completed    Shingles vaccine  Completed    Pneumococcal 50+ years Vaccine  Completed    Hepatitis C screen  Completed    Hepatitis A vaccine  Aged Out    Hepatitis B vaccine  Aged Out    Hib vaccine  Aged Out    Polio vaccine  Aged Out    Meningococcal (ACWY) vaccine  Aged Out    Meningococcal B vaccine  Aged Out    Pneumococcal 0-49 years Vaccine  Discontinued    Diabetes screen  Discontinued    HIV screen  Discontinued    Prostate Specific Antigen (PSA) Screening or Monitoring  Discontinued       Hemoglobin A1C (%)   Date Value   08/28/2024 5.8   04/13/2023 5.5   12/10/2019 5.6             ( goal A1C is < 7)   No components found for: \"LABMICR\"  No components found for: \"LDLCHOLESTEROL\", \"LDLCALC\"    (goal LDL is <100)   AST (U/L)   Date Value   03/29/2023 26     ALT (U/L)   Date Value   03/29/2023 19     BUN (mg/dL)   Date Value   12/22/2024 12     BP Readings from Last 3 Encounters:   05/08/25

## 2025-05-29 RX ORDER — PANTOPRAZOLE SODIUM 40 MG/1
40 TABLET, DELAYED RELEASE ORAL
Qty: 30 TABLET | Refills: 2 | Status: SHIPPED | OUTPATIENT
Start: 2025-05-29

## 2025-05-29 NOTE — TELEPHONE ENCOUNTER
Last visit:   Last Med refill:   Does patient have enough medication for 72 hours: No:     Next Visit Date:  Future Appointments   Date Time Provider Taj Arvizui   7/29/2020  1:30 PM [facility] CHF CLINIC RM 1 [facility] CHF CLI [facility]   8/4/2020  9:30 AM Loren Ochoa MD [address] [facility] [address] Maintenance   Topic Date Due    Hepatitis C screen  1955    Pneumococcal 0-64 years Vaccine (1 of 1 - PPSV23) 07/17/1963    HIV screen  07/17/1972    DTaP/Tdap/Td vaccine (1 - Tdap) 07/17/1976    Shingles Vaccine (1 of 2) 07/17/2007    Colon cancer screen colonoscopy  07/17/2007    Flu vaccine (1) 09/01/2020    Lipid screen  05/26/2021    Potassium monitoring  06/01/2021    Creatinine monitoring  06/01/2021    Diabetes screen  12/10/2022    Hepatitis A vaccine  Aged Out    Hepatitis B vaccine  Aged Out    Hib vaccine  Aged Out    Meningococcal (ACWY) vaccine  Aged Out       Hemoglobin A1C (%)   Date Value   12/10/2019 5.6   10/18/2019 5.8             ( goal A1C is < 7)   No results found for: LABMICR  LDL Cholesterol (mg/dL)   Date Value   05/26/2020 45   01/24/2020 37       (goal LDL is <100)   AST (U/L)   Date Value   05/26/2020 19     ALT (U/L)   Date Value   05/26/2020 17     BUN (mg/dL)   Date Value   06/01/2020 18     BP Readings from Last 3 Encounters:   07/08/20 123/70   06/30/20 122/82   06/22/20 138/80          (goal 120/80)    All Future Testing planned in [facility]  Lab Frequency Next Occurrence   Basic Metabolic Panel Once 77/99/3126   Magnesium Once 01/21/2020   Cologuard (For External Results Only) Once 06/22/2021               Patient Active Problem List:     Atrial flutter (HCC)     Sleep-related breathing disorder     Atrial fibrillation (HCC)     Ischemic cardiomyopathy     Combined systolic and diastolic congestive heart failure ([facility] [address]     Acute cystitis without hematuria     Hx of CABG     Heart failure, systolic and diastolic, chronic ([facility] [address]           Please address the medication refill and close the encounter. If I can be of assistance, please route to the applicable pool. Thank you. no

## 2025-05-30 ENCOUNTER — HOSPITAL ENCOUNTER (OUTPATIENT)
Dept: OTHER | Age: 70
Discharge: HOME | End: 2025-05-30
Payer: MEDICARE

## 2025-05-30 ENCOUNTER — TELEPHONE (OUTPATIENT)
Age: 70
End: 2025-05-30

## 2025-05-30 ENCOUNTER — HOSPITAL ENCOUNTER (OUTPATIENT)
Age: 70
Discharge: HOME | End: 2025-05-30
Payer: MEDICARE

## 2025-05-30 ENCOUNTER — RESULTS FOLLOW-UP (OUTPATIENT)
Dept: CARDIOLOGY | Age: 70
End: 2025-05-30

## 2025-05-30 VITALS
RESPIRATION RATE: 20 BRPM | WEIGHT: 233.6 LBS | SYSTOLIC BLOOD PRESSURE: 127 MMHG | BODY MASS INDEX: 38.87 KG/M2 | OXYGEN SATURATION: 96 % | HEART RATE: 67 BPM | DIASTOLIC BLOOD PRESSURE: 80 MMHG

## 2025-05-30 DIAGNOSIS — G47.33 OSA (OBSTRUCTIVE SLEEP APNEA): ICD-10-CM

## 2025-05-30 DIAGNOSIS — I25.5 ISCHEMIC CARDIOMYOPATHY: ICD-10-CM

## 2025-05-30 DIAGNOSIS — I50.32 CHRONIC HEART FAILURE WITH PRESERVED EJECTION FRACTION (HCC): Primary | ICD-10-CM

## 2025-05-30 DIAGNOSIS — N39.0 URINARY TRACT INFECTION WITHOUT HEMATURIA, SITE UNSPECIFIED: ICD-10-CM

## 2025-05-30 DIAGNOSIS — R25.2 LEG CRAMPING: ICD-10-CM

## 2025-05-30 DIAGNOSIS — I50.32 CHRONIC HEART FAILURE WITH PRESERVED EJECTION FRACTION (HCC): ICD-10-CM

## 2025-05-30 DIAGNOSIS — L03.116 CELLULITIS OF LEFT LOWER EXTREMITY: ICD-10-CM

## 2025-05-30 LAB
ANION GAP SERPL CALCULATED.3IONS-SCNC: 11 MMOL/L (ref 9–16)
BUN SERPL-MCNC: 14 MG/DL (ref 8–23)
CALCIUM SERPL-MCNC: 9.2 MG/DL (ref 8.6–10.4)
CHLORIDE SERPL-SCNC: 108 MMOL/L (ref 98–107)
CO2 SERPL-SCNC: 22 MMOL/L (ref 20–31)
CREAT SERPL-MCNC: 0.5 MG/DL (ref 0.7–1.2)
GFR, ESTIMATED: >90 ML/MIN/1.73M2
GLUCOSE SERPL-MCNC: 104 MG/DL (ref 74–99)
MAGNESIUM SERPL-MCNC: 2.5 MG/DL (ref 1.6–2.4)
POTASSIUM SERPL-SCNC: 4.3 MMOL/L (ref 3.7–5.3)
SODIUM SERPL-SCNC: 141 MMOL/L (ref 136–145)

## 2025-05-30 PROCEDURE — 36415 COLL VENOUS BLD VENIPUNCTURE: CPT

## 2025-05-30 PROCEDURE — 80048 BASIC METABOLIC PNL TOTAL CA: CPT

## 2025-05-30 PROCEDURE — 99212 OFFICE O/P EST SF 10 MIN: CPT

## 2025-05-30 PROCEDURE — 83735 ASSAY OF MAGNESIUM: CPT

## 2025-05-30 RX ORDER — CEPHALEXIN 500 MG/1
500 CAPSULE ORAL 2 TIMES DAILY
Qty: 20 CAPSULE | Refills: 0 | Status: SHIPPED | OUTPATIENT
Start: 2025-05-30 | End: 2025-06-09

## 2025-05-30 ASSESSMENT — ENCOUNTER SYMPTOMS
ABDOMINAL PAIN: 0
COUGH: 0
COLOR CHANGE: 1
EYE DISCHARGE: 0
BLOOD IN STOOL: 0
SHORTNESS OF BREATH: 0

## 2025-05-30 NOTE — PROGRESS NOTES
Date:  2025  Time:  11:24 AM    CHF Clinic at [facility]    Office: [phone] Fax: [phone]    Re:  Mickey Berg   Patient : 1955    Vital Signs: /80   Pulse 67   Resp 20   Wt 106 kg (233 lb 9.6 oz)   SpO2 96%   BMI 38.87 kg/m²                       O2 Device: None (Room air)                           No results for input(s): \"CBC\", \"HGB\", \"HCT\", \"WBC\", \"PLATELET\", \"NA\", \"K\", \"CL\", \"CO2\", \"BUN\", \"CREATININE\", \"GLUCOSE\", \"BNP\", \"INR\" in the last 72 hours.     Respiratory:    Assessment  Charting Type: Reassessment    Breath Sounds  Right Lower Lobe: Diminished  Left Lower Lobe: Diminished    Cough/Sputum  Cough: None         Peripheral Vascular  RLE Edema: +1, Non-pitting  LLE Edema: +2, Non-pitting    Future Appointments   Date Time Provider Department Center   2025  9:00 AM [facility] CHF CLINIC  1 [facility] CHF [facility]   2025 12:15 PM Tej Coffey DPM [facility] Podiatry [facility]   2025 10:30 AM Jono Conde MD [facility]      Complaints: wt. 233.4     Physician Orders BMP order.    Comment : Presents with cane for appointment, no S/S of acute CHF today.  Recently seen in ER for UTI, did not F/U with PCP ( Dr. Sung  ) as instructed, pt instructed to call for appointment, phone number provided.  I attempted while pt here for appointment with busy signal several times.  Also, needs seen for red lower legs, ? Cellulitis appearing.  Seen by LUANNE CLARK for concerns.  Reviewed and discussed need for labs, upcoming appointments, meds, 2000 mg Na diet, 64 ounces fluid limit, daily wt and log, importance of compliance, and when to call.  Next appointment  @ 0900.    Electronically signed by Ambika Marte RN on 2025 at 11:24 AM                                    
Rhythm: Normal rate and regular rhythm.      Heart sounds: Normal heart sounds.   Pulmonary:      Effort: Pulmonary effort is normal.      Breath sounds: Normal breath sounds. No wheezing or rales.   Abdominal:      Palpations: Abdomen is soft.   Musculoskeletal:         General: Normal range of motion.      Cervical back: Normal range of motion.      Right lower leg: Edema (2+) present.      Left lower leg: Edema (2+) present.      Comments: Edema of b/l LE    Skin:     General: Skin is dry.             Comments: On the left lower extremity there is a large area of erythema and thickened skin. No scabbing noted. Thick and blistered appearance. Pt has had cellulitis repeatedly, and appearance is following pt's typical pattern.     Neurological:      Mental Status: He is alert and oriented to person, place, and time.       /80   Pulse 67   Resp 20   Wt 106 kg (233 lb 9.6 oz)   SpO2 96%   BMI 38.87 kg/m²   O2 Device: None (Room air)       Lower Extremity Measurements in cm.   R Calf Circumference (cm): 45 cm  L Calf Circumference (cm): 45 cm  R Ankle Circumference (cm): 28 cm  L Ankle Circumference (cm): 28 cm    CBC:   Lab Results   Component Value Date/Time    WBC 9.3 12/22/2024 01:38 PM    RBC 4.81 12/22/2024 01:38 PM    HGB 14.5 12/22/2024 01:38 PM    HCT 45.3 12/22/2024 01:38 PM    MCV 94.2 12/22/2024 01:38 PM    MCH 30.1 12/22/2024 01:38 PM    MCHC 32.0 12/22/2024 01:38 PM    RDW 13.8 12/22/2024 01:38 PM     12/22/2024 01:38 PM    MPV 8.9 12/22/2024 01:38 PM     CMP:    Lab Results   Component Value Date/Time     05/30/2025 11:50 AM    K 4.3 05/30/2025 11:50 AM     05/30/2025 11:50 AM    CO2 22 05/30/2025 11:50 AM    BUN 14 05/30/2025 11:50 AM    CREATININE 0.5 05/30/2025 11:50 AM    GFRAA >60 08/12/2022 11:13 AM    LABGLOM >90 05/30/2025 11:50 AM    LABGLOM >90 04/29/2024 11:00 AM    GLUCOSE 104 05/30/2025 11:50 AM    CALCIUM 9.2 05/30/2025 11:50 AM    BILITOT 0.29 03/16/2021 05:32

## 2025-05-30 NOTE — TELEPHONE ENCOUNTER
Per KAYLYNN Sifuentes patient was seen in heart clinic today, 5-30-25. Patient has cellulitis, had was in the hospital on 5-8-25 for UTI. Maria started patient on antibiotics. Maria calling to see if could reach out to patient to get patient scheduled with office to follow up RE: UTI. Patient scheduled on 6-3-25.

## 2025-06-03 ENCOUNTER — OFFICE VISIT (OUTPATIENT)
Age: 70
End: 2025-06-03
Payer: MEDICARE

## 2025-06-03 VITALS
DIASTOLIC BLOOD PRESSURE: 63 MMHG | BODY MASS INDEX: 6.1 KG/M2 | HEIGHT: 65 IN | WEIGHT: 36.6 LBS | HEART RATE: 69 BPM | SYSTOLIC BLOOD PRESSURE: 99 MMHG | OXYGEN SATURATION: 96 %

## 2025-06-03 DIAGNOSIS — N30.00 ACUTE CYSTITIS WITHOUT HEMATURIA: Primary | ICD-10-CM

## 2025-06-03 DIAGNOSIS — M54.2 NECK PAIN: ICD-10-CM

## 2025-06-03 PROCEDURE — 3078F DIAST BP <80 MM HG: CPT | Performed by: STUDENT IN AN ORGANIZED HEALTH CARE EDUCATION/TRAINING PROGRAM

## 2025-06-03 PROCEDURE — 99213 OFFICE O/P EST LOW 20 MIN: CPT | Performed by: STUDENT IN AN ORGANIZED HEALTH CARE EDUCATION/TRAINING PROGRAM

## 2025-06-03 PROCEDURE — 1123F ACP DISCUSS/DSCN MKR DOCD: CPT | Performed by: STUDENT IN AN ORGANIZED HEALTH CARE EDUCATION/TRAINING PROGRAM

## 2025-06-03 PROCEDURE — 3074F SYST BP LT 130 MM HG: CPT | Performed by: STUDENT IN AN ORGANIZED HEALTH CARE EDUCATION/TRAINING PROGRAM

## 2025-06-03 NOTE — PROGRESS NOTES
HYPERTENSION visit     BP Readings from Last 3 Encounters:   05/30/25 127/80   05/08/25 137/79   04/21/25 126/60       HDL (mg/dL)   Date Value   10/02/2024 45     BUN (mg/dL)   Date Value   05/30/2025 14     Creatinine (mg/dL)   Date Value   05/30/2025 0.5 (L)     Glucose (mg/dL)   Date Value   05/30/2025 104 (H)              Have you changed or started any medications since your last visit including any over-the-counter medicines, vitamins, or herbal medicines? no   Have you stopped taking any of your medications? Is so, why? -  no  Are you having any side effects from any of your medications? - no  How often do you miss doses of your medication? no      Have you seen any other physician or provider since your last visit?  yes - Cardiology , podiatry ,urology    Have you had any other diagnostic tests since your last visit?  yes - labs , EKG    Have you been seen in the emergency room and/or had an admission in a hospital since we last saw you?  yes - [facility]   Have you had your routine dental cleaning in the past 6 months?  no     Do you have an active MyChart account? If no, what is the barrier?  No: declined    Patient Care Team:  Mich Sung MD as PCP - General (Family Medicine)  Mich Sung MD as PCP - Empaneled Provider  Kwame Johnston MD as Consulting Physician (Gastroenterology)  Giorgio Morrison Jr., MD as Consulting Physician (Urology)    Medical History Review  Past Medical, Family, and Social History reviewed and does not contribute to the patient presenting condition    Health Maintenance   Topic Date Due    Diabetic foot exam  Never done    Diabetic Alb to Cr ratio (uACR) test  Never done    Diabetic retinal exam  Never done    Respiratory Syncytial Virus (RSV) Pregnant or age 60 yrs+ (1 - Risk 60-74 years 1-dose series) Never done    COVID-19 Vaccine (3 - 2024-25 season) 09/01/2024    A1C test (Diabetic or Prediabetic)  08/28/2025    Lipids  10/02/2025    Depression Screen  01/23/2026    
to medication compliance addressed.      All patient questions answered.  Pt voiced understanding.     No follow-ups on file.        Disclaimer: Some orall of this note was transcribed using voice-recognition software.This may cause typographical errors occasionally. Although all effort is made to fix these errors, please do not hesitate to contact our office if there isany concern with the understanding of this note.

## 2025-06-03 NOTE — PATIENT INSTRUCTIONS
Thank you for letting us take care of you today. We hope all your questions were addressed. If a question was overlooked or something else comes to mind after you return home, please contact a member of your Care Team listed below.      Your Care Team at [facility] is Team #2  Mich Sung M.D. (Faculty)  Carlo Leblanc M.D. (Resident)  Nicol Tinoco M.D. (Resident)  River Webber M.D. (Resident)  Regla Sherwood M.D. (Resident)  Makayla Becker M.D. (Resident)  Mickey Olmos, [facility]  Ashley Washburn, [facility]  Caty Clark,  [facility]  Lesli Núñez, [facility]  Terese Huitron, [facility]  Faby Jiménez, [facility]  Monse Caballero (LJ) Sam NITIN (Clinical Practice Manager)  Sophie Becerra [facility] (Clinical Pharmacist)     Office phone number: [phone]    If you need to get in right away due to illness, please be advised we have \"Same Day\" appointments available Monday-Friday. Please call us at [phone] option #3 to schedule your \"Same Day\" appointment.

## 2025-06-04 DIAGNOSIS — N39.490 OVERFLOW INCONTINENCE OF URINE: ICD-10-CM

## 2025-06-04 NOTE — TELEPHONE ENCOUNTER
Last visit: 06/03/2025  Last Med refill: 01/14/2025  Does patient have enough medication for 72 hours: No:     Next Visit Date:  Future Appointments   Date Time Provider Department Center   6/25/2025  9:00 AM [facility] CHF CLINIC RM 1 [facility] CHF CLI [facility]   7/28/2025 12:15 PM Tej Coffey DPM [facility] Podiatry [facility]   9/11/2025 11:20 AM George Wong MD Resp Spec [facility]   9/16/2025 10:30 AM Jono Conde MD [facility]   10/7/2025  2:30 PM Mich Sung MD [facility] ECC DEP       Health Maintenance   Topic Date Due    Diabetic foot exam  Never done    Diabetic Alb to Cr ratio (uACR) test  Never done    Diabetic retinal exam  Never done    Respiratory Syncytial Virus (RSV) Pregnant or age 60 yrs+ (1 - Risk 60-74 years 1-dose series) Never done    COVID-19 Vaccine (3 - 2024-25 season) 09/01/2024    A1C test (Diabetic or Prediabetic)  08/28/2025    Lipids  10/02/2025    Depression Screen  01/23/2026    GFR test (Diabetes, CKD 3-4, OR last GFR 15-59)  05/30/2026    Colorectal Cancer Screen  01/12/2031    DTaP/Tdap/Td vaccine (5 - Td or Tdap) 02/25/2034    Annual Wellness Visit (Medicare Advantage)  Completed    Flu vaccine  Completed    Shingles vaccine  Completed    Pneumococcal 50+ years Vaccine  Completed    Hepatitis C screen  Completed    Hepatitis A vaccine  Aged Out    Hepatitis B vaccine  Aged Out    Hib vaccine  Aged Out    Polio vaccine  Aged Out    Meningococcal (ACWY) vaccine  Aged Out    Meningococcal B vaccine  Aged Out    Pneumococcal 0-49 years Vaccine  Discontinued    Diabetes screen  Discontinued    HIV screen  Discontinued    Prostate Specific Antigen (PSA) Screening or Monitoring  Discontinued       Hemoglobin A1C (%)   Date Value   08/28/2024 5.8   04/13/2023 5.5   12/10/2019 5.6             ( goal A1C is < 7)   No components found for: \"LABMICR\"  No components found for: \"LDLCHOLESTEROL\", \"LDLCALC\"    (goal LDL is <100)   AST (U/L)   Date Value   03/29/2023 26     ALT

## 2025-06-05 RX ORDER — TAMSULOSIN HYDROCHLORIDE 0.4 MG/1
0.4 CAPSULE ORAL DAILY
Qty: 30 CAPSULE | Refills: 5 | Status: SHIPPED | OUTPATIENT
Start: 2025-06-05

## 2025-06-09 ENCOUNTER — APPOINTMENT (OUTPATIENT)
Dept: GENERAL RADIOLOGY | Age: 70
End: 2025-06-09
Payer: MEDICARE

## 2025-06-09 ENCOUNTER — HOSPITAL ENCOUNTER (EMERGENCY)
Age: 70
Discharge: HOME | End: 2025-06-09
Attending: EMERGENCY MEDICINE
Payer: MEDICARE

## 2025-06-09 VITALS
SYSTOLIC BLOOD PRESSURE: 136 MMHG | BODY MASS INDEX: 38.27 KG/M2 | DIASTOLIC BLOOD PRESSURE: 61 MMHG | OXYGEN SATURATION: 94 % | RESPIRATION RATE: 14 BRPM | WEIGHT: 230 LBS | HEART RATE: 63 BPM | TEMPERATURE: 98.3 F

## 2025-06-09 DIAGNOSIS — R11.0 NAUSEA: Primary | ICD-10-CM

## 2025-06-09 LAB
ANION GAP SERPL CALCULATED.3IONS-SCNC: 12 MMOL/L (ref 9–16)
BASOPHILS # BLD: 0.04 K/UL (ref 0–0.2)
BASOPHILS NFR BLD: 1 % (ref 0–2)
BUN SERPL-MCNC: 15 MG/DL (ref 8–23)
CALCIUM SERPL-MCNC: 9.2 MG/DL (ref 8.6–10.4)
CHLORIDE SERPL-SCNC: 107 MMOL/L (ref 98–107)
CO2 SERPL-SCNC: 22 MMOL/L (ref 20–31)
CREAT SERPL-MCNC: 0.7 MG/DL (ref 0.7–1.2)
EKG ATRIAL RATE: 64 BPM
EKG P AXIS: 101 DEGREES
EKG P-R INTERVAL: 206 MS
EKG Q-T INTERVAL: 454 MS
EKG QRS DURATION: 80 MS
EKG QTC CALCULATION (BAZETT): 468 MS
EKG R AXIS: 65 DEGREES
EKG T AXIS: 69 DEGREES
EKG VENTRICULAR RATE: 64 BPM
EOSINOPHIL # BLD: 0.31 K/UL (ref 0–0.44)
EOSINOPHILS RELATIVE PERCENT: 4 % (ref 1–4)
ERYTHROCYTE [DISTWIDTH] IN BLOOD BY AUTOMATED COUNT: 13.9 % (ref 11.8–14.4)
GFR, ESTIMATED: >90 ML/MIN/1.73M2
GLUCOSE SERPL-MCNC: 110 MG/DL (ref 74–99)
HCT VFR BLD AUTO: 42.7 % (ref 40.7–50.3)
HGB BLD-MCNC: 13.6 G/DL (ref 13–17)
IMM GRANULOCYTES # BLD AUTO: 0.06 K/UL (ref 0–0.3)
IMM GRANULOCYTES NFR BLD: 1 %
LYMPHOCYTES NFR BLD: 1.45 K/UL (ref 1.1–3.7)
LYMPHOCYTES RELATIVE PERCENT: 19 % (ref 24–43)
MCH RBC QN AUTO: 29.8 PG (ref 25.2–33.5)
MCHC RBC AUTO-ENTMCNC: 31.9 G/DL (ref 28.4–34.8)
MCV RBC AUTO: 93.6 FL (ref 82.6–102.9)
MONOCYTES NFR BLD: 0.96 K/UL (ref 0.1–1.2)
MONOCYTES NFR BLD: 13 % (ref 3–12)
NEUTROPHILS NFR BLD: 62 % (ref 36–65)
NEUTS SEG NFR BLD: 4.8 K/UL (ref 1.5–8.1)
NRBC BLD-RTO: 0 PER 100 WBC
PLATELET # BLD AUTO: 228 K/UL (ref 138–453)
PMV BLD AUTO: 9.6 FL (ref 8.1–13.5)
POTASSIUM SERPL-SCNC: 3.4 MMOL/L (ref 3.7–5.3)
RBC # BLD AUTO: 4.56 M/UL (ref 4.21–5.77)
SODIUM SERPL-SCNC: 141 MMOL/L (ref 136–145)
TROPONIN I SERPL HS-MCNC: 8 NG/L (ref 0–22)
TROPONIN I SERPL HS-MCNC: 9 NG/L (ref 0–22)
WBC OTHER # BLD: 7.6 K/UL (ref 3.5–11.3)

## 2025-06-09 PROCEDURE — 80048 BASIC METABOLIC PNL TOTAL CA: CPT

## 2025-06-09 PROCEDURE — 93005 ELECTROCARDIOGRAM TRACING: CPT

## 2025-06-09 PROCEDURE — 96374 THER/PROPH/DIAG INJ IV PUSH: CPT

## 2025-06-09 PROCEDURE — 71046 X-RAY EXAM CHEST 2 VIEWS: CPT

## 2025-06-09 PROCEDURE — 84484 ASSAY OF TROPONIN QUANT: CPT

## 2025-06-09 PROCEDURE — 6360000002 HC RX W HCPCS

## 2025-06-09 PROCEDURE — 6370000000 HC RX 637 (ALT 250 FOR IP)

## 2025-06-09 PROCEDURE — 93010 ELECTROCARDIOGRAM REPORT: CPT | Performed by: INTERNAL MEDICINE

## 2025-06-09 PROCEDURE — 99285 EMERGENCY DEPT VISIT HI MDM: CPT

## 2025-06-09 PROCEDURE — 85025 COMPLETE CBC W/AUTO DIFF WBC: CPT

## 2025-06-09 RX ORDER — POTASSIUM CHLORIDE 1500 MG/1
40 TABLET, EXTENDED RELEASE ORAL ONCE
Status: COMPLETED | OUTPATIENT
Start: 2025-06-09 | End: 2025-06-09

## 2025-06-09 RX ORDER — ONDANSETRON 4 MG/1
4 TABLET, ORALLY DISINTEGRATING ORAL 3 TIMES DAILY PRN
Qty: 21 TABLET | Refills: 0 | Status: SHIPPED | OUTPATIENT
Start: 2025-06-09

## 2025-06-09 RX ORDER — ONDANSETRON 2 MG/ML
4 INJECTION INTRAMUSCULAR; INTRAVENOUS ONCE
Status: COMPLETED | OUTPATIENT
Start: 2025-06-09 | End: 2025-06-09

## 2025-06-09 RX ADMIN — ONDANSETRON 4 MG: 2 INJECTION, SOLUTION INTRAMUSCULAR; INTRAVENOUS at 03:06

## 2025-06-09 RX ADMIN — POTASSIUM CHLORIDE 40 MEQ: 1500 TABLET, EXTENDED RELEASE ORAL at 04:36

## 2025-06-09 ASSESSMENT — PAIN - FUNCTIONAL ASSESSMENT: PAIN_FUNCTIONAL_ASSESSMENT: 0-10

## 2025-06-09 ASSESSMENT — ENCOUNTER SYMPTOMS
COLOR CHANGE: 1
SHORTNESS OF BREATH: 0
APNEA: 0
ABDOMINAL PAIN: 0

## 2025-06-09 ASSESSMENT — PAIN SCALES - GENERAL: PAINLEVEL_OUTOF10: 0

## 2025-06-09 NOTE — ED TRIAGE NOTES
Pt presented to ED via EMS from home.  Pt presents with C/O nausea vomiting and sweating.  Pt states he was sitting looking at his phone when he became nauseous. Pt states he got up to go to the restroom but became very weak. Pt states he started sweating.  Pt denies falls. Pt denies LOC. Pt denies chest pain. Pt states he sometimes misses doses of his home medications. Pt states he took his medications today.  Pt has a hx of Afib, .  Pt is Alert and oriented. Pt is resting comfortably on stretcher with call light in reach.  No acute distress noted. Respirations are even and unlabored.  White board updated. Will continue to follow plan of care.

## 2025-06-09 NOTE — ED PROVIDER NOTES
any abdominal pain.  Denies any new leg swelling, but does state he is on Keflex for cellulitis.  On examination he is awake alert answering questions in no acute distress.  Normocephalic atraumatic.  Heart sounds regular and rate is 60.  Lungs clear to auscultation.  Abdomen soft nontender nondistended.  Bilateral lower extremities with chronic swelling.  Some mild erythema of the left leg.  But no open wounds.  No drainage.    Medical Decision Making  Nonspecific nausea and vomiting with diaphoresis.  Given his significant cardiac history we will get cardiac workup however does not have any overt chest pain.  Will treat symptomatically.  Will check basic labs.  And reevaluate    Amount and/or Complexity of Data Reviewed  Labs: ordered.  Radiology: ordered.  ECG/medicine tests: ordered.    Risk  Prescription drug management.       EKG Interpretation    Interpreted by emergency department physician    Clinical Impression: Normal sinus rhythm rate of 64 with sinus arrhythmia.  Nonspecific EKG with T wave flattening and low voltage QRS in aVL    Marilynn Ritter MD    Critical Care: None     Marilynn Ritter MD  Attending Emergency Physician         Marilynn Ritter MD  06/09/25 0339

## 2025-06-09 NOTE — DISCHARGE INSTRUCTIONS
You were seen in the today due to concern for nausea.  Your chest x-ray showed that you might have pneumonia, but in the absence of a cough or fever or lung production I do not suspect that you have pneumonia.  Your labs all came back normal.  You did have mildly low potassium which we gave you a replacement for.  I will discharge you with a prescription for an antinausea medication which you can fill at your pharmacy.  Please return here if you develop any worsening symptoms such as chest pain, shortness of breath, abdominal pain, inability to eat or drink.

## 2025-06-09 NOTE — ED PROVIDER NOTES
of both lower extremities (HCC), and Wears reading eyeglasses.       has a past surgical history that includes transesophageal echocardiogram (10/16/2019); Coronary Artery Bypass Graft Maze Procedure (N/A, 10/21/2019); Carpal tunnel release (Left, 09/11/2006); fracture surgery (Left, 1977); Cervical spine surgery; Colonoscopy (N/A, 10/29/2020); Colonoscopy (10/29/2020); Colonoscopy (10/29/2020); Colonoscopy (N/A, 01/12/2021); Cystography (N/A, 03/26/2021); Cystoscopy (03/26/2021); Cystoscopy (N/A, 05/14/2021); Urethral Surgery (N/A, 07/08/2022); transesophageal echocardiogram (01/30/2020); Carpal tunnel release (Right, 01/30/2007); other surgical history (1977); Cataract extraction (Bilateral, 2020); Urethral Surgery (N/A, 04/14/2023); Rotator cuff repair (Left); skin biopsy; Cystoscopy (12/28/2023); Urethral Surgery (N/A, 12/28/2023); Stimulator Surgery (01/10/2025); Stimulator Surgery (01/10/2025); Nerve Surgery (N/A, 01/10/2025); Cardiac surgery; Stimulator Surgery (01/21/2025); and Nerve Surgery (N/A, 1/21/2025).      Social History     Socioeconomic History    Marital status:      Spouse name: Not on file    Number of children: Not on file    Years of education: Not on file    Highest education level: Not on file   Occupational History    Not on file   Tobacco Use    Smoking status: Never     Passive exposure: Never    Smokeless tobacco: Never   Vaping Use    Vaping status: Never Used   Substance and Sexual Activity    Alcohol use: Not Currently     Comment: stopped 1991    Drug use: Never    Sexual activity: Not Currently   Other Topics Concern    Not on file   Social History Narrative    Not on file     Social Drivers of Health     Financial Resource Strain: Low Risk  (6/25/2024)    Overall Financial Resource Strain (CARDIA)     Difficulty of Paying Living Expenses: Not hard at all   Food Insecurity: Food Insecurity Present (12/22/2024)    Hunger Vital Sign     Worried About Running Out of Food in the

## 2025-06-18 NOTE — TELEPHONE ENCOUNTER
Last visit: 06/03/2025  Last Med refill: 03/12/2025  Does patient have enough medication for 72 hours: No    Next Visit Date:  Future Appointments   Date Time Provider Department Center   6/25/2025  9:00 AM [facility] CHF CLINIC RM 1 [facility] CHF CLI [facility]   7/28/2025 12:15 PM Tej Coffey DPM [facility] Podiatry [facility]   9/11/2025 11:20 AM George Wong MD Resp Spec [facility]   9/16/2025 10:30 AM Jono Conde MD [facility]   10/7/2025  2:30 PM Mich Sung MD [facility] DEP       Health Maintenance   Topic Date Due    Diabetic foot exam  Never done    Diabetic Alb to Cr ratio (uACR) test  Never done    Diabetic retinal exam  Never done    Respiratory Syncytial Virus (RSV) Pregnant or age 60 yrs+ (1 - Risk 60-74 years 1-dose series) Never done    COVID-19 Vaccine (3 - 2024-25 season) 09/01/2024    A1C test (Diabetic or Prediabetic)  08/28/2025    Lipids  10/02/2025    Depression Screen  01/23/2026    GFR test (Diabetes, CKD 3-4, OR last GFR 15-59)  06/09/2026    Colorectal Cancer Screen  01/12/2031    DTaP/Tdap/Td vaccine (5 - Td or Tdap) 02/25/2034    Annual Wellness Visit (Medicare Advantage)  Completed    Flu vaccine  Completed    Shingles vaccine  Completed    Pneumococcal 50+ years Vaccine  Completed    Hepatitis C screen  Completed    Hepatitis A vaccine  Aged Out    Hepatitis B vaccine  Aged Out    Hib vaccine  Aged Out    Polio vaccine  Aged Out    Meningococcal (ACWY) vaccine  Aged Out    Meningococcal B vaccine  Aged Out    Pneumococcal 0-49 years Vaccine  Discontinued    Diabetes screen  Discontinued    HIV screen  Discontinued    Prostate Specific Antigen (PSA) Screening or Monitoring  Discontinued       Hemoglobin A1C (%)   Date Value   08/28/2024 5.8   04/13/2023 5.5   12/10/2019 5.6             ( goal A1C is < 7)   No components found for: \"LABMICR\"  No components found for: \"LDLCHOLESTEROL\", \"LDLCALC\"    (goal LDL is <100)   AST (U/L)   Date Value   03/29/2023 26     ALT

## 2025-06-20 RX ORDER — OXYBUTYNIN CHLORIDE 10 MG/1
10 TABLET, EXTENDED RELEASE ORAL EVERY MORNING
Qty: 30 TABLET | Refills: 3 | Status: SHIPPED | OUTPATIENT
Start: 2025-06-20

## 2025-06-25 ENCOUNTER — TELEPHONE (OUTPATIENT)
Age: 70
End: 2025-06-25

## 2025-06-25 ENCOUNTER — HOSPITAL ENCOUNTER (OUTPATIENT)
Age: 70
Setting detail: OBSERVATION
Discharge: HOME | End: 2025-06-26
Attending: EMERGENCY MEDICINE | Admitting: EMERGENCY MEDICINE
Payer: MEDICARE

## 2025-06-25 ENCOUNTER — HOSPITAL ENCOUNTER (OUTPATIENT)
Dept: OTHER | Age: 70
Discharge: HOME | End: 2025-06-25
Payer: MEDICARE

## 2025-06-25 VITALS
DIASTOLIC BLOOD PRESSURE: 58 MMHG | RESPIRATION RATE: 16 BRPM | SYSTOLIC BLOOD PRESSURE: 110 MMHG | WEIGHT: 231.4 LBS | BODY MASS INDEX: 38.51 KG/M2 | HEART RATE: 67 BPM | OXYGEN SATURATION: 97 %

## 2025-06-25 DIAGNOSIS — L03.115 BILATERAL LOWER LEG CELLULITIS: Primary | ICD-10-CM

## 2025-06-25 DIAGNOSIS — I50.32 CHRONIC HEART FAILURE WITH PRESERVED EJECTION FRACTION (HCC): Primary | ICD-10-CM

## 2025-06-25 DIAGNOSIS — L03.116 BILATERAL LOWER LEG CELLULITIS: Primary | ICD-10-CM

## 2025-06-25 DIAGNOSIS — L03.115 BILATERAL CELLULITIS OF LOWER LEG: ICD-10-CM

## 2025-06-25 DIAGNOSIS — L03.116 BILATERAL CELLULITIS OF LOWER LEG: ICD-10-CM

## 2025-06-25 LAB
ANION GAP SERPL CALCULATED.3IONS-SCNC: 11 MMOL/L (ref 9–16)
BASOPHILS # BLD: 0.04 K/UL (ref 0–0.2)
BASOPHILS NFR BLD: 1 % (ref 0–2)
BUN SERPL-MCNC: 19 MG/DL (ref 8–23)
CALCIUM SERPL-MCNC: 9 MG/DL (ref 8.6–10.4)
CHLORIDE SERPL-SCNC: 105 MMOL/L (ref 98–107)
CO2 SERPL-SCNC: 23 MMOL/L (ref 20–31)
CREAT SERPL-MCNC: 0.6 MG/DL (ref 0.7–1.2)
CRP SERPL HS-MCNC: 39.8 MG/L (ref 0–5)
EOSINOPHIL # BLD: 0.28 K/UL (ref 0–0.44)
EOSINOPHILS RELATIVE PERCENT: 3 % (ref 1–4)
ERYTHROCYTE [DISTWIDTH] IN BLOOD BY AUTOMATED COUNT: 13.8 % (ref 11.8–14.4)
ERYTHROCYTE [SEDIMENTATION RATE] IN BLOOD BY PHOTOMETRIC METHOD: 36 MM/HR (ref 0–20)
GFR, ESTIMATED: >90 ML/MIN/1.73M2
GLUCOSE SERPL-MCNC: 102 MG/DL (ref 74–99)
HCT VFR BLD AUTO: 41.3 % (ref 40.7–50.3)
HGB BLD-MCNC: 13.4 G/DL (ref 13–17)
IMM GRANULOCYTES # BLD AUTO: 0.04 K/UL (ref 0–0.3)
IMM GRANULOCYTES NFR BLD: 1 %
LYMPHOCYTES NFR BLD: 0.91 K/UL (ref 1.1–3.7)
LYMPHOCYTES RELATIVE PERCENT: 11 % (ref 24–43)
MCH RBC QN AUTO: 30.3 PG (ref 25.2–33.5)
MCHC RBC AUTO-ENTMCNC: 32.4 G/DL (ref 28.4–34.8)
MCV RBC AUTO: 93.4 FL (ref 82.6–102.9)
MONOCYTES NFR BLD: 1.15 K/UL (ref 0.1–1.2)
MONOCYTES NFR BLD: 14 % (ref 3–12)
NEUTROPHILS NFR BLD: 70 % (ref 36–65)
NEUTS SEG NFR BLD: 5.96 K/UL (ref 1.5–8.1)
NRBC BLD-RTO: 0 PER 100 WBC
PLATELET # BLD AUTO: 202 K/UL (ref 138–453)
PMV BLD AUTO: 9.8 FL (ref 8.1–13.5)
POTASSIUM SERPL-SCNC: 3.9 MMOL/L (ref 3.7–5.3)
RBC # BLD AUTO: 4.42 M/UL (ref 4.21–5.77)
SODIUM SERPL-SCNC: 139 MMOL/L (ref 136–145)
WBC OTHER # BLD: 8.4 K/UL (ref 3.5–11.3)

## 2025-06-25 PROCEDURE — 6360000002 HC RX W HCPCS: Performed by: EMERGENCY MEDICINE

## 2025-06-25 PROCEDURE — 99285 EMERGENCY DEPT VISIT HI MDM: CPT

## 2025-06-25 PROCEDURE — 6360000002 HC RX W HCPCS

## 2025-06-25 PROCEDURE — 99215 OFFICE O/P EST HI 40 MIN: CPT | Performed by: NURSE PRACTITIONER

## 2025-06-25 PROCEDURE — 2580000003 HC RX 258: Performed by: EMERGENCY MEDICINE

## 2025-06-25 PROCEDURE — G0378 HOSPITAL OBSERVATION PER HR: HCPCS

## 2025-06-25 PROCEDURE — 86140 C-REACTIVE PROTEIN: CPT

## 2025-06-25 PROCEDURE — 80048 BASIC METABOLIC PNL TOTAL CA: CPT

## 2025-06-25 PROCEDURE — 96366 THER/PROPH/DIAG IV INF ADDON: CPT

## 2025-06-25 PROCEDURE — 2580000003 HC RX 258

## 2025-06-25 PROCEDURE — 96374 THER/PROPH/DIAG INJ IV PUSH: CPT

## 2025-06-25 PROCEDURE — 2500000003 HC RX 250 WO HCPCS: Performed by: EMERGENCY MEDICINE

## 2025-06-25 PROCEDURE — 85025 COMPLETE CBC W/AUTO DIFF WBC: CPT

## 2025-06-25 PROCEDURE — 6370000000 HC RX 637 (ALT 250 FOR IP)

## 2025-06-25 PROCEDURE — 85652 RBC SED RATE AUTOMATED: CPT

## 2025-06-25 PROCEDURE — 6370000000 HC RX 637 (ALT 250 FOR IP): Performed by: EMERGENCY MEDICINE

## 2025-06-25 PROCEDURE — 96365 THER/PROPH/DIAG IV INF INIT: CPT

## 2025-06-25 PROCEDURE — 99212 OFFICE O/P EST SF 10 MIN: CPT

## 2025-06-25 PROCEDURE — 96375 TX/PRO/DX INJ NEW DRUG ADDON: CPT

## 2025-06-25 PROCEDURE — 96376 TX/PRO/DX INJ SAME DRUG ADON: CPT

## 2025-06-25 PROCEDURE — 6370000000 HC RX 637 (ALT 250 FOR IP): Performed by: NURSE PRACTITIONER

## 2025-06-25 RX ORDER — POTASSIUM CHLORIDE 1500 MG/1
20 TABLET, EXTENDED RELEASE ORAL EVERY MORNING
Status: DISCONTINUED | OUTPATIENT
Start: 2025-06-26 | End: 2025-06-26 | Stop reason: HOSPADM

## 2025-06-25 RX ORDER — BUMETANIDE 1 MG/1
2 TABLET ORAL DAILY
Status: DISCONTINUED | OUTPATIENT
Start: 2025-06-26 | End: 2025-06-26 | Stop reason: HOSPADM

## 2025-06-25 RX ORDER — TAMSULOSIN HYDROCHLORIDE 0.4 MG/1
0.4 CAPSULE ORAL DAILY
Status: DISCONTINUED | OUTPATIENT
Start: 2025-06-26 | End: 2025-06-26 | Stop reason: HOSPADM

## 2025-06-25 RX ORDER — ONDANSETRON 2 MG/ML
4 INJECTION INTRAMUSCULAR; INTRAVENOUS EVERY 6 HOURS PRN
Status: DISCONTINUED | OUTPATIENT
Start: 2025-06-25 | End: 2025-06-26 | Stop reason: HOSPADM

## 2025-06-25 RX ORDER — OXYBUTYNIN CHLORIDE 10 MG/1
10 TABLET, EXTENDED RELEASE ORAL EVERY MORNING
Status: DISCONTINUED | OUTPATIENT
Start: 2025-06-25 | End: 2025-06-26 | Stop reason: HOSPADM

## 2025-06-25 RX ORDER — PANTOPRAZOLE SODIUM 40 MG/1
40 TABLET, DELAYED RELEASE ORAL
Status: DISCONTINUED | OUTPATIENT
Start: 2025-06-25 | End: 2025-06-26 | Stop reason: HOSPADM

## 2025-06-25 RX ORDER — ATORVASTATIN CALCIUM 40 MG/1
40 TABLET, FILM COATED ORAL DAILY
Status: DISCONTINUED | OUTPATIENT
Start: 2025-06-25 | End: 2025-06-26 | Stop reason: HOSPADM

## 2025-06-25 RX ORDER — ISOSORBIDE MONONITRATE 30 MG/1
30 TABLET, EXTENDED RELEASE ORAL DAILY
Status: DISCONTINUED | OUTPATIENT
Start: 2025-06-25 | End: 2025-06-26 | Stop reason: HOSPADM

## 2025-06-25 RX ORDER — CALCIUM CARBONATE 500 MG/1
1000 TABLET, CHEWABLE ORAL 3 TIMES DAILY PRN
Status: DISCONTINUED | OUTPATIENT
Start: 2025-06-25 | End: 2025-06-26 | Stop reason: HOSPADM

## 2025-06-25 RX ORDER — ASPIRIN 81 MG/1
81 TABLET ORAL DAILY
Status: DISCONTINUED | OUTPATIENT
Start: 2025-06-25 | End: 2025-06-26 | Stop reason: HOSPADM

## 2025-06-25 RX ORDER — NITROGLYCERIN 0.4 MG/1
0.4 TABLET SUBLINGUAL EVERY 5 MIN PRN
Status: DISCONTINUED | OUTPATIENT
Start: 2025-06-25 | End: 2025-06-25

## 2025-06-25 RX ADMIN — ISOSORBIDE MONONITRATE 30 MG: 30 TABLET, EXTENDED RELEASE ORAL at 18:13

## 2025-06-25 RX ADMIN — VANCOMYCIN HYDROCHLORIDE 1250 MG: 1.25 INJECTION, POWDER, LYOPHILIZED, FOR SOLUTION INTRAVENOUS at 22:56

## 2025-06-25 RX ADMIN — WATER 1000 MG: 1 INJECTION INTRAMUSCULAR; INTRAVENOUS; SUBCUTANEOUS at 13:41

## 2025-06-25 RX ADMIN — WATER 1000 MG: 1 INJECTION INTRAMUSCULAR; INTRAVENOUS; SUBCUTANEOUS at 10:21

## 2025-06-25 RX ADMIN — PANTOPRAZOLE SODIUM 40 MG: 40 TABLET, DELAYED RELEASE ORAL at 18:13

## 2025-06-25 RX ADMIN — ASPIRIN 81 MG: 81 TABLET, COATED ORAL at 18:13

## 2025-06-25 RX ADMIN — ANTACID TABLETS 1000 MG: 500 TABLET, CHEWABLE ORAL at 19:37

## 2025-06-25 RX ADMIN — EMPAGLIFLOZIN 10 MG: 10 TABLET, FILM COATED ORAL at 18:13

## 2025-06-25 RX ADMIN — ATORVASTATIN CALCIUM 40 MG: 40 TABLET, FILM COATED ORAL at 18:13

## 2025-06-25 RX ADMIN — SODIUM CHLORIDE 1500 MG: 0.9 INJECTION, SOLUTION INTRAVENOUS at 10:50

## 2025-06-25 ASSESSMENT — PAIN DESCRIPTION - ONSET: ONSET: ON-GOING

## 2025-06-25 ASSESSMENT — ENCOUNTER SYMPTOMS
NAUSEA: 0
COLOR CHANGE: 1
EYE DISCHARGE: 0
ABDOMINAL PAIN: 0
SHORTNESS OF BREATH: 0
SHORTNESS OF BREATH: 0
COUGH: 0
ABDOMINAL PAIN: 0
VOMITING: 0
BLOOD IN STOOL: 0
DIARRHEA: 0

## 2025-06-25 ASSESSMENT — PAIN SCALES - GENERAL: PAINLEVEL_OUTOF10: 8

## 2025-06-25 ASSESSMENT — PAIN DESCRIPTION - FREQUENCY: FREQUENCY: CONTINUOUS

## 2025-06-25 ASSESSMENT — PAIN DESCRIPTION - ORIENTATION: ORIENTATION: LEFT;RIGHT

## 2025-06-25 ASSESSMENT — PAIN DESCRIPTION - DESCRIPTORS: DESCRIPTORS: ACHING

## 2025-06-25 ASSESSMENT — PAIN DESCRIPTION - LOCATION: LOCATION: BACK;HAND

## 2025-06-25 NOTE — PROGRESS NOTES
[facility]   Pharmacy Pharmacokinetic Monitoring Service - Vancomycin     Mickey Berg is a 69 y.o. male starting on vancomycin therapy for bilateral lower leg cellulitis. Pharmacy consulted by Dr. Juan Lora for monitoring and adjustment.    Target Concentration: Goal AUC/MALU 400-600 mg*hr/L    Additional Antimicrobials: Rocephin    Pertinent Laboratory Values:   Wt Readings from Last 1 Encounters:   06/25/25 105 kg (231 lb 6.4 oz)     Temp Readings from Last 1 Encounters:   06/25/25 97.7 °F (36.5 °C) (Oral)     Estimated Creatinine Clearance: 133 mL/min (A) (based on SCr of 0.6 mg/dL (L)).  Recent Labs     06/25/25  1019   CREATININE 0.6*   BUN 19   WBC 8.4     Procalcitonin: n/a    Pertinent Cultures: ---  MRSA Nasal Swab: N/A. Non-respiratory infection.    Plan:  Dosing recommendations based on Bayesian software  Start vancomycin 1500 mg IV once followed by 1250 mg IV q12h  Anticipated AUC of 464 and trough concentration of 12.9 at steady state  Renal labs as indicated   Pharmacy will continue to monitor patient and adjust therapy as indicated    Thank you for the consult,  Eddie Mary [facility]  6/25/2025 1:49 PM

## 2025-06-25 NOTE — ED PROVIDER NOTES
[facility] EMERGENCY DEPARTMENT  EMERGENCY DEPARTMENT ENCOUNTER      Pt Name: Mickey Berg  MRN:[id number]  Birthdate 1955  Date of evaluation: 6/25/25      CHIEF COMPLAINT:   Chief Complaint   Patient presents with    Leg Swelling     HISTORY OF PRESENT ILLNESS    Mickey Berg is a 69 y.o. male who presents with long history of CHF undergoing treatment and although still having some leg swelling much improved from previous admission.   Was seen at the CHF clinic and they were concerned regarding erythema and redness concerning for recurrence of circumferential cellulitis on bilateral legs above the socks.   He has had several episodes of this usually unresponsive to p.o. antibiotics outpatient and usually requires IV antibiotics.    Denies any fevers or chills or chest pain/shortness of breath and no abdominal pain or vomiting/diarrhea no systemic symptoms.   No SIRS criteria met.    Patient is here for assessment on recommendation to come from the CHF clinic.   Of note, CHF clinic feels he is much improved in regards to his CHF symptoms.          REVIEW OF SYSTEMS(2-9 systemsfor level 4, 10 or more for level 5)     Review of Systems   Constitutional:  Negative for chills and fever.   Respiratory:  Negative for shortness of breath.    Cardiovascular:  Negative for chest pain.   Gastrointestinal:  Negative for abdominal pain, diarrhea, nausea and vomiting.   Genitourinary:  Negative for dysuria.   Musculoskeletal:  Negative for neck pain.   Skin:  Positive for rash and wound.   Neurological:  Negative for weakness (Denies any focal weakness).   All other systems reviewed and are negative.      PASTMEDICALHISTORY   PMH:  has a past medical history of SYDNEY (acute kidney injury), Ambulates with cane, Arthritis, Atrial fibrillation (HCC), BPH (benign prostatic hyperplasia), Cellulitis, Cervical disc disease, Chronic venous insufficiency, Combined systolic and diastolic congestive heart failure (HCC),  (36.4 °C) SpO2: 99 %    Physical Exam  Constitutional:       Appearance: He is well-developed.   HENT:      Head: Normocephalic and atraumatic.   Eyes:      Conjunctiva/sclera: Conjunctivae normal.      Pupils: Pupils are equal, round, and reactive to light.   Neck:      Vascular: No JVD.   Cardiovascular:      Rate and Rhythm: Normal rate and regular rhythm.      Heart sounds: S1 normal and S2 normal. No murmur heard.     No friction rub. No gallop.   Pulmonary:      Effort: Pulmonary effort is normal. No respiratory distress.      Breath sounds: Normal breath sounds. No wheezing.   Abdominal:      General: Bowel sounds are normal. There is no distension.      Palpations: Abdomen is soft. There is no mass (No pulsatile masses palpated).      Tenderness: There is no abdominal tenderness. There is no guarding or rebound.   Musculoskeletal:      Cervical back: Normal range of motion and neck supple.   Skin:     General: Skin is warm and dry.      Comments: Patient has mild swelling in bilateral lower extremities much improved from baseline per patient and CHF clinic.   Just above the sock line appears to have circumferential cellulitis bilaterally.   Erythema and warmth but no crepitance or pain out of proportion to examination at all.     Neurological:      Mental Status: He is alert and oriented to person, place, and time.      Cranial Nerves: No cranial nerve deficit.      Motor: No abnormal muscle tone.           EMERGENCY DEPARTMENT COURSE:     Patient with circumferential cellulitis bilateral lower extremities just above the sock to just below the knee.   Has had this multiple times before requiring IV antibiotics and likely the same today.   CHF symptoms much improved with no shortness of breath or chest pain and legs although swollen much less than baseline.   Will begin empiric vancomycin as well as ceftriaxone IV.   Obtain CBC/BMP as well as CRP and ESR.   After workup admission to observation unit    White

## 2025-06-25 NOTE — ED TRIAGE NOTES
Pt to ed c/o bilateral leg swelling. Per pt he has a hx of cellulitis. Pt states his legs have worsened over the last few days. Pt has erythema and redness to bilateral legs. Pt states he typically gets admitted for iv antibiotics to treat this. Pt denies chest pain or sob.     Pt is alert and oriented x4. Pt in gown. Vitals stable. Call light in reach. Will continue with plan of care.

## 2025-06-25 NOTE — H&P
[facility]  CDU / OBSERVATION ENCOUNTER  Physician NOTE     Pt Name: Mickey Berg  MRN: [id number]  Birthdate 1955  Date of evaluation: 6/25/25  Patient's PCP is :  Mich Sung MD    CHIEF COMPLAINT       Chief Complaint   Patient presents with    Leg Swelling         HISTORY OF PRESENT ILLNESS   Mickey Begr is a 69 y.o. male who presents with bilateral leg pain, and swelling for last couple of days and tried taking oral antibiotic but it did not work as pain and redness is more now. He denies fever, fall, vomiting, nausea, chest pain or SOB. He has chronic leg swelling due to CHF and chronic venous insufficiency.     Location/Symptom: Lower leg pain bilaterally   Timing/Onset: Chronic   Provocation: None  Quality: Dull   Radiation: None   Severity: Moderate   Timing/Duration: Constant   Modifying Factors: None     History was obtained in part through review of the ED chart. When possible, a direct discussion was had with ED nurses, residents, and attendings  REVIEW OF SYSTEMS       General ROS - No fevers, No malaise   Ophthalmic ROS - No discharge, No changes in vision  ENT ROS -  No sore throat, No rhinorrhea,   Respiratory ROS - no shortness of breath, no cough, no  wheezing  Cardiovascular ROS - No chest pain, no dyspnea on exertion  Gastrointestinal ROS - No abdominal pain, no nausea or vomiting, no change in bowel habits, no black or bloody stools  Genito-Urinary ROS - No dysuria, trouble voiding, or hematuria  Musculoskeletal ROS - No myalgias, No arthalgias  Neurological ROS - No headache, no dizziness/lightheadedness, No focal weakness, no loss of sensation  Dermatological ROS - No lesions, No rash     (PQRS) Advance directives on face sheet per hospital policy. No change unless specifically mentioned in chart    PAST MEDICAL HISTORY    has a past medical history of SYDNEY (acute kidney injury), Ambulates with cane, Arthritis, Atrial fibrillation (HCC), BPH (benign

## 2025-06-25 NOTE — TELEPHONE ENCOUNTER
Joy from [facility] heart failure clinic called stating that patient needs to be seen sooner than next scheduled appointment due to his legs being swollen, red, and bruised. She also stated he needs a prescription for more antibiotics

## 2025-06-25 NOTE — ED NOTES
ED to inpatient nurses report      Chief Complaint:  Chief Complaint   Patient presents with    Leg Swelling     Present to ED from: home     MOA:     LOC: alert and orientated to name, place, date  Mobility: Requires assistance * 1  Oxygen Baseline: 0    Current needs required: 0   Pending ED orders: n/a  Present condition: stable     Why did the patient come to the ED? Pt to ed c/o bilateral leg swelling. Per pt he has a hx of cellulitis. Pt states his legs have worsened over the last few days. Pt has erythema and redness to bilateral legs. Pt states he typically gets admitted for iv antibiotics to treat this. Pt denies chest pain or sob.   What is the plan? Admit, antibiotics    Any procedures or intervention occur? N/a   Any safety concerns?? Na    Mental Status:       Psych Assessment:   Psychosocial  Psychosocial (WDL): Within Defined Limits  Vital signs   Vitals:    06/25/25 0958 06/25/25 0959 06/25/25 1000   BP:  118/74    Pulse:  65    Resp: 18     Temp:   97.6 °F (36.4 °C)   TempSrc:   Oral   SpO2:  99%         Vitals:  Patient Vitals for the past 24 hrs:   BP Temp Temp src Pulse Resp SpO2   06/25/25 1000 -- 97.6 °F (36.4 °C) Oral -- -- --   06/25/25 0959 118/74 -- -- 65 -- 99 %   06/25/25 0958 -- -- -- -- 18 --      Visit Vitals  /74   Pulse 65   Temp 97.6 °F (36.4 °C) (Oral)   Resp 18   SpO2 99%        LDAs:   Peripheral IV 06/25/25 Left;Anterior Forearm (Active)   Site Assessment Clean, dry & intact 06/25/25 1004   Line Status Blood return noted 06/25/25 1004   Phlebitis Assessment No symptoms 06/25/25 1004   Infiltration Assessment 0 06/25/25 1004   Dressing Status New dressing applied;Clean, dry & intact 06/25/25 1004   Dressing Type Transparent 06/25/25 1004   Dressing Intervention New 06/25/25 1004       Ambulatory Status:  Presents to emergency department  because of falls (Syncope, seizure, or loss of consciousness): No, Age > 70: No, Altered Mental Status, Intoxication with alcohol or  HISTORY       Family History   Problem Relation Age of Onset    Alcohol Abuse Maternal Uncle     Heart Attack Maternal Uncle     Cancer Mother     Alcohol Abuse Father        ALLERGIES     Environmental/seasonal    CURRENT MEDICATIONS       Previous Medications    ASPIRIN 81 MG EC TABLET    Take 1 tablet by mouth daily    ATORVASTATIN (LIPITOR) 40 MG TABLET    Take 1 tablet by mouth daily    BUMETANIDE (BUMEX) 2 MG TABLET    Take 1 tablet by mouth daily    COMPRESSION STOCKINGS MISC    15-20 mmHg    DAPAGLIFLOZIN (FARXIGA) 10 MG TABLET    Take 1 tablet by mouth every morning    ISOSORBIDE MONONITRATE (IMDUR) 30 MG EXTENDED RELEASE TABLET    Take 1 tablet by mouth daily    NITROGLYCERIN (NITROSTAT) 0.4 MG SL TABLET    Place 1 tablet under the tongue every 5 minutes as needed for Chest pain up to max of 3 total doses. If no relief after 1 dose, call 911.    ONDANSETRON (ZOFRAN-ODT) 4 MG DISINTEGRATING TABLET    Take 1 tablet by mouth 3 times daily as needed for Nausea or Vomiting    OXYBUTYNIN (DITROPAN-XL) 10 MG EXTENDED RELEASE TABLET    TAKE 1 TABLET BY MOUTH EVERY MORNING    PANTOPRAZOLE (PROTONIX) 40 MG TABLET    TAKE 1 TABLET BY MOUTH DAILY WITH SUPPER    POTASSIUM CHLORIDE (KLOR-CON M) 20 MEQ EXTENDED RELEASE TABLET    TAKE 1 TABLET BY MOUTH EVERY MORNING    TAMSULOSIN (FLOMAX) 0.4 MG CAPSULE    TAKE 1 CAPSULE BY MOUTH DAILY     Orders Placed This Encounter   Medications    vancomycin (VANCOCIN) 1,500 mg in sodium chloride 0.9 % 250 mL IVPB ([id number])     Antimicrobial Indications:   Skin and Soft Tissue Infection    cefTRIAXone (ROCEPHIN) 1,000 mg in sterile water 10 mL IV syringe     Antimicrobial Indications:   Skin and Soft Tissue Infection       SURGICAL HISTORY       Past Surgical History:   Procedure Laterality Date    CARDIAC SURGERY      CARPAL TUNNEL RELEASE Left 09/11/2006    [facility]    CARPAL TUNNEL RELEASE Right 01/30/2007    [facility]    CATARACT EXTRACTION Bilateral 2020    ? IOL's

## 2025-06-25 NOTE — PROGRESS NOTES
CHF Clinic at [facility]    Office: [phone] Fax: [phone]    [facility] CHF CLINIC  [address] [address]  Dept: [phone]  Loc: [phone]    Mickey Berg is a 69 y.o. male who presents today for CHF evaluation.       HPI:     Denies shortness of breath  + fatigue  H/o cpap, not aware of machine location   Used it for years, belongings were lost when evicted on [place]/   +  Edema , worsening. Pt reports having 10 dogs and has multiple scratches on red erythematous skin of b/l LE     Pt was given abx at last  CHF Clinic d/t appearance of skin and d/t his prolific h/o cellulitis.   Denies chest pain   Denies  palpitations   No orthopnea , nor PND             Past Medical History:   Diagnosis Date    SYDNEY (acute kidney injury)     Ambulates with cane 01/08/2025    Rolator walker ordered by PCP 10/2024 but pt has not yet received.    Arthritis     back    Atrial fibrillation (HCC)     BPH (benign prostatic hyperplasia)     Cellulitis 02/2023    left leg and 12/2024    Cervical disc disease     Chronic venous insufficiency     Combined systolic and diastolic congestive heart failure (HCC) 01/15/2020    With preserved EF (12/12/2024)    Coronary artery disease 10/2019    s/p CABG x 1 ; [place] to LAD    GERD (gastroesophageal reflux disease)     on rx    History of incarceration     released 2019    Hyperlipidemia     Hypertension     Ischemic cardiomyopathy     Myocardial infarct (HCC)     Obesity     HARPREET (obstructive sleep apnea)     No machine presently - lost in eviciton (1/2025)    Overactive bladder     Under care of podiatrist     Dr. Lorie Piedra - last visit 8/2024    Under care of team 12/26/2023    Urology, Dr. Morrison, last visit 11/8/2024    Under care of team 12/26/2023    GI, dr. Johnston, last seen 11/2023    Under care of team 12/26/2023    Cardiology, [facility], Dr. Conde, last seen 2023

## 2025-06-25 NOTE — PROGRESS NOTES
Date:  2025  Time:  9:53 AM    CHF Clinic at [facility]    Office: [phone] Fax: [phone]    Re:  Mickey Berg   Patient : 1955    Vital Signs: BP (!) 110/58   Pulse 67   Resp 16   Wt 105 kg (231 lb 6.4 oz)   SpO2 97%   BMI 38.51 kg/m²                       O2 Device: None (Room air)                           No results for input(s): \"CBC\", \"HGB\", \"HCT\", \"WBC\", \"PLATELET\", \"NA\", \"K\", \"CL\", \"CO2\", \"BUN\", \"CREATININE\", \"GLUCOSE\", \"BNP\", \"INR\" in the last 72 hours.     Respiratory:    Assessment  Charting Type: Reassessment    Breath Sounds  Right Upper Lobe: Clear, Diminished  Right Middle Lobe: Clear, Diminished  Right Lower Lobe: Clear, Diminished  Left Upper Lobe: Diminished  Left Lower Lobe: Diminished    Cough/Sputum  Cough: None         Peripheral Vascular  RLE Edema: Non-pitting, +2  LLE Edema: +2, Non-pitting (worse than right)    Future Appointments   Date Time Provider Department Center   2025 10:00 AM [facility] DEP   2025  9:00 AM [facility] CHF CLINIC  1 [facility] CHF [facility]   2025 12:15 PM Tej Coffey DPM [facility]   2025 11:20 AM George Wong MD Resp Spec [facility]   2025 10:30 AM Jono Conde MD [facility]   10/7/2025  2:30 PM Mich Sung MD [facility] DEP      Complaints: Pt c/o some pain to back and hands bilat    Physician Orders    Comment : Pt ambulatory to clinic with cane. Pt's weight 231.4 lbs, down 2 lbs from prior visit. Pt denies any c/o SOB. Pt c/o leg swelling over last couple days. States his legs are starting to turn red and blister. Pt states he was scratched by his dogs. 2 + edema noted to lower extremities bilat. Blisters and redness present to lower extremities. 2+ edema present. Lungs clear, dim on left. Labs reviewed. Spoke with Zenia PIRES CNP regarding pt. States will come to see pt. Verbal order rec'd to have patient take 40 mEq

## 2025-06-25 NOTE — TELEPHONE ENCOUNTER
Patient called requesting a prescription for more antibiotics until next appointment. He states his legs are swollen, red, and bruised again. Please advise

## 2025-06-26 VITALS
BODY MASS INDEX: 38.49 KG/M2 | WEIGHT: 231 LBS | DIASTOLIC BLOOD PRESSURE: 64 MMHG | HEIGHT: 65 IN | RESPIRATION RATE: 16 BRPM | TEMPERATURE: 97.5 F | OXYGEN SATURATION: 97 % | SYSTOLIC BLOOD PRESSURE: 105 MMHG | HEART RATE: 57 BPM

## 2025-06-26 PROCEDURE — 99212 OFFICE O/P EST SF 10 MIN: CPT

## 2025-06-26 PROCEDURE — 6360000002 HC RX W HCPCS: Performed by: EMERGENCY MEDICINE

## 2025-06-26 PROCEDURE — 6360000002 HC RX W HCPCS

## 2025-06-26 PROCEDURE — 6370000000 HC RX 637 (ALT 250 FOR IP): Performed by: EMERGENCY MEDICINE

## 2025-06-26 PROCEDURE — G0378 HOSPITAL OBSERVATION PER HR: HCPCS

## 2025-06-26 PROCEDURE — 6370000000 HC RX 637 (ALT 250 FOR IP)

## 2025-06-26 PROCEDURE — 2500000003 HC RX 250 WO HCPCS: Performed by: EMERGENCY MEDICINE

## 2025-06-26 PROCEDURE — 2580000003 HC RX 258

## 2025-06-26 PROCEDURE — 96366 THER/PROPH/DIAG IV INF ADDON: CPT

## 2025-06-26 PROCEDURE — 96376 TX/PRO/DX INJ SAME DRUG ADON: CPT

## 2025-06-26 RX ORDER — DOXYCYCLINE HYCLATE 100 MG
100 TABLET ORAL 2 TIMES DAILY
Qty: 20 TABLET | Refills: 0 | Status: SHIPPED | OUTPATIENT
Start: 2025-06-26 | End: 2025-07-06

## 2025-06-26 RX ADMIN — WATER 2000 MG: 1 INJECTION INTRAMUSCULAR; INTRAVENOUS; SUBCUTANEOUS at 10:37

## 2025-06-26 RX ADMIN — ATORVASTATIN CALCIUM 40 MG: 40 TABLET, FILM COATED ORAL at 08:37

## 2025-06-26 RX ADMIN — EMPAGLIFLOZIN 10 MG: 10 TABLET, FILM COATED ORAL at 08:37

## 2025-06-26 RX ADMIN — OXYBUTYNIN CHLORIDE 10 MG: 10 TABLET, EXTENDED RELEASE ORAL at 08:37

## 2025-06-26 RX ADMIN — BUMETANIDE 2 MG: 1 TABLET ORAL at 08:37

## 2025-06-26 RX ADMIN — TAMSULOSIN HYDROCHLORIDE 0.4 MG: 0.4 CAPSULE ORAL at 08:37

## 2025-06-26 RX ADMIN — ASPIRIN 81 MG: 81 TABLET, COATED ORAL at 08:37

## 2025-06-26 RX ADMIN — ISOSORBIDE MONONITRATE 30 MG: 30 TABLET, EXTENDED RELEASE ORAL at 08:37

## 2025-06-26 RX ADMIN — VANCOMYCIN HYDROCHLORIDE 1250 MG: 1.25 INJECTION, POWDER, LYOPHILIZED, FOR SOLUTION INTRAVENOUS at 10:39

## 2025-06-26 RX ADMIN — POTASSIUM CHLORIDE 20 MEQ: 1500 TABLET, EXTENDED RELEASE ORAL at 08:37

## 2025-06-26 NOTE — PROGRESS NOTES
Congestive Heart Failure Education completed and charted. CHF booklet given. Patient was receptive to education.    Discussed the  importance of medication compliance.    Discussed the importance of a heart healthy diet. Discussed 2000 mg sodium-restricted daily diet.  Patient instructed to limit fluid intake to  1.5 to 2 liters per day.    Patient instructed to weigh self at the same time of each day each morning, reinforced teaching to monitor for 3-5 lb weight increase over 1-2 days notify physician if change noted.      Signs and symptoms of CHF discussed with patient, such as feeling more tired than normal, feeling short of breath, coughing that increases when lying down, sudden weight gain, swelling of the feet, legs or belly.  Patient verbalized understanding to notify physician office if these symptoms occur.  EF 55%   Pt is established with  CHF Clinic  Appt will be made for pt following this admission.

## 2025-06-26 NOTE — CARE COORDINATION
Case Management Assessment  Initial Evaluation    Date/Time of Evaluation: 6/26/2025 12:37 PM  Assessment Completed by: RAPHAEL MAHONEY RN    If patient is discharged prior to next notation, then this note serves as note for discharge by case management.    Patient Name: Mickey Berg                   YOB: 1955  Diagnosis: Bilateral lower leg cellulitis [L03.116, L03.115]                   Date / Time: 6/25/2025  9:53 AM    Patient Admission Status: Observation   Readmission Risk (Low < 19, Mod (19-27), High > 27): No data recorded  Current PCP: Mich Sung MD  PCP verified by CM? Yes    Chart Reviewed: Yes      History Provided by: Patient  Patient Orientation: Alert and Oriented    Patient Cognition: Alert    Hospitalization in the last 30 days (Readmission):  No    If yes, Readmission Assessment in CM Navigator will be completed.    Advance Directives:      Code Status: Full Code   Patient's Primary Decision Maker is:      Primary Decision Maker: JOSE CUBA - Brother/Sister - [phone]    Primary Decision Maker: Wai Berg - Brother/Sister - [phone]    Discharge Planning:    Patient lives with: Family Members Type of Home: House  Primary Care Giver: Self  Patient Support Systems include: Family Members   Current Financial resources:    Current community resources:    Current services prior to admission: None            Current DME:              Type of Home Care services:  None    ADLS  Prior functional level: Independent in ADLs/IADLs  Current functional level: Independent in ADLs/IADLs    PT AM-PAC:   /24  OT AM-PAC:   /24    Family can provide assistance at DC: No  Would you like Case Management to discuss the discharge plan with any other family members/significant others, and if so, who? No  Plans to Return to Present Housing: Yes  Other Identified Issues/Barriers to RETURNING to current housing: none  Potential Assistance needed at discharge: N/A            Potential DME:

## 2025-06-26 NOTE — DISCHARGE SUMMARY
CDU Discharge Summary        Patient:  Mickey Berg  YOB: 1955    MRN: [id number]   Acct: [id number]    Primary Care Physician: Mihc Sung MD    Admit date:  6/25/2025  9:53 AM  Discharge date: 6/26/2025    Discharge Diagnoses:     1.)  Patient had bilateral lower leg swelling and pain with acute on chronic onset due to chronic venous insufficiency and CHF related stasis skin changes .  Treated with IV antibiotics and pain management, wound care is consulted .  Patient's symptoms are improving with the plan to discharge home with oral antibiotic, wound care follow up.     Follow-up:  Call today/tomorrow for a follow up appointment with Mich Sung MD , or return to the Emergency Room with worsening symptoms    Stressed to patient the importance of following up with primary care doctor for further workup/management of symptoms.  Pt verbalizes understanding and agrees with plan.    Discharge Medication Changes:       Medication List        START taking these medications      doxycycline hyclate 100 MG tablet  Commonly known as: VIBRA-TABS  Take 1 tablet by mouth 2 times daily for 10 days            CONTINUE taking these medications      aspirin 81 MG EC tablet  Take 1 tablet by mouth daily     atorvastatin 40 MG tablet  Commonly known as: LIPITOR     bumetanide 2 MG tablet  Commonly known as: BUMEX  Take 1 tablet by mouth daily     Compression Stockings Misc  15-20 mmHg     dapagliflozin 10 MG tablet  Commonly known as: Farxiga  Take 1 tablet by mouth every morning     isosorbide mononitrate 30 MG extended release tablet  Commonly known as: IMDUR  Take 1 tablet by mouth daily     nitroGLYCERIN 0.4 MG SL tablet  Commonly known as: NITROSTAT  Place 1 tablet under the tongue every 5 minutes as needed for Chest pain up to max of 3 total doses. If no relief after 1 dose, call 911.     oxyBUTYnin 10 MG extended release tablet  Commonly known as: DITROPAN-XL  TAKE 1 TABLET BY MOUTH EVERY MORNING

## 2025-06-26 NOTE — PROGRESS NOTES
CLINICAL PHARMACY NOTE: MEDS TO BEDS    Total # of Prescriptions Filled: 1   The following medications were delivered to the patient:  Doxycycline hyc 100 mg     Additional Documentation:  Delivered x1 to patient room OBS 21 on 6/26 at 4:07p. $0.

## 2025-06-26 NOTE — PROGRESS NOTES
[facility] Wound Ostomy  Nurse  Consult Note       NAME:  Mickey Berg  MEDICAL RECORD NUMBER:  [id number]  AGE: 69 y.o.   GENDER: male  : 1955  TODAY'S DATE:  2025    Subjective   Reason for [facility] Nurse Evaluation and Assessment: chronic venous stasis      Mickey Berg is a 69 y.o. male referred by:   [x] Physician  [] Nursing  [] Other:     Wound Identification:  Wound Type: venous  Contributing Factors: venous stasis, chronic pressure, decreased mobility, and obesity    Wound History:   Long history of lower extremity venous disease. Attended the [facility] wound care center in the past.       Patient Goal of Care:  [x] Wound Healing  [] Odor Control  [] Palliative Care  [] Pain Control   [] Other:         PAST MEDICAL HISTORY        Diagnosis Date    SYDNEY (acute kidney injury)     Ambulates with cane 2025    Rolator walker ordered by PCP 10/2024 but pt has not yet received.    Arthritis     back    Atrial fibrillation (HCC)     BPH (benign prostatic hyperplasia)     Cellulitis 2023    left leg and 2024    Cervical disc disease     Chronic venous insufficiency     Combined systolic and diastolic congestive heart failure (HCC) 01/15/2020    With preserved EF (2024)    Coronary artery disease 10/2019    s/p CABG x 1 ; [place] to LAD    GERD (gastroesophageal reflux disease)     on rx    History of incarceration     released     Hyperlipidemia     Hypertension     Ischemic cardiomyopathy     Myocardial infarct (HCC)     Obesity     HARPREET (obstructive sleep apnea)     No machine presently - lost in eviciton (2025)    Overactive bladder     Under care of podiatrist     Dr. Lorie Piedra - last visit 2024    Under care of team 2023    Urology, Dr. Morrison, last visit 2024    Under care of team 2023    GI, dr. Johnston, last seen 2023    Under care of team 2023    Cardiology, [facility], Dr. Conde, last seen     Under care of team     CHF Clinic - [facility] last

## 2025-06-26 NOTE — PROGRESS NOTES
OBS/CDU   Progress NOTE      Patients PCP is:  Mich Sung MD        SUBJECTIVE      No acute events overnight. He is ambulating and reports moderate pain in legs. No fever, nausea, vomiting, chest pain or SOB.     PHYSICAL EXAM      General: NAD, AO X 3  Heent: EOMI, PERRL  Neck: SUPPLE, NO JVD  Cardiovascular: RRR, S1S2  Pulmonary: CTAB, NO SOB  Abdomen: SOFT, NTTP, ND, +BS  Extremities: Bilateral lower leg dry and red skin with tenderness and warm to touch, in right leg there is a blister, no pain on passive stretching or feet dorsi and planter flexion   Neuro / Psych: NO NUMBNESS OR TINGLING, MENTATION AT BASELINE    PERTINENT TEST /EXAMS      I have reviewed all available laboratory results.    MEDICATIONS CURRENT   bumetanide (BUMEX) tablet 2 mg, Daily  oxyBUTYnin (DITROPAN-XL) extended release tablet 10 mg, QAM  pantoprazole (PROTONIX) tablet 40 mg, Dinner  tamsulosin (FLOMAX) capsule 0.4 mg, Daily  ondansetron (ZOFRAN) injection 4 mg, Q6H PRN  cefTRIAXone (ROCEPHIN) 2,000 mg in sterile water 20 mL IV syringe, Q24H  vancomycin (VANCOCIN) intermittent dosing (placeholder), RX Placeholder  vancomycin (VANCOCIN) 1,250 mg in sodium chloride 0.9 % 250 mL IVPB ([id number]), Q12H  aspirin EC tablet 81 mg, Daily  atorvastatin (LIPITOR) tablet 40 mg, Daily  isosorbide mononitrate (IMDUR) extended release tablet 30 mg, Daily  potassium chloride (KLOR-CON M) extended release tablet 20 mEq, QAM  empagliflozin (JARDIANCE) tablet 10 mg, Daily  calcium carbonate (TUMS) chewable tablet 1,000 mg, TID PRN        All medication charted and reviewed.    CONSULTS      PHARMACY TO DOSE VANCOMYCIN    ASSESSMENT/PLAN       Mickey Berg is a 69 y.o. male who presents with chronic bilateral lower legs swelling with new onset worsening redness, warmth, and pain for last couple of days s/p OP antibiotic but not improving. Chronic venous insufficiency and CHF induced leg edema likely causing chronic skin changes due to stasis.

## 2025-06-26 NOTE — PROGRESS NOTES
[facility]  CDU / OBSERVATION ENCOUNTER  ATTENDING NOTE         I performed a history and physical examination of the patient and discussed management with the resident or midlevel provider. I reviewed the resident or midlevel provider's note and agree with the documented findings and plan of care. Any areas of disagreement are noted on the chart. I was personally present for the key portions of any procedures. I have documented in the chart those procedures where I was not present during the key portions. I have reviewed the nurses notes. I agree with the chief complaint, past medical history, past surgical history, allergies, medications, social and family history as documented unless otherwise noted below.    The Family history, social history, and ROS are effectively unchanged since admission unless noted elsewhere in the chart.     This patient was placed in the observation unit for reevaluation for possible admission to the hospital     Patient with bilateral lower extremity swelling.  Patient with open areas to the leg.  Patient with breaks in skin secondary to edema.  Patient requiring some relief from edema.    Skin changes most consistent with chronic venous stasis changes.  Patient with improvement after antibiotics.  Less redness.    Will involve wound care.  I feel that having some compression on this patient would be helpful.  Consider medicated dressings.  Patient for further evaluation in outpatient setting.  No acute ongoing cellulitis but rather need for ongoing monitoring and control of edema.       Austin Thrasher MD  Attending Emergency  Physician

## 2025-06-26 NOTE — DISCHARGE INSTRUCTIONS
Your workup from the emergency department did not show any significant pathology but you were admitted to the observation unit for ongoing evaluation.    Your testing included blood investigations.     We no longer need to keep you in the hospital but that does not mean you are done being treated  -Take your prescribed medications as directed  -Follow-up with your primary care physician or the specialist designated or both as scheduled    Please return if your condition worsens or there are new symptoms    If there are concerns that have not been addressed while you have been in the hospital please let the discharge nurse know so the physician can be informed -it is easier to fix problems while you are still here    If you have questions after you have left the hospital please call the unit at  and ask for the observation resident on-call

## 2025-06-27 ENCOUNTER — CARE COORDINATION (OUTPATIENT)
Dept: CARE COORDINATION | Age: 70
End: 2025-06-27

## 2025-06-27 NOTE — CARE COORDINATION
Care Transitions Note    Initial Call attempt #1 - Call within 2 business days of discharge: Yes  Attempted to reach patient for transitions of care follow up. Unable to reach patient.  Outreach Attempts:   HIPAA compliant voicemail left for patient.     I did reach patient's sister,  to inform that patient was discharged home on 10 day course of doxycycline which he received at [facility] from meds to beds.  I formed of PCP appt and basic DC instructions.  Will reattempt to contact patient next business day    Patient: Mickey Berg      Patient : 1955   MRN: [id number]      Reason for Admission: bilateral LE cellulitis  Discharge Date: 25    RURS: No data recorded    - ST OBS admission for bilateral LE cellulitis due to chronic venous changes and CHF.  IV VANCO + Rocephin, then discharged home on 10 day course of doxycycline.  F/U PCP  + CHF Clinic  + [facility] wound care + vascular appt Dr Cabrera to be scheduled.    Last Discharge Facility       Date Complaint Diagnosis Description Type Department Provider    25 Leg Swelling Bilateral lower leg cellulitis ED to Hosp-Admission (Discharged) (ADMITTED) [facility] OBS Austin Thrasher MD; [employer] [employer]...            Was this an external facility discharge? No    Follow Up Appointment:   Patient has hospital follow up appointment scheduled within 14 days of discharge.  Routed to change to HFU  Future Appointments         Provider Specialty Dept Phone    2025 10:00 AM Mich Sung MD Family Medicine [phone]    2025 9:00 AM [facility] CHF CLINIC  1 Cardiology [phone]    2025 12:15 PM Tej Coffey DPM Podiatry [phone]    2025 11:20 AM George Wong MD Pulmonology [phone]    2025 10:30 AM Jono Conde MD Cardiology [phone]    10/7/2025 2:30 PM Mich Sung MD Family Medicine [phone]            Plan for follow-up on next business day.    Assist as needed with other f/u instructions - [facility]

## 2025-06-28 NOTE — PROGRESS NOTES
[facility]  CDU / OBSERVATION ENCOUNTER  ATTENDING NOTE         I performed a history and physical examination of the patient and discussed management with the resident or midlevel provider. I reviewed the resident or midlevel provider's note and agree with the documented findings and plan of care. Any areas of disagreement are noted on the chart. I was personally present for the key portions of any procedures. I have documented in the chart those procedures where I was not present during the key portions. I have reviewed the nurses notes. I agree with the chief complaint, past medical history, past surgical history, allergies, medications, social and family history as documented unless otherwise noted below.    The Family history, social history, and ROS are effectively unchanged since admission unless noted elsewhere in the chart.     This patient was placed in the observation unit for reevaluation for possible admission to the hospital     Patient with lower extremity edema.  Patient with chronic venous stasis changes.  Patient may have component of cellulitis but primarily issue is with chronic venous stasis changes, edema, and need for more compression.  Patient for wound care to assist.       Austin Thrasher MD  Attending Emergency  Physician

## 2025-06-30 ENCOUNTER — CARE COORDINATION (OUTPATIENT)
Dept: CARE COORDINATION | Age: 70
End: 2025-06-30

## 2025-06-30 DIAGNOSIS — L03.116 CELLULITIS OF LEFT LOWER EXTREMITY: Primary | ICD-10-CM

## 2025-06-30 PROCEDURE — 1111F DSCHRG MED/CURRENT MED MERGE: CPT | Performed by: STUDENT IN AN ORGANIZED HEALTH CARE EDUCATION/TRAINING PROGRAM

## 2025-06-30 NOTE — CARE COORDINATION
Care Transitions Note    Initial Call - Call within 2 business days of discharge: Yes    Patient Current Location:  Home: [address] [place] [address]    Care Transition Nurse contacted the patient by telephone to perform post hospital discharge assessment, verified name and  as identifiers.  Provided introduction to self, and explanation of the Care Transition Nurse role.    Patient: Mickey Berg                             Patient : 1955   MRN: [id number]                                  Reason for Admission: bilateral LE cellulitis  Discharge Date: 25         RURS: No data recorded     - ST OBS admission for bilateral LE cellulitis due to chronic venous changes and CHF.  IV VANCO + Rocephin, then discharged home on 10 day course of doxycycline.  F/U PCP  + CHF Clinic  + [facility] wound care  (vascular appt Dr Cabrera)     Last Discharge Facility       Date Complaint Diagnosis Description Type Department Provider    25 Leg Swelling Bilateral lower leg cellulitis ED to Hosp-Admission (Discharged) (ADMITTED) [facility] OBS Austin Thrasher MD; [employer]...            Was this an external facility discharge? No    Additional needs identified to be addressed with provider   No needs identified             Method of communication with provider: none.    Patients top risk factors for readmission: medical condition-bilateral LE cellulitis due to chronic venous changes and CHF.    Interventions to address risk factors:   Review of patient management of conditions/medications: reviewed, appts    Care Summary Note:   - [facility] OBS admission for bilateral LE cellulitis due to chronic venous changes and CHF.  IV VANCO + Rocephin, then discharged home on 10 day course of doxycycline.  F/U PCP  + CHF Clinic  + [facility] wound care  (vascular appt Dr Cabrera)     Mailbox is full, but patient returned call.  Reports that he's doing \"good.\"  Bilateral lower legs are still swollen and discolored, but

## 2025-07-01 ENCOUNTER — TELEPHONE (OUTPATIENT)
Age: 70
End: 2025-07-01

## 2025-07-01 NOTE — DISCHARGE INSTRUCTIONS
[facility] -Phone: [phone] Fax: [phone]    Visit  Discharge Instructions / Physician Orders     DATE: 7/2/2025     Home Care:      SUPPLIES ORDERED THRU:      Wound Location: Bilateral Lower Legs     Cleanse with: Keep Dry and Intact     Dressing Orders: Silvercel, KerraMax, 3 Layer Compression wrap      Frequency: Keep Dry and Intact     Additional Orders: Increase protein to diet (meat, cheese, eggs, fish, peanut butter, nuts and beans)  ELEVATE LEGS AS MUCH AS POSSIBLE     Your next appointment with Wound Care Center is in 1 week     (Please note your next appointment above and if you are unable to keep, kindly give a 24 hour notice. Thank you.)  If more than 15 min late we cannot guarantee you will be seen due to clinician schedule  Per Policy, Excessive cancellation will call for dismissal from program.     If you experience any of the following, please call the Wound Care Center during business hours:  [phone]     * Increase in Pain  * Temperature over 101  * Increase in drainage from your wound  * Drainage with a foul odor  * Bleeding  * Increase in swelling  * Need for compression bandage changes due to slippage, breakthrough drainage.     If you need medical attention outside of the business hours of the Wound Care Centers please contact your PCP or go to the an urgent care or emergency department     The information contained in the After Visit Summary has been reviewed with me, the patient and/or responsible adult, by my health care provider(s). I had the opportunity to ask questions regarding this information. I have elected to receive;      []After Visit Summary  [x]Comprehensive Discharge Instruction        Patient signature______________________________________Date:________  Electronically signed by Christiano Ferraro RN on 7/2/2025 at 10:31 AM  Electronically signed by GHADA HOLM MD on 7/2/2025 at 10:38 AM

## 2025-07-01 NOTE — TELEPHONE ENCOUNTER
LVM  for a return call to reschedule canceled appointment on 07/08/2025, due to provider not in clinic .

## 2025-07-02 ENCOUNTER — HOSPITAL ENCOUNTER (OUTPATIENT)
Dept: WOUND CARE | Age: 70
Discharge: HOME | End: 2025-07-02
Payer: MEDICARE

## 2025-07-02 VITALS
BODY MASS INDEX: 38.49 KG/M2 | HEART RATE: 61 BPM | WEIGHT: 231 LBS | DIASTOLIC BLOOD PRESSURE: 62 MMHG | HEIGHT: 65 IN | SYSTOLIC BLOOD PRESSURE: 107 MMHG | TEMPERATURE: 96.8 F

## 2025-07-02 DIAGNOSIS — I83.012 VENOUS STASIS ULCER OF RIGHT CALF WITH FAT LAYER EXPOSED, UNSPECIFIED WHETHER VARICOSE VEINS PRESENT (HCC): ICD-10-CM

## 2025-07-02 DIAGNOSIS — L97.222 VENOUS STASIS ULCER OF LEFT CALF WITH FAT LAYER EXPOSED, UNSPECIFIED WHETHER VARICOSE VEINS PRESENT (HCC): Primary | ICD-10-CM

## 2025-07-02 DIAGNOSIS — L97.212 VENOUS STASIS ULCER OF RIGHT CALF WITH FAT LAYER EXPOSED, UNSPECIFIED WHETHER VARICOSE VEINS PRESENT (HCC): ICD-10-CM

## 2025-07-02 DIAGNOSIS — I83.022 VENOUS STASIS ULCER OF LEFT CALF WITH FAT LAYER EXPOSED, UNSPECIFIED WHETHER VARICOSE VEINS PRESENT (HCC): Primary | ICD-10-CM

## 2025-07-02 PROCEDURE — 11042 DBRDMT SUBQ TIS 1ST 20SQCM/<: CPT

## 2025-07-02 PROCEDURE — 99212 OFFICE O/P EST SF 10 MIN: CPT

## 2025-07-02 PROCEDURE — 29581 APPL MULTLAYER CMPRN SYS LEG: CPT

## 2025-07-02 PROCEDURE — 11042 DBRDMT SUBQ TIS 1ST 20SQCM/<: CPT | Performed by: STUDENT IN AN ORGANIZED HEALTH CARE EDUCATION/TRAINING PROGRAM

## 2025-07-02 RX ORDER — LIDOCAINE HYDROCHLORIDE 20 MG/ML
JELLY TOPICAL ONCE
Status: COMPLETED | OUTPATIENT
Start: 2025-07-02 | End: 2025-07-02

## 2025-07-02 RX ORDER — NEOMYCIN/BACITRACIN/POLYMYXINB 3.5-400-5K
OINTMENT (GRAM) TOPICAL ONCE
OUTPATIENT
Start: 2025-07-02 | End: 2025-07-02

## 2025-07-02 RX ORDER — LIDOCAINE HYDROCHLORIDE 20 MG/ML
JELLY TOPICAL ONCE
OUTPATIENT
Start: 2025-07-02 | End: 2025-07-02

## 2025-07-02 RX ORDER — TRIAMCINOLONE ACETONIDE 1 MG/G
OINTMENT TOPICAL ONCE
OUTPATIENT
Start: 2025-07-02 | End: 2025-07-02

## 2025-07-02 RX ORDER — GENTAMICIN SULFATE 1 MG/G
OINTMENT TOPICAL ONCE
OUTPATIENT
Start: 2025-07-02 | End: 2025-07-02

## 2025-07-02 RX ORDER — MUPIROCIN 2 %
OINTMENT (GRAM) TOPICAL ONCE
OUTPATIENT
Start: 2025-07-02 | End: 2025-07-02

## 2025-07-02 RX ORDER — LIDOCAINE 50 MG/G
OINTMENT TOPICAL ONCE
OUTPATIENT
Start: 2025-07-02 | End: 2025-07-02

## 2025-07-02 RX ORDER — GINSENG 100 MG
CAPSULE ORAL ONCE
OUTPATIENT
Start: 2025-07-02 | End: 2025-07-02

## 2025-07-02 RX ORDER — BACITRACIN ZINC AND POLYMYXIN B SULFATE 500; 1000 [USP'U]/G; [USP'U]/G
OINTMENT TOPICAL ONCE
OUTPATIENT
Start: 2025-07-02 | End: 2025-07-02

## 2025-07-02 RX ORDER — SODIUM CHLOR/HYPOCHLOROUS ACID 0.033 %
SOLUTION, IRRIGATION IRRIGATION ONCE
OUTPATIENT
Start: 2025-07-02 | End: 2025-07-02

## 2025-07-02 RX ORDER — CLOBETASOL PROPIONATE 0.5 MG/G
OINTMENT TOPICAL ONCE
OUTPATIENT
Start: 2025-07-02 | End: 2025-07-02

## 2025-07-02 RX ORDER — LIDOCAINE 40 MG/G
CREAM TOPICAL ONCE
OUTPATIENT
Start: 2025-07-02 | End: 2025-07-02

## 2025-07-02 RX ORDER — BETAMETHASONE DIPROPIONATE 0.5 MG/G
CREAM TOPICAL ONCE
OUTPATIENT
Start: 2025-07-02 | End: 2025-07-02

## 2025-07-02 RX ORDER — LIDOCAINE HYDROCHLORIDE 40 MG/ML
SOLUTION TOPICAL ONCE
OUTPATIENT
Start: 2025-07-02 | End: 2025-07-02

## 2025-07-02 RX ORDER — SILVER SULFADIAZINE 10 MG/G
CREAM TOPICAL ONCE
OUTPATIENT
Start: 2025-07-02 | End: 2025-07-02

## 2025-07-02 RX ADMIN — LIDOCAINE HYDROCHLORIDE 6 ML: 20 JELLY TOPICAL at 10:42

## 2025-07-02 NOTE — PROGRESS NOTES
Multilayer Compression Wrap   (Not Unna) Below the Knee    NAME:  Mickey Berg  YOB: 1955  MEDICAL RECORD NUMBER:  [id number]  DATE:  7/2/2025    Multilayer compression wrap: Removed old Multilayer wrap if indicated and wash leg with mild soap/water.  Applied moisturizing agent to dry skin as needed.   Applied primary and secondary dressing as ordered.  Applied multilayered dressing below the knee to right lower leg.  Applied multilayered dressing below the knee to left lower leg.  Instructed patient/caregiver not to remove dressing and to keep it clean and dry.   Instructed patient/caregiver on complications to report to provider, such as pain, numbness in toes, heavy drainage, and slippage of dressing.  Instructed patient on purpose of compression dressing and on activity and exercise recommendations.      Electronically signed by Jaret Rehman RN on 7/2/2025 at 10:36 AM

## 2025-07-02 NOTE — PROGRESS NOTES
[facility]   Progress Note and Procedure Note      Mickey Berg  MEDICAL RECORD NUMBER:  [id number]  AGE: 69 y.o.   GENDER: male  : 1955  EPISODE DATE:  2025    Subjective:     Chief Complaint   Patient presents with    Wound Check     Ble           HISTORY of PRESENT ILLNESS HPI     Mickey Berg is a 69 y.o. male who presents today for wound/ulcer evaluation.     History of bilateral leg swelling. Has left leg venous ulcer that is nearly healed.        PAST MEDICAL HISTORY        Diagnosis Date    SYDNEY (acute kidney injury)     Ambulates with cane 2025    Rolator walker ordered by PCP 10/2024 but pt has not yet received.    Arthritis     back    Atrial fibrillation (HCC)     BPH (benign prostatic hyperplasia)     Cellulitis 2023    left leg and 2024    Cervical disc disease     Chronic venous insufficiency     Combined systolic and diastolic congestive heart failure (HCC) 01/15/2020    With preserved EF (2024)    Coronary artery disease 10/2019    s/p CABG x 1 ; [place] to LAD    GERD (gastroesophageal reflux disease)     on rx    History of incarceration     released     Hyperlipidemia     Hypertension     Ischemic cardiomyopathy     Myocardial infarct (HCC)     Obesity     HARPREET (obstructive sleep apnea)     No machine presently - lost in eviciton (2025)    Overactive bladder     Under care of podiatrist     Dr. Lorie Piedra - last visit 2024    Under care of team 2023    Urology, Dr. Morrison, last visit 2024    Under care of team 2023    GI, dr. Johnston, last seen 2023    Under care of team 2023    Cardiology, [facility], Dr. Conde, last seen     Under care of team     CHF Clinic - [facility]. V - last visit 2024    Under care of team     PCP - Dr. Sung - last visit 10/24/2024    Under care of team     Vascular Surgery - Dr. Rico - last visit 2024 - F/U prn    Venous ulcers of both lower extremities ([facility])

## 2025-07-07 NOTE — DISCHARGE INSTRUCTIONS
[facility] -Phone: [phone] Fax: [phone]    Visit  Discharge Instructions / Physician Orders     DATE: 7/9/2025     Home Care:      SUPPLIES ORDERED THRU:      Wound Location: Bilateral Lower Legs     Cleanse with: Keep Dry and Intact     Dressing Orders: 3 Layer Compression wrap      Frequency: Keep Dry and Intact     Additional Orders: Increase protein to diet (meat, cheese, eggs, fish, peanut butter, nuts and beans)  ELEVATE LEGS AS MUCH AS POSSIBLE  Juxta Fits Ordered 7/9/25     Your next appointment with Wound Care Center is in 1 week     (Please note your next appointment above and if you are unable to keep, kindly give a 24 hour notice. Thank you.)  If more than 15 min late we cannot guarantee you will be seen due to clinician schedule  Per Policy, Excessive cancellation will call for dismissal from program.     If you experience any of the following, please call the Wound Care Center during business hours:  [phone]     * Increase in Pain  * Temperature over 101  * Increase in drainage from your wound  * Drainage with a foul odor  * Bleeding  * Increase in swelling  * Need for compression bandage changes due to slippage, breakthrough drainage.     If you need medical attention outside of the business hours of the Wound Care Centers please contact your PCP or go to the an urgent care or emergency department     The information contained in the After Visit Summary has been reviewed with me, the patient and/or responsible adult, by my health care provider(s). I had the opportunity to ask questions regarding this information. I have elected to receive;      []After Visit Summary  [x]Comprehensive Discharge Instruction        Patient signature______________________________________Date:________  Electronically signed by Christiano Ferraro RN on 7/9/2025 at 9:59 AM  Electronically signed by GHADA HOLM MD on 7/9/2025 at 10:01 AM

## 2025-07-09 ENCOUNTER — HOSPITAL ENCOUNTER (OUTPATIENT)
Dept: WOUND CARE | Age: 70
Discharge: HOME | End: 2025-07-09
Payer: MEDICARE

## 2025-07-09 VITALS
SYSTOLIC BLOOD PRESSURE: 93 MMHG | DIASTOLIC BLOOD PRESSURE: 66 MMHG | TEMPERATURE: 97.3 F | HEART RATE: 73 BPM | RESPIRATION RATE: 18 BRPM

## 2025-07-09 DIAGNOSIS — I89.0 LYMPHEDEMA: ICD-10-CM

## 2025-07-09 DIAGNOSIS — I83.022 VENOUS STASIS ULCER OF LEFT CALF WITH FAT LAYER EXPOSED, UNSPECIFIED WHETHER VARICOSE VEINS PRESENT (HCC): Primary | ICD-10-CM

## 2025-07-09 DIAGNOSIS — L97.222 VENOUS STASIS ULCER OF LEFT CALF WITH FAT LAYER EXPOSED, UNSPECIFIED WHETHER VARICOSE VEINS PRESENT (HCC): Primary | ICD-10-CM

## 2025-07-09 DIAGNOSIS — L97.212 VENOUS STASIS ULCER OF RIGHT CALF WITH FAT LAYER EXPOSED, UNSPECIFIED WHETHER VARICOSE VEINS PRESENT (HCC): ICD-10-CM

## 2025-07-09 DIAGNOSIS — I83.012 VENOUS STASIS ULCER OF RIGHT CALF WITH FAT LAYER EXPOSED, UNSPECIFIED WHETHER VARICOSE VEINS PRESENT (HCC): ICD-10-CM

## 2025-07-09 PROCEDURE — 99212 OFFICE O/P EST SF 10 MIN: CPT | Performed by: STUDENT IN AN ORGANIZED HEALTH CARE EDUCATION/TRAINING PROGRAM

## 2025-07-09 PROCEDURE — 29581 APPL MULTLAYER CMPRN SYS LEG: CPT

## 2025-07-09 RX ORDER — LIDOCAINE 50 MG/G
OINTMENT TOPICAL ONCE
OUTPATIENT
Start: 2025-07-09 | End: 2025-07-09

## 2025-07-09 RX ORDER — MUPIROCIN 2 %
OINTMENT (GRAM) TOPICAL ONCE
OUTPATIENT
Start: 2025-07-09 | End: 2025-07-09

## 2025-07-09 RX ORDER — BETAMETHASONE DIPROPIONATE 0.5 MG/G
CREAM TOPICAL ONCE
OUTPATIENT
Start: 2025-07-09 | End: 2025-07-09

## 2025-07-09 RX ORDER — NEOMYCIN/BACITRACIN/POLYMYXINB 3.5-400-5K
OINTMENT (GRAM) TOPICAL ONCE
OUTPATIENT
Start: 2025-07-09 | End: 2025-07-09

## 2025-07-09 RX ORDER — LIDOCAINE 40 MG/G
CREAM TOPICAL ONCE
OUTPATIENT
Start: 2025-07-09 | End: 2025-07-09

## 2025-07-09 RX ORDER — CLOBETASOL PROPIONATE 0.5 MG/G
OINTMENT TOPICAL ONCE
OUTPATIENT
Start: 2025-07-09 | End: 2025-07-09

## 2025-07-09 RX ORDER — LIDOCAINE HYDROCHLORIDE 20 MG/ML
JELLY TOPICAL ONCE
OUTPATIENT
Start: 2025-07-09 | End: 2025-07-09

## 2025-07-09 RX ORDER — LIDOCAINE HYDROCHLORIDE 20 MG/ML
JELLY TOPICAL ONCE
Status: DISCONTINUED | OUTPATIENT
Start: 2025-07-09 | End: 2025-07-10 | Stop reason: HOSPADM

## 2025-07-09 RX ORDER — TRIAMCINOLONE ACETONIDE 1 MG/G
OINTMENT TOPICAL ONCE
OUTPATIENT
Start: 2025-07-09 | End: 2025-07-09

## 2025-07-09 RX ORDER — SODIUM CHLOR/HYPOCHLOROUS ACID 0.033 %
SOLUTION, IRRIGATION IRRIGATION ONCE
OUTPATIENT
Start: 2025-07-09 | End: 2025-07-09

## 2025-07-09 RX ORDER — BACITRACIN ZINC AND POLYMYXIN B SULFATE 500; 1000 [USP'U]/G; [USP'U]/G
OINTMENT TOPICAL ONCE
OUTPATIENT
Start: 2025-07-09 | End: 2025-07-09

## 2025-07-09 RX ORDER — GINSENG 100 MG
CAPSULE ORAL ONCE
OUTPATIENT
Start: 2025-07-09 | End: 2025-07-09

## 2025-07-09 RX ORDER — GENTAMICIN SULFATE 1 MG/G
OINTMENT TOPICAL ONCE
OUTPATIENT
Start: 2025-07-09 | End: 2025-07-09

## 2025-07-09 RX ORDER — LIDOCAINE HYDROCHLORIDE 40 MG/ML
SOLUTION TOPICAL ONCE
OUTPATIENT
Start: 2025-07-09 | End: 2025-07-09

## 2025-07-09 RX ORDER — SILVER SULFADIAZINE 10 MG/G
CREAM TOPICAL ONCE
OUTPATIENT
Start: 2025-07-09 | End: 2025-07-09

## 2025-07-09 NOTE — PROGRESS NOTES
Compression Garments    Supply Company for Compression Stockings:     [employer] [address] [place] [address] f: [phone] f: [phone] p: [phone] [email or web address]      Ordering Center:     [facility]  [address] [place] [address]  [phone]  WOUND CARE [phone]  FAX NUMBER [phone]    Patient Information:      Frantz Berg  [address] [facility] [address]   [phone]   : 1955  AGE: 69 y.o.     GENDER: male   TODAYS DATE:  2025    Insurance:      PRIMARY INSURANCE:  Plan: [email or web address] DUAL ADVANTAGE  Coverage: BCBS MEDICARE  Effective Date: 2025  Group Number: [unfilled]  Subscriber Number: [id number] - (Medicare Managed)    Payer/Plan Subscr  Sex Relation Sub. Ins. ID Effective Group Num   1. BCBS MEDICARE* FRANTZ BERG 1955 Male Self [id number]                                      2. [place]ICAID [place] -* FRANTZ BERG 1955 Male Self [id number]                                    [address]       Patient Information:      Diagnoses     Codes Comments   Venous stasis ulcer of left calf with fat layer exposed, unspecified whether varicose veins present (HCC)  - Primary I83.022, L97.222    Venous stasis ulcer of right calf with fat layer exposed, unspecified whether varicose veins present (HCC) I83.012, L97.212    Lymphedema I89.0        Wound 25 Pretibial Left #1 (Active)   Wound Image   25 1001   Wound Etiology Venous 25 1001   Dressing Status Old drainage noted 25 1001   Wound Cleansed Soap and water 25 1001   Wound Length (cm) 0 cm 25 0939   Wound Width (cm) 0 cm 25 0939   Wound Depth (cm) 0 cm 25 0939   Wound Surface Area (cm^2) 0 cm^2 25   Change in Wound Size % (l*w) 100 25   Wound Volume (cm^3) 0 cm^3 25   Wound Healing % 100 25   Post-Procedure Length (cm) 0 cm 25   Post-Procedure

## 2025-07-09 NOTE — PROGRESS NOTES
Multilayer Compression Wrap   (Not Unna) Below the Knee    NAME:  Mickey Berg  YOB: 1955  MEDICAL RECORD NUMBER:  [id number]  DATE:  7/9/2025       [x] Removed old Multilayer wrap if indicated and wash leg with mild soap/water.  [x] Applied moisturizing agent to dry skin as needed.   [x] Applied primary and secondary dressing as ordered   [x] Applied multilayered dressing below the knee to Bilateral lower leg(s).    [x] Instructed patient/caregiver not to remove dressing and to keep it clean and dry.   [x] Instructed patient/caregiver on complications to report to provider, such as pain, numbness in toes, heavy drainage, and slippage of dressing.   [x] Instructed patient on purpose of compression dressing and on activity and exercise recommendations.    Electronically signed by LOAN COLEMAN RN on 7/9/2025 at 10:23 AM

## 2025-07-09 NOTE — PROGRESS NOTES
[facility]   Progress Note and Procedure Note      Mickey Berg  MEDICAL RECORD NUMBER:  [id number]  AGE: 69 y.o.   GENDER: male  : 1955  EPISODE DATE:  2025    Subjective:     Chief Complaint   Patient presents with    Wound Check     Left leg         HISTORY of PRESENT ILLNESS HPI     Mickey Berg is a 69 y.o. male who presents today for wound/ulcer evaluation.     History of bilateral leg swelling. Has left leg venous ulcer that is nearly healed.    Interval history: Wounds are healed.        PAST MEDICAL HISTORY        Diagnosis Date    SYDNEY (acute kidney injury)     Ambulates with cane 2025    Rolator walker ordered by PCP 10/2024 but pt has not yet received.    Arthritis     back    Atrial fibrillation (HCC)     BPH (benign prostatic hyperplasia)     Cellulitis 2023    left leg and 2024    Cervical disc disease     Chronic venous insufficiency     Combined systolic and diastolic congestive heart failure (HCC) 01/15/2020    With preserved EF (2024)    Coronary artery disease 10/2019    s/p CABG x 1 ; [place] to LAD    GERD (gastroesophageal reflux disease)     on rx    History of incarceration     released     Hyperlipidemia     Hypertension     Ischemic cardiomyopathy     Myocardial infarct (HCC)     Obesity     HARPREET (obstructive sleep apnea)     No machine presently - lost in eviciton (2025)    Overactive bladder     Under care of podiatrist     Dr. Lorie Piedra - last visit 2024    Under care of team 2023    Urology, Dr. Morrison, last visit 2024    Under care of team 2023    GI, dr. Johnston, last seen 2023    Under care of team 2023    Cardiology, [facility], Dr. Conde, last seen     Under care of team     CHF Clinic - [facility]. V - last visit 2024    Under care of team     PCP - Dr. Sung - last visit 10/24/2024    Under care of team     Vascular Surgery - Dr. Rico - last visit 2024 - F/U prn    Venous

## 2025-07-10 ENCOUNTER — CARE COORDINATION (OUTPATIENT)
Dept: CARE COORDINATION | Age: 70
End: 2025-07-10

## 2025-07-10 NOTE — CARE COORDINATION
Care Transitions Note    Final Call     Patient Current Location:  Home: [address] [place] [address]  [place] [address]    [facility] Care Coordinator contacted the caregiver, Mala MENDEZ  by telephone. Verified name and  as identifiers.    Patient graduated from the Care Transitions program on 7/10/25 .  Patient/family progressing towards self-management. .      Advance Care Planning:   Does patient have an Advance Directive: reviewed during previous call, see note. .    Handoff:   Patient was not referred to the ACM team due to patient not eligible for ACM services due to Blue header pt .      Care Summary Note: Writer spoke to pt care giver Mala she states that pt is doing better he has completed antibiotic for cellulitis..She states he is dealing with a young lady who tested positive for STI she states she will take him to ER to get checked out. Writer advised UC or PCP appointment instead. She verbalized understanding. Discussed upcoming appointments  CHF clinic appointment and  wound care appointment. Aware today is final transitional care call pt not eligible for ACM referral. Aware to reach out to care team for needs/care. Will resolve CTN episode.     Assessments:  Care Transitions Subsequent and Final Call    Subsequent and Final Calls  Do you have any ongoing symptoms?: No  Have your medications changed?: No  Do you have any questions related to your medications?: No  Do you currently have any active services?: No  Do you have any needs or concerns that I can assist you with?: No  Identified Barriers: None  Care Transitions Interventions  Other Interventions:              Upcoming Appointments:    Future Appointments         Provider Specialty Dept Phone    2025 9:00 AM [facility] RM 1 Cardiology [phone]    2025 9:45 AM Dior Rico MD Wound Ostomy [phone]    2025 12:15 PM Tej Coffey DPM Podiatry [phone]    2025 11:20 AM George Wong MD Pulmonology [phone]

## 2025-07-11 ENCOUNTER — HOSPITAL ENCOUNTER (EMERGENCY)
Age: 70
Discharge: HOME | End: 2025-07-12
Attending: STUDENT IN AN ORGANIZED HEALTH CARE EDUCATION/TRAINING PROGRAM
Payer: MEDICARE

## 2025-07-11 VITALS
DIASTOLIC BLOOD PRESSURE: 78 MMHG | WEIGHT: 235 LBS | OXYGEN SATURATION: 97 % | TEMPERATURE: 97.9 F | SYSTOLIC BLOOD PRESSURE: 131 MMHG | HEIGHT: 65 IN | BODY MASS INDEX: 39.15 KG/M2 | RESPIRATION RATE: 16 BRPM | HEART RATE: 58 BPM

## 2025-07-11 DIAGNOSIS — N30.00 ACUTE CYSTITIS WITHOUT HEMATURIA: ICD-10-CM

## 2025-07-11 DIAGNOSIS — Z20.2 EXPOSURE TO STD: Primary | ICD-10-CM

## 2025-07-11 PROCEDURE — 81001 URINALYSIS AUTO W/SCOPE: CPT

## 2025-07-11 PROCEDURE — 99284 EMERGENCY DEPT VISIT MOD MDM: CPT

## 2025-07-11 PROCEDURE — 87661 TRICHOMONAS VAGINALIS AMPLIF: CPT

## 2025-07-11 PROCEDURE — 87591 N.GONORRHOEAE DNA AMP PROB: CPT

## 2025-07-11 PROCEDURE — 87491 CHLMYD TRACH DNA AMP PROBE: CPT

## 2025-07-11 RX ORDER — ONDANSETRON 4 MG/1
4 TABLET, ORALLY DISINTEGRATING ORAL ONCE
Status: COMPLETED | OUTPATIENT
Start: 2025-07-11 | End: 2025-07-12

## 2025-07-11 RX ORDER — DOXYCYCLINE HYCLATE 100 MG
100 TABLET ORAL ONCE
Status: COMPLETED | OUTPATIENT
Start: 2025-07-11 | End: 2025-07-12

## 2025-07-11 RX ORDER — CEFTRIAXONE 500 MG/1
500 INJECTION, POWDER, FOR SOLUTION INTRAMUSCULAR; INTRAVENOUS ONCE
Status: COMPLETED | OUTPATIENT
Start: 2025-07-11 | End: 2025-07-12

## 2025-07-11 RX ORDER — METRONIDAZOLE 500 MG/1
2000 TABLET ORAL ONCE
Status: COMPLETED | OUTPATIENT
Start: 2025-07-11 | End: 2025-07-12

## 2025-07-11 RX ORDER — DOXYCYCLINE HYCLATE 100 MG
100 TABLET ORAL 2 TIMES DAILY
Qty: 14 TABLET | Refills: 0 | Status: SHIPPED | OUTPATIENT
Start: 2025-07-11 | End: 2025-07-18

## 2025-07-11 ASSESSMENT — PAIN - FUNCTIONAL ASSESSMENT: PAIN_FUNCTIONAL_ASSESSMENT: NONE - DENIES PAIN

## 2025-07-12 LAB
BACTERIA URNS QL MICRO: ABNORMAL
BILIRUB UR QL STRIP: NEGATIVE
CASTS #/AREA URNS LPF: ABNORMAL /LPF (ref 0–8)
CLARITY UR: ABNORMAL
COLOR UR: YELLOW
EPI CELLS #/AREA URNS HPF: ABNORMAL /HPF (ref 0–5)
GLUCOSE UR STRIP-MCNC: ABNORMAL MG/DL
HGB UR QL STRIP.AUTO: NEGATIVE
HIV 1+2 AB+HIV1 P24 AG SERPL QL IA: NONREACTIVE
KETONES UR STRIP-MCNC: ABNORMAL MG/DL
LEUKOCYTE ESTERASE UR QL STRIP: ABNORMAL
NITRITE UR QL STRIP: POSITIVE
PH UR STRIP: 5.5 [PH] (ref 5–8)
PROT UR STRIP-MCNC: ABNORMAL MG/DL
RBC #/AREA URNS HPF: ABNORMAL /HPF (ref 0–4)
SP GR UR STRIP: 1.03 (ref 1–1.03)
T PALLIDUM AB SER QL IA: NONREACTIVE
UROBILINOGEN UR STRIP-ACNC: NORMAL EU/DL (ref 0–1)
WBC #/AREA URNS HPF: ABNORMAL /HPF (ref 0–5)

## 2025-07-12 PROCEDURE — 6370000000 HC RX 637 (ALT 250 FOR IP)

## 2025-07-12 PROCEDURE — 86780 TREPONEMA PALLIDUM: CPT

## 2025-07-12 PROCEDURE — 6360000002 HC RX W HCPCS

## 2025-07-12 PROCEDURE — 87186 SC STD MICRODIL/AGAR DIL: CPT

## 2025-07-12 PROCEDURE — 87086 URINE CULTURE/COLONY COUNT: CPT

## 2025-07-12 PROCEDURE — 96372 THER/PROPH/DIAG INJ SC/IM: CPT

## 2025-07-12 PROCEDURE — 87077 CULTURE AEROBIC IDENTIFY: CPT

## 2025-07-12 PROCEDURE — 87389 HIV-1 AG W/HIV-1&-2 AB AG IA: CPT

## 2025-07-12 RX ORDER — CEFDINIR 300 MG/1
300 CAPSULE ORAL 2 TIMES DAILY
Qty: 14 CAPSULE | Refills: 0 | Status: SHIPPED | OUTPATIENT
Start: 2025-07-12 | End: 2025-07-19

## 2025-07-12 RX ADMIN — METRONIDAZOLE 2000 MG: 500 TABLET ORAL at 00:33

## 2025-07-12 RX ADMIN — CEFTRIAXONE SODIUM 500 MG: 500 INJECTION, POWDER, FOR SOLUTION INTRAMUSCULAR; INTRAVENOUS at 00:34

## 2025-07-12 RX ADMIN — DOXYCYCLINE HYCLATE 100 MG: 100 TABLET, COATED ORAL at 00:34

## 2025-07-12 RX ADMIN — ONDANSETRON 4 MG: 4 TABLET, ORALLY DISINTEGRATING ORAL at 00:33

## 2025-07-12 ASSESSMENT — ENCOUNTER SYMPTOMS
NAUSEA: 0
ABDOMINAL PAIN: 0
SHORTNESS OF BREATH: 0
VOMITING: 0
COUGH: 0
DIARRHEA: 0
BACK PAIN: 0

## 2025-07-12 NOTE — ED PROVIDER NOTES
[facility] EMERGENCY DEPARTMENT  Emergency Department  Faculty Attestation       I performed a history and physical examination of the patient and discussed management with the resident. I reviewed the resident’s note and agree with the documented findings including all diagnostic interpretations and plan of care. Any areas of disagreement are noted on the chart. I was personally present for the key portions of any procedures. I have documented in the chart those procedures where I was not present during the key portions. I have reviewed the emergency nurses triage note. I agree with the chief complaint, past medical history, past surgical history, allergies, medications, social and family history as documented unless otherwise noted below. Documentation of the HPI, Physical Exam and Medical Decision Making performed by yen is based on my personal performance of the HPI, PE and MDM. For Physician Assistant/ Nurse Practitioner cases/documentation I have personally evaluated this patient and have completed at least one if not all key elements of the E/M (history, physical exam, and MDM). Additional findings are as noted.    Pertinent Comments     Primary Care Physician: Mich Sung MD    History: This is a 69 y.o. male who presents to the Emergency Department with complaint of    Chief Complaint   Patient presents with    Exposure to STD         Physical:    ED Triage Vitals [07/11/25 2333]   BP Systolic BP Percentile Diastolic BP Percentile Temp Temp Source Pulse Respirations SpO2   131/78 -- -- 97.9 °F (36.6 °C) Oral 58 16 97 %      Height Weight - Scale         1.651 m (5' 5\") 106.6 kg (235 lb)              RADIOLOGY:  No results found.    MDM/Plan:   Concern for STD  Believes his partner has been unfaithful  Wants empiric treatment. States he partner tested positive for trichomonas and gonorrhea    EKG Interpretation    Interpreted by me  none    Procedures:  none    ED Course as of 07/12/25 0108

## 2025-07-12 NOTE — DISCHARGE INSTRUCTIONS
Take your medication as indicated and prescribed.  You are being given an antibiotic, so make sure you get the prescription filled and take the antibiotics until finished.  Drink plenty of water while taking the antibiotics.  Avoid drinking alcohol or drinks that have caffeine in it while taking antibiotics.       For pain use acetaminophen (Tylenol) or ibuprofen (Motrin / Advil), unless prescribed medications that have acetaminophen or ibuprofen (or similar medications) in it.  You can take over the counter acetaminophen tablets (1 - 2 tablets of the 500-mg strength every 6 hours) or ibuprofen tablets (2 tablets every 4 hours).    Do not have any sexual intercourse until the test results are completed in 2 - 3 days.   If your results come back positive for a STD then make sure that any of your sexual partners are treated before having intercourse with them.  The primary means of preventing STD transmission is with the use of condoms.    PLEASE RETURN TO THE EMERGENCY DEPARTMENT IMMEDIATELY for worsening symptoms, pain with urination, change in discharge from your penis, or if you develop any concerning symptoms such as: high fever not relieved by acetaminophen (Tylenol) and/or ibuprofen (Motrin / Advil), chills, shortness of breath, feeling of your heart fluttering or racing, persistent nausea and/or vomiting, changes in mental status, loss of bladder / bowel control, unable to follow up with your physician, or any other care or concern.

## 2025-07-12 NOTE — ED PROVIDER NOTES
[facility] EMERGENCY DEPARTMENT  Emergency Department Encounter  Emergency Medicine Resident     Pt Name:Mickey Berg  MRN: [id number]  Birthdate 1955  Date of evaluation: 7/11/25  PCP:  Mich Sung MD  Note Started: 11:27 PM EDT      CHIEF COMPLAINT       Chief Complaint   Patient presents with    Exposure to STD       HISTORY OF PRESENT ILLNESS  (Location/Symptom, Timing/Onset, Context/Setting, Quality, Duration, Modifying Factors, Severity.)      Mickey Berg is a 69 y.o. male who presents with 2 days of dysuria, yellow drainage, and concerned that partner has been unfaithful.  He overheard her discussing gonorrhea and trichomonas on the phone.  He would like prophylactic antibiotic treatment and all STI testing.  He denies allergies.    PAST MEDICAL / SURGICAL / SOCIAL / FAMILY HISTORY      has a past medical history of SYDNEY (acute kidney injury), Ambulates with cane, Arthritis, Atrial fibrillation (HCC), BPH (benign prostatic hyperplasia), Cellulitis, Cervical disc disease, Chronic venous insufficiency, Combined systolic and diastolic congestive heart failure (HCC), Coronary artery disease, GERD (gastroesophageal reflux disease), History of incarceration, Hyperlipidemia, Hypertension, Ischemic cardiomyopathy, Myocardial infarct (HCC), Obesity, HARPREET (obstructive sleep apnea), Overactive bladder, Under care of podiatrist, Under care of team, Under care of team, Under care of team, Under care of team, Under care of team, Under care of team, Venous ulcers of both lower extremities (HCC), and Wears reading eyeglasses.       has a past surgical history that includes transesophageal echocardiogram (10/16/2019); Coronary Artery Bypass Graft Maze Procedure (N/A, 10/21/2019); Carpal tunnel release (Left, 09/11/2006); fracture surgery (Left, 1977); Cervical spine surgery; Colonoscopy (N/A, 10/29/2020); Colonoscopy (10/29/2020); Colonoscopy (10/29/2020); Colonoscopy (N/A, 01/12/2021); Cystography

## 2025-07-12 NOTE — ED TRIAGE NOTES
Pt ambulated to room 05 from triage with c/o a possible exposure to STD's. Pt states that a female who he had sexual relations with was overheard speaking on the phone about having an STD. Pt is asymptomatic at this time. No other complaints. Breathing is non-labored. Call light is within reach.

## 2025-07-13 LAB
MICROORGANISM SPEC CULT: ABNORMAL
SPECIMEN DESCRIPTION: ABNORMAL

## 2025-07-14 ENCOUNTER — HOSPITAL ENCOUNTER (OUTPATIENT)
Dept: OTHER | Age: 70
Discharge: HOME | End: 2025-07-14
Payer: MEDICARE

## 2025-07-14 VITALS
OXYGEN SATURATION: 96 % | HEART RATE: 58 BPM | RESPIRATION RATE: 16 BRPM | BODY MASS INDEX: 37.91 KG/M2 | WEIGHT: 227.8 LBS | SYSTOLIC BLOOD PRESSURE: 114 MMHG | DIASTOLIC BLOOD PRESSURE: 61 MMHG

## 2025-07-14 LAB
CHLAMYDIA DNA UR QL NAA+PROBE: NEGATIVE
N GONORRHOEA DNA UR QL NAA+PROBE: NEGATIVE
SOURCE: NORMAL
SPECIMEN DESCRIPTION: NORMAL
TRICHOMONAS VAGINALIS, MOLECULAR: NEGATIVE

## 2025-07-14 PROCEDURE — 99212 OFFICE O/P EST SF 10 MIN: CPT

## 2025-07-14 NOTE — PROGRESS NOTES
Patient presented on positive microbiological report for ESBL Ecoli urine. Attempted to contact patient 7/14 am. Phone went straight to voicemail, will attempt to contact again     Amaya Rodriguez Pharm.D., [facility]

## 2025-07-14 NOTE — PROGRESS NOTES
Date:  2025  Time:  9:05 AM    CHF Clinic at [facility]    Office: [phone] Fax: [phone]    Re:  Mickey Berg   Patient : 1955    Vital Signs: /61   Pulse 58   Resp 16   Wt 103.3 kg (227 lb 12.8 oz)   SpO2 96%   BMI 37.91 kg/m²                                                   No results for input(s): \"CBC\", \"HGB\", \"HCT\", \"WBC\", \"PLATELET\", \"NA\", \"K\", \"CL\", \"CO2\", \"BUN\", \"CREATININE\", \"GLUCOSE\", \"BNP\", \"INR\" in the last 72 hours.     Respiratory:    Assessment  Charting Type: Reassessment    Breath Sounds  Right Upper Lobe: Clear  Right Middle Lobe: Clear  Right Lower Lobe: Clear  Left Upper Lobe: Clear  Left Lower Lobe: Clear              Peripheral Vascular  RLE Edema: +2, Non-pitting  LLE Edema: +2, Non-pitting    Future Appointments   Date Time Provider Department Center   2025  9:45 AM Dior Rico MD [facility]   2025 12:15 PM Tej Coffey DPM [facility] Podiatry [facility]   2025  9:00 AM [facility] CHF CLINIC  1 [facility] CHF I [facility]   2025 11:20 AM George Wong MD Resp Spec [facility]   2025 10:30 AM Jono Conde MD [facility]   10/7/2025  2:30 PM Mich Sung MD [facility] DEP      Complaints: No new complaints     Physician Orders No new orders     Comment : Weight is down 3 pounds from last visit. Has wound care appt on 25 states last appt. Leg wounds are all healed his leg wraps will come off at this appt. Stressed importance of keeping legs elevated and to watch for signs of infection closely,currently on Abx's for UTI. All leg measurements are down significantly and lungs are clear +2 non pitting,Discussed in detail low sodium diet 2000mg per day and 64 ounces of fluid per day. We will see in 1 month 25.    Electronically signed by Britany Yoon RN on 2025 at 9:05 AM

## 2025-07-15 NOTE — PROGRESS NOTES
Patient instructed to remove shoes and socks and instructed to sit in exam chair.  Current PCP is Mich Sung MD and date of last visit was 6/3/2025.   Do you have a follow up visit scheduled?  Yes  If yes, the date is 10/7/2025

## 2025-07-16 ENCOUNTER — HOSPITAL ENCOUNTER (OUTPATIENT)
Dept: WOUND CARE | Age: 70
Discharge: HOME | End: 2025-07-16
Payer: MEDICARE

## 2025-07-16 VITALS
TEMPERATURE: 97.7 F | HEART RATE: 67 BPM | BODY MASS INDEX: 37.95 KG/M2 | WEIGHT: 227.8 LBS | SYSTOLIC BLOOD PRESSURE: 111 MMHG | DIASTOLIC BLOOD PRESSURE: 67 MMHG | HEIGHT: 65 IN

## 2025-07-16 DIAGNOSIS — I83.012 VENOUS STASIS ULCER OF RIGHT CALF WITH FAT LAYER EXPOSED, UNSPECIFIED WHETHER VARICOSE VEINS PRESENT (HCC): ICD-10-CM

## 2025-07-16 DIAGNOSIS — I83.022 VENOUS STASIS ULCER OF LEFT CALF WITH FAT LAYER EXPOSED, UNSPECIFIED WHETHER VARICOSE VEINS PRESENT (HCC): Primary | ICD-10-CM

## 2025-07-16 DIAGNOSIS — L97.212 VENOUS STASIS ULCER OF RIGHT CALF WITH FAT LAYER EXPOSED, UNSPECIFIED WHETHER VARICOSE VEINS PRESENT (HCC): ICD-10-CM

## 2025-07-16 DIAGNOSIS — L97.222 VENOUS STASIS ULCER OF LEFT CALF WITH FAT LAYER EXPOSED, UNSPECIFIED WHETHER VARICOSE VEINS PRESENT (HCC): Primary | ICD-10-CM

## 2025-07-16 PROCEDURE — 99212 OFFICE O/P EST SF 10 MIN: CPT

## 2025-07-16 PROCEDURE — 99212 OFFICE O/P EST SF 10 MIN: CPT | Performed by: STUDENT IN AN ORGANIZED HEALTH CARE EDUCATION/TRAINING PROGRAM

## 2025-07-16 RX ORDER — LIDOCAINE HYDROCHLORIDE 20 MG/ML
JELLY TOPICAL ONCE
OUTPATIENT
Start: 2025-07-16 | End: 2025-07-16

## 2025-07-16 RX ORDER — LIDOCAINE HYDROCHLORIDE 20 MG/ML
JELLY TOPICAL ONCE
Status: COMPLETED | OUTPATIENT
Start: 2025-07-16 | End: 2025-07-16

## 2025-07-16 RX ORDER — MUPIROCIN 2 %
OINTMENT (GRAM) TOPICAL ONCE
OUTPATIENT
Start: 2025-07-16 | End: 2025-07-16

## 2025-07-16 RX ORDER — NEOMYCIN/BACITRACIN/POLYMYXINB 3.5-400-5K
OINTMENT (GRAM) TOPICAL ONCE
OUTPATIENT
Start: 2025-07-16 | End: 2025-07-16

## 2025-07-16 RX ORDER — GENTAMICIN SULFATE 1 MG/G
OINTMENT TOPICAL ONCE
OUTPATIENT
Start: 2025-07-16 | End: 2025-07-16

## 2025-07-16 RX ORDER — BETAMETHASONE DIPROPIONATE 0.5 MG/G
CREAM TOPICAL ONCE
OUTPATIENT
Start: 2025-07-16 | End: 2025-07-16

## 2025-07-16 RX ORDER — LIDOCAINE 40 MG/G
CREAM TOPICAL ONCE
OUTPATIENT
Start: 2025-07-16 | End: 2025-07-16

## 2025-07-16 RX ORDER — TRIAMCINOLONE ACETONIDE 1 MG/G
OINTMENT TOPICAL ONCE
OUTPATIENT
Start: 2025-07-16 | End: 2025-07-16

## 2025-07-16 RX ORDER — CLOBETASOL PROPIONATE 0.5 MG/G
OINTMENT TOPICAL ONCE
OUTPATIENT
Start: 2025-07-16 | End: 2025-07-16

## 2025-07-16 RX ORDER — LIDOCAINE HYDROCHLORIDE 40 MG/ML
SOLUTION TOPICAL ONCE
OUTPATIENT
Start: 2025-07-16 | End: 2025-07-16

## 2025-07-16 RX ORDER — LIDOCAINE 50 MG/G
OINTMENT TOPICAL ONCE
OUTPATIENT
Start: 2025-07-16 | End: 2025-07-16

## 2025-07-16 RX ORDER — GINSENG 100 MG
CAPSULE ORAL ONCE
OUTPATIENT
Start: 2025-07-16 | End: 2025-07-16

## 2025-07-16 RX ORDER — SILVER SULFADIAZINE 10 MG/G
CREAM TOPICAL ONCE
OUTPATIENT
Start: 2025-07-16 | End: 2025-07-16

## 2025-07-16 RX ORDER — SODIUM CHLOR/HYPOCHLOROUS ACID 0.033 %
SOLUTION, IRRIGATION IRRIGATION ONCE
OUTPATIENT
Start: 2025-07-16 | End: 2025-07-16

## 2025-07-16 RX ORDER — BACITRACIN ZINC AND POLYMYXIN B SULFATE 500; 1000 [USP'U]/G; [USP'U]/G
OINTMENT TOPICAL ONCE
OUTPATIENT
Start: 2025-07-16 | End: 2025-07-16

## 2025-07-16 RX ADMIN — LIDOCAINE HYDROCHLORIDE 6 ML: 20 JELLY TOPICAL at 09:16

## 2025-07-16 NOTE — TELEPHONE ENCOUNTER
E-scribe request for . Please review and e-scribe if applicable.      Last Visit Date:    Next Visit Date:  Visit date not found    Hemoglobin A1C (%)   Date Value   12/10/2019 5.6   10/18/2019 5.8             ( goal A1C is < 7)   No results found for: LABMICR  LDL Cholesterol (mg/dL)   Date Value   05/26/2020 45       (goal LDL is <100)   AST (U/L)   Date Value   05/26/2020 19     ALT (U/L)   Date Value   05/26/2020 17     BUN (mg/dL)   Date Value   12/22/2020 16     BP Readings from Last 3 Encounters:   02/19/21 (!) 98/46   02/12/21 (!) 78/46   01/20/21 104/60          (goal 120/80)        Patient Active Problem List:     Atrial flutter (HCC)     Sleep-related breathing disorder     Hypokalemia     Atrial fibrillation (HCC)     Ischemic cardiomyopathy     Combined systolic and diastolic congestive heart failure (HCC)     Acute cystitis without hematuria     Hx of CABG     Heart failure, systolic and diastolic, chronic (HCC)     Bilateral hand numbness     Right thigh pain     Left leg weakness     Coronary artery disease     Chronic cough     Gastroesophageal reflux disease without esophagitis     Overflow incontinence of urine     Polyp of colon     Post-void dribbling     Post-nasal drip      ----Reilly Garcia 89

## 2025-07-16 NOTE — DISCHARGE INSTRUCTIONS
[facility] -Phone: [phone] Fax: [phone]    Visit  Discharge Instructions / Physician Orders     DATE: 7/16/2025     Home Care:      SUPPLIES ORDERED THRU:      Wound Location: Bilateral Lower Legs- healed     Cleanse with: As Normal     Dressing Orders: Compression on during the day, off at night while in bed     Frequency: EveryDay     Additional Orders: Increase protein to diet (meat, cheese, eggs, fish, peanut butter, nuts and beans)  ELEVATE LEGS AS MUCH AS POSSIBLE  Juxta Fits Ordered 7/9/25     Your next appointment with Wound Care Center is- Call if needed     (Please note your next appointment above and if you are unable to keep, kindly give a 24 hour notice. Thank you.)  If more than 15 min late we cannot guarantee you will be seen due to clinician schedule  Per Policy, Excessive cancellation will call for dismissal from program.     If you experience any of the following, please call the Wound Care Center during business hours:  [phone]     * Increase in Pain  * Temperature over 101  * Increase in drainage from your wound  * Drainage with a foul odor  * Bleeding  * Increase in swelling  * Need for compression bandage changes due to slippage, breakthrough drainage.     If you need medical attention outside of the business hours of the Wound Care Centers please contact your PCP or go to the an urgent care or emergency department     The information contained in the After Visit Summary has been reviewed with me, the patient and/or responsible adult, by my health care provider(s). I had the opportunity to ask questions regarding this information. I have elected to receive;      []After Visit Summary  [x]Comprehensive Discharge Instruction        Patient signature______________________________________Date:________  Electronically signed by Christiano Ferraro RN on 7/16/2025 at 9:58 AM  Electronically signed by GHADA HOLM MD on 7/16/2025 at 10:00 AM

## 2025-07-16 NOTE — PROGRESS NOTES
[facility]   Progress Note and Procedure Note      Mickey Berg  MEDICAL RECORD NUMBER:  [id number]  AGE: 69 y.o.   GENDER: male  : 1955  EPISODE DATE:  2025    Subjective:     Chief Complaint   Patient presents with    Wound Check     Ble           HISTORY of PRESENT ILLNESS HPI     Mickey Berg is a 69 y.o. male who presents today for wound/ulcer evaluation.     History of bilateral leg swelling. Has left leg venous ulcer that is nearly healed.    Interval history: Wounds are healed. He tolerated extra week of wrap well. Does not have his compression wraps yet.        PAST MEDICAL HISTORY        Diagnosis Date    SYDNEY (acute kidney injury)     Ambulates with cane 2025    Rolator walker ordered by PCP 10/2024 but pt has not yet received.    Arthritis     back    Atrial fibrillation (HCC)     BPH (benign prostatic hyperplasia)     Cellulitis 2023    left leg and 2024    Cervical disc disease     Chronic venous insufficiency     Combined systolic and diastolic congestive heart failure (HCC) 01/15/2020    With preserved EF (2024)    Coronary artery disease 10/2019    s/p CABG x 1 ; [place] to LAD    GERD (gastroesophageal reflux disease)     on rx    History of incarceration     released     Hyperlipidemia     Hypertension     Ischemic cardiomyopathy     Myocardial infarct (HCC)     Obesity     HARPREET (obstructive sleep apnea)     No machine presently - lost in eviciton (2025)    Overactive bladder     Under care of podiatrist     Dr. Lorie Piedra - last visit 2024    Under care of team 2023    Urology, Dr. Morrison, last visit 2024    Under care of team 2023    GI, dr. Johnston, last seen 2023    Under care of team 2023    Cardiology, [facility], Dr. Conde, last seen     Under care of team     [facility] - [facility] - last visit 2024    Under care of team     PCP - Dr. Sung - last visit 10/24/2024    Under care of team

## 2025-07-22 ENCOUNTER — HOSPITAL ENCOUNTER (OUTPATIENT)
Age: 70
Setting detail: SPECIMEN
Discharge: HOME | End: 2025-07-22

## 2025-07-22 ENCOUNTER — OFFICE VISIT (OUTPATIENT)
Age: 70
End: 2025-07-22
Payer: MEDICARE

## 2025-07-22 VITALS
SYSTOLIC BLOOD PRESSURE: 96 MMHG | TEMPERATURE: 97.6 F | BODY MASS INDEX: 38.61 KG/M2 | HEART RATE: 65 BPM | DIASTOLIC BLOOD PRESSURE: 63 MMHG | WEIGHT: 232 LBS

## 2025-07-22 DIAGNOSIS — Z00.00 HEALTH CARE MAINTENANCE: ICD-10-CM

## 2025-07-22 DIAGNOSIS — L03.115 BILATERAL LOWER LEG CELLULITIS: ICD-10-CM

## 2025-07-22 DIAGNOSIS — B96.29 URINARY TRACT INFECTION DUE TO EXTENDED-SPECTRUM BETA LACTAMASE (ESBL) PRODUCING ESCHERICHIA COLI: ICD-10-CM

## 2025-07-22 DIAGNOSIS — L03.116 BILATERAL LOWER LEG CELLULITIS: ICD-10-CM

## 2025-07-22 DIAGNOSIS — E08.01 DIABETES MELLITUS DUE TO UNDERLYING CONDITION WITH HYPEROSMOLARITY AND COMA, WITHOUT LONG-TERM CURRENT USE OF INSULIN (HCC): ICD-10-CM

## 2025-07-22 DIAGNOSIS — B96.29 URINARY TRACT INFECTION DUE TO EXTENDED-SPECTRUM BETA LACTAMASE (ESBL) PRODUCING ESCHERICHIA COLI: Primary | ICD-10-CM

## 2025-07-22 DIAGNOSIS — I87.8 CHRONIC VENOUS STASIS: ICD-10-CM

## 2025-07-22 DIAGNOSIS — N39.0 URINARY TRACT INFECTION DUE TO EXTENDED-SPECTRUM BETA LACTAMASE (ESBL) PRODUCING ESCHERICHIA COLI: ICD-10-CM

## 2025-07-22 DIAGNOSIS — N39.0 URINARY TRACT INFECTION DUE TO EXTENDED-SPECTRUM BETA LACTAMASE (ESBL) PRODUCING ESCHERICHIA COLI: Primary | ICD-10-CM

## 2025-07-22 DIAGNOSIS — Z16.12 URINARY TRACT INFECTION DUE TO EXTENDED-SPECTRUM BETA LACTAMASE (ESBL) PRODUCING ESCHERICHIA COLI: ICD-10-CM

## 2025-07-22 DIAGNOSIS — Z16.12 URINARY TRACT INFECTION DUE TO EXTENDED-SPECTRUM BETA LACTAMASE (ESBL) PRODUCING ESCHERICHIA COLI: Primary | ICD-10-CM

## 2025-07-22 PROBLEM — L02.415 CELLULITIS AND ABSCESS OF RIGHT LOWER EXTREMITY: Status: ACTIVE | Noted: 2025-07-22

## 2025-07-22 LAB
BILIRUBIN, POC: NORMAL
BLOOD URINE, POC: NORMAL
CLARITY, POC: CLEAR
COLOR, POC: NORMAL
CREAT UR-MCNC: 127 MG/DL (ref 39–259)
GLUCOSE URINE, POC: NORMAL MG/DL
HBA1C MFR BLD: 5.6 %
KETONES, POC: NORMAL MG/DL
LEUKOCYTE EST, POC: NORMAL
MICROALBUMIN UR-MCNC: <12 MG/L (ref 0–20)
MICROALBUMIN/CREAT UR-RTO: NORMAL MCG/MG CREAT (ref 0–17)
NITRITE, POC: POSITIVE
PH, POC: 5.5
PROTEIN, POC: NORMAL MG/DL
SPECIFIC GRAVITY, POC: 1.03
UROBILINOGEN, POC: 2 MG/DL

## 2025-07-22 PROCEDURE — 1123F ACP DISCUSS/DSCN MKR DOCD: CPT

## 2025-07-22 PROCEDURE — 3078F DIAST BP <80 MM HG: CPT

## 2025-07-22 PROCEDURE — 99213 OFFICE O/P EST LOW 20 MIN: CPT

## 2025-07-22 PROCEDURE — 1125F AMNT PAIN NOTED PAIN PRSNT: CPT

## 2025-07-22 PROCEDURE — 81002 URINALYSIS NONAUTO W/O SCOPE: CPT

## 2025-07-22 PROCEDURE — 3074F SYST BP LT 130 MM HG: CPT

## 2025-07-22 PROCEDURE — 83036 HEMOGLOBIN GLYCOSYLATED A1C: CPT

## 2025-07-22 RX ORDER — CLINDAMYCIN HYDROCHLORIDE 300 MG/1
300 CAPSULE ORAL 2 TIMES DAILY
Qty: 14 CAPSULE | Refills: 0 | Status: SHIPPED | OUTPATIENT
Start: 2025-07-22 | End: 2025-07-22

## 2025-07-22 RX ORDER — NITROFURANTOIN 25; 75 MG/1; MG/1
100 CAPSULE ORAL 2 TIMES DAILY
Qty: 20 CAPSULE | Refills: 0 | Status: SHIPPED | OUTPATIENT
Start: 2025-07-22 | End: 2025-08-01

## 2025-07-22 RX ORDER — MUPIROCIN 2 %
1 OINTMENT (GRAM) TOPICAL 3 TIMES DAILY
Qty: 30 G | Refills: 0 | Status: SHIPPED | OUTPATIENT
Start: 2025-07-22

## 2025-07-22 NOTE — PROGRESS NOTES
[facility] - Outpatient Note            Mickey Berg is a 70 y.o. male  presented to the office on 07/22/25:      Assessement/Plan:      Diagnosis Orders   1. Urinary tract infection due to extended-spectrum beta lactamase (ESBL) producing Escherichia coli  POCT Urinalysis Dipstick no Micro    nitrofurantoin, macrocrystal-monohydrate, (MACROBID) 100 MG capsule    Culture, Urine      2. Health care maintenance  POCT glycosylated hemoglobin (Hb A1C)    Albumin/Creatinine Ratio, Urine      3. Diabetes mellitus due to underlying condition with hyperosmolarity and coma, without long-term current use of insulin (HCC)  POCT glycosylated hemoglobin (Hb A1C)    Albumin/Creatinine Ratio, Urine      4. Chronic venous stasis        5. Bilateral lower leg cellulitis  mupirocin (BACTROBAN) 2 % ointment    DISCONTINUED: clindamycin (CLEOCIN) 300 MG capsule          Assessment & Plan  1. Urinary Tract Infection.  - The urine culture indicates an ongoing infection, previously treated but not specifically for ESBL.  - No fever or chills reported.  - Discussion about previous antibiotic treatments and the need for culture to ensure appropriate treatment.  - A prescription for Macrobid 100 mg will be provided for 10 days.    2. Leg Ulcers.  - The leg ulcers do not appear to be overtly infected at present but show signs of potential future infection.  - Physical examination reveals draining wound on the right lower leg, draining serous fluid, exfoliation  - Discussion about the vascular issues contributing to recurrent ulcers and the need for wound care.  - A prescription for Mupirocin will be provided for 7 days. Advised to maintain cleanliness of the wound and contact the wound ostomy team for further management.    3. Health Maintenance.  - The patient inquired about the shingles vaccine.  - Discussed that the shingles vaccine is effective in preventing severe shingles infections.  - Advised

## 2025-07-22 NOTE — PROGRESS NOTES
Visit Information    Have you changed or started any medications since your last visit including any over-the-counter medicines, vitamins, or herbal medicines? no   Have you stopped taking any of your medications? Is so, why? -  no  Are you having any side effects from any of your medications? - no    Have you seen any other physician or provider since your last visit?  no   Have you had any other diagnostic tests since your last visit?  no   Have you been seen in the emergency room and/or had an admission in a hospital since we last saw you?  no   Have you had your routine dental cleaning in the past 6 months?  no     Do you have an active MyChart account? If no, what is the barrier?  Yes    Patient Care Team:  Mich Sung MD as PCP - General (Family Medicine)  Mich Sung MD as PCP - Empaneled Provider  Kwame Johnston MD as Consulting Physician (Gastroenterology)  Giorgio Morrison Jr., MD as Consulting Physician (Urology)    Medical History Review  Past Medical, Family, and Social History reviewed and does not contribute to the patient presenting condition    Health Maintenance   Topic Date Due    Diabetic foot exam  Never done    Diabetic Alb to Cr ratio (uACR) test  Never done    Diabetic retinal exam  Never done    Respiratory Syncytial Virus (RSV) Pregnant or age 60 yrs+ (1 - Risk 60-74 years 1-dose series) Never done    COVID-19 Vaccine (3 - 2024-25 season) 09/01/2024    Flu vaccine (1) 08/01/2025    A1C test (Diabetic or Prediabetic)  08/28/2025    Lipids  10/02/2025    Depression Screen  01/23/2026    GFR test (Diabetes, CKD 3-4, OR last GFR 15-59)  06/25/2026    Colorectal Cancer Screen  01/12/2031    DTaP/Tdap/Td vaccine (5 - Td or Tdap) 02/25/2034    Annual Wellness Visit (Medicare Advantage)  Completed    Shingles vaccine  Completed    Pneumococcal 50+ years Vaccine  Completed    Hepatitis C screen  Completed    Hepatitis A vaccine  Aged Out    Hepatitis B vaccine  Aged Out    Hib vaccine  Aged

## 2025-07-22 NOTE — PROGRESS NOTES
Attending Physician Statement  I have discussed the care of Mickey Berg, 70 y.o. male,including pertinent history and exam findings,  with the resident Magaly Hernandez MD.  History:  Chief Complaint   Patient presents with    Follow-up     Patient is follow up from [facility]. Patient states he feel better    Cellulitis     Patient having pain in right leg 7/10       I have reviewed the key elements of the encounter with the resident. Examination was done by resident as documented in residents note.    BP Readings from Last 3 Encounters:   07/22/25 96/63   07/16/25 111/67   07/14/25 114/61     BP 96/63 (BP Site: Left Upper Arm, Patient Position: Sitting, BP Cuff Size: Large Adult)   Pulse 65   Temp 97.6 °F (36.4 °C) (Oral)   Wt 105.2 kg (232 lb)   BMI 38.61 kg/m²   Lab Results   Component Value Date    WBC 8.4 06/25/2025    HGB 13.4 06/25/2025    HCT 41.3 06/25/2025     06/25/2025    CHOL 101 10/02/2024    TRIG 45 10/02/2024    HDL 45 10/02/2024    ALT 19 03/29/2023    AST 26 03/29/2023     06/25/2025    K 3.9 06/25/2025     06/25/2025    CREATININE 0.6 (L) 06/25/2025    BUN 19 06/25/2025    CO2 23 06/25/2025    TSH 1.71 06/09/2023    PSA 0.49 08/12/2022    INR 1.0 03/08/2021    LABA1C 5.6 07/22/2025     Lab Results   Component Value Date    CALCIUM 9.0 06/25/2025    PHOS 4.0 03/08/2021     No results found for: \"LDLDIRECT\"  I agree with the assessment, plan and diagnosis of    Diagnosis Orders   1. Urinary tract infection due to extended-spectrum beta lactamase (ESBL) producing Escherichia coli  POCT Urinalysis Dipstick no Micro    nitrofurantoin, macrocrystal-monohydrate, (MACROBID) 100 MG capsule    Culture, Urine      2. Health care maintenance  POCT glycosylated hemoglobin (Hb A1C)    Albumin/Creatinine Ratio, Urine      3. Diabetes mellitus due to underlying condition with hyperosmolarity and coma, without long-term current use of insulin (HCC)  POCT glycosylated hemoglobin (Hb

## 2025-07-24 DIAGNOSIS — N39.0 URINARY TRACT INFECTION DUE TO EXTENDED-SPECTRUM BETA LACTAMASE (ESBL) PRODUCING ESCHERICHIA COLI: Primary | ICD-10-CM

## 2025-07-24 DIAGNOSIS — Z16.12 URINARY TRACT INFECTION DUE TO EXTENDED-SPECTRUM BETA LACTAMASE (ESBL) PRODUCING ESCHERICHIA COLI: Primary | ICD-10-CM

## 2025-07-24 DIAGNOSIS — B96.29 URINARY TRACT INFECTION DUE TO EXTENDED-SPECTRUM BETA LACTAMASE (ESBL) PRODUCING ESCHERICHIA COLI: Primary | ICD-10-CM

## 2025-07-24 LAB
MICROORGANISM SPEC CULT: ABNORMAL
SERVICE CMNT-IMP: ABNORMAL
SPECIMEN DESCRIPTION: ABNORMAL

## 2025-07-28 ENCOUNTER — TELEPHONE (OUTPATIENT)
Age: 70
End: 2025-07-28

## 2025-07-28 ENCOUNTER — OFFICE VISIT (OUTPATIENT)
Age: 70
End: 2025-07-28

## 2025-07-28 VITALS
RESPIRATION RATE: 18 BRPM | SYSTOLIC BLOOD PRESSURE: 108 MMHG | HEIGHT: 65 IN | WEIGHT: 230 LBS | BODY MASS INDEX: 38.32 KG/M2 | DIASTOLIC BLOOD PRESSURE: 62 MMHG | HEART RATE: 65 BPM

## 2025-07-28 DIAGNOSIS — M79.676 PAIN DUE TO ONYCHOMYCOSIS OF TOENAIL: ICD-10-CM

## 2025-07-28 DIAGNOSIS — B35.1 ONYCHOMYCOSIS: ICD-10-CM

## 2025-07-28 DIAGNOSIS — B35.1 PAIN DUE TO ONYCHOMYCOSIS OF TOENAIL: ICD-10-CM

## 2025-07-28 DIAGNOSIS — L03.116 CELLULITIS OF LEFT LOWER EXTREMITY: Primary | ICD-10-CM

## 2025-07-28 DIAGNOSIS — I87.8 VENOUS STASIS OF BOTH LOWER EXTREMITIES: ICD-10-CM

## 2025-07-28 DIAGNOSIS — G62.9 POLYNEUROPATHY: ICD-10-CM

## 2025-07-28 RX ORDER — CEPHALEXIN 500 MG/1
500 CAPSULE ORAL 2 TIMES DAILY
Qty: 14 CAPSULE | Refills: 0 | Status: SHIPPED | OUTPATIENT
Start: 2025-07-28 | End: 2025-08-04

## 2025-07-29 ENCOUNTER — OFFICE VISIT (OUTPATIENT)
Dept: PULMONOLOGY | Age: 70
End: 2025-07-29
Payer: MEDICARE

## 2025-07-29 VITALS
SYSTOLIC BLOOD PRESSURE: 111 MMHG | RESPIRATION RATE: 16 BRPM | WEIGHT: 230 LBS | HEART RATE: 60 BPM | OXYGEN SATURATION: 96 % | DIASTOLIC BLOOD PRESSURE: 66 MMHG | BODY MASS INDEX: 38.32 KG/M2 | HEIGHT: 65 IN

## 2025-07-29 DIAGNOSIS — G47.33 OSA (OBSTRUCTIVE SLEEP APNEA): Primary | ICD-10-CM

## 2025-07-29 PROCEDURE — 99204 OFFICE O/P NEW MOD 45 MIN: CPT | Performed by: INTERNAL MEDICINE

## 2025-07-29 PROCEDURE — 1159F MED LIST DOCD IN RCRD: CPT | Performed by: INTERNAL MEDICINE

## 2025-07-29 PROCEDURE — 3078F DIAST BP <80 MM HG: CPT | Performed by: INTERNAL MEDICINE

## 2025-07-29 PROCEDURE — 3074F SYST BP LT 130 MM HG: CPT | Performed by: INTERNAL MEDICINE

## 2025-07-29 PROCEDURE — 1123F ACP DISCUSS/DSCN MKR DOCD: CPT | Performed by: INTERNAL MEDICINE

## 2025-07-29 ASSESSMENT — SLEEP AND FATIGUE QUESTIONNAIRES
HOW LIKELY ARE YOU TO NOD OFF OR FALL ASLEEP WHILE SITTING INACTIVE IN A PUBLIC PLACE: SLIGHT CHANCE OF DOZING
ESS TOTAL SCORE: 13
HOW LIKELY ARE YOU TO NOD OFF OR FALL ASLEEP WHILE SITTING AND TALKING TO SOMEONE: WOULD NEVER DOZE
HOW LIKELY ARE YOU TO NOD OFF OR FALL ASLEEP WHILE SITTING AND READING: MODERATE CHANCE OF DOZING
HOW LIKELY ARE YOU TO NOD OFF OR FALL ASLEEP WHEN YOU ARE A PASSENGER IN A CAR FOR AN HOUR WITHOUT A BREAK: HIGH CHANCE OF DOZING
HOW LIKELY ARE YOU TO NOD OFF OR FALL ASLEEP WHILE SITTING QUIETLY AFTER LUNCH WITHOUT ALCOHOL: MODERATE CHANCE OF DOZING
HOW LIKELY ARE YOU TO NOD OFF OR FALL ASLEEP WHILE LYING DOWN TO REST IN THE AFTERNOON WHEN CIRCUMSTANCES PERMIT: HIGH CHANCE OF DOZING
HOW LIKELY ARE YOU TO NOD OFF OR FALL ASLEEP IN A CAR, WHILE STOPPED FOR A FEW MINUTES IN TRAFFIC: SLIGHT CHANCE OF DOZING
HOW LIKELY ARE YOU TO NOD OFF OR FALL ASLEEP WHILE WATCHING TV: SLIGHT CHANCE OF DOZING
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
## 2025-07-29 NOTE — PROGRESS NOTES
REASON FOR THE CONSULTATION:  Obstructive sleep apnea syndrome  History of coronary artery disease  Have had coronary bypass surgery  Cellulitis of the legs  HISTORY OF PRESENT ILLNESS:    Mickey Berg is a 70 y.o. year old male here for evaluation of obstructive sleep apnea syndrome.  He has history of chronic snoring.  He was diagnosed to have sleep apnea syndrome in 2020.  His apnea hypopnea index at that time was 66/min.  Patient was advised to use BiPAP for the control of the apnea which she did use for some time however for the last few years he has not been using the machine because he lost his machine.  As noted above he continued to snore.  He goes to bed around 1:59 in the morning.  He wakes up around 6 in the morning.  He dozing off all day long.  He is always tired and fatigued    He is not known to have any hypertension or diabetes.    He does have neuropathy involving his feet and hands.  He has weakness of the right leg secondary to neuropathy.  He also had cervical surgery for cervical spondylosis in 2018 which which was thought to be causing the neuropathy.  However after the surgery he did feel better but the symptoms recurred again.  He is not on any anticoagulation he is on aspirin.    He was diagnosed to have coronary artery disease and had coronary bypass surgery.  Denies any angina no evidence of any heart failure he is continue to be overweight.  He has not been able to lose any weight.    He is very cooperative.  He is not in any distress.    We have had COVID-vaccine,, Pneumovax and flu vaccines.  History of smoking for short..  He used to consume alcohol significantly but he has not done so for the last 5 to 6 years.  It is possible that the neuropathy may be related to alcohol abuse he is not diabetic.  He did work in [employer] where he may have been exposed to beryllium but that has never been diagnosed to have berylliosis.  LUNG CANCER SCREENING     CRITERIA MET    []     CT

## 2025-07-29 NOTE — PATIENT INSTRUCTIONS
[facility]        YOU ARE BEING REFERRED TO:    [facility] (a department of [facility])    [address] [place] [address] [address]  [place] [address]    Main Phone Number: [phone]    Phone number for scheduling sleep study: [phone]      Please contact the above numbers to schedule your sleep study.     Once you have completed the FIRST NIGHT you may be asked to return for a SECOND NIGHT if necessary.   After the SECOND NIGHT the sleep center will order a CPAP/BiPAP machine for you.     An order for the machine will be sent to a durable medical equipment company ([employer]).   The sleep center will provide you with the [employer] companies information.     Once you have your machine please contact our office to schedule a follow up appointment.   Your follow up will be scheduled for a date 30-90 days after you have received your machine.   At that follow up visit we will need to have a compliance download from your DME company.   Please contact your [employer] company to have the compliance download faxed to our office at   [phone] for your follow up appointment.       IF YOU HAVE ANY QUESTIONS ABOUT YOUR SLEEP STUDY   PLEASE CONTACT THE SLEEP CENTER.

## 2025-08-05 ENCOUNTER — APPOINTMENT (OUTPATIENT)
Dept: GENERAL RADIOLOGY | Age: 70
End: 2025-08-05
Payer: MEDICARE

## 2025-08-05 ENCOUNTER — HOSPITAL ENCOUNTER (EMERGENCY)
Age: 70
Discharge: HOME | End: 2025-08-05
Attending: EMERGENCY MEDICINE
Payer: MEDICARE

## 2025-08-05 VITALS
HEIGHT: 65 IN | HEART RATE: 63 BPM | WEIGHT: 230 LBS | SYSTOLIC BLOOD PRESSURE: 98 MMHG | RESPIRATION RATE: 20 BRPM | TEMPERATURE: 97.7 F | DIASTOLIC BLOOD PRESSURE: 62 MMHG | BODY MASS INDEX: 38.32 KG/M2 | OXYGEN SATURATION: 96 %

## 2025-08-05 DIAGNOSIS — R11.0 NAUSEA: Primary | ICD-10-CM

## 2025-08-05 DIAGNOSIS — Z11.3 SCREENING EXAMINATION FOR STD (SEXUALLY TRANSMITTED DISEASE): ICD-10-CM

## 2025-08-05 LAB
ALBUMIN SERPL-MCNC: 4.2 G/DL (ref 3.5–5.2)
ALBUMIN/GLOB SERPL: 1.2 {RATIO} (ref 1–2.5)
ALP SERPL-CCNC: 94 U/L (ref 40–129)
ALT SERPL-CCNC: 21 U/L (ref 10–50)
ANION GAP SERPL CALCULATED.3IONS-SCNC: 16 MMOL/L (ref 9–16)
AST SERPL-CCNC: 26 U/L (ref 10–50)
BASOPHILS # BLD: 0.06 K/UL (ref 0–0.2)
BASOPHILS NFR BLD: 1 % (ref 0–2)
BILIRUB DIRECT SERPL-MCNC: 0.1 MG/DL (ref 0–0.2)
BILIRUB INDIRECT SERPL-MCNC: 0.3 MG/DL (ref 0–1)
BILIRUB SERPL-MCNC: 0.4 MG/DL (ref 0–1.2)
BILIRUB UR QL STRIP: NEGATIVE
BNP SERPL-MCNC: 146 PG/ML (ref 0–125)
BUN SERPL-MCNC: 18 MG/DL (ref 8–23)
CALCIUM SERPL-MCNC: 9.6 MG/DL (ref 8.6–10.4)
CHLORIDE SERPL-SCNC: 100 MMOL/L (ref 98–107)
CLARITY UR: CLEAR
CO2 SERPL-SCNC: 23 MMOL/L (ref 20–31)
COLOR UR: YELLOW
COMMENT: ABNORMAL
CREAT SERPL-MCNC: 0.9 MG/DL (ref 0.7–1.2)
CRP SERPL HS-MCNC: 9.5 MG/L (ref 0–5)
EOSINOPHIL # BLD: 0.25 K/UL (ref 0–0.44)
EOSINOPHILS RELATIVE PERCENT: 3 % (ref 1–4)
ERYTHROCYTE [DISTWIDTH] IN BLOOD BY AUTOMATED COUNT: 13.3 % (ref 11.8–14.4)
ERYTHROCYTE [SEDIMENTATION RATE] IN BLOOD BY PHOTOMETRIC METHOD: 49 MM/HR (ref 0–20)
GFR, ESTIMATED: >90 ML/MIN/1.73M2
GLOBULIN SER CALC-MCNC: 3.5 G/DL
GLUCOSE SERPL-MCNC: 95 MG/DL (ref 74–99)
GLUCOSE UR STRIP-MCNC: ABNORMAL MG/DL
HCT VFR BLD AUTO: 44.3 % (ref 40.7–50.3)
HGB BLD-MCNC: 14.8 G/DL (ref 13–17)
HGB UR QL STRIP.AUTO: NEGATIVE
HIV 1+2 AB+HIV1 P24 AG SERPL QL IA: NONREACTIVE
IMM GRANULOCYTES # BLD AUTO: 0.06 K/UL (ref 0–0.3)
IMM GRANULOCYTES NFR BLD: 1 %
KETONES UR STRIP-MCNC: ABNORMAL MG/DL
LEUKOCYTE ESTERASE UR QL STRIP: NEGATIVE
LIPASE SERPL-CCNC: 21 U/L (ref 13–60)
LYMPHOCYTES NFR BLD: 1.44 K/UL (ref 1.1–3.7)
LYMPHOCYTES RELATIVE PERCENT: 18 % (ref 24–43)
MCH RBC QN AUTO: 30.8 PG (ref 25.2–33.5)
MCHC RBC AUTO-ENTMCNC: 33.4 G/DL (ref 28.4–34.8)
MCV RBC AUTO: 92.3 FL (ref 82.6–102.9)
MONOCYTES NFR BLD: 0.96 K/UL (ref 0.1–1.2)
MONOCYTES NFR BLD: 12 % (ref 3–12)
NEUTROPHILS NFR BLD: 65 % (ref 36–65)
NEUTS SEG NFR BLD: 5.23 K/UL (ref 1.5–8.1)
NITRITE UR QL STRIP: NEGATIVE
NRBC BLD-RTO: 0 PER 100 WBC
PH UR STRIP: 5.5 [PH] (ref 5–8)
PLATELET # BLD AUTO: 319 K/UL (ref 138–453)
PMV BLD AUTO: 9.3 FL (ref 8.1–13.5)
POTASSIUM SERPL-SCNC: 4 MMOL/L (ref 3.7–5.3)
PROT SERPL-MCNC: 7.7 G/DL (ref 6.6–8.7)
PROT UR STRIP-MCNC: NEGATIVE MG/DL
RBC # BLD AUTO: 4.8 M/UL (ref 4.21–5.77)
SODIUM SERPL-SCNC: 139 MMOL/L (ref 136–145)
SP GR UR STRIP: 1.02 (ref 1–1.03)
T PALLIDUM AB SER QL IA: NONREACTIVE
TROPONIN I SERPL HS-MCNC: 12 NG/L (ref 0–22)
TROPONIN I SERPL HS-MCNC: 13 NG/L (ref 0–22)
UROBILINOGEN UR STRIP-ACNC: NORMAL EU/DL (ref 0–1)
WBC OTHER # BLD: 8 K/UL (ref 3.5–11.3)

## 2025-08-05 PROCEDURE — 87389 HIV-1 AG W/HIV-1&-2 AB AG IA: CPT

## 2025-08-05 PROCEDURE — 73562 X-RAY EXAM OF KNEE 3: CPT

## 2025-08-05 PROCEDURE — 6360000002 HC RX W HCPCS

## 2025-08-05 PROCEDURE — 87661 TRICHOMONAS VAGINALIS AMPLIF: CPT

## 2025-08-05 PROCEDURE — 96374 THER/PROPH/DIAG INJ IV PUSH: CPT | Performed by: EMERGENCY MEDICINE

## 2025-08-05 PROCEDURE — 86140 C-REACTIVE PROTEIN: CPT

## 2025-08-05 PROCEDURE — 81003 URINALYSIS AUTO W/O SCOPE: CPT

## 2025-08-05 PROCEDURE — 85025 COMPLETE CBC W/AUTO DIFF WBC: CPT

## 2025-08-05 PROCEDURE — 85652 RBC SED RATE AUTOMATED: CPT

## 2025-08-05 PROCEDURE — 93005 ELECTROCARDIOGRAM TRACING: CPT

## 2025-08-05 PROCEDURE — 71045 X-RAY EXAM CHEST 1 VIEW: CPT

## 2025-08-05 PROCEDURE — 99285 EMERGENCY DEPT VISIT HI MDM: CPT | Performed by: EMERGENCY MEDICINE

## 2025-08-05 PROCEDURE — 83690 ASSAY OF LIPASE: CPT

## 2025-08-05 PROCEDURE — 80076 HEPATIC FUNCTION PANEL: CPT

## 2025-08-05 PROCEDURE — 87491 CHLMYD TRACH DNA AMP PROBE: CPT

## 2025-08-05 PROCEDURE — 83880 ASSAY OF NATRIURETIC PEPTIDE: CPT

## 2025-08-05 PROCEDURE — 87591 N.GONORRHOEAE DNA AMP PROB: CPT

## 2025-08-05 PROCEDURE — 84484 ASSAY OF TROPONIN QUANT: CPT

## 2025-08-05 PROCEDURE — 86780 TREPONEMA PALLIDUM: CPT

## 2025-08-05 PROCEDURE — 96372 THER/PROPH/DIAG INJ SC/IM: CPT | Performed by: EMERGENCY MEDICINE

## 2025-08-05 PROCEDURE — 6370000000 HC RX 637 (ALT 250 FOR IP)

## 2025-08-05 PROCEDURE — 80048 BASIC METABOLIC PNL TOTAL CA: CPT

## 2025-08-05 RX ORDER — DOXYCYCLINE HYCLATE 100 MG
100 TABLET ORAL 2 TIMES DAILY
Qty: 14 TABLET | Refills: 0 | Status: SHIPPED | OUTPATIENT
Start: 2025-08-05 | End: 2025-08-12

## 2025-08-05 RX ORDER — CEFTRIAXONE 500 MG/1
500 INJECTION, POWDER, FOR SOLUTION INTRAMUSCULAR; INTRAVENOUS ONCE
Status: COMPLETED | OUTPATIENT
Start: 2025-08-05 | End: 2025-08-05

## 2025-08-05 RX ORDER — DOXYCYCLINE HYCLATE 100 MG
100 TABLET ORAL ONCE
Status: COMPLETED | OUTPATIENT
Start: 2025-08-05 | End: 2025-08-05

## 2025-08-05 RX ORDER — ONDANSETRON 2 MG/ML
4 INJECTION INTRAMUSCULAR; INTRAVENOUS ONCE
Status: COMPLETED | OUTPATIENT
Start: 2025-08-05 | End: 2025-08-05

## 2025-08-05 RX ORDER — OXYCODONE HYDROCHLORIDE 5 MG/1
5 TABLET ORAL ONCE
Refills: 0 | Status: COMPLETED | OUTPATIENT
Start: 2025-08-05 | End: 2025-08-05

## 2025-08-05 RX ORDER — ONDANSETRON 4 MG/1
4 TABLET, ORALLY DISINTEGRATING ORAL 3 TIMES DAILY PRN
Qty: 12 TABLET | Refills: 0 | Status: SHIPPED | OUTPATIENT
Start: 2025-08-05 | End: 2025-08-09

## 2025-08-05 RX ADMIN — CEFTRIAXONE SODIUM 500 MG: 500 INJECTION, POWDER, FOR SOLUTION INTRAMUSCULAR; INTRAVENOUS at 23:24

## 2025-08-05 RX ADMIN — OXYCODONE 5 MG: 5 TABLET ORAL at 21:20

## 2025-08-05 RX ADMIN — ONDANSETRON 4 MG: 2 INJECTION, SOLUTION INTRAMUSCULAR; INTRAVENOUS at 21:20

## 2025-08-05 RX ADMIN — DOXYCYCLINE HYCLATE 100 MG: 100 TABLET, COATED ORAL at 23:23

## 2025-08-05 ASSESSMENT — PAIN - FUNCTIONAL ASSESSMENT: PAIN_FUNCTIONAL_ASSESSMENT: 0-10

## 2025-08-05 ASSESSMENT — PAIN DESCRIPTION - DESCRIPTORS: DESCRIPTORS: SORE

## 2025-08-05 ASSESSMENT — PAIN SCALES - GENERAL
PAINLEVEL_OUTOF10: 8
PAINLEVEL_OUTOF10: 8

## 2025-08-05 ASSESSMENT — PAIN DESCRIPTION - LOCATION: LOCATION: GENERALIZED

## 2025-08-07 LAB
EKG ATRIAL RATE: 80 BPM
EKG Q-T INTERVAL: 424 MS
EKG QRS DURATION: 82 MS
EKG QTC CALCULATION (BAZETT): 477 MS
EKG R AXIS: 44 DEGREES
EKG T AXIS: 75 DEGREES
EKG VENTRICULAR RATE: 76 BPM

## 2025-08-11 ENCOUNTER — HOSPITAL ENCOUNTER (OUTPATIENT)
Dept: OTHER | Age: 70
Discharge: HOME | End: 2025-08-11
Payer: MEDICARE

## 2025-08-11 ENCOUNTER — OFFICE VISIT (OUTPATIENT)
Age: 70
End: 2025-08-11
Payer: MEDICARE

## 2025-08-11 VITALS
RESPIRATION RATE: 18 BRPM | BODY MASS INDEX: 36.98 KG/M2 | WEIGHT: 222.2 LBS | OXYGEN SATURATION: 98 % | SYSTOLIC BLOOD PRESSURE: 110 MMHG | DIASTOLIC BLOOD PRESSURE: 68 MMHG | HEART RATE: 66 BPM

## 2025-08-11 VITALS — WEIGHT: 222 LBS | HEIGHT: 65 IN | BODY MASS INDEX: 36.99 KG/M2

## 2025-08-11 DIAGNOSIS — I87.8 VENOUS STASIS OF BOTH LOWER EXTREMITIES: ICD-10-CM

## 2025-08-11 DIAGNOSIS — I89.0 LYMPHEDEMA: Primary | ICD-10-CM

## 2025-08-11 PROCEDURE — 99212 OFFICE O/P EST SF 10 MIN: CPT

## 2025-08-11 PROCEDURE — 1159F MED LIST DOCD IN RCRD: CPT

## 2025-08-11 PROCEDURE — 1123F ACP DISCUSS/DSCN MKR DOCD: CPT

## 2025-08-11 PROCEDURE — 1126F AMNT PAIN NOTED NONE PRSNT: CPT

## 2025-08-11 PROCEDURE — 99214 OFFICE O/P EST MOD 30 MIN: CPT

## 2025-08-13 DIAGNOSIS — K21.9 GASTROESOPHAGEAL REFLUX DISEASE WITHOUT ESOPHAGITIS: ICD-10-CM

## 2025-08-18 ENCOUNTER — TELEPHONE (OUTPATIENT)
Age: 70
End: 2025-08-18

## 2025-08-18 RX ORDER — PANTOPRAZOLE SODIUM 40 MG/1
40 TABLET, DELAYED RELEASE ORAL
Qty: 30 TABLET | Refills: 2 | Status: SHIPPED | OUTPATIENT
Start: 2025-08-18

## 2025-08-21 PROBLEM — Z00.00 HEALTH CARE MAINTENANCE: Status: RESOLVED | Noted: 2025-07-22 | Resolved: 2025-08-21

## 2025-08-27 ENCOUNTER — TELEPHONE (OUTPATIENT)
Dept: PHARMACY | Facility: CLINIC | Age: 70
End: 2025-08-27

## 2025-09-03 ENCOUNTER — HOSPITAL ENCOUNTER (OUTPATIENT)
Age: 70
Setting detail: SPECIMEN
Discharge: HOME | End: 2025-09-03

## 2025-09-03 ENCOUNTER — OFFICE VISIT (OUTPATIENT)
Age: 70
End: 2025-09-03
Payer: MEDICARE

## 2025-09-03 VITALS
WEIGHT: 220.2 LBS | SYSTOLIC BLOOD PRESSURE: 133 MMHG | DIASTOLIC BLOOD PRESSURE: 78 MMHG | BODY MASS INDEX: 36.69 KG/M2 | HEART RATE: 61 BPM | HEIGHT: 65 IN

## 2025-09-03 DIAGNOSIS — G47.33 OSA (OBSTRUCTIVE SLEEP APNEA): Primary | ICD-10-CM

## 2025-09-03 DIAGNOSIS — I50.32 CHRONIC HEART FAILURE WITH PRESERVED EJECTION FRACTION (HCC): ICD-10-CM

## 2025-09-03 DIAGNOSIS — R41.3 MEMORY DEFICIT: ICD-10-CM

## 2025-09-03 LAB
FOLATE SERPL-MCNC: 10.4 NG/ML (ref 4.8–24.2)
TSH SERPL DL<=0.05 MIU/L-ACNC: 1.18 UIU/ML (ref 0.27–4.2)
VIT B12 SERPL-MCNC: 441 PG/ML (ref 232–1245)

## 2025-09-03 PROCEDURE — 1125F AMNT PAIN NOTED PAIN PRSNT: CPT

## 2025-09-03 PROCEDURE — 3078F DIAST BP <80 MM HG: CPT

## 2025-09-03 PROCEDURE — 1159F MED LIST DOCD IN RCRD: CPT

## 2025-09-03 PROCEDURE — 3075F SYST BP GE 130 - 139MM HG: CPT

## 2025-09-03 PROCEDURE — 1123F ACP DISCUSS/DSCN MKR DOCD: CPT

## 2025-09-03 PROCEDURE — 99213 OFFICE O/P EST LOW 20 MIN: CPT

## 2025-09-03 ASSESSMENT — PATIENT HEALTH QUESTIONNAIRE - PHQ9
SUM OF ALL RESPONSES TO PHQ QUESTIONS 1-9: 8
7. TROUBLE CONCENTRATING ON THINGS, SUCH AS READING THE NEWSPAPER OR WATCHING TELEVISION: MORE THAN HALF THE DAYS
SUM OF ALL RESPONSES TO PHQ QUESTIONS 1-9: 8
3. TROUBLE FALLING OR STAYING ASLEEP: SEVERAL DAYS
SUM OF ALL RESPONSES TO PHQ QUESTIONS 1-9: 8
8. MOVING OR SPEAKING SO SLOWLY THAT OTHER PEOPLE COULD HAVE NOTICED. OR THE OPPOSITE, BEING SO FIGETY OR RESTLESS THAT YOU HAVE BEEN MOVING AROUND A LOT MORE THAN USUAL: NOT AT ALL
10. IF YOU CHECKED OFF ANY PROBLEMS, HOW DIFFICULT HAVE THESE PROBLEMS MADE IT FOR YOU TO DO YOUR WORK, TAKE CARE OF THINGS AT HOME, OR GET ALONG WITH OTHER PEOPLE: SOMEWHAT DIFFICULT
9. THOUGHTS THAT YOU WOULD BE BETTER OFF DEAD, OR OF HURTING YOURSELF: NOT AT ALL
4. FEELING TIRED OR HAVING LITTLE ENERGY: MORE THAN HALF THE DAYS
6. FEELING BAD ABOUT YOURSELF - OR THAT YOU ARE A FAILURE OR HAVE LET YOURSELF OR YOUR FAMILY DOWN: NOT AT ALL
1. LITTLE INTEREST OR PLEASURE IN DOING THINGS: MORE THAN HALF THE DAYS
2. FEELING DOWN, DEPRESSED OR HOPELESS: SEVERAL DAYS
SUM OF ALL RESPONSES TO PHQ QUESTIONS 1-9: 8
5. POOR APPETITE OR OVEREATING: NOT AT ALL

## 2025-09-03 ASSESSMENT — ENCOUNTER SYMPTOMS: SHORTNESS OF BREATH: 0

## 2025-09-04 ENCOUNTER — CARE COORDINATION (OUTPATIENT)
Dept: CARE COORDINATION | Age: 70
End: 2025-09-04

## 2025-09-04 RX ORDER — BUMETANIDE 2 MG/1
2 TABLET ORAL DAILY
Qty: 60 TABLET | Refills: 2 | Status: SHIPPED | OUTPATIENT
Start: 2025-09-04

## 2025-09-05 ENCOUNTER — CARE COORDINATION (OUTPATIENT)
Dept: CARE COORDINATION | Age: 70
End: 2025-09-05

## (undated) DEVICE — DRAPE,CVMAX,CARDIOVASCULAR: Brand: MEDLINE

## (undated) DEVICE — DRAPE SLUSH DISC W44XL66IN ST FOR RND BSIN HUSH SLUSH SYS

## (undated) DEVICE — DRESSING TRNSPAR W8XL12IN FLM SURESITE 123

## (undated) DEVICE — SPONGE LAP W18XL18IN WHT COT 4 PLY FLD STRUNG RADPQ DISP ST

## (undated) DEVICE — SOLUTION SCRB 4OZ 4% CHG CLN BASE FOR PT SKIN ANTISEPSIS

## (undated) DEVICE — SUTURE PROL SZ 4-0 L36IN NONABSORBABLE BLU L26MM SH 1/2 CIR 8521H

## (undated) DEVICE — DRAIN SURG 19FR 100% SIL RADPQ RND CHN FULL FLUT

## (undated) DEVICE — Z INACTIVE USE 2540311 LEAD PACE L475MM CHN A OR V MYOCARDIAL STEROID ELUT SIL

## (undated) DEVICE — HYPODERMIC SAFETY NEEDLE: Brand: MAGELLAN

## (undated) DEVICE — SYRINGE, LUER LOCK, 10ML: Brand: MEDLINE

## (undated) DEVICE — SUTURE NONABSORBABLE MONOFILAMENT 4-0 RB-1 36 IN BLU PROLENE 8557H

## (undated) DEVICE — BLADE SAW W6.35XL32MM STRNM CUT STRNOTMY

## (undated) DEVICE — GAUZE,SPONGE,FLUFF,6"X6.75",STRL,5/TRAY: Brand: MEDLINE

## (undated) DEVICE — TUBE CARDIAC SUCTION 6FR SOFT TIP 10FR

## (undated) DEVICE — DISSECTOR ARTIC LUMITIP W/TRANS GUIDE

## (undated) DEVICE — CLAMP ISOLATOR RF RT

## (undated) DEVICE — DRESSING HEMSTAT W3INXL4YD WHT IMPREG KAOLIN HYDRPHLC SFT

## (undated) DEVICE — SVMMC PEDS/UROLOGY MINOR PACK: Brand: MEDLINE INDUSTRIES, INC.

## (undated) DEVICE — Device: Brand: DEFENDO VALVE AND CONNECTOR KIT

## (undated) DEVICE — Z DUP USE 2522782 SOLUTION IRRIG 1000ML STRL H2O PLAS CONTAINER UROMATIC

## (undated) DEVICE — TUBING, SUCTION, 1/4" X 12', STRAIGHT: Brand: MEDLINE

## (undated) DEVICE — BAG ENDOSCP TRNSPRT CLR RECLOSABLE 24INX20IN

## (undated) DEVICE — TRAP SURG QUAD PARABOLA SLOT DSGN SFTY SCRN TRAPEASE

## (undated) DEVICE — CORD ES L10FT BLU MPLR FT SWCH DISP ACMI

## (undated) DEVICE — GLOVE ORANGE PI 8   MSG9080

## (undated) DEVICE — ADAPTER URO SCP UROLOK LL

## (undated) DEVICE — GLOVE ORANGE PI 7 1/2   MSG9075

## (undated) DEVICE — RESERVOIR,SUCTION,100CC,SILICONE: Brand: MEDLINE

## (undated) DEVICE — SUTURE MONOCRYL + SZ 4 0 L18IN ABSRB UD PC 3 L16MM 3 8 CIR PRIM MCP845G

## (undated) DEVICE — CATHETER URETH 16FR BLLN 5CC SIL ALLY W/ SIL HYDRGEL 2 W F

## (undated) DEVICE — TOWEL,OR,DSP,ST,NATURAL,DLX,4/PK,20PK/CS: Brand: MEDLINE

## (undated) DEVICE — CATHETER THOR 28FR SIL 6 EYE STR HI TEAR STRENGTH

## (undated) DEVICE — GAUZE,SPONGE,4"X4",16PLY,STRL,LF,10/TRAY: Brand: MEDLINE

## (undated) DEVICE — STRAP ARMBRD W1.5XL32IN FOAM STR YET SFT W/ HK AND LOOP

## (undated) DEVICE — FORCEPS BX L240CM WRK CHN 2.8MM STD CAP W/ NDL MIC MESH

## (undated) DEVICE — GLOVE SURG SZ 7 L12IN FNGR THK87MIL WHT LTX FREE

## (undated) DEVICE — DRAPE,REIN 53X77,STERILE: Brand: MEDLINE

## (undated) DEVICE — SINGLE-USE POLYPECTOMY SNARE: Brand: CAPTIFLEX

## (undated) DEVICE — DISPOSABLE NEEDLE: Brand: DISPOSABLE NEEDLE

## (undated) DEVICE — CLAMP ISOLATOR RF LT

## (undated) DEVICE — SUTURE VIC + SH-13-0 27IN  VCP311H

## (undated) DEVICE — GLOVE EXAM SM L95IN FNGR THK35MIL PALM THK24MIL OFF WHT

## (undated) DEVICE — STRAP,CATHETER,ELASTIC,HOOK&LOOP: Brand: MEDLINE

## (undated) DEVICE — GLOVE ORANGE PI 8 1/2   MSG9085

## (undated) DEVICE — GLOVE SURG SZ 75 L12IN FNGR THK87MIL WHT LTX FREE

## (undated) DEVICE — KIT SURG PREP POVIDONE IOD PRESATURATED PAINT WET FOR UNIV

## (undated) DEVICE — C-ARMOR C-ARM EQUIPMENT COVERS CLEAR STERILE UNIVERSAL FIT 12 PER CASE: Brand: C-ARMOR

## (undated) DEVICE — GOWN,SIRUS,POLYRNF,BRTHSLV,2XL,18/CS: Brand: MEDLINE

## (undated) DEVICE — PROTECTOR ULN NRV PUR FOAM HK LOOP STRP ANATOMICALLY

## (undated) DEVICE — DIFFUSER GAS CO2 DISP DEV CARBONAID

## (undated) DEVICE — PLEDGET SURG W3.5XL7MM THK1.5MM WHT PTFE RECT FIRM TFE

## (undated) DEVICE — PREMIUM DRY TRAY LF: Brand: MEDLINE INDUSTRIES, INC.

## (undated) DEVICE — CATHETER URODYN AIR CHARGED ABD SENSOR

## (undated) DEVICE — STERNUM BLADE, OFFSET (31.7 X 0.64 X 6.3MM)

## (undated) DEVICE — NEUROSTIMULATOR EXT SM LTWT SGL BTTN H2O RESIST WIRELESS

## (undated) DEVICE — PLATELET CONCENTRATION PACK PROC 14-20 ML SMARTPREP 2

## (undated) DEVICE — BASIN EMSIS 700ML GRAPHITE PLAS KID SHP GRAD

## (undated) DEVICE — STRAP,POSITIONING,KNEE/BODY,FOAM,4X60": Brand: MEDLINE

## (undated) DEVICE — ELECTRODE PT RET AD L9FT HI MOIST COND ADH HYDRGEL CORDED

## (undated) DEVICE — GOWN,AURORA,NONREINFORCED,LARGE: Brand: MEDLINE

## (undated) DEVICE — YANKAUER,FLEXIBLE HANDLE,REGLR CAPACITY: Brand: MEDLINE INDUSTRIES, INC.

## (undated) DEVICE — LIQUIBAND RAPID ADHESIVE 36/CS 0.8ML: Brand: MEDLINE

## (undated) DEVICE — GLOVE ORTHO 7 1/2   MSG9475

## (undated) DEVICE — ILLUMINATOR ELECTROCAUTERY RETRACTED L8IN EXT L11IN DYN 10PK

## (undated) DEVICE — GOWN,SIRUS,NONRNF,SETINSLV,XL,20/CS: Brand: MEDLINE

## (undated) DEVICE — SOLUTION IRRIG 3000ML STRL H2O USP UROMATIC PLAS CONT

## (undated) DEVICE — AGENT HEMSTAT W2XL4IN OXIDIZED REGENERATED CELOS ABSRB

## (undated) DEVICE — 60 ML SYRINGE LUER-LOCK TIP: Brand: MONOJECT

## (undated) DEVICE — MEDICINE CUP, GRADUATED, STER: Brand: MEDLINE

## (undated) DEVICE — POSITIONER,HEAD,MULTIRING,36CS: Brand: MEDLINE

## (undated) DEVICE — Device

## (undated) DEVICE — NEEDLE SCLERO 25GA L240CM OD0.51MM ID0.24MM EXTN L4MM SHTH

## (undated) DEVICE — PACK PROCEDURE SURG CYSTO SVMMC LF

## (undated) DEVICE — GLOVE SURG SZ 7 L12IN FNGR THK79MIL GRN LTX FREE

## (undated) DEVICE — PROGRAMMER SMART COMM W/HANDSET F/SACRAL NRVE STIMULATORS

## (undated) DEVICE — PACK PROCEDURE SURG OPN HRT ADD ON

## (undated) DEVICE — PROBE CRYOABLATION L10CM ALUM SMOOTH MAL CRYOICE

## (undated) DEVICE — ADAPTER TBNG LUER STUB 15 GA INTMED

## (undated) DEVICE — CANNULA PERFUSION 5.5IN 9FR AORTIC ROOT

## (undated) DEVICE — CO2 CANNULA,SUPERSOFT, ADLT,7'O2,7'CO2: Brand: MEDLINE

## (undated) DEVICE — GLOVE SURG SZ 75 L12IN FNGR THK79MIL GRN LTX FREE

## (undated) DEVICE — Device: Brand: AIR-CHARGED SINGLE SENSOR COUDÉ CATHETER

## (undated) DEVICE — Z DISCONTINUED BY MEDLINE USE 2711682 TRAY SKIN PREP DRY W/ PREM GLV

## (undated) DEVICE — Z DISCONTINUED APPLICATOR SURG PREP 0.35OZ 2% CHG 70% ISO ALC W/ HI LT

## (undated) DEVICE — STIMULATOR NERVE 4.32 MM PERC EXTN KT INTERSTIM QUAD

## (undated) DEVICE — CUP MED 1OZ CLR POLYPR FEED GRAD W/O LID

## (undated) DEVICE — SUTURE ETHIB EXCL BR GRN TAPR PT 2-0 30 X563H X563H

## (undated) DEVICE — TUBING, SUCTION, 9/32" X 20', STRAIGHT: Brand: MEDLINE INDUSTRIES, INC.

## (undated) DEVICE — GOWN,SIRUS,POLYRNF,XLN/3XL,18/CS: Brand: MEDLINE

## (undated) DEVICE — Device: Brand: SINGLE USE INJECTOR NM-221C-0427

## (undated) DEVICE — CONTAINER,SPECIMEN,4OZ,OR STRL: Brand: MEDLINE

## (undated) DEVICE — TOWEL,OR,DSP,ST,BLUE,DLX,XR,4/PK,20PK/CS: Brand: MEDLINE

## (undated) DEVICE — Device: Brand: PUMP TUBING INFUSION LINE

## (undated) DEVICE — CHLORAPREP 26ML ORANGE

## (undated) DEVICE — SHEET, T, LAPAROTOMY, STERILE: Brand: MEDLINE

## (undated) DEVICE — PAD,NON-ADHERENT,3X8,STERILE,LF,1/PK: Brand: MEDLINE

## (undated) DEVICE — SUTURE PROLENE 8-0 BV1758

## (undated) DEVICE — WORKING LENGTH 155CM, WORKING CHANNEL 2.8MM: Brand: RESOLUTION 360 CLIP

## (undated) DEVICE — TTL1LYR 16FR10ML 100%SIL TMPST TR: Brand: MEDLINE

## (undated) DEVICE — GLOVE SURG SZ 65 L12IN FNGR THK79MIL GRN LTX FREE

## (undated) DEVICE — GAUZE,SPONGE,4"X4",16PLY,XRAY,STRL,LF: Brand: MEDLINE

## (undated) DEVICE — GLOVE SURG SZ 8 L12IN FNGR THK87MIL WHT LTX FREE

## (undated) DEVICE — JELLY,LUBE,STERILE,FLIP TOP,TUBE,2-OZ: Brand: MEDLINE

## (undated) DEVICE — SUTURE PROL SZ 0 L30IN NONABSORBABLE BLU L36MM CT-1 1/2 CIR 8424H

## (undated) DEVICE — GOWN,AURORA,NONRNF,XL,30/CS: Brand: MEDLINE

## (undated) DEVICE — DRAPE,UNDRBUT,WHT GRAD PCH,CAPPORT,20/CS: Brand: MEDLINE

## (undated) DEVICE — ELECTRODE EMG GEL PTCH W/ WIRE NEOTRODE II URODYN

## (undated) DEVICE — DRAINBAG,ANTI-REFLUX TOWER,L/F,2000ML,LL: Brand: MEDLINE

## (undated) DEVICE — DRAPE C ARM W/ POLY STRP W42XL72IN FOR MOB XR

## (undated) DEVICE — UNIT DRNGE SGL COLL W/ INLINE CONN EXPR

## (undated) DEVICE — SUTURE MONOCRYL SZ 4-0 L18IN ABSRB UD L16MM PC-3 3/8 CIR PRIM Y845G

## (undated) DEVICE — SYRINGE MED 50ML LUERLOCK TIP

## (undated) DEVICE — PACK PROCEDURE SURG OPN HRT